# Patient Record
Sex: MALE | Race: WHITE | NOT HISPANIC OR LATINO | Employment: OTHER | URBAN - METROPOLITAN AREA
[De-identification: names, ages, dates, MRNs, and addresses within clinical notes are randomized per-mention and may not be internally consistent; named-entity substitution may affect disease eponyms.]

---

## 2021-04-13 DIAGNOSIS — Z23 ENCOUNTER FOR IMMUNIZATION: ICD-10-CM

## 2022-10-17 ENCOUNTER — TELEPHONE (OUTPATIENT)
Dept: SCHEDULING | Facility: CLINIC | Age: 72
End: 2022-10-17
Payer: MEDICARE

## 2022-10-17 NOTE — TELEPHONE ENCOUNTER
New Patient Appointment Request    Name of caller: Fiorella    Reason for Visit: was in [facility]-d/c 10/16-afib; saw Dr Pastor 2009 for valve repair    Insurance: Medicare and [employer] supplement    Recent Procedures: prostate removed-7/27/22    Referred by: friends    Previous Cardiologist name and phone number: Dr Pastor; Dr Barajas    Best contact number: [phone]    Additional notes: Pt is scheduled with Dr Golden 10/21 at [facility], but prefers to have  seen in [facility], [place], or [place] office if possible.    Please call pt if any NP appts available in those locations in the near future.

## 2022-10-17 NOTE — TELEPHONE ENCOUNTER
Called pt and left VM. We do not schedule here for any of those locations, pt is to call central scheduling.

## 2022-10-19 ENCOUNTER — APPOINTMENT (EMERGENCY)
Dept: RADIOLOGY | Facility: HOSPITAL | Age: 72
DRG: 308 | End: 2022-10-19
Payer: MEDICARE

## 2022-10-19 ENCOUNTER — HOSPITAL ENCOUNTER (INPATIENT)
Facility: HOSPITAL | Age: 72
LOS: 4 days | Discharge: HOME | DRG: 308 | End: 2022-10-23
Attending: EMERGENCY MEDICINE | Admitting: INTERNAL MEDICINE
Payer: MEDICARE

## 2022-10-19 ENCOUNTER — APPOINTMENT (EMERGENCY)
Dept: RADIOLOGY | Facility: HOSPITAL | Age: 72
DRG: 308 | End: 2022-10-19
Attending: EMERGENCY MEDICINE
Payer: MEDICARE

## 2022-10-19 DIAGNOSIS — I50.9 ACUTE CONGESTIVE HEART FAILURE, UNSPECIFIED HEART FAILURE TYPE (CMS/HCC): ICD-10-CM

## 2022-10-19 DIAGNOSIS — E83.42 HYPOMAGNESEMIA: ICD-10-CM

## 2022-10-19 DIAGNOSIS — I48.91 ATRIAL FIBRILLATION, UNSPECIFIED TYPE (CMS/HCC): Primary | ICD-10-CM

## 2022-10-19 DIAGNOSIS — E87.6 HYPOKALEMIA: ICD-10-CM

## 2022-10-19 PROBLEM — N18.9 CKD (CHRONIC KIDNEY DISEASE): Status: ACTIVE | Noted: 2022-10-19

## 2022-10-19 PROBLEM — K92.1 GASTROINTESTINAL HEMORRHAGE WITH MELENA: Status: ACTIVE | Noted: 2022-10-19

## 2022-10-19 PROBLEM — C50.921: Status: ACTIVE | Noted: 2022-10-19

## 2022-10-19 PROBLEM — E87.8 ELECTROLYTE ABNORMALITY: Status: ACTIVE | Noted: 2022-10-19

## 2022-10-19 PROBLEM — C50.929: Status: ACTIVE | Noted: 2022-10-19

## 2022-10-19 PROBLEM — C61 PROSTATE CANCER (CMS/HCC): Status: ACTIVE | Noted: 2022-10-19

## 2022-10-19 PROBLEM — N42.9 ABNORMALITY DETECTED ON RECTAL EXAMINATION OF PROSTATE: Status: ACTIVE | Noted: 2022-10-19

## 2022-10-19 LAB
ALBUMIN SERPL-MCNC: 3 G/DL (ref 3.4–5)
ALP SERPL-CCNC: 82 IU/L (ref 35–126)
ALT SERPL-CCNC: 32 IU/L (ref 16–63)
ANION GAP SERPL CALC-SCNC: 8 MEQ/L (ref 3–15)
AST SERPL-CCNC: 40 IU/L (ref 15–41)
ATRIAL RATE: 144
BASOPHILS # BLD: 0.06 K/UL (ref 0.01–0.1)
BASOPHILS NFR BLD: 0.6 %
BILIRUB SERPL-MCNC: 0.3 MG/DL (ref 0.3–1.2)
BNP SERPL-MCNC: 521 PG/ML
BUN SERPL-MCNC: 13 MG/DL (ref 8–20)
CALCIUM SERPL-MCNC: 8.1 MG/DL (ref 8.9–10.3)
CHLORIDE SERPL-SCNC: 111 MEQ/L (ref 98–109)
CO2 SERPL-SCNC: 21 MEQ/L (ref 22–32)
CREAT SERPL-MCNC: 1.4 MG/DL (ref 0.8–1.3)
D DIMER PPP IA.FEU-MCNC: 3.3 UG/ML FEU (ref 0–0.5)
DIFFERENTIAL METHOD BLD: ABNORMAL
EOSINOPHIL # BLD: 0 K/UL (ref 0.04–0.54)
EOSINOPHIL NFR BLD: 0 %
ERYTHROCYTE [DISTWIDTH] IN BLOOD BY AUTOMATED COUNT: 15.7 % (ref 11.6–14.4)
GFR SERPL CREATININE-BSD FRML MDRD: 49.8 ML/MIN/1.73M*2
GLUCOSE SERPL-MCNC: 107 MG/DL (ref 70–99)
HCT VFR BLDCO AUTO: 30.8 % (ref 40.1–51)
HGB BLD-MCNC: 10.1 G/DL (ref 13.7–17.5)
IMM GRANULOCYTES # BLD AUTO: 0.07 K/UL (ref 0–0.08)
IMM GRANULOCYTES NFR BLD AUTO: 0.7 %
LYMPHOCYTES # BLD: 1.24 K/UL (ref 1.2–3.5)
LYMPHOCYTES NFR BLD: 12 %
MAGNESIUM SERPL-MCNC: 1.7 MG/DL (ref 1.8–2.5)
MCH RBC QN AUTO: 33.7 PG (ref 28–33.2)
MCHC RBC AUTO-ENTMCNC: 32.8 G/DL (ref 32.2–36.5)
MCV RBC AUTO: 102.7 FL (ref 83–98)
MONOCYTES # BLD: 0.64 K/UL (ref 0.3–1)
MONOCYTES NFR BLD: 6.2 %
NEUTROPHILS # BLD: 8.33 K/UL (ref 1.7–7)
NEUTS SEG NFR BLD: 80.5 %
NRBC BLD-RTO: 0.6 %
PDW BLD AUTO: 9.2 FL (ref 9.4–12.4)
PLATELET # BLD AUTO: 287 K/UL (ref 150–350)
POTASSIUM SERPL-SCNC: 3.1 MEQ/L (ref 3.6–5.1)
PROT SERPL-MCNC: 5.6 G/DL (ref 6–8.2)
QRS DURATION: 122
QT INTERVAL: 346
QTC CALCULATION(BAZETT): 514
R AXIS: -26
RBC # BLD AUTO: 3 M/UL (ref 4.5–5.8)
SARS-COV-2 RNA RESP QL NAA+PROBE: NEGATIVE
SODIUM SERPL-SCNC: 140 MEQ/L (ref 136–144)
T WAVE AXIS: 172
TROPONIN I SERPL HS-MCNC: 31 PG/ML
TROPONIN I SERPL HS-MCNC: 39 PG/ML
VENTRICULAR RATE: 133
WBC # BLD AUTO: 10.34 K/UL (ref 3.8–10.5)

## 2022-10-19 PROCEDURE — 63600105 HC IODINE BASED CONTRAST

## 2022-10-19 PROCEDURE — 83880 ASSAY OF NATRIURETIC PEPTIDE: CPT

## 2022-10-19 PROCEDURE — 96366 THER/PROPH/DIAG IV INF ADDON: CPT | Mod: 59

## 2022-10-19 PROCEDURE — 25000000 HC PHARMACY GENERAL

## 2022-10-19 PROCEDURE — U0002 COVID-19 LAB TEST NON-CDC: HCPCS

## 2022-10-19 PROCEDURE — 71045 X-RAY EXAM CHEST 1 VIEW: CPT

## 2022-10-19 PROCEDURE — 1123F ACP DISCUSS/DSCN MKR DOCD: CPT | Performed by: INTERNAL MEDICINE

## 2022-10-19 PROCEDURE — 80053 COMPREHEN METABOLIC PANEL: CPT

## 2022-10-19 PROCEDURE — 93010 ELECTROCARDIOGRAM REPORT: CPT | Performed by: INTERNAL MEDICINE

## 2022-10-19 PROCEDURE — 25000000 HC PHARMACY GENERAL: Performed by: EMERGENCY MEDICINE

## 2022-10-19 PROCEDURE — 93005 ELECTROCARDIOGRAM TRACING: CPT

## 2022-10-19 PROCEDURE — 63700000 HC SELF-ADMINISTRABLE DRUG: Performed by: INTERNAL MEDICINE

## 2022-10-19 PROCEDURE — 63700000 HC SELF-ADMINISTRABLE DRUG

## 2022-10-19 PROCEDURE — 84484 ASSAY OF TROPONIN QUANT: CPT | Mod: 91

## 2022-10-19 PROCEDURE — 85025 COMPLETE CBC W/AUTO DIFF WBC: CPT

## 2022-10-19 PROCEDURE — 99222 1ST HOSP IP/OBS MODERATE 55: CPT | Performed by: INTERNAL MEDICINE

## 2022-10-19 PROCEDURE — 96375 TX/PRO/DX INJ NEW DRUG ADDON: CPT | Mod: 59

## 2022-10-19 PROCEDURE — 84484 ASSAY OF TROPONIN QUANT: CPT

## 2022-10-19 PROCEDURE — 83735 ASSAY OF MAGNESIUM: CPT

## 2022-10-19 PROCEDURE — 99285 EMERGENCY DEPT VISIT HI MDM: CPT | Mod: 25

## 2022-10-19 PROCEDURE — 96365 THER/PROPH/DIAG IV INF INIT: CPT | Mod: 59

## 2022-10-19 PROCEDURE — 36415 COLL VENOUS BLD VENIPUNCTURE: CPT

## 2022-10-19 PROCEDURE — 96376 TX/PRO/DX INJ SAME DRUG ADON: CPT | Mod: 59

## 2022-10-19 PROCEDURE — 63600000 HC DRUGS/DETAIL CODE: Performed by: EMERGENCY MEDICINE

## 2022-10-19 PROCEDURE — 12000000 HC ROOM AND CARE MED/SURG

## 2022-10-19 PROCEDURE — 71275 CT ANGIOGRAPHY CHEST: CPT | Mod: ME

## 2022-10-19 PROCEDURE — 99223 1ST HOSP IP/OBS HIGH 75: CPT | Performed by: INTERNAL MEDICINE

## 2022-10-19 PROCEDURE — 85379 FIBRIN DEGRADATION QUANT: CPT | Performed by: EMERGENCY MEDICINE

## 2022-10-19 PROCEDURE — 63600000 HC DRUGS/DETAIL CODE: Performed by: INTERNAL MEDICINE

## 2022-10-19 PROCEDURE — 63600000 HC DRUGS/DETAIL CODE

## 2022-10-19 RX ORDER — METOPROLOL SUCCINATE 100 MG/1
100 TABLET, EXTENDED RELEASE ORAL 2 TIMES DAILY
COMMUNITY
End: 2022-11-17 | Stop reason: SDUPTHER

## 2022-10-19 RX ORDER — POTASSIUM CHLORIDE 750 MG/1
40 TABLET, EXTENDED RELEASE ORAL ONCE
Status: COMPLETED | OUTPATIENT
Start: 2022-10-19 | End: 2022-10-19

## 2022-10-19 RX ORDER — IBUPROFEN 200 MG
16-32 TABLET ORAL AS NEEDED
Status: DISCONTINUED | OUTPATIENT
Start: 2022-10-19 | End: 2022-10-23 | Stop reason: HOSPADM

## 2022-10-19 RX ORDER — DILTIAZEM HYDROCHLORIDE 5 MG/ML
10 INJECTION INTRAVENOUS ONCE
Status: COMPLETED | OUTPATIENT
Start: 2022-10-19 | End: 2022-10-19

## 2022-10-19 RX ORDER — DEXTROSE 40 %
15-30 GEL (GRAM) ORAL AS NEEDED
Status: DISCONTINUED | OUTPATIENT
Start: 2022-10-19 | End: 2022-10-23 | Stop reason: HOSPADM

## 2022-10-19 RX ORDER — METOPROLOL TARTRATE 50 MG/1
50 TABLET ORAL 2 TIMES DAILY
Status: DISCONTINUED | OUTPATIENT
Start: 2022-10-19 | End: 2022-10-20

## 2022-10-19 RX ORDER — METOPROLOL TARTRATE 1 MG/ML
5 INJECTION, SOLUTION INTRAVENOUS ONCE
Status: COMPLETED | OUTPATIENT
Start: 2022-10-19 | End: 2022-10-19

## 2022-10-19 RX ORDER — FUROSEMIDE 10 MG/ML
40 INJECTION INTRAMUSCULAR; INTRAVENOUS EVERY 12 HOURS
Status: DISCONTINUED | OUTPATIENT
Start: 2022-10-19 | End: 2022-10-20

## 2022-10-19 RX ORDER — HYDROCHLOROTHIAZIDE 25 MG/1
25 TABLET ORAL DAILY
COMMUNITY
End: 2022-10-23 | Stop reason: HOSPADM

## 2022-10-19 RX ORDER — ATORVASTATIN CALCIUM 10 MG/1
10 TABLET, FILM COATED ORAL DAILY
Status: DISCONTINUED | OUTPATIENT
Start: 2022-10-19 | End: 2022-10-23 | Stop reason: HOSPADM

## 2022-10-19 RX ORDER — METOPROLOL TARTRATE 1 MG/ML
5 INJECTION, SOLUTION INTRAVENOUS EVERY 5 MIN PRN
Status: DISCONTINUED | OUTPATIENT
Start: 2022-10-19 | End: 2022-10-23 | Stop reason: HOSPADM

## 2022-10-19 RX ORDER — PANTOPRAZOLE SODIUM 40 MG/1
40 TABLET, DELAYED RELEASE ORAL 2 TIMES DAILY
COMMUNITY
End: 2023-02-15 | Stop reason: SDUPTHER

## 2022-10-19 RX ORDER — HEPARIN SODIUM 5000 [USP'U]/ML
5000 INJECTION, SOLUTION INTRAVENOUS; SUBCUTANEOUS EVERY 8 HOURS
Status: DISCONTINUED | OUTPATIENT
Start: 2022-10-19 | End: 2022-10-20

## 2022-10-19 RX ORDER — SIMVASTATIN 20 MG/1
20 TABLET, FILM COATED ORAL NIGHTLY
COMMUNITY
End: 2022-11-17 | Stop reason: SDUPTHER

## 2022-10-19 RX ORDER — LIDOCAINE AND PRILOCAINE 25; 25 MG/G; MG/G
1 CREAM TOPICAL AS NEEDED
COMMUNITY
End: 2023-07-17 | Stop reason: ALTCHOICE

## 2022-10-19 RX ORDER — POTASSIUM CHLORIDE 14.9 MG/ML
20 INJECTION INTRAVENOUS ONCE
Status: COMPLETED | OUTPATIENT
Start: 2022-10-19 | End: 2022-10-19

## 2022-10-19 RX ORDER — FUROSEMIDE 10 MG/ML
20 INJECTION INTRAMUSCULAR; INTRAVENOUS ONCE
Status: DISPENSED | OUTPATIENT
Start: 2022-10-19 | End: 2022-10-20

## 2022-10-19 RX ORDER — DEXTROSE 50 % IN WATER (D50W) INTRAVENOUS SYRINGE
25 AS NEEDED
Status: DISCONTINUED | OUTPATIENT
Start: 2022-10-19 | End: 2022-10-23 | Stop reason: HOSPADM

## 2022-10-19 RX ORDER — PANTOPRAZOLE SODIUM 40 MG/1
40 TABLET, DELAYED RELEASE ORAL 2 TIMES DAILY
Status: DISCONTINUED | OUTPATIENT
Start: 2022-10-19 | End: 2022-10-23 | Stop reason: HOSPADM

## 2022-10-19 RX ADMIN — POTASSIUM CHLORIDE 40 MEQ: 750 TABLET, EXTENDED RELEASE ORAL at 14:56

## 2022-10-19 RX ADMIN — DILTIAZEM HYDROCHLORIDE 10 MG: 5 INJECTION INTRAVENOUS at 15:42

## 2022-10-19 RX ADMIN — IOHEXOL 100 ML: 350 INJECTION, SOLUTION INTRAVENOUS at 18:18

## 2022-10-19 RX ADMIN — METOPROLOL TARTRATE 5 MG: 5 INJECTION, SOLUTION INTRAVENOUS at 13:21

## 2022-10-19 RX ADMIN — HEPARIN SODIUM 5000 UNITS: 5000 INJECTION INTRAVENOUS; SUBCUTANEOUS at 23:45

## 2022-10-19 RX ADMIN — METOPROLOL TARTRATE 50 MG: 50 TABLET, FILM COATED ORAL at 20:28

## 2022-10-19 RX ADMIN — ATORVASTATIN CALCIUM 10 MG: 10 TABLET, FILM COATED ORAL at 20:28

## 2022-10-19 RX ADMIN — MAGNESIUM SULFATE HEPTAHYDRATE 2 G: 40 INJECTION, SOLUTION INTRAVENOUS at 14:56

## 2022-10-19 RX ADMIN — PANTOPRAZOLE SODIUM 40 MG: 40 TABLET, DELAYED RELEASE ORAL at 20:29

## 2022-10-19 RX ADMIN — METOPROLOL TARTRATE 5 MG: 5 INJECTION, SOLUTION INTRAVENOUS at 19:05

## 2022-10-19 RX ADMIN — POTASSIUM CHLORIDE 20 MEQ: 14.9 INJECTION, SOLUTION INTRAVENOUS at 15:43

## 2022-10-19 ASSESSMENT — ENCOUNTER SYMPTOMS
CHILLS: 0
LIGHT-HEADEDNESS: 0
NERVOUS/ANXIOUS: 0
DIAPHORESIS: 0
COUGH: 0
UNEXPECTED WEIGHT CHANGE: 1
FREQUENCY: 0
WEAKNESS: 0
HEADACHES: 0
NUMBNESS: 0
SORE THROAT: 0
SHORTNESS OF BREATH: 1
VOMITING: 0
DIARRHEA: 0
ABDOMINAL DISTENTION: 1
FEVER: 0
RHINORRHEA: 0
NAUSEA: 0
BACK PAIN: 0
FLANK PAIN: 0
ABDOMINAL PAIN: 0
HEMATURIA: 0
FATIGUE: 0
DYSURIA: 0
SINUS PRESSURE: 0
DIZZINESS: 0

## 2022-10-19 NOTE — CONSULTS
Electrophysiology Consult        Reason for Consult: Atrial fibrillation with rapid ventricular response      Cam Rosas is a 72 y.o. male with a past medical history of prostate cancer status post prostatectomy, right breast cancer currently undergoing chemotherapy (last dose 10/3), recently diagnosed atrial fibrillation approximately 9 days ago, CKD, mitral annular ring placement (2006), hypertension, who presents with worsening shortness of breath.    Patient presented to [facility] 10/10 with shortness of breath and palpitations for 4 days.  He was found to be in atrial fibrillation at that time and was started on a Cardizem drip.  Patient was also febrile to 100.6 with a white blood cell count of 25.4, found to have duodenitis.  He was not anticoagulated due to melena.  He underwent an EGD which showed duodenal ulcer and shallow gastric ulcers which were biopsied.  No source of bleeding was identified.  He did receive transfusion.      He was discharged from [facility] 10/16 on metoprolol succinate 100 mg twice daily. He followed up with his PCP the following day, who noted his HR was not well controlled and referred him for a TTE. TTE was performed yesterday which showed an ejection fraction of 40-45%, global left ventricular hypokinesis, mildly dilated left atrium, however this was performed while the patient was in A. fib with ventricular rates in the 130s.  Previous echo demonstrated a normal ejection fraction.    On presentation today, he was found to be hypokalemic to 3.1, magnesium 1.7. He was in AF with rates 130-140s on telemetry. He was given 10 mg IV diltiazem and 5 mg IV metoprolol in the ER, in addition to 40 mEq of potassium p.o. and 20 mEq IV x2. He denies any history of ischemic heart disease, chest pain, lightheadedness, or syncope. He does endorse orthopnea, weight gain, and worsening lower extremity edema.       No past medical history on file.  No past  surgical history on file.  Social History     Socioeconomic History    Marital status:      No family history on file.  Allergies:  Azithromycin and Lorazepam      dilTIAZem, 10 mg, intravenous, Once    furosemide, 20 mg, intravenous, Once    magnesium sulfate in D5W/NSS/SWFI, 2 g, intravenous, Once    potassium chloride, 20 mEq, intravenous, Once    ROS: 10 point review systems performed and is negative unless otherwise stated above    Objective   Visit Vitals  /76 (BP Location: Left upper arm, Patient Position: Lying)   Pulse (!) 135   Temp 36.4 °C (97.5 °F)   Resp (!) 22   SpO2 99%         Physical exam:  General: No acute distress  Neck: JVP 20+ cm H2O  CV: irregularly irregular, no murmurs  Respiratory: normal inspiratory effort, otherwise CTAB  GI: Abdomen soft  Skin: Warm  Extremities: 2+ pitting edema b/l  Psych: Normal affect      Labs   Results from last 7 days   Lab Units 10/19/22  1305   WBC K/uL 10.34   HEMOGLOBIN g/dL 10.1*   HEMATOCRIT % 30.8*   PLATELETS K/uL 287     No results found for: INR  No results found for: TROPONINI  Results from last 7 days   Lab Units 10/19/22  1305   SODIUM mEQ/L 140   POTASSIUM mEQ/L 3.1*   CHLORIDE mEQ/L 111*   CO2 mEQ/L 21*   BUN mg/dL 13   CREATININE mg/dL 1.4*   CALCIUM mg/dL 8.1*   ALBUMIN g/dL 3.0*   BILIRUBIN TOTAL mg/dL 0.3   ALK PHOS IU/L 82   ALT IU/L 32   AST IU/L 40   GLUCOSE mg/dL 107*           ECG:   - 10/19/2022: AF with RVR, LBBB, low precordial voltage    Telemetry: AF with RVR, rates 130-140s      Assessment/Plan:  1. Atrial fibrillation with rapid ventricular response: [id number] score of at least 3, recently admitted to [facility] 10/10-10/16 with AF w/ RVR, melena requiring blood transfusion, found to have non-bleeding duodenal ulcer and gastric ulcers, nothing actively bleeding, attempts were made to rate control unsuccessfully and he was discharged on Toprol XL 100mg BID, off of AC. TTE obtained yesterday at OSH  demonstrated EF 40-45%, global hypokinesis, mildly dilated LA, though HR was in 130s at that time. He is marginally better rate controlled now in 110-120s with addition of diltiazem and IV metoprolol    2. Acute CHF exacerbation: examines markedly overloaded, unclear whether AF precipitated CHF or CHF precipitated AF. He is also actively receiving chemotherapy for breast cancer, which may also be playing into this    3. Melena: As above. No further episodes since initial presentation to OSH. Has not been retried on AC    4. CKD: Cr 1.4, appears close to baseline    5. Hypokalemia and hypomagnesemia: likely in the setting of diuresis with inadequate repletion. 3.1 and 1.7 on admission, respectively. He had been on HCTZ as an outpatient, which was discontinued during recent [facility] admission.      6. Breast cancer: Currently receiving chemotherapy    7. History of mitral annular ring placement: By Dr. Pastor in 2006      - Start metoprolol tartrate 50mg TID, titrate up as BP allows  - Aggressively replete K and Mg. K > 4, Mg > 2. He should be on standing repletion of these  - Needs additional diuresis as he is quite overloaded, likely also contributing to fast rates  - Would ideally like to trial him on AC. Can discuss with GI. Without the ability to demonstrate tolerance of AC, he cannot be safely cardioverted  - If he cannot be rate controlled with beta blockade, we can consider digoxin, but with his hypokalemia/hypomagnesemia would hold off until this is repleted    Brenton Julien MD  PGY8 Electrophysiology Fellow    Recommendations not final until attending attestation note.    This letter was generated using speech recognition software. Please excuse any typographical errors.

## 2022-10-19 NOTE — PROGRESS NOTES
Spoke with the patients wife and confirmed with medication list from [facility] and/or patient's own pharmacy to complete the medication reconciliation.     Prior to admission medication list:    Current Outpatient Medications:     hydrochlorothiazide (HYDRODIURIL) 25 mg tablet, Take 25 mg by mouth daily.    lidocaine-prilocaine (EMLA) cream, Apply 1 application topically as needed for pain.    metoprolol succinate XL (TOPROL-XL) 100 mg 24 hr tablet, Take 100 mg by mouth 2 (two) times a day.    pantoprazole (PROTONIX) 40 mg EC tablet, Take 40 mg by mouth 2 (two) times a day.    simvastatin (ZOCOR) 20 mg tablet, Take 20 mg by mouth nightly.    Comments about home medications:    -The patient's wife reports that lorazepam was discontinued due to an adverse reaction.    -Per [facility], the patient received a ten-day course of dexamethasone on 10/4/22. The patient's wife reports that the patient completed this as directed.     -Per [facility], the patient received a ten-day course of ondansetron on 9/30/22. The patient's wife reports that the patient completed this as directed.    -Per [facility], the patient received a seven-day course of prochlorperazine on 9/30/22. The patient's wife reports that the patient completed this as directed.    Compliance:   Consistent fills, no compliance concerns based on patient interview

## 2022-10-19 NOTE — ED ATTESTATION NOTE
I have personally seen and examined the patient.   EHR/RN and PA hx reviewed    I reviewed and agree with the PA/NP's assessment and plan of care. Defer to my note for any discrepancies b/t us.     My examination, assessment, and plan of care of Cam Rosas is as follows:    HPI-  Recent dx af (9d ago); recent inc'd MCGREGOR;  Pt has cancer, getting chemo-tx (last 9 d ago);  Had black stool 9d ago;  No abd pain; pt not put on AC for af given GIB    PE-  G- alert  p- cta b/l  cv- tachycaric,   A- s, nt/nd, no g/r  Ext- no le assymetry/cords; 2+ edema feet to knees;    Data reviewed  Presentation most consistent with rapid a-flutter and resulting LE edema;  Dimer +-->CT to r/o PE;  e'lyte derangments-->optimized;    Metop 5mg iv--->no improvement in HR  --->dilt 10mg iv-->mild transient improvement  EP c/s--->recommend against dig load right now; to reconsider after e'lytes optimized;recomend b-blocker---will try again;  No recommendation for missy guided CV right now.    Doubt  Sepsis; , or other acute emergency at this time.    +++++++++++++++++++++++++++++++++++++++++++++++++++++++++++++++++  PROCEDURE NOTE  CRITICAL CARE NOTE:  -ACUTE PROCESS/ORGAN SYSTEM AT RISK- atrial flutter with RVR; CHF  -I have personally provided 40 minutes of critical care time exclusive of time spent on separately billable procedures. Time includes review of all data / discussion with consultants / admitting physicians, re-evaluation of patient, discussion with patient and/or family, review of all results, and monitoring for potential decompensation.       Kike Loaiza MD  10/19/22 2031

## 2022-10-19 NOTE — ED PROVIDER NOTES
Emergency Medicine Note  HPI   HISTORY OF PRESENT ILLNESS     72-year-old male with PMH of prostate cancer status post prostatectomy, right breast HER2 positive cancer undergoing THCP chemo (pt states last dose 10/10/2022), CKD presenting for tachycardia, shortness of breath x1 week.  Patient was recently hospitalized at [facility] for atrial fibrillation.  On chart review, patient was started on Cardizem drip.  Patient was also found to have heme positive stool, EGD with nonbleeding duodenal and gastric ulcers.  Patient was started on PPI.  Patient also had Toprol adjusted to 100 twice daily from 50 once daily.  It seems patient was not started on anticoagulation at this time.  Currently patient endorses lower extremity swelling, abdominal distention, approximately 10 pound weight gain, dyspnea on exertion, orthopnea.  Denies chest pain, lightheadedness/dizziness, palpitations.  Denies cough, congestion, nausea, fevers or chills.  Respirations.    Patient had echo performed yesterday, EF of 40 to 45%.  Patient was tachycardic to 130s.  Patient states he was instructed by his PCP to come in to be evaluated given echo results.  Patient states he follows with Dr. Golden.            Patient History   PAST HISTORY     Reviewed from Nursing Triage:  Allergies  Meds       Past Medical History:   Diagnosis Date    Atrial fibrillation (CMS/HCC)        History reviewed. No pertinent surgical history.    History reviewed. No pertinent family history.    Social History     Tobacco Use    Smoking status: Never    Smokeless tobacco: Never         Review of Systems   REVIEW OF SYSTEMS     Review of Systems   Constitutional: Positive for unexpected weight change. Negative for chills, diaphoresis, fatigue and fever.   HENT: Negative for congestion, rhinorrhea, sinus pressure and sore throat.    Eyes: Negative for visual disturbance.   Respiratory: Positive for shortness of breath. Negative for cough.     Cardiovascular: Positive for leg swelling. Negative for chest pain.   Gastrointestinal: Positive for abdominal distention. Negative for abdominal pain, diarrhea, nausea and vomiting.   Genitourinary: Negative for dysuria, flank pain, frequency and hematuria.   Musculoskeletal: Negative for back pain.   Skin: Negative for rash.   Neurological: Negative for dizziness, weakness, light-headedness, numbness and headaches.   Psychiatric/Behavioral: The patient is not nervous/anxious.          VITALS     ED Vitals    Date/Time Temp Pulse Resp BP SpO2 [facility]   10/20/22 0320 36.7 °C (98.1 °F) 131 22 108/82 100 % JQ   10/19/22 2345 -- 122 -- 112/71 -- JQ   10/19/22 2300 36.7 °C (98 °F) -- 24 110/52 99 % JQ   10/19/22 2126 36.7 °C (98 °F) 122 20 120/62 98 % JQ   10/19/22 2026 36.6 °C (97.9 °F) 132 22 127/68 98 % JQ   10/19/22 2025 -- 130 -- 127/68 98 % JQ   10/19/22 1837 -- 134 26 122/91 99 % LC   10/19/22 1715 -- 132 24 131/93 97 % LC   10/19/22 1615 -- 130 -- 139/116 97 % MMH   10/19/22 1542 -- 138 -- 139/88 -- LC   10/19/22 1515 -- 135 22 -- -- LC   10/19/22 1345 -- 130 21 114/76 99 % LC   10/19/22 1330 -- 133 23 108/75 98 % LC   10/19/22 1321 -- 134 -- 108/81 -- LC   10/19/22 1228 36.4 °C (97.5 °F) 137 18 114/73 100 % JWB        Pulse Ox %: 100 % (10/19/22 1308)  Pulse Ox Interpretation: Normal (10/19/22 1308)  Heart Rate: 137 (10/19/22 1308)  Rhythm Strip Interpretation: Atrial Fibrillation (10/19/22 1308)     Physical Exam   PHYSICAL EXAM     Physical Exam  Vitals and nursing note reviewed.   Constitutional:       Appearance: He is well-developed.      Comments: 72-year-old male in no acute distress on exam.  Heart rate of 137.  Otherwise stable vitals.  Calm and cooperative   HENT:      Head: Normocephalic and atraumatic.   Eyes:      Conjunctiva/sclera: Conjunctivae normal.   Cardiovascular:      Rate and Rhythm: Tachycardia present. Rhythm irregular.   Pulmonary:      Effort: Pulmonary effort is normal.      Comments:  Decreased breath sounds at bases  Abdominal:      General: There is no distension.      Palpations: Abdomen is soft. There is no mass.      Tenderness: There is no abdominal tenderness.   Musculoskeletal:         General: No tenderness or deformity. Normal range of motion.      Cervical back: Normal range of motion.      Right lower leg: Edema present.      Left lower leg: Edema present.      Comments: 3+ pitting edema bilaterally   Skin:     General: Skin is warm and dry.   Neurological:      General: No focal deficit present.      Mental Status: He is alert. Mental status is at baseline.   Psychiatric:         Behavior: Behavior normal.           PROCEDURES     Procedures     DATA     Results     Procedure Component Value Units Date/Time    D-dimer, quantitative [[id number]]  (Abnormal) Collected: 10/19/22 1453    Specimen: Blood, Venous Updated: 10/19/22 1530     D-Dimer, Quant 3.30 ug/mL FEU      Comment: Suggested Age Related Reference Range: 0.00 - 0.72  The D-Dimer assay can be used as an aid in the diagnosis of DVT or PE. The test can not be used by itself to exclude DVT or PE. When used as a diagnostic aid, the cutoff value is the same as the reference range: <0.5 ug/ml FEU.       SARS-CoV-2 (COVID-19), PCR Nasopharynx [[id number]]  (Normal) Collected: 10/19/22 1305    Specimen: Nasopharyngeal Swab from Nasopharynx Updated: 10/19/22 1448    Narrative:      The following orders were created for panel order SARS-CoV-2 (COVID-19), PCR Nasopharynx.  Procedure                               Abnormality         Status                     ---------                               -----------         ------                     SARS-CoV-2 (COVID-19), P...[[id number]]  Normal              Final result                 Please view results for these tests on the individual orders.    SARS-CoV-2 (COVID-19), PCR Nasopharynx [[id number]]  (Normal) Collected: 10/19/22 1305    Specimen: Nasopharyngeal Swab from Nasopharynx Updated:  10/19/22 1448     SARS-CoV-2 (COVID-19) Negative    Narrative:      Testing performed using real-time PCR for detection of COVID-19. EUA approved validation studies performed on site.     Comprehensive metabolic panel [[id number]]  (Abnormal) Collected: 10/19/22 1305    Specimen: Blood, Venous Updated: 10/19/22 1401     Sodium 140 mEQ/L      Potassium 3.1 mEQ/L      Comment: Results obtained on plasma. Plasma Potassium values may be up to 0.4 mEQ/L less than serum values. The differences may be greater for patients with high platelet or white cell counts.        Chloride 111 mEQ/L      CO2 21 mEQ/L      BUN 13 mg/dL      Creatinine 1.4 mg/dL      Glucose 107 mg/dL      Calcium 8.1 mg/dL      AST (SGOT) 40 IU/L      ALT (SGPT) 32 IU/L      Alkaline Phosphatase 82 IU/L      Total Protein 5.6 g/dL      Comment: Test performed on plasma which typically contains approximately 0.4 g/dL more protein than serum.        Albumin 3.0 g/dL      Bilirubin, Total 0.3 mg/dL      eGFR 49.8 mL/min/1.73m*2      Anion Gap 8 mEQ/L     Magnesium [[id number]]  (Abnormal) Collected: 10/19/22 1305    Specimen: Blood, Venous Updated: 10/19/22 1401     Magnesium 1.7 mg/dL     HS Troponin I (with 2 hour reflex) [[id number]]  (Abnormal) Collected: 10/19/22 1305    Specimen: Blood, Venous Updated: 10/19/22 1359     High Sens Troponin I 39 pg/mL     B-type natriuretic peptide [[id number]]  (Abnormal) Collected: 10/19/22 1305    Specimen: Blood, Venous Updated: 10/19/22 1359      pg/mL     CBC and differential [[id number]]  (Abnormal) Collected: 10/19/22 1305    Specimen: Blood, Venous Updated: 10/19/22 1327     WBC 10.34 K/uL      RBC 3.00 M/uL      Hemoglobin 10.1 g/dL      Hematocrit 30.8 %      .7 fL      MCH 33.7 pg      MCHC 32.8 g/dL      RDW 15.7 %      Platelets 287 K/uL      MPV 9.2 fL      Differential Type Auto     nRBC 0.6 %      Immature Granulocytes 0.7 %      Neutrophils 80.5 %      Lymphocytes 12.0 %      Monocytes 6.2 %       Eosinophils 0.0 %      Basophils 0.6 %      Immature Granulocytes, Absolute 0.07 K/uL      Neutrophils, Absolute 8.33 K/uL      Lymphocytes, Absolute 1.24 K/uL      Monocytes, Absolute 0.64 K/uL      Eosinophils, Absolute 0.00 K/uL      Basophils, Absolute 0.06 K/uL           Imaging Results          CT ANGIOGRAPHY CHEST PULMONARY EMBOLISM WITH IV CONTRAST (Final result)  Result time 10/19/22 18:28:02    Final result                 Impression:    IMPRESSION:    1.  No pulmonary embolism. No active disease in the chest.  2.  Suspicion for mild colitis in the visualized transverse colon               Narrative:    CLINICAL HISTORY:    Shortness of breath    COMPARISON:    None    COMMENT:  Technique: CT of the Chest was performed following the administration of  intravenous contrast. :  100mL of iohexoL (OMNIPAQUE 350) 350 mg iodine/mL  solution 100 mL  3D MIP and/or volume rendered image reconstruction performed and reviewed.    CHEST:    Lungs: Groundglass 0.6 cm nodule right upper lobe, axial image 80. No  parenchymal consolidation., Right anterolateral intercostal hernia at the level  of the third and fourth rib.  Airways: The central airways are widely patent.  Pleura: within normal limits.  Lower Neck: within normal limits.  Axilla: No enlarged nodes.  Mediastinum and Zelda: No enlarged nodes.  Heart and Pericardium: Mitral annular ring is noted. Top normal heart size. No  pericardial effusion  Vessels:  Atherosclerotic changes of the aorta and coronary arteries.  Pulmonary arteries:  There is no pulmonary embolism.  Esophagus:  Normal caliber.  Upper abdomen:  Cholelithiasis.  Chest Wall: Segmental wall thickening of the visualized transverse colon  Bones:  Degenerative changes of the spine                               X-RAY CHEST 1 VIEW (Final result)  Result time 10/19/22 14:33:38   Procedure changed from X-RAY CHEST 2 VIEWS     Final result                 Impression:    IMPRESSION:  1.  No acute  disease seen in the chest.  2.  Left chest wall Port-A-Cath with tip at the cavoatrial junction.  3.  Status post valvular repair.    COMMENT:    Support lines, tubes, devices, and surgical hardware, material: Left chest wall  Port-A-Cath as above. Valvular prosthesis. Monitoring leads.    Lungs and Pleura: Minimal subsegmental atelectasis at the left lung base. No  consolidation or effusion. No pneumothorax.    Cardiovascular and Mediastinum: The cardiomediastinal silhouette is stable in  size noting postoperative change    Other: Degenerative changes of the visualized spine.    CLINICAL HISTORY:   a fib    PROCEDURE: Single frontal view of the chest.    COMPARISON:   5/18/2009                                      ECG 12 lead   ED Interpretation   Atrial fibrillation at rate of 133 bpm.  Nonspecific ST/T wave changes      Final Result          Scoring tools                                  ED Course & MDM   MDM / ED COURSE / CLINICAL IMPRESSION / DISPO     MDM  Number of Diagnoses or Management Options  Diagnosis management comments: 72-year-old male presenting for tachycardia, uncontrolled atrial fibrillation with RVR, shortness of breath.  On exam, patient is fluid overloaded, atrial fibrillation at a rate of 137 bpm.  Cardiopulmonary exam with decreased breath sounds at bases.  Differential includes but is not limited to A. fib with RVR secondary to new induction of chemotherapy, lecture abnormality, doubt GI bleed, doubt infection.  CHF likely secondary to uncontrolled A. fib fibrillation for 1 week plus.  Will check basic labs, chest x-ray, BNP, magnesium.  Will rate control, consider anticoagulation.  Will discuss with attending      ED Course as of 10/20/22 1334   Wed Oct 19, 2022   1306 Ekg- af, 130s, non-specific st-tw changes [JF]   1347 Patient given 5 of Lopressor.  On reexamination, patient continues to have heart rate in the 130s [SB]   1439 BNP(!): 521 [SB]   1439 Potassium(!): 3.1 [SB]   1439  Magnesium(!): 1.7  Magnesium and potassium repleted [SB]   1440 Creatinine(!): 1.4  Last known 1.24, on 10/15 via Care Everywhere [SB]   1859 Paged [facility] [SB]   1923 Repaged [facility] [SB]   1931 Case was discussed with hospitalist.  Reviewed patient's presentation, ED course, and relevant data.  Hospitalist accepts patient on their service and will see / admit pt.   [SB]      ED Course User Index  [JF] Kike Loaiza MD  [SB] Chyna Rios PA C     Clinical Impression      Atrial fibrillation, unspecified type (CMS/HCC)   Acute congestive heart failure, unspecified heart failure type (CMS/HCC)   Hypokalemia   Hypomagnesemia     _________________     ED Disposition   Admit / Observation                  Chyna Rios PA C  10/20/22 3047

## 2022-10-20 LAB
ANION GAP SERPL CALC-SCNC: 11 MEQ/L (ref 3–15)
APTT PPP: 28 SEC (ref 23–35)
APTT PPP: 36 SEC (ref 23–35)
BILIRUB UR QL STRIP.AUTO: NEGATIVE MG/DL
BUN SERPL-MCNC: 14 MG/DL (ref 8–20)
CALCIUM SERPL-MCNC: 8.5 MG/DL (ref 8.9–10.3)
CHLORIDE SERPL-SCNC: 110 MEQ/L (ref 98–109)
CLARITY UR REFRACT.AUTO: CLEAR
CO2 SERPL-SCNC: 18 MEQ/L (ref 22–32)
COLOR UR AUTO: COLORLESS
CREAT SERPL-MCNC: 1.4 MG/DL (ref 0.8–1.3)
ERYTHROCYTE [DISTWIDTH] IN BLOOD BY AUTOMATED COUNT: 15.8 % (ref 11.6–14.4)
GFR SERPL CREATININE-BSD FRML MDRD: 49.8 ML/MIN/1.73M*2
GLUCOSE SERPL-MCNC: 166 MG/DL (ref 70–99)
GLUCOSE UR STRIP.AUTO-MCNC: NEGATIVE MG/DL
HCT VFR BLDCO AUTO: 32.4 % (ref 40.1–51)
HGB BLD-MCNC: 10.7 G/DL (ref 13.7–17.5)
HGB UR QL STRIP.AUTO: NEGATIVE
INR PPP: 1.1
KETONES UR STRIP.AUTO-MCNC: NEGATIVE MG/DL
LEUKOCYTE ESTERASE UR QL STRIP.AUTO: NEGATIVE
MAGNESIUM SERPL-MCNC: 2 MG/DL (ref 1.8–2.5)
MCH RBC QN AUTO: 33.8 PG (ref 28–33.2)
MCHC RBC AUTO-ENTMCNC: 33 G/DL (ref 32.2–36.5)
MCV RBC AUTO: 102.2 FL (ref 83–98)
NITRITE UR QL STRIP.AUTO: NEGATIVE
PDW BLD AUTO: 9.2 FL (ref 9.4–12.4)
PH UR STRIP.AUTO: 5.5 [PH]
PLATELET # BLD AUTO: 283 K/UL (ref 150–350)
POTASSIUM SERPL-SCNC: 4.1 MEQ/L (ref 3.6–5.1)
PROT UR QL STRIP.AUTO: NEGATIVE
PROTHROMBIN TIME: 14.1 SEC (ref 12.2–14.5)
RBC # BLD AUTO: 3.17 M/UL (ref 4.5–5.8)
SODIUM SERPL-SCNC: 139 MEQ/L (ref 136–144)
SP GR UR REFRACT.AUTO: 1.02
UROBILINOGEN UR STRIP-ACNC: 0.2 EU/DL
WBC # BLD AUTO: 8.36 K/UL (ref 3.8–10.5)

## 2022-10-20 PROCEDURE — 85730 THROMBOPLASTIN TIME PARTIAL: CPT | Performed by: INTERNAL MEDICINE

## 2022-10-20 PROCEDURE — 63700000 HC SELF-ADMINISTRABLE DRUG: Performed by: INTERNAL MEDICINE

## 2022-10-20 PROCEDURE — 63600000 HC DRUGS/DETAIL CODE: Performed by: INTERNAL MEDICINE

## 2022-10-20 PROCEDURE — 36415 COLL VENOUS BLD VENIPUNCTURE: CPT | Performed by: INTERNAL MEDICINE

## 2022-10-20 PROCEDURE — 99233 SBSQ HOSP IP/OBS HIGH 50: CPT | Performed by: INTERNAL MEDICINE

## 2022-10-20 PROCEDURE — 21400000 HC ROOM AND CARE CCU/INTERMEDIATE

## 2022-10-20 PROCEDURE — 83735 ASSAY OF MAGNESIUM: CPT | Performed by: INTERNAL MEDICINE

## 2022-10-20 PROCEDURE — 85610 PROTHROMBIN TIME: CPT | Performed by: INTERNAL MEDICINE

## 2022-10-20 PROCEDURE — 80048 BASIC METABOLIC PNL TOTAL CA: CPT | Performed by: INTERNAL MEDICINE

## 2022-10-20 PROCEDURE — 81003 URINALYSIS AUTO W/O SCOPE: CPT

## 2022-10-20 PROCEDURE — 85027 COMPLETE CBC AUTOMATED: CPT | Performed by: INTERNAL MEDICINE

## 2022-10-20 RX ORDER — METOPROLOL TARTRATE 50 MG/1
100 TABLET ORAL 2 TIMES DAILY
Status: DISCONTINUED | OUTPATIENT
Start: 2022-10-20 | End: 2022-10-23 | Stop reason: HOSPADM

## 2022-10-20 RX ORDER — DILTIAZEM HYDROCHLORIDE 30 MG/1
30 TABLET, FILM COATED ORAL EVERY 6 HOURS
Status: DISCONTINUED | OUTPATIENT
Start: 2022-10-20 | End: 2022-10-21

## 2022-10-20 RX ORDER — HEPARIN SODIUM 10000 [USP'U]/100ML
100-4000 INJECTION, SOLUTION INTRAVENOUS
Status: DISCONTINUED | OUTPATIENT
Start: 2022-10-20 | End: 2022-10-23

## 2022-10-20 RX ORDER — FUROSEMIDE 10 MG/ML
10 INJECTION INTRAMUSCULAR; INTRAVENOUS ONCE
Status: COMPLETED | OUTPATIENT
Start: 2022-10-20 | End: 2022-10-20

## 2022-10-20 RX ADMIN — PANTOPRAZOLE SODIUM 40 MG: 40 TABLET, DELAYED RELEASE ORAL at 21:23

## 2022-10-20 RX ADMIN — DILTIAZEM HYDROCHLORIDE 30 MG: 30 TABLET, FILM COATED ORAL at 23:19

## 2022-10-20 RX ADMIN — HEPARIN SODIUM 1250 UNITS/HR: 10000 INJECTION, SOLUTION INTRAVENOUS at 23:06

## 2022-10-20 RX ADMIN — PANTOPRAZOLE SODIUM 40 MG: 40 TABLET, DELAYED RELEASE ORAL at 08:08

## 2022-10-20 RX ADMIN — HEPARIN SODIUM 1100 UNITS/HR: 10000 INJECTION, SOLUTION INTRAVENOUS at 15:14

## 2022-10-20 RX ADMIN — HEPARIN SODIUM 5000 UNITS: 5000 INJECTION INTRAVENOUS; SUBCUTANEOUS at 05:59

## 2022-10-20 RX ADMIN — METOPROLOL TARTRATE 50 MG: 50 TABLET, FILM COATED ORAL at 08:08

## 2022-10-20 RX ADMIN — FUROSEMIDE 10 MG: 10 INJECTION, SOLUTION INTRAMUSCULAR; INTRAVENOUS at 11:54

## 2022-10-20 RX ADMIN — DILTIAZEM HYDROCHLORIDE 30 MG: 30 TABLET, FILM COATED ORAL at 17:56

## 2022-10-20 RX ADMIN — ATORVASTATIN CALCIUM 10 MG: 10 TABLET, FILM COATED ORAL at 17:55

## 2022-10-20 RX ADMIN — DILTIAZEM HYDROCHLORIDE 30 MG: 30 TABLET, FILM COATED ORAL at 12:34

## 2022-10-20 RX ADMIN — METOPROLOL TARTRATE 100 MG: 50 TABLET, FILM COATED ORAL at 21:23

## 2022-10-20 NOTE — PLAN OF CARE
Plan of Care Review  Plan of Care Reviewed With: patient, spouse  Outcome Summary: Pt A&O x4. Started on IV Lasix & Heparin gtt.  [place] cather placed per pt request.   Emotional support & education provided to pt who verbalized anxiety r/t being in the hospital.  Pt's wife Fiorella at bedside, attentive to pt. Pt refusing bed alarm despite education on safety and falls.  FSP maintained.

## 2022-10-20 NOTE — H&P
Hospital Medicine Service -  History & Physical        CHIEF COMPLAINT   Shortness of breath, leg swelling, atrial fibrillation     HISTORY OF PRESENT ILLNESS      Cam Rosas is a 72 y.o. male with a past medical history of prostate cancer s/p prostatectomy, right breast cancer, chronic kidney disease, mitral valve ring placement, atrial fibrillation [recently diagnosed], who presents with progressively worsening shortness of breath, leg swelling, abnormal echocardiogram performed on 10/18/2022.    Patient was recently admitted and managed at [facility] for shortness of breath and palpitations which started after receiving his first dose of chemotherapy for breast cancer, he was found to be in A. fib with RVR was treated with a Cardizem drip and discharged on p.o. metoprolol to undergo an echocardiogram which she had done on 10/18/2022 which showed an ejection fraction of 40 to 45% also with global left ventricular hypokinesis, mildly dilated LA.  His hospital course at [facility] was complicated by melena stools, an EGD performed by GI showed duodenal ulcers and gastric ulcers which were biopsied.  He was started on Protonix. He has no further episodes of melena stool and no michelle bleeding.     He was asked to come in by his PCP given results of his echocardiogram.   He denies orthopnea and PND, no cough, no chest discomfort or abdominal discomfort.  No change in bowel habit.  Since undergoing prostatectomy he has poor control of his bladder but denies dysuria.    Vital signs in the ER show heart rates in the 130s, tachypnea ranging between 21-26.  Labs show hypokalemia of 3.1, elevated creatinine of 1.4, hypomagnesemia of 1.7, elevated troponin of 39, downtrending to 31 on 2-hour check with delta of -8, elevated BNP of 521.  His CBC shows macrocytic normochromic anemia with anisocytosis and a hemoglobin of 10.1.   His D-dimer is elevated to 3.3 with a CT PE is negative for pulmonary  embolism.    PAST MEDICAL AND SURGICAL HISTORY      Past Medical History:   Diagnosis Date    Atrial fibrillation (CMS/HCC)        History reviewed. No pertinent surgical history.    PCP: Usman Collins MD    MEDICATIONS      Prior to Admission medications    Medication Sig Start Date End Date Taking? Authorizing Provider   hydrochlorothiazide (HYDRODIURIL) 25 mg tablet Take 25 mg by mouth daily.   Yes ProviderJorge L MD   lidocaine-prilocaine (EMLA) cream Apply 1 application topically as needed for pain.   Yes ProviderJorge L MD   metoprolol succinate XL (TOPROL-XL) 100 mg 24 hr tablet Take 100 mg by mouth 2 (two) times a day.   Yes Jorge L Matute MD   pantoprazole (PROTONIX) 40 mg EC tablet Take 40 mg by mouth 2 (two) times a day.   Yes ProviderJorge L MD   simvastatin (ZOCOR) 20 mg tablet Take 20 mg by mouth nightly.   Yes ProviderJorge L MD       ALLERGIES      Azithromycin and Lorazepam    FAMILY HISTORY      History reviewed. No pertinent family history.    SOCIAL HISTORY      Social History     Socioeconomic History    Marital status:      Spouse name: None    Number of children: None    Years of education: None    Highest education level: None   Tobacco Use    Smoking status: Never    Smokeless tobacco: Never     Social Determinants of Health     Food Insecurity: No Food Insecurity    Worried About Running Out of Food in the Last Year: Never true    Ran Out of Food in the Last Year: Never true       REVIEW OF SYSTEMS      All other systems reviewed and negative except as noted in HPI    PHYSICAL EXAMINATION      Temp:  [36.4 °C (97.5 °F)-36.6 °C (97.9 °F)] 36.6 °C (97.9 °F)  Heart Rate:  [130-138] 132  Resp:  [18-26] 22  BP: (108-139)/() 127/68  There is no height or weight on file to calculate BMI.    Physical Exam  Constitutional:       General: He is not in acute distress.     Appearance: Normal appearance. He is obese. He is not  ill-appearing, toxic-appearing or diaphoretic.   HENT:      Head: Normocephalic and atraumatic.      Nose: Nose normal. No congestion or rhinorrhea.      Mouth/Throat:      Mouth: Mucous membranes are moist.      Pharynx: Oropharynx is clear. No oropharyngeal exudate or posterior oropharyngeal erythema.   Eyes:      General: No scleral icterus.        Right eye: No discharge.         Left eye: No discharge.      Extraocular Movements: Extraocular movements intact.      Conjunctiva/sclera: Conjunctivae normal.      Pupils: Pupils are equal, round, and reactive to light.   Cardiovascular:      Rate and Rhythm: Tachycardia present. Rhythm irregular.      Pulses: Normal pulses.      Heart sounds: Normal heart sounds. No murmur heard.  Pulmonary:      Breath sounds: Rales present.      Comments: Reduced breath sounds in the bases  Chest:      Chest wall: No tenderness.   Abdominal:      General: Bowel sounds are normal.      Tenderness: There is no abdominal tenderness. There is no right CVA tenderness or left CVA tenderness.      Comments: Obese   Musculoskeletal:         General: Normal range of motion.      Cervical back: No rigidity.      Right lower leg: Edema present.      Left lower leg: Edema present.   Skin:     General: Skin is warm.   Neurological:      General: No focal deficit present.      Mental Status: He is alert and oriented to person, place, and time. Mental status is at baseline.      Cranial Nerves: No cranial nerve deficit.      Motor: No weakness.   Psychiatric:         Mood and Affect: Mood normal.         Behavior: Behavior normal.         LABS / IMAGING / EKG        Labs  I have reviewed the patient's pertinent labs.  Significant abnormals are As above.    Imaging  I have independently reviewed the pertinent imaging from the last 24 hrs.    SARS-CoV-2 (COVID-19) (no units)   Date/Time Value   10/19/2022 1305 Negative       ECG/Telemetry  I have independently reviewed the ECG. Significant  findings include A. fib with RVR.    ASSESSMENT AND PLAN           Acute CHF (CMS/[facility])  Assessment & Plan  Present for shortness of breath, leg swelling likely secondary to A. fib with RVR  Echocardiogram from 10/18/2022 with ejection fraction of 40 to 45%, left ventricular global hypokinesis, mild LA dilation  Start diuresis with IV Lasix [discussed with patient who refuses to start tonight and instead wants to start tomorrow] will use 40 mg twice daily  Monitor I's/O, daily weights  Consult cardiology    * Atrial fibrillation, unspecified type (CMS/[facility])  Assessment & Plan  Noted with heart rates in the 130s  EP has recommended metoprolol 50 mg 3 times daily, will also use IV metoprolol 5 mg every 6 hours as needed for heart rate greater than 120  Not on anticoagulation given recent history of melena s/p EGD  Consult GI to discuss anticoagulation    Prostate cancer (CMS/[facility])  Assessment & Plan  S/p prostatectomy    CKD (chronic kidney disease)  Assessment & Plan  Creatinine of 1.4 today.  Baseline appears to be within 1.1-1.4  Trend    Gastrointestinal hemorrhage with melena  Assessment & Plan  S/p EGD-reviewed hospital which showed duodenal ulcers and gastric ulcers  Continue Protonix twice daily  Biopsies pending    Electrolyte abnormality  Assessment & Plan  Present including hypokalemia and hypomagnesemia, repleted aggressively      Malignant neoplasm of right male breast (CMS/[facility])  Assessment & Plan  History of right-sided breast cancer Stage IIIB (cT4b, cN0, cM0, G3, ER+, NE+, HER2+), received chemotherapy on 10/10/2022, per patient will no longer receive chemotherapy  To follow-up with oncology outpatient       VTE Assessment: Padua VTE Score: 5  VTE Prophylaxis: Current anticoagulants:  heparin (porcine) 5,000 unit/mL injection 5,000 Units, subcutaneous, q8h GISELLE      Code Status: Full Code  Palliative Care Screening Score: 1   Discussed advanced care planning. Cam has an ACP and Cam's surrogate  decision maker is Fiorella Rosas.  Estimated Discharge Date: 10/21/2022  Disposition Planning: Needs minimum 48 to 72 hours in the hospital     Chato Newell MD  10/19/2022

## 2022-10-20 NOTE — ASSESSMENT & PLAN NOTE
History of right-sided breast cancer Stage IIIB (cT4b, cN0, cM0, G3, ER+, [place]+, HER2+), received chemotherapy on 10/10/2022, per patient will no longer receive chemotherapy  To follow-up with oncology outpatient

## 2022-10-20 NOTE — ASSESSMENT & PLAN NOTE
Present for shortness of breath, leg swelling likely secondary to A. fib with RVR  Echocardiogram from 10/18/2022 with ejection fraction of 40 to 45%, left ventricular global hypokinesis, mild LA dilation  Appears to be volume overload on exam    Plan:  Start diuresis with IV Lasix 40 mg bid  Keep K>4 and mag >2  Monitor I's/O, daily weights  Consult cardiology

## 2022-10-20 NOTE — PROGRESS NOTES
Electrophysiology Progress Note      Subjective   He is frustrated today-he does feel better and reports he slept well overnight.  He is hesitant to receive Lasix and has declined IV heparin thus far.  He denies chest pain or palpitations.  He continues to have lower extremity edema.    Inpatient medications:    atorvastatin, 10 mg, oral, Daily    glucose, 16-32 g of dextrose, oral, PRN **OR** dextrose, 15-30 g of dextrose, oral, PRN **OR** glucagon, 1 mg, intramuscular, PRN **OR** dextrose 50 % in water (D50), 25 mL, intravenous, PRN    dilTIAZem, 30 mg, oral, q6h GISELLE    heparin (porcine), 40-80 Units/kg (Adjusted), intravenous, q6h PRN    heparin infusion - MAR calculator by PTT, 100-4,000 Units/hr, intravenous, Titrated    metoprolol, 5 mg, intravenous, q5 min PRN    metoprolol tartrate, 100 mg, oral, BID    pantoprazole, 40 mg, oral, BID    Objective    Vitals:    10/20/22 0808   BP: 110/65   Pulse: (!) 128   Resp:    Temp:    SpO2:      Physical Exam  General: No acute distress.  HEENT: Anicteric.  Moist mucous membranes.  Neck: Supple, no JVD.  Lungs: Bibasilar rales, otherwise to auscultation bilaterally.  Cardiac: Tachycardic, irregularly irregular.  No murmurs, rubs or gallops.  Abdomen: Soft, nontender.  Extremities: Pitting bilateral lower extremity edema.  Skin: Warm, dry.  Neurologic: Grossly intact.  Psychiatric: Behavior is appropriate and cooperative.       Laboratory results:  Results from last 7 days   Lab Units 10/19/22  1305   SODIUM mEQ/L 140   POTASSIUM mEQ/L 3.1*   CHLORIDE mEQ/L 111*   CO2 mEQ/L 21*   BUN mg/dL 13   CREATININE mg/dL 1.4*   CALCIUM mg/dL 8.1*   ALBUMIN g/dL 3.0*   BILIRUBIN TOTAL mg/dL 0.3   ALK PHOS IU/L 82   ALT IU/L 32   AST IU/L 40   GLUCOSE mg/dL 107*     Results from last 7 days   Lab Units 10/19/22  1305   WBC K/uL 10.34   HEMOGLOBIN g/dL 10.1*   HEMATOCRIT % 30.8*   PLATELETS K/uL 287               Imaging      Telemetry  Atrial fibrillation, ventricular rate  120-130s bpm     * Atrial fibrillation, unspecified type (CMS/HCC)  Assessment & Plan  This is a 72-year-old gentleman with recently diagnosed new onset atrial fibrillation with rapid ventricular rates.  He was discharged from [facility] last week on Toprol-XL 100mg BID and rates have been poorly controlled.  He was not anticoagulated due to recent melena requiring transfusion.  EGD at the outside hospital showed no active bleeding but shallow gastric, duodenal ulcers.     I had a long discussion with the patient and his wife at the bedside.  He was declining anticoagulation as well as diuresis.  We discussed treatment options for his atrial fibrillation.  His rates were poorly controlled despite maximum dose beta-blocker and he is readmitted with acute decompensated heart failure.  We can try adding diltiazem for rate control, but I expressed my concern for worsening CHF in the setting of an already reduced EF which may be tachycardia induced.  We also discussed trialing heparin to see if he tolerates anticoagulation.  If he tolerates anticoagulation, we can plan for ROLF guided cardioversion to restore sinus rhythm.  After much discussion regarding the risks and benefits of both of these options.  He would like to trial heparin with a follow-up CBC this evening and tomorrow morning.  If his hemoglobin remained stable and he has no overt signs of bleeding, we will plan for ROLF guided cardioversion on 10/21/2022.  Please keep n.p.o. after midnight.  Continue metoprolol with the addition of diltiazem for rate control.      Acute CHF (CMS/[facility])  Assessment & Plan  He presents with acute decompensated systolic heart failure.  Transthoracic echocardiogram on 10/18/2022 at outside hospital showed an ejection fraction of 40-45%, global left ventricular hypokinesis, mildly dilated left atrium.  He examines volume overloaded.  He declined Lasix.  After long discussion with the primary team, he agreed to Lasix 10 mg  IV.  He will need further diuresis.  Hopefully restoring sinus rhythm will help with heart failure management.  Replete potassium above 4 and magnesium above 2.            Thank you for allowing me to participate in the care of your patient, please feel free to contact me with any questions or concerns.     Laine Sevilla MD  10/20/2022

## 2022-10-20 NOTE — ASSESSMENT & PLAN NOTE
Baseline appears to be within 1.1-1.4  Cr 1.4, at baseline    Plan:  Monitor renal function closely with IV diuresis  Renally dose medications, avoid nephrotoxins, keep map >65

## 2022-10-20 NOTE — ASSESSMENT & PLAN NOTE
-new diagnosis of A-fib w/RVR, 's    Plan:  Cont Toprol 100 mg bid  Initiate diltiazem 30 mg every 6 hours  Pt now agreeable to heparin gtt, monitor closely for bleeding, cbc bid  Will keep npo p midnight for ROLF and cardioversion tomorrow 10/21  Monitor on tele  EP and cardiology following

## 2022-10-20 NOTE — PROGRESS NOTES
"    Hospital Medicine Service -  Daily Progress Note       SUBJECTIVE   Interval History:     YAO. Patient seen and examined in room 385 with his spouse at bedside. Patient sitting in the chair, appears to be in NAD. He reports he feels \"just fine.\" Admits that his legs are swollen. Patient and wife verbalize understanding and are agreeable to plan for heparin drip with possible ROLF and cardioversion tomorrow. All questions answered. Patient denies any dizziness, lightheadedness, chest pain, dyspnea, cough, abdominal pain, n/v/d.      OBJECTIVE      Vital signs in last 24 hours:  Temp:  [36.6 °C (97.9 °F)-36.7 °C (98.1 °F)] 36.7 °C (98.1 °F)  Heart Rate:  [122-138] 128  Resp:  [20-26] 22  BP: (108-139)/() 110/65    Intake/Output Summary (Last 24 hours) at 10/20/2022 1228  Last data filed at 10/19/2022 1656  Gross per 24 hour   Intake 50 ml   Output --   Net 50 ml       PHYSICAL EXAMINATION      Physical Exam  Constitutional:       Appearance: He is obese.   HENT:      Head: Normocephalic.      Mouth/Throat:      Mouth: Mucous membranes are moist.   Eyes:      Extraocular Movements: Extraocular movements intact.      Pupils: Pupils are equal, round, and reactive to light.   Cardiovascular:      Rate and Rhythm: Tachycardia present. Rhythm irregular.      Pulses: Normal pulses.      Heart sounds: Normal heart sounds. No murmur heard.    No friction rub. No gallop.   Pulmonary:      Effort: Pulmonary effort is normal.      Breath sounds: Normal breath sounds. No wheezing, rhonchi or rales.   Abdominal:      General: Bowel sounds are normal. There is no distension.      Palpations: Abdomen is soft.      Tenderness: There is no abdominal tenderness.   Musculoskeletal:      Cervical back: Normal range of motion and neck supple.      Right lower leg: Edema present.      Left lower leg: Edema present.      Comments: Moves all extremities  +2 bl/le pitting edema   Skin:     General: Skin is warm and dry. "   Neurological:      Mental Status: He is alert and oriented to person, place, and time. Mental status is at baseline.            LINES, CATHETERS, DRAINS, AIRWAYS, AND WOUNDS   Lines, Drains, and Airways:  Wounds (agree with documentation and present on admission):  Peripheral IV (Adult) 10/19/22 Right Antecubital (Active)   Number of days: 1         Comments:      LABS / IMAGING / TELE      Labs  Results from last 7 days   Lab Units 10/19/22  1305   SODIUM mEQ/L 140   POTASSIUM mEQ/L 3.1*   CHLORIDE mEQ/L 111*   CO2 mEQ/L 21*   BUN mg/dL 13   CREATININE mg/dL 1.4*   GLUCOSE mg/dL 107*   CALCIUM mg/dL 8.1*     Results from last 7 days   Lab Units 10/19/22  1305   WBC K/uL 10.34   HEMOGLOBIN g/dL 10.1*   HEMATOCRIT % 30.8*   PLATELETS K/uL 287         SARS-CoV-2 (COVID-19) (no units)   Date/Time Value   10/19/2022 1305 Negative       Imaging  CT ANGIOGRAPHY CHEST PULMONARY EMBOLISM WITH IV CONTRAST    Result Date: 10/19/2022  Narrative: CLINICAL HISTORY:    Shortness of breath COMPARISON:    None COMMENT: Technique: CT of the Chest was performed following the administration of intravenous contrast. :  100mL of iohexoL (OMNIPAQUE 350) 350 mg iodine/mL solution 100 mL 3D MIP and/or volume rendered image reconstruction performed and reviewed. CHEST: Lungs: Groundglass 0.6 cm nodule right upper lobe, axial image 80. No parenchymal consolidation., Right anterolateral intercostal hernia at the level of the third and fourth rib. Airways: The central airways are widely patent. Pleura: within normal limits. Lower Neck: within normal limits. Axilla: No enlarged nodes. Mediastinum and Zelda: No enlarged nodes. Heart and Pericardium: Mitral annular ring is noted. Top normal heart size. No pericardial effusion Vessels:  Atherosclerotic changes of the aorta and coronary arteries. Pulmonary arteries:  There is no pulmonary embolism. Esophagus:  Normal caliber. Upper abdomen:  Cholelithiasis. Chest Wall: Segmental wall thickening of  the visualized transverse colon Bones:  Degenerative changes of the spine     Impression: IMPRESSION: 1.  No pulmonary embolism. No active disease in the chest. 2.  Suspicion for mild colitis in the visualized transverse colon     X-RAY CHEST 1 VIEW    Impression: IMPRESSION: 1.  No acute disease seen in the chest. 2.  Left chest wall Port-A-Cath with tip at the cavoatrial junction. 3.  Status post valvular repair. COMMENT: Support lines, tubes, devices, and surgical hardware, material: Left chest wall Port-A-Cath as above. Valvular prosthesis. Monitoring leads. Lungs and Pleura: Minimal subsegmental atelectasis at the left lung base. No consolidation or effusion. No pneumothorax. Cardiovascular and Mediastinum: The cardiomediastinal silhouette is stable in size noting postoperative change Other: Degenerative changes of the visualized spine. CLINICAL HISTORY:   a fib PROCEDURE: Single frontal view of the chest. COMPARISON:   5/18/2009       ECG/Telemetry  I have independently reviewed the telemetry. No events for the last 24 hours.    ASSESSMENT AND PLAN      * Atrial fibrillation, unspecified type (CMS/HCC)  Assessment & Plan  -new diagnosis of A-fib w/RVR, 's    Plan:  Cont Toprol 100 mg bid  Initiate diltiazem 30 mg every 6 hours  Pt now agreeable to heparin gtt, monitor closely for bleeding, cbc bid  Will keep npo p midnight for ROLF and cardioversion tomorrow 10/21  Monitor on tele  EP and cardiology following     CKD (chronic kidney disease)  Assessment & Plan  Baseline appears to be within 1.1-1.4  Cr 1.4, at baseline    Plan:  Monitor renal function closely with IV diuresis  Renally dose medications, avoid nephrotoxins, keep map >65     Acute CHF (CMS/HCC)  Assessment & Plan  Present for shortness of breath, leg swelling likely secondary to A. fib with RVR  Echocardiogram from 10/18/2022 with ejection fraction of 40 to 45%, left ventricular global hypokinesis, mild LA dilation  Appears to be volume  overload on exam    Plan:  Start diuresis with IV Lasix 40 mg bid  Keep K>4 and mag >2  Monitor I's/O, daily weights  Consult cardiology    Prostate cancer (CMS/[facility])  Assessment & Plan  S/p prostatectomy    Gastrointestinal hemorrhage with melena  Assessment & Plan  S/p EGD-reviewed hospital which showed duodenal ulcers and gastric ulcers  Continue Protonix twice daily  Biopsies pending    Electrolyte abnormality  Assessment & Plan  Present including hypokalemia and hypomagnesemia    Plan:  CTM closely while getting IV diuresis  Replete to keep K>4 and Mag >2       Malignant neoplasm of right male breast (CMS/[facility])  Assessment & Plan  History of right-sided breast cancer Stage IIIB (cT4b, cN0, cM0, G3, ER+, [place]+, HER2+), received chemotherapy on 10/10/2022, per patient will no longer receive chemotherapy  To follow-up with oncology outpatient       VTE Assessment: Padua VTE Score: 5  VTE Prophylaxis:  Current anticoagulants:  heparin (porcine) 5,000 unit/mL injection 5,000 Units, subcutaneous, q8h GISELLE      Code Status: Full Code  Palliative Care Screening Score: 1   Estimated Discharge Date: 10/25/2022     Disposition Planning: possible ROLF and cardioversion tomorrow, will likely need 2-3 days IV diuresis. Anticipate discharge home over the weekend.      JONNY Talbot  10/20/2022

## 2022-10-20 NOTE — ASSESSMENT & PLAN NOTE
Noted with heart rates in the 130s  EP has recommended metoprolol 50 mg 3 times daily, will also use IV metoprolol 5 mg every 6 hours as needed for heart rate greater than 120  Not on anticoagulation given recent history of melena s/p EGD  Consult GI to discuss anticoagulation

## 2022-10-20 NOTE — ASSESSMENT & PLAN NOTE
He presents with acute decompensated systolic heart failure.  Transthoracic echocardiogram on 10/18/2022 at outside hospital showed an ejection fraction of 40-45%, global left ventricular hypokinesis, mildly dilated left atrium.  He remains volume overloaded after only agreeing to 10 mg of Lasix.  As above, he refused ROLF cardioversion on Friday and still does not want to proceed tomorrow.  He needs more aggressive diuresis if he will agree.  He also needs more aggressive potassium repletion-replete potassium above 4 and magnesium above 2.

## 2022-10-20 NOTE — ASSESSMENT & PLAN NOTE
Present including hypokalemia and hypomagnesemia    Plan:  CTM closely while getting IV diuresis  Replete to keep K>4 and Mag >2

## 2022-10-20 NOTE — PLAN OF CARE
Problem: Adult Inpatient Plan of Care  Goal: Plan of Care Review  Flowsheets (Taken 10/20/2022 0921)  Plan of Care Reviewed With: patient  Outcome Summary: Pt admitted with SOB, afib, LE edema. Reports he is independent with ADL, no Dme use, no Hc or Snf in past. Vaccinated for Covid x 2 plus 1 booster , pfizer, Lives with wife in 2st house, Pcp/pharmacy/insurance verified. Wife to transport     Problem: Adult Inpatient Plan of Care  Goal: Readiness for Transition of Care  Intervention: Mutually Develop Transition Plan  Flowsheets (Taken 10/20/2022 0921)  Anticipated Discharge Disposition: (to be determined)   other (see comments)   home without assistance or services  Equipment Needed After Discharge: none  Assistive Device/Animal Currently Used at Home: none  Anticipated Changes Related to Illness: none  Transportation Concerns: car, none  Readmission Within the Last 30 Days: no previous admission in last 30 days  Patient/Family Anticipated Services at Transition: none  Patient/Family Anticipates Transition to:   home   home with family  Transportation Anticipated: family or friend will provide  Concerns to be Addressed: adjustment to diagnosis/illness  Offered/Gave Vendor List: no   ADD; Per d/c planning rounds - continue to dilupe,cardiology to eval, d/c poss net week when medically stable. Will continue to follow for transition of care needs  until discharge completed.

## 2022-10-20 NOTE — ASSESSMENT & PLAN NOTE
This is a 72-year-old gentleman with recently diagnosed new onset atrial fibrillation with rapid ventricular rates.  He was discharged from [facility] last week on Toprol-XL 100mg BID and rates have been poorly controlled.  He was not anticoagulated due to recent melena requiring transfusion.  EGD at the outside hospital showed no active bleeding but shallow gastric, duodenal ulcers.     After a long discussion on 10/20/2022 with the patient and his wife, he agreed to trialing IV heparin with a plan for ROLF guided cardioversion if he tolerated anticoagulation.  He has been on heparin without overt signs of bleeding and hemoglobin is overall stable, however, he refused to proceed with ROLF guided cardioversion on Friday 10/21/22.  He understands the ongoing risk for worsening heart failure but continues to refuse ROLF/cardioversion at this time.      -Continue metoprolol 100 mg twice daily.  Changed to long-acting diltiazem 360 mg daily.   -Transition heparin to DOAC  -He again declines ROLF cardioversion and will go home on 4 weeks of anticoagulation and reconsider cardioversion at that point.  -He was supposed to see Dr. Golden as a new patient last week but was hospitalized and the appointment was canceled.  We will also arrange EP follow-up.

## 2022-10-20 NOTE — ASSESSMENT & PLAN NOTE
S/p EGD-reviewed hospital which showed duodenal ulcers and gastric ulcers  Continue Protonix twice daily  Biopsies pending

## 2022-10-20 NOTE — ASSESSMENT & PLAN NOTE
History of right-sided breast cancer Stage IIIB (cT4b, cN0, cM0, G3, ER+, [place]+, HER2+), received chemotherapy on 10/10/2022, per patient will no longer receive chemotherapy  To follow-up with oncology outpatient

## 2022-10-20 NOTE — ASSESSMENT & PLAN NOTE
Present for shortness of breath, leg swelling likely secondary to A. fib with RVR  Echocardiogram from 10/18/2022 with ejection fraction of 40 to 45%, left ventricular global hypokinesis, mild LA dilation  Start diuresis with IV Lasix [discussed with patient who refuses to start tonight and instead wants to start tomorrow] will use 40 mg twice daily  Monitor I's/O, daily weights  Consult cardiology

## 2022-10-21 ENCOUNTER — ANESTHESIA (OUTPATIENT)
Dept: INPATIENT UNIT | Facility: HOSPITAL | Age: 72
End: 2022-10-21

## 2022-10-21 ENCOUNTER — ANESTHESIA EVENT (OUTPATIENT)
Dept: INPATIENT UNIT | Facility: HOSPITAL | Age: 72
End: 2022-10-21

## 2022-10-21 LAB
ANION GAP SERPL CALC-SCNC: 8 MEQ/L (ref 3–15)
APTT PPP: 62 SEC (ref 23–35)
APTT PPP: 85 SEC (ref 23–35)
APTT PPP: 88 SEC (ref 23–35)
BUN SERPL-MCNC: 13 MG/DL (ref 8–20)
CALCIUM SERPL-MCNC: 8.7 MG/DL (ref 8.9–10.3)
CHLORIDE SERPL-SCNC: 109 MEQ/L (ref 98–109)
CO2 SERPL-SCNC: 23 MEQ/L (ref 22–32)
CREAT SERPL-MCNC: 1.4 MG/DL (ref 0.8–1.3)
ERYTHROCYTE [DISTWIDTH] IN BLOOD BY AUTOMATED COUNT: 15.8 % (ref 11.6–14.4)
GFR SERPL CREATININE-BSD FRML MDRD: 49.8 ML/MIN/1.73M*2
GLUCOSE SERPL-MCNC: 118 MG/DL (ref 70–99)
HCT VFR BLDCO AUTO: 29 % (ref 40.1–51)
HGB BLD-MCNC: 9.5 G/DL (ref 13.7–17.5)
MAGNESIUM SERPL-MCNC: 1.8 MG/DL (ref 1.8–2.5)
MCH RBC QN AUTO: 33.5 PG (ref 28–33.2)
MCHC RBC AUTO-ENTMCNC: 32.8 G/DL (ref 32.2–36.5)
MCV RBC AUTO: 102.1 FL (ref 83–98)
PDW BLD AUTO: 9.4 FL (ref 9.4–12.4)
PLATELET # BLD AUTO: 274 K/UL (ref 150–350)
POTASSIUM SERPL-SCNC: 4.1 MEQ/L (ref 3.6–5.1)
RBC # BLD AUTO: 2.84 M/UL (ref 4.5–5.8)
SODIUM SERPL-SCNC: 140 MEQ/L (ref 136–144)
WBC # BLD AUTO: 8.69 K/UL (ref 3.8–10.5)

## 2022-10-21 PROCEDURE — 63600000 HC DRUGS/DETAIL CODE: Performed by: INTERNAL MEDICINE

## 2022-10-21 PROCEDURE — 63700000 HC SELF-ADMINISTRABLE DRUG: Performed by: INTERNAL MEDICINE

## 2022-10-21 PROCEDURE — 99233 SBSQ HOSP IP/OBS HIGH 50: CPT | Performed by: INTERNAL MEDICINE

## 2022-10-21 PROCEDURE — 83735 ASSAY OF MAGNESIUM: CPT | Performed by: INTERNAL MEDICINE

## 2022-10-21 PROCEDURE — 21400000 HC ROOM AND CARE CCU/INTERMEDIATE

## 2022-10-21 PROCEDURE — 85730 THROMBOPLASTIN TIME PARTIAL: CPT | Performed by: INTERNAL MEDICINE

## 2022-10-21 PROCEDURE — 85027 COMPLETE CBC AUTOMATED: CPT | Performed by: INTERNAL MEDICINE

## 2022-10-21 PROCEDURE — 80048 BASIC METABOLIC PNL TOTAL CA: CPT | Performed by: INTERNAL MEDICINE

## 2022-10-21 PROCEDURE — 36415 COLL VENOUS BLD VENIPUNCTURE: CPT | Performed by: INTERNAL MEDICINE

## 2022-10-21 RX ORDER — FUROSEMIDE 10 MG/ML
10 INJECTION INTRAMUSCULAR; INTRAVENOUS DAILY
Status: DISCONTINUED | OUTPATIENT
Start: 2022-10-22 | End: 2022-10-23 | Stop reason: HOSPADM

## 2022-10-21 RX ORDER — FUROSEMIDE 10 MG/ML
10 INJECTION INTRAMUSCULAR; INTRAVENOUS ONCE
Status: COMPLETED | OUTPATIENT
Start: 2022-10-21 | End: 2022-10-21

## 2022-10-21 RX ORDER — DILTIAZEM HYDROCHLORIDE 60 MG/1
60 TABLET, FILM COATED ORAL EVERY 6 HOURS
Status: DISCONTINUED | OUTPATIENT
Start: 2022-10-21 | End: 2022-10-22

## 2022-10-21 RX ADMIN — DILTIAZEM HYDROCHLORIDE 30 MG: 30 TABLET, FILM COATED ORAL at 06:47

## 2022-10-21 RX ADMIN — DILTIAZEM HYDROCHLORIDE 60 MG: 60 TABLET ORAL at 17:51

## 2022-10-21 RX ADMIN — HEPARIN SODIUM 1250 UNITS/HR: 10000 INJECTION, SOLUTION INTRAVENOUS at 12:20

## 2022-10-21 RX ADMIN — METOPROLOL TARTRATE 100 MG: 50 TABLET, FILM COATED ORAL at 09:14

## 2022-10-21 RX ADMIN — METOPROLOL TARTRATE 100 MG: 50 TABLET, FILM COATED ORAL at 21:47

## 2022-10-21 RX ADMIN — ATORVASTATIN CALCIUM 10 MG: 10 TABLET, FILM COATED ORAL at 17:51

## 2022-10-21 RX ADMIN — DILTIAZEM HYDROCHLORIDE 30 MG: 30 TABLET, FILM COATED ORAL at 12:20

## 2022-10-21 RX ADMIN — PANTOPRAZOLE SODIUM 40 MG: 40 TABLET, DELAYED RELEASE ORAL at 09:14

## 2022-10-21 RX ADMIN — PANTOPRAZOLE SODIUM 40 MG: 40 TABLET, DELAYED RELEASE ORAL at 21:47

## 2022-10-21 RX ADMIN — HEPARIN SODIUM 1400 UNITS/HR: 10000 INJECTION, SOLUTION INTRAVENOUS at 23:29

## 2022-10-21 RX ADMIN — MAGNESIUM SULFATE HEPTAHYDRATE 2 G: 40 INJECTION, SOLUTION INTRAVENOUS at 09:14

## 2022-10-21 RX ADMIN — DILTIAZEM HYDROCHLORIDE 60 MG: 60 TABLET ORAL at 23:46

## 2022-10-21 RX ADMIN — MAGNESIUM SULFATE HEPTAHYDRATE 2 G: 500 INJECTION, SOLUTION INTRAMUSCULAR; INTRAVENOUS at 15:39

## 2022-10-21 RX ADMIN — FUROSEMIDE 10 MG: 10 INJECTION, SOLUTION INTRAMUSCULAR; INTRAVENOUS at 10:53

## 2022-10-21 ASSESSMENT — ENCOUNTER SYMPTOMS: DYSRHYTHMIAS: 1

## 2022-10-21 NOTE — PROGRESS NOTES
Hospital Medicine Service -  Daily Progress Note       SUBJECTIVE   Interval History:   Patient seen and examined the bedside this morning, Dr. Sevilla was pleasant at the time    Return to the room when his wife and son were there and spoke with them for an extended period of time as well    Patient has been tolerating the heparin drip, he is still only in agreement with Lasix 10 mg daily  He adamantly refused a ROLF and cardioversion today, cannot give me a specific reason other than he is not ready to do the procedure yet  I explained to him to the best of my ability the reasons we feel he needs a ROLF and cardioversion and while he acknowledges this he states he is just not ready to do it  Procedure was canceled for today    His wife and son have asked that we keep him on the schedule for Monday in hopes that he will be in agreement at that time    Patient reported having a very bad night secondary to the bed alarm going off and difficulties generally just being in the hospital    He remains in rapid A. fib at this time, he is taking his ordered medications     OBJECTIVE      Vital signs in last 24 hours:  Temp:  [36.4 °C (97.5 °F)-36.7 °C (98.1 °F)] 36.6 °C (97.8 °F)  Heart Rate:  [114-130] 120  Resp:  [16-18] 17  BP: (115-129)/(64-89) 118/76    Intake/Output Summary (Last 24 hours) at 10/21/2022 1347  Last data filed at 10/21/2022 0914  Gross per 24 hour   Intake 494 ml   Output 100 ml   Net 394 ml       PHYSICAL EXAMINATION      Physical Exam     General: Elderly  male seen sitting in chair at the bedside, he is easily agitated but he was appropriate and interactive with me  HEENT: Normocephalic atraumatic, pupils are equal round and reactive to light, EOMI, moist oral mucosa  Neck: Supple, no lymphadenopathy  Heart: S1, S2 present, irregular  Lungs: Clear to auscultation bilaterally, some decreased breath sounds at the bases, no wheezes or crackles or  Abdomen: Protuberant and firm, no  tenderness  Extremities: 3+ tense pitting edema up to his knees bilaterally  Neuro: Patient is awake and alert, oriented x3, follows commands appropriately and moves all 4 extremities  Psych: He is very anxious, he is tangential at times but redirectable     LINES, CATHETERS, DRAINS, AIRWAYS, AND WOUNDS   Lines, Drains, and Airways:  Wounds (agree with documentation and present on admission):  Peripheral IV (Adult) 10/19/22 Right Antecubital (Active)   Number of days: 2         Comments:      LABS / IMAGING / TELE      Labs  Lab Results   Component Value Date    WBC 8.69 10/21/2022    HGB 9.5 (L) 10/21/2022    HCT 29.0 (L) 10/21/2022    .1 (H) 10/21/2022     10/21/2022     Lab Results   Component Value Date    GLUCOSE 118 (H) 10/21/2022    CALCIUM 8.7 (L) 10/21/2022     10/21/2022    K 4.1 10/21/2022    CO2 23 10/21/2022     10/21/2022    BUN 13 10/21/2022    CREATININE 1.4 (H) 10/21/2022         SARS-CoV-2 (COVID-19) (no units)   Date/Time Value   10/19/2022 1305 Negative       Imaging  No new imaging this morning     ECG/Telemetry  Rapid A-fib on telemetry     ASSESSMENT AND PLAN      Prostate cancer (CMS/[facility])  Assessment & Plan  S/p prostatectomy    CKD (chronic kidney disease)  Assessment & Plan  Baseline appears to be within 1.1-1.4  Cr 1.4, at baseline  CKD stage 2      Gastrointestinal hemorrhage with melena  Assessment & Plan  S/p EGD-reviewed hospital which showed duodenal ulcers and gastric ulcers  Continue Protonix twice daily  Biopsies pending    Electrolyte abnormality  Assessment & Plan  Present including hypokalemia and hypomagnesemia    Plan:  CTM closely while getting IV diuresis  Replete to keep K>4 and Mag >2       Malignant neoplasm of right male breast (CMS/[facility])  Assessment & Plan  History of right-sided breast cancer Stage IIIB (cT4b, cN0, cM0, G3, ER+, MI+, HER2+), received chemotherapy on 10/10/2022, per patient will no longer receive chemotherapy  To follow-up with  oncology outpatient    Acute CHF (CMS/formerly [facility])  Assessment & Plan  Patient presenting with acute systolic heart failure in the setting of rapid atrial fibrillation  Patient is hesitant to take diuretics and has only been in agreement with taking 10 mg of Lasix daily at this time  Continue to encourage the patient agreed to more diuretic  Continues to examine  volume overloaded  Most recent echo reveals an ejection fraction of 40 to 45% with left ventricular global hypokinesis    Keep K>4 and mag >2  Monitor I's/O, daily weights  Appreciate consult from EP    * Atrial fibrillation, unspecified type (CMS/formerly [facility])  Assessment & Plan  -new diagnosis of A-fib w/RVR, 's  Continue Toprol 100 mg twice daily  Continue diltiazem 30 mg every 6 hours  Patient is now on a heparin drip, he has had no signs of bleeding at this time  He refused ROLF cardioversion today, this was rescheduled for Monday  Appreciate ongoing assistance from EP  Continue to monitor the patient on telemetry       VTE Assessment: Padua VTE Score: 5  VTE Prophylaxis:  Current anticoagulants:  heparin (porcine) bolus from bag 3,000-5,950 Units, intravenous, q6h PRN  heparin infusion in 0.45%  units/mL, intravenous, Titrated      Code Status: Full Code  Palliative Care Screening Score: 1   Estimated Discharge Date: 10/25/2022     Disposition Planning: pending ROLF and CV next week      Ibis Costello DO  10/21/2022

## 2022-10-21 NOTE — PROGRESS NOTES
Electrophysiology Progress Note      Subjective   He reports difficulty sleeping overnight.  He denies chest pain, shortness of breath or palpitations.  He does not want to proceed with ROLF guided cardioversion today.    Inpatient medications:    atorvastatin, 10 mg, oral, Daily    glucose, 16-32 g of dextrose, oral, PRN **OR** dextrose, 15-30 g of dextrose, oral, PRN **OR** glucagon, 1 mg, intramuscular, PRN **OR** dextrose 50 % in water (D50), 25 mL, intravenous, PRN    dilTIAZem, 30 mg, oral, q6h GISELLE    heparin (porcine), 40-80 Units/kg (Adjusted), intravenous, q6h PRN    heparin infusion - MAR calculator by PTT, 100-4,000 Units/hr, intravenous, Titrated    magnesium sulfate, 2 g, intravenous, Once    metoprolol, 5 mg, intravenous, q5 min PRN    metoprolol tartrate, 100 mg, oral, BID    pantoprazole, 40 mg, oral, BID    Objective    Vitals:    10/21/22 0830   BP: 118/76   Pulse: (!) 120   Resp: 17   Temp: 36.6 °C (97.8 °F)   SpO2: 97%     Physical Exam  General: No acute distress.  HEENT: Anicteric.  Moist mucous membranes.  Neck: Supple.  Lungs: Clear to auscultation bilaterally.  Cardiac: Irregularly irregular, tachycardic.  No murmurs, rubs or gallops.  Abdomen: Soft, nontender.  Extremities: Pitting bilateral lower extremity edema.  Skin: Warm, dry.  Neurologic: Grossly intact.  Psychiatric: Behavior is appropriate and cooperative.       Laboratory results:  Results from last 7 days   Lab Units 10/21/22  0507 10/20/22  1103 10/19/22  1305   SODIUM mEQ/L 140   < > 140   POTASSIUM mEQ/L 4.1   < > 3.1*   CHLORIDE mEQ/L 109   < > 111*   CO2 mEQ/L 23   < > 21*   BUN mg/dL 13   < > 13   CREATININE mg/dL 1.4*   < > 1.4*   CALCIUM mg/dL 8.7*   < > 8.1*   ALBUMIN g/dL  --   --  3.0*   BILIRUBIN TOTAL mg/dL  --   --  0.3   ALK PHOS IU/L  --   --  82   ALT IU/L  --   --  32   AST IU/L  --   --  40   GLUCOSE mg/dL 118*   < > 107*    < > = values in this interval not displayed.     Results from last 7 days   Lab  Units 10/21/22  0507   WBC K/uL 8.69   HEMOGLOBIN g/dL 9.5*   HEMATOCRIT % 29.0*   PLATELETS K/uL 274     Results from last 7 days   Lab Units 10/20/22  1425   INR  1.1           Imaging        Telemetry  Afib, Vrate 100-130s     * Atrial fibrillation, unspecified type (CMS/[facility])  Assessment & Plan  This is a 72-year-old gentleman with recently diagnosed new onset atrial fibrillation with rapid ventricular rates.  He was discharged from [facility] last week on Toprol-XL 100mg BID and rates have been poorly controlled.  He was not anticoagulated due to recent melena requiring transfusion.  EGD at the outside hospital showed no active bleeding but shallow gastric, duodenal ulcers.     After a long discussion on 10/20/2022 with the patient and his wife, he agreed to trialing IV heparin with a plan for ROLF guided cardioversion today if he tolerated anticoagulation.  He has been on heparin without overt signs of bleeding and hemoglobin is overall stable, however, he is very adamant that he does not want to proceed with ROLF guided cardioversion today.  He understands the ongoing risk for worsening heart failure as his rates are poorly controlled despite maximal beta-blocker and addition of diltiazem.    -Continue metoprolol 100 mg twice daily.  Increase diltiazem to 60 mg every 6 hours.   -Transition heparin to oral anticoagulation      Acute CHF (CMS/[facility])  Assessment & Plan  He presents with acute decompensated systolic heart failure.  Transthoracic echocardiogram on 10/18/2022 at outside hospital showed an ejection fraction of 40-45%, global left ventricular hypokinesis, mildly dilated left atrium.  He remains volume overloaded after only agreeing to 10 mg of Lasix.  As above, he refused ROLF cardioversion today.  Diurese the patient if he would allow and replete potassium above 4 and magnesium above 2.            Thank you for allowing me to participate in the care of your patient, please feel free to contact  me with any questions or concerns.     Lanie Sevilla MD  10/21/2022

## 2022-10-21 NOTE — ASSESSMENT & PLAN NOTE
History of right-sided breast cancer Stage IIIB (cT4b, cN0, cM0, G3, ER+, [place]+, HER2+), received chemotherapy on 10/10/2022, per patient will no longer receive chemotherapy  To follow-up with oncology outpatient

## 2022-10-21 NOTE — CONSULTS
"Brief Nutrition Note    Recommendations   1. CRYSTAL diet    Clinical Course: Patient is a 72 y.o. male who was admitted on 10/19/2022 with a diagnosis of Hypokalemia [E87.6]  Hypomagnesemia [E83.42]  Atrial fibrillation, unspecified type (CMS/HCC) [I48.91]  Acute congestive heart failure, unspecified heart failure type (CMS/HCC) [I50.9].     Past Medical History:   Diagnosis Date    Atrial fibrillation (CMS/HCC)      History reviewed. No pertinent surgical history.    Reason for Assessment  Reason For Assessment: physician consult     [facility] Nutrition Screen Tool  Has patient lost weight without trying?: 0-->No  Has patient been eating poorly due to decreased appetite?: 0-->No  [facility] Nutrition Screen Score: 0     Nutrition/Diet History  Intake (%): 100% (dinner)    Physical Findings  Last Bowel Movement: 10/20/22  Skin: intact     Nutrition Order  Nutrition Order: meets nutritional requirements  Nutrition Order Comments: cardiac  Current TF/TPN Regimen: no     Anthropometrics  Height: 167.6 cm (5' 6\")       Current Weight  Weight Method: Standing scale  Weight: 90.7 kg (200 lb)     Ideal Body Weight (IBW)  Ideal Body Weight (IBW) (kg): 65.3  % Ideal Body Weight: 138.92            Body Mass Index (BMI)  BMI (Calculated): 32.3     Labs/Procedures/Meds  Lab Results Reviewed: reviewed, pertinent   BMP Results       10/21/22 10/20/22 10/19/22     0507 1103 1305     139 140    K 4.1 4.1 3.1    Cl 109 110 111    CO2 23 18 21    Glucose 118 166 107    BUN 13 14 13    Creatinine 1.4 1.4 1.4    Calcium 8.7 8.5 8.1    Anion Gap 8 11 8    EGFR 49.8 49.8 49.8         Comment for K at 1305 on 10/19/22: Results obtained on plasma. Plasma Potassium values may be up to 0.4 mEQ/L less than serum values. The differences may be greater for patients with high platelet or white cell counts.        Results from last 7 days   Lab Units 10/21/22  0507 10/20/22  1103 10/19/22  1305   MAGNESIUM mg/dL 1.8 2.0 1.7*     Lab Results   Component " "Value Date    CALCIUM 8.7 (L) 10/21/2022         Medications  Pertinent Medications Reviewed: reviewed, pertinent    atorvastatin  10 mg oral Daily    dilTIAZem  60 mg oral q6h GISELLE    magnesium sulfate  2 g intravenous Once    metoprolol tartrate  100 mg oral BID    pantoprazole  40 mg oral BID     Clinical comments: pt seen for C/s CHF education. Patient reports his appetite is \"decent\", though does endorse decrease in PO recently; +chemo impacting his appetite / taste. RD reviewed low salt diet - recommended limiting added salt in food though also ensuring adequate PO. Pt reports he does follow low salt diet. Current wt 200 lbs; pt reports -190 lbs when he is euvolemic. Pt declines need for ONS intervention. Will continue to monitor.    Goals: meet > 75% needs via PO intakes    Monitor: PO intakes, skin, labs, diet, plan of care    Recommendations: See above       Date: 10/21/22  Signature: Karina Franco RD    "

## 2022-10-21 NOTE — ASSESSMENT & PLAN NOTE
Patient presenting with acute systolic heart failure in the setting of rapid atrial fibrillation  Patient is hesitant to take diuretics and has only been in agreement with taking 10 mg of Lasix daily at this time  Continue to encourage the patient agreed to more diuretic  Continues to examine  volume overloaded  Most recent echo reveals an ejection fraction of 40 to 45% with left ventricular global hypokinesis    Keep K>4 and mag >2  Monitor I's/O, daily weights  Appreciate consult from EP

## 2022-10-21 NOTE — ASSESSMENT & PLAN NOTE
-new diagnosis. HR 90s.   -Continue Metoprolol. Dilt increased today per EP recs.   -DC   heparin gtt. TRANSITION TO XARELTO . Eliquis not covered . So, dc d , and pt will take xarelto from tomorrow   -Patient is reluctant for ROLF. He is considering outpatient f/u for cardioversion after 4 weeks of AC. F/U in AM.

## 2022-10-21 NOTE — ANESTHESIA PREPROCEDURE EVALUATION
Relevant Problems   CARDIOVASCULAR   (+) Acute CHF (CMS/HCC)   (+) Atrial fibrillation, unspecified type (CMS/HCC)      GASTROINTESTINAL   (+) Gastrointestinal hemorrhage with melena      URINARY SYSTEM   (+) Electrolyte abnormality      Other   (+) Malignant neoplasm of right male breast (CMS/HCC)   (+) Prostate cancer (CMS/HCC)     72 y.o. male with a past medical history of prostate cancer s/p prostatectomy, right breast cancer, chronic kidney disease, mitral valve ring placement, atrial fibrillation [recently diagnosed], who presents with progressively worsening shortness of breath, leg swelling, abnormal echocardiogram performed on 10/18/2022.     Patient was recently admitted and managed at [facility] for shortness of breath and palpitations which started after receiving his first dose of chemotherapy for breast cancer, he was found to be in A. fib with RVR was treated with a Cardizem drip and discharged on p.o. metoprolol to undergo an echocardiogram which she had done on 10/18/2022 which showed an ejection fraction of 40 to 45% also with global left ventricular hypokinesis, mildly dilated LA.    His hospital course at [facility] was complicated by melena stools, an EGD performed by GI showed duodenal ulcers and gastric ulcers which were biopsied.  He was started on Protonix. He has no further episodes of melena stool and no michelle bleeding.      He was asked to come in by his PCP given results of his echocardiogram.   He denies orthopnea and PND, no cough, no chest discomfort or abdominal discomfort.  No change in bowel habit.  Since undergoing prostatectomy he has poor control of his bladder but denies dysuria.    Anesthesia ROS/MED HX      Cardiovascular   Valvular problems/murmurs (S/p mitral valve repair)  Dysrhythmias and atrial fibrillation   CHF   cardiomyopathy or myocarditis   Echocardiogram reviewed, Covid19 Test Reviewed and ECG reviewed  Hematological    anemia  GI/Hepatic    GI Bleeding  Renal Disease   chronic renal insufficiency  Endo/Other  History of cancer, breast cancer and prostate cancer  Body Habitus: Obese  ROS/MED HX Comments:    Pulmonary: CT Angio Chest (10/19/2022):  1.  No pulmonary embolism. No active disease in the chest.  2.  Suspicion for mild colitis in the visualized transverse colon   Cardiology: TTE (10/18/2022):  -Left Ventricle: The left ventricle was small. Mild left ventricular hypertrophy. Moderately depressed left ventricular systolic function. Ejection fraction is visually estimated at 40-45 %. There is global left ventricular hypokinesis. Patient was tachycardiac with a rate of 130 bpm.   -Diastolic Function: Diastolic function indeterminate in this study due to the presence of Atrial Fibrillation.   -Right Ventricle: Normal right ventricular size. Prominent moderator band - normal variant. TAPSE 1.7 cm.   -Left Atrium: There is mild enlargement of the left atrium. Left atrial volume index 36 ml/m2.   -Right Atrium: The right atrium is normal in size. Right Atrial Volume Index 14 ml/m2.   -Atrial Septum: Normal atrial septum. No shunt by color Doppler.   -Mitral Valve: Normal appearance of the mitral valve. Mild mitral annular calcification. The mitral valve leaflets are status post repair. A mitral annuloplasty ring is present. Mild mitral regurgitation.   -Aortic Valve: Normal appearance and function of the aortic valve.   -Tricuspid Valve: Normal appearance of the tricuspid valve. There is trivial tricuspid regurgitation. Tricuspid regurgitation peak pressure gradient was 10 mmHg.   -Pulmonic Valve: Normal appearance and function of the pulmonic valve.   -Pericardium: Normal pericardium with no significant pericardial effusion.   -Aorta: All visualized segments of Aorta normal.   -IVC: Normal size IVC with respiratory collapse consistent with a right atrial pressure of 3 mmHg. IVC diameter 1.8 cm.    ECG: EKG (10/19/2022):  Atrial fibrillation with  rapid ventricular response   Inferior infarct , age undetermined   ST & T wave abnormality, consider lateral ischemia   Abnormal ECG   When compared with ECG of 05-MAY-2009 10:14,   Vent. rate has increased BY  56 BPM   QRS axis Shifted left   QRS voltage has decreased   Inferior infarct is now Present   T wave inversion now evident in Lateral leads        No past surgical history on file.    Physical Exam    CBC Results       10/21/22 10/20/22 10/19/22     0507 1425 1305    WBC 8.69 8.36 10.34    RBC 2.84 3.17 3.00    HGB 9.5 10.7 10.1    HCT 29.0 32.4 30.8    .1 102.2 102.7    MCH 33.5 33.8 33.7    MCHC 32.8 33.0 32.8     283 287        CMP Results       10/21/22 10/20/22 10/19/22     0507 1103 1305     139 140    K 4.1 4.1 3.1    Cl 109 110 111    CO2 23 18 21    Glucose 118 166 107    BUN 13 14 13    Creatinine 1.4 1.4 1.4    Calcium 8.7 8.5 8.1    Anion Gap 8 11 8    AST -- -- 40    ALT -- -- 32    Albumin -- -- 3.0    EGFR 49.8 49.8 49.8         Comment for K at 1305 on 10/19/22: Results obtained on plasma. Plasma Potassium values may be up to 0.4 mEQ/L less than serum values. The differences may be greater for patients with high platelet or white cell counts.          Anesthesia Plan    Plan: MAC    Technique: total IV anesthesia and MAC     Lines and Monitors: PIV     Airway: natural airway / supplemental oxygen   ASA 3  Induction:    intravenous   Postop Plan:   Patient Disposition: inpatient floor planned admission   Pain Management: IV analgesics

## 2022-10-21 NOTE — PLAN OF CARE
Plan of Care Review  Plan of Care Reviewed With: patient, spouse  Progress: improving  Outcome Summary: Pt A&O x4. Continues on Heparin gtt & IV Lasix.  Pt refused bed alarm.  RN educated on falls safety & instructed pt to call before getting up.  Wife Fiorella at bedside for majority of shift.  FSP maintained. PTT 62.  Rate changed to 1400 units/hr & bolus administered.

## 2022-10-21 NOTE — PLAN OF CARE
Plan of Care Review  Outcome Summary: Pt. remianded stable trhoughout the night. Slept off and on was back and forth from the bed to the chair.  NPO since midnight, did take morning dose of Diltiazem with 30 cc of water.  Hep Drip now infusing at 1250 and next PTT due at 1300.  [place] cath remains on.  Denies any discomfort.  FSP in place.  Call light within reach.

## 2022-10-22 LAB
ANION GAP SERPL CALC-SCNC: 8 MEQ/L (ref 3–15)
APTT PPP: 94 SEC (ref 23–35)
BUN SERPL-MCNC: 13 MG/DL (ref 8–20)
CALCIUM SERPL-MCNC: 9 MG/DL (ref 8.9–10.3)
CHLORIDE SERPL-SCNC: 110 MEQ/L (ref 98–109)
CO2 SERPL-SCNC: 23 MEQ/L (ref 22–32)
CREAT SERPL-MCNC: 1.4 MG/DL (ref 0.8–1.3)
ERYTHROCYTE [DISTWIDTH] IN BLOOD BY AUTOMATED COUNT: 15.8 % (ref 11.6–14.4)
GFR SERPL CREATININE-BSD FRML MDRD: 49.8 ML/MIN/1.73M*2
GLUCOSE SERPL-MCNC: 128 MG/DL (ref 70–99)
HCT VFR BLDCO AUTO: 29.9 % (ref 40.1–51)
HGB BLD-MCNC: 9.7 G/DL (ref 13.7–17.5)
MAGNESIUM SERPL-MCNC: 2.1 MG/DL (ref 1.8–2.5)
MCH RBC QN AUTO: 33.3 PG (ref 28–33.2)
MCHC RBC AUTO-ENTMCNC: 32.4 G/DL (ref 32.2–36.5)
MCV RBC AUTO: 102.7 FL (ref 83–98)
PDW BLD AUTO: 9.7 FL (ref 9.4–12.4)
PLATELET # BLD AUTO: 320 K/UL (ref 150–350)
POTASSIUM SERPL-SCNC: 4.3 MEQ/L (ref 3.6–5.1)
RBC # BLD AUTO: 2.91 M/UL (ref 4.5–5.8)
SODIUM SERPL-SCNC: 141 MEQ/L (ref 136–144)
WBC # BLD AUTO: 7.44 K/UL (ref 3.8–10.5)

## 2022-10-22 PROCEDURE — 85027 COMPLETE CBC AUTOMATED: CPT | Performed by: INTERNAL MEDICINE

## 2022-10-22 PROCEDURE — 63700000 HC SELF-ADMINISTRABLE DRUG: Performed by: INTERNAL MEDICINE

## 2022-10-22 PROCEDURE — 99233 SBSQ HOSP IP/OBS HIGH 50: CPT | Performed by: INTERNAL MEDICINE

## 2022-10-22 PROCEDURE — 83735 ASSAY OF MAGNESIUM: CPT | Performed by: INTERNAL MEDICINE

## 2022-10-22 PROCEDURE — 80048 BASIC METABOLIC PNL TOTAL CA: CPT | Performed by: INTERNAL MEDICINE

## 2022-10-22 PROCEDURE — 63600000 HC DRUGS/DETAIL CODE: Mod: JW | Performed by: INTERNAL MEDICINE

## 2022-10-22 PROCEDURE — 63700000 HC SELF-ADMINISTRABLE DRUG: Performed by: HOSPITALIST

## 2022-10-22 PROCEDURE — 36415 COLL VENOUS BLD VENIPUNCTURE: CPT | Performed by: INTERNAL MEDICINE

## 2022-10-22 PROCEDURE — 21400000 HC ROOM AND CARE CCU/INTERMEDIATE

## 2022-10-22 PROCEDURE — 99233 SBSQ HOSP IP/OBS HIGH 50: CPT | Performed by: HOSPITALIST

## 2022-10-22 PROCEDURE — 63600000 HC DRUGS/DETAIL CODE: Performed by: INTERNAL MEDICINE

## 2022-10-22 PROCEDURE — 85730 THROMBOPLASTIN TIME PARTIAL: CPT | Performed by: HOSPITALIST

## 2022-10-22 RX ORDER — DILTIAZEM HYDROCHLORIDE 90 MG/1
90 TABLET, FILM COATED ORAL EVERY 6 HOURS
Status: DISCONTINUED | OUTPATIENT
Start: 2022-10-22 | End: 2022-10-23

## 2022-10-22 RX ADMIN — METOPROLOL TARTRATE 100 MG: 50 TABLET, FILM COATED ORAL at 08:44

## 2022-10-22 RX ADMIN — ATORVASTATIN CALCIUM 10 MG: 10 TABLET, FILM COATED ORAL at 16:53

## 2022-10-22 RX ADMIN — FUROSEMIDE 10 MG: 10 INJECTION, SOLUTION INTRAMUSCULAR; INTRAVENOUS at 08:44

## 2022-10-22 RX ADMIN — DILTIAZEM HYDROCHLORIDE 90 MG: 90 TABLET, FILM COATED ORAL at 16:51

## 2022-10-22 RX ADMIN — HEPARIN SODIUM 1400 UNITS/HR: 10000 INJECTION, SOLUTION INTRAVENOUS at 08:44

## 2022-10-22 RX ADMIN — DILTIAZEM HYDROCHLORIDE 60 MG: 60 TABLET ORAL at 06:16

## 2022-10-22 RX ADMIN — DILTIAZEM HYDROCHLORIDE 60 MG: 60 TABLET ORAL at 12:14

## 2022-10-22 RX ADMIN — METOPROLOL TARTRATE 100 MG: 50 TABLET, FILM COATED ORAL at 20:15

## 2022-10-22 RX ADMIN — PANTOPRAZOLE SODIUM 40 MG: 40 TABLET, DELAYED RELEASE ORAL at 08:44

## 2022-10-22 RX ADMIN — HEPARIN SODIUM 1400 UNITS/HR: 10000 INJECTION, SOLUTION INTRAVENOUS at 06:02

## 2022-10-22 RX ADMIN — PANTOPRAZOLE SODIUM 40 MG: 40 TABLET, DELAYED RELEASE ORAL at 20:15

## 2022-10-22 NOTE — PROGRESS NOTES
Hospital Medicine Service -  Daily Progress Note       SUBJECTIVE   Interval History: No acute overnight events. Patient denies chest pain, palpitations, dyspnea. No hypoxia. He is leaning against ROLF now as he wants to go home. He will discuss with his wife tonight. Wife at bedside. All questions answered.      OBJECTIVE      Vital signs in last 24 hours:  Temp:  [36.3 °C (97.4 °F)-36.7 °C (98 °F)] 36.7 °C (98 °F)  Heart Rate:  [] 90  Resp:  [18-20] 18  BP: (106-125)/(66-77) 122/68  No intake or output data in the 24 hours ending 10/22/22 3366    PHYSICAL EXAMINATION      Physical Exam  Vitals and nursing note reviewed.   Constitutional:       General: He is not in acute distress.     Appearance: Normal appearance. He is well-developed.   HENT:      Head: Normocephalic and atraumatic.   Eyes:      Extraocular Movements: Extraocular movements intact.      Pupils: Pupils are equal, round, and reactive to light.   Cardiovascular:      Rate and Rhythm: Normal rate. Rhythm irregular.      Heart sounds: Normal heart sounds.   Pulmonary:      Effort: Pulmonary effort is normal. No respiratory distress.      Breath sounds: Normal breath sounds. No wheezing, rhonchi or rales.   Abdominal:      General: Bowel sounds are normal. There is no distension.      Palpations: Abdomen is soft.      Tenderness: There is no abdominal tenderness.   Musculoskeletal:         General: Swelling (2+ BLE pitting edema) present. Normal range of motion.      Cervical back: Normal range of motion and neck supple.   Skin:     General: Skin is warm and dry.   Neurological:      Mental Status: He is alert and oriented to person, place, and time.            LINES, CATHETERS, DRAINS, AIRWAYS, AND WOUNDS   Lines, Drains, and Airways:  Wounds (agree with documentation and present on admission):  Peripheral IV (Adult) 10/19/22 Right Antecubital (Active)   Number of days: 3         Comments:      LABS / IMAGING / TELE      Labs  Results from  last 7 days   Lab Units 10/22/22  0411   WBC K/uL 7.44   HEMOGLOBIN g/dL 9.7*   HEMATOCRIT % 29.9*   PLATELETS K/uL 320     Results from last 7 days   Lab Units 10/22/22  0411   SODIUM mEQ/L 141   POTASSIUM mEQ/L 4.3   CHLORIDE mEQ/L 110*   CO2 mEQ/L 23   BUN mg/dL 13   CREATININE mg/dL 1.4*   GLUCOSE mg/dL 128*   CALCIUM mg/dL 9.0     SARS-CoV-2 (COVID-19) (no units)   Date/Time Value   10/19/2022 1305 Negative       Imaging  I have independently reviewed the pertinent imaging from the last 24 hrs.    ECG/Telemetry  I have independently reviewed the telemetry. No events for the last 24 hours.    ASSESSMENT AND PLAN      * Atrial fibrillation, unspecified type (CMS/[facility])  Assessment & Plan  -new diagnosis. HR 90s.   -Continue Metoprolol. Dilt increased today per EP recs.   -Continue heparin gtt.   -Patient is reluctant for ROLF. He is considering outpatient f/u for cardioversion after 4 weeks of AC. F/U in AM.     Prostate cancer (CMS/[facility])  Assessment & Plan  -S/p prostatectomy    CKD (chronic kidney disease)  Assessment & Plan  Baseline appears to be within 1.1-1.4  Cr 1.4, at baseline  CKD stage 2      Gastrointestinal hemorrhage with melena  Assessment & Plan  S/p EGD-reviewed hospital which showed duodenal ulcers and gastric ulcers  Continue Protonix twice daily  Biopsies pending    Electrolyte abnormality  Assessment & Plan  Present including hypokalemia and hypomagnesemia    Plan:  CTM closely while getting IV diuresis  Replete to keep K>4 and Mag >2       Malignant neoplasm of right male breast (CMS/[facility])  Assessment & Plan  History of right-sided breast cancer Stage IIIB (cT4b, cN0, cM0, G3, ER+, DE+, HER2+), received chemotherapy on 10/10/2022, per patient will no longer receive chemotherapy  To follow-up with oncology outpatient    Acute CHF (CMS/[facility])  Assessment & Plan  Patient presenting with acute systolic heart failure in the setting of rapid atrial fibrillation  Patient is hesitant to take diuretics and  has only been in agreement with taking 10 mg of Lasix daily at this time  Continue to encourage the patient agreed to more diuretic  Continues to examine  volume overloaded  Most recent echo reveals an ejection fraction of 40 to 45% with left ventricular global hypokinesis    Keep K>4 and mag >2  Monitor I's/O, daily weights  Appreciate consult from EP       VTE Assessment: Padua VTE Score: 5  VTE Prophylaxis:  Current anticoagulants:  heparin (porcine) bolus from bag 3,000-5,950 Units, intravenous, q6h PRN  heparin infusion in 0.45%  units/mL, intravenous, Titrated      Code Status: Full Code  Palliative Care Screening Score: 1   Estimated Discharge Date: 10/25/2022     Disposition Planning: Home     Almaz Glynn DO  10/22/2022

## 2022-10-22 NOTE — PROGRESS NOTES
Electrophysiology Progress Note      Subjective   He is feeling better this morning.  He denies chest pain or dyspnea.  He continues to have lower extremity edema.    Inpatient medications:    atorvastatin, 10 mg, oral, Daily    glucose, 16-32 g of dextrose, oral, PRN **OR** dextrose, 15-30 g of dextrose, oral, PRN **OR** glucagon, 1 mg, intramuscular, PRN **OR** dextrose 50 % in water (D50), 25 mL, intravenous, PRN    dilTIAZem, 60 mg, oral, q6h GISELLE    furosemide, 10 mg, intravenous, Daily    heparin (porcine), 40-80 Units/kg (Adjusted), intravenous, q6h PRN    heparin infusion - MAR calculator by PTT, 100-4,000 Units/hr, intravenous, Titrated    metoprolol, 5 mg, intravenous, q5 min PRN    metoprolol tartrate, 100 mg, oral, BID    pantoprazole, 40 mg, oral, BID    Objective    Vitals:    10/22/22 1040   BP: 114/70   Pulse: 81   Resp: 18   Temp: 36.3 °C (97.4 °F)   SpO2: 100%     Physical Exam  General: No acute distress.  HEENT: Anicteric.  Moist mucous membranes.  Neck: Supple, no JVD.  Lungs: Clear to auscultation bilaterally.  Cardiac: Irregularly irregular.  No murmurs, rubs or gallops.  Abdomen: Soft, nontender.  Extremities: Pitting bilateral lower extremity edema to the knees.  Skin: Warm, dry.  Neurologic: Grossly intact.  Psychiatric: Behavior is appropriate and cooperative.       Laboratory results:  Results from last 7 days   Lab Units 10/22/22  0411 10/20/22  1103 10/19/22  1305   SODIUM mEQ/L 141   < > 140   POTASSIUM mEQ/L 4.3   < > 3.1*   CHLORIDE mEQ/L 110*   < > 111*   CO2 mEQ/L 23   < > 21*   BUN mg/dL 13   < > 13   CREATININE mg/dL 1.4*   < > 1.4*   CALCIUM mg/dL 9.0   < > 8.1*   ALBUMIN g/dL  --   --  3.0*   BILIRUBIN TOTAL mg/dL  --   --  0.3   ALK PHOS IU/L  --   --  82   ALT IU/L  --   --  32   AST IU/L  --   --  40   GLUCOSE mg/dL 128*   < > 107*    < > = values in this interval not displayed.     Results from last 7 days   Lab Units 10/22/22  0411   WBC K/uL 7.44   HEMOGLOBIN g/dL  9.7*   HEMATOCRIT % 29.9*   PLATELETS K/uL 320     Results from last 7 days   Lab Units 10/20/22  1425   INR  1.1           Imaging        Telemetry  Atrial fibrillation, ventricular rates  bpm     * Atrial fibrillation, unspecified type (CMS/HCC)  Assessment & Plan  This is a 72-year-old gentleman with recently diagnosed new onset atrial fibrillation with rapid ventricular rates.  He was discharged from [facility] last week on Toprol-XL 100mg BID and rates have been poorly controlled.  He was not anticoagulated due to recent melena requiring transfusion.  EGD at the outside hospital showed no active bleeding but shallow gastric, duodenal ulcers.     After a long discussion on 10/20/2022 with the patient and his wife, he agreed to trialing IV heparin with a plan for ROLF guided cardioversion if he tolerated anticoagulation.  He has been on heparin without overt signs of bleeding and hemoglobin is overall stable, however, he refused to proceed with ROLF guided cardioversion on Friday 10/21/22.  He understands the ongoing risk for worsening heart failure as his rates are poorly controlled despite maximal beta-blocker and addition of diltiazem.      -Continue metoprolol 100 mg twice daily.  Increase diltiazem to 90 mg every 6 hours.   -Transition heparin to DOAC  -He is reconsidering ROLF cardioversion and may proceed on Monday.  He will discuss further with his family over the weekend      Acute CHF (CMS/[facility])  Assessment & Plan  He presents with acute decompensated systolic heart failure.  Transthoracic echocardiogram on 10/18/2022 at outside hospital showed an ejection fraction of 40-45%, global left ventricular hypokinesis, mildly dilated left atrium.  He remains volume overloaded after only agreeing to 10 mg of Lasix.  As above, he refused ROLF cardioversion on Friday but may reconsider for Monday.  He needs more aggressive diuresis if he will agree. Replete potassium above 4 and magnesium above 2.             Thank you for allowing me to participate in the care of your patient, please feel free to contact me with any questions or concerns.     Lanie Sevilla MD  10/22/2022

## 2022-10-22 NOTE — PLAN OF CARE
Plan of Care Review  Plan of Care Reviewed With: patient  Progress: no change  Outcome Summary: no changes in assessment, bed alarm on call bell in reach  heparin drip unchanged at 1400 units/hr. ambulated to bathroom for hygiene am care. no concerns or complains trhoughout my shift. denied pain/sob/cp. voided in bathroom - clear yellow urine, no bowel movement today.

## 2022-10-22 NOTE — PLAN OF CARE
Plan of Care Review  Plan of Care Reviewed With: patient  Progress: improving  Outcome Summary: VSS  satble. Patient received in the room awake and responsive. Safety checks maintained. Patient remain on heparin drip and infusing without any complication. Safety checks maintained. Call light within reach.

## 2022-10-23 VITALS
RESPIRATION RATE: 18 BRPM | HEART RATE: 90 BPM | DIASTOLIC BLOOD PRESSURE: 70 MMHG | TEMPERATURE: 98.5 F | HEIGHT: 66 IN | OXYGEN SATURATION: 97 % | BODY MASS INDEX: 32.11 KG/M2 | WEIGHT: 199.8 LBS | SYSTOLIC BLOOD PRESSURE: 120 MMHG

## 2022-10-23 LAB
ANION GAP SERPL CALC-SCNC: 6 MEQ/L (ref 3–15)
APTT PPP: 131 SEC (ref 23–35)
BUN SERPL-MCNC: 12 MG/DL (ref 8–20)
CALCIUM SERPL-MCNC: 8.8 MG/DL (ref 8.9–10.3)
CHLORIDE SERPL-SCNC: 109 MEQ/L (ref 98–109)
CO2 SERPL-SCNC: 25 MEQ/L (ref 22–32)
CREAT SERPL-MCNC: 1.6 MG/DL (ref 0.8–1.3)
ERYTHROCYTE [DISTWIDTH] IN BLOOD BY AUTOMATED COUNT: 15.5 % (ref 11.6–14.4)
GFR SERPL CREATININE-BSD FRML MDRD: 42.7 ML/MIN/1.73M*2
GLUCOSE SERPL-MCNC: 116 MG/DL (ref 70–99)
HCT VFR BLDCO AUTO: 29.6 % (ref 40.1–51)
HGB BLD-MCNC: 9.5 G/DL (ref 13.7–17.5)
MAGNESIUM SERPL-MCNC: 1.8 MG/DL (ref 1.8–2.5)
MCH RBC QN AUTO: 33.5 PG (ref 28–33.2)
MCHC RBC AUTO-ENTMCNC: 32.1 G/DL (ref 32.2–36.5)
MCV RBC AUTO: 104.2 FL (ref 83–98)
PDW BLD AUTO: 9.2 FL (ref 9.4–12.4)
PLATELET # BLD AUTO: 279 K/UL (ref 150–350)
POTASSIUM SERPL-SCNC: 3.4 MEQ/L (ref 3.6–5.1)
RBC # BLD AUTO: 2.84 M/UL (ref 4.5–5.8)
SODIUM SERPL-SCNC: 140 MEQ/L (ref 136–144)
WBC # BLD AUTO: 6.06 K/UL (ref 3.8–10.5)

## 2022-10-23 PROCEDURE — 63700000 HC SELF-ADMINISTRABLE DRUG: Performed by: HOSPITALIST

## 2022-10-23 PROCEDURE — 99233 SBSQ HOSP IP/OBS HIGH 50: CPT | Performed by: INTERNAL MEDICINE

## 2022-10-23 PROCEDURE — 80048 BASIC METABOLIC PNL TOTAL CA: CPT | Performed by: INTERNAL MEDICINE

## 2022-10-23 PROCEDURE — 63700000 HC SELF-ADMINISTRABLE DRUG: Performed by: INTERNAL MEDICINE

## 2022-10-23 PROCEDURE — 85027 COMPLETE CBC AUTOMATED: CPT | Performed by: INTERNAL MEDICINE

## 2022-10-23 PROCEDURE — 83735 ASSAY OF MAGNESIUM: CPT | Performed by: INTERNAL MEDICINE

## 2022-10-23 PROCEDURE — 36415 COLL VENOUS BLD VENIPUNCTURE: CPT | Performed by: HOSPITALIST

## 2022-10-23 PROCEDURE — 63600000 HC DRUGS/DETAIL CODE: Mod: JW | Performed by: INTERNAL MEDICINE

## 2022-10-23 PROCEDURE — 99239 HOSP IP/OBS DSCHRG MGMT >30: CPT | Performed by: INTERNAL MEDICINE

## 2022-10-23 PROCEDURE — 85730 THROMBOPLASTIN TIME PARTIAL: CPT | Performed by: HOSPITALIST

## 2022-10-23 PROCEDURE — 63600000 HC DRUGS/DETAIL CODE: Performed by: INTERNAL MEDICINE

## 2022-10-23 RX ORDER — DILTIAZEM HYDROCHLORIDE 300 MG/1
300 CAPSULE, EXTENDED RELEASE ORAL DAILY
Qty: 30 CAPSULE | Refills: 0 | Status: SHIPPED | OUTPATIENT
Start: 2022-10-23 | End: 2022-11-17 | Stop reason: SDUPTHER

## 2022-10-23 RX ORDER — FUROSEMIDE 20 MG/1
20 TABLET ORAL DAILY
Qty: 30 TABLET | Refills: 0 | Status: SHIPPED | OUTPATIENT
Start: 2022-10-23 | End: 2022-11-17 | Stop reason: SDUPTHER

## 2022-10-23 RX ORDER — POTASSIUM CHLORIDE 750 MG/1
10 TABLET, EXTENDED RELEASE ORAL DAILY
Qty: 30 TABLET | Refills: 0 | Status: SHIPPED | OUTPATIENT
Start: 2022-10-23 | End: 2022-11-17 | Stop reason: SDUPTHER

## 2022-10-23 RX ADMIN — DILTIAZEM HYDROCHLORIDE 300 MG: 180 CAPSULE, COATED, EXTENDED RELEASE ORAL at 13:49

## 2022-10-23 RX ADMIN — PANTOPRAZOLE SODIUM 40 MG: 40 TABLET, DELAYED RELEASE ORAL at 08:57

## 2022-10-23 RX ADMIN — METOPROLOL TARTRATE 100 MG: 50 TABLET, FILM COATED ORAL at 08:57

## 2022-10-23 RX ADMIN — HEPARIN SODIUM 1250 UNITS/HR: 10000 INJECTION, SOLUTION INTRAVENOUS at 07:03

## 2022-10-23 RX ADMIN — DILTIAZEM HYDROCHLORIDE 90 MG: 90 TABLET, FILM COATED ORAL at 05:58

## 2022-10-23 RX ADMIN — FUROSEMIDE 10 MG: 10 INJECTION, SOLUTION INTRAMUSCULAR; INTRAVENOUS at 09:00

## 2022-10-23 RX ADMIN — DILTIAZEM HYDROCHLORIDE 90 MG: 90 TABLET, FILM COATED ORAL at 00:17

## 2022-10-23 NOTE — PROGRESS NOTES
Electrophysiology Progress Note      Subjective   He continues to decline ROLF cardioversion which was rescheduled for tomorrow after he refused on Friday.  He would like to go home today.    Inpatient medications:    atorvastatin, 10 mg, oral, Daily    glucose, 16-32 g of dextrose, oral, PRN **OR** dextrose, 15-30 g of dextrose, oral, PRN **OR** glucagon, 1 mg, intramuscular, PRN **OR** dextrose 50 % in water (D50), 25 mL, intravenous, PRN    dilTIAZem, 90 mg, oral, q6h GISELLE    furosemide, 10 mg, intravenous, Daily    heparin (porcine), 40-80 Units/kg (Adjusted), intravenous, q6h PRN    heparin infusion - MAR calculator by PTT, 100-4,000 Units/hr, intravenous, Titrated    metoprolol, 5 mg, intravenous, q5 min PRN    metoprolol tartrate, 100 mg, oral, BID    pantoprazole, 40 mg, oral, BID    Objective    Vitals:    10/23/22 0804   BP: 100/65   Pulse: 78   Resp: 18   Temp: 36.7 °C (98.1 °F)   SpO2: 95%     Physical Exam  General: No acute distress.  HEENT: Anicteric.  Moist mucous membranes.  Neck: Supple, no JVD.  Lungs: Clear to auscultation bilaterally.  Cardiac: Irregularly irregular.  No murmurs, rubs or gallops.  Abdomen: Soft, nontender.  Extremities: Pitting bilateral lower extremity edema to the knees.  Skin: Warm, dry.  Neurologic: Grossly intact.  Psychiatric: Behavior is appropriate and cooperative.       Laboratory results:  Results from last 7 days   Lab Units 10/23/22  0505 10/20/22  1103 10/19/22  1305   SODIUM mEQ/L 140   < > 140   POTASSIUM mEQ/L 3.4*   < > 3.1*   CHLORIDE mEQ/L 109   < > 111*   CO2 mEQ/L 25   < > 21*   BUN mg/dL 12   < > 13   CREATININE mg/dL 1.6*   < > 1.4*   CALCIUM mg/dL 8.8*   < > 8.1*   ALBUMIN g/dL  --   --  3.0*   BILIRUBIN TOTAL mg/dL  --   --  0.3   ALK PHOS IU/L  --   --  82   ALT IU/L  --   --  32   AST IU/L  --   --  40   GLUCOSE mg/dL 116*   < > 107*    < > = values in this interval not displayed.     Results from last 7 days   Lab Units 10/23/22  0505   WBC  K/uL 6.06   HEMOGLOBIN g/dL 9.5*   HEMATOCRIT % 29.6*   PLATELETS K/uL 279     Results from last 7 days   Lab Units 10/20/22  1425   INR  1.1           Imaging      Telemetry  Atrial fibrillation, ventricular rates  bpm     * Atrial fibrillation, unspecified type (CMS/HCC)  Assessment & Plan  This is a 72-year-old gentleman with recently diagnosed new onset atrial fibrillation with rapid ventricular rates.  He was discharged from [facility] last week on Toprol-XL 100mg BID and rates have been poorly controlled.  He was not anticoagulated due to recent melena requiring transfusion.  EGD at the outside hospital showed no active bleeding but shallow gastric, duodenal ulcers.     After a long discussion on 10/20/2022 with the patient and his wife, he agreed to trialing IV heparin with a plan for ROLF guided cardioversion if he tolerated anticoagulation.  He has been on heparin without overt signs of bleeding and hemoglobin is overall stable, however, he refused to proceed with ROLF guided cardioversion on Friday 10/21/22.  He understands the ongoing risk for worsening heart failure but continues to refuse ROLF/cardioversion at this time.      -Continue metoprolol 100 mg twice daily.  Changed to long-acting diltiazem 360 mg daily.   -Transition heparin to DOAC  -He again declines ROLF cardioversion and will go home on 4 weeks of anticoagulation and reconsider cardioversion at that point.  -He was supposed to see Dr. Golden as a new patient last week but was hospitalized and the appointment was canceled.  We will also arrange EP follow-up.    Acute CHF (CMS/[facility])  Assessment & Plan  He presents with acute decompensated systolic heart failure.  Transthoracic echocardiogram on 10/18/2022 at outside hospital showed an ejection fraction of 40-45%, global left ventricular hypokinesis, mildly dilated left atrium.  He remains volume overloaded after only agreeing to 10 mg of Lasix.  As above, he refused ROLF  cardioversion on Friday and still does not want to proceed tomorrow.  He needs more aggressive diuresis if he will agree.  He also needs more aggressive potassium repletion-replete potassium above 4 and magnesium above 2.            Thank you for allowing me to participate in the care of your patient, please feel free to contact me with any questions or concerns.     Lanie Sevilla MD  10/23/2022

## 2022-10-23 NOTE — DISCHARGE SUMMARY
[facility] Medicine Service -  Inpatient Discharge Summary        BRIEF OVERVIEW   Admitting Provider: Chato Newell MD  Attending Provider: Sulema Goodwin,* Attending phys phone: [phone]    PCP: Usman Collins [phone]    Admission Date: 10/19/2022  Discharge Date: 10/23/2022     DISCHARGE DIAGNOSES      Primary Discharge Diagnosis  Atrial fibrillation, unspecified type (CMS/HCC)    Secondary Discharge Diagnoses  Active Hospital Problems    Diagnosis Date Noted    Atrial fibrillation, unspecified type (CMS/HCC) 10/19/2022     Priority: High    Acute CHF (CMS/HCC) 10/19/2022    Malignant neoplasm of right male breast (CMS/HCC) 10/19/2022    Electrolyte abnormality 10/19/2022    Gastrointestinal hemorrhage with melena 10/19/2022    CKD (chronic kidney disease) 10/19/2022    Prostate cancer (CMS/HCC) 10/19/2022      Resolved Hospital Problems   No resolved problems to display.       Problem List on Day of Discharge  * Atrial fibrillation, unspecified type (CMS/HCC)  Assessment & Plan  -new diagnosis. HR 90s.   -Continue Metoprolol. Dilt increased today per EP recs.   -DC   heparin gtt. TRANSITION TO XARELTO . Eliquis not covered . So, dc d , and pt will take xarelto from tomorrow   -Patient is reluctant for ROLF. He is considering outpatient f/u for cardioversion after 4 weeks of AC. F/U in AM.     Prostate cancer (CMS/HCC)  Assessment & Plan  -S/p prostatectomy    CKD (chronic kidney disease)  Assessment & Plan  Baseline appears to be within 1.1-1.4  Cr 1.4, at baseline  CKD stage 2      Gastrointestinal hemorrhage with melena  Assessment & Plan  S/p EGD-reviewed hospital which showed duodenal ulcers and gastric ulcers  Continue Protonix twice daily  Biopsies pending    Electrolyte abnormality  Assessment & Plan  Present including hypokalemia and hypomagnesemia    Plan:  CTM closely while getting IV diuresis  Replete to keep K>4 and Mag >2       Malignant neoplasm of right male  breast (CMS/[facility])  Assessment & Plan  History of right-sided breast cancer Stage IIIB (cT4b, cN0, cM0, G3, ER+, CA+, HER2+), received chemotherapy on 10/10/2022, per patient will no longer receive chemotherapy  To follow-up with oncology outpatient    Acute CHF (CMS/[facility])  Assessment & Plan  Patient presenting with acute systolic heart failure in the setting of rapid atrial fibrillation  Patient is hesitant to take diuretics and has only been in agreement with taking 10 mg of Lasix daily at this time  Continue to encourage the patient agreed to more diuretic  Continues to examine  volume overloaded  Most recent echo reveals an ejection fraction of 40 to 45% with left ventricular global hypokinesis    Keep K>4 and mag >2  Monitor I's/O, daily weights  Appreciate consult from EP    SUMMARY OF HOSPITALIZATION      Presenting Problem/History of Present Illness  This is a 72 y.o. year-old male admitted on 10/19/2022 with Hypokalemia [E87.6]  Hypomagnesemia [E83.42]  Atrial fibrillation, unspecified type (CMS/[facility]) [I48.91]  Acute congestive heart failure, unspecified heart failure type (CMS/[facility]) [I50.9].      Hospital Course  Cam Rosas is a 72 y.o. male with a past medical history of prostate cancer s/p prostatectomy, right breast cancer, chronic kidney disease, mitral valve ring placement, atrial fibrillation [recently diagnosed], who presents with progressively worsening shortness of breath, leg swelling, abnormal echocardiogram performed on 10/18/2022.     Patient was recently admitted and managed at [facility] for shortness of breath and palpitations which started after receiving his first dose of chemotherapy for breast cancer, he was found to be in A. fib with RVR was treated with a Cardizem drip and discharged on p.o. metoprolol to undergo an echocardiogram which she had done on 10/18/2022 which showed an ejection fraction of 40 to 45% also with global left ventricular hypokinesis, mildly dilated  LA.  His hospital course at [facility] was complicated by melena stools, an EGD performed by GI showed duodenal ulcers and gastric ulcers which were biopsied.  He was started on Protonix. He has no further episodes of melena stool and no michelle bleeding.    He is admitted with new onset atrial fibrillation . He is very reluctant to pursue ROLF and is considering dc home with outpatient f/u for cardioversion in a few weeks.   We discussed  with EP,  As he is not agreable for cardioversion , We discontinued heparin drip, transitioned him to xarelto , and discharging him home today . He reluctantly agreed to use lasix po 20 mg q  Daily .  He will follow up with cardiology & EP to discuss cardioversion again in 4 wks . He was switched from cardizem drip to 360 mg extended release cardizem po q daily along with his metoprolol.          Exam on Day of Discharge  Physical Exam  Constitutional:       Appearance: Normal appearance.   HENT:      Head: Normocephalic and atraumatic.      Nose: Nose normal.   Eyes:      Extraocular Movements: Extraocular movements intact.   Cardiovascular:      Comments: Irreg. Rhythm   Pulmonary:      Effort: Pulmonary effort is normal.   Abdominal:      Palpations: Abdomen is soft.   Musculoskeletal:         General: Swelling present.   Skin:     General: Skin is warm.   Neurological:      General: No focal deficit present.      Mental Status: He is alert.   Psychiatric:         Mood and Affect: Mood normal.         Consults During Admission  IP CONSULT TO ELECTROPHYSIOLOGY  IP CONSULT TO CASE MANAGEMENT  IP CONSULT TO NUTRITION SERVICES  IP CONSULT TO CASE MANAGEMENT    DISCHARGE MEDICATIONS               Medication List      START taking these medications    diltiazem 300 mg 24 hr capsule  Commonly known as: TIAZAC  Take 1 capsule (300 mg total) by mouth daily.  Dose: 300 mg     furosemide 20 mg tablet  Commonly known as: LASIX  Take 1 tablet (20 mg total) by mouth daily.  Dose: 20  mg     potassium chloride 10 mEq CR tablet  Commonly known as: KLOR-CON  Take 1 tablet (10 mEq total) by mouth daily.  Dose: 10 mEq     rivaroxaban 15 mg tablet  Commonly known as: XARELTO  Start taking on: October 24, 2022  Take 1 tablet (15 mg total) by mouth daily with dinner Indications: treatment to prevent blood clots in chronic atrial fibrillation.  Dose: 15 mg        CONTINUE taking these medications    lidocaine-prilocaine cream  Commonly known as: EMLA  Apply 1 application topically as needed for pain.  Dose: 1 application     metoprolol succinate  mg 24 hr tablet  Commonly known as: TOPROL-XL  Take 100 mg by mouth 2 (two) times a day.  Dose: 100 mg     pantoprazole 40 mg EC tablet  Commonly known as: PROTONIX  Take 40 mg by mouth 2 (two) times a day.  Dose: 40 mg     simvastatin 20 mg tablet  Commonly known as: ZOCOR  Take 20 mg by mouth nightly.  Dose: 20 mg        STOP taking these medications    hydrochlorothiazide 25 mg tablet  Commonly known as: HYDRODIURIL             Pl. Note , diltiazem dose has been changed to 360 mg long acting . - called the pharmacy .        PROCEDURES / LABS / IMAGING      Operative Procedures      Other Procedures      Pertinent Labs      SARS-CoV-2 (COVID-19) (no units)   Date/Time Value   10/19/2022 1305 Negative       Pertinent Imaging  CT ANGIOGRAPHY CHEST PULMONARY EMBOLISM WITH IV CONTRAST    Result Date: 10/19/2022  IMPRESSION: 1.  No pulmonary embolism. No active disease in the chest. 2.  Suspicion for mild colitis in the visualized transverse colon     X-RAY CHEST 1 VIEW    Result Date: 10/19/2022  IMPRESSION: 1.  No acute disease seen in the chest. 2.  Left chest wall Port-A-Cath with tip at the cavoatrial junction. 3.  Status post valvular repair. COMMENT: Support lines, tubes, devices, and surgical hardware, material: Left chest wall Port-A-Cath as above. Valvular prosthesis. Monitoring leads. Lungs and Pleura: Minimal subsegmental atelectasis at the left  lung base. No consolidation or effusion. No pneumothorax. Cardiovascular and Mediastinum: The cardiomediastinal silhouette is stable in size noting postoperative change Other: Degenerative changes of the visualized spine. CLINICAL HISTORY:   a fib PROCEDURE: Single frontal view of the chest. COMPARISON:   5/18/2009       OUTPATIENT  FOLLOW-UP / REFERRALS / PENDING TESTS        Outpatient Follow-Up Appointments            Tomorrow [facility] EP LAB 2; [facility] ROLF [facility] Cardiology          Referrals  No orders of the defined types were placed in this encounter.      Test Results Pending at Discharge  Unresulted Labs (From admission, onward)     Start     Ordered    10/21/22 0600  Basic metabolic panel  Daily      Question:  Release to patient  Answer:  Immediate   Start Status   10/24/22 0600 Scheduled   10/25/22 0600 Scheduled   10/26/22 0600 Scheduled   10/27/22 0600 Scheduled       10/20/22 1229    10/21/22 0600  Magnesium  Daily      Question:  Release to patient  Answer:  Immediate   Start Status   10/24/22 0600 Scheduled   10/25/22 0600 Scheduled   10/26/22 0600 Scheduled   10/27/22 0600 Scheduled       10/20/22 1229    10/21/22 0600  CBC  Daily      Question:  Release to patient  Answer:  Immediate   Start Status   10/24/22 0600 Scheduled   10/25/22 0600 Scheduled   10/26/22 0600 Scheduled   10/27/22 0600 Scheduled       10/20/22 1230                Important Issues to Address in Follow-  Follow up with   & [facility] - Dr.Collen Sevilla     DISCHARGE DISPOSITION AND DESTINATION      Disposition: Home   Destination: Home                          OK for d/c. See d/c summary.  Total time of discharge planning and counseling 35 minutes.  NOTE: Some or all of the note above was created with the use of dictation software, please note this dictation software can have anomalies in its ability to transcribe. Please contact me directly for anything that seems abnormal for clarification.          Code Status At  Discharge: Full Code    Physician Order for Life-Sustaining Treatment Document Status      No documents found

## 2022-10-23 NOTE — NURSING NOTE
"Discharge pending for prcing of home meds - alvarez from CM to price meds \"will call the pharmacy @ 1300 today\" pt and famliy informed  "

## 2022-10-23 NOTE — DISCHARGE INSTRUCTIONS
You were started on a blood thinner . If you notice any bleeding in luigi, or urine , pl. Seek medical treatment . You have to follow up with  both cardiology & Electrophysiology doctors to follow up for your atrial fibrillation . Continue anticoagulation , and discuss cardioversion with your next appointment. .    will go home on 4 weeks of anticoagulation and reconsider cardioversion at that point.   You were  started on 20 mg po lasix . Make sure   you get regular lab work  with your doctor / pcp .   Dilatizem dose has been changed to 360 mg - a verbal order given to pharmacy.

## 2022-10-23 NOTE — NURSING NOTE
Discharge instruction given to patient and family - all questions answered. Coupon given for xarelto. All belongings sent with pt.

## 2022-10-23 NOTE — PLAN OF CARE
Plan of Care Review  Plan of Care Reviewed With: patient  Progress: no change  Outcome Summary: Patient  received in the room sitting on the chair, awake ,alert and responsive. No pain or discomfort. Heparin infusion continues with no reaction observed. VSS stable. Safety checks maintained. Call light within reach.

## 2022-10-23 NOTE — PLAN OF CARE
Problem: Adult Inpatient Plan of Care  Goal: Plan of Care Review  Outcome: Progressing     On Call RN Care Coordination Note     Contacted a local [facility] about PO Eliquis-pt copay=$335/month-pt deducible has not been met yet? Start Coumadin. D/C date home: TBD.     Addendum    1410 PO Xarelto @ [facility]-pt copay=$46/month-30 day free coupon could be used-pt copay=$0-medication is available & ready for . Eliquis prescription cancelled @ [facility].

## 2022-10-24 ENCOUNTER — TELEPHONE (OUTPATIENT)
Dept: SCHEDULING | Facility: CLINIC | Age: 72
End: 2022-10-24
Payer: MEDICARE

## 2022-10-24 NOTE — TELEPHONE ENCOUNTER
Patient recently D/C'ed from [facility]. Needed sooner appt. I scheduled him at [facility] office.

## 2022-10-24 NOTE — TELEPHONE ENCOUNTER
New Patient Appointment Request    Name of caller:  Fiorella     Reason for Visit:  A fib      Insurance:Medicare       Recent Procedures:  Prostate removed 7/2022     Referred by: n/a     Previous Cardiologist name and phone number: n/a     Best contact number:  [phone]     Additional [facility] or  [facility] location

## 2022-11-04 ENCOUNTER — OFFICE VISIT (OUTPATIENT)
Dept: CARDIOLOGY | Facility: CLINIC | Age: 72
End: 2022-11-04
Payer: MEDICARE

## 2022-11-04 VITALS
SYSTOLIC BLOOD PRESSURE: 118 MMHG | WEIGHT: 197 LBS | BODY MASS INDEX: 31.66 KG/M2 | HEART RATE: 81 BPM | RESPIRATION RATE: 16 BRPM | DIASTOLIC BLOOD PRESSURE: 64 MMHG | HEIGHT: 66 IN

## 2022-11-04 DIAGNOSIS — I50.21 ACUTE SYSTOLIC CONGESTIVE HEART FAILURE (CMS/HCC): ICD-10-CM

## 2022-11-04 DIAGNOSIS — I48.91 ATRIAL FIBRILLATION, UNSPECIFIED TYPE (CMS/HCC): Primary | ICD-10-CM

## 2022-11-04 DIAGNOSIS — K92.1 GASTROINTESTINAL HEMORRHAGE WITH MELENA: ICD-10-CM

## 2022-11-04 PROCEDURE — 99215 OFFICE O/P EST HI 40 MIN: CPT | Performed by: INTERNAL MEDICINE

## 2022-11-04 PROCEDURE — 93000 ELECTROCARDIOGRAM COMPLETE: CPT | Performed by: INTERNAL MEDICINE

## 2022-11-04 NOTE — PATIENT INSTRUCTIONS
Take furosemide (Lasix) 20 mg daily.    Check weight every day - when weight decreases by ~10 lbs, take lasix every other day. If weight decreases by more than 4 lbs, skip that days Lasix    Have blood checked next week.

## 2022-11-04 NOTE — PROGRESS NOTES
Electrophysiology  Outpatient Consult Note         HPI   Cam Rosas is a 72 y.o. male who is seen today for further evaluation and management of his newly diagnosed atrial fibrillation.  He was recently admitted to [facility] (10/19-10/23) with acute decompensated systolic heart failure. He was recently diagnosed with atrial fibrillation when he was seen at [facility].  He was placed on metoprolol and discharged.  He was not anticoagulated due to recent melena requiring transfusion.  EGD at outside hospital showed no active bleeding.    While admitted he started on IV diltiazem in the ED and was noted to be both hypokalemic and hypomagnesemic.  He was placed on IV heparin with close monitoring of his hemoglobin and discussed possible ROLF guided cardioversion. He then refused ROLF guided cardioversion and wanted to be discharged home.  He was discharged on metoprolol, diltiazem, and Xarelto with plans for cardioversion in 4 weeks.      He has a past medical history of CKD, breast cancer on chemotherapy melena, and history of mitral annular ring placement by Dr. Pastor in 2006.    Since his discharge he has continued to have lower extremity edema and has been taking variable doses of diuretics.  He has been prescribed 20 mg of furosemide, however was told to take only half a tablet.  When occasional times he took 20 mg but did not notice any abrupt response.  His weight prior to developing symptoms of heart failure was about 186 pounds, and more recently home his weights been 195 pounds.    Past Medical History:   Diagnosis Date    Atrial fibrillation (CMS/[facility])        History reviewed. No pertinent surgical history.    Allergies: Azithromycin and Lorazepam    Current Outpatient Medications   Medication Sig Dispense Refill    dilTIAZem CD (TIAZAC) 300 mg 24 hr capsule Take 1 capsule (300 mg total) by mouth daily. (Patient taking differently: Take 300 mg by mouth daily. 360mg daily) 30 capsule 0     furosemide (LASIX) 20 mg tablet Take 1 tablet (20 mg total) by mouth daily. 30 tablet 0    lidocaine-prilocaine (EMLA) cream Apply 1 application topically as needed for pain.      metoprolol succinate XL (TOPROL-XL) 100 mg 24 hr tablet Take 100 mg by mouth 2 (two) times a day.      pantoprazole (PROTONIX) 40 mg EC tablet Take 40 mg by mouth 2 (two) times a day.      potassium chloride (KLOR-CON) 10 mEq CR tablet Take 1 tablet (10 mEq total) by mouth daily. 30 tablet 0    rivaroxaban (XARELTO) 15 mg tablet Take 1 tablet (15 mg total) by mouth daily with dinner Indications: treatment to prevent blood clots in chronic atrial fibrillation. 30 tablet 0    simvastatin (ZOCOR) 20 mg tablet Take 20 mg by mouth nightly.       No current facility-administered medications for this visit.       Social History     Tobacco Use    Smoking status: Never    Smokeless tobacco: Never       History reviewed. No pertinent family history.    Review of Systems   Constitutional: Negative for malaise/fatigue.   HENT: Negative for hearing loss.    Eyes: Negative for visual disturbance.   Cardiovascular: Positive for dyspnea on exertion, irregular heartbeat and leg swelling. Negative for chest pain, near-syncope, orthopnea, palpitations, paroxysmal nocturnal dyspnea and syncope.   Respiratory: Negative for cough and shortness of breath.    Hematologic/Lymphatic: Negative for bleeding problem.   Skin: Negative for rash.   Musculoskeletal: Negative for joint pain and muscle weakness.   Gastrointestinal: Negative for abdominal pain.   Genitourinary: Negative for hematuria.   Neurological: Negative for focal weakness, paresthesias, tremors and weakness.   Psychiatric/Behavioral: Negative for altered mental status.       Objective     Vitals:    11/04/22 1428   BP: 118/64   Pulse: 81   Resp: 16       Physical Exam  Constitutional:       Appearance: He is well-developed.   HENT:      Head: Normocephalic and atraumatic.   Neck:       Vascular: No JVD.   Cardiovascular:      Rate and Rhythm: Normal rate. Rhythm irregular.      Heart sounds: No murmur heard.  Pulmonary:      Breath sounds: Normal breath sounds.   Abdominal:      General: Bowel sounds are normal.      Palpations: Abdomen is soft.      Tenderness: There is no abdominal tenderness.   Musculoskeletal:      Right lower leg: Edema present.      Left lower leg: Edema present.   Skin:     General: Skin is warm and dry.   Neurological:      Mental Status: He is alert and oriented to person, place, and time.          Labs   Lab Results   Component Value Date    WBC 6.06 10/23/2022    HGB 9.5 (L) 10/23/2022    HCT 29.6 (L) 10/23/2022     10/23/2022    ALT 32 10/19/2022    AST 40 10/19/2022     10/23/2022    K 3.4 (L) 10/23/2022     10/23/2022    CREATININE 1.6 (H) 10/23/2022    BUN 12 10/23/2022    CO2 25 10/23/2022    INR 1.1 10/20/2022       IMAGING   Transthoracic echocardiogram on 10/18/2022 at outside hospital showed an ejection fraction of 40-45%, global left ventricular hypokinesis, mildly dilated left atrium.    ECG   Atrial fibrillation with left-sided ventricular conduction delay nonspecific ST-T normalities.    Assessment/Plan   Problem List Items Addressed This Visit        Circulatory    Atrial fibrillation, unspecified type (CMS/HCC) - Primary     Patient has new onset atrial fibrillation with association of worsening heart failure.  At a prior hospitalization anticoagulation was not initiated due to GI bleed.  He is currently tolerating his oral anticoagulants.  We will watch closely for bleeding.  During the most recent hospitalization he was reluctant undergo transesophageal ultrasonic cardioversion, so therefore we will continue rate control strategy with plan on cardioversion after 3 to 4 weeks of adequate coagulation.         Relevant Orders    ECG 12 LEAD-OFFICE PERFORMED (Completed)    Basic metabolic panel    Acute CHF (CMS/HCC)     Patient remains  volume overloaded.  We will increase his furosemide to 20 mg daily.  If there is no significant weight loss we may need to increase this to 40 mg.  If however his weight does decrease significantly, we will begin the taper back on diuretics.  We are aiming for about a 10 pound weight loss over the next week.            Digestive    Gastrointestinal hemorrhage with melena     Patient has history of gastric ulcers.  He is on Protonix I will be followed closely for any bleeding.            Gary Aceves MD  11/6/2022

## 2022-11-06 ASSESSMENT — ENCOUNTER SYMPTOMS
ABDOMINAL PAIN: 0
SYNCOPE: 0
IRREGULAR HEARTBEAT: 1
PARESTHESIAS: 0
ORTHOPNEA: 0
PALPITATIONS: 0
DYSPNEA ON EXERTION: 1
FOCAL WEAKNESS: 0
HEMATURIA: 0
TREMORS: 0
NEAR-SYNCOPE: 0
PND: 0
WEAKNESS: 0
SHORTNESS OF BREATH: 0
COUGH: 0
ALTERED MENTAL STATUS: 0

## 2022-11-06 NOTE — ASSESSMENT & PLAN NOTE
Patient has new onset atrial fibrillation with association of worsening heart failure.  At a prior hospitalization anticoagulation was not initiated due to GI bleed.  He is currently tolerating his oral anticoagulants.  We will watch closely for bleeding.  During the most recent hospitalization he was reluctant undergo transesophageal ultrasonic cardioversion, so therefore we will continue rate control strategy with plan on cardioversion after 3 to 4 weeks of adequate coagulation.

## 2022-11-06 NOTE — ASSESSMENT & PLAN NOTE
Patient has history of gastric ulcers.  He is on Protonix I will be followed closely for any bleeding.

## 2022-11-06 NOTE — ASSESSMENT & PLAN NOTE
Patient remains volume overloaded.  We will increase his furosemide to 20 mg daily.  If there is no significant weight loss we may need to increase this to 40 mg.  If however his weight does decrease significantly, we will begin the taper back on diuretics.  We are aiming for about a 10 pound weight loss over the next week.

## 2022-11-07 ENCOUNTER — TELEPHONE (OUTPATIENT)
Dept: SCHEDULING | Facility: CLINIC | Age: 72
End: 2022-11-07
Payer: MEDICARE

## 2022-11-07 NOTE — TELEPHONE ENCOUNTER
Medical Records Request     Name of caller: Fiorella    Relationship to patient: Spouse    Whos requesting copy of medical records? Fiorella    Records being requested: Recent lab slip    Fax number: [phone]    Best contact number: [phone]

## 2022-11-10 ENCOUNTER — OFFICE VISIT (OUTPATIENT)
Dept: HEMATOLOGY/ONCOLOGY | Facility: CLINIC | Age: 72
End: 2022-11-10
Attending: INTERNAL MEDICINE
Payer: MEDICARE

## 2022-11-10 VITALS
HEART RATE: 92 BPM | BODY MASS INDEX: 32.38 KG/M2 | SYSTOLIC BLOOD PRESSURE: 108 MMHG | WEIGHT: 200.6 LBS | RESPIRATION RATE: 18 BRPM | TEMPERATURE: 97.4 F | DIASTOLIC BLOOD PRESSURE: 67 MMHG | OXYGEN SATURATION: 94 %

## 2022-11-10 DIAGNOSIS — Z17.0 MALIGNANT NEOPLASM OF OVERLAPPING SITES OF RIGHT BREAST IN MALE, ESTROGEN RECEPTOR POSITIVE (CMS/HCC): Primary | ICD-10-CM

## 2022-11-10 DIAGNOSIS — C50.821 MALIGNANT NEOPLASM OF OVERLAPPING SITES OF RIGHT BREAST IN MALE, ESTROGEN RECEPTOR POSITIVE (CMS/HCC): Primary | ICD-10-CM

## 2022-11-10 LAB
BUN SERPL-MCNC: 19 MG/DL (ref 7–25)
BUN/CREAT SERPL: 12 (CALC) (ref 6–22)
CALCIUM SERPL-MCNC: 8.6 MG/DL (ref 8.6–10.3)
CHLORIDE SERPL-SCNC: 104 MMOL/L (ref 98–110)
CO2 SERPL-SCNC: 26 MMOL/L (ref 20–32)
CREAT SERPL-MCNC: 1.53 MG/DL (ref 0.7–1.28)
EGFRCR SERPLBLD CKD-EPI 2021: 48 ML/MIN/1.73M2
GLUCOSE SERPL-MCNC: 142 MG/DL (ref 65–139)
POTASSIUM SERPL-SCNC: 3.9 MMOL/L (ref 3.5–5.3)
SODIUM SERPL-SCNC: 141 MMOL/L (ref 135–146)

## 2022-11-10 PROCEDURE — 99205 OFFICE O/P NEW HI 60 MIN: CPT | Performed by: INTERNAL MEDICINE

## 2022-11-10 ASSESSMENT — ENCOUNTER SYMPTOMS
MUSCULOSKELETAL NEGATIVE: 1
LEG SWELLING: 1
CHILLS: 1
HEMATOLOGIC/LYMPHATIC NEGATIVE: 1
DEPRESSION: 1
GASTROINTESTINAL NEGATIVE: 1
NEUROLOGICAL NEGATIVE: 1
EYES NEGATIVE: 1
ENDOCRINE NEGATIVE: 1
SHORTNESS OF BREATH: 1

## 2022-11-10 NOTE — ASSESSMENT & PLAN NOTE
He presents today regarding his recently diagnosed breast cancer.  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  He was receiving his care at the [facility].  He wishes to transfer his care.  His treatment course has been complicated by atrial fibrillation, decreased ejection fraction.  He is scheduled for cardioversion in 3 weeks.    We discussed therapy for breast cancer.  He requires multimodality treatment including surgery, chemotherapy, anti-HER2/jhonatan therapy, radiation and endocrine therapy.  He currently cannot receive anti-HER2/jhonatan therapy or chemotherapy or surgery considering his cardiac issues.    We discussed that we may build to treat him with endocrine therapy at this time until his  cardiac issues are addressed.  I plan to contact his cardiologist to ensure that it is safe to give him either tamoxifen or anastrozole at this time.  After speaking with his cardiologist I will contact him by phone and discuss the next steps.    All of his questions were answered.

## 2022-11-10 NOTE — LETTER
November 10, 2022                                                                                                                             Patient: Cam Rosas   YOB: 1950   Date of Visit: 11/10/2022       Dear Dr. Collins:    Thank you for referring Cam Rosas to me for evaluation at [facility] [place]. Attached is my assessment and plan of care.    If you have questions, please do not hesitate to call me. I look forward to following Cam Rosas along with you.           Sincerely,        Paz Cleveland MD    CC: No Recipients

## 2022-11-10 NOTE — PROGRESS NOTES
Review of Systems   Constitutional: Positive for chills (frequently feels cold since starting xarelto).   HENT:  Negative.    Eyes: Negative.    Respiratory: Positive for shortness of breath (With stairs ever since starting chemo/hospitalization).    Cardiovascular: Positive for leg swelling (BLE swelling/tightness since hospitlization in early october).   Gastrointestinal: Negative.    Endocrine: Negative.    Genitourinary: Negative.          Follows up with Dr. Torrez (urologist) next week.     Musculoskeletal: Negative.    Skin: Positive for rash (redness/dry skin over legs).   Neurological: Negative.    Hematological: Negative.    Psychiatric/Behavioral: Positive for depression (Symptoms come and go, depressed about health condition).

## 2022-11-10 NOTE — PROGRESS NOTES
Cam Rosas is a 72 y.o. male,   :  1950  REFERRING PHYSICIAN:   Usman Collins MD  [address] [place] [id number]  PRIMARY CARE PROVIDER:  Usman Collins MD    Encounter Diagnosis   Name Primary?   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  Treatment Plans     No treatment plans exist        Oncology History    No history exists.       History of Present Illness    Cam Rosas is seen today at the request of Usman Collins MD  [address] [place] [id number] for   Encounter Diagnosis   Name Primary?    Malignant neoplasm of overlapping sites of right breast in male, estrogen receptor positive (CMS/HCC) Yes   .  Records were reviewed.    He presents today regarding his diagnosis of breast cancer.  He tells me that he has had a rough year.  On 2022 he underwent prostatectomy for prostate cancer.  Antio[place] score was 4+3 equal 7.  He notes that his recent PSA was decreased and he does have follow-up scheduled with his urologist soon.    He have an abnormality in his right breast.   He had noticed that the right nipple was retracted and crustingHe underwent a mammogram on 2022.  There was a worrisome retroareolar mass with associated dimpling of the nipple.  The left breast was negative.  He was referred to surgery and saw Dr. Sergei Camacho on 2022.     He underwent a biopsy which revealed invasive ductal carcinoma [place] grade 3 ER positive at 90%, AR positive at 50%, HER2/jhonatan positive at +3, Ki-67 30%.      Records indicate that he clinically had a stage II breast cancer.  He underwent a metastatic disease work-up.  He underwent a bone scan which showed no evidence of metastatic disease.  There was areas of degenerative changes.    He was referred to oncology who discussed neoadjuvant chemotherapy with MARY KAY CLEMENTS.    He met with genetic counseling.  He does have a son who has esophageal carcinoma at the age of 48 his mother had cervical cancer and an uncle who had leukemia he was found to have a mutation in BRCA1 and the OK genes.    On September 30 initiated his first cycle to [facility] but apparently had infusion reaction to the Cancian T.  Not continue with therapy that day.  A few days later he received the Taxotere and carboplatin without issue.  On October 10 he was sent to the emergency room due to elevated heart rate.  He was admitted to the hospital with LIZETH barba.  During that hospitalization he also developed heme positive stools and underwent upper endoscopy and was found to have duodenitis.  There was a decrease in his ejection fraction.  He was admitted to [facility] in mid October with atrial fibrillation.  He indicates he has met with cardiology and there is plan for cardioversion in 3 to 4 weeks.  He currently is on diuretics for his congestive heart failure.    He does not have any fevers or chills nausea vomiting difficulty swallowing.  His legs are heavy.  It makes it difficult to walk.  He is hopeful the diuretics will help.  He is worried about all of his medical issues.  He feels his cardiac issues are the highest priority    He does wish to to transfer his care.              Review of Systems - Oncology   Review of Systems   Constitutional: Positive for chills (frequently feels cold since starting xarelto).   HENT:  Negative.    Eyes: Negative.    Respiratory: Positive for shortness of breath (With stairs ever since starting chemo/hospitalization).    Cardiovascular: Positive  for leg swelling (BLE swelling/tightness since hospitlization in early october).   Gastrointestinal: Negative.    Endocrine: Negative.    Genitourinary: Negative.          Follows up with Dr. Torrez (urologist) next week.     Musculoskeletal: Negative.    Skin: Positive for rash (redness/dry skin over legs).   Neurological: Negative.    Hematological: Negative.    Psychiatric/Behavioral: Positive for depression (Symptoms come and go, depressed about health condition).   Review of Systems:  Nursing assessment reviewed. Pertinent positive and negative symptoms noted in HPI, all others negative.    Temp:  [36.3 °C (97.4 °F)] 36.3 °C (97.4 °F)  Heart Rate:  [92] 92  Resp:  [18] 18  BP: (108)/(67) 108/67  Visit Vitals  /67   Pulse 92   Temp 36.3 °C (97.4 °F)   Resp 18   Wt 91 kg (200 lb 9.6 oz)   SpO2 94%   BMI 32.38 kg/m²     Physical Exam  Vitals and nursing note reviewed.   Constitutional:       General: He is not in acute distress.     Appearance: Normal appearance. He is well-developed. He is not ill-appearing.   HENT:      Head: Normocephalic and atraumatic.   Eyes:      General: No scleral icterus.     Extraocular Movements: Extraocular movements intact.   Cardiovascular:      Rate and Rhythm: Normal rate. Rhythm irregular.   Pulmonary:      Effort: Pulmonary effort is normal. No respiratory distress.      Breath sounds: Normal breath sounds. No wheezing, rhonchi or rales.   Chest:      Comments: Right breast mass with retracted nipple.  Small satellite nodule/lymph node    No palpable abnormalities left breast  Abdominal:      General: Bowel sounds are normal. There is no distension.      Palpations: Abdomen is soft. There is no mass.      Tenderness: There is no abdominal tenderness.   Musculoskeletal:      Cervical back: Neck supple.      Right lower leg: Edema present.      Left lower leg: Edema present.   Lymphadenopathy:      Cervical: No cervical adenopathy.   Skin:     General: Skin is warm and dry.       Coloration: Skin is not jaundiced.   Neurological:      Mental Status: He is alert and oriented to person, place, and time.   Psychiatric:         Mood and Affect: Mood normal.         Behavior: Behavior normal.         Past Medical History:   Diagnosis Date    Atrial fibrillation (CMS/HCC)     CHF (congestive heart failure) (CMS/HCC)     Chronic kidney disease     Hx antineoplastic chemo        Past Surgical History:   Procedure Laterality Date    CARDIAC SURGERY         Social History     Tobacco Use    Smoking status: Never    Smokeless tobacco: Never   Substance Use Topics    Alcohol use: Yes     Alcohol/week: 2.0 standard drinks     Types: 2 Shots of liquor per week     Comment: 2 drinks/day - scotch       Family History   Problem Relation Age of Onset    Hypertension Biological Mother     Breast cancer Biological Mother     Hypertension Biological Father     Arthritis Biological Father     No Known Problems Biological Sister     No Known Problems Biological Brother     Heart disease Maternal Grandmother     Arthritis Maternal Grandfather     COPD Maternal Grandfather     Diabetes Maternal Grandfather     No Known Problems Paternal Grandmother     No Known Problems Paternal Grandfather        Allergies  Azithromycin, Prednisone, and Lorazepam    Medications  Current Outpatient Medications   Medication Sig Dispense Refill    dilTIAZem CD (TIAZAC) 300 mg 24 hr capsule Take 1 capsule (300 mg total) by mouth daily. (Patient taking differently: Take 300 mg by mouth daily. 360mg daily) 30 capsule 0    furosemide (LASIX) 20 mg tablet Take 1 tablet (20 mg total) by mouth daily. 30 tablet 0    metoprolol succinate XL (TOPROL-XL) 100 mg 24 hr tablet Take 100 mg by mouth 2 (two) times a day.      pantoprazole (PROTONIX) 40 mg EC tablet Take 40 mg by mouth 2 (two) times a day.      potassium chloride (KLOR-CON) 10 mEq CR tablet Take 1 tablet (10 mEq total) by mouth daily. 30 tablet 0     rivaroxaban (XARELTO) 15 mg tablet Take 1 tablet (15 mg total) by mouth daily with dinner Indications: treatment to prevent blood clots in chronic atrial fibrillation. 30 tablet 0    simvastatin (ZOCOR) 20 mg tablet Take 20 mg by mouth nightly.      lidocaine-prilocaine (EMLA) cream Apply 1 application topically as needed for pain.       No current facility-administered medications for this visit.        Laboratory    Recent Results (from the past 672 hour(s))   ECG 12 lead    Collection Time: 10/19/22 12:28 PM   Result Value Ref Range    Ventricular rate 133     Atrial rate 144     QRS duration 122     QT Interval 346     QTC Calculation(Bazett) 514     R Axis -26     T Wave Axis 172    CBC and differential    Collection Time: 10/19/22  1:05 PM   Result Value Ref Range    WBC 10.34 3.80 - 10.50 K/uL    RBC 3.00 (L) 4.50 - 5.80 M/uL    Hemoglobin 10.1 (L) 13.7 - 17.5 g/dL    Hematocrit 30.8 (L) 40.1 - 51.0 %    .7 (H) 83.0 - 98.0 fL    MCH 33.7 (H) 28.0 - 33.2 pg    MCHC 32.8 32.2 - 36.5 g/dL    RDW 15.7 (H) 11.6 - 14.4 %    Platelets 287 150 - 350 K/uL    MPV 9.2 (L) 9.4 - 12.4 fL    Differential Type Auto     nRBC 0.6 (H) <=0.0 %    Immature Granulocytes 0.7 %    Neutrophils 80.5 %    Lymphocytes 12.0 %    Monocytes 6.2 %    Eosinophils 0.0 %    Basophils 0.6 %    Immature Granulocytes, Absolute 0.07 0.00 - 0.08 K/uL    Neutrophils, Absolute 8.33 (H) 1.70 - 7.00 K/uL    Lymphocytes, Absolute 1.24 1.20 - 3.50 K/uL    Monocytes, Absolute 0.64 0.30 - 1.00 K/uL    Eosinophils, Absolute 0.00 (L) 0.04 - 0.54 K/uL    Basophils, Absolute 0.06 0.01 - 0.10 K/uL   Comprehensive metabolic panel    Collection Time: 10/19/22  1:05 PM   Result Value Ref Range    Sodium 140 136 - 144 mEQ/L    Potassium 3.1 (L) 3.6 - 5.1 mEQ/L    Chloride 111 (H) 98 - 109 mEQ/L    CO2 21 (L) 22 - 32 mEQ/L    BUN 13 8 - 20 mg/dL    Creatinine 1.4 (H) 0.8 - 1.3 mg/dL    Glucose 107 (H) 70 - 99 mg/dL    Calcium 8.1 (L) 8.9 - 10.3 mg/dL     AST (SGOT) 40 15 - 41 IU/L    ALT (SGPT) 32 16 - 63 IU/L    Alkaline Phosphatase 82 35 - 126 IU/L    Total Protein 5.6 (L) 6.0 - 8.2 g/dL    Albumin 3.0 (L) 3.4 - 5.0 g/dL    Bilirubin, Total 0.3 0.3 - 1.2 mg/dL    eGFR 49.8 (L) >=60.0 mL/min/1.73m*2    Anion Gap 8 3 - 15 mEQ/L   HS Troponin I (with 2 hour reflex)    Collection Time: 10/19/22  1:05 PM   Result Value Ref Range    High Sens Troponin I 39 (H) <15 pg/mL   Magnesium    Collection Time: 10/19/22  1:05 PM   Result Value Ref Range    Magnesium 1.7 (L) 1.8 - 2.5 mg/dL   B-type natriuretic peptide    Collection Time: 10/19/22  1:05 PM   Result Value Ref Range     (H) <=100 pg/mL   SARS-CoV-2 (COVID-19), PCR Nasopharynx    Collection Time: 10/19/22  1:05 PM    Specimen: Nasopharynx; Nasopharyngeal Swab   Result Value Ref Range    SARS-CoV-2 (COVID-19) Negative Negative   HIGH SENSITIVE TROPONIN I (NO REFLEX)    Collection Time: 10/19/22  2:53 PM   Result Value Ref Range    High Sens Troponin I 31 (H) <15 pg/mL   D-dimer, quantitative    Collection Time: 10/19/22  2:53 PM   Result Value Ref Range    D-Dimer, Quant 3.30 (H) 0.00 - 0.50 ug/mL FEU   Basic metabolic panel    Collection Time: 10/20/22 11:03 AM   Result Value Ref Range    Sodium 139 136 - 144 mEQ/L    Potassium 4.1 3.6 - 5.1 mEQ/L    Chloride 110 (H) 98 - 109 mEQ/L    CO2 18 (L) 22 - 32 mEQ/L    BUN 14 8 - 20 mg/dL    Creatinine 1.4 (H) 0.8 - 1.3 mg/dL    Glucose 166 (H) 70 - 99 mg/dL    Calcium 8.5 (L) 8.9 - 10.3 mg/dL    eGFR 49.8 (L) >=60.0 mL/min/1.73m*2    Anion Gap 11 3 - 15 mEQ/L   Magnesium    Collection Time: 10/20/22 11:03 AM   Result Value Ref Range    Magnesium 2.0 1.8 - 2.5 mg/dL   CBC    Collection Time: 10/20/22  2:25 PM   Result Value Ref Range    WBC 8.36 3.80 - 10.50 K/uL    RBC 3.17 (L) 4.50 - 5.80 M/uL    Hemoglobin 10.7 (L) 13.7 - 17.5 g/dL    Hematocrit 32.4 (L) 40.1 - 51.0 %    .2 (H) 83.0 - 98.0 fL    MCH 33.8 (H) 28.0 - 33.2 pg    MCHC 33.0 32.2 - 36.5 g/dL     RDW 15.8 (H) 11.6 - 14.4 %    Platelets 283 150 - 350 K/uL    MPV 9.2 (L) 9.4 - 12.4 fL   Protime-INR    Collection Time: 10/20/22  2:25 PM   Result Value Ref Range    PT 14.1 12.2 - 14.5 sec    INR 1.1     PTT    Collection Time: 10/20/22  2:25 PM   Result Value Ref Range    PTT 28 23 - 35 sec   UA Reflex to Culture (Macroscopic)    Collection Time: 10/20/22  7:23 PM    Specimen: Urine, Clean Catch   Result Value Ref Range    Color, Urine Colorless Yellow, Colorless    Clarity, Urine Clear Clear    Specific Gravity, Urine 1.018 1.005 - 1.030    pH, Urine 5.5 4.5 - 8.0    Leukocyte Esterase Negative Negative    Nitrite, Urine Negative Negative    Protein, Urine Negative Negative    Glucose, Urine Negative Negative mg/dL    Ketones, Urine Negative Negative mg/dL    Urobilinogen, Urine 0.2 <2.0 EU/dL EU/dL    Bilirubin, Urine Negative Negative mg/dL    Blood, Urine Negative Negative   PTT    Collection Time: 10/20/22  9:25 PM   Result Value Ref Range    PTT 36 (H) 23 - 35 sec   Basic metabolic panel    Collection Time: 10/21/22  5:07 AM   Result Value Ref Range    Sodium 140 136 - 144 mEQ/L    Potassium 4.1 3.6 - 5.1 mEQ/L    Chloride 109 98 - 109 mEQ/L    CO2 23 22 - 32 mEQ/L    BUN 13 8 - 20 mg/dL    Creatinine 1.4 (H) 0.8 - 1.3 mg/dL    Glucose 118 (H) 70 - 99 mg/dL    Calcium 8.7 (L) 8.9 - 10.3 mg/dL    eGFR 49.8 (L) >=60.0 mL/min/1.73m*2    Anion Gap 8 3 - 15 mEQ/L   Magnesium    Collection Time: 10/21/22  5:07 AM   Result Value Ref Range    Magnesium 1.8 1.8 - 2.5 mg/dL   CBC    Collection Time: 10/21/22  5:07 AM   Result Value Ref Range    WBC 8.69 3.80 - 10.50 K/uL    RBC 2.84 (L) 4.50 - 5.80 M/uL    Hemoglobin 9.5 (L) 13.7 - 17.5 g/dL    Hematocrit 29.0 (L) 40.1 - 51.0 %    .1 (H) 83.0 - 98.0 fL    MCH 33.5 (H) 28.0 - 33.2 pg    MCHC 32.8 32.2 - 36.5 g/dL    RDW 15.8 (H) 11.6 - 14.4 %    Platelets 274 150 - 350 K/uL    MPV 9.4 9.4 - 12.4 fL   PTT    Collection Time: 10/21/22  5:07 AM   Result Value Ref  Range    PTT 85 (H) 23 - 35 sec   PTT    Collection Time: 10/21/22  3:00 PM   Result Value Ref Range    PTT 62 (H) 23 - 35 sec   PTT    Collection Time: 10/21/22 10:12 PM   Result Value Ref Range    PTT 88 (H) 23 - 35 sec   Basic metabolic panel    Collection Time: 10/22/22  4:11 AM   Result Value Ref Range    Sodium 141 136 - 144 mEQ/L    Potassium 4.3 3.6 - 5.1 mEQ/L    Chloride 110 (H) 98 - 109 mEQ/L    CO2 23 22 - 32 mEQ/L    BUN 13 8 - 20 mg/dL    Creatinine 1.4 (H) 0.8 - 1.3 mg/dL    Glucose 128 (H) 70 - 99 mg/dL    Calcium 9.0 8.9 - 10.3 mg/dL    eGFR 49.8 (L) >=60.0 mL/min/1.73m*2    Anion Gap 8 3 - 15 mEQ/L   Magnesium    Collection Time: 10/22/22  4:11 AM   Result Value Ref Range    Magnesium 2.1 1.8 - 2.5 mg/dL   CBC    Collection Time: 10/22/22  4:11 AM   Result Value Ref Range    WBC 7.44 3.80 - 10.50 K/uL    RBC 2.91 (L) 4.50 - 5.80 M/uL    Hemoglobin 9.7 (L) 13.7 - 17.5 g/dL    Hematocrit 29.9 (L) 40.1 - 51.0 %    .7 (H) 83.0 - 98.0 fL    MCH 33.3 (H) 28.0 - 33.2 pg    MCHC 32.4 32.2 - 36.5 g/dL    RDW 15.8 (H) 11.6 - 14.4 %    Platelets 320 150 - 350 K/uL    MPV 9.7 9.4 - 12.4 fL   PTT    Collection Time: 10/22/22  4:11 AM   Result Value Ref Range    PTT 94 (H) 23 - 35 sec   Basic metabolic panel    Collection Time: 10/23/22  5:05 AM   Result Value Ref Range    Sodium 140 136 - 144 mEQ/L    Potassium 3.4 (L) 3.6 - 5.1 mEQ/L    Chloride 109 98 - 109 mEQ/L    CO2 25 22 - 32 mEQ/L    BUN 12 8 - 20 mg/dL    Creatinine 1.6 (H) 0.8 - 1.3 mg/dL    Glucose 116 (H) 70 - 99 mg/dL    Calcium 8.8 (L) 8.9 - 10.3 mg/dL    eGFR 42.7 (L) >=60.0 mL/min/1.73m*2    Anion Gap 6 3 - 15 mEQ/L   Magnesium    Collection Time: 10/23/22  5:05 AM   Result Value Ref Range    Magnesium 1.8 1.8 - 2.5 mg/dL   CBC    Collection Time: 10/23/22  5:05 AM   Result Value Ref Range    WBC 6.06 3.80 - 10.50 K/uL    RBC 2.84 (L) 4.50 - 5.80 M/uL    Hemoglobin 9.5 (L) 13.7 - 17.5 g/dL    Hematocrit 29.6 (L) 40.1 - 51.0 %    .2  (H) 83.0 - 98.0 fL    MCH 33.5 (H) 28.0 - 33.2 pg    MCHC 32.1 (L) 32.2 - 36.5 g/dL    RDW 15.5 (H) 11.6 - 14.4 %    Platelets 279 150 - 350 K/uL    MPV 9.2 (L) 9.4 - 12.4 fL   PTT    Collection Time: 10/23/22  5:05 AM   Result Value Ref Range     (H) 23 - 35 sec   Basic metabolic panel    Collection Time: 11/09/22  1:31 PM   Result Value Ref Range    Glucose 142 (H) 65 - 139 mg/dL    BUN 19 7 - 25 mg/dL    Creatinine 1.53 (H) 0.70 - 1.28 mg/dL    EGFR 48 (L) > OR = 60 mL/min/1.73m2    Bun/Creatinine Ratio 12 6 - 22 (calc)    Sodium 141 135 - 146 mmol/L    Potassium 3.9 3.5 - 5.3 mmol/L    Chloride 104 98 - 110 mmol/L    Carbon Dioxide 26 20 - 32 mmol/L    Calcium 8.6 8.6 - 10.3 mg/dL       Radiology  No new Radiology.  CT ANGIOGRAPHY CHEST PULMONARY EMBOLISM WITH IV CONTRAST    Result Date: 10/19/2022  Narrative: CLINICAL HISTORY:    Shortness of breath COMPARISON:    None COMMENT: Technique: CT of the Chest was performed following the administration of intravenous contrast. :  100mL of iohexoL (OMNIPAQUE 350) 350 mg iodine/mL solution 100 mL 3D MIP and/or volume rendered image reconstruction performed and reviewed. CHEST: Lungs: Groundglass 0.6 cm nodule right upper lobe, axial image 80. No parenchymal consolidation., Right anterolateral intercostal hernia at the level of the third and fourth rib. Airways: The central airways are widely patent. Pleura: within normal limits. Lower Neck: within normal limits. Axilla: No enlarged nodes. Mediastinum and Zelda: No enlarged nodes. Heart and Pericardium: Mitral annular ring is noted. Top normal heart size. No pericardial effusion Vessels:  Atherosclerotic changes of the aorta and coronary arteries. Pulmonary arteries:  There is no pulmonary embolism. Esophagus:  Normal caliber. Upper abdomen:  Cholelithiasis. Chest Wall: Segmental wall thickening of the visualized transverse colon Bones:  Degenerative changes of the spine     Impression: IMPRESSION: 1.  No  pulmonary embolism. No active disease in the chest. 2.  Suspicion for mild colitis in the visualized transverse colon     X-RAY CHEST 1 VIEW    Impression: IMPRESSION: 1.  No acute disease seen in the chest. 2.  Left chest wall Port-A-Cath with tip at the cavoatrial junction. 3.  Status post valvular repair. COMMENT: Support lines, tubes, devices, and surgical hardware, material: Left chest wall Port-A-Cath as above. Valvular prosthesis. Monitoring leads. Lungs and Pleura: Minimal subsegmental atelectasis at the left lung base. No consolidation or effusion. No pneumothorax. Cardiovascular and Mediastinum: The cardiomediastinal silhouette is stable in size noting postoperative change Other: Degenerative changes of the visualized spine. CLINICAL HISTORY:   a fib PROCEDURE: Single frontal view of the chest. COMPARISON:   5/18/2009       Assessment and Plan  Malignant neoplasm of right male breast (CMS/HCC)  He presents today regarding his recently diagnosed breast cancer.  He has a grade 3 ER positive NV positive HER2/jhonatan positive invasive ductal carcinoma involving the right breast.  He was receiving his care at the [place] [facility].  He wishes to transfer his care.  His treatment course has been complicated by atrial fibrillation, decreased ejection fraction.  He is scheduled for cardioversion in 3 weeks.    We discussed therapy for breast cancer.  He requires multimodality treatment including surgery, chemotherapy, anti-HER2/jhonatan therapy, radiation and endocrine therapy.  He currently cannot receive anti-HER2/jhonatan therapy or chemotherapy or surgery considering his cardiac issues.    We discussed that we may build to treat him with endocrine therapy at this time until his  cardiac issues are addressed.  I plan to contact his cardiologist to ensure that it is safe to give him either tamoxifen or anastrozole at this time.  After speaking with his cardiologist I will contact him by phone and discuss the next  steps.    All of his questions were answered.      I spent 75 minutes on this date of service performing the following activities: obtaining history, performing examination, documenting, preparing for visit, obtaining / reviewing records, providing counseling and education, communicating results and coordinating care.     Paz Cleveland MD

## 2022-11-15 ENCOUNTER — TELEPHONE (OUTPATIENT)
Dept: HEMATOLOGY/ONCOLOGY | Facility: CLINIC | Age: 72
End: 2022-11-15
Payer: MEDICARE

## 2022-11-15 ENCOUNTER — TELEPHONE (OUTPATIENT)
Dept: SCHEDULING | Facility: CLINIC | Age: 72
End: 2022-11-15
Payer: MEDICARE

## 2022-11-15 RX ORDER — TAMOXIFEN CITRATE 20 MG/1
20 TABLET ORAL DAILY
Qty: 30 TABLET | Refills: 3 | Status: SHIPPED | OUTPATIENT
Start: 2022-11-15 | End: 2022-12-12 | Stop reason: SDUPTHER

## 2022-11-15 NOTE — TELEPHONE ENCOUNTER
Triage, please call patient.  I reached out to his cardiologist Dr. Colt Acvees regarding using tamoxifen or anastrozole for his breast cancer while he undergoes further evaluation management of his cardiac issues.  Dr. Aceves indicated that he can be on either medication.  Cardioversion planned in 3 to 4 weeks.  Would need to be on anticoagulation for at least 3 to 4 weeks following audio version if surgery is planned.    Would like to start tamoxifen at this time.  Did discuss this with him during his recent visit.  Prescription for tamoxifen sent to his pharmacy.  He is to take 1 pill daily.    I attempted to contact him by telephone but there was no answer.  I could not leave a voice message.

## 2022-11-15 NOTE — TELEPHONE ENCOUNTER
Patient still remains slightly fluid overloaded.  His weight is hanging around 197 for the last few days.  I increased the furosemide 40 mg daily and potassium to 20 mEq.  They will call with an update on Friday.

## 2022-11-15 NOTE — TELEPHONE ENCOUNTER
I attempted to contact patient.  He was not available.  Voice message left.  Did not expect the tamoxifen to cause him weight gain or fluid retention.  Recommend that he take it to help control his breast cancer he undergoes cardiac evaluation and management.  Them to call back with questions

## 2022-11-15 NOTE — TELEPHONE ENCOUNTER
Spoke with Cam and informed him Dr. Cleveland spoke with his cardiologist and who cleared him to start Tamoxifen. Advised him the prescription was sent over to his pharmacy and he should start taking it as directed 20 mg daily. He is concerned about the possibility of weight gain and increased fluid retention. He states he does not want to take anything that will increase his weight or cause more swelling. He would like to speak with Dr. Cleveland.

## 2022-11-17 ENCOUNTER — TELEPHONE (OUTPATIENT)
Dept: SCHEDULING | Facility: CLINIC | Age: 72
End: 2022-11-17
Payer: MEDICARE

## 2022-11-17 DIAGNOSIS — I48.91 ATRIAL FIBRILLATION, UNSPECIFIED TYPE (CMS/HCC): Primary | ICD-10-CM

## 2022-11-17 RX ORDER — METOPROLOL SUCCINATE 100 MG/1
100 TABLET, EXTENDED RELEASE ORAL 2 TIMES DAILY
Qty: 180 TABLET | Refills: 2 | Status: ON HOLD | OUTPATIENT
Start: 2022-11-17 | End: 2022-12-05 | Stop reason: SDUPTHER

## 2022-11-17 RX ORDER — DILTIAZEM HYDROCHLORIDE 300 MG/1
300 CAPSULE, EXTENDED RELEASE ORAL DAILY
Qty: 90 CAPSULE | Refills: 2 | Status: SHIPPED | OUTPATIENT
Start: 2022-11-17 | End: 2022-11-18 | Stop reason: ALTCHOICE

## 2022-11-17 RX ORDER — PANTOPRAZOLE SODIUM 40 MG/1
40 TABLET, DELAYED RELEASE ORAL 2 TIMES DAILY
Status: CANCELLED | OUTPATIENT
Start: 2022-11-17

## 2022-11-17 RX ORDER — SIMVASTATIN 20 MG/1
20 TABLET, FILM COATED ORAL NIGHTLY
Qty: 90 TABLET | Refills: 3 | Status: SHIPPED | OUTPATIENT
Start: 2022-11-17 | End: 2023-02-15 | Stop reason: SDUPTHER

## 2022-11-17 RX ORDER — POTASSIUM CHLORIDE 750 MG/1
20 TABLET, EXTENDED RELEASE ORAL DAILY
Qty: 180 TABLET | Refills: 2 | Status: SHIPPED | OUTPATIENT
Start: 2022-11-17 | End: 2023-02-15 | Stop reason: SDUPTHER

## 2022-11-17 RX ORDER — FUROSEMIDE 20 MG/1
40 TABLET ORAL DAILY
Qty: 180 TABLET | Refills: 2 | Status: SHIPPED | OUTPATIENT
Start: 2022-11-17 | End: 2023-01-06

## 2022-11-17 NOTE — TELEPHONE ENCOUNTER
Cardiac meds escribed to [facility]    Protonix Not a cardiac med--per protocol not escribed    Called [facility] who called for refill and LM VM advised to reach out to PCP for this    Please advise of any changes.    Thank you

## 2022-11-17 NOTE — TELEPHONE ENCOUNTER
Pt's wife Fiorella is requesting to review BMP lab result w/ Dr. Aceves.     Fiorella can be reached at [phone].    Ty.

## 2022-11-17 NOTE — TELEPHONE ENCOUNTER
Medicine Refill Request    Last Office: Visit date not found   Last Consult Visit: Visit date not found  Last Telemedicine Visit: Visit date not found    Next Appointment: Visit date not found    furosemide (LASIX) 20 mg tablet  **Fiorella states that rx has changed to 40 mg daily**    dilTIAZem CD (TIAZAC) 300 mg 24 hr capsule    metoprolol succinate XL (TOPROL-XL) 100 mg 24 hr tablet    potassium chloride (KLOR-CON) 10 mEq CR tablet  **Fiorella states one 20 mg tablet daily**    rivaroxaban (XARELTO) 15 mg tablet    pantoprazole (PROTONIX) 40 mg EC tablet    simvastatin (ZOCOR) 20 mg tablet    **Fiorella is requesting 90 day supply of all meds.**      Current Outpatient Medications:     dilTIAZem CD (TIAZAC) 300 mg 24 hr capsule, Take 1 capsule (300 mg total) by mouth daily. (Patient taking differently: Take 300 mg by mouth daily. 360mg daily), Disp: 30 capsule, Rfl: 0    furosemide (LASIX) 20 mg tablet, Take 1 tablet (20 mg total) by mouth daily., Disp: 30 tablet, Rfl: 0    lidocaine-prilocaine (EMLA) cream, Apply 1 application topically as needed for pain., Disp: , Rfl:     metoprolol succinate XL (TOPROL-XL) 100 mg 24 hr tablet, Take 100 mg by mouth 2 (two) times a day., Disp: , Rfl:     pantoprazole (PROTONIX) 40 mg EC tablet, Take 40 mg by mouth 2 (two) times a day., Disp: , Rfl:     potassium chloride (KLOR-CON) 10 mEq CR tablet, Take 1 tablet (10 mEq total) by mouth daily., Disp: 30 tablet, Rfl: 0    rivaroxaban (XARELTO) 15 mg tablet, Take 1 tablet (15 mg total) by mouth daily with dinner Indications: treatment to prevent blood clots in chronic atrial fibrillation., Disp: 30 tablet, Rfl: 0    simvastatin (ZOCOR) 20 mg tablet, Take 20 mg by mouth nightly., Disp: , Rfl:     tamoxifen (NOLVADEX) 20 mg chemo tablet, Take  1 tablet (20 mg total) daily, Disp: 30 tablet, Rfl: 3      BP Readings from Last 3 Encounters:   11/10/22 108/67   11/04/22 118/64   10/23/22 120/70       Recent Lab results:  No results found for:  CHOL, No results found for: HDL, No results found for: LDLCALC, No results found for: TRIG     Lab Results   Component Value Date    GLUCOSE 142 (H) 11/09/2022   , No results found for: HGBA1C      Lab Results   Component Value Date    CREATININE 1.53 (H) 11/09/2022       No results found for: TSH

## 2022-11-18 RX ORDER — DILTIAZEM HYDROCHLORIDE 120 MG/1
360 CAPSULE, COATED, EXTENDED RELEASE ORAL DAILY
Qty: 270 CAPSULE | Refills: 3 | Status: SHIPPED | OUTPATIENT
Start: 2022-11-18 | End: 2023-01-06

## 2022-11-18 NOTE — TELEPHONE ENCOUNTER
Spoke with patient's wife.  Reordered medications.  Spoke regarding lab work.  He will need to be arranged for cardioversion with Dr. Aceves either next week or the week after.  He will need labs within 1 month and COVID testing prior to procedure.  He  has been on Xarelto uninterrupted since he was discharged from the hospital on 10/19.    Liana/Galilea -please schedule for cardioversion, orders are placed. Thank you!

## 2022-11-18 NOTE — TELEPHONE ENCOUNTER
Pt's spouse Fiorella called returning Abhilash's call. Fiorella states when pt was in ER he was supposed to take diltiazem 360mg. Fiorella states Dung was not at pharmacy, medication was sent 11/17 and pharmacy confirmed. Fiorella requests a call back once medication has been sent     Fiorella can be reached at [phone]

## 2022-11-22 NOTE — TELEPHONE ENCOUNTER
Pt's spouse Fiorella called requests a call back from [facility] with the information for 11/28 cardioversion. Fiorella unsure if our office if we have pt's information that Fiorella filled out.     Fiorella can be reached at [phone]

## 2022-11-28 ENCOUNTER — HOSPITAL ENCOUNTER (OUTPATIENT)
Facility: HOSPITAL | Age: 72
Setting detail: HOSPITAL OUTPATIENT SURGERY
Discharge: HOME | End: 2022-11-28
Attending: INTERNAL MEDICINE | Admitting: INTERNAL MEDICINE
Payer: MEDICARE

## 2022-11-28 DIAGNOSIS — I48.91 ATRIAL FIBRILLATION, UNSPECIFIED TYPE (CMS/HCC): ICD-10-CM

## 2022-11-28 DIAGNOSIS — I48.91 ATRIAL FIBRILLATION, UNSPECIFIED TYPE (CMS/HCC): Primary | ICD-10-CM

## 2022-11-28 RX ORDER — SODIUM CHLORIDE, SODIUM GLUCONATE, SODIUM ACETATE, POTASSIUM CHLORIDE AND MAGNESIUM CHLORIDE 30; 37; 368; 526; 502 MG/100ML; MG/100ML; MG/100ML; MG/100ML; MG/100ML
125 INJECTION, SOLUTION INTRAVENOUS CONTINUOUS
Status: CANCELLED | OUTPATIENT
Start: 2022-11-28 | End: 2022-12-05

## 2022-11-28 RX ORDER — DEXTROSE 50 % IN WATER (D50W) INTRAVENOUS SYRINGE
25 AS NEEDED
Status: CANCELLED | OUTPATIENT
Start: 2022-11-28

## 2022-11-28 RX ORDER — IBUPROFEN 200 MG
16-32 TABLET ORAL AS NEEDED
Status: CANCELLED | OUTPATIENT
Start: 2022-11-28

## 2022-11-28 RX ORDER — DEXTROSE 40 %
15-30 GEL (GRAM) ORAL AS NEEDED
Status: CANCELLED | OUTPATIENT
Start: 2022-11-28

## 2022-11-28 RX ORDER — ONDANSETRON HYDROCHLORIDE 2 MG/ML
4 INJECTION, SOLUTION INTRAVENOUS
Status: CANCELLED | OUTPATIENT
Start: 2022-11-28

## 2022-11-28 NOTE — TELEPHONE ENCOUNTER
Pt's spouse, Fiorella, call to reschedule cardioversion.    Fiorella states that she was there this morning and states that she did not receive instructions to have PATs completed prior to procedure.      Fiorella can be reached at [phone].

## 2022-11-28 NOTE — NURSING NOTE
Patient arrived for outpatient CV. RN asked patient and wife for results of Covid test done at [facility]. After several attempts, they were unsuccessful at getting results. RN offered to repeat Covid test today and explained that there would be a delay in procedure while waiting for results. Both patient and wife did not want to repeat test and decided to reschedule for later date

## 2022-12-01 ENCOUNTER — APPOINTMENT (OUTPATIENT)
Dept: LAB | Age: 72
End: 2022-12-01
Payer: MEDICARE

## 2022-12-01 DIAGNOSIS — I48.91 ATRIAL FIBRILLATION, UNSPECIFIED TYPE (CMS/HCC): ICD-10-CM

## 2022-12-01 LAB
ANION GAP SERPL CALC-SCNC: 11 MEQ/L (ref 3–15)
BASOPHILS # BLD: 0.05 K/UL (ref 0.01–0.1)
BASOPHILS NFR BLD: 0.8 %
BUN SERPL-MCNC: 22 MG/DL (ref 8–20)
CALCIUM SERPL-MCNC: 9 MG/DL (ref 8.9–10.3)
CHLORIDE SERPL-SCNC: 109 MEQ/L (ref 98–109)
CO2 SERPL-SCNC: 23 MEQ/L (ref 22–32)
CREAT SERPL-MCNC: 1.3 MG/DL (ref 0.8–1.3)
DIFFERENTIAL METHOD BLD: ABNORMAL
EOSINOPHIL # BLD: 0.19 K/UL (ref 0.04–0.54)
EOSINOPHIL NFR BLD: 2.9 %
ERYTHROCYTE [DISTWIDTH] IN BLOOD BY AUTOMATED COUNT: 14.6 % (ref 11.6–14.4)
GFR SERPL CREATININE-BSD FRML MDRD: 54.3 ML/MIN/1.73M*2
GLUCOSE SERPL-MCNC: 116 MG/DL (ref 70–99)
HCT VFR BLDCO AUTO: 35 % (ref 40.1–51)
HGB BLD-MCNC: 10.8 G/DL (ref 13.7–17.5)
IMM GRANULOCYTES # BLD AUTO: 0.03 K/UL (ref 0–0.08)
IMM GRANULOCYTES NFR BLD AUTO: 0.5 %
LYMPHOCYTES # BLD: 1.6 K/UL (ref 1.2–3.5)
LYMPHOCYTES NFR BLD: 24.5 %
MAGNESIUM SERPL-MCNC: 1.4 MG/DL (ref 1.8–2.5)
MCH RBC QN AUTO: 30.9 PG (ref 28–33.2)
MCHC RBC AUTO-ENTMCNC: 30.9 G/DL (ref 32.2–36.5)
MCV RBC AUTO: 100 FL (ref 83–98)
MONOCYTES # BLD: 0.59 K/UL (ref 0.3–1)
MONOCYTES NFR BLD: 9 %
NEUTROPHILS # BLD: 4.08 K/UL (ref 1.7–7)
NEUTS SEG NFR BLD: 62.3 %
NRBC BLD-RTO: 0 %
PDW BLD AUTO: 9.3 FL (ref 9.4–12.4)
PLATELET # BLD AUTO: 209 K/UL (ref 150–350)
POTASSIUM SERPL-SCNC: 3.9 MEQ/L (ref 3.6–5.1)
RBC # BLD AUTO: 3.5 M/UL (ref 4.5–5.8)
SODIUM SERPL-SCNC: 143 MEQ/L (ref 136–144)
WBC # BLD AUTO: 6.54 K/UL (ref 3.8–10.5)

## 2022-12-01 PROCEDURE — 36415 COLL VENOUS BLD VENIPUNCTURE: CPT

## 2022-12-01 PROCEDURE — 80048 BASIC METABOLIC PNL TOTAL CA: CPT

## 2022-12-01 PROCEDURE — C9803 HOPD COVID-19 SPEC COLLECT: HCPCS

## 2022-12-01 PROCEDURE — U0003 INFECTIOUS AGENT DETECTION BY NUCLEIC ACID (DNA OR RNA); SEVERE ACUTE RESPIRATORY SYNDROME CORONAVIRUS 2 (SARS-COV-2) (CORONAVIRUS DISEASE [COVID-19]), AMPLIFIED PROBE TECHNIQUE, MAKING USE OF HIGH THROUGHPUT TECHNOLOGIES AS DESCRIBED BY CMS-2020-01-R: HCPCS

## 2022-12-01 PROCEDURE — 83735 ASSAY OF MAGNESIUM: CPT

## 2022-12-01 PROCEDURE — 85025 COMPLETE CBC W/AUTO DIFF WBC: CPT

## 2022-12-02 LAB — SARS-COV-2 RNA RESP QL NAA+PROBE: NEGATIVE

## 2022-12-05 ENCOUNTER — ANESTHESIA EVENT (OUTPATIENT)
Dept: CARDIOLOGY | Facility: HOSPITAL | Age: 72
Setting detail: HOSPITAL OUTPATIENT SURGERY
End: 2022-12-05
Payer: MEDICARE

## 2022-12-05 ENCOUNTER — HOSPITAL ENCOUNTER (OUTPATIENT)
Facility: HOSPITAL | Age: 72
Setting detail: HOSPITAL OUTPATIENT SURGERY
Discharge: HOME | End: 2022-12-05
Attending: INTERNAL MEDICINE | Admitting: INTERNAL MEDICINE
Payer: MEDICARE

## 2022-12-05 VITALS
WEIGHT: 189 LBS | HEART RATE: 68 BPM | HEIGHT: 65 IN | OXYGEN SATURATION: 100 % | TEMPERATURE: 97.2 F | DIASTOLIC BLOOD PRESSURE: 71 MMHG | RESPIRATION RATE: 23 BRPM | BODY MASS INDEX: 31.49 KG/M2 | SYSTOLIC BLOOD PRESSURE: 156 MMHG

## 2022-12-05 DIAGNOSIS — I48.91 ATRIAL FIBRILLATION, UNSPECIFIED TYPE (CMS/HCC): ICD-10-CM

## 2022-12-05 DIAGNOSIS — I50.21 ACUTE SYSTOLIC CONGESTIVE HEART FAILURE (CMS/HCC): Primary | ICD-10-CM

## 2022-12-05 PROCEDURE — 93005 ELECTROCARDIOGRAM TRACING: CPT | Performed by: INTERNAL MEDICINE

## 2022-12-05 PROCEDURE — 63600000 HC DRUGS/DETAIL CODE: Mod: JW | Performed by: NURSE ANESTHETIST, CERTIFIED REGISTERED

## 2022-12-05 PROCEDURE — 92960 CARDIOVERSION ELECTRIC EXT: CPT | Performed by: INTERNAL MEDICINE

## 2022-12-05 PROCEDURE — 25800000 HC PHARMACY IV SOLUTIONS: Performed by: NURSE ANESTHETIST, CERTIFIED REGISTERED

## 2022-12-05 PROCEDURE — 71000011 HC PACU PHASE 1 EA ADDL MIN: Performed by: INTERNAL MEDICINE

## 2022-12-05 PROCEDURE — 71000001 HC PACU PHASE 1 INITIAL 30MIN: Performed by: INTERNAL MEDICINE

## 2022-12-05 PROCEDURE — 37000002 HC ANESTHESIA MAC: Performed by: INTERNAL MEDICINE

## 2022-12-05 PROCEDURE — 5A2204Z RESTORATION OF CARDIAC RHYTHM, SINGLE: ICD-10-PCS | Performed by: INTERNAL MEDICINE

## 2022-12-05 RX ORDER — SODIUM CHLORIDE 9 MG/ML
INJECTION, SOLUTION INTRAVENOUS CONTINUOUS PRN
Status: DISCONTINUED | OUTPATIENT
Start: 2022-12-05 | End: 2022-12-05 | Stop reason: SURG

## 2022-12-05 RX ORDER — LANOLIN ALCOHOL/MO/W.PET/CERES
400 CREAM (GRAM) TOPICAL DAILY
Qty: 30 TABLET | Refills: 0 | Status: SHIPPED | OUTPATIENT
Start: 2022-12-05 | End: 2023-01-06 | Stop reason: SDUPTHER

## 2022-12-05 RX ORDER — AMIODARONE HYDROCHLORIDE 200 MG/1
TABLET ORAL
Qty: 58 TABLET | Refills: 0 | Status: SHIPPED | OUTPATIENT
Start: 2022-12-05 | End: 2023-01-06

## 2022-12-05 RX ORDER — METOPROLOL SUCCINATE 100 MG/1
100 TABLET, EXTENDED RELEASE ORAL DAILY
Qty: 30 TABLET | Refills: 0 | Status: SHIPPED | OUTPATIENT
Start: 2022-12-05 | End: 2023-03-09

## 2022-12-05 RX ORDER — PROPOFOL 200MG/20ML
SYRINGE (ML) INTRAVENOUS AS NEEDED
Status: DISCONTINUED | OUTPATIENT
Start: 2022-12-05 | End: 2022-12-05 | Stop reason: SURG

## 2022-12-05 RX ADMIN — SODIUM CHLORIDE: 9 INJECTION, SOLUTION INTRAVENOUS at 10:18

## 2022-12-05 RX ADMIN — PROPOFOL 50 MG: 10 INJECTION, EMULSION INTRAVENOUS at 10:28

## 2022-12-05 ASSESSMENT — ENCOUNTER SYMPTOMS
SHORTNESS OF BREATH: 1
DYSRHYTHMIAS: 1

## 2022-12-05 NOTE — DISCHARGE INSTRUCTIONS
CARDIOVERSION DISCHARGE INSTRUCTIONS    Anesthesia:  You received a short-acting anesthesia during your procedure. Because of this, you may not drive or drink alcohol for 24 hours.   Please do not drive or operate heavy equipment for 24 hours.  Please do not make critical decisions or sign contracts for 24 hours.  Avoid activities requiring balance today.    Medications:  Please review your medications with your care provider prior to leaving the hospital.    New Medication: Amiodarone  New Medication: Magnesium  Decrease Metoprolol to 100mg daily    How you may feel:  Most patients feel well after the procedure. However you may have discomfort in the chest or a skin burn where the electrical current was applied. This will resolve in a few days.If it is uncomfortable you may use 1% hydrocortisone cream or ibuprofen cream. (All are available over the counter)    Due to the anesthesia, you may feel drowsy for the rest of the day.    Activity:  Rest for the remainder of the day while you feel the effects from the anesthesia. You may resume your normal activity the following day.    Get help if symptoms occur:  ? Rapid heartbeat or palpitations  ? Dizziness  ? Shortness of breath  ? Passing out  ? Numbness, or arm/leg weakness  ? Difficulty with speech      Follow-up with Dr. Aceves in 1 month, [phone].

## 2022-12-05 NOTE — ANESTHESIA PREPROCEDURE EVALUATION
Relevant Problems   CARDIOVASCULAR   (+) Acute CHF (CMS/HCC)   (+) Atrial fibrillation, unspecified type (CMS/HCC)      GASTROINTESTINAL   (+) Gastrointestinal hemorrhage with melena      URINARY SYSTEM   (+) Electrolyte abnormality      Other   (+) Malignant neoplasm of right male breast (CMS/HCC)   (+) Prostate cancer (CMS/HCC)       Anesthesia ROS/MED HX    Anesthesia History    Previous anesthetics  Pulmonary    Shortness of breath  Neuro/Psych - neg  Cardiovascular  Dysrhythmias and atrial fibrillation   CHF (EF 40%)   Covid19 Test Reviewed and ECG reviewed  Hematological    anticoagulants   anemia  GI/Hepatic   GI Bleeding  Renal Disease   chronic renal insufficiency  Endo/Other  History of cancer, history of chemotherapy, breast cancer and prostate cancer  Body Habitus: Obese       Past Surgical History:   Procedure Laterality Date   • CARDIAC SURGERY         Physical Exam    Airway   Mallampati: III   TM distance: >3 FB   Neck ROM: full  Cardiovascular    Rhythm: irregular   Rate: normalPulmonary    Decreased breath sounds  Dental - normal        Anesthesia Plan    Plan: MAC    Technique: MAC     Airway: natural airway / supplemental oxygen   ASA 3  Blood Products:   Use of Blood Products Discussed: No   Anesthetic plan and risks discussed with: patient  Induction:    intravenous   Postop Plan:   Patient Disposition: phase II then home   Pain Management: IV analgesics  Comments:    Plan: Patient is SOB. He attributes this to anxiety for the procedure. Decreased breath sounds bilaterally but no rales.

## 2022-12-05 NOTE — ANESTHESIOLOGIST PRE-PROCEDURE ATTESTATION
Pre-Procedure Patient Identification:  I am the Primary Anesthesiologist and have identified the patient on 12/05/22 at 9:41 AM.   I have confirmed the procedure(s) will be performed by the following surgeon/proceduralist Gary Aceves MD.

## 2022-12-05 NOTE — H&P
Patient is s/p cardioversion  Plan to start Amio 400BID x 1 week then 200daily.  Decrease Metoprolol to 100 daily  Adding magnesium supplement for mag 1.4  Will follow-up with Dr. Aceves in 1 month - office contacted for appointment

## 2022-12-05 NOTE — ANESTHESIA POSTPROCEDURE EVALUATION
Patient: Cam Rosas    Procedure Summary     Date: 12/05/22 Room / Location: [facility] CATH/EP HOLDING A / [facility] CARDIAC CATH/EP    Anesthesia Start: 1021 Anesthesia Stop: 1035    Procedure: CARDIOVERSION EXTERNAL Diagnosis:       Atrial fibrillation, unspecified type (CMS/HCC)      (atrial fibrillation)    Providers: Gary Aceves MD Responsible Provider: Ayah Oneil MD    Anesthesia Type: MAC ASA Status: 3          Anesthesia Type: MAC  PACU Vitals  12/5/2022 1034 - 12/5/2022 1134      12/5/2022  1045 12/5/2022  1100          BP: 146/56 156/71      Pulse: 66 68      Resp: 24 23      SpO2: 100 % 100 %              Anesthesia Post Evaluation    Pain management: adequate  Patient location during evaluation: PACU  Patient participation: complete - patient participated  Level of consciousness: awake and alert  Cardiovascular status: acceptable  Airway Patency: adequate  Respiratory status: acceptable  Hydration status: acceptable  Anesthetic complications: no  Comments: SOB improved post cardioversion

## 2022-12-05 NOTE — Clinical Note
Universal procedure checklist completed. Airway assessment completed. Physician agrees with pre-sedation assessment..

## 2022-12-05 NOTE — H&P
Cardiology History and Physical     Admitting Diagnosis   Atrial fibrillation, unspecified type (CMS/HCC) [I48.91]   HPI    Cam Rosas is a 72 y.o. male with PMH of CKD, MV repair, GI bleed, prostate cancer s/p prostatectomy, recent breast cancer diagnosis who presents to [facility] for cardioversion. Pt was started on chemotherapy on September 30th. On October 10 he was admitted to [facility] with rapid AF and decompensated heart failure. During that hospitalization he also developed heme positive stools and underwent upper endoscopy and was found to have duodenitis. Echo showed decrease in his ejection fraction to 40%.  He was discharged on Toprol without anticoagulation. He was then readmitted to [facility] in mid October with atrial fibrillation. He was recommended to have ROLF/cardioversion during that admission but patient declined. He was diuresed and discharged on Xarelto and Diltiazem with plan for outpatient cardioversion in 3-4 weeks.   He denies syncope, dizziness, palpitations, chest pain. He continues to have fluid retention and weight gain but it has significantly improved. His weight is down to 189. Patient has been compliant with taking 40mg of Lasix a day .He was recently started on Tamoxifen for breast cancer. He has broken blood vessels in his R eye that he just noticed this morning.  He feels more short of breath than usual this morning but reports feeling very anxious.       Medical History   Medical History:   Past Medical History:   Diagnosis Date   • Atrial fibrillation (CMS/HCC)    • CHF (congestive heart failure) (CMS/HCC)    • Chronic kidney disease    • Hx antineoplastic chemo        Surgical History:   Past Surgical History:   Procedure Laterality Date   • CARDIAC SURGERY         Allergies: Azithromycin, Prednisone, and Lorazepam    No current facility-administered medications for this encounter.       Social History:   Social History     Socioeconomic History   • Marital  "status:    Tobacco Use   • Smoking status: Never   • Smokeless tobacco: Never   Substance and Sexual Activity   • Alcohol use: Yes     Alcohol/week: 2.0 standard drinks     Types: 2 Shots of liquor per week     Comment: 2 drinks/day - scotch     Social Determinants of Health     Food Insecurity: No Food Insecurity   • Worried About Running Out of Food in the Last Year: Never true   • Ran Out of Food in the Last Year: Never true       Family History:   Family History   Problem Relation Age of Onset   • Hypertension Biological Mother    • Breast cancer Biological Mother    • Hypertension Biological Father    • Arthritis Biological Father    • No Known Problems Biological Sister    • No Known Problems Biological Brother    • Heart disease Maternal Grandmother    • Arthritis Maternal Grandfather    • COPD Maternal Grandfather    • Diabetes Maternal Grandfather    • No Known Problems Paternal Grandmother    • No Known Problems Paternal Grandfather          Review of Systems   All other systems reviewed and negative except as noted in the HPI.   Objective    Vital Signs for the last 24 hours           Physical Exam   Visit Vitals  Ht 1.651 m (5' 5\")   Wt 85.7 kg (189 lb)   BMI 31.45 kg/m²       General Appearance:  Alert, no distress   Head:  Normocephalic, without obvious abnormality, atraumatic   Eyes:  Conjunctiva/corneas clear, EOM's intact   Endocrine: No thyroid enlargement    Lungs:   Decreased lung fields bilaterally, respirations unlabored, no rales, no wheezing   Heart:  Regular rhythm, S1 and S2 normal, no murmur, rub or gallop   Abdomen:   Soft, non-tender, no masses, no organomegaly   Vascular: Pulses 2+ and symmetric all extremities, no carotid bruit or jugular vein distention   Musculoskeletal:  Skin: No injury or deformity  Skin color, texture, turgor normal, no rashes or lesions, no cyanosis or edema   Extremities: Extremities normal, atraumatic   Behavior/Emotional: Appropriate, cooperative      "      Labs   Lab Results   Component Value Date    WBC 6.54 12/01/2022    HGB 10.8 (L) 12/01/2022    HCT 35.0 (L) 12/01/2022     12/01/2022    ALT 32 10/19/2022    AST 40 10/19/2022     12/01/2022    K 3.9 12/01/2022     12/01/2022    CREATININE 1.3 12/01/2022    BUN 22 (H) 12/01/2022    CO2 23 12/01/2022    INR 1.1 10/20/2022     Troponin I Results       10/19/22 10/19/22     1453 1305    HS Troponin I 31 39            Imaging   I have independently reviewed the pertinent imaging from the last 24 hrs.            ECG     atrial fibrillation   Telemetry   atrial fibrillation      Assessment/Plan      Atrial fibrillation, unspecified type (CMS/HCC)  Newly diagnosed following chemotherapy for breast cancer in September  Declined prior ROLF cardioversion   Has been compliant with Xarelto without missed doses  Has been on Cardizem 360QD and Toprol XL 100BID   NPO since last evening   Will follow-up with Dr. Aceves     Malignant neoplasm of right male breast (CMS/HCC)  Unable to continue chemotherapy at this time due to cardiac issues  Started on Tamoxifen     Acute CHF (CMS/HCC)  Improving, weight down to 189 (was as high as 200s)  ECHO at [facility] showed EF 40%  Continues on lasix 40mg daily     Electrolyte abnormality  12/1 bloodwork shows magnesium 1.4 and K 3.9       JONNY Clifton  12/5/2022  9:20 AM

## 2022-12-06 LAB
ATRIAL RATE: 60
P AXIS: 3
PR INTERVAL: 196
QRS DURATION: 114
QRS DURATION: 124
QT INTERVAL: 362
QT INTERVAL: 410
QTC CALCULATION(BAZETT): 362
QTC CALCULATION(BAZETT): 512
R AXIS: -20
R AXIS: -23
T WAVE AXIS: -36
T WAVE AXIS: 0
VENTRICULAR RATE: 60
VENTRICULAR RATE: 94

## 2022-12-06 PROCEDURE — 93010 ELECTROCARDIOGRAM REPORT: CPT | Mod: 76 | Performed by: INTERNAL MEDICINE

## 2022-12-06 PROCEDURE — 93010 ELECTROCARDIOGRAM REPORT: CPT | Performed by: INTERNAL MEDICINE

## 2022-12-08 ENCOUNTER — TELEPHONE (OUTPATIENT)
Dept: SCHEDULING | Facility: CLINIC | Age: 72
End: 2022-12-08
Payer: MEDICARE

## 2022-12-08 NOTE — TELEPHONE ENCOUNTER
Patient Name: Cam Rosas    Caller name: Fiorella    Relationship: wife    Reason for call: Pt is scheduled 1/6/23 for 1 month FU s/p cardioversion.    Wife would like to know if there are any appt available in the [facility] or [facility] office for the fu    Callback number: [phone]

## 2022-12-12 RX ORDER — TAMOXIFEN CITRATE 20 MG/1
20 TABLET ORAL DAILY
Qty: 90 TABLET | Refills: 1 | Status: SHIPPED | OUTPATIENT
Start: 2022-12-12 | End: 2023-06-06 | Stop reason: SDUPTHER

## 2023-01-06 ENCOUNTER — OFFICE VISIT (OUTPATIENT)
Dept: CARDIOLOGY | Facility: CLINIC | Age: 73
End: 2023-01-06
Payer: MEDICARE

## 2023-01-06 VITALS
DIASTOLIC BLOOD PRESSURE: 80 MMHG | RESPIRATION RATE: 20 BRPM | HEART RATE: 60 BPM | WEIGHT: 194 LBS | SYSTOLIC BLOOD PRESSURE: 122 MMHG | BODY MASS INDEX: 32.32 KG/M2 | HEIGHT: 65 IN

## 2023-01-06 DIAGNOSIS — I50.42 CHRONIC COMBINED SYSTOLIC AND DIASTOLIC CHF (CONGESTIVE HEART FAILURE) (CMS/HCC): ICD-10-CM

## 2023-01-06 DIAGNOSIS — I48.91 ATRIAL FIBRILLATION, UNSPECIFIED TYPE (CMS/HCC): Primary | ICD-10-CM

## 2023-01-06 DIAGNOSIS — K92.1 GASTROINTESTINAL HEMORRHAGE WITH MELENA: ICD-10-CM

## 2023-01-06 PROCEDURE — 99214 OFFICE O/P EST MOD 30 MIN: CPT | Performed by: INTERNAL MEDICINE

## 2023-01-06 PROCEDURE — 93000 ELECTROCARDIOGRAM COMPLETE: CPT | Performed by: INTERNAL MEDICINE

## 2023-01-06 RX ORDER — AMIODARONE HYDROCHLORIDE 200 MG/1
TABLET ORAL
Qty: 58 TABLET | Refills: 0 | COMMUNITY
Start: 2023-01-06 | End: 2023-01-06 | Stop reason: SDUPTHER

## 2023-01-06 RX ORDER — DILTIAZEM HYDROCHLORIDE 120 MG/1
120 CAPSULE, COATED, EXTENDED RELEASE ORAL DAILY
Qty: 270 CAPSULE | Refills: 3 | COMMUNITY
Start: 2023-01-06 | End: 2023-05-12 | Stop reason: SDUPTHER

## 2023-01-06 RX ORDER — TORSEMIDE 20 MG/1
20 TABLET ORAL DAILY
Qty: 90 TABLET | Refills: 1 | Status: SHIPPED | OUTPATIENT
Start: 2023-01-06 | End: 2023-02-15 | Stop reason: SDUPTHER

## 2023-01-06 RX ORDER — SILVER SULFADIAZINE 10 G/1000G
CREAM TOPICAL DAILY
Qty: 20 G | Refills: 0 | Status: SHIPPED | OUTPATIENT
Start: 2023-01-06 | End: 2023-07-17 | Stop reason: ALTCHOICE

## 2023-01-06 RX ORDER — AMIODARONE HYDROCHLORIDE 200 MG/1
200 TABLET ORAL DAILY
Qty: 90 TABLET | Refills: 3 | Status: SHIPPED | OUTPATIENT
Start: 2023-01-06 | End: 2023-02-15 | Stop reason: SDUPTHER

## 2023-01-06 RX ORDER — LANOLIN ALCOHOL/MO/W.PET/CERES
400 CREAM (GRAM) TOPICAL DAILY
Qty: 90 TABLET | Refills: 1 | Status: SHIPPED | OUTPATIENT
Start: 2023-01-06 | End: 2023-02-15 | Stop reason: ALTCHOICE

## 2023-01-06 ASSESSMENT — ENCOUNTER SYMPTOMS
COUGH: 0
TREMORS: 0
WEAKNESS: 0
HEMATURIA: 0
ORTHOPNEA: 0
IRREGULAR HEARTBEAT: 0
FOCAL WEAKNESS: 0
PARESTHESIAS: 0
PND: 0
DYSPNEA ON EXERTION: 0
SYNCOPE: 0
NEAR-SYNCOPE: 0
PALPITATIONS: 0
ABDOMINAL PAIN: 0
SHORTNESS OF BREATH: 0
ALTERED MENTAL STATUS: 0

## 2023-01-06 NOTE — PROGRESS NOTES
Electrophysiology  Outpatient Note         HPI   Cam Rosas is a 73 y.o. male who is seen today for follow-up and management of atrial fibrillation.  He recently underwent a cardioversion on December 5, 2022.  He was loaded with amiodarone and now comes to the office on amiodarone 200 mg daily, metoprolol 100 mg daily and diltiazem 360 mg .  He returns the office in sinus bradycardia.    He was admitted to [facility] (10/19-10/23) with acute decompensated systolic heart failure. He was then diagnosed with atrial fibrillation when he was seen at [facility].  He was placed on metoprolol and discharged.  He was not anticoagulated due to recent melena requiring transfusion.  EGD at outside hospital showed no active bleeding.    While admitted he started on IV diltiazem in the ED and was noted to be both hypokalemic and hypomagnesemic.  He was placed on IV heparin with close monitoring of his hemoglobin and discussed possible ROLF guided cardioversion. He then refused ROLF guided cardioversion and wanted to be discharged home.  He was discharged on metoprolol, diltiazem, and Xarelto with plans for cardioversion in 4 weeks.      He has a past medical history of CKD, breast cancer on chemotherapy melena, and history of mitral annular ring placement by Dr. Pastor in 2006.    Past Medical History:   Diagnosis Date   • Atrial fibrillation (CMS/HCC)    • CHF (congestive heart failure) (CMS/HCC)    • Chronic kidney disease    • Hx antineoplastic chemo        Past Surgical History:   Procedure Laterality Date   • CARDIAC SURGERY     • CARDIOVERSION  12/05/2022    Successful DC cardioversion       Allergies: Azithromycin, Prednisone, and Lorazepam    Current Outpatient Medications   Medication Sig Dispense Refill   • amiodarone (PACERONE) 200 mg tablet Take 1 tablet (200 mg total) by mouth daily. 90 tablet 3   • dilTIAZem CD (CARDIZEM CD) 120 mg 24 hr capsule Take 1 capsule (120 mg total) by mouth daily. 270  capsule 3   • lidocaine-prilocaine (EMLA) cream Apply 1 application topically as needed for pain.     • magnesium oxide (MAG-OX) 400 mg (241.3 mg magnesium) tablet Take 1 tablet (400 mg total) by mouth daily. 90 tablet 1   • metoprolol succinate XL (TOPROL-XL) 100 mg 24 hr tablet Take 1 tablet (100 mg total) by mouth daily. 30 tablet 0   • pantoprazole (PROTONIX) 40 mg EC tablet Take 40 mg by mouth 2 (two) times a day.     • potassium chloride (KLOR-CON) 10 mEq CR tablet Take 2 tablets (20 mEq total) by mouth daily. 180 tablet 2   • rivaroxaban (XARELTO) 15 mg tablet Take 1 tablet (15 mg total) by mouth daily with dinner Indications: treatment to prevent blood clots in chronic atrial fibrillation. 90 tablet 1   • silver sulfadiazine (SILVADENE) 1 % cream Apply topically daily. 20 g 0   • simvastatin (ZOCOR) 20 mg tablet Take 1 tablet (20 mg total) by mouth nightly. 90 tablet 3   • tamoxifen (NOLVADEX) 20 mg chemo tablet Take  1 tablet (20 mg total) daily 90 tablet 1   • torsemide (DEMADEX) 20 mg tablet Take 1 tablet (20 mg total) by mouth daily. 90 tablet 1     No current facility-administered medications for this visit.       Social History     Tobacco Use   • Smoking status: Never   • Smokeless tobacco: Never   Substance Use Topics   • Alcohol use: Yes     Alcohol/week: 2.0 standard drinks     Types: 2 Shots of liquor per week     Comment: 2 drinks/day - scotch       Family History   Problem Relation Age of Onset   • Hypertension Biological Mother    • Breast cancer Biological Mother    • Hypertension Biological Father    • Arthritis Biological Father    • No Known Problems Biological Sister    • No Known Problems Biological Brother    • Heart disease Maternal Grandmother    • Arthritis Maternal Grandfather    • COPD Maternal Grandfather    • Diabetes Maternal Grandfather    • No Known Problems Paternal Grandmother    • No Known Problems Paternal Grandfather        Review of Systems   Constitutional: Negative for  malaise/fatigue.   HENT: Negative for hearing loss.    Eyes: Negative for visual disturbance.   Cardiovascular: Negative for chest pain, dyspnea on exertion, irregular heartbeat, leg swelling, near-syncope, orthopnea, palpitations, paroxysmal nocturnal dyspnea and syncope.   Respiratory: Negative for cough and shortness of breath.    Hematologic/Lymphatic: Negative for bleeding problem.   Skin: Negative for rash.   Musculoskeletal: Negative for joint pain and muscle weakness.   Gastrointestinal: Negative for abdominal pain.   Genitourinary: Negative for hematuria.   Neurological: Negative for focal weakness, paresthesias, tremors and weakness.   Psychiatric/Behavioral: Negative for altered mental status.       Objective     Vitals:    01/06/23 1400   BP: 122/80   Pulse: 60   Resp: 20       Physical Exam  Constitutional:       Appearance: He is well-developed.   HENT:      Head: Normocephalic and atraumatic.   Neck:      Vascular: No JVD.   Cardiovascular:      Rate and Rhythm: Regular rhythm. Bradycardia present.      Heart sounds: No murmur heard.  Pulmonary:      Breath sounds: Normal breath sounds.   Abdominal:      General: Bowel sounds are normal.      Palpations: Abdomen is soft.      Tenderness: There is no abdominal tenderness.   Musculoskeletal:      Right lower leg: No edema.      Left lower leg: No edema.   Skin:     General: Skin is warm and dry.   Neurological:      Mental Status: He is alert and oriented to person, place, and time.          Labs   Lab Results   Component Value Date    WBC 6.54 12/01/2022    HGB 10.8 (L) 12/01/2022    HCT 35.0 (L) 12/01/2022     12/01/2022    ALT 32 10/19/2022    AST 40 10/19/2022     12/01/2022    K 3.9 12/01/2022     12/01/2022    CREATININE 1.3 12/01/2022    BUN 22 (H) 12/01/2022    CO2 23 12/01/2022    INR 1.1 10/20/2022       Echocardiogram   Transthoracic echocardiogram on 10/18/2022 at outside hospital showed an ejection fraction of 40-45%,  global left ventricular hypokinesis, mildly dilated left atrium.    Cardioversion 12/5/2022. amiodarone.     ECG Sinus  Bradycardia       Assessment/Plan   Problem List Items Addressed This Visit        Circulatory    Atrial fibrillation, unspecified type (CMS/HCC) - Primary     Will continue amiodarone at 200 mg daily. Diltiazem will be decreased to 120 daily and metoprolol continued for now. .          Relevant Medications    dilTIAZem CD (CARDIZEM CD) 120 mg 24 hr capsule    amiodarone (PACERONE) 200 mg tablet    Other Relevant Orders    ECG 12 lead (Completed)    Basic metabolic panel    Magnesium    Chronic combined systolic and diastolic CHF (congestive heart failure) (CMS/HCC)     Remains stable with no CHF at this time. Exacerbated by AF and RVR; continue rhythm control.          Relevant Medications    dilTIAZem CD (CARDIZEM CD) 120 mg 24 hr capsule    amiodarone (PACERONE) 200 mg tablet       Digestive    Gastrointestinal hemorrhage with melena     Patient has history of gastric ulcers.  He is on Protonix and will be followed closely for any bleeding.            Gary Aceves MD   01/06/2023

## 2023-01-21 PROBLEM — I50.42 CHRONIC COMBINED SYSTOLIC AND DIASTOLIC CHF (CONGESTIVE HEART FAILURE) (CMS/HCC): Status: ACTIVE | Noted: 2022-10-19

## 2023-01-21 NOTE — ASSESSMENT & PLAN NOTE
Patient has history of gastric ulcers.  He is on Protonix and will be followed closely for any bleeding.

## 2023-01-21 NOTE — ASSESSMENT & PLAN NOTE
Will continue amiodarone at 200 mg daily. Diltiazem will be decreased to 120 daily and metoprolol continued for now. .

## 2023-01-27 ENCOUNTER — TRANSCRIBE ORDERS (OUTPATIENT)
Dept: SCHEDULING | Age: 73
End: 2023-01-27

## 2023-01-27 DIAGNOSIS — R60.0 LOCALIZED EDEMA: Primary | ICD-10-CM

## 2023-01-30 ENCOUNTER — HOSPITAL ENCOUNTER (OUTPATIENT)
Dept: CARDIOLOGY | Facility: HOSPITAL | Age: 73
Discharge: HOME | End: 2023-01-30
Attending: INTERNAL MEDICINE
Payer: MEDICARE

## 2023-01-30 DIAGNOSIS — R60.0 LOCALIZED EDEMA: ICD-10-CM

## 2023-01-30 PROCEDURE — 93970 EXTREMITY STUDY: CPT

## 2023-01-31 ENCOUNTER — TELEPHONE (OUTPATIENT)
Dept: HEMATOLOGY/ONCOLOGY | Facility: HOSPITAL | Age: 73
End: 2023-01-31
Payer: MEDICARE

## 2023-01-31 LAB
LT CFV REFLUX: 0 SEC
LT GSV 2CM DISTAL TO SFJ AP: 0.87 CM
LT GSV 2CM DISTAL TO SFJ REFLUX: 0 SEC
LT GSV 2CM DISTAL TO SFJ TRANS: 0.82 CM
LT GSV ABOVE KNEE AP: 0.31 CM
LT GSV ABOVE KNEE REFLUX: 0 SEC
LT GSV ABOVE KNEE TRANS: 0.31 CM
LT GSV AT SFJ AP: 0.89 CM
LT GSV AT SFJ REFLUX: 0 SEC
LT GSV AT SFJ TRANS: 0.75 CM
LT GSV BELOW KNEE AP: 0.27 CM
LT GSV BELOW KNEE REFLUX: 0 SEC
LT GSV BELOW KNEE TRANS: 0.34 CM
LT GSV MID CALF AP: 0.4 CM
LT GSV MID CALF REFLUX: 0 SEC
LT GSV MID CALF TRANS: 0.55 CM
LT GSV MID THIGH AP: 0.34 CM
LT GSV MID THIGH REFLUX: 0 SEC
LT GSV MID THIGH TRANS: 0.4 CM
LT GSV PROX THIGH AP: 0.42 CM
LT GSV PROX THIGH REFLUX: 0 SEC
LT GSV PROX THIGH TRANS: 0.38 CM
LT POPLITEAL REFLUX: 0 SEC
LT SFV DISTAL REFLUX: 0 SEC
LT SFV MID REFLUX: 0 SEC
LT SFV PROX REFLUX: 0 SEC
LT SSV PROX AP: 0.15 CM
LT SSV PROX REFLUX: 0 SEC
LT SSV PROX TRANS: 0.15 CM
RT CFV REFLUX: 0 SEC
RT GSV 2CM DISTAL TO SFJ AP: 0.57 CM
RT GSV 2CM DISTAL TO SFJ REFLUX: 0 SEC
RT GSV 2CM DISTAL TO SFJ TRANS: 0.59 CM
RT GSV ABOVE KNEE AP: 0.27 CM
RT GSV ABOVE KNEE REFLUX: 0 SEC
RT GSV ABOVE KNEE TRANS: 0.33 CM
RT GSV AT SFJ AP: 0.71 CM
RT GSV AT SFJ REFLUX: 1.7 SEC
RT GSV AT SFJ TRANS: 0.63 CM
RT GSV BELOW KNEE AP: 0.37 CM
RT GSV BELOW KNEE REFLUX: 0 SEC
RT GSV BELOW KNEE TRANS: 0.4 CM
RT GSV MID CALF AP: 0.27 CM
RT GSV MID CALF REFLUX: 0 SEC
RT GSV MID CALF TRANS: 0.33 CM
RT GSV MID THIGH AP: 0.41 CM
RT GSV MID THIGH REFLUX: 0 SEC
RT GSV MID THIGH TRANS: 0.29 CM
RT GSV PROX THIGH AP: 0.43 CM
RT GSV PROX THIGH REFLUX: 0 SEC
RT GSV PROX THIGH TRANS: 0.36 CM
RT POPLITEAL REFLUX: 0 SEC
RT SFV DISTAL REFLUX: 0 SEC
RT SFV MID REFLUX: 0 SEC
RT SFV PROX REFLUX: 0 SEC
RT SSV PROX AP: 0.13 CM
RT SSV PROX REFLUX: 0 SEC
RT SSV PROX TRANS: 0.16 CM

## 2023-01-31 NOTE — TELEPHONE ENCOUNTER
Spoke with his wife and provided Dr. Cleveland's feedback. She verbalized understanding and will follow-up with his cardiologist.

## 2023-01-31 NOTE — TELEPHONE ENCOUNTER
Wife called and reports she has questions about Tamoxifen and if it could be worsening her 's leg swelling. She reports for the past month both of his legs have been swollen and weeping. She reports his FMD ordered ultrasounds of both legs and he had them completed yesterday. She would like Dr. Cleveland's feedback. I advised her I would return a call after speaking with Dr. Cleveland.

## 2023-02-01 ENCOUNTER — TELEPHONE (OUTPATIENT)
Dept: SCHEDULING | Facility: CLINIC | Age: 73
End: 2023-02-01
Payer: MEDICARE

## 2023-02-01 DIAGNOSIS — I50.42 CHRONIC COMBINED SYSTOLIC AND DIASTOLIC HEART FAILURE (CMS/HCC): Primary | ICD-10-CM

## 2023-02-01 NOTE — TELEPHONE ENCOUNTER
Pt of Dr. Aceves w/hx afib on Xarelto AC, CHF and GIB. Call received from spouse Fiorella to discuss worsening LE edema in patient.    At OV 1/6, Dr. Aceves changed Lasix to torsemide and ordered silvadene cream for his legs, which were quite swollen and demonstrated some open wounds. Pt was directed to monitor LE edema and weight trends and callback to f/u.    Per spouse, not much weight loss:  Weight on 1/10 - 194lb  Weight today 2/1 - 189lb    On torsemide 20mg daily, weight has not decreased as much as desired. Legs have become progressively more swollen, painful, warm to the touch and oozing clear liquid and serosanguinous drainage/bleeding from open wounds. PCP saw pt this week, had LE US done on 1/30 (no DVT) and ordered more silvadene cream Pt was also advised to wrap LE in ACE bandages. PCP did not believe LE infected. Spouse also spoke to Oncology to discuss if symptoms could be r/t the tamoxifen, but Oncology RN did not believe this could cause these symptoms and advised her to contact Cardiology about edema.    Spouse is concerned that this may not be the right medication as symptoms have not been improving. Pt is urinating a lot (incontinence) after taking the torsemide. BP has been stable and pt not having issues with lightheadedness or dizziness.     Fiorella can be reached to discuss at [phone]. Thank you.

## 2023-02-01 NOTE — TELEPHONE ENCOUNTER
Fide I spoke with wife. She refers a HF appointment in [place] meeting with Dr Moran.       Spoke with wife. Per CRNP increase torsemide to 40 mg daily X thurs and Friday. Needs ordered lab work completed. Wife said can not get there until Monday. Elevate legs and warp as directed. If no increased diuresis, weight loss or if SOB proceed to ED.

## 2023-02-01 NOTE — TELEPHONE ENCOUNTER
Pt's spouse called back returning Linsey's call. Tried calling Linsey but no answer     Pt can be reached at [phone]   Spouse can be reached at [phone]

## 2023-02-01 NOTE — TELEPHONE ENCOUNTER
Ayah see below. Tough case. Ill include outside echo from 10/2022    Ejection fraction is visually estimated at 40-45 %. There is global left ventricular   hypokinesis. Patient was tachycardiac with a rate of 130 bpm.   Mild mitral annular calcification. The mitral valve leaflets are status post repair. A   mitral annuloplasty ring is present. Mild mitral regurgitation.   Compared to prior echocardiogram, EF has now declined from 55% to 40%.     LE edema and wounds likely multifactorial including heart failure and venous insufficiencies. US yesterday showed no DVT. Any suggestions for medication changes. Will need HF new pt appointment also. But while waiting to be seen....      Fide please help patient find an appt with any of the HF docs. Thx

## 2023-02-02 NOTE — TELEPHONE ENCOUNTER
Pt spouse Fiorella is calling to follow up on appt with Dr Moran. Fiorella was advised that Dr Aceves needs to send a referral over in order to get sooner appt. please advise      [phone]

## 2023-02-02 NOTE — TELEPHONE ENCOUNTER
Linsey I added in a BNP since he is getting labs soon. If you get the chance can you tell them to make sure the lab looks for both Dr. Aceves and I lab orders.    Thanks!

## 2023-02-02 NOTE — TELEPHONE ENCOUNTER
Fide- please let Mrs Rosas know for first appt [facility].

## 2023-02-02 NOTE — TELEPHONE ENCOUNTER
Lank for first visit, easier to squeeze in, booked out for months at [facility] with us only going there once a month    Thanks Linsey!

## 2023-02-02 NOTE — TELEPHONE ENCOUNTER
Spoke to patient gave her information to call for new patient appointment with Dr. Moran. Explain that we may need  to talk to Dr. Moran to get her  in early for a sooner appointment. sw

## 2023-02-03 NOTE — TELEPHONE ENCOUNTER
Pt's spouse Fiorella  calling regarding B-type natriuretic peptide lab, needed to be sent to [facility], please advise    Pt states she was able to print it out and wanted to know if that is okay to use     P:[phone]    TY

## 2023-02-06 ENCOUNTER — TELEPHONE (OUTPATIENT)
Dept: CARDIOLOGY | Facility: CLINIC | Age: 73
End: 2023-02-06

## 2023-02-06 NOTE — TELEPHONE ENCOUNTER
Patient called our office regarding appointment with Dr. Moran. In chart it said that patient is on wait list. Patient want to know how long does her  have to wait to be seen? Please call patient wife [phone]. Thanks Fide

## 2023-02-06 NOTE — TELEPHONE ENCOUNTER
Paul was on the list of NPV appointments I asked you to schedule from early this morning. Please call those people when you can.

## 2023-02-08 LAB — BNP SERPL-MCNC: 352 PG/ML

## 2023-02-15 ENCOUNTER — OFFICE VISIT (OUTPATIENT)
Dept: CARDIOLOGY | Facility: CLINIC | Age: 73
End: 2023-02-15
Payer: MEDICARE

## 2023-02-15 ENCOUNTER — TELEPHONE (OUTPATIENT)
Dept: CARDIOLOGY | Facility: CLINIC | Age: 73
End: 2023-02-15

## 2023-02-15 VITALS
SYSTOLIC BLOOD PRESSURE: 124 MMHG | OXYGEN SATURATION: 98 % | HEIGHT: 65 IN | DIASTOLIC BLOOD PRESSURE: 70 MMHG | BODY MASS INDEX: 31.04 KG/M2 | WEIGHT: 186.3 LBS | HEART RATE: 57 BPM

## 2023-02-15 DIAGNOSIS — C61 PROSTATE CANCER (CMS/HCC): ICD-10-CM

## 2023-02-15 DIAGNOSIS — I48.0 PAROXYSMAL ATRIAL FIBRILLATION (CMS/HCC): ICD-10-CM

## 2023-02-15 DIAGNOSIS — N18.2 STAGE 2 CHRONIC KIDNEY DISEASE: ICD-10-CM

## 2023-02-15 DIAGNOSIS — I50.21 ACUTE SYSTOLIC HEART FAILURE (CMS/HCC): Primary | ICD-10-CM

## 2023-02-15 PROBLEM — I50.42 CHRONIC COMBINED SYSTOLIC AND DIASTOLIC CHF (CONGESTIVE HEART FAILURE) (CMS/HCC): Status: RESOLVED | Noted: 2022-10-19 | Resolved: 2023-02-15

## 2023-02-15 PROCEDURE — 99205 OFFICE O/P NEW HI 60 MIN: CPT | Performed by: INTERNAL MEDICINE

## 2023-02-15 PROCEDURE — 93000 ELECTROCARDIOGRAM COMPLETE: CPT | Performed by: INTERNAL MEDICINE

## 2023-02-15 RX ORDER — POTASSIUM CHLORIDE 750 MG/1
20 TABLET, EXTENDED RELEASE ORAL DAILY
Qty: 180 TABLET | Refills: 3 | Status: SHIPPED | OUTPATIENT
Start: 2023-02-15 | End: 2023-04-17

## 2023-02-15 RX ORDER — PANTOPRAZOLE SODIUM 40 MG/1
40 TABLET, DELAYED RELEASE ORAL 2 TIMES DAILY
Qty: 180 TABLET | Refills: 3 | Status: SHIPPED | OUTPATIENT
Start: 2023-02-15 | End: 2024-01-10 | Stop reason: HOSPADM

## 2023-02-15 RX ORDER — AMIODARONE HYDROCHLORIDE 200 MG/1
200 TABLET ORAL DAILY
Qty: 90 TABLET | Refills: 3 | Status: SHIPPED | OUTPATIENT
Start: 2023-02-15 | End: 2024-04-11

## 2023-02-15 RX ORDER — SIMVASTATIN 20 MG/1
20 TABLET, FILM COATED ORAL NIGHTLY
Qty: 90 TABLET | Refills: 3 | Status: SHIPPED | OUTPATIENT
Start: 2023-02-15 | End: 2024-01-10 | Stop reason: HOSPADM

## 2023-02-15 RX ORDER — TORSEMIDE 20 MG/1
20 TABLET ORAL DAILY
Qty: 150 TABLET | Refills: 3 | Status: SHIPPED | OUTPATIENT
Start: 2023-02-15 | End: 2023-03-03 | Stop reason: SDUPTHER

## 2023-02-15 NOTE — PATIENT INSTRUCTIONS
Echo with next visit in [place] meeting April 17 th (Eloisa will call you with time for your echocardiogram and appointment)    *Weigh yourself daily in the morning after urinating: Call with weight gain of 3 lbs in one day or 5 lbs in one week  *Call the office if you experience: Chest pain, worsening shortness of breath, swelling in your legs or abdomen, dizziness or lightheaded  *Low sodium diet: 2,000 to 2,500mg of sodium per day  *Carry your medication list with you at all times and bring to every doctors appointment  *Before starting any new over the counter medications please call to ensure no interactions  *Fluid restriction of 48 ounces total per day  *Bring your weight log and blood pressure log to every doctors appointment    [phone]--Rasheeda Olsen Nurse Practitioner for Dr. Moran

## 2023-02-15 NOTE — TELEPHONE ENCOUNTER
Scheduled patient for ECHO and OV on 4/17. LM for him to call me back and confirm the time and date and also that e received my message.

## 2023-02-15 NOTE — TELEPHONE ENCOUNTER
Good Afternoon Eloisa,     Can you assist w/ scheduling an OV/Echo same day on 4/17 @ [place].*per Rachel Olsen*   Pt will be expecting your call with appt times.   Thank you so much

## 2023-02-15 NOTE — PROGRESS NOTES
" Cardiology Note          HPI   Cam Rosas \"PAULO" is a 73 y.o. man who presents for management of acute systolic heart failure, today, referred by Dr. Aceves, to our advanced heart failure clinic.    \"Paulo" has a past medical history of newly diagnosed atrial fibrillation in October with RVR and hospital admission where they also found his EF to be down to 40% s/p new chemotherapy for breast cancer; prostate cancer, CKD Stage 2, recent melena with no active bleeding on EGD, and s/p mitral annual ring placement by Dr. Pastor in 2006.    He was following with Dr. Ra Barajas, cardiologist, for 15 years, who unfortunately passed away 3 months ago. We will work on obtaining his records.     [facility] reports he has not had a weak heart nor atrial fibrillation prior to these events in the late fall of 2022. He was cardioverted after being on amiodarone and Xarelto for 4 weeks by Dr. Aceves on December 5th, 2022. Since that time he has had no further atrial fibrillation. He has been feeing much improved as his weight is down to 186 from 204 lbs. He had an increase in his weight for months since he was initiated on chemo and decadron. He continues to come down in weight by 1/2 ounce per day, no weight gains. He had one dose of Cancian T in September and one dose of Taxotere and carboplastin. He has had no further chemo as he did not tolerate these medications and this is what lead to him going into atrial fibrillation. He is now on Tamoxifen for his breat cancer and likely going for radiation treatments for his prostate cancer. He is post prostatectomy.     From a heart failure standpoint, his edema is much less. He never had abdominal bloating. He had orthopnea sleeping in a recliner. He recently returned to his bed as he is now able to lay flat and he got his sneakers on for the first time today in months! He denies chest pain, dyspnea at rest, orthopnea, PND, edema or abdominal bloating. He denies dizziness or " lightheadedness. He reports skin blisters, redness on his legs, which are improving with improvement of edema.        Past Medical History:   Diagnosis Date   • Acute systolic heart failure (CMS/HCC) 2/15/2023   • Atrial fibrillation (CMS/HCC)    • CHF (congestive heart failure) (CMS/HCC)    • Chronic kidney disease    • CKD (chronic kidney disease) 10/19/2022   • Hx antineoplastic chemo      Past Surgical History:   Procedure Laterality Date   • CARDIAC SURGERY     • CARDIOVERSION  12/05/2022    Successful DC cardioversion     Social History     Tobacco Use   • Smoking status: Never   • Smokeless tobacco: Never   Substance Use Topics   • Alcohol use: Yes     Alcohol/week: 2.0 standard drinks     Types: 2 Shots of liquor per week     Comment: 2 drinks/day - scotch       Family Status   Relation Name Status   • Bio Mother  (Not Specified)   • Bio Father  (Not Specified)   • Bio Sister  (Not Specified)   • Bio Brother  (Not Specified)   • MGM  (Not Specified)   • MGF  (Not Specified)   • PGM  (Not Specified)   • PGF  (Not Specified)        ALLERGIES  Azithromycin, Prednisone, and Lorazepam      Outpatient Encounter Medications as of 2/15/2023:   •  amiodarone (PACERONE) 200 mg tablet, Take 1 tablet (200 mg total) by mouth daily.  •  dilTIAZem CD (CARDIZEM CD) 120 mg 24 hr capsule, Take 1 capsule (120 mg total) by mouth daily.  •  lidocaine-prilocaine (EMLA) cream, Apply 1 application topically as needed for pain.  •  metoprolol succinate XL (TOPROL-XL) 100 mg 24 hr tablet, Take 1 tablet (100 mg total) by mouth daily.  •  pantoprazole (PROTONIX) 40 mg EC tablet, Take 1 tablet (40 mg total) by mouth 2 (two) times a day.  •  potassium chloride (KLOR-CON) 10 mEq CR tablet, Take 2 tablets (20 mEq total) by mouth daily.  •  rivaroxaban (XARELTO) 15 mg tablet, Take 1 tablet (15 mg total) by mouth daily with dinner Indications: treatment to prevent blood clots in chronic atrial fibrillation.  •  silver sulfadiazine  "(SILVADENE) 1 % cream, Apply topically daily.  •  simvastatin (ZOCOR) 20 mg tablet, Take 1 tablet (20 mg total) by mouth nightly.  •  tamoxifen (NOLVADEX) 20 mg chemo tablet, Take  1 tablet (20 mg total) daily  •  torsemide (DEMADEX) 20 mg tablet, Take 1 tablet (20 mg total) by mouth daily. With additional as needed for weight gain  •  [DISCONTINUED] amiodarone (PACERONE) 200 mg tablet, Take 1 tablet (200 mg total) by mouth daily.  •  [DISCONTINUED] magnesium oxide (MAG-OX) 400 mg (241.3 mg magnesium) tablet, Take 1 tablet (400 mg total) by mouth daily.  •  [DISCONTINUED] pantoprazole (PROTONIX) 40 mg EC tablet, Take 40 mg by mouth 2 (two) times a day.  •  [DISCONTINUED] potassium chloride (KLOR-CON) 10 mEq CR tablet, Take 2 tablets (20 mEq total) by mouth daily.  •  [DISCONTINUED] simvastatin (ZOCOR) 20 mg tablet, Take 1 tablet (20 mg total) by mouth nightly.  •  [DISCONTINUED] torsemide (DEMADEX) 20 mg tablet, Take 1 tablet (20 mg total) by mouth daily.    ROS as above in HPI and otherwise negative    Objective   Visit Vitals  /70   Pulse (!) 57   Ht 1.651 m (5' 5\")   Wt 84.5 kg (186 lb 4.8 oz)   SpO2 98%   BMI 31.00 kg/m²       Wt Readings from Last 3 Encounters:   02/15/23 84.5 kg (186 lb 4.8 oz)   01/06/23 88 kg (194 lb)   12/05/22 85.7 kg (189 lb)       Physical Exam  Constitutional:       Appearance: He is well-nourished.   HENT:      Head: Normocephalic and atraumatic.      Nose: Nose normal.      Mouth/Throat:      Mouth: Oropharynx is clear and moist.   Eyes:      Pupils: Pupils are equal, round, and reactive to light.   Neck:      Vascular: No carotid bruit or JVD.   Cardiovascular:      Rate and Rhythm: Normal rate and regular rhythm.      Pulses: Intact distal pulses.      Heart sounds: Normal heart sounds, S1 normal and S2 normal. No murmur heard.    No friction rub. No gallop.   Pulmonary:      Effort: No respiratory distress.      Breath sounds: Normal breath sounds. No wheezing or rales. "   Abdominal:      General: Bowel sounds are normal.      Palpations: Abdomen is soft. There is no mass.      Tenderness: There is no abdominal tenderness.   Musculoskeletal:         General: No edema. Normal range of motion.   Skin:     General: Skin is warm and dry.   Neurological:      Mental Status: He is alert and oriented to person, place, and time.   Psychiatric:         Mood and Affect: Mood and affect normal.         Behavior: Behavior normal.         Judgment: Judgment normal.         Lab Results   Component Value Date    GLUCOSE 116 (H) 12/01/2022    CALCIUM 9.0 12/01/2022     12/01/2022    K 3.9 12/01/2022    CO2 23 12/01/2022     12/01/2022    BUN 22 (H) 12/01/2022    CREATININE 1.3 12/01/2022     Lab Results   Component Value Date    ALT 32 10/19/2022    AST 40 10/19/2022    ALKPHOS 82 10/19/2022    BILITOT 0.3 10/19/2022     Lab Results   Component Value Date    WBC 6.54 12/01/2022    HGB 10.8 (L) 12/01/2022    HCT 35.0 (L) 12/01/2022    .0 (H) 12/01/2022     12/01/2022     No results found for: TSH  No results found for: CHOL  No results found for: HDL  No results found for: LDLCALC  No results found for: TRIG  No results found for: CHOLHDL  No results found for: HGBA1C     EKG    Performed on 2/15/2023 and personally reviewed:  Sinus  Bradycardia at 57bpm  Low voltage in limb leads.    -Nonspecific QRS widening.    -Nonspecific ST depression   +   T-abnormality  -Nondiagnostic  -Possible  Anterior/lateral  ischemia.     Imaging  TTE Sept 2022  Conclusions:   Ejection fraction is visually estimated at 50-55 %. No regional wall motion abnormalities   were appreciated on today's study.   Strain evaluation was technically difficult due to presence of PVC.   The mitral valve leaflets are status post repair. A mitral annuloplasty ring is present.   Mild mitral regurgitation.   Estimated PASP is 33 mmHg. No evidence of pulmonary hypertension.   Indeterminate rhythm on ECG. cannot  rule out AF. Clinical correlation suggested.   No prior for comparison.     TTE October 2022  Conclusions:   Ejection fraction is visually estimated at 40-45 %. There is global left ventricular   hypokinesis. Patient was tachycardiac with a rate of 130 bpm.   Mild mitral annular calcification. The mitral valve leaflets are status post repair. A   mitral annuloplasty ring is present. Mild mitral regurgitation.   Compared to prior echocardiogram, EF has now declined from 55% to 40%.      Assessment   1. Newly diagnosed acute systolic and diastolic heart failure, ACC Stage C, NYHA class 2  Acute change within one month with the only change being new atrial fibrillation with RVR at the time of the echocardiogram in October. He has been out of atrial fibrillation now since December. We will repeat an echocardiogram in April.   If he remains at 40%, we will stop Cardizem as this is a negative inotrope and start adding on GDMT with Entresto, SGLT-2 and spirolactone. If his EF recovers, we will not initiate these therapies as the clear cause is the atrial fibrillation.  He had one dose of the above mentioned chemotherapy agents in HPI.  Some chemo agents are cardiotoxic, he will let us know if his oncologist decides to pursue chemotherapy. If this is decided before his next scheduled echo in April, we will have this done sooner as it should be done prior to chemotherapy, as this will impact the choice of chemotherapy based on his EF.  He will continue to follow a low salt diet. We discussed fluid limitations to 48-64 ounces.  He will continue with torsemide 20 mg daily with additional as needed for weight gain of 3 lbs in 1 day or 5 lbs 1 week.    2. Paroxysmal atrial fibrillation  Rhythm control and following with Dr. Aceves. On Xarelto    3. CKD Stage 2  Kidneys stable on current medical therapy. We ordered labs to be done soon.    4. GI bleed  EGD with no active bleeding.    5. Breast and Prostate Cancer  As above, he  will let us know if oncology recommends chemo, plan outlined above.    By signing my name below, I, Chel GALLEGOSDeepika Olsen, attest that this documentation has been prepared under the direction and in the presence of Gary Moran MD          Thank you for allowing me to participate in the care of this patient.  Please do not hesitate to contact me with any questions or concerns.    Sincerely,  JONNY Coleman  2/15/2023     I spent 60 minutes on this date of service performing the following activities: obtaining history, performing examination, entering orders, documenting, preparing for visit, obtaining / reviewing records, providing counseling and education and communicating results.    Patient seen and examined.  I agree with the findings and recommendations of the scribed note above with my modifications.  We reviewed [facility] notes, labs and testing. We reviewed oncology notes, nephrology and EP notes. We ordered an echocardiogram and BMP and Mag to be done soon. Please not hesitate to contact me with any questions or concerns.    Sincerely,    Gary Moran MD  2/15/2023

## 2023-02-15 NOTE — LETTER
"February 15, 2023     Gary Aceves MD  [address]  [facility]  [place] [address]    Patient: Cam Rosas  YOB: 1950  Date of Visit: 2/15/2023      Dear Dr. Aceves:    Thank you for referring Cam Rosas to me for evaluation. Below are my notes for this consultation.    If you have questions, please do not hesitate to call me. I look forward to following your patient along with you.         Sincerely,        Gary Moran MD        CC: MD Paz Andujar MD Krishna Komanduri, DO Domsky, Steven M, MD  2/15/2023 10:26 PM  Sign when Signing Visit   Cardiology Note          HPI    Cam Rosas \"JH\" is a 73 y.o. man who presents for management of acute systolic heart failure, today, referred by Dr. Aceves, to our advanced heart failure clinic.    \"Jh\" has a past medical history of newly diagnosed atrial fibrillation in October with RVR and hospital admission where they also found his EF to be down to 40% s/p new chemotherapy for breast cancer; prostate cancer, CKD Stage 2, recent melena with no active bleeding on EGD, and s/p mitral annual ring placement by Dr. Pastor in 2006.    He was following with Dr. Ra Barajas, cardiologist, for 15 years, who unfortunately passed away 3 months ago. We will work on obtaining his records.     [facility] reports he has not had a weak heart nor atrial fibrillation prior to these events in the late fall of 2022. He was cardioverted after being on amiodarone and Xarelto for 4 weeks by Dr. Aceves on December 5th, 2022. Since that time he has had no further atrial fibrillation. He has been feeing much improved as his weight is down to 186 from 204 lbs. He had an increase in his weight for months since he was initiated on chemo and decadron. He continues to come down in weight by 1/2 ounce per day, no weight gains. He had one dose of Cancian T in September and one dose of Taxotere and " carboplastin. He has had no further chemo as he did not tolerate these medications and this is what lead to him going into atrial fibrillation. He is now on Tamoxifen for his breat cancer and likely going for radiation treatments for his prostate cancer. He is post prostatectomy.     From a heart failure standpoint, his edema is much less. He never had abdominal bloating. He had orthopnea sleeping in a recliner. He recently returned to his bed as he is now able to lay flat and he got his sneakers on for the first time today in months! He denies chest pain, dyspnea at rest, orthopnea, PND, edema or abdominal bloating. He denies dizziness or lightheadedness. He reports skin blisters, redness on his legs, which are improving with improvement of edema.        Past Medical History:   Diagnosis Date   • Acute systolic heart failure (CMS/HCC) 2/15/2023   • Atrial fibrillation (CMS/HCC)    • CHF (congestive heart failure) (CMS/HCC)    • Chronic kidney disease    • CKD (chronic kidney disease) 10/19/2022   • Hx antineoplastic chemo      Past Surgical History:   Procedure Laterality Date   • CARDIAC SURGERY     • CARDIOVERSION  12/05/2022    Successful DC cardioversion     Social History     Tobacco Use   • Smoking status: Never   • Smokeless tobacco: Never   Substance Use Topics   • Alcohol use: Yes     Alcohol/week: 2.0 standard drinks     Types: 2 Shots of liquor per week     Comment: 2 drinks/day - scotch       Family Status   Relation Name Status   • Bio Mother  (Not Specified)   • Bio Father  (Not Specified)   • Bio Sister  (Not Specified)   • Bio Brother  (Not Specified)   • MGM  (Not Specified)   • MGF  (Not Specified)   • PGM  (Not Specified)   • PGF  (Not Specified)        ALLERGIES  Azithromycin, Prednisone, and Lorazepam      Outpatient Encounter Medications as of 2/15/2023:   •  amiodarone (PACERONE) 200 mg tablet, Take 1 tablet (200 mg total) by mouth daily.  •  dilTIAZem CD (CARDIZEM CD) 120 mg 24 hr capsule,  "Take 1 capsule (120 mg total) by mouth daily.  •  lidocaine-prilocaine (EMLA) cream, Apply 1 application topically as needed for pain.  •  metoprolol succinate XL (TOPROL-XL) 100 mg 24 hr tablet, Take 1 tablet (100 mg total) by mouth daily.  •  pantoprazole (PROTONIX) 40 mg EC tablet, Take 1 tablet (40 mg total) by mouth 2 (two) times a day.  •  potassium chloride (KLOR-CON) 10 mEq CR tablet, Take 2 tablets (20 mEq total) by mouth daily.  •  rivaroxaban (XARELTO) 15 mg tablet, Take 1 tablet (15 mg total) by mouth daily with dinner Indications: treatment to prevent blood clots in chronic atrial fibrillation.  •  silver sulfadiazine (SILVADENE) 1 % cream, Apply topically daily.  •  simvastatin (ZOCOR) 20 mg tablet, Take 1 tablet (20 mg total) by mouth nightly.  •  tamoxifen (NOLVADEX) 20 mg chemo tablet, Take  1 tablet (20 mg total) daily  •  torsemide (DEMADEX) 20 mg tablet, Take 1 tablet (20 mg total) by mouth daily. With additional as needed for weight gain  •  [DISCONTINUED] amiodarone (PACERONE) 200 mg tablet, Take 1 tablet (200 mg total) by mouth daily.  •  [DISCONTINUED] magnesium oxide (MAG-OX) 400 mg (241.3 mg magnesium) tablet, Take 1 tablet (400 mg total) by mouth daily.  •  [DISCONTINUED] pantoprazole (PROTONIX) 40 mg EC tablet, Take 40 mg by mouth 2 (two) times a day.  •  [DISCONTINUED] potassium chloride (KLOR-CON) 10 mEq CR tablet, Take 2 tablets (20 mEq total) by mouth daily.  •  [DISCONTINUED] simvastatin (ZOCOR) 20 mg tablet, Take 1 tablet (20 mg total) by mouth nightly.  •  [DISCONTINUED] torsemide (DEMADEX) 20 mg tablet, Take 1 tablet (20 mg total) by mouth daily.    ROS as above in HPI and otherwise negative    Objective    Visit Vitals  /70   Pulse (!) 57   Ht 1.651 m (5' 5\")   Wt 84.5 kg (186 lb 4.8 oz)   SpO2 98%   BMI 31.00 kg/m²       Wt Readings from Last 3 Encounters:   02/15/23 84.5 kg (186 lb 4.8 oz)   01/06/23 88 kg (194 lb)   12/05/22 85.7 kg (189 lb)       Physical " Exam  Constitutional:       Appearance: He is well-nourished.   HENT:      Head: Normocephalic and atraumatic.      Nose: Nose normal.      Mouth/Throat:      Mouth: Oropharynx is clear and moist.   Eyes:      Pupils: Pupils are equal, round, and reactive to light.   Neck:      Vascular: No carotid bruit or JVD.   Cardiovascular:      Rate and Rhythm: Normal rate and regular rhythm.      Pulses: Intact distal pulses.      Heart sounds: Normal heart sounds, S1 normal and S2 normal. No murmur heard.    No friction rub. No gallop.   Pulmonary:      Effort: No respiratory distress.      Breath sounds: Normal breath sounds. No wheezing or rales.   Abdominal:      General: Bowel sounds are normal.      Palpations: Abdomen is soft. There is no mass.      Tenderness: There is no abdominal tenderness.   Musculoskeletal:         General: No edema. Normal range of motion.   Skin:     General: Skin is warm and dry.   Neurological:      Mental Status: He is alert and oriented to person, place, and time.   Psychiatric:         Mood and Affect: Mood and affect normal.         Behavior: Behavior normal.         Judgment: Judgment normal.         Lab Results   Component Value Date    GLUCOSE 116 (H) 12/01/2022    CALCIUM 9.0 12/01/2022     12/01/2022    K 3.9 12/01/2022    CO2 23 12/01/2022     12/01/2022    BUN 22 (H) 12/01/2022    CREATININE 1.3 12/01/2022     Lab Results   Component Value Date    ALT 32 10/19/2022    AST 40 10/19/2022    ALKPHOS 82 10/19/2022    BILITOT 0.3 10/19/2022     Lab Results   Component Value Date    WBC 6.54 12/01/2022    HGB 10.8 (L) 12/01/2022    HCT 35.0 (L) 12/01/2022    .0 (H) 12/01/2022     12/01/2022     No results found for: TSH  No results found for: CHOL  No results found for: HDL  No results found for: LDLCALC  No results found for: TRIG  No results found for: CHOLHDL  No results found for: HGBA1C     EKG    Performed on 2/15/2023 and personally reviewed:  Sinus   Bradycardia at 57bpm  Low voltage in limb leads.    -Nonspecific QRS widening.    -Nonspecific ST depression   +   T-abnormality  -Nondiagnostic  -Possible  Anterior/lateral  ischemia.     Imaging  TTE Sept 2022  Conclusions:   Ejection fraction is visually estimated at 50-55 %. No regional wall motion abnormalities   were appreciated on today's study.   Strain evaluation was technically difficult due to presence of PVC.   The mitral valve leaflets are status post repair. A mitral annuloplasty ring is present.   Mild mitral regurgitation.   Estimated PASP is 33 mmHg. No evidence of pulmonary hypertension.   Indeterminate rhythm on ECG. cannot rule out AF. Clinical correlation suggested.   No prior for comparison.     TTE October 2022  Conclusions:   Ejection fraction is visually estimated at 40-45 %. There is global left ventricular   hypokinesis. Patient was tachycardiac with a rate of 130 bpm.   Mild mitral annular calcification. The mitral valve leaflets are status post repair. A   mitral annuloplasty ring is present. Mild mitral regurgitation.   Compared to prior echocardiogram, EF has now declined from 55% to 40%.      Assessment    1. Newly diagnosed acute systolic and diastolic heart failure, ACC Stage C, NYHA class 2  Acute change within one month with the only change being new atrial fibrillation with RVR at the time of the echocardiogram in October. He has been out of atrial fibrillation now since December. We will repeat an echocardiogram in April.   If he remains at 40%, we will stop Cardizem as this is a negative inotrope and start adding on GDMT with Entresto, SGLT-2 and spirolactone. If his EF recovers, we will not initiate these therapies as the clear cause is the atrial fibrillation.  He had one dose of the above mentioned chemotherapy agents in HPI.  Some chemo agents are cardiotoxic, he will let us know if his oncologist decides to pursue chemotherapy. If this is decided before his next scheduled  echo in April, we will have this done sooner as it should be done prior to chemotherapy, as this will impact the choice of chemotherapy based on his EF.  He will continue to follow a low salt diet. We discussed fluid limitations to 48-64 ounces.  He will continue with torsemide 20 mg daily with additional as needed for weight gain of 3 lbs in 1 day or 5 lbs 1 week.    2. Paroxysmal atrial fibrillation  Rhythm control and following with Dr. Aceves. On Xarelto    3. CKD Stage 2  Kidneys stable on current medical therapy. We ordered labs to be done soon.    4. GI bleed  EGD with no active bleeding.    5. Breast and Prostate Cancer  As above, he will let us know if oncology recommends chemo, plan outlined above.    By signing my name below, I, Chel Olsen, attest that this documentation has been prepared under the direction and in the presence of Gary Moran MD         Thank you for allowing me to participate in the care of this patient.  Please do not hesitate to contact me with any questions or concerns.    Sincerely,  JONNY Coleman  2/15/2023     I spent 60 minutes on this date of service performing the following activities: obtaining history, performing examination, entering orders, documenting, preparing for visit, obtaining / reviewing records, providing counseling and education and communicating results.    Patient seen and examined.  I agree with the findings and recommendations of the scribed note above with my modifications.  We reviewed [facility] notes, labs and testing. We reviewed oncology notes, nephrology and EP notes. We ordered an echocardiogram and BMP and Mag to be done soon. Please not hesitate to contact me with any questions or concerns.    Sincerely,    Gary Moran MD  2/15/2023

## 2023-02-16 ENCOUNTER — TELEPHONE (OUTPATIENT)
Dept: HEMATOLOGY/ONCOLOGY | Facility: CLINIC | Age: 73
End: 2023-02-16
Payer: MEDICARE

## 2023-02-16 NOTE — TELEPHONE ENCOUNTER
Received letter from cardiologist.  Patient needs a follow-up appointment with me.  Please schedule.

## 2023-02-21 ENCOUNTER — OFFICE VISIT (OUTPATIENT)
Dept: HEMATOLOGY/ONCOLOGY | Facility: CLINIC | Age: 73
End: 2023-02-21
Attending: INTERNAL MEDICINE
Payer: MEDICARE

## 2023-02-21 VITALS
OXYGEN SATURATION: 96 % | HEART RATE: 59 BPM | DIASTOLIC BLOOD PRESSURE: 59 MMHG | WEIGHT: 186.8 LBS | SYSTOLIC BLOOD PRESSURE: 113 MMHG | TEMPERATURE: 98.1 F | RESPIRATION RATE: 18 BRPM | BODY MASS INDEX: 31.09 KG/M2

## 2023-02-21 DIAGNOSIS — Z17.0 MALIGNANT NEOPLASM OF OVERLAPPING SITES OF RIGHT BREAST IN MALE, ESTROGEN RECEPTOR POSITIVE (CMS/HCC): Primary | ICD-10-CM

## 2023-02-21 DIAGNOSIS — C50.821 MALIGNANT NEOPLASM OF OVERLAPPING SITES OF RIGHT BREAST IN MALE, ESTROGEN RECEPTOR POSITIVE (CMS/HCC): Primary | ICD-10-CM

## 2023-02-21 DIAGNOSIS — C61 PROSTATE CANCER (CMS/HCC): ICD-10-CM

## 2023-02-21 PROCEDURE — 99215 OFFICE O/P EST HI 40 MIN: CPT | Performed by: INTERNAL MEDICINE

## 2023-02-21 RX ORDER — CHOLECALCIFEROL (VITAMIN D3) 25 MCG
1000 TABLET ORAL DAILY
COMMUNITY

## 2023-02-21 ASSESSMENT — ENCOUNTER SYMPTOMS
LEG SWELLING: 1
NEUROLOGICAL NEGATIVE: 1
GASTROINTESTINAL NEGATIVE: 1
RESPIRATORY NEGATIVE: 1
DEPRESSION: 1
EYES NEGATIVE: 1
MUSCULOSKELETAL NEGATIVE: 1

## 2023-02-21 ASSESSMENT — PAIN SCALES - GENERAL: PAINLEVEL: 0-NO PAIN

## 2023-02-21 NOTE — ASSESSMENT & PLAN NOTE
Currently on tamoxifen and tolerating it well.  This was initiated while he underwent evaluation management of heart failure, A-fib, and renal failure.  He has improved.  Of note is that he does need to see radiation oncology regarding treatment of his prostate cancer.    On exam he still has a palpable mass and nodularity within the right breast.  There are some physical signs of improvement since he has been on the tamoxifen.  He will continue on the tamoxifen at this time while we have him undergo further evaluation.    His urologist had requested a PET scan to evaluate for distant sites of prostate cancer.  I am going to order a PET scan which she would like to be done in the [facility] system to evaluate for metastatic disease from breast cancer and prostate cancer.  He is not a good candidate for CAT scan with contrast considering his history of heart failure and renal failure.    We are going to have him evaluated by radiation oncology for management of his increasing PSA and history of prostate cancer.    I have ordered a mammogram with ultrasound the right breast.  His wife is going to  the discs from [facility] and bring them with her so that a comparison can be made.  I am going to refer him to Dr. Barb Osuna.  He would like all further care to be within the [facility] system.    We have had discussions that typically HER2/jhonatan positive cancer such as he has would require systemic therapy.  He is not a good candidate for any type of cardiotoxic agents considering his history.  However there are chemotherapeutic agents that are not particularly cardiotoxic that we could consider.  Discussed that we will need to have this as a team approach for him.    Is going to get his PET scan, mammogram and ultrasound, see radiation oncology, see the breast surgeon, and continue on his tamoxifen until we regroup in approximately a month.

## 2023-02-21 NOTE — ASSESSMENT & PLAN NOTE
He recently saw his urologist who recommended evaluation by radiation oncology due to his PSA increasing 6 months post prostatectomy.  He would like all further care to be in the [facility] system.  He has been referred to Dr. Greg Ochsner from radiation oncology.    We will need to coordinate a plan since he has had his renal failure, A-fib, and cardiac heart failure issues managed.  He needs treatment for his breast cancer as well.

## 2023-02-21 NOTE — PROGRESS NOTES
Review of Systems   HENT:  Negative.    Eyes: Negative.    Respiratory: Negative.    Cardiovascular: Positive for leg swelling (improving ).   Gastrointestinal: Negative.    Genitourinary:         No control of urine    Musculoskeletal: Negative.    Skin:        Sore on legs treating with silvadene and wraps. Dryness    Neurological: Negative.    Hematological:        Burst vessel R. Eye    Psychiatric/Behavioral: Positive for depression (down with medical issue ).   Labs 2/15/23  U/S of legs to r/o clots for recent leg swelling   12/5/22 Cardioversion

## 2023-02-21 NOTE — PROGRESS NOTES
Cam Rosas is a 73 y.o. male,   :  1950    Encounter Diagnoses   Name Primary?   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, estrogen receptor positive (CMS/HCC) Yes   • Prostate cancer (CMS/HCC)         Cancer Staging   No matching staging information was found for the patient.    Treatment Plans     No treatment plans exist          Oncology History   Malignant neoplasm of right male breast (CMS/HCC)   2022 Biopsy     1.  Right breast mass:     Invasive ductal carcinoma, Oumar grade 3 (3+ 3+2).   Invasive carcinoma is 13 mm in maximum dimension in core biopsy.    ER+ 90%; KY+ 50%; [id number]+3; [id number]%     2022 -  Chemotherapy    2022 initiated first cycle TCP H at [facility].  Questionable reaction during the anti-HER2/jhonatan therapy with Herceptin.  Taxotere and carboplatin given on 10/3/2022.  Patient hospitalized after first cycle.     10/19/2022 Initial Diagnosis    Malignant neoplasm of right male breast (CMS/HCC)     11/15/2022 -  Hormone Therapy    Tamoxifen 20 mg daily while undergoes evaluation of cardiac issues         Patient Active Problem List   Diagnosis   • Atrial fibrillation, unspecified type (CMS/HCC)   • Malignant neoplasm of right male breast (CMS/HCC)   • Electrolyte abnormality   • Gastrointestinal hemorrhage with melena   • CKD (chronic kidney disease)   • Prostate cancer (CMS/HCC)   • Acute systolic heart failure (CMS/HCC)       History of Present Illness  Cam Rosas is seen today in follow up.  I first and last met him on November 10, 2022 when he presented regarding his history of ER positive, HER2/jhonatan positive, right breast cancer.      At that time he had undergone a prostatectomy for prostate cancer in 2022.  In 2022 he was diagnosed with a right-sided breast cancer.  Biopsy revealed it to be grade 3, ER +90%, KY +50%, and HER2/jhonatan positive at +3 Ki-67 30%.  He had met with surgery and [facility] oncology [facility].  Metastatic work-up was negative.  He met with genetic counseling and underwent testing and has a BRCA1 and OK gene nutation.      He received 1 cycle of neoadjuvant chemotherapy with TCPH but was admitted to the hospital and developed heart failure, renal dysfunction and atrial fibrillation.    He was unable to take any further systemic therapy at that time due to decreased ejection fraction, heart failure and atrial fibrillation.  I discussed this case with cardiology.  We started tamoxifen while he underwent further cardiac evaluation and management.    He most recently saw cardiology on February 15.  Records indicate that on December 5 he underwent cardioversion after being on amiodarone and Xarelto for 4 weeks.  Since then there was no further atrial fibrillation.  Significant amount of his swelling resolved.  They will have follow-up with him in April with a repeat echocardiogram.  He was made aware that if he is recommended chemotherapy that there will need to be discussion if any of the agents are cardiotoxic.  If an agent is being considered he may need to have echocardiogram performed sooner.    Met with his nephrologist on February 13, 2023.  His creatinine had improved to 1.67 with a GFR 43 mL/min.  Prior to his recent medical issues his baseline creatinine was approximately 1.33 with chronic kidney disease felt to be related to hypertensive nephropathy.  It was felt to be exacerbated by valvular heart disease blood pressure was felt to be controlled.  He was felt to be euvolemic.  Follow-up was scheduled for 6 months.    He met with his urologist on February 14, 2023.  It was felt that his PSA had increased to 0.54  6 months out postsurgery.  It was noted that he had a decipher testing predicting rather high risk tumor biology.  His Clrae score was 7.  He was recommended further treatment considering biochemical recurrence occurrence.  He was recommended to undergo a PET scan for  staging and he was referred to radiation oncology to discuss salvage radiation therapy.    I had received a letter from his cardiologist prompting my office to call him and set up an appointment and thus he presents today.    He has continued tamoxifen.  He notes that his nipple is not as inverted.  He has seen some favorable changes in his right breast.  He has tolerated the tamoxifen well and has not had any significant side effects.              Review of Systems - Oncology  Review of Systems   HENT:  Negative.    Eyes: Negative.    Respiratory: Negative.    Cardiovascular: Positive for leg swelling (improving ).   Gastrointestinal: Negative.    Genitourinary:         No control of urine    Musculoskeletal: Negative.    Skin:        Sore on legs treating with silvadene and wraps. Dryness    Neurological: Negative.    Hematological:        Burst vessel R. Eye    Psychiatric/Behavioral: Positive for depression (down with medical issue ).   Labs 2/15/23  U/S of legs to r/o clots for recent leg swelling   12/5/22 Cardioversion   Review of Systems:  Nursing assessment reviewed. Pertinent positive and negative symptoms noted in HPI, all others negative.    Temp:  [36.7 °C (98.1 °F)] 36.7 °C (98.1 °F)  Heart Rate:  [59] 59  Resp:  [18] 18  BP: (113)/(59) 113/59  Visit Vitals  BP (!) 113/59 (BP Location: Left upper arm, Patient Position: Sitting)   Pulse (!) 59   Temp 36.7 °C (98.1 °F) (Temporal)   Resp 18   Wt 84.7 kg (186 lb 12.8 oz)   SpO2 96% Comment: RA   BMI 31.09 kg/m²     Physical Exam  Constitutional:       General: He is not in acute distress.     Appearance: He is not ill-appearing.   HENT:      Head: Normocephalic.   Eyes:      Comments: Injected right sclera   Cardiovascular:      Rate and Rhythm: Normal rate and regular rhythm.   Pulmonary:      Effort: Pulmonary effort is normal. No respiratory distress.      Breath sounds: Normal breath sounds. No wheezing, rhonchi or rales.   Chest:      Comments: Right  nipple is retracted.  Palpable mass.  Multiple small nodules palpable in the lateral aspect of the breast going up towards the axilla.  No significant redness or swelling.  Exam is improved from what I saw in November 2022.  Abdominal:      General: Bowel sounds are normal. There is no distension.      Palpations: Abdomen is soft. There is no mass.      Tenderness: There is no abdominal tenderness.   Musculoskeletal:      Right lower leg: Edema present.      Left lower leg: Edema present.   Lymphadenopathy:      Cervical: No cervical adenopathy.   Neurological:      Mental Status: He is oriented to person, place, and time.   Psychiatric:         Mood and Affect: Mood normal.         Behavior: Behavior normal.         Past Medical History:   Diagnosis Date   • Acute systolic heart failure (CMS/HCC) 2/15/2023   • Atrial fibrillation (CMS/HCC)    • CHF (congestive heart failure) (CMS/HCC)    • Chronic kidney disease    • CKD (chronic kidney disease) 10/19/2022   • Hx antineoplastic chemo        Past Surgical History:   Procedure Laterality Date   • CARDIAC SURGERY     • CARDIOVERSION  12/05/2022    Successful DC cardioversion       Social History     Tobacco Use   • Smoking status: Never   • Smokeless tobacco: Never   Substance Use Topics   • Alcohol use: Yes     Alcohol/week: 2.0 standard drinks     Types: 2 Shots of liquor per week     Comment: 2 drinks/day - scotch       Family History   Problem Relation Age of Onset   • Hyperlipidemia Biological Mother    • Hypertension Biological Mother    • Breast cancer Biological Mother    • Hyperlipidemia Biological Father    • Hypertension Biological Father    • Arthritis Biological Father    • No Known Problems Biological Sister    • No Known Problems Biological Brother    • Heart disease Maternal Grandmother    • Arthritis Maternal Grandfather    • COPD Maternal Grandfather    • Diabetes Maternal Grandfather    • No Known Problems Paternal Grandmother    • No Known Problems  Paternal Grandfather          Allergies  Azithromycin, Prednisone, and Lorazepam    Medications  Current Outpatient Medications   Medication Sig Dispense Refill   • amiodarone (PACERONE) 200 mg tablet Take 1 tablet (200 mg total) by mouth daily. 90 tablet 3   • cholecalciferol, vitamin D3, 1,000 unit (25 mcg) tablet Take 1,000 Units by mouth daily.     • dilTIAZem CD (CARDIZEM CD) 120 mg 24 hr capsule Take 1 capsule (120 mg total) by mouth daily. 270 capsule 3   • lidocaine-prilocaine (EMLA) cream Apply 1 application topically as needed for pain.     • metoprolol succinate XL (TOPROL-XL) 100 mg 24 hr tablet Take 1 tablet (100 mg total) by mouth daily. 30 tablet 0   • pantoprazole (PROTONIX) 40 mg EC tablet Take 1 tablet (40 mg total) by mouth 2 (two) times a day. 180 tablet 3   • potassium chloride (KLOR-CON) 10 mEq CR tablet Take 2 tablets (20 mEq total) by mouth daily. 180 tablet 3   • rivaroxaban (XARELTO) 15 mg tablet Take 1 tablet (15 mg total) by mouth daily with dinner Indications: treatment to prevent blood clots in chronic atrial fibrillation. 90 tablet 1   • silver sulfadiazine (SILVADENE) 1 % cream Apply topically daily. 20 g 0   • simvastatin (ZOCOR) 20 mg tablet Take 1 tablet (20 mg total) by mouth nightly. 90 tablet 3   • tamoxifen (NOLVADEX) 20 mg chemo tablet Take  1 tablet (20 mg total) daily 90 tablet 1   • torsemide (DEMADEX) 20 mg tablet Take 1 tablet (20 mg total) by mouth daily. With additional as needed for weight gain 150 tablet 3     No current facility-administered medications for this visit.        Laboratory  Recent Results (from the past 672 hour(s))   Ultrasound venous reflux leg bilateral    Collection Time: 01/30/23  9:42 AM   Result Value Ref Range    Rt CFV Reflux 0.00 sec    Rt Popliteal Reflux 0.00 sec    Rt SFV distal Reflux 0.00 sec    Rt SFV mid Reflux 0.00 sec    Rt SFV prox Reflux 0.00 sec    Rt GSV 2cm distal to SFJ AP 0.57 cm    Rt GSV 2cm distal to SFJ Reflux 0.00 sec     Rt GSV 2cm distal to SFJ Trans 0.59 cm    Rt GSV above knee AP 0.27 cm    Rt GSV above knee Reflux 0.00 sec    Rt GSV above knee Trans 0.33 cm    Rt GSV at SFJ AP 0.71 cm    Rt GSV at SFJ Reflux 1.70 sec    Rt GSV at SFJ Trans 0.63 cm    Rt GSV below knee AP 0.37 cm    Rt GSV below knee Reflux 0.00 sec    Rt GSV below knee Trans 0.40 cm    Rt GSV mid calf AP 0.27 cm    Rt GSV mid calf Reflux 0.00 sec    Rt GSV mid calf Trans 0.33 cm    Rt GSV mid thigh AP 0.41 cm    Rt GSV mid thigh Reflux 0.00 sec    Rt GSV mid thigh Trans 0.29 cm    Rt GSV prox thigh AP 0.43 cm    Rt GSV prox thigh Reflux 0.00 sec    Rt GSV prox thigh Trans 0.36 cm    Rt SSV prox AP 0.13 cm    Rt SSV prox Reflux 0.00 sec    Rt SSV prox Trans 0.16 cm    Lt CFV Reflux 0.00 sec    Lt GSV 2cm distal to SFJ AP 0.87 cm    Lt GSV 2cm distal to SFJ Reflux 0.00 sec    Lt GSV 2cm distal to SFJ Trans 0.82 cm    Lt GSV above knee AP 0.31 cm    Lt GSV above knee Reflux 0.00 sec    Lt GSV above knee Trans 0.31 cm    Lt GSV at SFJ AP 0.89 cm    Lt GSV at SFJ Reflux 0.00 sec    Lt GSV at SFJ Trans 0.75 cm    Lt GSV below knee AP 0.27 cm    Lt GSV below knee Reflux 0.00 sec    Lt GSV below knee Trans 0.34 cm    Lt GSV mid calf AP 0.40 cm    Lt GSV mid calf Reflux 0.00 sec    Lt GSV mid calf Trans 0.55 cm    Lt GSV mid thigh AP 0.34 cm    Lt GSV mid thigh Reflux 0.00 sec    Lt GSV mid thigh Trans 0.40 cm    Lt GSV prox thigh AP 0.42 cm    Lt GSV prox thigh Reflux 0.00 sec    Lt GSV prox thigh Trans 0.38 cm    Lt Popliteal Reflux 0.00 sec    Lt SFV distal Reflux 0.00 sec    Lt SFV mid Reflux 0.00 sec    Lt SFV prox Reflux 0.00 sec    Lt SSV prox AP 0.15 cm    Lt SSV prox Reflux 0.00 sec    Lt SSV prox Trans 0.15 cm   B-type natriuretic peptide    Collection Time: 02/07/23  1:26 PM   Result Value Ref Range    B Type Natriuretic Peptide (Bnp) 352 (H) <100 pg/mL       Radiology    Ultrasound venous reflux leg bilateral    Result Date: 1/31/2023  Narrative: No evidence  of deep vein thrombus (DVT) in either lower extremity.      Assessment and Plan  Prostate cancer (CMS/HCC)  He recently saw his urologist who recommended evaluation by radiation oncology due to his PSA increasing 6 months post prostatectomy.  He would like all further care to be in the [facility] system.  He has been referred to Dr. Greg Ochsner from radiation oncology.    We will need to coordinate a plan since he has had his renal failure, A-fib, and cardiac heart failure issues managed.  He needs treatment for his breast cancer as well.    Malignant neoplasm of right male breast (CMS/HCC)  Currently on tamoxifen and tolerating it well.  This was initiated while he underwent evaluation management of heart failure, A-fib, and renal failure.  He has improved.  Of note is that he does need to see radiation oncology regarding treatment of his prostate cancer.    On exam he still has a palpable mass and nodularity within the right breast.  There are some physical signs of improvement since he has been on the tamoxifen.  He will continue on the tamoxifen at this time while we have him undergo further evaluation.    His urologist had requested a PET scan to evaluate for distant sites of prostate cancer.  I am going to order a PET scan which she would like to be done in the [facility] system to evaluate for metastatic disease from breast cancer and prostate cancer.  He is not a good candidate for CAT scan with contrast considering his history of heart failure and renal failure.    We are going to have him evaluated by radiation oncology for management of his increasing PSA and history of prostate cancer.    I have ordered a mammogram with ultrasound the right breast.  His wife is going to  the discs from [facility] and bring them with her so that a comparison can be made.  I am going to refer him to Dr. Barb Osuna.  He would like all further care to be within the [facility]  system.    We have had discussions that typically HER2/jhonatan positive cancer such as he has would require systemic therapy.  He is not a good candidate for any type of cardiotoxic agents considering his history.  However there are chemotherapeutic agents that are not particularly cardiotoxic that we could consider.  Discussed that we will need to have this as a team approach for him.    Is going to get his PET scan, mammogram and ultrasound, see radiation oncology, see the breast surgeon, and continue on his tamoxifen until we regroup in approximately a month.      I spent 50 minutes on this date of service performing the following activities: obtaining history, performing examination, entering orders, documenting, preparing for visit, obtaining / reviewing records, providing counseling and education, communicating results and coordinating care.     Paz Cleveland MD

## 2023-02-22 ENCOUNTER — TRANSCRIBE ORDERS (OUTPATIENT)
Dept: SCHEDULING | Age: 73
End: 2023-02-22

## 2023-02-22 DIAGNOSIS — Z17.0 ESTROGEN RECEPTOR POSITIVE STATUS (ER+): ICD-10-CM

## 2023-02-22 DIAGNOSIS — C50.821 MALIGNANT NEOPLASM OF OVERLAPPING SITES OF RIGHT MALE BREAST (CMS/HCC): Primary | ICD-10-CM

## 2023-02-24 LAB
BUN SERPL-MCNC: 31 MG/DL (ref 7–25)
BUN/CREAT SERPL: 18 (CALC) (ref 6–22)
CALCIUM SERPL-MCNC: 8.5 MG/DL (ref 8.6–10.3)
CHLORIDE SERPL-SCNC: 103 MMOL/L (ref 98–110)
CO2 SERPL-SCNC: 25 MMOL/L (ref 20–32)
CREAT SERPL-MCNC: 1.72 MG/DL (ref 0.7–1.28)
EGFRCR SERPLBLD CKD-EPI 2021: 41 ML/MIN/1.73M2
GLUCOSE SERPL-MCNC: 85 MG/DL (ref 65–99)
MAGNESIUM SERPL-MCNC: 1.6 MG/DL (ref 1.5–2.5)
POTASSIUM SERPL-SCNC: 4 MMOL/L (ref 3.5–5.3)
SODIUM SERPL-SCNC: 141 MMOL/L (ref 135–146)

## 2023-02-28 ASSESSMENT — ENCOUNTER SYMPTOMS
WEAKNESS: 0
SYNCOPE: 0
SHORTNESS OF BREATH: 0
COUGH: 0
FOCAL WEAKNESS: 0
DYSPNEA ON EXERTION: 0
ALTERED MENTAL STATUS: 0
ORTHOPNEA: 0
TREMORS: 0
PALPITATIONS: 0
NEAR-SYNCOPE: 0
ABDOMINAL PAIN: 0
PARESTHESIAS: 0
HEMATURIA: 0
IRREGULAR HEARTBEAT: 0
PND: 0

## 2023-02-28 NOTE — PROGRESS NOTES
Electrophysiology  Outpatient Note         HPI   Cam Rosas is a 73 y.o. male who is seen today for follow-up and management of atrial fibrillation.  He underwent a cardioversion on December 5, 2022.  He was loaded with amiodarone and now comes to the office on amiodarone 200 mg daily, metoprolol 100 mg daily and diltiazem 360 mg .  He returned to the office 1/2023 in sinus bradycardia. Diltizem was decreased. Today his QT is slightly loner but still within normal range. He remains bradycardia.     He was admitted to [facility] (10/19-10/23) with acute decompensated systolic heart failure. He was then diagnosed with atrial fibrillation when he was seen at [facility].  He was placed on metoprolol and discharged.  He was not anticoagulated due to recent melena requiring transfusion.  EGD at outside hospital showed no active bleeding.    While admitted he started on IV diltiazem in the ED and was noted to be both hypokalemic and hypomagnesemic.  He was placed on IV heparin with close monitoring of his hemoglobin and discussed possible ROLF guided cardioversion. He then refused ROLF guided cardioversion and wanted to be discharged home.  He was discharged on metoprolol, diltiazem, and Xarelto with plans for cardioversion in 4 weeks.      He has a past medical history of CKD, breast cancer on chemotherapy melena, and history of mitral annular ring placement by Dr. Pastor in 2006.    Past Medical History:   Diagnosis Date   • Acute systolic heart failure (CMS/HCC) 02/15/2023   • Atrial fibrillation (CMS/HCC)    • Breast cancer (CMS/HCC) October 2022   • CHF (congestive heart failure) (CMS/HCC)    • Chronic kidney disease    • CKD (chronic kidney disease) 10/19/2022   • Hx antineoplastic chemo    • Prostate cancer (CMS/HCC)        Past Surgical History:   Procedure Laterality Date   • CARDIAC SURGERY     • CARDIOVERSION  12/05/2022    Successful DC cardioversion   • PROSTATE SURGERY  July 2022   •  PROSTATECTOMY  07/15/2022   • TONSILLECTOMY         Allergies: Azithromycin, Prednisone, and Lorazepam    Current Outpatient Medications   Medication Sig Dispense Refill   • amiodarone (PACERONE) 200 mg tablet Take 1 tablet (200 mg total) by mouth daily. 90 tablet 3   • cholecalciferol, vitamin D3, 1,000 unit (25 mcg) tablet Take 1,000 Units by mouth daily.     • dilTIAZem CD (CARDIZEM CD) 120 mg 24 hr capsule Take 1 capsule (120 mg total) by mouth daily. 270 capsule 3   • lidocaine-prilocaine (EMLA) cream Apply 1 application topically as needed for pain.     • metoprolol succinate XL (TOPROL-XL) 50 mg 24 hr tablet Take 1 tablet (50 mg total) by mouth daily.     • pantoprazole (PROTONIX) 40 mg EC tablet Take 1 tablet (40 mg total) by mouth 2 (two) times a day. 180 tablet 3   • potassium chloride (KLOR-CON) 10 mEq CR tablet Take 2 tablets (20 mEq total) by mouth daily. 180 tablet 3   • rivaroxaban (XARELTO) 15 mg tablet Take 1 tablet (15 mg total) by mouth daily with dinner Indications: treatment to prevent blood clots in chronic atrial fibrillation. 90 tablet 1   • silver sulfadiazine (SILVADENE) 1 % cream Apply topically daily. 20 g 0   • simvastatin (ZOCOR) 20 mg tablet Take 1 tablet (20 mg total) by mouth nightly. 90 tablet 3   • tamoxifen (NOLVADEX) 20 mg chemo tablet Take  1 tablet (20 mg total) daily 90 tablet 1   • torsemide (DEMADEX) 20 mg tablet Take 0.5 tablets (10 mg total) by mouth daily. With additional 1/2 tablet for AM standing weight over 185 lbs 90 tablet 3   • acetaminophen (TYLENOL EX STR RAPID RELEASE ORAL) Take 500 mg by mouth 3 (three) times a day.       No current facility-administered medications for this visit.       Social History     Tobacco Use   • Smoking status: Never   • Smokeless tobacco: Never   Vaping Use   • Vaping status: Never Used   Substance Use Topics   • Alcohol use: Yes     Alcohol/week: 2.0 standard drinks of alcohol     Types: 2 Shots of liquor per week     Comment: 2  drinks/day - scotch   • Drug use: Never       Family History   Problem Relation Age of Onset   • Hyperlipidemia Biological Mother    • Hypertension Biological Mother    • Breast cancer Biological Mother    • Cancer Biological Mother         gyn? cervical   • Hyperlipidemia Biological Father    • Hypertension Biological Father    • Arthritis Biological Father    • No Known Problems Biological Sister    • No Known Problems Biological Brother    • Heart disease Maternal Grandmother    • Arthritis Maternal Grandfather    • COPD Maternal Grandfather    • Diabetes Maternal Grandfather    • No Known Problems Paternal Grandmother    • No Known Problems Paternal Grandfather    • Cancer less than or equal to age 50 Other    • Esophageal cancer Other         47, in abd,chemo q 3 weeks       Review of Systems   Constitutional: Negative for malaise/fatigue.   HENT: Negative for hearing loss.    Eyes: Negative for visual disturbance.   Cardiovascular: Negative for chest pain, dyspnea on exertion, irregular heartbeat, leg swelling, near-syncope, orthopnea, palpitations, paroxysmal nocturnal dyspnea and syncope.   Respiratory: Negative for cough and shortness of breath.    Hematologic/Lymphatic: Negative for bleeding problem.   Skin: Negative for rash.   Musculoskeletal: Negative for joint pain and muscle weakness.   Gastrointestinal: Negative for abdominal pain.   Genitourinary: Negative for hematuria.   Neurological: Negative for focal weakness, paresthesias, tremors and weakness.   Psychiatric/Behavioral: Negative for altered mental status.       Objective     Vitals:    03/09/23 1431   BP: 130/66   Pulse: (!) 53   Resp: 18   SpO2: 95%       Physical Exam  Constitutional:       Appearance: He is well-developed.   HENT:      Head: Normocephalic and atraumatic.   Neck:      Vascular: No JVD.   Cardiovascular:      Rate and Rhythm: Regular rhythm. Bradycardia present.      Heart sounds: No murmur heard.  Pulmonary:      Breath  sounds: Normal breath sounds.   Abdominal:      General: Bowel sounds are normal.      Palpations: Abdomen is soft.      Tenderness: There is no abdominal tenderness.   Musculoskeletal:      Right lower leg: No edema.      Left lower leg: No edema.   Skin:     General: Skin is warm and dry.   Neurological:      Mental Status: He is alert and oriented to person, place, and time.          Labs   Lab Results   Component Value Date    WBC 6.54 12/01/2022    HGB 10.8 (L) 12/01/2022    HCT 35.0 (L) 12/01/2022     12/01/2022    ALT 32 10/19/2022    AST 40 10/19/2022     02/23/2023    K 4.0 02/23/2023     02/23/2023    CREATININE 1.72 (H) 02/23/2023    BUN 31 (H) 02/23/2023    CO2 25 02/23/2023    INR 1.1 10/20/2022       Echocardiogram   Transthoracic echocardiogram on 10/18/2022 at outside hospital showed an ejection fraction of 40-45%, global left ventricular hypokinesis, mildly dilated left atrium.    Cardioversion 12/5/2022. amiodarone.     ECG Sinus  Bradycardia       Assessment/Plan   Problem List Items Addressed This Visit        Circulatory    Atrial fibrillation, unspecified type (CMS/HCC) - Primary     He is maintaining sinus rhythm/bradycardia. Will continue amiodarone at 200 mg daily. Diltiazem was decreased to 120 mg daily at the January visit due to bradycardia.  Today he remains bradycardic and the metoprolol will be decreased to 50 mg daily.          Relevant Medications    metoprolol succinate XL (TOPROL-XL) 50 mg 24 hr tablet    Other Relevant Orders    ECG 12 LEAD-OFFICE PERFORMED (Completed)       Gary Aceves MD   03/09/2023

## 2023-03-03 ENCOUNTER — TELEPHONE (OUTPATIENT)
Dept: HEMATOLOGY/ONCOLOGY | Facility: HOSPITAL | Age: 73
End: 2023-03-03
Payer: MEDICARE

## 2023-03-03 RX ORDER — TORSEMIDE 20 MG/1
10 TABLET ORAL DAILY
Qty: 90 TABLET | Refills: 3 | Status: SHIPPED | OUTPATIENT
Start: 2023-03-03 | End: 2023-08-01 | Stop reason: ENTERED-IN-ERROR

## 2023-03-03 NOTE — PROGRESS NOTES
Torsemide decreased to 10 mg daily w/ PRN 10 mg for wgt >185 lbs (see result note from recent labs)

## 2023-03-03 NOTE — TELEPHONE ENCOUNTER
He has an allergy to benzo.  I would wait for dr rivera to respond to see what she would like him to have

## 2023-03-03 NOTE — TELEPHONE ENCOUNTER
Patient wife Fiorella SHANT with triage line.  She stated pt is apprehensive regarding PET SCAN on Monday at 130 pm . PET Scan recommended she contact doctor to order something for him to relax during test.

## 2023-03-03 NOTE — TELEPHONE ENCOUNTER
Office PHI checked.  LVM for patients wife with Dr Cleveland recommendation for OTC Benadryl 25-50 mg prior to MRI on Monday to help relax patient.

## 2023-03-06 ENCOUNTER — HOSPITAL ENCOUNTER (OUTPATIENT)
Dept: RADIOLOGY | Facility: CLINIC | Age: 73
Discharge: HOME | End: 2023-03-06
Attending: INTERNAL MEDICINE
Payer: MEDICARE

## 2023-03-06 VITALS — WEIGHT: 183 LBS | HEIGHT: 65 IN | BODY MASS INDEX: 30.49 KG/M2

## 2023-03-06 DIAGNOSIS — Z17.0 MALIGNANT NEOPLASM OF OVERLAPPING SITES OF RIGHT BREAST IN MALE, ESTROGEN RECEPTOR POSITIVE (CMS/HCC): ICD-10-CM

## 2023-03-06 DIAGNOSIS — C50.821 MALIGNANT NEOPLASM OF OVERLAPPING SITES OF RIGHT BREAST IN MALE, ESTROGEN RECEPTOR POSITIVE (CMS/HCC): ICD-10-CM

## 2023-03-06 DIAGNOSIS — C61 PROSTATE CANCER (CMS/HCC): ICD-10-CM

## 2023-03-06 RX ORDER — FLUDEOXYGLUCOSE F18 300 MCI/ML
8.99 INJECTION INTRAVENOUS ONCE
Status: COMPLETED | OUTPATIENT
Start: 2023-03-06 | End: 2023-03-06

## 2023-03-06 RX ADMIN — FLUDEOXYGLUCOSE F18 8.99 MILLICURIE: 300 INJECTION INTRAVENOUS at 13:38

## 2023-03-08 ENCOUNTER — DOCUMENTATION (OUTPATIENT)
Dept: RADIATION ONCOLOGY | Facility: HOSPITAL | Age: 73
End: 2023-03-08
Payer: MEDICARE

## 2023-03-08 ENCOUNTER — TELEPHONE (OUTPATIENT)
Dept: HEMATOLOGY/ONCOLOGY | Facility: CLINIC | Age: 73
End: 2023-03-08
Payer: MEDICARE

## 2023-03-08 ENCOUNTER — HOSPITAL ENCOUNTER (OUTPATIENT)
Dept: RADIATION ONCOLOGY | Facility: HOSPITAL | Age: 73
Setting detail: RADIATION/ONCOLOGY SERIES
Discharge: HOME | End: 2023-03-08
Attending: RADIOLOGY
Payer: MEDICARE

## 2023-03-08 VITALS
SYSTOLIC BLOOD PRESSURE: 142 MMHG | OXYGEN SATURATION: 95 % | BODY MASS INDEX: 31.05 KG/M2 | HEART RATE: 78 BPM | WEIGHT: 186.6 LBS | DIASTOLIC BLOOD PRESSURE: 76 MMHG

## 2023-03-08 DIAGNOSIS — C61 PROSTATE CANCER (CMS/HCC): Primary | ICD-10-CM

## 2023-03-08 RX ORDER — DIPHENHYDRAMINE HCL 50 MG/ML
25 VIAL (ML) INJECTION ONCE AS NEEDED
Status: CANCELLED | OUTPATIENT
Start: 2023-03-21

## 2023-03-08 RX ORDER — EPINEPHRINE 1 MG/ML
0.5 INJECTION, SOLUTION INTRAMUSCULAR; SUBCUTANEOUS ONCE AS NEEDED
Status: CANCELLED | OUTPATIENT
Start: 2023-03-21

## 2023-03-08 RX ORDER — FAMOTIDINE 10 MG/ML
20 INJECTION INTRAVENOUS ONCE AS NEEDED
Status: CANCELLED | OUTPATIENT
Start: 2023-03-21

## 2023-03-08 ASSESSMENT — ENCOUNTER SYMPTOMS
DEPRESSION: 0
ENDOCRINE NEGATIVE: 1
OCCASIONAL FEELINGS OF UNSTEADINESS: 0
HEMATOLOGIC/LYMPHATIC NEGATIVE: 1
GASTROINTESTINAL NEGATIVE: 1
DIFFICULTY URINATING: 0
FREQUENCY: 1
ARTHRALGIAS: 0
FATIGUE: 1
NEUROLOGICAL NEGATIVE: 1
WOUND: 0
CARDIOVASCULAR NEGATIVE: 1
RESPIRATORY NEGATIVE: 1
PSYCHIATRIC NEGATIVE: 1
LOSS OF SENSATION IN FEET: 0
BACK PAIN: 1
ALLERGIC/IMMUNOLOGIC NEGATIVE: 1
EYES NEGATIVE: 1

## 2023-03-08 ASSESSMENT — PAIN SCALES - GENERAL: PAINLEVEL: 0-NO PAIN

## 2023-03-08 NOTE — LETTER
March 8, 2023                                                          Patient: Cam Rosas   YOB: 1950   Date of Visit: 3/8/2023       Dear Dr. Collins:    The patient is seen at the [facility] today. Attached is my assessment and plan of care.  Thank you for the opportunity to share in Cam Rosas's care.       Sincerely,        Gregory J. Ochsner, MD      CC: No Recipients

## 2023-03-08 NOTE — PROGRESS NOTES
Patient ID:  Cam Rossa is a 73 y.o. male.    Referring Physician:   No referring provider defined for this encounter.    Primary Care Provider:  Usman Collins MD     Cancer Staging Information:  Cancer Staging   Prostate cancer (CMS/HCC)  Staging form: Prostate, AJCC 8th Edition  - Pathologic stage from 7/27/2022: Stage Unknown (pT3b, pNX, cM0, PSA: 6, Grade Group: 3) - Signed by [facility], Gregory J, MD on 3/8/2023      Problem List:  Patient Active Problem List    Diagnosis Date Noted   • Acute systolic heart failure (CMS/HCC) 02/15/2023   • Atrial fibrillation, unspecified type (CMS/HCC) 10/19/2022   • Malignant neoplasm of right male breast (CMS/HCC) 10/19/2022   • Electrolyte abnormality 10/19/2022   • Gastrointestinal hemorrhage with melena 10/19/2022   • CKD (chronic kidney disease) 10/19/2022   • Prostate cancer (CMS/HCC) 10/19/2022       Chief Complaint  No chief complaint on file.      Subjective      History of Present Illness:  The following portions of the patient's history were reviewed and updated as appropriate: allergies, current medications, past family history, past medical history, past social history and past surgical history.   HPI patient is a 73-year-old male who was noted in February of last year to have an elevated PSA level of 5.98.  Prior level from the year before was 5.1.  Patient was seen by urology and prostate needle biopsy reported prostate cancer involving 4 of 12 cores.  On 7/27/2022 Dr. Torrez performed laparoscopic prostatectomy using robotic with pelvic lymphadenectomy.  Pathology from [facility] revealed Clare score 7(4 +3) adenocarcinoma the prostate gland tumor involving 31 to 40% of the prostate with extraprostatic extension present.  Right seminal vesicle invasion present as well as involved margin right posterior lateral.  No lymph nodes are found in the specimen.  Since surgery the patient's had urinary incontinence and wears a diaper which is somewhat improving  but apparently stagnant now with little improvement.  PSA level shortly after surgery was 0.14 and more recent PSA level from last month is 0.54.  Apparently when patient was undergoing evaluation for surgery he is noted to have an inverted right nipple and right breast mass.  Patient was seen by Dr. Sergei Camacho surgeon and right breast biopsy on 8/29/2022 at [facility] showed grade 3 invasive ductal carcinoma.  Tumor was estrogen and progesterone receptor positive HER2 positive.  Work-up including bone scan and CAT scans chest abdomen pelvis were clear.  Patient received 1 cycle neoadjuvant chemotherapy with TCHP but was admitted to the hospital with heart failure, renal dysfunction and atrial fibrillation.  Patient said at that time he developed significant leg swelling with weeping.  Patient decided to come here for reevaluation and treatment.  Recent PET CT scan shows no evidence of malignancy.  Patient continues on tamoxifen therapy and is referred by Dr.Szarka hansen for evaluation for prostate cancer for consideration of postoperative radiation therapy.  Patient has been genetic tested and was found to have BRCA1 and OK gene mutation.  His oldest son has esophageal cancer recurrent.  Patient is scheduled for mammogram and evaluation with Dr. Barb Mustafa breast surgeon here at [facility].    Review of Systems:  Review of Systems   Constitutional: Positive for fatigue.   Genitourinary: Positive for bladder incontinence and frequency. Negative for difficulty urinating, pelvic pain and penile discharge.    Musculoskeletal: Positive for back pain. Negative for arthralgias.   Skin: Negative for itching, rash and wound.        Past Medical/Surgical History  Past Medical History:   Diagnosis Date   • Acute systolic heart failure (CMS/HCC) 02/15/2023   • Atrial fibrillation (CMS/HCC)    • Breast cancer (CMS/HCC) October 2022   • CHF (congestive heart failure) (CMS/HCC)    • Chronic kidney disease     • CKD (chronic kidney disease) 10/19/2022   • Hx antineoplastic chemo    • Prostate cancer (CMS/HCC)      Past Surgical History:   Procedure Laterality Date   • CARDIAC SURGERY     • CARDIOVERSION  12/05/2022    Successful DC cardioversion   • PROSTATE SURGERY  July 2022   • PROSTATECTOMY  07/15/2022   • TONSILLECTOMY          Current Medications  Current Outpatient Medications   Medication Sig Dispense Refill   • amiodarone (PACERONE) 200 mg tablet Take 1 tablet (200 mg total) by mouth daily. 90 tablet 3   • cholecalciferol, vitamin D3, 1,000 unit (25 mcg) tablet Take 1,000 Units by mouth daily.     • dilTIAZem CD (CARDIZEM CD) 120 mg 24 hr capsule Take 1 capsule (120 mg total) by mouth daily. 270 capsule 3   • lidocaine-prilocaine (EMLA) cream Apply 1 application topically as needed for pain.     • metoprolol succinate XL (TOPROL-XL) 100 mg 24 hr tablet Take 1 tablet (100 mg total) by mouth daily. 30 tablet 0   • pantoprazole (PROTONIX) 40 mg EC tablet Take 1 tablet (40 mg total) by mouth 2 (two) times a day. 180 tablet 3   • potassium chloride (KLOR-CON) 10 mEq CR tablet Take 2 tablets (20 mEq total) by mouth daily. 180 tablet 3   • rivaroxaban (XARELTO) 15 mg tablet Take 1 tablet (15 mg total) by mouth daily with dinner Indications: treatment to prevent blood clots in chronic atrial fibrillation. 90 tablet 1   • silver sulfadiazine (SILVADENE) 1 % cream Apply topically daily. 20 g 0   • simvastatin (ZOCOR) 20 mg tablet Take 1 tablet (20 mg total) by mouth nightly. 90 tablet 3   • tamoxifen (NOLVADEX) 20 mg chemo tablet Take  1 tablet (20 mg total) daily 90 tablet 1   • torsemide (DEMADEX) 20 mg tablet Take 0.5 tablets (10 mg total) by mouth daily. With additional 1/2 tablet for AM standing weight over 185 lbs 90 tablet 3     No current facility-administered medications for this encounter.       Allergies  Allergies   Allergen Reactions   • Azithromycin Other (see comments)     Kidney issues     • Prednisone  Angioedema   • Lorazepam Other (see comments)     WEAKNESS       Social History  Social History     Socioeconomic History   • Marital status:      Spouse name: None   • Number of children: None   • Years of education: None   • Highest education level: None   Occupational History   • Occupation: insurance   Tobacco Use   • Smoking status: Never   • Smokeless tobacco: Never   Substance and Sexual Activity   • Alcohol use: Yes     Alcohol/week: 2.0 standard drinks of alcohol     Types: 2 Shots of liquor per week     Comment: 2 drinks/day - scotch   • Drug use: Never   • Sexual activity: Not Currently     Partners: Female     Social Determinants of Health     Food Insecurity: No Food Insecurity (10/19/2022)    Hunger Vital Sign    • Worried About Running Out of Food in the Last Year: Never true    • Ran Out of Food in the Last Year: Never true       Family History  Family History   Problem Relation Age of Onset   • Hyperlipidemia Biological Mother    • Hypertension Biological Mother    • Breast cancer Biological Mother    • Hyperlipidemia Biological Father    • Hypertension Biological Father    • Arthritis Biological Father    • No Known Problems Biological Sister    • No Known Problems Biological Brother    • Heart disease Maternal Grandmother    • Arthritis Maternal Grandfather    • COPD Maternal Grandfather    • Diabetes Maternal Grandfather    • No Known Problems Paternal Grandmother    • No Known Problems Paternal Grandfather    • Cancer less than or equal to age 50 Other       Family Status   Relation Name Status   • Bio Mother mother (Not Specified)   • Bio Father Dad (Not Specified)   • Bio Sister  (Not Specified)   • Bio Brother  (Not Specified)   • [employer] (Not Specified)   • MGF  (Not Specified)   • PGM  (Not Specified)   • PGF  (Not Specified)   • Other son (Not Specified)               Objective      Physical Exam:  Vital Signs for this encounter:  BSA: 1.97 meters squared  Visit Vitals  BP (!)  142/76 (BP Location: Right upper arm, Patient Position: Sitting)   Pulse 78   Wt 84.6 kg (186 lb 9.6 oz)   SpO2 95%   BMI 31.05 kg/m²         Physical Exam healthy elderly appearing male in no apparent distress.  Right nipple is inverted with underlying palpable mass multifocal feeling.  There is a 3 to 4 cm area under the nipple with nodularity.  No adenopathy appreciated.  Left breast without suspicious lesions.  Lungs clear.  Healed small abdominal incisions.  Normal external genitalia.  Performance Status:80     PAIN ASSESSMENT:  Score Zero    Location    Pain Intervention      LABS:         Assessment/Plan Patient with BRCA1 and OK gene mutations with pathologic T3b Northome score 7 adenocarcinoma prostate status post prostatectomy completed 6 months ago now with rising PSA level.  Radiographically no evidence of metastatic disease.  Patient would certainly benefit from postop radiation therapy for biochemical failure with radiation therapy directed to the prostate and seminal vesicle bed and draining nicolette regions.  I discussed with the patient and wife risks and benefits of radiation therapy including possibility of damaging normal adjacent tissue especially bladder and the rectum.  Estimated chance of radiation proctitis or cystitis is 5 to 10% max.  Reviewed likely acute effects of radiation therapy with patient and wife.  Patient has urinary incontinence and was given instructions for Kegel exercises.  I tentatively plan to deliver a total dose of approximately 7000 cGy delivered over 8 weeks using intensity modulated radiation therapy with image guidance using a daily cone beam.  I have also recommended that he have 6 months of hormone therapy in addition to radiation therapy to help improve his chance for cure.  I discussed this with  who will initiate hormonal therapy for prostate cancer shortly.  In addition the patient has a synchronous high-grade right breast cancer and needs likely surgery  and will probably require postop radiation therapy.  I told patient and wife that I would like to try to treat simultaneously prostate bed and right chest wall region if needed for the synchronous malignancies.  Patient continues on hormonal therapy and will likely not receive chemotherapy given his toxicities.  Patient to contact me for follow-up after right breast surgery is completed.    Diagnoses and Orders Associated With This Visit:  Prostate cancer (CMS/HCC) (Primary)      TREATMENT PLAN SUMMARY:         IV CHEMOTHERAPY:  Treatment Details- Chemotherapy   Treatment goal [No plan goal]   Plan Name LEUPROLIDE (LUPRON) 22.5 MG EVERY 3 MONTHS   Status Active   Start Date 3/21/2023   End Date Until discontinued   Provider Paz Cleveland MD   IV Chemotherapy [No matching medication found in this treatment plan]

## 2023-03-08 NOTE — PROGRESS NOTES
"3/1/22 PSA 5.98    4/29/22 prostate bx     7/15/22 cytoscopy      7/27/22 Radical prostectomy   Prostatic adenocarcinoma Talmage score 4+3=7 (grade group 3).   Estimated percentage of prostate involved by tumor:  31-40%   Right side seminal vesicle invasion     8/25/22 B/L diagnostic mammogram (pt noticed R nipple was retracted and crusting)  In the subareolar portion of the right breast, there is a worrisome spiculated mass measuring approximately 1.6 cm in diameter. This is associated with some nipple retraction.    8/29/22 R US guided core bx (Dr. Sergei Camacho)  1.  Right breast mass:     Invasive ductal carcinoma, Oumar grade 3 (3+ 3+2).   Invasive carcinoma is 13 mm in maximum dimension in core biopsy.   (ER) Status: 90 %, strong positive   (PgR) Status: 50%, strong positive   HER2 Positive (Score 3+)     09/30/22 initiation of TCP H at [facility] (received x1 cycle)    10/10/22 pt presented to ER for elevated HR, admitted for a-fib    11/10/22 office visit w/ Dr. Cleveland -   \"requires multimodality treatment including surgery, chemotherapy, anti-HER2/jhonatan therapy, radiation and endocrine therapy.  He currently cannot receive anti-HER2/jhonatan therapy or chemotherapy or surgery considering his cardiac issues. Will contact his cardiologist to ensure it is safe to give him either Tamoxifen or anastrozole until cardiac issues are addressed.\"    11/15/22 3 month f/u  appt w/ Dr. Franny Hall - post-op PSA 0.14    11/15/22 pt began Tamoxifen 20mg QD   12/5/22 cardioversion     2/14/23 6 month f/u appt w/ Dr. Franny Hall - PSA 0.54. Underwent decipher testing predicting high risk tumor biology   Talmage score 7     2/21/23 LOV w/ Dr. Cleveland - recommend PET scan, mammogram and US. Would like pt to see breast surgeon and radiation oncology. Will continue tamoxifen and f/u in x1 month    3/6/23 PET scan    3/8/23 consult w/ Dr. [facility] for radiation therapy     3/16 US breast     3/20 appt w/ DrDeepika " Enrrique    Yes

## 2023-03-08 NOTE — TELEPHONE ENCOUNTER
Received  call from Dr. Greg [facility].  He saw him in consultation today.  He will plan radiotherapy for the prostate after he undergoes surgery for the breast.  Most likely will need radiation for the breast as well.  He would like him to start on Lupron injections.  Patient is seeing me on March 21.  He has an appointment with the breast surgeon.  Dr. Greg [facility] makes me aware that he will refuse any type of chemotherapy if offered.  Have placed therapy plan for Lupron injections to start on March 21.  At that time.  We will ask the nursing staff to coordinate the appointments.

## 2023-03-08 NOTE — PROGRESS NOTES
Subjective      Patient ID: Cam Rosas is a 73 y.o. male.  1950    Diagnosis:  No diagnosis found.    HPI    The following have been reviewed and updated as appropriate in this visit:        Review of Systems   Constitutional: Positive for fatigue (mild).   HENT: Negative.    Eyes: Negative.    Respiratory: Negative.    Cardiovascular: Negative.    Gastrointestinal: Negative.    Endocrine: Negative.    Genitourinary:        2-3 pads/day d/t incontinence      Musculoskeletal: Positive for back pain (lower back pain, Tylenol PRN effective).   Skin:        Starting in December, pt began experiencing edema w/ weeping on BLE. Per pt, this has resolved within the past two weeks. Was using silvadene cream w/ wraps. Currently using compression stockings.      Allergic/Immunologic: Negative.    Neurological: Negative.    Hematological: Negative.    Psychiatric/Behavioral: Negative.        Objective     Vitals:    03/08/23 1357   BP: (!) 142/76   BP Location: Right upper arm   Patient Position: Sitting   Pulse: 78   SpO2: 95%   Weight: 84.6 kg (186 lb 9.6 oz)     Body mass index is 31.05 kg/m².    Physical Exam    Assessment/Plan   Diagnoses and Orders Associated With This Visit:  There are no diagnoses linked to this encounter.

## 2023-03-09 ENCOUNTER — OFFICE VISIT (OUTPATIENT)
Dept: CARDIOLOGY | Facility: CLINIC | Age: 73
End: 2023-03-09
Payer: MEDICARE

## 2023-03-09 VITALS
BODY MASS INDEX: 30.82 KG/M2 | DIASTOLIC BLOOD PRESSURE: 66 MMHG | WEIGHT: 185 LBS | RESPIRATION RATE: 18 BRPM | HEART RATE: 53 BPM | HEIGHT: 65 IN | SYSTOLIC BLOOD PRESSURE: 130 MMHG | OXYGEN SATURATION: 95 %

## 2023-03-09 DIAGNOSIS — I48.91 ATRIAL FIBRILLATION, UNSPECIFIED TYPE (CMS/HCC): Primary | ICD-10-CM

## 2023-03-09 PROCEDURE — 99214 OFFICE O/P EST MOD 30 MIN: CPT | Performed by: INTERNAL MEDICINE

## 2023-03-09 PROCEDURE — 93000 ELECTROCARDIOGRAM COMPLETE: CPT | Performed by: INTERNAL MEDICINE

## 2023-03-09 RX ORDER — METOPROLOL SUCCINATE 50 MG/1
50 TABLET, EXTENDED RELEASE ORAL DAILY
COMMUNITY
Start: 2023-03-09 | End: 2023-06-08 | Stop reason: SDUPTHER

## 2023-03-09 NOTE — ASSESSMENT & PLAN NOTE
He is maintaining sinus rhythm/bradycardia. Will continue amiodarone at 200 mg daily. Diltiazem was decreased to 120 mg daily at the January visit due to bradycardia.  Today he remains bradycardic and the metoprolol will be decreased to 50 mg daily.

## 2023-03-16 ENCOUNTER — HOSPITAL ENCOUNTER (OUTPATIENT)
Dept: RADIOLOGY | Facility: CLINIC | Age: 73
Discharge: HOME | End: 2023-03-16
Attending: INTERNAL MEDICINE
Payer: MEDICARE

## 2023-03-16 DIAGNOSIS — Z17.0 MALIGNANT NEOPLASM OF OVERLAPPING SITES OF RIGHT BREAST IN MALE, ESTROGEN RECEPTOR POSITIVE (CMS/HCC): ICD-10-CM

## 2023-03-16 DIAGNOSIS — C50.821 MALIGNANT NEOPLASM OF OVERLAPPING SITES OF RIGHT MALE BREAST (CMS/HCC): ICD-10-CM

## 2023-03-16 DIAGNOSIS — C50.821 MALIGNANT NEOPLASM OF OVERLAPPING SITES OF RIGHT BREAST IN MALE, ESTROGEN RECEPTOR POSITIVE (CMS/HCC): ICD-10-CM

## 2023-03-16 DIAGNOSIS — Z17.0 ESTROGEN RECEPTOR POSITIVE STATUS (ER+): ICD-10-CM

## 2023-03-16 PROCEDURE — G0279 TOMOSYNTHESIS, MAMMO: HCPCS

## 2023-03-16 PROCEDURE — 76642 ULTRASOUND BREAST LIMITED: CPT | Mod: RT

## 2023-03-17 ENCOUNTER — APPOINTMENT (OUTPATIENT)
Dept: RADIOLOGY | Age: 73
End: 2023-03-17
Payer: MEDICARE

## 2023-03-20 ENCOUNTER — OFFICE VISIT (OUTPATIENT)
Dept: SURGERY | Facility: CLINIC | Age: 73
End: 2023-03-20
Payer: MEDICARE

## 2023-03-20 VITALS
HEART RATE: 67 BPM | DIASTOLIC BLOOD PRESSURE: 80 MMHG | BODY MASS INDEX: 31.46 KG/M2 | WEIGHT: 188.8 LBS | SYSTOLIC BLOOD PRESSURE: 128 MMHG | OXYGEN SATURATION: 97 % | HEIGHT: 65 IN | TEMPERATURE: 97.3 F

## 2023-03-20 DIAGNOSIS — C61 PROSTATE CANCER (CMS/HCC): ICD-10-CM

## 2023-03-20 DIAGNOSIS — C50.021 MALIGNANT NEOPLASM INVOLVING BOTH NIPPLE AND AREOLA OF RIGHT BREAST IN MALE, ESTROGEN RECEPTOR POSITIVE (CMS/HCC): Primary | ICD-10-CM

## 2023-03-20 DIAGNOSIS — Z17.0 MALIGNANT NEOPLASM INVOLVING BOTH NIPPLE AND AREOLA OF RIGHT BREAST IN MALE, ESTROGEN RECEPTOR POSITIVE (CMS/HCC): Primary | ICD-10-CM

## 2023-03-20 PROCEDURE — 99205 OFFICE O/P NEW HI 60 MIN: CPT | Performed by: SURGERY

## 2023-03-20 NOTE — LETTER
2023     Usman Collins MD  [address] [facility] [address]    Patient: Cam Rosas  YOB: 1950  Date of Visit: 3/20/2023      Dear Dr. Collins:    Thank you for referring Cam Rosas to me for evaluation. Below are my notes for this consultation.    If you have questions, please do not hesitate to call me. I look forward to following your patient along with you.         Sincerely,        Barb Osuna MD        CC: Christine E Szarka, MD Steven A Rothman, MD Steven M Domsky, MD Gregory J Ochsner, MD Carruthers, Catherine D, MD  3/20/2023  2:47 PM  Signed      [facility]  Dr. Barb Hernandes [place] [address]  Phone: [phone]  Fax: [phone]      Patient ID: Cam Rosas                              : 1950    Visit Date: 3/20/2023  Referring Provider: Paz Cleveland MD   PCP: Usman Collins MD  GYN: No care team member to display    Chief Complaint: Abnormal Breast Imaging      Cam Rosas is a 73 y.o.  male who presents for discussion of his right breast cancer.  He was found to have prostate cancer, and about a week or 2 prior to his prostate surgery his wife noticed a change in his right nipple.  He reports he had actually noticed this for couple weeks prior to her noticing the problem.  This was in .  Mammogram and ultrasound were suspicious, and he had a core needle biopsy at [facility] on 2022 which revealed invasive ductal carcinoma, grade 3, ER positive (90%), GA positive (50%), HER2 positive (+3), Ki-67 30%.  He met with a surgeon, and had genetic testing done which revealed mutations in BRCA1 as well as OK.  He proceeded with neoadjuvant chemotherapy with TCHP, but this was complicated with episodes of atrial fibrillation as well as heart failure.  He was also found to have ulcers in his stomach and duodenum.  He ended up having to have a cardiac  ablation.  He was placed on tamoxifen, and has been tolerating that well.  He reports his heart is now in a regular rhythm.  In the meantime, he was noted to have an elevated PSA, and has been recommended to have radiation to the area of the prostate.  He will also be starting Lupron, he is seeing Dr. Cleveland tomorrow.  He reports the last 8 months has been very difficult.  He had a follow-up mammogram and ultrasound on March 16, 2023 which showed a significant interval decrease in the size of the retroareolar right breast now measuring 11 mm compared to previous 19 mm.  Thickening of the skin previously noted was also improved.    Review of Systems           Allergies: Azithromycin, Prednisone, and Lorazepam    Current Medications: has a current medication list which includes the following prescription(s): amiodarone, cholecalciferol (vitamin d3), diltiazem cd, lidocaine-prilocaine, metoprolol succinate xl, pantoprazole, potassium chloride, rivaroxaban, silver sulfadiazine, simvastatin, tamoxifen, and torsemide.    Past Medical History:  has a past medical history of Acute systolic heart failure (CMS/HCC) (02/15/2023), Atrial fibrillation (CMS/HCC), Breast cancer (CMS/HCC) (October 2022), CHF (congestive heart failure) (CMS/HCC), Chronic kidney disease, CKD (chronic kidney disease) (10/19/2022), antineoplastic chemo, and Prostate cancer (CMS/HCC).    Past Surgical History:  has a past surgical history that includes Cardiac surgery; Cardioversion (12/05/2022); Prostate surgery (July 2022); Tonsillectomy; and Prostatectomy (07/15/2022).    Social History:   Social History     Tobacco Use   • Smoking status: Never   • Smokeless tobacco: Never   Vaping Use   • Vaping status: Never Used   Substance Use Topics   • Alcohol use: Yes     Alcohol/week: 2.0 standard drinks of alcohol     Types: 2 Shots of liquor per week     Comment: 2 drinks/day - scotch   • Drug use: Never       Family History: family history includes  "Arthritis in his biological father and maternal grandfather; Breast cancer in his biological mother; COPD in his maternal grandfather; Cancer in his biological mother; Cancer less than or equal to age 50 in an other family member; Diabetes in his maternal grandfather; Esophageal cancer in an other family member; Heart disease in his maternal grandmother; Hyperlipidemia in his biological father and biological mother; Hypertension in his biological father and biological mother; No Known Problems in his biological brother, biological sister, paternal grandfather, and paternal grandmother.        Physical Exam:    Vitals:   Visit Vitals  /80   Pulse 67   Temp 36.3 °C (97.3 °F)   Ht 1.651 m (5' 5\")   Wt 85.6 kg (188 lb 12.8 oz)   SpO2 97%   BMI 31.42 kg/m²     Body mass index is 31.42 kg/m².    Physical Exam  Constitutional:       Appearance: Normal appearance.   HENT:      Head: Normocephalic and atraumatic.   Cardiovascular:      Rate and Rhythm: Normal rate and regular rhythm.      Heart sounds: Normal heart sounds.   Pulmonary:      Effort: Pulmonary effort is normal.      Breath sounds: Normal breath sounds.   Chest:      Comments: Left breast and nipple normal in appearance with minimal gynecomastia.  Right nipple is flattened with slight inversion and slight nodularity of the nipple.  No ulceration or nipple discharge noted.  In the retroareolar right breast there is a small mass present measuring about a centimeter, fixed to the nipple.  No other dominant masses noted.  No axillary or supraclavicular adenopathy.  Abdominal:      General: Abdomen is flat.      Palpations: Abdomen is soft.   Musculoskeletal:         General: Normal range of motion.   Lymphadenopathy:      Cervical: No cervical adenopathy.   Skin:     General: Skin is warm and dry.   Neurological:      General: No focal deficit present.      Mental Status: He is alert and oriented to person, place, and time.   Psychiatric:         Mood and " Affect: Mood normal.         Behavior: Behavior normal.         Breast Imaging: I have personally reviewed the reports and images as follows.    Mammogram and ultrasound were reviewed from August as well as March.  There is a significant decrease in the size of the biopsy-proven right breast cancer from 1.9 cm down to 1.1 cm.  No axillary adenopathy noted.  Impression:  No problem-specific Assessment & Plan notes found for this encounter.  Malignant neoplasm of male breast  Recommendation and Plan:   Cam and his wife presented for discussion of his right breast cancer diagnosis and treatment options.  As noted above, he was noted to have an invasive ductal carcinoma, grade 3, ER positive, IN positive, HER2 positive in August, 2022.  His tumor is clinical T1c, N0, M0, or stage Ia breast cancer.  This was diagnosed just after he had surgery for prostate cancer in July, 2022.  He was placed on chemotherapy and had significant side effects including atrial fibrillation and heart failure.  Chemotherapy was stopped and he was then started on tamoxifen.  On repeat imaging, he has had a significant improvement in the cancer with about 50% reduction in the size of the tumor.  He is tolerating the tamoxifen well.  In the meantime, his PSA numbers have increased, and radiation to the prostate bed as well as Lupron have been recommended.    We discussed surgical management of his breast cancer including central lumpectomy with sentinel node biopsy versus mastectomy and sentinel node biopsy.  We reviewed that radiation would definitely be indicated after lumpectomy, and that he might need radiation after mastectomy depending on the final pathology findings.  We reviewed that these 2 surgical options do give equal survival, but there is a higher risk of local recurrence with lumpectomy versus mastectomy.  Mastectomy is a more invasive procedure and does require drains and hospitalization.  Both procedures would require  general anesthesia.    We reviewed whole breast radiation as well as accelerated whole breast radiation.  We reviewed some of the side effects including skin changes, fatigue, and effects on the lung and rib cage.    At this time, Cam prefers not to have any surgical intervention as he reports he has had a very tough 8 months since the diagnosis of his prostate cancer.  He has had a significant decrease in the size of his tumor, most likely secondary to his tamoxifen, as he only received 1 dose of chemotherapy.  We reviewed that if the surgery is done, he will need to continue on medication indefinitely.  If he wishes to proceed with this, I would like to have repeat imaging in 4 months just to ensure that there is no growth of the tumor.  We reviewed that sometimes cancers can stop responding to treatment in which case surgery would be needed.  He will be seeing Dr. Cleveland tomorrow, and will discuss this with her as well.  I did review that HER2 positive cancer does have a higher risk of spread, and continued HER2 therapy would be recommended, but he is not interested in having that at this time.  He does seem willing to accept medical treatment for his prostate cancer, and we discussed that if he is not having surgery for the breast, then he should proceed with radiation for the prostate as well.    All his and his wife's questions were addressed.  They participated in shared decision making.  I will see him back in the office in 4 months with a repeat ultrasound.  I encouraged him to call with any concerns in the meantime.    I spent 65 minutes on this date of service performing the following activities: obtaining history, performing examination, entering orders, documenting, preparing for visit, obtaining / reviewing records, providing counseling and education, independently reviewing study/studies, communicating results and coordinating care.      Return in about 4 months (around  7/20/2023).        3/20/2023   2:36 PM      Barb Osuna MD

## 2023-03-20 NOTE — PROGRESS NOTES
[facility]  Dr. Barb Osuna  [address] [place] [address]  Phone: [phone]  Fax: [phone]      Patient ID: Cam Rosas                              : 1950    Visit Date: 3/20/2023  Referring Provider: Paz Cleveland MD   PCP: Usman Collins MD  GYN: No care team member to display    Chief Complaint: Abnormal Breast Imaging      Cam Rosas is a 73 y.o.  male who presents for discussion of his right breast cancer.  He was found to have prostate cancer, and about a week or 2 prior to his prostate surgery his wife noticed a change in his right nipple.  He reports he had actually noticed this for couple weeks prior to her noticing the problem.  This was in .  Mammogram and ultrasound were suspicious, and he had a core needle biopsy at [facility] on 2022 which revealed invasive ductal carcinoma, grade 3, ER positive (90%), NH positive (50%), HER2 positive (+3), Ki-67 30%.  He met with a surgeon, and had genetic testing done which revealed mutations in BRCA1 as well as OK.  He proceeded with neoadjuvant chemotherapy with TCHP, but this was complicated with episodes of atrial fibrillation as well as heart failure.  He was also found to have ulcers in his stomach and duodenum.  He ended up having to have a cardiac ablation.  He was placed on tamoxifen, and has been tolerating that well.  He reports his heart is now in a regular rhythm.  In the meantime, he was noted to have an elevated PSA, and has been recommended to have radiation to the area of the prostate.  He will also be starting Lupron, he is seeing Dr. Cleveland tomorrow.  He reports the last 8 months has been very difficult.  He had a follow-up mammogram and ultrasound on 2023 which showed a significant interval decrease in the size of the retroareolar right breast now measuring 11 mm compared to previous 19 mm.  Thickening of the skin previously  noted was also improved.    Review of Systems           Allergies: Azithromycin, Prednisone, and Lorazepam    Current Medications: has a current medication list which includes the following prescription(s): amiodarone, cholecalciferol (vitamin d3), diltiazem cd, lidocaine-prilocaine, metoprolol succinate xl, pantoprazole, potassium chloride, rivaroxaban, silver sulfadiazine, simvastatin, tamoxifen, and torsemide.    Past Medical History:  has a past medical history of Acute systolic heart failure (CMS/HCC) (02/15/2023), Atrial fibrillation (CMS/[facility]), Breast cancer (CMS/HCC) (October 2022), CHF (congestive heart failure) (CMS/[facility]), Chronic kidney disease, CKD (chronic kidney disease) (10/19/2022), antineoplastic chemo, and Prostate cancer (CMS/[facility]).    Past Surgical History:  has a past surgical history that includes Cardiac surgery; Cardioversion (12/05/2022); Prostate surgery (July 2022); Tonsillectomy; and Prostatectomy (07/15/2022).    Social History:   Social History     Tobacco Use   • Smoking status: Never   • Smokeless tobacco: Never   Vaping Use   • Vaping status: Never Used   Substance Use Topics   • Alcohol use: Yes     Alcohol/week: 2.0 standard drinks of alcohol     Types: 2 Shots of liquor per week     Comment: 2 drinks/day - scotch   • Drug use: Never       Family History: family history includes Arthritis in his biological father and maternal grandfather; Breast cancer in his biological mother; COPD in his maternal grandfather; Cancer in his biological mother; Cancer less than or equal to age 50 in an other family member; Diabetes in his maternal grandfather; Esophageal cancer in an other family member; Heart disease in his maternal grandmother; Hyperlipidemia in his biological father and biological mother; Hypertension in his biological father and biological mother; No Known Problems in his biological brother, biological sister, paternal grandfather, and paternal grandmother.        Physical  "Exam:    Vitals:   Visit Vitals  /80   Pulse 67   Temp 36.3 °C (97.3 °F)   Ht 1.651 m (5' 5\")   Wt 85.6 kg (188 lb 12.8 oz)   SpO2 97%   BMI 31.42 kg/m²     Body mass index is 31.42 kg/m².    Physical Exam  Constitutional:       Appearance: Normal appearance.   HENT:      Head: Normocephalic and atraumatic.   Cardiovascular:      Rate and Rhythm: Normal rate and regular rhythm.      Heart sounds: Normal heart sounds.   Pulmonary:      Effort: Pulmonary effort is normal.      Breath sounds: Normal breath sounds.   Chest:      Comments: Left breast and nipple normal in appearance with minimal gynecomastia.  Right nipple is flattened with slight inversion and slight nodularity of the nipple.  No ulceration or nipple discharge noted.  In the retroareolar right breast there is a small mass present measuring about a centimeter, fixed to the nipple.  No other dominant masses noted.  No axillary or supraclavicular adenopathy.  Abdominal:      General: Abdomen is flat.      Palpations: Abdomen is soft.   Musculoskeletal:         General: Normal range of motion.   Lymphadenopathy:      Cervical: No cervical adenopathy.   Skin:     General: Skin is warm and dry.   Neurological:      General: No focal deficit present.      Mental Status: He is alert and oriented to person, place, and time.   Psychiatric:         Mood and Affect: Mood normal.         Behavior: Behavior normal.         Breast Imaging: I have personally reviewed the reports and images as follows.    Mammogram and ultrasound were reviewed from August as well as March.  There is a significant decrease in the size of the biopsy-proven right breast cancer from 1.9 cm down to 1.1 cm.  No axillary adenopathy noted.  Impression:  No problem-specific Assessment & Plan notes found for this encounter.  Malignant neoplasm of male breast  Recommendation and Plan:   Cam and his wife presented for discussion of his right breast cancer diagnosis and treatment options.  " As noted above, he was noted to have an invasive ductal carcinoma, grade 3, ER positive, HI positive, HER2 positive in August, 2022.  His tumor is clinical T1c, N0, M0, or stage Ia breast cancer.  This was diagnosed just after he had surgery for prostate cancer in July, 2022.  He was placed on chemotherapy and had significant side effects including atrial fibrillation and heart failure.  Chemotherapy was stopped and he was then started on tamoxifen.  On repeat imaging, he has had a significant improvement in the cancer with about 50% reduction in the size of the tumor.  He is tolerating the tamoxifen well.  In the meantime, his PSA numbers have increased, and radiation to the prostate bed as well as Lupron have been recommended.    We discussed surgical management of his breast cancer including central lumpectomy with sentinel node biopsy versus mastectomy and sentinel node biopsy.  We reviewed that radiation would definitely be indicated after lumpectomy, and that he might need radiation after mastectomy depending on the final pathology findings.  We reviewed that these 2 surgical options do give equal survival, but there is a higher risk of local recurrence with lumpectomy versus mastectomy.  Mastectomy is a more invasive procedure and does require drains and hospitalization.  Both procedures would require general anesthesia.    We reviewed whole breast radiation as well as accelerated whole breast radiation.  We reviewed some of the side effects including skin changes, fatigue, and effects on the lung and rib cage.    At this time, Cam prefers not to have any surgical intervention as he reports he has had a very tough 8 months since the diagnosis of his prostate cancer.  He has had a significant decrease in the size of his tumor, most likely secondary to his tamoxifen, as he only received 1 dose of chemotherapy.  We reviewed that if the surgery is done, he will need to continue on medication indefinitely.  If  he wishes to proceed with this, I would like to have repeat imaging in 4 months just to ensure that there is no growth of the tumor.  We reviewed that sometimes cancers can stop responding to treatment in which case surgery would be needed.  He will be seeing Dr. Cleveland tomorrow, and will discuss this with her as well.  I did review that HER2 positive cancer does have a higher risk of spread, and continued HER2 therapy would be recommended, but he is not interested in having that at this time.  He does seem willing to accept medical treatment for his prostate cancer, and we discussed that if he is not having surgery for the breast, then he should proceed with radiation for the prostate as well.    All his and his wife's questions were addressed.  They participated in shared decision making.  I will see him back in the office in 4 months with a repeat ultrasound.  I encouraged him to call with any concerns in the meantime.    I spent 65 minutes on this date of service performing the following activities: obtaining history, performing examination, entering orders, documenting, preparing for visit, obtaining / reviewing records, providing counseling and education, independently reviewing study/studies, communicating results and coordinating care.      Return in about 4 months (around 7/20/2023).        3/20/2023   2:36 PM      Barb Osuna MD

## 2023-03-21 ENCOUNTER — CLINICAL SUPPORT (OUTPATIENT)
Dept: HEMATOLOGY/ONCOLOGY | Facility: CLINIC | Age: 73
End: 2023-03-21
Attending: INTERNAL MEDICINE
Payer: MEDICARE

## 2023-03-21 ENCOUNTER — HOSPITAL ENCOUNTER (OUTPATIENT)
Dept: HEMATOLOGY/ONCOLOGY | Age: 73
Setting detail: INFUSION SERIES
Discharge: HOME | End: 2023-03-21
Attending: INTERNAL MEDICINE
Payer: MEDICARE

## 2023-03-21 VITALS
OXYGEN SATURATION: 98 % | RESPIRATION RATE: 16 BRPM | WEIGHT: 187.4 LBS | TEMPERATURE: 97.3 F | BODY MASS INDEX: 31.22 KG/M2 | HEART RATE: 61 BPM | HEIGHT: 65 IN | SYSTOLIC BLOOD PRESSURE: 156 MMHG | DIASTOLIC BLOOD PRESSURE: 75 MMHG

## 2023-03-21 DIAGNOSIS — C61 PROSTATE CANCER (CMS/HCC): Primary | ICD-10-CM

## 2023-03-21 DIAGNOSIS — Z17.0 MALIGNANT NEOPLASM OF OVERLAPPING SITES OF RIGHT BREAST IN MALE, ESTROGEN RECEPTOR POSITIVE (CMS/HCC): ICD-10-CM

## 2023-03-21 DIAGNOSIS — C50.821 MALIGNANT NEOPLASM OF OVERLAPPING SITES OF RIGHT BREAST IN MALE, ESTROGEN RECEPTOR POSITIVE (CMS/HCC): ICD-10-CM

## 2023-03-21 DIAGNOSIS — C61 PROSTATE CANCER (CMS/HCC): ICD-10-CM

## 2023-03-21 PROCEDURE — 63600000 HC DRUGS/DETAIL CODE: Performed by: INTERNAL MEDICINE

## 2023-03-21 PROCEDURE — 96523 IRRIG DRUG DELIVERY DEVICE: CPT

## 2023-03-21 PROCEDURE — 99215 OFFICE O/P EST HI 40 MIN: CPT | Performed by: INTERNAL MEDICINE

## 2023-03-21 PROCEDURE — 96401 CHEMO ANTI-NEOPL SQ/IM: CPT

## 2023-03-21 RX ORDER — DIPHENHYDRAMINE HCL 50 MG/ML
25 VIAL (ML) INJECTION ONCE AS NEEDED
Status: CANCELLED | OUTPATIENT
Start: 2023-03-21

## 2023-03-21 RX ORDER — EPINEPHRINE 1 MG/ML
0.5 INJECTION, SOLUTION INTRAMUSCULAR; SUBCUTANEOUS ONCE AS NEEDED
Status: CANCELLED | OUTPATIENT
Start: 2023-03-21

## 2023-03-21 RX ORDER — FAMOTIDINE 10 MG/ML
20 INJECTION INTRAVENOUS ONCE AS NEEDED
Status: CANCELLED | OUTPATIENT
Start: 2023-03-21

## 2023-03-21 RX ORDER — DIPHENHYDRAMINE HCL 50 MG/ML
25 VIAL (ML) INJECTION ONCE AS NEEDED
Status: CANCELLED | OUTPATIENT
Start: 2023-06-19

## 2023-03-21 RX ORDER — HEPARIN 100 UNIT/ML
500 SYRINGE INTRAVENOUS AS NEEDED
Status: CANCELLED | OUTPATIENT
Start: 2023-03-21

## 2023-03-21 RX ORDER — HEPARIN 100 UNIT/ML
500 SYRINGE INTRAVENOUS AS NEEDED
Status: DISCONTINUED | OUTPATIENT
Start: 2023-03-21 | End: 2023-03-22 | Stop reason: HOSPADM

## 2023-03-21 RX ORDER — EPINEPHRINE 1 MG/ML
0.5 INJECTION, SOLUTION INTRAMUSCULAR; SUBCUTANEOUS ONCE AS NEEDED
Status: CANCELLED | OUTPATIENT
Start: 2023-06-19

## 2023-03-21 RX ORDER — FAMOTIDINE 10 MG/ML
20 INJECTION INTRAVENOUS ONCE AS NEEDED
Status: CANCELLED | OUTPATIENT
Start: 2023-06-19

## 2023-03-21 RX ADMIN — HEPARIN 500 UNITS: 100 SYRINGE at 09:49

## 2023-03-21 RX ADMIN — LEUPROLIDE ACETATE 22.5 MG: KIT at 09:57

## 2023-03-21 ASSESSMENT — ENCOUNTER SYMPTOMS
PSYCHIATRIC NEGATIVE: 1
HEMATOLOGIC/LYMPHATIC NEGATIVE: 1
DIFFICULTY URINATING: 1
RESPIRATORY NEGATIVE: 1
EYES NEGATIVE: 1
BACK PAIN: 1
CARDIOVASCULAR NEGATIVE: 1
CONSTITUTIONAL NEGATIVE: 1
NEUROLOGICAL NEGATIVE: 1

## 2023-03-21 ASSESSMENT — PAIN SCALES - GENERAL: PAINLEVEL: 0-NO PAIN

## 2023-03-21 NOTE — PROGRESS NOTES
LCW port has not been accessed since his last chemo treatment I believe October of 2022. Port was accessed with .75 in needle good blood return, port was flushed with saline and heparin then de-accessed. Pt was informed he should have port flushed every 6-8 wks. Pt was given Lupron injection to L deltoid. Pt given information of Lupron from [facility] website. Pt to schedule appt with Dr. Nicole for radiation. Pt has next lupron injection scheduled for June 20th.

## 2023-03-21 NOTE — PROGRESS NOTES
Review of Systems   Constitutional: Negative.    HENT:  Negative.    Eyes: Negative.    Respiratory: Negative.    Cardiovascular: Negative.    Genitourinary: Positive for difficulty urinating.    Musculoskeletal: Positive for back pain (l4 and l5 last injection was about a year ago ).   Skin:        Legs healing using silvadene    Neurological: Negative.    Hematological: Negative.    Psychiatric/Behavioral: Negative.

## 2023-03-21 NOTE — PROGRESS NOTES
Cam Rosas is a 73 y.o. male,   :  1950    Encounter Diagnoses   Name Primary?   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, estrogen receptor positive (CMS/HCC)    • Prostate cancer (CMS/HCC)         Cancer Staging   Malignant neoplasm of right male breast (CMS/HCC)  Staging form: Breast, AJCC 8th Edition  - Clinical stage from 3/20/2023: Stage IA (cT1c, cN0, cM0, G3, ER+, MA+, HER2+) - Signed by Barb Osuna MD on 3/20/2023    Prostate cancer (CMS/HCC)  Staging form: Prostate, AJCC 8th Edition  - Pathologic stage from 2022: Stage Unknown (pT3b, pNX, cM0, PSA: 6, Grade Group: 3) - Signed by [facility]ner, Gregory J, MD on 3/8/2023      Treatment Plans     No treatment plans exist          Oncology History   Malignant neoplasm of right male breast (CMS/HCC)   2022 Biopsy     1.  Right breast mass:     Invasive ductal carcinoma, [place] grade 3 (3+ 3+2).   Invasive carcinoma is 13 mm in maximum dimension in core biopsy.    ER+ 90%; MA+ 50%; [id number]+3; QQ3848%     2022 -  Chemotherapy    2022 initiated first cycle TCP H at [facility].  Questionable reaction during the anti-HER2/jhonatan therapy with Herceptin.  Taxotere and carboplatin given on 10/3/2022.  Patient hospitalized after first cycle.     10/19/2022 Initial Diagnosis    Malignant neoplasm of right male breast (CMS/HCC)     11/15/2022 -  Hormone Therapy    Tamoxifen 20 mg daily while undergoes evaluation of cardiac issues     3/20/2023 Cancer Staged    Staging form: Breast, AJCC 8th Edition  - Clinical stage from 3/20/2023: Stage IA (cT1c, cN0, cM0, G3, ER+, MA+, HER2+)     Prostate cancer (CMS/HCC)   2022 Cancer Staged    Staging form: Prostate, AJCC 8th Edition  - Pathologic stage from 2022: Stage Unknown (pT3b, pNX, cM0, PSA: 6, Grade Group: 3)     3/21/2023 -  Chemotherapy    LEUPROLIDE (LUPRON) 22.5 MG EVERY 3 MONTHS  Plan Provider: Paz Cleveland MD     3/21/2023 -   Chemotherapy    MEDIPORT FLUSH (SINGLE LUMEN) - PATIENT ON TREATMENT  Plan Provider: Paz Cleveland MD     3/21/2023 -  Chemotherapy    LEUPROLIDE (LUPRON) 22.5 MG EVERY 3 MONTHS  Plan Provider: Paz Cleveland MD         Patient Active Problem List   Diagnosis   • Atrial fibrillation, unspecified type (CMS/HCC)   • Malignant neoplasm of right male breast (CMS/HCC)   • Electrolyte abnormality   • Gastrointestinal hemorrhage with melena   • CKD (chronic kidney disease)   • Prostate cancer (CMS/HCC)   • Acute systolic heart failure (CMS/HCC)       History of Present Illness  Cam Rosas is seen today in follow up.  Presents today in follow-up and to receive Lupron.    He underwent a PET scan3/6/2023 which showed no abnormal metabolic activity suggestive of metastatic or recurrent disease.  He met with Dr. Greg Ochsner on 3/8/2023 who recommended radiotherapy and initiating of Lupron.  Mr. West wished to receive Lupron in our office as opposed to a urologist.  This was arranged for him.  Dr. [facility] recommended radiation after he underwent surgery for his breast cancer.  If he needed radiation for breast cancer he would do them at the same time.    He underwent mammogram and ultrasound on 3/16/2023.  This revealed significant decrease in the right retroareolar breast mass.  It decreased from 1.9 x 1.7 x 1 0.8 to 1.1 x 0.5 x 0.8.    He met with Dr. Osuna yesterday surgical options were discussed with him.  He has indicated to me as well as to her that he will not take chemotherapy.  He was very happy that his breast cancer had decreased on the tamoxifen.  He voiced that he did not wish to have surgical intervention at this time considering he has had a rough past 8 months.  She is going to see him in follow-up with repeat imaging at 4 months to make sure there is no growth of the tumor.    He is compliant taking his tamoxifen.  He is here today for follow-up and to receive Lupron.  He is worried about  "developing recurrent pedal edema again which I do not think he will have with the Lupron.      Review of Systems - Oncology  Sign when Signing Visit                                  Review of Systems   Constitutional: Negative.    HENT:  Negative.    Eyes: Negative.    Respiratory: Negative.    Cardiovascular: Negative.    Genitourinary: Positive for difficulty urinating.    Musculoskeletal: Positive for back pain (l4 and l5 last injection was about a year ago ).   Skin:        Legs healing using silvadene    Neurological: Negative.    Hematological: Negative.    Psychiatric/Behavioral: Negative.              Review of Systems:  Nursing assessment reviewed. Pertinent positive and negative symptoms noted in HPI, all others negative.    Temp:  [36.3 °C (97.3 °F)] 36.3 °C (97.3 °F)  Heart Rate:  [61-67] 61  Resp:  [16] 16  BP: (128-156)/(75-80) 156/75  Visit Vitals  BP (!) 156/75 (BP Location: Right upper arm, Patient Position: Sitting)   Pulse 61   Temp 36.3 °C (97.3 °F) (Temporal)   Resp 16   Ht 1.651 m (5' 5\")   Wt 85 kg (187 lb 6.4 oz)   SpO2 98% Comment: RA   BMI 31.18 kg/m²     Physical Exam  Vitals and nursing note reviewed.   Constitutional:       General: He is not in acute distress.     Appearance: Normal appearance. He is not ill-appearing.   Chest:      Comments: Significant improvement in his right breast exam.  Decrease in the mass.  Less retraction of the nipple area.  Actually it now looks normal to the eye.  Less palpable nodularity within the breast.  No palpable axillary adenopathy.  Musculoskeletal:      Right lower leg: No edema.      Left lower leg: No edema.      Comments: Chronic vascular changes in his lower extremity but no edema   Neurological:      Mental Status: He is alert and oriented to person, place, and time.   Psychiatric:         Mood and Affect: Mood normal.         Behavior: Behavior normal.         Past Medical History:   Diagnosis Date   • Acute systolic heart failure (CMS/HCC) " 02/15/2023   • Atrial fibrillation (CMS/HCC)    • Breast cancer (CMS/HCC) October 2022   • CHF (congestive heart failure) (CMS/HCC)    • Chronic kidney disease    • CKD (chronic kidney disease) 10/19/2022   • Hx antineoplastic chemo    • Prostate cancer (CMS/HCC)        Past Surgical History:   Procedure Laterality Date   • CARDIAC SURGERY     • CARDIOVERSION  12/05/2022    Successful DC cardioversion   • PROSTATE SURGERY  July 2022   • PROSTATECTOMY  07/15/2022   • TONSILLECTOMY         Social History     Tobacco Use   • Smoking status: Never   • Smokeless tobacco: Never   Vaping Use   • Vaping status: Never Used   Substance Use Topics   • Alcohol use: Yes     Alcohol/week: 2.0 standard drinks of alcohol     Types: 2 Shots of liquor per week     Comment: 2 drinks/day - scotch   • Drug use: Never       Family History   Problem Relation Age of Onset   • Hyperlipidemia Biological Mother    • Hypertension Biological Mother    • Breast cancer Biological Mother    • Cancer Biological Mother         gyn? cervical   • Hyperlipidemia Biological Father    • Hypertension Biological Father    • Arthritis Biological Father    • No Known Problems Biological Sister    • No Known Problems Biological Brother    • Heart disease Maternal Grandmother    • Arthritis Maternal Grandfather    • COPD Maternal Grandfather    • Diabetes Maternal Grandfather    • No Known Problems Paternal Grandmother    • No Known Problems Paternal Grandfather    • Cancer less than or equal to age 50 Other    • Esophageal cancer Other         47, in abd,chemo q 3 weeks         Allergies  Azithromycin, Prednisone, and Lorazepam    Medications  Current Outpatient Medications   Medication Sig Dispense Refill   • acetaminophen (TYLENOL EX STR RAPID RELEASE ORAL) Take 500 mg by mouth 3 (three) times a day.     • amiodarone (PACERONE) 200 mg tablet Take 1 tablet (200 mg total) by mouth daily. 90 tablet 3   • cholecalciferol, vitamin D3, 1,000 unit (25 mcg) tablet  Take 1,000 Units by mouth daily.     • dilTIAZem CD (CARDIZEM CD) 120 mg 24 hr capsule Take 1 capsule (120 mg total) by mouth daily. 270 capsule 3   • lidocaine-prilocaine (EMLA) cream Apply 1 application topically as needed for pain.     • metoprolol succinate XL (TOPROL-XL) 50 mg 24 hr tablet Take 1 tablet (50 mg total) by mouth daily.     • pantoprazole (PROTONIX) 40 mg EC tablet Take 1 tablet (40 mg total) by mouth 2 (two) times a day. 180 tablet 3   • potassium chloride (KLOR-CON) 10 mEq CR tablet Take 2 tablets (20 mEq total) by mouth daily. 180 tablet 3   • rivaroxaban (XARELTO) 15 mg tablet Take 1 tablet (15 mg total) by mouth daily with dinner Indications: treatment to prevent blood clots in chronic atrial fibrillation. 90 tablet 1   • silver sulfadiazine (SILVADENE) 1 % cream Apply topically daily. 20 g 0   • simvastatin (ZOCOR) 20 mg tablet Take 1 tablet (20 mg total) by mouth nightly. 90 tablet 3   • tamoxifen (NOLVADEX) 20 mg chemo tablet Take  1 tablet (20 mg total) daily 90 tablet 1   • torsemide (DEMADEX) 20 mg tablet Take 0.5 tablets (10 mg total) by mouth daily. With additional 1/2 tablet for AM standing weight over 185 lbs 90 tablet 3     No current facility-administered medications for this visit.     Facility-Administered Medications Ordered in Other Visits   Medication Dose Route Frequency Provider Last Rate Last Admin   • heparin, porcine (PF) flush 500 Units  500 Units intra-catheter PRN Paz Cleveland MD   500 Units at 03/21/23 0949   • leuprolide (3 month) (LUPRON DEPOT) intramuscular injection 22.5 mg  22.5 mg intramuscular Once Paz Cleveland MD            Laboratory  Recent Results (from the past 672 hour(s))   Basic metabolic panel    Collection Time: 02/23/23  1:40 PM   Result Value Ref Range    Glucose 85 65 - 99 mg/dL    BUN 31 (H) 7 - 25 mg/dL    Creatinine 1.72 (H) 0.70 - 1.28 mg/dL    EGFR 41 (L) > OR = 60 mL/min/1.73m2    Bun/Creatinine Ratio 18 6 - 22 (calc)     Sodium 141 135 - 146 mmol/L    Potassium 4.0 3.5 - 5.3 mmol/L    Chloride 103 98 - 110 mmol/L    Carbon Dioxide 25 20 - 32 mmol/L    Calcium 8.5 (L) 8.6 - 10.3 mg/dL   Magnesium    Collection Time: 02/23/23  1:40 PM   Result Value Ref Range    Magnesium 1.6 1.5 - 2.5 mg/dL       Radiology    BI TRANSFER OF OUTSIDE FILMS    Result Date: 3/17/2023  Narrative: This order has been auto-finalized and does not contain a result.    US TRANSFER OF OUTSIDE FILMS    Result Date: 3/17/2023  Narrative: This order has been auto-finalized and does not contain a result.    BI DIAGNOSTIC MAMMOGRAM BILATERAL (TOMOSYNTHESIS), ULTRASOUND BREAST LIMITED RIGHT    Result Date: 3/16/2023  Narrative: CLINICAL HISTORY:  C50.821: Malignant neoplasm of overlapping sites of right male breast Z17.0: Estrogen receptor positive status (ER+)   . Right breast cancer status post 1 course of chemotherapy. Reevaluate. --    Impression:   Significant interval decrease in size of the retroareolar right breast mass compared to 9/9/2022. See comment for details. COMPARISON: Digital right only mammogram and ultrasound completed 9/9/2022. COMMENT:  Full-field digital imaging of the breasts is completed 3-D tomosynthesis. The previously documented mass in the subareolar right breast with associated nipple retraction shows a significant interval decrease in size. A biopsy clip is again noted at the upper outer margin of the mass. No new or enlarging mass. Left breast: No prior left breast mammogram available. Probable minor gynecomastia deep to the nipple best demonstrated on the MLO view. There are no findings concerning for malignancy. Diagnostic right breast ultrasound is completed. Retroareolar right breast: Small residual solid, hypoechoic, irregularly marginated mass deep to the nipple measuring approximately 1.1 x 0.5 x 0.8 cm today compared to 1.9 x 1.7 x 1.8 cm on 9/9/2022. There is no internal vascularity on present exam, also representing a change  from prior. Thickening of the overlying skin noted on the September 2022 study also shows interval improvement. Computer assisted detection was utilized in the interpretation of this study. Written results were conveyed to the patient at the time of imaging. FINAL ASSESSMENT  BIRADS CATEGORY 6 - KNOWN BIOPSY PROVEN MALIGNANCY     CT PET WHOLE BODY    Result Date: 3/6/2023  Narrative: CLINICAL HISTORY: Right breast cancer diagnosed August 2022 treated with chemotherapy; history of prostate cancer status post prostatectomy July 2022     Impression: IMPRESSION: No abnormal metabolic activity suggestive of metastatic/recurrent disease. COMMENT: Following the intravenous injection of 8.99 mCi of F-18 fluorodeoxyglucose, whole body PET/CT was performed. The CT was performed utilizing a lower dose for purposes of attenuation correction and anatomic localization; this does not constitute a diagnostic CT. Comparison is made to a chest CT from 10/19/2022, and outside CT scans of the abdomen and pelvis and bone scan from 09/09/2022. Fasting blood glucose = 109 mg/dL. For reference, background mediastinal uptake has a maximal SUV of 3.1 as measured in the proximal descending thoracic aorta at the level of the leo. There is no abnormal intracranial metabolic activity. There is no abnormal metabolic activity at the skull base or in the neck. There is no abnormal metabolic activity in the chest, including in an 8 mm groundglass nodule in the right upper lobe; this should be followed. A surgical clip is again seen in the region of the right breast; level symmetrical metabolic activity is seen in the breast tissue. There is no abnormal metabolic activity in the liver, spleen, adrenal glands, or elsewhere in the abdomen or pelvis. There is increased metabolic activity about the left knee which is felt to be due to degenerative changes. There is also mild increased uptake about the left shoulder joint, of uncertain significance. In  addition to the above-described findings, the low-dose CT reveals age appropriate intracranial atrophy, left jugular central venous catheter, mild cardiomegaly, mitral annular prosthesis, atherosclerotic calcification of the coronary arteries and atherosclerotic calcification elsewhere, probable pleuroparenchymal scarring at the lung bases, gallstones, surgical absence of the prostate gland, colonic diverticulosis, and small fat-containing umbilical and left inguinal hernias.      Assessment and Plan  Prostate cancer (CMS/HCC)  He has decided not to pursue surgery at this time for his breast cancer.  He did meet with radiation oncology.  I contacted Dr. Greg Ochsner by telephone today.  His office will contact him to start radiotherapy considering his not undergoing surgery for breast cancer.    He will receive his Lupron today.  He will receive it every 3 months.  .  He will return in 3 months time with laboratory studies including a PSA.    Malignant neoplasm of right male breast (CMS/HCC)  Everyone is very pleased that his breast mass has decreased in size while on tamoxifen.  He met with the breast surgeon and does not wish to pursue surgery at this time.    I do feel that he should have surgery.  I informed him that surgery is the cure.  PET scan did not reveal evidence of distant metastasis.  The tamoxifen is not a cure.  He is at increased risk for recurrent disease considering his pathology.  He voiced that he might consider surgery in the future.  Discussed with him that if the cancer did spread it could be considered incurable in the future.  He does not want to consider surgery at this time.  He does have follow-up with the breast surgeon with a managing studies.  I will continue to monitor him as well since he does not wish to go to surgery at this time.  He will continue on his tamoxifen.          I spent 50 minutes on this date of service performing the following activities: obtaining history,  performing examination, entering orders, documenting, preparing for visit, obtaining / reviewing records, providing counseling and education, communicating results and coordinating care.     Paz Cleveland MD

## 2023-03-21 NOTE — ASSESSMENT & PLAN NOTE
Everyone is very pleased that his breast mass has decreased in size while on tamoxifen.  He met with the breast surgeon and does not wish to pursue surgery at this time.    I do feel that he should have surgery.  I informed him that surgery is the cure.  PET scan did not reveal evidence of distant metastasis.  The tamoxifen is not a cure.  He is at increased risk for recurrent disease considering his pathology.  He voiced that he might consider surgery in the future.  Discussed with him that if the cancer did spread it could be considered incurable in the future.  He does not want to consider surgery at this time.  He does have follow-up with the breast surgeon with a managing studies.  I will continue to monitor him as well since he does not wish to go to surgery at this time.  He will continue on his tamoxifen.

## 2023-03-21 NOTE — ASSESSMENT & PLAN NOTE
He has decided not to pursue surgery at this time for his breast cancer.  He did meet with radiation oncology.  I contacted Dr. Greg O[facility]ner by telephone today.  His office will contact him to start radiotherapy considering his not undergoing surgery for breast cancer.    He will receive his Lupron today.  He will receive it every 3 months.  .  He will return in 3 months time with laboratory studies including a PSA.

## 2023-03-27 ENCOUNTER — DOCUMENTATION (OUTPATIENT)
Dept: RADIATION ONCOLOGY | Facility: HOSPITAL | Age: 73
End: 2023-03-27
Payer: MEDICARE

## 2023-03-27 NOTE — PROGRESS NOTES
Reconsult - last seen by Dr. O[facility]ner on 3/8/23.    Prostate/Breast    3/20/23 Dr. Osuna:  Pt did not want surgical intervention at this time.  Will need repeat imaging in 4 months to ensure no growth of tumor.  Aware HER2+ cancer has higher risk of spread - recommended pt continue HER2 treatments - pt not interested at this time.  Would like to proceed with prostate cancer treatments.    3/21/23  Dr. Cleveland:  Will start Lupron at the [facility].  Pt is compliant with taking tamoxifen - and pleased with results.  He is aware  Tamoxifen is not a cure.  Pt may consider surgery in the future.

## 2023-03-29 ENCOUNTER — HOSPITAL ENCOUNTER (OUTPATIENT)
Dept: RADIATION ONCOLOGY | Facility: HOSPITAL | Age: 73
Setting detail: RADIATION/ONCOLOGY SERIES
Discharge: HOME | End: 2023-03-29
Attending: RADIOLOGY
Payer: MEDICARE

## 2023-03-29 VITALS
HEART RATE: 57 BPM | DIASTOLIC BLOOD PRESSURE: 63 MMHG | OXYGEN SATURATION: 99 % | BODY MASS INDEX: 31.05 KG/M2 | SYSTOLIC BLOOD PRESSURE: 114 MMHG | WEIGHT: 186.6 LBS

## 2023-03-29 DIAGNOSIS — C61 PROSTATE CANCER (CMS/HCC): Primary | ICD-10-CM

## 2023-03-29 ASSESSMENT — ENCOUNTER SYMPTOMS
DIZZINESS: 1
EYES NEGATIVE: 1
CARDIOVASCULAR NEGATIVE: 1
ALLERGIC/IMMUNOLOGIC NEGATIVE: 1
MUSCULOSKELETAL NEGATIVE: 1
RESPIRATORY NEGATIVE: 1
LIGHT-HEADEDNESS: 1
FATIGUE: 1
PSYCHIATRIC NEGATIVE: 1
ENDOCRINE NEGATIVE: 1

## 2023-03-29 ASSESSMENT — PAIN SCALES - GENERAL: PAINLEVEL: 0-NO PAIN

## 2023-03-29 NOTE — PROGRESS NOTES
Subjective      Patient ID: Cam Rosas is a 73 y.o. male.  1950    Diagnosis:  No diagnosis found.    HPI    The following have been reviewed and updated as appropriate in this visit:        Review of Systems   Constitutional: Positive for fatigue (mild).   HENT: Positive for congestion (mucinex ).    Eyes: Negative.    Respiratory: Negative.    Cardiovascular: Negative.    Endocrine: Negative.    Genitourinary:        Incontinence      Musculoskeletal: Negative.    Skin: Negative.    Allergic/Immunologic: Negative.    Neurological: Positive for dizziness and light-headedness.   Psychiatric/Behavioral: Negative.        Objective     Vitals:    03/29/23 1046   BP: 114/63   BP Location: Right upper arm   Patient Position: Sitting   Pulse: (!) 57   SpO2: 99%   Weight: 84.6 kg (186 lb 9.6 oz)     Body mass index is 31.05 kg/m².    Physical Exam    Assessment/Plan   Diagnoses and Orders Associated With This Visit:  There are no diagnoses linked to this encounter.

## 2023-03-29 NOTE — PROGRESS NOTES
Patient ID:  Cam Rosas is a 73 y.o. male.  Referring Physician:   No referring provider defined for this encounter.    Primary Care Provider:  Usman Collins MD    Problem List:  Patient Active Problem List    Diagnosis Date Noted   • Acute systolic heart failure (CMS/HCC) 02/15/2023   • Atrial fibrillation, unspecified type (CMS/HCC) 10/19/2022   • Malignant neoplasm of right male breast (CMS/HCC) 10/19/2022   • Electrolyte abnormality 10/19/2022   • Gastrointestinal hemorrhage with melena 10/19/2022   • CKD (chronic kidney disease) 10/19/2022   • Prostate cancer (CMS/HCC) 10/19/2022        Cancer Staging Information:  Cancer Staging   Malignant neoplasm of right male breast (CMS/[facility])  Staging form: Breast, AJCC 8th Edition  - Clinical stage from 3/20/2023: Stage IA (cT1c, cN0, cM0, G3, ER+, KS+, HER2+) - Signed by Barb Osuna MD on 3/20/2023    Prostate cancer (CMS/HCC)  Staging form: Prostate, AJCC 8th Edition  - Pathologic stage from 7/27/2022: Stage Unknown (pT3b, pNX, cM0, PSA: 6, Grade Group: 3) - Signed by [facility]ner, Gregory J, MD on 3/8/2023      Subjective      Radiation Delivered       History of Present Illness:  Last seen patient was seen by breast surgeon and elected to forego surgery.  Patient is on tamoxifen and being followed by medical oncology.  Patient recently received Lupron shot 3 months per  denies any side effects including hot flashes.  Patient continues to note some urinary incontinence but is otherwise without urinary or bowel complaints.  Does complain of nasal congestion with lots of mucus and cough.    Physical Exam:  Vital Signs for this encounter:  BP: 114/63  Heart Rate: 57  SpO2: 99 %  Weight: 84.6 kg (186 lb 9.6 oz) (03/29 1046)  Lungs clear to auscultation.  Evidence of nasal congestion.  Abdomen soft nontender.  Performance Status:80       PAIN ASSESSMENT:  Score Zero    Location    Pain Intervention      LABS:  No results found for this or any previous  visit (from the past 336 hour(s)).       Assessment/Plan Patient with pretty much synchronous stage Ia right breast cancer and pathologic T3a [place] score 7 adenocarcinoma the prostate gland.  Patient on hormone therapy alone for breast cancer.  Patient has biochemical failure following surgical resection for prostate cancer and discussed with patient and wife regarding postop radiation therapy for salvage purposes.  I recommended today in addition 6 months of hormone therapy which is being provided by medical oncology.  Discussed with patient wife regarding risks and benefits of patient therapy to the prostate bed.  Discussed possibility of patient developing damage of the rectum and or bladder in 5 to 10% of cases.  Discussed likely acute effects of radiation therapy including diarrhea, urinary frequency and more urinary incontinence and tiredness.  I explained the symptoms typically resolve following treatment.  Patient is interested therefore I have arranged CT simulation planning next week.  I have given patient information regarding prep and plan to deliver 7020 cGy in 39 fractions over 7-1/2 weeks starting shortly.  Also recommended patient take allergy medications for apparent allergies and Mucinex.  Follow-up with his primary doctor for this.  Patient also complains of claustrophobia and will try to prescribe an antianxiety medication if safe and effective.    Diagnoses and Orders Associated With This Visit:  Prostate cancer (CMS/[facility]) (Primary)        TREATMENT PLAN SUMMARY:  Treatment Details- Chemotherapy   Treatment goal [No plan goal]   Plan Name LEUPROLIDE (LUPRON) 22.5 MG EVERY 3 MONTHS   Status Active   Start Date 3/21/2023   End Date Until discontinued   Provider Paz Cleveland MD   IV Chemotherapy leuprolide (3 month) (LUPRON DEPOT) intramuscular injection 22.5 mg, 22.5 mg, intramuscular, Once, 1 of 1 cycle                       Treatment Details- Chemotherapy   Treatment goal [No plan goal]    Plan Name MEDIPORT FLUSH (SINGLE LUMEN) - PATIENT ON TREATMENT   Status Active   Start Date 3/21/2023   End Date Until discontinued   Provider Paz Cleveland MD   IV Chemotherapy [No matching medication found in this treatment plan]                     Treatment Details- Chemotherapy   Treatment goal [No plan goal]   Plan Name LEUPROLIDE (LUPRON) 22.5 MG EVERY 3 MONTHS   Status Active   Start Date 3/21/2023   End Date Until discontinued   Provider Paz Cleveland MD   IV Chemotherapy leuprolide (3 month) (LUPRON DEPOT) intramuscular injection 22.5 mg, 22.5 mg, intramuscular, Once, 1 of 1 cycle  Administration: 22.5 mg (3/21/2023)

## 2023-03-29 NOTE — LETTER
March 29, 2023                                                          Patient: Cam Rosas   YOB: 1950   Date of Visit: 3/29/2023       Dear Dr. Collins:    The patient is seen at the [facility] today. Attached is my assessment and plan of care.  Thank you for the opportunity to share in Cam Rossa's care.       Sincerely,        Gregory J. O[facility]ner, MD      CC: No Recipients

## 2023-04-03 ENCOUNTER — DOCUMENTATION (OUTPATIENT)
Dept: RADIATION ONCOLOGY | Facility: HOSPITAL | Age: 73
End: 2023-04-03
Payer: MEDICARE

## 2023-04-03 NOTE — PROGRESS NOTES
TREATMENT PLAN OF CARE AND GOALS PELVIS    Care Providers   Medical Oncologist Dr. Cleveland   Radiation Oncologist Dr. [facility]   Primary Care Provider Usman Collins MD   Radiation Oncology Nurse April Philip RN BSN OCN    JASON Wright [phone]    Treatment Plan of Care   Chemotherapy, Hormonal, and Targeted therapies   Regimen Start date   Interval, Duration   Tamoxifen and Lupron          Radiation Therapy   Treated area Start date Interval, Duration   Prostate          Potential side effects:   BLADDER IRRITATION: with increased need to urinate.       AZO - (over the counter) as needed. See handout.        Good nutrition and hydration 8 - 8 oz water daily          (minimum). We can provide you with a nutrition        consult if needed.  BOWEL IRRITATION: abdominal cramping, rectal pressure and diarrhea are possible.        Imodium (over the counter) as needed. See handout.       Good nutrition and hydration.        Low-residue diet.   HAIR LOSS: You may lose your pubic hair near the area of the radiation. Some people find that the hair grows back in a different color or texture than before.  SEXUAL SIDE EFFECTS:  Some men may experience erectile dysfunction. These are temporary. Please tell your doctor or nurse. They may be able to recommend medications or products to help.  PELVIS PREP: On the day of treatments try to empty your bowels prior to treatment. Drink 16-20 ounces of water 30 minutes prior to radiation treatment. Do not urinate right before treatment.      SKIN REDNESS: like a sunburn  Calendula or Aquaphor as needed for redness (OTC).   1% Hydrocortisone (OTC) for itching as needed.   (Never use creams or lotions four hours prior to treatment).  Dove bar soap and warm water for bathing.  No hot tubs, heating pads or ice packs to treatment area.  Watch sun exposure - use sunscreen with zinc (Never use four hours prior to treatment).    FATIGUE: Exercise/Conserve energy    NO  MULTIVITAMINS OR ANTIOXIDANTS during your radiation treatment.    You will meet with the Nurse/ Doctor once a week for an on treatment visit. Please plan for a longer visit on this day.     You will speak with the radiation therapists for scheduling and treatment specific requirements.    Resources you may be interested in:  Music therapy, Art therapy, Staying Active, Nutrition, Complementary therapies (Reiki etc), Spiritual Care.     Contact Numbers:    Triage Line  [phone]  Doctor on call (nights and weekends) [phone]  To schedule or cancel your radiation treatment appointments  [phone]  Reviewed by:    April Philip RN  BSN OCN              Date completed:04/03/23

## 2023-04-04 ENCOUNTER — HOSPITAL ENCOUNTER (OUTPATIENT)
Dept: RADIATION ONCOLOGY | Facility: HOSPITAL | Age: 73
Setting detail: RADIATION/ONCOLOGY SERIES
Discharge: HOME | End: 2023-04-04
Attending: RADIOLOGY
Payer: MEDICARE

## 2023-04-04 PROCEDURE — 77332 RADIATION TREATMENT AID(S): CPT | Performed by: RADIOLOGY

## 2023-04-07 LAB
ARIA ZRC COURSE ID: NORMAL
ARIA ZRC COURSE INTENT: NORMAL
ARIA ZRC COURSE START DATE: NORMAL
ARIA ZRC TREATMENT ELAPSED DAYS: NORMAL
ARIA ZRP FRACTIONS TREATED TO DATE: NORMAL
ARIA ZRP PLAN ID: NORMAL
ARIA ZRP PLAN NAME: NORMAL
ARIA ZRP PRESCRIBED DOSE CGY: 1980
ARIA ZRP PRESCRIBED DOSE CGY: 1980
ARIA ZRP PRESCRIBED DOSE CGY: 5040
ARIA ZRP PRESCRIBED DOSE CGY: 5040
ARIA ZRP PRESCRIBED DOSE PER FRACTION: 1.8
ARIA ZRR DOSAGE GIVEN TO DATE: 0
ARIA ZRR REFERENCE POINT ID: NORMAL
MLH ARIA ZRC TREATMENT DATES: NORMAL

## 2023-04-13 ENCOUNTER — HOSPITAL ENCOUNTER (OUTPATIENT)
Dept: RADIATION ONCOLOGY | Facility: HOSPITAL | Age: 73
Setting detail: RADIATION/ONCOLOGY SERIES
Discharge: HOME | End: 2023-04-13
Attending: RADIOLOGY
Payer: MEDICARE

## 2023-04-13 LAB
ARIA ZRC COURSE ID: NORMAL
ARIA ZRC COURSE INTENT: NORMAL
ARIA ZRC COURSE START DATE: NORMAL
ARIA ZRC TREATMENT ELAPSED DAYS: NORMAL
ARIA ZRP FRACTIONS TREATED TO DATE: NORMAL
ARIA ZRP PLAN ID: NORMAL
ARIA ZRP PRESCRIBED DOSE CGY: 5040
ARIA ZRP PRESCRIBED DOSE PER FRACTION: 1.8
ARIA ZRR DOSAGE GIVEN TO DATE: 1.8
ARIA ZRR DOSAGE GIVEN TO DATE: 1.8
ARIA ZRR DOSAGE GIVEN TO DATE: 1.83
ARIA ZRR REFERENCE POINT ID: NORMAL
ARIA ZRR SESSION DOSAGE GIVEN: 1.8
ARIA ZRR SESSION DOSAGE GIVEN: 1.8
ARIA ZRR SESSION DOSAGE GIVEN: 1.83
MLH ARIA ZRC TREATMENT DATES: NORMAL

## 2023-04-13 PROCEDURE — 77385 HC IMRT DELIVERY SIMPLE: CPT | Performed by: RADIOLOGY

## 2023-04-14 ENCOUNTER — HOSPITAL ENCOUNTER (OUTPATIENT)
Dept: RADIATION ONCOLOGY | Facility: HOSPITAL | Age: 73
Setting detail: RADIATION/ONCOLOGY SERIES
Discharge: HOME | End: 2023-04-14
Attending: RADIOLOGY
Payer: MEDICARE

## 2023-04-14 LAB
ARIA ZRC COURSE ID: NORMAL
ARIA ZRC COURSE INTENT: NORMAL
ARIA ZRC COURSE START DATE: NORMAL
ARIA ZRC TREATMENT ELAPSED DAYS: NORMAL
ARIA ZRP FRACTIONS TREATED TO DATE: NORMAL
ARIA ZRP PLAN ID: NORMAL
ARIA ZRP PRESCRIBED DOSE CGY: 5040
ARIA ZRP PRESCRIBED DOSE PER FRACTION: 1.8
ARIA ZRR DOSAGE GIVEN TO DATE: 3.6
ARIA ZRR DOSAGE GIVEN TO DATE: 3.6
ARIA ZRR DOSAGE GIVEN TO DATE: 3.65
ARIA ZRR REFERENCE POINT ID: NORMAL
ARIA ZRR SESSION DOSAGE GIVEN: 1.8
ARIA ZRR SESSION DOSAGE GIVEN: 1.8
ARIA ZRR SESSION DOSAGE GIVEN: 1.83
MLH ARIA ZRC TREATMENT DATES: NORMAL

## 2023-04-14 PROCEDURE — 77385 HC IMRT DELIVERY SIMPLE: CPT | Performed by: RADIOLOGY

## 2023-04-17 ENCOUNTER — HOSPITAL ENCOUNTER (OUTPATIENT)
Dept: RADIATION ONCOLOGY | Facility: HOSPITAL | Age: 73
Setting detail: RADIATION/ONCOLOGY SERIES
Discharge: HOME | End: 2023-04-17
Attending: RADIOLOGY
Payer: MEDICARE

## 2023-04-17 ENCOUNTER — HOSPITAL ENCOUNTER (OUTPATIENT)
Dept: CARDIOLOGY | Facility: CLINIC | Age: 73
Discharge: HOME | End: 2023-04-17
Attending: NURSE PRACTITIONER
Payer: MEDICARE

## 2023-04-17 ENCOUNTER — OFFICE VISIT (OUTPATIENT)
Dept: CARDIOLOGY | Facility: CLINIC | Age: 73
End: 2023-04-17
Payer: MEDICARE

## 2023-04-17 VITALS
HEIGHT: 65 IN | WEIGHT: 186 LBS | DIASTOLIC BLOOD PRESSURE: 65 MMHG | BODY MASS INDEX: 30.99 KG/M2 | SYSTOLIC BLOOD PRESSURE: 125 MMHG

## 2023-04-17 VITALS
OXYGEN SATURATION: 97 % | BODY MASS INDEX: 30.99 KG/M2 | HEART RATE: 61 BPM | SYSTOLIC BLOOD PRESSURE: 148 MMHG | DIASTOLIC BLOOD PRESSURE: 76 MMHG | HEIGHT: 65 IN | RESPIRATION RATE: 18 BRPM | WEIGHT: 186 LBS

## 2023-04-17 DIAGNOSIS — C61 PROSTATE CANCER (CMS/HCC): ICD-10-CM

## 2023-04-17 DIAGNOSIS — I50.21 ACUTE SYSTOLIC CONGESTIVE HEART FAILURE (CMS/HCC): ICD-10-CM

## 2023-04-17 DIAGNOSIS — N18.31 ACUTE RENAL FAILURE SUPERIMPOSED ON STAGE 3A CHRONIC KIDNEY DISEASE, UNSPECIFIED ACUTE RENAL FAILURE TYPE (CMS/HCC): ICD-10-CM

## 2023-04-17 DIAGNOSIS — I48.0 PAROXYSMAL ATRIAL FIBRILLATION (CMS/HCC): ICD-10-CM

## 2023-04-17 DIAGNOSIS — I48.0 PAROXYSMAL ATRIAL FIBRILLATION (CMS/HCC): Primary | ICD-10-CM

## 2023-04-17 DIAGNOSIS — N17.9 ACUTE RENAL FAILURE SUPERIMPOSED ON STAGE 3A CHRONIC KIDNEY DISEASE, UNSPECIFIED ACUTE RENAL FAILURE TYPE (CMS/HCC): ICD-10-CM

## 2023-04-17 DIAGNOSIS — I50.42 CHRONIC COMBINED SYSTOLIC AND DIASTOLIC CHF (CONGESTIVE HEART FAILURE) (CMS/HCC): ICD-10-CM

## 2023-04-17 DIAGNOSIS — R60.0 LOCALIZED EDEMA: ICD-10-CM

## 2023-04-17 DIAGNOSIS — I50.21 ACUTE SYSTOLIC HEART FAILURE (CMS/HCC): ICD-10-CM

## 2023-04-17 LAB
AORTIC ROOT ANNULUS: 3.7 CM
ARIA ZRC COURSE ID: NORMAL
ARIA ZRC COURSE INTENT: NORMAL
ARIA ZRC COURSE START DATE: NORMAL
ARIA ZRC TREATMENT ELAPSED DAYS: NORMAL
ARIA ZRP FRACTIONS TREATED TO DATE: NORMAL
ARIA ZRP PLAN ID: NORMAL
ARIA ZRP PRESCRIBED DOSE CGY: 5040
ARIA ZRP PRESCRIBED DOSE PER FRACTION: 1.8
ARIA ZRR DOSAGE GIVEN TO DATE: 5.4
ARIA ZRR DOSAGE GIVEN TO DATE: 5.4
ARIA ZRR DOSAGE GIVEN TO DATE: 5.48
ARIA ZRR REFERENCE POINT ID: NORMAL
ARIA ZRR SESSION DOSAGE GIVEN: 1.8
ARIA ZRR SESSION DOSAGE GIVEN: 1.8
ARIA ZRR SESSION DOSAGE GIVEN: 1.83
ASCENDING AORTA: 3.5 CM
AV PEAK GRADIENT: 4 MMHG
AV PEAK VELOCITY-S: 0.94 M/S
AV VALVE AREA: 2.6 CM2
BSA FOR ECHO PROCEDURE: 1.97 M2
DOP CALC LVOT STROKE VOLUME: 65.2 CM3
E WAVE DECELERATION TIME: 254 MS
E/A RATIO: 4.1
E/E' RATIO: 20.8
E/LAT E' RATIO: 12.7
EDV (BP): 78.5 CM3
EF (A4C): 53.2 %
EF A2C: 69.2 %
EJECTION FRACTION: 62.4 %
EST RIGHT VENT SYSTOLIC PRESSURE BY TRICUSPID REGURGITATION JET: 52 MMHG
ESV (BP): 29.5 CM3
FRACTIONAL SHORTENING: 31.93 %
INTERVENTRICULAR SEPTUM: 1.19 CM
LA ESV (BP): 81.3 CM3
LA ESV INDEX (A2C): 36.09 CM3/M2
LA ESV INDEX (BP): 41.27 CM3/M2
LA/AORTA RATIO: 1.46
LAAS-AP2: 23.8 CM2
LAAS-AP4: 27.7 CM2
LAD 2D: 5.4 CM
LALD A4C: 6.59 CM
LALD A4C: 6.74 CM
LAV-S: 71.1 CM3
LEFT ATRIUM VOLUME INDEX: 46.5 CM3/M2
LEFT ATRIUM VOLUME: 91.6 CM3
LEFT INTERNAL DIMENSION IN SYSTOLE: 3.07 CM (ref 2.71–4.11)
LEFT VENTRICLE DIASTOLIC VOLUME INDEX: 37.72 CM3/M2
LEFT VENTRICLE DIASTOLIC VOLUME: 74.3 CM3
LEFT VENTRICLE SYSTOLIC VOLUME INDEX: 17.66 CM3/M2
LEFT VENTRICLE SYSTOLIC VOLUME: 34.8 CM3
LEFT VENTRICULAR INTERNAL DIMENSION IN DIASTOLE: 4.51 CM (ref 4.6–6.39)
LEFT VENTRICULAR POSTERIOR WALL IN END DIASTOLE: 1.24 CM (ref 0.6–1.12)
LV DIASTOLIC VOLUME: 76.5 CM3
LV ESV (APICAL 2 CHAMBER): 23.6 CM3
LVAD-AP2: 27.4 CM2
LVAD-AP4: 26 CM2
LVAS-AP2: 13.4 CM2
LVAS-AP4: 16.8 CM2
LVEDVI(A2C): 38.83 CM3/M2
LVEDVI(BP): 39.85 CM3/M2
LVESVI(A2C): 11.98 CM3/M2
LVESVI(BP): 14.97 CM3/M2
LVLD-AP2: 8.2 CM
LVLD-AP4: 7.53 CM
LVLS-AP2: 6.28 CM
LVLS-AP4: 6.73 CM
LVOT 2D: 2.3 CM
LVOT A: 4.15 CM2
LVOT MG: 1 MMHG
LVOT MV: 0.53 M/S
LVOT PEAK VELOCITY: 0.75 M/S
LVOT PG: 2 MMHG
LVOT STROKE VOLUME INDEX: 33.09 ML/M2
LVOT VTI: 15.7 CM
MLH ARIA ZRC TREATMENT DATES: NORMAL
MLH CV ECHO AVA INDEX VELOCITY RATIO: 1.3
MV E'TISSUE VEL-LAT: 0.12 M/S
MV E'TISSUE VEL-MED: 0.07 M/S
MV MEAN GRADIENT: 2 MMHG
MV PEAK A VEL: 0.37 M/S
MV PEAK E VEL: 1.54 M/S
MV PEAK GRADIENT: 9 MMHG
MV VALVE AREA BY CONTINUITY EQUATION: 1.65 CM2
MV VTI: 39.6 CM
POSTERIOR WALL: 1.24 CM
PULMONARY REGURGITATION LATE DIASTOLIC VELOCITY: 1.35 M/S
PV PEAK GRADIENT: 2 MMHG
PV PV: 0.76 M/S
RAP: 5 MMHG
RVOT VMAX: 0.53 M/S
RVOT VTI: 13 CM
SEPTAL TISSUE DOPPLER FREE WALL LATE DIA VELOCITY (APICAL 4 CHAMBER VIEW): 0.12 M/S
TAPSE: 1.68 CM
TR MAX PG: 46.51 MMHG
TRICUSPID VALVE PEAK REGURGITATION VELOCITY: 3.41 M/S
Z-SCORE OF LEFT VENTRICULAR DIMENSION IN END DIASTOLE: -1.83
Z-SCORE OF LEFT VENTRICULAR DIMENSION IN END SYSTOLE: -0.66
Z-SCORE OF LEFT VENTRICULAR POSTERIOR WALL IN END DIASTOLE: 2.16

## 2023-04-17 PROCEDURE — 93306 TTE W/DOPPLER COMPLETE: CPT | Performed by: INTERNAL MEDICINE

## 2023-04-17 PROCEDURE — 77385 HC IMRT DELIVERY SIMPLE: CPT | Performed by: RADIOLOGY

## 2023-04-17 PROCEDURE — 99214 OFFICE O/P EST MOD 30 MIN: CPT | Performed by: INTERNAL MEDICINE

## 2023-04-17 PROCEDURE — 93000 ELECTROCARDIOGRAM COMPLETE: CPT | Performed by: INTERNAL MEDICINE

## 2023-04-17 RX ORDER — PUMPKIN SEED EXTRACT/SOY GERM 300 MG
CAPSULE ORAL DAILY
COMMUNITY
End: 2023-05-23 | Stop reason: ENTERED-IN-ERROR

## 2023-04-17 RX ORDER — SENNOSIDES 8.6 MG/1
2 TABLET ORAL DAILY
COMMUNITY
End: 2023-07-17 | Stop reason: ALTCHOICE

## 2023-04-17 RX ORDER — POTASSIUM CHLORIDE 750 MG/1
10 TABLET, EXTENDED RELEASE ORAL DAILY
Qty: 180 TABLET | Refills: 3 | COMMUNITY
Start: 2023-04-17 | End: 2023-06-08 | Stop reason: SDUPTHER

## 2023-04-17 NOTE — LETTER
"April 17, 2023     Usman Collins MD  [facility] [address]    Patient: Cam Rosas  YOB: 1950  Date of Visit: 4/17/2023      Dear Dr. Collins:    Thank you for referring Cam Rosas to me for evaluation. Below are my notes for this consultation.    If you have questions, please do not hesitate to call me. I look forward to following your patient along with you.         Sincerely,        Gary Moran MD        CC: Christine E Szarka, MD Gregory J [facility], MD Steven A Rothman, MD [place], Gary SANTOS MD  4/17/2023  4:07 PM  Sign when Signing Visit   Cardiology Note          HPI   Cam Rosas \"CHOPS\" is a 73 y.o. man who presents followup of heart failure to our advanced heart failure clinic, originally referred by Dr. Aceves.    \"Dennyss\" has a past medical history of newly diagnosed atrial fibrillation in October with RVR and hospital admission where they also found his EF to be down to 40% s/p new chemotherapy for breast cancer; prostate cancer, CKD Stage 2, recent melena with no active bleeding on EGD, and s/p mitral annual ring placement by Dr. Pastor in 2006.  He was following with Dr. Ra Barajas, cardiologist, for 15 years, who unfortunately passed away late 2022. We will work on obtaining his records.     [facility] reports he has not had a weak heart nor atrial fibrillation prior to these events in the late fall of 2022. He was cardioverted after being on amiodarone and Xarelto for 4 weeks by Dr. Aceves on December 5th, 2022. Since that time he has had no further atrial fibrillation. He has been feeing much improved as his weight is down to 186 from 204 lbs. He had an increase in his weight for months since he was initiated on chemo and decadron. He had one dose of Cancian T in September and one dose of Taxotere and carboplastin. He has had no further chemo as he did not tolerate these medications and this is what lead to him going into atrial fibrillation. He is " now on Tamoxifen for his breat cancer and likely going for radiation treatments for his prostate cancer. He is post prostatectomy.     Since his last visit 2 months ago he reports reasonable cardiovascular stability.  To his knowledge he has not had a reoccurrence of atrial fibrillation.  He is on day 3 of the 7-week course of radiation for his prostate.  He reduced his torsemide to 10 mg when under 185 pounds and skips it when under 183 pounds.  He has not been over 185 pounds in weeks.  I think this is very reasonable and I asked him to continue this.  I do note he had mild acute kidney injury on labs a couple months ago.  We did an echo in the office today just before the visit and thankfully his LV function has returned to normal.  He no longer needs to wrap his legs as his edema has virtually disappeared. He never had abdominal bloating. He denies chest pain, dyspnea at rest, orthopnea, PND, edema or abdominal bloating. He denies dizziness or lightheadedness.  His skin blisters on his legs have healed.        Past Medical History:   Diagnosis Date   • Acute systolic heart failure (CMS/HCC) 02/15/2023   • Atrial fibrillation (CMS/HCC)    • Breast cancer (CMS/HCC) October 2022   • CHF (congestive heart failure) (CMS/HCC)    • Chronic kidney disease    • CKD (chronic kidney disease) 10/19/2022   • Hx antineoplastic chemo    • Prostate cancer (CMS/HCC)      Past Surgical History:   Procedure Laterality Date   • CARDIAC SURGERY     • CARDIOVERSION  12/05/2022    Successful DC cardioversion   • PROSTATE SURGERY  July 2022   • PROSTATECTOMY  07/15/2022   • TONSILLECTOMY       Social History     Tobacco Use   • Smoking status: Never   • Smokeless tobacco: Never   Vaping Use   • Vaping status: Never Used   Substance Use Topics   • Alcohol use: Yes     Alcohol/week: 2.0 standard drinks of alcohol     Types: 2 Shots of liquor per week     Comment: 2 drinks/day - scotch   • Drug use: Never       Family Status   Relation  Name Status   • Bio Mother mother (Not Specified)   • Bio Father Dad (Not Specified)   • Bio Sister  (Not Specified)   • Bio Brother  (Not Specified)   • MGM Belle (Not Specified)   • MGF  (Not Specified)   • PGM  (Not Specified)   • PGF  (Not Specified)   • Other son (Not Specified)        ALLERGIES  Azithromycin, Prednisone, and Lorazepam      Outpatient Encounter Medications as of 4/17/2023:   •  acetaminophen (TYLENOL EX STR RAPID RELEASE ORAL), Take 500 mg by mouth as needed.  •  amiodarone (PACERONE) 200 mg tablet, Take 1 tablet (200 mg total) by mouth daily.  •  cholecalciferol, vitamin D3, 1,000 unit (25 mcg) tablet, Take 1,000 Units by mouth daily.  •  dilTIAZem CD (CARDIZEM CD) 120 mg 24 hr capsule, Take 1 capsule (120 mg total) by mouth daily.  •  lidocaine-prilocaine (EMLA) cream, Apply 1 application topically as needed for pain.  •  metoprolol succinate XL (TOPROL-XL) 50 mg 24 hr tablet, Take 1 tablet (50 mg total) by mouth daily.  •  pantoprazole (PROTONIX) 40 mg EC tablet, Take 1 tablet (40 mg total) by mouth 2 (two) times a day.  •  potassium chloride (KLOR-CON) 10 mEq CR tablet, Take 1 tablet (10 mEq total) by mouth daily.  •  pumpkin seed extract-soy germ (Azo Bladder ControL) 300 mg capsule, Take by mouth daily.  •  rivaroxaban (XARELTO) 15 mg tablet, Take 1 tablet (15 mg total) by mouth daily with dinner Indications: treatment to prevent blood clots in chronic atrial fibrillation.  •  senna (SENOKOT) 8.6 mg tablet, Take 2 tablets by mouth daily.  •  silver sulfadiazine (SILVADENE) 1 % cream, Apply topically daily.  •  simethicone (GAS-X ORAL), Take by mouth 2 (two) times a day. Before treatment  •  simvastatin (ZOCOR) 20 mg tablet, Take 1 tablet (20 mg total) by mouth nightly.  •  tamoxifen (NOLVADEX) 20 mg chemo tablet, Take  1 tablet (20 mg total) daily  •  torsemide (DEMADEX) 20 mg tablet, Take 0.5 tablets (10 mg total) by mouth daily. With additional 1/2 tablet for AM standing weight over  "185 lbs  •  [DISCONTINUED] potassium chloride (KLOR-CON) 10 mEq CR tablet, Take 2 tablets (20 mEq total) by mouth daily.    ROS as above in HPI and otherwise negative    Objective   Visit Vitals  BP (!) 148/76 (BP Location: Left upper arm, Patient Position: Sitting)   Pulse 61   Resp 18   Ht 1.651 m (5' 5\")   Wt 84.4 kg (186 lb)   SpO2 97%   BMI 30.95 kg/m²       Wt Readings from Last 3 Encounters:   04/17/23 84.4 kg (186 lb)   04/17/23 84.4 kg (186 lb)   03/29/23 84.6 kg (186 lb 9.6 oz)       Physical Exam  HENT:      Head: Normocephalic and atraumatic.      Nose: Nose normal.   Eyes:      Pupils: Pupils are equal, round, and reactive to light.   Neck:      Vascular: No carotid bruit or JVD.   Cardiovascular:      Rate and Rhythm: Normal rate and regular rhythm.      Pulses: Intact distal pulses.      Heart sounds: Normal heart sounds, S1 normal and S2 normal. No murmur heard.     No friction rub. No gallop.   Pulmonary:      Effort: No respiratory distress.      Breath sounds: Normal breath sounds. No wheezing or rales.   Abdominal:      General: Bowel sounds are normal.      Palpations: Abdomen is soft. There is no mass.      Tenderness: There is no abdominal tenderness.   Musculoskeletal:         General: Normal range of motion.   Skin:     General: Skin is warm and dry.   Neurological:      Mental Status: He is alert and oriented to person, place, and time.   Psychiatric:         Behavior: Behavior normal.         Judgment: Judgment normal.         Lab Results   Component Value Date    GLUCOSE 85 02/23/2023    CALCIUM 8.5 (L) 02/23/2023     02/23/2023    K 4.0 02/23/2023    CO2 25 02/23/2023     02/23/2023    BUN 31 (H) 02/23/2023    CREATININE 1.72 (H) 02/23/2023     Lab Results   Component Value Date    ALT 32 10/19/2022    AST 40 10/19/2022    ALKPHOS 82 10/19/2022    BILITOT 0.3 10/19/2022     Lab Results   Component Value Date    WBC 6.54 12/01/2022    HGB 10.8 (L) 12/01/2022    HCT 35.0 (L) " 12/01/2022    .0 (H) 12/01/2022     12/01/2022     No results found for: TSH  No results found for: CHOL  No results found for: HDL  No results found for: LDLCALC  No results found for: TRIG  No results found for: CHOLHDL  No results found for: HGBA1C  Labs October 2022:\       EKG    Performed on 4/17/2023 and personally reviewed:  Sinus  Bradycardia at 57bpm  Low voltage in limb leads.    -Nonspecific QRS widening.    -Nonspecific ST depression   +   T-abnormality  -Nondiagnostic  -Possible  Anterior/lateral  ischemia.     Imaging  TTE Sept 2022  Conclusions:   Ejection fraction is visually estimated at 50-55 %. No regional wall motion abnormalities   were appreciated on today's study.   Strain evaluation was technically difficult due to presence of PVC.   The mitral valve leaflets are status post repair. A mitral annuloplasty ring is present.   Mild mitral regurgitation.   Estimated PASP is 33 mmHg. No evidence of pulmonary hypertension.   Indeterminate rhythm on ECG. cannot rule out AF. Clinical correlation suggested.   No prior for comparison.     TTE October 2022  Conclusions:   Ejection fraction is visually estimated at 40-45 %. There is global left ventricular   hypokinesis. Patient was tachycardiac with a rate of 130 bpm.   Mild mitral annular calcification. The mitral valve leaflets are status post repair. A   mitral annuloplasty ring is present. Mild mitral regurgitation.   Compared to prior echocardiogram, EF has now declined from 55% to 40%.     TTE April 2023  •  Left Ventricle: Normal ventricle size. Mild concentric left ventricular hypertrophy. No outflow tract obstruction present. Low normal systolic function. Estimated EF 55%. Wall motion appears grossly normal. Grade II diastolic dysfunction.  •  Right Ventricle: Normal ventricle size. Increased wall thickness. Normal systolic function.  •  Left Atrium: Moderately dilated atrium. Cannot exclude patent foramen ovale (PFO).  •   Right Atrium: Mildly dilated atrium.  •  Aortic Valve: Tricuspid valve.  Sclerotic leaflets. Trace regurgitation. No stenosis.  •  Mitral Valve: Mitral annuloplasty ring present. Mild regurgitation.  •  Tricuspid Valve: Normal structure. Mild regurgitation. The regurgitation jet is eccentric. Estimated RVSP = 52 mmHg.  •  Pulmonic Valve: Normal structure. Trace regurgitation. Mildly dilated pulmonary artery.  •  Aorta: Aortic root normal. Sinuses of Valsalva normal-sized. Ascending aorta normal-sized.  •  IVC/SVC: Inferior vena cava is <2.1cm. Inferior vena cava demonstrates normal respiratory collapse.  •  Pericardium: Normal structure.  •  Compared with echo report from October 2022, LV function has normalized.  •  I personally reviewed results with him today in the office.     Assessment   1. Newly diagnosed acute systolic and diastolic heart failure, ACC Stage C, NYHA class 2  Acute change within one month with the only change being new atrial fibrillation with RVR at the time of the echocardiogram in October. He has been out of atrial fibrillation now since December.  Repeat echocardiogram shows his ejection fraction has rebounded from 40% back to his baseline of 55%.  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  His diuretics have been scaled back.  I asked him to decrease his potassium to 10 mEq daily given he is taking significantly less diuretics and I asked him to have a basic metabolic profile checked in 2 weeks.  If his ejection fraction ever drops again, we will stop Cardizem as this is a negative inotrope and start adding on GDMT with Entresto, SGLT-2 and spirolactone. Some chemo agents are cardiotoxic, he will let us know if his oncologist decides to pursue chemotherapy.  He will continue to follow a low salt diet. We discussed fluid limitations to 48-64 ounces.  He will continue with torsemide 20 mg daily when weight over 185 pounds, 10 mg when between 183 and 185 pounds, and no  diuretic when below 183 pounds.     2. Paroxysmal atrial fibrillation  Rhythm control and following with Dr. Aceves. On Xarelto.     3.  Acute on CKD Stage 3a  Creatinine a bit higher on labs 6 weeks ago. Creat 1.4-->1.76. Diuretics have been scaled back since then.  We ordered labs to be done soon.    4. GI bleed  EGD with no active bleeding.    5. Breast and Prostate Cancer  As above, he will let us know if oncology recommends chemo, plan outlined above.       Thank you for allowing me to participate in the care of this patient.  I reviewed notes from Ep, oncology and radiation oncology.  I reviewed interim labs.  I ordered a basic metabolic profile to be done in 2 weeks.  I will plan to see him back in 6 months or sooner should the need arise.  Please do not hesitate to contact me with any questions or concerns.    Sincerely,  Gary Moran MD  4/17/2023

## 2023-04-17 NOTE — PROGRESS NOTES
" Cardiology Note     9/8/2023: Addendum: Port Removal at [facility] on 9/14/2023    Attention: Dr. Simpson    He should hold his Xarelto 48 hours prior to procedure. If your preference is for a 72 hour hold due to bleeding risk that is acceptable. He should continue on all his other cardiac medications.    Please do not hesitate to contact our office with questions or concerns.     Sincerely,     Chel Olsen JONNY          MIKEY Rosas \"JH\" is a 73 y.o. man who presents followup of heart failure to our advanced heart failure clinic, originally referred by Dr. Aceves.    \"Jh\" has a past medical history of newly diagnosed atrial fibrillation in October with RVR and hospital admission where they also found his EF to be down to 40% s/p new chemotherapy for breast cancer; prostate cancer, CKD Stage 2, recent melena with no active bleeding on EGD, and s/p mitral annual ring placement by Dr. Pastor in 2006.  He was following with Dr. Ra Barajas, cardiologist, for 15 years, who unfortunately passed away late 2022. We will work on obtaining his records.     Jh reports he has not had a weak heart nor atrial fibrillation prior to these events in the late fall of 2022. He was cardioverted after being on amiodarone and Xarelto for 4 weeks by Dr. Aceves on December 5th, 2022. Since that time he has had no further atrial fibrillation. He has been feeing much improved as his weight is down to 186 from 204 lbs. He had an increase in his weight for months since he was initiated on chemo and decadron. He had one dose of Cancian T in September and one dose of Taxotere and carboplastin. He has had no further chemo as he did not tolerate these medications and this is what lead to him going into atrial fibrillation. He is now on Tamoxifen for his breat cancer and likely going for radiation treatments for his prostate cancer. He is post prostatectomy.     Since his last visit 2 months ago he reports " reasonable cardiovascular stability.  To his knowledge he has not had a reoccurrence of atrial fibrillation.  He is on day 3 of the 7-week course of radiation for his prostate.  He reduced his torsemide to 10 mg when under 185 pounds and skips it when under 183 pounds.  He has not been over 185 pounds in weeks.  I think this is very reasonable and I asked him to continue this.  I do note he had mild acute kidney injury on labs a couple months ago.  We did an echo in the office today just before the visit and thankfully his LV function has returned to normal.  He no longer needs to wrap his legs as his edema has virtually disappeared. He never had abdominal bloating. He denies chest pain, dyspnea at rest, orthopnea, PND, edema or abdominal bloating. He denies dizziness or lightheadedness.  His skin blisters on his legs have healed.        Past Medical History:   Diagnosis Date   • Acute systolic heart failure (CMS/HCC) 02/15/2023   • Atrial fibrillation (CMS/HCC)    • Breast cancer (CMS/HCC) October 2022   • CHF (congestive heart failure) (CMS/HCC)    • Chronic kidney disease    • CKD (chronic kidney disease) 10/19/2022   • Hx antineoplastic chemo    • Prostate cancer (CMS/HCC)      Past Surgical History:   Procedure Laterality Date   • CARDIAC SURGERY     • CARDIOVERSION  12/05/2022    Successful DC cardioversion   • PROSTATE SURGERY  July 2022   • PROSTATECTOMY  07/15/2022   • TONSILLECTOMY       Social History     Tobacco Use   • Smoking status: Never   • Smokeless tobacco: Never   Vaping Use   • Vaping status: Never Used   Substance Use Topics   • Alcohol use: Yes     Alcohol/week: 2.0 standard drinks of alcohol     Types: 2 Shots of liquor per week     Comment: 2 drinks/day - scotch   • Drug use: Never       Family Status   Relation Name Status   • Bio Mother mother (Not Specified)   • Bio Father Dad (Not Specified)   • Bio Sister  (Not Specified)   • Bio Brother  (Not Specified)   • MGM Belle (Not Specified)    • MGF  (Not Specified)   • PGM  (Not Specified)   • PGF  (Not Specified)   • Other son (Not Specified)        ALLERGIES  Azithromycin, Prednisone, and Lorazepam      Outpatient Encounter Medications as of 4/17/2023:   •  acetaminophen (TYLENOL EX STR RAPID RELEASE ORAL), Take 500 mg by mouth as needed.  •  amiodarone (PACERONE) 200 mg tablet, Take 1 tablet (200 mg total) by mouth daily.  •  cholecalciferol, vitamin D3, 1,000 unit (25 mcg) tablet, Take 1,000 Units by mouth daily.  •  dilTIAZem CD (CARDIZEM CD) 120 mg 24 hr capsule, Take 1 capsule (120 mg total) by mouth daily.  •  lidocaine-prilocaine (EMLA) cream, Apply 1 application topically as needed for pain.  •  metoprolol succinate XL (TOPROL-XL) 50 mg 24 hr tablet, Take 1 tablet (50 mg total) by mouth daily.  •  pantoprazole (PROTONIX) 40 mg EC tablet, Take 1 tablet (40 mg total) by mouth 2 (two) times a day.  •  potassium chloride (KLOR-CON) 10 mEq CR tablet, Take 1 tablet (10 mEq total) by mouth daily.  •  pumpkin seed extract-soy germ (Azo Bladder ControL) 300 mg capsule, Take by mouth daily.  •  rivaroxaban (XARELTO) 15 mg tablet, Take 1 tablet (15 mg total) by mouth daily with dinner Indications: treatment to prevent blood clots in chronic atrial fibrillation.  •  senna (SENOKOT) 8.6 mg tablet, Take 2 tablets by mouth daily.  •  silver sulfadiazine (SILVADENE) 1 % cream, Apply topically daily.  •  simethicone (GAS-X ORAL), Take by mouth 2 (two) times a day. Before treatment  •  simvastatin (ZOCOR) 20 mg tablet, Take 1 tablet (20 mg total) by mouth nightly.  •  tamoxifen (NOLVADEX) 20 mg chemo tablet, Take  1 tablet (20 mg total) daily  •  torsemide (DEMADEX) 20 mg tablet, Take 0.5 tablets (10 mg total) by mouth daily. With additional 1/2 tablet for AM standing weight over 185 lbs  •  [DISCONTINUED] potassium chloride (KLOR-CON) 10 mEq CR tablet, Take 2 tablets (20 mEq total) by mouth daily.    ROS as above in HPI and otherwise negative    Objective  "  Visit Vitals  BP (!) 148/76 (BP Location: Left upper arm, Patient Position: Sitting)   Pulse 61   Resp 18   Ht 1.651 m (5' 5\")   Wt 84.4 kg (186 lb)   SpO2 97%   BMI 30.95 kg/m²       Wt Readings from Last 3 Encounters:   04/17/23 84.4 kg (186 lb)   04/17/23 84.4 kg (186 lb)   03/29/23 84.6 kg (186 lb 9.6 oz)       Physical Exam  HENT:      Head: Normocephalic and atraumatic.      Nose: Nose normal.   Eyes:      Pupils: Pupils are equal, round, and reactive to light.   Neck:      Vascular: No carotid bruit or JVD.   Cardiovascular:      Rate and Rhythm: Normal rate and regular rhythm.      Pulses: Intact distal pulses.      Heart sounds: Normal heart sounds, S1 normal and S2 normal. No murmur heard.     No friction rub. No gallop.   Pulmonary:      Effort: No respiratory distress.      Breath sounds: Normal breath sounds. No wheezing or rales.   Abdominal:      General: Bowel sounds are normal.      Palpations: Abdomen is soft. There is no mass.      Tenderness: There is no abdominal tenderness.   Musculoskeletal:         General: Normal range of motion.   Skin:     General: Skin is warm and dry.   Neurological:      Mental Status: He is alert and oriented to person, place, and time.   Psychiatric:         Behavior: Behavior normal.         Judgment: Judgment normal.         Lab Results   Component Value Date    GLUCOSE 85 02/23/2023    CALCIUM 8.5 (L) 02/23/2023     02/23/2023    K 4.0 02/23/2023    CO2 25 02/23/2023     02/23/2023    BUN 31 (H) 02/23/2023    CREATININE 1.72 (H) 02/23/2023     Lab Results   Component Value Date    ALT 32 10/19/2022    AST 40 10/19/2022    ALKPHOS 82 10/19/2022    BILITOT 0.3 10/19/2022     Lab Results   Component Value Date    WBC 6.54 12/01/2022    HGB 10.8 (L) 12/01/2022    HCT 35.0 (L) 12/01/2022    .0 (H) 12/01/2022     12/01/2022     No results found for: TSH  No results found for: CHOL  No results found for: HDL  No results found for: LDLCALC  No " results found for: TRIG  No results found for: CHOLHDL  No results found for: HGBA1C  Labs October 2022:\       EKG    Performed on 4/17/2023 and personally reviewed:  Sinus  Bradycardia at 57bpm  Low voltage in limb leads.    -Nonspecific QRS widening.    -Nonspecific ST depression   +   T-abnormality  -Nondiagnostic  -Possible  Anterior/lateral  ischemia.     Imaging  TTE Sept 2022  Conclusions:   Ejection fraction is visually estimated at 50-55 %. No regional wall motion abnormalities   were appreciated on today's study.   Strain evaluation was technically difficult due to presence of PVC.   The mitral valve leaflets are status post repair. A mitral annuloplasty ring is present.   Mild mitral regurgitation.   Estimated PASP is 33 mmHg. No evidence of pulmonary hypertension.   Indeterminate rhythm on ECG. cannot rule out AF. Clinical correlation suggested.   No prior for comparison.     TTE October 2022  Conclusions:   Ejection fraction is visually estimated at 40-45 %. There is global left ventricular   hypokinesis. Patient was tachycardiac with a rate of 130 bpm.   Mild mitral annular calcification. The mitral valve leaflets are status post repair. A   mitral annuloplasty ring is present. Mild mitral regurgitation.   Compared to prior echocardiogram, EF has now declined from 55% to 40%.     TTE April 2023  •  Left Ventricle: Normal ventricle size. Mild concentric left ventricular hypertrophy. No outflow tract obstruction present. Low normal systolic function. Estimated EF 55%. Wall motion appears grossly normal. Grade II diastolic dysfunction.  •  Right Ventricle: Normal ventricle size. Increased wall thickness. Normal systolic function.  •  Left Atrium: Moderately dilated atrium. Cannot exclude patent foramen ovale (PFO).  •  Right Atrium: Mildly dilated atrium.  •  Aortic Valve: Tricuspid valve.  Sclerotic leaflets. Trace regurgitation. No stenosis.  •  Mitral Valve: Mitral annuloplasty ring present. Mild  regurgitation.  •  Tricuspid Valve: Normal structure. Mild regurgitation. The regurgitation jet is eccentric. Estimated RVSP = 52 mmHg.  •  Pulmonic Valve: Normal structure. Trace regurgitation. Mildly dilated pulmonary artery.  •  Aorta: Aortic root normal. Sinuses of Valsalva normal-sized. Ascending aorta normal-sized.  •  IVC/SVC: Inferior vena cava is <2.1cm. Inferior vena cava demonstrates normal respiratory collapse.  •  Pericardium: Normal structure.  •  Compared with echo report from October 2022, LV function has normalized.  •  I personally reviewed results with him today in the office.     Assessment   1. Newly diagnosed acute systolic and diastolic heart failure, ACC Stage C, NYHA class 2  Acute change within one month with the only change being new atrial fibrillation with RVR at the time of the echocardiogram in October. He has been out of atrial fibrillation now since December.  Repeat echocardiogram shows his ejection fraction has rebounded from 40% back to his baseline of 55%.  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  His diuretics have been scaled back.  I asked him to decrease his potassium to 10 mEq daily given he is taking significantly less diuretics and I asked him to have a basic metabolic profile checked in 2 weeks.  If his ejection fraction ever drops again, we will stop Cardizem as this is a negative inotrope and start adding on GDMT with Entresto, SGLT-2 and spirolactone. Some chemo agents are cardiotoxic, he will let us know if his oncologist decides to pursue chemotherapy.  He will continue to follow a low salt diet. We discussed fluid limitations to 48-64 ounces.  He will continue with torsemide 20 mg daily when weight over 185 pounds, 10 mg when between 183 and 185 pounds, and no diuretic when below 183 pounds.     2. Paroxysmal atrial fibrillation  Rhythm control and following with Dr. Aceves. On Xarelto.     3.  Acute on CKD Stage 3a  Creatinine a bit higher on  labs 6 weeks ago. Creat 1.4-->1.76. Diuretics have been scaled back since then.  We ordered labs to be done soon.    4. GI bleed  EGD with no active bleeding.    5. Breast and Prostate Cancer  As above, he will let us know if oncology recommends chemo, plan outlined above.       Thank you for allowing me to participate in the care of this patient.  I reviewed notes from Ep, oncology and radiation oncology.  I reviewed interim labs.  I ordered a basic metabolic profile to be done in 2 weeks.  I will plan to see him back in 6 months or sooner should the need arise.  Please do not hesitate to contact me with any questions or concerns.    Sincerely,  Gary Moran MD  4/17/2023

## 2023-04-17 NOTE — PATIENT INSTRUCTIONS
BMP blood work at [facility] in 2-3 weeks  OK to cut down torsemide as you did: 0mg if weight <183, 10mg for 183-185, and 20mg for weight >185  Decrease potassium to 1 10meq pill daily  Return visit in about 6 months

## 2023-04-18 ENCOUNTER — HOSPITAL ENCOUNTER (OUTPATIENT)
Dept: RADIATION ONCOLOGY | Facility: HOSPITAL | Age: 73
Setting detail: RADIATION/ONCOLOGY SERIES
Discharge: HOME | End: 2023-04-18
Attending: RADIOLOGY
Payer: MEDICARE

## 2023-04-18 ENCOUNTER — RAD ONC OTV (OUTPATIENT)
Dept: RADIATION ONCOLOGY | Facility: HOSPITAL | Age: 73
End: 2023-04-18
Payer: MEDICARE

## 2023-04-18 VITALS
BODY MASS INDEX: 30.69 KG/M2 | DIASTOLIC BLOOD PRESSURE: 76 MMHG | SYSTOLIC BLOOD PRESSURE: 164 MMHG | WEIGHT: 184.4 LBS | HEART RATE: 53 BPM | TEMPERATURE: 97.6 F

## 2023-04-18 DIAGNOSIS — C61 PROSTATE CANCER (CMS/HCC): Primary | ICD-10-CM

## 2023-04-18 LAB
ARIA ZRC COURSE ID: NORMAL
ARIA ZRC COURSE INTENT: NORMAL
ARIA ZRC COURSE START DATE: NORMAL
ARIA ZRC TREATMENT ELAPSED DAYS: NORMAL
ARIA ZRP FRACTIONS TREATED TO DATE: NORMAL
ARIA ZRP PLAN ID: NORMAL
ARIA ZRP PRESCRIBED DOSE CGY: 5040
ARIA ZRP PRESCRIBED DOSE PER FRACTION: 1.8
ARIA ZRR DOSAGE GIVEN TO DATE: 7.2
ARIA ZRR DOSAGE GIVEN TO DATE: 7.2
ARIA ZRR DOSAGE GIVEN TO DATE: 7.31
ARIA ZRR REFERENCE POINT ID: NORMAL
ARIA ZRR SESSION DOSAGE GIVEN: 1.8
ARIA ZRR SESSION DOSAGE GIVEN: 1.8
ARIA ZRR SESSION DOSAGE GIVEN: 1.83
MLH ARIA ZRC TREATMENT DATES: NORMAL

## 2023-04-18 PROCEDURE — 77385 HC IMRT DELIVERY SIMPLE: CPT | Performed by: RADIOLOGY

## 2023-04-18 ASSESSMENT — ENCOUNTER SYMPTOMS
APPETITE CHANGE: 0
PSYCHIATRIC NEGATIVE: 1
FATIGUE: 0
GASTROINTESTINAL NEGATIVE: 1
MUSCULOSKELETAL NEGATIVE: 1
DYSURIA: 0
DIFFICULTY URINATING: 0
UNEXPECTED WEIGHT CHANGE: 0
HEMATURIA: 0

## 2023-04-18 ASSESSMENT — PAIN SCALES - GENERAL: PAINLEVEL: 0-NO PAIN

## 2023-04-18 NOTE — LETTER
April 18, 2023                                                          Patient: Cam Rosas   YOB: 1950   Date of Visit: 4/18/2023       Dear Dr. Collins:    The patient is seen at the [facility] RADIATION THERAPY today. Attached is my assessment and plan of care.  Thank you for the opportunity to share in Cam Rosas's care.       Sincerely,        Gregory J. Ochsner, MD      CC: No Recipients

## 2023-04-18 NOTE — PROGRESS NOTES
Subjective      Patient ID: Cam Rosas is a 73 y.o. male.  1950   Diagnosis Plan   1. Prostate cancer (CMS/[facility])          Diagnosis:  No diagnosis found.    HPI patient presents with wife continue to note urinary leakage uses diaper.  Continue stool softener and Senokot notes soft loose stool but denies diarrhea.  Denies blood per urine or bladder.  Notes some warmness at night possibly hot flashes from hormone therapy.  Patient on Lupron and tamoxifen.    The following have been reviewed and updated as appropriate in this visit:        Review of Systems   Constitutional: Negative for appetite change, fatigue and unexpected weight change (down 2 lbs).   Gastrointestinal: Negative.    Genitourinary: Negative for difficulty urinating (incontinence ), dysuria and hematuria.   Musculoskeletal: Negative.    Skin: Negative.    Psychiatric/Behavioral: Negative.        Objective     Vitals:    04/18/23 0929   BP: (!) 164/76   Patient Position: Sitting   Pulse: (!) 53   Temp: 36.4 °C (97.6 °F)   Weight: 83.6 kg (184 lb 6.4 oz)     Body mass index is 30.69 kg/m².    Physical Exam vital signs stable.  Abdomen soft nontender.  Performance status 80.    Assessment/Plan Continue postop radiation therapy to pelvic region including prostate bed for salvage radiation therapy for prostate cancer.  Continue 6 months of Lupron therapy per medical oncology.  Continue tamoxifen for breast cancer.  Discussed with patient regarding obtaining penile clamp to help with urinary incontinence.  Diagnoses and Orders Associated With This Visit:  There are no diagnoses linked to this encounter.

## 2023-04-19 ENCOUNTER — HOSPITAL ENCOUNTER (OUTPATIENT)
Dept: RADIATION ONCOLOGY | Facility: HOSPITAL | Age: 73
Setting detail: RADIATION/ONCOLOGY SERIES
Discharge: HOME | End: 2023-04-19
Attending: RADIOLOGY
Payer: MEDICARE

## 2023-04-19 LAB
ARIA ZRC COURSE ID: NORMAL
ARIA ZRC COURSE INTENT: NORMAL
ARIA ZRC COURSE START DATE: NORMAL
ARIA ZRC TREATMENT ELAPSED DAYS: NORMAL
ARIA ZRP FRACTIONS TREATED TO DATE: NORMAL
ARIA ZRP PLAN ID: NORMAL
ARIA ZRP PRESCRIBED DOSE CGY: 5040
ARIA ZRP PRESCRIBED DOSE PER FRACTION: 1.8
ARIA ZRR DOSAGE GIVEN TO DATE: 9
ARIA ZRR DOSAGE GIVEN TO DATE: 9
ARIA ZRR DOSAGE GIVEN TO DATE: 9.14
ARIA ZRR REFERENCE POINT ID: NORMAL
ARIA ZRR SESSION DOSAGE GIVEN: 1.8
ARIA ZRR SESSION DOSAGE GIVEN: 1.8
ARIA ZRR SESSION DOSAGE GIVEN: 1.83
MLH ARIA ZRC TREATMENT DATES: NORMAL

## 2023-04-19 PROCEDURE — 77385 HC IMRT DELIVERY SIMPLE: CPT | Performed by: RADIOLOGY

## 2023-04-20 ENCOUNTER — RAD ONC OTV (OUTPATIENT)
Dept: RADIATION ONCOLOGY | Facility: HOSPITAL | Age: 73
End: 2023-04-20
Payer: MEDICARE

## 2023-04-20 ENCOUNTER — HOSPITAL ENCOUNTER (OUTPATIENT)
Dept: RADIATION ONCOLOGY | Facility: HOSPITAL | Age: 73
Setting detail: RADIATION/ONCOLOGY SERIES
Discharge: HOME | End: 2023-04-20
Attending: RADIOLOGY
Payer: MEDICARE

## 2023-04-20 VITALS
WEIGHT: 184 LBS | DIASTOLIC BLOOD PRESSURE: 64 MMHG | OXYGEN SATURATION: 97 % | BODY MASS INDEX: 30.62 KG/M2 | SYSTOLIC BLOOD PRESSURE: 117 MMHG | HEART RATE: 58 BPM | TEMPERATURE: 98.6 F

## 2023-04-20 DIAGNOSIS — C50.021 MALIGNANT NEOPLASM INVOLVING BOTH NIPPLE AND AREOLA OF RIGHT BREAST IN MALE, ESTROGEN RECEPTOR POSITIVE (CMS/HCC): Primary | ICD-10-CM

## 2023-04-20 DIAGNOSIS — Z17.0 MALIGNANT NEOPLASM INVOLVING BOTH NIPPLE AND AREOLA OF RIGHT BREAST IN MALE, ESTROGEN RECEPTOR POSITIVE (CMS/HCC): Primary | ICD-10-CM

## 2023-04-20 LAB
ARIA ZRC COURSE ID: NORMAL
ARIA ZRC COURSE INTENT: NORMAL
ARIA ZRC COURSE START DATE: NORMAL
ARIA ZRC TREATMENT ELAPSED DAYS: NORMAL
ARIA ZRP FRACTIONS TREATED TO DATE: NORMAL
ARIA ZRP PLAN ID: NORMAL
ARIA ZRP PRESCRIBED DOSE CGY: 5040
ARIA ZRP PRESCRIBED DOSE PER FRACTION: 1.8
ARIA ZRR DOSAGE GIVEN TO DATE: 10.8
ARIA ZRR DOSAGE GIVEN TO DATE: 10.8
ARIA ZRR DOSAGE GIVEN TO DATE: 10.96
ARIA ZRR REFERENCE POINT ID: NORMAL
ARIA ZRR SESSION DOSAGE GIVEN: 1.8
ARIA ZRR SESSION DOSAGE GIVEN: 1.8
ARIA ZRR SESSION DOSAGE GIVEN: 1.83
MLH ARIA ZRC TREATMENT DATES: NORMAL

## 2023-04-20 PROCEDURE — 77336 RADIATION PHYSICS CONSULT: CPT | Performed by: RADIOLOGY

## 2023-04-20 PROCEDURE — 77385 HC IMRT DELIVERY SIMPLE: CPT | Performed by: RADIOLOGY

## 2023-04-20 NOTE — PROGRESS NOTES
Patient ID:   Cam Rosas  1950  [id number]    Referring Physician:   No referring provider defined for this encounter.  Primary Care Provider:  Usman Collins MD     Problem List:  Patient Active Problem List    Diagnosis Date Noted   • Acute systolic heart failure (CMS/HCC) 02/15/2023   • Atrial fibrillation, unspecified type (CMS/HCC) 10/19/2022   • Malignant neoplasm of right male breast (CMS/HCC) 10/19/2022   • Electrolyte abnormality 10/19/2022   • Gastrointestinal hemorrhage with melena 10/19/2022   • CKD (chronic kidney disease) 10/19/2022   • Prostate cancer (CMS/HCC) 10/19/2022       Cancer Staging   Malignant neoplasm of right male breast (CMS/HCC)  Staging form: Breast, AJCC 8th Edition  - Clinical stage from 3/20/2023: Stage IA (cT1c, cN0, cM0, G3, ER+, AR+, HER2+) - Signed by Barb Osuna MD on 3/20/2023    Prostate cancer (CMS/HCC)  Staging form: Prostate, AJCC 8th Edition  - Pathologic stage from 7/27/2022: Stage Unknown (pT3b, pNX, cM0, PSA: 6, Grade Group: 3) - Signed by Ochsner, Gregory J, MD on 3/8/2023      TREATMENT PLAN SUMMARY:  Radiation Treatments     Active   Plans   1a_part pelv   Most recent treatment: Dose planned: 180 cGy (fraction 6 of 28 on 4/20/2023)   Total: Dose planned: 5,040 cGy   Elapsed Days: 7 days as of 2023-04-20 @ 14:2015      Reference Points   1_calc   Most recent treatment: Dose given: 183 cGy (on 4/20/2023)   Total: Dose given: 1,096 cGy   Elapsed Days: 7 days as of 2023-04-20 @ 14:2015      1a_part pelv   Most recent treatment: Dose given: 180 cGy (on 4/20/2023)   Total: Dose given: 1,080 cGy   Elapsed Days: 7 days as of 2023-04-20 @ 14:2015      1c_trgt prosbed   Most recent treatment: Dose given: 180 cGy (on 4/20/2023)   Total: Dose given: 1,080 cGy   Elapsed Days: 7 days as of 2023-04-20 @ 14:2015         Historical   Plans   1a_part pelv   Most recent treatment: Dose planned: 180 cGy (fraction 0 of 28 on 4/7/2023)   Total: Dose planned:  "5,040 cGy   Elapsed Days: : @ --      1a_part pelv1   Most recent treatment: Dose planned: 180 cGy (fraction 0 of 28 on 4/7/2023)   Total: Dose planned: 5,040 cGy   Elapsed Days: : @ --      1b_cd prosbd1   Most recent treatment: Dose planned: 180 cGy (fraction 0 of 11 on 4/7/2023)   Total: Dose planned: 1,980 cGy   Elapsed Days: : @ --      1b_cd prosbed   Most recent treatment: Dose planned: 180 cGy (fraction 0 of 11 on 4/7/2023)   Total: Dose planned: 1,980 cGy   Elapsed Days: : @ --      Reference Points   1_calc   Most recent treatment: Course completed on 4/7/2023   Total: Dose given: 0 cGy   Elapsed Days: : @ --      1a_part pelv   Most recent treatment: Course completed on 4/7/2023   Total: Dose given: 0 cGy   Elapsed Days: : @ --      1b_cd prosbed   Most recent treatment: Course completed on 4/7/2023   Total: Dose given: 0 cGy   Elapsed Days: : @ --      1c_trgt prosbed   Most recent treatment: Course completed on 4/7/2023   Total: Dose given: 0 cGy   Elapsed Days: : @ --                   Subjective    Interval Notes:  Presents with new imbalance.  According to patient's wife he fell at home and, nearly fell here.  He denies headaches or nausea or vomiting.  Is no longer on blood pressure medications.  Not on Flomax.  Vital Signs for this encounter:  BP: 117/64  Temp: 37 °C (98.6 °F)  Temp Source: Oral  Heart Rate: 58  Resp: 18  SpO2: 97 %  Height: 165.1 cm (5' 5\")  Weight: 83.5 kg (184 lb) (04/17 1506-04/20 1430)  Performance Status:  70, Cares for self; unable to carry on normal activity or to do active work.  PAIN ASSESSMENT:  Score    Location    Pain Intervention      TOXICITY ASSESSMENT:             Objective      Physical Exam:  Physical Exam gait imbalance noted.  Blood pressure stable no tilt noted.  Abdomen soft nontender.  Neurologically otherwise intact.    LABS:  CMP   Lab Results   Component Value Date    ALBUMIN 3.0 (L) 10/19/2022    CO2 25 02/23/2023    BUN 31 (H) 02/23/2023    CREATININE " 1.72 (H) 02/23/2023    GLUCOSE 85 02/23/2023    AST 40 10/19/2022    ALT 32 10/19/2022    EGFR 41 (L) 02/23/2023     CBC   Lab Results   Component Value Date    WBC 6.54 12/01/2022    HGB 10.8 (L) 12/01/2022    HCT 35.0 (L) 12/01/2022    .0 (H) 12/01/2022     12/01/2022     Tumor Marker No results found for: , , PSA, , CEA, HCGQUANT         Assessment/Plan History of right breast cancer and prostate cancer with new gait and balance.  Does not appear to be cardiac reasons possible brain metastasis.  Ordered stat brain scan CAT scan he refuses MRI scan.  Ordered stat CAT scan and blood work with CMP for creatinine level.  Patient wants to continue radiation therapy and he knows he must use a wheelchair when here at the Center.  Otherwise tolerating radiation therapy well.    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

## 2023-04-21 ENCOUNTER — HOSPITAL ENCOUNTER (OUTPATIENT)
Dept: RADIATION ONCOLOGY | Facility: HOSPITAL | Age: 73
Setting detail: RADIATION/ONCOLOGY SERIES
Discharge: HOME | End: 2023-04-21
Attending: RADIOLOGY
Payer: MEDICARE

## 2023-04-21 DIAGNOSIS — C61 PROSTATE CANCER (CMS/HCC): Primary | ICD-10-CM

## 2023-04-21 LAB
ARIA ZRC COURSE ID: NORMAL
ARIA ZRC COURSE INTENT: NORMAL
ARIA ZRC COURSE START DATE: NORMAL
ARIA ZRC TREATMENT ELAPSED DAYS: NORMAL
ARIA ZRP FRACTIONS TREATED TO DATE: NORMAL
ARIA ZRP PLAN ID: NORMAL
ARIA ZRP PRESCRIBED DOSE CGY: 5040
ARIA ZRP PRESCRIBED DOSE PER FRACTION: 1.8
ARIA ZRR DOSAGE GIVEN TO DATE: 12.6
ARIA ZRR DOSAGE GIVEN TO DATE: 12.6
ARIA ZRR DOSAGE GIVEN TO DATE: 12.79
ARIA ZRR REFERENCE POINT ID: NORMAL
ARIA ZRR SESSION DOSAGE GIVEN: 1.8
ARIA ZRR SESSION DOSAGE GIVEN: 1.8
ARIA ZRR SESSION DOSAGE GIVEN: 1.83
MLH ARIA ZRC TREATMENT DATES: NORMAL

## 2023-04-21 PROCEDURE — 77385 HC IMRT DELIVERY SIMPLE: CPT | Performed by: RADIOLOGY

## 2023-04-24 ENCOUNTER — HOSPITAL ENCOUNTER (OUTPATIENT)
Dept: RADIATION ONCOLOGY | Facility: HOSPITAL | Age: 73
Setting detail: RADIATION/ONCOLOGY SERIES
Discharge: HOME | End: 2023-04-24
Attending: RADIOLOGY
Payer: MEDICARE

## 2023-04-24 LAB
ARIA ZRC COURSE ID: NORMAL
ARIA ZRC COURSE INTENT: NORMAL
ARIA ZRC COURSE START DATE: NORMAL
ARIA ZRC TREATMENT ELAPSED DAYS: NORMAL
ARIA ZRP FRACTIONS TREATED TO DATE: NORMAL
ARIA ZRP PLAN ID: NORMAL
ARIA ZRP PRESCRIBED DOSE CGY: 5040
ARIA ZRP PRESCRIBED DOSE PER FRACTION: 1.8
ARIA ZRR DOSAGE GIVEN TO DATE: 14.4
ARIA ZRR DOSAGE GIVEN TO DATE: 14.4
ARIA ZRR DOSAGE GIVEN TO DATE: 14.62
ARIA ZRR REFERENCE POINT ID: NORMAL
ARIA ZRR SESSION DOSAGE GIVEN: 1.8
ARIA ZRR SESSION DOSAGE GIVEN: 1.8
ARIA ZRR SESSION DOSAGE GIVEN: 1.83
MLH ARIA ZRC TREATMENT DATES: NORMAL

## 2023-04-24 PROCEDURE — 77385 HC IMRT DELIVERY SIMPLE: CPT | Performed by: RADIOLOGY

## 2023-04-25 ENCOUNTER — RAD ONC OTV (OUTPATIENT)
Dept: RADIATION ONCOLOGY | Facility: HOSPITAL | Age: 73
End: 2023-04-25
Payer: MEDICARE

## 2023-04-25 ENCOUNTER — HOSPITAL ENCOUNTER (OUTPATIENT)
Dept: RADIATION ONCOLOGY | Facility: HOSPITAL | Age: 73
Setting detail: RADIATION/ONCOLOGY SERIES
Discharge: HOME | End: 2023-04-25
Attending: RADIOLOGY
Payer: MEDICARE

## 2023-04-25 VITALS
TEMPERATURE: 98 F | HEART RATE: 67 BPM | DIASTOLIC BLOOD PRESSURE: 79 MMHG | SYSTOLIC BLOOD PRESSURE: 164 MMHG | OXYGEN SATURATION: 100 % | BODY MASS INDEX: 30.55 KG/M2 | WEIGHT: 183.6 LBS

## 2023-04-25 DIAGNOSIS — C61 PROSTATE CANCER (CMS/HCC): Primary | ICD-10-CM

## 2023-04-25 LAB
ALBUMIN SERPL-MCNC: 3.5 G/DL (ref 3.6–5.1)
ALBUMIN/GLOB SERPL: 1.5 (CALC) (ref 1–2.5)
ALP SERPL-CCNC: 63 U/L (ref 35–144)
ALT SERPL-CCNC: 10 U/L (ref 9–46)
ARIA ZRC COURSE ID: NORMAL
ARIA ZRC COURSE INTENT: NORMAL
ARIA ZRC COURSE START DATE: NORMAL
ARIA ZRC TREATMENT ELAPSED DAYS: NORMAL
ARIA ZRP FRACTIONS TREATED TO DATE: NORMAL
ARIA ZRP PLAN ID: NORMAL
ARIA ZRP PRESCRIBED DOSE CGY: 5040
ARIA ZRP PRESCRIBED DOSE PER FRACTION: 1.8
ARIA ZRR DOSAGE GIVEN TO DATE: 16.2
ARIA ZRR DOSAGE GIVEN TO DATE: 16.2
ARIA ZRR DOSAGE GIVEN TO DATE: 16.45
ARIA ZRR REFERENCE POINT ID: NORMAL
ARIA ZRR SESSION DOSAGE GIVEN: 1.8
ARIA ZRR SESSION DOSAGE GIVEN: 1.8
ARIA ZRR SESSION DOSAGE GIVEN: 1.83
AST SERPL-CCNC: 16 U/L (ref 10–35)
BASOPHILS # BLD AUTO: 31 CELLS/UL (ref 0–200)
BASOPHILS NFR BLD AUTO: 0.7 %
BILIRUB SERPL-MCNC: 0.5 MG/DL (ref 0.2–1.2)
BUN SERPL-MCNC: 16 MG/DL (ref 7–25)
BUN/CREAT SERPL: 12 (CALC) (ref 6–22)
CALCIUM SERPL-MCNC: 8.5 MG/DL (ref 8.6–10.3)
CHLORIDE SERPL-SCNC: 110 MMOL/L (ref 98–110)
CO2 SERPL-SCNC: 23 MMOL/L (ref 20–32)
CREAT SERPL-MCNC: 1.32 MG/DL (ref 0.7–1.28)
EGFRCR SERPLBLD CKD-EPI 2021: 57 ML/MIN/1.73M2
EOSINOPHIL # BLD AUTO: 79 CELLS/UL (ref 15–500)
EOSINOPHIL NFR BLD AUTO: 1.8 %
ERYTHROCYTE [DISTWIDTH] IN BLOOD BY AUTOMATED COUNT: 14.5 % (ref 11–15)
GLOBULIN SER CALC-MCNC: 2.4 G/DL (CALC) (ref 1.9–3.7)
GLUCOSE SERPL-MCNC: 128 MG/DL (ref 65–99)
HCT VFR BLD AUTO: 37.1 % (ref 38.5–50)
HGB BLD-MCNC: 12.1 G/DL (ref 13.2–17.1)
LYMPHOCYTES # BLD AUTO: 902 CELLS/UL (ref 850–3900)
LYMPHOCYTES NFR BLD AUTO: 20.5 %
MCH RBC QN AUTO: 30.9 PG (ref 27–33)
MCHC RBC AUTO-ENTMCNC: 32.6 G/DL (ref 32–36)
MCV RBC AUTO: 94.6 FL (ref 80–100)
MLH ARIA ZRC TREATMENT DATES: NORMAL
MONOCYTES # BLD AUTO: 414 CELLS/UL (ref 200–950)
MONOCYTES NFR BLD AUTO: 9.4 %
NEUTROPHILS # BLD AUTO: 2974 CELLS/UL (ref 1500–7800)
NEUTROPHILS NFR BLD AUTO: 67.6 %
PLATELET # BLD AUTO: 153 THOUSAND/UL (ref 140–400)
PMV BLD REES-ECKER: 8.9 FL (ref 7.5–12.5)
POTASSIUM SERPL-SCNC: 3.9 MMOL/L (ref 3.5–5.3)
PROT SERPL-MCNC: 5.9 G/DL (ref 6.1–8.1)
PSA SERPL-MCNC: 0.11 NG/ML
RBC # BLD AUTO: 3.92 MILLION/UL (ref 4.2–5.8)
SODIUM SERPL-SCNC: 141 MMOL/L (ref 135–146)
WBC # BLD AUTO: 4.4 THOUSAND/UL (ref 3.8–10.8)

## 2023-04-25 PROCEDURE — 77385 HC IMRT DELIVERY SIMPLE: CPT | Performed by: RADIOLOGY

## 2023-04-25 ASSESSMENT — ENCOUNTER SYMPTOMS
RESPIRATORY NEGATIVE: 1
CARDIOVASCULAR NEGATIVE: 1
FATIGUE: 1
DIARRHEA: 1

## 2023-04-25 NOTE — LETTER
April 25, 2023                                                          Patient: Cam Rosas   YOB: 1950   Date of Visit: 4/25/2023       Dear Dr. Collins:    The patient is seen at the [facility] RADIATION THERAPY today. Attached is my assessment and plan of care.  Thank you for the opportunity to share in Cam Rosas's care.       Sincerely,        Gregory J. Ochsner, MD      CC: No Recipients

## 2023-04-25 NOTE — PROGRESS NOTES
Subjective      Patient ID: Cam Rosas is a 73 y.o. male.  1950   Diagnosis Plan   1. Prostate cancer (CMS/HCC)          Diagnosis:  No diagnosis found.    HPI patient presents continuing to note some lightheadedness.  Patient is scheduled for his head CT on Thursday.  Patient recently seen by cardiology and cleared heart failure is improved.  Does note fatigue but denies urinary or bowel issues.  Actually notes some loose stool but denies michelle diarrhea.  Is on laxatives in preparation for daily radiation therapy.  Recent PSA levels down to 0.11.  Patient is receiving hormone therapy per medical oncology.  He is receiving hormone therapy for prostate and breast cancer.    The following have been reviewed and updated as appropriate in this visit:        Review of Systems   Constitutional: Positive for fatigue. Unexpected weight change: weight loss 1 lbs.   Respiratory: Negative.    Cardiovascular: Negative.    Gastrointestinal: Positive for diarrhea.   Genitourinary: Negative.    Skin: Negative.        Objective     There were no vitals filed for this visit.  There is no height or weight on file to calculate BMI.    Physical Exam vital signs stable.  Abdomen soft nontender.  Patient sitting in wheelchair no apparent distress.  Neurologic exam unchanged.  Performance status 60.    Assessment/Plan Continue postop radiation therapy to prostate bed as previously outlined.  Patient scheduled for head CT Thursday to rule out brain metastases.  Reevaluate on Friday.  Continue follow-up with cardiology.  Diagnoses and Orders Associated With This Visit:  There are no diagnoses linked to this encounter.

## 2023-04-26 ENCOUNTER — HOSPITAL ENCOUNTER (OUTPATIENT)
Dept: RADIATION ONCOLOGY | Facility: HOSPITAL | Age: 73
Setting detail: RADIATION/ONCOLOGY SERIES
Discharge: HOME | End: 2023-04-26
Attending: RADIOLOGY
Payer: MEDICARE

## 2023-04-26 LAB
ARIA ZRC COURSE ID: NORMAL
ARIA ZRC COURSE INTENT: NORMAL
ARIA ZRC COURSE START DATE: NORMAL
ARIA ZRC TREATMENT ELAPSED DAYS: NORMAL
ARIA ZRP FRACTIONS TREATED TO DATE: NORMAL
ARIA ZRP PLAN ID: NORMAL
ARIA ZRP PRESCRIBED DOSE CGY: 5040
ARIA ZRP PRESCRIBED DOSE PER FRACTION: 1.8
ARIA ZRR DOSAGE GIVEN TO DATE: 18
ARIA ZRR DOSAGE GIVEN TO DATE: 18
ARIA ZRR DOSAGE GIVEN TO DATE: 18.27
ARIA ZRR REFERENCE POINT ID: NORMAL
ARIA ZRR SESSION DOSAGE GIVEN: 1.8
ARIA ZRR SESSION DOSAGE GIVEN: 1.8
ARIA ZRR SESSION DOSAGE GIVEN: 1.83
MLH ARIA ZRC TREATMENT DATES: NORMAL

## 2023-04-26 PROCEDURE — 77385 HC IMRT DELIVERY SIMPLE: CPT | Performed by: RADIOLOGY

## 2023-04-27 ENCOUNTER — HOSPITAL ENCOUNTER (OUTPATIENT)
Dept: RADIATION ONCOLOGY | Facility: HOSPITAL | Age: 73
Setting detail: RADIATION/ONCOLOGY SERIES
Discharge: HOME | End: 2023-04-27
Attending: RADIOLOGY
Payer: MEDICARE

## 2023-04-27 ENCOUNTER — HOSPITAL ENCOUNTER (OUTPATIENT)
Dept: RADIOLOGY | Facility: HOSPITAL | Age: 73
Discharge: HOME | End: 2023-04-27
Attending: RADIOLOGY
Payer: MEDICARE

## 2023-04-27 DIAGNOSIS — C50.021 MALIGNANT NEOPLASM INVOLVING BOTH NIPPLE AND AREOLA OF RIGHT BREAST IN MALE, ESTROGEN RECEPTOR POSITIVE (CMS/HCC): ICD-10-CM

## 2023-04-27 DIAGNOSIS — Z17.0 MALIGNANT NEOPLASM INVOLVING BOTH NIPPLE AND AREOLA OF RIGHT BREAST IN MALE, ESTROGEN RECEPTOR POSITIVE (CMS/HCC): ICD-10-CM

## 2023-04-27 LAB
ARIA ZRC COURSE ID: NORMAL
ARIA ZRC COURSE INTENT: NORMAL
ARIA ZRC COURSE START DATE: NORMAL
ARIA ZRC TREATMENT ELAPSED DAYS: NORMAL
ARIA ZRP FRACTIONS TREATED TO DATE: NORMAL
ARIA ZRP PLAN ID: NORMAL
ARIA ZRP PRESCRIBED DOSE CGY: 5040
ARIA ZRP PRESCRIBED DOSE PER FRACTION: 1.8
ARIA ZRR DOSAGE GIVEN TO DATE: 19.8
ARIA ZRR DOSAGE GIVEN TO DATE: 19.8
ARIA ZRR DOSAGE GIVEN TO DATE: 20.1
ARIA ZRR REFERENCE POINT ID: NORMAL
ARIA ZRR SESSION DOSAGE GIVEN: 1.8
ARIA ZRR SESSION DOSAGE GIVEN: 1.8
ARIA ZRR SESSION DOSAGE GIVEN: 1.83
MLH ARIA ZRC TREATMENT DATES: NORMAL

## 2023-04-27 PROCEDURE — 77385 HC IMRT DELIVERY SIMPLE: CPT | Performed by: RADIOLOGY

## 2023-04-27 PROCEDURE — G1004 CDSM NDSC: HCPCS

## 2023-04-27 PROCEDURE — 70470 CT HEAD/BRAIN W/O & W/DYE: CPT | Mod: ME

## 2023-04-27 PROCEDURE — 77336 RADIATION PHYSICS CONSULT: CPT | Performed by: RADIOLOGY

## 2023-04-27 PROCEDURE — 63600105 HC IODINE BASED CONTRAST: Performed by: RADIOLOGY

## 2023-04-27 RX ADMIN — IOHEXOL 100 ML: 350 INJECTION, SOLUTION INTRAVENOUS at 10:50

## 2023-04-28 ENCOUNTER — HOSPITAL ENCOUNTER (OUTPATIENT)
Dept: RADIATION ONCOLOGY | Facility: HOSPITAL | Age: 73
Setting detail: RADIATION/ONCOLOGY SERIES
Discharge: HOME | End: 2023-04-28
Attending: RADIOLOGY
Payer: MEDICARE

## 2023-04-28 LAB
ARIA ZRC COURSE ID: NORMAL
ARIA ZRC COURSE INTENT: NORMAL
ARIA ZRC COURSE START DATE: NORMAL
ARIA ZRC TREATMENT ELAPSED DAYS: NORMAL
ARIA ZRP FRACTIONS TREATED TO DATE: NORMAL
ARIA ZRP PLAN ID: NORMAL
ARIA ZRP PRESCRIBED DOSE CGY: 5040
ARIA ZRP PRESCRIBED DOSE PER FRACTION: 1.8
ARIA ZRR DOSAGE GIVEN TO DATE: 21.6
ARIA ZRR DOSAGE GIVEN TO DATE: 21.6
ARIA ZRR DOSAGE GIVEN TO DATE: 21.93
ARIA ZRR REFERENCE POINT ID: NORMAL
ARIA ZRR SESSION DOSAGE GIVEN: 1.8
ARIA ZRR SESSION DOSAGE GIVEN: 1.8
ARIA ZRR SESSION DOSAGE GIVEN: 1.83
MLH ARIA ZRC TREATMENT DATES: NORMAL

## 2023-04-28 PROCEDURE — 77385 HC IMRT DELIVERY SIMPLE: CPT | Performed by: RADIOLOGY

## 2023-05-01 ENCOUNTER — HOSPITAL ENCOUNTER (OUTPATIENT)
Dept: RADIATION ONCOLOGY | Facility: HOSPITAL | Age: 73
Setting detail: RADIATION/ONCOLOGY SERIES
Discharge: HOME | End: 2023-05-01
Attending: RADIOLOGY
Payer: MEDICARE

## 2023-05-01 LAB
ARIA ZRC COURSE ID: NORMAL
ARIA ZRC COURSE INTENT: NORMAL
ARIA ZRC COURSE START DATE: NORMAL
ARIA ZRC TREATMENT ELAPSED DAYS: NORMAL
ARIA ZRP FRACTIONS TREATED TO DATE: NORMAL
ARIA ZRP PLAN ID: NORMAL
ARIA ZRP PRESCRIBED DOSE CGY: 5040
ARIA ZRP PRESCRIBED DOSE PER FRACTION: 1.8
ARIA ZRR DOSAGE GIVEN TO DATE: 23.4
ARIA ZRR DOSAGE GIVEN TO DATE: 23.4
ARIA ZRR DOSAGE GIVEN TO DATE: 23.76
ARIA ZRR REFERENCE POINT ID: NORMAL
ARIA ZRR SESSION DOSAGE GIVEN: 1.8
ARIA ZRR SESSION DOSAGE GIVEN: 1.8
ARIA ZRR SESSION DOSAGE GIVEN: 1.83
MLH ARIA ZRC TREATMENT DATES: NORMAL

## 2023-05-01 PROCEDURE — 77385 HC IMRT DELIVERY SIMPLE: CPT | Performed by: RADIOLOGY

## 2023-05-02 ENCOUNTER — HOSPITAL ENCOUNTER (OUTPATIENT)
Dept: RADIATION ONCOLOGY | Facility: HOSPITAL | Age: 73
Setting detail: RADIATION/ONCOLOGY SERIES
Discharge: HOME | End: 2023-05-02
Attending: RADIOLOGY
Payer: MEDICARE

## 2023-05-02 ENCOUNTER — RAD ONC OTV (OUTPATIENT)
Dept: RADIATION ONCOLOGY | Facility: HOSPITAL | Age: 73
End: 2023-05-02
Payer: MEDICARE

## 2023-05-02 VITALS
HEART RATE: 55 BPM | TEMPERATURE: 98 F | BODY MASS INDEX: 30.69 KG/M2 | WEIGHT: 184.4 LBS | OXYGEN SATURATION: 100 % | DIASTOLIC BLOOD PRESSURE: 75 MMHG | SYSTOLIC BLOOD PRESSURE: 146 MMHG

## 2023-05-02 DIAGNOSIS — C61 PROSTATE CANCER (CMS/HCC): Primary | ICD-10-CM

## 2023-05-02 LAB
ARIA ZRC COURSE ID: NORMAL
ARIA ZRC COURSE INTENT: NORMAL
ARIA ZRC COURSE START DATE: NORMAL
ARIA ZRC TREATMENT ELAPSED DAYS: NORMAL
ARIA ZRP FRACTIONS TREATED TO DATE: NORMAL
ARIA ZRP PLAN ID: NORMAL
ARIA ZRP PRESCRIBED DOSE CGY: 5040
ARIA ZRP PRESCRIBED DOSE PER FRACTION: 1.8
ARIA ZRR DOSAGE GIVEN TO DATE: 25.2
ARIA ZRR DOSAGE GIVEN TO DATE: 25.2
ARIA ZRR DOSAGE GIVEN TO DATE: 25.58
ARIA ZRR REFERENCE POINT ID: NORMAL
ARIA ZRR SESSION DOSAGE GIVEN: 1.8
ARIA ZRR SESSION DOSAGE GIVEN: 1.8
ARIA ZRR SESSION DOSAGE GIVEN: 1.83
MLH ARIA ZRC TREATMENT DATES: NORMAL

## 2023-05-02 PROCEDURE — 77385 HC IMRT DELIVERY SIMPLE: CPT | Performed by: RADIOLOGY

## 2023-05-02 ASSESSMENT — ENCOUNTER SYMPTOMS
WOUND: 1
DIFFICULTY URINATING: 1
FATIGUE: 1
GASTROINTESTINAL NEGATIVE: 1

## 2023-05-02 ASSESSMENT — PAIN SCALES - GENERAL: PAINLEVEL: 0-NO PAIN

## 2023-05-02 NOTE — LETTER
May 2, 2023                                                          Patient: Cam Rosas   YOB: 1950   Date of Visit: 5/2/2023       Dear Dr. Collins:    The patient is seen at the [facility] RADI[facility] THERAPY today. Attached is my assessment and plan of care.  Thank you for the opportunity to share in Cam Rosas's care.       Sincerely,        Gregory J. Ochsner, MD      CC: No Recipients

## 2023-05-02 NOTE — PROGRESS NOTES
Subjective      Patient ID: Cam Rosas is a 73 y.o. male.  1950   Diagnosis Plan   1. Prostate cancer (CMS/HCC)          Diagnosis:  No diagnosis found.    HPI patient presents claiming resolution of lightheadedness and no more falls.  Stopped Flomax.  Denies leg swelling.  Notes mild difficulty urinating.  Head CT showed no evidence of metastatic disease.    The following have been reviewed and updated as appropriate in this visit:        Review of Systems   Constitutional: Positive for fatigue.   Gastrointestinal: Negative.    Genitourinary: Positive for difficulty urinating.   Skin: Positive for wound (back leg has a cut).       Objective     There were no vitals filed for this visit.  There is no height or weight on file to calculate BMI.    Physical Exam vital signs stable.  Abdomen soft nontender.  Performance status 80.  No leg swelling noted.  Right breast exam unchanged.    Assessment/Plan Continue definitive radiation therapy with hormone therapy for prostate cancer.  Patient continues hormone therapy for breast cancer.  Patient again warned about lightheadedness if he returns to let us know.  Patient encouraged to drink plenty of fluids.  Diagnoses and Orders Associated With This Visit:  There are no diagnoses linked to this encounter.

## 2023-05-03 ENCOUNTER — HOSPITAL ENCOUNTER (OUTPATIENT)
Dept: RADIATION ONCOLOGY | Facility: HOSPITAL | Age: 73
Setting detail: RADIATION/ONCOLOGY SERIES
Discharge: HOME | End: 2023-05-03
Attending: RADIOLOGY
Payer: MEDICARE

## 2023-05-03 LAB
ARIA ZRC COURSE ID: NORMAL
ARIA ZRC COURSE INTENT: NORMAL
ARIA ZRC COURSE START DATE: NORMAL
ARIA ZRC TREATMENT ELAPSED DAYS: NORMAL
ARIA ZRP FRACTIONS TREATED TO DATE: NORMAL
ARIA ZRP PLAN ID: NORMAL
ARIA ZRP PRESCRIBED DOSE CGY: 5040
ARIA ZRP PRESCRIBED DOSE PER FRACTION: 1.8
ARIA ZRR DOSAGE GIVEN TO DATE: 27
ARIA ZRR DOSAGE GIVEN TO DATE: 27
ARIA ZRR DOSAGE GIVEN TO DATE: 27.41
ARIA ZRR REFERENCE POINT ID: NORMAL
ARIA ZRR SESSION DOSAGE GIVEN: 1.8
ARIA ZRR SESSION DOSAGE GIVEN: 1.8
ARIA ZRR SESSION DOSAGE GIVEN: 1.83
MLH ARIA ZRC TREATMENT DATES: NORMAL

## 2023-05-03 PROCEDURE — 77385 HC IMRT DELIVERY SIMPLE: CPT | Performed by: RADIOLOGY

## 2023-05-04 ENCOUNTER — HOSPITAL ENCOUNTER (OUTPATIENT)
Dept: RADIATION ONCOLOGY | Facility: HOSPITAL | Age: 73
Setting detail: RADIATION/ONCOLOGY SERIES
Discharge: HOME | End: 2023-05-04
Attending: RADIOLOGY
Payer: MEDICARE

## 2023-05-04 LAB
ARIA ZRC COURSE ID: NORMAL
ARIA ZRC COURSE INTENT: NORMAL
ARIA ZRC COURSE START DATE: NORMAL
ARIA ZRC TREATMENT ELAPSED DAYS: NORMAL
ARIA ZRP FRACTIONS TREATED TO DATE: NORMAL
ARIA ZRP PLAN ID: NORMAL
ARIA ZRP PRESCRIBED DOSE CGY: 5040
ARIA ZRP PRESCRIBED DOSE PER FRACTION: 1.8
ARIA ZRR DOSAGE GIVEN TO DATE: 28.8
ARIA ZRR DOSAGE GIVEN TO DATE: 28.8
ARIA ZRR DOSAGE GIVEN TO DATE: 29.24
ARIA ZRR REFERENCE POINT ID: NORMAL
ARIA ZRR SESSION DOSAGE GIVEN: 1.8
ARIA ZRR SESSION DOSAGE GIVEN: 1.8
ARIA ZRR SESSION DOSAGE GIVEN: 1.83
MLH ARIA ZRC TREATMENT DATES: NORMAL

## 2023-05-04 PROCEDURE — 77336 RADIATION PHYSICS CONSULT: CPT | Performed by: RADIOLOGY

## 2023-05-04 PROCEDURE — 77385 HC IMRT DELIVERY SIMPLE: CPT | Performed by: RADIOLOGY

## 2023-05-05 ENCOUNTER — HOSPITAL ENCOUNTER (OUTPATIENT)
Dept: RADIATION ONCOLOGY | Facility: HOSPITAL | Age: 73
Setting detail: RADIATION/ONCOLOGY SERIES
Discharge: HOME | End: 2023-05-05
Attending: RADIOLOGY
Payer: MEDICARE

## 2023-05-05 LAB
ARIA ZRC COURSE ID: NORMAL
ARIA ZRC COURSE INTENT: NORMAL
ARIA ZRC COURSE START DATE: NORMAL
ARIA ZRC TREATMENT ELAPSED DAYS: NORMAL
ARIA ZRP FRACTIONS TREATED TO DATE: NORMAL
ARIA ZRP PLAN ID: NORMAL
ARIA ZRP PRESCRIBED DOSE CGY: 5040
ARIA ZRP PRESCRIBED DOSE PER FRACTION: 1.8
ARIA ZRR DOSAGE GIVEN TO DATE: 30.6
ARIA ZRR DOSAGE GIVEN TO DATE: 30.6
ARIA ZRR DOSAGE GIVEN TO DATE: 31.07
ARIA ZRR REFERENCE POINT ID: NORMAL
ARIA ZRR SESSION DOSAGE GIVEN: 1.8
ARIA ZRR SESSION DOSAGE GIVEN: 1.8
ARIA ZRR SESSION DOSAGE GIVEN: 1.83
MLH ARIA ZRC TREATMENT DATES: NORMAL

## 2023-05-05 PROCEDURE — 77385 HC IMRT DELIVERY SIMPLE: CPT | Performed by: RADIOLOGY

## 2023-05-08 ENCOUNTER — HOSPITAL ENCOUNTER (OUTPATIENT)
Dept: RADIATION ONCOLOGY | Facility: HOSPITAL | Age: 73
Setting detail: RADIATION/ONCOLOGY SERIES
Discharge: HOME | End: 2023-05-08
Attending: RADIOLOGY
Payer: MEDICARE

## 2023-05-08 LAB
ARIA ZRC COURSE ID: NORMAL
ARIA ZRC COURSE INTENT: NORMAL
ARIA ZRC COURSE START DATE: NORMAL
ARIA ZRC TREATMENT ELAPSED DAYS: NORMAL
ARIA ZRP FRACTIONS TREATED TO DATE: NORMAL
ARIA ZRP PLAN ID: NORMAL
ARIA ZRP PRESCRIBED DOSE CGY: 5040
ARIA ZRP PRESCRIBED DOSE PER FRACTION: 1.8
ARIA ZRR DOSAGE GIVEN TO DATE: 32.4
ARIA ZRR DOSAGE GIVEN TO DATE: 32.4
ARIA ZRR DOSAGE GIVEN TO DATE: 32.89
ARIA ZRR REFERENCE POINT ID: NORMAL
ARIA ZRR SESSION DOSAGE GIVEN: 1.8
ARIA ZRR SESSION DOSAGE GIVEN: 1.8
ARIA ZRR SESSION DOSAGE GIVEN: 1.83
MLH ARIA ZRC TREATMENT DATES: NORMAL

## 2023-05-08 PROCEDURE — 77385 HC IMRT DELIVERY SIMPLE: CPT | Performed by: RADIOLOGY

## 2023-05-09 ENCOUNTER — HOSPITAL ENCOUNTER (OUTPATIENT)
Dept: RADIATION ONCOLOGY | Facility: HOSPITAL | Age: 73
Setting detail: RADIATION/ONCOLOGY SERIES
Discharge: HOME | End: 2023-05-09
Attending: RADIOLOGY
Payer: MEDICARE

## 2023-05-09 ENCOUNTER — RAD ONC OTV (OUTPATIENT)
Dept: RADIATION ONCOLOGY | Facility: HOSPITAL | Age: 73
End: 2023-05-09
Payer: MEDICARE

## 2023-05-09 VITALS
BODY MASS INDEX: 30.95 KG/M2 | HEART RATE: 61 BPM | SYSTOLIC BLOOD PRESSURE: 149 MMHG | OXYGEN SATURATION: 99 % | TEMPERATURE: 98 F | DIASTOLIC BLOOD PRESSURE: 75 MMHG | WEIGHT: 186 LBS

## 2023-05-09 DIAGNOSIS — C61 PROSTATE CANCER (CMS/HCC): Primary | ICD-10-CM

## 2023-05-09 LAB
ARIA ZRC COURSE ID: NORMAL
ARIA ZRC COURSE INTENT: NORMAL
ARIA ZRC COURSE START DATE: NORMAL
ARIA ZRC TREATMENT ELAPSED DAYS: NORMAL
ARIA ZRP FRACTIONS TREATED TO DATE: NORMAL
ARIA ZRP PLAN ID: NORMAL
ARIA ZRP PRESCRIBED DOSE CGY: 5040
ARIA ZRP PRESCRIBED DOSE PER FRACTION: 1.8
ARIA ZRR DOSAGE GIVEN TO DATE: 34.2
ARIA ZRR DOSAGE GIVEN TO DATE: 34.2
ARIA ZRR DOSAGE GIVEN TO DATE: 34.72
ARIA ZRR REFERENCE POINT ID: NORMAL
ARIA ZRR SESSION DOSAGE GIVEN: 1.8
ARIA ZRR SESSION DOSAGE GIVEN: 1.8
ARIA ZRR SESSION DOSAGE GIVEN: 1.83
MLH ARIA ZRC TREATMENT DATES: NORMAL

## 2023-05-09 PROCEDURE — 77338 DESIGN MLC DEVICE FOR IMRT: CPT | Mod: 59 | Performed by: RADIOLOGY

## 2023-05-09 PROCEDURE — 77385 HC IMRT DELIVERY SIMPLE: CPT | Performed by: RADIOLOGY

## 2023-05-09 PROCEDURE — 77300 RADIATION THERAPY DOSE PLAN: CPT | Performed by: RADIOLOGY

## 2023-05-09 ASSESSMENT — ENCOUNTER SYMPTOMS
DIFFICULTY URINATING: 1
FATIGUE: 1
DIARRHEA: 1

## 2023-05-09 ASSESSMENT — PAIN SCALES - GENERAL: PAINLEVEL: 0-NO PAIN

## 2023-05-09 NOTE — LETTER
May 9, 2023                                                          Patient: Cam Rosas   YOB: 1950   Date of Visit: 5/9/2023       Dear Dr. Collins:    The patient is seen at the [facility] RADIATION THERAPY today. Attached is my assessment and plan of care.  Thank you for the opportunity to share in Cam Rosas's care.       Sincerely,        Gregory J. Ochsner, MD      CC: No Recipients

## 2023-05-09 NOTE — PROGRESS NOTES
Subjective      Patient ID: Cam Rosas is a 73 y.o. male.  1950   Diagnosis Plan   1. Prostate cancer (CMS/[facility])          Diagnosis:  No diagnosis found.    HPI patient presents with wife claiming improved urination more sensitive to the need for urination.  Continued urinary incontinence unchanged.  Otherwise denies urinary or bowel issues.  Continues tamoxifen for breast cancer.  Recently discontinued Lasix.  Better off Lasix.    The following have been reviewed and updated as appropriate in this visit:        Review of Systems   Constitutional: Positive for fatigue.   Gastrointestinal: Positive for diarrhea (sennokot).   Genitourinary: Positive for difficulty urinating (improving- starting to be able to urinate).       Objective     There were no vitals filed for this visit.  There is no height or weight on file to calculate BMI.    Physical Exam vital signs stable.  Strong regular pulse at 60.  Bruising noted to right flank area old apparently.  Abdomen soft nontender.  Neurologically grossly intact.  Performance status 70.    Assessment/Plan History of prostate cancer status post surgery now undergoing salvage radiation therapy with hormone therapy.  Patient admits to recent fall at home and tripped on his rug.  Said he moved too fast.  He refuses to get in wheelchair again and refused to go to emergency room.  Patient is doing better off Lasix and knows about hypotension postural.  Continue radiation therapy as previously outlined.  I recommend no more than 6 months of hormone therapy.  Continue follow-up with cardiology.  Continue good p.o. fluid intake.  Patient told to go to emergency room if further problems.  Diagnoses and Orders Associated With This Visit:  There are no diagnoses linked to this encounter.

## 2023-05-10 ENCOUNTER — HOSPITAL ENCOUNTER (OUTPATIENT)
Dept: RADIATION ONCOLOGY | Facility: HOSPITAL | Age: 73
Setting detail: RADIATION/ONCOLOGY SERIES
Discharge: HOME | End: 2023-05-10
Attending: RADIOLOGY
Payer: MEDICARE

## 2023-05-10 LAB
ARIA ZRC COURSE ID: NORMAL
ARIA ZRC COURSE INTENT: NORMAL
ARIA ZRC COURSE START DATE: NORMAL
ARIA ZRC TREATMENT ELAPSED DAYS: NORMAL
ARIA ZRP FRACTIONS TREATED TO DATE: NORMAL
ARIA ZRP PLAN ID: NORMAL
ARIA ZRP PRESCRIBED DOSE CGY: 5040
ARIA ZRP PRESCRIBED DOSE PER FRACTION: 1.8
ARIA ZRR DOSAGE GIVEN TO DATE: 36
ARIA ZRR DOSAGE GIVEN TO DATE: 36
ARIA ZRR DOSAGE GIVEN TO DATE: 36.55
ARIA ZRR REFERENCE POINT ID: NORMAL
ARIA ZRR SESSION DOSAGE GIVEN: 1.8
ARIA ZRR SESSION DOSAGE GIVEN: 1.8
ARIA ZRR SESSION DOSAGE GIVEN: 1.83
MLH ARIA ZRC TREATMENT DATES: NORMAL

## 2023-05-10 PROCEDURE — 77385 HC IMRT DELIVERY SIMPLE: CPT | Performed by: RADIOLOGY

## 2023-05-11 ENCOUNTER — HOSPITAL ENCOUNTER (OUTPATIENT)
Dept: RADIATION ONCOLOGY | Facility: HOSPITAL | Age: 73
Setting detail: RADIATION/ONCOLOGY SERIES
Discharge: HOME | End: 2023-05-11
Attending: RADIOLOGY
Payer: MEDICARE

## 2023-05-11 LAB
ARIA ZRC COURSE ID: NORMAL
ARIA ZRC COURSE INTENT: NORMAL
ARIA ZRC COURSE START DATE: NORMAL
ARIA ZRC TREATMENT ELAPSED DAYS: NORMAL
ARIA ZRP FRACTIONS TREATED TO DATE: NORMAL
ARIA ZRP PLAN ID: NORMAL
ARIA ZRP PRESCRIBED DOSE CGY: 5040
ARIA ZRP PRESCRIBED DOSE PER FRACTION: 1.8
ARIA ZRR DOSAGE GIVEN TO DATE: 37.8
ARIA ZRR DOSAGE GIVEN TO DATE: 37.8
ARIA ZRR DOSAGE GIVEN TO DATE: 38.38
ARIA ZRR REFERENCE POINT ID: NORMAL
ARIA ZRR SESSION DOSAGE GIVEN: 1.8
ARIA ZRR SESSION DOSAGE GIVEN: 1.8
ARIA ZRR SESSION DOSAGE GIVEN: 1.83
MLH ARIA ZRC TREATMENT DATES: NORMAL

## 2023-05-11 PROCEDURE — 77336 RADIATION PHYSICS CONSULT: CPT | Performed by: RADIOLOGY

## 2023-05-11 PROCEDURE — 77385 HC IMRT DELIVERY SIMPLE: CPT | Performed by: RADIOLOGY

## 2023-05-12 ENCOUNTER — HOSPITAL ENCOUNTER (OUTPATIENT)
Dept: RADIATION ONCOLOGY | Facility: HOSPITAL | Age: 73
Setting detail: RADIATION/ONCOLOGY SERIES
Discharge: HOME | End: 2023-05-12
Attending: RADIOLOGY
Payer: MEDICARE

## 2023-05-12 DIAGNOSIS — I48.91 ATRIAL FIBRILLATION, UNSPECIFIED TYPE (CMS/HCC): ICD-10-CM

## 2023-05-12 LAB
ARIA ZRC COURSE ID: NORMAL
ARIA ZRC COURSE INTENT: NORMAL
ARIA ZRC COURSE START DATE: NORMAL
ARIA ZRC TREATMENT ELAPSED DAYS: NORMAL
ARIA ZRP FRACTIONS TREATED TO DATE: NORMAL
ARIA ZRP PLAN ID: NORMAL
ARIA ZRP PRESCRIBED DOSE CGY: 5040
ARIA ZRP PRESCRIBED DOSE PER FRACTION: 1.8
ARIA ZRR DOSAGE GIVEN TO DATE: 39.6
ARIA ZRR DOSAGE GIVEN TO DATE: 39.6
ARIA ZRR DOSAGE GIVEN TO DATE: 40.2
ARIA ZRR REFERENCE POINT ID: NORMAL
ARIA ZRR SESSION DOSAGE GIVEN: 1.8
ARIA ZRR SESSION DOSAGE GIVEN: 1.8
ARIA ZRR SESSION DOSAGE GIVEN: 1.83
MLH ARIA ZRC TREATMENT DATES: NORMAL

## 2023-05-12 PROCEDURE — 77385 HC IMRT DELIVERY SIMPLE: CPT | Performed by: RADIOLOGY

## 2023-05-12 RX ORDER — DILTIAZEM HYDROCHLORIDE 120 MG/1
120 CAPSULE, COATED, EXTENDED RELEASE ORAL DAILY
Qty: 270 CAPSULE | Refills: 3 | Status: SHIPPED | OUTPATIENT
Start: 2023-05-12 | End: 2023-09-28

## 2023-05-12 NOTE — TELEPHONE ENCOUNTER
Medicine Refill Request [facility]    Name of Patient: Cam Rosas    Caller's name/Relationship: spouse    Callback number: [phone]    Medication Name, Dosage, Supply: dilTIAZem CD (CARDIZEM CD) 120 mg 24 hr capsule    Quantity left: 1 weeks  Would like a call back    Pharmacy: [facility]    Last Office Visit: 3/9/23  Last Consult Visit: Visit date not found  Last Telemedicine Visit: Visit date not found    Next Appointment: Visit date not found      Current Outpatient Medications:   •  acetaminophen (TYLENOL EX STR RAPID RELEASE ORAL), Take 500 mg by mouth as needed., Disp: , Rfl:   •  amiodarone (PACERONE) 200 mg tablet, Take 1 tablet (200 mg total) by mouth daily., Disp: 90 tablet, Rfl: 3  •  cholecalciferol, vitamin D3, 1,000 unit (25 mcg) tablet, Take 1,000 Units by mouth daily., Disp: , Rfl:   •  dilTIAZem CD (CARDIZEM CD) 120 mg 24 hr capsule, Take 1 capsule (120 mg total) by mouth daily., Disp: 270 capsule, Rfl: 3  •  lidocaine-prilocaine (EMLA) cream, Apply 1 application topically as needed for pain., Disp: , Rfl:   •  metoprolol succinate XL (TOPROL-XL) 50 mg 24 hr tablet, Take 1 tablet (50 mg total) by mouth daily., Disp: , Rfl:   •  pantoprazole (PROTONIX) 40 mg EC tablet, Take 1 tablet (40 mg total) by mouth 2 (two) times a day., Disp: 180 tablet, Rfl: 3  •  potassium chloride (KLOR-CON) 10 mEq CR tablet, Take 1 tablet (10 mEq total) by mouth daily., Disp: 180 tablet, Rfl: 3  •  pumpkin seed extract-soy germ (Azo Bladder ControL) 300 mg capsule, Take by mouth daily., Disp: , Rfl:   •  rivaroxaban (XARELTO) 15 mg tablet, Take 1 tablet (15 mg total) by mouth daily with dinner Indications: treatment to prevent blood clots in chronic atrial fibrillation., Disp: 90 tablet, Rfl: 1  •  senna (SENOKOT) 8.6 mg tablet, Take 2 tablets by mouth daily., Disp: , Rfl:   •  silver sulfadiazine (SILVADENE) 1 % cream, Apply topically daily., Disp: 20 g, Rfl: 0  •  simethicone (GAS-X ORAL), Take by mouth 2 (two) times a day.  Before treatment, Disp: , Rfl:   •  simvastatin (ZOCOR) 20 mg tablet, Take 1 tablet (20 mg total) by mouth nightly., Disp: 90 tablet, Rfl: 3  •  tamoxifen (NOLVADEX) 20 mg chemo tablet, Take  1 tablet (20 mg total) daily, Disp: 90 tablet, Rfl: 1  •  torsemide (DEMADEX) 20 mg tablet, Take 0.5 tablets (10 mg total) by mouth daily. With additional 1/2 tablet for AM standing weight over 185 lbs (Patient not taking: Reported on 4/18/2023), Disp: 90 tablet, Rfl: 3      BP Readings from Last 3 Encounters:   05/09/23 (!) 149/75   05/02/23 (!) 146/75   04/25/23 (!) 164/79       Recent Lab results:  No results found for: CHOL, No results found for: HDL, No results found for: LDLCALC, No results found for: TRIG     Lab Results   Component Value Date    GLUCOSE 128 (H) 04/24/2023   , No results found for: HGBA1C      Lab Results   Component Value Date    CREATININE 1.32 (H) 04/24/2023       No results found for: TSH

## 2023-05-15 ENCOUNTER — HOSPITAL ENCOUNTER (OUTPATIENT)
Dept: RADIATION ONCOLOGY | Facility: HOSPITAL | Age: 73
Setting detail: RADIATION/ONCOLOGY SERIES
Discharge: HOME | End: 2023-05-15
Attending: RADIOLOGY
Payer: MEDICARE

## 2023-05-15 LAB
ARIA ZRC COURSE ID: NORMAL
ARIA ZRC COURSE INTENT: NORMAL
ARIA ZRC COURSE START DATE: NORMAL
ARIA ZRC TREATMENT ELAPSED DAYS: NORMAL
ARIA ZRP FRACTIONS TREATED TO DATE: NORMAL
ARIA ZRP PLAN ID: NORMAL
ARIA ZRP PRESCRIBED DOSE CGY: 5040
ARIA ZRP PRESCRIBED DOSE PER FRACTION: 1.8
ARIA ZRR DOSAGE GIVEN TO DATE: 41.4
ARIA ZRR DOSAGE GIVEN TO DATE: 41.4
ARIA ZRR DOSAGE GIVEN TO DATE: 42.03
ARIA ZRR REFERENCE POINT ID: NORMAL
ARIA ZRR SESSION DOSAGE GIVEN: 1.8
ARIA ZRR SESSION DOSAGE GIVEN: 1.8
ARIA ZRR SESSION DOSAGE GIVEN: 1.83
MLH ARIA ZRC TREATMENT DATES: NORMAL

## 2023-05-15 PROCEDURE — 77385 HC IMRT DELIVERY SIMPLE: CPT | Performed by: RADIOLOGY

## 2023-05-16 ENCOUNTER — RAD ONC OTV (OUTPATIENT)
Dept: RADIATION ONCOLOGY | Facility: HOSPITAL | Age: 73
End: 2023-05-16
Payer: MEDICARE

## 2023-05-16 ENCOUNTER — HOSPITAL ENCOUNTER (OUTPATIENT)
Dept: RADIATION ONCOLOGY | Facility: HOSPITAL | Age: 73
Setting detail: RADIATION/ONCOLOGY SERIES
Discharge: HOME | End: 2023-05-16
Attending: RADIOLOGY
Payer: MEDICARE

## 2023-05-16 VITALS
WEIGHT: 187 LBS | DIASTOLIC BLOOD PRESSURE: 70 MMHG | BODY MASS INDEX: 31.12 KG/M2 | SYSTOLIC BLOOD PRESSURE: 141 MMHG | TEMPERATURE: 97.5 F | OXYGEN SATURATION: 98 % | HEART RATE: 64 BPM

## 2023-05-16 DIAGNOSIS — C61 PROSTATE CANCER (CMS/HCC): Primary | ICD-10-CM

## 2023-05-16 LAB
ARIA ZRC COURSE ID: NORMAL
ARIA ZRC COURSE INTENT: NORMAL
ARIA ZRC COURSE START DATE: NORMAL
ARIA ZRC TREATMENT ELAPSED DAYS: NORMAL
ARIA ZRP FRACTIONS TREATED TO DATE: NORMAL
ARIA ZRP PLAN ID: NORMAL
ARIA ZRP PRESCRIBED DOSE CGY: 5040
ARIA ZRP PRESCRIBED DOSE PER FRACTION: 1.8
ARIA ZRR DOSAGE GIVEN TO DATE: 43.2
ARIA ZRR DOSAGE GIVEN TO DATE: 43.2
ARIA ZRR DOSAGE GIVEN TO DATE: 43.86
ARIA ZRR REFERENCE POINT ID: NORMAL
ARIA ZRR SESSION DOSAGE GIVEN: 1.8
ARIA ZRR SESSION DOSAGE GIVEN: 1.8
ARIA ZRR SESSION DOSAGE GIVEN: 1.83
MLH ARIA ZRC TREATMENT DATES: NORMAL

## 2023-05-16 PROCEDURE — 77385 HC IMRT DELIVERY SIMPLE: CPT | Performed by: RADIOLOGY

## 2023-05-16 ASSESSMENT — ENCOUNTER SYMPTOMS
SHORTNESS OF BREATH: 1
WHEEZING: 1
COUGH: 1
GASTROINTESTINAL NEGATIVE: 1
FATIGUE: 1
WEAKNESS: 1

## 2023-05-16 ASSESSMENT — PAIN SCALES - GENERAL: PAINLEVEL: 0-NO PAIN

## 2023-05-16 NOTE — PROGRESS NOTES
Pt could not lay down last night he was SOB and weak, gasping for air. Needed help going to the bathroom.  Pt has allergies.     Subjective      Patient ID: Cam Rosas is a 73 y.o. male.  1950   Diagnosis Plan   1. Prostate cancer (CMS/[facility])          Diagnosis:  No diagnosis found.    HPI presents with wife taking advantage of using wheelchair when here receiving treatment complaining of some shortness of breath last night.  Notes congestion takes Zyrtec.  Complains of dry cough.  No further falls taking it easy.  Being very extra careful.  Drinking and eating well.  Complain of fatigue.    The following have been reviewed and updated as appropriate in this visit:        Review of Systems   Constitutional: Positive for fatigue.   Respiratory: Positive for cough (could not clear throat/ choking on mucous no blood but very hard to clear  ), shortness of breath (difficulty breathing lastnight) and wheezing.    Cardiovascular: Positive for leg swelling (new onset).   Gastrointestinal: Negative.    Genitourinary: Negative.         Urination improved   Neurological: Positive for weakness.       Objective     There were no vitals filed for this visit.  There is no height or weight on file to calculate BMI.    Physical Exam vital signs stable.  Pulse regular at 60.  Lungs clear to auscultation.  Pulse ox 90% on room air.  Patient sitting in wheelchair in no apparent distress.    Assessment/Plan Patient with history of prostate cancer undergoing postop salvage radiation therapy with hormone therapy also on tamoxifen for right breast cancer.  Since starting hormones for his prostate cancer he is noted weakness and generalized and has had several falls.  Cardiology has apparently cleared him.  No evidence of being in atrial fibrillation currently.  I believe some of his symptoms are likely related to lack of testosterone from hormone shots.  Therefore I told patient no further hormone shots he had a 3-month shot which  should be sufficient.  I feel the risks outweigh the benefits for further hormone shots for patient for prostate cancer.  Patient to discuss with Dr. Cleveland his medical oncologist when he sees him next month.  Continue tamoxifen for breast cancer.  Continue radiation therapy postop radiation therapy to the prostate bed as previously outlined.  Patient encouraged to continue Zyrtec for likely seasonal allergies along with taking Mucinex for cough.  Encouraged increased fluid intake and nutritional intake.  Diagnoses and Orders Associated With This Visit:  There are no diagnoses linked to this encounter.

## 2023-05-16 NOTE — LETTER
May 16, 2023                                                          Patient: Cam Rosas   YOB: 1950   Date of Visit: 5/16/2023       Dear Dr. Collins:    The patient is seen at the [facility] RADIATION [facility]ER[facility]Y today. Attached is my assessment and plan of care.  Thank you for the opportunity to share in Cam Rosas's care.       Sincerely,        Gregory J. Ochsner, MD      CC: No Recipients

## 2023-05-17 ENCOUNTER — HOSPITAL ENCOUNTER (OUTPATIENT)
Dept: RADIATION ONCOLOGY | Facility: HOSPITAL | Age: 73
Setting detail: RADIATION/ONCOLOGY SERIES
Discharge: HOME | End: 2023-05-17
Attending: RADIOLOGY
Payer: MEDICARE

## 2023-05-17 LAB
ARIA ZRC COURSE ID: NORMAL
ARIA ZRC COURSE INTENT: NORMAL
ARIA ZRC COURSE START DATE: NORMAL
ARIA ZRC TREATMENT ELAPSED DAYS: NORMAL
ARIA ZRP FRACTIONS TREATED TO DATE: NORMAL
ARIA ZRP PLAN ID: NORMAL
ARIA ZRP PRESCRIBED DOSE CGY: 5040
ARIA ZRP PRESCRIBED DOSE PER FRACTION: 1.8
ARIA ZRR DOSAGE GIVEN TO DATE: 45
ARIA ZRR DOSAGE GIVEN TO DATE: 45
ARIA ZRR DOSAGE GIVEN TO DATE: 45.69
ARIA ZRR REFERENCE POINT ID: NORMAL
ARIA ZRR SESSION DOSAGE GIVEN: 1.8
ARIA ZRR SESSION DOSAGE GIVEN: 1.8
ARIA ZRR SESSION DOSAGE GIVEN: 1.83
MLH ARIA ZRC TREATMENT DATES: NORMAL

## 2023-05-17 PROCEDURE — 77385 HC IMRT DELIVERY SIMPLE: CPT | Performed by: RADIOLOGY

## 2023-05-18 ENCOUNTER — HOSPITAL ENCOUNTER (OUTPATIENT)
Dept: RADIATION ONCOLOGY | Facility: HOSPITAL | Age: 73
Setting detail: RADIATION/ONCOLOGY SERIES
Discharge: HOME | End: 2023-05-18
Attending: RADIOLOGY
Payer: MEDICARE

## 2023-05-18 LAB
ARIA ZRC COURSE ID: NORMAL
ARIA ZRC COURSE INTENT: NORMAL
ARIA ZRC COURSE START DATE: NORMAL
ARIA ZRC TREATMENT ELAPSED DAYS: NORMAL
ARIA ZRP FRACTIONS TREATED TO DATE: NORMAL
ARIA ZRP PLAN ID: NORMAL
ARIA ZRP PRESCRIBED DOSE CGY: 5040
ARIA ZRP PRESCRIBED DOSE PER FRACTION: 1.8
ARIA ZRR DOSAGE GIVEN TO DATE: 46.8
ARIA ZRR DOSAGE GIVEN TO DATE: 46.8
ARIA ZRR DOSAGE GIVEN TO DATE: 47.51
ARIA ZRR REFERENCE POINT ID: NORMAL
ARIA ZRR SESSION DOSAGE GIVEN: 1.8
ARIA ZRR SESSION DOSAGE GIVEN: 1.8
ARIA ZRR SESSION DOSAGE GIVEN: 1.83
MLH ARIA ZRC TREATMENT DATES: NORMAL

## 2023-05-18 PROCEDURE — 77385 HC IMRT DELIVERY SIMPLE: CPT | Performed by: RADIOLOGY

## 2023-05-18 PROCEDURE — 77336 RADIATION PHYSICS CONSULT: CPT | Performed by: RADIOLOGY

## 2023-05-19 ENCOUNTER — HOSPITAL ENCOUNTER (OUTPATIENT)
Dept: RADIATION ONCOLOGY | Facility: HOSPITAL | Age: 73
Setting detail: RADIATION/ONCOLOGY SERIES
Discharge: HOME | End: 2023-05-19
Attending: RADIOLOGY
Payer: MEDICARE

## 2023-05-19 LAB
ARIA ZRC COURSE ID: NORMAL
ARIA ZRC COURSE INTENT: NORMAL
ARIA ZRC COURSE START DATE: NORMAL
ARIA ZRC TREATMENT ELAPSED DAYS: NORMAL
ARIA ZRP FRACTIONS TREATED TO DATE: NORMAL
ARIA ZRP PLAN ID: NORMAL
ARIA ZRP PRESCRIBED DOSE CGY: 5040
ARIA ZRP PRESCRIBED DOSE PER FRACTION: 1.8
ARIA ZRR DOSAGE GIVEN TO DATE: 48.6
ARIA ZRR DOSAGE GIVEN TO DATE: 48.6
ARIA ZRR DOSAGE GIVEN TO DATE: 49.34
ARIA ZRR REFERENCE POINT ID: NORMAL
ARIA ZRR SESSION DOSAGE GIVEN: 1.8
ARIA ZRR SESSION DOSAGE GIVEN: 1.8
ARIA ZRR SESSION DOSAGE GIVEN: 1.83
MLH ARIA ZRC TREATMENT DATES: NORMAL

## 2023-05-19 PROCEDURE — 77385 HC IMRT DELIVERY SIMPLE: CPT | Performed by: RADIOLOGY

## 2023-05-22 ENCOUNTER — HOSPITAL ENCOUNTER (OUTPATIENT)
Dept: RADIATION ONCOLOGY | Facility: HOSPITAL | Age: 73
Setting detail: RADIATION/ONCOLOGY SERIES
Discharge: HOME | End: 2023-05-22
Attending: RADIOLOGY
Payer: MEDICARE

## 2023-05-22 LAB
ARIA ZRC COURSE ID: NORMAL
ARIA ZRC COURSE INTENT: NORMAL
ARIA ZRC COURSE START DATE: NORMAL
ARIA ZRC TREATMENT ELAPSED DAYS: NORMAL
ARIA ZRP FRACTIONS TREATED TO DATE: NORMAL
ARIA ZRP PLAN ID: NORMAL
ARIA ZRP PRESCRIBED DOSE CGY: 5040
ARIA ZRP PRESCRIBED DOSE PER FRACTION: 1.8
ARIA ZRR DOSAGE GIVEN TO DATE: 50.4
ARIA ZRR DOSAGE GIVEN TO DATE: 50.4
ARIA ZRR DOSAGE GIVEN TO DATE: 51.17
ARIA ZRR REFERENCE POINT ID: NORMAL
ARIA ZRR SESSION DOSAGE GIVEN: 1.8
ARIA ZRR SESSION DOSAGE GIVEN: 1.8
ARIA ZRR SESSION DOSAGE GIVEN: 1.83
MLH ARIA ZRC TREATMENT DATES: NORMAL

## 2023-05-22 PROCEDURE — 77385 HC IMRT DELIVERY SIMPLE: CPT | Performed by: RADIOLOGY

## 2023-05-23 ENCOUNTER — HOSPITAL ENCOUNTER (OUTPATIENT)
Dept: RADIATION ONCOLOGY | Facility: HOSPITAL | Age: 73
Setting detail: RADIATION/ONCOLOGY SERIES
Discharge: HOME | End: 2023-05-23
Attending: RADIOLOGY
Payer: MEDICARE

## 2023-05-23 ENCOUNTER — RAD ONC OTV (OUTPATIENT)
Dept: RADIATION ONCOLOGY | Facility: HOSPITAL | Age: 73
End: 2023-05-23
Payer: MEDICARE

## 2023-05-23 VITALS
BODY MASS INDEX: 30.49 KG/M2 | DIASTOLIC BLOOD PRESSURE: 65 MMHG | HEART RATE: 56 BPM | SYSTOLIC BLOOD PRESSURE: 129 MMHG | WEIGHT: 183.2 LBS

## 2023-05-23 DIAGNOSIS — C61 PROSTATE CANCER (CMS/HCC): Primary | ICD-10-CM

## 2023-05-23 LAB
ARIA ZRC COURSE ID: NORMAL
ARIA ZRC COURSE INTENT: NORMAL
ARIA ZRC COURSE START DATE: NORMAL
ARIA ZRC TREATMENT ELAPSED DAYS: NORMAL
ARIA ZRP FRACTIONS TREATED TO DATE: NORMAL
ARIA ZRP PLAN ID: NORMAL
ARIA ZRP PRESCRIBED DOSE CGY: 1980
ARIA ZRP PRESCRIBED DOSE PER FRACTION: 1.8
ARIA ZRR DOSAGE GIVEN TO DATE: 1.8
ARIA ZRR DOSAGE GIVEN TO DATE: 52.2
ARIA ZRR DOSAGE GIVEN TO DATE: 52.99
ARIA ZRR REFERENCE POINT ID: NORMAL
ARIA ZRR SESSION DOSAGE GIVEN: 1.8
ARIA ZRR SESSION DOSAGE GIVEN: 1.8
ARIA ZRR SESSION DOSAGE GIVEN: 1.83
MLH ARIA ZRC TREATMENT DATES: NORMAL

## 2023-05-23 PROCEDURE — 77385 HC IMRT DELIVERY SIMPLE: CPT | Performed by: RADIOLOGY

## 2023-05-23 RX ORDER — CETIRIZINE HYDROCHLORIDE 10 MG/1
10 TABLET ORAL NIGHTLY
COMMUNITY
End: 2024-01-10 | Stop reason: HOSPADM

## 2023-05-23 ASSESSMENT — ENCOUNTER SYMPTOMS
OCCASIONAL FEELINGS OF UNSTEADINESS: 0
DIFFICULTY URINATING: 0
PSYCHIATRIC NEGATIVE: 1
FATIGUE: 0
DIARRHEA: 0
APPETITE CHANGE: 0
MUSCULOSKELETAL NEGATIVE: 1
CONSTIPATION: 0
FREQUENCY: 0
DYSURIA: 0
UNEXPECTED WEIGHT CHANGE: 1

## 2023-05-23 ASSESSMENT — PAIN SCALES - GENERAL: PAINLEVEL: 0-NO PAIN

## 2023-05-23 NOTE — PROGRESS NOTES
Subjective      Patient ID: Cam Rosas is a 73 y.o. male.  1950   Diagnosis Plan   1. Prostate cancer (CMS/[facility])          Diagnosis:  No diagnosis found.    HPI patient presents with wife continue to note occasional hot flashes but is otherwise without complaints.  Continues urinary leakage longstanding.  Denies bowel problems.  Lightheadedness much less of a problem.    The following have been reviewed and updated as appropriate in this visit:        Review of Systems   Constitutional: Positive for unexpected weight change (2 lbs - pt is ok with loss). Negative for appetite change and fatigue.   Gastrointestinal: Negative for constipation (formed) and diarrhea.   Endocrine: Positive for heat intolerance (Hot flash from Tamoxifen).   Genitourinary: Negative.  Negative for difficulty urinating, dysuria and frequency (wears incontinence pad).   Musculoskeletal: Negative.    Skin: Negative.    Psychiatric/Behavioral: Negative.    Pt only received 1 Lupron injection    Objective     Vitals:    05/23/23 1422   BP: 129/65   Patient Position: Sitting   Pulse: (!) 56   Weight: 83.1 kg (183 lb 3.2 oz)     Body mass index is 30.49 kg/m².    Physical Exam vital signs stable.  Patient sitting in wheelchair no apparent distress.  Abdomen soft nontender.  Performance status 70.    Assessment/Plan Patient with biochemical failure following prostatectomy undergoing salvage radiation therapy with hormone therapy.  Plan stopping hormone shots given possible side effects of lightheadedness.  Continue tamoxifen therapy for breast cancer.  Continue radiation therapy as previously outlined.  Patient had a little area in the rectum for treatment continue Gas-X for prevention.  Diagnoses and Orders Associated With This Visit:  There are no diagnoses linked to this encounter.

## 2023-05-23 NOTE — LETTER
May 23, 2023                                                          Patient: Cam Rosas   YOB: 1950   Date of Visit: 5/23/2023       Dear Dr. Collins:    The patient is seen at the [facility] RADIATION THERAPY today. Attached is my assessment and plan of care.  Thank you for the opportunity to share in Cam Rosas's care.       Sincerely,        Gregory J. Ochsner, MD      CC: No Recipients

## 2023-05-24 ENCOUNTER — HOSPITAL ENCOUNTER (OUTPATIENT)
Dept: RADIATION ONCOLOGY | Facility: HOSPITAL | Age: 73
Setting detail: RADIATION/ONCOLOGY SERIES
Discharge: HOME | End: 2023-05-24
Attending: RADIOLOGY
Payer: MEDICARE

## 2023-05-24 LAB
ARIA ZRC COURSE ID: NORMAL
ARIA ZRC COURSE INTENT: NORMAL
ARIA ZRC COURSE START DATE: NORMAL
ARIA ZRC TREATMENT ELAPSED DAYS: NORMAL
ARIA ZRP FRACTIONS TREATED TO DATE: NORMAL
ARIA ZRP PLAN ID: NORMAL
ARIA ZRP PRESCRIBED DOSE CGY: 1980
ARIA ZRP PRESCRIBED DOSE PER FRACTION: 1.8
ARIA ZRR DOSAGE GIVEN TO DATE: 3.6
ARIA ZRR DOSAGE GIVEN TO DATE: 54
ARIA ZRR DOSAGE GIVEN TO DATE: 54.82
ARIA ZRR REFERENCE POINT ID: NORMAL
ARIA ZRR SESSION DOSAGE GIVEN: 1.8
ARIA ZRR SESSION DOSAGE GIVEN: 1.8
ARIA ZRR SESSION DOSAGE GIVEN: 1.83
MLH ARIA ZRC TREATMENT DATES: NORMAL

## 2023-05-24 PROCEDURE — 77385 HC IMRT DELIVERY SIMPLE: CPT | Performed by: RADIOLOGY

## 2023-05-25 ENCOUNTER — HOSPITAL ENCOUNTER (OUTPATIENT)
Dept: RADIATION ONCOLOGY | Facility: HOSPITAL | Age: 73
Setting detail: RADIATION/ONCOLOGY SERIES
Discharge: HOME | End: 2023-05-25
Attending: RADIOLOGY
Payer: MEDICARE

## 2023-05-25 LAB
ARIA ZRC COURSE ID: NORMAL
ARIA ZRC COURSE INTENT: NORMAL
ARIA ZRC COURSE START DATE: NORMAL
ARIA ZRC TREATMENT ELAPSED DAYS: NORMAL
ARIA ZRP FRACTIONS TREATED TO DATE: NORMAL
ARIA ZRP PLAN ID: NORMAL
ARIA ZRP PRESCRIBED DOSE CGY: 1980
ARIA ZRP PRESCRIBED DOSE PER FRACTION: 1.8
ARIA ZRR DOSAGE GIVEN TO DATE: 5.4
ARIA ZRR DOSAGE GIVEN TO DATE: 55.8
ARIA ZRR DOSAGE GIVEN TO DATE: 56.65
ARIA ZRR REFERENCE POINT ID: NORMAL
ARIA ZRR SESSION DOSAGE GIVEN: 1.8
ARIA ZRR SESSION DOSAGE GIVEN: 1.8
ARIA ZRR SESSION DOSAGE GIVEN: 1.83
MLH ARIA ZRC TREATMENT DATES: NORMAL

## 2023-05-25 PROCEDURE — 77336 RADIATION PHYSICS CONSULT: CPT | Performed by: RADIOLOGY

## 2023-05-25 PROCEDURE — 77385 HC IMRT DELIVERY SIMPLE: CPT | Performed by: RADIOLOGY

## 2023-05-26 ENCOUNTER — HOSPITAL ENCOUNTER (OUTPATIENT)
Dept: RADIATION ONCOLOGY | Facility: HOSPITAL | Age: 73
Setting detail: RADIATION/ONCOLOGY SERIES
Discharge: HOME | End: 2023-05-26
Attending: RADIOLOGY
Payer: MEDICARE

## 2023-05-26 LAB
ARIA ZRC COURSE ID: NORMAL
ARIA ZRC COURSE INTENT: NORMAL
ARIA ZRC COURSE START DATE: NORMAL
ARIA ZRC TREATMENT ELAPSED DAYS: NORMAL
ARIA ZRP FRACTIONS TREATED TO DATE: NORMAL
ARIA ZRP PLAN ID: NORMAL
ARIA ZRP PRESCRIBED DOSE CGY: 1980
ARIA ZRP PRESCRIBED DOSE PER FRACTION: 1.8
ARIA ZRR DOSAGE GIVEN TO DATE: 57.6
ARIA ZRR DOSAGE GIVEN TO DATE: 58.48
ARIA ZRR DOSAGE GIVEN TO DATE: 7.2
ARIA ZRR REFERENCE POINT ID: NORMAL
ARIA ZRR SESSION DOSAGE GIVEN: 1.8
ARIA ZRR SESSION DOSAGE GIVEN: 1.8
ARIA ZRR SESSION DOSAGE GIVEN: 1.83
MLH ARIA ZRC TREATMENT DATES: NORMAL

## 2023-05-26 PROCEDURE — 77385 HC IMRT DELIVERY SIMPLE: CPT | Performed by: RADIOLOGY

## 2023-05-30 ENCOUNTER — RAD ONC OTV (OUTPATIENT)
Dept: RADIATION ONCOLOGY | Facility: HOSPITAL | Age: 73
End: 2023-05-30
Payer: MEDICARE

## 2023-05-30 ENCOUNTER — HOSPITAL ENCOUNTER (OUTPATIENT)
Dept: RADIATION ONCOLOGY | Facility: HOSPITAL | Age: 73
Setting detail: RADIATION/ONCOLOGY SERIES
Discharge: HOME | End: 2023-05-30
Attending: RADIOLOGY
Payer: MEDICARE

## 2023-05-30 VITALS
HEART RATE: 52 BPM | BODY MASS INDEX: 30.62 KG/M2 | DIASTOLIC BLOOD PRESSURE: 68 MMHG | SYSTOLIC BLOOD PRESSURE: 113 MMHG | WEIGHT: 184 LBS | OXYGEN SATURATION: 98 %

## 2023-05-30 DIAGNOSIS — C61 PROSTATE CANCER (CMS/HCC): Primary | ICD-10-CM

## 2023-05-30 LAB
ARIA ZRC COURSE ID: NORMAL
ARIA ZRC COURSE INTENT: NORMAL
ARIA ZRC COURSE START DATE: NORMAL
ARIA ZRC TREATMENT ELAPSED DAYS: NORMAL
ARIA ZRP FRACTIONS TREATED TO DATE: NORMAL
ARIA ZRP PLAN ID: NORMAL
ARIA ZRP PRESCRIBED DOSE CGY: 1980
ARIA ZRP PRESCRIBED DOSE PER FRACTION: 1.8
ARIA ZRR DOSAGE GIVEN TO DATE: 59.4
ARIA ZRR DOSAGE GIVEN TO DATE: 60.3
ARIA ZRR DOSAGE GIVEN TO DATE: 9
ARIA ZRR REFERENCE POINT ID: NORMAL
ARIA ZRR SESSION DOSAGE GIVEN: 1.8
ARIA ZRR SESSION DOSAGE GIVEN: 1.8
ARIA ZRR SESSION DOSAGE GIVEN: 1.83
MLH ARIA ZRC TREATMENT DATES: NORMAL

## 2023-05-30 PROCEDURE — 77385 HC IMRT DELIVERY SIMPLE: CPT | Performed by: RADIOLOGY

## 2023-05-30 ASSESSMENT — ENCOUNTER SYMPTOMS
DIARRHEA: 1
FREQUENCY: 1
UNEXPECTED WEIGHT CHANGE: 1

## 2023-05-30 ASSESSMENT — PAIN SCALES - GENERAL: PAINLEVEL: 0-NO PAIN

## 2023-05-30 NOTE — PROGRESS NOTES
Subjective      Patient ID: Cam Rosas is a 73 y.o. male.  1950   Diagnosis Plan   1. Prostate cancer (CMS/[facility])          Diagnosis:  No diagnosis found.    HPI patient presents without complaints.  Denies any further falls.  Uses wheelchair at [facility] center for safety reasons.  Notes hot flashes persist.  Urinary leakage persists.  Patient noted recent loose stool denies watery diarrhea.  Frequent urination.    The following have been reviewed and updated as appropriate in this visit:        Review of Systems   Constitutional: Positive for unexpected weight change (weight is steady).   Gastrointestinal: Positive for diarrhea (loose).   Genitourinary: Positive for frequency.       Objective     Vitals:    05/30/23 1426   BP: 113/68   BP Location: Left upper arm   Patient Position: Sitting   Pulse: (!) 52   SpO2: 98%   Weight: 83.5 kg (184 lb)     Body mass index is 30.62 kg/m².    Physical Exam vital signs stable.  Abdomen soft nontender.  Skin clear.  Performance status 70.    Assessment/Plan Patient with prostate cancer and breast cancer undergoing definitive radiation therapy for salvage purposes for biochemical failure following prostatectomy.  Hormone therapy is on hold for the given side effects of lightheadedness and falling down.  Continue tamoxifen therapy for breast cancer.  Continue radiation therapy as previously outlined.  Patient instructed to use of Imodium if necessary for diarrhea.  Diagnoses and Orders Associated With This Visit:  There are no diagnoses linked to this encounter.

## 2023-05-30 NOTE — LETTER
May 30, 2023                                                          Patient: Cam Rosas   YOB: 1950   Date of Visit: 5/30/2023       Dear Dr. Collins:    The patient is seen at the [facility] RAD[facility]ATION [facility]ERAP[facility] today. Attached is my assessment and plan of care.  Thank you for the opportunity to share in Cam Rosas's care.       Sincerely,        Gregory J. Ochsner, MD      CC: No Recipients

## 2023-05-31 ENCOUNTER — HOSPITAL ENCOUNTER (OUTPATIENT)
Dept: RADIATION ONCOLOGY | Facility: HOSPITAL | Age: 73
Setting detail: RADIATION/ONCOLOGY SERIES
Discharge: HOME | End: 2023-05-31
Attending: RADIOLOGY
Payer: MEDICARE

## 2023-05-31 LAB
ARIA ZRC COURSE ID: NORMAL
ARIA ZRC COURSE INTENT: NORMAL
ARIA ZRC COURSE START DATE: NORMAL
ARIA ZRC TREATMENT ELAPSED DAYS: NORMAL
ARIA ZRP FRACTIONS TREATED TO DATE: NORMAL
ARIA ZRP PLAN ID: NORMAL
ARIA ZRP PRESCRIBED DOSE CGY: 1980
ARIA ZRP PRESCRIBED DOSE PER FRACTION: 1.8
ARIA ZRR DOSAGE GIVEN TO DATE: 10.8
ARIA ZRR DOSAGE GIVEN TO DATE: 61.2
ARIA ZRR DOSAGE GIVEN TO DATE: 62.13
ARIA ZRR REFERENCE POINT ID: NORMAL
ARIA ZRR SESSION DOSAGE GIVEN: 1.8
ARIA ZRR SESSION DOSAGE GIVEN: 1.8
ARIA ZRR SESSION DOSAGE GIVEN: 1.83
MLH ARIA ZRC TREATMENT DATES: NORMAL

## 2023-05-31 PROCEDURE — 77385 HC IMRT DELIVERY SIMPLE: CPT | Performed by: RADIOLOGY

## 2023-06-01 ENCOUNTER — HOSPITAL ENCOUNTER (OUTPATIENT)
Dept: RADIATION ONCOLOGY | Facility: HOSPITAL | Age: 73
Setting detail: RADIATION/ONCOLOGY SERIES
Discharge: HOME | End: 2023-06-01
Attending: RADIOLOGY
Payer: MEDICARE

## 2023-06-01 LAB
ARIA ZRC COURSE ID: NORMAL
ARIA ZRC COURSE INTENT: NORMAL
ARIA ZRC COURSE START DATE: NORMAL
ARIA ZRC TREATMENT ELAPSED DAYS: NORMAL
ARIA ZRP FRACTIONS TREATED TO DATE: NORMAL
ARIA ZRP PLAN ID: NORMAL
ARIA ZRP PRESCRIBED DOSE CGY: 1980
ARIA ZRP PRESCRIBED DOSE PER FRACTION: 1.8
ARIA ZRR DOSAGE GIVEN TO DATE: 12.6
ARIA ZRR DOSAGE GIVEN TO DATE: 63
ARIA ZRR DOSAGE GIVEN TO DATE: 63.96
ARIA ZRR REFERENCE POINT ID: NORMAL
ARIA ZRR SESSION DOSAGE GIVEN: 1.8
ARIA ZRR SESSION DOSAGE GIVEN: 1.8
ARIA ZRR SESSION DOSAGE GIVEN: 1.83
MLH ARIA ZRC TREATMENT DATES: NORMAL

## 2023-06-01 PROCEDURE — 77385 HC IMRT DELIVERY SIMPLE: CPT | Performed by: RADIOLOGY

## 2023-06-01 PROCEDURE — 77336 RADIATION PHYSICS CONSULT: CPT | Performed by: RADIOLOGY

## 2023-06-02 ENCOUNTER — HOSPITAL ENCOUNTER (OUTPATIENT)
Dept: RADIATION ONCOLOGY | Facility: HOSPITAL | Age: 73
Setting detail: RADIATION/ONCOLOGY SERIES
Discharge: HOME | End: 2023-06-02
Attending: RADIOLOGY
Payer: MEDICARE

## 2023-06-02 LAB
ARIA ZRC COURSE ID: NORMAL
ARIA ZRC COURSE INTENT: NORMAL
ARIA ZRC COURSE START DATE: NORMAL
ARIA ZRC TREATMENT ELAPSED DAYS: NORMAL
ARIA ZRP FRACTIONS TREATED TO DATE: NORMAL
ARIA ZRP PLAN ID: NORMAL
ARIA ZRP PRESCRIBED DOSE CGY: 1980
ARIA ZRP PRESCRIBED DOSE PER FRACTION: 1.8
ARIA ZRR DOSAGE GIVEN TO DATE: 14.4
ARIA ZRR DOSAGE GIVEN TO DATE: 64.8
ARIA ZRR DOSAGE GIVEN TO DATE: 65.79
ARIA ZRR REFERENCE POINT ID: NORMAL
ARIA ZRR SESSION DOSAGE GIVEN: 1.8
ARIA ZRR SESSION DOSAGE GIVEN: 1.8
ARIA ZRR SESSION DOSAGE GIVEN: 1.83
MLH ARIA ZRC TREATMENT DATES: NORMAL

## 2023-06-02 PROCEDURE — 77385 HC IMRT DELIVERY SIMPLE: CPT | Performed by: RADIOLOGY

## 2023-06-05 ENCOUNTER — HOSPITAL ENCOUNTER (OUTPATIENT)
Dept: RADIATION ONCOLOGY | Facility: HOSPITAL | Age: 73
Setting detail: RADIATION/ONCOLOGY SERIES
Discharge: HOME | End: 2023-06-05
Attending: RADIOLOGY
Payer: MEDICARE

## 2023-06-05 LAB
ARIA ZRC COURSE ID: NORMAL
ARIA ZRC COURSE INTENT: NORMAL
ARIA ZRC COURSE START DATE: NORMAL
ARIA ZRC TREATMENT ELAPSED DAYS: NORMAL
ARIA ZRP FRACTIONS TREATED TO DATE: NORMAL
ARIA ZRP PLAN ID: NORMAL
ARIA ZRP PRESCRIBED DOSE CGY: 1980
ARIA ZRP PRESCRIBED DOSE PER FRACTION: 1.8
ARIA ZRR DOSAGE GIVEN TO DATE: 16.2
ARIA ZRR DOSAGE GIVEN TO DATE: 66.6
ARIA ZRR DOSAGE GIVEN TO DATE: 67.61
ARIA ZRR REFERENCE POINT ID: NORMAL
ARIA ZRR SESSION DOSAGE GIVEN: 1.8
ARIA ZRR SESSION DOSAGE GIVEN: 1.8
ARIA ZRR SESSION DOSAGE GIVEN: 1.83
MLH ARIA ZRC TREATMENT DATES: NORMAL

## 2023-06-05 PROCEDURE — 77385 HC IMRT DELIVERY SIMPLE: CPT | Performed by: RADIOLOGY

## 2023-06-06 ENCOUNTER — RAD ONC OTV (OUTPATIENT)
Dept: RADIATION ONCOLOGY | Facility: HOSPITAL | Age: 73
End: 2023-06-06
Payer: MEDICARE

## 2023-06-06 ENCOUNTER — HOSPITAL ENCOUNTER (OUTPATIENT)
Dept: RADIATION ONCOLOGY | Facility: HOSPITAL | Age: 73
Setting detail: RADIATION/ONCOLOGY SERIES
Discharge: HOME | End: 2023-06-06
Attending: RADIOLOGY
Payer: MEDICARE

## 2023-06-06 VITALS
WEIGHT: 184 LBS | BODY MASS INDEX: 30.62 KG/M2 | HEART RATE: 52 BPM | SYSTOLIC BLOOD PRESSURE: 145 MMHG | TEMPERATURE: 97.7 F | DIASTOLIC BLOOD PRESSURE: 70 MMHG

## 2023-06-06 DIAGNOSIS — C61 PROSTATE CANCER (CMS/HCC): Primary | ICD-10-CM

## 2023-06-06 LAB
ARIA ZRC COURSE ID: NORMAL
ARIA ZRC COURSE INTENT: NORMAL
ARIA ZRC COURSE START DATE: NORMAL
ARIA ZRC TREATMENT ELAPSED DAYS: NORMAL
ARIA ZRP FRACTIONS TREATED TO DATE: NORMAL
ARIA ZRP PLAN ID: NORMAL
ARIA ZRP PRESCRIBED DOSE CGY: 1980
ARIA ZRP PRESCRIBED DOSE PER FRACTION: 1.8
ARIA ZRR DOSAGE GIVEN TO DATE: 18
ARIA ZRR DOSAGE GIVEN TO DATE: 68.4
ARIA ZRR DOSAGE GIVEN TO DATE: 69.44
ARIA ZRR REFERENCE POINT ID: NORMAL
ARIA ZRR SESSION DOSAGE GIVEN: 1.8
ARIA ZRR SESSION DOSAGE GIVEN: 1.8
ARIA ZRR SESSION DOSAGE GIVEN: 1.83
MLH ARIA ZRC TREATMENT DATES: NORMAL

## 2023-06-06 PROCEDURE — 77385 HC IMRT DELIVERY SIMPLE: CPT | Performed by: RADIOLOGY

## 2023-06-06 RX ORDER — TAMOXIFEN CITRATE 20 MG/1
20 TABLET ORAL DAILY
Qty: 90 TABLET | Refills: 1 | Status: SHIPPED | OUTPATIENT
Start: 2023-06-06 | End: 2023-09-05 | Stop reason: SDUPTHER

## 2023-06-06 ASSESSMENT — ENCOUNTER SYMPTOMS
OCCASIONAL FEELINGS OF UNSTEADINESS: 0
CONSTITUTIONAL NEGATIVE: 1
DIARRHEA: 0
DIFFICULTY URINATING: 1
CONSTIPATION: 0
PSYCHIATRIC NEGATIVE: 1
FREQUENCY: 0
MUSCULOSKELETAL NEGATIVE: 1
HEMATURIA: 0
DYSURIA: 0

## 2023-06-06 ASSESSMENT — PAIN SCALES - GENERAL: PAINLEVEL: 0-NO PAIN

## 2023-06-06 NOTE — TELEPHONE ENCOUNTER
RN called and spoke with patent spouse. Spouse informed [facility]

## 2023-06-06 NOTE — PROGRESS NOTES
Subjective      Patient ID: Cam Rosas is a 73 y.o. male.  1950   Diagnosis Plan   1. Prostate cancer (CMS/[facility])  PSA, Post Prostatectomy        Diagnosis:  No diagnosis found.    HPI patient presents continuing no hot flashes and difficulty with urination with urinary incontinence for which she uses diapers.  Urination improves a little bit the other day and is able to urinate in his own.  This has been a chronic problem only worsened by radiation therapy.  Notes loose stools denies diarrhea.    The following have been reviewed and updated as appropriate in this visit:        Review of Systems   Constitutional: Negative.    Gastrointestinal: Negative for constipation and diarrhea (loose stool/no imodium/no senna).   Endocrine: Positive for heat intolerance (Hot flashes).   Genitourinary: Positive for difficulty urinating (Incontinence/ sitting on toliet urinates better). Negative for dysuria, frequency and hematuria.   Musculoskeletal: Negative.    Skin: Negative.    Psychiatric/Behavioral: Negative.    Completed Lupron.    Objective     Vitals:    06/06/23 0912   BP: (!) 145/70   Patient Position: Sitting   Pulse: (!) 52   Temp: 36.5 °C (97.7 °F)   Weight: 83.5 kg (184 lb)     Body mass index is 30.62 kg/m².    Physical Exam patient sitting in wheelchair no apparent distress.  Tapers in place.  Abdomen soft nontender.  Vital signs stable.  Fullness right lateral breast no obvious masses.  No adenopathy noted.  Performance status 80.    Assessment/Plan Continue radiation therapy to a total dose 7020 cGy and follow-up in 3 to 4 weeks.  Patient given prescription for PSA to be done prior.  Symptoms from radiation therapy should gradually resolve over the next couple weeks.  No further hormone therapy for prostate cancer continue tamoxifen therapy for breast cancer.  Diagnoses and Orders Associated With This Visit:  There are no diagnoses linked to this encounter.

## 2023-06-06 NOTE — LETTER
June 6, 2023                                                          Patient: Cam Rosas   YOB: 1950   Date of Visit: 6/6/2023       Dear Dr. Collins:    The patient is seen at the [facility] RADIATION THERAP[facility] today. Attached is my assessment and plan of care.  Thank you for the opportunity to share in Cam Rosas's care.       Sincerely,        Gregory J. Ochsner, MD      CC: No Recipients

## 2023-06-07 ENCOUNTER — HOSPITAL ENCOUNTER (OUTPATIENT)
Dept: RADIATION ONCOLOGY | Facility: HOSPITAL | Age: 73
Setting detail: RADIATION/ONCOLOGY SERIES
Discharge: HOME | End: 2023-06-07
Attending: RADIOLOGY
Payer: MEDICARE

## 2023-06-07 LAB
ARIA ZRC COURSE ID: NORMAL
ARIA ZRC COURSE INTENT: NORMAL
ARIA ZRC COURSE START DATE: NORMAL
ARIA ZRC TREATMENT ELAPSED DAYS: NORMAL
ARIA ZRP FRACTIONS TREATED TO DATE: NORMAL
ARIA ZRP PLAN ID: NORMAL
ARIA ZRP PRESCRIBED DOSE CGY: 1980
ARIA ZRP PRESCRIBED DOSE PER FRACTION: 1.8
ARIA ZRR DOSAGE GIVEN TO DATE: 19.8
ARIA ZRR DOSAGE GIVEN TO DATE: 70.2
ARIA ZRR DOSAGE GIVEN TO DATE: 71.27
ARIA ZRR REFERENCE POINT ID: NORMAL
ARIA ZRR SESSION DOSAGE GIVEN: 1.8
ARIA ZRR SESSION DOSAGE GIVEN: 1.8
ARIA ZRR SESSION DOSAGE GIVEN: 1.83
MLH ARIA ZRC TREATMENT DATES: NORMAL

## 2023-06-07 PROCEDURE — 77385 HC IMRT DELIVERY SIMPLE: CPT | Performed by: RADIOLOGY

## 2023-06-07 PROCEDURE — 77402 RADIATION TX DELIVERY LVL 1: CPT | Performed by: RADIOLOGY

## 2023-06-08 RX ORDER — POTASSIUM CHLORIDE 750 MG/1
10 TABLET, EXTENDED RELEASE ORAL DAILY
Qty: 180 TABLET | Refills: 3 | Status: ON HOLD | OUTPATIENT
Start: 2023-06-08 | End: 2023-08-17 | Stop reason: ALTCHOICE

## 2023-06-08 RX ORDER — METOPROLOL SUCCINATE 50 MG/1
50 TABLET, EXTENDED RELEASE ORAL DAILY
Qty: 90 TABLET | Refills: 3 | Status: SHIPPED | OUTPATIENT
Start: 2023-06-08 | End: 2024-01-10 | Stop reason: HOSPADM

## 2023-06-08 NOTE — TELEPHONE ENCOUNTER
Medicine Refill Request  Pt's spouse Fiorella called requests a refill of         metoprolol succinate XL (TOPROL-XL) 50 mg 24 hr tablet      Last Office: Visit date not found   Last Consult Visit: Visit date not found  Last Telemedicine Visit: Visit date not found    Next Appointment: Visit date not found      Current Outpatient Medications:   •  acetaminophen (TYLENOL EX STR RAPID RELEASE ORAL), Take 500 mg by mouth as needed., Disp: , Rfl:   •  amiodarone (PACERONE) 200 mg tablet, Take 1 tablet (200 mg total) by mouth daily., Disp: 90 tablet, Rfl: 3  •  cetirizine (ZyrTEC) 10 mg tablet, Take 10 mg by mouth daily., Disp: , Rfl:   •  cholecalciferol, vitamin D3, 1,000 unit (25 mcg) tablet, Take 1,000 Units by mouth daily., Disp: , Rfl:   •  cranberry conc-C-bacillus coag (AZO CRANBERRY + PROBIOTIC) 250-30-15 mg tablet, Take by mouth daily., Disp: , Rfl:   •  dilTIAZem CD (CARDIZEM CD) 120 mg 24 hr capsule, Take 1 capsule (120 mg total) by mouth daily., Disp: 270 capsule, Rfl: 3  •  lidocaine-prilocaine (EMLA) cream, Apply 1 application topically as needed for pain., Disp: , Rfl:   •  metoprolol succinate XL (TOPROL-XL) 50 mg 24 hr tablet, Take 1 tablet (50 mg total) by mouth daily., Disp: , Rfl:   •  pantoprazole (PROTONIX) 40 mg EC tablet, Take 1 tablet (40 mg total) by mouth 2 (two) times a day., Disp: 180 tablet, Rfl: 3  •  potassium chloride (KLOR-CON) 10 mEq CR tablet, Take 1 tablet (10 mEq total) by mouth daily., Disp: 180 tablet, Rfl: 3  •  rivaroxaban (XARELTO) 15 mg tablet, Take 1 tablet (15 mg total) by mouth daily with dinner Indications: treatment to prevent blood clots in chronic atrial fibrillation., Disp: 90 tablet, Rfl: 1  •  senna (SENOKOT) 8.6 mg tablet, Take 2 tablets by mouth daily., Disp: , Rfl:   •  silver sulfadiazine (SILVADENE) 1 % cream, Apply topically daily. (Patient not taking: Reported on 5/23/2023), Disp: 20 g, Rfl: 0  •  simethicone (GAS-X ORAL), Take by mouth 2 (two) times a day. Before  treatment, Disp: , Rfl:   •  simvastatin (ZOCOR) 20 mg tablet, Take 1 tablet (20 mg total) by mouth nightly., Disp: 90 tablet, Rfl: 3  •  tamoxifen (NOLVADEX) 20 mg chemo tablet, Take  1 tablet (20 mg total) daily, Disp: 90 tablet, Rfl: 1  •  torsemide (DEMADEX) 20 mg tablet, Take 0.5 tablets (10 mg total) by mouth daily. With additional 1/2 tablet for AM standing weight over 185 lbs (Patient not taking: Reported on 4/18/2023), Disp: 90 tablet, Rfl: 3      BP Readings from Last 3 Encounters:   06/06/23 (!) 145/70   05/30/23 113/68   05/23/23 129/65       Recent Lab results:  No results found for: CHOL, No results found for: HDL, No results found for: LDLCALC, No results found for: TRIG     Lab Results   Component Value Date    GLUCOSE 128 (H) 04/24/2023   , No results found for: HGBA1C      Lab Results   Component Value Date    CREATININE 1.32 (H) 04/24/2023       No results found for: TSH

## 2023-06-08 NOTE — TELEPHONE ENCOUNTER
Medicine Refill Request  Pt spouse calling in   potassium chloride (KLOR-CON) 10 mEq CR tablet,      Current Outpatient Medications:   •  acetaminophen (TYLENOL EX STR RAPID RELEASE ORAL), Take 500 mg by mouth as needed., Disp: , Rfl:   •  amiodarone (PACERONE) 200 mg tablet, Take 1 tablet (200 mg total) by mouth daily., Disp: 90 tablet, Rfl: 3  •  cetirizine (ZyrTEC) 10 mg tablet, Take 10 mg by mouth daily., Disp: , Rfl:   •  cholecalciferol, vitamin D3, 1,000 unit (25 mcg) tablet, Take 1,000 Units by mouth daily., Disp: , Rfl:   •  cranberry conc-C-bacillus coag (AZO CRANBERRY + PROBIOTIC) 250-30-15 mg tablet, Take by mouth daily., Disp: , Rfl:   •  dilTIAZem CD (CARDIZEM CD) 120 mg 24 hr capsule, Take 1 capsule (120 mg total) by mouth daily., Disp: 270 capsule, Rfl: 3  •  lidocaine-prilocaine (EMLA) cream, Apply 1 application topically as needed for pain., Disp: , Rfl:   •  metoprolol succinate XL (TOPROL-XL) 50 mg 24 hr tablet, Take 1 tablet (50 mg total) by mouth daily., Disp: , Rfl:   •  pantoprazole (PROTONIX) 40 mg EC tablet, Take 1 tablet (40 mg total) by mouth 2 (two) times a day., Disp: 180 tablet, Rfl: 3  •  potassium chloride (KLOR-CON) 10 mEq CR tablet, Take 1 tablet (10 mEq total) by mouth daily., Disp: 180 tablet, Rfl: 3  •  rivaroxaban (XARELTO) 15 mg tablet, Take 1 tablet (15 mg total) by mouth daily with dinner Indications: treatment to prevent blood clots in chronic atrial fibrillation., Disp: 90 tablet, Rfl: 1  •  senna (SENOKOT) 8.6 mg tablet, Take 2 tablets by mouth daily., Disp: , Rfl:   •  silver sulfadiazine (SILVADENE) 1 % cream, Apply topically daily. (Patient not taking: Reported on 5/23/2023), Disp: 20 g, Rfl: 0  •  simethicone (GAS-X ORAL), Take by mouth 2 (two) times a day. Before treatment, Disp: , Rfl:   •  simvastatin (ZOCOR) 20 mg tablet, Take 1 tablet (20 mg total) by mouth nightly., Disp: 90 tablet, Rfl: 3  •  tamoxifen (NOLVADEX) 20 mg chemo tablet, Take  1 tablet (20 mg total)  daily, Disp: 90 tablet, Rfl: 1  •  torsemide (DEMADEX) 20 mg tablet, Take 0.5 tablets (10 mg total) by mouth daily. With additional 1/2 tablet for AM standing weight over 185 lbs (Patient not taking: Reported on 4/18/2023), Disp: 90 tablet, Rfl: 3      BP Readings from Last 3 Encounters:   06/06/23 (!) 145/70   05/30/23 113/68   05/23/23 129/65       Recent Lab results:  No results found for: CHOL, No results found for: HDL, No results found for: LDLCALC, No results found for: TRIG     Lab Results   Component Value Date    GLUCOSE 128 (H) 04/24/2023   , No results found for: HGBA1C      Lab Results   Component Value Date    CREATININE 1.32 (H) 04/24/2023       No results found for: TSH

## 2023-06-09 LAB
ARIA ZRC COURSE ID: NORMAL
ARIA ZRC COURSE INTENT: NORMAL
ARIA ZRC COURSE START DATE: NORMAL
ARIA ZRC TREATMENT ELAPSED DAYS: NORMAL
ARIA ZRP FRACTIONS TREATED TO DATE: NORMAL
ARIA ZRP FRACTIONS TREATED TO DATE: NORMAL
ARIA ZRP PLAN ID: NORMAL
ARIA ZRP PLAN ID: NORMAL
ARIA ZRP PLAN NAME: NORMAL
ARIA ZRP PLAN NAME: NORMAL
ARIA ZRP PRESCRIBED DOSE CGY: 1980
ARIA ZRP PRESCRIBED DOSE CGY: 5040
ARIA ZRP PRESCRIBED DOSE PER FRACTION: 1.8
ARIA ZRP PRESCRIBED DOSE PER FRACTION: 1.8
ARIA ZRR DOSAGE GIVEN TO DATE: 19.8
ARIA ZRR DOSAGE GIVEN TO DATE: 50.4
ARIA ZRR DOSAGE GIVEN TO DATE: 70.2
ARIA ZRR DOSAGE GIVEN TO DATE: 71.27
ARIA ZRR REFERENCE POINT ID: NORMAL
MLH ARIA ZRC TREATMENT DATES: NORMAL

## 2023-06-20 ENCOUNTER — OFFICE VISIT (OUTPATIENT)
Dept: HEMATOLOGY/ONCOLOGY | Facility: CLINIC | Age: 73
End: 2023-06-20
Attending: INTERNAL MEDICINE
Payer: MEDICARE

## 2023-06-20 ENCOUNTER — HOSPITAL ENCOUNTER (OUTPATIENT)
Dept: HEMATOLOGY/ONCOLOGY | Facility: HOSPITAL | Age: 73
Setting detail: INFUSION SERIES
End: 2023-06-20
Attending: INTERNAL MEDICINE
Payer: MEDICARE

## 2023-06-20 VITALS
BODY MASS INDEX: 30.69 KG/M2 | DIASTOLIC BLOOD PRESSURE: 76 MMHG | OXYGEN SATURATION: 98 % | RESPIRATION RATE: 18 BRPM | WEIGHT: 184.4 LBS | TEMPERATURE: 97.6 F | SYSTOLIC BLOOD PRESSURE: 140 MMHG | HEART RATE: 82 BPM

## 2023-06-20 DIAGNOSIS — C61 PROSTATE CANCER (CMS/HCC): ICD-10-CM

## 2023-06-20 DIAGNOSIS — Z17.0 MALIGNANT NEOPLASM OF OVERLAPPING SITES OF RIGHT BREAST IN MALE, ESTROGEN RECEPTOR POSITIVE (CMS/HCC): ICD-10-CM

## 2023-06-20 DIAGNOSIS — C50.821 MALIGNANT NEOPLASM OF OVERLAPPING SITES OF RIGHT BREAST IN MALE, ESTROGEN RECEPTOR POSITIVE (CMS/HCC): ICD-10-CM

## 2023-06-20 PROCEDURE — 99214 OFFICE O/P EST MOD 30 MIN: CPT | Performed by: INTERNAL MEDICINE

## 2023-06-20 NOTE — ASSESSMENT & PLAN NOTE
27 2022 underwent prostatectomy for prostate cancer at outside institution.  [place] score 4+3.  February 2023 PSA increasing.  Urology recommended radiation therapy.  Received Lupron while receiving treatment with radiation.  Completed radiotherapy June 2023.    He completed his radiation.  He feels very well.  He is going to continue surveillance under the direction of radiation oncology and his urologist.

## 2023-06-20 NOTE — PROGRESS NOTES
Cam Rosas is a 73 y.o. male,   :  1950    Encounter Diagnoses   Name Primary?   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, estrogen receptor positive (CMS/HCC)    • Prostate cancer (CMS/HCC)         Cancer Staging   Malignant neoplasm of right male breast (CMS/HCC)  Staging form: Breast, AJCC 8th Edition  - Clinical stage from 3/20/2023: Stage IA (cT1c, cN0, cM0, G3, ER+, NJ+, HER2+) - Signed by Barb Osuna MD on 3/20/2023    Prostate cancer (CMS/HCC)  Staging form: Prostate, AJCC 8th Edition  - Pathologic stage from 2022: Stage Unknown (pT3b, pNX, cM0, PSA: 6, Grade Group: 3) - Signed by [facility], Gregory J, MD on 3/8/2023      Current Therapy:  Treatment Plans     No treatment plans exist        No therapy plan of the specified type found.    Oncology History   Malignant neoplasm of right male breast (CMS/HCC)   2022 Biopsy     1.  Right breast mass:     Invasive ductal carcinoma, Post grade 3 (3+ 3+2).   Invasive carcinoma is 13 mm in maximum dimension in core biopsy.    ER+ 90%; NJ+ 50%; [id number]+3; [id number]%     2022 -  Chemotherapy    2022 initiated first cycle TCP H at [facility].  Questionable reaction during the anti-HER2/jhonatan therapy with Herceptin.  Taxotere and carboplatin given on 10/3/2022.  Patient hospitalized after first cycle.     10/19/2022 Initial Diagnosis    Malignant neoplasm of right male breast (CMS/HCC)     11/15/2022 -  Hormone Therapy    Tamoxifen 20 mg daily while undergoes evaluation of cardiac issues     3/20/2023 Cancer Staged    Staging form: Breast, AJCC 8th Edition  - Clinical stage from 3/20/2023: Stage IA (cT1c, cN0, cM0, G3, ER+, NJ+, HER2+)     Prostate cancer (CMS/HCC)   2022 Cancer Staged    Staging form: Prostate, AJCC 8th Edition  - Pathologic stage from 2022: Stage Unknown (pT3b, pNX, cM0, PSA: 6, Grade Group: 3)     3/21/2023 - 2023 Chemotherapy    LEUPROLIDE (LUPRON) 22.5 MG  EVERY 3 MONTHS  Plan Provider: Paz Cleveland MD     3/21/2023 -  Chemotherapy    MEDIPORT FLUSH (SINGLE LUMEN) - PATIENT ON TREATMENT  Plan Provider: Paz Cleveland MD     3/21/2023 - 3/21/2023 Chemotherapy    LEUPROLIDE (LUPRON) 22.5 MG EVERY 3 MONTHS  Plan Provider: Paz Cleevland MD         Patient Active Problem List   Diagnosis   • Atrial fibrillation, unspecified type (CMS/HCC)   • Malignant neoplasm of right male breast (CMS/HCC)   • Electrolyte abnormality   • Gastrointestinal hemorrhage with melena   • CKD (chronic kidney disease)   • Prostate cancer (CMS/HCC)   • Acute systolic heart failure (CMS/HCC)       History of Present Illness  Cam Rosas returns today in follow up.   Presents today in follow-up.  He completed his radiotherapy recently.  He is feeling very well.  He is in good spirits.  With saying Dr. Greg [facility] he was informed that the Lupron injections can be discontinued.  He is happy about that because they cause more flushing.  He is compliant taking his tamoxifen.  He notes further improvement in his right nipple.  He does have an appointment with Dr. Barb Osuna in mid July with a repeat ultrasound.  He denies any fevers or chills, nausea or vomiting, shortness of breath, chest pain, or pain anywhere.  Says he will do what ever we tell him.      Review of Systems - Oncology  Toxicity Assessment:         Review of Systems:  Pertinent positive and negative symptoms noted in HPI, all others negative.    No data recorded    Temp:  [36.4 °C (97.6 °F)] 36.4 °C (97.6 °F)  Heart Rate:  [82] 82  Resp:  [18] 18  BP: (140)/(76) 140/76  Visit Vitals  /76   Pulse 82   Temp 36.4 °C (97.6 °F) (Temporal)   Resp 18   Wt 83.6 kg (184 lb 6.4 oz)   SpO2 98%   BMI 30.69 kg/m²     Physical Exam  Vitals and nursing note reviewed.   Constitutional:       General: He is not in acute distress.     Appearance: Normal appearance. He is well-developed. He is not ill-appearing.    HENT:      Head: Normocephalic and atraumatic.      Nose: Nose normal.   Eyes:      General: No scleral icterus.     Extraocular Movements: Extraocular movements intact.   Cardiovascular:      Rate and Rhythm: Normal rate and regular rhythm.      Heart sounds: Normal heart sounds.   Pulmonary:      Effort: Pulmonary effort is normal. No respiratory distress.      Breath sounds: Normal breath sounds. No wheezing, rhonchi or rales.   Chest:      Comments: No palpable axillary lymphadenopathy.  Continue improvement in the nipple area.  Flattening of the nipple now.  No significant retraction.  No erythema no edema  Abdominal:      General: Bowel sounds are normal. There is no distension.      Palpations: Abdomen is soft. There is no mass.      Tenderness: There is no abdominal tenderness.   Musculoskeletal:      Cervical back: Neck supple.      Right lower leg: No edema.      Left lower leg: No edema.      Comments: Peripheral vascular disease changes lower extremities   Lymphadenopathy:      Cervical: No cervical adenopathy.   Skin:     General: Skin is warm and dry.      Coloration: Skin is not jaundiced.   Neurological:      Mental Status: He is alert and oriented to person, place, and time.   Psychiatric:         Mood and Affect: Mood normal.         Behavior: Behavior normal.         Past Medical History:   Diagnosis Date   • Acute systolic heart failure (CMS/HCC) 02/15/2023   • Atrial fibrillation (CMS/HCC)    • Breast cancer (CMS/HCC) October 2022   • CHF (congestive heart failure) (CMS/HCC)    • Chronic kidney disease    • CKD (chronic kidney disease) 10/19/2022   • History of radiation therapy    • Hx antineoplastic chemo    • Prostate cancer (CMS/HCC)        Past Surgical History:   Procedure Laterality Date   • CARDIAC SURGERY     • CARDIOVERSION  12/05/2022    Successful DC cardioversion   • PROSTATE SURGERY  July 2022   • PROSTATECTOMY  07/15/2022   • TONSILLECTOMY         Social History     Tobacco Use    • Smoking status: Never   • Smokeless tobacco: Never   Vaping Use   • Vaping Use: Never used   Substance Use Topics   • Alcohol use: Yes     Alcohol/week: 2.0 standard drinks of alcohol     Types: 2 Shots of liquor per week     Comment: 2 drinks/day - scotch   • Drug use: Never       Family History   Problem Relation Age of Onset   • Hyperlipidemia Biological Mother    • Hypertension Biological Mother    • Breast cancer Biological Mother    • Cancer Biological Mother         gyn? cervical   • Hyperlipidemia Biological Father    • Hypertension Biological Father    • Arthritis Biological Father    • No Known Problems Biological Sister    • No Known Problems Biological Brother    • Heart disease Maternal Grandmother    • Arthritis Maternal Grandfather    • COPD Maternal Grandfather    • Diabetes Maternal Grandfather    • No Known Problems Paternal Grandmother    • No Known Problems Paternal Grandfather    • Cancer less than or equal to age 50 Other    • Esophageal cancer Other         47, in abd,chemo q 3 weeks       Allergies  Azithromycin, Prednisone, and Lorazepam    Medications    Current Outpatient Medications:   •  acetaminophen (TYLENOL EX STR RAPID RELEASE ORAL), Take 500 mg by mouth as needed., Disp: , Rfl:   •  amiodarone (PACERONE) 200 mg tablet, Take 1 tablet (200 mg total) by mouth daily., Disp: 90 tablet, Rfl: 3  •  cetirizine (ZyrTEC) 10 mg tablet, Take 10 mg by mouth daily., Disp: , Rfl:   •  cholecalciferol, vitamin D3, 1,000 unit (25 mcg) tablet, Take 1,000 Units by mouth daily., Disp: , Rfl:   •  cranberry conc-C-bacillus coag (AZO CRANBERRY + PROBIOTIC) 250-30-15 mg tablet, Take by mouth daily., Disp: , Rfl:   •  dilTIAZem CD (CARDIZEM CD) 120 mg 24 hr capsule, Take 1 capsule (120 mg total) by mouth daily., Disp: 270 capsule, Rfl: 3  •  lidocaine-prilocaine (EMLA) cream, Apply 1 application topically as needed for pain., Disp: , Rfl:   •  metoprolol succinate XL (TOPROL-XL) 50 mg 24 hr tablet,  Take 1 tablet (50 mg total) by mouth daily., Disp: 90 tablet, Rfl: 3  •  pantoprazole (PROTONIX) 40 mg EC tablet, Take 1 tablet (40 mg total) by mouth 2 (two) times a day., Disp: 180 tablet, Rfl: 3  •  potassium chloride (KLOR-CON) 10 mEq CR tablet, Take 1 tablet (10 mEq total) by mouth daily., Disp: 180 tablet, Rfl: 3  •  rivaroxaban (XARELTO) 15 mg tablet, Take 1 tablet (15 mg total) by mouth daily with dinner Indications: treatment to prevent blood clots in chronic atrial fibrillation., Disp: 90 tablet, Rfl: 1  •  senna (SENOKOT) 8.6 mg tablet, Take 2 tablets by mouth daily., Disp: , Rfl:   •  silver sulfadiazine (SILVADENE) 1 % cream, Apply topically daily., Disp: 20 g, Rfl: 0  •  simethicone (GAS-X ORAL), Take by mouth 2 (two) times a day. Before treatment, Disp: , Rfl:   •  simvastatin (ZOCOR) 20 mg tablet, Take 1 tablet (20 mg total) by mouth nightly., Disp: 90 tablet, Rfl: 3  •  tamoxifen (NOLVADEX) 20 mg chemo tablet, Take  1 tablet (20 mg total) daily, Disp: 90 tablet, Rfl: 1  •  torsemide (DEMADEX) 20 mg tablet, Take 0.5 tablets (10 mg total) by mouth daily. With additional 1/2 tablet for AM standing weight over 185 lbs, Disp: 90 tablet, Rfl: 3   Laboratory  Recent Results (from the past 672 hour(s))   Rad Onc Aria Session Summary    Collection Time: 05/23/23  2:23 PM   Result Value Ref Range    Course ID C1     Course Start Date 4/5/2023  7:44 AM     Treatment Elapsed Days 40 days as of 2023-05-23 @ 14:2118     Course Intent Post-op     Treatment Dates       Course End Date: : @ --  First Treatment Date: 2023-04-13 @ 14:3206  Last Treatment Date: 2023-05-23 @ 14:2118      Reference Point ID [email or web address]     Dosage Given To Date Gy 52.89462257     Session Dosage Given Gy 1.68079318     Reference Point ID [email or web address] prosbed     Dosage Given To Date Gy 1.8     Session Dosage Given Gy 1.8     Reference Point ID [email or web address] prosbed     Dosage Given To Date Gy 52.2     Session Dosage Given Gy 1.8     PLAN ID [email or web address] prosbed      Fractions Treated To Date 1 of 11     Prescribed Dose Per Fraction Gy 1.8     Prescribed Dose cGy 1,980    Rad Onc Aria Session Summary    Collection Time: 05/24/23  2:47 PM   Result Value Ref Range    Course ID C1     Course Start Date 4/5/2023  7:44 AM     Treatment Elapsed Days 41 days as of 2023-05-24 @ 14:4540     Course Intent Post-op     Treatment Dates       Course End Date: : @ --  First Treatment Date: 2023-04-13 @ 14:3206  Last Treatment Date: 2023-05-24 @ 14:4540      Reference Point ID [email or web address]     Dosage Given To Date Gy 54.8724591502163     Session Dosage Given Gy 1.72996908     Reference Point ID [email or web address] prosbed     Dosage Given To Date Gy 3.6     Session Dosage Given Gy 1.8     Reference Point ID [email or web address] prosbed     Dosage Given To Date Gy 54     Session Dosage Given Gy 1.8     PLAN ID [email or web address] prosbed     Fractions Treated To Date 2 of 11     Prescribed Dose Per Fraction Gy 1.8     Prescribed Dose cGy 1,980    Rad Onc Aria Session Summary    Collection Time: 05/25/23  2:30 PM   Result Value Ref Range    Course ID C1     Course Start Date 4/5/2023  7:44 AM     Treatment Elapsed Days 42 days as of 2023-05-25 @ 14:2749     Course Intent Post-op     Treatment Dates       Course End Date: : @ --  First Treatment Date: 2023-04-13 @ 14:3206  Last Treatment Date: 2023-05-25 @ 14:2749      Reference Point ID [email or web address]     Dosage Given To Date Gy 56.28991238     Session Dosage Given Gy 1.99120384     Reference Point ID [email or web address] prosbed     Dosage Given To Date Gy 5.4     Session Dosage Given Gy 1.8     Reference Point ID [email or web address] prosbed     Dosage Given To Date Gy 55.8     Session Dosage Given Gy 1.8     PLAN ID [email or web address] prosbed     Fractions Treated To Date 3 of 11     Prescribed Dose Per Fraction Gy 1.8     Prescribed Dose cGy 1,980    Rad Onc Aria Session Summary    Collection Time: 05/26/23 12:05 PM   Result Value Ref Range    Course ID C1     Course Start Date 4/5/2023  7:44 AM     Treatment Elapsed Days 43 days as  of 2023-05-26 @ 12:0317     Course Intent Post-op     Treatment Dates       Course End Date: : @ --  First Treatment Date: 2023-04-13 @ 14:3206  Last Treatment Date: 2023-05-26 @ 12:0317      Reference Point ID [email or web address]     Dosage Given To Date Gy 58.6950615554279     Session Dosage Given Gy 1.91630302     Reference Point ID [email or web address] prosbed     Dosage Given To Date Gy 7.2     Session Dosage Given Gy 1.8     Reference Point ID [email or web address] prosbed     Dosage Given To Date Gy 57.6     Session Dosage Given Gy 1.8     PLAN ID [email or web address] prosbed     Fractions Treated To Date 4 of 11     Prescribed Dose Per Fraction Gy 1.8     Prescribed Dose cGy 1,980    Rad Onc Aria Session Summary    Collection Time: 05/30/23  3:29 PM   Result Value Ref Range    Course ID C1     Course Start Date 4/5/2023  7:44 AM     Treatment Elapsed Days 47 days as of 2023-05-30 @ 15:2817     Course Intent Post-op     Treatment Dates       Course End Date: : @ --  First Treatment Date: 2023-04-13 @ 14:3206  Last Treatment Date: 2023-05-30 @ 15:2817      Reference Point ID [email or web address]     Dosage Given To Date Gy 60.89944719     Session Dosage Given Gy 1.60514582     Reference Point ID [email or web address] prosbed     Dosage Given To Date Gy 9     Session Dosage Given Gy 1.8     Reference Point ID [email or web address] prosbed     Dosage Given To Date Gy 59.4     Session Dosage Given Gy 1.8     PLAN ID [email or web address] prosbed     Fractions Treated To Date 5 of 11     Prescribed Dose Per Fraction Gy 1.8     Prescribed Dose cGy 1,980    Rad Onc Aria Session Summary    Collection Time: 05/31/23  3:15 PM   Result Value Ref Range    Course ID C1     Course Start Date 4/5/2023  7:44 AM     Treatment Elapsed Days 48 days as of 2023-05-31 @ 15:1314     Course Intent Post-op     Treatment Dates       Course End Date: : @ --  First Treatment Date: 2023-04-13 @ 14:3206  Last Treatment Date: 2023-05-31 @ 15:1314      Reference Point ID [email or web address]     Dosage Given To Date Gy 62.5415820541257     Session Dosage Given Gy  1.85041812     Reference Point ID [email or web address] prosbed     Dosage Given To Date Gy 10.8     Session Dosage Given Gy 1.8     Reference Point ID [email or web address] prosbed     Dosage Given To Date Gy 61.2     Session Dosage Given Gy 1.8     PLAN ID [email or web address] prosbed     Fractions Treated To Date 6 of 11     Prescribed Dose Per Fraction Gy 1.8     Prescribed Dose cGy 1,980    Rad Onc Aria Session Summary    Collection Time: 06/01/23  2:50 PM   Result Value Ref Range    Course ID C1     Course Start Date 4/5/2023  7:44 AM     Treatment Elapsed Days 49 days as of 2023-06-01 @ 14:4835     Course Intent Post-op     Treatment Dates       Course End Date: : @ --  First Treatment Date: 2023-04-13 @ 14:3206  Last Treatment Date: 2023-06-01 @ 14:4835      Reference Point ID [email or web address]     Dosage Given To Date Gy 63.6639242     Session Dosage Given Gy 1.66186269     Reference Point ID [email or web address]_cd prosbed     Dosage Given To Date Gy 12.6     Session Dosage Given Gy 1.8     Reference Point ID [email or web address] prosbed     Dosage Given To Date Gy 63     Session Dosage Given Gy 1.8     PLAN ID [email or web address]_cd prosbed     Fractions Treated To Date 7 of 11     Prescribed Dose Per Fraction Gy 1.8     Prescribed Dose cGy 1,980    Rad Onc Aria Session Summary    Collection Time: 06/02/23  2:30 PM   Result Value Ref Range    Course ID C1     Course Start Date 4/5/2023  7:44 AM     Treatment Elapsed Days 50 days as of 2023-06-02 @ 14:2818     Course Intent Post-op     Treatment Dates       Course End Date: : @ --  First Treatment Date: 2023-04-13 @ 14:3206  Last Treatment Date: 2023-06-02 @ 14:2818      Reference Point ID [email or web address]     Dosage Given To Date Gy 65.16659376     Session Dosage Given Gy 1.99038662     Reference Point ID [email or web address] prosbed     Dosage Given To Date Gy 14.4     Session Dosage Given Gy 1.8     Reference Point ID [email or web address] prosbed     Dosage Given To Date Gy 64.8     Session Dosage Given Gy 1.8     PLAN ID [email or web address]_cd prosbed     Fractions Treated To Date 8 of 11     Prescribed Dose  Per Fraction Gy 1.8     Prescribed Dose cGy 1,980    Rad Onc Aria Session Summary    Collection Time: 06/05/23  2:27 PM   Result Value Ref Range    Course ID C1     Course Start Date 4/5/2023  7:44 AM     Treatment Elapsed Days 53 days as of 2023-06-05 @ 14:2541     Course Intent Post-op     Treatment Dates       Course End Date: : @ --  First Treatment Date: 2023-04-13 @ 14:3206  Last Treatment Date: 2023-06-05 @ 14:2541      Reference Point ID [email or web address]     Dosage Given To Date Gy 67.9597516153134     Session Dosage Given Gy 1.00215794     Reference Point ID [email or web address]_cd prosbed     Dosage Given To Date Gy 16.2     Session Dosage Given Gy 1.8     Reference Point ID [email or web address]_trgt prosbed     Dosage Given To Date Gy 66.6     Session Dosage Given Gy 1.8     PLAN ID [email or web address]_cd prosbed     Fractions Treated To Date 9 of 11     Prescribed Dose Per Fraction Gy 1.8     Prescribed Dose cGy 1,980    Rad Onc Aria Session Summary    Collection Time: 06/06/23  9:10 AM   Result Value Ref Range    Course ID C1     Course Start Date 4/5/2023  7:44 AM     Treatment Elapsed Days 54 days as of 2023-06-06 @ 09:0854     Course Intent Post-op     Treatment Dates       Course End Date: : @ --  First Treatment Date: 2023-04-13 @ 14:3206  Last Treatment Date: 2023-06-06 @ 09:0854      Reference Point ID [email or web address]     Dosage Given To Date Gy 69.2498941028789     Session Dosage Given Gy 1.68039286     Reference Point ID [email or web address]_cd prosbed     Dosage Given To Date Gy 18     Session Dosage Given Gy 1.8     Reference Point ID [email or web address] prosbed     Dosage Given To Date Gy 68.4     Session Dosage Given Gy 1.8     PLAN ID [email or web address]_cd prosbed     Fractions Treated To Date 10 of 11     Prescribed Dose Per Fraction Gy 1.8     Prescribed Dose cGy 1,980    Rad Onc Aria Session Summary    Collection Time: 06/07/23  2:29 PM   Result Value Ref Range    Course ID C1     Course Start Date 4/5/2023  7:44 AM     Treatment Elapsed Days 55 days as of 2023-06-07 @ 14:2817     Course Intent Post-op      Treatment Dates       Course End Date: : @ --  First Treatment Date: 2023-04-13 @ 14:3206  Last Treatment Date: 2023-06-07 @ 14:2817      Reference Point ID [email or web address]     Dosage Given To Date Gy 71.9828131377812     Session Dosage Given Gy 1.98637126     Reference Point ID [email or web address]_cd prosbed     Dosage Given To Date Gy 19.8     Session Dosage Given Gy 1.8     Reference Point ID [email or web address]_trgt prosbed     Dosage Given To Date Gy 70.2     Session Dosage Given Gy 1.8     PLAN ID [email or web address]_cd prosbed     Fractions Treated To Date 11 of 11     Prescribed Dose Per Fraction Gy 1.8     Prescribed Dose cGy 1,980    Rad Onc Aria Course Summary    Collection Time: 06/09/23  9:30 AM   Result Value Ref Range    Course ID C1     Course Start Date 4/5/2023  7:44 AM     Treatment Elapsed Days 55 days as of 2023-06-07 @ 14:2817     Course Intent Post-op     Treatment Dates       Course End Date: 2023-06-09 @ 09:3004  First Treatment Date: 2023-04-13 @ 14:3206  Last Treatment Date: 2023-06-07 @ 14:2817      Reference Point ID [email or web address]     Dosage Given To Date Gy 71.4598663615375     Reference Point ID [email or web address]_part pelv     Dosage Given To Date Gy 50.4     Reference Point ID [email or web address]_cd prosbed     Dosage Given To Date Gy 19.8     Reference Point ID [email or web address]_trgt prosbed     Dosage Given To Date Gy 70.2     PLAN ID 1a_part pelv     Plan Name 1a_part pelv     Fractions Treated To Date 28 of 28     Prescribed Dose Per Fraction Gy 1.8     Prescribed Dose cGy 5,040     PLAN ID [email or web address]_cd prosbed     Plan Name 1b_cd prosbed     Fractions Treated To Date 11 of 11     Prescribed Dose Per Fraction Gy 1.8     Prescribed Dose cGy 1,980        Radiology    No results found.    Assessment and Plan  Prostate cancer (CMS/HCC)    27 2022 underwent prostatectomy for prostate cancer at outside institution.  Clarks score 4+3.  February 2023 PSA increasing.  Urology recommended radiation therapy.  Received Lupron while receiving treatment with radiation.  Completed radiotherapy June  2023.    He completed his radiation.  He feels very well.  He is going to continue surveillance under the direction of radiation oncology and his urologist.    Malignant neoplasm of right male breast (CMS/HCC)  Diagnosed with invasive ductal carcinoma of the right breast, grade 3, ER positive AK positive HER2/jhonatan +3.  Initiated 1 cycle TC pH in September 2022 at [facility].  Questionable reaction to the anti-HER2/jhonatan therapy.  Second cycle with Taxotere and carboplatin alone.  Patient hospitalized.  Patient decays she will never take systemic therapy again for his breast cancer.  Had multiple medical issues including cardiac issues.  Was agreeable to initiating tamoxifen November 2022.  Remained on tamoxifen while underwent treatment for his prostate cancer.  Has been seen by our breast surgery group.    He presents today in follow-up.  He continues on the tamoxifen.  He is continue to have improvement in his breast exam.Recommend that he proceed with surgical intervention.  He does have a follow-up ultrasound of the breast and appoint with Dr. Barb Osuna in approximately a month.  Following that appointment he will have follow-up with my colleague Dr. Kailey Gale regarding further management.  All questions answered.      I spent 30 minutes on this date of service performing the following activities: obtaining history, performing examination, entering orders, documenting, preparing for visit, obtaining / reviewing records, providing counseling and education, communicating results and coordinating care.     Paz Cleveland MD

## 2023-07-05 ENCOUNTER — HOSPITAL ENCOUNTER (OUTPATIENT)
Dept: HEMATOLOGY/ONCOLOGY | Facility: HOSPITAL | Age: 73
Setting detail: INFUSION SERIES
Discharge: HOME | End: 2023-07-05
Attending: INTERNAL MEDICINE
Payer: MEDICARE

## 2023-07-05 DIAGNOSIS — C61 PROSTATE CANCER (CMS/HCC): Primary | ICD-10-CM

## 2023-07-05 PROCEDURE — 96523 IRRIG DRUG DELIVERY DEVICE: CPT

## 2023-07-05 RX ORDER — HEPARIN 100 UNIT/ML
500 SYRINGE INTRAVENOUS AS NEEDED
Status: CANCELLED | OUTPATIENT
Start: 2023-07-05

## 2023-07-05 RX ORDER — HEPARIN 100 UNIT/ML
500 SYRINGE INTRAVENOUS AS NEEDED
Status: DISCONTINUED | OUTPATIENT
Start: 2023-07-05 | End: 2023-07-06 | Stop reason: HOSPADM

## 2023-07-05 RX ADMIN — HEPARIN 500 UNITS: 100 SYRINGE at 14:17

## 2023-07-05 NOTE — PROGRESS NOTES
Patient here for port flush. LCW port accessed, blood return notes, port flushed NSS and heparinized.     Appointment schedule reviewed with patient. Patient verbalized understanding schedule.

## 2023-07-17 ENCOUNTER — OFFICE VISIT (OUTPATIENT)
Dept: SURGERY | Facility: CLINIC | Age: 73
End: 2023-07-17
Payer: MEDICARE

## 2023-07-17 ENCOUNTER — PATIENT OUTREACH (OUTPATIENT)
Dept: RADIOLOGY | Facility: HOSPITAL | Age: 73
End: 2023-07-17
Payer: MEDICARE

## 2023-07-17 ENCOUNTER — TELEPHONE (OUTPATIENT)
Dept: SCHEDULING | Facility: CLINIC | Age: 73
End: 2023-07-17
Payer: MEDICARE

## 2023-07-17 ENCOUNTER — HOSPITAL ENCOUNTER (OUTPATIENT)
Dept: RADIOLOGY | Facility: HOSPITAL | Age: 73
Discharge: HOME | End: 2023-07-17
Attending: SURGERY
Payer: MEDICARE

## 2023-07-17 VITALS
TEMPERATURE: 97.4 F | OXYGEN SATURATION: 99 % | WEIGHT: 188.4 LBS | BODY MASS INDEX: 31.39 KG/M2 | HEIGHT: 65 IN | DIASTOLIC BLOOD PRESSURE: 82 MMHG | HEART RATE: 61 BPM | SYSTOLIC BLOOD PRESSURE: 138 MMHG

## 2023-07-17 DIAGNOSIS — Z17.0: Primary | ICD-10-CM

## 2023-07-17 DIAGNOSIS — C50.021 MALIGNANT NEOPLASM INVOLVING BOTH NIPPLE AND AREOLA OF RIGHT BREAST IN MALE, ESTROGEN RECEPTOR POSITIVE (CMS/HCC): ICD-10-CM

## 2023-07-17 DIAGNOSIS — Z17.0 MALIGNANT NEOPLASM INVOLVING BOTH NIPPLE AND AREOLA OF RIGHT BREAST IN MALE, ESTROGEN RECEPTOR POSITIVE (CMS/HCC): ICD-10-CM

## 2023-07-17 DIAGNOSIS — C50.021: Primary | ICD-10-CM

## 2023-07-17 PROCEDURE — 76642 ULTRASOUND BREAST LIMITED: CPT | Mod: RT

## 2023-07-17 PROCEDURE — 99214 OFFICE O/P EST MOD 30 MIN: CPT | Performed by: SURGERY

## 2023-07-17 NOTE — TELEPHONE ENCOUNTER
Shannon calling from surgeon office requesting cc addendum    Surgery: R Mesectomy   Surgery date: 8/17  Surgeon:Dr. Barb Osuna  Tel: [phone]  Fax: [phone]    Pt last seen 4/23

## 2023-07-17 NOTE — PROGRESS NOTES
[facility]  Dr. Barb Osuna  [address] [place] [address]  Phone: [phone]  Fax: [phone]      Patient ID: Cam Rosas                              : 1950    Visit Date: 2023  Referring Provider: No ref. provider found   PCP: Usman Collins MD  GYN: No care team member to display    Subjective:  Cam presents for follow-up of right breast cancer.  He was initially noted to have prostate cancer, and a week or 2 prior to his prostate surgery his wife had noticed a change in his right nipple.  He ended up having a core needle biopsy on 2022 which showed invasive ductal carcinoma, grade 3, ER positive, AZ positive, HER2 positive.  Genetic testing at that time revealed pathogenic mutations in BRCA1 and OK.  He proceeded with neoadjuvant chemotherapy with TCHP, but had significant side effects with atrial fibrillation and heart failure as well as stomach and duodenal ulcers.  He stopped the chemotherapy, and was placed on tamoxifen.  Since I last saw him, he had 39 treatments of radiation for his prostate cancer, and is no longer on Lupron.  He had an ultrasound done today which showed a slight increase in the size of the retroareolar mass in the right breast now measuring 1.7 cm (1.1 cm in March and 1.9 cm in ).  Also noted on ultrasound were 3 additional probable satellite lesions around the main tumor measuring 1.1 cm, 0.4 cm, and 0.4 cm.  He reports he is tolerating the tamoxifen well.  He has not noticed any change in the breast himself.     Oncology History:    Cancer Staging   Malignant neoplasm of right male breast (CMS/HCC)  Staging form: Breast, AJCC 8th Edition  - Clinical stage from 3/20/2023: Stage IA (cT1c, cN0, cM0, G3, ER+, AZ+, HER2+) - Signed by Barb Osuna MD on 3/20/2023    Prostate cancer (CMS/HCC)  Staging form: Prostate, AJCC 8th Edition  - Pathologic stage from 2022: Stage Unknown  "(pT3b, pNX, cM0, PSA: 6, Grade Group: 3) - Signed by O[facility]ner, Gregory J, MD on 3/8/2023         Oncology History   Malignant neoplasm of right male breast (CMS/HCC)   8/29/2022 Biopsy     1.  Right breast mass:     Invasive ductal carcinoma, Rogerson grade 3 (3+ 3+2).   Invasive carcinoma is 13 mm in maximum dimension in core biopsy.    ER+ 90%; NC+ 50%; Yrv7crt+3; [id number]%     9/30/2022 -  Chemotherapy    9/30/2022 initiated first cycle TCP H at [facility].  Questionable reaction during the anti-HER2/jhonatan therapy with Herceptin.  Taxotere and carboplatin given on 10/3/2022.  Patient hospitalized after first cycle.     10/19/2022 Initial Diagnosis    Malignant neoplasm of right male breast (CMS/HCC)     11/15/2022 -  Hormone Therapy    Tamoxifen 20 mg daily while undergoes evaluation of cardiac issues     3/20/2023 Cancer Staged    Staging form: Breast, AJCC 8th Edition  - Clinical stage from 3/20/2023: Stage IA (cT1c, cN0, cM0, G3, ER+, NC+, HER2+)     Prostate cancer (CMS/HCC)   7/27/2022 Cancer Staged    Staging form: Prostate, AJCC 8th Edition  - Pathologic stage from 7/27/2022: Stage Unknown (pT3b, pNX, cM0, PSA: 6, Grade Group: 3)     3/21/2023 - 6/20/2023 Chemotherapy    LEUPROLIDE (LUPRON) 22.5 MG EVERY 3 MONTHS  Plan Provider: Paz Cleveland MD     3/21/2023 -  Chemotherapy    MEDIPORT FLUSH (SINGLE LUMEN) - PATIENT ON TREATMENT  Plan Provider: Paz Cleveland MD     3/21/2023 - 3/21/2023 Chemotherapy    LEUPROLIDE (LUPRON) 22.5 MG EVERY 3 MONTHS  Plan Provider: Paz Cleveland MD           Review of Systems       Physical Exam:    Vitals:   Visit Vitals  /82   Pulse 61   Temp 36.3 °C (97.4 °F)   Ht 1.651 m (5' 5\")   Wt 85.5 kg (188 lb 6.4 oz)   SpO2 99%   BMI 31.35 kg/m²     Body mass index is 31.35 kg/m².    Physical Exam  Constitutional:       Appearance: Normal appearance.   HENT:      Head: Normocephalic and atraumatic.   Cardiovascular:      Rate and Rhythm: Normal rate " and regular rhythm.      Heart sounds: Normal heart sounds.   Pulmonary:      Effort: Pulmonary effort is normal.      Breath sounds: Normal breath sounds.   Chest:          Comments: Left breast and nipple normal in appearance with minimal gynecomastia.  Right nipple is flattened with slight inversion and slight nodularity of the nipple.  No ulceration or nipple discharge noted, but there is a slight red sara on the nipple.  In the retroareolar right breast there is a small mass present measuring about a centimeter, fixed to the nipple, no definite change in size.  No other dominant masses noted.  No axillary or supraclavicular adenopathy.  Scar lateral right breast from previous valve replacement  Abdominal:      General: Abdomen is flat.      Palpations: Abdomen is soft.   Musculoskeletal:         General: Normal range of motion.   Lymphadenopathy:      Cervical: No cervical adenopathy.   Skin:     General: Skin is warm and dry.   Neurological:      General: No focal deficit present.      Mental Status: He is alert and oriented to person, place, and time.   Psychiatric:         Mood and Affect: Mood normal.         Behavior: Behavior normal.         Breast Imaging: I have personally reviewed the reports and images as follows.  Ultrasound was reviewed and shows a small increase in the size of the tumor as well as 3 additional lesions around the primary tumor  Impression:  Malignant neoplasm of nipple of right male breast  Recommendation and Plan:   Cam presents for follow-up of his right breast cancer.  Unfortunately, on ultrasound it appears there has been a slight increase in the size of the tumor as well as several satellite lesions which have developed.  He is taking tamoxifen, but is not interested in resuming chemotherapy because of his previous reaction to that.  I explained that I would recommend surgery at this time, and as he has several satellite lesions I would recommend mastectomy rather than a  lumpectomy.  He will also need a sentinel node biopsy.  He and his wife are in agreement with the plan.  We discussed the plan for mastectomy with sentinel node biopsy and possible axillary node dissection.  I explained risks of bleeding, infection, scarring, lymphedema, nerve damage, numbness.  We discussed placement of the drain at the time of surgery and the possibility of needing an axillary node dissection if 3 or more nodes are found to be positive with metastatic cancer.  We also reviewed the potential of needing postmastectomy radiation.  All his questions as well as his wife's questions were addressed, and he will be scheduled for surgery as soon as possible.  He will need cardiac clearance for the surgery.    All of the questions were answered.  The patient is in agreement with the treatment plan.      No follow-ups on file.        7/17/2023   11:42 AM    MD Barb Robles MD

## 2023-07-17 NOTE — LETTER
2023     Usman Collins MD  [address] [facility] [address]    Patient: Cam Rosas  YOB: 1950  Date of Visit: 2023      Dear Dr. Collins:    Thank you for referring Cam Rosas to me for evaluation. Below are my notes for this consultation.    If you have questions, please do not hesitate to call me. I look forward to following your patient along with you.         Sincerely,        Barb Osuna MD        CC: Steven M Domsky, MD Christine E Szarka, MD Gregory J Ochsner, MD Carruthers, Catherine D, MD  2023 12:59 PM  Signed      [facility]  Dr. Barb Osuna  [facility] [address] [place] [address]  Phone: [phone]  Fax: [phone]      Patient ID: Cam Rosas                              : 1950    Visit Date: 2023  Referring Provider: No ref. provider found   PCP: Usman Collins MD  GYN: No care team member to display    Subjective:  Cam presents for follow-up of right breast cancer.  He was initially noted to have prostate cancer, and a week or 2 prior to his prostate surgery his wife had noticed a change in his right nipple.  He ended up having a core needle biopsy on 2022 which showed invasive ductal carcinoma, grade 3, ER positive, ID positive, HER2 positive.  Genetic testing at that time revealed pathogenic mutations in BRCA1 and OK.  He proceeded with neoadjuvant chemotherapy with TCHP, but had significant side effects with atrial fibrillation and heart failure as well as stomach and duodenal ulcers.  He stopped the chemotherapy, and was placed on tamoxifen.  Since I last saw him, he had 39 treatments of radiation for his prostate cancer, and is no longer on Lupron.  He had an ultrasound done today which showed a slight increase in the size of the retroareolar mass in the right breast now measuring 1.7 cm (1.1 cm in March and 1.9 cm in ).  Also noted on ultrasound were  3 additional probable satellite lesions around the main tumor measuring 1.1 cm, 0.4 cm, and 0.4 cm.  He reports he is tolerating the tamoxifen well.  He has not noticed any change in the breast himself.     Oncology History:    Cancer Staging   Malignant neoplasm of right male breast (CMS/HCC)  Staging form: Breast, AJCC 8th Edition  - Clinical stage from 3/20/2023: Stage IA (cT1c, cN0, cM0, G3, ER+, VT+, HER2+) - Signed by Barb Osuna MD on 3/20/2023    Prostate cancer (CMS/[facility])  Staging form: Prostate, AJCC 8th Edition  - Pathologic stage from 7/27/2022: Stage Unknown (pT3b, pNX, cM0, PSA: 6, Grade Group: 3) - Signed by Ochsner, Gregory J, MD on 3/8/2023         Oncology History   Malignant neoplasm of right male breast (CMS/HCC)   8/29/2022 Biopsy     1.  Right breast mass:     Invasive ductal carcinoma, Oumar grade 3 (3+ 3+2).   Invasive carcinoma is 13 mm in maximum dimension in core biopsy.    ER+ 90%; VT+ 50%; Wvo7svs+3; RC0137%     9/30/2022 -  Chemotherapy    9/30/2022 initiated first cycle TCP H at [facility].  Questionable reaction during the anti-HER2/jhonatan therapy with Herceptin.  Taxotere and carboplatin given on 10/3/2022.  Patient hospitalized after first cycle.     10/19/2022 Initial Diagnosis    Malignant neoplasm of right male breast (CMS/HCC)     11/15/2022 -  Hormone Therapy    Tamoxifen 20 mg daily while undergoes evaluation of cardiac issues     3/20/2023 Cancer Staged    Staging form: Breast, AJCC 8th Edition  - Clinical stage from 3/20/2023: Stage IA (cT1c, cN0, cM0, G3, ER+, VT+, HER2+)     Prostate cancer (CMS/HCC)   7/27/2022 Cancer Staged    Staging form: Prostate, AJCC 8th Edition  - Pathologic stage from 7/27/2022: Stage Unknown (pT3b, pNX, cM0, PSA: 6, Grade Group: 3)     3/21/2023 - 6/20/2023 Chemotherapy    LEUPROLIDE (LUPRON) 22.5 MG EVERY 3 MONTHS  Plan Provider: Paz Cleveland MD     3/21/2023 -  Chemotherapy    MEDIPORT FLUSH (SINGLE LUMEN) -  "PATIENT ON TREATMENT  Plan Provider: Paz Cleveland MD     3/21/2023 - 3/21/2023 Chemotherapy    LEUPROLIDE (LUPRON) 22.5 MG EVERY 3 MONTHS  Plan Provider: Paz Cleveland MD           Review of Systems       Physical Exam:    Vitals:   Visit Vitals  /82   Pulse 61   Temp 36.3 °C (97.4 °F)   Ht 1.651 m (5' 5\")   Wt 85.5 kg (188 lb 6.4 oz)   SpO2 99%   BMI 31.35 kg/m²     Body mass index is 31.35 kg/m².    Physical Exam  Constitutional:       Appearance: Normal appearance.   HENT:      Head: Normocephalic and atraumatic.   Cardiovascular:      Rate and Rhythm: Normal rate and regular rhythm.      Heart sounds: Normal heart sounds.   Pulmonary:      Effort: Pulmonary effort is normal.      Breath sounds: Normal breath sounds.   Chest:          Comments: Left breast and nipple normal in appearance with minimal gynecomastia.  Right nipple is flattened with slight inversion and slight nodularity of the nipple.  No ulceration or nipple discharge noted, but there is a slight red sara on the nipple.  In the retroareolar right breast there is a small mass present measuring about a centimeter, fixed to the nipple, no definite change in size.  No other dominant masses noted.  No axillary or supraclavicular adenopathy.  Scar lateral right breast from previous valve replacement  Abdominal:      General: Abdomen is flat.      Palpations: Abdomen is soft.   Musculoskeletal:         General: Normal range of motion.   Lymphadenopathy:      Cervical: No cervical adenopathy.   Skin:     General: Skin is warm and dry.   Neurological:      General: No focal deficit present.      Mental Status: He is alert and oriented to person, place, and time.   Psychiatric:         Mood and Affect: Mood normal.         Behavior: Behavior normal.         Breast Imaging: I have personally reviewed the reports and images as follows.  Ultrasound was reviewed and shows a small increase in the size of the tumor as well as 3 additional " lesions around the primary tumor  Impression:  Malignant neoplasm of nipple of right male breast  Recommendation and Plan:   Cam presents for follow-up of his right breast cancer.  Unfortunately, on ultrasound it appears there has been a slight increase in the size of the tumor as well as several satellite lesions which have developed.  He is taking tamoxifen, but is not interested in resuming chemotherapy because of his previous reaction to that.  I explained that I would recommend surgery at this time, and as he has several satellite lesions I would recommend mastectomy rather than a lumpectomy.  He will also need a sentinel node biopsy.  He and his wife are in agreement with the plan.  We discussed the plan for mastectomy with sentinel node biopsy and possible axillary node dissection.  I explained risks of bleeding, infection, scarring, lymphedema, nerve damage, numbness.  We discussed placement of the drain at the time of surgery and the possibility of needing an axillary node dissection if 3 or more nodes are found to be positive with metastatic cancer.  We also reviewed the potential of needing postmastectomy radiation.  All his questions as well as his wife's questions were addressed, and he will be scheduled for surgery as soon as possible.  He will need cardiac clearance for the surgery.    All of the questions were answered.  The patient is in agreement with the treatment plan.      No follow-ups on file.        7/17/2023   11:42 AM    MD Barb Robles MD

## 2023-07-18 ENCOUNTER — DOCUMENTATION (OUTPATIENT)
Dept: RADIATION ONCOLOGY | Facility: HOSPITAL | Age: 73
End: 2023-07-18
Payer: MEDICARE

## 2023-07-18 PROBLEM — N18.31 STAGE 3A CHRONIC KIDNEY DISEASE (CMS/HCC): Status: ACTIVE | Noted: 2021-08-31

## 2023-07-18 PROBLEM — E86.0 DEHYDRATION DETERMINED BY EXAMINATION: Status: ACTIVE | Noted: 2022-10-10

## 2023-07-18 PROBLEM — M48.061 SPINAL STENOSIS OF LUMBAR REGION: Status: ACTIVE | Noted: 2023-07-18

## 2023-07-18 PROBLEM — I10 ESSENTIAL HYPERTENSION: Status: ACTIVE | Noted: 2021-08-31

## 2023-07-18 PROBLEM — I49.1 APC (ATRIAL PREMATURE CONTRACTIONS): Status: ACTIVE | Noted: 2022-03-30

## 2023-07-18 PROBLEM — E78.5 HYPERLIPIDEMIA: Status: ACTIVE | Noted: 2021-08-31

## 2023-07-18 PROBLEM — M51.16 HERNIATION OF LUMBAR INTERVERTEBRAL DISC WITH RADICULOPATHY: Status: ACTIVE | Noted: 2023-07-18

## 2023-07-18 PROBLEM — M47.817 SPONDYLOSIS OF LUMBOSACRAL REGION: Status: ACTIVE | Noted: 2023-07-18

## 2023-07-18 NOTE — PROGRESS NOTES
Prostate Cancer Follow Up (RADIATION)    LOV 6/6/23- 184 LBS  EOT 6/7/23    Dr Cleveland- Hem/ Onc LOV 6/20/23  Dr Osuna- [facility] LOV 7/17/23    Surgery- Masectomy 8/17/23  Ultrasound Breast 7/17/23  CT Head 4/27/23    PSA- [facility] 7/23/23

## 2023-07-19 ENCOUNTER — PATIENT OUTREACH (OUTPATIENT)
Dept: RADIOLOGY | Facility: HOSPITAL | Age: 73
End: 2023-07-19
Payer: MEDICARE

## 2023-07-21 LAB
BUN SERPL-MCNC: 23 MG/DL (ref 7–25)
BUN/CREAT SERPL: 16 (CALC) (ref 6–22)
CALCIUM SERPL-MCNC: 8.5 MG/DL (ref 8.6–10.3)
CHLORIDE SERPL-SCNC: 109 MMOL/L (ref 98–110)
CO2 SERPL-SCNC: 25 MMOL/L (ref 20–32)
CREAT SERPL-MCNC: 1.46 MG/DL (ref 0.7–1.28)
EGFRCR SERPLBLD CKD-EPI 2021: 50 ML/MIN/1.73M2
GLUCOSE SERPL-MCNC: 83 MG/DL (ref 65–99)
POTASSIUM SERPL-SCNC: 5 MMOL/L (ref 3.5–5.3)
SODIUM SERPL-SCNC: 142 MMOL/L (ref 135–146)

## 2023-07-23 LAB — PSA SERPL DL<=0.01 NG/ML-MCNC: <0.02 NG/ML

## 2023-07-26 ENCOUNTER — TELEPHONE (OUTPATIENT)
Dept: SURGERY | Facility: CLINIC | Age: 73
End: 2023-07-26
Payer: MEDICARE

## 2023-07-27 ENCOUNTER — HOSPITAL ENCOUNTER (OUTPATIENT)
Dept: RADIATION ONCOLOGY | Facility: HOSPITAL | Age: 73
Setting detail: RADIATION/ONCOLOGY SERIES
Discharge: HOME | End: 2023-07-27
Attending: RADIOLOGY
Payer: MEDICARE

## 2023-07-27 VITALS
WEIGHT: 189.2 LBS | BODY MASS INDEX: 31.48 KG/M2 | HEART RATE: 54 BPM | TEMPERATURE: 97.3 F | DIASTOLIC BLOOD PRESSURE: 67 MMHG | SYSTOLIC BLOOD PRESSURE: 133 MMHG

## 2023-07-27 DIAGNOSIS — C61 PROSTATE CANCER (CMS/HCC): Primary | ICD-10-CM

## 2023-07-27 ASSESSMENT — ENCOUNTER SYMPTOMS
HEMATURIA: 0
SHORTNESS OF BREATH: 0
CONSTITUTIONAL NEGATIVE: 1
COUGH: 1
GASTROINTESTINAL NEGATIVE: 1
OCCASIONAL FEELINGS OF UNSTEADINESS: 0
DYSURIA: 0
DYSPHORIC MOOD: 1
NEUROLOGICAL NEGATIVE: 1
CARDIOVASCULAR NEGATIVE: 1
DIFFICULTY URINATING: 1
NERVOUS/ANXIOUS: 1
EYES NEGATIVE: 1
ARTHRALGIAS: 1

## 2023-07-27 ASSESSMENT — PAIN SCALES - GENERAL: PAINLEVEL: 0-NO PAIN

## 2023-07-27 NOTE — PROGRESS NOTES
Subjective      Patient ID: Cam Rosas is a 73 y.o. male.  1950    Diagnosis:  No diagnosis found.    HPI    The following have been reviewed and updated as appropriate in this visit:        Review of Systems   Constitutional: Negative.    HENT: Negative.    Eyes: Negative.    Respiratory: Positive for cough (in am - takes mucinex). Negative for shortness of breath.    Cardiovascular: Negative.    Gastrointestinal: Negative.    Endocrine: Positive for heat intolerance (  Hot flashes - on Tamoxifen).   Genitourinary: Positive for difficulty urinating (incontinence/feels a sensation - small amount /wears a pad). Negative for dysuria and hematuria.   Musculoskeletal: Positive for arthralgias (knee).   Skin: Negative.    Neurological: Negative.    Psychiatric/Behavioral: Positive for dysphoric mood (it comes and goes). The patient is nervous/anxious (Its comes and goes.  Denies need for therapy).    Mastectomy 8/17/23 with Dr. Osuna    Objective     Vitals:    07/27/23 1319   BP: 133/67   Patient Position: Sitting   Pulse: (!) 54   Temp: 36.3 °C (97.3 °F)   Weight: 85.8 kg (189 lb 3.2 oz)     Body mass index is 31.48 kg/m².    Physical Exam    Assessment/Plan   Diagnoses and Orders Associated With This Visit:  There are no diagnoses linked to this encounter.

## 2023-07-27 NOTE — PROGRESS NOTES
[facility] RADIATION THERAPY  [address] [place] [address]  Dept: [phone]  Loc: [phone]    Objective   Cam Rosas, 1950, has completed a course of radiation.  Cam Rosas was seen and treated in Radiation Oncology at [facility] RADIATION THERAPY.     Cam Rosas, MRN: [id number]   Patient Active Problem List   Diagnosis   • Atrial fibrillation, unspecified type (CMS/HCC)   • Malignant neoplasm of right male breast (CMS/HCC)   • Electrolyte abnormality   • Gastrointestinal hemorrhage with melena   • CKD (chronic kidney disease)   • Prostate cancer (CMS/HCC)   • Acute systolic heart failure (CMS/HCC)   • APC (atrial premature contractions)   • Dehydration determined by examination   • Essential hypertension   • Herniation of lumbar intervertebral disc with radiculopathy   • Hyperlipidemia   • Spinal stenosis of lumbar region   • Spondylosis of lumbosacral region   • Stage 3a chronic kidney disease (CMS/HCC)       Below you will find the details of the course of radiation therapy.  He tolerated the treatment well.      Treatment Intent: primary.  Cancer Staging   Malignant neoplasm of right male breast (CMS/HCC)  Staging form: Breast, AJCC 8th Edition  - Clinical stage from 3/20/2023: Stage IA (cT1c, cN0, cM0, G3, ER+, DC+, HER2+) - Signed by Barb Osuna MD on 3/20/2023    Prostate cancer (CMS/HCC)  Staging form: Prostate, AJCC 8th Edition  - Pathologic stage from 7/27/2022: Stage Unknown (pT3b, pNX, cM0, PSA: 6, Grade Group: 3) - Signed by O[facility]ner, Gregory J, MD on 3/8/2023       Performance Status at the End of Treatment: 80, Normal activity with effort; some signs or symptoms of disease..    LABS:  CMP  Lab Results   Component Value Date    ALBUMIN 3.5 (L) 04/24/2023    CO2 25 07/20/2023    BUN 23 07/20/2023    CREATININE 1.46 (H) 07/20/2023    GLUCOSE 83 07/20/2023    AST 16 04/24/2023    ALT 10 04/24/2023    EGFR 50 (L) 07/20/2023    EGFR  57 (L) 04/24/2023    EGFR 41 (L) 02/23/2023    EGFR 54.3 (L) 12/01/2022    EGFR 48 (L) 11/09/2022    EGFR 42.7 (L) 10/23/2022     CBC  Lab Results   Component Value Date    WBC 4.4 04/24/2023    HGB 12.1 (L) 04/24/2023    HCT 37.1 (L) 04/24/2023    MCV 94.6 04/24/2023     04/24/2023     12/01/2022     10/23/2022     10/22/2022     10/21/2022     10/20/2022     Tumor Marker  Lab Results   Component Value Date    PSA 0.11 04/24/2023        Treatment Plan Summary  Radiation Therapy  Radiation Treatments     Active   No active radiation treatments to show.   Historical   Plans   1a_part pelv   Most recent treatment: Dose planned: 180 cGy (fraction 28 of 28 on 5/22/2023)   Total: Dose planned: 5,040 cGy   Elapsed Days: 55 days as of 2023-06-07 @ 14:2817      1b_cd prosbed   Most recent treatment: Dose planned: 180 cGy (fraction 11 of 11 on 6/7/2023)   Total: Dose planned: 1,980 cGy   Elapsed Days: 55 days as of 2023-06-07 @ 14:2817      1a_part pelv   Most recent treatment: Dose planned: 180 cGy (fraction 0 of 28 on 4/7/2023)   Total: Dose planned: 5,040 cGy   Elapsed Days: : @ --      1a_part pelv1   Most recent treatment: Dose planned: 180 cGy (fraction 0 of 28 on 4/7/2023)   Total: Dose planned: 5,040 cGy   Elapsed Days: : @ --      1b_cd prosbd1   Most recent treatment: Dose planned: 180 cGy (fraction 0 of 11 on 4/7/2023)   Total: Dose planned: 1,980 cGy   Elapsed Days: : @ --      1b_cd prosbed   Most recent treatment: Dose planned: 180 cGy (fraction 0 of 11 on 4/7/2023)   Total: Dose planned: 1,980 cGy   Elapsed Days: : @ --      Reference Points   1_calc   Most recent treatment: Dose given: 183 cGy (on 6/7/2023)   Total: Dose given: 7,127 cGy   Elapsed Days: 55 days as of 2023-06-07 @ 14:2817      1a_part pelv   Most recent treatment: Dose given: 180 cGy (on 5/22/2023)   Total: Dose given: 5,040 cGy   Elapsed Days: 55 days as of 2023-06-07 @ 14:2817      1b_cd prosbed    Most recent treatment: Dose given: 180 cGy (on 6/7/2023)   Total: Dose given: 1,980 cGy   Elapsed Days: 55 days as of 2023-06-07 @ 14:2817      1c_trgt prosbed   Most recent treatment: Dose given: 180 cGy (on 6/7/2023)   Total: Dose given: 7,020 cGy   Elapsed Days: 55 days as of 2023-06-07 @ 14:2817      1_calc   Most recent treatment: Course completed on 4/7/2023   Total: Dose given: 0 cGy   Elapsed Days: : @ --      1a_part pelv   Most recent treatment: Course completed on 4/7/2023   Total: Dose given: 0 cGy   Elapsed Days: : @ --      1b_cd prosbed   Most recent treatment: Course completed on 4/7/2023   Total: Dose given: 0 cGy   Elapsed Days: : @ --      1c_trgt prosbed   Most recent treatment: Course completed on 4/7/2023   Total: Dose given: 0 cGy   Elapsed Days: : @ --                        Assessment/Plan See follow-up note from today.    Plan for Follow-up: We plan to see the patient back in in 3 months to monitor immediate post-therapy progress, which will be further documented.  The patient is also informed of the need for continuing follow-up through your office.    CMS Clinical Data Elements  End of Treatment  [facility] ONCBCN RADIATION COMPLETION FORM ADVANCED       7/27/2023 1:48 PM

## 2023-07-27 NOTE — PROGRESS NOTES
Patient ID:  Cam Rosas is a 73 y.o. male.  Referring Physician:   No referring provider defined for this encounter.    Primary Care Provider:  Usman Collins MD    Problem List:  Patient Active Problem List    Diagnosis Date Noted   • Herniation of lumbar intervertebral disc with radiculopathy 07/18/2023   • Spinal stenosis of lumbar region 07/18/2023   • Spondylosis of lumbosacral region 07/18/2023   • Acute systolic heart failure (CMS/HCC) 02/15/2023   • Atrial fibrillation, unspecified type (CMS/HCC) 10/19/2022   • Malignant neoplasm of right male breast (CMS/HCC) 10/19/2022   • Electrolyte abnormality 10/19/2022   • Gastrointestinal hemorrhage with melena 10/19/2022   • CKD (chronic kidney disease) 10/19/2022   • Prostate cancer (CMS/HCC) 10/19/2022   • Dehydration determined by examination 10/10/2022   • APC (atrial premature contractions) 03/30/2022   • Essential hypertension 08/31/2021   • Hyperlipidemia 08/31/2021   • Stage 3a chronic kidney disease (CMS/HCC) 08/31/2021        Cancer Staging Information:  Cancer Staging   Malignant neoplasm of right male breast (CMS/HCC)  Staging form: Breast, AJCC 8th Edition  - Clinical stage from 3/20/2023: Stage IA (cT1c, cN0, cM0, G3, ER+, PA+, HER2+) - Signed by Barb Osuna MD on 3/20/2023    Prostate cancer (CMS/[facility])  Staging form: Prostate, AJCC 8th Edition  - Pathologic stage from 7/27/2022: Stage Unknown (pT3b, pNX, cM0, PSA: 6, Grade Group: 3) - Signed by Ochsner, Gregory J, MD on 3/8/2023      Subjective      Radiation Delivered  Radiation Therapy   Component Value Date    COURSEID C1 06/09/2023    PLANID [id number] pelv 06/09/2023    PLANID 1b_cd prosbed 06/09/2023    PRESCRIBEDDO 1.8 06/09/2023    PRESCRIBEDDO 1.8 06/09/2023    DOSAGEGIVENT 19.8 06/09/2023    DOSAGEGIVENT 70.2 06/09/2023    DOSAGEGIVENT [id number] 06/09/2023    DOSAGEGIVENT 50.4 06/09/2023    FRACTIONSTRE 28 of 28 06/09/2023    FRACTIONSTRE 11 of 11 06/09/2023        History of Present Illness:  Patient presents with spouse in good spirits.  Patient denies hot flashes notes occasional urinary leakage.  Uses a pad otherwise without complaints.  Admits not using Kegel exercises as recommended.  Recent PSA levels undetectable.  Continues tamoxifen for right breast cancer.    Physical Exam:  Vital Signs for this encounter:  BP: 133/67  Temp: 36.3 °C (97.3 °F)  Heart Rate: 54  Weight: 85.8 kg (189 lb 3.2 oz) (07/27 1319)  Fullness right breast firmness and area of biopsy consistent with persistent primary right breast cancer.  No adenopathy appreciated.  Lungs clear.  Abdomen soft nontender.  Performance Status:90       PAIN ASSESSMENT:  Score Zero    Location    Pain Intervention      LABS:  Recent Results (from the past 336 hour(s))   Basic metabolic panel    Collection Time: 07/20/23  9:55 AM   Result Value Ref Range Status    Glucose 83 65 - 99 mg/dL Final    BUN 23 7 - 25 mg/dL Final    Creatinine 1.46 (H) 0.70 - 1.28 mg/dL Final    EGFR 50 (L) > OR = 60 mL/min/1.73m2 Final    Bun/Creatinine Ratio 16 6 - 22 (calc) Final    Sodium 142 135 - 146 mmol/L Final    Potassium 5.0 3.5 - 5.3 mmol/L Final    Chloride 109 98 - 110 mmol/L Final    Carbon Dioxide 25 20 - 32 mmol/L Final    Calcium 8.5 (L) 8.6 - 10.3 mg/dL Final   PSA, Post Prostatectomy    Collection Time: 07/20/23  9:59 AM   Result Value Ref Range Status    PSA, Post-Prostatectomy <0.02 ng/mL Final          Assessment/Plan Patient with biochemical recurrent prostate cancer following prostatectomy recently completed radiation therapy with hormonal therapy.  Patient has excellent biochemical response thus far.  Hormonal therapy was discontinued secondary to side effects.  Was having lightheadedness and falling that no longer is a problem.  Continues tamoxifen for right breast cancer scheduled for mastectomy in a few weeks with Dr. Paz.  Patient to have repeat PSA level and testosterone level done next month and  see me in a few months.  We will review pathology from breast cancer mastectomy to ensure no need for postop radiation therapy.  Continue follow-up with urology and encouraged to do Kegel exercises.    Diagnoses and Orders Associated With This Visit:  Prostate cancer (CMS/[facility]) (Primary)  -     PSA, Post Prostatectomy  -     Testosterone        TREATMENT PLAN SUMMARY:  Treatment Details- Chemotherapy   Treatment goal [No plan goal]   Plan Name LEUPROLIDE (LUPRON) 22.5 MG EVERY 3 MONTHS   Status Inactive   Start Date 3/21/2023   End Date 3/21/2023   Provider Paz Cleveland MD   IV Chemotherapy leuprolide (3 month) (LUPRON DEPOT) intramuscular injection 22.5 mg, 22.5 mg, intramuscular, Once, 1 of 1 cycle                       Treatment Details- Chemotherapy   Treatment goal [No plan goal]   Plan Name MEDIPORT FLUSH (SINGLE LUMEN) - PATIENT ON TREATMENT   Status Active   Start Date 3/21/2023   End Date Until discontinued   Provider Paz Cleveland MD   IV Chemotherapy [No matching medication found in this treatment plan]                     Treatment Details- Chemotherapy   Treatment goal [No plan goal]   Plan Name LEUPROLIDE (LUPRON) 22.5 MG EVERY 3 MONTHS   Status Inactive   Start Date 3/21/2023   End Date 3/21/2023   Provider Paz Cleveland MD   IV Chemotherapy leuprolide (3 month) (LUPRON DEPOT) intramuscular injection 22.5 mg, 22.5 mg, intramuscular, Once, 1 of 1 cycle  Administration: 22.5 mg (3/21/2023)

## 2023-07-27 NOTE — LETTER
July 27, 2023                                                          Patient: Cam Rosas   YOB: 1950   Date of Visit: 7/27/2023       Dear Dr. Collins:    The patient is seen at the [facility] today. Attached is my assessment and plan of care.  Thank you for the opportunity to share in Cam Rosas's care.       Sincerely,        Gregory J. Ochsner, MD      CC: No Recipients

## 2023-08-01 ENCOUNTER — APPOINTMENT (OUTPATIENT)
Dept: PREADMISSION TESTING | Facility: HOSPITAL | Age: 73
End: 2023-08-01
Payer: MEDICARE

## 2023-08-01 ENCOUNTER — PREP FOR CASE (OUTPATIENT)
Dept: SURGERY | Facility: CLINIC | Age: 73
End: 2023-08-01
Payer: MEDICARE

## 2023-08-01 VITALS — BODY MASS INDEX: 30.79 KG/M2 | WEIGHT: 185 LBS

## 2023-08-01 DIAGNOSIS — Z17.0: Primary | ICD-10-CM

## 2023-08-01 DIAGNOSIS — C50.021: Primary | ICD-10-CM

## 2023-08-01 RX ORDER — SODIUM CHLORIDE 9 MG/ML
INJECTION, SOLUTION INTRAVENOUS CONTINUOUS
Status: CANCELLED | OUTPATIENT
Start: 2023-08-01 | End: 2023-08-02

## 2023-08-01 RX ORDER — ACETAMINOPHEN 325 MG/1
975 TABLET ORAL
Status: CANCELLED | OUTPATIENT
Start: 2023-08-01

## 2023-08-01 RX ORDER — GUAIFENESIN 600 MG/1
1200 TABLET, EXTENDED RELEASE ORAL 2 TIMES DAILY
COMMUNITY
End: 2024-01-10 | Stop reason: HOSPADM

## 2023-08-01 RX ORDER — GABAPENTIN 100 MG/1
300 CAPSULE ORAL
Status: CANCELLED | OUTPATIENT
Start: 2023-08-01

## 2023-08-01 NOTE — H&P
Subjective:  Cam presents for follow-up of right breast cancer.  He was initially noted to have prostate cancer, and a week or 2 prior to his prostate surgery his wife had noticed a change in his right nipple.  He ended up having a core needle biopsy on August 29, 2022 which showed invasive ductal carcinoma, grade 3, ER positive, RI positive, HER2 positive.  Genetic testing at that time revealed pathogenic mutations in BRCA1 and OK.  He proceeded with neoadjuvant chemotherapy with TCHP, but had significant side effects with atrial fibrillation and heart failure as well as stomach and duodenal ulcers.  He stopped the chemotherapy, and was placed on tamoxifen.  Since I last saw him, he had 39 treatments of radiation for his prostate cancer, and is no longer on Lupron.  He had an ultrasound done today which showed a slight increase in the size of the retroareolar mass in the right breast now measuring 1.7 cm (1.1 cm in March and 1.9 cm in September, 2022).  Also noted on ultrasound were 3 additional probable satellite lesions around the main tumor measuring 1.1 cm, 0.4 cm, and 0.4 cm.  He reports he is tolerating the tamoxifen well.  He has not noticed any change in the breast himself.                 Oncology History:                          Cancer Staging   Malignant neoplasm of right male breast (CMS/HCC)  Staging form: Breast, AJCC 8th Edition  - Clinical stage from 3/20/2023: Stage IA (cT1c, cN0, cM0, G3, ER+, RI+, HER2+) - Signed by Barb Osuna MD on 3/20/2023     Prostate cancer (CMS/[facility])  Staging form: Prostate, AJCC 8th Edition  - Pathologic stage from 7/27/2022: Stage Unknown (pT3b, pNX, cM0, PSA: 6, Grade Group: 3) - Signed by Ochsner, Gregory J, MD on 3/8/2023                                           Patient's Oncology History documentation       Oncology History   Malignant neoplasm of right male breast (CMS/HCC)   8/29/2022 Biopsy       1.  Right breast mass:     Invasive ductal  "carcinoma, [place] grade 3 (3+ 3+2).   Invasive carcinoma is 13 mm in maximum dimension in core biopsy.    ER+ 90%; MI+ 50%; Pai8rrn+3; MV4958%      9/30/2022 -  Chemotherapy     9/30/2022 initiated first cycle TCP H at [facility].  Questionable reaction during the anti-HER2/jhonatan therapy with Herceptin.  Taxotere and carboplatin given on 10/3/2022.  Patient hospitalized after first cycle.      10/19/2022 Initial Diagnosis     Malignant neoplasm of right male breast (CMS/HCC)      11/15/2022 -  Hormone Therapy     Tamoxifen 20 mg daily while undergoes evaluation of cardiac issues      3/20/2023 Cancer Staged     Staging form: Breast, AJCC 8th Edition  - Clinical stage from 3/20/2023: Stage IA (cT1c, cN0, cM0, G3, ER+, MI+, HER2+)      Prostate cancer (CMS/HCC)   7/27/2022 Cancer Staged     Staging form: Prostate, AJCC 8th Edition  - Pathologic stage from 7/27/2022: Stage Unknown (pT3b, pNX, cM0, PSA: 6, Grade Group: 3)      3/21/2023 - 6/20/2023 Chemotherapy     LEUPROLIDE (LUPRON) 22.5 MG EVERY 3 MONTHS  Plan Provider: Paz Cleveland MD      3/21/2023 -  Chemotherapy     MEDIPORT FLUSH (SINGLE LUMEN) - PATIENT ON TREATMENT  Plan Provider: Paz Cleveland MD      3/21/2023 - 3/21/2023 Chemotherapy     LEUPROLIDE (LUPRON) 22.5 MG EVERY 3 MONTHS  Plan Provider: Paz Cleveland MD                  Review of Systems         Physical Exam:     Vitals:   Visit Vitals  /82   Pulse 61   Temp 36.3 °C (97.4 °F)   Ht 1.651 m (5' 5\")   Wt 85.5 kg (188 lb 6.4 oz)   SpO2 99%   BMI 31.35 kg/m²      Body mass index is 31.35 kg/m².     Physical Exam  Constitutional:       Appearance: Normal appearance.   HENT:      Head: Normocephalic and atraumatic.   Cardiovascular:      Rate and Rhythm: Normal rate and regular rhythm.      Heart sounds: Normal heart sounds.   Pulmonary:      Effort: Pulmonary effort is normal.      Breath sounds: Normal breath sounds.   Chest:          Comments: Left breast and " nipple normal in appearance with minimal gynecomastia.  Right nipple is flattened with slight inversion and slight nodularity of the nipple.  No ulceration or nipple discharge noted, but there is a slight red sara on the nipple.  In the retroareolar right breast there is a small mass present measuring about a centimeter, fixed to the nipple, no definite change in size.  No other dominant masses noted.  No axillary or supraclavicular adenopathy.  Scar lateral right breast from previous valve replacement  Abdominal:      General: Abdomen is flat.      Palpations: Abdomen is soft.   Musculoskeletal:         General: Normal range of motion.   Lymphadenopathy:      Cervical: No cervical adenopathy.   Skin:     General: Skin is warm and dry.   Neurological:      General: No focal deficit present.      Mental Status: He is alert and oriented to person, place, and time.   Psychiatric:         Mood and Affect: Mood normal.         Behavior: Behavior normal.            Breast Imaging: I have personally reviewed the reports and images as follows.  Ultrasound was reviewed and shows a small increase in the size of the tumor as well as 3 additional lesions around the primary tumor  Impression:  Malignant neoplasm of nipple of right male breast  Recommendation and Plan:   Cam presents for follow-up of his right breast cancer.  Unfortunately, on ultrasound it appears there has been a slight increase in the size of the tumor as well as several satellite lesions which have developed.  He is taking tamoxifen, but is not interested in resuming chemotherapy because of his previous reaction to that.  I explained that I would recommend surgery at this time, and as he has several satellite lesions I would recommend mastectomy rather than a lumpectomy.  He will also need a sentinel node biopsy.  He and his wife are in agreement with the plan.  We discussed the plan for mastectomy with sentinel node biopsy and possible axillary node  dissection.  I explained risks of bleeding, infection, scarring, lymphedema, nerve damage, numbness.  We discussed placement of the drain at the time of surgery and the possibility of needing an axillary node dissection if 3 or more nodes are found to be positive with metastatic cancer.  We also reviewed the potential of needing postmastectomy radiation.  All his questions as well as his wife's questions were addressed, and he will be scheduled for surgery as soon as possible.  He will need cardiac clearance for the surgery.     All of the questions were answered.  The patient is in agreement with the treatment plan.        No follow-ups on file.

## 2023-08-01 NOTE — PRE-PROCEDURE INSTRUCTIONS
1. We will call you between 3 pm and 7 pm on 8/16/23 to inform you of arrival time for your procedure. If you do not hear by 6PM, please call [phone] for arrival time.     2. Please arrive through the Main Entrance of the [facility] (across from the parking garage) and report to the Surgery Registration Desk on the day of your procedure.    3. Please follow the following fasting guidelines:     No solid food EIGHT HOURS prior to surgery.  Unlimited clear liquids, meaning water or PLAIN black coffee WITHOUT any milk, cream, sugar, or sweetener are permitted up to TWO HOURS prior to arrival at the hospital.    4. Please take ONLY the following medications with a sip of water on the morning of your procedure: (populate names and/or NONE)  Amiodarone  Metoprolol  Diltiazem  Mucinex  Tamoxifen  Tylenol if need  Stop Xarelto per cardiology    5. Other Instructions: You may brush your teeth the morning of the procedure. Rinse and spit, do not swallow.  Bring a list of your medications with dosages.  Use surgical wash as directed.     6. If you develop a cold, cough, fever, rash, or other symptom prior to the day of the procedure, please report it to your physician immediately.    7. If you need to cancel the procedure for any reason, please contact your physician.    8. Make arrangements to have safe transportation home accompanied by a responsible adult. If you have not arranged safe transportation home, your surgery will be cancelled. Safe transportation may include private vehicle, ride-share service, taxi and public transportation when accompanied by a responsible adult who will assist you home. A responsible adult is someone known to you and does not include the taxi, ride-share or public transit drive transporting you.    9.  If it is medically necessary for you to have a longer stay, you will be informed as soon as the decision is made.    10. Only bring essential items to the hospital.  Do not wear or bring  anything of value to the hospital including jewelry of any kind, money, or wallet. Do not wear make-up or contact lenses.  DO NOT BRING MEDICATIONS FROM HOME unless instructed to do so. DO bring your hearing aids, glasses, and a case    11. No lotion, creams, powders, or oils on skin the morning of procedure     12. Dress in comfortable clothes.    13.  If instructed, please bring a copy of your Advanced Directive (Living Will/Durable Power of ) on the day of your procedure.     14. Ensuring your safety at all times is a very important part of our [facility] Culture of Safety. After having surgery and sedation, you are at risk for falling and balance issues. Although you may feel awake, the effects of the medication can last up to 24 hours after anesthesia. If you need to use the bathroom during your recovery period, nursing staff will escort you there and stay with you to ensure your safety.    15. Refrain from drinking alcohol and smoking cigarettes for 24 hours prior to surgery.    16. Shower with antibacterial soap (Dial) the night before and morning of your procedure.  If your procedure indicates the need for CHG antiseptic wash (Bactoshield or Hibiclens), please use this instead and follow instructions as discussed at the time of your Pre-Admission Testing phone interview or visit.    Above instructions reviewed with patient and patient acknowledges understanding.    Form explained by: Kalyn Jama RN

## 2023-08-01 NOTE — H&P (VIEW-ONLY)
Subjective:  Cam presents for follow-up of right breast cancer.  He was initially noted to have prostate cancer, and a week or 2 prior to his prostate surgery his wife had noticed a change in his right nipple.  He ended up having a core needle biopsy on August 29, 2022 which showed invasive ductal carcinoma, grade 3, ER positive, NH positive, HER2 positive.  Genetic testing at that time revealed pathogenic mutations in BRCA1 and OK.  He proceeded with neoadjuvant chemotherapy with TCHP, but had significant side effects with atrial fibrillation and heart failure as well as stomach and duodenal ulcers.  He stopped the chemotherapy, and was placed on tamoxifen.  Since I last saw him, he had 39 treatments of radiation for his prostate cancer, and is no longer on Lupron.  He had an ultrasound done today which showed a slight increase in the size of the retroareolar mass in the right breast now measuring 1.7 cm (1.1 cm in March and 1.9 cm in September, 2022).  Also noted on ultrasound were 3 additional probable satellite lesions around the main tumor measuring 1.1 cm, 0.4 cm, and 0.4 cm.  He reports he is tolerating the tamoxifen well.  He has not noticed any change in the breast himself.                 Oncology History:                          Cancer Staging   Malignant neoplasm of right male breast (CMS/HCC)  Staging form: Breast, AJCC 8th Edition  - Clinical stage from 3/20/2023: Stage IA (cT1c, cN0, cM0, G3, ER+, NH+, HER2+) - Signed by Barb Osuna MD on 3/20/2023     Prostate cancer (CMS/[facility])  Staging form: Prostate, AJCC 8th Edition  - Pathologic stage from 7/27/2022: Stage Unknown (pT3b, pNX, cM0, PSA: 6, Grade Group: 3) - Signed by Ochsner, Gregory J, MD on 3/8/2023                                           Patient's Oncology History documentation       Oncology History   Malignant neoplasm of right male breast (CMS/HCC)   8/29/2022 Biopsy       1.  Right breast mass:     Invasive ductal  "carcinoma, [place] grade 3 (3+ 3+2).   Invasive carcinoma is 13 mm in maximum dimension in core biopsy.    ER+ 90%; TX+ 50%; Maa2qzv+3; [id number]%      9/30/2022 -  Chemotherapy     9/30/2022 initiated first cycle TCP H at [facility].  Questionable reaction during the anti-HER2/jhonatan therapy with Herceptin.  Taxotere and carboplatin given on 10/3/2022.  Patient hospitalized after first cycle.      10/19/2022 Initial Diagnosis     Malignant neoplasm of right male breast (CMS/HCC)      11/15/2022 -  Hormone Therapy     Tamoxifen 20 mg daily while undergoes evaluation of cardiac issues      3/20/2023 Cancer Staged     Staging form: Breast, AJCC 8th Edition  - Clinical stage from 3/20/2023: Stage IA (cT1c, cN0, cM0, G3, ER+, TX+, HER2+)      Prostate cancer (CMS/HCC)   7/27/2022 Cancer Staged     Staging form: Prostate, AJCC 8th Edition  - Pathologic stage from 7/27/2022: Stage Unknown (pT3b, pNX, cM0, PSA: 6, Grade Group: 3)      3/21/2023 - 6/20/2023 Chemotherapy     LEUPROLIDE (LUPRON) 22.5 MG EVERY 3 MONTHS  Plan Provider: Paz Cleveland MD      3/21/2023 -  Chemotherapy     MEDIPORT FLUSH (SINGLE LUMEN) - PATIENT ON TREATMENT  Plan Provider: Paz Cleveland MD      3/21/2023 - 3/21/2023 Chemotherapy     LEUPROLIDE (LUPRON) 22.5 MG EVERY 3 MONTHS  Plan Provider: Paz Cleveland MD                  Review of Systems         Physical Exam:     Vitals:   Visit Vitals  /82   Pulse 61   Temp 36.3 °C (97.4 °F)   Ht 1.651 m (5' 5\")   Wt 85.5 kg (188 lb 6.4 oz)   SpO2 99%   BMI 31.35 kg/m²      Body mass index is 31.35 kg/m².     Physical Exam  Constitutional:       Appearance: Normal appearance.   HENT:      Head: Normocephalic and atraumatic.   Cardiovascular:      Rate and Rhythm: Normal rate and regular rhythm.      Heart sounds: Normal heart sounds.   Pulmonary:      Effort: Pulmonary effort is normal.      Breath sounds: Normal breath sounds.   Chest:          Comments: Left breast and " nipple normal in appearance with minimal gynecomastia.  Right nipple is flattened with slight inversion and slight nodularity of the nipple.  No ulceration or nipple discharge noted, but there is a slight red sara on the nipple.  In the retroareolar right breast there is a small mass present measuring about a centimeter, fixed to the nipple, no definite change in size.  No other dominant masses noted.  No axillary or supraclavicular adenopathy.  Scar lateral right breast from previous valve replacement  Abdominal:      General: Abdomen is flat.      Palpations: Abdomen is soft.   Musculoskeletal:         General: Normal range of motion.   Lymphadenopathy:      Cervical: No cervical adenopathy.   Skin:     General: Skin is warm and dry.   Neurological:      General: No focal deficit present.      Mental Status: He is alert and oriented to person, place, and time.   Psychiatric:         Mood and Affect: Mood normal.         Behavior: Behavior normal.            Breast Imaging: I have personally reviewed the reports and images as follows.  Ultrasound was reviewed and shows a small increase in the size of the tumor as well as 3 additional lesions around the primary tumor  Impression:  Malignant neoplasm of nipple of right male breast  Recommendation and Plan:   Cam presents for follow-up of his right breast cancer.  Unfortunately, on ultrasound it appears there has been a slight increase in the size of the tumor as well as several satellite lesions which have developed.  He is taking tamoxifen, but is not interested in resuming chemotherapy because of his previous reaction to that.  I explained that I would recommend surgery at this time, and as he has several satellite lesions I would recommend mastectomy rather than a lumpectomy.  He will also need a sentinel node biopsy.  He and his wife are in agreement with the plan.  We discussed the plan for mastectomy with sentinel node biopsy and possible axillary node  dissection.  I explained risks of bleeding, infection, scarring, lymphedema, nerve damage, numbness.  We discussed placement of the drain at the time of surgery and the possibility of needing an axillary node dissection if 3 or more nodes are found to be positive with metastatic cancer.  We also reviewed the potential of needing postmastectomy radiation.  All his questions as well as his wife's questions were addressed, and he will be scheduled for surgery as soon as possible.  He will need cardiac clearance for the surgery.     All of the questions were answered.  The patient is in agreement with the treatment plan.        No follow-ups on file.

## 2023-08-08 ENCOUNTER — APPOINTMENT (OUTPATIENT)
Dept: LAB | Age: 73
End: 2023-08-08
Attending: SURGERY
Payer: MEDICARE

## 2023-08-08 DIAGNOSIS — Z17.0: ICD-10-CM

## 2023-08-08 DIAGNOSIS — C50.021: ICD-10-CM

## 2023-08-08 LAB
ALBUMIN SERPL-MCNC: 4.1 G/DL (ref 3.5–5.7)
ALP SERPL-CCNC: 65 IU/L (ref 34–125)
ALT SERPL-CCNC: 15 IU/L (ref 7–52)
ANION GAP SERPL CALC-SCNC: 10 MEQ/L (ref 3–15)
AST SERPL-CCNC: 31 IU/L (ref 13–39)
BASOPHILS # BLD: 0.04 K/UL (ref 0.01–0.1)
BASOPHILS NFR BLD: 0.5 %
BILIRUB SERPL-MCNC: 0.8 MG/DL (ref 0.3–1.2)
BUN SERPL-MCNC: 28 MG/DL (ref 7–25)
CALCIUM SERPL-MCNC: 9 MG/DL (ref 8.6–10.3)
CHLORIDE SERPL-SCNC: 108 MEQ/L (ref 98–107)
CO2 SERPL-SCNC: 23 MEQ/L (ref 21–31)
CREAT SERPL-MCNC: 1.3 MG/DL (ref 0.7–1.3)
DIFFERENTIAL METHOD BLD: ABNORMAL
EOSINOPHIL # BLD: 0.05 K/UL (ref 0.04–0.54)
EOSINOPHIL NFR BLD: 0.7 %
ERYTHROCYTE [DISTWIDTH] IN BLOOD BY AUTOMATED COUNT: 15.8 % (ref 11.6–14.4)
GFR SERPL CREATININE-BSD FRML MDRD: 54.1 ML/MIN/1.73M*2
GLUCOSE SERPL-MCNC: 102 MG/DL (ref 70–99)
HCT VFR BLDCO AUTO: 36.8 % (ref 40.1–51)
HGB BLD-MCNC: 11.6 G/DL (ref 13.7–17.5)
IMM GRANULOCYTES # BLD AUTO: 0.02 K/UL (ref 0–0.08)
IMM GRANULOCYTES NFR BLD AUTO: 0.3 %
LYMPHOCYTES # BLD: 1.37 K/UL (ref 1.2–3.5)
LYMPHOCYTES NFR BLD: 18.6 %
MCH RBC QN AUTO: 33.4 PG (ref 28–33.2)
MCHC RBC AUTO-ENTMCNC: 31.5 G/DL (ref 32.2–36.5)
MCV RBC AUTO: 106.1 FL (ref 83–98)
MONOCYTES # BLD: 0.82 K/UL (ref 0.3–1)
MONOCYTES NFR BLD: 11.1 %
NEUTROPHILS # BLD: 5.08 K/UL (ref 1.7–7)
NEUTS SEG NFR BLD: 68.8 %
NRBC BLD-RTO: 0 %
PDW BLD AUTO: 9.3 FL (ref 9.4–12.4)
PLATELET # BLD AUTO: 196 K/UL (ref 150–350)
POTASSIUM SERPL-SCNC: 4.5 MEQ/L (ref 3.5–5.1)
PROT SERPL-MCNC: 6.2 G/DL (ref 6–8.2)
RBC # BLD AUTO: 3.47 M/UL (ref 4.5–5.8)
SODIUM SERPL-SCNC: 141 MEQ/L (ref 136–145)
WBC # BLD AUTO: 7.38 K/UL (ref 3.8–10.5)

## 2023-08-08 PROCEDURE — 36415 COLL VENOUS BLD VENIPUNCTURE: CPT

## 2023-08-08 PROCEDURE — 80053 COMPREHEN METABOLIC PANEL: CPT

## 2023-08-08 PROCEDURE — 85025 COMPLETE CBC W/AUTO DIFF WBC: CPT

## 2023-08-09 ENCOUNTER — ANESTHESIA EVENT (OUTPATIENT)
Dept: OPERATING ROOM | Facility: HOSPITAL | Age: 73
Setting detail: HOSPITAL OUTPATIENT SURGERY
End: 2023-08-09
Payer: MEDICARE

## 2023-08-15 ENCOUNTER — PATIENT OUTREACH (OUTPATIENT)
Dept: RADIOLOGY | Facility: HOSPITAL | Age: 73
End: 2023-08-15
Payer: MEDICARE

## 2023-08-17 ENCOUNTER — APPOINTMENT (OUTPATIENT)
Dept: RADIOLOGY | Facility: HOSPITAL | Age: 73
Setting detail: HOSPITAL OUTPATIENT SURGERY
End: 2023-08-17
Attending: SURGERY
Payer: MEDICARE

## 2023-08-17 ENCOUNTER — PATIENT OUTREACH (OUTPATIENT)
Dept: RADIOLOGY | Facility: HOSPITAL | Age: 73
End: 2023-08-17
Payer: MEDICARE

## 2023-08-17 ENCOUNTER — DOCUMENTATION (OUTPATIENT)
Dept: SOCIAL WORK | Facility: HOSPITAL | Age: 73
End: 2023-08-17
Payer: MEDICARE

## 2023-08-17 ENCOUNTER — HOSPITAL ENCOUNTER (OUTPATIENT)
Facility: HOSPITAL | Age: 73
Discharge: HOME HEALTH | End: 2023-08-18
Attending: SURGERY | Admitting: SURGERY
Payer: MEDICARE

## 2023-08-17 ENCOUNTER — APPOINTMENT (OUTPATIENT)
Dept: RADIOLOGY | Facility: HOSPITAL | Age: 73
End: 2023-08-17
Attending: SURGERY
Payer: MEDICARE

## 2023-08-17 ENCOUNTER — ANESTHESIA (OUTPATIENT)
Dept: OPERATING ROOM | Facility: HOSPITAL | Age: 73
Setting detail: HOSPITAL OUTPATIENT SURGERY
End: 2023-08-17
Payer: MEDICARE

## 2023-08-17 DIAGNOSIS — I48.91 ATRIAL FIBRILLATION, UNSPECIFIED TYPE (CMS/HCC): ICD-10-CM

## 2023-08-17 DIAGNOSIS — C50.121 MALIGNANT NEOPLASM OF CENTRAL PORTION OF RIGHT BREAST IN MALE, ESTROGEN RECEPTOR POSITIVE (CMS/HCC): Primary | ICD-10-CM

## 2023-08-17 DIAGNOSIS — C50.921 MALIGNANT NEOPLASM OF RIGHT MALE BREAST, UNSPECIFIED ESTROGEN RECEPTOR STATUS, UNSPECIFIED SITE OF BREAST (CMS/HCC): ICD-10-CM

## 2023-08-17 DIAGNOSIS — Z17.0 MALIGNANT NEOPLASM OF CENTRAL PORTION OF RIGHT BREAST IN MALE, ESTROGEN RECEPTOR POSITIVE (CMS/HCC): Primary | ICD-10-CM

## 2023-08-17 PROCEDURE — 0HTT0ZZ RESECTION OF RIGHT BREAST, OPEN APPROACH: ICD-10-PCS | Performed by: SURGERY

## 2023-08-17 PROCEDURE — C71L1ZZ PLANAR NUCLEAR MEDICINE IMAGING OF UPPER CHEST LYMPHATICS USING TECHNETIUM 99M (TC-99M): ICD-10-PCS | Performed by: SURGERY

## 2023-08-17 PROCEDURE — 25000000 HC PHARMACY GENERAL: Performed by: SURGERY

## 2023-08-17 PROCEDURE — 88360 TUMOR IMMUNOHISTOCHEM/MANUAL: CPT | Mod: 59 | Performed by: SURGERY

## 2023-08-17 PROCEDURE — 71000012 HC PACU PHASE 2 EA ADDL MIN: Performed by: SURGERY

## 2023-08-17 PROCEDURE — 71000001 HC PACU PHASE 1 INITIAL 30MIN: Performed by: SURGERY

## 2023-08-17 PROCEDURE — 88307 TISSUE EXAM BY PATHOLOGIST: CPT | Mod: 59 | Performed by: SURGERY

## 2023-08-17 PROCEDURE — 88332 PATH CONSLTJ SURG EA ADD BLK: CPT | Performed by: PATHOLOGY

## 2023-08-17 PROCEDURE — 19303 MAST SIMPLE COMPLETE: CPT | Mod: RT | Performed by: SURGERY

## 2023-08-17 PROCEDURE — A9520 TC99 TILMANOCEPT DIAG 0.5MCI: HCPCS | Performed by: SURGERY

## 2023-08-17 PROCEDURE — 37000001 HC ANESTHESIA GENERAL: Performed by: SURGERY

## 2023-08-17 PROCEDURE — A9521 TC99M EXAMETAZIME: HCPCS | Performed by: SURGERY

## 2023-08-17 PROCEDURE — 38525 BIOPSY/REMOVAL LYMPH NODES: CPT | Mod: RT | Performed by: SURGERY

## 2023-08-17 PROCEDURE — 07B50ZX EXCISION OF RIGHT AXILLARY LYMPHATIC, OPEN APPROACH, DIAGNOSTIC: ICD-10-PCS | Performed by: SURGERY

## 2023-08-17 PROCEDURE — 97535 SELF CARE MNGMENT TRAINING: CPT | Mod: GO

## 2023-08-17 PROCEDURE — 88342 IMHCHEM/IMCYTCHM 1ST ANTB: CPT | Mod: 59 | Performed by: SURGERY

## 2023-08-17 PROCEDURE — 63700000 HC SELF-ADMINISTRABLE DRUG: Performed by: SURGERY

## 2023-08-17 PROCEDURE — 27200000 HC STERILE SUPPLY: Performed by: SURGERY

## 2023-08-17 PROCEDURE — 63600000 HC DRUGS/DETAIL CODE: Performed by: NURSE ANESTHETIST, CERTIFIED REGISTERED

## 2023-08-17 PROCEDURE — 71000011 HC PACU PHASE 1 EA ADDL MIN: Performed by: SURGERY

## 2023-08-17 PROCEDURE — 88331 PATH CONSLTJ SURG 1 BLK 1SPC: CPT | Mod: 59 | Performed by: PATHOLOGY

## 2023-08-17 PROCEDURE — 36000004 HC OR LEVEL 4 INITIAL 30MIN: Performed by: SURGERY

## 2023-08-17 PROCEDURE — 71000002 HC PACU PHASE 2 INITIAL 30MIN: Performed by: SURGERY

## 2023-08-17 PROCEDURE — 25800000 HC PHARMACY IV SOLUTIONS: Performed by: SURGERY

## 2023-08-17 PROCEDURE — 38792 RA TRACER ID OF SENTINL NODE: CPT | Mod: RT | Performed by: SURGERY

## 2023-08-17 PROCEDURE — A4648 IMPLANTABLE TISSUE MARKER: HCPCS | Performed by: SURGERY

## 2023-08-17 PROCEDURE — 97166 OT EVAL MOD COMPLEX 45 MIN: CPT | Mod: GO

## 2023-08-17 PROCEDURE — 25000000 HC PHARMACY GENERAL: Performed by: NURSE ANESTHETIST, CERTIFIED REGISTERED

## 2023-08-17 PROCEDURE — 36000014 HC OR LEVEL 4 EA ADDL MIN: Performed by: SURGERY

## 2023-08-17 PROCEDURE — 38792 RA TRACER ID OF SENTINL NODE: CPT

## 2023-08-17 PROCEDURE — 71045 X-RAY EXAM CHEST 1 VIEW: CPT

## 2023-08-17 PROCEDURE — 63600000 HC DRUGS/DETAIL CODE: Mod: JZ | Performed by: SURGERY

## 2023-08-17 RX ORDER — LIDOCAINE HYDROCHLORIDE 10 MG/ML
INJECTION, SOLUTION INFILTRATION; PERINEURAL AS NEEDED
Status: DISCONTINUED | OUTPATIENT
Start: 2023-08-17 | End: 2023-08-17 | Stop reason: SURG

## 2023-08-17 RX ORDER — OXYCODONE HYDROCHLORIDE 5 MG/1
5 TABLET ORAL EVERY 4 HOURS PRN
Status: DISCONTINUED | OUTPATIENT
Start: 2023-08-17 | End: 2023-08-18 | Stop reason: HOSPADM

## 2023-08-17 RX ORDER — ACETAMINOPHEN 325 MG/1
975 TABLET ORAL
Status: COMPLETED | OUTPATIENT
Start: 2023-08-17 | End: 2023-08-17

## 2023-08-17 RX ORDER — IPRATROPIUM BROMIDE AND ALBUTEROL SULFATE 2.5; .5 MG/3ML; MG/3ML
3 SOLUTION RESPIRATORY (INHALATION) ONCE
Status: COMPLETED | OUTPATIENT
Start: 2023-08-17 | End: 2023-08-17

## 2023-08-17 RX ORDER — ISOSULFAN BLUE 50 MG/5ML
INJECTION, SOLUTION SUBCUTANEOUS
Status: DISCONTINUED | OUTPATIENT
Start: 2023-08-17 | End: 2023-08-17 | Stop reason: HOSPADM

## 2023-08-17 RX ORDER — CETIRIZINE HYDROCHLORIDE 10 MG/1
10 TABLET ORAL NIGHTLY
Status: DISCONTINUED | OUTPATIENT
Start: 2023-08-17 | End: 2023-08-18 | Stop reason: HOSPADM

## 2023-08-17 RX ORDER — ACETAMINOPHEN 325 MG/1
975 TABLET ORAL
Status: DISCONTINUED | OUTPATIENT
Start: 2023-08-17 | End: 2023-08-18 | Stop reason: HOSPADM

## 2023-08-17 RX ORDER — CHOLECALCIFEROL (VITAMIN D3) 25 MCG
1000 TABLET ORAL DAILY
Status: DISCONTINUED | OUTPATIENT
Start: 2023-08-17 | End: 2023-08-18 | Stop reason: HOSPADM

## 2023-08-17 RX ORDER — GUAIFENESIN 600 MG/1
1200 TABLET, EXTENDED RELEASE ORAL 2 TIMES DAILY
Status: DISCONTINUED | OUTPATIENT
Start: 2023-08-17 | End: 2023-08-18 | Stop reason: HOSPADM

## 2023-08-17 RX ORDER — AMIODARONE HYDROCHLORIDE 200 MG/1
200 TABLET ORAL DAILY
Status: DISCONTINUED | OUTPATIENT
Start: 2023-08-18 | End: 2023-08-18 | Stop reason: HOSPADM

## 2023-08-17 RX ORDER — OXYCODONE HYDROCHLORIDE 5 MG/1
5 TABLET ORAL EVERY 4 HOURS PRN
Qty: 15 TABLET | Refills: 0 | Status: SHIPPED | OUTPATIENT
Start: 2023-08-17 | End: 2023-08-22

## 2023-08-17 RX ORDER — DIPHENHYDRAMINE HCL 25 MG
25 CAPSULE ORAL EVERY 6 HOURS PRN
Status: DISCONTINUED | OUTPATIENT
Start: 2023-08-17 | End: 2023-08-18 | Stop reason: HOSPADM

## 2023-08-17 RX ORDER — DEXTROSE 50 % IN WATER (D50W) INTRAVENOUS SYRINGE
25 AS NEEDED
Status: DISCONTINUED | OUTPATIENT
Start: 2023-08-17 | End: 2023-08-17 | Stop reason: HOSPADM

## 2023-08-17 RX ORDER — IBUPROFEN 200 MG
400 TABLET ORAL EVERY 6 HOURS PRN
Start: 2023-08-17 | End: 2023-10-20 | Stop reason: ENTERED-IN-ERROR

## 2023-08-17 RX ORDER — FENTANYL CITRATE 50 UG/ML
50 INJECTION, SOLUTION INTRAMUSCULAR; INTRAVENOUS EVERY 5 MIN PRN
Status: DISCONTINUED | OUTPATIENT
Start: 2023-08-17 | End: 2023-08-17 | Stop reason: HOSPADM

## 2023-08-17 RX ORDER — ROCURONIUM BROMIDE 10 MG/ML
INJECTION, SOLUTION INTRAVENOUS AS NEEDED
Status: DISCONTINUED | OUTPATIENT
Start: 2023-08-17 | End: 2023-08-17 | Stop reason: SURG

## 2023-08-17 RX ORDER — GABAPENTIN 300 MG/1
300 CAPSULE ORAL
Status: COMPLETED | OUTPATIENT
Start: 2023-08-17 | End: 2023-08-17

## 2023-08-17 RX ORDER — LIDOCAINE HYDROCHLORIDE 10 MG/ML
INJECTION, SOLUTION INFILTRATION; PERINEURAL
Status: DISCONTINUED | OUTPATIENT
Start: 2023-08-17 | End: 2023-08-17 | Stop reason: HOSPADM

## 2023-08-17 RX ORDER — IBUPROFEN 600 MG/1
600 TABLET ORAL EVERY 6 HOURS PRN
Status: DISCONTINUED | OUTPATIENT
Start: 2023-08-17 | End: 2023-08-18 | Stop reason: HOSPADM

## 2023-08-17 RX ORDER — EPHEDRINE SULFATE 50 MG/ML
INJECTION, SOLUTION INTRAVENOUS AS NEEDED
Status: DISCONTINUED | OUTPATIENT
Start: 2023-08-17 | End: 2023-08-17 | Stop reason: SURG

## 2023-08-17 RX ORDER — ONDANSETRON HYDROCHLORIDE 2 MG/ML
INJECTION, SOLUTION INTRAVENOUS AS NEEDED
Status: DISCONTINUED | OUTPATIENT
Start: 2023-08-17 | End: 2023-08-17 | Stop reason: SURG

## 2023-08-17 RX ORDER — ACETAMINOPHEN 500 MG
1000 TABLET ORAL EVERY 6 HOURS PRN
Start: 2023-08-17 | End: 2023-09-16

## 2023-08-17 RX ORDER — FENTANYL CITRATE 50 UG/ML
INJECTION, SOLUTION INTRAMUSCULAR; INTRAVENOUS AS NEEDED
Status: DISCONTINUED | OUTPATIENT
Start: 2023-08-17 | End: 2023-08-17 | Stop reason: SURG

## 2023-08-17 RX ORDER — SODIUM CHLORIDE 9 MG/ML
INJECTION, SOLUTION INTRAVENOUS CONTINUOUS
Status: ACTIVE | OUTPATIENT
Start: 2023-08-17 | End: 2023-08-18

## 2023-08-17 RX ORDER — PROPOFOL 10 MG/ML
INJECTION, EMULSION INTRAVENOUS AS NEEDED
Status: DISCONTINUED | OUTPATIENT
Start: 2023-08-17 | End: 2023-08-17 | Stop reason: SURG

## 2023-08-17 RX ORDER — METOPROLOL SUCCINATE 50 MG/1
50 TABLET, EXTENDED RELEASE ORAL DAILY
Status: DISCONTINUED | OUTPATIENT
Start: 2023-08-18 | End: 2023-08-18 | Stop reason: HOSPADM

## 2023-08-17 RX ORDER — DEXTROSE 40 %
15-30 GEL (GRAM) ORAL AS NEEDED
Status: DISCONTINUED | OUTPATIENT
Start: 2023-08-17 | End: 2023-08-17 | Stop reason: HOSPADM

## 2023-08-17 RX ORDER — BUPIVACAINE HYDROCHLORIDE 5 MG/ML
INJECTION, SOLUTION PERINEURAL
Status: DISCONTINUED | OUTPATIENT
Start: 2023-08-17 | End: 2023-08-17 | Stop reason: HOSPADM

## 2023-08-17 RX ORDER — HYDROMORPHONE HYDROCHLORIDE 1 MG/ML
0.5 INJECTION, SOLUTION INTRAMUSCULAR; INTRAVENOUS; SUBCUTANEOUS
Status: DISCONTINUED | OUTPATIENT
Start: 2023-08-17 | End: 2023-08-17 | Stop reason: HOSPADM

## 2023-08-17 RX ORDER — PANTOPRAZOLE SODIUM 40 MG/1
40 TABLET, DELAYED RELEASE ORAL 2 TIMES DAILY
Status: DISCONTINUED | OUTPATIENT
Start: 2023-08-17 | End: 2023-08-18 | Stop reason: HOSPADM

## 2023-08-17 RX ORDER — IBUPROFEN 200 MG
16-32 TABLET ORAL AS NEEDED
Status: DISCONTINUED | OUTPATIENT
Start: 2023-08-17 | End: 2023-08-17 | Stop reason: HOSPADM

## 2023-08-17 RX ORDER — DILTIAZEM HYDROCHLORIDE 120 MG/1
120 CAPSULE, COATED, EXTENDED RELEASE ORAL DAILY
Status: DISCONTINUED | OUTPATIENT
Start: 2023-08-18 | End: 2023-08-18 | Stop reason: HOSPADM

## 2023-08-17 RX ORDER — ATORVASTATIN CALCIUM 10 MG/1
10 TABLET, FILM COATED ORAL EVERY EVENING
Status: DISCONTINUED | OUTPATIENT
Start: 2023-08-17 | End: 2023-08-18 | Stop reason: HOSPADM

## 2023-08-17 RX ORDER — HYDROMORPHONE HYDROCHLORIDE 1 MG/ML
0.25 INJECTION, SOLUTION INTRAMUSCULAR; INTRAVENOUS; SUBCUTANEOUS EVERY 4 HOURS PRN
Status: DISCONTINUED | OUTPATIENT
Start: 2023-08-17 | End: 2023-08-18 | Stop reason: HOSPADM

## 2023-08-17 RX ORDER — CEFAZOLIN SODIUM/WATER 2 G/20 ML
2 SYRINGE (ML) INTRAVENOUS
Status: COMPLETED | OUTPATIENT
Start: 2023-08-17 | End: 2023-08-17

## 2023-08-17 RX ADMIN — SUGAMMADEX 200 MG: 100 INJECTION, SOLUTION INTRAVENOUS at 09:53

## 2023-08-17 RX ADMIN — TILMANOCEPT 1.1 MILLICURIE: KIT at 07:25

## 2023-08-17 RX ADMIN — ACETAMINOPHEN 975 MG: 325 TABLET ORAL at 22:21

## 2023-08-17 RX ADMIN — ACETAMINOPHEN 975 MG: 325 TABLET ORAL at 17:24

## 2023-08-17 RX ADMIN — ONDANSETRON 4 MG: 2 INJECTION INTRAMUSCULAR; INTRAVENOUS at 09:51

## 2023-08-17 RX ADMIN — ACETAMINOPHEN 975 MG: 325 TABLET ORAL at 06:37

## 2023-08-17 RX ADMIN — SODIUM CHLORIDE 500 ML: 9 INJECTION, SOLUTION INTRAVENOUS at 17:23

## 2023-08-17 RX ADMIN — EPHEDRINE SULFATE 5 MG: 50 INJECTION, SOLUTION INTRAVENOUS at 08:44

## 2023-08-17 RX ADMIN — SODIUM CHLORIDE: 9 INJECTION, SOLUTION INTRAVENOUS at 06:53

## 2023-08-17 RX ADMIN — GUAIFENESIN 1200 MG: 600 TABLET ORAL at 16:30

## 2023-08-17 RX ADMIN — LIDOCAINE HYDROCHLORIDE 5 ML: 10 INJECTION, SOLUTION INFILTRATION; PERINEURAL at 07:37

## 2023-08-17 RX ADMIN — PROPOFOL 100 MG: 10 INJECTION, EMULSION INTRAVENOUS at 07:37

## 2023-08-17 RX ADMIN — ROCURONIUM BROMIDE 50 MG: 10 SOLUTION INTRAVENOUS at 07:37

## 2023-08-17 RX ADMIN — CEFAZOLIN 2 G: 2 INJECTION, POWDER, FOR SOLUTION INTRAMUSCULAR; INTRAVENOUS at 16:29

## 2023-08-17 RX ADMIN — CETIRIZINE HYDROCHLORIDE 10 MG: 10 TABLET, FILM COATED ORAL at 22:21

## 2023-08-17 RX ADMIN — CHOLECALCIFEROL TAB 25 MCG (1000 UNIT) 1000 UNITS: 25 TAB at 18:37

## 2023-08-17 RX ADMIN — FENTANYL CITRATE 50 MCG: 50 INJECTION, SOLUTION INTRAMUSCULAR; INTRAVENOUS at 07:30

## 2023-08-17 RX ADMIN — Medication 2 G: at 07:48

## 2023-08-17 RX ADMIN — ATORVASTATIN CALCIUM 10 MG: 10 TABLET, FILM COATED ORAL at 17:24

## 2023-08-17 RX ADMIN — EPHEDRINE SULFATE 5 MG: 50 INJECTION, SOLUTION INTRAVENOUS at 09:03

## 2023-08-17 RX ADMIN — IPRATROPIUM BROMIDE AND ALBUTEROL SULFATE 3 ML: 2.5; .5 SOLUTION RESPIRATORY (INHALATION) at 17:09

## 2023-08-17 RX ADMIN — PANTOPRAZOLE SODIUM 40 MG: 40 TABLET, DELAYED RELEASE ORAL at 22:21

## 2023-08-17 RX ADMIN — GABAPENTIN 300 MG: 300 CAPSULE ORAL at 06:37

## 2023-08-17 RX ADMIN — FENTANYL CITRATE 50 MCG: 50 INJECTION, SOLUTION INTRAMUSCULAR; INTRAVENOUS at 07:37

## 2023-08-17 ASSESSMENT — COGNITIVE AND FUNCTIONAL STATUS - GENERAL
DRESSING REGULAR UPPER BODY CLOTHING: 2 - A LOT
DRESSING REGULAR LOWER BODY CLOTHING: 2 - A LOT
HELP NEEDED FOR PERSONAL GROOMING: 3 - A LITTLE
MOVING TO AND FROM BED TO CHAIR: 3 - A LITTLE
HELP NEEDED FOR BATHING: 2 - A LOT
EATING MEALS: 3 - A LITTLE
AFFECT: FLAT/BLUNTED AFFECT
WALKING IN HOSPITAL ROOM: 3 - A LITTLE
TOILETING: 2 - A LOT
STANDING UP FROM CHAIR USING ARMS: 3 - A LITTLE
CLIMB 3 TO 5 STEPS WITH RAILING: 3 - A LITTLE

## 2023-08-17 ASSESSMENT — ENCOUNTER SYMPTOMS: DYSRHYTHMIAS: 1

## 2023-08-17 NOTE — PLAN OF CARE
Problem: Adult Inpatient Plan of Care  Goal: Plan of Care Review  Outcome: Progressing  Flowsheets (Taken 8/17/2023 7944)  Progress: improving  Outcome Evaluation: OT evaluation complete. ADL [facility] 14 . Patient presents with functional limitations affecting areas of ADL’s and functional transfers. Currently, patient performs bed mobility with MIN A, functional STS MIN A for stability using no AD, and functional mobility with MOD A X2 assist with several LOB requiring MAX A. Pt would benefit from physical therapy consult. RN stated he will update MD. Pt required MIN A for UB dressing and MAX A For LB dressing. Spouse present and stated concerns for taking pt home. RN made aware. Noted drop in pt BP with movement.   Pt would benefit from skilled OT services to maximize safety and independence with daily tasks. Anticipate d/c to home with assist/ OT HH when medically stable. OT will continue to follow, if pt does not improve pt may require SNF.  Plan of Care Reviewed With: patient

## 2023-08-17 NOTE — ANESTHESIA PREPROCEDURE EVALUATION
Relevant Problems   CARDIOVASCULAR   (+) APC (atrial premature contractions)   (+) Atrial fibrillation, unspecified type (CMS/HCC)   (+) Essential hypertension      GASTROINTESTINAL   (+) Gastrointestinal hemorrhage with melena      MUSCULOSKELETAL   (+) Spondylosis of lumbosacral region      NEUROLOGY   (+) Herniation of lumbar intervertebral disc with radiculopathy      URINARY SYSTEM   (+) Electrolyte abnormality      Other   (+) Malignant neoplasm of right male breast (CMS/HCC)   (+) Prostate cancer (CMS/HCC)       Anesthesia ROS/MED HX    Anesthesia History - neg  Pulmonary - neg  Neuro/Psych - neg  Cardiovascular   hypertension  Dysrhythmias and atrial fibrillation   CHF  Hematological - neg  GI/Hepatic- neg  Musculoskeletal- neg  Renal Disease- neg  Endo/Other- neg       HPI: 73M here for right breast mastectomy and RSLN biopsy. PMH includes afib, HTN, CHF, CKD.       TTE 4/17/23   Left Ventricle: Normal ventricle size. Mild concentric left ventricular hypertrophy. No outflow tract obstruction present. Low normal systolic function. Estimated EF 55%. Wall motion appears grossly normal. Grade II diastolic dysfunction.  •  Right Ventricle: Normal ventricle size. Increased wall thickness. Normal systolic function.  •  Left Atrium: Moderately dilated atrium. Cannot exclude patent foramen ovale (PFO).  •  Right Atrium: Mildly dilated atrium.  •  Aortic Valve: Tricuspid valve.  Sclerotic leaflets. Trace regurgitation. No stenosis.  •  Mitral Valve: Mitral annuloplasty ring present. Mild regurgitation.  •  Tricuspid Valve: Normal structure. Mild regurgitation. The regurgitation jet is eccentric. Estimated RVSP = 52 mmHg.  •  Pulmonic Valve: Normal structure. Trace regurgitation. Mildly dilated pulmonary artery.  •  Aorta: Aortic root normal. Sinuses of Valsalva normal-sized. Ascending aorta normal-sized.  •  IVC/SVC: Inferior vena cava is <2.1cm. Inferior vena cava demonstrates normal respiratory collapse.  •   Pericardium: Normal structure.  •  Compared with echo report from October 2022, LV function has normalized.  •  I personally reviewed results with him today in the office.      Allergies:   Allergies   Allergen Reactions   • Azithromycin Other (see comments)     Kidney issues     • Prednisone Angioedema     All over swelling   • Lorazepam Other (see comments)     WEAKNESS       ROS: Denies any chest pain or shortness of breath    PMH:   Past Medical History:   Diagnosis Date   • Acute systolic heart failure (CMS/HCC) 02/15/2023   • Atrial fibrillation (CMS/HCC)    • Breast cancer (CMS/HCC) October 2022   • CHF (congestive heart failure) (CMS/HCC)    • Chronic kidney disease    • CKD (chronic kidney disease) 10/19/2022   • History of radiation therapy    • Hx antineoplastic chemo    • Prostate cancer (CMS/HCC)        PSH:   Past Surgical History:   Procedure Laterality Date   • CARDIAC SURGERY      mitral valve repair 2006   • CARDIOVERSION  12/05/2022    Successful DC cardioversion   • KNEE CARTILAGE SURGERY Left    • PROSTATE SURGERY  July 2022   • PROSTATECTOMY  07/15/2022   • TONSILLECTOMY         No current facility-administered medications on file prior to encounter.     Current Outpatient Medications on File Prior to Encounter   Medication Sig   • acetaminophen (TYLENOL EX STR RAPID RELEASE ORAL) Take 500 mg by mouth as needed.   • amiodarone (PACERONE) 200 mg tablet Take 1 tablet (200 mg total) by mouth daily.   • cetirizine (ZyrTEC) 10 mg tablet Take 10 mg by mouth nightly.   • cholecalciferol, vitamin D3, 1,000 unit (25 mcg) tablet Take 1,000 Units by mouth daily.   • dilTIAZem CD (CARDIZEM CD) 120 mg 24 hr capsule Take 1 capsule (120 mg total) by mouth daily.   • metoprolol succinate XL (TOPROL-XL) 50 mg 24 hr tablet Take 1 tablet (50 mg total) by mouth daily.   • pantoprazole (PROTONIX) 40 mg EC tablet Take 1 tablet (40 mg total) by mouth 2 (two) times a day.   • simvastatin (ZOCOR) 20 mg tablet Take 1  tablet (20 mg total) by mouth nightly.   • tamoxifen (NOLVADEX) 20 mg chemo tablet Take  1 tablet (20 mg total) daily   • rivaroxaban (XARELTO) 15 mg tablet Take 1 tablet (15 mg total) by mouth daily with dinner Indications: treatment to prevent blood clots in chronic atrial fibrillation.   • [DISCONTINUED] potassium chloride (KLOR-CON) 10 mEq CR tablet Take 1 tablet (10 mEq total) by mouth daily.       CBC Results       08/08/23 04/24/23 12/01/22     1000 1152 1319    WBC 7.38 4.4 6.54    RBC 3.47 3.92 3.50    HGB 11.6 12.1 10.8    HCT 36.8 37.1 35.0    .1 94.6 100.0    MCH 33.4 30.9 30.9    MCHC 31.5 32.6 30.9     153 209        BMP Results       08/08/23 07/20/23 04/24/23     1000 0955 1147     142 141    K 4.5 5.0 3.9    Cl 108 109 110    CO2 23 25 23    Glucose 102 83 128    BUN 28 23 16    Creatinine 1.3 1.46 1.32    Calcium 9.0 8.5 8.5    Anion Gap 10 -- --    EGFR 54.1 50 57         Comment for Glucose at 0955 on 07/20/23:               Fasting reference interval         Comment for Glucose at 1147 on 04/24/23:               Fasting reference interval     For someone without known diabetes, a glucose  value >125 mg/dL indicates that they may have  diabetes and this should be confirmed with a  follow-up test.         Comment for EGFR at 0955 on 07/20/23: The eGFR is based on the CKD-EPI 2021 equation. To calculate   the new eGFR from a previous Creatinine or Cystatin C  result, go to https://www.kidney.org/professionals/  kdoqi/gfr%5Fcalculator      Comment for EGFR at 1147 on 04/24/23: The eGFR is based on the CKD-EPI 2021 equation. To calculate   the new eGFR from a previous Creatinine or Cystatin C  result, go to https://www.kidney.org/professionals/  kdoqi/gfr%5Fcalculator                    Physical Exam    Airway   Mallampati: II   TM distance: >3 FB   Neck ROM: full  Cardiovascular - normal   Rhythm: regular   Rate: normalPulmonary - normal   clear to auscultation  Dental -  normal        Anesthesia Plan    Plan: general    Technique: general endotracheal     Airway: direct visual laryngoscopy   3 ASA  Anesthetic plan and risks discussed with: patient  Induction:    intravenous   Postop Plan:   Patient Disposition: phase II then home  Comments:    Plan: GETA

## 2023-08-17 NOTE — PROGRESS NOTES
Referral received and chart reviewed. Attempted to contact wife and patient, voicemails left.  [facility] referral completed for RN and OT at home.  [facility] will reach out to patient at home.

## 2023-08-17 NOTE — HOSPITAL COURSE
Radha is a 73 y.o. male admitted on 8/17/2023 with Malignant neoplasm of right male breast, unspecified estrogen receptor status, unspecified site of breast (CMS/HCC) [C50.921]  Malignant neoplasm of central portion of right male breast (CMS/HCC) [C50.121]. Principal problem is No Principal Problem: There is no principal problem currently on the Problem List. Please update the Problem List and refresh..    Past Medical History  Radha has a past medical history of Acute systolic heart failure (CMS/HCC) (02/15/2023), Atrial fibrillation (CMS/HCC), Breast cancer (CMS/HCC) (October 2022), CHF (congestive heart failure) (CMS/HCC), Chronic kidney disease, CKD (chronic kidney disease) (10/19/2022), History of radiation therapy, antineoplastic chemo, and Prostate cancer (CMS/HCC).    History of Present Illness   Cam presents for follow-up of right breast cancer.  He was initially noted to have prostate cancer, and a week or 2 prior to his prostate surgery his wife had noticed a change in his right nipple.  He ended up having a core needle biopsy on August 29, 2022 which showed invasive ductal carcinoma, grade 3, ER positive, [place] positive, HER2 positive.  Genetic testing at that time revealed pathogenic mutations in BRCA1 and OK.  He proceeded with neoadjuvant chemotherapy with TCHP, but had significant side effects with atrial fibrillation and heart failure as well as stomach and duodenal ulcers.  He stopped the chemotherapy, and was placed on tamoxifen.  Since I last saw him, he had 39 treatments of radiation for his prostate cancer, and is no longer on Lupron.  He had an ultrasound done today which showed a slight increase in the size of the retroareolar mass in the right breast now measuring 1.7 cm (1.1 cm in March and 1.9 cm in September, 2022).  Also noted on ultrasound were 3 additional probable satellite lesions around the main tumor measuring 1.1 cm, 0.4 cm, and 0.4 cm.  He reports he is tolerating the tamoxifen  well.  He has not noticed any change in the breast himself.    PROCEDURE: RIGHT BREAST MASTECTOMY; RIGHT SENTINEL NODE IDENTIFICATION, MAPPING, AND BIOPSY

## 2023-08-17 NOTE — OR SURGEON
Pre-Procedure patient identification:  I am the primary operating surgeon/proceduralist and I have reviewed the applicable pathology reports and radiology studies for this procedure. I have identified the patient and confirmed laterality is right on 08/17/23 at 7:18 AM Barb Osuna MD  Phone Number: [phone]

## 2023-08-17 NOTE — ANESTHESIOLOGIST PRE-PROCEDURE ATTESTATION
Pre-Procedure Patient Identification:  I am the Primary Anesthesiologist and have identified the patient on 08/17/23 at 7:23 AM.   I have confirmed the procedure(s) will be performed by the following surgeon/proceduralist Barb Osuna MD.

## 2023-08-17 NOTE — PROGRESS NOTES
1405: Spoke with Felicia in PACU, patient for d/c to home today. Attending requesting home care services, patient has SUSI drain. Spoke with Shereen Deluna for home care referral.

## 2023-08-17 NOTE — PROGRESS NOTES
Occupational Therapy -  Initial Evaluation     Patient: Cam Rosas  Location: [facility] Operating Room OR  MRN: [id number]  Today's date: 8/17/2023    HISTORY OF PRESENT ILLNESS     Radha is a 73 y.o. male admitted on 8/17/2023 with Malignant neoplasm of right male breast, unspecified estrogen receptor status, unspecified site of breast (CMS/HCC) [C50.921]  Malignant neoplasm of central portion of right male breast (CMS/HCC) [C50.121]. Principal problem is No Principal Problem: There is no principal problem currently on the Problem List. Please update the Problem List and refresh..    Past Medical History  Radha has a past medical history of Acute systolic heart failure (CMS/HCC) (02/15/2023), Atrial fibrillation (CMS/HCC), Breast cancer (CMS/HCC) (October 2022), CHF (congestive heart failure) (CMS/HCC), Chronic kidney disease, CKD (chronic kidney disease) (10/19/2022), History of radiation therapy, antineoplastic chemo, and Prostate cancer (CMS/HCC).    History of Present Illness   Cam presents for follow-up of right breast cancer.  He was initially noted to have prostate cancer, and a week or 2 prior to his prostate surgery his wife had noticed a change in his right nipple.  He ended up having a core needle biopsy on August 29, 2022 which showed invasive ductal carcinoma, grade 3, ER positive, RI positive, HER2 positive.  Genetic testing at that time revealed pathogenic mutations in BRCA1 and OK.  He proceeded with neoadjuvant chemotherapy with TCHP, but had significant side effects with atrial fibrillation and heart failure as well as stomach and duodenal ulcers.  He stopped the chemotherapy, and was placed on tamoxifen.  Since I last saw him, he had 39 treatments of radiation for his prostate cancer, and is no longer on Lupron.  He had an ultrasound done today which showed a slight increase in the size of the retroareolar mass in the right breast now measuring 1.7 cm (1.1 cm in March and 1.9  cm in September, 2022).  Also noted on ultrasound were 3 additional probable satellite lesions around the main tumor measuring 1.1 cm, 0.4 cm, and 0.4 cm.  He reports he is tolerating the tamoxifen well.  He has not noticed any change in the breast himself.    PROCEDURE: RIGHT BREAST MASTECTOMY; RIGHT SENTINEL NODE IDENTIFICATION, MAPPING, AND BIOPSY;    PRIOR LEVEL OF FUNCTION AND LIVING ENVIRONMENT     Prior Level of Function    Flowsheet Row Most Recent Value   Dominant Hand right   Ambulation independent   Transferring independent   Toileting independent   Bathing independent   Dressing independent   Eating independent   IADLs independent   Driving/Transportation    Communication understands/communicates without difficulty   Assistive Device Currently Used at Home none        Prior Living Environment    Flowsheet Row Most Recent Value   People in Home spouse   Current Living Arrangements home   Living Environment Comment lives with spouse, 2 SH, 2 SRINIVAS with no handrail, LA on first floor, bed and bath located upstairs. tub/ shower combo        Occupational Profile    Flowsheet Row Most Recent Value   Successful Occupations indep PLOF   Occupational History/Life Experiences retired        VITALS AND PAIN     OT Vitals    Date/Time Pulse HR Source Resp O2 Therapy BP BP Location BP Method Pt Position [facility]   08/17/23 1429 55 Monitor 16 -- 162/69 -- -- -- CS   08/17/23 1430 54 Monitor -- None (Room air) 162/69 Left upper arm Automatic Lying CLG   08/17/23 1435 56 Monitor -- -- 143/69 Left upper arm Automatic Sitting CLG   08/17/23 1437 56 -- -- -- 137/69 Left upper arm Automatic Standing CLG   08/17/23 1440 58 -- -- -- 112/55 Left upper arm Automatic Sitting CLG   08/17/23 1450 55 Monitor -- -- 158/67 Left upper arm Automatic Lying CLG      OT Pain    Date/Time Pain Type Rating: Rest Rating: Activity [facility]   08/17/23 1430 Pain Assessment 0 0 CLG   08/17/23 1450 Pain Assessment 0 0 CLG        Objective    OBJECTIVE     Start time:  1410  End time:  1450  Session Length: 40 min  Mode of Treatment: individual therapy, occupational therapy    General Observations  Patient received supine, in bed. He was no issues or concerns identified by nurse prior to session, agreeable to therapy.      Precautions: fall, limb restrictions              OT Eval and Treat - 08/17/23 1415        Cognition    Orientation Status oriented x 3     Affect/Mental Status flat/blunted affect     Follows Commands WFL     Cognitive Function executive function deficit;safety deficit     Executive Function Deficit planning/decision-making;problem-solving/reasoning;judgment;organization/sequencing     Safety Deficit insight into deficits/self-awareness;judgment;problem-solving        Upper Extremity Assessment    General Observations R UE ROM to 90 degress. pt with drain therefore, did not assess further. LUE WNL ROM strength        Motor Skills    Coordination gross motor deficit;minimal impairment        Bed Mobility    Bed Mobility Activities supine to sit;sit to supine     [place] minimum assist (75% or more patient effort);1 person assist     Safety/Cues increased time to complete;maintaining center of gravity over base of support;maintaining precautions;technique;sequencing     Assistive Device head of bed elevated        Mobility Belt    Mobility Belt Used for All Out of Bed Activity no     Reason Mobility Belt Not Used patient refused        Sit/Stand Transfer    Surface edge of bed     [place] minimum assist (75% or more patient effort);1 person assist     Safety/Cues increased time to complete;maintaining center of gravity over base of support;sequencing;maintaining precautions     Assistive Device none        Functional Mobility    Distance in room/bathroom     Functional Mobility [place] moderate assist (50-74% patient effort);2 person assist     Safety/Cues increased time to complete;maintaining center of gravity over  base of support;sequencing;technique     Assistive Device none     Functional Mobility Comments noted increase tremors and several LOB. 2 person assist to safety complete with chair follow. noted MAX A for correction of balance.        Upper Body Dressing    Tasks don;front-opening garment     [place] minimum assist (75% or more patient effort)     Safety/Cues increased time to complete;compensatory techniques;sequencing;use of adaptive equipment;maintaining precautions;energy conservation     Position edge of bed sitting     Adaptive Equipment none     Comment education to complete tasks without assist. pt able to thread RUE requried assist for pull shirt around back, able to button shirt seated EOB        Lower Body Dressing    Tasks don;pants/bottoms     [place] maximum assist (25-49% patient effort)     Safety/Cues increased time to complete;energy conservation;problem-solving;compensatory techniques;maintaining precautions     Position edge of bed sitting     Adaptive Equipment none     Comment requried assist with threading RLE And pull up pants.        Balance    Static Sitting Balance WFL     Dynamic Sitting Balance mild impairment     Sit to Stand Dynamic Balance moderate impairment     Static Standing Balance moderate impairment     Dynamic Standing Balance severe impairment        Impairments/Safety Issues    Impairments Affecting Function balance;endurance/activity tolerance     Safety Issues Affecting Function judgment;insight into deficits/self-awareness;sequencing abilities;safety precautions follow-through/compliance;safety precaution awareness        Upper Extremity (Therapeutic Exercise)    Comment elbow, wrist, hand ROM taught and eduation to not raise arm right past 90 degrees. pt recieved handout                               Education Documentation  Self-Care, taught by France Kyle OT at 8/17/2023  3:00 PM.  Learner: Patient  Readiness: Acceptance  Method:  Explanation  Response: Verbalizes Understanding, Needs Reinforcement  Comment: role of OT, goals, BADLS, handut of ROM,        Session Outcome  Patient supine, in bed at end of session, bed alarm on, all needs met, call light in reach, personal items in reach. Nursing notified about change in vital signs, patient's performance, patient's position, and patient's response to therapy/activity.    AM-PAC™ - ADL (Current Function)     Putting on/taking off regular lower body clothing 2 - A Lot   Bathing 2 - A Lot   Toileting 2 - A Lot   Putting on/taking off regular upper body clothing 2 - A Lot   Help for taking care of personal grooming 3 - A Little   Eating meals 3 - A Little   AM-PAC™ ADL Score 14      ASSESSMENT AND PLAN     Progress Summary  OT evaluation complete. ADL [facility] 14 . Patient presents with functional limitations affecting areas of ADL’s and functional transfers. Currently, patient performs bed mobility with MIN A, functional STS MIN A for stability using no AD, and functional mobility with MOD A X2 assist with several LOB requiring MAX A. Pt would benefit from physical therapy consult. RN stated he will update MD. Pt required MIN A for UB dressing and MAX A For LB dressing. Spouse present and stated concerns for taking pt home. RN made aware. Noted drop in pt BP with movement.   Pt would benefit from skilled OT services to maximize safety and independence with daily tasks. Anticipate d/c to home with assist/ OT HH when medically stable. OT will continue to follow, if pt does not improve pt may require SNF.   Patient/Family Therapy Goal Statement: go home    OT Plan    Flowsheet Row Most Recent Value   Rehab Potential good, to achieve stated therapy goals at 08/17/2023 1415   Therapy Frequency 2 times/wk at 08/17/2023 1415   Planned Therapy Interventions activity tolerance training, adaptive equipment training, BADL retraining, functional balance retraining, IADL retraining, passive ROM/stretching,  patient/caregiver education/training, strengthening exercise, transfer/mobility retraining, ROM/therapeutic exercise at 08/17/2023 1415          OT Discharge Recommendations    Flowsheet Row Most Recent Value   OT Recommended Discharge Disposition home with assistance, home with home health  [pending progress] at 08/17/2023 1415   Anticipated Equipment Needs At Discharge (OT) other (see comments)  [TBD] at 08/17/2023 1415               OT Goals    Flowsheet Row Most Recent Value   Transfer Goal 1    Activity/Assistive Device toilet at 08/17/2023 1415   [place] modified independence at 08/17/2023 1415   Time Frame by discharge at 08/17/2023 1415   Progress/Outcome goal ongoing at 08/17/2023 1415   Bathing Goal 1    Activity/Assistive Device bathing skills, all at 08/17/2023 1415   [place] modified independence at 08/17/2023 1415   Time Frame by discharge at 08/17/2023 1415   Progress/Outcome goal ongoing at 08/17/2023 1415   Dressing Goal 1    Activity/Adaptive Equipment dressing skills, all at 08/17/2023 1415   [place] modified independence at 08/17/2023 1415   Time Frame by discharge at 08/17/2023 1415   Progress/Outcome goal ongoing at 08/17/2023 1415   Toileting Goal 1    Activity/Assistive Device toileting skills, all at 08/17/2023 1415   [place] modified independence at 08/17/2023 1415   Time Frame by discharge at 08/17/2023 1415   Progress/Outcome goal ongoing at 08/17/2023 1415   Grooming Goal 1    Activity/Assistive Device grooming skills, all at 08/17/2023 1415   [place] modified independence at 08/17/2023 1415   Time Frame by discharge at 08/17/2023 1415   Progress/Outcome goal ongoing at 08/17/2023 1415

## 2023-08-17 NOTE — DISCHARGE INSTRUCTIONS
Discharge Instructions for Mastectomy     You just had a procedure to remove your breast. After surgery, be sure to have an adult drive you home.     What to expect  The following are common after a mastectomy:   Bruising and mild swelling around the incision  Mild discomfort for a few days.  Feeling tired for a day or so.  Feeling anxious or down.  Some pain is to be expected and is normal. Take Tylenol and Ibuprofen as        directed on the bottle for 48 hours postoperatively.      Diet  What to eat and drink after a mastectomy:   Start with liquids and light, easy-to-digest foods, such as bananas and dry toast. As you feel up to it, slowly return to your normal diet.  There is no diet restriction.  Drink at least 6 to 8 glasses of water or other nonalcoholic fluids a day, unless directed otherwise.    Activity  What you can do after a mastectomy:   Please wear the provided pink surgical bra until your follow-up appointment, even when going to sleep.   After the procedure, take it easy for the rest of the day.  If you had general anesthesia, don't use machinery or power tools, drink alcohol, or make any major decisions for at least the first 24 hours.  Return to normal activities (including driving) in 24 hours.   No contact sports or strenuous activity; otherwise there is no restriction.  You may shower in 24 hours, starting ***. No tub bath or swimming pools for 2 weeks. Leave dressing over drain site in place.  No repetitive overhead movements until drains removed.    Bandage and incision care   You may use a waterproof ice pack or bag of frozen peas in a thin cloth. Place this over the  incision for 20 minutes on, 20 minutes off, for 48 hours postoperatively for pain relief.  You may shower in 24 hours, starting ***. No tub bath or swimming pools for 2 weeks. Leave dressing over drain site in place.    Postoperative appointment  Please call [phone] to schedule a postoperative follow-up appointment with  Dr. Osuna for 2 weeks weeks after your surgery.     Drain management  Empty your drains when about 1/3 full and record output (initially 2-3 times per day). Strip each drain at least daily.  When the output over 24 hours is less than 30 mL, please notify the office and we will schedule you for drain removal prior to your post-op appointment.    Please call the Office for questions or concerns.                [place] Lymph Node Biopsy in Breast Cancer Treatment, Care After  This sheet gives you information about how to care for yourself after your procedure. Your health care provider may also give you more specific instructions. If you have problems or questions, contact your health care provider.  What can I expect after the procedure?  After the procedure, it is common to have:  Numbness, tingling, or pain near your incision.  Swelling or bruising near your incision.  Follow these instructions at home:  Activity  Avoid activities that take a lot of effort (are strenuous).  Return to your normal activities as told by your health care provider. Ask your health care provider what activities are safe for you.  Incision care    Follow instructions from your health care provider about how to take care of your incision. Make sure you:  Wash your hands with soap and water before you change your bandage (dressing). If soap and water are not available, use hand .  Leave stitches (sutures), skin glue, or adhesive strips in place.   Do not take baths, swim, or use a hot tub for two weeks. You may shower 24 hours after your procedure.  Check your biopsy site every day for signs of infection. Check for:  More redness, swelling, or pain.  More fluid or blood.  Warmth.  Pus or a bad smell.  General instructions  Wear your provided pink surgical bra until your follow-up appointment. You may remove the bra to shower.  You may resume your regular diet.  Keep all follow-up visits as told by your health care provider.  This is important.  Contact a health care provider if:  You have more redness, swelling, or pain around your biopsy site.  You have more fluid or blood coming from your incision.  Your incision feels warm to the touch.  You have pus or a bad smell coming from your incision.  You have nausea and vomiting.  You have any new bruising.  You have chills or fever.  Get help right away if:  You have pain that is getting worse, and your medicine is not helping.  You have vomiting that will not stop.  You have chest pain or trouble breathing.  Summary  Follow instructions from your health care provider about how to take care of your incision.  This information is not intended to replace advice given to you by your health care provider. Make sure you discuss any questions you have with your health care provider.  Document Released: 12/23/2014 Document Revised: 11/30/2018 Document Reviewed: 09/06/2017  Elsevier Patient Education © 2020 [employer]

## 2023-08-17 NOTE — OP NOTE
OPERATIVE NOTE    DATE: 2023    PATIENT NAME: Cam Rosas  MRN: [id number]  AGE: 73 y.o.  : 1950    PREOPERATIVE DIAGNOSIS:  1.  Right breast cancer status post neoadjuvant treatment    POSTOPERATIVE DIAGNOSIS:  1.  Same with negative sentinel nodes      PROCEDURE: RIGHT BREAST MASTECTOMY; RIGHT SENTINEL NODE IDENTIFICATION, MAPPING, AND BIOPSY;    ESTIMATED BLOOD LOSS: No blood loss documented.    Surgeon(s) and Role:     * Barb Osuna MD - Primary    ASSISTANT: Dr. Alvina Hobson, fellow, and Dr. Rosanna Henriquez, resident    ANESTHESIA: General      INDICATIONS FOR PROCEDURE: Cam is a 73-year-old male who noted a mass in his right breast.  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  He initially was placed on neoadjuvant chemotherapy, however he had a severe reaction to this with atrial fibrillation and hospitalization.  He then stopped chemotherapy and was placed on tamoxifen.  Most recent imaging showed questionable increase in the size of the mass in his right breast as well as some satellite lesions, after discussion of options he elected to proceed with a mastectomy and sentinel node biopsy with possible axillary node dissection.    DESCRIPTION OF THE PROCEDURE IN FULL: The patient was identified and marked in the preoperative area.  Informed consent was obtained..  Skin of the right breast was prepped with alcohol and injected with Lymphoseek for sentinel node identification.  The patient was taken to the operating room, and placed on the operating table in supine position.  General endotracheal anesthesia was administered.  A timeout was performed, and the patient and surgical site were identified.  Skin of the right breast was prepped with alcohol, and injected with isosulfan blue dye for sentinel node mapping.  The axilla was checked with the neoprobe and there was good uptake in the axilla of the Lymphoseek.  Next, the right breast  were prepped and draped in usual sterile fashion.    Skin at the anterior axillary hair line was anesthetized with a mixture of lidocaine and Marcaine and an incision was made in the skin using the PEAK PlasmaBlade.  Dissection was carried through subcutaneous tissue and the clavipectoral fascia with the PlasmaBlade.  The axilla was entered and a lymphatic channel was identified and traced to a small blue lymph node which was hot, this was removed and had ex vivo counts of 171.  This was sent for frozen section as right axillary sentinel node #1.  A second hot and blue lymph node was likewise removed, this had ex vivo counts of 33 and was sent as right axillary sentinel node 2 for frozen section.  A third lymph node was likewise removed which was blue and hot, this had ex vivo counts of 248 sent as right axillary sentinel node #3 for frozen section.  The axilla was checked and no additional blue, hot, or palpable nodes were identified.  The axilla was injected with additional lidocaine and Marcaine for local anesthetic and made hemostatic with the PEAK PlasmaBlade.  This incision was left open pending the results of the sentinel node biopsy.  Next, an incision was made around the skin of the right breast using the peak plasma blade.  Dissection was carried into subcutaneous tissue, and skin flaps were created superiorly to the clavicle, medially to the sternum, inferiorly to the inframammary fold, and laterally to the serratus muscle.  The breast and pectoralis fascia were peeled off of the pectoralis muscle with the peak plasma blade.  The axillary tail was marked with suture for orientation, and the breast was sent to pathology.  The cavity was then irrigated with saline until clear and made hemostatic with the peak plasma blade.  [place] nodes 1, 2, and 3 were all noted to be negative on frozen section.  A 15 Nigerian Justin drain was then placed under the mastectomy flaps and sutured in place with a Prolene  suture.  Jeniffer was applied to the incisions for hemostasis.  The axillary incision was closed with 3-0 Vicryl deep and a running 4-0 Monocryl subcuticular stitch.  The breast incision was closed with 2-0 Vicryl deep's and a running 4 oh strata fix suture.  Additional skin at the lateral mastectomy edge was removed and sent to path cosmesis.  The drain bulb was then fixed to the drain.  Dermabond was applied to the incisions followed by dry dressings and an Ace wrap.  Patient was woken and taken to recovery in stable condition.  All sponge, needle, and instrument counts reported me as correct at the end of the case.      Synoptic Operative Report - Breast [place] Node Biopsy    1. Substrate)s) used for sentinel node biopsy in the non-neoadjuvant setting: BSNB SUBTRATES: N/A    2.  Substrate(s) used for sentinel node biopsy in the neoadjuvant setting: BSNB SUBTRATES: Dye and Radiotracer    3.  All colored nodes or non-colored nodes present at the end of a dye filled lymphatic channel were removed, if dye was used as the substrate for localization: Yes    4.  All significantly radioactive nodes were removed, if radionuclide was used as the substrate for localization:Yes    5.  All palpably suspicious nodes were removed if present: Yes    6.  If clips were placed in pathologically-involved nodes, those nodes were identified and removed: N/A    Synoptic Report complete       DISPOSITION: The patient tolerated the procedure well        Electronically signed by Barb Osuna MD August 17, 2023 10:01 AM    CC: Patient Care Team:  Usman Collins MD as PCP - General (Internal Medicine)  Paz Cleveland MD as Medical Oncologist (Hematology and Oncology)  Gary Moran MD as Cardiologist (Cardiology)  Bill Amin DO as Nephrologist (Nephrology)  [facility], Gregory J, MD as Radiation Oncologist (Radiation Oncology)  Barb Osuna MD as Consulting Physician (Breast Surgery)  Rafael Torrez,  MD as Referring Physician (Urology)  Gary Aceves MD as Cardiologist (Electrophysiology)  Lucita Brambila, MIQUEL as Nurse Navigator  Kailey Gale MD as Medical Oncologist (Hematology and Oncology)

## 2023-08-17 NOTE — NURSING NOTE
Pt arrived with wife at bedside, dressing to chest CDI, brief drain teaching preformed with wife, patient introduced to IS able to maintain 1000, denies SOB, denies pain, can make needs known, call bell within reach.

## 2023-08-18 ENCOUNTER — TELEPHONE (OUTPATIENT)
Dept: SURGERY | Facility: CLINIC | Age: 73
End: 2023-08-18
Payer: MEDICARE

## 2023-08-18 VITALS
WEIGHT: 183 LBS | TEMPERATURE: 98.1 F | BODY MASS INDEX: 29.41 KG/M2 | HEIGHT: 66 IN | HEART RATE: 68 BPM | DIASTOLIC BLOOD PRESSURE: 66 MMHG | OXYGEN SATURATION: 94 % | RESPIRATION RATE: 16 BRPM | SYSTOLIC BLOOD PRESSURE: 139 MMHG

## 2023-08-18 PROBLEM — I48.0 PAROXYSMAL ATRIAL FIBRILLATION (CMS/HCC): Status: ACTIVE | Noted: 2022-10-19

## 2023-08-18 LAB
ANION GAP SERPL CALC-SCNC: 6 MEQ/L (ref 3–15)
BUN SERPL-MCNC: 15 MG/DL (ref 7–25)
CALCIUM SERPL-MCNC: 7.8 MG/DL (ref 8.6–10.3)
CHLORIDE SERPL-SCNC: 109 MEQ/L (ref 98–107)
CO2 SERPL-SCNC: 25 MEQ/L (ref 21–31)
CREAT SERPL-MCNC: 1.2 MG/DL (ref 0.7–1.3)
ERYTHROCYTE [DISTWIDTH] IN BLOOD BY AUTOMATED COUNT: 15.1 % (ref 11.6–14.4)
GFR SERPL CREATININE-BSD FRML MDRD: 59.3 ML/MIN/1.73M*2
GLUCOSE SERPL-MCNC: 115 MG/DL (ref 70–99)
HCT VFR BLDCO AUTO: 34.5 % (ref 40.1–51)
HGB BLD-MCNC: 10.8 G/DL (ref 13.7–17.5)
MCH RBC QN AUTO: 34.5 PG (ref 28–33.2)
MCHC RBC AUTO-ENTMCNC: 31.3 G/DL (ref 32.2–36.5)
MCV RBC AUTO: 110.2 FL (ref 83–98)
PDW BLD AUTO: 8.8 FL (ref 9.4–12.4)
PLATELET # BLD AUTO: 108 K/UL (ref 150–350)
POTASSIUM SERPL-SCNC: 4.2 MEQ/L (ref 3.5–5.1)
RBC # BLD AUTO: 3.13 M/UL (ref 4.5–5.8)
SODIUM SERPL-SCNC: 140 MEQ/L (ref 136–145)
WBC # BLD AUTO: 6.72 K/UL (ref 3.8–10.5)

## 2023-08-18 PROCEDURE — 80048 BASIC METABOLIC PNL TOTAL CA: CPT | Performed by: HOSPITALIST

## 2023-08-18 PROCEDURE — 63600000 HC DRUGS/DETAIL CODE: Mod: JZ | Performed by: SURGERY

## 2023-08-18 PROCEDURE — 36415 COLL VENOUS BLD VENIPUNCTURE: CPT | Performed by: HOSPITALIST

## 2023-08-18 PROCEDURE — 63700000 HC SELF-ADMINISTRABLE DRUG: Performed by: SURGERY

## 2023-08-18 PROCEDURE — 85027 COMPLETE CBC AUTOMATED: CPT | Performed by: HOSPITALIST

## 2023-08-18 PROCEDURE — 25800000 HC PHARMACY IV SOLUTIONS: Performed by: SURGERY

## 2023-08-18 PROCEDURE — 99221 1ST HOSP IP/OBS SF/LOW 40: CPT | Performed by: HOSPITALIST

## 2023-08-18 PROCEDURE — 97162 PT EVAL MOD COMPLEX 30 MIN: CPT | Mod: GP

## 2023-08-18 RX ORDER — BENZOCAINE AND DEXTROMETHORPHAN HYDROBROMIDE 7.5; 5 MG/1; MG/1
1 LOZENGE ORAL EVERY 4 HOURS PRN
Refills: 0
Start: 2023-08-18 | End: 2023-09-17

## 2023-08-18 RX ADMIN — ACETAMINOPHEN 975 MG: 325 TABLET ORAL at 12:30

## 2023-08-18 RX ADMIN — GUAIFENESIN 600 MG: 600 TABLET ORAL at 05:38

## 2023-08-18 RX ADMIN — ACETAMINOPHEN 975 MG: 325 TABLET ORAL at 05:17

## 2023-08-18 RX ADMIN — BENZOCAINE 6 MG-MENTHOL 10 MG LOZENGES 1 LOZENGE: at 12:30

## 2023-08-18 RX ADMIN — DILTIAZEM HYDROCHLORIDE 120 MG: 120 CAPSULE, COATED, EXTENDED RELEASE ORAL at 09:50

## 2023-08-18 RX ADMIN — CEFAZOLIN 2 G: 2 INJECTION, POWDER, FOR SOLUTION INTRAMUSCULAR; INTRAVENOUS at 00:42

## 2023-08-18 RX ADMIN — METOPROLOL SUCCINATE 50 MG: 50 TABLET, EXTENDED RELEASE ORAL at 09:50

## 2023-08-18 RX ADMIN — PANTOPRAZOLE SODIUM 40 MG: 40 TABLET, DELAYED RELEASE ORAL at 09:50

## 2023-08-18 RX ADMIN — AMIODARONE HYDROCHLORIDE 200 MG: 200 TABLET ORAL at 09:50

## 2023-08-18 RX ADMIN — CHOLECALCIFEROL TAB 25 MCG (1000 UNIT) 1000 UNITS: 25 TAB at 09:51

## 2023-08-18 ASSESSMENT — COGNITIVE AND FUNCTIONAL STATUS - GENERAL
STANDING UP FROM CHAIR USING ARMS: 4 - NONE
WALKING IN HOSPITAL ROOM: 4 - NONE
MOVING TO AND FROM BED TO CHAIR: 3 - A LITTLE
STANDING UP FROM CHAIR USING ARMS: 3 - A LITTLE
CLIMB 3 TO 5 STEPS WITH RAILING: 3 - A LITTLE
CLIMB 3 TO 5 STEPS WITH RAILING: 3 - A LITTLE
AFFECT: WFL
MOVING TO AND FROM BED TO CHAIR: 4 - NONE
WALKING IN HOSPITAL ROOM: 3 - A LITTLE

## 2023-08-18 NOTE — PLAN OF CARE
Problem: Adult Inpatient Plan of Care  Goal: Plan of Care Review  Outcome: Progressing    Patient reported minimal chest pain throughout the shift; however, this morning patient reported right chest/breast pain had increased a bit. Administer scheduled tylenol and discussed PRN options if tylenol ineffective. Patient ambulated to bathroom this morning using walker. Incontinent of urine at baseline but patient requested to ambulate to bathroom to change his brief. Quarter sized area of bloody drainage noted on ACE bandage; on the right side at the SUSI drain site. Spoke with answering service; awaiting call back from on call oncology provider. 5ml of bloody drainage removed from SUSI drain. Patient noted with frequent cough, he reports he had cough prior to surgery.Pt on scheduled mucinex, only wanted to take 1 of two mucinex tablets ordered this morning. Bed alarm active, call bell in reach.

## 2023-08-18 NOTE — CONSULTS
[facility] Medicine Service -  Inpatient Consultation         Requesting Physician: Dr. Osuna    Reason for Consultation: Medical management     HISTORY OF PRESENT ILLNESS        This is a 73 y.o. male PMH prostate cancer s/p prostatectomy, paroxysmal atrial fibrillation, CKD stage 3a, right breast cancer who presented on 8/17/23 for elective right mastectomy with sentinel node biopsy on 8/17 with Dr. Osuna. [facility] was consulted for post-operative medical management. Patient tolerated surgery well. He denies fever, chills, chest pain, palpitations, abdominal pain, nausea, vomiting, bowel/bladder changes, right arm swelling/numbness/weakness. He states pain is controlled. Tolerating diet. Wife at bedside.     PAST MEDICAL AND SURGICAL HISTORY        Past Medical History:   Diagnosis Date   • Acute systolic heart failure (CMS/HCC) 02/15/2023   • Atrial fibrillation (CMS/HCC)    • Breast cancer (CMS/HCC) October 2022   • CHF (congestive heart failure) (CMS/HCC)    • Chronic kidney disease    • CKD (chronic kidney disease) 10/19/2022   • History of radiation therapy    • Hx antineoplastic chemo    • Prostate cancer (CMS/HCC)        Past Surgical History:   Procedure Laterality Date   • CARDIAC SURGERY      mitral valve repair 2006   • CARDIOVERSION  12/05/2022    Successful DC cardioversion   • KNEE CARTILAGE SURGERY Left    • PROSTATE SURGERY  July 2022   • PROSTATECTOMY  07/15/2022   • TONSILLECTOMY         PCP: Usman Collins MD    MEDICATIONS        Home Medications:  Medications Prior to Admission   Medication Sig Dispense Refill Last Dose   • acetaminophen (TYLENOL EX STR RAPID RELEASE ORAL) Take 500 mg by mouth as needed.   8/16/2023   • amiodarone (PACERONE) 200 mg tablet Take 1 tablet (200 mg total) by mouth daily. 90 tablet 3 8/17/2023 at 0430   • cetirizine (ZyrTEC) 10 mg tablet Take 10 mg by mouth nightly.   8/16/2023   • cholecalciferol, vitamin D3, 1,000 unit (25 mcg) tablet Take 1,000 Units by  mouth daily.   8/16/2023   • dilTIAZem CD (CARDIZEM CD) 120 mg 24 hr capsule Take 1 capsule (120 mg total) by mouth daily. 270 capsule 3 8/17/2023 at 0430   • guaiFENesin (MUCINEX) 600 mg 12 hr tablet Take 1,200 mg by mouth 2 (two) times a day.   8/17/2023 at 0430   • metoprolol succinate XL (TOPROL-XL) 50 mg 24 hr tablet Take 1 tablet (50 mg total) by mouth daily. 90 tablet 3 8/17/2023 at 0430   • pantoprazole (PROTONIX) 40 mg EC tablet Take 1 tablet (40 mg total) by mouth 2 (two) times a day. 180 tablet 3 8/16/2023   • simvastatin (ZOCOR) 20 mg tablet Take 1 tablet (20 mg total) by mouth nightly. 90 tablet 3 8/16/2023   • tamoxifen (NOLVADEX) 20 mg chemo tablet Take  1 tablet (20 mg total) daily 90 tablet 1 8/17/2023 at 0430   • [DISCONTINUED] rivaroxaban (XARELTO) 15 mg tablet Take 1 tablet (15 mg total) by mouth daily with dinner Indications: treatment to prevent blood clots in chronic atrial fibrillation. 90 tablet 1 8/14/2023 at 1900       Current inpatient medications were personally reviewed.    ALLERGIES        Azithromycin, Prednisone, and Lorazepam    FAMILY HISTORY        Family History   Problem Relation Age of Onset   • Hyperlipidemia Biological Mother    • Hypertension Biological Mother    • Breast cancer Biological Mother    • Cancer Biological Mother         gyn? cervical   • Hyperlipidemia Biological Father    • Hypertension Biological Father    • Arthritis Biological Father    • No Known Problems Biological Sister    • No Known Problems Biological Brother    • Heart disease Maternal Grandmother    • Arthritis Maternal Grandfather    • COPD Maternal Grandfather    • Diabetes Maternal Grandfather    • No Known Problems Paternal Grandmother    • No Known Problems Paternal Grandfather    • Cancer less than or equal to age 50 Other    • Esophageal cancer Other         47, in abd,chemo q 3 weeks       SOCIAL HISTORY        Social History     Socioeconomic History   • Marital status:      Spouse  "name: None   • Number of children: None   • Years of education: None   • Highest education level: None   Occupational History   • Occupation: insurance   Tobacco Use   • Smoking status: Never   • Smokeless tobacco: Never   Vaping Use   • Vaping Use: Never used   Substance and Sexual Activity   • Alcohol use: Yes     Alcohol/week: 2.0 standard drinks of alcohol     Types: 2 Shots of liquor per week     Comment: 2 drinks/day - scotch   • Drug use: Never   • Sexual activity: Not Currently     Partners: Female     Social Determinants of Health     Financial Resource Strain: Low Risk  (8/18/2023)    Overall Financial Resource Strain (CARDIA)    • Difficulty of Paying Living Expenses: Not hard at all   Food Insecurity: No Food Insecurity (10/19/2022)    Hunger Vital Sign    • Worried About Running Out of Food in the Last Year: Never true    • Ran Out of Food in the Last Year: Never true   Transportation Needs: No Transportation Needs (8/18/2023)    PRAPARE - Transportation    • Lack of Transportation (Medical): No    • Lack of Transportation (Non-Medical): No   Housing Stability: Unknown (8/18/2023)    Housing Stability Vital Sign    • Unstable Housing in the Last Year: No       REVIEW OF SYSTEMS        All other systems reviewed and negative except as noted in HPI    PHYSICAL EXAMINATION        Visit Vitals  /66 (BP Location: Left upper arm, Patient Position: Sitting)   Pulse 68   Temp 36.7 °C (98.1 °F) (Oral)   Resp 16   Ht 1.676 m (5' 6\")   Wt 83 kg (183 lb)   SpO2 94%   BMI 29.54 kg/m²     Body mass index is 29.54 kg/m².    Intake/Output Summary (Last 24 hours) at 8/18/2023 1121  Last data filed at 8/18/2023 0900  Gross per 24 hour   Intake 320 ml   Output 35 ml   Net 285 ml       Physical Exam  Vitals and nursing note reviewed.   Constitutional:       General: He is not in acute distress.     Appearance: Normal appearance. He is well-developed.   HENT:      Head: Normocephalic and atraumatic.   Eyes:      " Extraocular Movements: Extraocular movements intact.      Pupils: Pupils are equal, round, and reactive to light.   Cardiovascular:      Rate and Rhythm: Normal rate and regular rhythm.      Heart sounds: Normal heart sounds.   Pulmonary:      Effort: Pulmonary effort is normal. No respiratory distress.      Breath sounds: Normal breath sounds. No wheezing, rhonchi or rales.   Abdominal:      General: Bowel sounds are normal. There is no distension.      Palpations: Abdomen is soft.      Tenderness: There is no abdominal tenderness.   Musculoskeletal:         General: No swelling. Normal range of motion.      Cervical back: Normal range of motion and neck supple.   Skin:     General: Skin is warm and dry.      Comments: Right chest wall surgical incision clean, dry, glue intact. Drain in place with minimal serosanguinous drainage. RUE no edema, strength 5/5, sensation intact.    Neurological:      Mental Status: He is alert and oriented to person, place, and time.         LABS / EKG        Labs  Results from last 7 days   Lab Units 08/18/23  0918   WBC K/uL 6.72   HEMOGLOBIN g/dL 10.8*   HEMATOCRIT % 34.5*   PLATELETS K/uL 108*     Results from last 7 days   Lab Units 08/18/23  0918   SODIUM mEQ/L 140   POTASSIUM mEQ/L 4.2   CHLORIDE mEQ/L 109*   CO2 mEQ/L 25   BUN mg/dL 15   CREATININE mg/dL 1.2   GLUCOSE mg/dL 115*   CALCIUM mg/dL 7.8*     SARS-CoV-2 (COVID-19) (no units)   Date/Time Value   12/01/2022 1319 Negative        ECG/Telemetry  I have independently reviewed the telemetry. No events for the last 24 hours.    Imaging  Not on telemetry.    ASSESSMENT AND RECOMMENDATIONS           Paroxysmal atrial fibrillation (CMS/HCC)  Assessment & Plan  -Continue metoprolol and diltiazem.   -Restart Xarelto once cleared by surgery.    * Malignant neoplasm of right male breast (CMS/HCC)  Assessment & Plan  -S/P right mastectomy with sentinel node biopsy on 8/17 Dr. Osuna.   -Post-op management per surgery team.      Stage 3a chronic kidney disease (CMS/[facility])  Assessment & Plan  -Post-op creatinine at baseline.   -Avoid nephrotoxins.     Hyperlipidemia  Assessment & Plan  -Continue statin.    Essential hypertension  Assessment & Plan  -BP acceptable.   -Continue diltiazem and metoprolol.   -Monitor vitals.     Prostate cancer (CMS/[facility])  Assessment & Plan  -History of prostatectomy and XRT.  -Follow up with urology.       DVT ppx: SCDs.  GI ppx: Pantoprazole.     Almaz Glynn,   8/18/2023

## 2023-08-18 NOTE — PLAN OF CARE
Care Coordination Discharge Plan Note     Discharge Needs Assessment  Concerns to be Addressed:    Current Discharge Risk:      Anticipated Discharge Plan  Anticipated Discharge Disposition: home with home health, home with assistance  Type of Home Care Services: home PT, home OT, nursing        Patient Choice  Offered/Gave Vendor List: yes  Patient's Choice of Community Agency(s): [facility]    Patient and/or patient guardian/advocate was made aware of their right to choose a provider. A list of eligible providers was presented and reviewed with the patient and/or patient guardian/advocate in written and/or verbal form. The list delineates providers in the patient’s desired geographic area who are participating in the Medicare program and/or providers contracted with the patient’s primary insurance. The Medicare list and quality ratings were obtained from the Medicare.gov [medicare.gov] website.    ---------------------------------------------------------------------------------------------------------------------    Interdisciplinary Discharge Plan Review:  Participants:     Concerns Comments:      Discharge Plan:   Disposition/Destination: Home Health Care - [facility] / Home  Discharge Facility:    Community Resources:      Discharge Transportation:  Is Out of Hospital DNR needed at Discharge: no  Does patient need discharge transport? No      PT will be discharging home with assistance and RN/OT/PT through [facility]. Per PT rec is for home w/assist & HHC.   JASON Finney

## 2023-08-18 NOTE — PROGRESS NOTES
"Breast Surgery Progress Note          Subjective: Postop day #1 status post right mastectomy with sentinel node biopsy.  Patient reports he is doing well.  No significant pain.  He was able to walk in the ortiz with a walker and some in the room without the walker.  PT has recommended continuing using a walker at home, he does report having a walker.  Seen by [facility] and cleared for discharge.    Objective:    Visit Vitals  /66 (BP Location: Left upper arm, Patient Position: Sitting)   Pulse 68   Temp 36.7 °C (98.1 °F) (Oral)   Resp 16   Ht 1.676 m (5' 6\")   Wt 83 kg (183 lb)   SpO2 94%   BMI 29.54 kg/m²       Physical Exam:  Gen: no acute distress, comfortable  Breast Exam: Incisions clean dry and intact.  Small amount of fluid under the flaps, drain stripped and fluid evacuated.  No significant ecchymosis.  Abdomen: Deferred  Drains: Serosanguineous      Intake/Output Summary (Last 24 hours) at 8/18/2023 1213  Last data filed at 8/18/2023 0900  Gross per 24 hour   Intake 320 ml   Output 35 ml   Net 285 ml       CBC Results       08/18/23 08/08/23 04/24/23     0918 1000 1152    WBC 6.72 7.38 4.4    RBC 3.13 3.47 3.92    HGB 10.8 11.6 12.1    HCT 34.5 36.8 37.1    .2 106.1 94.6    MCH 34.5 33.4 30.9    MCHC 31.3 31.5 32.6     196 153          BMP Results       08/18/23 08/08/23 07/20/23     0918 1000 0955     141 142    K 4.2 4.5 5.0    Cl 109 108 109    CO2 25 23 25    Glucose 115 102 83    BUN 15 28 23    Creatinine 1.2 1.3 1.46    Calcium 7.8 9.0 8.5    Anion Gap 6 10 --    EGFR 59.3 54.1 50         Comment for Glucose at 0955 on 07/20/23:               Fasting reference interval         Comment for EGFR at 0918 on 08/18/23: NOT CALCULATED    Comment for EGFR at 0955 on 07/20/23: The eGFR is based on the CKD-EPI 2021 equation. To calculate   the new eGFR from a previous Creatinine or Cystatin C  result, go to https://www.kidney.org/professionals/  kdoqi/gfr%5Fcalculator      "         Assessment and Plan: Postop day #1 status post right mastectomy and sentinel node biopsy  Discussed situation with his wife, he seems stable for discharge, will be discharged home with home health care.  Nurses to do education regarding drain management.  He will continue to use a walker as needed, they will call the office when the drain is less than 30 cc, otherwise I will see him in about 12 days.      Barb Osuna MD FACS

## 2023-08-18 NOTE — PLAN OF CARE
Care Coordination Discharge Plan Summary    Admission Assessment Summary    General Information  Readmission Within the last 30 days: no previous admission in last 30 days  Does patient have a :    Patient-Specific Goals (include timeframe):      Living Arrangements  Arrived From:    Current Living Arrangements: home  People in Home: spouse  Home Accessibility:    Living Arrangement Comments:      Social Determinants of Health - Screenings  Housing Stability  In the last 12 months, was there a time when you did not have a steady place to sleep or slept in a shelter (including now)?: No  Financial Resource Strain  How hard is it for you to pay for the very basics like food, housing, medical care, and heating?: Not hard at all  Transportation Needs  In the past 12 months, has lack of transportation kept you from medical appointments or from getting medications?: No  In the past 12 months, has lack of transportation kept you from meetings, work, or from getting things needed for daily living?: No    Functional Status Prior to Admission  Assistive Device/Animal Currently Used at Home: none  Functional Status Comments:    IADL Comments:      Discharge Needs Assessment    Concerns to be Addressed:    Current Discharge Risk:    Anticipated Changes Related to Illness:      Discharge Plan Summary    Patient Choice  Offered/Gave Vendor List: yes  Patient's Choice of Community Agency(s): [facility]  Patient and/or patient guardian/advocate was made aware of their right to choose a provider. A list of eligible providers was presented and reviewed with the patient and/or patient guardian/advocate in written and/or verbal form. The list delineates providers in the patient’s desired geographic area who are participating in the Medicare program and/or providers contracted with the patient’s primary insurance. The Medicare list and quality ratings were obtained from the Medicare.gov [medicare.gov] website.    Discharge  Plan:  Disposition/Destination: Home Health Care - [facility] / Home       Connection to Community  Not applicable    Community Resources:      Discharge Transportation:  Is Out of Hospital DNR needed at Discharge: no  Does patient need discharge transport?         PT will be discharging home today w/HHC for home OT through [facility] and home w/assist. Per OT rec is for home w/assist & HHC.   JASON Finney

## 2023-08-18 NOTE — PLAN OF CARE
Problem: Adult Inpatient Plan of Care  Goal: Plan of Care Review  Outcome: Met  Flowsheets (Taken 8/18/2023 1213)  Progress: improving  Outcome Evaluation: PT evaluation complete. [facility] mobility score of 23/24. Demonstrates mod indep with bed mobility, sit <> stand transfers, and gait training using RW, close supervision with stair training. Mild instabilty with stair training secondary to dynamic balance impairments. PT to sign off. Recommending home with assist from family, especially with stairs, and home PT. Recommend use of RW at home to improve stability.  Plan of Care Reviewed With: patient     Problem: Mobility Impairment  Goal: Optimal Mobility  Outcome: Met

## 2023-08-18 NOTE — PROGRESS NOTES
Physical Therapy -  Initial Evaluation     Patient: Cam Rosas  Location: [address]  MRN: [id number]  Today's date: 8/18/2023    HISTORY OF PRESENT ILLNESS     Radha is a 73 y.o. male admitted on 8/17/2023 with Malignant neoplasm of right male breast, unspecified estrogen receptor status, unspecified site of breast (CMS/HCC) [C50.921]  Malignant neoplasm of central portion of right male breast (CMS/HCC) [C50.121]. Principal problem is No Principal Problem: There is no principal problem currently on the Problem List. Please update the Problem List and refresh..    Past Medical History  Radha has a past medical history of Acute systolic heart failure (CMS/HCC) (02/15/2023), Atrial fibrillation (CMS/HCC), Breast cancer (CMS/HCC) (October 2022), CHF (congestive heart failure) (CMS/HCC), Chronic kidney disease, CKD (chronic kidney disease) (10/19/2022), History of radiation therapy, antineoplastic chemo, and Prostate cancer (CMS/HCC).    History of Present Illness   Cam presents for follow-up of right breast cancer.  He was initially noted to have prostate cancer, and a week or 2 prior to his prostate surgery his wife had noticed a change in his right nipple.  He ended up having a core needle biopsy on August 29, 2022 which showed invasive ductal carcinoma, grade 3, ER positive, MS positive, HER2 positive.  Genetic testing at that time revealed pathogenic mutations in BRCA1 and OK.  He proceeded with neoadjuvant chemotherapy with TCHP, but had significant side effects with atrial fibrillation and heart failure as well as stomach and duodenal ulcers.  He stopped the chemotherapy, and was placed on tamoxifen.  Since I last saw him, he had 39 treatments of radiation for his prostate cancer, and is no longer on Lupron.  He had an ultrasound done today which showed a slight increase in the size of the retroareolar mass in the right breast now measuring 1.7 cm (1.1 cm in March and 1.9 cm in  September, 2022).  Also noted on ultrasound were 3 additional probable satellite lesions around the main tumor measuring 1.1 cm, 0.4 cm, and 0.4 cm.  He reports he is tolerating the tamoxifen well.  He has not noticed any change in the breast himself.    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 tub/ shower combo. Pt reports that he plans to stay on the main level upon return to home. Children live nearby and are able to assist, if necessary.        VITALS AND PAIN     PT Vitals    Date/Time Pulse HR Source SpO2 Pt Activity O2 Therapy BP BP Location BP Method Pt Position [facility]   08/18/23 0955 66 Monitor 95 % At rest None (Room air) 155/68 Left upper arm Automatic Sitting MPN   08/18/23 1010 68 Monitor 94 % At rest None (Room air) 139/66 Left upper arm Automatic Sitting MPN      PT Pain    Date/Time Pain Type Rating: Rest Rating: Activity [facility]   08/18/23 0955 Pain Assessment 0 - no pain 0 - no pain MPN        Objective   OBJECTIVE     Start time:  0952  End time:  1012  Session Length: 20 min  Mode of Treatment:  individual therapy, physical therapy    General Observations  Patient received supine, in bed. He was agreeable to therapy, no issues or concerns identified by nurse prior to session.      Precautions: fall, limb restrictions        Services  Do You Speak a Language Other Than English at Home?: no  Is an  Needed/Used?: No      PT Eval and Treat - 08/18/23 1205        Cognition    Orientation Status oriented x 3     Affect/Mental Status WFL     Follows Commands WFL;follows one-step commands;over 90% accuracy     Cognitive Function WFL        Hearing Assessment    Hearing Status WFL        Sensory Assessment    Sensory Assessment sensation intact, lower extremities        Lower Extremity Assessment    LE Assessment ROM and Strength WFL        Bed Mobility    Bed Mobility Activities supine to sit;sit to supine     [place] modified independence     Safety/Cues increased time to complete     Assistive Device head of bed elevated        Mobility Belt    Mobility Belt Used for All Out of Bed Activity no     Reason Mobility Belt Not Used other (see comments)     Reason Mobility Belt Not Used Pt declined        Sit/Stand Transfer    Surface edge of bed     [place] modified independence     Safety/Cues increased time to complete     Assistive Device walker, front-wheeled        Gait Training    [place], Gait modified independence     Safety/Cues increased time to complete;gait deviations;minimal;verbal cues;proper use of assistive device     Assistive Device walker, front-wheeled     Distance in Feet 80 feet     Pattern step-through     Deviations/Abnormal Patterns stride length decreased;step length decreased;gait speed decreased        Stairs Training    [place], Stairs close supervision     Safety/Cues increased time to complete     Assistive Device railing     Handrail Location (Stairs) left side (ascending);right side (descending)     Number of Stairs 2     Ascending Stairs  Technique step-to-step     Descending Stairs Technique step-to-step     Comment Pt reports using door frame on left side to stabilize himself at home. Use of single railing with stair training during eval.        Balance    Static Sitting Balance WFL;sitting, edge of bed     Dynamic Sitting Balance WFL;sitting, edge of bed     Sit to Stand Dynamic Balance WFL;supported     Static Standing Balance WFL;supported     Dynamic Standing Balance mild impairment;supported     Comment, Balance Use of RW to improve stability. Demonstrates mild unsteadiness with stair training requiring close supervision. Otherwise, demonstrates good standing static and dynamic balance.        Impairments/Safety Issues    Impairments Affecting Function balance;endurance/activity tolerance     Functional Endurance Fair (+)                               Education Documentation  Joint Mobility/Strength, taught by Meek Villagran, PT at 8/18/2023 12:13 PM.  Learner: Patient  Readiness: Acceptance  Method: Explanation  Response: Verbalizes Understanding  Comment: Education on role of PT, PT POC and d/c recs, use of RW        Session Outcome  Patient supine, in bed at end of session, bed alarm on, all needs met, personal items in reach, call light in reach. Nursing notified about patient's response to therapy/activity, patient's performance, and patient's position.    AM-PAC™ - Mobility (Current Function)     Turning form your back to your side while in flat bed without using bedrails 4 - None   Moving from lying on your back to sitting on the side of a flat bed without using bedrails 4 - None   Moving to and from a bed to a chair 4 - None   Standing up from a chair using your arms 4 - None   To walk in a hospital room 4 - None   Climbing 3-5 steps with a railing 3 - A Little   AM-PAC™ Mobility Score 23      ASSESSMENT AND PLAN     Progress Summary  PT evaluation complete. [facility] mobility score of 23/24. Demonstrates mod indep with bed mobility, sit <>  stand transfers, and gait training using RW, close supervision with stair training. Mild instabilty with stair training secondary to dynamic balance impairments. PT to sign off. Recommending home with assist from family, especially with stairs, and home PT. Recommend use of RW at home to improve stability.    Patient/Family Therapy Goals Statement: Home    PT Plan    Flowsheet Row Most Recent Value   Therapy Frequency evaluation only at 08/18/2023 1205          PT Discharge Recommendations    Flowsheet Row Most Recent Value   PT Recommended Discharge Disposition home with assistance, home with home health at 08/18/2023 1205   Anticipated Equipment Needs at Discharge (PT) none at 08/18/2023 1205

## 2023-08-18 NOTE — NURSING NOTE
Patient discharged to home, all instructions reviewed with patient, IV access was removed, SUSI drain teaching done with wife, acknowledge the understanding drain care, R  Breast dressing was replaced by MD this morning, site CDI, patient comfortable at the time of discharge, frequent cough persist.

## 2023-08-18 NOTE — PLAN OF CARE
Care Coordination Discharge Plan Note     Discharge Needs Assessment  Concerns to be Addressed:    Current Discharge Risk:      Anticipated Discharge Plan  Anticipated Discharge Disposition: home with home health       Patient Choice  Offered/Gave Vendor List: yes  Patient's Choice of Community Agency(s): [facility]    Patient and/or patient guardian/advocate was made aware of their right to choose a provider. A list of eligible providers was presented and reviewed with the patient and/or patient guardian/advocate in written and/or verbal form. The list delineates providers in the patient’s desired geographic area who are participating in the Medicare program and/or providers contracted with the patient’s primary insurance. The Medicare list and quality ratings were obtained from the Medicare.gov [medicare.gov] website.    ---------------------------------------------------------------------------------------------------------------------    Interdisciplinary Discharge Plan Review:  Participants:     Concerns Comments:      Discharge Plan:   Disposition/Destination: Home Health Care - [facility] / Home  Discharge Facility:    Community Resources:      Discharge Transportation:  Is Out of Hospital DNR needed at Discharge:    Does patient need discharge transport?        PT will be discharging home today w/HHC for home OT through [facility] and home w/assist. Per OT rec is for home w/assist & HHC.   JASON Finney

## 2023-08-18 NOTE — ASSESSMENT & PLAN NOTE
-S/P right mastectomy with sentinel node biopsy on 8/17 Dr. Osuna.   -Post-op management per surgery team.

## 2023-08-21 ENCOUNTER — TELEPHONE (OUTPATIENT)
Dept: SURGERY | Facility: CLINIC | Age: 73
End: 2023-08-21
Payer: MEDICARE

## 2023-08-21 ENCOUNTER — TRANSCRIBE ORDERS (OUTPATIENT)
Dept: RADIOLOGY | Facility: HOSPITAL | Age: 73
End: 2023-08-21

## 2023-08-21 ENCOUNTER — TELEPHONE (OUTPATIENT)
Dept: CARDIOLOGY | Facility: CLINIC | Age: 73
End: 2023-08-21
Payer: MEDICARE

## 2023-08-21 ENCOUNTER — PATIENT OUTREACH (OUTPATIENT)
Dept: RADIOLOGY | Facility: HOSPITAL | Age: 73
End: 2023-08-21
Payer: MEDICARE

## 2023-08-21 DIAGNOSIS — I50.21 ACUTE SYSTOLIC CONGESTIVE HEART FAILURE (CMS/HCC): ICD-10-CM

## 2023-08-21 DIAGNOSIS — N18.2 STAGE 2 CHRONIC KIDNEY DISEASE: ICD-10-CM

## 2023-08-21 DIAGNOSIS — N17.9 ACUTE RENAL FAILURE SUPERIMPOSED ON STAGE 3A CHRONIC KIDNEY DISEASE, UNSPECIFIED ACUTE RENAL FAILURE TYPE (CMS/HCC): Primary | ICD-10-CM

## 2023-08-21 DIAGNOSIS — N18.31 ACUTE RENAL FAILURE SUPERIMPOSED ON STAGE 3A CHRONIC KIDNEY DISEASE, UNSPECIFIED ACUTE RENAL FAILURE TYPE (CMS/HCC): Primary | ICD-10-CM

## 2023-08-21 DIAGNOSIS — I50.42 CHRONIC COMBINED SYSTOLIC AND DIASTOLIC CHF (CONGESTIVE HEART FAILURE) (CMS/HCC): ICD-10-CM

## 2023-08-21 ASSESSMENT — PAIN SCALES - GENERAL: PAIN_LEVEL: 0

## 2023-08-21 NOTE — TELEPHONE ENCOUNTER
Saturday, 8/19/2023 at about 11:30 AM I returned page from Fiorella West regarding her  Cam West.  Fiorella stated that Cam drain did not seem to be working.  The drain appeared to be holding suction but there was no output.  She tried stripping the drain several times without success.  Fiorella had also noticed some serosanguineous drainage at the axillary incision, but no drainage at the mastectomy incision.     I instructed the patient's wife to set the patient into a sitting position and attempt to strip the drain in that manner I also instructed to change the wet gauze if she felt comfortable.  Otherwise, the home health care nurse was scheduled to come out to the house today or early Sunday morning to assist with the drain and dressings.    I advised the patient to please call us back if the drain output did not improve or if she noticed increased drainage around the axillary incision or mastectomy incision, or  overlying skin color changes at the sites.  She was also instructed to call if the patient developed any fevers, chills, worsening pain at the incision site.    The patient and the patient's wife agreed with the plan.  All questions were answered to the patient and his wife satisfaction.      Addendum:    8/21/2023 at about 1015 I followed up with the patient and his wife today to ensure the drain was functioning properly and to make sure they did not have any more questions.  Per Fiorella, this patient's spouse, she states that the drain started working again on Juan C morning.  The patient has had about 110 cc of serosanguineous output over the weekend.  Both incisions appear clean, dry, intact.  The home health care nurse came out on Sunday to assist the patient.  Only complaint today is that the patient is having bilateral lower extremity edema.  Fiorella states that she called Dr. Osuna office to inform us about the swelling.  The patient complains of a heaviness but no pain.  Patient is ambulating.   The patient was encouraged to elevate his lower extremities and continue to walk as tolerated.  I instructed the patient and his wife that I would call back with any further recommendations if needed.

## 2023-08-21 NOTE — TELEPHONE ENCOUNTER
Telephone call from Fiorella (Radha wife) with a concern of bilateral lower extremity edema.  She reports an increase in bilateral foot and ankle swelling on Saturday, with some increase in swelling in his calves bilaterally this morning.  She denies any calf pain, tenderness, or erythema on either side.  She reports after his prostate surgery 1 year ago this same thing occurred, and it took several months for resolution.  aRdha does have a history of congestive heart failure, and I recommend she call cardiology for input and management.  She was agreeable and will call now.  She also reports maureen continues to have a cough postoperatively, and home health nurse recommended consultation with primary care physician with possibility of getting an inhaler.  I told her I agreed, and recommend she call PCP today for further assessment and management.  As far as his breast surgery, she reports Radha has minimal pain .  She reports his SUSI drain is still putting out quite a bit of drainage and she continues to strip the tubing and empty regularly.  He has a postop appointment scheduled next Monday with Dr. Osuna which I encouraged him to keep.  Otherwise they will call with any questions or concerns prior.

## 2023-08-21 NOTE — TELEPHONE ENCOUNTER
"Patient of Niharika Moran and Yola with PMH PAF, CKD, heart failure    Received call from patient's wife who reports patient had right mastectomy at [facility] on 8/17/23 by Dr Osuna and was discharged home on 8/18/23.    Wife reports she noted patient had b/l LE swelling of feet, ankles and legs on Saturday which has been gradually increasing. Per wife, patient had prostate surgery last year and developed LE swelling at that time as well but she believes it was related to steroids which patient is not currently taking.    Weight prior to hospitalization was 181-182 lbs  Weight today 184 lbs  No BP or HR available    No sodium indiscretion and patient denies CP, palpitations, lightheadedness, dizziness, syncope, SOB, MCGREGOR, PND or orthopnea.    Per wife, patient was seen by HCRN yesterday and was noted to have \"a rattle in his chest\" and was advised patient may need an inhaler    Patient is not currently taking any diuretics and is taking amiodarone 200 mg daily, Cardizem  mg daily and metoprolol succinate 50 mg daily.    Patient's wife also reports patient has been experiencing a cough since being in the hospital and at home and called Dr Osuna office this am and was advised to contact our office to discuss LE swelling and contact PCP to discuss cough and if inhaler is needed.    Wife is requesting call back and can be reached [phone]    "

## 2023-08-21 NOTE — TELEPHONE ENCOUNTER
A--he has torsemide 20 mg in his home. I told him to take 10 mg now and call with his weight in the morning. No plans for chemo as of now, hopefully they got it all with surgery.

## 2023-08-21 NOTE — TELEPHONE ENCOUNTER
I called and spoke with Mrs. Rosas.  She informed me Chops lower extremity edema has progressively gotten worse since he has been home on Friday.  She stated the swelling is up to his knee.      He denies shortness of breath or abdominal bloating.  He needs to sleep in a recliner post surgery so he is not laying flat.  The edema has gotten a little worse each day.     Please advise.

## 2023-08-21 NOTE — ANESTHESIA POSTPROCEDURE EVALUATION
Patient: Cam Rosas    Procedure Summary     Date: 08/17/23 Room / Location:  OR 2 / PH OR    Anesthesia Start: 0730 Anesthesia Stop: 1016    Procedure: RIGHT BREAST MASTECTOMY; RIGHT SENTINEL NODE IDENTIFICATION, MAPPING, AND BIOPSY; (Right: Breast) Diagnosis:       Malignant neoplasm of right male breast, unspecified estrogen receptor status, unspecified site of breast (CMS/HCC)      (Right breast cancer)    Surgeons: Barb Osuna MD Responsible Provider: Sergio Borges MD    Anesthesia Type: general ASA Status: 3          Anesthesia Type: general  PACU Vitals  8/17/2023 1008 - 8/17/2023 1108      8/17/2023  1015 8/17/2023  1030 8/17/2023  1045 8/17/2023  1100    BP: 171/74 153/69 150/64 166/69    Temp: 36.3 °C (97.3 °F) -- -- --    Pulse: 51 52 52 48    Resp: 21 24 21 14    SpO2: 98 % 98 % 98 % 99 %            Anesthesia Post Evaluation    Pain score: 0  Pain management: adequate  Patient location during evaluation: PACU  Patient participation: complete - patient participated  Level of consciousness: awake and alert  Cardiovascular status: acceptable  Airway Patency: adequate  Respiratory status: acceptable  Hydration status: acceptable  Anesthetic complications: no

## 2023-08-22 NOTE — TELEPHONE ENCOUNTER
I called Anila back.  She informed me that Mrs. Rosas thought she was supposed to start the torsemide 10 mg today. She stated his wheezing does not clear much with a cough. She encouraged him to do deep breathing exercises with coughing.  He does not have an insentive spirometer at home.     I called Fiorella and left a message informing her to make sure Radha gets his torsemide 10 mg today. I instructed her on the message to make sure he is doing the deep breathing exercises and coughing.  I asked her to call me back at my direct phone number.

## 2023-08-22 NOTE — TELEPHONE ENCOUNTER
Call received from HCRN Rina to verify medication changes    Upon further inquiry, it was discovered that the pt/spouse did NOT follow instructions yesterday as Torsemide not taken     lbs today and yesterday  +1 pitting LE edema, knee down  Appetite is poor  Low energy levels  Sleeping in recliner    BP sitting 138/62  BP standing 138/59  HR 60  RA sat 97%, + wheezing t/o all fields with persistent cough noted, however the pt denies dyspnea    Advised Rina to provide Torsemide at this time     Next SNV planned for Thurs    Please return call to Rina for additional diuretic instructions and advise if KCL should also be initiated. Pt has 10 meq tabs in home    Rina can be reached at [phone]

## 2023-08-22 NOTE — TELEPHONE ENCOUNTER
I received a VM message from Mrs. Rosas regarding Chops torsemide and potassium.  I let her know we only want her to give him torsemide today and we will reevaluate tomorrow about his weight and symptoms.  I let her know he does not need potassium for one dose of torsemide.  I told her when he was in the hospital his potassium was 4.2.  I told her I will follow up with her tomorrow.  She was thankful for the call.

## 2023-08-23 ENCOUNTER — TELEPHONE (OUTPATIENT)
Dept: SURGERY | Facility: CLINIC | Age: 73
End: 2023-08-23
Payer: MEDICARE

## 2023-08-23 NOTE — TELEPHONE ENCOUNTER
Spoke with wife, Radha is doing better with his swelling on torsemide, his weight was down 2 pounds today.  His drain is decreasing in output.  We discussed his pathology which showed 1 node positive out of a total of 4, no extranodal extension, I do not think that additional nicolette surgery would be necessary but did recommend consultation with radiation oncology to discuss the possibility of postmastectomy radiation.

## 2023-08-23 NOTE — TELEPHONE ENCOUNTER
I called Fiorella galvan.  She stated his weight is down 2 lbs today.  It is 182 lbs.  He denies any shortness of breath or abdominal bloating.   She informed me physical therapy was there working with him yesterday.  She denies any change in his lower extremity edema.  Please advise.

## 2023-08-23 NOTE — TELEPHONE ENCOUNTER
Pt spouse Fiorella following up on medication update . Pt has lost 2lbs since yesterday. Please advise [phone]

## 2023-08-23 NOTE — TELEPHONE ENCOUNTER
I called and instructed Fiorella to have Chops take torsmide 10 mg today and we will see how his is tomorrow.  She verbally understood.

## 2023-08-25 ENCOUNTER — TELEPHONE (OUTPATIENT)
Dept: CARDIOLOGY | Facility: CLINIC | Age: 73
End: 2023-08-25
Payer: MEDICARE

## 2023-08-25 DIAGNOSIS — N18.31 STAGE 3A CHRONIC KIDNEY DISEASE (CMS/HCC): Primary | ICD-10-CM

## 2023-08-25 DIAGNOSIS — I50.21 ACUTE SYSTOLIC HEART FAILURE (CMS/HCC): ICD-10-CM

## 2023-08-25 NOTE — TELEPHONE ENCOUNTER
I called Fiorella.  She stated Radha is doing better but he still has lower extremity edema.  I instructed her to continue with the torsemide 10 mg daily today and over the weekend.  She informed me the HCRN is going back out on Wednesday.  They have an appointment with Dr. Osuna on Monday at [facility].  I asked her to have him have labs drawn when they are their.  She asked if she could call me back because the OT was there.

## 2023-08-25 NOTE — TELEPHONE ENCOUNTER
I received a call from Ezequiel-home physical therapist. He asked for clarification on duration of diuretic therapy? Pt told Ezequiel he was to take this for a couple of days however Ezequiel thought it was to continue on a daily basis.    Weights:    8/2421=700 lbs  8/2519=861 lbs    Mild MCGREGOR, no rest dyspnea, PND or orthopnea.    No abdominal distention    Increase in bilateral lower extremity edema today, 2+ which extends from feet to mid calf.    Lungs clear per HCRN visit yesterday, no assessment today.    Vital signs: HR at rest 58 with increase to 65 with activity BP sitting 124/64 standing 130/60 after activity 130/70 room air pulse ox at rest=97% with exercise 95%    Dry cough better, not completely resolved.    Took Torsemide 10mg on 8/22 and 8/23.    Please reach out to spouse (Fiorella) at [phone] with your recommendations.

## 2023-08-28 ENCOUNTER — OFFICE VISIT (OUTPATIENT)
Dept: SURGERY | Facility: CLINIC | Age: 73
End: 2023-08-28
Payer: MEDICARE

## 2023-08-28 VITALS
BODY MASS INDEX: 30.46 KG/M2 | WEIGHT: 182.8 LBS | TEMPERATURE: 96.8 F | DIASTOLIC BLOOD PRESSURE: 64 MMHG | SYSTOLIC BLOOD PRESSURE: 120 MMHG | HEART RATE: 60 BPM | OXYGEN SATURATION: 97 % | HEIGHT: 65 IN

## 2023-08-28 DIAGNOSIS — Z15.89 MONOALLELIC MUTATION OF ATM GENE: ICD-10-CM

## 2023-08-28 DIAGNOSIS — Z15.01 BRCA1 POSITIVE: ICD-10-CM

## 2023-08-28 DIAGNOSIS — Z17.0 MALIGNANT NEOPLASM INVOLVING BOTH NIPPLE AND AREOLA OF RIGHT BREAST IN MALE, ESTROGEN RECEPTOR POSITIVE (CMS/HCC): Primary | ICD-10-CM

## 2023-08-28 DIAGNOSIS — C50.021 MALIGNANT NEOPLASM INVOLVING BOTH NIPPLE AND AREOLA OF RIGHT BREAST IN MALE, ESTROGEN RECEPTOR POSITIVE (CMS/HCC): Primary | ICD-10-CM

## 2023-08-28 DIAGNOSIS — Z15.01 MONOALLELIC MUTATION OF ATM GENE: ICD-10-CM

## 2023-08-28 DIAGNOSIS — Z15.09 MONOALLELIC MUTATION OF ATM GENE: ICD-10-CM

## 2023-08-28 DIAGNOSIS — Z15.09 BRCA1 POSITIVE: ICD-10-CM

## 2023-08-28 PROCEDURE — 99024 POSTOP FOLLOW-UP VISIT: CPT | Performed by: SURGERY

## 2023-08-28 RX ORDER — TORSEMIDE 10 MG/1
10 TABLET ORAL DAILY
COMMUNITY
End: 2023-08-31

## 2023-08-28 NOTE — PROGRESS NOTES
Patient ID: Cam Rosas                              : 1950    Visit Date: 2023  Referring Provider: No ref. provider found  PCP: Usman Collins MD  GYN: No care team member to display    Subjective   Chief Complaint: Post-op Visit    Cam Rosas is a 73 y.o. old male presenting today for first postoperative visit after right mastectomy with sentinel node biopsy for right breast cancer.  He was originally diagnosed at [place] with prostate cancer and then found to have a mass in his right breast.  Genetic testing revealed a mutation in BRCA1 as well as OK.  He initially had chemotherapy for his HER2 positive breast cancer, but had a reaction to the anti-HER2 therapy, and then after a second cycle of chemotherapy was hospitalized.  He refused any additional chemotherapy, he was placed on tamoxifen.  He had a mastectomy and sentinel node biopsy on 2023.  Frozen section of his lymph nodes was negative, on final pathology for right axillary sentinel nodes were removed, 1 of 4 was positive for metastatic disease with a 2.5 mm metastasis with no extranodal extension.  The other 3 lymph nodes were negative.  In the right breast she had invasive ductal carcinoma, grade 3, with multiple scattered foci of disease, the largest measuring 12 mm.  Carcinoma infiltrates the dermis and dermal lymphatic invasion was present, skin ulceration was not identified.  All margins were negative.  He had a partial response to the chemotherapy.  After the surgery, he did develop swelling in his legs, he called his cardiologist and was recommended to start torsemide.  He had a couple doses of that over the weekend, but his wife was unclear whether he should continue this.  He has physical therapy coming to the home to help him with his walking.  He reports he is overall feeling good.  No pain.       Breast History:    OB/GYN Status    Currently Pregnant:  N/A   Last Menstrual Period:  N/A   Menstrual  "Status:   N/A   LMP Comment:   N/A   Menarche Age:     Breastfeeding?  N/A   Lactation Comment:   N/A       Family History: family history includes Arthritis in his biological father and maternal grandfather; Breast cancer in his biological mother; COPD in his maternal grandfather; Cancer in his biological mother; Cancer less than or equal to age 50 in an other family member; Diabetes in his maternal grandfather; Esophageal cancer in an other family member; Heart disease in his maternal grandmother; Hyperlipidemia in his biological father and biological mother; Hypertension in his biological father and biological mother; No Known Problems in his biological brother, biological sister, paternal grandfather, and paternal grandmother.  Allergies: is allergic to azithromycin, prednisone, and lorazepam.    Objective   Vitals:   Visit Vitals  /64   Pulse 60   Temp (!) 36 °C (96.8 °F)   Ht 1.651 m (5' 5\")   Wt 82.9 kg (182 lb 12.8 oz)   SpO2 97%   BMI 30.42 kg/m²     Physical Exam  Cardiovascular:      Comments: Bilateral lower extremity pitting edema present  Chest:      Comments: Right mastectomy and axillary incisions healing well.Drain removed.  Slightly decreased range of motion of right shoulder.  No evidence of lymphedema.           Assessment/Plan   Problem List Items Addressed This Visit        Other    Malignant neoplasm of right male breast (CMS/HCC) - Primary    Relevant Orders    Ambulatory referral to Physical Therapy    BRCA1 positive    Monoallelic mutation of OK gene     Chops is healing from his right mastectomy with sentinel node biopsy well.  His drain was removed without difficulty.  He does have bilateral lower extremity edema, he was on torsemide over the weekend, I encouraged his wife to continue this and to follow-up with the cardiologist for further recommendations.  He is seeing PT at home for gait training, I added to the prescription range of motion exercises for the right shoulder.  We " reviewed his final pathology which showed a 2.5 mm metastasis in 1 out of 4 right axillary sentinel nodes with no extranodal extension.  In the right breast he had multifocal invasive ductal carcinoma, grade 3, with dermal invasion and dermal lymphatic invasion.  He will be following up with Dr. Dr. Gale to discuss medical management of his breast cancer.  In addition, I have recommended consultation with his radiation oncologist to consider postmastectomy radiation given the node positivity and dermal lymphatic invasion.  I do not think additional surgery is necessary at this time.  He will follow-up in 1 month with my nurse practitioner, and I will see him 3 months after that.  We also discussed follow-up for his left breast given the fact that he carries both the BRCA1 and OK mutation.  He did have a bilateral mammogram in March, I explained to his wife that there is not a definite recommendation for annual mammogram for men with BRCA mutations, but we can consider that.  He would not be due for any additional imaging until March, 2024, and we can always discuss that further at his next appointment.    Return in about 4 weeks (around 9/25/2023) for Jesus Palencia.       Barb Osuna MD

## 2023-08-28 NOTE — LETTER
2023     Usman Collins MD  [facility] [address]    Patient: Cam Rosas  YOB: 1950  Date of Visit: 2023      Dear Dr. Collins:    Thank you for referring Cam Rosas to me for evaluation. Below are my notes for this consultation.    If you have questions, please do not hesitate to call me. I look forward to following your patient along with you.         Sincerely,        Barb Osuna MD        CC: Gregory J Ochsner, MD Sandra Urtishak, MD Carruthers, Catherine D, MD  2023  4:57 PM  Signed  Patient ID: Cam Rosas                              : 1950    Visit Date: 2023  Referring Provider: No ref. provider found  PCP: Usman Collins MD  GYN: No care team member to display    Subjective   Chief Complaint: Post-op Visit    Cam Rosas is a 73 y.o. old male presenting today for first postoperative visit after right mastectomy with sentinel node biopsy for right breast cancer.  He was originally diagnosed at [place] with prostate cancer and then found to have a mass in his right breast.  Genetic testing revealed a mutation in BRCA1 as well as OK.  He initially had chemotherapy for his HER2 positive breast cancer, but had a reaction to the anti-HER2 therapy, and then after a second cycle of chemotherapy was hospitalized.  He refused any additional chemotherapy, he was placed on tamoxifen.  He had a mastectomy and sentinel node biopsy on 2023.  Frozen section of his lymph nodes was negative, on final pathology for right axillary sentinel nodes were removed, 1 of 4 was positive for metastatic disease with a 2.5 mm metastasis with no extranodal extension.  The other 3 lymph nodes were negative.  In the right breast she had invasive ductal carcinoma, grade 3, with multiple scattered foci of disease, the largest measuring 12 mm.  Carcinoma infiltrates the dermis and dermal lymphatic invasion was present, skin  "ulceration was not identified.  All margins were negative.  He had a partial response to the chemotherapy.  After the surgery, he did develop swelling in his legs, he called his cardiologist and was recommended to start torsemide.  He had a couple doses of that over the weekend, but his wife was unclear whether he should continue this.  He has physical therapy coming to the home to help him with his walking.  He reports he is overall feeling good.  No pain.      Breast History:    OB/GYN Status    Currently Pregnant:  N/A   Last Menstrual Period:  N/A   Menstrual Status:   N/A   LMP Comment:   N/A   Menarche Age:     Breastfeeding?  N/A   Lactation Comment:   N/A       Family History: family history includes Arthritis in his biological father and maternal grandfather; Breast cancer in his biological mother; COPD in his maternal grandfather; Cancer in his biological mother; Cancer less than or equal to age 50 in an other family member; Diabetes in his maternal grandfather; Esophageal cancer in an other family member; Heart disease in his maternal grandmother; Hyperlipidemia in his biological father and biological mother; Hypertension in his biological father and biological mother; No Known Problems in his biological brother, biological sister, paternal grandfather, and paternal grandmother.  Allergies: is allergic to azithromycin, prednisone, and lorazepam.    Objective   Vitals:   Visit Vitals  /64   Pulse 60   Temp (!) 36 °C (96.8 °F)   Ht 1.651 m (5' 5\")   Wt 82.9 kg (182 lb 12.8 oz)   SpO2 97%   BMI 30.42 kg/m²     Physical Exam  Cardiovascular:      Comments: Bilateral lower extremity pitting edema present  Chest:      Comments: Right mastectomy and axillary incisions healing well.Drain removed.  Slightly decreased range of motion of right shoulder.  No evidence of lymphedema.          Assessment/Plan   Problem List Items Addressed This Visit        Other    Malignant neoplasm of right male breast " (CMS/[facility]) - Primary    Relevant Orders    Ambulatory referral to Physical Therapy    BRCA1 positive    Monoallelic mutation of OK gene     Radha is healing from his right mastectomy with sentinel node biopsy well.  His drain was removed without difficulty.  He does have bilateral lower extremity edema, he was on torsemide over the weekend, I encouraged his wife to continue this and to follow-up with the cardiologist for further recommendations.  He is seeing PT at home for gait training, I added to the prescription range of motion exercises for the right shoulder.  We reviewed his final pathology which showed a 2.5 mm metastasis in 1 out of 4 right axillary sentinel nodes with no extranodal extension.  In the right breast he had multifocal invasive ductal carcinoma, grade 3, with dermal invasion and dermal lymphatic invasion.  He will be following up with Dr. Dr. Gale to discuss medical management of his breast cancer.  In addition, I have recommended consultation with his radiation oncologist to consider postmastectomy radiation given the node positivity and dermal lymphatic invasion.  I do not think additional surgery is necessary at this time.  He will follow-up in 1 month with my nurse practitioner, and I will see him 3 months after that.  We also discussed follow-up for his left breast given the fact that he carries both the BRCA1 and OK mutation.  He did have a bilateral mammogram in March, I explained to his wife that there is not a definite recommendation for annual mammogram for men with BRCA mutations, but we can consider that.  He would not be due for any additional imaging until March, 2024, and we can always discuss that further at his next appointment.    Return in about 4 weeks (around 9/25/2023) for Jesus Palencia.      Barb Osuna MD

## 2023-08-29 ENCOUNTER — PATIENT OUTREACH (OUTPATIENT)
Dept: RADIOLOGY | Facility: HOSPITAL | Age: 73
End: 2023-08-29
Payer: MEDICARE

## 2023-08-30 ENCOUNTER — TELEPHONE (OUTPATIENT)
Dept: SCHEDULING | Facility: CLINIC | Age: 73
End: 2023-08-30
Payer: MEDICARE

## 2023-08-30 LAB
CASE RPRT: NORMAL
CLINICAL INFO: NORMAL
IMMUNE STAIN STUDY: NORMAL
PATH REPORT.ADDENDUM SPEC: NORMAL
PATH REPORT.FINAL DX SPEC: NORMAL
PATH REPORT.GROSS SPEC: NORMAL
PATH REPORT.INTRAOP OBS SPEC DOC: NORMAL
PATHOLOGY SYNOPTIC REPORT: NORMAL

## 2023-08-30 NOTE — TELEPHONE ENCOUNTER
I called to follow up with Fiorella the patient's wife. She denies Chops having any shortness of breath or abdominal bloating.  He has some edema in his feet. The right foot has more edema than the left. He is complaining of pain in his right big toe.  She denies any color change in the toe.     She said he was lightheaded when he tried going up the 2 steps but that was the only time.     He continues his torsmeide 10 mg daily.  She said his weight has been 179-191 lbs.  She informed me Anila Theatro today.  I told her we will contact her when we get the results.

## 2023-08-30 NOTE — TELEPHONE ENCOUNTER
I received a call back from Anila.  She is concerned about  Mr. Rosas's BP dropping  SBP from 120 to 80 when he stood up.  Radha denied having any lightheadedness or dizziness with the drop in BP of 80/45.  She was concerned when he was walking to the kitchen and when he got to the 2 steps to walk up he almost fell backwards.  He has had a fall on these steps in the past. I told her I would inform Dr. Moran and JONNY Rodrigez.

## 2023-08-30 NOTE — TELEPHONE ENCOUNTER
Anila from [facility] calling     States pt bp dropped when standing, ppt denied any symptoms, did try to grab something and almost fell back but still denied dizziness    18oz of fluid so far today  180lbs  Sitting /0  Standing 80/4 HR 56, Pulse ox 98% on room air    Can be reached at [phone] if any questions

## 2023-08-30 NOTE — TELEPHONE ENCOUNTER
I called Anila back to discuss Mr. Rosas's drop in BP when standing.  I asked her to call me back at my direct number.

## 2023-08-31 ENCOUNTER — PATIENT OUTREACH (OUTPATIENT)
Dept: RADIOLOGY | Facility: HOSPITAL | Age: 73
End: 2023-08-31
Payer: MEDICARE

## 2023-08-31 RX ORDER — TORSEMIDE 10 MG/1
10 TABLET ORAL 3 TIMES WEEKLY
Qty: 40 TABLET | Refills: 0
Start: 2023-09-01 | End: 2023-10-20 | Stop reason: ENTERED-IN-ERROR

## 2023-08-31 NOTE — TELEPHONE ENCOUNTER
I called to follow up with Mrs. Rosas.  She said Radha is doing ok.  He did not move around much last night.  He did not complain of lightheadedness or dizziness.  He denies any shortness of breath or abdominal bloating. He does not have any swelling on his left leg but the right leg is swollen but he is not complaining of pain in his R big toe. His weight is 180 lbs today. She gave him his torsemide 10 mg this am.  She does not have a cuff to check his BP.

## 2023-08-31 NOTE — TELEPHONE ENCOUNTER
Per JONNY Rodrigez: I called Mrs. Rosas and let her know we got the lab work back.  I informed her the labs showed he is a little dry so we want to decrease his torsemide to 3 days a week. She verbally understood. I asked her to call us if he gains weight or develops congestive symptoms.  She verbally understood.

## 2023-09-05 ENCOUNTER — HOSPITAL ENCOUNTER (OUTPATIENT)
Dept: RADIATION ONCOLOGY | Facility: HOSPITAL | Age: 73
Setting detail: RADIATION/ONCOLOGY SERIES
Discharge: HOME | End: 2023-09-05
Attending: RADIOLOGY
Payer: MEDICARE

## 2023-09-05 ENCOUNTER — OFFICE VISIT (OUTPATIENT)
Dept: HEMATOLOGY/ONCOLOGY | Facility: CLINIC | Age: 73
End: 2023-09-05
Attending: INTERNAL MEDICINE
Payer: MEDICARE

## 2023-09-05 VITALS
TEMPERATURE: 98.5 F | SYSTOLIC BLOOD PRESSURE: 122 MMHG | DIASTOLIC BLOOD PRESSURE: 50 MMHG | RESPIRATION RATE: 18 BRPM | BODY MASS INDEX: 30.29 KG/M2 | HEART RATE: 80 BPM | OXYGEN SATURATION: 98 % | WEIGHT: 182 LBS

## 2023-09-05 VITALS
SYSTOLIC BLOOD PRESSURE: 128 MMHG | BODY MASS INDEX: 29.95 KG/M2 | DIASTOLIC BLOOD PRESSURE: 62 MMHG | WEIGHT: 180 LBS | HEART RATE: 60 BPM | OXYGEN SATURATION: 97 %

## 2023-09-05 DIAGNOSIS — Z15.01 BRCA1 POSITIVE: ICD-10-CM

## 2023-09-05 DIAGNOSIS — C50.021 MALIGNANT NEOPLASM INVOLVING BOTH NIPPLE AND AREOLA OF RIGHT BREAST IN MALE, ESTROGEN RECEPTOR POSITIVE (CMS/HCC): Primary | ICD-10-CM

## 2023-09-05 DIAGNOSIS — Z17.0 MALIGNANT NEOPLASM INVOLVING BOTH NIPPLE AND AREOLA OF RIGHT BREAST IN MALE, ESTROGEN RECEPTOR POSITIVE (CMS/HCC): Primary | ICD-10-CM

## 2023-09-05 DIAGNOSIS — C61 PROSTATE CANCER (CMS/HCC): ICD-10-CM

## 2023-09-05 DIAGNOSIS — Z15.09 BRCA1 POSITIVE: ICD-10-CM

## 2023-09-05 PROCEDURE — G8752 SYS BP LESS 140: HCPCS | Performed by: INTERNAL MEDICINE

## 2023-09-05 PROCEDURE — 99214 OFFICE O/P EST MOD 30 MIN: CPT | Performed by: INTERNAL MEDICINE

## 2023-09-05 PROCEDURE — G8754 DIAS BP LESS 90: HCPCS | Performed by: INTERNAL MEDICINE

## 2023-09-05 RX ORDER — TAMOXIFEN CITRATE 20 MG/1
20 TABLET ORAL DAILY
Qty: 90 TABLET | Refills: 3 | Status: SHIPPED | OUTPATIENT
Start: 2023-09-05 | End: 2024-11-04 | Stop reason: SDUPTHER

## 2023-09-05 ASSESSMENT — ENCOUNTER SYMPTOMS
ENDOCRINE NEGATIVE: 1
COUGH: 1
ENDOCRINE NEGATIVE: 1
SHORTNESS OF BREATH: 0
GASTROINTESTINAL NEGATIVE: 1
LOSS OF SENSATION IN FEET: 0
FATIGUE: 1
OCCASIONAL FEELINGS OF UNSTEADINESS: 0
HEMATOLOGIC/LYMPHATIC NEGATIVE: 1
UNEXPECTED WEIGHT CHANGE: 0
LEG SWELLING: 1
COUGH: 1
TROUBLE SWALLOWING: 0
VOICE CHANGE: 0
EYES NEGATIVE: 1
CHILLS: 0
NERVOUS/ANXIOUS: 1
GASTROINTESTINAL NEGATIVE: 1
WHEEZING: 0
LEG SWELLING: 1
HEMATOLOGIC/LYMPHATIC NEGATIVE: 1
PSYCHIATRIC NEGATIVE: 1
EYES NEGATIVE: 1
DEPRESSION: 0
CONSTITUTIONAL NEGATIVE: 1
APPETITE CHANGE: 0

## 2023-09-05 ASSESSMENT — PAIN SCALES - GENERAL
PAINLEVEL: 0-NO PAIN
PAINLEVEL: 0-NO PAIN

## 2023-09-05 NOTE — ASSESSMENT & PLAN NOTE
July 27, 2022 underwent prostatectomy for prostate cancer at outside institution.  Dover score 4+3.  February 2023 PSA increasing.  Urology recommended radiation therapy.  Received Lupron while receiving treatment with radiation.  Completed radiotherapy June 2023.    He completed his radiation.  He feels very well.  He is going to continue surveillance under the direction of radiation oncology and his urologist.   Danny Melendez

## 2023-09-05 NOTE — PROGRESS NOTES
Patient ID:  Cam Rosas is a 73 y.o. male.    Referring Physician:   No referring provider defined for this encounter.    Primary Care Provider:  Usman Collins MD     Cancer Staging Information:  Cancer Staging   Malignant neoplasm of right male breast (CMS/HCC)  Staging form: Breast, AJCC 8th Edition  - Clinical stage from 3/20/2023: Stage IA (cT1c, cN0, cM0, G3, ER+, HI+, HER2+) - Signed by Barb Osuna MD on 3/20/2023  - Pathologic stage from 8/28/2023: No Stage Recommended (ypT1c, pN1a(sn), cM0, G3, ER+, HI+, HER2+) - Signed by Barb Osuna MD on 8/28/2023    Prostate cancer (CMS/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    Herniation of lumbar intervertebral disc with radiculopathy 07/18/2023    Spinal stenosis of lumbar region 07/18/2023    Spondylosis of lumbosacral region 07/18/2023    Acute systolic heart failure (CMS/HCC) 02/15/2023    Paroxysmal atrial fibrillation (CMS/HCC) 10/19/2022    Malignant neoplasm of right male breast (CMS/HCC) 10/19/2022    Electrolyte abnormality 10/19/2022    Gastrointestinal hemorrhage with melena 10/19/2022    CKD (chronic kidney disease) 10/19/2022    Prostate cancer (CMS/HCC) 10/19/2022    Dehydration determined by examination 10/10/2022    APC (atrial premature contractions) 03/30/2022    Essential hypertension 08/31/2021    Hyperlipidemia 08/31/2021    Stage 3a chronic kidney disease (CMS/HCC) 08/31/2021       Chief Complaint  No chief complaint on file.      Subjective      History of Present Illness:  The following portions of the patient's history were reviewed and updated as appropriate: allergies, current medications, past family  history, past medical history, past social history and past surgical history.   HPI patient presents with his wife for follow-up assessment for right breast cancer.  Patient has a history of pathologic T3b prostate cancer status post prostatectomy recently completed salvage radiation therapy with hormonal therapy.  Patient doing well following that treatment PSA level undetectable.  Patient completed hormone therapy for prostate cancer.  Patient was noted to have a synchronous right breast cancer and started on tamoxifen therapy.  He initially had 1 cycle of neoadjuvant chemotherapy but was admitted to the hospital with heart failure renal dysfunction and atrial fibrillation.  Since then he vows no further chemotherapy.  Continues on hormone therapy with tamoxifen.  On 8/17/2023 Dr. Paz performed right breast mastectomy and sentinel node sampling.  Final pathology from [facility] revealed grade 3 invasive ductal carcinoma with tumor bed with dense fibrosis compatible with partial response and multiple scattered foci of residual invasive carcinoma largest measuring 12 mm.  Carcinoma infiltrates the dermis and dermal lymphatics with lymphatic invasion present all margins are clear.  Because of lymphatic invasion felt to be inflammatory type breast cancer.  0 of 4 nodes involved.  Tumor was estrogen progesterone receptor positive and HER2 positive.  Following surgery patient doing well regaining arm mobility.  Denies any urinary or bowel issues currently.  Seen by Dr. Gale oncology and again refuses further chemotherapy but is agreeable to continue tamoxifen.    Review of Systems:  Review of Systems   Constitutional: Negative for appetite change, chills and unexpected weight change.   HENT:   Negative for trouble swallowing and voice change.    Respiratory: Positive for cough. Negative for shortness of breath and wheezing.    Cardiovascular: Positive for leg swelling.   Musculoskeletal: Positive for gait  problem.   Neurological: Positive for gait problem.        Past Medical/Surgical History  Past Medical History:   Diagnosis Date    Acute systolic heart failure (CMS/HCC) 02/15/2023    Atrial fibrillation (CMS/HCC)     Breast cancer (CMS/HCC) October 2022    CHF (congestive heart failure) (CMS/HCC)     Chronic kidney disease     CKD (chronic kidney disease) 10/19/2022    History of radiation therapy     Hx antineoplastic chemo     Prostate cancer (CMS/HCC)      Past Surgical History:   Procedure Laterality Date    CARDIAC SURGERY      mitral valve repair 2006    CARDIOVERSION  12/05/2022    Successful DC cardioversion    KNEE CARTILAGE SURGERY Left     PROSTATE SURGERY  July 2022    PROSTATECTOMY  07/15/2022    TONSILLECTOMY          Current Medications  Current Outpatient Medications   Medication Sig Dispense Refill    acetaminophen (TYLENOL EX STR RAPID RELEASE ORAL) Take 500 mg by mouth as needed.      acetaminophen (TYLENOL) 500 mg tablet Take 2 tablets (1,000 mg total) by mouth every 6 (six) hours as needed for pain.      amiodarone (PACERONE) 200 mg tablet Take 1 tablet (200 mg total) by mouth daily. 90 tablet 3    cetirizine (ZyrTEC) 10 mg tablet Take 10 mg by mouth nightly.      cholecalciferol, vitamin D3, 1,000 unit (25 mcg) tablet Take 1,000 Units by mouth daily.      dextromethorphan-benzocaine (CEPACOL SORE THROAT-COUGH) 5-7.5 mg lozenge Take 1 lozenge by mouth every 4 (four) hours as needed (cough). (Patient not taking: Reported on 8/28/2023)  0    dilTIAZem CD (CARDIZEM CD) 120 mg 24 hr capsule Take 1 capsule (120 mg total) by mouth daily. 270 capsule 3    guaiFENesin (MUCINEX) 600 mg 12 hr tablet Take 1,200 mg by mouth 2 (two) times a day.      ibuprofen (MOTRIN) 200 mg tablet Take 2 tablets (400 mg total) by mouth every 6 (six) hours as needed for pain for up to 10 days. (Patient not taking: Reported on 8/28/2023)      metoprolol succinate XL (TOPROL-XL) 50 mg 24 hr tablet  Take 1 tablet (50 mg total) by mouth daily. 90 tablet 3    pantoprazole (PROTONIX) 40 mg EC tablet Take 1 tablet (40 mg total) by mouth 2 (two) times a day. 180 tablet 3    rivaroxaban (XARELTO) 15 mg tablet Take 1 tablet (15 mg total) by mouth daily with dinner Indications: treatment to prevent blood clots in chronic atrial fibrillation. 30 tablet 0    simvastatin (ZOCOR) 20 mg tablet Take 1 tablet (20 mg total) by mouth nightly. 90 tablet 3    tamoxifen (NOLVADEX) 20 mg chemo tablet Take  1 tablet (20 mg total) daily 90 tablet 3    torsemide (DEMADEX) 10 mg tablet Take 1 tablet (10 mg total) by mouth 3 (three) times a week (Mon, Wed, Fri). 40 tablet 0     No current facility-administered medications for this encounter.       Allergies  Allergies   Allergen Reactions    Azithromycin Other (see comments)     Kidney issues      Prednisone Angioedema     All over swelling    Lorazepam Other (see comments)     WEAKNESS       Social History  Social History     Socioeconomic History    Marital status:      Spouse name: None    Number of children: None    Years of education: None    Highest education level: None   Occupational History    Occupation: insurance   Tobacco Use    Smoking status: Never    Smokeless tobacco: Never   Vaping Use    Vaping Use: Never used   Substance and Sexual Activity    Alcohol use: Yes     Alcohol/week: 2.0 standard drinks of alcohol     Types: 2 Shots of liquor per week     Comment: 2 drinks/day - scotch    Drug use: Never    Sexual activity: Not Currently     Partners: Female     Social Determinants of Health     Financial Resource Strain: Low Risk  (8/18/2023)    Overall Financial Resource Strain (CARDIA)     Difficulty of Paying Living Expenses: Not hard at all   Food Insecurity: No Food Insecurity (10/19/2022)    Hunger Vital Sign     Worried About Running Out of Food in the Last Year: Never true     Ran Out of Food in the Last Year: Never true    Transportation Needs: No Transportation Needs (8/18/2023)    PRAPARE - Transportation     Lack of Transportation (Medical): No     Lack of Transportation (Non-Medical): No   Housing Stability: Unknown (8/18/2023)    Housing Stability Vital Sign     Unstable Housing in the Last Year: No       Family History  Family History   Problem Relation Age of Onset    Hyperlipidemia Biological Mother     Hypertension Biological Mother     Breast cancer Biological Mother     Cancer Biological Mother         gyn? cervical    Hyperlipidemia Biological Father     Hypertension Biological Father     Arthritis Biological Father     No Known Problems Biological Sister     No Known Problems Biological Brother     Heart disease Maternal Grandmother     Arthritis Maternal Grandfather     COPD Maternal Grandfather     Diabetes Maternal Grandfather     No Known Problems Paternal Grandmother     No Known Problems Paternal Grandfather     Cancer less than or equal to age 50 Other     Esophageal cancer Other         47, in abd,chemo q 3 weeks      Family Status   Relation Name Status    Bio Mother mother (Not Specified)    Bio Father Dad (Not Specified)    Bio Sister  (Not Specified)    Bio Brother  (Not Specified)    MGM Belle (Not Specified)    MGF  (Not Specified)    PGM  (Not Specified)    PGF  (Not Specified)    Other son (Not Specified)               Objective      Physical Exam:  Vital Signs for this encounter:  BSA: 1.93 meters squared  Visit Vitals  /62 (BP Location: Right upper arm, Patient Position: Sitting)   Pulse 60   Wt 81.6 kg (180 lb)   SpO2 97%   BMI 29.95 kg/m²         Physical Exam healing right chest wall incision.  Healing right axillary incision.  No chest wall or axillary masses noted.  No supraclavicular adenopathy noted.  Lungs clear to auscultation.  Slight decreased mobility right arm but no swelling noted.  Performance Status:80     PAIN ASSESSMENT:  Score Zero    Location     Pain Intervention      LABS:  Recent Results (from the past 4368 hour(s))   Comprehensive metabolic panel    Collection Time: 04/24/23 11:47 AM   Result Value Ref Range Status    Glucose 128 (H) 65 - 99 mg/dL Final    BUN 16 7 - 25 mg/dL Final    Creatinine 1.32 (H) 0.70 - 1.28 mg/dL Final    EGFR 57 (L) > OR = 60 mL/min/1.73m2 Final    Bun/Creatinine Ratio 12 6 - 22 (calc) Final    Sodium 141 135 - 146 mmol/L Final    Potassium 3.9 3.5 - 5.3 mmol/L Final    Chloride 110 98 - 110 mmol/L Final    Carbon Dioxide 23 20 - 32 mmol/L Final    Calcium 8.5 (L) 8.6 - 10.3 mg/dL Final    Protein, Total 5.9 (L) 6.1 - 8.1 g/dL Final    Albumin 3.5 (L) 3.6 - 5.1 g/dL Final    Globulin 2.4 1.9 - 3.7 g/dL (calc) Final    Albumin/Globulin Ratio 1.5 1.0 - 2.5 (calc) Final    Bilirubin, Total 0.5 0.2 - 1.2 mg/dL Final    Alkaline Phosphatase 63 35 - 144 U/L Final    Ast 16 10 - 35 U/L Final    Alt 10 9 - 46 U/L Final   CBC and Differential    Collection Time: 04/24/23 11:52 AM   Result Value Ref Range Status    White Blood Cell Count 4.4 3.8 - 10.8 Thousand/uL Final    Red Blood Cell Count 3.92 (L) 4.20 - 5.80 Million/uL Final    Hemoglobin 12.1 (L) 13.2 - 17.1 g/dL Final    HEMATOCRIT 37.1 (L) 38.5 - 50.0 % Final    Mcv 94.6 80.0 - 100.0 fL Final    Mch 30.9 27.0 - 33.0 pg Final    Mchc 32.6 32.0 - 36.0 g/dL Final    Rdw 14.5 11.0 - 15.0 % Final    Platelet Count 153 140 - 400 Thousand/uL Final    Mpv 8.9 7.5 - 12.5 fL Final    Absolute Neutrophils 2,974 1,500 - 7,800 cells/uL Final    Absolute Lymphocytes 902 850 - 3,900 cells/uL Final    Absolute Monocytes 414 200 - 950 cells/uL Final    Absolute Eosinophils 79 15 - 500 cells/uL Final    Absolute Basophils 31 0 - 200 cells/uL Final    Neutrophils 67.6 % Final    Lymphocytes 20.5 % Final    Monocytes 9.4 % Final    Eosinophils 1.8 % Final    Basophils 0.7 % Final   PSA    Collection Time: 04/24/23 11:52 AM   Result Value Ref Range Status    Psa, Total 0.11 < OR = 4.00 ng/mL Final    Basic metabolic panel    Collection Time: 07/20/23  9:55 AM   Result Value Ref Range Status    Glucose 83 65 - 99 mg/dL Final    BUN 23 7 - 25 mg/dL Final    Creatinine 1.46 (H) 0.70 - 1.28 mg/dL Final    EGFR 50 (L) > OR = 60 mL/min/1.73m2 Final    Bun/Creatinine Ratio 16 6 - 22 (calc) Final    Sodium 142 135 - 146 mmol/L Final    Potassium 5.0 3.5 - 5.3 mmol/L Final    Chloride 109 98 - 110 mmol/L Final    Carbon Dioxide 25 20 - 32 mmol/L Final    Calcium 8.5 (L) 8.6 - 10.3 mg/dL Final   PSA, Post Prostatectomy    Collection Time: 07/20/23  9:59 AM   Result Value Ref Range Status    PSA, Post-Prostatectomy <0.02 ng/mL Final   Comprehensive metabolic panel    Collection Time: 08/08/23 10:00 AM   Result Value Ref Range Status    Sodium 141 136 - 145 mEQ/L Final    Potassium 4.5 3.5 - 5.1 mEQ/L Final    Chloride 108 (H) 98 - 107 mEQ/L Final    CO2 23 21 - 31 mEQ/L Final    BUN 28 (H) 7 - 25 mg/dL Final    Creatinine 1.3 0.7 - 1.3 mg/dL Final    Glucose 102 (H) 70 - 99 mg/dL Final    Calcium 9.0 8.6 - 10.3 mg/dL Final    AST (SGOT) 31 13 - 39 IU/L Final    ALT (SGPT) 15 7 - 52 IU/L Final    Alkaline Phosphatase 65 34 - 125 IU/L Final    Total Protein 6.2 6.0 - 8.2 g/dL Final    Albumin 4.1 3.5 - 5.7 g/dL Final    Bilirubin, Total 0.8 0.3 - 1.2 mg/dL Final    eGFR 54.1 (L) >=60.0 mL/min/1.73m*2 Final    Anion Gap 10 3 - 15 mEQ/L Final   CBC and differential    Collection Time: 08/08/23 10:00 AM   Result Value Ref Range Status    WBC 7.38 3.80 - 10.50 K/uL Final    RBC 3.47 (L) 4.50 - 5.80 M/uL Final    Hemoglobin 11.6 (L) 13.7 - 17.5 g/dL Final    Hematocrit 36.8 (L) 40.1 - 51.0 % Final    .1 (H) 83.0 - 98.0 fL Final    MCH 33.4 (H) 28.0 - 33.2 pg Final    MCHC 31.5 (L) 32.2 - 36.5 g/dL Final    RDW 15.8 (H) 11.6 - 14.4 % Final    Platelets 196 150 - 350 K/uL Final    MPV 9.3 (L) 9.4 - 12.4 fL Final    Differential Type Auto No results found Final    nRBC 0.0 <=0.0 % Final    Immature Granulocytes 0.3 % Final     Neutrophils 68.8 % Final    Lymphocytes 18.6 % Final    Monocytes 11.1 % Final    Eosinophils 0.7 % Final    Basophils 0.5 % Final    Immature Granulocytes, Absolute 0.02 0.00 - 0.08 K/uL Final    Neutrophils, Absolute 5.08 1.70 - 7.00 K/uL Final    Lymphocytes, Absolute 1.37 1.20 - 3.50 K/uL Final    Monocytes, Absolute 0.82 0.30 - 1.00 K/uL Final    Eosinophils, Absolute 0.05 0.04 - 0.54 K/uL Final    Basophils, Absolute 0.04 0.01 - 0.10 K/uL Final   Pathology Tissue Exam    Collection Time: 08/17/23  8:26 AM    Specimen: 1: Lymph Node (specify site)    2: Lymph Node (specify site)    3: Lymph Node (specify site)    4: Breast, Right; Lymph Node    5: Breast, Right; Lymph Node   Result Value Ref Range Status    Case Report  No results found Final     Surgical Pathology                                Case: [id number]                                  Authorizing Provider:  Barb Osuna,   Collected:           08/17/2023 0826                                     MD                                                                           Ordering Location:     [facility]   Received:            08/17/2023 0843                                     Room                                                                         Pathologist:           Master Johnson MD                                                       Intraop:               Eliz Lakhani MD                                                               Specimens:   A) - Lymph Node (specify site), right axillary sentinel lymph node #1, blue hot,                    count is 171                                                                                        B) - Lymph Node (specify site), right axillary sentinel lymph node #2, blue, count is                33                                                                                                  C) - Lymph Node (specify site), right axillary sentinel lymph node #3,  blue and hot,                count is 248                                                                                        D) - Breast, Right, right breast mastectomy suture marks axillary tail                              E) - Breast, Right, right breast mastectomy skin lateral                                   Addendum  No results found Final     IMMUNOHISTOCHEMICAL ANALYSIS FOR ESTROGEN, PROGESTERONE, HER2 RECEPTORS AND Ki-67 IN INFILTRATING BREAST CARCINOMA      Immunohistochemical stains are performed on block D9    ESTROGEN RECEPTOR (CLONE SP1):    Positive. Nuclear staining is seen in 80% of infiltrating tumor cells     Intensity of nuclear staining:  Strong     PROGESTERONE RECEPTOR (CLONE IE2):        Positive. Nuclear staining is seen in 70% of infiltrating tumor cells     Intensity of nuclear staining:  Strong    HER2 (CLONE 4B5)     POSITIVE (+3); 60% of tumor cells    Ki-67: 50 %    Less than 10%: Favorable.  10-20%: Borderline.  20% and higher: unfavorable.      COMMENT FOR ER/PgR, HER2 AND Ki-67:  Immunohistochemical stains are performed on formalin fixed, paraffin embedded tissue. Tissue is fixed in 10% neutral buffered formalin not less than 6 hours and not more than 72 hours.  Positive and negative controls are adequate. Internal control is: positive  Cold Ischemia time is 46 seconds  Specimen is adequate for testing.  The detection system used is a miiCard Multimer kit.    DEFINITION OF POSITIVE RESULTS FOR ER AND PgR:  Nuclear staining with any intensity, involving equal or greater than 1% of infiltrating tumor cells, is considered positive.    DEFINITION OF POSITIVE AND NEGATIVE RESULTS FOR HER2:    Staining Pattern Score HER2   Interpretation     No Membrane staining   0   Negative   Faint, partial staining of the membrane, >10%of invasive tumor cells  1+   Negative   Weak complete staining of the membrane, >10%of invasive tumor cells  Circumferential membrane IHC staining that is  intense  but within =/<10% of tumor cells can be considered 2+ equivocal 2+       Indeterminate   Intense complete staining of the membrane, >10%of invasive tumor cells 3+   Positive     Product name: PATHWAY - TM HER2 (Clone 4B5); : [employer] FDA approved No: [id number]; Approval date: 11/28/2000    Reference:  Alex Colon, Didier ARMENDARIZ, et al : ASCO/CAP HER2 testing in breast cancer update.  Arch Pathol Lab Med 9604758;(9): doi:10.5858/arpa.[id number]-BD  Alex ROBERTSON, Chaka LINTON, Benjie SANTOS, et al: ASCO/CAP guideline recommendations for Immunohistochemical testing of estrogen and progesterone receptors in breast cancer. J Clin Oncol 28:2784-30071, 2010.  Trinh RIDDLE, Alex LOWE, Benjie SANTOS, et al. Estrogen and progesterone receptor testing in breast cancer: American Society of Clinical Oncology/College of American Pathologists guideline update Arch Pathol Lab Med (2020) 144 (5): 545-563.    The immunohistochemical test for Ki-67, estrogen and progesterone receptors used in this lab was developed and its performance characteristics determined by [facility].  It has not been cleared or approved by the U.S. Food and Drug Administration.  The FDA has determined that such clearance or approval is not necessary.  This test is used for clinical purposes. Immunohistochemical stains were performed at [facility], [address] [place] [place], [place]. Shan Bolden MD, Medical Director.        Clinical Information Pre-op diagnosis:  Right breast cancer No results found Final    Final Diagnosis  No results found Final     A.  Right axillary sentinel lymph node #1, excision:             One lymph node; negative for metastatic carcinoma (0/1).     B.  Right axillary sentinel lymph node #2, excision:             One lymph node positive for metastatic carcinoma (1/1).                - Size of metastatic deposit - 2.5mm.                - No evidence of extra-nicolette  extension identified.    C.  Right axillary sentinel lymph node #3, excision:             Two lymph nodes; negative for metastatic carcinoma (0/2).    D.  Right breast, mastectomy:             Invasive ductal carcinoma.               - Histologic grade 3 (of 3).               - Status-post neoadjuvant chemotherapy.               - Prior biopsy site changes are noted.               - Tumor bed with dense fibrosis and histiocytic infiltrate, compatible with partial response to therapy (~50%).               - Multiple scattered foci of residual invasive carcinoma (multifocal), greatest confluent size - 12mm.               - Carcinoma infiltrates the dermis and dermal lymphatic invasion is present; skin ulceration is not identified.               - All margins are negative for carcinoma.               - Pathologic stage (8th ed. AJCC): ypmT1c, ypN1a, ypMx.                     ** given the present of dermal lymphatic invasion, clinical correlation is required to determine if                          inflammatory breast carcinoma exists for this patient (**ypmT4).    E.  Right breast mastectomy skin lateral, excision:               Skin and subcutaneous tissue with no abnormality; no tumor identified.            **ATTENTION PATIENTS**  **The findings in this report have been made available for review potentially before your provider has had a chance to review and discuss the results with you.  Please allow time for your provider to review your results.  If you have any questions or concerns about these results, please contact the healthcare provider who ordered the test.**        Synoptic Checklist  No results found Final     INVASIVE CARCINOMA OF THE BREAST: Resection   INVASIVE CARCINOMA OF THE BREAST: COMPLETE EXCISION - All Specimens   8th Edition - Protocol posted: 12/14/2022      SPECIMEN      Procedure:    Excision (less than total mastectomy)       Specimen Laterality:    Right       TUMOR      Tumor Site:    Not  specified       Histologic Type:    Invasive carcinoma of no special type (ductal)       Histologic Grade (Oumar Histologic Score):            Glandular (Acinar) / Tubular Differentiation:    Score 3         Nuclear Pleomorphism:    Score 3         Mitotic Rate:    Score 2         Overall Grade:    Grade 3 (scores of 8 or 9)       Tumor Size:    Greatest dimension of largest invasive focus (Millimeters): 12 mm      Tumor Focality:    Multiple foci of invasive carcinoma         Number of Foci:    At least: 7       Ductal Carcinoma In Situ (DCIS):    Not identified       Lobular Carcinoma In Situ (LCIS):    Not identified       Tumor Extent:    Skin is present and involved         Skin Invasion:    Carcinoma directly invades into the dermis or epidermis without skin ulceration (this does not change the T classification)         Skin Satellite Foci:    Satellite foci not identified     Lymphatic and / or Vascular Invasion:    Present       :    Extensive     Dermal Lymphovascular Invasion:    Present     Microcalcifications:    Not identified     Treatment Effect in the Breast:    Probable or definite response to presurgical therapy in the invasive carcinoma     Treatment Effect in the Lymph Nodes:    No definite response to presurgical therapy in metastatic carcinoma       MARGINS    Margin Status for Invasive Carcinoma:    All margins negative for invasive carcinoma       Distance from Invasive Carcinoma to Closest Margin:    Greater than: 5 mm      REGIONAL LYMPH NODES    Regional Lymph Node Status:          :    Tumor present in regional lymph node(s)         Number of Lymph Nodes with Macrometastases:    1         Number of Lymph Nodes with Micrometastases:    0         Number of Lymph Nodes with Isolated Tumor Cells:    0         Size of Largest Sherri Metastatic Deposit:    2.5 mm        Extranodal Extension:    Not identified       Total Number of Lymph Nodes Examined (sentinel and non-sentinel):    4        "Number of Trimble Nodes Examined:    4       pTNM CLASSIFICATION (AJCC 8th Edition)      Reporting of pT, pN, and (when applicable) pM categories is based on information available to the pathologist at the time the report is issued. As per the AJCC (Chapter 1, 8th Ed.) it is the managing physicians responsibility to establish the final pathologic stage based upon all pertinent information, including but potentially not limited to this pathology report.    Modified Classification:    y     pT Category:    pT1c     T Suffix:    (m)     pN Category:    pN1a     N Suffix:    (sn)       Immunohistochemistry  No results found Final     Immunohistochemical stains for cytokeratin AE1/AE3 (blocks A1, C1) are negative, confirming lymph nodes negative for metastatic carcinoma.    Immunohistochemical staining (block B1) for cytokeratin AE1/AE3 is positive, confirming one lymph  node positive for metastatic carcinoma.    Some immunohistochemical tests used in this lab were developed and their performance characteristics determined by [facility].  They have not been cleared or approved by the U[place] Food and Drug Administration.  The FDA has determined that such clearance or approval is not necessary.  These tests are used for clinical purposes.  Control slide(s) are reviewed and are adequate.  Immunostains are performed in the Immunohistochemistry Lab at [facility], [address], [place] [address], Shan Bolden MD, Medical Director.      Gross Description  No results found Final     A.  The specimen  is received fresh for frozen section in a container labeled with the patient's name and \" right axillary sentinel lymph node #1, blue hot\", and consists of a 1.5 x 1.1 x 0.4 cm firm, pale yellow-red fatty lymph node which is bisected and entirely frozen as A1.  B.  The specimen  is received fresh for frozen section in a container labeled with the patient's name and \" right axillary sentinel " "lymph node #2, blue\", and consists of a 1.5 cm fatty, pale yellow-red lymph node which is bisected and entirely frozen as B1.  C.  The specimen  is received fresh for frozen section in a container labeled with the patient's name and \" right axillary sentinel lymph node #3, blue and hot\", and consists of 2, palpable, fatty pale yellow-red probable lymph nodes 1.1-1.6 cm in greatest dimension.  The nodes are frozen as follows:    C1-1 lymph node in toto  C2-1 lymph node bisected  D.  The specimen is received in formalin in a container labeled with the patient's name and \" right breast mastectomy\", and consists of a 263 g, 16 x 13.5 x 2.5 cm breast.  There is a stitch marking axillary tail.  Surfacing the breast is a 8.5 x 4 cm ellipse of lightly pigmented, mildly wrinkled skin stained by blue dye on the lateral half.  The nipple is central, retracted, and indurated.  Surfacing the nipple inferiorly is a 0.3 x 0.3 x 0.2 cm indurated, smooth surfaced light brown nodule.  Muscle is not present posteriorly, the surface of which is inked black.  Sectioning through the nipple discloses a subcutaneous, well-defined indurated tan-white mass approximately 1.7 cm in greatest dimension.  In addition there are at least 2 similar-appearing satellite masses more inferior at approximately 0.6 cm in greatest dimension.  All lesions are widely free of the deep margin.  Within the center of this area is a well-defined 0.6 cm in diameter clot filled cavity.  Identification of a stereotactic marker clip is rendered extremely difficult due to the presence of an abundance of blue dye.  The remainder of the specimen consists of 8 preponderance of yellow, lobular adipose tissue with occasional interlacing delicate strands of white fibrous tissue.  No intramammary nodes are present.  Representative sections are submitted as follows:    D1-D10 entire lesional area including nipple, skin, and satellite nodules  D11-D12 deep margin including " "satellite nodule  D13-upper outer quadrant  D14-lower outer quadrant  D15-upper inner quadrant  D16-lower inner quadrant    Cold Ischemia time: 46s        E.  The specimen is received in formalin in a container labeled with the patient's name and \" right breast mastectomy skin lateral\", and consists of a 3.5 x 2.5 cm roughly triangular portion of lightly pigmented tan-pink hairbearing skin with 1.6 cm of subcutaneous tissue.  There appears to be a central scarlike area 1.9 x 0.3 cm.  The deep surface is inked.  A representative central longitudinal section is submitted in cassette E1.    Prateek PAREKH ([facility])cm      Intraoperative Consultation  No results found Final     FSA1:  One lymph node; negative for metastatic carcinoma (0/1).     FSB1:  One lymph node; negative for metastatic carcinoma (0/1).     FSC1-2:  Two lymph nodes; negative for metastatic carcinoma (0/2).       Results called to:Dr. Osuna at 9:13 am.       Electronically signed by Eliz Lakhani MD on August 17, 2023 at 9:13 AM.         CBC    Collection Time: 08/18/23  9:18 AM   Result Value Ref Range Status    WBC 6.72 3.80 - 10.50 K/uL Final    RBC 3.13 (L) 4.50 - 5.80 M/uL Final    Hemoglobin 10.8 (L) 13.7 - 17.5 g/dL Final    Hematocrit 34.5 (L) 40.1 - 51.0 % Final    .2 (H) 83.0 - 98.0 fL Final    MCH 34.5 (H) 28.0 - 33.2 pg Final    MCHC 31.3 (L) 32.2 - 36.5 g/dL Final    RDW 15.1 (H) 11.6 - 14.4 % Final    Platelets 108 (L) 150 - 350 K/uL Final    MPV 8.8 (L) 9.4 - 12.4 fL Final   Basic metabolic panel    Collection Time: 08/18/23  9:18 AM   Result Value Ref Range Status    Sodium 140 136 - 145 mEQ/L Final    Potassium 4.2 3.5 - 5.1 mEQ/L Final    Chloride 109 (H) 98 - 107 mEQ/L Final    CO2 25 21 - 31 mEQ/L Final    BUN 15 7 - 25 mg/dL Final    Creatinine 1.2 0.7 - 1.3 mg/dL Final    Glucose 115 (H) 70 - 99 mg/dL Final    Calcium 7.8 (L) 8.6 - 10.3 mg/dL Final    eGFR 59.3 (L) >=60.0 mL/min/1.73m*2 Final    Anion Gap 6 3 - " 15 mEQ/L Final          Assessment/Plan 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  Patient continues to refuse chemotherapy or other alternative targeted therapy but is agreeable to continue tamoxifen.  Discussed with patient and wife regarding right chest wall radiation therapy to try to prevent local regional recurrence.  I also plan on treating nicolette beds given his high-grade inflammatory right breast cancer.  Risks and benefits of radiation therapy discussed including possibility of damaging normal adjacent tissues.  Patient understands that he is at increased risk for right arm swelling in the future.  Acute effects such as skin erythema and tiredness were also discussed.  Patient is agreeable and therefore I have arranged CT simulation planning take place in a few weeks and plan to deliver 5000 cGy over approximately 6 weeks using conformal radiation therapy with image guidance daily.  Treatment to start shortly.  Patient scheduled for repeat PSA level and testosterone level in regards to prostate cancer follow-up.  Did offer for patient to be treated by Dr. Taryn Vallejo my partner and breast expert but he would prefer that I take care of him for the situation.    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, estrogen receptor positive (CMS/HCC) (Primary)      TREATMENT PLAN SUMMARY:    Radiation Therapy   Component Value Date    COURSEID C1 06/09/2023    PLANID [id number] 06/09/2023    PLANID [id number] prosbed 06/09/2023    PRESCRIBEDDO 1.8 06/09/2023    PRESCRIBEDDO 1.8 06/09/2023    DOSAGEGIVENT 19.8 06/09/2023    DOSAGEGIVENT 70.2 06/09/2023    DOSAGEGIVENT [id number] 06/09/2023    DOSAGEGIVENT 50.4 06/09/2023    FRACTIONSTRE 28 of 28 06/09/2023    FRACTIONSTRE 11 of 11 06/09/2023       IV  CHEMOTHERAPY:  Treatment Details- Chemotherapy   Treatment goal [No plan goal]   Plan Name LEUPROLIDE (LUPRON) 22.5 MG EVERY 3 MONTHS   Status Inactive   Start Date 3/21/2023   End Date 3/21/2023   Provider Paz Cleveland MD   IV Chemotherapy leuprolide (3 month) (LUPRON DEPOT) intramuscular injection 22.5 mg, 22.5 mg, intramuscular, Once, 1 of 1 cycle                       Treatment Details- Chemotherapy   Treatment goal [No plan goal]   Plan Name MEDIPORT FLUSH (SINGLE LUMEN) - PATIENT ON TREATMENT   Status Active   Start Date 3/21/2023   End Date Until discontinued   Provider Paz Cleveland MD   IV Chemotherapy [No matching medication found in this treatment plan]                     Treatment Details- Chemotherapy   Treatment goal [No plan goal]   Plan Name LEUPROLIDE (LUPRON) 22.5 MG EVERY 3 MONTHS   Status Inactive   Start Date 3/21/2023   End Date 3/21/2023   Provider Paz Cleveland MD   IV Chemotherapy leuprolide (3 month) (LUPRON DEPOT) intramuscular injection 22.5 mg, 22.5 mg, intramuscular, Once, 1 of 1 cycle  Administration: 22.5 mg (3/21/2023)

## 2023-09-05 NOTE — PROGRESS NOTES
Cam Rosas is a 73 y.o. male,   :  1950    Encounter Diagnoses   Name Primary?   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, estrogen receptor positive (CMS/HCC) Yes    BRCA1 positive     Prostate cancer (CMS/HCC)         Cancer Staging   Malignant neoplasm of right male breast (CMS/HCC)  Staging form: Breast, AJCC 8th Edition  - Clinical stage from 3/20/2023: Stage IA (cT1c, cN0, cM0, G3, ER+, KY+, HER2+) - Signed by Barb Osuna MD on 3/20/2023  - Pathologic stage from 2023: No Stage Recommended (ypT1c, pN1a(sn), cM0, G3, ER+, KY+, HER2+) - Signed by Barb Osuna MD on 2023    Prostate cancer (CMS/HCC)  Staging form: Prostate, AJCC 8th Edition  - Pathologic stage from 2022: Stage Unknown (pT3b, pNX, cM0, PSA: 6, Grade Group: 3) - Signed by [facility], Gregory J, MD on 3/8/2023      Treatment Plans     No treatment plans exist          Oncology History   Malignant neoplasm of right male breast (CMS/HCC)   2022 Biopsy     1.  Right breast mass:     Invasive ductal carcinoma, [place] grade 3 (3+ 3+2).   Invasive carcinoma is 13 mm in maximum dimension in core biopsy.    ER+ 90%; KY+ 50%; Xds0rph+3; EX3179%     2022 -  Chemotherapy    2022 initiated first cycle TCP H at [facility].  Questionable reaction during the anti-HER2/jhonatan therapy with Herceptin.  Taxotere and carboplatin given on 10/3/2022.  Patient hospitalized after first cycle.     10/19/2022 Initial Diagnosis    Malignant neoplasm of right male breast (CMS/HCC)     11/15/2022 -  Hormone Therapy    Tamoxifen 20 mg daily while undergoes evaluation of cardiac issues     3/20/2023 Cancer Staged    Staging form: Breast, AJCC 8th Edition  - Clinical stage from 3/20/2023: Stage IA (cT1c, cN0, cM0, G3, ER+, KY+, HER2+)     2023 Cancer Staged    Staging form: Breast, AJCC 8th Edition  - Pathologic stage from 2023: No Stage Recommended (ypT1c,  pN1a(sn), cM0, G3, ER+, CA+, HER2+)     Prostate cancer (CMS/HCC)   7/27/2022 Cancer Staged    Staging form: Prostate, AJCC 8th Edition  - Pathologic stage from 7/27/2022: Stage Unknown (pT3b, pNX, cM0, PSA: 6, Grade Group: 3)     3/21/2023 - 6/20/2023 Chemotherapy    LEUPROLIDE (LUPRON) 22.5 MG EVERY 3 MONTHS  Plan Provider: Paz Cleveland MD     3/21/2023 -  Chemotherapy    MEDIPORT FLUSH (SINGLE LUMEN) - PATIENT ON TREATMENT  Plan Provider: Paz Cleveland MD     3/21/2023 - 3/21/2023 Chemotherapy    LEUPROLIDE (LUPRON) 22.5 MG EVERY 3 MONTHS  Plan Provider: Paz Cleveland MD         Patient Active Problem List   Diagnosis    Paroxysmal atrial fibrillation (CMS/HCC)    Malignant neoplasm of right male breast (CMS/HCC)    Electrolyte abnormality    Gastrointestinal hemorrhage with melena    CKD (chronic kidney disease)    Prostate cancer (CMS/HCC)    Acute systolic heart failure (CMS/HCC)    APC (atrial premature contractions)    Dehydration determined by examination    Essential hypertension    Herniation of lumbar intervertebral disc with radiculopathy    Hyperlipidemia    Spinal stenosis of lumbar region    Spondylosis of lumbosacral region    Stage 3a chronic kidney disease (CMS/HCC)    BRCA1 positive    Monoallelic mutation of OK gene       History of Present Illness  HPI  Cam Rosas is seen today in follow up.  I am assuming his care since Dr. Cleveland's MCC.  Patient has a history of right breast cancer in the setting of BRCA1 mutation.  He also has a history of prostate cancer.  With regards to his breast cancer this was diagnosed almost a year ago at [facility].  Tumor was ER, CA and HER2/jhonatan positive.  Patient was recommended neoadjuvant chemotherapy.  However he had significant adverse reaction to his first cycle of chemotherapy and declined further intravenous systemic treatment.  He did start taking tamoxifen neoadjuvantly.  This led to a decrease in  size of the breast mass.  He ultimately underwent right mastectomy with Dr. Osuna on August 17.  Final pathology showed a residual 12 m focus of invasive ductal carcinoma in the right breast and 1 axillary lymph node with metastatic deposit.  Patient reports he is recovering well from his surgery.  He continues on tamoxifen and is tolerating the medication without difficulty.  He will be following up with radiation oncology later today to discuss radiation for his recent breast cancer.    Review of Systems - Oncology    Review of Systems:  Nursing assessment reviewed. Pertinent positive and negative symptoms noted in HPI, all others negative.    Temp:  [36.9 °C (98.5 °F)] 36.9 °C (98.5 °F)  Heart Rate:  [60-80] 60  Resp:  [18] 18  BP: (122-128)/(50-62) 128/62  Visit Vitals  BP (!) 122/50 (BP Location: Left upper arm, Patient Position: Sitting)   Pulse 80   Temp 36.9 °C (98.5 °F) (Temporal)   Resp 18   Wt 82.6 kg (182 lb)   SpO2 98% Comment: RA   BMI 30.29 kg/m²     Physical Exam  Constitutional:       General: He is not in acute distress.     Appearance: He is well-developed.   HENT:      Head: Normocephalic.      Mouth/Throat:      Pharynx: No oropharyngeal exudate.   Eyes:      General: No scleral icterus.     Pupils: Pupils are equal, round, and reactive to light.   Cardiovascular:      Rate and Rhythm: Normal rate and regular rhythm.   Pulmonary:      Effort: Pulmonary effort is normal.      Breath sounds: Normal breath sounds.   Chest:      Comments: Status post right mastectomy with well-healing scar  Abdominal:      Palpations: Abdomen is soft.      Tenderness: There is no abdominal tenderness.   Musculoskeletal:         General: No tenderness.      Cervical back: Neck supple.   Lymphadenopathy:      Cervical: No cervical adenopathy.   Skin:     Findings: No rash.   Neurological:      Mental Status: He is alert and oriented to person, place, and time.         Past Medical History:   Diagnosis Date     Acute systolic heart failure (CMS/HCC) 02/15/2023    Atrial fibrillation (CMS/HCC)     Breast cancer (CMS/HCC) October 2022    CHF (congestive heart failure) (CMS/HCC)     Chronic kidney disease     CKD (chronic kidney disease) 10/19/2022    History of radiation therapy     Hx antineoplastic chemo     Prostate cancer (CMS/HCC)        Past Surgical History:   Procedure Laterality Date    CARDIAC SURGERY      mitral valve repair 2006    CARDIOVERSION  12/05/2022    Successful DC cardioversion    KNEE CARTILAGE SURGERY Left     PROSTATE SURGERY  July 2022    PROSTATECTOMY  07/15/2022    TONSILLECTOMY         Social History     Tobacco Use    Smoking status: Never    Smokeless tobacco: Never   Vaping Use    Vaping Use: Never used   Substance Use Topics    Alcohol use: Yes     Alcohol/week: 2.0 standard drinks of alcohol     Types: 2 Shots of liquor per week     Comment: 2 drinks/day - scotch    Drug use: Never       Family History   Problem Relation Age of Onset    Hyperlipidemia Biological Mother     Hypertension Biological Mother     Breast cancer Biological Mother     Cancer Biological Mother         gyn? cervical    Hyperlipidemia Biological Father     Hypertension Biological Father     Arthritis Biological Father     No Known Problems Biological Sister     No Known Problems Biological Brother     Heart disease Maternal Grandmother     Arthritis Maternal Grandfather     COPD Maternal Grandfather     Diabetes Maternal Grandfather     No Known Problems Paternal Grandmother     No Known Problems Paternal Grandfather     Cancer less than or equal to age 50 Other     Esophageal cancer Other         47, in abd,chemo q 3 weeks         Allergies  Azithromycin, Prednisone, and Lorazepam    Medications  Current Outpatient Medications   Medication Sig Dispense Refill    acetaminophen (TYLENOL EX STR RAPID RELEASE ORAL) Take 500 mg by mouth as needed.      acetaminophen (TYLENOL) 500 mg  tablet Take 2 tablets (1,000 mg total) by mouth every 6 (six) hours as needed for pain.      amiodarone (PACERONE) 200 mg tablet Take 1 tablet (200 mg total) by mouth daily. 90 tablet 3    cetirizine (ZyrTEC) 10 mg tablet Take 10 mg by mouth nightly.      cholecalciferol, vitamin D3, 1,000 unit (25 mcg) tablet Take 1,000 Units by mouth daily.      dilTIAZem CD (CARDIZEM CD) 120 mg 24 hr capsule Take 1 capsule (120 mg total) by mouth daily. 270 capsule 3    guaiFENesin (MUCINEX) 600 mg 12 hr tablet Take 1,200 mg by mouth 2 (two) times a day.      metoprolol succinate XL (TOPROL-XL) 50 mg 24 hr tablet Take 1 tablet (50 mg total) by mouth daily. 90 tablet 3    pantoprazole (PROTONIX) 40 mg EC tablet Take 1 tablet (40 mg total) by mouth 2 (two) times a day. 180 tablet 3    rivaroxaban (XARELTO) 15 mg tablet Take 1 tablet (15 mg total) by mouth daily with dinner Indications: treatment to prevent blood clots in chronic atrial fibrillation. 30 tablet 0    simvastatin (ZOCOR) 20 mg tablet Take 1 tablet (20 mg total) by mouth nightly. 90 tablet 3    tamoxifen (NOLVADEX) 20 mg chemo tablet Take  1 tablet (20 mg total) daily 90 tablet 3    torsemide (DEMADEX) 10 mg tablet Take 1 tablet (10 mg total) by mouth 3 (three) times a week (Mon, Wed, Fri). 40 tablet 0    dextromethorphan-benzocaine (CEPACOL SORE THROAT-COUGH) 5-7.5 mg lozenge Take 1 lozenge by mouth every 4 (four) hours as needed (cough). (Patient not taking: Reported on 8/28/2023)  0    ibuprofen (MOTRIN) 200 mg tablet Take 2 tablets (400 mg total) by mouth every 6 (six) hours as needed for pain for up to 10 days. (Patient not taking: Reported on 8/28/2023)       No current facility-administered medications for this visit.          Laboratory  Recent Results (from the past 504 hour(s))   Pathology Tissue Exam    Collection Time: 08/17/23  8:26 AM    Specimen: 1: Lymph Node (specify site)    2: Lymph Node (specify site)    3: Lymph Node (specify site)     4: Breast, Right; Lymph Node    5: Breast, Right; Lymph Node   Result Value Ref Range    Case Report       Surgical Pathology                                Case: [id number]                                  Authorizing Provider:  Barb Osuna,   Collected:           08/17/2023 0826                                     MD                                                                           Ordering Location:     [facility] Operating   Received:            08/17/2023 0843                                     Room                                                                         Pathologist:           Master Johnson MD                                                       Intraop:               Eliz Lakhani MD                                                               Specimens:   A) - Lymph Node (specify site), right axillary sentinel lymph node #1, blue hot,                    count is 171                                                                                        B) - Lymph Node (specify site), right axillary sentinel lymph node #2, blue, count is                33                                                                                                  C) - Lymph Node (specify site), right axillary sentinel lymph node #3, blue and hot,                count is 248                                                                                        D) - Breast, Right, right breast mastectomy suture marks axillary tail                              E) - Breast, Right, right breast mastectomy skin lateral                                   Addendum       IMMUNOHISTOCHEMICAL ANALYSIS FOR ESTROGEN, PROGESTERONE, HER2 RECEPTORS AND Ki-67 IN INFILTRATING BREAST CARCINOMA      Immunohistochemical stains are performed on block D9    ESTROGEN RECEPTOR (CLONE SP1):    Positive. Nuclear staining is seen in 80% of infiltrating tumor cells     Intensity of nuclear staining:   Strong     PROGESTERONE RECEPTOR (CLONE IE2):        Positive. Nuclear staining is seen in 70% of infiltrating tumor cells     Intensity of nuclear staining:  Strong    HER2 (CLONE 4B5)     POSITIVE (+3); 60% of tumor cells    Ki-67: 50 %    Less than 10%: Favorable.  10-20%: Borderline.  20% and higher: unfavorable.      COMMENT FOR ER/PgR, HER2 AND Ki-67:  Immunohistochemical stains are performed on formalin fixed, paraffin embedded tissue. Tissue is fixed in 10% neutral buffered formalin not less than 6 hours and not more than 72 hours.  Positive and negative controls are adequate. Internal control is: positive  Cold Ischemia time is 46 seconds  Specimen is adequate for testing.  The detection system used is a Cycle Multimer kit.    DEFINITION OF POSITIVE RESULTS FOR ER AND PgR:  Nuclear staining with any intensity, involving equal or greater than 1% of infiltrating tumor cells, is considered positive.    DEFINITION OF POSITIVE AND NEGATIVE RESULTS FOR HER2:    Staining Pattern Score HER2   Interpretation     No Membrane staining   0   Negative   Faint, partial staining of the membrane, >10%of invasive tumor cells  1+   Negative   Weak complete staining of the membrane, >10%of invasive tumor cells  Circumferential membrane IHC staining that is intense  but within =/<10% of tumor cells can be considered 2+ equivocal 2+       Indeterminate   Intense complete staining of the membrane, >10%of invasive tumor cells 3+   Positive     Product name: PATHWAY - TM HER2 (Clone 4B5); : [employer] FDA approved No: [id number]; Approval date: 11/28/2000    Reference:  Alex Colon, Didier B, et al : ASCO/CAP HER2 testing in breast cancer update.  Arch Pathol Lab Med 4325529;(9): doi:10.5858/arpa.1998-8611-OD  Chaka Chavez, Benjie SANTOS, et al: ASCO/CAP guideline recommendations for Immunohistochemical testing of estrogen and progesterone receptors in breast cancer. J Clin Oncol  28:1459-85891, 2010.  Trinh KH, Alex EH, Benjie M, et al. Estrogen and progesterone receptor testing in breast cancer: American Society of Clinical Oncology/College of American Pathologists guideline update Arch Pathol Lab Med (2020) 144 (5): 545-563.    The immunohistochemical test for Ki-67, estrogen and progesterone receptors used in this lab was developed and its performance characteristics determined by [employer].  It has not been cleared or approved by the U.S. Food and Drug Administration.  The FDA has determined that such clearance or approval is not necessary.  This test is used for clinical purposes. Immunohistochemical stains were performed at [facility], [address] [place] [place], [place]. Shan Bolden MD, Medical Director.        Clinical Information Pre-op diagnosis:  Right breast cancer     Final Diagnosis       A.  Right axillary sentinel lymph node #1, excision:             One lymph node; negative for metastatic carcinoma (0/1).     B.  Right axillary sentinel lymph node #2, excision:             One lymph node positive for metastatic carcinoma (1/1).                - Size of metastatic deposit - 2.5mm.                - No evidence of extra-nicolette extension identified.    C.  Right axillary sentinel lymph node #3, excision:             Two lymph nodes; negative for metastatic carcinoma (0/2).    D.  Right breast, mastectomy:             Invasive ductal carcinoma.               - Histologic grade 3 (of 3).               - Status-post neoadjuvant chemotherapy.               - Prior biopsy site changes are noted.               - Tumor bed with dense fibrosis and histiocytic infiltrate, compatible with partial response to therapy (~50%).               - Multiple scattered foci of residual invasive carcinoma (multifocal), greatest confluent size - 12mm.               - Carcinoma infiltrates the dermis and dermal lymphatic invasion is present; skin ulceration is not  identified.               - All margins are negative for carcinoma.               - Pathologic stage (8th ed. AJCC): ypmT1c, ypN1a, ypMx.                     ** given the present of dermal lymphatic invasion, clinical correlation is required to determine if                          inflammatory breast carcinoma exists for this patient (**ypmT4).    E.  Right breast mastectomy skin lateral, excision:               Skin and subcutaneous tissue with no abnormality; no tumor identified.            **ATTENTION PATIENTS**  **The findings in this report have been made available for review potentially before your provider has had a chance to review and discuss the results with you.  Please allow time for your provider to review your results.  If you have any questions or concerns about these results, please contact the healthcare provider who ordered the test.**        Synoptic Checklist       INVASIVE CARCINOMA OF THE BREAST: Resection   INVASIVE CARCINOMA OF THE BREAST: COMPLETE EXCISION - All Specimens   8th Edition - Protocol posted: 12/14/2022      SPECIMEN      Procedure:    Excision (less than total mastectomy)       Specimen Laterality:    Right       TUMOR      Tumor Site:    Not specified       Histologic Type:    Invasive carcinoma of no special type (ductal)       Histologic Grade (Oumar Histologic Score):            Glandular (Acinar) / Tubular Differentiation:    Score 3         Nuclear Pleomorphism:    Score 3         Mitotic Rate:    Score 2         Overall Grade:    Grade 3 (scores of 8 or 9)       Tumor Size:    Greatest dimension of largest invasive focus (Millimeters): 12 mm      Tumor Focality:    Multiple foci of invasive carcinoma         Number of Foci:    At least: 7       Ductal Carcinoma In Situ (DCIS):    Not identified       Lobular Carcinoma In Situ (LCIS):    Not identified       Tumor Extent:    Skin is present and involved         Skin Invasion:    Carcinoma directly invades into the  dermis or epidermis without skin ulceration (this does not change the T classification)         Skin Satellite Foci:    Satellite foci not identified     Lymphatic and / or Vascular Invasion:    Present       :    Extensive     Dermal Lymphovascular Invasion:    Present     Microcalcifications:    Not identified     Treatment Effect in the Breast:    Probable or definite response to presurgical therapy in the invasive carcinoma     Treatment Effect in the Lymph Nodes:    No definite response to presurgical therapy in metastatic carcinoma       MARGINS    Margin Status for Invasive Carcinoma:    All margins negative for invasive carcinoma       Distance from Invasive Carcinoma to Closest Margin:    Greater than: 5 mm      REGIONAL LYMPH NODES    Regional Lymph Node Status:          :    Tumor present in regional lymph node(s)         Number of Lymph Nodes with Macrometastases:    1         Number of Lymph Nodes with Micrometastases:    0         Number of Lymph Nodes with Isolated Tumor Cells:    0         Size of Largest Sherri Metastatic Deposit:    2.5 mm        Extranodal Extension:    Not identified       Total Number of Lymph Nodes Examined (sentinel and non-sentinel):    4       Number of [place] Nodes Examined:    4       pTNM CLASSIFICATION (AJCC 8th Edition)      Reporting of pT, pN, and (when applicable) pM categories is based on information available to the pathologist at the time the report is issued. As per the AJCC (Chapter 1, 8th Ed.) it is the managing physicians responsibility to establish the final pathologic stage based upon all pertinent information, including but potentially not limited to this pathology report.    Modified Classification:    y     pT Category:    pT1c     T Suffix:    (m)     pN Category:    pN1a     N Suffix:    (sn)       Immunohistochemistry       Immunohistochemical stains for cytokeratin AE1/AE3 (blocks A1, C1) are negative, confirming lymph nodes negative for metastatic  "carcinoma.    Immunohistochemical staining (block B1) for cytokeratin AE1/AE3 is positive, confirming one lymph  node positive for metastatic carcinoma.    Some immunohistochemical tests used in this lab were developed and their performance characteristics determined by [employer].  They have not been cleared or approved by the U.S. Food and Drug Administration.  The FDA has determined that such clearance or approval is not necessary.  These tests are used for clinical purposes.  Control slide(s) are reviewed and are adequate.  Immunostains are performed in the Immunohistochemistry Lab at [facility], [address] [place], [place] [address], Shan Bolden MD, Medical Director.      Gross Description       A.  The specimen  is received fresh for frozen section in a container labeled with the patient's name and \" right axillary sentinel lymph node #1, blue hot\", and consists of a 1.5 x 1.1 x 0.4 cm firm, pale yellow-red fatty lymph node which is bisected and entirely frozen as A1.  B.  The specimen  is received fresh for frozen section in a container labeled with the patient's name and \" right axillary sentinel lymph node #2, blue\", and consists of a 1.5 cm fatty, pale yellow-red lymph node which is bisected and entirely frozen as B1.  C.  The specimen  is received fresh for frozen section in a container labeled with the patient's name and \" right axillary sentinel lymph node #3, blue and hot\", and consists of 2, palpable, fatty pale yellow-red probable lymph nodes 1.1-1.6 cm in greatest dimension.  The nodes are frozen as follows:    C1-1 lymph node in toto  C2-1 lymph node bisected  D.  The specimen is received in formalin in a container labeled with the patient's name and \" right breast mastectomy\", and consists of a 263 g, 16 x 13.5 x 2.5 cm breast.  There is a stitch marking axillary tail.  Surfacing the breast is a 8.5 x 4 cm ellipse of lightly pigmented, mildly wrinkled skin " "stained by blue dye on the lateral half.  The nipple is central, retracted, and indurated.  Surfacing the nipple inferiorly is a 0.3 x 0.3 x 0.2 cm indurated, smooth surfaced light brown nodule.  Muscle is not present posteriorly, the surface of which is inked black.  Sectioning through the nipple discloses a subcutaneous, well-defined indurated tan-white mass approximately 1.7 cm in greatest dimension.  In addition there are at least 2 similar-appearing satellite masses more inferior at approximately 0.6 cm in greatest dimension.  All lesions are widely free of the deep margin.  Within the center of this area is a well-defined 0.6 cm in diameter clot filled cavity.  Identification of a stereotactic marker clip is rendered extremely difficult due to the presence of an abundance of blue dye.  The remainder of the specimen consists of 8 preponderance of yellow, lobular adipose tissue with occasional interlacing delicate strands of white fibrous tissue.  No intramammary nodes are present.  Representative sections are submitted as follows:    D1-D10 entire lesional area including nipple, skin, and satellite nodules  D11-D12 deep margin including satellite nodule  D13-upper outer quadrant  D14-lower outer quadrant  D15-upper inner quadrant  D16-lower inner quadrant    Cold Ischemia time: 46s        E.  The specimen is received in formalin in a container labeled with the patient's name and \" right breast mastectomy skin lateral\", and consists of a 3.5 x 2.5 cm roughly triangular portion of lightly pigmented tan-pink hairbearing skin with 1.6 cm of subcutaneous tissue.  There appears to be a central scarlike area 1.9 x 0.3 cm.  The deep surface is inked.  A representative central longitudinal section is submitted in cassette E1.    Prateek PAREKH ([facility])cm      Intraoperative Consultation       FSA1:  One lymph node; negative for metastatic carcinoma (0/1).     FSB1:  One lymph node; negative for metastatic " carcinoma (0/1).     FSC1-2:  Two lymph nodes; negative for metastatic carcinoma (0/2).       Results called to:Dr. Osuna at 9:13 am.       Electronically signed by Eliz Lakhani MD on August 17, 2023 at 9:13 AM.         CBC    Collection Time: 08/18/23  9:18 AM   Result Value Ref Range    WBC 6.72 3.80 - 10.50 K/uL    RBC 3.13 (L) 4.50 - 5.80 M/uL    Hemoglobin 10.8 (L) 13.7 - 17.5 g/dL    Hematocrit 34.5 (L) 40.1 - 51.0 %    .2 (H) 83.0 - 98.0 fL    MCH 34.5 (H) 28.0 - 33.2 pg    MCHC 31.3 (L) 32.2 - 36.5 g/dL    RDW 15.1 (H) 11.6 - 14.4 %    Platelets 108 (L) 150 - 350 K/uL    MPV 8.8 (L) 9.4 - 12.4 fL   Basic metabolic panel    Collection Time: 08/18/23  9:18 AM   Result Value Ref Range    Sodium 140 136 - 145 mEQ/L    Potassium 4.2 3.5 - 5.1 mEQ/L    Chloride 109 (H) 98 - 107 mEQ/L    CO2 25 21 - 31 mEQ/L    BUN 15 7 - 25 mg/dL    Creatinine 1.2 0.7 - 1.3 mg/dL    Glucose 115 (H) 70 - 99 mg/dL    Calcium 7.8 (L) 8.6 - 10.3 mg/dL    eGFR 59.3 (L) >=60.0 mL/min/1.73m*2    Anion Gap 6 3 - 15 mEQ/L         Radiology  I have reviewed the patient's Radiology report(s).    X-RAY CHEST 1 VIEW    Result Date: 8/18/2023  Narrative: CLINICAL HISTORY:  Cough COMMENT:  A portable erect AP view of the chest was obtained at 1707 hours on 8/17/2023 COMPARISON:  Chest radiograph from 10/19/2022 A left chest wall port and postsurgical changes of aortic valvuloplasty are noted. There are multiple surgical clips in the right axilla. A right chest wall drain is noted.  Mild pulmonary vascular congestion and interstitial edema, which may suggest volume overload. No large effusion or pneumothorax.    There is stable cardiomegaly.     Impression: IMPRESSION: Interstitial thickening and mild pulmonary vascular congestion, which may suggest volume overload.     NM SENTINEL NODE RADIOPHARM TRACER    Result Date: 8/17/2023  Narrative: CLINICAL HISTORY:  Right Breast carcinoma. COMMENT: 1.1mCi of technetium 99m lymphoseek was  provided to Dr. Osuna for administration into the patient's breast as part of a planned sentinel node localization procedure.     Impression: IMPRESSION: Radiotracer provided as above.      Assessment and Plan  Malignant neoplasm of right male breast (CMS/HCC)  Diagnosed with invasive ductal carcinoma of the right breast, grade 3, ER positive TN positive HER2/jhonatan +3.  Initiated 1 cycle TCHP in September 2022 at [facility].  Questionable reaction to the anti-HER2/jhonatan therapy.  Second cycle with Taxotere and carboplatin alone.  Patient hospitalized.  Patient declined further intravenous systemic therapy for his breast cancer.  Had multiple medical issues including cardiac issues.  Was agreeable to initiating tamoxifen November 2022.  Remained on tamoxifen while underwent treatment for his prostate cancer.  Right mastectomy performed August 17, 2013.  Pathologic stage ypT1c N1a, ER positive, TN positive and HER2/jhonatan positive.    Patient seems to be recovering well from his recent surgery.  Reviewed final pathology showing residual invasive ductal carcinoma that was estrogen receptor positive and HER2 positive.  We discussed potential role for further HER2 directed therapy.  Given his previous adverse reaction to treatment as well as his comorbidities, patient declines any additional chemotherapy or HER2 directed therapy.  He is agreeable to continue on tamoxifen.  He will also be meeting with radiation oncology today discussed the role of adjuvant radiation.  I have asked him to return to see me in 3 months.    BRCA1 positive  Patient has a history of both breast and prostate cancer and BRCA1 mutation.  With regards to screening for contralateral breast cancer, we discussed recommendations for matter less clear than for women.  Patient is not interested in breast MRI as he is unable to tolerate MRI testing.  He would consider yearly mammogram and would be due for this testing in March.  We will plan to  continue clinical exams at least every 6 months.    Prostate cancer (CMS/[facility])    July 27, 2022 underwent prostatectomy for prostate cancer at outside institution.  Clare score 4+3.  February 2023 PSA increasing.  Urology recommended radiation therapy.  Received Lupron while receiving treatment with radiation.  Completed radiotherapy June 2023.    He completed his radiation.  He feels very well.  He is going to continue surveillance under the direction of radiation oncology and his urologist.      Kailey Gale MD

## 2023-09-05 NOTE — ASSESSMENT & PLAN NOTE
Patient has a history of both breast and prostate cancer and BRCA1 mutation.  With regards to screening for contralateral breast cancer, we discussed recommendations for matter less clear than for women.  Patient is not interested in breast MRI as he is unable to tolerate MRI testing.  He would consider yearly mammogram and would be due for this testing in March.  We will plan to continue clinical exams at least every 6 months.

## 2023-09-05 NOTE — ASSESSMENT & PLAN NOTE
Diagnosed with invasive ductal carcinoma of the right breast, grade 3, ER positive WV positive HER2/jhonatan +3.  Initiated 1 cycle TCHP in September 2022 at [facility].  Questionable reaction to the anti-HER2/jhonatan therapy.  Second cycle with Taxotere and carboplatin alone.  Patient hospitalized.  Patient declined further intravenous systemic therapy for his breast cancer.  Had multiple medical issues including cardiac issues.  Was agreeable to initiating tamoxifen November 2022.  Remained on tamoxifen while underwent treatment for his prostate cancer.  Right mastectomy performed August 17, 2013.  Pathologic stage ypT1c N1a, ER positive, WV positive and HER2/jhonatan positive.    Patient seems to be recovering well from his recent surgery.  Reviewed final pathology showing residual invasive ductal carcinoma that was estrogen receptor positive and HER2 positive.  We discussed potential role for further HER2 directed therapy.  Given his previous adverse reaction to treatment as well as his comorbidities, patient declines any additional chemotherapy or HER2 directed therapy.  He is agreeable to continue on tamoxifen.  He will also be meeting with radiation oncology today discussed the role of adjuvant radiation.  I have asked him to return to see me in 3 months.

## 2023-09-05 NOTE — PROGRESS NOTES
Review of Systems   Constitutional: Positive for fatigue (mild ).   HENT:  Negative.    Eyes: Negative.    Respiratory: Positive for cough (comes and goes white phlegm taking mucinex ).    Cardiovascular: Positive for leg swelling (improving ).   Gastrointestinal: Negative.    Endocrine: Negative.    Genitourinary: Negative.     Musculoskeletal: Positive for gait problem (uses cane ).   Skin: Negative.    Neurological: Positive for gait problem (uses cane ).   Hematological: Negative.    Psychiatric/Behavioral: The patient is nervous/anxious.      Pt had surgery on August 17 R mastectomy with sentinel node biopsy

## 2023-09-05 NOTE — PROGRESS NOTES
Subjective      Patient ID: Radha Rosas is a 73 y.o. male.  1950    Diagnosis:  No diagnosis found.    HPI    The following have been reviewed and updated as appropriate in this visit:        Review of Systems   Constitutional: Negative.    HENT: Negative.    Eyes: Negative.    Respiratory: Positive for cough (Mucinex PRN, productive cough, white phlegm ).    Cardiovascular: Positive for leg swelling (BLE, R>L).   Gastrointestinal: Negative.    Endocrine: Negative.    Genitourinary: Negative.    Musculoskeletal: Positive for gait problem (cane w/ ambulation).   Skin: Negative.    Hematological: Negative.    Psychiatric/Behavioral: Negative.        Objective     Vitals:    09/05/23 1225   BP: 128/62   BP Location: Right upper arm   Patient Position: Sitting   Pulse: 60   SpO2: 97%   Weight: 81.6 kg (180 lb)     Body mass index is 29.95 kg/m².    Physical Exam    Assessment/Plan   Diagnoses and Orders Associated With This Visit:  There are no diagnoses linked to this encounter.

## 2023-09-05 NOTE — LETTER
September 5, 2023                                                          Patient: Radha Rosas   YOB: 1950   Date of Visit: 9/5/2023       Dear Dr. Collins:    The patient is seen at the [facility] RADIATION [facility]ERAPY today. Attached is my assessment and plan of care.  Thank you for the opportunity to share in Radha Rosas's care.       Sincerely,        Gregory J. [facility], MD      CC: No Recipients

## 2023-09-06 ENCOUNTER — PATIENT OUTREACH (OUTPATIENT)
Dept: RADIOLOGY | Facility: HOSPITAL | Age: 73
End: 2023-09-06
Payer: MEDICARE

## 2023-09-08 ENCOUNTER — TELEPHONE (OUTPATIENT)
Dept: SCHEDULING | Facility: CLINIC | Age: 73
End: 2023-09-08
Payer: MEDICARE

## 2023-09-08 DIAGNOSIS — Z01.812 PRE-PROCEDURE LAB EXAM: Primary | ICD-10-CM

## 2023-09-08 DIAGNOSIS — I48.0 PAROXYSMAL ATRIAL FIBRILLATION (CMS/HCC): ICD-10-CM

## 2023-09-08 NOTE — TELEPHONE ENCOUNTER
Patient's spouse Clementine calling because Dr. Polk wants to have the patient's port removed.  The procedure will be at [facility].      They can not schedule the appt until they receive a note stating that the patient is OK to hold Xarelto for 72 hours prior to the procedure.    The note can be faxed to [phone], Interventional Radiology - [facility] [facility] would like a call back when this is completed because her  is tentatively scheduled for Thursday 9/14/23 @ 9am    Clementine can be reached at [phone]

## 2023-09-08 NOTE — TELEPHONE ENCOUNTER
Deo Muse, it looks like they are requesting a letter below.     I ordered the PT/INR to [facility] in an alternate encounter.     I called Clementine and advised the pt may hold the Xarelto for 72 hrs prior to the procedure and that we will send a letter either today or Monday. I advised the labs were sent to [facility].

## 2023-09-08 NOTE — TELEPHONE ENCOUNTER
I called and s/w the pt's wife Clementine. Dr. Gale ([facility] hematology/oncology) is recommending the pt's port be removed. It has to be done at [facility] where it was placed. [facility] is requesting a PT/INR level prior to the procedure and would like the xarelto held for 72 hrs prior to the scheduled 9/14 procedure. She was advised to reach out to Dr. Moran for the order.     SD/LG: Pls advise if he can hold the Xarelto and if I can order a PT/INR to [facility]. TY

## 2023-09-08 NOTE — TELEPHONE ENCOUNTER
Patient spouse called stated [facility] lab called her told her patient will need additional lab before he can have procedure state's patient will need a PT/INR patient is having labs drawn at [facility] Spouse did not have fax number    Spouse can be reached at:[phone]    [facility]  [address] [place] [address]

## 2023-09-11 ENCOUNTER — TELEPHONE (OUTPATIENT)
Dept: CARDIOLOGY | Facility: CLINIC | Age: 73
End: 2023-09-11
Payer: MEDICARE

## 2023-09-11 DIAGNOSIS — I48.91 ATRIAL FIBRILLATION, UNSPECIFIED TYPE (CMS/HCC): ICD-10-CM

## 2023-09-11 NOTE — TELEPHONE ENCOUNTER
Medicine Refill Request    Name of Patient: Cam Rosas    Caller's name/Relationship: Clementine Macedo number: [phone]    Medication Name, Dosage, Supply: xarelto 15mg 90#    Quantity left: 9 pills left    Pharmacy: [id number]    Last Office Visit: Visit date not found   Last Consult Visit: Visit date not found  Last Telemedicine Visit: Visit date not found    Next Appointment: Visit date not found      Current Outpatient Medications:     acetaminophen (TYLENOL EX STR RAPID RELEASE ORAL), Take 500 mg by mouth as needed., Disp: , Rfl:     acetaminophen (TYLENOL) 500 mg tablet, Take 2 tablets (1,000 mg total) by mouth every 6 (six) hours as needed for pain., Disp: , Rfl:     amiodarone (PACERONE) 200 mg tablet, Take 1 tablet (200 mg total) by mouth daily., Disp: 90 tablet, Rfl: 3    cetirizine (ZyrTEC) 10 mg tablet, Take 10 mg by mouth nightly., Disp: , Rfl:     cholecalciferol, vitamin D3, 1,000 unit (25 mcg) tablet, Take 1,000 Units by mouth daily., Disp: , Rfl:     dextromethorphan-benzocaine (CEPACOL SORE THROAT-COUGH) 5-7.5 mg lozenge, Take 1 lozenge by mouth every 4 (four) hours as needed (cough). (Patient not taking: Reported on 8/28/2023), Disp: , Rfl: 0    dilTIAZem CD (CARDIZEM CD) 120 mg 24 hr capsule, Take 1 capsule (120 mg total) by mouth daily., Disp: 270 capsule, Rfl: 3    guaiFENesin (MUCINEX) 600 mg 12 hr tablet, Take 1,200 mg by mouth 2 (two) times a day., Disp: , Rfl:     ibuprofen (MOTRIN) 200 mg tablet, Take 2 tablets (400 mg total) by mouth every 6 (six) hours as needed for pain for up to 10 days. (Patient not taking: Reported on 8/28/2023), Disp: , Rfl:     metoprolol succinate XL (TOPROL-XL) 50 mg 24 hr tablet, Take 1 tablet (50 mg total) by mouth daily., Disp: 90 tablet, Rfl: 3    pantoprazole (PROTONIX) 40 mg EC tablet, Take 1 tablet (40 mg total) by mouth 2 (two) times a day., Disp: 180 tablet, Rfl: 3    rivaroxaban (XARELTO) 15 mg tablet, Take 1 tablet (15 mg  total) by mouth daily with dinner Indications: treatment to prevent blood clots in chronic atrial fibrillation., Disp: 30 tablet, Rfl: 0    simvastatin (ZOCOR) 20 mg tablet, Take 1 tablet (20 mg total) by mouth nightly., Disp: 90 tablet, Rfl: 3    tamoxifen (NOLVADEX) 20 mg chemo tablet, Take  1 tablet (20 mg total) daily, Disp: 90 tablet, Rfl: 3    torsemide (DEMADEX) 10 mg tablet, Take 1 tablet (10 mg total) by mouth 3 (three) times a week (Mon, Wed, Fri)., Disp: 40 tablet, Rfl: 0      BP Readings from Last 3 Encounters:   09/05/23 128/62   09/05/23 (!) 122/50   08/28/23 120/64       Recent Lab results:  No results found for: CHOL, No results found for: HDL, No results found for: LDLCALC, No results found for: TRIG     Lab Results   Component Value Date    GLUCOSE 115 (H) 08/18/2023   , No results found for: HGBA1C      Lab Results   Component Value Date    CREATININE 1.2 08/18/2023       No results found for: TSH      No results found for: HGBA1C

## 2023-09-12 LAB
INR PPP: 1.3
PROTHROMBIN TIME: 13 SEC (ref 9–11.5)

## 2023-09-13 NOTE — PROGRESS NOTES
Patient ID: Cam Rosas                              : 1950    Visit Date: 2023  Referring Provider: No ref. provider found  PCP: Usman Collins MD  GYN: No care team member to display    Subjective   Chief Complaint: Post-op Visit    Cam Rosas is a 73 y.o. old male presenting today for second postoperative visit after right mastectomy with sentinel lymph node biopsy for right breast cancer.  He had genetic testing which revealed a mutation in the BRCA1 gene as well as OK.  He initially had chemotherapy for his HER2 positive breast cancer but had a reaction to the anti-HER2 therapy, and then after a second cycle of chemotherapy was hospitalized.  He ultimately declined any additional chemotherapy treatments and was placed on tamoxifen.  On 2023 he had right mastectomy and sentinel lymph node biopsy.  Frozen section of the lymph nodes were negative on final pathology right axillary sentinel lymph node was removed, 1 of 4 positive for metastatic disease with a 2.5 mm metastasis with no extranodal extension.  The other 3 lymph nodes were negative.  In the right breast he had invasive ductal carcinoma, grade 3 with multiple scattered foci of disease, the largest measuring 12 mm.  Carcinoma infiltrates the dermis and dermal lymphatic invasion was present, skin ulceration was not identified.  All margins were negative.  He had a partial response to the chemotherapy.  On 2023 he met with Dr. Gale, medical oncology, to discuss options moving forward.  After discussion he ultimately declined any additional chemotherapy or HER2 directed therapy.  He was agreeable to continue on tamoxifen.  On 2023 he met with Dr. Nicole, radiation oncology, and began radiation therapy yesterday, 2023.         Breast History:    OB/GYN Status    Currently Pregnant:  N/A   Last Menstrual Period:  N/A   Menstrual Status:   N/A   LMP Comment:   N/A   Menarche Age:    "  Breastfeeding?  N/A   Lactation Comment:   N/A       Family History: family history includes Arthritis in his biological father and maternal grandfather; Breast cancer in his biological mother; COPD in his maternal grandfather; Cancer in his biological mother; Cancer less than or equal to age 50 in an other family member; Diabetes in his maternal grandfather; Esophageal cancer in an other family member; Heart disease in his maternal grandmother; Hyperlipidemia in his biological father and biological mother; Hypertension in his biological father and biological mother; No Known Problems in his biological brother, biological sister, paternal grandfather, and paternal grandmother.  Allergies: is allergic to azithromycin, prednisone, and lorazepam.    Objective   Vitals:   Visit Vitals  /78   Pulse (!) 57   Temp 36.4 °C (97.6 °F)   Ht 1.651 m (5' 5\")   Wt 83.6 kg (184 lb 3.2 oz)   SpO2 97%   BMI 30.65 kg/m²     Physical Exam  Chest:          Comments: Healing mastectomy scar, with mild edema noted.  No erythema, or tenderness to palpation. No evidence of lymphedema.           Assessment/Plan   Problem List Items Addressed This Visit        Other    Malignant neoplasm of right male breast (CMS/HCC) - Primary    BRCA1 positive    Monoallelic mutation of OK gene     Radha is a 73-year-old who presents for second postoperative visit after right mastectomy.  He continues to heal nicely postoperatively.  After long discussion with medical oncology, he ultimately declines any additional HER2 therapy and will continue on the tamoxifen.  He will also continue his radiation treatments as scheduled.  He still has his Chemo-Port in place, and will schedule to have removed in the operating room.  I encouraged him to continue with routine self exams, and reviewed indications that would warrant follow-up.  Otherwise he will follow-up with Dr. Osuna in 6 months or sooner with any questions or concerns.  Lastly, I " encouraged  him to continue with healthy diet, moderate exercise, and to continue with his routine and preventative health care.    Return in about 6 months (around 3/29/2024).       JONNY Sánchez

## 2023-09-14 ENCOUNTER — TELEPHONE (OUTPATIENT)
Dept: SURGERY | Facility: CLINIC | Age: 73
End: 2023-09-14
Payer: MEDICARE

## 2023-09-14 NOTE — TELEPHONE ENCOUNTER
Home care nurse Rina called to advise Dr Osuna That Cam has a script to have a port removed and it says for him to go to [facility]. Patient told the nurse that he will not go back to that hospital and that the script needs to send him elsewhere or he will just keep the port in and have it flushed every month per the Nurse.   I attached the phone number for [facility] # [phone].  Message sent to Dr. Osuna

## 2023-09-15 ENCOUNTER — HOSPITAL ENCOUNTER (OUTPATIENT)
Dept: RADIATION ONCOLOGY | Facility: HOSPITAL | Age: 73
Setting detail: RADIATION/ONCOLOGY SERIES
Discharge: HOME | End: 2023-09-15
Attending: RADIOLOGY
Payer: MEDICARE

## 2023-09-15 ENCOUNTER — DOCUMENTATION (OUTPATIENT)
Dept: RADIATION ONCOLOGY | Facility: HOSPITAL | Age: 73
End: 2023-09-15
Payer: MEDICARE

## 2023-09-15 PROCEDURE — 77290 THER RAD SIMULAJ FIELD CPLX: CPT | Performed by: RADIOLOGY

## 2023-09-15 PROCEDURE — 77332 RADIATION TREATMENT AID(S): CPT | Performed by: RADIOLOGY

## 2023-09-15 NOTE — PROGRESS NOTES
"TREATMENT PLAN OF CARE AND GOALS BREAST     Care Providers   Medical Oncologist Dr. Gale   Radiation Oncologist Dr. [facility]   Surgeon Dr. Osuna   Primary Care Provider Usman Collins MD   Radiation Oncology Nurse April Philip, MIQUEL BSN OCN    Radha Walter, [phone]   Genetics Counselor Abhilash Marleyannalee, MS, [facility] [phone]    Treatment Plan of Care   Chemotherapy, Hormonal, and Targeted therapies   Regimen Start date   Interval, Duration   Tamoxifen          Radiation Therapy   Treatment area/Technique Start date Interval, Duration   Right Chest wall/LN            -Stay hydrated. Drink plenty of fluids.     -Moisturize your skin. Moisturizers provide a seal over your skin to keep water from escaping. Thicker moisturizers work best. Choose a moisturizer that's water-based and unscented. Ask your doctor or nurse to recommend specific products.    NEVER USE A MOISTURIZER 4 HOURS PRIOR TO YOUR RADIATION TREATMENT.  Apply after treatment and nightly.     -Limit bath time. Hot water and long showers or baths can dry your skin. Take short baths or showers, and use warm -- rather than hot -- water.     - -Wash with mild soap. Avoid harsh, drying soaps such as deodorant or antibacterial types.    - Avoid direct sun. UV rays are the highest from 10 am to 4 pm- and are stronger during spring and summer months. It is suggested to use:  UV Protective clothing, Sunscreen with Zinc, or stay in a shaded area.  Do not use tanning beds.                            - Allow time for air : By wearing a loose-fitting cotton shirt or using a hair dryer set to \" COOL\" .    -Avoid Chlorine -  dries your skin.  Shower after swimming in a pool.    - Avoid antiperspirants.  Use a natural deodorant only after your daily treatments.    FATIGUE: Conserve your energy, reduce stress, and keep yourself from focusing on the fatigue by exercising.      NO MULTIVITAMINS OR ANTIOXIDANTS during your radiation treatment.    You " will meet with the Nurse/ Doctor once a week for an on treatment visit. Please plan for a longer visit on this day.     You will speak with the radiation therapists for scheduling and treatment specific requirements.      Resources you may be interested in:      Social Work, Free counseling services, Art therapy, Nutrition, Complementary therapies , Spiritual Care, Virtual programs and classes provided by [facility]. Free wig program.       Contact Numbers:    [facility] Line  [phone]  Doctor on call (nights and weekends) [phone]  To schedule or cancel your radiation treatment appointments  [phone]  Reviewed by:    April Philip RN  BSN OCN              Date completed:09/15/23

## 2023-09-17 LAB
PSA SERPL DL<=0.01 NG/ML-MCNC: <0.02 NG/ML
TESTOST SERPL-MCNC: 15 NG/DL (ref 250–1100)

## 2023-09-18 LAB
ARIA ZRC COURSE ID: NORMAL
ARIA ZRC COURSE ID: NORMAL
ARIA ZRC COURSE INTENT: NORMAL
ARIA ZRC COURSE INTENT: NORMAL
ARIA ZRC COURSE START DATE: NORMAL
ARIA ZRC COURSE START DATE: NORMAL
ARIA ZRC TREATMENT ELAPSED DAYS: NORMAL
ARIA ZRC TREATMENT ELAPSED DAYS: NORMAL
ARIA ZRP FRACTIONS TREATED TO DATE: NORMAL
ARIA ZRP PLAN ID: NORMAL
ARIA ZRP PLAN NAME: NORMAL
ARIA ZRP PRESCRIBED DOSE CGY: 1980
ARIA ZRP PRESCRIBED DOSE CGY: 1980
ARIA ZRP PRESCRIBED DOSE CGY: 5000
ARIA ZRP PRESCRIBED DOSE CGY: 5040
ARIA ZRP PRESCRIBED DOSE CGY: 5040
ARIA ZRP PRESCRIBED DOSE PER FRACTION: 1.8
ARIA ZRP PRESCRIBED DOSE PER FRACTION: 2
ARIA ZRR DOSAGE GIVEN TO DATE: 0
ARIA ZRR REFERENCE POINT ID: NORMAL
MLH ARIA ZRC TREATMENT DATES: NORMAL
MLH ARIA ZRC TREATMENT DATES: NORMAL

## 2023-09-25 ASSESSMENT — ENCOUNTER SYMPTOMS
WEAKNESS: 0
TREMORS: 0
SHORTNESS OF BREATH: 0
COUGH: 0
DYSPNEA ON EXERTION: 0
SYNCOPE: 0
FOCAL WEAKNESS: 0
HEMATURIA: 0
NEAR-SYNCOPE: 0
PALPITATIONS: 0
PARESTHESIAS: 0
PND: 0
IRREGULAR HEARTBEAT: 0
ABDOMINAL PAIN: 0
ORTHOPNEA: 0
ALTERED MENTAL STATUS: 0

## 2023-09-25 NOTE — PROGRESS NOTES
Electrophysiology  Outpatient Note         HPI   Cam Rosas is a 73 y.o. male who is seen today for follow-up and management of atrial fibrillation.  He underwent a cardioversion on December 5, 2022.  He was loaded with amiodarone and now comes to the office on amiodarone 200 mg daily, metoprolol 100 mg daily and diltiazem 360 mg .  He returned to the office 1/2023 in sinus bradycardia. Diltizem was decreased. During the month of August 2023  he underwent right breast mastectomy with lymph node removal.  Overall he feels well and with no symptoms of bradycardia.  Denies any episodes of syncope or near syncope, and has had no palpitations or symptoms of recurrent atrial fibrillation.    He was admitted to [facility] (10/19-10/23) with acute decompensated systolic heart failure. He was then diagnosed with atrial fibrillation when he was seen at [facility].  He was placed on metoprolol and discharged.  He was not anticoagulated due to recent melena requiring transfusion.  EGD at outside hospital showed no active bleeding.    While admitted he started on IV diltiazem in the ED and was noted to be both hypokalemic and hypomagnesemic.  He was placed on IV heparin with close monitoring of his hemoglobin and discussed possible ROLF guided cardioversion. He then refused ROLF guided cardioversion and wanted to be discharged home.  He was discharged on metoprolol, diltiazem, and Xarelto with plans for cardioversion in 4 weeks.      He has a past medical history of CKD, breast cancer on chemotherapy underwent BX/REMV,LYMPH NODE,DEEP AXILLARY on 8/17/2023,  melena, and history of mitral annular ring placement by Dr. Pastor in 2006.    Past Medical History:   Diagnosis Date    Acute systolic heart failure (CMS/HCC) 02/15/2023    Atrial fibrillation (CMS/HCC)     Breast cancer (CMS/HCC) October 2022    CHF (congestive heart failure) (CMS/HCC)     Chronic kidney disease     CKD (chronic kidney disease)  10/19/2022    History of radiation therapy     Hx antineoplastic chemo     Prostate cancer (CMS/HCC)        Past Surgical History:   Procedure Laterality Date    CARDIAC SURGERY      mitral valve repair 2006    CARDIOVERSION  12/05/2022    Successful DC cardioversion    KNEE CARTILAGE SURGERY Left     MASTECTOMY      PROSTATE SURGERY  July 2022    PROSTATECTOMY  07/15/2022    TONSILLECTOMY         Allergies: Azithromycin, Prednisone, and Lorazepam    Current Outpatient Medications   Medication Sig Dispense Refill    acetaminophen (TYLENOL EX STR RAPID RELEASE ORAL) Take 500 mg by mouth as needed.      amiodarone (PACERONE) 200 mg tablet Take 1 tablet (200 mg total) by mouth daily. 90 tablet 3    cetirizine (ZyrTEC) 10 mg tablet Take 10 mg by mouth nightly.      cholecalciferol, vitamin D3, 1,000 unit (25 mcg) tablet Take 1,000 Units by mouth daily.      guaiFENesin (MUCINEX) 600 mg 12 hr tablet Take 1,200 mg by mouth 2 (two) times a day.      metoprolol succinate XL (TOPROL-XL) 50 mg 24 hr tablet Take 1 tablet (50 mg total) by mouth daily. 90 tablet 3    pantoprazole (PROTONIX) 40 mg EC tablet Take 1 tablet (40 mg total) by mouth 2 (two) times a day. 180 tablet 3    rivaroxaban (XARELTO) 15 mg tablet Take 1 tablet (15 mg total) by mouth daily with dinner. 90 tablet 3    simvastatin (ZOCOR) 20 mg tablet Take 1 tablet (20 mg total) by mouth nightly. 90 tablet 3    tamoxifen (NOLVADEX) 20 mg chemo tablet Take  1 tablet (20 mg total) daily 90 tablet 3    torsemide (DEMADEX) 10 mg tablet Take 1 tablet (10 mg total) by mouth 3 (three) times a week (Mon, Wed, Fri). (Patient taking differently: Take 10 mg by mouth 3 (three) times a week (Mon, Wed, Fri). If weight > 185 lb) 40 tablet 0    ibuprofen (MOTRIN) 200 mg tablet Take 2 tablets (400 mg total) by mouth every 6 (six) hours as needed for pain for up to 10 days. (Patient not taking: Reported on 8/28/2023)       No current facility-administered  medications for this visit.       Social History     Tobacco Use    Smoking status: Never    Smokeless tobacco: Never   Vaping Use    Vaping Use: Never used   Substance Use Topics    Alcohol use: Yes     Alcohol/week: 14.0 standard drinks of alcohol     Types: 14 Shots of liquor per week     Comment: 2 drinks/day - scotch    Drug use: Never       Family History   Problem Relation Age of Onset    Hyperlipidemia Biological Mother     Hypertension Biological Mother     Breast cancer Biological Mother     Cancer Biological Mother         gyn? cervical    Hyperlipidemia Biological Father     Hypertension Biological Father     Arthritis Biological Father     No Known Problems Biological Sister     No Known Problems Biological Brother     Heart disease Maternal Grandmother     Arthritis Maternal Grandfather     COPD Maternal Grandfather     Diabetes Maternal Grandfather     No Known Problems Paternal Grandmother     No Known Problems Paternal Grandfather     Cancer less than or equal to age 50 Other     Esophageal cancer Other         47, in abd,chemo q 3 weeks       Review of Systems   Constitutional: Negative for malaise/fatigue.   HENT: Negative for hearing loss.    Eyes: Negative for visual disturbance.   Cardiovascular: Negative for chest pain, dyspnea on exertion, irregular heartbeat, leg swelling, near-syncope, orthopnea, palpitations, paroxysmal nocturnal dyspnea and syncope.   Respiratory: Negative for cough and shortness of breath.    Hematologic/Lymphatic: Negative for bleeding problem.   Skin: Negative for rash.   Musculoskeletal: Negative for joint pain and muscle weakness.   Gastrointestinal: Negative for abdominal pain.   Genitourinary: Negative for hematuria.   Neurological: Negative for focal weakness, paresthesias, tremors and weakness.   Psychiatric/Behavioral: Negative for altered mental status.       Objective     Vitals:    09/28/23 1318   BP: 124/70   Pulse: (!) 48   Resp: 18    SpO2: 98%       Physical Exam  Constitutional:       Appearance: He is well-developed.   HENT:      Head: Normocephalic and atraumatic.   Neck:      Vascular: No JVD.   Cardiovascular:      Rate and Rhythm: Regular rhythm. Bradycardia present.      Heart sounds: No murmur heard.  Pulmonary:      Breath sounds: Normal breath sounds.   Abdominal:      General: Bowel sounds are normal.      Palpations: Abdomen is soft.      Tenderness: There is no abdominal tenderness.   Musculoskeletal:      Right lower leg: No edema.      Left lower leg: No edema.   Skin:     General: Skin is warm and dry.   Neurological:      Mental Status: He is alert and oriented to person, place, and time.          Labs   Lab Results   Component Value Date    WBC 6.72 08/18/2023    HGB 10.8 (L) 08/18/2023    HCT 34.5 (L) 08/18/2023     (L) 08/18/2023    ALT 15 08/08/2023    AST 31 08/08/2023     08/18/2023    K 4.2 08/18/2023     (H) 08/18/2023    CREATININE 1.2 08/18/2023    BUN 15 08/18/2023    CO2 25 08/18/2023    INR 1.3 (H) 09/11/2023       Echocardiogram   Transthoracic echocardiogram on 10/18/2022 at outside hospital showed an ejection fraction of 40-45%, global left ventricular hypokinesis, mildly dilated left atrium.    Cardioversion 12/5/2022. amiodarone.     ECG marked sinus bradycardia with left side intraventricular conduction delay.      Assessment/Plan   Problem List Items Addressed This Visit        Circulatory    Paroxysmal atrial fibrillation (CMS/HCC)     He is maintaining sinus rhythm/bradycardia. Will continue amiodarone at 200 mg daily.  He continues to be mildly bradycardic though asymptomatic.  He is on low-dose diltiazem which we will discontinue.  For now we will continue metoprolol at the lower dose, though this too may need to be decreased in the future.         Chronic combined systolic and diastolic CHF (congestive heart failure) (CMS/HCC)     Remains stable with no CHF at this time.  No evidence  of volume overload.  Exacerbated by AF and RVR; continue rhythm control.             Genitourinary    Stage 3a chronic kidney disease (CMS/HCC)    Relevant Orders    ECG 12 LEAD-OFFICE PERFORMED (Completed)       Other    Long term current use of amiodarone     He is tolerating amiodarone, maintaining sinus rhythm.  However, he does have significant bradycardia in combination with his diltiazem and metoprolol.  We will discontinue diltiazem.  We may need to consider discontinuing metoprolol future as well.  For now he remains asymptomatic.    His blood chemistries have been adequate with no evidence of any liver issues.  He is due for general blood work in the near future, and I have added a TSH for amiodarone follow-up.         Relevant Orders    TSH w reflex FT4   Other Visit Diagnoses     Atrial fibrillation, unspecified type (CMS/HCC)    -  Primary    Relevant Orders    ECG 12 LEAD-OFFICE PERFORMED (Completed)    TSH w reflex FT4    Acute systolic congestive heart failure (CMS/HCC)        Relevant Orders    ECG 12 LEAD-OFFICE PERFORMED (Completed)          Gary Aceves MD   09/28/2023

## 2023-09-27 ENCOUNTER — HOSPITAL ENCOUNTER (OUTPATIENT)
Dept: RADIATION ONCOLOGY | Facility: HOSPITAL | Age: 73
Setting detail: RADIATION/ONCOLOGY SERIES
Discharge: HOME | End: 2023-09-27
Attending: RADIOLOGY
Payer: MEDICARE

## 2023-09-27 PROCEDURE — 77280 THER RAD SIMULAJ FIELD SMPL: CPT | Performed by: RADIOLOGY

## 2023-09-28 ENCOUNTER — OFFICE VISIT (OUTPATIENT)
Dept: CARDIOLOGY | Facility: CLINIC | Age: 73
End: 2023-09-28
Payer: MEDICARE

## 2023-09-28 ENCOUNTER — HOSPITAL ENCOUNTER (OUTPATIENT)
Dept: RADIATION ONCOLOGY | Facility: HOSPITAL | Age: 73
Setting detail: RADIATION/ONCOLOGY SERIES
Discharge: HOME | End: 2023-09-28
Attending: RADIOLOGY
Payer: MEDICARE

## 2023-09-28 VITALS
OXYGEN SATURATION: 98 % | SYSTOLIC BLOOD PRESSURE: 124 MMHG | HEART RATE: 48 BPM | RESPIRATION RATE: 18 BRPM | WEIGHT: 183.8 LBS | HEIGHT: 65 IN | DIASTOLIC BLOOD PRESSURE: 70 MMHG | BODY MASS INDEX: 30.62 KG/M2

## 2023-09-28 DIAGNOSIS — Z79.899 LONG TERM CURRENT USE OF AMIODARONE: ICD-10-CM

## 2023-09-28 DIAGNOSIS — I50.21 ACUTE SYSTOLIC CONGESTIVE HEART FAILURE (CMS/HCC): ICD-10-CM

## 2023-09-28 DIAGNOSIS — I50.42 CHRONIC COMBINED SYSTOLIC AND DIASTOLIC CHF (CONGESTIVE HEART FAILURE) (CMS/HCC): ICD-10-CM

## 2023-09-28 DIAGNOSIS — I48.0 PAROXYSMAL ATRIAL FIBRILLATION (CMS/HCC): ICD-10-CM

## 2023-09-28 DIAGNOSIS — I48.91 ATRIAL FIBRILLATION, UNSPECIFIED TYPE (CMS/HCC): Primary | ICD-10-CM

## 2023-09-28 DIAGNOSIS — N18.31 STAGE 3A CHRONIC KIDNEY DISEASE (CMS/HCC): ICD-10-CM

## 2023-09-28 LAB
ARIA ZRC COURSE ID: NORMAL
ARIA ZRC COURSE INTENT: NORMAL
ARIA ZRC COURSE START DATE: NORMAL
ARIA ZRC TREATMENT ELAPSED DAYS: NORMAL
ARIA ZRP FRACTIONS TREATED TO DATE: NORMAL
ARIA ZRP FRACTIONS TREATED TO DATE: NORMAL
ARIA ZRP PLAN ID: NORMAL
ARIA ZRP PLAN ID: NORMAL
ARIA ZRP PRESCRIBED DOSE CGY: 5000
ARIA ZRP PRESCRIBED DOSE CGY: 5000
ARIA ZRP PRESCRIBED DOSE PER FRACTION: 2
ARIA ZRP PRESCRIBED DOSE PER FRACTION: 2
ARIA ZRR DOSAGE GIVEN TO DATE: 2
ARIA ZRR DOSAGE GIVEN TO DATE: 2.02
ARIA ZRR REFERENCE POINT ID: NORMAL
ARIA ZRR SESSION DOSAGE GIVEN: 2
ARIA ZRR SESSION DOSAGE GIVEN: 2.02
MLH ARIA ZRC TREATMENT DATES: NORMAL

## 2023-09-28 PROCEDURE — 77332 RADIATION TREATMENT AID(S): CPT | Performed by: RADIOLOGY

## 2023-09-28 PROCEDURE — 93000 ELECTROCARDIOGRAM COMPLETE: CPT | Performed by: INTERNAL MEDICINE

## 2023-09-28 PROCEDURE — 77387 GUIDANCE FOR RADJ TX DLVR: CPT | Performed by: RADIOLOGY

## 2023-09-28 PROCEDURE — G8754 DIAS BP LESS 90: HCPCS | Performed by: INTERNAL MEDICINE

## 2023-09-28 PROCEDURE — G8752 SYS BP LESS 140: HCPCS | Performed by: INTERNAL MEDICINE

## 2023-09-28 PROCEDURE — 77412 RADIATION TX DELIVERY LVL 3: CPT | Performed by: RADIOLOGY

## 2023-09-28 PROCEDURE — 99214 OFFICE O/P EST MOD 30 MIN: CPT | Performed by: INTERNAL MEDICINE

## 2023-09-28 NOTE — LETTER
October 2, 2023     Usman Collins MD  [facility] [address]    Patient: Cam Rosas  YOB: 1950  Date of Visit: 9/28/2023      Dear Dr. Collins:    Thank you for referring Cam Rosas to me for evaluation. Below are my notes for this consultation.    If you have questions, please do not hesitate to call me. I look forward to following your patient along with you.         Sincerely,        Gary Aceves MD        CC: No Recipients    Gary Aceves MD  10/2/2023 11:43 AM  Signed       Electrophysiology  Outpatient Note         HPI   Cam Rosas is a 73 y.o. male who is seen today for follow-up and management of atrial fibrillation.  He underwent a cardioversion on December 5, 2022.  He was loaded with amiodarone and now comes to the office on amiodarone 200 mg daily, metoprolol 100 mg daily and diltiazem 360 mg .  He returned to the office 1/2023 in sinus bradycardia. Diltizem was decreased. During the month of August 2023  he underwent right breast mastectomy with lymph node removal.  Overall he feels well and with no symptoms of bradycardia.  Denies any episodes of syncope or near syncope, and has had no palpitations or symptoms of recurrent atrial fibrillation.    He was admitted to [facility] (10/19-10/23) with acute decompensated systolic heart failure. He was then diagnosed with atrial fibrillation when he was seen at [facility].  He was placed on metoprolol and discharged.  He was not anticoagulated due to recent melena requiring transfusion.  EGD at outside hospital showed no active bleeding.    While admitted he started on IV diltiazem in the ED and was noted to be both hypokalemic and hypomagnesemic.  He was placed on IV heparin with close monitoring of his hemoglobin and discussed possible ROLF guided cardioversion. He then refused ROLF guided cardioversion and wanted to be discharged home.  He was discharged on metoprolol, diltiazem, and Xarelto with  plans for cardioversion in 4 weeks.      He has a past medical history of CKD, breast cancer on chemotherapy underwent BX/REMV,LYMPH NODE,DEEP AXILLARY on 8/17/2023,  melena, and history of mitral annular ring placement by Dr. Pastor in 2006.    Past Medical History:   Diagnosis Date    Acute systolic heart failure (CMS/HCC) 02/15/2023    Atrial fibrillation (CMS/HCC)     Breast cancer (CMS/HCC) October 2022    CHF (congestive heart failure) (CMS/HCC)     Chronic kidney disease     CKD (chronic kidney disease) 10/19/2022    History of radiation therapy     Hx antineoplastic chemo     Prostate cancer (CMS/HCC)        Past Surgical History:   Procedure Laterality Date    CARDIAC SURGERY      mitral valve repair 2006    CARDIOVERSION  12/05/2022    Successful DC cardioversion    KNEE CARTILAGE SURGERY Left     MASTECTOMY      PROSTATE SURGERY  July 2022    PROSTATECTOMY  07/15/2022    TONSILLECTOMY         Allergies: Azithromycin, Prednisone, and Lorazepam    Current Outpatient Medications   Medication Sig Dispense Refill    acetaminophen (TYLENOL EX STR RAPID RELEASE ORAL) Take 500 mg by mouth as needed.      amiodarone (PACERONE) 200 mg tablet Take 1 tablet (200 mg total) by mouth daily. 90 tablet 3    cetirizine (ZyrTEC) 10 mg tablet Take 10 mg by mouth nightly.      cholecalciferol, vitamin D3, 1,000 unit (25 mcg) tablet Take 1,000 Units by mouth daily.      guaiFENesin (MUCINEX) 600 mg 12 hr tablet Take 1,200 mg by mouth 2 (two) times a day.      metoprolol succinate XL (TOPROL-XL) 50 mg 24 hr tablet Take 1 tablet (50 mg total) by mouth daily. 90 tablet 3    pantoprazole (PROTONIX) 40 mg EC tablet Take 1 tablet (40 mg total) by mouth 2 (two) times a day. 180 tablet 3    rivaroxaban (XARELTO) 15 mg tablet Take 1 tablet (15 mg total) by mouth daily with dinner. 90 tablet 3    simvastatin (ZOCOR) 20 mg tablet Take 1 tablet (20 mg total) by mouth nightly. 90 tablet 3    tamoxifen (NOLVADEX)  20 mg chemo tablet Take  1 tablet (20 mg total) daily 90 tablet 3    torsemide (DEMADEX) 10 mg tablet Take 1 tablet (10 mg total) by mouth 3 (three) times a week (Mon, Wed, Fri). (Patient taking differently: Take 10 mg by mouth 3 (three) times a week (Mon, Wed, Fri). If weight > 185 lb) 40 tablet 0    ibuprofen (MOTRIN) 200 mg tablet Take 2 tablets (400 mg total) by mouth every 6 (six) hours as needed for pain for up to 10 days. (Patient not taking: Reported on 8/28/2023)       No current facility-administered medications for this visit.       Social History     Tobacco Use    Smoking status: Never    Smokeless tobacco: Never   Vaping Use    Vaping Use: Never used   Substance Use Topics    Alcohol use: Yes     Alcohol/week: 14.0 standard drinks of alcohol     Types: 14 Shots of liquor per week     Comment: 2 drinks/day - scotch    Drug use: Never       Family History   Problem Relation Age of Onset    Hyperlipidemia Biological Mother     Hypertension Biological Mother     Breast cancer Biological Mother     Cancer Biological Mother         gyn? cervical    Hyperlipidemia Biological Father     Hypertension Biological Father     Arthritis Biological Father     No Known Problems Biological Sister     No Known Problems Biological Brother     Heart disease Maternal Grandmother     Arthritis Maternal Grandfather     COPD Maternal Grandfather     Diabetes Maternal Grandfather     No Known Problems Paternal Grandmother     No Known Problems Paternal Grandfather     Cancer less than or equal to age 50 Other     Esophageal cancer Other         47, in abd,chemo q 3 weeks       Review of Systems   Constitutional: Negative for malaise/fatigue.   HENT: Negative for hearing loss.    Eyes: Negative for visual disturbance.   Cardiovascular: Negative for chest pain, dyspnea on exertion, irregular heartbeat, leg swelling, near-syncope, orthopnea, palpitations, paroxysmal nocturnal dyspnea and syncope.    Respiratory: Negative for cough and shortness of breath.    Hematologic/Lymphatic: Negative for bleeding problem.   Skin: Negative for rash.   Musculoskeletal: Negative for joint pain and muscle weakness.   Gastrointestinal: Negative for abdominal pain.   Genitourinary: Negative for hematuria.   Neurological: Negative for focal weakness, paresthesias, tremors and weakness.   Psychiatric/Behavioral: Negative for altered mental status.       Objective     Vitals:    09/28/23 1318   BP: 124/70   Pulse: (!) 48   Resp: 18   SpO2: 98%       Physical Exam  Constitutional:       Appearance: He is well-developed.   HENT:      Head: Normocephalic and atraumatic.   Neck:      Vascular: No JVD.   Cardiovascular:      Rate and Rhythm: Regular rhythm. Bradycardia present.      Heart sounds: No murmur heard.  Pulmonary:      Breath sounds: Normal breath sounds.   Abdominal:      General: Bowel sounds are normal.      Palpations: Abdomen is soft.      Tenderness: There is no abdominal tenderness.   Musculoskeletal:      Right lower leg: No edema.      Left lower leg: No edema.   Skin:     General: Skin is warm and dry.   Neurological:      Mental Status: He is alert and oriented to person, place, and time.          Labs   Lab Results   Component Value Date    WBC 6.72 08/18/2023    HGB 10.8 (L) 08/18/2023    HCT 34.5 (L) 08/18/2023     (L) 08/18/2023    ALT 15 08/08/2023    AST 31 08/08/2023     08/18/2023    K 4.2 08/18/2023     (H) 08/18/2023    CREATININE 1.2 08/18/2023    BUN 15 08/18/2023    CO2 25 08/18/2023    INR 1.3 (H) 09/11/2023       Echocardiogram   Transthoracic echocardiogram on 10/18/2022 at outside hospital showed an ejection fraction of 40-45%, global left ventricular hypokinesis, mildly dilated left atrium.    Cardioversion 12/5/2022. amiodarone.     ECG marked sinus bradycardia with left side intraventricular conduction delay.      Assessment/Plan   Problem List Items Addressed This Visit         Circulatory    Paroxysmal atrial fibrillation (CMS/HCC)     He is maintaining sinus rhythm/bradycardia. Will continue amiodarone at 200 mg daily.  He continues to be mildly bradycardic though asymptomatic.  He is on low-dose diltiazem which we will discontinue.  For now we will continue metoprolol at the lower dose, though this too may need to be decreased in the future.         Chronic combined systolic and diastolic CHF (congestive heart failure) (CMS/HCC)     Remains stable with no CHF at this time.  No evidence of volume overload.  Exacerbated by AF and RVR; continue rhythm control.             Genitourinary    Stage 3a chronic kidney disease (CMS/HCC)    Relevant Orders    ECG 12 LEAD-OFFICE PERFORMED (Completed)       Other    Long term current use of amiodarone     He is tolerating amiodarone, maintaining sinus rhythm.  However, he does have significant bradycardia in combination with his diltiazem and metoprolol.  We will discontinue diltiazem.  We may need to consider discontinuing metoprolol future as well.  For now he remains asymptomatic.    His blood chemistries have been adequate with no evidence of any liver issues.  He is due for general blood work in the near future, and I have added a TSH for amiodarone follow-up.         Relevant Orders    TSH w reflex FT4   Other Visit Diagnoses     Atrial fibrillation, unspecified type (CMS/HCC)    -  Primary    Relevant Orders    ECG 12 LEAD-OFFICE PERFORMED (Completed)    TSH w reflex FT4    Acute systolic congestive heart failure (CMS/HCC)        Relevant Orders    ECG 12 LEAD-OFFICE PERFORMED (Completed)          Gary Aceves MD   09/28/2023

## 2023-09-29 ENCOUNTER — HOSPITAL ENCOUNTER (OUTPATIENT)
Dept: RADIATION ONCOLOGY | Facility: HOSPITAL | Age: 73
Setting detail: RADIATION/ONCOLOGY SERIES
Discharge: HOME | End: 2023-09-29
Attending: RADIOLOGY
Payer: MEDICARE

## 2023-09-29 ENCOUNTER — OFFICE VISIT (OUTPATIENT)
Dept: SURGERY | Facility: CLINIC | Age: 73
End: 2023-09-29
Payer: MEDICARE

## 2023-09-29 ENCOUNTER — TELEPHONE (OUTPATIENT)
Dept: SCHEDULING | Facility: CLINIC | Age: 73
End: 2023-09-29
Payer: MEDICARE

## 2023-09-29 VITALS
TEMPERATURE: 97.6 F | OXYGEN SATURATION: 97 % | SYSTOLIC BLOOD PRESSURE: 140 MMHG | BODY MASS INDEX: 30.69 KG/M2 | HEART RATE: 57 BPM | WEIGHT: 184.2 LBS | DIASTOLIC BLOOD PRESSURE: 78 MMHG | HEIGHT: 65 IN

## 2023-09-29 DIAGNOSIS — Z15.09 MONOALLELIC MUTATION OF ATM GENE: ICD-10-CM

## 2023-09-29 DIAGNOSIS — Z15.01 BRCA1 POSITIVE: ICD-10-CM

## 2023-09-29 DIAGNOSIS — C50.021 MALIGNANT NEOPLASM INVOLVING BOTH NIPPLE AND AREOLA OF RIGHT BREAST IN MALE, ESTROGEN RECEPTOR POSITIVE (CMS/HCC): Primary | ICD-10-CM

## 2023-09-29 DIAGNOSIS — Z15.89 MONOALLELIC MUTATION OF ATM GENE: ICD-10-CM

## 2023-09-29 DIAGNOSIS — Z15.09 BRCA1 POSITIVE: ICD-10-CM

## 2023-09-29 DIAGNOSIS — Z15.01 MONOALLELIC MUTATION OF ATM GENE: ICD-10-CM

## 2023-09-29 DIAGNOSIS — Z17.0 MALIGNANT NEOPLASM INVOLVING BOTH NIPPLE AND AREOLA OF RIGHT BREAST IN MALE, ESTROGEN RECEPTOR POSITIVE (CMS/HCC): Primary | ICD-10-CM

## 2023-09-29 LAB
ARIA ZRC COURSE ID: NORMAL
ARIA ZRC COURSE INTENT: NORMAL
ARIA ZRC COURSE START DATE: NORMAL
ARIA ZRC TREATMENT ELAPSED DAYS: NORMAL
ARIA ZRP FRACTIONS TREATED TO DATE: NORMAL
ARIA ZRP FRACTIONS TREATED TO DATE: NORMAL
ARIA ZRP PLAN ID: NORMAL
ARIA ZRP PLAN ID: NORMAL
ARIA ZRP PRESCRIBED DOSE CGY: 5000
ARIA ZRP PRESCRIBED DOSE CGY: 5000
ARIA ZRP PRESCRIBED DOSE PER FRACTION: 2
ARIA ZRP PRESCRIBED DOSE PER FRACTION: 2
ARIA ZRR DOSAGE GIVEN TO DATE: 4
ARIA ZRR DOSAGE GIVEN TO DATE: 4.04
ARIA ZRR REFERENCE POINT ID: NORMAL
ARIA ZRR SESSION DOSAGE GIVEN: 2
ARIA ZRR SESSION DOSAGE GIVEN: 2.02
MLH ARIA ZRC TREATMENT DATES: NORMAL

## 2023-09-29 PROCEDURE — 77387 GUIDANCE FOR RADJ TX DLVR: CPT | Performed by: RADIOLOGY

## 2023-09-29 PROCEDURE — 77412 RADIATION TX DELIVERY LVL 3: CPT | Performed by: RADIOLOGY

## 2023-09-29 PROCEDURE — 99024 POSTOP FOLLOW-UP VISIT: CPT | Performed by: NURSE PRACTITIONER

## 2023-09-29 NOTE — TELEPHONE ENCOUNTER
Pt's spouse Fiorella called, wants to reschedule pt's 10/2 appt with Dr. Moran.      Fiorella can be reached at [phone]

## 2023-10-02 ENCOUNTER — HOSPITAL ENCOUNTER (OUTPATIENT)
Dept: RADIATION ONCOLOGY | Facility: HOSPITAL | Age: 73
Setting detail: RADIATION/ONCOLOGY SERIES
Discharge: HOME | End: 2023-10-02
Attending: RADIOLOGY
Payer: MEDICARE

## 2023-10-02 PROBLEM — N17.9 ACUTE RENAL FAILURE SUPERIMPOSED ON STAGE 3A CHRONIC KIDNEY DISEASE (CMS/HCC): Status: ACTIVE | Noted: 2023-10-02

## 2023-10-02 PROBLEM — N18.31 ACUTE RENAL FAILURE SUPERIMPOSED ON STAGE 3A CHRONIC KIDNEY DISEASE (CMS/HCC): Status: ACTIVE | Noted: 2023-10-02

## 2023-10-02 PROBLEM — Z79.899 LONG TERM CURRENT USE OF AMIODARONE: Status: ACTIVE | Noted: 2023-10-02

## 2023-10-02 LAB
ARIA ZRC COURSE ID: NORMAL
ARIA ZRC COURSE INTENT: NORMAL
ARIA ZRC COURSE START DATE: NORMAL
ARIA ZRC TREATMENT ELAPSED DAYS: NORMAL
ARIA ZRP FRACTIONS TREATED TO DATE: NORMAL
ARIA ZRP FRACTIONS TREATED TO DATE: NORMAL
ARIA ZRP PLAN ID: NORMAL
ARIA ZRP PLAN ID: NORMAL
ARIA ZRP PRESCRIBED DOSE CGY: 5000
ARIA ZRP PRESCRIBED DOSE CGY: 5000
ARIA ZRP PRESCRIBED DOSE PER FRACTION: 2
ARIA ZRP PRESCRIBED DOSE PER FRACTION: 2
ARIA ZRR DOSAGE GIVEN TO DATE: 6
ARIA ZRR DOSAGE GIVEN TO DATE: 6.06
ARIA ZRR REFERENCE POINT ID: NORMAL
ARIA ZRR SESSION DOSAGE GIVEN: 2
ARIA ZRR SESSION DOSAGE GIVEN: 2.02
MLH ARIA ZRC TREATMENT DATES: NORMAL

## 2023-10-02 PROCEDURE — 77387 GUIDANCE FOR RADJ TX DLVR: CPT | Performed by: RADIOLOGY

## 2023-10-02 PROCEDURE — 77412 RADIATION TX DELIVERY LVL 3: CPT | Performed by: RADIOLOGY

## 2023-10-02 NOTE — TELEPHONE ENCOUNTER
Pt's spouse Fiorella called and cancelled 10/2 appt with Dr. Moran and seeking appt after 11/6    Fiorella can be reached at [phone]

## 2023-10-02 NOTE — ASSESSMENT & PLAN NOTE
He is maintaining sinus rhythm/bradycardia. Will continue amiodarone at 200 mg daily.  He continues to be mildly bradycardic though asymptomatic.  He is on low-dose diltiazem which we will discontinue.  For now we will continue metoprolol at the lower dose, though this too may need to be decreased in the future.

## 2023-10-02 NOTE — ASSESSMENT & PLAN NOTE
Remains stable with no CHF at this time.  No evidence of volume overload.  Exacerbated by AF and RVR; continue rhythm control.

## 2023-10-02 NOTE — ASSESSMENT & PLAN NOTE
He is tolerating amiodarone, maintaining sinus rhythm.  However, he does have significant bradycardia in combination with his diltiazem and metoprolol.  We will discontinue diltiazem.  We may need to consider discontinuing metoprolol future as well.  For now he remains asymptomatic.    His blood chemistries have been adequate with no evidence of any liver issues.  He is due for general blood work in the near future, and I have added a TSH for amiodarone follow-up.

## 2023-10-03 ENCOUNTER — HOSPITAL ENCOUNTER (OUTPATIENT)
Dept: RADIATION ONCOLOGY | Facility: HOSPITAL | Age: 73
Setting detail: RADIATION/ONCOLOGY SERIES
Discharge: HOME | End: 2023-10-03
Attending: RADIOLOGY
Payer: MEDICARE

## 2023-10-03 LAB
ARIA ZRC COURSE ID: NORMAL
ARIA ZRC COURSE INTENT: NORMAL
ARIA ZRC COURSE START DATE: NORMAL
ARIA ZRC TREATMENT ELAPSED DAYS: NORMAL
ARIA ZRP FRACTIONS TREATED TO DATE: NORMAL
ARIA ZRP FRACTIONS TREATED TO DATE: NORMAL
ARIA ZRP PLAN ID: NORMAL
ARIA ZRP PLAN ID: NORMAL
ARIA ZRP PRESCRIBED DOSE CGY: 5000
ARIA ZRP PRESCRIBED DOSE CGY: 5000
ARIA ZRP PRESCRIBED DOSE PER FRACTION: 2
ARIA ZRP PRESCRIBED DOSE PER FRACTION: 2
ARIA ZRR DOSAGE GIVEN TO DATE: 8
ARIA ZRR DOSAGE GIVEN TO DATE: 8.08
ARIA ZRR REFERENCE POINT ID: NORMAL
ARIA ZRR SESSION DOSAGE GIVEN: 2
ARIA ZRR SESSION DOSAGE GIVEN: 2.02
MLH ARIA ZRC TREATMENT DATES: NORMAL

## 2023-10-03 PROCEDURE — 77387 GUIDANCE FOR RADJ TX DLVR: CPT | Performed by: RADIOLOGY

## 2023-10-03 PROCEDURE — 77412 RADIATION TX DELIVERY LVL 3: CPT | Performed by: RADIOLOGY

## 2023-10-04 ENCOUNTER — HOSPITAL ENCOUNTER (OUTPATIENT)
Dept: RADIATION ONCOLOGY | Facility: HOSPITAL | Age: 73
Setting detail: RADIATION/ONCOLOGY SERIES
Discharge: HOME | End: 2023-10-04
Attending: RADIOLOGY
Payer: MEDICARE

## 2023-10-04 LAB
ARIA ZRC COURSE ID: NORMAL
ARIA ZRC COURSE INTENT: NORMAL
ARIA ZRC COURSE START DATE: NORMAL
ARIA ZRC TREATMENT ELAPSED DAYS: NORMAL
ARIA ZRP FRACTIONS TREATED TO DATE: NORMAL
ARIA ZRP FRACTIONS TREATED TO DATE: NORMAL
ARIA ZRP PLAN ID: NORMAL
ARIA ZRP PLAN ID: NORMAL
ARIA ZRP PRESCRIBED DOSE CGY: 5000
ARIA ZRP PRESCRIBED DOSE CGY: 5000
ARIA ZRP PRESCRIBED DOSE PER FRACTION: 2
ARIA ZRP PRESCRIBED DOSE PER FRACTION: 2
ARIA ZRR DOSAGE GIVEN TO DATE: 10
ARIA ZRR DOSAGE GIVEN TO DATE: 10.1
ARIA ZRR REFERENCE POINT ID: NORMAL
ARIA ZRR SESSION DOSAGE GIVEN: 2
ARIA ZRR SESSION DOSAGE GIVEN: 2.02
MLH ARIA ZRC TREATMENT DATES: NORMAL

## 2023-10-04 PROCEDURE — 77412 RADIATION TX DELIVERY LVL 3: CPT | Performed by: RADIOLOGY

## 2023-10-04 PROCEDURE — 77387 GUIDANCE FOR RADJ TX DLVR: CPT | Performed by: RADIOLOGY

## 2023-10-05 ENCOUNTER — HOSPITAL ENCOUNTER (OUTPATIENT)
Dept: RADIATION ONCOLOGY | Facility: HOSPITAL | Age: 73
Setting detail: RADIATION/ONCOLOGY SERIES
Discharge: HOME | End: 2023-10-05
Attending: RADIOLOGY
Payer: MEDICARE

## 2023-10-05 LAB
ARIA ZRC COURSE ID: NORMAL
ARIA ZRC COURSE INTENT: NORMAL
ARIA ZRC COURSE START DATE: NORMAL
ARIA ZRC TREATMENT ELAPSED DAYS: NORMAL
ARIA ZRP FRACTIONS TREATED TO DATE: NORMAL
ARIA ZRP FRACTIONS TREATED TO DATE: NORMAL
ARIA ZRP PLAN ID: NORMAL
ARIA ZRP PLAN ID: NORMAL
ARIA ZRP PRESCRIBED DOSE CGY: 5000
ARIA ZRP PRESCRIBED DOSE CGY: 5000
ARIA ZRP PRESCRIBED DOSE PER FRACTION: 2
ARIA ZRP PRESCRIBED DOSE PER FRACTION: 2
ARIA ZRR DOSAGE GIVEN TO DATE: 12
ARIA ZRR DOSAGE GIVEN TO DATE: 12.12
ARIA ZRR REFERENCE POINT ID: NORMAL
ARIA ZRR SESSION DOSAGE GIVEN: 2
ARIA ZRR SESSION DOSAGE GIVEN: 2.02
MLH ARIA ZRC TREATMENT DATES: NORMAL

## 2023-10-05 PROCEDURE — 77412 RADIATION TX DELIVERY LVL 3: CPT | Performed by: RADIOLOGY

## 2023-10-05 PROCEDURE — 77387 GUIDANCE FOR RADJ TX DLVR: CPT | Performed by: RADIOLOGY

## 2023-10-06 ENCOUNTER — HOSPITAL ENCOUNTER (OUTPATIENT)
Dept: RADIATION ONCOLOGY | Facility: HOSPITAL | Age: 73
Setting detail: RADIATION/ONCOLOGY SERIES
Discharge: HOME | End: 2023-10-06
Attending: RADIOLOGY
Payer: MEDICARE

## 2023-10-06 LAB
ARIA ZRC COURSE ID: NORMAL
ARIA ZRC COURSE INTENT: NORMAL
ARIA ZRC COURSE START DATE: NORMAL
ARIA ZRC TREATMENT ELAPSED DAYS: NORMAL
ARIA ZRP FRACTIONS TREATED TO DATE: NORMAL
ARIA ZRP FRACTIONS TREATED TO DATE: NORMAL
ARIA ZRP PLAN ID: NORMAL
ARIA ZRP PLAN ID: NORMAL
ARIA ZRP PRESCRIBED DOSE CGY: 5000
ARIA ZRP PRESCRIBED DOSE CGY: 5000
ARIA ZRP PRESCRIBED DOSE PER FRACTION: 2
ARIA ZRP PRESCRIBED DOSE PER FRACTION: 2
ARIA ZRR DOSAGE GIVEN TO DATE: 14
ARIA ZRR DOSAGE GIVEN TO DATE: 14.14
ARIA ZRR REFERENCE POINT ID: NORMAL
ARIA ZRR SESSION DOSAGE GIVEN: 2
ARIA ZRR SESSION DOSAGE GIVEN: 2.02
MLH ARIA ZRC TREATMENT DATES: NORMAL

## 2023-10-06 PROCEDURE — 77412 RADIATION TX DELIVERY LVL 3: CPT | Performed by: RADIOLOGY

## 2023-10-06 PROCEDURE — 77336 RADIATION PHYSICS CONSULT: CPT | Performed by: RADIOLOGY

## 2023-10-06 PROCEDURE — 77387 GUIDANCE FOR RADJ TX DLVR: CPT | Performed by: RADIOLOGY

## 2023-10-09 ENCOUNTER — RAD ONC OTV (OUTPATIENT)
Dept: RADIATION ONCOLOGY | Facility: HOSPITAL | Age: 73
End: 2023-10-09
Payer: MEDICARE

## 2023-10-09 ENCOUNTER — HOSPITAL ENCOUNTER (OUTPATIENT)
Dept: RADIATION ONCOLOGY | Facility: HOSPITAL | Age: 73
Setting detail: RADIATION/ONCOLOGY SERIES
Discharge: HOME | End: 2023-10-09
Attending: RADIOLOGY
Payer: MEDICARE

## 2023-10-09 VITALS — SYSTOLIC BLOOD PRESSURE: 153 MMHG | DIASTOLIC BLOOD PRESSURE: 71 MMHG | HEART RATE: 51 BPM

## 2023-10-09 DIAGNOSIS — C50.021 MALIGNANT NEOPLASM INVOLVING BOTH NIPPLE AND AREOLA OF RIGHT BREAST IN MALE, ESTROGEN RECEPTOR POSITIVE (CMS/HCC): Primary | ICD-10-CM

## 2023-10-09 DIAGNOSIS — Z17.0 MALIGNANT NEOPLASM INVOLVING BOTH NIPPLE AND AREOLA OF RIGHT BREAST IN MALE, ESTROGEN RECEPTOR POSITIVE (CMS/HCC): Primary | ICD-10-CM

## 2023-10-09 LAB
ARIA ZRC COURSE ID: NORMAL
ARIA ZRC COURSE INTENT: NORMAL
ARIA ZRC COURSE START DATE: NORMAL
ARIA ZRC TREATMENT ELAPSED DAYS: NORMAL
ARIA ZRP FRACTIONS TREATED TO DATE: NORMAL
ARIA ZRP FRACTIONS TREATED TO DATE: NORMAL
ARIA ZRP PLAN ID: NORMAL
ARIA ZRP PLAN ID: NORMAL
ARIA ZRP PRESCRIBED DOSE CGY: 5000
ARIA ZRP PRESCRIBED DOSE CGY: 5000
ARIA ZRP PRESCRIBED DOSE PER FRACTION: 2
ARIA ZRP PRESCRIBED DOSE PER FRACTION: 2
ARIA ZRR DOSAGE GIVEN TO DATE: 16
ARIA ZRR DOSAGE GIVEN TO DATE: 16.16
ARIA ZRR REFERENCE POINT ID: NORMAL
ARIA ZRR SESSION DOSAGE GIVEN: 2
ARIA ZRR SESSION DOSAGE GIVEN: 2.02
MLH ARIA ZRC TREATMENT DATES: NORMAL

## 2023-10-09 PROCEDURE — 77412 RADIATION TX DELIVERY LVL 3: CPT | Performed by: RADIOLOGY

## 2023-10-09 PROCEDURE — 77387 GUIDANCE FOR RADJ TX DLVR: CPT | Performed by: RADIOLOGY

## 2023-10-09 RX ORDER — BETAMETHASONE DIPROPIONATE 0.5 MG/G
CREAM TOPICAL 2 TIMES DAILY
Qty: 45 G | Refills: 1 | Status: SHIPPED | OUTPATIENT
Start: 2023-10-09 | End: 2023-10-20 | Stop reason: ENTERED-IN-ERROR

## 2023-10-09 ASSESSMENT — ENCOUNTER SYMPTOMS
COLOR CHANGE: 1
MUSCULOSKELETAL NEGATIVE: 1
PSYCHIATRIC NEGATIVE: 1
FATIGUE: 1

## 2023-10-09 ASSESSMENT — PAIN SCALES - GENERAL: PAINLEVEL: 0-NO PAIN

## 2023-10-09 NOTE — LETTER
October 9, 2023                                                          Patient: Radha Rosas   YOB: 1950   Date of Visit: 10/9/2023       Dear Dr. Collins:    The patient is seen at the [facility] RADIATION THERAPY today. Attached is my assessment and plan of care.  Thank you for the opportunity to share in Radha Rosas's care.       Sincerely,        Gregory J. Ochsner, MD      CC: No Recipients

## 2023-10-09 NOTE — PROGRESS NOTES
Subjective      Patient ID: Radha Rosas is a 73 y.o. male.  1950   Diagnosis Plan   1. 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    HPI patient notes mild fatigue and erythema right chest wall region.    The following have been reviewed and updated as appropriate in this visit:        Review of Systems   Constitutional: Positive for fatigue.   Musculoskeletal: Negative.    Skin: Positive for color change (increased erythema to Chest wall with small blister/ Denies itching. Pt is using aquaphor/ will have doctor order topical steroid.  Re-inforced skin care education).   Psychiatric/Behavioral: Negative.    Continues on Tamoxifen.      Objective     Vitals:    10/09/23 1035   BP: (!) 153/71   Patient Position: Sitting   Pulse: (!) 51     There is no height or weight on file to calculate BMI.    Physical Exam patient in wheelchair no apparent distress.  Vital signs stable.  Healed right-sided mastectomy incision.  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  No desquamation noted.  No abnormal masses or adenopathy noted.  Performance status 70.    Assessment/Plan Prescribed Diprolene cream to use with Aquaphor for radiation dermatitis.  Continue treatment as previously outlined.  Diagnoses and Orders Associated With This Visit:  There are no diagnoses linked to this encounter.

## 2023-10-10 ENCOUNTER — HOSPITAL ENCOUNTER (OUTPATIENT)
Dept: RADIATION ONCOLOGY | Facility: HOSPITAL | Age: 73
Setting detail: RADIATION/ONCOLOGY SERIES
Discharge: HOME | End: 2023-10-10
Attending: RADIOLOGY
Payer: MEDICARE

## 2023-10-10 LAB
ARIA ZRC COURSE ID: NORMAL
ARIA ZRC COURSE INTENT: NORMAL
ARIA ZRC COURSE START DATE: NORMAL
ARIA ZRC TREATMENT ELAPSED DAYS: NORMAL
ARIA ZRP FRACTIONS TREATED TO DATE: NORMAL
ARIA ZRP FRACTIONS TREATED TO DATE: NORMAL
ARIA ZRP PLAN ID: NORMAL
ARIA ZRP PLAN ID: NORMAL
ARIA ZRP PRESCRIBED DOSE CGY: 5000
ARIA ZRP PRESCRIBED DOSE CGY: 5000
ARIA ZRP PRESCRIBED DOSE PER FRACTION: 2
ARIA ZRP PRESCRIBED DOSE PER FRACTION: 2
ARIA ZRR DOSAGE GIVEN TO DATE: 18
ARIA ZRR DOSAGE GIVEN TO DATE: 18.18
ARIA ZRR REFERENCE POINT ID: NORMAL
ARIA ZRR SESSION DOSAGE GIVEN: 2
ARIA ZRR SESSION DOSAGE GIVEN: 2.02
MLH ARIA ZRC TREATMENT DATES: NORMAL

## 2023-10-10 PROCEDURE — 77387 GUIDANCE FOR RADJ TX DLVR: CPT | Performed by: RADIOLOGY

## 2023-10-10 PROCEDURE — 77412 RADIATION TX DELIVERY LVL 3: CPT | Performed by: RADIOLOGY

## 2023-10-11 ENCOUNTER — HOSPITAL ENCOUNTER (OUTPATIENT)
Dept: RADIATION ONCOLOGY | Facility: HOSPITAL | Age: 73
Setting detail: RADIATION/ONCOLOGY SERIES
Discharge: HOME | End: 2023-10-11
Attending: RADIOLOGY
Payer: MEDICARE

## 2023-10-11 LAB
ARIA ZRC COURSE ID: NORMAL
ARIA ZRC COURSE INTENT: NORMAL
ARIA ZRC COURSE START DATE: NORMAL
ARIA ZRC TREATMENT ELAPSED DAYS: NORMAL
ARIA ZRP FRACTIONS TREATED TO DATE: NORMAL
ARIA ZRP FRACTIONS TREATED TO DATE: NORMAL
ARIA ZRP PLAN ID: NORMAL
ARIA ZRP PLAN ID: NORMAL
ARIA ZRP PRESCRIBED DOSE CGY: 5000
ARIA ZRP PRESCRIBED DOSE CGY: 5000
ARIA ZRP PRESCRIBED DOSE PER FRACTION: 2
ARIA ZRP PRESCRIBED DOSE PER FRACTION: 2
ARIA ZRR DOSAGE GIVEN TO DATE: 20
ARIA ZRR DOSAGE GIVEN TO DATE: 20.2
ARIA ZRR REFERENCE POINT ID: NORMAL
ARIA ZRR SESSION DOSAGE GIVEN: 2
ARIA ZRR SESSION DOSAGE GIVEN: 2.02
MLH ARIA ZRC TREATMENT DATES: NORMAL

## 2023-10-11 PROCEDURE — 77387 GUIDANCE FOR RADJ TX DLVR: CPT | Performed by: RADIOLOGY

## 2023-10-11 PROCEDURE — 77412 RADIATION TX DELIVERY LVL 3: CPT | Performed by: RADIOLOGY

## 2023-10-12 ENCOUNTER — HOSPITAL ENCOUNTER (OUTPATIENT)
Dept: RADIATION ONCOLOGY | Facility: HOSPITAL | Age: 73
Setting detail: RADIATION/ONCOLOGY SERIES
Discharge: HOME | End: 2023-10-12
Attending: RADIOLOGY
Payer: MEDICARE

## 2023-10-12 LAB
ARIA ZRC COURSE ID: NORMAL
ARIA ZRC COURSE INTENT: NORMAL
ARIA ZRC COURSE START DATE: NORMAL
ARIA ZRC TREATMENT ELAPSED DAYS: NORMAL
ARIA ZRP FRACTIONS TREATED TO DATE: NORMAL
ARIA ZRP FRACTIONS TREATED TO DATE: NORMAL
ARIA ZRP PLAN ID: NORMAL
ARIA ZRP PLAN ID: NORMAL
ARIA ZRP PRESCRIBED DOSE CGY: 5000
ARIA ZRP PRESCRIBED DOSE CGY: 5000
ARIA ZRP PRESCRIBED DOSE PER FRACTION: 2
ARIA ZRP PRESCRIBED DOSE PER FRACTION: 2
ARIA ZRR DOSAGE GIVEN TO DATE: 22
ARIA ZRR DOSAGE GIVEN TO DATE: 22.22
ARIA ZRR REFERENCE POINT ID: NORMAL
ARIA ZRR SESSION DOSAGE GIVEN: 2
ARIA ZRR SESSION DOSAGE GIVEN: 2.02
MLH ARIA ZRC TREATMENT DATES: NORMAL

## 2023-10-12 PROCEDURE — 77412 RADIATION TX DELIVERY LVL 3: CPT | Performed by: RADIOLOGY

## 2023-10-12 PROCEDURE — 77336 RADIATION PHYSICS CONSULT: CPT | Performed by: RADIOLOGY

## 2023-10-12 PROCEDURE — 77387 GUIDANCE FOR RADJ TX DLVR: CPT | Performed by: RADIOLOGY

## 2023-10-13 ENCOUNTER — HOSPITAL ENCOUNTER (OUTPATIENT)
Dept: RADIATION ONCOLOGY | Facility: HOSPITAL | Age: 73
Setting detail: RADIATION/ONCOLOGY SERIES
Discharge: HOME | End: 2023-10-13
Attending: RADIOLOGY
Payer: MEDICARE

## 2023-10-13 LAB
ARIA ZRC COURSE ID: NORMAL
ARIA ZRC COURSE INTENT: NORMAL
ARIA ZRC COURSE START DATE: NORMAL
ARIA ZRC TREATMENT ELAPSED DAYS: NORMAL
ARIA ZRP FRACTIONS TREATED TO DATE: NORMAL
ARIA ZRP FRACTIONS TREATED TO DATE: NORMAL
ARIA ZRP PLAN ID: NORMAL
ARIA ZRP PLAN ID: NORMAL
ARIA ZRP PRESCRIBED DOSE CGY: 5000
ARIA ZRP PRESCRIBED DOSE CGY: 5000
ARIA ZRP PRESCRIBED DOSE PER FRACTION: 2
ARIA ZRP PRESCRIBED DOSE PER FRACTION: 2
ARIA ZRR DOSAGE GIVEN TO DATE: 24
ARIA ZRR DOSAGE GIVEN TO DATE: 24.24
ARIA ZRR REFERENCE POINT ID: NORMAL
ARIA ZRR SESSION DOSAGE GIVEN: 2
ARIA ZRR SESSION DOSAGE GIVEN: 2.02
MLH ARIA ZRC TREATMENT DATES: NORMAL

## 2023-10-13 PROCEDURE — 77387 GUIDANCE FOR RADJ TX DLVR: CPT | Performed by: RADIOLOGY

## 2023-10-13 PROCEDURE — 77412 RADIATION TX DELIVERY LVL 3: CPT | Performed by: RADIOLOGY

## 2023-10-16 ENCOUNTER — HOSPITAL ENCOUNTER (OUTPATIENT)
Dept: RADIATION ONCOLOGY | Facility: HOSPITAL | Age: 73
Setting detail: RADIATION/ONCOLOGY SERIES
Discharge: HOME | End: 2023-10-16
Attending: RADIOLOGY
Payer: MEDICARE

## 2023-10-16 ENCOUNTER — RAD ONC OTV (OUTPATIENT)
Dept: RADIATION ONCOLOGY | Facility: HOSPITAL | Age: 73
End: 2023-10-16
Payer: MEDICARE

## 2023-10-16 VITALS
HEART RATE: 54 BPM | OXYGEN SATURATION: 100 % | DIASTOLIC BLOOD PRESSURE: 64 MMHG | WEIGHT: 183 LBS | BODY MASS INDEX: 30.45 KG/M2 | RESPIRATION RATE: 17 BRPM | SYSTOLIC BLOOD PRESSURE: 140 MMHG

## 2023-10-16 DIAGNOSIS — C50.021 MALIGNANT NEOPLASM INVOLVING BOTH NIPPLE AND AREOLA OF RIGHT BREAST IN MALE, ESTROGEN RECEPTOR POSITIVE (CMS/HCC): Primary | ICD-10-CM

## 2023-10-16 DIAGNOSIS — Z17.0 MALIGNANT NEOPLASM INVOLVING BOTH NIPPLE AND AREOLA OF RIGHT BREAST IN MALE, ESTROGEN RECEPTOR POSITIVE (CMS/HCC): Primary | ICD-10-CM

## 2023-10-16 LAB
ARIA ZRC COURSE ID: NORMAL
ARIA ZRC COURSE INTENT: NORMAL
ARIA ZRC COURSE START DATE: NORMAL
ARIA ZRC TREATMENT ELAPSED DAYS: NORMAL
ARIA ZRP FRACTIONS TREATED TO DATE: NORMAL
ARIA ZRP FRACTIONS TREATED TO DATE: NORMAL
ARIA ZRP PLAN ID: NORMAL
ARIA ZRP PLAN ID: NORMAL
ARIA ZRP PRESCRIBED DOSE CGY: 5000
ARIA ZRP PRESCRIBED DOSE CGY: 5000
ARIA ZRP PRESCRIBED DOSE PER FRACTION: 2
ARIA ZRP PRESCRIBED DOSE PER FRACTION: 2
ARIA ZRR DOSAGE GIVEN TO DATE: 26
ARIA ZRR DOSAGE GIVEN TO DATE: 26.26
ARIA ZRR REFERENCE POINT ID: NORMAL
ARIA ZRR SESSION DOSAGE GIVEN: 2
ARIA ZRR SESSION DOSAGE GIVEN: 2.02
MLH ARIA ZRC TREATMENT DATES: NORMAL

## 2023-10-16 PROCEDURE — 77412 RADIATION TX DELIVERY LVL 3: CPT | Performed by: RADIOLOGY

## 2023-10-16 PROCEDURE — 77387 GUIDANCE FOR RADJ TX DLVR: CPT | Performed by: RADIOLOGY

## 2023-10-16 ASSESSMENT — ENCOUNTER SYMPTOMS
FATIGUE: 1
CARDIOVASCULAR NEGATIVE: 1
COLOR CHANGE: 1
PSYCHIATRIC NEGATIVE: 1
NEUROLOGICAL NEGATIVE: 1
GASTROINTESTINAL NEGATIVE: 1
ENDOCRINE NEGATIVE: 1
EYES NEGATIVE: 1
ALLERGIC/IMMUNOLOGIC NEGATIVE: 1
RESPIRATORY NEGATIVE: 1
MUSCULOSKELETAL NEGATIVE: 1
HEMATOLOGIC/LYMPHATIC NEGATIVE: 1

## 2023-10-16 ASSESSMENT — PAIN SCALES - GENERAL: PAINLEVEL: 0-NO PAIN

## 2023-10-16 NOTE — LETTER
October 16, 2023                                                          Patient: Radha Rosas   YOB: 1950   Date of Visit: 10/16/2023       Dear Dr. Collins:    The patient is seen at the [facility] RADIATION THERAPY today. Attached is my assessment and plan of care.  Thank you for the opportunity to share in Radha Rosas's care.       Sincerely,        Gregory J. Ochsner, MD      CC: No Recipients

## 2023-10-16 NOTE — PROGRESS NOTES
Subjective      Patient ID: Radha Rosas is a 73 y.o. male.  1950   Diagnosis Plan   1. 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    HPI patient notes fatigue otherwise without complaints.  Using steroid cream and Aquaphor ointment for radiation dermatitis.    The following have been reviewed and updated as appropriate in this visit:        Review of Systems   Constitutional: Positive for fatigue.   HENT: Negative.    Eyes: Negative.    Respiratory: Negative.    Cardiovascular: Negative.    Gastrointestinal: Negative.    Endocrine: Negative.    Genitourinary: Negative.    Musculoskeletal: Negative.    Skin: Positive for color change (skin over treated area is a little pink, patient using bethametasone ointment BID).   Allergic/Immunologic: Negative.    Neurological: Negative.    Hematological: Negative.    Psychiatric/Behavioral: Negative.        Objective     There were no vitals filed for this visit.  There is no height or weight on file to calculate BMI.    Physical Exam moderate skin erythema right chest wall with healed incision.  No desquamation noted.  No adenopathy appreciated.  Performance status 70.  Patient sitting in wheelchair no apparent distress    Assessment/Plan Continue radiation therapy postop to right chest wall as previously outlined.  We will likely discontinue bolus by next week.  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  Diagnoses and Orders Associated With This Visit:  There are no diagnoses linked to this encounter.

## 2023-10-17 ENCOUNTER — HOSPITAL ENCOUNTER (OUTPATIENT)
Dept: RADIATION ONCOLOGY | Facility: HOSPITAL | Age: 73
Setting detail: RADIATION/ONCOLOGY SERIES
Discharge: HOME | End: 2023-10-17
Attending: RADIOLOGY
Payer: MEDICARE

## 2023-10-17 ENCOUNTER — PREP FOR CASE (OUTPATIENT)
Dept: SURGERY | Facility: CLINIC | Age: 73
End: 2023-10-17
Payer: MEDICARE

## 2023-10-17 DIAGNOSIS — C50.021 MALIGNANT NEOPLASM INVOLVING BOTH NIPPLE AND AREOLA OF RIGHT BREAST IN MALE, ESTROGEN RECEPTOR POSITIVE (CMS/HCC): Primary | ICD-10-CM

## 2023-10-17 DIAGNOSIS — Z17.0 MALIGNANT NEOPLASM INVOLVING BOTH NIPPLE AND AREOLA OF RIGHT BREAST IN MALE, ESTROGEN RECEPTOR POSITIVE (CMS/HCC): Primary | ICD-10-CM

## 2023-10-17 LAB
ARIA ZRC COURSE ID: NORMAL
ARIA ZRC COURSE INTENT: NORMAL
ARIA ZRC COURSE START DATE: NORMAL
ARIA ZRC TREATMENT ELAPSED DAYS: NORMAL
ARIA ZRP FRACTIONS TREATED TO DATE: NORMAL
ARIA ZRP FRACTIONS TREATED TO DATE: NORMAL
ARIA ZRP PLAN ID: NORMAL
ARIA ZRP PLAN ID: NORMAL
ARIA ZRP PRESCRIBED DOSE CGY: 5000
ARIA ZRP PRESCRIBED DOSE CGY: 5000
ARIA ZRP PRESCRIBED DOSE PER FRACTION: 2
ARIA ZRP PRESCRIBED DOSE PER FRACTION: 2
ARIA ZRR DOSAGE GIVEN TO DATE: 28
ARIA ZRR DOSAGE GIVEN TO DATE: 28.28
ARIA ZRR REFERENCE POINT ID: NORMAL
ARIA ZRR SESSION DOSAGE GIVEN: 2
ARIA ZRR SESSION DOSAGE GIVEN: 2.02
MLH ARIA ZRC TREATMENT DATES: NORMAL

## 2023-10-17 PROCEDURE — 77412 RADIATION TX DELIVERY LVL 3: CPT | Performed by: RADIOLOGY

## 2023-10-17 PROCEDURE — 77387 GUIDANCE FOR RADJ TX DLVR: CPT | Performed by: RADIOLOGY

## 2023-10-17 RX ORDER — ACETAMINOPHEN 325 MG/1
975 TABLET ORAL
Status: CANCELLED | OUTPATIENT
Start: 2023-10-17

## 2023-10-17 RX ORDER — SODIUM CHLORIDE 9 MG/ML
INJECTION, SOLUTION INTRAVENOUS CONTINUOUS
Status: CANCELLED | OUTPATIENT
Start: 2023-10-17 | End: 2023-10-18

## 2023-10-17 NOTE — H&P
Subjective:  Cam presents for follow-up of right breast cancer.  He was initially noted to have prostate cancer, and a week or 2 prior to his prostate surgery his wife had noticed a change in his right nipple.  He ended up having a core needle biopsy on August 29, 2022 which showed invasive ductal carcinoma, grade 3, ER positive, OK positive, HER2 positive.  Genetic testing at that time revealed pathogenic mutations in BRCA1 and OK.  He proceeded with neoadjuvant chemotherapy with TCHP, but had significant side effects with atrial fibrillation and heart failure as well as stomach and duodenal ulcers.  He stopped the chemotherapy, and was placed on tamoxifen.    He proceeded with a mastectomy for treatment, he now has decided that he has completed chemotherapy and desires port removal.                 Oncology History:                          Cancer Staging   Malignant neoplasm of right male breast (CMS/[facility])  Staging form: Breast, AJCC 8th Edition  - Clinical stage from 3/20/2023: Stage IA (cT1c, cN0, cM0, G3, ER+, OK+, HER2+) - Signed by Barb Osuna MD on 3/20/2023     Prostate cancer (CMS/[facility])  Staging form: Prostate, AJCC 8th Edition  - Pathologic stage from 7/27/2022: Stage Unknown (pT3b, pNX, cM0, PSA: 6, Grade Group: 3) - Signed by Ochsner, Gregory J, MD on 3/8/2023                                           Patient's Oncology History documentation       Oncology History   Malignant neoplasm of right male breast (CMS/HCC)   8/29/2022 Biopsy       1.  Right breast mass:     Invasive ductal carcinoma, [place] grade 3 (3+ 3+2).   Invasive carcinoma is 13 mm in maximum dimension in core biopsy.    ER+ 90%; OK+ 50%; Smi7umh+3; ND1939%      9/30/2022 -  Chemotherapy     9/30/2022 initiated first cycle TCP H at [facility].  Questionable reaction during the anti-HER2/jhonatan therapy with Herceptin.  Taxotere and carboplatin given on 10/3/2022.  Patient hospitalized after first cycle.     "  10/19/2022 Initial Diagnosis     Malignant neoplasm of right male breast (CMS/HCC)      11/15/2022 -  Hormone Therapy     Tamoxifen 20 mg daily while undergoes evaluation of cardiac issues      3/20/2023 Cancer Staged     Staging form: Breast, AJCC 8th Edition  - Clinical stage from 3/20/2023: Stage IA (cT1c, cN0, cM0, G3, ER+, UT+, HER2+)      Prostate cancer (CMS/HCC)   7/27/2022 Cancer Staged     Staging form: Prostate, AJCC 8th Edition  - Pathologic stage from 7/27/2022: Stage Unknown (pT3b, pNX, cM0, PSA: 6, Grade Group: 3)      3/21/2023 - 6/20/2023 Chemotherapy     LEUPROLIDE (LUPRON) 22.5 MG EVERY 3 MONTHS  Plan Provider: Paz Cleveland MD      3/21/2023 -  Chemotherapy     MEDIPORT FLUSH (SINGLE LUMEN) - PATIENT ON TREATMENT  Plan Provider: Paz Cleveland MD      3/21/2023 - 3/21/2023 Chemotherapy     LEUPROLIDE (LUPRON) 22.5 MG EVERY 3 MONTHS  Plan Provider: Paz Cleveland MD                  Review of Systems         Physical Exam:     Vitals:   Visit Vitals  /82   Pulse 61   Temp 36.3 °C (97.4 °F)   Ht 1.651 m (5' 5\")   Wt 85.5 kg (188 lb 6.4 oz)   SpO2 99%   BMI 31.35 kg/m²      Body mass index is 31.35 kg/m².     Physical Exam  Constitutional:       Appearance: Normal appearance.   HENT:      Head: Normocephalic and atraumatic.   Cardiovascular:      Rate and Rhythm: Normal rate and regular rhythm.      Heart sounds: Normal heart sounds.   Pulmonary:      Effort: Pulmonary effort is normal.      Breath sounds: Normal breath sounds.   Chest:       Abdominal:      General: Abdomen is flat.      Palpations: Abdomen is soft.   Musculoskeletal:         General: Normal range of motion.   Lymphadenopathy:      Cervical: No cervical adenopathy.   Skin:     General: Skin is warm and dry.   Neurological:      General: No focal deficit present.      Mental Status: He is alert and oriented to person, place, and time.   Psychiatric:         Mood and Affect: Mood normal.         Behavior: " Behavior normal.            Breast Imaging: I have personally reviewed the reports and images as follows.  Ultrasound was reviewed and shows a small increase in the size of the tumor as well as 3 additional lesions around the primary tumor  Impression:  Malignant neoplasm of nipple of right male breast  Recommendation and Plan:   Cam presents for Xvsnqf-q-Aybf removal.  He has had a mastectomy and healed well from that procedure.  The procedure was explained to him as well as risks of bleeding, infection, and scarring.  All of the questions were answered.  The patient is in agreement with the treatment plan.        No follow-ups on file.

## 2023-10-17 NOTE — H&P (VIEW-ONLY)
Subjective:  Cam presents for follow-up of right breast cancer.  He was initially noted to have prostate cancer, and a week or 2 prior to his prostate surgery his wife had noticed a change in his right nipple.  He ended up having a core needle biopsy on August 29, 2022 which showed invasive ductal carcinoma, grade 3, ER positive, PA positive, HER2 positive.  Genetic testing at that time revealed pathogenic mutations in BRCA1 and OK.  He proceeded with neoadjuvant chemotherapy with TCHP, but had significant side effects with atrial fibrillation and heart failure as well as stomach and duodenal ulcers.  He stopped the chemotherapy, and was placed on tamoxifen.    He proceeded with a mastectomy for treatment, he now has decided that he has completed chemotherapy and desires port removal.                 Oncology History:                          Cancer Staging   Malignant neoplasm of right male breast (CMS/[facility])  Staging form: Breast, AJCC 8th Edition  - Clinical stage from 3/20/2023: Stage IA (cT1c, cN0, cM0, G3, ER+, PA+, HER2+) - Signed by Barb Osuna MD on 3/20/2023     Prostate cancer (CMS/[facility])  Staging form: Prostate, AJCC 8th Edition  - Pathologic stage from 7/27/2022: Stage Unknown (pT3b, pNX, cM0, PSA: 6, Grade Group: 3) - Signed by Ochsner, Gregory J, MD on 3/8/2023                                           Patient's Oncology History documentation       Oncology History   Malignant neoplasm of right male breast (CMS/HCC)   8/29/2022 Biopsy       1.  Right breast mass:     Invasive ductal carcinoma, [place] grade 3 (3+ 3+2).   Invasive carcinoma is 13 mm in maximum dimension in core biopsy.    ER+ 90%; PA+ 50%; Phm9art+3; UL8093%      9/30/2022 -  Chemotherapy     9/30/2022 initiated first cycle TCP H at [facility].  Questionable reaction during the anti-HER2/jhonatan therapy with Herceptin.  Taxotere and carboplatin given on 10/3/2022.  Patient hospitalized after first cycle.     "  10/19/2022 Initial Diagnosis     Malignant neoplasm of right male breast (CMS/HCC)      11/15/2022 -  Hormone Therapy     Tamoxifen 20 mg daily while undergoes evaluation of cardiac issues      3/20/2023 Cancer Staged     Staging form: Breast, AJCC 8th Edition  - Clinical stage from 3/20/2023: Stage IA (cT1c, cN0, cM0, G3, ER+, [place]+, HER2+)      Prostate cancer (CMS/HCC)   7/27/2022 Cancer Staged     Staging form: Prostate, AJCC 8th Edition  - Pathologic stage from 7/27/2022: Stage Unknown (pT3b, pNX, cM0, PSA: 6, Grade Group: 3)      3/21/2023 - 6/20/2023 Chemotherapy     LEUPROLIDE (LUPRON) 22.5 MG EVERY 3 MONTHS  Plan Provider: Paz Cleveland MD      3/21/2023 -  Chemotherapy     MEDIPORT FLUSH (SINGLE LUMEN) - PATIENT ON TREATMENT  Plan Provider: Paz Cleveland MD      3/21/2023 - 3/21/2023 Chemotherapy     LEUPROLIDE (LUPRON) 22.5 MG EVERY 3 MONTHS  Plan Provider: Paz Cleveland MD                  Review of Systems         Physical Exam:     Vitals:   Visit Vitals  /82   Pulse 61   Temp 36.3 °C (97.4 °F)   Ht 1.651 m (5' 5\")   Wt 85.5 kg (188 lb 6.4 oz)   SpO2 99%   BMI 31.35 kg/m²      Body mass index is 31.35 kg/m².     Physical Exam  Constitutional:       Appearance: Normal appearance.   HENT:      Head: Normocephalic and atraumatic.   Cardiovascular:      Rate and Rhythm: Normal rate and regular rhythm.      Heart sounds: Normal heart sounds.   Pulmonary:      Effort: Pulmonary effort is normal.      Breath sounds: Normal breath sounds.   Chest:       Abdominal:      General: Abdomen is flat.      Palpations: Abdomen is soft.   Musculoskeletal:         General: Normal range of motion.   Lymphadenopathy:      Cervical: No cervical adenopathy.   Skin:     General: Skin is warm and dry.   Neurological:      General: No focal deficit present.      Mental Status: He is alert and oriented to person, place, and time.   Psychiatric:         Mood and Affect: Mood normal.         Behavior: " Behavior normal.            Breast Imaging: I have personally reviewed the reports and images as follows.  Ultrasound was reviewed and shows a small increase in the size of the tumor as well as 3 additional lesions around the primary tumor  Impression:  Malignant neoplasm of nipple of right male breast  Recommendation and Plan:   Cam presents for Yqycne-r-Vlqu removal.  He has had a mastectomy and healed well from that procedure.  The procedure was explained to him as well as risks of bleeding, infection, and scarring.  All of the questions were answered.  The patient is in agreement with the treatment plan.        No follow-ups on file.

## 2023-10-18 ENCOUNTER — HOSPITAL ENCOUNTER (OUTPATIENT)
Dept: RADIATION ONCOLOGY | Facility: HOSPITAL | Age: 73
Setting detail: RADIATION/ONCOLOGY SERIES
Discharge: HOME | End: 2023-10-18
Attending: RADIOLOGY
Payer: MEDICARE

## 2023-10-18 ENCOUNTER — DOCUMENTATION (OUTPATIENT)
Dept: RADIATION ONCOLOGY | Facility: HOSPITAL | Age: 73
End: 2023-10-18
Payer: MEDICARE

## 2023-10-18 LAB
ARIA ZRC COURSE ID: NORMAL
ARIA ZRC COURSE ID: NORMAL
ARIA ZRC COURSE INTENT: NORMAL
ARIA ZRC COURSE INTENT: NORMAL
ARIA ZRC COURSE START DATE: NORMAL
ARIA ZRC COURSE START DATE: NORMAL
ARIA ZRC TREATMENT ELAPSED DAYS: NORMAL
ARIA ZRC TREATMENT ELAPSED DAYS: NORMAL
ARIA ZRP FRACTIONS TREATED TO DATE: NORMAL
ARIA ZRP PLAN ID: NORMAL
ARIA ZRP PLAN NAME: NORMAL
ARIA ZRP PRESCRIBED DOSE CGY: 1980
ARIA ZRP PRESCRIBED DOSE CGY: 1980
ARIA ZRP PRESCRIBED DOSE CGY: 3000
ARIA ZRP PRESCRIBED DOSE CGY: 5000
ARIA ZRP PRESCRIBED DOSE CGY: 5000
ARIA ZRP PRESCRIBED DOSE CGY: 5040
ARIA ZRP PRESCRIBED DOSE CGY: 5040
ARIA ZRP PRESCRIBED DOSE PER FRACTION: 1.8
ARIA ZRP PRESCRIBED DOSE PER FRACTION: 2
ARIA ZRR DOSAGE GIVEN TO DATE: 0
ARIA ZRR DOSAGE GIVEN TO DATE: 30
ARIA ZRR DOSAGE GIVEN TO DATE: 30.3
ARIA ZRR REFERENCE POINT ID: NORMAL
ARIA ZRR SESSION DOSAGE GIVEN: 2
ARIA ZRR SESSION DOSAGE GIVEN: 2.02
MLH ARIA ZRC TREATMENT DATES: NORMAL
MLH ARIA ZRC TREATMENT DATES: NORMAL

## 2023-10-18 PROCEDURE — 77387 GUIDANCE FOR RADJ TX DLVR: CPT | Performed by: RADIOLOGY

## 2023-10-18 PROCEDURE — 77300 RADIATION THERAPY DOSE PLAN: CPT | Performed by: RADIOLOGY

## 2023-10-18 PROCEDURE — 77412 RADIATION TX DELIVERY LVL 3: CPT | Performed by: RADIOLOGY

## 2023-10-18 NOTE — PROGRESS NOTES
Malignant Neoplasm Of Right Male Breast Follow Up: Radiation/Oncology    LOV 10/16/23- 183 LSB  EOT 10/18/23    Dr Gale- Med/Onc LOV 9/5/23 Next Appt 12/7/23    [facility] L[facility] 9/29/23 (Pt to return for follow up 6 months- around 3/29/24)    Labs 9/29/23  Betamethasone cream

## 2023-10-19 ENCOUNTER — HOSPITAL ENCOUNTER (OUTPATIENT)
Dept: RADIATION ONCOLOGY | Facility: HOSPITAL | Age: 73
Setting detail: RADIATION/ONCOLOGY SERIES
Discharge: HOME | End: 2023-10-19
Attending: RADIOLOGY
Payer: MEDICARE

## 2023-10-19 LAB
ARIA ZRC COURSE ID: NORMAL
ARIA ZRC COURSE INTENT: NORMAL
ARIA ZRC COURSE START DATE: NORMAL
ARIA ZRC TREATMENT ELAPSED DAYS: NORMAL
ARIA ZRP FRACTIONS TREATED TO DATE: NORMAL
ARIA ZRP FRACTIONS TREATED TO DATE: NORMAL
ARIA ZRP PLAN ID: NORMAL
ARIA ZRP PLAN ID: NORMAL
ARIA ZRP PRESCRIBED DOSE CGY: 2000
ARIA ZRP PRESCRIBED DOSE CGY: 5000
ARIA ZRP PRESCRIBED DOSE PER FRACTION: 2
ARIA ZRP PRESCRIBED DOSE PER FRACTION: 2
ARIA ZRR DOSAGE GIVEN TO DATE: 32
ARIA ZRR DOSAGE GIVEN TO DATE: 32.32
ARIA ZRR REFERENCE POINT ID: NORMAL
ARIA ZRR SESSION DOSAGE GIVEN: 2
ARIA ZRR SESSION DOSAGE GIVEN: 2.02
MLH ARIA ZRC TREATMENT DATES: NORMAL

## 2023-10-19 PROCEDURE — 77336 RADIATION PHYSICS CONSULT: CPT | Performed by: RADIOLOGY

## 2023-10-19 PROCEDURE — 77387 GUIDANCE FOR RADJ TX DLVR: CPT | Performed by: RADIOLOGY

## 2023-10-19 PROCEDURE — 77412 RADIATION TX DELIVERY LVL 3: CPT | Performed by: RADIOLOGY

## 2023-10-20 ENCOUNTER — HOSPITAL ENCOUNTER (OUTPATIENT)
Dept: RADIATION ONCOLOGY | Facility: HOSPITAL | Age: 73
Setting detail: RADIATION/ONCOLOGY SERIES
Discharge: HOME | End: 2023-10-20
Attending: RADIOLOGY
Payer: MEDICARE

## 2023-10-20 ENCOUNTER — APPOINTMENT (OUTPATIENT)
Dept: PREADMISSION TESTING | Facility: HOSPITAL | Age: 73
End: 2023-10-20
Payer: MEDICARE

## 2023-10-20 VITALS — HEIGHT: 65 IN | BODY MASS INDEX: 30.49 KG/M2 | WEIGHT: 183 LBS

## 2023-10-20 LAB
ARIA ZRC COURSE ID: NORMAL
ARIA ZRC COURSE INTENT: NORMAL
ARIA ZRC COURSE START DATE: NORMAL
ARIA ZRC TREATMENT ELAPSED DAYS: NORMAL
ARIA ZRP FRACTIONS TREATED TO DATE: NORMAL
ARIA ZRP FRACTIONS TREATED TO DATE: NORMAL
ARIA ZRP PLAN ID: NORMAL
ARIA ZRP PLAN ID: NORMAL
ARIA ZRP PRESCRIBED DOSE CGY: 2000
ARIA ZRP PRESCRIBED DOSE CGY: 5000
ARIA ZRP PRESCRIBED DOSE PER FRACTION: 2
ARIA ZRP PRESCRIBED DOSE PER FRACTION: 2
ARIA ZRR DOSAGE GIVEN TO DATE: 34
ARIA ZRR DOSAGE GIVEN TO DATE: 34.34
ARIA ZRR REFERENCE POINT ID: NORMAL
ARIA ZRR SESSION DOSAGE GIVEN: 2
ARIA ZRR SESSION DOSAGE GIVEN: 2.02
MLH ARIA ZRC TREATMENT DATES: NORMAL

## 2023-10-20 PROCEDURE — 77412 RADIATION TX DELIVERY LVL 3: CPT | Performed by: RADIOLOGY

## 2023-10-20 PROCEDURE — 77387 GUIDANCE FOR RADJ TX DLVR: CPT | Performed by: RADIOLOGY

## 2023-10-20 NOTE — PRE-PROCEDURE INSTRUCTIONS
1. We will call you between 3 pm and 7 pm on 11/8/23 to inform you of arrival time for your procedure. If you do not hear by 6PM, please call [phone] for arrival time.     2. Please arrive through the Main Entrance of the [facility] (across from the parking garage) and report to the Surgery Registration Desk on the day of your procedure.    3. Please follow the following fasting guidelines:     No solid food After midnight on 11/8/23  Unlimited clear liquids, meaning water or PLAIN black coffee WITHOUT any milk, cream, sugar, or sweetener are permitted up to TWO HOURS prior to arrival at the hospital.    4. Please take ONLY the following medications with a sip of water on the morning of your procedure: (populate names and/or NONE)  Amiodarone  Metoprolol  Pantoprazole  Tylenol if need  Stop Xarelto per cardiology  Check with oncologist regarding Tamoxifen      5. Other Instructions: You may brush your teeth the morning of the procedure. Rinse and spit, do not swallow.  Bring a list of your medications with dosages.  Use surgical wash as directed.     6. If you develop a cold, cough, fever, rash, or other symptom prior to the day of the procedure, please report it to your physician immediately.    7. If you need to cancel the procedure for any reason, please contact your physician.    8. Make arrangements to have safe transportation home accompanied by a responsible adult. If you have not arranged safe transportation home, your surgery will be cancelled. Safe transportation may include private vehicle, ride-share service, taxi and public transportation when accompanied by a responsible adult who will assist you home. A responsible adult is someone known to you and does not include the taxi, ride-share or public transit drive transporting you.    9.  If it is medically necessary for you to have a longer stay, you will be informed as soon as the decision is made.    10. Only bring essential items to the hospital.  Do  not wear or bring anything of value to the hospital including jewelry of any kind, money, or wallet. Do not wear make-up or contact lenses.  DO NOT BRING MEDICATIONS FROM HOME unless instructed to do so. DO bring your hearing aids, glasses, and a case    11. No lotion, creams, powders, or oils on skin the morning of procedure     12. Dress in comfortable clothes.    13.  If instructed, please bring a copy of your Advanced Directive (Living Will/Durable Power of ) on the day of your procedure.     14. Ensuring your safety at all times is a very important part of our [facility] Culture of Safety. After having surgery and sedation, you are at risk for falling and balance issues. Although you may feel awake, the effects of the medication can last up to 24 hours after anesthesia. If you need to use the bathroom during your recovery period, nursing staff will escort you there and stay with you to ensure your safety.    15. Refrain from drinking alcohol and smoking cigarettes for 24 hours prior to surgery.    16. Shower with antibacterial soap (Dial) the night before and morning of your procedure.  If your procedure indicates the need for CHG antiseptic wash (Bactoshield or Hibiclens), please use this instead and follow instructions as discussed at the time of your Pre-Admission Testing phone interview or visit.    Above instructions reviewed with patient and patient acknowledges understanding.    Form explained by: Kalyn Jama RN

## 2023-10-23 ENCOUNTER — HOSPITAL ENCOUNTER (OUTPATIENT)
Dept: RADIATION ONCOLOGY | Facility: HOSPITAL | Age: 73
Setting detail: RADIATION/ONCOLOGY SERIES
Discharge: HOME | End: 2023-10-23
Attending: RADIOLOGY
Payer: MEDICARE

## 2023-10-23 ENCOUNTER — RAD ONC OTV (OUTPATIENT)
Dept: RADIATION ONCOLOGY | Facility: HOSPITAL | Age: 73
End: 2023-10-23
Payer: MEDICARE

## 2023-10-23 VITALS
DIASTOLIC BLOOD PRESSURE: 77 MMHG | SYSTOLIC BLOOD PRESSURE: 161 MMHG | HEART RATE: 61 BPM | TEMPERATURE: 97.2 F | RESPIRATION RATE: 16 BRPM | OXYGEN SATURATION: 99 %

## 2023-10-23 DIAGNOSIS — C50.021 MALIGNANT NEOPLASM INVOLVING BOTH NIPPLE AND AREOLA OF RIGHT BREAST IN MALE, ESTROGEN RECEPTOR POSITIVE (CMS/HCC): Primary | ICD-10-CM

## 2023-10-23 DIAGNOSIS — Z17.0 MALIGNANT NEOPLASM INVOLVING BOTH NIPPLE AND AREOLA OF RIGHT BREAST IN MALE, ESTROGEN RECEPTOR POSITIVE (CMS/HCC): Primary | ICD-10-CM

## 2023-10-23 LAB
ARIA ZRC COURSE ID: NORMAL
ARIA ZRC COURSE INTENT: NORMAL
ARIA ZRC COURSE START DATE: NORMAL
ARIA ZRC TREATMENT ELAPSED DAYS: NORMAL
ARIA ZRP FRACTIONS TREATED TO DATE: NORMAL
ARIA ZRP FRACTIONS TREATED TO DATE: NORMAL
ARIA ZRP PLAN ID: NORMAL
ARIA ZRP PLAN ID: NORMAL
ARIA ZRP PRESCRIBED DOSE CGY: 2000
ARIA ZRP PRESCRIBED DOSE CGY: 5000
ARIA ZRP PRESCRIBED DOSE PER FRACTION: 2
ARIA ZRP PRESCRIBED DOSE PER FRACTION: 2
ARIA ZRR DOSAGE GIVEN TO DATE: 36
ARIA ZRR DOSAGE GIVEN TO DATE: 36.36
ARIA ZRR REFERENCE POINT ID: NORMAL
ARIA ZRR SESSION DOSAGE GIVEN: 2
ARIA ZRR SESSION DOSAGE GIVEN: 2.02
MLH ARIA ZRC TREATMENT DATES: NORMAL

## 2023-10-23 PROCEDURE — 77412 RADIATION TX DELIVERY LVL 3: CPT | Performed by: RADIOLOGY

## 2023-10-23 PROCEDURE — 77387 GUIDANCE FOR RADJ TX DLVR: CPT | Performed by: RADIOLOGY

## 2023-10-23 ASSESSMENT — ENCOUNTER SYMPTOMS
PSYCHIATRIC NEGATIVE: 1
MUSCULOSKELETAL NEGATIVE: 1
CARDIOVASCULAR NEGATIVE: 1
DIFFICULTY URINATING: 0
ACTIVITY CHANGE: 1
HEMATOLOGIC/LYMPHATIC NEGATIVE: 1
OCCASIONAL FEELINGS OF UNSTEADINESS: 1
NEUROLOGICAL NEGATIVE: 1
EYES NEGATIVE: 1
GASTROINTESTINAL NEGATIVE: 1
COLOR CHANGE: 1
RESPIRATORY NEGATIVE: 1

## 2023-10-23 ASSESSMENT — PAIN SCALES - GENERAL: PAINLEVEL: 5

## 2023-10-23 NOTE — PROGRESS NOTES
Subjective      Patient ID: Radha Rosas is a 73 y.o. male.  1950   Diagnosis Plan   1. 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    HPI notes some skin irritation for which topical creams and ointments help.  Had a fall over the weekend and tripped fell denies hurting himself.  Refused to go to the emergency room.    The following have been reviewed and updated as appropriate in this visit:        Review of Systems   Constitutional: Positive for activity change (not much of an appetite not eating as much but still gets nutrition ).   Eyes: Negative.    Respiratory: Negative.    Cardiovascular: Negative.    Gastrointestinal: Negative.    Endocrine: Positive for heat intolerance (hot flashes once or twice a night ).   Genitourinary: Negative for difficulty urinating (prostate removal incontinence ).   Musculoskeletal: Negative.    Skin: Positive for color change (bruising on left rib cage from recent fall ) and rash (redness no pain or puritis using prescribed cream ).   Neurological: Negative.    Hematological: Negative.    Psychiatric/Behavioral: Negative.        Objective     Vitals:    10/23/23 1017   BP: (!) 161/77   BP Location: Left forearm   Patient Position: Sitting   Pulse: 61   Resp: 16   Temp: 36.2 °C (97.2 °F)   TempSrc: Temporal   SpO2: 99%     There is no height or weight on file to calculate BMI.    Physical Exam patient sitting in wheelchair no apparent distress.  Moderate erythema right chest wall with healed incision no desquamation noted.  No adenopathy appreciated.  Blood pressure elevated otherwise vital signs stable.  Performance status 70.    Assessment/Plan Prostate cancer right breast cancer undergoing tamoxifen therapy discontinued hormone therapy for prostate cancer.  Recent testosterone levels 15.  Patient continues to note hot flashes.  Likely some of his symptoms  overall weakness secondary to hormone therapy with low testosterone.  I told patient and wife I expect his testosterone to hopefully rebound.  Patient refuses going to the emergency room knows to be careful at home and also regarding falls.  Continue radiation therapy to right chest wall as previously outlined.  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  Diagnoses and Orders Associated With This Visit:  There are no diagnoses linked to this encounter.

## 2023-10-23 NOTE — LETTER
October 23, 2023                                                          Patient: Radha Rosas   YOB: 1950   Date of Visit: 10/23/2023       Dear Dr. Collins:    The patient is seen at the [facility] RADIATION THERAPY today. Attached is my assessment and plan of care.  Thank you for the opportunity to share in Radha Rosas's care.       Sincerely,        Gregory J. Ochsner, MD      CC: No Recipients

## 2023-10-24 ENCOUNTER — HOSPITAL ENCOUNTER (OUTPATIENT)
Dept: RADIATION ONCOLOGY | Facility: HOSPITAL | Age: 73
Setting detail: RADIATION/ONCOLOGY SERIES
Discharge: HOME | End: 2023-10-24
Attending: RADIOLOGY
Payer: MEDICARE

## 2023-10-24 LAB
ARIA ZRC COURSE ID: NORMAL
ARIA ZRC COURSE INTENT: NORMAL
ARIA ZRC COURSE START DATE: NORMAL
ARIA ZRC TREATMENT ELAPSED DAYS: NORMAL
ARIA ZRP FRACTIONS TREATED TO DATE: NORMAL
ARIA ZRP FRACTIONS TREATED TO DATE: NORMAL
ARIA ZRP PLAN ID: NORMAL
ARIA ZRP PLAN ID: NORMAL
ARIA ZRP PRESCRIBED DOSE CGY: 2000
ARIA ZRP PRESCRIBED DOSE CGY: 5000
ARIA ZRP PRESCRIBED DOSE PER FRACTION: 2
ARIA ZRP PRESCRIBED DOSE PER FRACTION: 2
ARIA ZRR DOSAGE GIVEN TO DATE: 38
ARIA ZRR DOSAGE GIVEN TO DATE: 38.38
ARIA ZRR REFERENCE POINT ID: NORMAL
ARIA ZRR SESSION DOSAGE GIVEN: 2
ARIA ZRR SESSION DOSAGE GIVEN: 2.02
MLH ARIA ZRC TREATMENT DATES: NORMAL

## 2023-10-24 PROCEDURE — 77387 GUIDANCE FOR RADJ TX DLVR: CPT | Performed by: RADIOLOGY

## 2023-10-24 PROCEDURE — 77412 RADIATION TX DELIVERY LVL 3: CPT | Performed by: RADIOLOGY

## 2023-10-25 ENCOUNTER — HOSPITAL ENCOUNTER (OUTPATIENT)
Dept: RADIATION ONCOLOGY | Facility: HOSPITAL | Age: 73
Setting detail: RADIATION/ONCOLOGY SERIES
Discharge: HOME | End: 2023-10-25
Attending: RADIOLOGY
Payer: MEDICARE

## 2023-10-25 LAB
ARIA ZRC COURSE ID: NORMAL
ARIA ZRC COURSE INTENT: NORMAL
ARIA ZRC COURSE START DATE: NORMAL
ARIA ZRC TREATMENT ELAPSED DAYS: NORMAL
ARIA ZRP FRACTIONS TREATED TO DATE: NORMAL
ARIA ZRP FRACTIONS TREATED TO DATE: NORMAL
ARIA ZRP PLAN ID: NORMAL
ARIA ZRP PLAN ID: NORMAL
ARIA ZRP PRESCRIBED DOSE CGY: 2000
ARIA ZRP PRESCRIBED DOSE CGY: 5000
ARIA ZRP PRESCRIBED DOSE PER FRACTION: 2
ARIA ZRP PRESCRIBED DOSE PER FRACTION: 2
ARIA ZRR DOSAGE GIVEN TO DATE: 40
ARIA ZRR DOSAGE GIVEN TO DATE: 40.4
ARIA ZRR REFERENCE POINT ID: NORMAL
ARIA ZRR SESSION DOSAGE GIVEN: 2
ARIA ZRR SESSION DOSAGE GIVEN: 2.02
MLH ARIA ZRC TREATMENT DATES: NORMAL

## 2023-10-25 PROCEDURE — 77387 GUIDANCE FOR RADJ TX DLVR: CPT | Performed by: RADIOLOGY

## 2023-10-25 PROCEDURE — 77412 RADIATION TX DELIVERY LVL 3: CPT | Performed by: RADIOLOGY

## 2023-10-26 ENCOUNTER — HOSPITAL ENCOUNTER (OUTPATIENT)
Dept: RADIATION ONCOLOGY | Facility: HOSPITAL | Age: 73
Setting detail: RADIATION/ONCOLOGY SERIES
Discharge: HOME | End: 2023-10-26
Attending: RADIOLOGY
Payer: MEDICARE

## 2023-10-26 ENCOUNTER — HOSPITAL ENCOUNTER (OUTPATIENT)
Dept: HEMATOLOGY/ONCOLOGY | Facility: HOSPITAL | Age: 73
Setting detail: INFUSION SERIES
Discharge: HOME | End: 2023-10-26
Attending: INTERNAL MEDICINE
Payer: MEDICARE

## 2023-10-26 DIAGNOSIS — C61 PROSTATE CANCER (CMS/HCC): Primary | ICD-10-CM

## 2023-10-26 LAB
ARIA ZRC COURSE ID: NORMAL
ARIA ZRC COURSE INTENT: NORMAL
ARIA ZRC COURSE START DATE: NORMAL
ARIA ZRC TREATMENT ELAPSED DAYS: NORMAL
ARIA ZRP FRACTIONS TREATED TO DATE: NORMAL
ARIA ZRP FRACTIONS TREATED TO DATE: NORMAL
ARIA ZRP PLAN ID: NORMAL
ARIA ZRP PLAN ID: NORMAL
ARIA ZRP PRESCRIBED DOSE CGY: 2000
ARIA ZRP PRESCRIBED DOSE CGY: 5000
ARIA ZRP PRESCRIBED DOSE PER FRACTION: 2
ARIA ZRP PRESCRIBED DOSE PER FRACTION: 2
ARIA ZRR DOSAGE GIVEN TO DATE: 42
ARIA ZRR DOSAGE GIVEN TO DATE: 42.42
ARIA ZRR REFERENCE POINT ID: NORMAL
ARIA ZRR SESSION DOSAGE GIVEN: 2
ARIA ZRR SESSION DOSAGE GIVEN: 2.02
MLH ARIA ZRC TREATMENT DATES: NORMAL

## 2023-10-26 PROCEDURE — 77412 RADIATION TX DELIVERY LVL 3: CPT | Performed by: RADIOLOGY

## 2023-10-26 PROCEDURE — 3C1ZX8Z IRRIGATION OF INDWELLING DEVICE USING IRRIGATING SUBSTANCE, EXTERNAL APPROACH: ICD-10-PCS | Performed by: INTERNAL MEDICINE

## 2023-10-26 PROCEDURE — 77387 GUIDANCE FOR RADJ TX DLVR: CPT | Performed by: RADIOLOGY

## 2023-10-26 PROCEDURE — 96523 IRRIG DRUG DELIVERY DEVICE: CPT

## 2023-10-26 RX ORDER — HEPARIN 100 UNIT/ML
500 SYRINGE INTRAVENOUS AS NEEDED
OUTPATIENT
Start: 2023-10-26

## 2023-10-26 RX ORDER — HEPARIN 100 UNIT/ML
500 SYRINGE INTRAVENOUS AS NEEDED
Status: DISCONTINUED | OUTPATIENT
Start: 2023-10-26 | End: 2023-10-27 | Stop reason: HOSPADM

## 2023-10-26 RX ADMIN — HEPARIN 500 UNITS: 100 SYRINGE at 14:59

## 2023-10-27 ENCOUNTER — HOSPITAL ENCOUNTER (OUTPATIENT)
Dept: RADIATION ONCOLOGY | Facility: HOSPITAL | Age: 73
Setting detail: RADIATION/ONCOLOGY SERIES
Discharge: HOME | End: 2023-10-27
Attending: RADIOLOGY
Payer: MEDICARE

## 2023-10-27 LAB
ARIA ZRC COURSE ID: NORMAL
ARIA ZRC COURSE INTENT: NORMAL
ARIA ZRC COURSE START DATE: NORMAL
ARIA ZRC TREATMENT ELAPSED DAYS: NORMAL
ARIA ZRP FRACTIONS TREATED TO DATE: NORMAL
ARIA ZRP FRACTIONS TREATED TO DATE: NORMAL
ARIA ZRP PLAN ID: NORMAL
ARIA ZRP PLAN ID: NORMAL
ARIA ZRP PRESCRIBED DOSE CGY: 2000
ARIA ZRP PRESCRIBED DOSE CGY: 5000
ARIA ZRP PRESCRIBED DOSE PER FRACTION: 2
ARIA ZRP PRESCRIBED DOSE PER FRACTION: 2
ARIA ZRR DOSAGE GIVEN TO DATE: 44
ARIA ZRR DOSAGE GIVEN TO DATE: 44.44
ARIA ZRR REFERENCE POINT ID: NORMAL
ARIA ZRR SESSION DOSAGE GIVEN: 2
ARIA ZRR SESSION DOSAGE GIVEN: 2.02
MLH ARIA ZRC TREATMENT DATES: NORMAL

## 2023-10-27 PROCEDURE — 77387 GUIDANCE FOR RADJ TX DLVR: CPT | Performed by: RADIOLOGY

## 2023-10-27 PROCEDURE — 77412 RADIATION TX DELIVERY LVL 3: CPT | Performed by: RADIOLOGY

## 2023-10-27 PROCEDURE — 77336 RADIATION PHYSICS CONSULT: CPT | Performed by: RADIOLOGY

## 2023-10-30 ENCOUNTER — HOSPITAL ENCOUNTER (OUTPATIENT)
Dept: RADIATION ONCOLOGY | Facility: HOSPITAL | Age: 73
Setting detail: RADIATION/ONCOLOGY SERIES
Discharge: HOME | End: 2023-10-30
Attending: RADIOLOGY
Payer: MEDICARE

## 2023-10-30 LAB
ARIA ZRC COURSE ID: NORMAL
ARIA ZRC COURSE INTENT: NORMAL
ARIA ZRC COURSE START DATE: NORMAL
ARIA ZRC TREATMENT ELAPSED DAYS: NORMAL
ARIA ZRP FRACTIONS TREATED TO DATE: NORMAL
ARIA ZRP FRACTIONS TREATED TO DATE: NORMAL
ARIA ZRP PLAN ID: NORMAL
ARIA ZRP PLAN ID: NORMAL
ARIA ZRP PRESCRIBED DOSE CGY: 2000
ARIA ZRP PRESCRIBED DOSE CGY: 5000
ARIA ZRP PRESCRIBED DOSE PER FRACTION: 2
ARIA ZRP PRESCRIBED DOSE PER FRACTION: 2
ARIA ZRR DOSAGE GIVEN TO DATE: 46
ARIA ZRR DOSAGE GIVEN TO DATE: 46.46
ARIA ZRR REFERENCE POINT ID: NORMAL
ARIA ZRR SESSION DOSAGE GIVEN: 2
ARIA ZRR SESSION DOSAGE GIVEN: 2.02
MLH ARIA ZRC TREATMENT DATES: NORMAL

## 2023-10-30 PROCEDURE — 77412 RADIATION TX DELIVERY LVL 3: CPT | Performed by: RADIOLOGY

## 2023-10-30 PROCEDURE — 77387 GUIDANCE FOR RADJ TX DLVR: CPT | Performed by: RADIOLOGY

## 2023-10-31 ENCOUNTER — RAD ONC OTV (OUTPATIENT)
Dept: RADIATION ONCOLOGY | Facility: HOSPITAL | Age: 73
End: 2023-10-31
Payer: MEDICARE

## 2023-10-31 ENCOUNTER — HOSPITAL ENCOUNTER (OUTPATIENT)
Dept: RADIATION ONCOLOGY | Facility: HOSPITAL | Age: 73
Setting detail: RADIATION/ONCOLOGY SERIES
Discharge: HOME | End: 2023-10-31
Attending: RADIOLOGY
Payer: MEDICARE

## 2023-10-31 VITALS
HEART RATE: 59 BPM | SYSTOLIC BLOOD PRESSURE: 160 MMHG | RESPIRATION RATE: 18 BRPM | DIASTOLIC BLOOD PRESSURE: 74 MMHG | OXYGEN SATURATION: 100 %

## 2023-10-31 DIAGNOSIS — Z17.0 MALIGNANT NEOPLASM INVOLVING BOTH NIPPLE AND AREOLA OF RIGHT BREAST IN MALE, ESTROGEN RECEPTOR POSITIVE (CMS/HCC): Primary | ICD-10-CM

## 2023-10-31 DIAGNOSIS — C50.021 MALIGNANT NEOPLASM INVOLVING BOTH NIPPLE AND AREOLA OF RIGHT BREAST IN MALE, ESTROGEN RECEPTOR POSITIVE (CMS/HCC): Primary | ICD-10-CM

## 2023-10-31 LAB
ARIA ZRC COURSE ID: NORMAL
ARIA ZRC COURSE INTENT: NORMAL
ARIA ZRC COURSE START DATE: NORMAL
ARIA ZRC TREATMENT ELAPSED DAYS: NORMAL
ARIA ZRP FRACTIONS TREATED TO DATE: NORMAL
ARIA ZRP FRACTIONS TREATED TO DATE: NORMAL
ARIA ZRP PLAN ID: NORMAL
ARIA ZRP PLAN ID: NORMAL
ARIA ZRP PRESCRIBED DOSE CGY: 2000
ARIA ZRP PRESCRIBED DOSE CGY: 5000
ARIA ZRP PRESCRIBED DOSE PER FRACTION: 2
ARIA ZRP PRESCRIBED DOSE PER FRACTION: 2
ARIA ZRR DOSAGE GIVEN TO DATE: 48
ARIA ZRR DOSAGE GIVEN TO DATE: 48.48
ARIA ZRR REFERENCE POINT ID: NORMAL
ARIA ZRR SESSION DOSAGE GIVEN: 2
ARIA ZRR SESSION DOSAGE GIVEN: 2.02
MLH ARIA ZRC TREATMENT DATES: NORMAL

## 2023-10-31 PROCEDURE — 77387 GUIDANCE FOR RADJ TX DLVR: CPT | Performed by: RADIOLOGY

## 2023-10-31 PROCEDURE — 77412 RADIATION TX DELIVERY LVL 3: CPT | Performed by: RADIOLOGY

## 2023-10-31 RX ORDER — SILVER SULFADIAZINE 10 G/1000G
CREAM TOPICAL 2 TIMES DAILY
Qty: 50 G | Refills: 1 | Status: SHIPPED | OUTPATIENT
Start: 2023-10-31 | End: 2024-05-13 | Stop reason: ALTCHOICE

## 2023-10-31 ASSESSMENT — ENCOUNTER SYMPTOMS
NEUROLOGICAL NEGATIVE: 1
EYES NEGATIVE: 1
RESPIRATORY NEGATIVE: 1
PSYCHIATRIC NEGATIVE: 1
GASTROINTESTINAL NEGATIVE: 1
MUSCULOSKELETAL NEGATIVE: 1
CONSTITUTIONAL NEGATIVE: 1
OCCASIONAL FEELINGS OF UNSTEADINESS: 1
COLOR CHANGE: 1
HEMATOLOGIC/LYMPHATIC NEGATIVE: 1
CARDIOVASCULAR NEGATIVE: 1
ENDOCRINE NEGATIVE: 1

## 2023-10-31 ASSESSMENT — PAIN SCALES - GENERAL: PAINLEVEL: 0-NO PAIN

## 2023-10-31 NOTE — PROGRESS NOTES
Subjective      Patient ID: Radha Rosas is a 73 y.o. male.  1950   Diagnosis Plan   1. 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    HPI patient complains of leg swelling bilaterally.  Continues in her right chest wall skin discomfort mild.  Using topical creams and steroid ointments.    The following have been reviewed and updated as appropriate in this visit:        Review of Systems   Constitutional: Negative.    HENT: Negative.    Eyes: Negative.    Respiratory: Negative.    Cardiovascular: Negative.    Gastrointestinal: Negative.    Endocrine: Negative.    Genitourinary: Negative.    Musculoskeletal: Negative.    Skin: Positive for color change (erythema, dry flaky skin, hyperpigmentation to treatment area ).   Neurological: Negative.    Hematological: Negative.    Psychiatric/Behavioral: Negative.        Objective     Vitals:    10/31/23 1012   BP: (!) 160/74   BP Location: Left upper arm   Patient Position: Sitting   Pulse: (!) 59   Resp: 18   SpO2: 100%     There is no height or weight on file to calculate BMI.    Physical Exam increased skin pigmentation right chest wall with some dry desquamation.  1+ bilateral pedal edema.    Pt has large area of erythema, dry flaky desquamation, and hyperpigmentation to right chest area of radiation treatment. +2 edema bilateral lower extremities.     Assessment/Plan Continue radiation therapy to right chest wall as previously outlined.  Follow-up in 2 weeks.  Discontinued steroid cream started Silvadene cream pending desquamation moist.  Continue Aquaphor ointment for radiation dermatitis.  Lasix as needed for leg swelling.  Diagnoses and Orders Associated With This Visit:  There are no diagnoses linked to this encounter.

## 2023-10-31 NOTE — LETTER
October 31, 2023                                                          Patient: Radha oRsas   YOB: 1950   Date of Visit: 10/31/2023       Dear Dr. Collins:    The patient is seen at the [facility] RADIATION THERAPY today. Attached is my assessment and plan of care.  Thank you for the opportunity to share in Radha Rosas's care.       Sincerely,        Gregory J. Ochsner, MD      CC: No Recipients

## 2023-11-01 ENCOUNTER — HOSPITAL ENCOUNTER (OUTPATIENT)
Dept: RADIATION ONCOLOGY | Facility: HOSPITAL | Age: 73
Setting detail: RADIATION/ONCOLOGY SERIES
Discharge: HOME | End: 2023-11-01
Attending: RADIOLOGY
Payer: MEDICARE

## 2023-11-01 LAB
ARIA ZRC COURSE ID: NORMAL
ARIA ZRC COURSE INTENT: NORMAL
ARIA ZRC COURSE START DATE: NORMAL
ARIA ZRC TREATMENT ELAPSED DAYS: NORMAL
ARIA ZRP FRACTIONS TREATED TO DATE: NORMAL
ARIA ZRP FRACTIONS TREATED TO DATE: NORMAL
ARIA ZRP PLAN ID: NORMAL
ARIA ZRP PLAN ID: NORMAL
ARIA ZRP PRESCRIBED DOSE CGY: 2000
ARIA ZRP PRESCRIBED DOSE CGY: 5000
ARIA ZRP PRESCRIBED DOSE PER FRACTION: 2
ARIA ZRP PRESCRIBED DOSE PER FRACTION: 2
ARIA ZRR DOSAGE GIVEN TO DATE: 50
ARIA ZRR DOSAGE GIVEN TO DATE: 50.51
ARIA ZRR REFERENCE POINT ID: NORMAL
ARIA ZRR SESSION DOSAGE GIVEN: 2
ARIA ZRR SESSION DOSAGE GIVEN: 2.02
MLH ARIA ZRC TREATMENT DATES: NORMAL

## 2023-11-01 PROCEDURE — 77387 GUIDANCE FOR RADJ TX DLVR: CPT | Performed by: RADIOLOGY

## 2023-11-01 PROCEDURE — 77412 RADIATION TX DELIVERY LVL 3: CPT | Performed by: RADIOLOGY

## 2023-11-04 LAB
ALBUMIN SERPL-MCNC: 3.6 G/DL (ref 3.6–5.1)
ALBUMIN/GLOB SERPL: 1.6 (CALC) (ref 1–2.5)
ALP SERPL-CCNC: 136 U/L (ref 35–144)
ALT SERPL-CCNC: 18 U/L (ref 9–46)
AST SERPL-CCNC: 39 U/L (ref 10–35)
BASOPHILS # BLD AUTO: 30 CELLS/UL (ref 0–200)
BASOPHILS NFR BLD AUTO: 0.8 %
BILIRUB SERPL-MCNC: 0.7 MG/DL (ref 0.2–1.2)
BUN SERPL-MCNC: 18 MG/DL (ref 7–25)
BUN/CREAT SERPL: ABNORMAL (CALC) (ref 6–22)
CALCIUM SERPL-MCNC: 8.3 MG/DL (ref 8.6–10.3)
CHLORIDE SERPL-SCNC: 105 MMOL/L (ref 98–110)
CO2 SERPL-SCNC: 29 MMOL/L (ref 20–32)
CREAT SERPL-MCNC: 1.14 MG/DL (ref 0.7–1.28)
EGFRCR SERPLBLD CKD-EPI 2021: 68 ML/MIN/1.73M2
EOSINOPHIL # BLD AUTO: 52 CELLS/UL (ref 15–500)
EOSINOPHIL NFR BLD AUTO: 1.4 %
ERYTHROCYTE [DISTWIDTH] IN BLOOD BY AUTOMATED COUNT: 14.3 % (ref 11–15)
GLOBULIN SER CALC-MCNC: 2.3 G/DL (CALC) (ref 1.9–3.7)
GLUCOSE SERPL-MCNC: 93 MG/DL (ref 65–99)
HCT VFR BLD AUTO: 32 % (ref 38.5–50)
HGB BLD-MCNC: 10.6 G/DL (ref 13.2–17.1)
LYMPHOCYTES # BLD AUTO: 422 CELLS/UL (ref 850–3900)
LYMPHOCYTES NFR BLD AUTO: 11.4 %
MCH RBC QN AUTO: 33.8 PG (ref 27–33)
MCHC RBC AUTO-ENTMCNC: 33.1 G/DL (ref 32–36)
MCV RBC AUTO: 101.9 FL (ref 80–100)
MONOCYTES # BLD AUTO: 374 CELLS/UL (ref 200–950)
MONOCYTES NFR BLD AUTO: 10.1 %
NEUTROPHILS # BLD AUTO: 2823 CELLS/UL (ref 1500–7800)
NEUTROPHILS NFR BLD AUTO: 76.3 %
PLATELET # BLD AUTO: 160 THOUSAND/UL (ref 140–400)
PMV BLD REES-ECKER: 9.4 FL (ref 7.5–12.5)
POTASSIUM SERPL-SCNC: 3.9 MMOL/L (ref 3.5–5.3)
PROT SERPL-MCNC: 5.9 G/DL (ref 6.1–8.1)
RBC # BLD AUTO: 3.14 MILLION/UL (ref 4.2–5.8)
SODIUM SERPL-SCNC: 142 MMOL/L (ref 135–146)
TSH SERPL-ACNC: 0.4 MIU/L (ref 0.4–4.5)
WBC # BLD AUTO: 3.7 THOUSAND/UL (ref 3.8–10.8)

## 2023-11-06 ENCOUNTER — ANESTHESIA EVENT (OUTPATIENT)
Dept: OPERATING ROOM | Facility: HOSPITAL | Age: 73
Setting detail: HOSPITAL OUTPATIENT SURGERY
End: 2023-11-06
Payer: MEDICARE

## 2023-11-06 LAB
ARIA ZRC COURSE ID: NORMAL
ARIA ZRC COURSE INTENT: NORMAL
ARIA ZRC COURSE START DATE: NORMAL
ARIA ZRC TREATMENT ELAPSED DAYS: NORMAL
ARIA ZRP FRACTIONS TREATED TO DATE: NORMAL
ARIA ZRP PLAN ID: NORMAL
ARIA ZRP PLAN NAME: NORMAL
ARIA ZRP PRESCRIBED DOSE CGY: 2000
ARIA ZRP PRESCRIBED DOSE CGY: 5000
ARIA ZRP PRESCRIBED DOSE CGY: 5000
ARIA ZRP PRESCRIBED DOSE PER FRACTION: 2
ARIA ZRR DOSAGE GIVEN TO DATE: 50
ARIA ZRR DOSAGE GIVEN TO DATE: 50.51
ARIA ZRR REFERENCE POINT ID: NORMAL
MLH ARIA ZRC TREATMENT DATES: NORMAL

## 2023-11-08 ENCOUNTER — TELEPHONE (OUTPATIENT)
Dept: SURGERY | Facility: CLINIC | Age: 73
End: 2023-11-08
Payer: MEDICARE

## 2023-11-08 NOTE — TELEPHONE ENCOUNTER
Chops wife called asking if he should take his tamoxifen in the morning. I called the [facility] and they transferred me to [place] in Triage, who said it's ok to take.

## 2023-11-08 NOTE — ANESTHESIA PREPROCEDURE EVALUATION
Relevant Problems   CARDIOVASCULAR   (+) APC (atrial premature contractions)   (+) Essential hypertension   (+) Paroxysmal atrial fibrillation (CMS/HCC)      GASTROINTESTINAL   (+) Gastrointestinal hemorrhage with melena      MUSCULOSKELETAL   (+) Spondylosis of lumbosacral region      NEUROLOGY   (+) Herniation of lumbar intervertebral disc with radiculopathy      URINARY SYSTEM   (+) Acute renal failure superimposed on stage 3a chronic kidney disease (CMS/HCC)   (+) Electrolyte abnormality      Other   (+) Malignant neoplasm of right male breast (CMS/HCC)   (+) Prostate cancer (CMS/HCC)       ROS/Med Hx     Past Surgical History:   Procedure Laterality Date    CARDIAC SURGERY      mitral valve repair 2006    CARDIOVERSION  12/05/2022    Successful DC cardioversion    KNEE CARTILAGE SURGERY Left     MASTECTOMY      PROSTATE SURGERY  July 2022    PROSTATECTOMY  07/15/2022    TONSILLECTOMY         Physical Exam    Airway   Mallampati: II   TM distance: >3 FB   Neck ROM: full  Dental    Teeth Problems: chipped        Anesthesia Plan    Plan: MAC   3 ASA  Comments:    Plan: GA TIVA

## 2023-11-09 ENCOUNTER — ANESTHESIA (OUTPATIENT)
Dept: OPERATING ROOM | Facility: HOSPITAL | Age: 73
Setting detail: HOSPITAL OUTPATIENT SURGERY
End: 2023-11-09
Payer: MEDICARE

## 2023-11-09 ENCOUNTER — HOSPITAL ENCOUNTER (OUTPATIENT)
Facility: HOSPITAL | Age: 73
Setting detail: HOSPITAL OUTPATIENT SURGERY
Discharge: HOME | End: 2023-11-09
Attending: SURGERY | Admitting: SURGERY
Payer: MEDICARE

## 2023-11-09 ENCOUNTER — DOCUMENTATION (OUTPATIENT)
Dept: RADIATION ONCOLOGY | Facility: HOSPITAL | Age: 73
End: 2023-11-09
Payer: MEDICARE

## 2023-11-09 VITALS
SYSTOLIC BLOOD PRESSURE: 139 MMHG | OXYGEN SATURATION: 96 % | BODY MASS INDEX: 29.16 KG/M2 | HEIGHT: 65 IN | RESPIRATION RATE: 16 BRPM | WEIGHT: 175 LBS | HEART RATE: 53 BPM | DIASTOLIC BLOOD PRESSURE: 65 MMHG | TEMPERATURE: 97.2 F

## 2023-11-09 DIAGNOSIS — C50.021: Primary | ICD-10-CM

## 2023-11-09 DIAGNOSIS — Z17.0: Primary | ICD-10-CM

## 2023-11-09 PROCEDURE — 71000011 HC PACU PHASE 1 EA ADDL MIN: Performed by: SURGERY

## 2023-11-09 PROCEDURE — 36000012 HC OR LEVEL 2 EA ADDL MIN: Performed by: SURGERY

## 2023-11-09 PROCEDURE — 71000012 HC PACU PHASE 2 EA ADDL MIN: Performed by: SURGERY

## 2023-11-09 PROCEDURE — 36590 REMOVAL TUNNELED CV CATH: CPT | Mod: 79 | Performed by: SURGERY

## 2023-11-09 PROCEDURE — 63600000 HC DRUGS/DETAIL CODE: Mod: JZ | Performed by: SURGERY

## 2023-11-09 PROCEDURE — 71000002 HC PACU PHASE 2 INITIAL 30MIN: Performed by: SURGERY

## 2023-11-09 PROCEDURE — 63600000 HC DRUGS/DETAIL CODE: Mod: JZ | Performed by: ANESTHESIOLOGY

## 2023-11-09 PROCEDURE — 36000002 HC OR LEVEL 2 INITIAL 30MIN: Performed by: SURGERY

## 2023-11-09 PROCEDURE — 25000000 HC PHARMACY GENERAL: Performed by: SURGERY

## 2023-11-09 PROCEDURE — 27200000 HC STERILE SUPPLY: Performed by: SURGERY

## 2023-11-09 PROCEDURE — 63700000 HC SELF-ADMINISTRABLE DRUG: Performed by: SURGERY

## 2023-11-09 PROCEDURE — 0JPT0XZ REMOVAL OF TUNNELED VASCULAR ACCESS DEVICE FROM TRUNK SUBCUTANEOUS TISSUE AND FASCIA, OPEN APPROACH: ICD-10-PCS | Performed by: SURGERY

## 2023-11-09 PROCEDURE — 05PY33Z REMOVAL OF INFUSION DEVICE FROM UPPER VEIN, PERCUTANEOUS APPROACH: ICD-10-PCS | Performed by: SURGERY

## 2023-11-09 PROCEDURE — 25800000 HC PHARMACY IV SOLUTIONS: Performed by: SURGERY

## 2023-11-09 PROCEDURE — 37000002 HC ANESTHESIA MAC: Performed by: SURGERY

## 2023-11-09 PROCEDURE — 71000001 HC PACU PHASE 1 INITIAL 30MIN: Performed by: SURGERY

## 2023-11-09 RX ORDER — ACETAMINOPHEN 325 MG/1
975 TABLET ORAL
Status: COMPLETED | OUTPATIENT
Start: 2023-11-09 | End: 2023-11-09

## 2023-11-09 RX ORDER — HYDROMORPHONE HYDROCHLORIDE 1 MG/ML
0.5 INJECTION, SOLUTION INTRAMUSCULAR; INTRAVENOUS; SUBCUTANEOUS
Status: DISCONTINUED | OUTPATIENT
Start: 2023-11-09 | End: 2023-11-09 | Stop reason: HOSPADM

## 2023-11-09 RX ORDER — ACETAMINOPHEN 500 MG
1000 TABLET ORAL EVERY 6 HOURS PRN
Start: 2023-11-09 | End: 2023-12-09

## 2023-11-09 RX ORDER — PROPOFOL 200MG/20ML
SYRINGE (ML) INTRAVENOUS AS NEEDED
Status: DISCONTINUED | OUTPATIENT
Start: 2023-11-09 | End: 2023-11-09 | Stop reason: SURG

## 2023-11-09 RX ORDER — FENTANYL CITRATE 50 UG/ML
INJECTION, SOLUTION INTRAMUSCULAR; INTRAVENOUS AS NEEDED
Status: DISCONTINUED | OUTPATIENT
Start: 2023-11-09 | End: 2023-11-09 | Stop reason: SURG

## 2023-11-09 RX ORDER — MIDAZOLAM HYDROCHLORIDE 2 MG/2ML
INJECTION, SOLUTION INTRAMUSCULAR; INTRAVENOUS AS NEEDED
Status: DISCONTINUED | OUTPATIENT
Start: 2023-11-09 | End: 2023-11-09 | Stop reason: SURG

## 2023-11-09 RX ORDER — SODIUM CHLORIDE 9 MG/ML
INJECTION, SOLUTION INTRAVENOUS CONTINUOUS
Status: DISCONTINUED | OUTPATIENT
Start: 2023-11-09 | End: 2023-11-10 | Stop reason: HOSPADM

## 2023-11-09 RX ORDER — MEPERIDINE HYDROCHLORIDE 25 MG/ML
12.5 INJECTION INTRAMUSCULAR; INTRAVENOUS; SUBCUTANEOUS EVERY 10 MIN PRN
Status: DISCONTINUED | OUTPATIENT
Start: 2023-11-09 | End: 2023-11-09 | Stop reason: HOSPADM

## 2023-11-09 RX ORDER — EPHEDRINE SULFATE/0.9% NACL/PF 50 MG/5 ML
10 SYRINGE (ML) INTRAVENOUS AS NEEDED
Status: DISCONTINUED | OUTPATIENT
Start: 2023-11-09 | End: 2023-11-09 | Stop reason: HOSPADM

## 2023-11-09 RX ORDER — LIDOCAINE HYDROCHLORIDE 10 MG/ML
INJECTION, SOLUTION INFILTRATION; PERINEURAL
Status: DISCONTINUED | OUTPATIENT
Start: 2023-11-09 | End: 2023-11-09 | Stop reason: HOSPADM

## 2023-11-09 RX ORDER — BUPIVACAINE HYDROCHLORIDE 5 MG/ML
INJECTION, SOLUTION EPIDURAL; INTRACAUDAL
Status: DISCONTINUED | OUTPATIENT
Start: 2023-11-09 | End: 2023-11-09 | Stop reason: HOSPADM

## 2023-11-09 RX ORDER — LABETALOL HCL 20 MG/4 ML
5 SYRINGE (ML) INTRAVENOUS AS NEEDED
Status: DISCONTINUED | OUTPATIENT
Start: 2023-11-09 | End: 2023-11-09 | Stop reason: HOSPADM

## 2023-11-09 RX ORDER — ONDANSETRON HYDROCHLORIDE 2 MG/ML
4 INJECTION, SOLUTION INTRAVENOUS
Status: DISCONTINUED | OUTPATIENT
Start: 2023-11-09 | End: 2023-11-09 | Stop reason: HOSPADM

## 2023-11-09 RX ORDER — FENTANYL CITRATE 50 UG/ML
50 INJECTION, SOLUTION INTRAMUSCULAR; INTRAVENOUS EVERY 5 MIN PRN
Status: DISCONTINUED | OUTPATIENT
Start: 2023-11-09 | End: 2023-11-09 | Stop reason: HOSPADM

## 2023-11-09 RX ADMIN — PROPOFOL 150 MG: 10 INJECTION, EMULSION INTRAVENOUS at 07:30

## 2023-11-09 RX ADMIN — MIDAZOLAM 1 MG: 1 INJECTION INTRAMUSCULAR; INTRAVENOUS at 07:33

## 2023-11-09 RX ADMIN — SODIUM CHLORIDE: 9 INJECTION, SOLUTION INTRAVENOUS at 06:58

## 2023-11-09 RX ADMIN — FENTANYL CITRATE 50 MCG: 50 INJECTION, SOLUTION INTRAMUSCULAR; INTRAVENOUS at 07:36

## 2023-11-09 RX ADMIN — FENTANYL CITRATE 50 MCG: 50 INJECTION, SOLUTION INTRAMUSCULAR; INTRAVENOUS at 07:26

## 2023-11-09 RX ADMIN — MIDAZOLAM 1 MG: 1 INJECTION INTRAMUSCULAR; INTRAVENOUS at 07:25

## 2023-11-09 RX ADMIN — PROPOFOL 50 MG: 10 INJECTION, EMULSION INTRAVENOUS at 07:26

## 2023-11-09 RX ADMIN — ACETAMINOPHEN 975 MG: 325 TABLET ORAL at 07:00

## 2023-11-09 NOTE — OR SURGEON
Pre-Procedure patient identification:  I am the primary operating surgeon/proceduralist and I have reviewed the applicable pathology reports and radiology studies for this procedure. I have identified the patient and confirmed laterality is left on 11/09/23 at 7:12 AM Barb Osuna MD  Phone Number: [phone]

## 2023-11-09 NOTE — OP NOTE
OPERATIVE NOTE    DATE: 23    PATIENT NAME: Cam Rosas  MRN: [id number]  AGE:73 y.o., : 1950    PREOPERATIVE DIAGNOSIS: History of breast cancer and venous insufficiency    POSTOPERATIVE DIAGNOSIS: Same    PROCEDURE: PORT/PERMACATH REMOVAL    SURGEON:   Dr. Barb Osuna    Surgeon(s) and Role:     * Barb Osuna MD - Primary    ANESTHESIA: Monitor Anesthesia Care    INDICATIONS FOR PROCEDURE: Radha is a 73-year-old male who had been diagnosed with right breast cancer.  He had a few treatments of chemotherapy, but did not tolerate this well.  He then proceeded with a mastectomy.  He has completed his chemotherapy and is ready for port removal.    PREPARATION: The patient was examined in the preoperative area.  Informed consent was obtained.  The left port site was marked for planned excision.  Patient was then taken to the operating room and placed on the operating table in supine position.  The sedation anesthesia was administered.    DESCRIPTION OF THE PROCEDURE IN FULL: A timeout was performed and the patient and surgical site were identified.  The left chest wall was prepped and draped in usual sterile fashion skin was anesthetized with a mixture of lidocaine and Marcaine.  The incision over the port was opened using the scalpel, and dissection was carried through subcutaneous tissue to the port using sharp and electrocautery dissection.  The port was removed, ligating the venous insertion tunnel with a 3-0 Vicryl suture.  And pressure was held over the port insertion site in the left IJ for hemostasis.  The incision was then injected with additional lidocaine and Marcaine for local anesthetic and made hemostatic with electrocautery.  The incision was closed in a layered fashion with 3-0 Vicryl deep followed by running 4-0 Monocryl subcuticular stitch.  Dermabond was applied, followed by dressing.  Patient was woken and taken to recovery in stable condition.    DISPOSITION:  The patient tolerated the procedure well and was taken to the recovery room in stable condition.    ESTIMATED BLOOD LOSS: No blood loss documented.    COUNTS: Sponge and needle counts correct at the close of the procedure per nursing report.     Electronically signed by Barb Osuna MD November 9, 2023 8:12 AM    CC: Patient Care Team:  Usman Collins MD as PCP - General (Internal Medicine)  Paz Cleveland MD as Medical Oncologist (Hematology and Oncology)  Gary Moran MD as Cardiologist (Cardiology)  Bill Amin DO as Nephrologist (Nephrology)  Ochsner, Gregory J, MD as Radiation Oncologist (Radiation Oncology)  Barb Osuna MD as Consulting Physician (Breast Surgery)  Rafael Torrez MD as Referring Physician (Urology)  Gary Aceves MD as Cardiologist (Electrophysiology)  Lucita Brambila, MIQUEL as Nurse Navigator  Kailey Gale MD as Medical Oncologist (Hematology and Oncology)

## 2023-11-09 NOTE — ANESTHESIA POSTPROCEDURE EVALUATION
Patient: Cam Rosas    Procedure Summary     Date: 11/09/23 Room / Location:  OR 3 / PH OR    Anesthesia Start: 0725 Anesthesia Stop: 0810    Procedure: PORT/PERMACATH REMOVAL (Left: Chest) Diagnosis:       Malignant neoplasm of right male breast, unspecified estrogen receptor status, unspecified site of breast (CMS/HCC)      (right breast cancer)    Surgeons: Barb Osuna MD Responsible Provider: Meek Nieves MD    Anesthesia Type: MAC ASA Status: 3          Anesthesia Type: MAC  PACU Vitals  11/9/2023 0755 - 11/9/2023 0841      11/9/2023  0755 11/9/2023  0800 11/9/2023  0815       BP: 112/59 112/59 150/70     Temp: 36.7 °C (98 °F) -- --     Pulse: 50 50 54     Resp: 9 9 20     SpO2: 100 % 100 % 99 %             Anesthesia Post Evaluation    Pain management: adequate  Patient participation: complete - patient participated  Level of consciousness: awake and alert  Cardiovascular status: acceptable  Airway Patency: adequate  Respiratory status: acceptable  Hydration status: acceptable  Anesthetic complications: no

## 2023-11-09 NOTE — PROGRESS NOTES
Malignant Neoplasm Of Right Male Breast Follow Up (RADIATION)    LOV 10/31/23- 175 LBS  EOT 11/1/23    Dr Osuna[facility] LOV 11/9/23- OP NOTE     Labs 11/3/23

## 2023-11-09 NOTE — ANESTHESIOLOGIST PRE-PROCEDURE ATTESTATION
Pre-Procedure Patient Identification:  I am the Primary Anesthesiologist and have identified the patient on 11/09/23 at 7:10 AM.   I have confirmed the procedure(s) will be performed by the following surgeon/proceduralist Barb Osuna MD.

## 2023-11-09 NOTE — DISCHARGE INSTRUCTIONS
Ice pack to *left chest wall 20 minutes every hour for today  OK to remove dressing and shower tomorrow  No heavy lifting or vigorous exercise for 2-3 days  Follow up with Dr. Osuna in 2 weeks.

## 2023-11-13 ENCOUNTER — TRANSCRIBE ORDERS (OUTPATIENT)
Dept: SCHEDULING | Age: 73
End: 2023-11-13

## 2023-11-13 DIAGNOSIS — R60.0 LOCALIZED EDEMA: Primary | ICD-10-CM

## 2023-11-15 ENCOUNTER — TELEPHONE (OUTPATIENT)
Dept: SCHEDULING | Facility: CLINIC | Age: 73
End: 2023-11-15
Payer: MEDICARE

## 2023-11-15 NOTE — TELEPHONE ENCOUNTER
Pt's spouse, Fiorella called and asked could a TTE be preformed on the day of pt's upcoming appt    Pt's appt is 11/27    Fiorella say they are open that whole day and can move the time if needed    Fiorella can be reached at [phone]

## 2023-11-17 ENCOUNTER — HOSPITAL ENCOUNTER (OUTPATIENT)
Dept: RADIATION ONCOLOGY | Facility: HOSPITAL | Age: 73
Setting detail: RADIATION/ONCOLOGY SERIES
Discharge: HOME | End: 2023-11-17
Attending: RADIOLOGY
Payer: MEDICARE

## 2023-11-17 VITALS
DIASTOLIC BLOOD PRESSURE: 67 MMHG | SYSTOLIC BLOOD PRESSURE: 143 MMHG | HEART RATE: 51 BPM | OXYGEN SATURATION: 99 % | TEMPERATURE: 97.8 F

## 2023-11-17 DIAGNOSIS — C50.021 MALIGNANT NEOPLASM INVOLVING BOTH NIPPLE AND AREOLA OF RIGHT BREAST IN MALE, ESTROGEN RECEPTOR POSITIVE (CMS/HCC): Primary | ICD-10-CM

## 2023-11-17 DIAGNOSIS — Z17.0 MALIGNANT NEOPLASM INVOLVING BOTH NIPPLE AND AREOLA OF RIGHT BREAST IN MALE, ESTROGEN RECEPTOR POSITIVE (CMS/HCC): Primary | ICD-10-CM

## 2023-11-17 ASSESSMENT — ENCOUNTER SYMPTOMS
OCCASIONAL FEELINGS OF UNSTEADINESS: 1
APPETITE CHANGE: 0
GASTROINTESTINAL NEGATIVE: 1
EYES NEGATIVE: 1
RESPIRATORY NEGATIVE: 1
NEUROLOGICAL NEGATIVE: 1
MUSCULOSKELETAL NEGATIVE: 1
UNEXPECTED WEIGHT CHANGE: 0
PSYCHIATRIC NEGATIVE: 1
FATIGUE: 1
FEVER: 0

## 2023-11-17 ASSESSMENT — PAIN SCALES - GENERAL: PAINLEVEL: 0-NO PAIN

## 2023-11-17 NOTE — PROGRESS NOTES
Patient ID:  Cam Rosas is a 73 y.o. male.  Referring Physician:   No referring provider defined for this encounter.    Primary Care Provider:  Usman Collins MD    Problem List:  Patient Active Problem List    Diagnosis Date Noted    Long term current use of amiodarone 10/02/2023    Acute renal failure superimposed on stage 3a chronic kidney disease (CMS/HCC) 10/02/2023    BRCA1 positive 08/28/2023    Monoallelic mutation of OK gene 08/28/2023    Herniation of lumbar intervertebral disc with radiculopathy 07/18/2023    Spinal stenosis of lumbar region 07/18/2023    Spondylosis of lumbosacral region 07/18/2023    Acute systolic heart failure (CMS/HCC) 02/15/2023    Paroxysmal atrial fibrillation (CMS/HCC) 10/19/2022    Chronic combined systolic and diastolic CHF (congestive heart failure) (CMS/HCC) 10/19/2022    Malignant neoplasm of right male breast (CMS/[facility], [facility]) 10/19/2022    Electrolyte abnormality 10/19/2022    Gastrointestinal hemorrhage with melena 10/19/2022    CKD (chronic kidney disease) 10/19/2022    Prostate cancer (CMS/[facility], [facility]) 10/19/2022    Dehydration determined by examination 10/10/2022    APC (atrial premature contractions) 03/30/2022    Essential hypertension 08/31/2021    Hyperlipidemia 08/31/2021    Stage 3a chronic kidney disease (CMS/HCC) 08/31/2021        Cancer Staging Information:  Cancer Staging   Malignant neoplasm of right male breast (CMS/[facility], located within [facility])  Staging form: Breast, AJCC 8th Edition  - Clinical stage from 3/20/2023: Stage IA (cT1c, cN0, cM0, G3, ER+, DE+, HER2+) - Signed by Barb Osuna MD on 3/20/2023  - Pathologic stage from 8/28/2023: No Stage Recommended (ypT1c, pN1a(sn), cM0, G3, ER+, DE+, HER2+) - Signed by Barb Osuna MD on 8/28/2023    Prostate cancer (CMS/[facility], located [facility])  Staging form: Prostate, AJCC 8th Edition  - Pathologic stage from 7/27/2022: Stage Unknown (pT3b, pNX, cM0, PSA: 6, Grade Group: 3) - Signed by [facility], Gregory J, MD on  3/8/2023      Subjective      Radiation Delivered  Radiation Therapy   Component Value Date    COURSEID C2 11/06/2023    PLANID 2a_right nds 11/06/2023    PLANID 2b_R CW NoBol 11/06/2023    PLANID 2b_right CW 11/06/2023    PRESCRIBEDDO 2 11/06/2023    PRESCRIBEDDO 2 11/06/2023    PRESCRIBEDDO 2 11/06/2023    DOSAGEGIVENT 50.2286106 11/06/2023    DOSAGEGIVENT 50 11/06/2023    DOSAGEGIVENT 50 11/06/2023    DOSAGEGIVENT [id number] 11/06/2023    FRACTIONSTRE 25 of 25 11/06/2023    FRACTIONSTRE 10 of 10 11/06/2023    FRACTIONSTRE 15 of 15 11/06/2023       History of Present Illness:  Patient presents with his wife for reassessment purposes.  Getting out of the car he fell to the ground has an abrasion on his right shin.  Complains of left knee giving out.  Patient is otherwise without complaints.  PSA level from several months ago was undetectable.  Testosterone level was less than 15.    Physical Exam:  Vital Signs for this encounter:  BP: 143/67  Temp: 36.6 °C (97.8 °F)  Heart Rate: 51  SpO2: 99 % (11/17 1121)  Signs stable.  Small abrasion right shin.  Dry desquamation right chest wall with increased pigmentation.  No adenopathy appreciated.  Status post right-sided mastectomy.  1+ bilateral pedal edema.  Sitting in wheelchair no apparent distress.  Performance Status:70       PAIN ASSESSMENT:  Score Zero    Location    Pain Intervention      LABS:  No results found for this or any previous visit (from the past 336 hour(s)).       Assessment/Plan With history of intermediate risk prostate cancer status post radiation therapy hormone therapy stopped early secondary to generalized weakness.  Patient with male right breast cancer status postmastectomy, radiation therapy to the chest wall and draining nicolette regions recently completed.  Patient now on hormonal therapy.  Patient with dry desquamation recommended Aquaphor ointment.  Arranging home care with physical therapy and recommended left knee evaluation by  orthopedics.  Continue follow-up and treatment with medical oncology.  Repeat blood work including PSA level and testosterone level and follow-up in 3 months.  Follow-up with cardiology echo scheduled for next week.    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, estrogen receptor positive (CMS/HCC)  (Primary)        TREATMENT PLAN SUMMARY:  Treatment Details- Chemotherapy   Treatment goal [No plan goal]   Plan Name LEUPROLIDE (LUPRON) 22.5 MG EVERY 3 MONTHS   Status Inactive   Start Date 3/21/2023   End Date 3/21/2023   Provider Paz Cleveland MD   IV Chemotherapy leuprolide (3 month) (LUPRON DEPOT) intramuscular injection 22.5 mg, 22.5 mg, intramuscular, Once, 1 of 1 cycle                       Treatment Details- Chemotherapy   Treatment goal [No plan goal]   Plan Name MEDIPORT FLUSH (SINGLE LUMEN) - PATIENT ON TREATMENT   Status Active   Start Date 3/21/2023   End Date Until discontinued   Provider Paz Cleveland MD   IV Chemotherapy [No matching medication found in this treatment plan]                     Treatment Details- Chemotherapy   Treatment goal [No plan goal]   Plan Name LEUPROLIDE (LUPRON) 22.5 MG EVERY 3 MONTHS   Status Inactive   Start Date 3/21/2023   End Date 3/21/2023   Provider Paz Cleveland MD   IV Chemotherapy leuprolide (3 month) (LUPRON DEPOT) intramuscular injection 22.5 mg, 22.5 mg, intramuscular, Once, 1 of 1 cycle  Administration: 22.5 mg (3/21/2023)

## 2023-11-17 NOTE — PROGRESS NOTES
Subjective      Patient ID: Radha Rosas is a 73 y.o. male.  1950    Diagnosis:  No diagnosis found.    HPI    The following have been reviewed and updated as appropriate in this visit:        Review of Systems   Constitutional: Positive for fatigue. Negative for appetite change, fever and unexpected weight change (unable to weigh d/t fall today.  Pt slipped out of wheelchair on the floor of sun room in cancer center.  Skin tear to right shin cleansed and tegaderm placed.  Instructions/supplies provided to wife.).   HENT: Negative.    Eyes: Negative.    Respiratory: Negative.  Shortness of breath: pulse ox 97% RA.    Cardiovascular: Positive for leg swelling (B/L +2 pitting edema.  Primary is ordering an Echo for 11/27/23 for further evaluation.).   Gastrointestinal: Negative.    Endocrine: Negative for heat intolerance (Decreased hot flashes - on Tamoxifen).   Genitourinary: Negative.    Musculoskeletal: Negative.    Skin: Negative.  Color change: rare use of silvadene.   Neurological: Negative.    Psychiatric/Behavioral: Negative.    12/7/23 Dr. Gale.     11/27 Dr. Moran (cardiology)  Spoke with  who will put referral in for Home Care nurse/PT and OT.  Wife and pt pleased with this intervention.  Objective     Vitals:    11/17/23 1121   BP: (!) 143/67   Patient Position: Sitting   Pulse: (!) 51   Temp: 36.6 °C (97.8 °F)   SpO2: 99%     There is no height or weight on file to calculate BMI.    Physical Exam    Assessment/Plan   Diagnoses and Orders Associated With This Visit:  There are no diagnoses linked to this encounter.

## 2023-11-17 NOTE — RAD ONC COMPLETION NOTE
Formerly [facility] RADIATION THERAPY  [address] [place] [address]  Dept: [phone]  Loc: [phone]    Objective   Cam Rosas, 1950, has completed a course of radiation.  Cam Rosas was seen and treated in Radiation Oncology at [facility] RADIATION THERAPY.     Radha Rosas, MRN: [id number]   Patient Active Problem List   Diagnosis    Paroxysmal atrial fibrillation (CMS/HCC)    Chronic combined systolic and diastolic CHF (congestive heart failure) (CMS/HCC)    Malignant neoplasm of right male breast (CMS/HCC)    Electrolyte abnormality    Gastrointestinal hemorrhage with melena    CKD (chronic kidney disease)    Prostate cancer (CMS/HCC)    Acute systolic heart failure (CMS/HCC)    APC (atrial premature contractions)    Dehydration determined by examination    Essential hypertension    Herniation of lumbar intervertebral disc with radiculopathy    Hyperlipidemia    Spinal stenosis of lumbar region    Spondylosis of lumbosacral region    Stage 3a chronic kidney disease (CMS/HCC)    BRCA1 positive    Monoallelic mutation of OK gene    Long term current use of amiodarone    Acute renal failure superimposed on stage 3a chronic kidney disease (CMS/HCC)       Below you will find the details of the course of radiation therapy.  He tolerated the treatment well.      Treatment Intent: adjuvant.  Cancer Staging   Malignant neoplasm of right male breast (CMS/HCC)  Staging form: Breast, AJCC 8th Edition  - Clinical stage from 3/20/2023: Stage IA (cT1c, cN0, cM0, G3, ER+, TX+, HER2+) - Signed by Barb Osuna MD on 3/20/2023  - Pathologic stage from 8/28/2023: No Stage Recommended (ypT1c, pN1a(sn), cM0, G3, ER+, TX+, HER2+) - Signed by Barb Osuna MD on 8/28/2023    Prostate cancer (CMS/HCC)  Staging form: Prostate, AJCC 8th Edition  - Pathologic stage from 7/27/2022: Stage Unknown (pT3b, pNX, cM0, PSA: 6, Grade Group: 3) - Signed by  [facility], Gregory J, MD on 3/8/2023       Performance Status at the End of Treatment: 70, Cares for self; unable to carry on normal activity or to do active work..    LABS:  CMP  Lab Results   Component Value Date    ALBUMIN 3.6 11/03/2023    CO2 29 11/03/2023    BUN 18 11/03/2023    CREATININE 1.14 11/03/2023    GLUCOSE 93 11/03/2023    AST 39 (H) 11/03/2023    ALT 18 11/03/2023    EGFR 68 11/03/2023    EGFR 59.3 (L) 08/18/2023    EGFR 54.1 (L) 08/08/2023    EGFR 50 (L) 07/20/2023    EGFR 57 (L) 04/24/2023    EGFR 41 (L) 02/23/2023     CBC  Lab Results   Component Value Date    WBC 3.7 (L) 11/03/2023    HGB 10.6 (L) 11/03/2023    HCT 32.0 (L) 11/03/2023    .9 (H) 11/03/2023     11/03/2023     (L) 08/18/2023     08/08/2023     04/24/2023     12/01/2022     10/23/2022     Tumor Marker  Lab Results   Component Value Date    PSA 0.11 04/24/2023        Treatment Plan Summary  Radiation Therapy  Radiation Treatments     Active   No active radiation treatments to show.   Historical   Plans   1a_part pelv   Most recent treatment: Dose planned: 180 cGy (fraction 28 of 28 on 5/22/2023)   Total: Dose planned: 5,040 cGy   Elapsed Days: 55 days as of 2023-06-07 @ 14:2817      1b_cd prosbed   Most recent treatment: Dose planned: 180 cGy (fraction 11 of 11 on 6/7/2023)   Total: Dose planned: 1,980 cGy   Elapsed Days: 55 days as of 2023-06-07 @ 14:2817      2a_right nds   Most recent treatment: Dose planned: 200 cGy (fraction 25 of 25 on 11/1/2023)   Total: Dose planned: 5,000 cGy   Elapsed Days: 34 days as of 2023-11-01 @ 10:3209      2b_R CW NoBol   Most recent treatment: Dose planned: 200 cGy (fraction 10 of 10 on 11/1/2023)   Total: Dose planned: 2,000 cGy   Elapsed Days: 34 days as of 2023-11-01 @ 10:3209      2b_right CW   Most recent treatment: Dose planned: 200 cGy (fraction 15 of 15 on 10/18/2023)   Total: Dose planned: 5,000 cGy   Elapsed Days: 34 days as of 2023-11-01 @  10:3209      1a_part pelv   Most recent treatment: Dose planned: 180 cGy (fraction 0 of 28 on 10/18/2023)   Total: Dose planned: 5,040 cGy   Elapsed Days: : @ --      1a_part pelv1   Most recent treatment: Dose planned: 180 cGy (fraction 0 of 28 on 10/18/2023)   Total: Dose planned: 5,040 cGy   Elapsed Days: : @ --      1b_cd prosbd1   Most recent treatment: Dose planned: 180 cGy (fraction 0 of 11 on 10/18/2023)   Total: Dose planned: 1,980 cGy   Elapsed Days: : @ --      1b_cd prosbed   Most recent treatment: Dose planned: 180 cGy (fraction 0 of 11 on 10/18/2023)   Total: Dose planned: 1,980 cGy   Elapsed Days: : @ --      2b_RtCWBol 15   Most recent treatment: Dose planned: 200 cGy (fraction 0 of 15 on 10/18/2023)   Total: Dose planned: 3,000 cGy   Elapsed Days: : @ --      2 flds   Most recent treatment: Dose planned: -- (fraction 0 of 25 on 9/18/2023)   Total: Dose planned: --   Elapsed Days: : @ --      2a_rt nodEZ0   Most recent treatment: Dose planned: 200 cGy (fraction 0 of 25 on 9/18/2023)   Total: Dose planned: 5,000 cGy   Elapsed Days: : @ --      2a_rt nodes   Most recent treatment: Dose planned: 200 cGy (fraction 0 of 25 on 9/18/2023)   Total: Dose planned: 5,000 cGy   Elapsed Days: : @ --      2b_flds   Most recent treatment: Dose planned: 200 cGy (fraction 0 of 25 on 9/18/2023)   Total: Dose planned: 5,000 cGy   Elapsed Days: : @ --      2b_fldsEZ0   Most recent treatment: Dose planned: 200 cGy (fraction 0 of 25 on 9/18/2023)   Total: Dose planned: 5,000 cGy   Elapsed Days: : @ --      Reference Points   1_calc   Most recent treatment: Dose given: 183 cGy (on 6/7/2023)   Total: Dose given: 7,127 cGy   Elapsed Days: 55 days as of 2023-06-07 @ 14:2817      1a_part pelv   Most recent treatment: Dose given: 180 cGy (on 5/22/2023)   Total: Dose given: 5,040 cGy   Elapsed Days: 55 days as of 2023-06-07 @ 14:2817      1b_cd prosbed   Most recent treatment: Dose given: 180 cGy (on 6/7/2023)   Total: Dose  given: 1,980 cGy   Elapsed Days: 55 days as of 2023-06-07 @ 14:2817      1c_trgt prosbed   Most recent treatment: Dose given: 180 cGy (on 6/7/2023)   Total: Dose given: 7,020 cGy   Elapsed Days: 55 days as of 2023-06-07 @ 14:2817      2a_calc Nodes   Most recent treatment: Dose given: 200 cGy (on 11/1/2023)   Total: Dose given: 5,000 cGy   Elapsed Days: 34 days as of 2023-11-01 @ 10:3209      2a_rt nodes   Most recent treatment: Dose given: 200 cGy (on 11/1/2023)   Total: Dose given: 5,000 cGy   Elapsed Days: 34 days as of 2023-11-01 @ 10:3209      2b_calc CW   Most recent treatment: Dose given: 202 cGy (on 11/1/2023)   Total: Dose given: 5,051 cGy   Elapsed Days: 34 days as of 2023-11-01 @ 10:3209      2b_right CW   Most recent treatment: Dose given: 200 cGy (on 11/1/2023)   Total: Dose given: 5,000 cGy   Elapsed Days: 34 days as of 2023-11-01 @ 10:3209      1_calc   Most recent treatment: Course completed on 10/18/2023   Total: Dose given: 0 cGy   Elapsed Days: : @ --      1a_part pelv   Most recent treatment: Course completed on 10/18/2023   Total: Dose given: 0 cGy   Elapsed Days: : @ --      1b_cd prosbed   Most recent treatment: Course completed on 10/18/2023   Total: Dose given: 0 cGy   Elapsed Days: : @ --      1c_trgt prosbed   Most recent treatment: Course completed on 10/18/2023   Total: Dose given: 0 cGy   Elapsed Days: : @ --      2b_calc CW   Most recent treatment: Course completed on 10/18/2023   Total: Dose given: 0 cGy   Elapsed Days: : @ --      2b_right CW   Most recent treatment: Course completed on 10/18/2023   Total: Dose given: 0 cGy   Elapsed Days: : @ --      2a_calc Nodes   Most recent treatment: Course completed on 9/18/2023   Total: Dose given: 0 cGy   Elapsed Days: : @ --      2a_rt nodes   Most recent treatment: Course completed on 9/18/2023   Total: Dose given: 0 cGy   Elapsed Days: : @ --      2b_calc CW   Most recent treatment: Course completed on 9/18/2023   Total: Dose given: 0 cGy    Elapsed Days: : @ --                        Assessment/Plan See follow-up note.    Plan for Follow-up: We plan to see the patient back in in 3 months to monitor immediate post-therapy progress, which will be further documented.  The patient is also informed of the need for continuing follow-up through your office.    CMS Clinical Data Elements  End of Treatment  [facility] ON[facility] RADIATION COMPLETION FORM ADVANCED       11/17/2023 11:55 AM

## 2023-11-17 NOTE — LETTER
November 17, 2023                                                          Patient: Radha Rosas   YOB: 1950   Date of Visit: 11/17/2023       Dear Dr. Collins:    The patient is seen at the [facility] RADIATION THERAPY today. Attached is my assessment and plan of care.  Thank you for the opportunity to share in Radha Rosas's care.       Sincerely,        Gregory J. Ochsner, MD      CC: No Recipients

## 2023-11-20 ENCOUNTER — TELEPHONE (OUTPATIENT)
Dept: CARDIOLOGY | Facility: CLINIC | Age: 73
End: 2023-11-20
Payer: MEDICARE

## 2023-11-20 RX ORDER — TORSEMIDE 20 MG/1
20 TABLET ORAL DAILY PRN
Qty: 30 TABLET | Refills: 3 | Status: SHIPPED | OUTPATIENT
Start: 2023-11-20 | End: 2024-01-10 | Stop reason: HOSPADM

## 2023-11-20 RX ORDER — POTASSIUM CHLORIDE 20 MEQ/1
TABLET, EXTENDED RELEASE ORAL
Qty: 30 TABLET | Refills: 3 | Status: SHIPPED | OUTPATIENT
Start: 2023-11-20 | End: 2024-01-10 | Stop reason: HOSPADM

## 2023-11-20 NOTE — TELEPHONE ENCOUNTER
"Dr Moran patient. Seen on  4/17/23. PMH: systolic HF, afib, prostate Ca, CKD, breast cancer on chemotherapy underwent BX/REMV,LYMPH NODE,DEEP AXILLARY on 8/17/2023, melena, and history of mitral annular ring placement by Dr. Pastor in 2006.    Call from [facility]. DANIELA got a referral from Radiation therapy. She is seeing patient for skin breakdown following XRT at mastectomy site, as well as fall/weakness, PT, OT.    Veronica saw patient today. He has significant swelling in BLE left greater than right, instep to mid calf. Both legs are taut and slightly weepy. They left is mildly pink and she is concerned may be heading towards cellulitis.     He told her Torsemide \"20 mg 1/2 table when weight is 186 or greater\" but he has lost significant weight and she thinks those paramaters may need to be re-evaluated.      She told the patient she thinks he probably needs to take Torsemide 20 mg tablets today for edema and that she would call you. Torsemide seems to have fallen off of med profile. Unable to locate any conversation that it was DC.     178 lbs  62 bpm regular  110/62 sitting  LCTA  No SOB, cough, CP palpitations.    [phone]      "

## 2023-11-20 NOTE — TELEPHONE ENCOUNTER
I called and spoke with Veronica.  I told her we are going to have Chops take the torsemide 20 mg today and tomorrow.  I told her we are going to order him potassium 20 mEq daily to take with the torsemide.  She stated she will call the family.  I told her I would check in with him on Wednesday.  I also let her know he has an echo and an appointment with Dr. Moran on Monday at [place].  She said she will call the family and let them know.

## 2023-11-22 ENCOUNTER — TELEPHONE (OUTPATIENT)
Dept: RADIATION ONCOLOGY | Facility: HOSPITAL | Age: 73
End: 2023-11-22
Payer: MEDICARE

## 2023-11-22 NOTE — TELEPHONE ENCOUNTER
I called and spoke with Mrs. Rosas.  She informed me Radha did not take the torsemide yesterday because he was out at appointments all day. His weight was 178 lbs yesterday, the same as it was on Monday. He is not up yet so she does not have a weight from today.  I instructed her to have him take the torsemide 20 mg today and tomorrow. I confirmed he is getting potassium 20 mEq with the torsemide. I instructed her to give him the torsemide today and tomorrow and that I would check in on Friday.  She verbally understood.

## 2023-11-22 NOTE — TELEPHONE ENCOUNTER
Spoke to pts home care nurse gianni in regards to silvadene cream he is to use that twice daily and can use aquaphor if having discomfort between silvadene applicatiions

## 2023-11-24 NOTE — TELEPHONE ENCOUNTER
I called to follow up with Mr. Rosas's weight and symptoms.  I spoke with his wife Fiorella and she informed me he had the torsemide and potassium on Wed and Monday. She said it was too busy with the holiday for him to take it yesterday.  She said his weight is down.  Weight today is 174.5 lbs from 178 lbs on Tuesday. She said the lower extremity edema has improved. He denies any shortness of breath or lower extremity edema.

## 2023-11-27 ENCOUNTER — OFFICE VISIT (OUTPATIENT)
Dept: CARDIOLOGY | Facility: CLINIC | Age: 73
End: 2023-11-27
Payer: MEDICARE

## 2023-11-27 ENCOUNTER — HOSPITAL ENCOUNTER (OUTPATIENT)
Dept: CARDIOLOGY | Facility: CLINIC | Age: 73
Discharge: HOME | End: 2023-11-27
Attending: INTERNAL MEDICINE
Payer: MEDICARE

## 2023-11-27 VITALS
SYSTOLIC BLOOD PRESSURE: 134 MMHG | RESPIRATION RATE: 18 BRPM | HEIGHT: 65 IN | BODY MASS INDEX: 29.49 KG/M2 | WEIGHT: 177 LBS | OXYGEN SATURATION: 94 % | HEART RATE: 59 BPM | DIASTOLIC BLOOD PRESSURE: 72 MMHG

## 2023-11-27 VITALS
SYSTOLIC BLOOD PRESSURE: 118 MMHG | WEIGHT: 177 LBS | BODY MASS INDEX: 29.49 KG/M2 | DIASTOLIC BLOOD PRESSURE: 58 MMHG | HEIGHT: 65 IN

## 2023-11-27 DIAGNOSIS — R60.0 LOCALIZED EDEMA: ICD-10-CM

## 2023-11-27 DIAGNOSIS — I50.33 ACUTE ON CHRONIC DIASTOLIC (CONGESTIVE) HEART FAILURE: Primary | ICD-10-CM

## 2023-11-27 DIAGNOSIS — I48.91 ATRIAL FIBRILLATION, UNSPECIFIED TYPE (CMS/HCC): ICD-10-CM

## 2023-11-27 DIAGNOSIS — Z79.899 LONG TERM CURRENT USE OF AMIODARONE: ICD-10-CM

## 2023-11-27 DIAGNOSIS — N18.31 STAGE 3A CHRONIC KIDNEY DISEASE (CMS/HCC): ICD-10-CM

## 2023-11-27 LAB
AORTIC ROOT ANNULUS: 3.2 CM
AORTIC VALVE MEAN VELOCITY: 0.66 M/S
AORTIC VALVE VELOCITY TIME INTEGRAL: 23.7 CM
ASCENDING AORTA: 3.2 CM
AV MEAN GRADIENT: 2 MMHG
AV MEAN GRADIENT: 2 MMHG
AV PEAK GRADIENT: 4 MMHG
AV PEAK VELOCITY-S: 0.98 M/S
AV VALVE AREA INDEX: 1.49
AV VALVE AREA: 2.39 CM2
AV VELOCITY RATIO: 0.63
AVA (VTI): 2.86 CM2
BSA FOR ECHO PROCEDURE: 1.92 M2
CUSP SEPARATION: 2.2 CM
DOP CALC LVOT STROKE VOLUME: 67.82 CM3
E WAVE DECELERATION TIME: 222 MS
E/A RATIO: 4.8
E/E' RATIO: 29.6
E/LAT E' RATIO: 12
EDV (BP): 80.5 CM3
EF (A4C): 54.9 %
EF A2C: 58.9 %
EJECTION FRACTION: 57.9 %
EST RIGHT VENT SYSTOLIC PRESSURE BY TRICUSPID REGURGITATION JET: 54 MMHG
ESV (BP): 33.9 CM3
FRACTIONAL SHORTENING: 31.25 %
INTERVENTRICULAR SEPTUM: 1.22 CM
LA ESV (BP): 98.6 CM3
LA ESV INDEX (A2C): 55.21 CM3/M2
LA ESV INDEX (BP): 51.35 CM3/M2
LA/AORTA RATIO: 1.66
LAAS-AP2: 28.7 CM2
LAAS-AP4: 26.4 CM2
LAD 2D: 5.3 CM
LALD A4C: 6.38 CM
LALD A4C: 6.57 CM
LAV-S: 106 CM3
LEFT ATRIUM VOLUME INDEX: 46.72 CM3/M2
LEFT ATRIUM VOLUME: 89.7 CM3
LEFT INTERNAL DIMENSION IN SYSTOLE: 3.63 CM (ref 2.63–3.98)
LEFT VENTRICLE DIASTOLIC VOLUME INDEX: 42.81 CM3/M2
LEFT VENTRICLE DIASTOLIC VOLUME: 82.2 CM3
LEFT VENTRICLE SYSTOLIC VOLUME INDEX: 19.38 CM3/M2
LEFT VENTRICLE SYSTOLIC VOLUME: 37.2 CM3
LEFT VENTRICULAR INTERNAL DIMENSION IN DIASTOLE: 5.28 CM (ref 4.46–6.19)
LEFT VENTRICULAR POSTERIOR WALL IN END DIASTOLE: 1.25 CM (ref 0.58–1.09)
LV DIASTOLIC VOLUME: 74.5 CM3
LV ESV (APICAL 2 CHAMBER): 30.6 CM3
LVAD-AP2: 26.6 CM2
LVAD-AP4: 27.3 CM2
LVAS-AP2: 17 CM2
LVAS-AP4: 17.8 CM2
LVEDVI(A2C): 38.8 CM3/M2
LVEDVI(BP): 41.93 CM3/M2
LVESVI(A2C): 15.94 CM3/M2
LVESVI(BP): 17.66 CM3/M2
LVLD-AP2: 8.26 CM
LVLD-AP4: 7.81 CM
LVLS-AP2: 7.64 CM
LVLS-AP4: 7.35 CM
LVOT 2D: 2.4 CM
LVOT A: 4.52 CM2
LVOT MG: 1 MMHG
LVOT MV: 0.46 M/S
LVOT PEAK VELOCITY: 0.66 M/S
LVOT PG: 2 MMHG
LVOT STROKE VOLUME INDEX: 35.33 ML/M2
LVOT VTI: 15 CM
MLH CV ECHO AVA INDEX VELOCITY RATIO: 1.2
MV E'TISSUE VEL-LAT: 0.14 M/S
MV E'TISSUE VEL-MED: 0.06 M/S
MV MEAN GRADIENT: 2 MMHG
MV PEAK A VEL: 0.34 M/S
MV PEAK E VEL: 1.66 M/S
MV PEAK GRADIENT: 10 MMHG
MV STENOSIS PRESSURE HALF TIME: 65 MS
MV VALVE AREA BY CONTINUITY EQUATION: 1.81 CM2
MV VALVE AREA P 1/2 METHOD: 3.38 CM2
MV VTI: 37.5 CM
POSTERIOR WALL: 1.25 CM
RAP: 8 MMHG
RVOT VMAX: 0.35 M/S
SEPTAL TISSUE DOPPLER FREE WALL LATE DIA VELOCITY (APICAL 4 CHAMBER VIEW): 0.1 M/S
TR MAX PG: 46.24 MMHG
TRICUSPID VALVE PEAK REGURGITATION VELOCITY: 3.4 M/S
Z-SCORE OF LEFT VENTRICULAR DIMENSION IN END DIASTOLE: 0.06
Z-SCORE OF LEFT VENTRICULAR DIMENSION IN END SYSTOLE: 0.9
Z-SCORE OF LEFT VENTRICULAR POSTERIOR WALL IN END DIASTOLE: 2.35

## 2023-11-27 PROCEDURE — G8754 DIAS BP LESS 90: HCPCS | Performed by: INTERNAL MEDICINE

## 2023-11-27 PROCEDURE — 93000 ELECTROCARDIOGRAM COMPLETE: CPT | Performed by: INTERNAL MEDICINE

## 2023-11-27 PROCEDURE — G8752 SYS BP LESS 140: HCPCS | Performed by: INTERNAL MEDICINE

## 2023-11-27 PROCEDURE — 93306 TTE W/DOPPLER COMPLETE: CPT | Performed by: INTERNAL MEDICINE

## 2023-11-27 PROCEDURE — 99214 OFFICE O/P EST MOD 30 MIN: CPT | Performed by: INTERNAL MEDICINE

## 2023-11-27 RX ORDER — BETAMETHASONE DIPROPIONATE 0.5 MG/G
CREAM TOPICAL AS NEEDED
COMMUNITY
Start: 2023-11-19 | End: 2024-01-10 | Stop reason: HOSPADM

## 2023-11-27 NOTE — LETTER
"November 27, 2023     Usman Collins MD  [facility] [address]    Patient: Cam Rosas  YOB: 1950  Date of Visit: 11/27/2023      Dear Dr. Collins:    Thank you for referring Cam Rosas to me for evaluation. Below are my notes for this consultation.    If you have questions, please do not hesitate to call me. I look forward to following your patient along with you.         Sincerely,        Gary Moran MD        CC: MD Bill Sanford, MD Gary House MD Domsky, Gary SANTOS MD  11/27/2023  5:20 PM  Sign when Signing Visit   Cardiology Note          HPI   Cam Rosas \"JH\" is a 73 y.o. man who presents followup of heart failure to our advanced heart failure clinic, originally referred by Dr. Aceves.    \"Jh\" has a past medical history of newly diagnosed atrial fibrillation in October with RVR and hospital admission where they also found his EF to be down to 40% s/p new chemotherapy for breast cancer; prostate cancer, CKD Stage 2, recent melena with no active bleeding on EGD, and s/p mitral annual ring placement by Dr. Pastor in 2006.  He was following with Dr. Ra Barajas, cardiologist, for 15 years, who unfortunately passed away late 2022.     [facility] reports he has not had a weak heart nor atrial fibrillation prior to these events in the late fall of 2022. He was cardioverted after being on amiodarone and Xarelto for 4 weeks by Dr. Aceves on December 5th, 2022. Since that time he has had no further atrial fibrillation. He has been feeing much improved as his weight is down to 186 from 204 lbs. He had an increase in his weight for months since he was initiated on chemo and decadron. He had one dose of Cancian T in September and one dose of Taxotere and carboplastin. He has had no further chemo as he did not tolerate these medications and this is what lead to him going into atrial fibrillation. He is now on " Tamoxifen for his breat cancer and radiation treatments for his prostate cancer. He is post prostatectomy.     Since his last visit 7 months ago he reports fair cardiovascular stability. He completed radiation treatment for his prostate cancer in the spring.  He underwent a right mastectomy in August.  One of the nodes were positive, so he began radiation therapy to his right chest in late September.  4 days later he called with worsening lower extremity edema.  We asked him to take torsemide 10 mg.  After about 10 days of this, home care nurse reported orthostatic vital signs though he felt well.  His weight had decreased from 184 pounds down to 180 pounds on the daily low-dose torsemide.  I then reduce his torsemide to 10 mg 3 times per week.  He followed up with Dr. Aceves in late September who found him to be well but mildly bradycardic, so diltiazem was discontinued.  Home care nurse called a week ago with worsening lower extremity edema and redness.  His weight was 178 pounds but his home care nurse felt he probably lost regular weight and this still represented volume overload.  After 2 doses of torsemide 20 mg, his weight was 174.5 pounds and his legs were better. He has been taking it 3x/week on average and weight has now been 178-179 with noticible edema. He has been avoiding salt. He estimates he does not take in excessive amounts of fluid though it includes a fair amount of Gatorade and we discussed this. He is not formally exercising but he does not experience shortness of breath including with physical therapy. To his knowledge he has not had a reoccurrence of atrial fibrillation though he has never felt it. He denies chest pain, dyspnea at rest, orthopnea, PND, edema or abdominal bloating. He denies dizziness or lightheadedness.          Past Medical History:   Diagnosis Date    Acute systolic heart failure (CMS/HCC) 02/15/2023    Ambulates with cane     and walker    Atrial fibrillation (CMS/HCC)      Breast cancer (CMS/[facility]) October 2022    CHF (congestive heart failure) (CMS/[facility])     Chronic kidney disease     CKD (chronic kidney disease) 10/19/2022    History of radiation therapy     Hx antineoplastic chemo     Prostate cancer (CMS/[facility])      Past Surgical History:   Procedure Laterality Date    CARDIAC SURGERY      mitral valve repair 2006    CARDIOVERSION  12/05/2022    Successful DC cardioversion    KNEE CARTILAGE SURGERY Left     MASTECTOMY      PROSTATE SURGERY  July 2022    PROSTATECTOMY  07/15/2022    TONSILLECTOMY       Social History     Tobacco Use    Smoking status: Never    Smokeless tobacco: Never   Vaping Use    Vaping Use: Never used   Substance Use Topics    Alcohol use: Yes     Alcohol/week: 14.0 standard drinks of alcohol     Types: 14 Shots of liquor per week     Comment: 2 drinks/day - scotch    Drug use: Never       Family Status   Relation Name Status    Bio Mother mother (Not Specified)    Bio Father Dad (Not Specified)    Bio Sister  (Not Specified)    Bio Brother  (Not Specified)    MGM Belle (Not Specified)    MGF  (Not Specified)    PGM  (Not Specified)    PGF  (Not Specified)    Other son (Not Specified)        ALLERGIES  Azithromycin, Prednisone, and Lorazepam      Outpatient Encounter Medications as of 11/27/2023:     acetaminophen (TYLENOL EX STR RAPID RELEASE ORAL), Take 500 mg by mouth as needed.    acetaminophen (TYLENOL) 500 mg tablet, Take 2 tablets (1,000 mg total) by mouth every 6 (six) hours as needed for pain.    amiodarone (PACERONE) 200 mg tablet, Take 1 tablet (200 mg total) by mouth daily.    betamethasone dipropionate 0.05 % cream, Apply topically as needed.    cetirizine (ZyrTEC) 10 mg tablet, Take 10 mg by mouth nightly.    cholecalciferol, vitamin D3, 1,000 unit (25 mcg) tablet, Take 1,000 Units by mouth daily.    guaiFENesin (MUCINEX) 600 mg 12 hr tablet, Take 1,200 mg by mouth 2 (two) times a day.    metoprolol succinate  "XL (TOPROL-XL) 50 mg 24 hr tablet, Take 1 tablet (50 mg total) by mouth daily.    pantoprazole (PROTONIX) 40 mg EC tablet, Take 1 tablet (40 mg total) by mouth 2 (two) times a day.    potassium chloride (KLOR-CON M) 20 mEq CR tablet, Take 1 tablet (20 mEq) daily when you take as needed  torsemide.    rivaroxaban (XARELTO) 15 mg tablet, Take 1 tablet (15 mg total) by mouth daily with dinner.    simvastatin (ZOCOR) 20 mg tablet, Take 1 tablet (20 mg total) by mouth nightly.    tamoxifen (NOLVADEX) 20 mg chemo tablet, Take  1 tablet (20 mg total) daily    torsemide (DEMADEX) 20 mg tablet, Take 1 tablet (20 mg total) by mouth daily as needed (for weight gain 3 lbs overnight, 5 lbs in a week, lower extremity edema).    silver sulfadiazine (SILVADENE) 1 % cream, Apply topically 2 (two) times a day. Apply to affected area. (Patient not taking: Reported on 11/17/2023)    [DISCONTINUED] sodium chloride 0.9 % infusion,     ROS as above in HPI.  Additionally, depression, rash and muscle weakness.  Otherwise all relevant systems were reviewed and are negative.    Objective   Visit Vitals  /72 (BP Location: Left upper arm, Patient Position: Sitting)   Pulse (!) 59   Resp 18   Ht 1.651 m (5' 5\")   Wt 80.3 kg (177 lb)   SpO2 94%   BMI 29.45 kg/m²       Wt Readings from Last 3 Encounters:   11/27/23 80.3 kg (177 lb)   11/27/23 80.3 kg (177 lb)   11/09/23 79.4 kg (175 lb)       Physical Exam  HENT:      Head: Normocephalic and atraumatic.      Nose: Nose normal.   Eyes:      Pupils: Pupils are equal, round, and reactive to light.   Neck:      Vascular: No carotid bruit or JVD.      Comments: JVP 9cm  Cardiovascular:      Rate and Rhythm: Normal rate and regular rhythm.      Pulses: Intact distal pulses.      Heart sounds: Normal heart sounds, S1 normal and S2 normal. No murmur heard.     No friction rub. No gallop.   Pulmonary:      Effort: No respiratory distress.      Breath sounds: Normal breath sounds. No wheezing " "or rales.   Abdominal:      General: Bowel sounds are normal.      Palpations: Abdomen is soft. There is no mass.      Tenderness: There is no abdominal tenderness.   Musculoskeletal:         General: Swelling (2+ b/l LE edema to upper shin) present. Normal range of motion.   Skin:     General: Skin is warm and dry.   Neurological:      Mental Status: He is alert and oriented to person, place, and time.   Psychiatric:         Behavior: Behavior normal.         Judgment: Judgment normal.         Lab Results   Component Value Date    GLUCOSE 93 11/03/2023    CALCIUM 8.3 (L) 11/03/2023     11/03/2023    K 3.9 11/03/2023    CO2 29 11/03/2023     11/03/2023    BUN 18 11/03/2023    CREATININE 1.14 11/03/2023     Lab Results   Component Value Date    ALT 18 11/03/2023    AST 39 (H) 11/03/2023    ALKPHOS 136 11/03/2023    BILITOT 0.7 11/03/2023     Lab Results   Component Value Date    WBC 3.7 (L) 11/03/2023    HGB 10.6 (L) 11/03/2023    HCT 32.0 (L) 11/03/2023    .9 (H) 11/03/2023     11/03/2023     Lab Results   Component Value Date    TSH 0.40 11/03/2023     No results found for: \"CHOL\"  No results found for: \"HDL\"  No results found for: \"LDLCALC\"  No results found for: \"TRIG\"  No results found for: \"CHOLHDL\"  No results found for: \"HGBA1C\"  Labs October 2022:\       EKG    Performed on 11/27/2023 and personally reviewed:  Sinus  Bradycardia at 56bpm  Low voltage in limb leads.    -Nonspecific QRS widening.    -Nonspecific ST depression   +   T-abnormality  -Nondiagnostic     Imaging  TTE Sept 2022  Conclusions:   Ejection fraction is visually estimated at 50-55 %. No regional wall motion abnormalities   were appreciated on today's study.   Strain evaluation was technically difficult due to presence of PVC.   The mitral valve leaflets are status post repair. A mitral annuloplasty ring is present.   Mild mitral regurgitation.   Estimated PASP is 33 mmHg. No evidence of pulmonary hypertension. "   Indeterminate rhythm on ECG. cannot rule out AF. Clinical correlation suggested.   No prior for comparison.     TTE October 2022  Conclusions:   Ejection fraction is visually estimated at 40-45 %. There is global left ventricular   hypokinesis. Patient was tachycardiac with a rate of 130 bpm.   Mild mitral annular calcification. The mitral valve leaflets are status post repair. A   mitral annuloplasty ring is present. Mild mitral regurgitation.   Compared to prior echocardiogram, EF has now declined from 55% to 40%.     TTE April 2023    Left Ventricle: Normal ventricle size. Mild concentric left ventricular hypertrophy. No outflow tract obstruction present. Low normal systolic function. Estimated EF 55%. Wall motion appears grossly normal. Grade II diastolic dysfunction.    Right Ventricle: Normal ventricle size. Increased wall thickness. Normal systolic function.    Left Atrium: Moderately dilated atrium. Cannot exclude patent foramen ovale (PFO).    Right Atrium: Mildly dilated atrium.    Aortic Valve: Tricuspid valve.  Sclerotic leaflets. Trace regurgitation. No stenosis.    Mitral Valve: Mitral annuloplasty ring present. Mild regurgitation.    Tricuspid Valve: Normal structure. Mild regurgitation. The regurgitation jet is eccentric. Estimated RVSP = 52 mmHg.    Pulmonic Valve: Normal structure. Trace regurgitation. Mildly dilated pulmonary artery.    Aorta: Aortic root normal. Sinuses of Valsalva normal-sized. Ascending aorta normal-sized.    IVC/SVC: Inferior vena cava is <2.1cm. Inferior vena cava demonstrates normal respiratory collapse.    Pericardium: Normal structure.    Compared with echo report from October 2022, LV function has normalized.    I personally reviewed results with him today in the office.    TTE 11/27/2023:    Left Ventricle: Normal ventricle size. Mild concentric left ventricular hypertrophy. No outflow tract obstruction present. Low normal systolic function. Estimated EF  55%. Wall motion appears grossly normal. Grade II diastolic dysfunction.    Right Ventricle: Mildly to moderately dilated ventricle size. Increased wall thickness. RVOT doppler shows VTI =10, possible systolic notching, time to peak acceleration of 70-80ms. This suggests normal output and elevated pulmonary vascular resistance. RV s'=10cm/s. TAPSE=2.1cm. Normal systolic function.    Left Atrium: Moderately dilated atrium.    Right Atrium: Mildly dilated atrium.    Aortic Valve: Tricuspid valve.  Sclerotic leaflets. Trace regurgitation. No stenosis. Calculated dimensionless index = 0.63.    Mitral Valve: Mitral annuloplasty ring present. Mild regurgitation. The jet is centrally directed.    Tricuspid Valve: Normal structure. Mild regurgitation. The regurgitation jet is eccentric. Estimated RVSP = 54 mmHg.    Pulmonic Valve: Normal structure. Trace regurgitation. Mildly dilated pulmonary artery.    Aorta: Aortic root normal. Sinuses of Valsalva normal-sized. Ascending aorta normal-sized.    IVC/SVC: Inferior vena cava is <2.1cm. Inferior vena cava collapses <50% during inspiration.    Pericardium: There is a trivial circumferential pericardial effusion.    Compared with April 2023, RV is now dilated.    I personally reviewed results with him today in the office.        Assessment   1.  Acute on chronic diastolic heart failure, ACC Stage C, NYHA class 2. HFrecEF.   Acute change within one month with the only change being new atrial fibrillation with RVR at the time of the echocardiogram in October 2022. He has been out of atrial fibrillation now since December.  Repeat echocardiogram shows his ejection fraction has rebounded from 40% back to his baseline of 55%.  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  His diuretics have been scaled back, but he has become volume overloaded recently.  I considered lower extremity ultrasound of his legs, but he has been on therapeutic anticoagulation and  an ultrasound of his legs were negative for DVT in January when his edema was much worse.  Based on exam today at 178 pounds, his dry weight is likely in the low 170s.  I asked him to increase torsemide to most days of the week.  He will take potassium on days he takes torsemide.  Given prior acute kidney injury, I asked him to get a basic metabolic profile in 2 to 3 weeks.   He will continue to follow a low salt diet. We discussed fluid limitations to 48-64 ounces.    2. Paroxysmal atrial fibrillation  Rhythm control and following with Dr. Aceves. On Xarelto.  On his last 3 checks of his renal function his GFR has been above 50, thus I increased his Xarelto to 20 mg daily.    3.  Acute on CKD Stage 3a  On his last 3 checks of his renal function his GFR has been above 50, thus I increased his Xarelto to 20 mg daily. Baseline creatinine 1.1-1.4.  Given increase in diuretics that are recommended today have asked him to get a basic metabolic profile in 2 to 3 weeks.    4. GI bleed  EGD with no active bleeding.    5. Breast and Prostate Cancer  As above, he will let us know if oncology recommends chemo, plan outlined above.    6.  Long-term use of amiodarone  TSH and LFTs were checked 3 weeks ago and are essentially normal.       Thank you for allowing me to participate in the care of this patient.  I reviewed notes from Ep, oncology, surgery and radiation oncology.  I reviewed interim labs.  I asked him to increase torsemide and potassium to most days of the week. I ordered a basic metabolic profile to be done in 2-3 weeks.  I will plan to see him back in 6 months or sooner should the need arise.  Please do not hesitate to contact me with any questions or concerns.    Sincerely,    Gary Moran MD  11/27/2023

## 2023-11-27 NOTE — PROGRESS NOTES
" Cardiology Note          HPI   Cam Rosas \"RADHA\" is a 73 y.o. man who presents followup of heart failure to our advanced heart failure clinic, originally referred by Dr. Aceves.    \"Radha\" has a past medical history of newly diagnosed atrial fibrillation in October with RVR and hospital admission where they also found his EF to be down to 40% s/p new chemotherapy for breast cancer; prostate cancer, CKD Stage 2, recent melena with no active bleeding on EGD, and s/p mitral annual ring placement by Dr. Pastor in 2006.  He was following with Dr. Ra Barajas, cardiologist, for 15 years, who unfortunately passed away late 2022.     [facility] reports he has not had a weak heart nor atrial fibrillation prior to these events in the late fall of 2022. He was cardioverted after being on amiodarone and Xarelto for 4 weeks by Dr. Aceves on December 5th, 2022. Since that time he has had no further atrial fibrillation. He has been feeing much improved as his weight is down to 186 from 204 lbs. He had an increase in his weight for months since he was initiated on chemo and decadron. He had one dose of Cancian T in September and one dose of Taxotere and carboplastin. He has had no further chemo as he did not tolerate these medications and this is what lead to him going into atrial fibrillation. He is now on Tamoxifen for his breat cancer and radiation treatments for his prostate cancer. He is post prostatectomy.     Since his last visit 7 months ago he reports fair cardiovascular stability. He completed radiation treatment for his prostate cancer in the spring.  He underwent a right mastectomy in August.  One of the nodes were positive, so he began radiation therapy to his right chest in late September.  4 days later he called with worsening lower extremity edema.  We asked him to take torsemide 10 mg.  After about 10 days of this, home care nurse reported orthostatic vital signs though he felt well.  His weight had " decreased from 184 pounds down to 180 pounds on the daily low-dose torsemide.  I then reduce his torsemide to 10 mg 3 times per week.  He followed up with Dr. Aceves in late September who found him to be well but mildly bradycardic, so diltiazem was discontinued.  Home care nurse called a week ago with worsening lower extremity edema and redness.  His weight was 178 pounds but his home care nurse felt he probably lost regular weight and this still represented volume overload.  After 2 doses of torsemide 20 mg, his weight was 174.5 pounds and his legs were better. He has been taking it 3x/week on average and weight has now been 178-179 with noticible edema. He has been avoiding salt. He estimates he does not take in excessive amounts of fluid though it includes a fair amount of Gatorade and we discussed this. He is not formally exercising but he does not experience shortness of breath including with physical therapy. To his knowledge he has not had a reoccurrence of atrial fibrillation though he has never felt it. He denies chest pain, dyspnea at rest, orthopnea, PND, edema or abdominal bloating. He denies dizziness or lightheadedness.          Past Medical History:   Diagnosis Date    Acute systolic heart failure (CMS/HCC) 02/15/2023    Ambulates with cane     and walker    Atrial fibrillation (CMS/HCC)     Breast cancer (CMS/HCC) October 2022    CHF (congestive heart failure) (CMS/HCC)     Chronic kidney disease     CKD (chronic kidney disease) 10/19/2022    History of radiation therapy     Hx antineoplastic chemo     Prostate cancer (CMS/HCC)      Past Surgical History:   Procedure Laterality Date    CARDIAC SURGERY      mitral valve repair 2006    CARDIOVERSION  12/05/2022    Successful DC cardioversion    KNEE CARTILAGE SURGERY Left     MASTECTOMY      PROSTATE SURGERY  July 2022    PROSTATECTOMY  07/15/2022    TONSILLECTOMY       Social History     Tobacco Use    Smoking status: Never     Smokeless tobacco: Never   Vaping Use    Vaping Use: Never used   Substance Use Topics    Alcohol use: Yes     Alcohol/week: 14.0 standard drinks of alcohol     Types: 14 Shots of liquor per week     Comment: 2 drinks/day - scotch    Drug use: Never       Family Status   Relation Name Status    Bio Mother mother (Not Specified)    Bio Father Dad (Not Specified)    Bio Sister  (Not Specified)    Bio Brother  (Not Specified)    MGM Belle (Not Specified)    MGF  (Not Specified)    PGM  (Not Specified)    PGF  (Not Specified)    Other son (Not Specified)        ALLERGIES  Azithromycin, Prednisone, and Lorazepam      Outpatient Encounter Medications as of 11/27/2023:     acetaminophen (TYLENOL EX STR RAPID RELEASE ORAL), Take 500 mg by mouth as needed.    acetaminophen (TYLENOL) 500 mg tablet, Take 2 tablets (1,000 mg total) by mouth every 6 (six) hours as needed for pain.    amiodarone (PACERONE) 200 mg tablet, Take 1 tablet (200 mg total) by mouth daily.    betamethasone dipropionate 0.05 % cream, Apply topically as needed.    cetirizine (ZyrTEC) 10 mg tablet, Take 10 mg by mouth nightly.    cholecalciferol, vitamin D3, 1,000 unit (25 mcg) tablet, Take 1,000 Units by mouth daily.    guaiFENesin (MUCINEX) 600 mg 12 hr tablet, Take 1,200 mg by mouth 2 (two) times a day.    metoprolol succinate XL (TOPROL-XL) 50 mg 24 hr tablet, Take 1 tablet (50 mg total) by mouth daily.    pantoprazole (PROTONIX) 40 mg EC tablet, Take 1 tablet (40 mg total) by mouth 2 (two) times a day.    potassium chloride (KLOR-CON M) 20 mEq CR tablet, Take 1 tablet (20 mEq) daily when you take as needed  torsemide.    rivaroxaban (XARELTO) 15 mg tablet, Take 1 tablet (15 mg total) by mouth daily with dinner.    simvastatin (ZOCOR) 20 mg tablet, Take 1 tablet (20 mg total) by mouth nightly.    tamoxifen (NOLVADEX) 20 mg chemo tablet, Take  1 tablet (20 mg total) daily    torsemide (DEMADEX) 20 mg tablet, Take 1 tablet (20  "mg total) by mouth daily as needed (for weight gain 3 lbs overnight, 5 lbs in a week, lower extremity edema).    silver sulfadiazine (SILVADENE) 1 % cream, Apply topically 2 (two) times a day. Apply to affected area. (Patient not taking: Reported on 11/17/2023)    [DISCONTINUED] sodium chloride 0.9 % infusion,     ROS as above in HPI.  Additionally, depression, rash and muscle weakness.  Otherwise all relevant systems were reviewed and are negative.    Objective   Visit Vitals  /72 (BP Location: Left upper arm, Patient Position: Sitting)   Pulse (!) 59   Resp 18   Ht 1.651 m (5' 5\")   Wt 80.3 kg (177 lb)   SpO2 94%   BMI 29.45 kg/m²       Wt Readings from Last 3 Encounters:   11/27/23 80.3 kg (177 lb)   11/27/23 80.3 kg (177 lb)   11/09/23 79.4 kg (175 lb)       Physical Exam  HENT:      Head: Normocephalic and atraumatic.      Nose: Nose normal.   Eyes:      Pupils: Pupils are equal, round, and reactive to light.   Neck:      Vascular: No carotid bruit or JVD.      Comments: JVP 9cm  Cardiovascular:      Rate and Rhythm: Normal rate and regular rhythm.      Pulses: Intact distal pulses.      Heart sounds: Normal heart sounds, S1 normal and S2 normal. No murmur heard.     No friction rub. No gallop.   Pulmonary:      Effort: No respiratory distress.      Breath sounds: Normal breath sounds. No wheezing or rales.   Abdominal:      General: Bowel sounds are normal.      Palpations: Abdomen is soft. There is no mass.      Tenderness: There is no abdominal tenderness.   Musculoskeletal:         General: Swelling (2+ b/l LE edema to upper shin) present. Normal range of motion.   Skin:     General: Skin is warm and dry.   Neurological:      Mental Status: He is alert and oriented to person, place, and time.   Psychiatric:         Behavior: Behavior normal.         Judgment: Judgment normal.         Lab Results   Component Value Date    GLUCOSE 93 11/03/2023    CALCIUM 8.3 (L) 11/03/2023     11/03/2023    K " "3.9 11/03/2023    CO2 29 11/03/2023     11/03/2023    BUN 18 11/03/2023    CREATININE 1.14 11/03/2023     Lab Results   Component Value Date    ALT 18 11/03/2023    AST 39 (H) 11/03/2023    ALKPHOS 136 11/03/2023    BILITOT 0.7 11/03/2023     Lab Results   Component Value Date    WBC 3.7 (L) 11/03/2023    HGB 10.6 (L) 11/03/2023    HCT 32.0 (L) 11/03/2023    .9 (H) 11/03/2023     11/03/2023     Lab Results   Component Value Date    TSH 0.40 11/03/2023     No results found for: \"CHOL\"  No results found for: \"HDL\"  No results found for: \"LDLCALC\"  No results found for: \"TRIG\"  No results found for: \"CHOLHDL\"  No results found for: \"HGBA1C\"  Labs October 2022:\       EKG    Performed on 11/27/2023 and personally reviewed:  Sinus  Bradycardia at 56bpm  Low voltage in limb leads.    -Nonspecific QRS widening.    -Nonspecific ST depression   +   T-abnormality  -Nondiagnostic     Imaging  TTE Sept 2022  Conclusions:   Ejection fraction is visually estimated at 50-55 %. No regional wall motion abnormalities   were appreciated on today's study.   Strain evaluation was technically difficult due to presence of PVC.   The mitral valve leaflets are status post repair. A mitral annuloplasty ring is present.   Mild mitral regurgitation.   Estimated PASP is 33 mmHg. No evidence of pulmonary hypertension.   Indeterminate rhythm on ECG. cannot rule out AF. Clinical correlation suggested.   No prior for comparison.     TTE October 2022  Conclusions:   Ejection fraction is visually estimated at 40-45 %. There is global left ventricular   hypokinesis. Patient was tachycardiac with a rate of 130 bpm.   Mild mitral annular calcification. The mitral valve leaflets are status post repair. A   mitral annuloplasty ring is present. Mild mitral regurgitation.   Compared to prior echocardiogram, EF has now declined from 55% to 40%.     TTE April 2023    Left Ventricle: Normal ventricle size. Mild concentric left ventricular " hypertrophy. No outflow tract obstruction present. Low normal systolic function. Estimated EF 55%. Wall motion appears grossly normal. Grade II diastolic dysfunction.    Right Ventricle: Normal ventricle size. Increased wall thickness. Normal systolic function.    Left Atrium: Moderately dilated atrium. Cannot exclude patent foramen ovale (PFO).    Right Atrium: Mildly dilated atrium.    Aortic Valve: Tricuspid valve.  Sclerotic leaflets. Trace regurgitation. No stenosis.    Mitral Valve: Mitral annuloplasty ring present. Mild regurgitation.    Tricuspid Valve: Normal structure. Mild regurgitation. The regurgitation jet is eccentric. Estimated RVSP = 52 mmHg.    Pulmonic Valve: Normal structure. Trace regurgitation. Mildly dilated pulmonary artery.    Aorta: Aortic root normal. Sinuses of Valsalva normal-sized. Ascending aorta normal-sized.    IVC/SVC: Inferior vena cava is <2.1cm. Inferior vena cava demonstrates normal respiratory collapse.    Pericardium: Normal structure.    Compared with echo report from October 2022, LV function has normalized.    I personally reviewed results with him today in the office.    TTE 11/27/2023:    Left Ventricle: Normal ventricle size. Mild concentric left ventricular hypertrophy. No outflow tract obstruction present. Low normal systolic function. Estimated EF 55%. Wall motion appears grossly normal. Grade II diastolic dysfunction.    Right Ventricle: Mildly to moderately dilated ventricle size. Increased wall thickness. RVOT doppler shows VTI =10, possible systolic notching, time to peak acceleration of 70-80ms. This suggests normal output and elevated pulmonary vascular resistance. RV s'=10cm/s. TAPSE=2.1cm. Normal systolic function.    Left Atrium: Moderately dilated atrium.    Right Atrium: Mildly dilated atrium.    Aortic Valve: Tricuspid valve.  Sclerotic leaflets. Trace regurgitation. No stenosis. Calculated dimensionless index = 0.63.    Mitral Valve:  Mitral annuloplasty ring present. Mild regurgitation. The jet is centrally directed.    Tricuspid Valve: Normal structure. Mild regurgitation. The regurgitation jet is eccentric. Estimated RVSP = 54 mmHg.    Pulmonic Valve: Normal structure. Trace regurgitation. Mildly dilated pulmonary artery.    Aorta: Aortic root normal. Sinuses of Valsalva normal-sized. Ascending aorta normal-sized.    IVC/SVC: Inferior vena cava is <2.1cm. Inferior vena cava collapses <50% during inspiration.    Pericardium: There is a trivial circumferential pericardial effusion.    Compared with April 2023, RV is now dilated.    I personally reviewed results with him today in the office.        Assessment   1.  Acute on chronic diastolic heart failure, ACC Stage C, NYHA class 2. HFrecEF.   Acute change within one month with the only change being new atrial fibrillation with RVR at the time of the echocardiogram in October 2022. He has been out of atrial fibrillation now since December.  Repeat echocardiogram shows his ejection fraction has rebounded from 40% back to his baseline of 55%.  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  His diuretics have been scaled back, but he has become volume overloaded recently.  I considered lower extremity ultrasound of his legs, but he has been on therapeutic anticoagulation and an ultrasound of his legs were negative for DVT in January when his edema was much worse.  Based on exam today at 178 pounds, his dry weight is likely in the low 170s.  I asked him to increase torsemide to most days of the week.  He will take potassium on days he takes torsemide.  Given prior acute kidney injury, I asked him to get a basic metabolic profile in 2 to 3 weeks.   He will continue to follow a low salt diet. We discussed fluid limitations to 48-64 ounces.    2. Paroxysmal atrial fibrillation  Rhythm control and following with Dr. Aceves. On Xarelto.  On his last 3 checks of his renal function his  GFR has been above 50, thus I increased his Xarelto to 20 mg daily.    3.  Acute on CKD Stage 3a  On his last 3 checks of his renal function his GFR has been above 50, thus I increased his Xarelto to 20 mg daily. Baseline creatinine 1.1-1.4.  Given increase in diuretics that are recommended today have asked him to get a basic metabolic profile in 2 to 3 weeks.    4. GI bleed  EGD with no active bleeding.    5. Breast and Prostate Cancer  As above, he will let us know if oncology recommends chemo, plan outlined above.    6.  Long-term use of amiodarone  TSH and LFTs were checked 3 weeks ago and are essentially normal.       Thank you for allowing me to participate in the care of this patient.  I reviewed notes from Ep, oncology, nephrology, surgery and radiation oncology.  I reviewed interim labs.  I asked him to increase torsemide and potassium to most days of the week. I ordered a basic metabolic profile to be done in 2-3 weeks.  I will plan to see him back in 6 months or sooner should the need arise.  Please do not hesitate to contact me with any questions or concerns.    Sincerely,    Gary Moran MD  11/27/2023

## 2023-11-27 NOTE — PATIENT INSTRUCTIONS
Try to increase Torsemide to nearly daily.  Bloodwork in a few weeks to check your kidneys  Return visit in 4-6 months.

## 2023-12-07 ENCOUNTER — OFFICE VISIT (OUTPATIENT)
Dept: HEMATOLOGY/ONCOLOGY | Facility: CLINIC | Age: 73
End: 2023-12-07
Attending: INTERNAL MEDICINE
Payer: MEDICARE

## 2023-12-07 VITALS
DIASTOLIC BLOOD PRESSURE: 62 MMHG | SYSTOLIC BLOOD PRESSURE: 127 MMHG | BODY MASS INDEX: 29.45 KG/M2 | OXYGEN SATURATION: 98 % | WEIGHT: 177 LBS | HEART RATE: 60 BPM

## 2023-12-07 DIAGNOSIS — Z17.0 MALIGNANT NEOPLASM INVOLVING BOTH NIPPLE AND AREOLA OF RIGHT BREAST IN MALE, ESTROGEN RECEPTOR POSITIVE (CMS/HCC): Primary | ICD-10-CM

## 2023-12-07 DIAGNOSIS — Z15.01 BRCA1 POSITIVE: ICD-10-CM

## 2023-12-07 DIAGNOSIS — C50.021 MALIGNANT NEOPLASM INVOLVING BOTH NIPPLE AND AREOLA OF RIGHT BREAST IN MALE, ESTROGEN RECEPTOR POSITIVE (CMS/HCC): Primary | ICD-10-CM

## 2023-12-07 DIAGNOSIS — Z15.09 BRCA1 POSITIVE: ICD-10-CM

## 2023-12-07 DIAGNOSIS — C61 PROSTATE CANCER (CMS/HCC): ICD-10-CM

## 2023-12-07 PROCEDURE — G8754 DIAS BP LESS 90: HCPCS | Performed by: INTERNAL MEDICINE

## 2023-12-07 PROCEDURE — 99214 OFFICE O/P EST MOD 30 MIN: CPT | Performed by: INTERNAL MEDICINE

## 2023-12-07 PROCEDURE — G8752 SYS BP LESS 140: HCPCS | Performed by: INTERNAL MEDICINE

## 2023-12-07 ASSESSMENT — ENCOUNTER SYMPTOMS
LEG SWELLING: 1
EYES NEGATIVE: 1
COUGH: 1
GASTROINTESTINAL NEGATIVE: 1
BRUISES/BLEEDS EASILY: 1
WOUND: 1
CONSTITUTIONAL NEGATIVE: 1

## 2023-12-07 ASSESSMENT — PAIN SCALES - GENERAL: PAINLEVEL: 0-NO PAIN

## 2023-12-07 NOTE — PROGRESS NOTES
Review of Systems   Constitutional: Negative.    HENT:  Negative.    Eyes: Negative.    Respiratory: Positive for cough (upo waking).    Cardiovascular: Positive for leg swelling.   Gastrointestinal: Negative.    Genitourinary: Negative.     Musculoskeletal: Positive for gait problem.        Sore ribs on Lside   Skin: Positive for wound. Rash: L leg-Homecare for wound.   Neurological: Positive for gait problem.   Hematological: Bruises/bleeds easily.

## 2023-12-07 NOTE — ASSESSMENT & PLAN NOTE
Patient has a history of both breast and prostate cancer and BRCA1 mutation.  With regards to screening for contralateral breast cancer, we discussed recommendations for men are less clear than for women.  Patient is not interested in breast MRI as he is unable to tolerate MRI testing.  He would consider yearly mammogram and would be due for this testing in March.  He was given a prescription for left-sided mammogram to be performed in March today.

## 2023-12-07 NOTE — ASSESSMENT & PLAN NOTE
Diagnosed with invasive ductal carcinoma of the right breast, grade 3, ER positive TX positive HER2/jhonatan +3.  Initiated 1 cycle TCHP in September 2022 at [facility].  Questionable reaction to the anti-HER2/jhonatan therapy.  Second cycle with Taxotere and carboplatin alone.  Patient hospitalized.  Patient declined further intravenous systemic therapy for his breast cancer.  Had multiple medical issues including cardiac issues.  Was agreeable to initiating tamoxifen November 2022.  Remained on tamoxifen while underwent treatment for his prostate cancer.  Right mastectomy performed August 17, 2013.  Pathologic stage ypT1c N1a, ER positive, TX positive and HER2/jhonatan positive.  Completed adjuvant radiation to the chest wall and axilla in November 2023.    Medically he is stable without any new signs or symptoms to suggest recurrence.  No evidence of recurrence on exam.  He continues to be followed expectantly.  He continues on tamoxifen and is not having any side effects.  He will be following up with Dr. [facility] in February and Dr. Mustafa in April.  Have asked him to return to see me in July.  We discussed annual contralateral mammogram which will be due in March and he was given a prescription for this testing.  He was asked to call with questions or concerns prior to his next visit.

## 2023-12-07 NOTE — PROGRESS NOTES
Cam Rosas is a 73 y.o. male,   :  1950    Encounter Diagnoses   Name Primary?   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, estrogen receptor positive (CMS/HCC)  Yes   • Prostate cancer (CMS/HCC)    • BRCA1 positive         Cancer Staging   Malignant neoplasm of right male breast (CMS/HCC)  Staging form: Breast, AJCC 8th Edition  - Clinical stage from 3/20/2023: Stage IA (cT1c, cN0, cM0, G3, ER+, NV+, HER2+) - Signed by Barb Osuna MD on 3/20/2023  - Pathologic stage from 2023: No Stage Recommended (ypT1c, pN1a(sn), cM0, G3, ER+, NV+, HER2+) - Signed by Barb Osuna MD on 2023    Prostate cancer (CMS/HCC)  Staging form: Prostate, AJCC 8th Edition  - Pathologic stage from 2022: Stage Unknown (pT3b, pNX, cM0, PSA: 6, Grade Group: 3) - Signed by [facility], Gregory J, MD on 3/8/2023      Treatment Plans     No treatment plans exist          Oncology History   Malignant neoplasm of right male breast (CMS/HCC)   2022 Biopsy     1.  Right breast mass:     Invasive ductal carcinoma, Picher grade 3 (3+ 3+2).   Invasive carcinoma is 13 mm in maximum dimension in core biopsy.    ER+ 90%; NV+ 50%; Kys0lji+3; [id number]%     2022 -  Chemotherapy    2022 initiated first cycle TCP H at [facility].  Questionable reaction during the anti-HER2/jhonatan therapy with Herceptin.  Taxotere and carboplatin given on 10/3/2022.  Patient hospitalized after first cycle.     10/19/2022 Initial Diagnosis    Malignant neoplasm of right male breast (CMS/HCC)     11/15/2022 -  Hormone Therapy    Tamoxifen 20 mg daily while undergoes evaluation of cardiac issues     3/20/2023 Cancer Staged    Staging form: Breast, AJCC 8th Edition  - Clinical stage from 3/20/2023: Stage IA (cT1c, cN0, cM0, G3, ER+, NV+, HER2+)     2023 Cancer Staged    Staging form: Breast, AJCC 8th Edition  - Pathologic stage from 2023: No Stage Recommended (ypT1c,  pN1a(sn), cM0, G3, ER+, AZ+, HER2+)     Prostate cancer (CMS/HCC)   7/27/2022 Cancer Staged    Staging form: Prostate, AJCC 8th Edition  - Pathologic stage from 7/27/2022: Stage Unknown (pT3b, pNX, cM0, PSA: 6, Grade Group: 3)     3/21/2023 - 6/20/2023 Chemotherapy    LEUPROLIDE (LUPRON) 22.5 MG EVERY 3 MONTHS  Plan Provider: Paz Cleveland MD     3/21/2023 -  Chemotherapy    MEDIPORT FLUSH (SINGLE LUMEN) - PATIENT ON TREATMENT  Plan Provider: Paz Cleveland MD     3/21/2023 - 3/21/2023 Chemotherapy    LEUPROLIDE (LUPRON) 22.5 MG EVERY 3 MONTHS  Plan Provider: Paz Cleveland MD         Patient Active Problem List   Diagnosis   • Paroxysmal atrial fibrillation (CMS/HCC)   • Chronic combined systolic and diastolic CHF (congestive heart failure) (CMS/HCC)   • Malignant neoplasm of right male breast (CMS/HCC)   • Electrolyte abnormality   • Gastrointestinal hemorrhage with melena   • CKD (chronic kidney disease)   • Prostate cancer (CMS/HCC)   • Acute systolic heart failure (CMS/HCC)   • APC (atrial premature contractions)   • Dehydration determined by examination   • Essential hypertension   • Herniation of lumbar intervertebral disc with radiculopathy   • Hyperlipidemia   • Spinal stenosis of lumbar region   • Spondylosis of lumbosacral region   • Stage 3a chronic kidney disease (CMS/HCC)   • BRCA1 positive   • 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  Reports he is feeling about the same.  He is continue to follow-up with wound care for lower extremity wound.  He completed radiation to the right chest wall and axilla in November and reports that he recovered well from that treatment.    Review of Systems - Oncology    Review of Systems:  Nursing assessment reviewed. Pertinent positive and negative symptoms  noted in HPI, all others negative.    Heart Rate:  [60] 60  BP: (127)/(62) 127/62  Visit Vitals  /62 (BP Location: Left upper arm, Patient Position: Sitting)   Pulse 60   Wt 80.3 kg (177 lb) Comment: patient reported   SpO2 98%   BMI 29.45 kg/m²     Physical Exam  Constitutional:       General: He is not in acute distress.     Appearance: He is well-developed.   HENT:      Head: Normocephalic.      Mouth/Throat:      Pharynx: No oropharyngeal exudate.   Eyes:      General: No scleral icterus.     Pupils: Pupils are equal, round, and reactive to light.   Cardiovascular:      Rate and Rhythm: Normal rate and regular rhythm.   Pulmonary:      Effort: Pulmonary effort is normal.      Breath sounds: Normal breath sounds.   Abdominal:      Palpations: Abdomen is soft.      Tenderness: There is no abdominal tenderness.   Musculoskeletal:         General: No tenderness.      Cervical back: Neck supple.   Lymphadenopathy:      Cervical: No cervical adenopathy.   Skin:     Findings: No rash.   Neurological:      Mental Status: He is alert and oriented to person, place, and time.         Past Medical History:   Diagnosis Date   • Acute systolic heart failure (CMS/HCC) 02/15/2023   • Ambulates with cane     and walker   • Atrial fibrillation (CMS/HCC)    • Breast cancer (CMS/HCC) October 2022   • CHF (congestive heart failure) (CMS/HCC)    • Chronic kidney disease    • CKD (chronic kidney disease) 10/19/2022   • History of radiation therapy    • Hx antineoplastic chemo    • Prostate cancer (CMS/HCC)        Past Surgical History:   Procedure Laterality Date   • CARDIAC SURGERY      mitral valve repair 2006   • CARDIOVERSION  12/05/2022    Successful DC cardioversion   • KNEE CARTILAGE SURGERY Left    • MASTECTOMY     • PROSTATE SURGERY  July 2022   • PROSTATECTOMY  07/15/2022   • TONSILLECTOMY         Social History     Tobacco Use   • Smoking status: Never   • Smokeless tobacco: Never   Vaping Use   • Vaping Use: Never  used   Substance Use Topics   • Alcohol use: Yes     Alcohol/week: 14.0 standard drinks of alcohol     Types: 14 Shots of liquor per week     Comment: 2 drinks/day - scotch   • Drug use: Never       Family History   Problem Relation Age of Onset   • Hyperlipidemia Biological Mother    • Hypertension Biological Mother    • Breast cancer Biological Mother    • Cancer Biological Mother         gyn? cervical   • Hyperlipidemia Biological Father    • Hypertension Biological Father    • Arthritis Biological Father    • No Known Problems Biological Sister    • No Known Problems Biological Brother    • Heart disease Maternal Grandmother    • Arthritis Maternal Grandfather    • COPD Maternal Grandfather    • Diabetes Maternal Grandfather    • No Known Problems Paternal Grandmother    • No Known Problems Paternal Grandfather    • Cancer less than or equal to age 50 Other    • Esophageal cancer Other         47, in abd,chemo q 3 weeks         Allergies  Azithromycin, Prednisone, and Lorazepam    Medications  Current Outpatient Medications   Medication Sig Dispense Refill   • acetaminophen (TYLENOL EX STR RAPID RELEASE ORAL) Take 500 mg by mouth as needed.     • acetaminophen (TYLENOL) 500 mg tablet Take 2 tablets (1,000 mg total) by mouth every 6 (six) hours as needed for pain.     • amiodarone (PACERONE) 200 mg tablet Take 1 tablet (200 mg total) by mouth daily. 90 tablet 3   • cetirizine (ZyrTEC) 10 mg tablet Take 10 mg by mouth nightly.     • cholecalciferol, vitamin D3, 1,000 unit (25 mcg) tablet Take 1,000 Units by mouth daily.     • guaiFENesin (MUCINEX) 600 mg 12 hr tablet Take 1,200 mg by mouth 2 (two) times a day.     • metoprolol succinate XL (TOPROL-XL) 50 mg 24 hr tablet Take 1 tablet (50 mg total) by mouth daily. 90 tablet 3   • pantoprazole (PROTONIX) 40 mg EC tablet Take 1 tablet (40 mg total) by mouth 2 (two) times a day. 180 tablet 3   • potassium chloride (KLOR-CON M) 20 mEq CR tablet Take 1 tablet (20 mEq)  daily when you take as needed  torsemide. (Patient taking differently: as needed. Take 1 tablet (20 mEq) daily when taking torsemide (also taken as needed)..) 30 tablet 3   • rivaroxaban (XARELTO) 20 mg tablet Take 1 tablet (20 mg total) by mouth daily with dinner. 90 tablet 3   • silver sulfadiazine (SILVADENE) 1 % cream Apply topically 2 (two) times a day. Apply to affected area. 50 g 1   • simvastatin (ZOCOR) 20 mg tablet Take 1 tablet (20 mg total) by mouth nightly. 90 tablet 3   • tamoxifen (NOLVADEX) 20 mg chemo tablet Take  1 tablet (20 mg total) daily 90 tablet 3   • betamethasone dipropionate 0.05 % cream Apply topically as needed.     • torsemide (DEMADEX) 20 mg tablet Take 1 tablet (20 mg total) by mouth daily as needed (for weight gain 3 lbs overnight, 5 lbs in a week, lower extremity edema). (Patient not taking: Reported on 12/7/2023) 30 tablet 3     No current facility-administered medications for this visit.          Laboratory  Recent Results (from the past 504 hour(s))   Transthoracic echo (TTE) complete    Collection Time: 11/27/23  2:04 PM   Result Value Ref Range    BSA 1.92 m2    LVLd A2C 8.26 cm    LVLd A4C 7.81 cm    LVAd A2C 26.60 cm2    LVAd A4C 27.30 cm2    LV EDV (apical 2 chamber 74.50 cm3    LV Diastolic Volume 82.20 cm3    End Diastolic Volume Biplane Measurement 80.50 cm3    LVLs A2C 7.64 cm    LVLs A4C 7.35 cm    LVAs A2C 17.00 cm2    LVAs A4C 17.80 cm2    LV ESV (Apical 2 Chamber) 30.60 cm3    LV Systolic Volume 37.20 cm3    End Systolic Volume Biplane 33.90 cm3    IVS 1.22 cm    LVIDd 5.28 4.46 - 6.19 cm    LVIDs 3.63 2.63 - 3.98 cm    LVOT Diameter 2D 2.40 cm    LVOT Mean Velocity  0.46 m/s    LVOT Mean Gradient 1.00 mmHg    LVOT VTI 15.00 cm    LVOT Peak Velocity 0.66 m/s    LVOT Peak Gradient 2.00 mmHg    PW 1.25 cm    LVPWd 1.25 0.58 - 1.09 cm    MV E' Tissue Velocity Lateral 0.14 m/s    MV E' Tissue Velocity Medial 0.06 m/s    Ejection Fraction Apical 2 Chamber 58.90 %     Ejection Fraction (A4C) 54.90 %    EF 57.90 %    Tissue doppler E/ Lateral E' ratio 12.00     E/E' ratio 29.60     FS 31.25 %    LVOT Cross-section Area 4.52 cm2    LVOT Stroke Volume 67.82 cm3    RYAN A4C  6.38 cm    LALD A4C 6.57 cm    LAAs A2C 28.70 cm2    LAAs A4C 26.40 cm2    Left Atrium Systolic Volume 106.00 cm3    LA volume 89.70 cm3    Left Atrium End Systolic Volume (BP method) 98.60 cm3    LAD 2D 5.30 cm    LA/Ao Ratio 1.66     Cusp Separation 2.20 cm    AV Mean Velocity 0.66 m/s    AV Mean Gradient 2.00 mmHg    AV Mean Gradient 2.00 mmHg    AV VTI 23.70 cm    AV Peak Velocity-S 0.98 m/s    AV Peak Gradient 4.00 mmHg    AV Area 2.39 cm2    Ao Root Annulus 3.20 cm    Ascend Ao 3.20 cm    E wave deceleration time 222.00 ms    MV Stenosis P1/2t 65.00 ms    MV Peak A-Wave 0.34 m/s    MV Peak E-Wave 1.66 m/s    MV Mean Gradient 2.00 mmHg    MV VTI 37.50 cm    MV Peak Gradient 10.00 mmHg    MVA P1/2  3.38 cm2    MVA Cont Eq 1.81 cm2    E/A ratio 4.80     RVOT PV 0.35 m/s    TR Peak Velocity 3.40 m/s    TR Peak Gradient 46.24 mmHg    LV Diastolic Volume Index 42.81 cm3/m2    LV Systolic Volume Index 19.38 cm3/m2    LV Diastolic Volume Index (A2C) 38.80 cm3/m2    LV Systolic Volume Index (A2C) 15.94 cm3/m2    LV Diastolic Volume Index (BP) 41.93 cm3/m2    LV Systolic Volume  Index (BP) 17.66 cm3/m2    LVOT Stroke Volume Index 35.33 mL/m2    LA Volume Index 46.72 cm3/m2    Left Atrial End Systolic Volume Index 55.21 cm3/m2    LA ESV INDEX (BP) 51.35 cm3/m2    AV Velocity Ratio 0.63     Aortic Valve Area by Continuity of VTI 2.86 cm2    Valve area - Index 1.49     Valve area - Index 1.2     ZLVPWD 2.35     ZLVIDS 0.90     ZLVIDD 0.06     S' lat velocity 0.10 m/s    RAP 8 mmHg    Est RVSP TR JET 54 mmHg         Radiology  I have reviewed the patient's Radiology report(s).    Transthoracic echo (TTE) complete    Result Date: 11/27/2023  Narrative: •  Left Ventricle: Normal ventricle size. Mild concentric left  ventricular hypertrophy. No outflow tract obstruction present. Low normal systolic function. Estimated EF 55%. Wall motion appears grossly normal. Grade II diastolic dysfunction. •  Right Ventricle: Mildly to moderately dilated ventricle size. Increased wall thickness. RVOT doppler shows VTI =10, possible systolic notching, time to peak acceleration of 70-80ms. This suggests normal output and elevated pulmonary vascular resistance. RV s'=10cm/s. TAPSE=2.1cm. Normal systolic function. •  Left Atrium: Moderately dilated atrium. •  Right Atrium: Mildly dilated atrium. •  Aortic Valve: Tricuspid valve.  Sclerotic leaflets. Trace regurgitation. No stenosis. Calculated dimensionless index = 0.63. •  Mitral Valve: Mitral annuloplasty ring present. Mild regurgitation. The jet is centrally directed. •  Tricuspid Valve: Normal structure. Mild regurgitation. The regurgitation jet is eccentric. Estimated RVSP = 54 mmHg. •  Pulmonic Valve: Normal structure. Trace regurgitation. Mildly dilated pulmonary artery. •  Aorta: Aortic root normal. Sinuses of Valsalva normal-sized. Ascending aorta normal-sized. •  IVC/SVC: Inferior vena cava is <2.1cm. Inferior vena cava collapses <50% during inspiration. •  Pericardium: There is a trivial circumferential pericardial effusion. •  Compared with April 2023, RV is now dilated. •  I personally reviewed results with him today in the office.       Assessment and Plan  Malignant neoplasm of right male breast (CMS/HCC)  Diagnosed with invasive ductal carcinoma of the right breast, grade 3, ER positive CO positive HER2/jhonatan +3.  Initiated 1 cycle TCHP in September 2022 at [facility].  Questionable reaction to the anti-HER2/jhonatan therapy.  Second cycle with Taxotere and carboplatin alone.  Patient hospitalized.  Patient declined further intravenous systemic therapy for his breast cancer.  Had multiple medical issues including cardiac issues.  Was agreeable to initiating tamoxifen November  2022.  Remained on tamoxifen while underwent treatment for his prostate cancer.  Right mastectomy performed August 17, 2013.  Pathologic stage ypT1c N1a, ER positive, IL positive and HER2/jhonatan positive.  Completed adjuvant radiation to the chest wall and axilla in November 2023.    Medically he is stable without any new signs or symptoms to suggest recurrence.  No evidence of recurrence on exam.  He continues to be followed expectantly.  He continues on tamoxifen and is not having any side effects.  He will be following up with Dr. [facility] in February and Dr. Mustafa in April.  Have asked him to return to see me in July.  We discussed annual contralateral mammogram which will be due in March and he was given a prescription for this testing.  He was asked to call with questions or concerns prior to his next visit.    Prostate cancer (CMS/[facility])    July 27, 2022 underwent prostatectomy for prostate cancer at outside institution.  Clare score 4+3.  February 2023 PSA increasing.  Urology recommended radiation therapy.  Received Lupron while receiving treatment with radiation.  Completed radiotherapy June 2023.    He is going to continue surveillance under the direction of radiation oncology and his urologist.  Most recent PSA from September 2023 was undetectable.    BRCA1 positive  Patient has a history of both breast and prostate cancer and BRCA1 mutation.  With regards to screening for contralateral breast cancer, we discussed recommendations for men are less clear than for women.  Patient is not interested in breast MRI as he is unable to tolerate MRI testing.  He would consider yearly mammogram and would be due for this testing in March.  He was given a prescription for left-sided mammogram to be performed in March today.        Kailey Gale MD

## 2023-12-07 NOTE — ASSESSMENT & PLAN NOTE
July 27, 2022 underwent prostatectomy for prostate cancer at outside institution.  Huntington score 4+3.  February 2023 PSA increasing.  Urology recommended radiation therapy.  Received Lupron while receiving treatment with radiation.  Completed radiotherapy June 2023.    He is going to continue surveillance under the direction of radiation oncology and his urologist.  Most recent PSA from September 2023 was undetectable.

## 2023-12-24 ENCOUNTER — APPOINTMENT (INPATIENT)
Dept: RADIOLOGY | Facility: HOSPITAL | Age: 73
DRG: 004 | End: 2023-12-24
Payer: MEDICARE

## 2023-12-24 ENCOUNTER — APPOINTMENT (INPATIENT)
Dept: RADIOLOGY | Facility: HOSPITAL | Age: 73
DRG: 004 | End: 2023-12-24
Attending: HOSPITALIST
Payer: MEDICARE

## 2023-12-24 ENCOUNTER — ANESTHESIA EVENT (INPATIENT)
Dept: INTENSIVE CARE | Facility: HOSPITAL | Age: 73
DRG: 004 | End: 2023-12-24
Payer: MEDICARE

## 2023-12-24 ENCOUNTER — HOSPITAL ENCOUNTER (INPATIENT)
Facility: HOSPITAL | Age: 73
LOS: 17 days | Discharge: NURSING, REHAB OR LONG-TERM CARE | DRG: 004 | End: 2024-01-10
Attending: EMERGENCY MEDICINE | Admitting: HOSPITALIST
Payer: MEDICARE

## 2023-12-24 ENCOUNTER — ANESTHESIA (INPATIENT)
Dept: INTENSIVE CARE | Facility: HOSPITAL | Age: 73
DRG: 004 | End: 2023-12-24
Payer: MEDICARE

## 2023-12-24 ENCOUNTER — APPOINTMENT (EMERGENCY)
Dept: RADIOLOGY | Facility: HOSPITAL | Age: 73
DRG: 004 | End: 2023-12-24
Payer: MEDICARE

## 2023-12-24 DIAGNOSIS — I21.3 ST ELEVATION MYOCARDIAL INFARCTION (STEMI), UNSPECIFIED ARTERY (CMS/HCC): ICD-10-CM

## 2023-12-24 DIAGNOSIS — R65.20 SEVERE SEPSIS (CMS/HCC): Primary | ICD-10-CM

## 2023-12-24 DIAGNOSIS — A41.9 SEVERE SEPSIS (CMS/HCC): Primary | ICD-10-CM

## 2023-12-24 DIAGNOSIS — I48.0 PAROXYSMAL ATRIAL FIBRILLATION (CMS/HCC): ICD-10-CM

## 2023-12-24 DIAGNOSIS — E44.0 MODERATE PROTEIN-CALORIE MALNUTRITION (CMS/HCC): ICD-10-CM

## 2023-12-24 DIAGNOSIS — J10.1 INFLUENZA A: ICD-10-CM

## 2023-12-24 DIAGNOSIS — I46.9 CARDIAC ARREST (CMS/HCC): ICD-10-CM

## 2023-12-24 DIAGNOSIS — I50.9 ACUTE ON CHRONIC CONGESTIVE HEART FAILURE, UNSPECIFIED HEART FAILURE TYPE (CMS/HCC): ICD-10-CM

## 2023-12-24 DIAGNOSIS — I50.21 ACUTE SYSTOLIC HEART FAILURE (CMS/HCC): ICD-10-CM

## 2023-12-24 DIAGNOSIS — R60.1 GENERALIZED EDEMA: ICD-10-CM

## 2023-12-24 LAB
ALBUMIN SERPL-MCNC: 3.9 G/DL (ref 3.5–5.7)
ALP SERPL-CCNC: 84 IU/L (ref 34–125)
ALT SERPL-CCNC: 22 IU/L (ref 7–52)
ANION GAP SERPL CALC-SCNC: 13 MEQ/L (ref 3–15)
AST SERPL-CCNC: 40 IU/L (ref 13–39)
BASE EXCESS BLDA CALC-SCNC: -5.1 MEQ/L
BASE EXCESS BLDA CALC-SCNC: -5.9 MEQ/L
BASE EXCESS BLDA CALC-SCNC: -6 MEQ/L
BASE EXCESS BLDV CALC-SCNC: -8.4 MEQ/L
BASOPHILS # BLD: 0.04 K/UL (ref 0.01–0.1)
BASOPHILS NFR BLD: 0.4 %
BILIRUB SERPL-MCNC: 0.6 MG/DL (ref 0.3–1.2)
BNP SERPL-MCNC: 1135 PG/ML
BUN SERPL-MCNC: 16 MG/DL (ref 7–25)
CA-I BLD-SCNC: 1.09 MMOL/L (ref 1.15–1.27)
CALCIUM SERPL-MCNC: 8.7 MG/DL (ref 8.6–10.3)
CHLORIDE BLDA-SCNC: 110 MEQ/L (ref 98–109)
CHLORIDE SERPL-SCNC: 107 MEQ/L (ref 98–107)
CO2 BLDA-SCNC: 21 MEQ/L (ref 22–32)
CO2 BLDV-SCNC: 22.8 MEQ/L (ref 22–32)
CO2 SERPL-SCNC: 21 MEQ/L (ref 21–31)
CREAT SERPL-MCNC: 1.2 MG/DL (ref 0.7–1.3)
DIFFERENTIAL METHOD BLD: ABNORMAL
EGFRCR SERPLBLD CKD-EPI 2021: >60 ML/MIN/1.73M*2
EOSINOPHIL # BLD: 0.01 K/UL (ref 0.04–0.54)
EOSINOPHIL NFR BLD: 0.1 %
ERYTHROCYTE [DISTWIDTH] IN BLOOD BY AUTOMATED COUNT: 15 % (ref 11.6–14.4)
FIO2 ON VENT: 21 %
FIO2 ON VENT: 40 %
FIO2 ON VENT: 40 %
FLUAV RNA SPEC QL NAA+PROBE: POSITIVE
FLUBV RNA SPEC QL NAA+PROBE: NEGATIVE
GLUCOSE BLDA-MCNC: 206 MG/DL (ref 70–99)
GLUCOSE SERPL-MCNC: 195 MG/DL (ref 70–99)
HCO3 BLDA-SCNC: 20 MEQ/L (ref 21–28)
HCO3 BLDA-SCNC: 20.3 MEQ/L (ref 21–28)
HCO3 BLDA-SCNC: 21 MEQ/L (ref 21–28)
HCO3 BLDV-SCNC: 17.5 MEQ/L (ref 21–28)
HCT VFR BLDCO AUTO: 35.2 % (ref 40.1–51)
HGB BLD-MCNC: 11 G/DL (ref 13.7–17.5)
HGB BLDA-MCNC: 10.1 G/DL (ref 14–17.5)
IMM GRANULOCYTES # BLD AUTO: 0.07 K/UL (ref 0–0.08)
IMM GRANULOCYTES NFR BLD AUTO: 0.6 %
INHALED O2 CONCENTRATION: ABNORMAL %
LACTATE BLDA-SCNC: 2.4 MMOL/L (ref 0.4–1.6)
LACTATE SERPL-SCNC: 3.8 MMOL/L (ref 0.4–2)
LACTATE SERPL-SCNC: 5.1 MMOL/L (ref 0.4–2)
LYMPHOCYTES # BLD: 0.79 K/UL (ref 1.2–3.5)
LYMPHOCYTES NFR BLD: 7.2 %
MACROCYTES BLD QL SMEAR: ABNORMAL
MCH RBC QN AUTO: 34.5 PG (ref 28–33.2)
MCHC RBC AUTO-ENTMCNC: 31.3 G/DL (ref 32.2–36.5)
MCV RBC AUTO: 110.3 FL (ref 83–98)
MONOCYTES # BLD: 0.76 K/UL (ref 0.3–1)
MONOCYTES NFR BLD: 6.9 %
NEUTROPHILS # BLD: 9.34 K/UL (ref 1.7–7)
NEUTS SEG NFR BLD: 84.8 %
NRBC BLD-RTO: 0.2 %
PCO2 BLDA: 37 MM HG (ref 35–48)
PCO2 BLDA: 46 MM HG (ref 35–48)
PCO2 BLDA: 48 MM HG (ref 35–48)
PCO2 BLDV: 59 MM HG (ref 41–51)
PDW BLD AUTO: 9.4 FL (ref 9.4–12.4)
PH BLDA: 7.27 [PH] (ref 7.35–7.45)
PH BLDA: 7.27 [PH] (ref 7.35–7.45)
PH BLDA: 7.33 PH (ref 7.35–7.45)
PH BLDV: 7.16 [PH] (ref 7.32–7.42)
PLAT MORPH BLD: NORMAL
PLATELET # BLD AUTO: 167 K/UL (ref 150–350)
PLATELET # BLD EST: ABNORMAL 10*3/UL
PLATELET CLUMP BLD QL SMEAR: PRESENT
PO2 BLDA: 100 MM HG (ref 83–100)
PO2 BLDA: 151 MM HG (ref 83–100)
PO2 BLDA: 514 MM HG (ref 83–100)
PO2 BLDV: 42 MM HG (ref 25–40)
POCT PATIENT TEMPERATURE: 97.2 °F (ref 97–99)
POCT TEST (BLD GAS): ABNORMAL
POLYCHROMASIA BLD QL SMEAR: ABNORMAL
POTASSIUM BLDA-SCNC: 4.6 MEQ/L (ref 3.4–4.5)
POTASSIUM SERPL-SCNC: 4.1 MEQ/L (ref 3.5–5.1)
PROT SERPL-MCNC: 6.8 G/DL (ref 6–8.2)
RBC # BLD AUTO: 3.19 M/UL (ref 4.5–5.8)
RSV RNA SPEC QL NAA+PROBE: NEGATIVE
SAO2 % BLDA: 98 % (ref 93–98)
SARS-COV-2 RNA RESP QL NAA+PROBE: NEGATIVE
SODIUM BLDA-SCNC: 137 MEQ/L (ref 136–145)
SODIUM SERPL-SCNC: 141 MEQ/L (ref 136–145)
TROPONIN I SERPL HS-MCNC: 49.4 PG/ML
TROPONIN I SERPL HS-MCNC: 68.7 PG/ML
WBC # BLD AUTO: 11.01 K/UL (ref 3.8–10.5)

## 2023-12-24 PROCEDURE — 85610 PROTHROMBIN TIME: CPT | Performed by: HOSPITALIST

## 2023-12-24 PROCEDURE — 71045 X-RAY EXAM CHEST 1 VIEW: CPT

## 2023-12-24 PROCEDURE — 84484 ASSAY OF TROPONIN QUANT: CPT | Performed by: EMERGENCY MEDICINE

## 2023-12-24 PROCEDURE — 94003 VENT MGMT INPAT SUBQ DAY: CPT

## 2023-12-24 PROCEDURE — 82803 BLOOD GASES ANY COMBINATION: CPT | Performed by: STUDENT IN AN ORGANIZED HEALTH CARE EDUCATION/TRAINING PROGRAM

## 2023-12-24 PROCEDURE — 36415 COLL VENOUS BLD VENIPUNCTURE: CPT | Performed by: EMERGENCY MEDICINE

## 2023-12-24 PROCEDURE — 93005 ELECTROCARDIOGRAM TRACING: CPT | Performed by: EMERGENCY MEDICINE

## 2023-12-24 PROCEDURE — 5A1945Z RESPIRATORY VENTILATION, 24-96 CONSECUTIVE HOURS: ICD-10-PCS | Performed by: HOSPITALIST

## 2023-12-24 PROCEDURE — 85025 COMPLETE CBC W/AUTO DIFF WBC: CPT | Performed by: EMERGENCY MEDICINE

## 2023-12-24 PROCEDURE — 36600 WITHDRAWAL OF ARTERIAL BLOOD: CPT

## 2023-12-24 PROCEDURE — 83605 ASSAY OF LACTIC ACID: CPT | Performed by: EMERGENCY MEDICINE

## 2023-12-24 PROCEDURE — 94002 VENT MGMT INPAT INIT DAY: CPT

## 2023-12-24 PROCEDURE — 63600105 HC IODINE BASED CONTRAST: Mod: JZ

## 2023-12-24 PROCEDURE — 87637 SARSCOV2&INF A&B&RSV AMP PRB: CPT | Performed by: EMERGENCY MEDICINE

## 2023-12-24 PROCEDURE — 99223 1ST HOSP IP/OBS HIGH 75: CPT | Performed by: HOSPITALIST

## 2023-12-24 PROCEDURE — 87040 BLOOD CULTURE FOR BACTERIA: CPT

## 2023-12-24 PROCEDURE — 80053 COMPREHEN METABOLIC PANEL: CPT | Performed by: EMERGENCY MEDICINE

## 2023-12-24 PROCEDURE — 82803 BLOOD GASES ANY COMBINATION: CPT | Performed by: EMERGENCY MEDICINE

## 2023-12-24 PROCEDURE — 83880 ASSAY OF NATRIURETIC PEPTIDE: CPT

## 2023-12-24 PROCEDURE — 87040 BLOOD CULTURE FOR BACTERIA: CPT | Performed by: EMERGENCY MEDICINE

## 2023-12-24 PROCEDURE — 63600000 HC DRUGS/DETAIL CODE: Mod: JW | Performed by: ANESTHESIOLOGY

## 2023-12-24 PROCEDURE — 63600000 HC DRUGS/DETAIL CODE: Mod: JZ

## 2023-12-24 PROCEDURE — 99285 EMERGENCY DEPT VISIT HI MDM: CPT | Mod: 25

## 2023-12-24 PROCEDURE — 25000000 HC PHARMACY GENERAL: Performed by: ANESTHESIOLOGY

## 2023-12-24 PROCEDURE — 0BH17EZ INSERTION OF ENDOTRACHEAL AIRWAY INTO TRACHEA, VIA NATURAL OR ARTIFICIAL OPENING: ICD-10-PCS | Performed by: ANESTHESIOLOGY

## 2023-12-24 PROCEDURE — 96374 THER/PROPH/DIAG INJ IV PUSH: CPT | Mod: 59

## 2023-12-24 PROCEDURE — 20000000 HC ROOM AND CARE ICU

## 2023-12-24 PROCEDURE — 94660 CPAP INITIATION&MGMT: CPT

## 2023-12-24 PROCEDURE — 25800000 HC PHARMACY IV SOLUTIONS

## 2023-12-24 PROCEDURE — G1004 CDSM NDSC: HCPCS

## 2023-12-24 RX ORDER — CHLORHEXIDINE GLUCONATE ORAL RINSE 1.2 MG/ML
15 SOLUTION DENTAL 2 TIMES DAILY
Status: DISCONTINUED | OUTPATIENT
Start: 2023-12-25 | End: 2023-12-26

## 2023-12-24 RX ORDER — NOREPINEPHRINE BITARTRATE 0.02 MG/ML
INJECTION, SOLUTION INTRAVENOUS
Status: DISPENSED
Start: 2023-12-24 | End: 2023-12-25

## 2023-12-24 RX ORDER — ATROPINE SULFATE 0.1 MG/ML
INJECTION INTRAVENOUS
Status: COMPLETED
Start: 2023-12-24 | End: 2023-12-24

## 2023-12-24 RX ORDER — ROCURONIUM BROMIDE 10 MG/ML
INJECTION, SOLUTION INTRAVENOUS
Status: COMPLETED | OUTPATIENT
Start: 2023-12-24 | End: 2023-12-24

## 2023-12-24 RX ORDER — ATROPINE SULFATE 0.1 MG/ML
INJECTION INTRAVENOUS
Status: DISPENSED
Start: 2023-12-24 | End: 2023-12-25

## 2023-12-24 RX ORDER — LIDOCAINE HYDROCHLORIDE 10 MG/ML
INJECTION, SOLUTION EPIDURAL; INFILTRATION; INTRACAUDAL; PERINEURAL
Status: COMPLETED | OUTPATIENT
Start: 2023-12-24 | End: 2023-12-24

## 2023-12-24 RX ORDER — FUROSEMIDE 10 MG/ML
40 INJECTION INTRAMUSCULAR; INTRAVENOUS ONCE
Status: COMPLETED | OUTPATIENT
Start: 2023-12-24 | End: 2023-12-24

## 2023-12-24 RX ORDER — PROPOFOL 200MG/20ML
SYRINGE (ML) INTRAVENOUS
Status: COMPLETED | OUTPATIENT
Start: 2023-12-24 | End: 2023-12-24

## 2023-12-24 RX ORDER — PHENYLEPHRINE HCL IN 0.9% NACL 1 MG/10 ML
SYRINGE (ML) INTRAVENOUS
Status: COMPLETED | OUTPATIENT
Start: 2023-12-24 | End: 2023-12-24

## 2023-12-24 RX ORDER — IOPAMIDOL 755 MG/ML
100 INJECTION, SOLUTION INTRAVASCULAR
Status: COMPLETED | OUTPATIENT
Start: 2023-12-24 | End: 2023-12-24

## 2023-12-24 RX ADMIN — LIDOCAINE HYDROCHLORIDE 10 ML: 10 INJECTION, SOLUTION EPIDURAL; INFILTRATION; INTRACAUDAL; PERINEURAL at 23:10

## 2023-12-24 RX ADMIN — PIPERACILLIN AND TAZOBACTAM 4.5 G: 4; .5 INJECTION, POWDER, LYOPHILIZED, FOR SOLUTION INTRAVENOUS; PARENTERAL at 20:20

## 2023-12-24 RX ADMIN — ATROPINE SULFATE 1 MG: 0.1 INJECTION INTRAVENOUS at 23:15

## 2023-12-24 RX ADMIN — IOPAMIDOL 100 ML: 755 INJECTION, SOLUTION INTRAVENOUS at 19:07

## 2023-12-24 RX ADMIN — VANCOMYCIN HYDROCHLORIDE 1750 MG: 1 INJECTION, POWDER, LYOPHILIZED, FOR SOLUTION INTRAVENOUS at 20:55

## 2023-12-24 RX ADMIN — ROCURONIUM BROMIDE 100 MG: 10 INJECTION INTRAVENOUS at 23:10

## 2023-12-24 RX ADMIN — ATROPINE SULFATE 0.5 MG: 0.1 INJECTION INTRAVENOUS at 21:48

## 2023-12-24 RX ADMIN — Medication 500 MCG: at 23:10

## 2023-12-24 RX ADMIN — PROPOFOL 200 MG: 10 INJECTION, EMULSION INTRAVENOUS at 23:10

## 2023-12-24 RX ADMIN — FUROSEMIDE 40 MG: 10 INJECTION, SOLUTION INTRAMUSCULAR; INTRAVENOUS at 18:17

## 2023-12-24 ASSESSMENT — ENCOUNTER SYMPTOMS
WEAKNESS: 1
FATIGUE: 1
SHORTNESS OF BREATH: 1
COUGH: 1
NAUSEA: 0
UNEXPECTED WEIGHT CHANGE: 1
ABDOMINAL PAIN: 0
VOMITING: 0

## 2023-12-24 ASSESSMENT — COGNITIVE AND FUNCTIONAL STATUS - GENERAL
WALKING IN HOSPITAL ROOM: 1 - TOTAL
STANDING UP FROM CHAIR USING ARMS: 1 - TOTAL
MOVING TO AND FROM BED TO CHAIR: 1 - TOTAL
CLIMB 3 TO 5 STEPS WITH RAILING: 1 - TOTAL

## 2023-12-24 NOTE — ED ATTESTATION NOTE
Procedures  Physical Exam  Review of Systems  [facility]    12/24/20234:50 PM  I have personally seen and examined the patient. I was involved in the care and medical decision making for this patient.    I reviewed and agree with the PA/NP/Resident's assessment and plan of care, with any exceptions as documented below.    My focused history, examination, assessment, and plan of care of Cam Rosas is as follows:  The patient presents with dyspnea.  Patient has a history of right breast cancer and also prostate cancer.  He is thought to be in remission.  Starting last night he developed shortness of breath and a cough.  Today he was profoundly weak.  Exam: Alert and oriented  Extremely tachypneic with difficulty breathing (using accessory muscles)    Impression/Plan/Medical Decision Making: Extensive discussion with the patient's wife and the patient.  He is a full code.  His sats were 82% on arrival and now 100% on a nonrebreather.  We will do a BiPAP trial.  We will rule the patient out for blood clots and also fluid in the lungs in addition to COVID/flu/RSV.  The patient may require intubation if he continues to be is profoundly dyspneic as he is right now.  I will follow closely    Vital Signs Review: Vital signs have been reviewed. The oxygen saturation is  SpO2: (!) 82 % which is hypoxic.    I was physically present for the key/critical portions of the following procedures: None    This document was created using Dragon dictation software.  There might be some typographical errors due to this technology.    We are in a period of time with increased volumes and decreased capacity.      Dakota Jefferson MD  12/24/23 1402

## 2023-12-24 NOTE — Clinical Note
Patient placed on procedure table in supine position with arms at side. Positioning devices: all pressure points padded and safety strap applied.

## 2023-12-24 NOTE — Clinical Note
Closure device placed for the right femoral artery. Closure device used: Angio-Seal. Closure pressure manually applied. Hemostasis achieved.

## 2023-12-24 NOTE — Clinical Note
Send to the following Provider Care Team:: [facility] CONSULT/ADMIT TEAM [[id number]]

## 2023-12-24 NOTE — ED PROVIDER NOTES
Emergency Medicine Note  HPI   HISTORY OF PRESENT ILLNESS       History provided by:  Patient and spouse  History limited by:  Acuity of condition   used: No      73 y.o. male with PMH of atrial fibrillation s/p cardioversion on Xarelto, CHF, CKD, HTN, prostate cancer and breast cancer s/p right mastectomy not currently on chemoradiation therapy, HLD presents to ED for evaluation of shortness of breath. History limited due to acuity, provided mostly by wife. Patient hypoxic in triage at 82% on room air. Normally does not use supplemental oxygen. Lives at home with his wife and has home health care. Patient complaining of shortness of breath and cough. According to wife, patient with increased bilateral lower extremity swelling.     Patient follows with oncology (Dr. Gale). He completed radiation treatment for prostate cancer in the spring. Underwent right mastectomy in August and radiation therapy to right chest wall in September for breast cancer.     Patient follows with cardiology (Dr. Moran) for history of CHF and Atrial fibrillation. Patient seen by cardiology in the office on 11/27/2023. He was placed on torsemide 10 mg however was having orthostatic symptoms so decreased to three times per week then told to monitor weight and take if his weight goes above 185 lbs. Wife states his weight has been below that so he has not taken regularly, maybe took twice last week.        Oncologist: Shahla  Cardiology: Luisa  Breast Surgeon: Enrrique        Patient History   PAST HISTORY     Reviewed from Nursing Triage:  Tobacco  Allergies  Meds  Problems  Med Hx  Surg Hx  Fam Hx  Soc   Hx      Past Medical History:   Diagnosis Date   • Acute systolic heart failure (CMS/HCC) 02/15/2023   • Ambulates with cane     and walker   • Atrial fibrillation (CMS/HCC)    • Breast cancer (CMS/HCC) October 2022   • CHF (congestive heart failure) (CMS/HCC)    • Chronic kidney disease    • CKD (chronic  kidney disease) 10/19/2022   • History of radiation therapy    • Hx antineoplastic chemo    • Prostate cancer (CMS/HCC)        Past Surgical History:   Procedure Laterality Date   • CARDIAC SURGERY      mitral valve repair 2006   • CARDIOVERSION  12/05/2022    Successful DC cardioversion   • KNEE CARTILAGE SURGERY Left    • MASTECTOMY     • PROSTATE SURGERY  July 2022   • PROSTATECTOMY  07/15/2022   • TONSILLECTOMY         Family History   Problem Relation Age of Onset   • Hyperlipidemia Biological Mother    • Hypertension Biological Mother    • Breast cancer Biological Mother    • Cancer Biological Mother         gyn? cervical   • Hyperlipidemia Biological Father    • Hypertension Biological Father    • Arthritis Biological Father    • No Known Problems Biological Sister    • No Known Problems Biological Brother    • Heart disease Maternal Grandmother    • Arthritis Maternal Grandfather    • COPD Maternal Grandfather    • Diabetes Maternal Grandfather    • No Known Problems Paternal Grandmother    • No Known Problems Paternal Grandfather    • Cancer less than or equal to age 50 Other    • Esophageal cancer Other         47, in abd,chemo q 3 weeks       Social History     Tobacco Use   • Smoking status: Never   • Smokeless tobacco: Never   Vaping Use   • Vaping Use: Never used   Substance Use Topics   • Alcohol use: Yes     Alcohol/week: 14.0 standard drinks of alcohol     Types: 14 Shots of liquor per week     Comment: 2 drinks/day - scotch   • Drug use: Never         Review of Systems   REVIEW OF SYSTEMS     Review of Systems   Unable to perform ROS: Acuity of condition (provided by wife)   Constitutional: Positive for fatigue and unexpected weight change.   Respiratory: Positive for cough and shortness of breath.    Cardiovascular: Positive for leg swelling. Negative for chest pain.   Gastrointestinal: Negative for abdominal pain, nausea and vomiting.   Neurological: Positive for weakness.         VITALS     ED  Vitals    Date/Time Temp Pulse Resp BP SpO2 [facility]   12/24/23 2015 -- 70 28 118/70 97 % MS   12/24/23 1915 -- 72 27 145/92 95 % MS   12/24/23 1743 -- 68 31 139/95 94 % MS   12/24/23 1643 -- -- -- -- 100 % HKR   12/24/23 1640 -- -- -- -- 63 % HKR   12/24/23 1639 -- -- -- -- 83 % HKR   12/24/23 1634 35.3 °C (95.6 °F) 70 30 142/86 82 % HKR        Pulse Ox %: 82 % (12/24/23 1651)  Pulse Ox Interpretation: Low (12/24/23 1651)  Heart Rate: 70 (12/24/23 1651)     Vital Signs Review: Vital signs have been reviewed. The oxygen saturation is SpO2: (!) 82 % which is  LOW     Physical Exam   PHYSICAL EXAM     Physical Exam  Vitals and nursing note reviewed.   Constitutional:       General: He is awake. He is in acute distress.      Appearance: Normal appearance. He is well-groomed and normal weight. He is ill-appearing and diaphoretic.      Interventions: He is not intubated.  HENT:      Head: Normocephalic and atraumatic.      Mouth/Throat:      Lips: Pink.      Mouth: Mucous membranes are moist.   Cardiovascular:      Rate and Rhythm: Normal rate and regular rhythm.      Pulses:           Radial pulses are 2+ on the right side and 2+ on the left side.        Posterior tibial pulses are 2+ on the right side and 2+ on the left side.      Heart sounds: Normal heart sounds.   Pulmonary:      Effort: Tachypnea, accessory muscle usage and respiratory distress present. He is not intubated.      Breath sounds: Examination of the right-lower field reveals rales. Examination of the left-lower field reveals rales. Rales present.   Abdominal:      Palpations: Abdomen is soft.      Tenderness: There is no abdominal tenderness. There is no guarding or rebound.   Musculoskeletal:      Cervical back: Neck supple.      Right lower leg: 3+ Pitting Edema present.      Left lower leg: 3+ Pitting Edema present.   Skin:     General: Skin is warm.      Capillary Refill: Capillary refill takes less than 2 seconds.   Neurological:      General: No focal  deficit present.      Mental Status: He is alert.      GCS: GCS eye subscore is 4. GCS verbal subscore is 5. GCS motor subscore is 6.           PROCEDURES     Critical Care    Performed by: Ya Fajardo PA C  Authorized by: Dakota Jefferson MD    Critical care provider statement:     Critical care time (minutes):  45    Critical care start time:  12/24/2023 4:41 PM    Critical care end time:  12/24/2023 5:37 PM    Critical care was necessary to treat or prevent imminent or life-threatening deterioration of the following conditions:  Respiratory failure and sepsis    Critical care was time spent personally by me on the following activities:  Development of treatment plan with patient or surrogate, discussions with primary provider, evaluation of patient's response to treatment, examination of patient, obtaining history from patient or surrogate, ordering and performing treatments and interventions, ordering and review of laboratory studies, ordering and review of radiographic studies, pulse oximetry, re-evaluation of patient's condition and review of old charts    I assumed direction of critical care for this patient from another provider in my specialty: no      Care discussed with: admitting provider           DATA     Results     Procedure Component Value Units Date/Time    Blood gas, arterial [[id number]]  (Abnormal) Collected: 12/24/23 1753    Specimen: Blood, Arterial Updated: 12/24/23 1818     pH, Arterial 7.27     pCO2, Arterial 48 mm Hg      pO2, Arterial 151 mm Hg      Base Excess, Arterial -5.1 mEQ/L      HCO3, Arterial 21.0 mEQ/L      Source Of Oxygen bipap     FIO2 40    B-type natriuretic peptide [[id number]]  (Abnormal) Collected: 12/24/23 1647    Specimen: Blood, Venous Updated: 12/24/23 1752     BNP 1,135 pg/mL     Blood Culture Blood, Venous [[id number]] Collected: 12/24/23 1647    Specimen: Blood, Venous Updated: 12/24/23 1742    SARS-CoV-2 (COVID-19) Nasopharynx [[id number]]  (Abnormal)  Collected: 12/24/23 1649    Specimen: Nasopharyngeal Swab from Nasopharynx Updated: 12/24/23 1736    Narrative:      The following orders were created for panel order SARS-CoV-2 (COVID-19) Nasopharynx.  Procedure                               Abnormality         Status                     ---------                               -----------         ------                     SARS-COV-2 (COVID-19)/ F...[[id number]]  Abnormal            Final result                 Please view results for these tests on the individual orders.    SARS-COV-2 (COVID-19)/ FLU A/B, AND RSV, PCR Nasopharynx [[id number]]  (Abnormal) Collected: 12/24/23 1649    Specimen: Nasopharyngeal Swab from Nasopharynx Updated: 12/24/23 1736     SARS-CoV-2 (COVID-19) Negative     Influenza A Positive     Influenza B Negative     Respiratory Syncytial Virus Negative    Narrative:      Testing performed using real-time PCR for detection of COVID-19. EUA approved validation studies performed on site.     HS Troponin I (with 2 hour reflex) [[id number]]  (Abnormal) Collected: 12/24/23 1647    Specimen: Blood, Venous Updated: 12/24/23 1731     High Sens Troponin I 49.4 pg/mL     CBC and differential [[id number]]  (Abnormal) Collected: 12/24/23 1647    Specimen: Blood, Venous Updated: 12/24/23 1726     WBC 11.01 K/uL      RBC 3.19 M/uL      Hemoglobin 11.0 g/dL      Hematocrit 35.2 %      .3 fL      MCH 34.5 pg      MCHC 31.3 g/dL      RDW 15.0 %      Platelets 167 K/uL      MPV 9.4 fL      Differential Type Auto     nRBC 0.2 %      Immature Granulocytes 0.6 %      Neutrophils 84.8 %      Lymphocytes 7.2 %      Monocytes 6.9 %      Eosinophils 0.1 %      Basophils 0.4 %      Immature Granulocytes, Absolute 0.07 K/uL      Neutrophils, Absolute 9.34 K/uL      Lymphocytes, Absolute 0.79 K/uL      Monocytes, Absolute 0.76 K/uL      Eosinophils, Absolute 0.01 K/uL      Basophils, Absolute 0.04 K/uL      PLT Morphology Normal     Platelet Estimate Adequate  (150,000-400,000)     Clumped Platelets Present     Macrocytes 3+     Polychromasia 1+    Comprehensive metabolic panel [[id number]]  (Abnormal) Collected: 12/24/23 1647    Specimen: Blood, Venous Updated: 12/24/23 1723     Sodium 141 mEQ/L      Potassium 4.1 mEQ/L      Comment: Results obtained on plasma. Plasma Potassium values may be up to 0.4 mEQ/L less than serum values. The differences may be greater for patients with high platelet or white cell counts.        Chloride 107 mEQ/L      CO2 21 mEQ/L      BUN 16 mg/dL      Creatinine 1.2 mg/dL      Glucose 195 mg/dL      Calcium 8.7 mg/dL      AST (SGOT) 40 IU/L      ALT (SGPT) 22 IU/L      Alkaline Phosphatase 84 IU/L      Total Protein 6.8 g/dL      Comment: Test performed on plasma which typically contains approximately 0.4 g/dL more protein than serum.        Albumin 3.9 g/dL      Bilirubin, Total 0.6 mg/dL      eGFR >60.0 mL/min/1.73m*2      Comment: Calculation based on the Chronic Kidney Disease Epidemiology Collaboration (CKD-EPI) equation refit without adjustment for race.        Anion Gap 13 mEQ/L     Blood Gas+Lactate, Venous [[id number]]  (Abnormal) Collected: 12/24/23 1647    Specimen: Blood, Venous Updated: 12/24/23 1716     pH, Venous 7.16     pCO2, Venous 59 mm Hg      pO2, Venous 42 mm Hg      HCO3, Venous 17.5 mEQ/L      Base Excess, Venous -8.4 mEQ/L      TCO2, Venous 22.8 mEQ/L      Lactate 5.1 mmol/L      Source Of Oxygen -     FIO2 21    Blood Culture Blood, Venous [[id number]] Collected: 12/24/23 1652    Specimen: Blood, Venous Updated: 12/24/23 1656    [facility] Blood Culture [[id number]] Collected: 12/24/23 1647    Specimen: Blood, Venous Updated: 12/24/23 1655    RAINBOW LT BLUE [[id number]] Collected: 12/24/23 1647    Specimen: Blood, Venous Updated: 12/24/23 1655    RAINBOW RED [[id number]] Collected: 12/24/23 1647    Specimen: Blood, Venous Updated: 12/24/23 1655    [place] Draw Panel [[id number]] Collected: 12/24/23 1647    Specimen: Blood,  Venous Updated: 12/24/23 1655    Narrative:      The following orders were created for panel order [place] Draw Panel.  Procedure                               Abnormality         Status                     ---------                               -----------         ------                     RAINBOW RED[[id number]]                                      In process                 RAINBOW LT BLUE[[id number]]                                  In process                 RAINBOW GOLD[[id number]]                                     In process                 [place] Blood Culture[[id number]]                            In process                   Please view results for these tests on the individual orders.    RAINBOW GOLD [[id number]] Collected: 12/24/23 1647    Specimen: Blood, Venous Updated: 12/24/23 1655          Imaging Results          CT ANGIOGRAPHY CHEST PULMONARY EMBOLISM WITH IV CONTRAST (In process)  Result time 12/24/23 19:37:32               X-RAY CHEST 1 VIEW (Preliminary result)  Result time 12/24/23 18:38:48    Preliminary Interpretation    Cardiomegaly and pulmonary vascular congestion                              ECG 12 lead    (Results Pending)       Scoring tools                                  ED Course & MDM   MDM / ED COURSE / CLINICAL IMPRESSION / DISPO     Medical Decision Making  Patient presents with wife c/o shortness of breath. PMHx of prostate and breast cancer (not currently on chemoradiation) and CHF. Hypoxic in triage at 82% on RA, not usually on supplemental oxygen. SpO2 dropped to 62% when brought back to room with tachypnea and use of accessory muscles. Improved to 100% on nonrebreather. Respiratory placed patient on BiPAP, patient improved. Patient with 3+ pitting edema bilaterally to LE. Labs obtained: lactate 5.1, pH 7.16, pCO2 59, pO2 42, HCO2 17.5, trop 49.4 > 68.7, BNP 1135, WBC 11.01. CMP unremarkable. CXR shows cardiomegaly and pulmonary vascular congestion. CTA chest PE study  ordered. Patient treated with IV lasix, zosyn, and vancomycin. Admitted to ICU        Acute on chronic congestive heart failure, unspecified heart failure type (CMS/HCC): acute illness or injury  Influenza A: acute illness or injury  Severe sepsis (CMS/HCC): acute illness or injury  Amount and/or Complexity of Data Reviewed  Labs: ordered. Decision-making details documented in ED Course.  Radiology: ordered and independent interpretation performed.  ECG/medicine tests: ordered.      Risk  Prescription drug management.  Decision regarding hospitalization.          ED Course as of 12/24/23 2220   Sun Dec 24, 2023   1641 Called to patient's bedside. Patient was hypoxic at 82% on RA in triage. Now 100% on nonrebreather. He is tachypneic and using accessory muscles. Respirations at 30. Slightly hypothermic. BP and HR normal. Wife at bedside states patient c/o shortness of breath today, increased leg swelling last few days. Pmhx of prostate and breast cancer. Not currently on chemoradiation therapy     Plan: cbc, cmp, trop,  [CK]   1648 D/w Kyleigh: ICU NP - to come see [SB]   1650 Respiratory called for BiPAP [CK]   1709 WBC(!): 11.01 [CK]   1709 Hemoglobin(!): 11.0 [CK]   1716 pH, Venous(!!): 7.16 [CK]   1716 Lactate(!!): 5.1  TTE performed 11/27/2023 with an EF 55%. Patient appears volume overload with pmhx of CHF. No sepsis fluids ordered at this time. BP stable. Will continue to monitor  [CK]   1716 pCO2, Venous(!): 59 [CK]   1716 pO2, Venous(!): 42 [CK]   1717 HCO3, Venous(!): 17.5 [CK]   1726 Comprehensive metabolic panel(!)  unremarkable [CK]   1737 Influenza A(!): Positive [CK]   1737 High Sens Troponin I(!): 49.4 [CK]   1753 BNP(!): 1,135 [CK]   1800 Updated patient and wife with results and plan for admission. Reviewed code status again, patient is a FULL CODE [CK]   1831 pH, Arterial(!): 7.27  Improved on BiPAP [CK]   1839 [facility] [place]]   1842 [facility] [place] returned call but will be signing patient out to night  [facility] [place] given [CK]   1956 Case discussed with hospitalist (Dr. Lomas). Reviewed patient's presentation, ED course, and relevant data. Hospitalist accepts patient on their service and will see/admit.  [CK]   2026 [facility] [place] patient and recommends ICU admission rather than PCU [CK]      ED Course User Index  [CK] Ya Fajardo PA C  [SB] Dakota Jefferson MD     Clinical Impression      Severe sepsis (CMS/[facility])   Influenza A   Acute on chronic congestive heart failure, unspecified heart failure type (CMS/[facility])     _________________     ED Disposition   Admit / Observation                   Ya Fajardo PA C  12/24/23 2220

## 2023-12-24 NOTE — Clinical Note
Patient placed on procedure table in supine position. Positioning devices: all pressure points padded, arm board under arms and safety strap applied.

## 2023-12-25 ENCOUNTER — APPOINTMENT (INPATIENT)
Dept: RADIOLOGY | Facility: HOSPITAL | Age: 73
DRG: 004 | End: 2023-12-25
Attending: INTERNAL MEDICINE
Payer: MEDICARE

## 2023-12-25 ENCOUNTER — APPOINTMENT (INPATIENT)
Dept: RADIOLOGY | Facility: HOSPITAL | Age: 73
DRG: 004 | End: 2023-12-25
Attending: STUDENT IN AN ORGANIZED HEALTH CARE EDUCATION/TRAINING PROGRAM
Payer: MEDICARE

## 2023-12-25 ENCOUNTER — APPOINTMENT (INPATIENT)
Dept: RADIOLOGY | Facility: HOSPITAL | Age: 73
DRG: 004 | End: 2023-12-25
Attending: NURSE PRACTITIONER
Payer: MEDICARE

## 2023-12-25 LAB
ALBUMIN SERPL-MCNC: 3 G/DL (ref 3.5–5.7)
ALBUMIN SERPL-MCNC: 3.7 G/DL (ref 3.5–5.7)
ALP SERPL-CCNC: 66 IU/L (ref 34–125)
ALP SERPL-CCNC: 71 IU/L (ref 34–125)
ALT SERPL-CCNC: 34 IU/L (ref 7–52)
ALT SERPL-CCNC: 64 IU/L (ref 7–52)
ANION GAP SERPL CALC-SCNC: 11 MEQ/L (ref 3–15)
ANION GAP SERPL CALC-SCNC: 16 MEQ/L (ref 3–15)
ANISOCYTOSIS BLD QL SMEAR: ABNORMAL
APTT PPP: 26 SEC (ref 23–35)
APTT PPP: 33 SEC (ref 23–35)
AST SERPL-CCNC: 130 IU/L (ref 13–39)
AST SERPL-CCNC: 70 IU/L (ref 13–39)
ATRIAL RATE: 326
ATRIAL RATE: 72
ATRIAL RATE: 86
BACTERIA URNS QL MICRO: ABNORMAL /HPF
BASE EXCESS BLDA CALC-SCNC: -2.5 MEQ/L
BASE EXCESS BLDA CALC-SCNC: -2.6 MEQ/L
BASE EXCESS BLDA CALC-SCNC: -5.1 MEQ/L
BASOPHILS # BLD: 0 K/UL (ref 0.01–0.1)
BASOPHILS NFR BLD: 0 %
BILIRUB SERPL-MCNC: 0.6 MG/DL (ref 0.3–1.2)
BILIRUB SERPL-MCNC: 0.7 MG/DL (ref 0.3–1.2)
BILIRUB UR QL STRIP.AUTO: NEGATIVE MG/DL
BUN SERPL-MCNC: 19 MG/DL (ref 7–25)
BUN SERPL-MCNC: 20 MG/DL (ref 7–25)
CA-I BLD-SCNC: 1.01 MMOL/L (ref 1.15–1.27)
CA-I BLD-SCNC: 1.01 MMOL/L (ref 1.15–1.27)
CA-I BLD-SCNC: 1.06 MMOL/L (ref 1.15–1.27)
CALCIUM SERPL-MCNC: 7.6 MG/DL (ref 8.6–10.3)
CALCIUM SERPL-MCNC: 8.3 MG/DL (ref 8.6–10.3)
CATH EF ESTIMATED: 25 %
CHLORIDE BLDA-SCNC: 108 MEQ/L (ref 98–109)
CHLORIDE BLDA-SCNC: 109 MEQ/L (ref 98–109)
CHLORIDE BLDA-SCNC: 110 MEQ/L (ref 98–109)
CHLORIDE SERPL-SCNC: 104 MEQ/L (ref 98–107)
CHLORIDE SERPL-SCNC: 107 MEQ/L (ref 98–107)
CLARITY UR REFRACT.AUTO: CLEAR
CO2 BLDA-SCNC: 25 MEQ/L (ref 22–32)
CO2 BLDA-SCNC: 26 MEQ/L (ref 22–32)
CO2 BLDA-SCNC: 26 MEQ/L (ref 22–32)
CO2 SERPL-SCNC: 25 MEQ/L (ref 21–31)
CO2 SERPL-SCNC: 26 MEQ/L (ref 21–31)
COLOR UR AUTO: YELLOW
CREAT SERPL-MCNC: 1.5 MG/DL (ref 0.7–1.3)
CREAT SERPL-MCNC: 1.5 MG/DL (ref 0.7–1.3)
DIFFERENTIAL METHOD BLD: ABNORMAL
EGFRCR SERPLBLD CKD-EPI 2021: 48.9 ML/MIN/1.73M*2
EGFRCR SERPLBLD CKD-EPI 2021: 48.9 ML/MIN/1.73M*2
EOSINOPHIL # BLD: 0 K/UL (ref 0.04–0.54)
EOSINOPHIL NFR BLD: 0 %
ERYTHROCYTE [DISTWIDTH] IN BLOOD BY AUTOMATED COUNT: 15.4 % (ref 11.6–14.4)
ERYTHROCYTE [DISTWIDTH] IN BLOOD BY AUTOMATED COUNT: 15.6 % (ref 11.6–14.4)
GLUCOSE BLD-MCNC: 161 MG/DL (ref 70–99)
GLUCOSE BLD-MCNC: 171 MG/DL (ref 70–99)
GLUCOSE BLDA-MCNC: 171 MG/DL (ref 70–99)
GLUCOSE BLDA-MCNC: 184 MG/DL (ref 70–99)
GLUCOSE BLDA-MCNC: 221 MG/DL (ref 70–99)
GLUCOSE SERPL-MCNC: 177 MG/DL (ref 70–99)
GLUCOSE SERPL-MCNC: 180 MG/DL (ref 70–99)
GLUCOSE UR STRIP.AUTO-MCNC: NEGATIVE MG/DL
GRAM STN SPEC: NORMAL
HCO3 BLDA-SCNC: 21 MEQ/L (ref 21–28)
HCO3 BLDA-SCNC: 23 MEQ/L (ref 21–28)
HCO3 BLDA-SCNC: 23 MEQ/L (ref 21–28)
HCT VFR BLDCO AUTO: 30.4 % (ref 40.1–51)
HCT VFR BLDCO AUTO: 34.2 % (ref 40.1–51)
HGB BLD-MCNC: 10.6 G/DL (ref 13.7–17.5)
HGB BLD-MCNC: 9.5 G/DL (ref 13.7–17.5)
HGB BLDA-MCNC: 10.6 G/DL (ref 14–17.5)
HGB BLDA-MCNC: 10.7 G/DL (ref 14–17.5)
HGB BLDA-MCNC: 10.8 G/DL (ref 14–17.5)
HGB UR QL STRIP.AUTO: ABNORMAL
HYALINE CASTS #/AREA URNS LPF: ABNORMAL /LPF
INR PPP: 1.4
INR PPP: 1.9
KETONES UR STRIP.AUTO-MCNC: NEGATIVE MG/DL
LACTATE BLDA-SCNC: 10.5 MMOL/L (ref 0.4–1.6)
LACTATE BLDA-SCNC: 4.3 MMOL/L (ref 0.4–1.6)
LACTATE BLDA-SCNC: 5.6 MMOL/L (ref 0.4–1.6)
LACTATE SERPL-SCNC: 2.4 MMOL/L (ref 0.4–2)
LACTATE SERPL-SCNC: 3.1 MMOL/L (ref 0.4–2)
LEUKOCYTE ESTERASE UR QL STRIP.AUTO: NEGATIVE
LYMPHOCYTES # BLD: 1.67 K/UL (ref 1.2–3.5)
LYMPHOCYTES NFR BLD: 12 %
MACROCYTES BLD QL SMEAR: ABNORMAL
MAGNESIUM SERPL-MCNC: 1.4 MG/DL (ref 1.8–2.5)
MAGNESIUM SERPL-MCNC: 3.1 MG/DL (ref 1.8–2.5)
MCH RBC QN AUTO: 34.3 PG (ref 28–33.2)
MCH RBC QN AUTO: 34.4 PG (ref 28–33.2)
MCHC RBC AUTO-ENTMCNC: 31 G/DL (ref 32.2–36.5)
MCHC RBC AUTO-ENTMCNC: 31.3 G/DL (ref 32.2–36.5)
MCV RBC AUTO: 110.1 FL (ref 83–98)
MCV RBC AUTO: 110.7 FL (ref 83–98)
MONOCYTES # BLD: 0.84 K/UL (ref 0.3–1)
MONOCYTES NFR BLD: 6 %
MRSA DNA SPEC QL NAA+PROBE: NEGATIVE
MUCOUS THREADS URNS QL MICRO: ABNORMAL /LPF
NEUTS BAND # BLD: 11.44 K/UL (ref 1.7–7)
NEUTS SEG NFR BLD: 82 %
NEUTS VAC BLD QL SMEAR: ABNORMAL
NITRITE UR QL STRIP.AUTO: NEGATIVE
OVALOCYTES BLD QL SMEAR: ABNORMAL
PCO2 BLDA: 45 MM HG (ref 35–48)
PCO2 BLDA: 48 MM HG (ref 35–48)
PCO2 BLDA: 71 MM HG (ref 35–48)
PDW BLD AUTO: 9.5 FL (ref 9.4–12.4)
PDW BLD AUTO: 9.9 FL (ref 9.4–12.4)
PH BLDA: 7.15 PH (ref 7.35–7.45)
PH BLDA: 7.31 PH (ref 7.35–7.45)
PH BLDA: 7.32 PH (ref 7.35–7.45)
PH UR STRIP.AUTO: 5.5 [PH]
PHOSPHATE SERPL-MCNC: 4.7 MG/DL (ref 2.4–4.7)
PLAT MORPH BLD: NORMAL
PLATELET # BLD AUTO: 127 K/UL (ref 150–350)
PLATELET # BLD AUTO: 133 K/UL (ref 150–350)
PLATELET # BLD EST: ABNORMAL 10*3/UL
PO2 BLDA: 122 MM HG (ref 83–100)
PO2 BLDA: 186 MM HG (ref 83–100)
PO2 BLDA: 327 MM HG (ref 83–100)
POCT OXYHGB: 42.5 % (ref 93–98)
POCT OXYHGB: 97.5 % (ref 93–98)
POCT PATIENT TEMPERATURE: 100.8 °F (ref 97–99)
POCT PATIENT TEMPERATURE: 101.8 °F (ref 97–99)
POCT PATIENT TEMPERATURE: 97.7 °F (ref 97–99)
POCT TEST (BLD GAS): ABNORMAL
POCT TEST: ABNORMAL
POCT TEST: NORMAL
POTASSIUM BLDA-SCNC: 3.7 MEQ/L (ref 3.4–4.5)
POTASSIUM BLDA-SCNC: 4 MEQ/L (ref 3.4–4.5)
POTASSIUM BLDA-SCNC: 4.1 MEQ/L (ref 3.4–4.5)
POTASSIUM SERPL-SCNC: 3.7 MEQ/L (ref 3.5–5.1)
POTASSIUM SERPL-SCNC: 3.8 MEQ/L (ref 3.5–5.1)
POTASSIUM SERPL-SCNC: 3.8 MEQ/L (ref 3.5–5.1)
POTASSIUM SERPL-SCNC: 4.5 MEQ/L (ref 3.5–5.1)
PROT SERPL-MCNC: 5.2 G/DL (ref 6–8.2)
PROT SERPL-MCNC: 6.2 G/DL (ref 6–8.2)
PROT UR QL STRIP.AUTO: ABNORMAL
PROTHROMBIN TIME: 17.4 SEC (ref 12.2–14.5)
PROTHROMBIN TIME: 21.2 SEC (ref 12.2–14.5)
QRS DURATION: 144
QRS DURATION: 150
QRS DURATION: 164
QT INTERVAL: 464
QT INTERVAL: 512
QT INTERVAL: 522
QTC CALCULATION(BAZETT): 518
QTC CALCULATION(BAZETT): 568
QTC CALCULATION(BAZETT): 579
R AXIS: -33
R AXIS: -40
R AXIS: -49
RBC # BLD AUTO: 2.76 M/UL (ref 4.5–5.8)
RBC # BLD AUTO: 3.09 M/UL (ref 4.5–5.8)
RBC #/AREA URNS HPF: ABNORMAL /HPF
SAO2 % BLDA: 98 % (ref 93–98)
SAO2 % BLDA: 98 % (ref 93–98)
SAO2 % BLDA: 99 % (ref 93–98)
SMUDGE CELLS BLD QL SMEAR: ABNORMAL
SODIUM BLDA-SCNC: 141 MEQ/L (ref 136–145)
SODIUM BLDA-SCNC: 143 MEQ/L (ref 136–145)
SODIUM BLDA-SCNC: 143 MEQ/L (ref 136–145)
SODIUM SERPL-SCNC: 141 MEQ/L (ref 136–145)
SODIUM SERPL-SCNC: 148 MEQ/L (ref 136–145)
SP GR UR REFRACT.AUTO: >1.035
SQUAMOUS URNS QL MICRO: ABNORMAL /HPF
T WAVE AXIS: 144
T WAVE AXIS: 159
T WAVE AXIS: 176
TOXIC GRANULES BLD QL SMEAR: SLIGHT
TROPONIN I SERPL HS-MCNC: 135 PG/ML
TROPONIN I SERPL HS-MCNC: 154.2 PG/ML
TROPONIN I SERPL HS-MCNC: 410.7 PG/ML
TROPONIN I SERPL HS-MCNC: 706.3 PG/ML
TROPONIN I SERPL HS-MCNC: 792 PG/ML
TROPONIN I SERPL HS-MCNC: 860.4 PG/ML
UROBILINOGEN UR STRIP-ACNC: 0.2 EU/DL
VENTRICULAR RATE: 74
VENTRICULAR RATE: 74
VENTRICULAR RATE: 75
WBC # BLD AUTO: 10.6 K/UL (ref 3.8–10.5)
WBC # BLD AUTO: 13.95 K/UL (ref 3.8–10.5)
WBC #/AREA URNS HPF: ABNORMAL /HPF

## 2023-12-25 PROCEDURE — 25800000 HC PHARMACY IV SOLUTIONS: Performed by: NURSE PRACTITIONER

## 2023-12-25 PROCEDURE — 5A2204Z RESTORATION OF CARDIAC RHYTHM, SINGLE: ICD-10-PCS | Performed by: INTERNAL MEDICINE

## 2023-12-25 PROCEDURE — 5A12012 PERFORMANCE OF CARDIAC OUTPUT, SINGLE, MANUAL: ICD-10-PCS | Performed by: INTERNAL MEDICINE

## 2023-12-25 PROCEDURE — 83735 ASSAY OF MAGNESIUM: CPT | Performed by: NURSE PRACTITIONER

## 2023-12-25 PROCEDURE — 84484 ASSAY OF TROPONIN QUANT: CPT | Performed by: NURSE PRACTITIONER

## 2023-12-25 PROCEDURE — 93460 R&L HRT ART/VENTRICLE ANGIO: CPT | Performed by: INTERNAL MEDICINE

## 2023-12-25 PROCEDURE — 27200000 HC STERILE SUPPLY: Performed by: INTERNAL MEDICINE

## 2023-12-25 PROCEDURE — 36620 INSERTION CATHETER ARTERY: CPT

## 2023-12-25 PROCEDURE — 87205 SMEAR GRAM STAIN: CPT | Performed by: HOSPITALIST

## 2023-12-25 PROCEDURE — 71045 X-RAY EXAM CHEST 1 VIEW: CPT

## 2023-12-25 PROCEDURE — 85027 COMPLETE CBC AUTOMATED: CPT | Performed by: HOSPITALIST

## 2023-12-25 PROCEDURE — 93005 ELECTROCARDIOGRAM TRACING: CPT | Performed by: HOSPITALIST

## 2023-12-25 PROCEDURE — 93005 ELECTROCARDIOGRAM TRACING: CPT | Performed by: INTERNAL MEDICINE

## 2023-12-25 PROCEDURE — C1751 CATH, INF, PER/CENT/MIDLINE: HCPCS | Performed by: INTERNAL MEDICINE

## 2023-12-25 PROCEDURE — C1760 CLOSURE DEV, VASC: HCPCS | Performed by: INTERNAL MEDICINE

## 2023-12-25 PROCEDURE — B215YZZ FLUOROSCOPY OF LEFT HEART USING OTHER CONTRAST: ICD-10-PCS | Performed by: INTERNAL MEDICINE

## 2023-12-25 PROCEDURE — 85730 THROMBOPLASTIN TIME PARTIAL: CPT | Performed by: NURSE PRACTITIONER

## 2023-12-25 PROCEDURE — 20000000 HC ROOM AND CARE ICU

## 2023-12-25 PROCEDURE — C1769 GUIDE WIRE: HCPCS | Performed by: INTERNAL MEDICINE

## 2023-12-25 PROCEDURE — 81003 URINALYSIS AUTO W/O SCOPE: CPT | Performed by: HOSPITALIST

## 2023-12-25 PROCEDURE — 85610 PROTHROMBIN TIME: CPT | Performed by: HOSPITALIST

## 2023-12-25 PROCEDURE — 27800000 HC SUPPLY/IMPLANTS: Performed by: INTERNAL MEDICINE

## 2023-12-25 PROCEDURE — 3E033XZ INTRODUCTION OF VASOPRESSOR INTO PERIPHERAL VEIN, PERCUTANEOUS APPROACH: ICD-10-PCS | Performed by: INTERNAL MEDICINE

## 2023-12-25 PROCEDURE — 84484 ASSAY OF TROPONIN QUANT: CPT | Performed by: HOSPITALIST

## 2023-12-25 PROCEDURE — 36415 COLL VENOUS BLD VENIPUNCTURE: CPT | Performed by: HOSPITALIST

## 2023-12-25 PROCEDURE — 63600000 HC DRUGS/DETAIL CODE: Mod: JZ | Performed by: HOSPITALIST

## 2023-12-25 PROCEDURE — 25000000 HC PHARMACY GENERAL

## 2023-12-25 PROCEDURE — 25800000 HC PHARMACY IV SOLUTIONS: Performed by: HOSPITALIST

## 2023-12-25 PROCEDURE — C1894 INTRO/SHEATH, NON-LASER: HCPCS | Performed by: INTERNAL MEDICINE

## 2023-12-25 PROCEDURE — 94640 AIRWAY INHALATION TREATMENT: CPT

## 2023-12-25 PROCEDURE — 92950 HEART/LUNG RESUSCITATION CPR: CPT

## 2023-12-25 PROCEDURE — B211YZZ FLUOROSCOPY OF MULTIPLE CORONARY ARTERIES USING OTHER CONTRAST: ICD-10-PCS | Performed by: INTERNAL MEDICINE

## 2023-12-25 PROCEDURE — 83605 ASSAY OF LACTIC ACID: CPT | Performed by: NURSE PRACTITIONER

## 2023-12-25 PROCEDURE — 83735 ASSAY OF MAGNESIUM: CPT | Performed by: HOSPITALIST

## 2023-12-25 PROCEDURE — 25000000 HC PHARMACY GENERAL: Performed by: INTERNAL MEDICINE

## 2023-12-25 PROCEDURE — 80053 COMPREHEN METABOLIC PANEL: CPT | Performed by: NURSE PRACTITIONER

## 2023-12-25 PROCEDURE — 85025 COMPLETE CBC W/AUTO DIFF WBC: CPT | Performed by: NURSE PRACTITIONER

## 2023-12-25 PROCEDURE — 85730 THROMBOPLASTIN TIME PARTIAL: CPT | Performed by: HOSPITALIST

## 2023-12-25 PROCEDURE — 87070 CULTURE OTHR SPECIMN AEROBIC: CPT | Performed by: HOSPITALIST

## 2023-12-25 PROCEDURE — 36600 WITHDRAWAL OF ARTERIAL BLOOD: CPT

## 2023-12-25 PROCEDURE — 84100 ASSAY OF PHOSPHORUS: CPT | Performed by: HOSPITALIST

## 2023-12-25 PROCEDURE — 4A023N8 MEASUREMENT OF CARDIAC SAMPLING AND PRESSURE, BILATERAL, PERCUTANEOUS APPROACH: ICD-10-PCS | Performed by: INTERNAL MEDICINE

## 2023-12-25 PROCEDURE — 82040 ASSAY OF SERUM ALBUMIN: CPT | Performed by: HOSPITALIST

## 2023-12-25 PROCEDURE — 63600105 HC IODINE BASED CONTRAST: Performed by: INTERNAL MEDICINE

## 2023-12-25 PROCEDURE — 63600000 HC DRUGS/DETAIL CODE: Mod: JZ | Performed by: NURSE PRACTITIONER

## 2023-12-25 PROCEDURE — 87641 MR-STAPH DNA AMP PROBE: CPT | Performed by: HOSPITALIST

## 2023-12-25 PROCEDURE — 63700000 HC SELF-ADMINISTRABLE DRUG: Performed by: HOSPITALIST

## 2023-12-25 PROCEDURE — 33210 INSERT ELECTRD/PM CATH SNGL: CPT | Performed by: INTERNAL MEDICINE

## 2023-12-25 PROCEDURE — 94003 VENT MGMT INPAT SUBQ DAY: CPT

## 2023-12-25 PROCEDURE — 25800000 HC PHARMACY IV SOLUTIONS: Performed by: INTERNAL MEDICINE

## 2023-12-25 PROCEDURE — 84132 ASSAY OF SERUM POTASSIUM: CPT | Performed by: NURSE PRACTITIONER

## 2023-12-25 PROCEDURE — 25000000 HC PHARMACY GENERAL: Performed by: HOSPITALIST

## 2023-12-25 DEVICE — SWAN-GANZ BIPOLAR PACING CATHETER
Type: IMPLANTABLE DEVICE | Site: HEART | Status: FUNCTIONAL
Brand: SWAN-GANZ

## 2023-12-25 DEVICE — ANGIO-SEAL VIP VASCULAR CLOSURE DEVICE
Type: IMPLANTABLE DEVICE | Site: ARTERIAL | Status: FUNCTIONAL
Brand: ANGIO-SEAL

## 2023-12-25 RX ORDER — ACETAMINOPHEN 650 MG/20.3ML
650 LIQUID ORAL EVERY 4 HOURS PRN
Status: DISCONTINUED | OUTPATIENT
Start: 2023-12-25 | End: 2024-01-10 | Stop reason: HOSPADM

## 2023-12-25 RX ORDER — SODIUM BICARBONATE 1 MEQ/ML
SYRINGE (ML) INTRAVENOUS
Status: COMPLETED
Start: 2023-12-25 | End: 2023-12-25

## 2023-12-25 RX ORDER — GUAIFENESIN 100 MG/5ML
400 SOLUTION ORAL EVERY 6 HOURS
Status: DISCONTINUED | OUTPATIENT
Start: 2023-12-25 | End: 2024-01-08

## 2023-12-25 RX ORDER — DEXTROSE 40 %
15-30 GEL (GRAM) ORAL AS NEEDED
Status: DISCONTINUED | OUTPATIENT
Start: 2023-12-25 | End: 2024-01-10 | Stop reason: HOSPADM

## 2023-12-25 RX ORDER — PRAVASTATIN SODIUM 20 MG/1
20 TABLET ORAL DAILY
Status: DISCONTINUED | OUTPATIENT
Start: 2023-12-25 | End: 2023-12-25

## 2023-12-25 RX ORDER — PANTOPRAZOLE SODIUM 40 MG/10ML
40 INJECTION, POWDER, LYOPHILIZED, FOR SOLUTION INTRAVENOUS EVERY 12 HOURS
Status: DISCONTINUED | OUTPATIENT
Start: 2023-12-25 | End: 2024-01-10 | Stop reason: HOSPADM

## 2023-12-25 RX ORDER — POTASSIUM CHLORIDE 14.9 MG/ML
20 INJECTION INTRAVENOUS AS NEEDED
Status: DISCONTINUED | OUTPATIENT
Start: 2023-12-25 | End: 2024-01-10 | Stop reason: HOSPADM

## 2023-12-25 RX ORDER — FUROSEMIDE 10 MG/ML
40 INJECTION INTRAMUSCULAR; INTRAVENOUS EVERY 12 HOURS
Status: DISCONTINUED | OUTPATIENT
Start: 2023-12-25 | End: 2024-01-02

## 2023-12-25 RX ORDER — SODIUM CHLORIDE 9 MG/ML
INJECTION, SOLUTION INTRAVENOUS
Status: COMPLETED | OUTPATIENT
Start: 2023-12-25 | End: 2023-12-25

## 2023-12-25 RX ORDER — POTASSIUM CHLORIDE 750 MG/1
20 TABLET, EXTENDED RELEASE ORAL AS NEEDED
Status: DISCONTINUED | OUTPATIENT
Start: 2023-12-25 | End: 2024-01-10 | Stop reason: HOSPADM

## 2023-12-25 RX ORDER — NOREPINEPHRINE BITARTRATE 0.02 MG/ML
0-90 INJECTION, SOLUTION INTRAVENOUS
Status: DISCONTINUED | OUTPATIENT
Start: 2023-12-25 | End: 2023-12-30

## 2023-12-25 RX ORDER — ATROPINE SULFATE 0.1 MG/ML
1 INJECTION INTRAVENOUS ONCE
Status: COMPLETED | OUTPATIENT
Start: 2023-12-24 | End: 2023-12-24

## 2023-12-25 RX ORDER — LIDOCAINE HYDROCHLORIDE ANHYDROUS AND DEXTROSE MONOHYDRATE .8; 5 G/100ML; G/100ML
2 INJECTION, SOLUTION INTRAVENOUS CONTINUOUS
Status: DISCONTINUED | OUTPATIENT
Start: 2023-12-25 | End: 2023-12-25

## 2023-12-25 RX ORDER — IOPAMIDOL 755 MG/ML
INJECTION, SOLUTION INTRAVASCULAR
Status: DISCONTINUED | OUTPATIENT
Start: 2023-12-25 | End: 2023-12-25 | Stop reason: HOSPADM

## 2023-12-25 RX ORDER — HEPARIN SODIUM 5000 [USP'U]/ML
5000 INJECTION, SOLUTION INTRAVENOUS; SUBCUTANEOUS
Status: DISCONTINUED | OUTPATIENT
Start: 2023-12-25 | End: 2023-12-28

## 2023-12-25 RX ORDER — TAMOXIFEN CITRATE 10 MG/1
20 TABLET ORAL DAILY
Status: DISCONTINUED | OUTPATIENT
Start: 2023-12-25 | End: 2024-01-10 | Stop reason: HOSPADM

## 2023-12-25 RX ORDER — TAMOXIFEN CITRATE 10 MG/1
20 TABLET ORAL DAILY
Status: DISCONTINUED | OUTPATIENT
Start: 2023-12-25 | End: 2023-12-25

## 2023-12-25 RX ORDER — AMIODARONE HYDROCHLORIDE 200 MG/1
200 TABLET ORAL DAILY
Status: DISCONTINUED | OUTPATIENT
Start: 2023-12-25 | End: 2024-01-10 | Stop reason: HOSPADM

## 2023-12-25 RX ORDER — CHOLECALCIFEROL (VITAMIN D3) 25 MCG
1000 TABLET ORAL DAILY
Status: DISCONTINUED | OUTPATIENT
Start: 2023-12-25 | End: 2024-01-10 | Stop reason: HOSPADM

## 2023-12-25 RX ORDER — METOCLOPRAMIDE HYDROCHLORIDE 5 MG/ML
10 INJECTION INTRAMUSCULAR; INTRAVENOUS EVERY 6 HOURS PRN
Status: DISCONTINUED | OUTPATIENT
Start: 2023-12-25 | End: 2024-01-10 | Stop reason: HOSPADM

## 2023-12-25 RX ORDER — SILVER SULFADIAZINE 10 G/1000G
CREAM TOPICAL 2 TIMES DAILY
Status: DISCONTINUED | OUTPATIENT
Start: 2023-12-25 | End: 2024-01-10 | Stop reason: HOSPADM

## 2023-12-25 RX ORDER — CALCIUM GLUCONATE 20 MG/ML
1 INJECTION, SOLUTION INTRAVENOUS EVERY 6 HOURS PRN
Status: DISCONTINUED | OUTPATIENT
Start: 2023-12-25 | End: 2024-01-10 | Stop reason: HOSPADM

## 2023-12-25 RX ORDER — ATROPINE SULFATE 0.1 MG/ML
1 INJECTION INTRAVENOUS ONCE AS NEEDED
Status: DISCONTINUED | OUTPATIENT
Start: 2023-12-25 | End: 2024-01-10 | Stop reason: HOSPADM

## 2023-12-25 RX ORDER — IBUPROFEN 200 MG
16-32 TABLET ORAL AS NEEDED
Status: DISCONTINUED | OUTPATIENT
Start: 2023-12-25 | End: 2024-01-10 | Stop reason: HOSPADM

## 2023-12-25 RX ORDER — CETIRIZINE HYDROCHLORIDE 1 MG/ML
10 SOLUTION ORAL NIGHTLY
Status: DISCONTINUED | OUTPATIENT
Start: 2023-12-25 | End: 2024-01-10 | Stop reason: HOSPADM

## 2023-12-25 RX ORDER — POTASSIUM CHLORIDE 750 MG/1
40 TABLET, EXTENDED RELEASE ORAL AS NEEDED
Status: DISCONTINUED | OUTPATIENT
Start: 2023-12-25 | End: 2024-01-10 | Stop reason: HOSPADM

## 2023-12-25 RX ORDER — ATORVASTATIN CALCIUM 10 MG/1
10 TABLET, FILM COATED ORAL NIGHTLY
Status: DISCONTINUED | OUTPATIENT
Start: 2023-12-25 | End: 2024-01-10 | Stop reason: HOSPADM

## 2023-12-25 RX ORDER — OSELTAMIVIR PHOSPHATE 6 MG/ML
30 FOR SUSPENSION ORAL DAILY
Status: DISCONTINUED | OUTPATIENT
Start: 2023-12-25 | End: 2023-12-25

## 2023-12-25 RX ORDER — LIDOCAINE HYDROCHLORIDE 10 MG/ML
INJECTION, SOLUTION INFILTRATION; PERINEURAL
Status: DISCONTINUED | OUTPATIENT
Start: 2023-12-25 | End: 2023-12-25 | Stop reason: HOSPADM

## 2023-12-25 RX ORDER — OSELTAMIVIR PHOSPHATE 6 MG/ML
30 FOR SUSPENSION ORAL 2 TIMES DAILY
Status: COMPLETED | OUTPATIENT
Start: 2023-12-25 | End: 2023-12-29

## 2023-12-25 RX ORDER — ALBUTEROL SULFATE 90 UG/1
2 INHALANT RESPIRATORY (INHALATION) EVERY 6 HOURS PRN
Status: DISCONTINUED | OUTPATIENT
Start: 2023-12-25 | End: 2023-12-26

## 2023-12-25 RX ORDER — BETAMETHASONE VALERATE 1.2 MG/G
OINTMENT TOPICAL 2 TIMES DAILY PRN
Status: DISCONTINUED | OUTPATIENT
Start: 2023-12-25 | End: 2024-01-10 | Stop reason: HOSPADM

## 2023-12-25 RX ORDER — MILRINONE LACTATE 0.2 MG/ML
0.12 INJECTION, SOLUTION INTRAVENOUS CONTINUOUS
Status: DISCONTINUED | OUTPATIENT
Start: 2023-12-25 | End: 2023-12-29

## 2023-12-25 RX ORDER — DEXTROSE 50 % IN WATER (D50W) INTRAVENOUS SYRINGE
25 AS NEEDED
Status: DISCONTINUED | OUTPATIENT
Start: 2023-12-25 | End: 2024-01-10 | Stop reason: HOSPADM

## 2023-12-25 RX ORDER — MIDAZOLAM HYDROCHLORIDE 2 MG/2ML
INJECTION, SOLUTION INTRAMUSCULAR; INTRAVENOUS
Status: DISPENSED
Start: 2023-12-25 | End: 2023-12-25

## 2023-12-25 RX ORDER — FENTANYL CITRATE/PF 100MCG/2ML
0-200 SYRINGE (ML) INTRAVENOUS
Status: DISCONTINUED | OUTPATIENT
Start: 2023-12-25 | End: 2023-12-30

## 2023-12-25 RX ADMIN — VANCOMYCIN HYDROCHLORIDE 750 MG: 750 INJECTION, POWDER, LYOPHILIZED, FOR SOLUTION INTRAVENOUS at 21:50

## 2023-12-25 RX ADMIN — GUAIFENESIN 400 MG: 100 SOLUTION ORAL at 12:14

## 2023-12-25 RX ADMIN — PIPERACILLIN AND TAZOBACTAM 4.5 G: 4; .5 INJECTION, POWDER, LYOPHILIZED, FOR SOLUTION INTRAVENOUS; PARENTERAL at 22:00

## 2023-12-25 RX ADMIN — CHLORHEXIDINE GLUCONATE 0.12% ORAL RINSE 15 ML: 1.2 LIQUID ORAL at 09:58

## 2023-12-25 RX ADMIN — ACETAMINOPHEN 650 MG: 650 SOLUTION ORAL at 04:34

## 2023-12-25 RX ADMIN — Medication 10 MG: at 21:50

## 2023-12-25 RX ADMIN — PIPERACILLIN AND TAZOBACTAM 4.5 G: 4; .5 INJECTION, POWDER, LYOPHILIZED, FOR SOLUTION INTRAVENOUS; PARENTERAL at 14:43

## 2023-12-25 RX ADMIN — CHLORHEXIDINE GLUCONATE 0.12% ORAL RINSE 15 ML: 1.2 LIQUID ORAL at 01:15

## 2023-12-25 RX ADMIN — PANTOPRAZOLE SODIUM 40 MG: 40 INJECTION, POWDER, LYOPHILIZED, FOR SOLUTION INTRAVENOUS at 04:34

## 2023-12-25 RX ADMIN — OSELTAMIVIR PHOSPHATE 30 MG: 6 POWDER, FOR SUSPENSION ORAL at 20:30

## 2023-12-25 RX ADMIN — SODIUM BICARBONATE: 84 INJECTION INTRAVENOUS at 09:58

## 2023-12-25 RX ADMIN — GUAIFENESIN 400 MG: 100 SOLUTION ORAL at 17:28

## 2023-12-25 RX ADMIN — Medication 25 MCG/HR: at 04:19

## 2023-12-25 RX ADMIN — SODIUM CHLORIDE 500 ML: 9 INJECTION, SOLUTION INTRAVENOUS at 16:20

## 2023-12-25 RX ADMIN — VANCOMYCIN HYDROCHLORIDE 750 MG: 750 INJECTION, POWDER, LYOPHILIZED, FOR SOLUTION INTRAVENOUS at 09:59

## 2023-12-25 RX ADMIN — CHLORHEXIDINE GLUCONATE 0.12% ORAL RINSE 15 ML: 1.2 LIQUID ORAL at 20:00

## 2023-12-25 RX ADMIN — PIPERACILLIN AND TAZOBACTAM 4.5 G: 4; .5 INJECTION, POWDER, LYOPHILIZED, FOR SOLUTION INTRAVENOUS; PARENTERAL at 10:42

## 2023-12-25 RX ADMIN — OSELTAMIVIR PHOSPHATE 30 MG: 6 POWDER, FOR SUSPENSION ORAL at 12:14

## 2023-12-25 RX ADMIN — NOREPINEPHRINE BITARTRATE 14 MCG/MIN: 0.02 INJECTION, SOLUTION INTRAVENOUS at 17:41

## 2023-12-25 RX ADMIN — MILRINONE LACTATE IN DEXTROSE 0.12 MCG/KG/MIN: 200 INJECTION, SOLUTION INTRAVENOUS at 14:45

## 2023-12-25 RX ADMIN — GUAIFENESIN 400 MG: 100 SOLUTION ORAL at 06:08

## 2023-12-25 RX ADMIN — PIPERACILLIN AND TAZOBACTAM 4.5 G: 4; .5 INJECTION, POWDER, LYOPHILIZED, FOR SOLUTION INTRAVENOUS; PARENTERAL at 03:59

## 2023-12-25 RX ADMIN — ATORVASTATIN CALCIUM 10 MG: 10 TABLET, FILM COATED ORAL at 21:50

## 2023-12-25 RX ADMIN — IPRATROPIUM BROMIDE 2 PUFF: 17 AEROSOL, METERED RESPIRATORY (INHALATION) at 20:33

## 2023-12-25 RX ADMIN — NOREPINEPHRINE BITARTRATE 14 MCG/MIN: 0.02 INJECTION, SOLUTION INTRAVENOUS at 23:01

## 2023-12-25 RX ADMIN — IPRATROPIUM BROMIDE 2 PUFF: 17 AEROSOL, METERED RESPIRATORY (INHALATION) at 14:56

## 2023-12-25 RX ADMIN — PANTOPRAZOLE SODIUM 40 MG: 40 INJECTION, POWDER, LYOPHILIZED, FOR SOLUTION INTRAVENOUS at 21:15

## 2023-12-25 RX ADMIN — SILVER SULFADIAZINE: 10 CREAM TOPICAL at 20:00

## 2023-12-25 RX ADMIN — IPRATROPIUM BROMIDE 2 PUFF: 17 AEROSOL, METERED RESPIRATORY (INHALATION) at 11:11

## 2023-12-25 RX ADMIN — HEPARIN SODIUM 5000 UNITS: 5000 INJECTION, SOLUTION INTRAVENOUS; SUBCUTANEOUS at 06:07

## 2023-12-25 RX ADMIN — LIDOCAINE HYDROCHLORIDE ANHYDROUS AND DEXTROSE MONOHYDRATE 2 MG/MIN: .8; 5 INJECTION, SOLUTION INTRAVENOUS at 09:11

## 2023-12-25 RX ADMIN — MAGNESIUM SULFATE HEPTAHYDRATE 2 G: 40 INJECTION, SOLUTION INTRAVENOUS at 06:04

## 2023-12-25 RX ADMIN — ACETAMINOPHEN 650 MG: 650 SOLUTION ORAL at 10:04

## 2023-12-25 RX ADMIN — HEPARIN SODIUM 5000 UNITS: 5000 INJECTION, SOLUTION INTRAVENOUS; SUBCUTANEOUS at 17:25

## 2023-12-25 ASSESSMENT — COGNITIVE AND FUNCTIONAL STATUS - GENERAL
STANDING UP FROM CHAIR USING ARMS: 1 - TOTAL
MOVING TO AND FROM BED TO CHAIR: 1 - TOTAL
STANDING UP FROM CHAIR USING ARMS: 1 - TOTAL
CLIMB 3 TO 5 STEPS WITH RAILING: 1 - TOTAL
MOVING TO AND FROM BED TO CHAIR: 1 - TOTAL
WALKING IN HOSPITAL ROOM: 1 - TOTAL
CLIMB 3 TO 5 STEPS WITH RAILING: 1 - TOTAL
WALKING IN HOSPITAL ROOM: 1 - TOTAL

## 2023-12-25 NOTE — CONSULTS
Cardiology Consult Note      Cam Rosas is a 73 y.o. male who was admitted for Influenza A [J10.1]  Severe sepsis (CMS/[facility]) [A41.9, R65.20]  Acute on chronic congestive heart failure, unspecified heart failure type (CMS/[facility]) [I50.9]. Cardiology was consulted by Tanesha for VT.      History of Present Illness: 73-year-old white male presented to the emergency room with shortness of breath generalized weakness and a cough on 12/24/2023.  In the emergency room the patient was hypoxic with an oxygenation of 82%.  He does not usually employ supplemental oxygen at home.  He lives with his wife.    The patient has completed radiation therapy for prostate cancer in the spring.  He has right breast cancer and underwent a right mastectomy in August and radiation therapy to his right chest wall in September.    The patient does have a history of Atrial fibrillation diagnosed in October 2023 with a rapid ventricular response rate.  His ejection fraction was found to be 40%.  He underwent mitral annular ring repair by Dr. Pastor in 2006.  He was cardioverted after being placed on amiodarone and Xarelto in December 2022.  Since that time he has not had further episodes of atrial fibrillation.  He did note an increase in his weight after chemotherapy.  He was treated with torsemide.  An echocardiogram in November 2023 revealed normal left ventricular function.    He has chronic renal insufficiency with a creatinine of 1.1 to 1.4 mg/dL.    The patient was hypoxic last evening.  He became bradycardic.  He was treated with atropine.  He was subsequently intubated and supposedly was doing well this morning when he once again had bradycardia with polymorphic ventricular tachycardia and then went into sustained ventricular flutter.  He was treated with intravenous lidocaine.  He received 4 external shocks and a slow wide-complex rhythm was achieved.  His blood pressure was restored.  He was given 300 mg of IV amiodarone.  He  was bolused with intravenous lidocaine.  He had persistent bradycardia.  He had a persistently wide QRS.  I discontinued the lidocaine.  He is heart rate has come up.  His pH is 7.32.  His PaO2 is greater than 300.    Outside records reviewed.    PAST MEDICAL HISTORY:   Past Medical History:   Diagnosis Date   • Acute systolic heart failure (CMS/HCC) 02/15/2023   • Ambulates with cane     and walker   • Atrial fibrillation (CMS/[facility])    • Breast cancer (CMS/HCC) October 2022   • CHF (congestive heart failure) (CMS/[facility])    • Chronic kidney disease    • CKD (chronic kidney disease) 10/19/2022   • History of radiation therapy    • Hx antineoplastic chemo    • Prostate cancer (CMS/HCC)        PAST SURGICAL HISTORY: He has a past surgical history that includes Cardiac surgery; Cardioversion (12/05/2022); Prostate surgery (July 2022); Tonsillectomy; Prostatectomy (07/15/2022); Knee cartilage surgery (Left); and Mastectomy.    SOCIAL HISTORY: He is   Social History     Tobacco Use   • Smoking status: Never   • Smokeless tobacco: Never   Vaping Use   • Vaping Use: Never used   Substance Use Topics   • Alcohol use: Yes     Alcohol/week: 14.0 standard drinks of alcohol     Types: 14 Shots of liquor per week     Comment: 2 drinks/day - scotch   • Drug use: Never       FAMILY HISTORY:He has a family history includes Arthritis in his biological father and maternal grandfather; Breast cancer in his biological mother; COPD in his maternal grandfather; Cancer in his biological mother; Cancer less than or equal to age 50 in an other family member; Diabetes in his maternal grandfather; Esophageal cancer in an other family member; Heart disease in his maternal grandmother; Hyperlipidemia in his biological father and biological mother; Hypertension in his biological father and biological mother; No Known Problems in his biological brother, biological sister, paternal grandfather, and paternal grandmother.    ALLERGIES:  Allergies    Allergen Reactions   • Azithromycin Other (see comments)     Kidney issues     • Prednisone Angioedema     All over swelling   • Lorazepam Other (see comments)     WEAKNESS       Home Meds  •  acetaminophen (TYLENOL EX STR RAPID RELEASE ORAL), Take 500 mg by mouth as needed.  •  amiodarone, Take 1 tablet (200 mg total) by mouth daily.  •  cetirizine, Take 10 mg by mouth nightly.  •  cholecalciferol (vitamin D3), Take 1,000 Units by mouth daily.  •  guaiFENesin, Take 1,200 mg by mouth 2 (two) times a day.  •  metoprolol succinate XL, Take 1 tablet (50 mg total) by mouth daily.  •  pantoprazole, Take 1 tablet (40 mg total) by mouth 2 (two) times a day.  •  rivaroxaban, Take 1 tablet (20 mg total) by mouth daily with dinner.  •  simvastatin, Take 1 tablet (20 mg total) by mouth nightly.  •  tamoxifen, Take  1 tablet (20 mg total) daily  •  torsemide, Take 1 tablet (20 mg total) by mouth daily as needed (for weight gain 3 lbs overnight, 5 lbs in a week, lower extremity edema).  •  betamethasone dipropionate, Apply topically as needed.  •  potassium chloride, Take 1 tablet (20 mEq) daily when you take as needed  torsemide. (Patient taking differently: as needed. Take 1 tablet (20 mEq) daily when taking torsemide (also taken as needed)..)  •  silver sulfadiazine, Apply topically 2 (two) times a day. Apply to affected area.    CURRENT MEDICATIONS:   •  acetaminophen, 650 mg, feeding tube, q4h PRN  •  albuterol HFA, 2 puff, inhalation, q6h PRN  •  amiodarone, 200 mg, feeding tube, Daily  •  atorvastatin, 10 mg, oral, Nightly  •  atropine, 1 mg, intravenous, Once PRN  •  betamethasone valerate, , Topical, 2x daily PRN  •  calcium gluconate, 1 g, intravenous, q6h PRN  •  cetirizine, 10 mg, feeding tube, Nightly  •  chlorhexidine, 15 mL, Mouth/Throat, BID  •  cholecalciferol (vitamin D3), 1,000 Units, feeding tube, Daily  •  glucose, 16-32 g of dextrose, oral, PRN **OR** dextrose, 15-30 g of dextrose, oral, PRN **OR**  glucagon, 1 mg, intramuscular, PRN **OR** dextrose 50 % in water (D50), 25 mL, intravenous, PRN  •  fentaNYL, 0-200 mcg/hr, intravenous, Titrated **AND** fentaNYL, 25 mcg, intravenous, q5 min PRN  •  furosemide, 40 mg, intravenous, q12h GISELLE  •  guaiFENesin, 400 mg, oral, q6h GISELLE  •  heparin (porcine), 5,000 Units, subcutaneous, q12h (6a, 6p)  •  ipratropium HFA, 2 puff, inhalation, QID (8a, 12p, 4p, 8p)  •  lidocaine, 2 mg/min, intravenous, Continuous  •  magnesium sulfate, 2 g, intravenous, PRN  •  metoclopramide, 10 mg, intravenous, q6h PRN  •  midazolam, , ,   •  norepinephrine, 0-90 mcg/min, intravenous, Titrated  •  oseltamivir, 30 mg, oral, BID  •  pantoprazole, 40 mg, intravenous, q12h GISELLE  •  piperacillin-tazobactam, 4.5 g, intravenous, q6h INT  •  potassium chloride, 20 mEq, oral, PRN  •  potassium chloride, 40 mEq, oral, PRN  •  potassium chloride, 20 mEq, intravenous, PRN  •  potassium chloride in water, 20 mEq, intravenous, PRN **AND** potassium chloride in water, 20 mEq, intravenous, PRN  •  silver sulfadiazine, , Topical, BID  •  tamoxifen, 20 mg, feeding tube, Daily  •  vancomycin, 750 mg, intravenous, q12h INT **AND** [] IV Vancomycin Therapy by Pharmacy Protocol, , , Once    Review of Systems:  Pertinent items are noted in HPI.  Intubated unable to assess  Physical Exam:    Visit Vitals  /74   Pulse (!) 59   Temp (!) 38.6 °C (101.5 °F)   Resp (!) 25   Wt 80.3 kg (177 lb 0.5 oz)   SpO2 98%   BMI 29.46 kg/m²       General appearance: Intubated and sedated  Head and Neck: without obvious abnormality  Eyes: Pupils equal and clear conjunctiva  Lungs: Adequate air entry bilaterally, skin changes consistent with radiation therapy  Heart: Distant S1, S2 normal, no murmur, click, rub or gallop, regular rate and rhythm, no JVD  Abdomen: soft, non-tender; bowel sounds normal; no masses  Extremities/Musculoskeletal: peripheral pulses intact, no edema, no joint deformities  Skin: Skin changes  "consistent with radiation therapy  Neurologic: Intubated and sedated   Psyche: Appropriate affect, no evidence for depression or anxiety  Endocrine: no thyromegaly    Labs:   Results from last 7 days   Lab Units 12/25/23  1023 12/25/23  0934 12/25/23  0410 12/24/23  1647   SODIUM mEQ/L 148*  --  141 141   POTASSIUM mEQ/L 3.8  3.8 3.7 4.5 4.1   CO2 mEQ/L 25  --  26 21   BUN mg/dL 20  --  19 16   CREATININE mg/dL 1.5*  --  1.5* 1.2   CALCIUM mg/dL 7.6*  --  8.3* 8.7   ALT IU/L 64*  --  34 22   AST IU/L 130*  --  70* 40*     Results from last 7 days   Lab Units 12/25/23  0934 12/25/23  0410 12/24/23  1647   WBC K/uL 13.95* 10.60* 11.01*   HEMOGLOBIN g/dL 9.5* 10.6* 11.0*   HEMATOCRIT % 30.4* 34.2* 35.2*   PLATELETS K/uL 133* 127* 167     Results from last 7 days   Lab Units 12/25/23  0934 12/25/23  0410 12/24/23  1647   PTT sec 26 33  --    INR   --  1.4 1.9   PROTIME sec  --  17.4* 21.2*         No lab exists for component: \"CPK\"  0   Lab Value Date/Time    BNP 1,135 (H) 12/24/2023 1647     (H) 02/07/2023 1326     (H) 10/19/2022 1305       Telemetry (12/25/23): Bradycardia with an intraventricular conduction delay compatible with left bundle branch block, torsade and ventricular flutter  Cardiac Testing:    Electrogram (12/25/23): Left bundle branch block, unclear sinus mechanism    Echocardiogram:    Cardiac Imaging    TRANSTHORACIC ECHO (TTE) COMPLETE 11/27/2023    Interpretation Summary  •  Left Ventricle: Normal ventricle size. Mild concentric left ventricular hypertrophy. No outflow tract obstruction present. Low normal systolic function. Estimated EF 55%. Wall motion appears grossly normal. Grade II diastolic dysfunction.  •  Right Ventricle: Mildly to moderately dilated ventricle size. Increased wall thickness. RVOT doppler shows VTI =10, possible systolic notching, time to peak acceleration of 70-80ms. This suggests normal output and elevated pulmonary vascular resistance. RV s'=10cm/s. " TAPSE=2.1cm. Normal systolic function.  •  Left Atrium: Moderately dilated atrium.  •  Right Atrium: Mildly dilated atrium.  •  Aortic Valve: Tricuspid valve.  Sclerotic leaflets. Trace regurgitation. No stenosis. Calculated dimensionless index = 0.63.  •  Mitral Valve: Mitral annuloplasty ring present. Mild regurgitation. The jet is centrally directed.  •  Tricuspid Valve: Normal structure. Mild regurgitation. The regurgitation jet is eccentric. Estimated RVSP = 54 mmHg.  •  Pulmonic Valve: Normal structure. Trace regurgitation. Mildly dilated pulmonary artery.  •  Aorta: Aortic root normal. Sinuses of Valsalva normal-sized. Ascending aorta normal-sized.  •  IVC/SVC: Inferior vena cava is <2.1cm. Inferior vena cava collapses <50% during inspiration.  •  Pericardium: There is a trivial circumferential pericardial effusion.  •  Compared with April 2023, RV is now dilated.  •  I personally reviewed results with him today in the office.    Cardiac Imaging    US VENOUS REFLUX LEG BILATERAL 01/30/2023    Interpretation Summary  No evidence of deep vein thrombus (DVT) in either lower extremity.        Impression and Plan:    Cam Rosas is a 73 y.o. male with the following problems,      1.  VF arrest-the circumstances of this event are relatively unclear.  The patient has a history of atrial fibrillation and is on amiodarone.  He has preserved left ventricular function.  He presented with shortness of breath and hypoxemia.  He developed bradycardia.  The twelve-lead electrocardiogram was during his bradycardia on normal available for review.  Telemetry reveals what appears to be sinus bradycardia and possible AV block.  His bradycardia was treated with atropine.  It is certainly possible that his bradycardia was due to hypoxemia.  He was intubated.  He then had episodes of bradycardia with polymorphic ventricular tachycardia and ultimately ventricular flutter resuscitation with external defibrillation.  He was treated  with IV amiodarone and lidocaine.  He then has developed further bradycardia.  I have discontinued the amiodarone and the lidocaine.    At this point I will organize for him to undergo placement of a temporary wire and have a cardiac catheterization to rule out coronary disease.  I cannot explain why he developed further bradycardia and torsade once he was intubated and adequately ventilated.  I do note that his magnesium was low.  It has been repleted.    2.  Hypoxemia- chest x-ray on admission revealed mild pulmonary interstitial edema.  I will consider diuresis after cardiac catheterization.  Hypoxemia may be related to influenza.  He is influenza A positive.  He may have a superimposed right lower lobe pneumonia.    3.  Prostate cancer-completed therapy    4.  Right breast cancer-radiation therapy    I have spent more than 60 minutes of ICU time coordinating the patient's care, speaking with the ICU team including Dr. Dorsey and Dr. Onofre.  Electronic signature:    Michele Estrada MD 12/25/23

## 2023-12-25 NOTE — H&P
Hospital Medicine Service -  History & Physical        CHIEF COMPLAINT     Chief Complaint: Respiratory failure   Patient presents with   • Weakness - Generalized   • Cough   • Shortness of Breath   • Fatigue        HISTORY OF PRESENT ILLNESS      73 y.o. 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  Does have this history of paroxysmal atrial fibrillation and is on Xarelto and amiodarone for rate control but is currently in the sinus rhythm.  His BNP was greater than 1000, and white blood cell count was elevated only minimally but the patient was markedly hypoxic and in distress.  He tested positive for influenza A, and given his hypoxia was admitted to the ICU.  We initially had him on a nonrebreather mask and then tried BiPAP but after a short time on the BiPAP and discussion with his wife and worsening blood gas, the patient agreed to intubation.  Lasix was given and the patient's blood pressure was controlled.  For the influenza he was started on steroids, Tamiflu, Mucinex, albuterol and ipratropium MDI.  He was admitted to the ICU.  We plan to trend his troponin due to the significant strain of his heart failure, obtain an echocardiogram and cardiology consult in the morning.  At this point the patient is full code, and was involved in making that decision and making the decision to go on the ventilator along with his wife.        PAST MEDICAL AND SURGICAL HISTORY      Past Medical History:   Diagnosis Date   • Acute systolic heart failure (CMS/[facility]) 02/15/2023   •  Ambulates with cane     and walker   • Atrial fibrillation (CMS/HCC)    • Breast cancer (CMS/HCC) October 2022   • CHF (congestive heart failure) (CMS/HCC)    • Chronic kidney disease    • CKD (chronic kidney disease) 10/19/2022   • History of radiation therapy    • Hx antineoplastic chemo    • Prostate cancer (CMS/HCC)        Past Surgical History:   Procedure Laterality Date   • CARDIAC SURGERY      mitral valve repair 2006   • CARDIOVERSION  12/05/2022    Successful DC cardioversion   • KNEE CARTILAGE SURGERY Left    • MASTECTOMY     • PROSTATE SURGERY  July 2022   • PROSTATECTOMY  07/15/2022   • TONSILLECTOMY         MEDICATIONS      Prior to Admission medications    Medication Sig Start Date End Date Taking? Authorizing Provider   acetaminophen (TYLENOL EX STR RAPID RELEASE ORAL) Take 500 mg by mouth as needed.   Yes Provider, Jorge L Barboza MD   amiodarone (PACERONE) 200 mg tablet Take 1 tablet (200 mg total) by mouth daily. 2/15/23  Yes Chel Olsen CRNP   cetirizine (ZyrTEC) 10 mg tablet Take 10 mg by mouth nightly.   Yes Provider, Jorge L Barboza MD   cholecalciferol, vitamin D3, 1,000 unit (25 mcg) tablet Take 1,000 Units by mouth daily.   Yes Provider, Jorge L Barboza MD   guaiFENesin (MUCINEX) 600 mg 12 hr tablet Take 1,200 mg by mouth 2 (two) times a day.   Yes Provider, Jorge L Barboza MD   metoprolol succinate XL (TOPROL-XL) 50 mg 24 hr tablet Take 1 tablet (50 mg total) by mouth daily. 6/8/23  Yes Gary Moran MD   pantoprazole (PROTONIX) 40 mg EC tablet Take 1 tablet (40 mg total) by mouth 2 (two) times a day. 2/15/23  Yes Chel Olsen CRNP   rivaroxaban (XARELTO) 20 mg tablet Take 1 tablet (20 mg total) by mouth daily with dinner. 11/27/23  Yes Gary Moran MD   simvastatin (ZOCOR) 20 mg tablet Take 1 tablet (20 mg total) by mouth nightly. 2/15/23  Yes Chel Olsen CRNP   tamoxifen (NOLVADEX) 20 mg chemo tablet Take  1 tablet (20 mg total) daily 9/5/23  Yes Kailey Gale,  MD   torsemide (DEMADEX) 20 mg tablet Take 1 tablet (20 mg total) by mouth daily as needed (for weight gain 3 lbs overnight, 5 lbs in a week, lower extremity edema). 11/20/23  Yes Chel Olsen CRNP   betamethasone dipropionate 0.05 % cream Apply topically as needed. 11/19/23   Provider, Jorge L Barboza MD   potassium chloride (KLOR-CON M) 20 mEq CR tablet Take 1 tablet (20 mEq) daily when you take as needed  torsemide.  Patient taking differently: as needed. Take 1 tablet (20 mEq) daily when taking torsemide (also taken as needed).. 11/20/23   Chel Olsen CRNP   silver sulfadiazine (SILVADENE) 1 % cream Apply topically 2 (two) times a day. Apply to affected area. 10/31/23 10/30/24  Ochsner, Gregory J, MD       ALLERGIES      Azithromycin, Prednisone, and Lorazepam    FAMILY HISTORY      Family History   Problem Relation Age of Onset   • Hyperlipidemia Biological Mother    • Hypertension Biological Mother    • Breast cancer Biological Mother    • Cancer Biological Mother         gyn? cervical   • Hyperlipidemia Biological Father    • Hypertension Biological Father    • Arthritis Biological Father    • No Known Problems Biological Sister    • No Known Problems Biological Brother    • Heart disease Maternal Grandmother    • Arthritis Maternal Grandfather    • COPD Maternal Grandfather    • Diabetes Maternal Grandfather    • No Known Problems Paternal Grandmother    • No Known Problems Paternal Grandfather    • Cancer less than or equal to age 50 Other    • Esophageal cancer Other         47, in abd,chemo q 3 weeks       SOCIAL HISTORY      Social History     Socioeconomic History   • Marital status:      Spouse name: None   • Number of children: None   • Years of education: None   • Highest education level: None   Occupational History   • Occupation: insurance   Tobacco Use   • Smoking status: Never   • Smokeless tobacco: Never   Vaping Use   • Vaping Use: Never used   Substance and Sexual Activity   •  Alcohol use: Yes     Alcohol/week: 14.0 standard drinks of alcohol     Types: 14 Shots of liquor per week     Comment: 2 drinks/day - scotch   • Drug use: Never   • Sexual activity: Not Currently     Partners: Female     Social Determinants of Health     Financial Resource Strain: Low Risk  (8/18/2023)    Overall Financial Resource Strain (CARDIA)    • Difficulty of Paying Living Expenses: Not hard at all   Food Insecurity: No Food Insecurity (12/24/2023)    Hunger Vital Sign    • Worried About Running Out of Food in the Last Year: Never true    • Ran Out of Food in the Last Year: Never true   Transportation Needs: No Transportation Needs (8/18/2023)    PRAPARE - Transportation    • Lack of Transportation (Medical): No    • Lack of Transportation (Non-Medical): No   Housing Stability: Unknown (8/18/2023)    Housing Stability Vital Sign    • Unstable Housing in the Last Year: No       REVIEW OF SYSTEMS        Review of Systems: Limited due to the patient's degree of dyspnea  Constitutional:+for 3 days of increasing fatigue, denies and unexpected weight change.   Eyes: Negative for visual disturbance. Mouth no sore throat  Respiratory:  for cough and shortness of breath at rest and worse with any activity.    Cardiovascular: Denies  chest pain and palpitations.   Gastrointestinal: Negative for abdominal pain, constipation, diarrhea, nausea and vomiting.   Genitourinary: + for difficulty urinating which he attributes to having a Lloyd catheter once in the past although it more likely is due to his prostate cancer and surgery.  (Currently refusing Lloyd catheter). no hematuria no frequency no urgency no discharge  Musculoskeletal: + Generalized myalgias and arthralgias over the last 3 days.   Skin: Negative for rash.   Neurological:   + for dizziness with exertion, denies headaches.   Hematological: Does  bruise/bleed easily.   Psychiatric/Behavioral: Negative for confusion and dysphoric mood no suicidal  ideations          PHYSICAL EXAMINATION      Temp:  [35.3 °C (95.6 °F)] 35.3 °C (95.6 °F)  Heart Rate:  [36-82] 75  Resp:  [18-36] 24  BP: ()/(47-95) 137/65  FiO2 (%) (Set):  [40 %-100 %] 40 %  Body mass index is 30.41 kg/m².    General exam : appears age stated, overweight, in acute respiratory distress, unable to speak more than 1 word at a time even with assistance from BiPAP   Head: atraumatic, normocephalic  Eyes : PERRLA, EOMI, + pallor, no icterus  ENT: no oral lesions, no facial asymmetry, oropharynx cyanotic, mucous membranes dry  Neck: supple, no Lymph nodes,+ Thyromegaly, no JVD   CVS : Bradycardic irregular rate and rhythm, 2/6 systolic murmur, no rubs or gallops  Resp: Increased accessory muscle usage, unable to speak more than 1 word at a time, currently on BiPAP, lungs sound wet throughout with diminished breath sounds at bases and scattered wheezing to auscultation Bilaterally  Abdomen : Obese, firm, decreased bowel sounds, no discrete tenderness, mass or organomegaly,   Extremities : Intact pulses, trace edema bilaterally but there is an area on the left pretibial and lateral lower extremity that has focal swelling and erythema, wife says this has been a recent development.  Denies calf tenderness on palpation but states he does occasionally get cramps in his legs.  MSK: no joint deformity or effusion  Skin: Left pretibial and lateral lower extremity erythema as described, they asked nurses to place photo in media otherwise skin color is cool and mottled in the lower extremities    Neuro: AAO x3, CN 2-12 intact, 4/5 motor strength in all extremities, sensations and DTRs intact, coordination intact.  Psych:  anxious mood.at times uncooperative, but once his wife arrived he was cooperative and follow recommendations.      LABS / IMAGING / EKG        Labs  CBC Results       12/24/23 11/03/23 08/18/23     1647 1111 0918    WBC 11.01 3.7 6.72    RBC 3.19 3.14 3.13    HGB 11.0 10.6 10.8    HCT 35.2  32.0 34.5    .3 101.9 110.2    MCH 34.5 33.8 34.5    MCHC 31.3 33.1 31.3     160 108        CMP Results       12/24/23 11/03/23 08/18/23     1647 1111 0918     142 140    K 4.1 3.9 4.2    Cl 107 105 109    CO2 21 29 25    Glucose 195 93 115    BUN 16 18 15    Creatinine 1.2 1.14 1.2    Calcium 8.7 8.3 7.8    Anion Gap 13 -- 6    AST 40 39 --    ALT 22 18 --    Albumin 3.9 3.6 --    EGFR >60.0 68 59.3         Comment for K at 1647 on 12/24/23: Results obtained on plasma. Plasma Potassium values may be up to 0.4 mEQ/L less than serum values. The differences may be greater for patients with high platelet or white cell counts.    Comment for Glucose at 1111 on 11/03/23:               Fasting reference interval         Comment for EGFR at 1647 on 12/24/23: Calculation based on the Chronic Kidney Disease Epidemiology Collaboration (CKD-EPI) equation refit without adjustment for race.    Comment for EGFR at 0918 on 08/18/23: NOT CALCULATED          Troponin I Results       12/24/23 12/24/23 10/19/22     1817 1647 1453    HS Troponin I 68.7 49.4 31        Microbiology Results     Procedure Component Value Units Date/Time    SARS-CoV-2 (COVID-19) Nasopharynx [[id number]]  (Abnormal) Collected: 12/24/23 1649    Specimen: Nasopharyngeal Swab from Nasopharynx Updated: 12/24/23 1736    Narrative:      The following orders were created for panel order SARS-CoV-2 (COVID-19) Nasopharynx.  Procedure                               Abnormality         Status                     ---------                               -----------         ------                     SARS-COV-2 (COVID-19)/ F...[[id number]]  Abnormal            Final result                 Please view results for these tests on the individual orders.    SARS-COV-2 (COVID-19)/ FLU A/B, AND RSV, PCR Nasopharynx [[id number]]  (Abnormal) Collected: 12/24/23 1649    Specimen: Nasopharyngeal Swab from Nasopharynx Updated: 12/24/23 1736     SARS-CoV-2 (COVID-19)  Negative     Influenza A Positive     Influenza B Negative     Respiratory Syncytial Virus Negative    Narrative:      Testing performed using real-time PCR for detection of COVID-19. EUA approved validation studies performed on site.         UA Results       10/20/22     1923    Color Colorless    Clarity Clear    Glucose Negative    Bilirubin Negative    Ketones Negative    Sp Grav 1.018    Blood Negative    Ph 5.5    Protein Negative    Urobilinogen 0.2    Nitrite Negative    Leuk Est Negative         Comment for Blood at 1923 on 10/20/22: The sensitivity of the occult blood test is equivalent to approximately 4 intact RBC/HPF.    Comment for Leuk Est at 1923 on 10/20/22: Results can be falsely negative due to high specific gravity, some antibiotics, glucose >3 g/dl, or WBC other than neutrophils.          Imaging  CT ANGIOGRAPHY CHEST PULMONARY EMBOLISM WITH IV CONTRAST   Final Result   IMPRESSION:   1. No pulmonary embolism in the main or proximal segmental pulmonary arteries.   2. Right upper lobe and left lower lobe infiltrate with patchy airspace   opacities in the right middle lobe.      X-RAY CHEST 1 VIEW   ED Interpretation   Cardiomegaly and pulmonary vascular congestion      ECG 12 lead    (Results Pending)   X-RAY CHEST 1 VIEW    (Results Pending)   ECG 12-lead - daily x3    (Results Pending)   ECG 12-lead - daily x3    (Results Pending)         ECG/Telemetry  reviewed by me sinus bradycardia with first-degree AV block, left bundle branch block, no acute ischemic change    At 1 point patient's heart rate slowed into the 30s but responded quickly to an amp of 1 mg IV atropine at the time he was on BiPAP and did not receive any medication acutely that would slow his heart rate although he is on amiodarone and metoprolol chronically.    ASSESSMENT AND PLAN           1.  Acute respiratory failure with hypoxia and hypercapnia: Failing BiPAP, patient has a combination of RSV, and probable multifocal  pneumonia and CHF.  He is agreed to electively be intubated.  This was done by anesthesia without incident.  The patient will be maintained on sedation, and started on steroids, Tamiflu via NG tube, and albuterol and ipratropium Tropium MDI around-the-clock.  Continuous monitoring of pulse ox, will ask respiratory therapy to get an A-line if possible.  Daily x-rays in the morning, strict intake and output.  2.  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  Trend troponin, monitor on telemetry, daily weights and intake and output.  Consult cardiology, may benefit from another echocardiogram  3.  Influenza A: Diffuse wheezing and dyspnea complicated by patient's underlying heart failure.  He already has an elevated BNP but also lactate greater than 5.  This may just be the combination of cardiac strain and respiratory distress from his influenza but he will be started on broad-spectrum antibiotics as well because he is immunocompromise from his chemotherapy.  Zosyn and vancomycin been ordered and ID consult will be placed.  Tamiflu will be started.  4.  Septic shock: Initially patient blood pressure was 70/50, temperature as high as 38.9.  The first ABG showed significant acidosis but once patient was intubated, pH came up from 7.27 where it had been for several hours to 7.33.  At that time able to decrease his FiO2 from 100% to 50%, and will continue to monitor with repeat ABG in the a.m.  Levophed for blood pressure support.  Monitor for any secondary organ dysfunction associated with his sepsis.  Because the patient had an elevated lactic acid and was in distress he was started on vancomycin and Zosyn which we will continue.  Infectious disease will be consulted to see him in the morning.  We need to balance the patient's need for diuretics and his need for support to clear the lactic acid secondary to sepsis  5.   Arrhythmia: Patient has history of paroxysmal atrial fibrillation but is currently in sinus bradycardia with left bundle branch block.  He had an episode of worsening bradycardia that decreased into the 30s but responded to atropine.  Hold his Lopressor and amiodarone, consult cardiology, trend troponin, consider echocardiogram, continue to have as needed atropine order available.  I am awaiting PT/INR to determine whether or not to continue the patient's Xarelto.  Protonix twice daily twice daily for GI prophylaxis  6.  History prostate cancer: Patient had no urine output for several hours, despite Lasix.  He finally agreed to Lloyd catheter and he is now starting to have diuresis.  Strict intake and output, continue Lloyd due to potential for obstruction, await urinalysis to see if that may be a source of infection.  6.  Left lower extremity cellulitis: There is a focal area of swelling, and erythema in the pretibial and lateral lower extremity.  Monitor closely, have asked nurses to put a photo in media which should be followed, infectious disease will be consulted, and skin infection is covered by his current antibiotics with vancomycin and Zosyn.  7.  CKD 3: Initial creatinine 1.5.  Monitor closely for worsening secondary to sepsis   8.  Breast cancer: Currently on tamoxifen, increasing his hypercoagulability.  He is on Xarelto but no coags were checked in the ER.  Those have been ordered and are pending.  If abnormal, consider holding the Xarelto and using Lovenox or IV heparin instead.  For now continue his tamoxifen  9.  General: Appears very toxic and in significant distress.  Influenza on top of his other chronic underlying conditions carries greater risk, and thus he was put in the ICU.  Now he is intubated and infectious disease will see him in the morning.  VTE Assessment: Padua    VTE Prophylaxis Plan:   Code Status: Full Code       Angie Almendarez MD  12/25/2023

## 2023-12-25 NOTE — CONSULTS
Offered supportive conversation and spiritual resources to Pt's sons and wife/Fiorella in [facility]. Fiorella shows signs of deep distress, processing circumstances but asked me to return for a visit when Pt is more settled. She and sons thanked me for visit and support.    The Spiritual Care team will remain available for spiritual and emotional needs.    Mindy Oppenheimer  Spiritual Care Coordinator  [phone]  #[phone]

## 2023-12-25 NOTE — PROGRESS NOTES
Interventional Cardiology Note:    Case discussed with family, Dr. Estrada.   Plan for L/R HC with Temp Wire backup pacing.  Risks / benefits explained to family.       Cardiac Cath Risk/Benefits Statement    I have explained the risks and benefits of invasive catheterization with the patient which include, but are not limited to at least a 2-3% (higher than usual) risk of heart attack, stroke, vascular trauma requiring surgical repair or transfusion, abnormal heart rhythm requiring defibrillation or pacemaker, need for intraaortic balloon pump support, renal dysfunction requiring dialysis, exacerbated gastrointestinal bleeding, allergic response to medications requiring ventilatory support, emergent cardiac surgery and even death. They also understand the need for medical compliance - particularly if stenting is required - mandating continued daily consumption of aspirin and antiplatelet agent such as plavix, brilinta or effient.  The patient expresses an understanding and verbally consents. They also understand plans for either radial or femoral access - with unique risks to both vascular beds including arterial occlusion, vascular trauma, hematoma and need for evacuation. I have answered all of their questions regarding the procedure and they are willing to proceed.    Haja Onofre MD

## 2023-12-25 NOTE — PLAN OF CARE
Problem: Adult Inpatient Plan of Care  Goal: Optimal Comfort and Wellbeing  Outcome: Progressing     Problem: Adult Inpatient Plan of Care  Goal: Readiness for Transition of Care  Outcome: Progressing     Problem: Infection  Goal: Absence of Infection Signs and Symptoms  Outcome: Progressing     Problem: Restraint, Nonviolent  Goal: Absence of Harm or Injury  Outcome: Progressing     Problem: Sepsis/Septic Shock  Goal: Optimal Coping  Outcome: Progressing  Goal: Absence of Bleeding  Outcome: Progressing  Goal: Blood Glucose Level Within Targeted Range  Outcome: Progressing  Goal: Absence of Infection Signs and Symptoms  Outcome: Progressing  Goal: Optimal Nutrition Intake  Outcome: Progressing   Plan of Care Review  Plan of Care Reviewed With: patient, family  Outcome Evaluation: Pt had witnessed cardiac arrest this morning, Vtac/ Vfib arrest, PEA, muliple rounds of shocks, Epi, Amio push, lidocaine & bicab gtt, return to ROSC, now following commands, taken to cath lab for temp pacer, Milrinone gtt Levophed gtt, family at the bedside emotional support offered.

## 2023-12-25 NOTE — CONSULTS
Responded to RRT, Pt engaged with staff and no family present.    The Spiritual Care team will remain available for spiritual and emotional needs.    Mindy Oppenheimer  Spiritual Care Coordinator  [phone]  #[phone]

## 2023-12-25 NOTE — CONSULTS
Pulmonology / Critical Care Consult Note    Subjective     Cam Rosas is a 73 y.o. male with past medical history of prostate cancer and breast cancer, status post right mastectomy, prior chemotherapy, history atrial fibrillation status post cardioversion on Xarelto, CHF, CKD, hypertension who presented to the emergency room on 12/24/2023 with shortness of breath-found to be saturating 82% on room air.  History from chart as patient intubated.  Per his wife he had had increased lower extremity swelling recently.  He had completed radiation therapy for prostate cancer in the spring.  For breast CA-had right mastectomy in August 2023 followed by radiation to right chest wall in September 2023.  He follows at [facility] cardiology for CHF and atrial fibrillation .  He had been placed on torsemide but due to orthostatic symptoms as well as reduced to 3 times a week.  Vital signs in the ER-temperature 35.3 heart rate 70 respiratory 30 /86 O2 sat 82% room air-subsequently desaturated to 63% and placed on nonrebreather then on BiPAP.  He had episode of bradycardia and was intubated overnight.  Subsequently he became more febrile-temperature 38.9  Viral swab-flu positive  CT angio chest- no PE.  Left lower lobe infiltrate with surrounding groundglass opacities patchy airspace disease right middle lobe.  Groundglass nodular infiltrate right upper lobe  Postintubation ABG-7.3 3/37/514  BNP 1135    This morning, patient was awake and responsive on ventilator. He had been  hemodynamically stable and then suddenly had eyes rolling back and became pulseless with telemetry showing ventricular fibrillation.    CODE BLUE called.  CPR initiated.  Patient required shock 3 times.  Epinephrine administered.  Bicarb administered.  Amiodarone given.  IV magnesium administered.  Lidocaine started.  Thereafter able to obtain pulse and blood pressure.  Postcardiac arrest.  Became more bradycardic-wide complexes.  ABG indicates  metabolic acidosis-additional bicarb administered  Targeted temperature monitoring protocol initiated  Wife updated on arrival to ICU.  Cardiology has been consulted and will see patient    Outside records reviewed. Pertinent radiology results reviewed. Pertinent lab results reviewed..    Past Medical History:   Diagnosis Date   • Acute systolic heart failure (CMS/HCC) 02/15/2023   • Ambulates with cane     and walker   • Atrial fibrillation (CMS/HCC)    • Breast cancer (CMS/HCC) October 2022   • CHF (congestive heart failure) (CMS/HCC)    • Chronic kidney disease    • CKD (chronic kidney disease) 10/19/2022   • History of radiation therapy    • Hx antineoplastic chemo    • Prostate cancer (CMS/HCC)      Past Surgical History:   Procedure Laterality Date   • CARDIAC SURGERY      mitral valve repair 2006   • CARDIOVERSION  12/05/2022    Successful DC cardioversion   • KNEE CARTILAGE SURGERY Left    • MASTECTOMY     • PROSTATE SURGERY  July 2022   • PROSTATECTOMY  07/15/2022   • TONSILLECTOMY       Social History     Socioeconomic History   • Marital status:      Spouse name: None   • Number of children: None   • Years of education: None   • Highest education level: None   Occupational History   • Occupation: insurance   Tobacco Use   • Smoking status: Never   • Smokeless tobacco: Never   Vaping Use   • Vaping Use: Never used   Substance and Sexual Activity   • Alcohol use: Yes     Alcohol/week: 14.0 standard drinks of alcohol     Types: 14 Shots of liquor per week     Comment: 2 drinks/day - scotch   • Drug use: Never   • Sexual activity: Not Currently     Partners: Female     Social Determinants of Health     Financial Resource Strain: Low Risk  (8/18/2023)    Overall Financial Resource Strain (CARDIA)    • Difficulty of Paying Living Expenses: Not hard at all   Food Insecurity: No Food Insecurity (12/24/2023)    Hunger Vital Sign    • Worried About Running Out of Food in the Last Year: Never true    • Ran  Out of Food in the Last Year: Never true   Transportation Needs: No Transportation Needs (8/18/2023)    PRAPARE - Transportation    • Lack of Transportation (Medical): No    • Lack of Transportation (Non-Medical): No   Housing Stability: Unknown (8/18/2023)    Housing Stability Vital Sign    • Unstable Housing in the Last Year: No     Family History   Problem Relation Age of Onset   • Hyperlipidemia Biological Mother    • Hypertension Biological Mother    • Breast cancer Biological Mother    • Cancer Biological Mother         gyn? cervical   • Hyperlipidemia Biological Father    • Hypertension Biological Father    • Arthritis Biological Father    • No Known Problems Biological Sister    • No Known Problems Biological Brother    • Heart disease Maternal Grandmother    • Arthritis Maternal Grandfather    • COPD Maternal Grandfather    • Diabetes Maternal Grandfather    • No Known Problems Paternal Grandmother    • No Known Problems Paternal Grandfather    • Cancer less than or equal to age 50 Other    • Esophageal cancer Other         47, in abd,chemo q 3 weeks       Allergies: Azithromycin, Prednisone, and Lorazepam    Current Inpatient Medications   Medication Dose Route Frequency Provider Last Rate Last Admin   • acetaminophen (TYLENOL) 650 mg/20.3 mL solution 650 mg  650 mg feeding tube q4h PRN Angie Almendarez MD   650 mg at 12/25/23 1004   • albuterol HFA 90 mcg/actuation inhaler 2 puff  2 puff inhalation q6h PRN Angie Almendarez MD       • amiodarone (PACERONE) tablet 200 mg  200 mg feeding tube Daily Angie Almendarez MD       • atorvastatin (LIPITOR) tablet 10 mg  10 mg oral Nightly Angie Almendarez MD       • atropine 0.1 mg/mL injection  - Pyxis Override Pull            • atropine injection 1 mg  1 mg intravenous Once PRN Angie Almendarez MD       • betamethasone valerate (VALISONE) 0.1 % ointment   Topical 2x daily PRN Angie Almendarez MD       • calcium gluconate 1  gram/50 mL IVPB in NSS 1 g  1 g intravenous q6h PRN Angie Almendarez MD       • cetirizine (ZyrTEC) 1 mg/mL solution 10 mg  10 mg feeding tube Nightly Angie Almendarez MD       • chlorhexidine (PERIDEX) 0.12 % mouthwash 15 mL  15 mL Mouth/Throat BID Angie Almendarez MD   15 mL at 12/25/23 0958   • cholecalciferol (vitamin D3) tablet 1,000 Units  1,000 Units feeding tube Daily Angie Almendarez MD       • glucose chewable tablet 16-32 g of dextrose  16-32 g of dextrose oral PRN Angie Almendarez MD        Or   • dextrose 40 % oral gel 15-30 g of dextrose  15-30 g of dextrose oral PRN Angie Almendarez MD        Or   • glucagon (GLUCAGEN) injection 1 mg  1 mg intramuscular PRN Angie Almendarez MD        Or   • dextrose 50 % in water (D50) injection 12.5 g  25 mL intravenous PRN Angie Almendarez MD       • fentaNYL (SUBLIMAZE) 2500 mcg/50 mL (50 mcg/mL) syringe  0-200 mcg/hr intravenous Titrated Angie Almendarez MD 0.5 mL/hr at 12/25/23 0603 25 mcg/hr at 12/25/23 0603    And   • fentaNYL bolus from bag 25 mcg  25 mcg intravenous q5 min PRN Angie Almendarez MD   25 mcg at 12/25/23 0427   • furosemide (LASIX) injection 40 mg  40 mg intravenous q12h Angie Parry MD       • guaiFENesin (ROBITUSSIN) 100 mg/5 mL liquid 400 mg  400 mg oral q6h Angie Parry MD   400 mg at 12/25/23 0608   • heparin (porcine) 5,000 unit/mL injection 5,000 Units  5,000 Units subcutaneous q12h (6a, 6p) Angie Almendarez MD   5,000 Units at 12/25/23 0607   • ipratropium HFA (ATROVENT HFA) 17 mcg/actuation inhaler 2 puff  2 puff inhalation QID (8a, 12p, 4p, 8p) Angie Almendarez MD       • lidocaine infusion 2 g/250 mL D5W (8 mg/mL)  2 mg/min intravenous Continuous Ceci Jaeger CRNP 15 mL/hr at 12/25/23 0911 2 mg/min at 12/25/23 0911   • magnesium sulfate IVPB 2g in 50 mL NSS/D5W/SWFI  2 g intravenous PRN Angie Almendarez MD   Stopped at 12/25/23  0828   • metoclopramide (REGLAN) injection 10 mg  10 mg intravenous q6h PRN Angie Almendarez MD       • midazolam (VERSED) 1 mg/mL injection  - Pyxis Override Pull            • norepinephrine (LEVOPHED) 4 mg/250 mL (16 mcg/mL) in 0.9 % NaCl infusion  0-90 mcg/min intravenous Titrated Angie Almendarez  mL/hr at 12/25/23 1045 28 mcg/min at 12/25/23 1045   • oseltamivir (TAMIFLU) 6 mg/mL suspension 30 mg  30 mg oral BID Angie Almendarez MD       • pantoprazole (PROTONIX) injection 40 mg  40 mg intravenous q12h GISELLE Angie Almendarez MD   40 mg at 12/25/23 0434   • piperacillin-tazobactam (ZOSYN) 4.5 g/100 mL IVPB in NSS  4.5 g intravenous q6h INT Angie Almendarez  mL/hr at 12/25/23 1042 4.5 g at 12/25/23 1042   • potassium chloride (KLOR-CON M) ER tablet (particles/crystals) 20 mEq  20 mEq oral PRN Angie Almendarez MD       • potassium chloride (KLOR-CON M) ER tablet (particles/crystals) 40 mEq  40 mEq oral PRN Angie Almendarez MD       • potassium chloride 20 mEq in 100 mL IVPB  (premix)  20 mEq intravenous PRN Angie Almendarez MD       • potassium chloride 20 mEq in 100 mL IVPB  (premix)  20 mEq intravenous PRN Angie Almendarez MD        And   • potassium chloride 20 mEq in 100 mL IVPB  (premix)  20 mEq intravenous PRN Angie Almendarez MD       • silver sulfadiazine (SILVADENE) 1 % cream   Topical BID Angie Almendarez MD       • tamoxifen (NOLVADEX) tablet 20 mg  20 mg feeding tube Daily Angie Almendarez MD       • vancomycin 750 mg/250 mL IVPB in NSS  750 mg intravenous q12h INT Angie Almendarez  mL/hr at 12/25/23 0959 750 mg at 12/25/23 0959        Medication List      ASK your doctor about these medications    amiodarone 200 mg tablet  Commonly known as: PACERONE  Take 1 tablet (200 mg total) by mouth daily.  Dose: 200 mg     betamethasone dipropionate 0.05 % cream  Apply topically as needed.     cetirizine 10 mg  tablet  Commonly known as: ZyrTEC  Take 10 mg by mouth nightly.  Dose: 10 mg     cholecalciferol (vitamin D3) 1,000 unit (25 mcg) tablet  Take 1,000 Units by mouth daily.  Dose: 1,000 Units     guaiFENesin 600 mg 12 hr tablet  Commonly known as: MUCINEX  Take 1,200 mg by mouth 2 (two) times a day.  Dose: 1,200 mg     metoprolol succinate XL 50 mg 24 hr tablet  Commonly known as: TOPROL-XL  Take 1 tablet (50 mg total) by mouth daily.  Dose: 50 mg     pantoprazole 40 mg EC tablet  Commonly known as: PROTONIX  Take 1 tablet (40 mg total) by mouth 2 (two) times a day.  Dose: 40 mg     potassium chloride 20 mEq CR tablet  Commonly known as: KLOR-CON M  Take 1 tablet (20 mEq) daily when you take as needed  torsemide.     rivaroxaban 20 mg tablet  Commonly known as: XARELTO  Take 1 tablet (20 mg total) by mouth daily with dinner.  Dose: 20 mg     silver sulfadiazine 1 % cream  Commonly known as: SILVADENE  Apply topically 2 (two) times a day. Apply to affected area.     simvastatin 20 mg tablet  Commonly known as: ZOCOR  Take 1 tablet (20 mg total) by mouth nightly.  Dose: 20 mg     tamoxifen 20 mg chemo tablet  Commonly known as: NOLVADEX  Take  1 tablet (20 mg total) daily  Dose: 20 mg     torsemide 20 mg tablet  Commonly known as: DEMADEX  Take 1 tablet (20 mg total) by mouth daily as needed (for weight gain 3 lbs overnight, 5 lbs in a week, lower extremity edema).  Dose: 20 mg     TYLENOL EX STR RAPID RELEASE ORAL  Take 500 mg by mouth as needed.  Dose: 500 mg            Review of Systems:  Pertinent items are noted in HPI.    Objective     Vital Signs for the last 24 hours:  Temp:  [35.3 °C (95.6 °F)-38.6 °C (101.5 °F)] 38.6 °C (101.5 °F)  Heart Rate:  [] 47  Resp:  [18-92] 24  BP: ()/(47-95) 122/59  SpO2:  [63 %-100 %] 100 %    Oxygen:  Oxygen Therapy: Supplemental oxygen  O2 Delivery Method: Endotracheal tube  FiO2 (%) (Set): 100 %        Labs:  I have reviewed the patient's labs.  Significant abnormals  are Elevated BNP.  Flu positive.    Imaging:  I have reviewed the Imaging from the last 24 hrs.      ECG:  EKG on admission-wide QRS rhythm.  Left bundle branch block    Physical Exam:    Sedated, intubated on vent  HEENT-pupils equal reactive  Cor: Regular rhythm, normal heart sounds, no murmurs  Chest: Assisted breath sounds but clear bilaterally  Abdomen: Soft, nontender  Extremities: No clubbing, cyanosis.  1+ edema with chronic venous stasis changes  Neuro check troponins  Cardiology consulted  Continue Tamiflu and Zosyn.  Sputum culture sent awake and alert prior to cardiac arrest this morning.  Difficult to assess thereafter    Assessment      73 y.o. male with history atrial fibrillation, CHF, prostate and breast CA, history of GI bleed who presented to the emergency room with shortness of breath and found to be influenza A positive.  Possible superimposed right lower lobe pneumonia  Intubated for bradycardic event overnight.  This morning he ventricular fibrillation arrest-unclear precipitating events.. Looked like [place] at one point.   ROSC was obtained- not following commands postcode  Metabolic acidosis post cardiac arrest  Influenza A with possible superimposed bacterial pneumonia, but did not have hypoxic respiratory arrest his reason for his intubation/cardiac arrest    Plan     Continue current vent settings-increase RR to compensate for acidosis  IV bicarbonate  IV lidocaine drip for now  Levophed vasopressor support  Echocardiogram  Continue Tamiflu and Zosyn, pending sputum culture results  Sedation with fentanyl  TTM protocol  Cardiology consulted- D/Jimmie Palomo  Follow-up ABG and check electrolytes    Discussed with nursing, respiratory therapy and consultants.  Met with his wife and explained current condition critical illness    Critical care time 100 minutes evaluating and managing patient with cardiac arrest, AHRF, Influenza A

## 2023-12-25 NOTE — NURSING NOTE
Procedure: A-line Insertion      Platelets levels: Confirmed   Positive Modified Guillermo’s test   Patient, Procedure and Site: Confirmed   Placement Date - 12/25/23      Technique:    1. A time out was performed.   2. The patients left radial region was prepped with  Chlora Prep, draped in sterile fashion.   3. A 20G Jelco Arterial Catheter Needle was inserted with sterile technique into the left radial artery and spontaneous pulsatile blood flow was observed.     4. The arterial catheter was advanced over guide wire and then attached to the intraflow flush system.   5. Transduced and calibrated    6. Secured with tape, &  transparent Dressing    Findings:   Good arterial blood flow noted.   Procedure performed with anatomical landmark & guidwire.   No Adverse reactions noted

## 2023-12-25 NOTE — PROGRESS NOTES
"Vancomycin Dosing by Pharmacy Consult Initiated    Cam Rosas is a 73 y.o. male who has been consulted for vancomycin dosing for pneumonia and sepsis prescribed by Dr. Angie Almendarez.    Reviewed relevant clinical data including weight, renal function, previous vancomycin doses, and vancomycin levels:  Creatinine   Date/Time Value Ref Range Status   12/24/2023 1647 1.2 0.7 - 1.3 mg/dL Final     No results found for: \"VANCORANDOM\", \"VANCOTROUGH\", \"VANCOPEAK\"      Vancomycin Administrations (last 96 hours)       Date/Time Action Medication Dose Rate    12/24/23 2055 New Bag    vancomycin (VANCOCIN) 1,750 mg in sodium chloride 0.9 % 500 mL IVPB 1,750 mg 250 mL/hr            Assessment/Plan  The patient is ordered vancomycin dosing by pharmacy.    The dose will be based on:         Wt Readings from Last 1 Encounters:   12/25/23 82.9 kg (182 lb 12.2 oz)         Estimated Creatinine Clearance: 54.4 mL/min by (C-G formula)      Will initiate a loading dose   1750 mg IV x1 and maintenance dose of 750 mg IV every 12 hours.    Target a trough of 15-20 ug/mL per vancomycin dosing per pharmacy order.  Pharmacy will continue to follow the patient's vancomycin dosing daily.      Please call vancomycin levels to the pharmacy.  Shannon Ridley PharmD    "

## 2023-12-25 NOTE — SIGNIFICANT EVENT
Pt. was awake, following commands,MAEX4  BP soft, Low dose Levophed restarted, Febrile, Resp therapist at the bedside pt was placed on PSV, Noted Vtach on the monitor, Pt rolled back he's eyes , lost pulse, coded  PEA, see Code sheet.

## 2023-12-25 NOTE — PLAN OF CARE
Plan of Care Review  Plan of Care Reviewed With: patient, spouse  Progress: no change Pt was intubated and care explained to wife and patient. Will monitor labs and try to wean as appropriate

## 2023-12-25 NOTE — CONSULTS
Infectious Disease Consult Note    Patient Name: Cam Rosas  MR#: [id number]  : 1950  Admission Date: 2023  Consult Date: 23 11:42 AM   Consultant: Adam Tucker MD    Reason for Consult: influenza A, with resp. failure - started on ABX due to elevated lactate - also has cellulitis left pre-tibial area  Referring Provider: Angie Almendarez       History of Present Illness     Cam Rosas is a 73 y.o. male with PMH of CKD, CHF, HTN, prostate ca, breast cancer s/p right mastectomy not on chemo who was admitted on 2023 with dyspnea which started the day before. He required intubation on admission for respiratory distress and is unable to provide any history now. He had fever of 102 with WBC count of 14K now and influenza A PCR is positive. He was started on vancomycin, pip-tazo along with oseltamivir with CTA chest showing right upper lobe and left lower lobe infiltrate with patchy airspace opacities in the right middle lobe. Blood cx and sputum cx are in process.    Outside records were reviewed.    Allergies:   Allergies   Allergen Reactions   • Azithromycin Other (see comments)     Kidney issues     • Prednisone Angioedema     All over swelling   • Lorazepam Other (see comments)     WEAKNESS       Medical History:   Past Medical History:   Diagnosis Date   • Acute systolic heart failure (CMS/HCC) 02/15/2023   • Ambulates with cane     and walker   • Atrial fibrillation (CMS/HCC)    • Breast cancer (CMS/HCC) 2022   • CHF (congestive heart failure) (CMS/HCC)    • Chronic kidney disease    • CKD (chronic kidney disease) 10/19/2022   • History of radiation therapy    • Hx antineoplastic chemo    • Prostate cancer (CMS/HCC)        Surgical History:   Past Surgical History:   Procedure Laterality Date   • CARDIAC SURGERY      mitral valve repair    • CARDIOVERSION  2022    Successful DC cardioversion   • KNEE CARTILAGE SURGERY Left    • MASTECTOMY     • PROSTATE  SURGERY  July 2022   • PROSTATECTOMY  07/15/2022   • TONSILLECTOMY         Social History:   Social History     Socioeconomic History   • Marital status:      Spouse name: None   • Number of children: None   • Years of education: None   • Highest education level: None   Occupational History   • Occupation: insurance   Tobacco Use   • Smoking status: Never   • Smokeless tobacco: Never   Vaping Use   • Vaping Use: Never used   Substance and Sexual Activity   • Alcohol use: Yes     Alcohol/week: 14.0 standard drinks of alcohol     Types: 14 Shots of liquor per week     Comment: 2 drinks/day - scotch   • Drug use: Never   • Sexual activity: Not Currently     Partners: Female     Social Determinants of Health     Financial Resource Strain: Low Risk  (8/18/2023)    Overall Financial Resource Strain (CARDIA)    • Difficulty of Paying Living Expenses: Not hard at all   Food Insecurity: No Food Insecurity (12/24/2023)    Hunger Vital Sign    • Worried About Running Out of Food in the Last Year: Never true    • Ran Out of Food in the Last Year: Never true   Transportation Needs: No Transportation Needs (8/18/2023)    PRAPARE - Transportation    • Lack of Transportation (Medical): No    • Lack of Transportation (Non-Medical): No   Housing Stability: Unknown (8/18/2023)    Housing Stability Vital Sign    • Unstable Housing in the Last Year: No          Travel Exposure:   Travel and Exposure Screening    Flowsheet Row Most Recent Value   Travel Screening    Overnight hospitalization outside the [place] in the last year? No Filed On: 12/24/2023 1642   Exposure Screening    Symptoms         Family History:   Family History   Problem Relation Age of Onset   • Hyperlipidemia Biological Mother    • Hypertension Biological Mother    • Breast cancer Biological Mother    • Cancer Biological Mother         gyn? cervical   • Hyperlipidemia Biological Father    • Hypertension Biological Father    • Arthritis Biological Father    • No  Known Problems Biological Sister    • No Known Problems Biological Brother    • Heart disease Maternal Grandmother    • Arthritis Maternal Grandfather    • COPD Maternal Grandfather    • Diabetes Maternal Grandfather    • No Known Problems Paternal Grandmother    • No Known Problems Paternal Grandfather    • Cancer less than or equal to age 50 Other    • Esophageal cancer Other         47, in abd,chemo q 3 weeks       Review of Systems    Review of systems not obtained due to patient factors.    Medications:    Current IP Meds (From admission, onward)        Frequency     cetirizine (ZyrTEC) 1 mg/mL solution 10 mg         Nightly     atorvastatin (LIPITOR) tablet 10 mg         Nightly     lidocaine infusion 2 g/250 mL D5W (8 mg/mL)  (lidocaine IV infusion/bolus panel)         Continuous     sodium bicarbonate 8.4 % (1 mEq/mL) injection  - Pyxis Override Pull        Note to Pharmacy: Created by cabinet override         vancomycin 750 mg/250 mL IVPB in NSS  (Vancomycin IV therapy by Pharmacy Protocol)        See Hyperspace for full Linked Orders Report.    Every 12 hours interval     furosemide (LASIX) injection 40 mg         Every 12 hours     amiodarone (PACERONE) tablet 200 mg         Daily     cholecalciferol (vitamin D3) tablet 1,000 Units         Daily     silver sulfadiazine (SILVADENE) 1 % cream         2 times daily     pravastatin (PRAVACHOL) tablet 20 mg  Status:  Discontinued         Daily     tamoxifen (NOLVADEX) tablet 20 mg  Status:  Discontinued         Daily     oseltamivir (TAMIFLU) 6 mg/mL suspension 30 mg  Status:  Discontinued         Daily     tamoxifen (NOLVADEX) tablet 20 mg         Daily     oseltamivir (TAMIFLU) 6 mg/mL suspension 30 mg         2 times daily     midazolam (VERSED) 1 mg/mL injection  - Pyxis Override Pull        Note to Pharmacy: Created by cabinet override         norepinephrine (LEVOPHED) 4 mg/250 mL (16 mcg/mL) in 0.9 % NaCl infusion         Titrated     ipratropium HFA  (ATROVENT HFA) 17 mcg/actuation inhaler 2 puff  (Mechanically Ventilated)         4 times daily (8a, 12p, 4p, 8p)     guaiFENesin (ROBITUSSIN) 100 mg/5 mL liquid 400 mg         Every 6 hours     heparin (porcine) 5,000 unit/mL injection 5,000 Units  (Assessed at increased VTE risk (Padua score greater than or equal to 4))         Every 12 hours (6a, 6p)     fentaNYL (SUBLIMAZE) 2500 mcg/50 mL (50 mcg/mL) syringe  (Sedation Medications for Critical Care Patients)        See Hyperspace for full Linked Orders Report.    Titrated     pantoprazole (PROTONIX) injection 40 mg         Every 12 hours     piperacillin-tazobactam (ZOSYN) 4.5 g/100 mL IVPB in NSS         Every 6 hours interval     potassium chloride (KLOR-CON M) ER tablet (particles/crystals) 20 mEq  (Potassium Replacement)         As needed     potassium chloride (KLOR-CON M) ER tablet (particles/crystals) 40 mEq  (Potassium Replacement)         As needed     potassium chloride 20 mEq in 100 mL IVPB  (premix)  (Potassium Replacement)         As needed     potassium chloride 20 mEq in 100 mL IVPB  (premix)  (Potassium Replacement)        See Hyperspace for full Linked Orders Report.    As needed     potassium chloride 20 mEq in 100 mL IVPB  (premix)  (Potassium Replacement)        See Hyperspace for full Linked Orders Report.    As needed     calcium gluconate 1 gram/50 mL IVPB in NSS 1 g         Every 6 hours PRN     magnesium sulfate IVPB 2g in 50 mL NSS/D5W/SWFI         As needed     albuterol HFA 90 mcg/actuation inhaler 2 puff  (Mechanically Ventilated)         Every 6 hours PRN     metoclopramide (REGLAN) injection 10 mg  (Nausea/Vomiting)         Every 6 hours PRN     acetaminophen (TYLENOL) 650 mg/20.3 mL solution 650 mg  (Analgesics - Acetaminophen pain or fever)         Every 4 hours PRN     glucose chewable tablet 16-32 g of dextrose  (Hypoglycemia Treatment Protocol and Hyperglycemia Validation Protocol)        See Hyperspace for full Linked Orders  Report.    As needed     dextrose 40 % oral gel 15-30 g of dextrose  (Hypoglycemia Treatment Protocol and Hyperglycemia Validation Protocol)        See Hyperspace for full Linked Orders Report.    As needed     glucagon (GLUCAGEN) injection 1 mg  (Hypoglycemia Treatment Protocol and Hyperglycemia Validation Protocol)        See Hyperspace for full Linked Orders Report.    As needed     dextrose 50 % in water (D50) injection 12.5 g  (Hypoglycemia Treatment Protocol and Hyperglycemia Validation Protocol)        See Hyperspace for full Linked Orders Report.    As needed     betamethasone valerate (VALISONE) 0.1 % ointment         2 times daily PRN     fentaNYL bolus from bag 25 mcg  (Sedation Medications for Critical Care Patients)        See Hyperspace for full Linked Orders Report.    Every 5 min PRN     atropine injection 1 mg         Once as needed     chlorhexidine (PERIDEX) 0.12 % mouthwash 15 mL  (Adult invasive ventilation)         2 times daily     atropine injection 1 mg         Once     lidocaine PF (XYLOCAINE) 10 mg/mL (1 %) injection              phenylephrine HCl in 0.9% NaCl 1 mg/10 mL (100 mcg/mL) syringe              propofol (DIPRIVAN) IV bolus injection              rocuronium (ZEMURON) injection              atropine 0.1 mg/mL injection  - Pyxis Override Pull        Note to Pharmacy: Created by cabinet override         atropine 0.1 mg/mL injection  - Pyxis Override Pull        Note to Pharmacy: Created by cabinet override         iopamidoL (ISOVUE-370) 370 mg iodine /mL (76 %) injection 100 mL         Once in imaging     furosemide (LASIX) injection 40 mg  (ED Diuretic Med Panel)         Once     piperacillin-tazobactam (ZOSYN) 4.5 g/100 mL IVPB in NSS  (Sepsis Antibiotics)         Once     vancomycin (VANCOCIN) 1,750 mg in sodium chloride 0.9 % 500 mL IVPB  (Sepsis Antibiotics)         Once          Anti-infectives (From admission, onward)    Start     Dose/Rate Route Frequency Ordered Stop     12/25/23 0900  vancomycin 750 mg/250 mL IVPB in NSS        See Hyperspace for full Linked Orders Report.    750 mg  250 mL/hr over 60 Minutes intravenous Every 12 hours interval 12/25/23 0317      12/25/23 0900  silver sulfadiazine (SILVADENE) 1 % cream          Topical 2 times daily 12/25/23 0344 10/30/24 0859    12/25/23 0900  oseltamivir (TAMIFLU) 6 mg/mL suspension 30 mg         30 mg oral 2 times daily 12/25/23 0802 12/30/23 0859    12/25/23 0415  piperacillin-tazobactam (ZOSYN) 4.5 g/100 mL IVPB in NSS         4.5 g  200 mL/hr over 30 Minutes intravenous Every 6 hours interval 12/25/23 0317              Objective     Vital Signs:    Patient Vitals for the past 72 hrs:   BP Temp Temp src Pulse Resp SpO2 Weight   12/25/23 1102 (!) 116/56 -- -- (!) 45 (!) 25 100 % --   12/25/23 1100 (!) 116/56 -- -- (!) 45 (!) 25 100 % --   12/25/23 1055 -- -- -- (!) 46 (!) 24 100 % --   12/25/23 1050 -- -- -- (!) 47 (!) 24 100 % --   12/25/23 1045 (!) 122/59 -- -- (!) 47 (!) 24 100 % --   12/25/23 1040 -- -- -- (!) 47 (!) 24 100 % --   12/25/23 1030 (!) 123/58 -- -- (!) 48 (!) 27 100 % --   12/25/23 1015 (!) 117/57 -- -- (!) 50 (!) 24 100 % --   12/25/23 1010 (!) 119/58 -- -- (!) 50 (!) 21 100 % --   12/25/23 1005 (!) 115/56 -- -- (!) 51 (!) 24 99 % --   12/25/23 1000 (!) 119/56 -- -- (!) 52 (!) 24 98 % --   12/25/23 0955 (!) 121/59 -- -- (!) 54 (!) 22 100 % --   12/25/23 0950 118/63 -- -- (!) 55 (!) 28 100 % --   12/25/23 0945 (!) 108/55 -- -- 80 (!) 31 99 % --   12/25/23 0940 120/67 -- -- 90 (!) 28 100 % --   12/25/23 0935 102/64 -- -- (!) 104 (!) 25 100 % --   12/25/23 0930 (!) 112/59 -- -- (!) 106 (!) 29 100 % --   12/25/23 0925 108/70 -- -- (!) 107 (!) 24 (!) 46 % --   12/25/23 0920 (!) 114/56 -- -- 67 (!) 25 (!) 74 % --   12/25/23 0915 (!) 126/57 -- -- 87 (!) 35 (!) 82 % --   12/25/23 0905 (!) 175/139 -- -- 87 (!) 31 (!) 74 % --   12/25/23 0900 -- -- -- (!) 171 (!) 25 (!) 80 % --   12/25/23 0855 (!) 70/51 -- -- (!) 113 (!)  92 (!) 74 % --   12/25/23 0850 -- -- -- 78 (!) 25 97 % --   12/25/23 0845 (!) 106/53 -- -- 73 18 94 % --   12/25/23 0830 (!) 79/50 -- -- 62 (!) 27 95 % --   12/25/23 0815 (!) 81/49 -- -- 64 (!) 28 95 % --   12/25/23 0800 (!) 81/53 -- -- 62 (!) 24 95 % --   12/25/23 0715 (!) 88/54 -- -- 63 (!) 24 95 % --   12/25/23 0700 (!) 92/54 -- -- 64 (!) 24 95 % --   12/25/23 0645 (!) 92/52 -- -- 66 19 95 % --   12/25/23 0630 (!) 84/52 -- -- 64 (!) 22 95 % --   12/25/23 0615 (!) 92/51 -- -- 65 (!) 25 94 % --   12/25/23 0600 (!) 88/50 -- -- 68 (!) 26 94 % 80.3 kg (177 lb 0.5 oz)   12/25/23 0530 101/60 -- -- 71 (!) 25 93 % --   12/25/23 0434 -- (!) 38.6 °C (101.5 °F) -- -- -- -- --   12/25/23 0400 131/69 -- -- 75 (!) 24 94 % --   12/25/23 0345 -- -- -- -- -- -- 82.9 kg (182 lb 12.2 oz)   12/25/23 0330 137/65 -- -- 75 (!) 24 92 % --   12/25/23 0300 (!) 154/71 -- -- 73 (!) 24 92 % --   12/25/23 0245 (!) 150/67 -- -- 72 (!) 26 (!) 91 % --   12/25/23 0230 (!) 159/68 -- -- 72 (!) 27 93 % --   12/25/23 0200 138/80 -- -- 69 (!) 24 93 % --   12/25/23 0130 138/77 -- -- 69 (!) 24 93 % --   12/25/23 0115 136/69 -- -- 68 (!) 24 93 % --   12/25/23 0100 140/71 -- -- 68 (!) 24 92 % --   12/25/23 0030 (!) 144/65 -- -- 70 (!) 24 92 % --   12/25/23 0000 (!) 143/73 -- -- 72 (!) 24 (!) 91 % --   12/24/23 2345 -- -- -- 72 (!) 23 94 % --   12/24/23 2339 -- -- -- 72 -- 95 % --   12/24/23 2333 (!) 142/61 -- -- 74 (!) 24 95 % --   12/24/23 2315 (!) 101/47 -- -- (!) 37 (!) 24 (!) 69 % --   12/24/23 2306 138/76 -- -- 77 (!) 24 99 % --   12/24/23 2300 (!) 109/58 -- -- (!) 36 18 100 % --   12/24/23 2245 97/71 -- -- 64 19 98 % --   12/24/23 2230 (!) 90/49 -- -- (!) 39 19 98 % --   12/24/23 2215 (!) 157/55 -- -- 82 (!) 21 98 % --   12/24/23 2200 119/86 -- -- 73 (!) 25 99 % --   12/24/23 2145 114/69 -- -- (!) 38 (!) 31 95 % --   12/24/23 2135 126/72 -- -- 68 (!) 36 100 % --   12/24/23 2130 126/72 -- -- 72 (!) 28 (!) 88 % --   12/24/23 2015 118/70 -- -- 70 (!) 28  97 % --   23 1915 (!) 145/92 -- -- 72 (!) 27 95 % --   23 1745 -- -- -- -- -- -- 81.6 kg (180 lb)   23 1743 (!) 139/95 -- -- 68 (!) 31 94 % --   23 1643 -- -- -- -- -- 100 % --   23 1640 -- -- -- -- -- (!) 63 % --   23 1639 -- -- -- -- -- (!) 83 % --   23 1634 (!) 142/86 (!) 35.3 °C (95.6 °F) Temporal 70 (!) 30 (!) 82 % --       Temp (72hrs), Av °C (98.6 °F), Min:35.3 °C (95.6 °F), Max:38.6 °C (101.5 °F)      Physical Exam:    General appearance: intubated  Head: ETT in place  Eyes: anicteric sclera  Lungs: ventilated respirations b/l  Heart: regular rate and rhythm  Abdomen: soft, non-tender  Extremities: edema none  Joints: no swelling  Skin: no rashes  Neurologic: sedated    Lines, Drains, Airways, Wounds:  Peripheral IV (Adult) 23 Distal;Posterior;Right Forearm (Active)   Number of days: 1       Peripheral IV (Adult) 23 Left Antecubital (Active)   Number of days: 1       Urethral Catheter Temperature probe 16 Fr (Active)   Number of days: 1       ETT  (Active)   Number of days: 1       Wound Other (comment) Right Pretibial (Active)   Number of days: 1       Wound Venous Ulcer Left Pretibial (Active)   Number of days: 1       Wound Perineum (Active)   Number of days: 1       Labs:    CBC Results       23     0934 0410 1647    WBC 13.95 10.60 11.01    RBC 2.76 3.09 3.19    HGB 9.5 10.6 11.0    HCT 30.4 34.2 35.2    .1 110.7 110.3    MCH 34.4 34.3 34.5    MCHC 31.3 31.0 31.3     127 167      BMP Results       23     1023 0934 0410     -- 141    K 3.8 3.7 4.5     3.8      Cl 107 -- 104    CO2 25 -- 26    Glucose 177 -- 180    BUN 20 -- 19    Creatinine 1.5 -- 1.5    Calcium 7.6 -- 8.3    Anion Gap 16 -- 11    EGFR 48.9 -- 48.9         Comment for EGFR at 1023 on 23: Calculation based on the Chronic Kidney Disease Epidemiology Collaboration (CKD-EPI) equation refit without adjustment for  race.    Comment for EGFR at 0410 on 12/25/23: Calculation based on the Chronic Kidney Disease Epidemiology Collaboration (CKD-EPI) equation refit without adjustment for race.      PT/PTT Results       12/25/23 12/25/23 12/24/23     0934 0410 1647    PT -- 17.4 21.2    INR -- 1.4 1.9    PTT 26 33 --         Comment for INR at 0410 on 12/25/23: Moderate Intensity Anticoagulation = 2.0 to 3.0, High Intensity = 2.5 to 3.5    Comment for INR at 1647 on 12/24/23: Moderate Intensity Anticoagulation = 2.0 to 3.0, High Intensity = 2.5 to 3.5      UA Results       12/25/23 0414    Color Yellow    Clarity Clear    Glucose Negative    Bilirubin Negative    Ketones Negative    Sp Grav >1.035    Blood Trace    Ph 5.5    Protein Trace    Urobilinogen 0.2    Nitrite Negative    Leuk Est Negative    WBC 0 TO 3    RBC 0 TO 4    Bacteria Rare         Comment for Blood at 0414 on 12/25/23: The sensitivity of the occult blood test is equivalent to approximately 4 intact RBC/HPF.    Comment for Protein at 0414 on 12/25/23: Trace False Positive Protein can be seen with alkaline or highly buffered urines or urine with high specific gravity. Suggest clinical correlation.    Comment for Leuk Est at 0414 on 12/25/23: Results can be falsely negative due to high specific gravity, some antibiotics, glucose >3 g/dl, or WBC other than neutrophils.      Lactate Results       12/24/23 12/24/23 2012 1647    Lactate 3.8 5.1          Microbiology Results     Procedure Component Value Units Date/Time    Sputum Gram Stain (Lab Only) Expectorated Sputum [[id number]] Collected: 12/25/23 0418    Specimen: Aspirate from Expectorated Sputum Updated: 12/25/23 0956     Gram Stain Result 3+ WBC      No Epithelial Cells Seen      3+ Gram positive bacilli      2+ Gram positive cocci in pairs, chains and clusters    MRSA Screen, Nares Only Nose [[id number]]  (Normal) Collected: 12/25/23 0352    Specimen: Nasal Swab from Nose Updated: 12/25/23 0906     MRSA  DNA, Nares Negative    SARS-CoV-2 (COVID-19) Nasopharynx [[id number]]  (Abnormal) Collected: 12/24/23 1649    Specimen: Nasopharyngeal Swab from Nasopharynx Updated: 12/24/23 1736    Narrative:      The following orders were created for panel order SARS-CoV-2 (COVID-19) Nasopharynx.  Procedure                               Abnormality         Status                     ---------                               -----------         ------                     SARS-COV-2 (COVID-19)/ F...[[id number]]  Abnormal            Final result                 Please view results for these tests on the individual orders.    SARS-COV-2 (COVID-19)/ FLU A/B, AND RSV, PCR Nasopharynx [[id number]]  (Abnormal) Collected: 12/24/23 1649    Specimen: Nasopharyngeal Swab from Nasopharynx Updated: 12/24/23 1736     SARS-CoV-2 (COVID-19) Negative     Influenza A Positive     Influenza B Negative     Respiratory Syncytial Virus Negative    Narrative:      Testing performed using real-time PCR for detection of COVID-19. EUA approved validation studies performed on site.           Pathology Results     ** No results found for the last 720 hours. **          Echo:     Cardiac Imaging    TRANSTHORACIC ECHO (TTE) COMPLETE 11/27/2023    Interpretation Summary  •  Left Ventricle: Normal ventricle size. Mild concentric left ventricular hypertrophy. No outflow tract obstruction present. Low normal systolic function. Estimated EF 55%. Wall motion appears grossly normal. Grade II diastolic dysfunction.  •  Right Ventricle: Mildly to moderately dilated ventricle size. Increased wall thickness. RVOT doppler shows VTI =10, possible systolic notching, time to peak acceleration of 70-80ms. This suggests normal output and elevated pulmonary vascular resistance. RV s'=10cm/s. TAPSE=2.1cm. Normal systolic function.  •  Left Atrium: Moderately dilated atrium.  •  Right Atrium: Mildly dilated atrium.  •  Aortic Valve: Tricuspid valve.  Sclerotic leaflets. Trace  regurgitation. No stenosis. Calculated dimensionless index = 0.63.  •  Mitral Valve: Mitral annuloplasty ring present. Mild regurgitation. The jet is centrally directed.  •  Tricuspid Valve: Normal structure. Mild regurgitation. The regurgitation jet is eccentric. Estimated RVSP = 54 mmHg.  •  Pulmonic Valve: Normal structure. Trace regurgitation. Mildly dilated pulmonary artery.  •  Aorta: Aortic root normal. Sinuses of Valsalva normal-sized. Ascending aorta normal-sized.  •  IVC/SVC: Inferior vena cava is <2.1cm. Inferior vena cava collapses <50% during inspiration.  •  Pericardium: There is a trivial circumferential pericardial effusion.  •  Compared with April 2023, RV is now dilated.  •  I personally reviewed results with him today in the office.      Imaging:    Radiology Imaging    XR CHEST 1 VW    Narrative  CLINICAL HISTORY: Shortness of breath.    Impression  IMPRESSION: Vascular congestion.  Left basal atelectasis.    COMMENT: AP radiograph the chest is compared to previous study dated 8/17/2023.  There is vascular congestion and small effusions.  Findings may represent mild  congestive heart failure.  Cardiac silhouette is unchanged.  Prosthetic valve  ring seen.  Surgical staples seen in the right axilla.  There is minimal left  basal atelectasis.      Assessment     1. Sepsis due to pneumonia - in the setting of #2 and requiring intubation now. CTA chest shows right upper lobe and left lower lobe infiltrate with patchy airspace opacities in the right middle lobe. Blood cx and sputum cx are in process.    2. Influenza A - on oseltamivir with fever and leukocytosis being noted.    3. Acute hypoxic respiratory failure requiring intubation - mgmt per Primary team        Plan     1. Continue vancomycin and pip-tazo for now while waiting for blood cx and sputum cx and continue oseltamivir while waiting for fever to resolve.

## 2023-12-25 NOTE — ANESTHESIA PROCEDURE NOTES
Airway  Urgency: emergent    Start Time: 12/24/2023 11:10 PM  Stop Time: 12/24/2023 11:14 PM      General Information and Staff    Anesthesiologist: Ralf Nieves MD  Performed by: Ralf Nieves MD  Authorized by: Ralf Nieves MD      Consent for Airway (if performed for an anesthetic, see related documentation for consents)  Consent: The procedure was performed in an emergent situation. Verbal consent obtained.  Risks and benefits: risks, benefits and alternatives were discussed  Consent given by: patient and spouse      Indications and Patient Condition  Indications for airway management: respiratory failure  Sedation level: deep  Preoxygenated: yes  Patient position: sniffing      Final Airway Details  Final airway type: endotracheal airway      Successful airway: ETT  Cuffed: yes   Successful intubation technique: video laryngoscopy  Facilitating devices/methods: intubating stylet and cricoid pressure  Endotracheal tube insertion site: oral  Blade: Carlos  Blade size: #3  ETT size (mm): 7.5  Cormack-Lehane Classification: grade IIa - partial view of glottis  Placement verified by: chest auscultation and capnometry   Measured from: lips  Number of attempts at approach: 2          Medications Administered -   rocuronium (ZEMURON) injection - intravenous   100 mg - 12/24/2023 11:10:00 PM  propofol (DIPRIVAN) bolus injection 10 mg/mL - intravenous   200 mg - 12/24/2023 11:10:00 PM  lidocaine PF (XYLOCAINE) injection 1% - intravenous   10 mL - 12/24/2023 11:10:00 PM  phenylephrine (SEA-SYNEPHRINE) injection 100 mcg/mL - intravenous   500 mcg - 12/24/2023 11:10:00 PM

## 2023-12-25 NOTE — NURSING NOTE
Pt was intubated by Dr Nieves. Bradycardic from medications given . Pt given Atropine by nursing.4mg and then another 1mg by Dr Nieves. Patients BP dropped also and was on levophed for approximately 1/2 hour. Dr Almendarez was at several  RRT's on the floor so order was put in late.

## 2023-12-26 ENCOUNTER — APPOINTMENT (INPATIENT)
Dept: RADIOLOGY | Facility: HOSPITAL | Age: 73
DRG: 004 | End: 2023-12-26
Payer: MEDICARE

## 2023-12-26 ENCOUNTER — APPOINTMENT (INPATIENT)
Dept: CARDIOLOGY | Facility: HOSPITAL | Age: 73
DRG: 004 | End: 2023-12-26
Attending: NURSE PRACTITIONER
Payer: MEDICARE

## 2023-12-26 ENCOUNTER — ANESTHESIA (INPATIENT)
Dept: INTENSIVE CARE | Facility: HOSPITAL | Age: 73
DRG: 004 | End: 2023-12-26
Payer: MEDICARE

## 2023-12-26 ENCOUNTER — APPOINTMENT (INPATIENT)
Dept: RADIOLOGY | Facility: HOSPITAL | Age: 73
DRG: 004 | End: 2023-12-26
Attending: NURSE PRACTITIONER
Payer: MEDICARE

## 2023-12-26 ENCOUNTER — ANESTHESIA EVENT (INPATIENT)
Dept: INTENSIVE CARE | Facility: HOSPITAL | Age: 73
DRG: 004 | End: 2023-12-26
Payer: MEDICARE

## 2023-12-26 LAB
ALBUMIN SERPL-MCNC: 2.6 G/DL (ref 3.5–5.7)
ALP SERPL-CCNC: 53 IU/L (ref 34–125)
ALT SERPL-CCNC: 268 IU/L (ref 7–52)
ANION GAP SERPL CALC-SCNC: 8 MEQ/L (ref 3–15)
AORTIC ROOT ANNULUS: 3.5 CM
ASCENDING AORTA: 3.2 CM
AST SERPL-CCNC: 711 IU/L (ref 13–39)
BASE EXCESS BLDA CALC-SCNC: -0.5 MEQ/L
BASE EXCESS BLDA CALC-SCNC: -1.1 MEQ/L
BASE EXCESS BLDA CALC-SCNC: -4.2 MEQ/L
BASE EXCESS BLDA CALC-SCNC: -5.2 MEQ/L
BASE EXCESS BLDA CALC-SCNC: 0.7 MEQ/L
BILIRUB SERPL-MCNC: 0.9 MG/DL (ref 0.3–1.2)
BSA FOR ECHO PROCEDURE: 1.92 M2
BUN SERPL-MCNC: 21 MG/DL (ref 7–25)
CA-I BLD-SCNC: 1.05 MMOL/L (ref 1.15–1.27)
CA-I BLD-SCNC: 1.06 MMOL/L (ref 1.15–1.27)
CA-I BLD-SCNC: 1.07 MMOL/L (ref 1.15–1.27)
CA-I BLD-SCNC: 1.08 MMOL/L (ref 1.15–1.27)
CA-I BLD-SCNC: 1.1 MMOL/L (ref 1.15–1.27)
CALCIUM SERPL-MCNC: 7.3 MG/DL (ref 8.6–10.3)
CHLORIDE BLDA-SCNC: 113 MEQ/L (ref 98–109)
CHLORIDE BLDA-SCNC: 115 MEQ/L (ref 98–109)
CHLORIDE SERPL-SCNC: 114 MEQ/L (ref 98–107)
CO2 BLDA-SCNC: 25 MEQ/L (ref 22–32)
CO2 BLDA-SCNC: 26 MEQ/L (ref 22–32)
CO2 BLDA-SCNC: 27 MEQ/L (ref 22–32)
CO2 BLDA-SCNC: 28 MEQ/L (ref 22–32)
CO2 BLDA-SCNC: 28 MEQ/L (ref 22–32)
CO2 SERPL-SCNC: 26 MEQ/L (ref 21–31)
CREAT SERPL-MCNC: 1.6 MG/DL (ref 0.7–1.3)
E WAVE DECELERATION TIME: 260 MS
E/A RATIO: 2.3
E/E' RATIO: 14.7
E/LAT E' RATIO: 9.3
EDV (BP): 86.7 CM3
EF (A4C): 30.2 %
EF A2C: 36.2 %
EGFRCR SERPLBLD CKD-EPI 2021: 45.2 ML/MIN/1.73M*2
EJECTION FRACTION: 31.4 %
ERYTHROCYTE [DISTWIDTH] IN BLOOD BY AUTOMATED COUNT: 15.6 % (ref 11.6–14.4)
EST RIGHT VENT SYSTOLIC PRESSURE BY TRICUSPID REGURGITATION JET: 43 MMHG
ESV (BP): 59.5 CM3
FRACTIONAL SHORTENING: 27.66 %
GLUCOSE BLD-MCNC: 112 MG/DL (ref 70–99)
GLUCOSE BLD-MCNC: 144 MG/DL (ref 70–99)
GLUCOSE BLDA-MCNC: 100 MG/DL (ref 70–99)
GLUCOSE BLDA-MCNC: 101 MG/DL (ref 70–99)
GLUCOSE BLDA-MCNC: 108 MG/DL (ref 70–99)
GLUCOSE BLDA-MCNC: 96 MG/DL (ref 70–99)
GLUCOSE BLDA-MCNC: 97 MG/DL (ref 70–99)
GLUCOSE SERPL-MCNC: 117 MG/DL (ref 70–99)
HCO3 BLDA-SCNC: 21 MEQ/L (ref 21–28)
HCO3 BLDA-SCNC: 22 MEQ/L (ref 21–28)
HCO3 BLDA-SCNC: 24 MEQ/L (ref 21–28)
HCO3 BLDA-SCNC: 25 MEQ/L (ref 21–28)
HCO3 BLDA-SCNC: 26 MEQ/L (ref 21–28)
HCT VFR BLDCO AUTO: 29 % (ref 40.1–51)
HGB BLD-MCNC: 9.4 G/DL (ref 13.7–17.5)
HGB BLDA-MCNC: 9.1 G/DL (ref 14–17.5)
HGB BLDA-MCNC: 9.2 G/DL (ref 14–17.5)
HGB BLDA-MCNC: 9.6 G/DL (ref 14–17.5)
HGB BLDA-MCNC: 9.8 G/DL (ref 14–17.5)
HGB BLDA-MCNC: 9.8 G/DL (ref 14–17.5)
INTERVENTRICULAR SEPTUM: 1.11 CM
LA ESV INDEX (A2C): 13.96 CM3/M2
LA/AORTA RATIO: 1.31
LAAS-AP2: 12.1 CM2
LACTATE BLDA-SCNC: 1.3 MMOL/L (ref 0.4–1.6)
LACTATE BLDA-SCNC: 1.4 MMOL/L (ref 0.4–1.6)
LACTATE BLDA-SCNC: 1.5 MMOL/L (ref 0.4–1.6)
LACTATE BLDA-SCNC: 1.6 MMOL/L (ref 0.4–1.6)
LACTATE BLDA-SCNC: 1.7 MMOL/L (ref 0.4–1.6)
LAD 2D: 4.6 CM
LALD A4C: 4.35 CM
LAV-S: 26.8 CM3
LEFT INTERNAL DIMENSION IN SYSTOLE: 3.19 CM (ref 2.63–3.98)
LEFT VENTRICLE DIASTOLIC VOLUME INDEX: 49.22 CM3/M2
LEFT VENTRICLE DIASTOLIC VOLUME: 94.5 CM3
LEFT VENTRICLE SYSTOLIC VOLUME INDEX: 34.38 CM3/M2
LEFT VENTRICLE SYSTOLIC VOLUME: 66 CM3
LEFT VENTRICULAR INTERNAL DIMENSION IN DIASTOLE: 4.41 CM (ref 4.46–6.19)
LEFT VENTRICULAR POSTERIOR WALL IN END DIASTOLE: 1.06 CM (ref 0.58–1.09)
LV DIASTOLIC VOLUME: 77.3 CM3
LV ESV (APICAL 2 CHAMBER): 49.3 CM3
LVAD-AP2: 26.4 CM2
LVAD-AP4: 30.2 CM2
LVAS-AP2: 20 CM2
LVAS-AP4: 25.2 CM2
LVEDVI(A2C): 40.26 CM3/M2
LVEDVI(BP): 45.16 CM3/M2
LVESVI(A2C): 25.68 CM3/M2
LVESVI(BP): 30.99 CM3/M2
LVLD-AP2: 7.5 CM
LVLD-AP4: 7.79 CM
LVLS-AP2: 6.87 CM
LVLS-AP4: 7.67 CM
MAGNESIUM SERPL-MCNC: 2.2 MG/DL (ref 1.8–2.5)
MCH RBC QN AUTO: 34.2 PG (ref 28–33.2)
MCHC RBC AUTO-ENTMCNC: 32.4 G/DL (ref 32.2–36.5)
MCV RBC AUTO: 105.5 FL (ref 83–98)
MV E'TISSUE VEL-LAT: 0.11 M/S
MV E'TISSUE VEL-MED: 0.07 M/S
MV PEAK A VEL: 0.45 M/S
MV PEAK E VEL: 1.04 M/S
MV STENOSIS PRESSURE HALF TIME: 76 MS
MV VALVE AREA P 1/2 METHOD: 2.89 CM2
PCO2 BLDA: 41 MM HG (ref 35–48)
PCO2 BLDA: 44 MM HG (ref 35–48)
PCO2 BLDA: 59 MM HG (ref 35–48)
PCO2 BLDA: 66 MM HG (ref 35–48)
PCO2 BLDA: 73 MM HG (ref 35–48)
PDW BLD AUTO: 9.7 FL (ref 9.4–12.4)
PH BLDA: 7.15 PH (ref 7.35–7.45)
PH BLDA: 7.17 PH (ref 7.35–7.45)
PH BLDA: 7.26 PH (ref 7.35–7.45)
PH BLDA: 7.36 PH (ref 7.35–7.45)
PH BLDA: 7.4 PH (ref 7.35–7.45)
PHOSPHATE SERPL-MCNC: 2.9 MG/DL (ref 2.4–4.7)
PLATELET # BLD AUTO: 112 K/UL (ref 150–350)
PO2 BLDA: 114 MM HG (ref 83–100)
PO2 BLDA: 308 MM HG (ref 83–100)
PO2 BLDA: 69 MM HG (ref 83–100)
PO2 BLDA: 79 MM HG (ref 83–100)
PO2 BLDA: 84 MM HG (ref 83–100)
POCT PATIENT TEMPERATURE: 100 °F (ref 97–99)
POCT PATIENT TEMPERATURE: 100.4 °F (ref 97–99)
POCT PATIENT TEMPERATURE: 98.6 °F (ref 97–99)
POCT PATIENT TEMPERATURE: 99.5 °F (ref 97–99)
POCT PATIENT TEMPERATURE: 99.9 °F (ref 97–99)
POCT TEST (BLD GAS): ABNORMAL
POCT TEST: ABNORMAL
POCT TEST: ABNORMAL
POSTERIOR WALL: 1.06 CM
POTASSIUM BLDA-SCNC: 3.5 MEQ/L (ref 3.4–4.5)
POTASSIUM BLDA-SCNC: 3.6 MEQ/L (ref 3.4–4.5)
POTASSIUM BLDA-SCNC: 3.7 MEQ/L (ref 3.4–4.5)
POTASSIUM BLDA-SCNC: 3.7 MEQ/L (ref 3.4–4.5)
POTASSIUM BLDA-SCNC: 3.8 MEQ/L (ref 3.4–4.5)
POTASSIUM SERPL-SCNC: 3.5 MEQ/L (ref 3.5–5.1)
PROT SERPL-MCNC: 4.6 G/DL (ref 6–8.2)
RAP: 5 MMHG
RBC # BLD AUTO: 2.75 M/UL (ref 4.5–5.8)
SAO2 % BLDA: 89 % (ref 93–98)
SAO2 % BLDA: 93 % (ref 93–98)
SAO2 % BLDA: 93 % (ref 93–98)
SAO2 % BLDA: 97 % (ref 93–98)
SAO2 % BLDA: 98 % (ref 93–98)
SODIUM BLDA-SCNC: 144 MEQ/L (ref 136–145)
SODIUM BLDA-SCNC: 145 MEQ/L (ref 136–145)
SODIUM BLDA-SCNC: 147 MEQ/L (ref 136–145)
SODIUM SERPL-SCNC: 148 MEQ/L (ref 136–145)
TR MAX PG: 37.7 MMHG
TRICUSPID VALVE PEAK REGURGITATION VELOCITY: 3.07 M/S
WBC # BLD AUTO: 8.09 K/UL (ref 3.8–10.5)
Z-SCORE OF LEFT VENTRICULAR DIMENSION IN END DIASTOLE: -1.75
Z-SCORE OF LEFT VENTRICULAR DIMENSION IN END SYSTOLE: -0.12
Z-SCORE OF LEFT VENTRICULAR POSTERIOR WALL IN END DIASTOLE: 1.49

## 2023-12-26 PROCEDURE — 71045 X-RAY EXAM CHEST 1 VIEW: CPT

## 2023-12-26 PROCEDURE — 80053 COMPREHEN METABOLIC PANEL: CPT | Performed by: HOSPITALIST

## 2023-12-26 PROCEDURE — 94640 AIRWAY INHALATION TREATMENT: CPT

## 2023-12-26 PROCEDURE — 93005 ELECTROCARDIOGRAM TRACING: CPT | Performed by: INTERNAL MEDICINE

## 2023-12-26 PROCEDURE — 94003 VENT MGMT INPAT SUBQ DAY: CPT

## 2023-12-26 PROCEDURE — 25000000 HC PHARMACY GENERAL: Performed by: INTERNAL MEDICINE

## 2023-12-26 PROCEDURE — 20000000 HC ROOM AND CARE ICU

## 2023-12-26 PROCEDURE — 5A1955Z RESPIRATORY VENTILATION, GREATER THAN 96 CONSECUTIVE HOURS: ICD-10-PCS | Performed by: INTERNAL MEDICINE

## 2023-12-26 PROCEDURE — 63600000 HC DRUGS/DETAIL CODE: Mod: JZ

## 2023-12-26 PROCEDURE — 63700000 HC SELF-ADMINISTRABLE DRUG: Performed by: HOSPITALIST

## 2023-12-26 PROCEDURE — 94660 CPAP INITIATION&MGMT: CPT

## 2023-12-26 PROCEDURE — 93005 ELECTROCARDIOGRAM TRACING: CPT | Performed by: HOSPITALIST

## 2023-12-26 PROCEDURE — 25000000 HC PHARMACY GENERAL: Performed by: HOSPITALIST

## 2023-12-26 PROCEDURE — 25000000 HC PHARMACY GENERAL

## 2023-12-26 PROCEDURE — 25000000 HC PHARMACY GENERAL: Performed by: ANESTHESIOLOGY

## 2023-12-26 PROCEDURE — 0BH17EZ INSERTION OF ENDOTRACHEAL AIRWAY INTO TRACHEA, VIA NATURAL OR ARTIFICIAL OPENING: ICD-10-PCS | Performed by: ANESTHESIOLOGY

## 2023-12-26 PROCEDURE — 63600000 HC DRUGS/DETAIL CODE: Mod: JZ | Performed by: HOSPITALIST

## 2023-12-26 PROCEDURE — 36415 COLL VENOUS BLD VENIPUNCTURE: CPT | Performed by: HOSPITALIST

## 2023-12-26 PROCEDURE — 85027 COMPLETE CBC AUTOMATED: CPT | Performed by: HOSPITALIST

## 2023-12-26 PROCEDURE — 83735 ASSAY OF MAGNESIUM: CPT | Performed by: HOSPITALIST

## 2023-12-26 PROCEDURE — 25800000 HC PHARMACY IV SOLUTIONS: Performed by: HOSPITALIST

## 2023-12-26 PROCEDURE — 84100 ASSAY OF PHOSPHORUS: CPT | Performed by: HOSPITALIST

## 2023-12-26 PROCEDURE — 25500000 HC DRUGS/INCIDENT RAD: Mod: JZ | Performed by: INTERNAL MEDICINE

## 2023-12-26 PROCEDURE — 63600000 HC DRUGS/DETAIL CODE: Performed by: ANESTHESIOLOGY

## 2023-12-26 PROCEDURE — G1004 CDSM NDSC: HCPCS

## 2023-12-26 RX ORDER — CHLORHEXIDINE GLUCONATE ORAL RINSE 1.2 MG/ML
15 SOLUTION DENTAL
Status: DISCONTINUED | OUTPATIENT
Start: 2023-12-26 | End: 2024-01-10 | Stop reason: HOSPADM

## 2023-12-26 RX ORDER — PROPOFOL 10 MG/ML
0-80 INJECTION, EMULSION INTRAVENOUS
Status: DISCONTINUED | OUTPATIENT
Start: 2023-12-26 | End: 2023-12-27

## 2023-12-26 RX ORDER — SODIUM BICARBONATE 1 MEQ/ML
50 SYRINGE (ML) INTRAVENOUS ONCE
Status: COMPLETED | OUTPATIENT
Start: 2023-12-26 | End: 2023-12-26

## 2023-12-26 RX ORDER — DEXAMETHASONE SODIUM PHOSPHATE 4 MG/ML
6 INJECTION, SOLUTION INTRA-ARTICULAR; INTRALESIONAL; INTRAMUSCULAR; INTRAVENOUS; SOFT TISSUE
Status: DISCONTINUED | OUTPATIENT
Start: 2023-12-26 | End: 2023-12-26

## 2023-12-26 RX ORDER — DEXAMETHASONE SODIUM PHOSPHATE 4 MG/ML
10 INJECTION, SOLUTION INTRA-ARTICULAR; INTRALESIONAL; INTRAMUSCULAR; INTRAVENOUS; SOFT TISSUE ONCE
Status: COMPLETED | OUTPATIENT
Start: 2023-12-26 | End: 2023-12-26

## 2023-12-26 RX ORDER — ETOMIDATE 2 MG/ML
INJECTION INTRAVENOUS
Status: COMPLETED | OUTPATIENT
Start: 2023-12-26 | End: 2023-12-26

## 2023-12-26 RX ORDER — ALBUTEROL SULFATE 0.83 MG/ML
2.5 SOLUTION RESPIRATORY (INHALATION) EVERY 4 HOURS PRN
Status: DISCONTINUED | OUTPATIENT
Start: 2023-12-26 | End: 2024-01-10 | Stop reason: HOSPADM

## 2023-12-26 RX ORDER — FUROSEMIDE 10 MG/ML
40 INJECTION INTRAMUSCULAR; INTRAVENOUS ONCE
Status: COMPLETED | OUTPATIENT
Start: 2023-12-26 | End: 2023-12-26

## 2023-12-26 RX ADMIN — PANTOPRAZOLE SODIUM 40 MG: 40 INJECTION, POWDER, LYOPHILIZED, FOR SOLUTION INTRAVENOUS at 20:40

## 2023-12-26 RX ADMIN — PIPERACILLIN AND TAZOBACTAM 4.5 G: 4; .5 INJECTION, POWDER, LYOPHILIZED, FOR SOLUTION INTRAVENOUS; PARENTERAL at 17:04

## 2023-12-26 RX ADMIN — TAMOXIFEN CITRATE 20 MG: 10 TABLET, FILM COATED ORAL at 08:57

## 2023-12-26 RX ADMIN — VANCOMYCIN HYDROCHLORIDE 750 MG: 750 INJECTION, POWDER, LYOPHILIZED, FOR SOLUTION INTRAVENOUS at 08:56

## 2023-12-26 RX ADMIN — PIPERACILLIN AND TAZOBACTAM 4.5 G: 4; .5 INJECTION, POWDER, LYOPHILIZED, FOR SOLUTION INTRAVENOUS; PARENTERAL at 12:38

## 2023-12-26 RX ADMIN — HEPARIN SODIUM 5000 UNITS: 5000 INJECTION, SOLUTION INTRAVENOUS; SUBCUTANEOUS at 06:00

## 2023-12-26 RX ADMIN — OSELTAMIVIR PHOSPHATE 30 MG: 6 POWDER, FOR SUSPENSION ORAL at 08:57

## 2023-12-26 RX ADMIN — IPRATROPIUM BROMIDE 2 PUFF: 17 AEROSOL, METERED RESPIRATORY (INHALATION) at 19:49

## 2023-12-26 RX ADMIN — CHOLECALCIFEROL TAB 25 MCG (1000 UNIT) 1000 UNITS: 25 TAB at 08:57

## 2023-12-26 RX ADMIN — PROPOFOL 10 MCG/KG/MIN: 10 INJECTION, EMULSION INTRAVENOUS at 17:01

## 2023-12-26 RX ADMIN — GUAIFENESIN 400 MG: 100 SOLUTION ORAL at 00:30

## 2023-12-26 RX ADMIN — HEPARIN SODIUM 5000 UNITS: 5000 INJECTION, SOLUTION INTRAVENOUS; SUBCUTANEOUS at 17:04

## 2023-12-26 RX ADMIN — IPRATROPIUM BROMIDE 2 PUFF: 17 AEROSOL, METERED RESPIRATORY (INHALATION) at 08:21

## 2023-12-26 RX ADMIN — NOREPINEPHRINE BITARTRATE 14 MCG/MIN: 0.02 INJECTION, SOLUTION INTRAVENOUS at 04:11

## 2023-12-26 RX ADMIN — CHLORHEXIDINE GLUCONATE 0.12% ORAL RINSE 15 ML: 1.2 LIQUID ORAL at 08:57

## 2023-12-26 RX ADMIN — OSELTAMIVIR PHOSPHATE 30 MG: 6 POWDER, FOR SUSPENSION ORAL at 20:40

## 2023-12-26 RX ADMIN — SODIUM BICARBONATE 50 MEQ: 84 INJECTION, SOLUTION INTRAVENOUS at 15:11

## 2023-12-26 RX ADMIN — GUAIFENESIN 400 MG: 100 SOLUTION ORAL at 17:02

## 2023-12-26 RX ADMIN — PIPERACILLIN AND TAZOBACTAM 4.5 G: 4; .5 INJECTION, POWDER, LYOPHILIZED, FOR SOLUTION INTRAVENOUS; PARENTERAL at 04:10

## 2023-12-26 RX ADMIN — SUCCINYLCHOLINE CHLORIDE 100 MG: 20 INJECTION, SOLUTION INTRAMUSCULAR; INTRAVENOUS at 15:15

## 2023-12-26 RX ADMIN — METHYLPREDNISOLONE SODIUM SUCCINATE 60 MG: 125 INJECTION, POWDER, FOR SOLUTION INTRAMUSCULAR; INTRAVENOUS at 20:40

## 2023-12-26 RX ADMIN — Medication 10 MG: at 22:00

## 2023-12-26 RX ADMIN — GUAIFENESIN 400 MG: 100 SOLUTION ORAL at 06:00

## 2023-12-26 RX ADMIN — AMIODARONE HYDROCHLORIDE 200 MG: 200 TABLET ORAL at 08:57

## 2023-12-26 RX ADMIN — GUAIFENESIN 400 MG: 100 SOLUTION ORAL at 14:00

## 2023-12-26 RX ADMIN — FUROSEMIDE 40 MG: 10 INJECTION, SOLUTION INTRAMUSCULAR; INTRAVENOUS at 14:02

## 2023-12-26 RX ADMIN — PIPERACILLIN AND TAZOBACTAM 4.5 G: 4; .5 INJECTION, POWDER, LYOPHILIZED, FOR SOLUTION INTRAVENOUS; PARENTERAL at 22:00

## 2023-12-26 RX ADMIN — IPRATROPIUM BROMIDE 2 PUFF: 17 AEROSOL, METERED RESPIRATORY (INHALATION) at 15:51

## 2023-12-26 RX ADMIN — CHLORHEXIDINE GLUCONATE ORAL RINSE 15 ML: 1.2 SOLUTION DENTAL at 22:00

## 2023-12-26 RX ADMIN — ETOMIDATE 16 MG: 2 INJECTION, SOLUTION INTRAVENOUS at 15:15

## 2023-12-26 RX ADMIN — SODIUM CHLORIDE: 9 INJECTION INTRAMUSCULAR; INTRAVENOUS; SUBCUTANEOUS at 09:46

## 2023-12-26 RX ADMIN — SILVER SULFADIAZINE: 10 CREAM TOPICAL at 20:00

## 2023-12-26 RX ADMIN — SILVER SULFADIAZINE: 10 CREAM TOPICAL at 08:56

## 2023-12-26 RX ADMIN — DEXAMETHASONE SODIUM PHOSPHATE 10 MG: 4 INJECTION, SOLUTION INTRA-ARTICULAR; INTRALESIONAL; INTRAMUSCULAR; INTRAVENOUS; SOFT TISSUE at 14:03

## 2023-12-26 RX ADMIN — ATORVASTATIN CALCIUM 10 MG: 10 TABLET, FILM COATED ORAL at 22:00

## 2023-12-26 RX ADMIN — PANTOPRAZOLE SODIUM 40 MG: 40 INJECTION, POWDER, LYOPHILIZED, FOR SOLUTION INTRAVENOUS at 08:56

## 2023-12-26 RX ADMIN — NOREPINEPHRINE BITARTRATE 8 MCG/MIN: 0.02 INJECTION, SOLUTION INTRAVENOUS at 23:30

## 2023-12-26 RX ADMIN — IPRATROPIUM BROMIDE 2 PUFF: 17 AEROSOL, METERED RESPIRATORY (INHALATION) at 11:26

## 2023-12-26 RX ADMIN — POTASSIUM CHLORIDE 20 MEQ: 200 INJECTION, SOLUTION INTRAVENOUS at 06:26

## 2023-12-26 ASSESSMENT — COGNITIVE AND FUNCTIONAL STATUS - GENERAL
WALKING IN HOSPITAL ROOM: 1 - TOTAL
CLIMB 3 TO 5 STEPS WITH RAILING: 1 - TOTAL
STANDING UP FROM CHAIR USING ARMS: 1 - TOTAL
MOVING TO AND FROM BED TO CHAIR: 1 - TOTAL

## 2023-12-26 NOTE — ANESTHESIA PROCEDURE NOTES
Airway  Urgency: emergent    Start Time: 12/26/2023 3:14 PM  Airway not difficult    General Information and Staff    Patient location during procedure: ICU  Anesthesiologist: Sergio Borges MD  Performed: anesthesiologist   Performed by: Sergio Borges MD  Authorized by: Sergio Borges MD      Consent for Airway (if performed for an anesthetic, see related documentation for consents)  Patient identity confirmed: arm band  Consent: The procedure was performed in an emergent situation. Verbal consent not obtained. Written consent not obtained.  Risks and benefits: risks, benefits and alternatives were not discussed  Consent given by: patient      Indications and Patient Condition  Indications for airway management: respiratory distress  Sedation level: general  Preoxygenated: yes  Patient position: sniffing  MILS not maintained throughout  Mask difficulty assessment: 0 - not attempted    Final Airway Details  Final airway type: endotracheal airway      Successful airway: ETT wEVAC  Cuffed: yes   Successful intubation technique: video laryngoscopy  Facilitating devices/methods: intubating stylet and cricoid pressure  Endotracheal tube insertion site: oral  Blade: Carlos  Blade size: #3  ETT size (mm): 7.5  Cormack-Lehane Classification: grade I - full view of glottis  Placement verified by: chest auscultation and capnometry   Measured from: lips  ETT to lips (cm): 24  Number of attempts at approach: 1  Ventilation between attempts: none  Number of other approaches attempted: 0  Traumatic airway insertion        Medications Administered -   etomidate (AMIDATE) injection 2 mg/mL - intravenous   16 mg - 12/26/2023 3:15:00 PM  succinylcholine (ANECTINE) injection 20 mg/mL - intravenous   100 mg - 12/26/2023 3:15:00 PM

## 2023-12-26 NOTE — PLAN OF CARE
Problem: Adult Inpatient Plan of Care  Goal: Plan of Care Review  Outcome: Progressing  Flowsheets (Taken 12/26/2023 1331)  Plan of Care Reviewed With: (ICU team) other (see comments)     Problem: Skin Injury Risk Increased  Goal: Skin Health and Integrity  Outcome: Progressing     Nutrition Interventions/ Recommendations:   1. If unable to extubate pt today, recommend initiate EN   -Recommend Peptamen AF, initiate at 20ml/hr and advance by 10ml q 4hrs to goal rate of 60ml/hr.   -At goal, above provides 1440ml TV, 1728kcal (22kcal/kg), 109g protein (1.4g/kg), and 1170ml free water   -Suggest FWF 30ml q 4hrs for tube patency   2. If able to extubate, recommend ADAT to goal diet: 2g Na   3. Monitor labs/lytes, replete prn    -trend Na (148 today)  4. Monitor GI function  5. Daily weights    -monitor I/Os

## 2023-12-26 NOTE — PROGRESS NOTES
[facility]....Pt is in a current episode of homecare.  Will follow case for any homecare needs.

## 2023-12-26 NOTE — CONSULTS
Nutrition Note           Clinical Course: Patient is a 73 y.o. male who was admitted on 12/24/2023 with a diagnosis of Influenza A [J10.1]  Severe sepsis (CMS/HCC) [A41.9, R65.20]  Acute on chronic congestive heart failure, unspecified heart failure type (CMS/HCC) [I50.9].   Past Medical History:   Diagnosis Date   • Acute systolic heart failure (CMS/HCC) 02/15/2023   • Ambulates with cane     and walker   • Atrial fibrillation (CMS/HCC)    • Breast cancer (CMS/HCC) October 2022   • CHF (congestive heart failure) (CMS/HCC)    • Chronic kidney disease    • CKD (chronic kidney disease) 10/19/2022   • History of radiation therapy    • Hx antineoplastic chemo    • Prostate cancer (CMS/HCC)      Past Surgical History:   Procedure Laterality Date   • CARDIAC SURGERY      mitral valve repair 2006   • CARDIOVERSION  12/05/2022    Successful DC cardioversion   • KNEE CARTILAGE SURGERY Left    • MASTECTOMY     • PROSTATE SURGERY  July 2022   • PROSTATECTOMY  07/15/2022   • TONSILLECTOMY         Nutrition Interventions/ Recommendations:   1. If unable to extubate pt today, recommend initiate EN   -Recommend Peptamen AF, initiate at 20ml/hr and advance by 10ml q 4hrs to goal rate of 60ml/hr.   -At goal, above provides 1440ml TV, 1728kcal (22kcal/kg), 109g protein (1.4g/kg), and 1170ml free water   -Suggest FWF 30ml q 4hrs for tube patency   2. If able to extubate, recommend ADAT to goal diet: 2g Na   3. Monitor labs/lytes, replete prn    -trend Na (148 today)  4. Monitor GI function  5. Daily weights    -monitor I/Os          Nutrition Status Classification: Moderate nutritional compromise       Dietary Orders   (From admission, onward)             Start     Ordered    12/25/23 0932  NPO Diet  Diet effective now         12/25/23 0931                    Reason for Assessment  Reason For Assessment: physician consult, identified at risk by screening criteria (new vent,)  Diagnosis:  (hypoxia, flu code blue)         Nutrition/Diet  "History  Typical Food/Fluid Intake: pt from home  Diet Prior to Admission: unkown  Food Allergies: no known food allergies  Factors Affecting Nutritional Intake: compromised airway    Physical Findings  Overall Physical Appearance: on ventilator support, overweight  Gastrointestinal:  (no GI distress)  Tubes: Nasogastric tube  Skin: other (see comments) (R&L pretibeal wounds/blisters, perineum pink)         Nutrition Order  Nutrition Order: does not meet nutritional requirements  Nutrition Order Comments: NPO    Anthropometrics  Height: 165.1 cm (5' 5\")    Weights (last 7 days)     Date/Time Weight    12/26/23 0737 80.3 kg (177 lb)    12/25/23 0600 80.3 kg (177 lb 0.5 oz)    12/25/23 0345 82.9 kg (182 lb 12.2 oz)    12/24/23 1745 81.6 kg (180 lb)               Current Weight  Weight Method: Bed scale  Weight: 80.3 kg (177 lb)    Ideal Body Weight (IBW)  Ideal Body Weight (IBW) (kg): 62.51  % Ideal Body Weight: 128.44    Usual Body Weight (UBW)  Usual Body Weight:  (177-185# per EMR)    Body Mass Index (BMI)  BMI (Calculated): 29.5      Labs/Procedures/Meds  Lab Results Reviewed: reviewed      Results from last 7 days   Lab Units 12/26/23 0347 12/25/23  1023 12/25/23  0934 12/25/23  0410   SODIUM mEQ/L 148* 148*  --  141   POTASSIUM mEQ/L 3.5 3.8  3.8 3.7 4.5   CHLORIDE mEQ/L 114* 107  --  104   CO2 mEQ/L 26 25  --  26   BUN mg/dL 21 20  --  19   CREATININE mg/dL 1.6* 1.5*  --  1.5*   GLUCOSE mg/dL 117* 177*  --  180*   CALCIUM mg/dL 7.3* 7.6*  --  8.3*      Results from last 7 days   Lab Units 12/26/23 0347 12/25/23  1023 12/25/23  0410   ALK PHOS IU/L 53 66 71   BILIRUBIN TOTAL mg/dL 0.9 0.7 0.6   ALBUMIN g/dL 2.6* 3.0* 3.7   ALT IU/L 268* 64* 34   AST IU/L 711* 130* 70*          No results found for: \"HGBA1C\"  No results found for: \"QFQAKQVJ11\"  Lab Results   Component Value Date    CALCIUM 7.3 (L) 12/26/2023    PHOS 2.9 12/26/2023     Results from last 7 days   Lab Units 12/26/23  0347 12/25/23  0934 " 12/25/23  0410   WBC K/uL 8.09 13.95* 10.60*   HEMOGLOBIN g/dL 9.4* 9.5* 10.6*   HEMATOCRIT % 29.0* 30.4* 34.2*   PLATELETS K/uL 112* 133* 127*               Results from last 7 days   Lab Units 12/26/23  0347 12/25/23  1023 12/25/23  0410   MAGNESIUM mg/dL 2.2 3.1* 1.4*          Diagnostic Tests/Procedures  Diagnostic Test/Procedure Reviewed: reviewed    Medications  Pertinent Medications Reviewed: reviewed  • amiodarone  200 mg feeding tube Daily   • atorvastatin  10 mg oral Nightly   • cetirizine  10 mg feeding tube Nightly   • chlorhexidine  15 mL Mouth/Throat BID   • cholecalciferol (vitamin D3)  1,000 Units feeding tube Daily   • [Provider Managed Hold] furosemide  40 mg intravenous q12h GISELLE   • guaiFENesin  400 mg oral q6h GISELLE   • heparin (porcine)  5,000 Units subcutaneous q12h (6a, 6p)   • ipratropium HFA  2 puff inhalation QID (8a, 12p, 4p, 8p)   • oseltamivir  30 mg oral BID   • pantoprazole  40 mg intravenous q12h GISELLE   • piperacillin-tazobactam  4.5 g intravenous q6h INT   • silver sulfadiazine   Topical BID   • tamoxifen  20 mg feeding tube Daily     • fentaNYL  0-200 mcg/hr 50 mcg/hr (12/26/23 0815)   • milrinone  0.125 mcg/kg/min 0.125 mcg/kg/min (12/26/23 0700)   • norepinephrine  0-90 mcg/min Stopped (12/26/23 1210)       Estimated/Assessed Needs  Additional Documentation: Calorie Requirements (Group), Fluid Requirements (Group), Protein Requirements (Group)    Calorie Requirements  Estimated kCal Needs: Actual Body Weight  Estimated Calorie Need Method: kcal/kg  Calorie/kg Recommended: 22-25  Calorie Recommendations: 4976-1615 kcal      Protein Requirements  Recommended Dosing Weight (Estimated Protein Needs): Actual Body Weight  Est Protein Requirement Amount (gms/kg):  (1.2-1.5g)  Protein Recommendations: 96-120g    Fluid Requirements  Fluid Recommendation (mL):  (22-25ml/kg)  Recommended Fluid Needs Dosing Weight: Actual Body Weight  Fluid Requirements (mL/day): 1750-2000ml  Megan  Method (over 20 kg): 3105.74    PES  Statement: PES Statement  Nutrition Diagnosis: Inadequate Energy Intake  Related To:: Decreased ability to consume sufficient energy  As Evidenced By:: intubated, NPO at this time  Nutritional Needs Met?: No                                   Clinical Comments:     Initial assessment complete, secondary to MD consult for intubation and new vent patient. Pt is 74 yo male adm with hypoxia, +Flu A, pt required intubation 12/24 after failing bipap. Pt s/p Code Blue 12/25 AM, then had a cardiac cath and temporary pacer placed later that day. Pmhx includes chronic atrial fibrillation, right breast cancer post mastectomy and postradiation therapy currently on chemo, prostate cancer, combined systolic and diastolic heart failure, CKD 3.   At time of visit:    Pt intubated since 12/24  Currently off pressors, milrinone/fentanyl gtts  Pt awake and following commands, hoping for extubation today per RN  Labs (12/26) Na 148, creat 1.6  Lactate 1.3  UOP: 0.3ml/kg/hr  I/Os: +1.855 L since adm     TF recs are above if pt unable to be extubated today. Recommend initiation of TF prior to 2300 today to be within 48hrs of intubation, if needed. Will continue to follow per protocol.          Goals:  Extubation with diet advancement vs EN support if unable to extubate       Monitor and Evaluation:   Ability to extubate vs initiation of TF  GI function   Labs/lytes  Skin   Weights   Meds  I/Os   Plan of care       Discussed with: ICU team         Date: 12/26/23  Signature: PETE Cooper

## 2023-12-26 NOTE — PROGRESS NOTES
Critical Care/Pulmonary  Daily Progress Note        Subjective    Interval History:    Trial of extubation today   Shortly after became hypoxic and was placed on bipap  Afebrile  Remains on 2 of Levophed and Milrinone    I/O last 3 completed shifts:  In: 3162 [I.V.:1432; NG/GT:30; IV Piggyback:1700]  Out: 1225 [Urine:1220; Blood:5]  I/O this shift:  In: 48.5 [I.V.:48.5]  Out: 130 [Urine:130]    Net 24 hrs- +1834ml       Objective       Allergies: Azithromycin, Prednisone, and Lorazepam    CURRENT MEDS  •  acetaminophen, 650 mg, feeding tube, q4h PRN  •  albuterol HFA, 2 puff, inhalation, q6h PRN  •  amiodarone, 200 mg, feeding tube, Daily  •  atorvastatin, 10 mg, oral, Nightly  •  atropine, 1 mg, intravenous, Once PRN  •  betamethasone valerate, , Topical, 2x daily PRN  •  calcium gluconate, 1 g, intravenous, q6h PRN  •  cetirizine, 10 mg, feeding tube, Nightly  •  chlorhexidine, 15 mL, Mouth/Throat, BID  •  cholecalciferol (vitamin D3), 1,000 Units, feeding tube, Daily  •  glucose, 16-32 g of dextrose, oral, PRN **OR** dextrose, 15-30 g of dextrose, oral, PRN **OR** glucagon, 1 mg, intramuscular, PRN **OR** dextrose 50 % in water (D50), 25 mL, intravenous, PRN  •  fentaNYL, 0-200 mcg/hr, intravenous, Titrated **AND** fentaNYL, 25 mcg, intravenous, q5 min PRN  •  [Provider Managed Hold] furosemide, 40 mg, intravenous, q12h GISELLE  •  guaiFENesin, 400 mg, oral, q6h GISELLE  •  heparin (porcine), 5,000 Units, subcutaneous, q12h (6a, 6p)  •  ipratropium HFA, 2 puff, inhalation, QID (8a, 12p, 4p, 8p)  •  magnesium sulfate, 2 g, intravenous, PRN  •  metoclopramide, 10 mg, intravenous, q6h PRN  •  milrinone, 0.125 mcg/kg/min, intravenous, Continuous  •  norepinephrine, 0-90 mcg/min, intravenous, Titrated  •  oseltamivir, 30 mg, oral, BID  •  pantoprazole, 40 mg, intravenous, q12h GISELLE  •  piperacillin-tazobactam, 4.5 g, intravenous, q6h INT  •  potassium chloride, 20 mEq, oral, PRN  •  potassium chloride, 40 mEq, oral,  PRN  •  potassium chloride, 20 mEq, intravenous, PRN  •  potassium chloride in water, 20 mEq, intravenous, PRN **AND** potassium chloride in water, 20 mEq, intravenous, PRN  •  silver sulfadiazine, , Topical, BID  •  tamoxifen, 20 mg, feeding tube, Daily    VITAL SIGNS  Vitals:    12/26/23 0946 12/26/23 1000 12/26/23 1100 12/26/23 1200   BP: (!) 144/60      BP Location:       Patient Position:       Pulse:  78 78 80   Resp:  18 19 19   Temp:       TempSrc:       SpO2:  97% 97% 97%   Weight:       Height:           VENTILATOR SETTINGS  Vent Mode: Pressure support  FiO2 (%) (Set):  [40 %] 40 %  S RR:  [24] 24  S VT:  [450 mL] 450 mL  PEEP/CPAP (Set, cmH2O):  [6 cm H20] 6 cm H20  MAP (Observed, cmH2O):  [8-16] 8    Oxygen:  Oxygen Therapy: Supplemental oxygen  O2 Delivery Method: Endotracheal tube  FiO2 (%) (Set): 40 %        INTAKE/OUTPUT    Intake/Output Summary (Last 24 hours) at 12/26/2023 1230  Last data filed at 12/26/2023 0700  Gross per 24 hour   Intake 2223.79 ml   Output 605 ml   Net 1618.79 ml       Lines, Drains, Airways, Wounds:  Peripheral IV (Adult) 12/25/23 Anterior;Right Hand (Active)   Number of days: 1       NG/OG Tube 12/25/23 Left nostril (Active)   Number of days: 1       Urethral Catheter Temperature probe 16 Fr (Active)   Number of days: 2       ETT  (Active)   Number of days: 2       Wound Other (comment) Right Pretibial (Active)   Number of days: 2       Wound Venous Ulcer Left Pretibial (Active)   Number of days: 2       Wound Perineum (Active)   Number of days: 2       Catheterization Site - Arterial Right Femoral 6 Fr. (Active)   Number of days: 1       Catheterization Site - Venous Right Femoral 6 Fr. (Active)   Number of days: 1         Physical Exam:  Physical Exam  Vitals and nursing note reviewed.   Constitutional:       Comments: Awake and following commands   Cardiovascular:      Rate and Rhythm: Normal rate. Rhythm irregular.      Pulses: Normal pulses.      Heart sounds: Normal  heart sounds.   Pulmonary:      Effort: Pulmonary effort is normal.      Breath sounds: Normal breath sounds.      Comments: Coarse breath sounds  Abdominal:      General: Abdomen is flat. Bowel sounds are normal.      Palpations: Abdomen is soft.   Musculoskeletal:      Right lower leg: No edema.      Left lower leg: No edema.   Skin:     General: Skin is dry.   Neurological:      Mental Status: He is alert.      Comments: Able to follow commands  Strength intact throughout all extremities          Labs:  See Labs Below:  ABG  Results from last 7 days   Lab Units 12/26/23  1133 12/26/23  0355 12/25/23  1437 12/24/23  2332 12/24/23 2012 12/24/23  1753 12/24/23  1753 12/24/23  1647   PH ART pH 7.36 7.40 7.31*   < > 7.27*   < > 7.27*  --    PCO2 ART mm Hg 44 41 48   < > 46   < > 48  --    PO2 ART mm Hg 308* 114* 122*   < > 100   < > 151*  --    HCO3 ART mEQ/L 25 26 23   < > 20.3*   < > 21.0  --    O2 SAT ART % 98 97 98   < >  --   --   --   --    BASE EXC ART mEQ/L -0.5 0.7 -2.6   < > -5.9   < > -5.1  --    SOURCE OF OXYGEN   --   --   --   --  Bipap  --  bipap -    < > = values in this interval not displayed.     CBC  Results from last 7 days   Lab Units 12/26/23  0347 12/25/23  0934 12/25/23  0410   WBC K/uL 8.09 13.95* 10.60*   RBC M/uL 2.75* 2.76* 3.09*   HEMOGLOBIN g/dL 9.4* 9.5* 10.6*   HEMATOCRIT % 29.0* 30.4* 34.2*   MCV fL 105.5* 110.1* 110.7*   MCH pg 34.2* 34.4* 34.3*   MCHC g/dL 32.4 31.3* 31.0*   PLATELETS K/uL 112* 133* 127*   RDW % 15.6* 15.6* 15.4*   MPV fL 9.7 9.9 9.5     BMP  Results from last 7 days   Lab Units 12/26/23  0347 12/25/23  1023 12/25/23  0934 12/25/23  0410   SODIUM mEQ/L 148* 148*  --  141   POTASSIUM mEQ/L 3.5 3.8  3.8 3.7 4.5   CHLORIDE mEQ/L 114* 107  --  104   CO2 mEQ/L 26 25  --  26   BUN mg/dL 21 20  --  19   CREATININE mg/dL 1.6* 1.5*  --  1.5*   CALCIUM mg/dL 7.3* 7.6*  --  8.3*   ALBUMIN g/dL 2.6* 3.0*  --  3.7   BILIRUBIN TOTAL mg/dL 0.9 0.7  --  0.6   ALK PHOS IU/L 53 66   --  71   ALT IU/L 268* 64*  --  34   AST IU/L 711* 130*  --  70*   GLUCOSE mg/dL 117* 177*  --  180*     Coag  Results from last 7 days   Lab Units 12/25/23  0934 12/25/23  0410 12/24/23  1647   PROTIME sec  --  17.4* 21.2*   INR   --  1.4 1.9   PTT sec 26 33  --        Imaging:     All imaging reviewed by me    X-RAY CHEST 1 VIEW    Result Date: 12/26/2023  IMPRESSION: ET tube 4 cm above leo, NG tube tip not well seen, likely in proximal stomach. Stable cardiomegaly, mitral valve replacement. Clear lungs, with interstitial crowding secondary to low lung volumes.    X-RAY CHEST 1 VIEW    Result Date: 12/26/2023  IMPRESSION: Improved pulmonary vascular congestion. ET tube 3 cm above leo, NG tube tip below inferior edge of film.    X-RAY CHEST 1 VIEW    Result Date: 12/25/2023  IMPRESSION: Interval retraction of the endotracheal tube now in satisfactory position, as below. Stable satisfactory positioning of the enteric tube. Progressive pulmonary interstitial edema with stable enlargement of the cardiac silhouette. No pneumothorax. COMMENT: Comparison: Chest x-ray 12/24/2023. Technique: A single frontal AP portable upright projection of the chest was obtained. FINDINGS: There has been interval retraction of the endotracheal tube now terminating 2.7 cm above the leo. An enteric tube courses to the stomach with the distal tip collimated from the field-of-view beneath the left hemidiaphragm in satisfactory position. There are defibrillator pad projecting over the left hemithorax. There are progressively increased interstitial opacities seen projecting over both lungs. There is no pleural effusion or pneumothorax. The cardiac silhouette is stably enlarged. The mediastinal contours are unchanged. Again noted is a prosthetic mitral valve. The upper abdomen is unremarkable. There is no acute osseous abnormality. Surgical clips overlie the right axillary region.     X-RAY CHEST 1 VIEW    Result Date:  12/25/2023  IMPRESSION: Satisfactory positioning of the endotracheal and enteric tubes. No pneumothorax. Stable pulmonary edema and enlargement of the cardiac silhouette. COMMENT: Comparison: Chest x-ray 12/25/2023. Technique: A single frontal AP portable upright projection of the chest was obtained. FINDINGS: The tip of an endotracheal tube terminates 4.0 cm above the leo. An enteric tube courses to the stomach with the distal tip collimated from the field-of-view beneath the left hemidiaphragm in satisfactory position. There are defibrillator pad projecting over the left hemithorax. There are mildly increased interstitial opacities again seen projecting over both lungs. No pleural effusion or pneumothorax is seen. There is stable enlargement of the cardiac silhouette. Again noted is a prosthetic mitral valve. The mediastinal contours are unchanged. The upper abdomen is unremarkable. There is no acute osseous abnormality. There are surgical clips overlying the right axillary region.     X-RAY CHEST 1 VIEW    Result Date: 12/25/2023  IMPRESSION: Low-lying endotracheal tube terminating over the proximal right mainstem bronchus. Recommend retraction. Satisfactory positioning of the enteric tube. This finding and recommendation was discussed with nurse Shoemaker at 11:27 AM on 12/25/2023 by Dr. Martinez by telephone. Mild pulmonary interstitial edema and stable enlargement of the cardiac silhouette. No pneumothorax. COMMENT: Comparison: Chest x-ray 12/24/2023. Technique: A single frontal AP portable upright projection of the chest was obtained. FINDINGS: The tip of the intervally placed endotracheal tube terminates over the proximal right mainstem bronchus. The tip of the enteric tube terminates over the left upper quadrant of the abdomen. There are mildly increased interstitial opacities noted within both lungs. There is no pleural effusion or pneumothorax. There is stable enlargement of the cardiac silhouette. Again  noted is a mitral valve prosthesis. Surgical clips overlie the right axilla. The upper abdomen is unremarkable. There is no acute osseous abnormality.    X-RAY CHEST 1 VIEW    Result Date: 12/25/2023  IMPRESSION: Vascular congestion.  Left basal atelectasis. COMMENT: AP radiograph the chest is compared to previous study dated 8/17/2023. There is vascular congestion and small effusions.  Findings may represent mild congestive heart failure.  Cardiac silhouette is unchanged.  Prosthetic valve ring seen.  Surgical staples seen in the right axilla.  There is minimal left basal atelectasis.    CT ANGIOGRAPHY CHEST PULMONARY EMBOLISM WITH IV CONTRAST    Result Date: 12/24/2023  IMPRESSION: 1. No pulmonary embolism in the main or proximal segmental pulmonary arteries. 2. Right upper lobe and left lower lobe infiltrate with patchy airspace opacities in the right middle lobe.      Micro:   Microbiology Results     Procedure Component Value Units Date/Time    Sputum Gram Stain (Lab Only) Expectorated Sputum [[id number]] Collected: 12/25/23 0418    Specimen: Aspirate from Expectorated Sputum Updated: 12/25/23 0956     Gram Stain Result 3+ WBC      No Epithelial Cells Seen      3+ Gram positive bacilli      2+ Gram positive cocci in pairs, chains and clusters    Sputum Culture (Lab Only) Expectorated Sputum [[id number]]  (Normal) Collected: 12/25/23 0418    Specimen: Aspirate from Expectorated Sputum Updated: 12/26/23 0927     Culture Culture in progress    MRSA Screen, Nares Only Nose [[id number]]  (Normal) Collected: 12/25/23 0352    Specimen: Nasal Swab from Nose Updated: 12/25/23 0906     MRSA DNA, Nares Negative    Blood Culture Blood, Venous [[id number]]  (Normal) Collected: 12/24/23 1652    Specimen: Blood, Venous Updated: 12/26/23 0000     Culture No growth at 18-24 hours    SARS-CoV-2 (COVID-19) Nasopharynx [[id number]]  (Abnormal) Collected: 12/24/23 1649    Specimen: Nasopharyngeal Swab from Nasopharynx Updated: 12/24/23  1736    Narrative:      The following orders were created for panel order SARS-CoV-2 (COVID-19) Nasopharynx.  Procedure                               Abnormality         Status                     ---------                               -----------         ------                     SARS-COV-2 (COVID-19)/ F...[[id number]]  Abnormal            Final result                 Please view results for these tests on the individual orders.    SARS-COV-2 (COVID-19)/ FLU A/B, AND RSV, PCR Nasopharynx [[id number]]  (Abnormal) Collected: 12/24/23 1649    Specimen: Nasopharyngeal Swab from Nasopharynx Updated: 12/24/23 1736     SARS-CoV-2 (COVID-19) Negative     Influenza A Positive     Influenza B Negative     Respiratory Syncytial Virus Negative    Narrative:      Testing performed using real-time PCR for detection of COVID-19. EUA approved validation studies performed on site.     Blood Culture Blood, Venous [[id number]]  (Normal) Collected: 12/24/23 1647    Specimen: Blood, Venous Updated: 12/26/23 0100     Culture No growth at 18-24 hours          ECG/Telemetry  I have independently reviewed the telemetry. No events for the last 24 hours.         ASSESSMENT    73 y.o. male with history atrial fibrillation, CHF, prostate and breast CA, history of GI bleed who presented to the emergency room with shortness of breath and found to be influenza A positive. During day 2 of his hospital stay he had a v fib arrest with ROSC. He was transferred to the ICU for further monitoring     PLAN     #Acute Hypoxic respiratory failure  -Failed Bipap on 12/24 in ED and was subsequently intubated   -Today was able to successfully tolerate Pressure support  -ABG 7.36/44/308/25  -Trial of extubation was done however shortly after he became hypoxic requiring bag mask ventilation and was placed on bipap-- ? Upper airway edema vs acute bronchospasm vs flash pulm edema  -40 mg lasix given  -Chest XR pending  -Decadron 10mg given then 6mg q 6 of note  family reported that his allergic reaction to prednisone consisted of lower extremity edema they stated there was never any angio edema or anaphylaxis reaction in this case the benefits have outweighed the risks  -Repeat ABG post extubation 7.15/73/69/22  -Continue serial ABGs   Anesthesia called- pt reintubated- they noted some swelling around cords    #Shock  -Continue Levophed and milrinone  -Vasopressors to maintain a MAP above 65     #Takotsubo Cardiomyopathy  -TTE 12/26 suggestive of Takotsubo   -Confirmed by LV gram   -Once off pressure support and extubated start losartan 25mg daily and low dose carvedilol  -Cardiology following    #Pneumonia  -CT Chest 12/24 with left lower lobe infiltrate and right upper lobe infiltrate  -Blood cultures with no growth  -Sputum cultures pending  -Continue on Pip-tazo    #V Fib Arrest 12/25  - ROSC achieved   -Initially was started on TTM however was able to follow commands later in the day TTM was dc'd  -Taken by interventional cardiology for the placement of a temporary pacemaker  -Temporary pacemaker rate changed to 60 by cardiology this am    #Afib  -Continue on amiodarone    #Influenza A  -Continue Oseltamivir       VTE Prophylaxis Plan: SCDs SQH  GI Prophylaxis Plan: Protonix  Lines/Drains/Airway: PIV   Fluids/Electrolytes/Nutrition: NPO     Disposition Planning: Remain in the ICU    CODE STATUS  Full Code       The case was reviewed this morning at the patient's beside multidisciplinary rounds with the patient's nurse, dietician, pharmacist, respiratory therapist, physical therapist and critical care nurse coordinator. The patient's clinical status with regards to diagnosis, treatment plans including disposition of any IV, arterial lines, Lloyd and tubes were discussed, as well as dietary, respiratory therapy and mobilization needs.     This case was discussed with consultants.    Total critical time spent managing AHRF, flu, cardiogenic shock 60 minutes.    This note  was scribed by Ishaan Juares PA-C in my presence on my behalf.    Almaz Dorsey MD   12/26/2023  12:30 PM

## 2023-12-26 NOTE — PROGRESS NOTES
Daily Progress Note (LOS: 2)    ?  Interval History: Patient remains intubated.  He is awake and responsive.  His blood pressure is stable on low-dose milrinone and Levophed.  He is oxygenating fine.  His bedside echocardiogram is consistent with Takotsubo syndrome.  He is pacemaker is functioning appropriately.      Current Medications:  • amiodarone  200 mg feeding tube Daily   • atorvastatin  10 mg oral Nightly   • cetirizine  10 mg feeding tube Nightly   • chlorhexidine  15 mL Mouth/Throat BID   • cholecalciferol (vitamin D3)  1,000 Units feeding tube Daily   • [Provider Managed Hold] furosemide  40 mg intravenous q12h GISELLE   • guaiFENesin  400 mg oral q6h GISELLE   • heparin (porcine)  5,000 Units subcutaneous q12h (6a, 6p)   • ipratropium HFA  2 puff inhalation QID (8a, 12p, 4p, 8p)   • oseltamivir  30 mg oral BID   • pantoprazole  40 mg intravenous q12h GISELLE   • piperacillin-tazobactam  4.5 g intravenous q6h INT   • silver sulfadiazine   Topical BID   • tamoxifen  20 mg feeding tube Daily       Physical Exam:  Vitals:    12/26/23 0946   BP: (!) 144/60   Pulse:    Resp:    Temp:    SpO2:        General appearance: alert, appears stated age and cooperative  Head: without obvious abnormality  Eyes: PERRLA, EOM's intact  Lungs: clear to auscultation bilaterally, no rales or wheezing, intubated  Heart: S1, S2 normal, no murmur, click, rub or gallop, regular rate and rhythm, no JVD  Abdomen: soft, non-tender; bowel sounds normal; no masses  Extremities: Peripheries have warmed up   skin: Skin color, texture, turgor normal. No rashes or lesions  Neurologic: Alert and oriented X 3, no focal deficits  Psychiatric: Appropriate affect  Endocrine: No thyromegaly    I&Os:    Intake/Output Summary (Last 24 hours) at 12/26/2023 1052  Last data filed at 12/26/2023 0700  Gross per 24 hour   Intake 2507.96 ml   Output 630 ml   Net 1877.96 ml       Weights:   Wt Readings from Last 3 Encounters:   12/26/23 80.3 kg (177 lb)   12/07/23  "80.3 kg (177 lb)   11/27/23 80.3 kg (177 lb)       Labs:  Results from last 7 days   Lab Units 12/26/23  0347 12/25/23  1023 12/25/23  0934 12/25/23  0410   SODIUM mEQ/L 148* 148*  --  141   POTASSIUM mEQ/L 3.5 3.8  3.8 3.7 4.5   CO2 mEQ/L 26 25  --  26   BUN mg/dL 21 20  --  19   CREATININE mg/dL 1.6* 1.5*  --  1.5*   CALCIUM mg/dL 7.3* 7.6*  --  8.3*   ALT IU/L 268* 64*  --  34   AST IU/L 711* 130*  --  70*     Results from last 7 days   Lab Units 12/26/23  0347 12/25/23  0934 12/25/23  0410   WBC K/uL 8.09 13.95* 10.60*   HEMOGLOBIN g/dL 9.4* 9.5* 10.6*   HEMATOCRIT % 29.0* 30.4* 34.2*   PLATELETS K/uL 112* 133* 127*     Results from last 7 days   Lab Units 12/25/23  0934 12/25/23  0410 12/24/23  1647   PTT sec 26 33  --    INR   --  1.4 1.9   PROTIME sec  --  17.4* 21.2*         No lab exists for component: \"CPK\"  0   Lab Value Date/Time    BNP 1,135 (H) 12/24/2023 1647     (H) 02/07/2023 1326     (H) 10/19/2022 1305       Data Review:  EKG Impression: Sinus rhythm, nonspecific intraventricular conduction delay, QT prolongation  Telemetry Review: No further VT or VF    Plan:  Cam Rosas is a 73 y.o. male with the following problems,      1.  VF arrest- it appears that the patient came in with an upper respiratory tract infection and developed hypoxemia.  He became bradycardic with hypoxemia.  He was treated with atropine.  He was then intubated.  The following morning he had QT prolongation and had polymorphic VT VF.  A temporary wire was placed.  Lidocaine and amiodarone were discontinued.  Cardiac catheterization revealed no significant coronary disease.  His LV gram was consistent with Takotsubo syndrome.  He was started on intravenous milrinone as his intracardiac pressures were elevated.  He remains on milrinone and low-dose Levophed.  I recommend weaning off of Levophed.  Continue on milrinone.  The patient has been diuresed.  His chest x-ray revealed improved pulmonary vascular " congestion yesterday.    2.  Hypoxemia- chest x-ray on admission revealed mild pulmonary interstitial edema.    The patient's creatinine is increasing and his sodium is 148.  Avoid overdiuresis.    3.  Prostate cancer-completed therapy     4.  Right breast cancer-radiation therapy    5.  Atrial fibrillation-patient has a history of atrial fibrillation-continue on oral/nasogastric amiodarone.    6.  Takotsubo syndrome- initiate losartan 25 mg once a day and low-dose carvedilol once the patient is extubated, off of milrinone and blood pressure stabilizes.    7.  Hypoxemia/influenza A- improving.  Most recent blood gas looks good.    8.  Temporary pacing wire-if patient has no further bradycardia arrhythmias, consider removing the temporary wire after he is extubated.    I have spent more than 30 minutes of critical care ICU time evaluating and treating this patient.    Electronic signature  Michele Estrada MD   12/26/23

## 2023-12-26 NOTE — PROGRESS NOTES
"Infectious Disease Progress Note    Patient Name: Cam Rosas  MR#: [id number]  : 1950  Admission Date: 2023  Date: 23   Time: 10:25 AM   Author: Adam Tucker MD    Major Events: none    Antibiotics:    Anti-infectives (From admission, onward)    Start     Dose/Rate Route Frequency Ordered Stop    23 09  vancomycin 750 mg/250 mL IVPB in NSS        See Hyperspace for full Linked Orders Report.    750 mg  250 mL/hr over 60 Minutes intravenous Every 12 hours interval 23 0317      23 09  silver sulfadiazine (SILVADENE) 1 % cream          Topical 2 times daily 23 0344 10/30/24 0859    23 09  oseltamivir (TAMIFLU) 6 mg/mL suspension 30 mg         30 mg oral 2 times daily 23 0802 23 0859    23 0415  piperacillin-tazobactam (ZOSYN) 4.5 g/100 mL IVPB in NSS         4.5 g  200 mL/hr over 30 Minutes intravenous Every 6 hours interval 23            Subjective     Review of Systems  Remains on vent and is awake without acute events overnight. Remainder of 12 ROS is unchanged.    Objective     Vital Signs:    Patient Vitals for the past 72 hrs:   BP Temp Temp src Pulse Resp SpO2 Height Weight   23 0946 (!) 144/60 -- -- -- -- -- -- --   23 0823 (!) 144/60 -- -- 76 (!) 33 98 % -- --   23 0737 (!) 144/60 -- -- -- -- -- 1.651 m (5' 5\") 80.3 kg (177 lb)   23 0700 135/73 -- -- 73 (!) 24 96 % -- --   23 0630 121/66 -- -- 72 (!) 26 96 % -- --   23 0600 109/85 -- -- 71 (!) 29 96 % -- --   23 0530 138/67 -- -- 69 (!) 27 96 % -- --   23 0500 134/62 -- -- 69 (!) 24 96 % -- --   23 0430 120/63 -- -- 69 (!) 24 96 % -- --   23 0400 (!) 112/55 -- Bladder 70 (!) 24 96 % -- --   23 0330 116/61 -- -- 78 (!) 26 97 % -- --   23 0300 101/60 -- -- 70 (!) 24 96 % -- --   23 0230 (!) 95/54 -- -- 69 (!) 25 97 % -- --   23 0200 (!) 115/56 -- -- 70 (!) 26 96 % -- --   23 0130 " (!) 115/57 -- -- 69 (!) 26 97 % -- --   12/26/23 0100 (!) 103/55 -- -- 69 (!) 24 97 % -- --   12/26/23 0030 (!) 100/56 -- -- 69 (!) 25 97 % -- --   12/26/23 0000 110/60 -- Bladder 69 (!) 24 96 % -- --   12/25/23 2333 (!) 100/58 -- -- 69 (!) 22 97 % -- --   12/25/23 2330 (!) 100/58 -- -- 69 (!) 24 97 % -- --   12/25/23 2315 (!) 103/59 -- -- 69 (!) 24 97 % -- --   12/25/23 2310 119/61 -- -- 69 (!) 24 96 % -- --   12/25/23 2304 -- -- -- 87 (!) 26 97 % -- --   12/25/23 2301 -- -- -- 69 (!) 24 97 % -- --   12/25/23 2300 (!) 74/46 -- -- 69 (!) 24 97 % -- --   12/25/23 2200 (!) 114/58 -- -- 69 (!) 24 97 % -- --   12/25/23 2100 109/81 -- -- 69 (!) 24 96 % -- --   12/25/23 2025 -- -- -- 69 (!) 22 97 % -- --   12/25/23 2000 119/67 -- Bladder 69 (!) 24 96 % -- --   12/25/23 1915 116/69 -- -- 69 (!) 28 97 % -- --   12/25/23 1900 106/61 -- -- 69 (!) 24 98 % -- --   12/25/23 1845 (!) 107/59 -- -- 69 (!) 24 98 % -- --   12/25/23 1830 (!) 105/55 -- -- 69 (!) 24 98 % -- --   12/25/23 1815 (!) 108/59 -- -- 69 (!) 24 97 % -- --   12/25/23 1800 (!) 105/59 -- -- 69 (!) 24 98 % -- --   12/25/23 1745 (!) 114/58 -- -- 69 (!) 24 97 % -- --   12/25/23 1741 -- -- -- 69 (!) 24 97 % -- --   12/25/23 1730 (!) 119/58 -- -- 69 (!) 25 96 % -- --   12/25/23 1715 (!) 128/58 -- -- 69 (!) 24 97 % -- --   12/25/23 1700 (!) 118/56 -- -- 69 15 96 % -- --   12/25/23 1645 (!) 109/59 -- -- 69 (!) 22 96 % -- --   12/25/23 1630 119/62 -- -- 69 (!) 24 97 % -- --   12/25/23 1615 (!) 117/58 -- -- 69 (!) 24 97 % -- --   12/25/23 1600 (!) 117/58 -- -- 69 (!) 24 97 % -- --   12/25/23 1545 (!) 118/59 -- -- 69 (!) 24 96 % -- --   12/25/23 1530 (!) 123/59 -- -- 69 (!) 24 95 % -- --   12/25/23 1515 126/76 -- -- 69 (!) 23 94 % -- --   12/25/23 1500 127/71 -- -- 69 19 98 % -- --   12/25/23 1456 -- -- -- 69 14 95 % -- --   12/25/23 1450 -- -- -- 69 12 98 % -- --   12/25/23 1445 116/70 -- -- 69 14 98 % -- --   12/25/23 1442 -- -- -- 69 13 98 % -- --   12/25/23 1440 -- -- --  69 13 98 % -- --   12/25/23 1435 (!) 114/59 -- -- 69 14 98 % -- --   12/25/23 1430 -- -- -- 69 14 98 % -- --   12/25/23 1425 (!) 102/59 -- -- 69 16 98 % -- --   12/25/23 1420 -- -- -- 68 18 98 % -- --   12/25/23 1225 -- -- -- 60 20 98 % -- --   12/25/23 1220 -- -- -- (!) 59 15 98 % -- --   12/25/23 1215 -- -- -- 60 18 98 % -- --   12/25/23 1210 -- -- -- 73 (!) 25 98 % -- --   12/25/23 1200 136/86 -- -- (!) 59 (!) 21 98 % -- --   12/25/23 1145 130/74 -- -- (!) 59 (!) 25 98 % -- --   12/25/23 1140 -- -- -- (!) 40 (!) 25 99 % -- --   12/25/23 1130 115/63 -- -- (!) 42 (!) 24 100 % -- --   12/25/23 1102 (!) 116/56 -- -- (!) 45 (!) 25 100 % -- --   12/25/23 1100 (!) 116/56 -- -- (!) 45 (!) 25 100 % -- --   12/25/23 1055 -- -- -- (!) 46 (!) 24 100 % -- --   12/25/23 1050 -- -- -- (!) 47 (!) 24 100 % -- --   12/25/23 1045 (!) 122/59 -- -- (!) 47 (!) 24 100 % -- --   12/25/23 1040 -- -- -- (!) 47 (!) 24 100 % -- --   12/25/23 1030 (!) 123/58 -- -- (!) 48 (!) 27 100 % -- --   12/25/23 1015 (!) 117/57 -- -- (!) 50 (!) 24 100 % -- --   12/25/23 1010 (!) 119/58 -- -- (!) 50 (!) 21 100 % -- --   12/25/23 1005 (!) 115/56 -- -- (!) 51 (!) 24 99 % -- --   12/25/23 1000 (!) 119/56 -- -- (!) 52 (!) 24 98 % -- --   12/25/23 0955 (!) 121/59 -- -- (!) 54 (!) 22 100 % -- --   12/25/23 0950 118/63 -- -- (!) 55 (!) 28 100 % -- --   12/25/23 0945 (!) 108/55 -- -- 80 (!) 31 99 % -- --   12/25/23 0940 120/67 -- -- 90 (!) 28 100 % -- --   12/25/23 0935 102/64 -- -- (!) 104 (!) 25 100 % -- --   12/25/23 0930 (!) 112/59 -- -- (!) 106 (!) 29 100 % -- --   12/25/23 0925 108/70 -- -- (!) 107 (!) 24 (!) 46 % -- --   12/25/23 0920 (!) 114/56 -- -- 67 (!) 25 (!) 74 % -- --   12/25/23 0915 (!) 126/57 -- -- 87 (!) 35 (!) 82 % -- --   12/25/23 0905 (!) 175/139 -- -- 87 (!) 31 (!) 74 % -- --   12/25/23 0900 -- -- -- (!) 171 (!) 25 (!) 80 % -- --   12/25/23 0855 (!) 70/51 -- -- (!) 113 (!) 92 (!) 74 % -- --   12/25/23 0850 -- -- -- 78 (!) 25 97 % -- --    12/25/23 0845 (!) 106/53 -- -- 73 18 94 % -- --   12/25/23 0830 (!) 79/50 -- -- 62 (!) 27 95 % -- --   12/25/23 0815 (!) 81/49 -- -- 64 (!) 28 95 % -- --   12/25/23 0800 (!) 81/53 -- -- 62 (!) 24 95 % -- --   12/25/23 0715 (!) 88/54 -- -- 63 (!) 24 95 % -- --   12/25/23 0700 (!) 92/54 -- -- 64 (!) 24 95 % -- --   12/25/23 0645 (!) 92/52 -- -- 66 19 95 % -- --   12/25/23 0630 (!) 84/52 -- -- 64 (!) 22 95 % -- --   12/25/23 0615 (!) 92/51 -- -- 65 (!) 25 94 % -- --   12/25/23 0600 (!) 88/50 -- -- 68 (!) 26 94 % -- 80.3 kg (177 lb 0.5 oz)   12/25/23 0530 101/60 -- -- 71 (!) 25 93 % -- --   12/25/23 0434 -- (!) 38.6 °C (101.5 °F) -- -- -- -- -- --   12/25/23 0400 131/69 -- -- 75 (!) 24 94 % -- --   12/25/23 0345 -- -- -- -- -- -- -- 82.9 kg (182 lb 12.2 oz)   12/25/23 0330 137/65 -- -- 75 (!) 24 92 % -- --   12/25/23 0300 (!) 154/71 -- -- 73 (!) 24 92 % -- --   12/25/23 0245 (!) 150/67 -- -- 72 (!) 26 (!) 91 % -- --   12/25/23 0230 (!) 159/68 -- -- 72 (!) 27 93 % -- --   12/25/23 0200 138/80 -- -- 69 (!) 24 93 % -- --   12/25/23 0130 138/77 -- -- 69 (!) 24 93 % -- --   12/25/23 0115 136/69 -- -- 68 (!) 24 93 % -- --   12/25/23 0100 140/71 -- -- 68 (!) 24 92 % -- --   12/25/23 0030 (!) 144/65 -- -- 70 (!) 24 92 % -- --   12/25/23 0000 (!) 143/73 -- -- 72 (!) 24 (!) 91 % -- --   12/24/23 2345 -- -- -- 72 (!) 23 94 % -- --   12/24/23 2339 -- -- -- 72 -- 95 % -- --   12/24/23 2333 (!) 142/61 -- -- 74 (!) 24 95 % -- --   12/24/23 2315 (!) 101/47 -- -- (!) 37 (!) 24 (!) 69 % -- --   12/24/23 2306 138/76 -- -- 77 (!) 24 99 % -- --   12/24/23 2300 (!) 109/58 -- -- (!) 36 18 100 % -- --   12/24/23 2245 97/71 -- -- 64 19 98 % -- --   12/24/23 2230 (!) 90/49 -- -- (!) 39 19 98 % -- --   12/24/23 2215 (!) 157/55 -- -- 82 (!) 21 98 % -- --   12/24/23 2200 119/86 -- -- 73 (!) 25 99 % -- --   12/24/23 2145 114/69 -- -- (!) 38 (!) 31 95 % -- --   12/24/23 2135 126/72 -- -- 68 (!) 36 100 % -- --   12/24/23 2130 126/72 -- -- 72 (!) 28  (!) 88 % -- --   23 118/70 -- -- 70 (!) 28 97 % -- --   23 1915 (!) 145/92 -- -- 72 (!) 27 95 % -- --   23 1745 -- -- -- -- -- -- -- 81.6 kg (180 lb)   23 1743 (!) 139/95 -- -- 68 (!) 31 94 % -- --   23 1643 -- -- -- -- -- 100 % -- --   23 1640 -- -- -- -- -- (!) 63 % -- --   23 1639 -- -- -- -- -- (!) 83 % -- --   23 1634 (!) 142/86 (!) 35.3 °C (95.6 °F) Temporal 70 (!) 30 (!) 82 % -- --       Temp (72hrs), Av °C (98.6 °F), Min:35.3 °C (95.6 °F), Max:38.6 °C (101.5 °F)      Physical Exam:    General appearance: awake on vent  Head: ETT in place  Eyes: anicteric sclera  Lungs: decreased breath sounds b/l  Heart: regular rate and rhythm  Abdomen: soft, non-tender  Extremities: edema none  Joints: no swelling  Skin: no rashes  Neurologic: following commands    Lines, Drains, Airways, Wounds:  Peripheral IV (Adult) 23 Anterior;Right Hand (Active)   Number of days: 1       NG/OG Tube 23 Left nostril (Active)   Number of days: 1       Urethral Catheter Temperature probe 16 Fr (Active)   Number of days: 2       ETT  (Active)   Number of days: 2       Wound Other (comment) Right Pretibial (Active)   Number of days: 2       Wound Venous Ulcer Left Pretibial (Active)   Number of days: 2       Wound Perineum (Active)   Number of days: 2       Catheterization Site - Arterial Right Femoral 6 Fr. (Active)   Number of days: 1       Catheterization Site - Venous Right Femoral 6 Fr. (Active)   Number of days: 1       Labs:    CBC Results       1223     0347 0934 0410    WBC 8.09 13.95 10.60    RBC 2.75 2.76 3.09    HGB 9.4 9.5 10.6    HCT 29.0 30.4 34.2    .5 110.1 110.7    MCH 34.2 34.4 34.3    MCHC 32.4 31.3 31.0     133 127      BMP Results       23     0347 1023 0934     148 --    K 3.5 3.8 3.7      3.8     Cl 114 107 --    CO2 26 25 --    Glucose 117 177 --    BUN 21 20 --    Creatinine 1.6 1.5  --    Calcium 7.3 7.6 --    Anion Gap 8 16 --    EGFR 45.2 48.9 --         Comment for EGFR at 0347 on 12/26/23: Calculation based on the Chronic Kidney Disease Epidemiology Collaboration (CKD-EPI) equation refit without adjustment for race.    Comment for EGFR at 1023 on 12/25/23: Calculation based on the Chronic Kidney Disease Epidemiology Collaboration (CKD-EPI) equation refit without adjustment for race.      PT/PTT Results       12/25/23 12/25/23 12/24/23     0934 0410 1647    PT -- 17.4 21.2    INR -- 1.4 1.9    PTT 26 33 --         Comment for INR at 0410 on 12/25/23: Moderate Intensity Anticoagulation = 2.0 to 3.0, High Intensity = 2.5 to 3.5    Comment for INR at 1647 on 12/24/23: Moderate Intensity Anticoagulation = 2.0 to 3.0, High Intensity = 2.5 to 3.5      UA Results       12/25/23     0414    Color Yellow    Clarity Clear    Glucose Negative    Bilirubin Negative    Ketones Negative    Sp Grav >1.035    Blood Trace    Ph 5.5    Protein Trace    Urobilinogen 0.2    Nitrite Negative    Leuk Est Negative    WBC 0 TO 3    RBC 0 TO 4    Bacteria Rare         Comment for Blood at 0414 on 12/25/23: The sensitivity of the occult blood test is equivalent to approximately 4 intact RBC/HPF.    Comment for Protein at 0414 on 12/25/23: Trace False Positive Protein can be seen with alkaline or highly buffered urines or urine with high specific gravity. Suggest clinical correlation.    Comment for Leuk Est at 0414 on 12/25/23: Results can be falsely negative due to high specific gravity, some antibiotics, glucose >3 g/dl, or WBC other than neutrophils.      Lactate Results       12/25/23 12/25/23 12/24/23 2011 1729 2012    Lactate 2.4 3.1 3.8          Microbiology Results     Procedure Component Value Units Date/Time    Sputum Gram Stain (Lab Only) Expectorated Sputum [[id number]] Collected: 12/25/23 0418    Specimen: Aspirate from Expectorated Sputum Updated: 12/25/23 0990     Gram Stain Result 3+ WBC      No  Epithelial Cells Seen      3+ Gram positive bacilli      2+ Gram positive cocci in pairs, chains and clusters    Sputum Culture (Lab Only) Expectorated Sputum [[id number]]  (Normal) Collected: 12/25/23 0418    Specimen: Aspirate from Expectorated Sputum Updated: 12/26/23 0927     Culture Culture in progress    MRSA Screen, Nares Only Nose [[id number]]  (Normal) Collected: 12/25/23 0352    Specimen: Nasal Swab from Nose Updated: 12/25/23 0906     MRSA DNA, Nares Negative    Blood Culture Blood, Venous [[id number]]  (Normal) Collected: 12/24/23 1652    Specimen: Blood, Venous Updated: 12/26/23 0000     Culture No growth at 18-24 hours    SARS-CoV-2 (COVID-19) Nasopharynx [[id number]]  (Abnormal) Collected: 12/24/23 1649    Specimen: Nasopharyngeal Swab from Nasopharynx Updated: 12/24/23 1736    Narrative:      The following orders were created for panel order SARS-CoV-2 (COVID-19) Nasopharynx.  Procedure                               Abnormality         Status                     ---------                               -----------         ------                     SARS-COV-2 (COVID-19)/ F...[[id number]]  Abnormal            Final result                 Please view results for these tests on the individual orders.    SARS-COV-2 (COVID-19)/ FLU A/B, AND RSV, PCR Nasopharynx [[id number]]  (Abnormal) Collected: 12/24/23 1649    Specimen: Nasopharyngeal Swab from Nasopharynx Updated: 12/24/23 1736     SARS-CoV-2 (COVID-19) Negative     Influenza A Positive     Influenza B Negative     Respiratory Syncytial Virus Negative    Narrative:      Testing performed using real-time PCR for detection of COVID-19. EUA approved validation studies performed on site.     Blood Culture Blood, Venous [[id number]]  (Normal) Collected: 12/24/23 1647    Specimen: Blood, Venous Updated: 12/26/23 0100     Culture No growth at 18-24 hours          Pathology Results     ** No results found for the last 720 hours. **          Echo:     Cardiac  Imaging    TRANSTHORACIC ECHO (TTE) COMPLETE 11/27/2023    Interpretation Summary  •  Left Ventricle: Normal ventricle size. Mild concentric left ventricular hypertrophy. No outflow tract obstruction present. Low normal systolic function. Estimated EF 55%. Wall motion appears grossly normal. Grade II diastolic dysfunction.  •  Right Ventricle: Mildly to moderately dilated ventricle size. Increased wall thickness. RVOT doppler shows VTI =10, possible systolic notching, time to peak acceleration of 70-80ms. This suggests normal output and elevated pulmonary vascular resistance. RV s'=10cm/s. TAPSE=2.1cm. Normal systolic function.  •  Left Atrium: Moderately dilated atrium.  •  Right Atrium: Mildly dilated atrium.  •  Aortic Valve: Tricuspid valve.  Sclerotic leaflets. Trace regurgitation. No stenosis. Calculated dimensionless index = 0.63.  •  Mitral Valve: Mitral annuloplasty ring present. Mild regurgitation. The jet is centrally directed.  •  Tricuspid Valve: Normal structure. Mild regurgitation. The regurgitation jet is eccentric. Estimated RVSP = 54 mmHg.  •  Pulmonic Valve: Normal structure. Trace regurgitation. Mildly dilated pulmonary artery.  •  Aorta: Aortic root normal. Sinuses of Valsalva normal-sized. Ascending aorta normal-sized.  •  IVC/SVC: Inferior vena cava is <2.1cm. Inferior vena cava collapses <50% during inspiration.  •  Pericardium: There is a trivial circumferential pericardial effusion.  •  Compared with April 2023, RV is now dilated.  •  I personally reviewed results with him today in the office.      Imaging:    Radiology Imaging    XR CHEST 1 VW    Narrative  CLINICAL HISTORY: 73-year-old, ETT adjustment    TECHNIQUE: 1 view of the chest acquired.    COMPARISON: Earlier same day    COMMENT:  LINES: ET tube 3 cm above leo. NG tube tip below inferior edge of film.    LUNGS: Improved pulmonary vascular congestion. No pleural effusion.    MEDIASTINUM/OTHER: Prominent cardiomediastinal  silhouette. Overlying  defibrillator pads and EKG leads. Prosthetic cardiac valve. Surgical clips in  right axilla.    Impression  IMPRESSION:  Improved pulmonary vascular congestion.    ET tube 3 cm above leo, NG tube tip below inferior edge of film.      Assessment     1. Pneumonia - remains on vent with sputum cx pending while on pip-tazo in the setting of #2 and blood cx are showing no growth to date and MRSA screen is negative. Repeat CXR shows improved pulmonary vascular congestion.     2. Influenza A - on oseltamivir with fever and leukocytosis having resolved now.     3. Acute hypoxic respiratory failure requiring intubation - mgmt per Primary team     Plan     1. D/C vancomycin and continue pip-tazo for now while waiting for sputum cx. Continue oseltamivir with fever and leukocytosis being resolved.

## 2023-12-26 NOTE — PROGRESS NOTES
Vancomycin Dosing by Pharmacy Consult Discontinued    IV Vancomycin Dosing by Pharmacy Protocol was discontinued.  Pharmacy will sign off and no longer dose vancomycin for this patient.    Kae White, WardD

## 2023-12-26 NOTE — PLAN OF CARE
Care Coordination Admission Assessment Note    General Information:  Readmission Within the last 30 days: no previous admission in last 30 days  Does patient have a : No  Patient-Specific Goals (include timeframe): on vent unable to state    Living Arrangements:  Arrived From: home  Current Living Arrangements: home  People in Home: child(angelia), adult, spouse  Home Accessibility: stairs to enter home (Group), stairs within home (Group)  Living Arrangement Comments: patient is in 2 story home with 2 steps to enter & 13 steps to 2nd floor. david is staying mostly on first floor & 2 steps into family room & patient holds onto a pole to step down    Housing Stability and Utility Access (SDOH):  In the last 12 months, was there a time when you were not able to pay the mortgage or rent on time?: No  In the last 12 months, how many places have you lived?: 1  In the last 12 months, was there a time when you did not have a steady place to sleep or slept in a shelter (including now)?: No  In the past 12 months has the electric, gas, oil, or water company threatened to shut off services in your home?: No    Functional Status Prior to Admission:   Assistive Device/Animal Currently Used at Home: walker, front-wheeled, shower chair, cane, straight  Functional Status Comments: patient was needing some assistance for adls & wife does home mangement  IADL Comments:       Supports and Services:  Current Outpatient/Agency/Support Group: homecare agency  Type of Current Home Care Services: home PT, home OT, nursing  History of home care episode or rehab stay: [facility]ent [facility]    Discharge Needs Assessment:   Concerns to be Addressed: adjustment to diagnosis/illness, care coordination/care conferences, discharge planning  Current Discharge Risk: chronically ill, physical impairment  Anticipated Changes Related to Illness: inability to care for self    Patient/Family Anticipated Discharge  Plan:  Patient/Family Anticipates Transition To: home, home with family, home with help/services, skilled nursing facility  Patient/Family Anticipated Services at Transition: home health care, skilled nursing    Connection to Community  Not applicable      Patient Choice:   Offered/Gave Vendor List: yes  Patient's Choice of Community Agency(s): discussed with family that care coordiantin will follow for approrpaite aftecare needs  Patient and/or patient guardian/advocate was made aware of their right to choose a provider. A list of eligible providers was presented and reviewed with the patient and/or patient guardian/advocate in written and/or verbal form. The list delineates providers in the patient’s desired geographic area who are participating in the Medicare program and/or providers contracted with the patient’s primary insurance. The Medicare list and quality ratings were obtained from the Medicare.gov [medicare.gov] website.    Anticipated Discharge Plan:  Met with patient. Provided education and contact information for Care Coordination services.: yes  Anticipated Discharge Disposition: skilled nursing facility, home with home health  Type of Home Care Services: home OT, home PT, nursing    Type of Skilled Nursing Care Services: SLP, OT, PT, nursing      Transportation Needs (SDOH):  Transportation Concerns: none  Transportation Anticipated: family or friend will provide  Is Out of Hospital DNR needed at discharge?: no    In the past 12 months, has lack of transportation kept you from medical appointments or from getting medications?: No  In the past 12 months, has lack of transportation kept you from meetings, work, or from getting things needed for daily living?: No    Concerns - comments: shireen met with patient & his 2 children at bedside & spouse. sw went over role of care coordiatnon & d/c plans. sw confirmed pcp & pharamcy. [facility]. david has hx of prostate cancer  & was receing treatment in past. per son his brother has been staying at patient's home to assist with care fro fabion. patietn stays mostly in family room . sw discussed with wife that care coordination will continue to follow for appropriate aftecare needs. patient is currently on vent. Patient will need pt/ot when able.

## 2023-12-27 ENCOUNTER — APPOINTMENT (INPATIENT)
Dept: RADIOLOGY | Facility: HOSPITAL | Age: 73
DRG: 004 | End: 2023-12-27
Attending: NURSE PRACTITIONER
Payer: MEDICARE

## 2023-12-27 LAB
ALBUMIN SERPL-MCNC: 2.5 G/DL (ref 3.5–5.7)
ALP SERPL-CCNC: 49 IU/L (ref 34–125)
ALT SERPL-CCNC: 516 IU/L (ref 7–52)
ANION GAP SERPL CALC-SCNC: 9 MEQ/L (ref 3–15)
AST SERPL-CCNC: 1028 IU/L (ref 13–39)
ATRIAL RATE: 55
ATRIAL RATE: 60
ATRIAL RATE: 63
BILIRUB SERPL-MCNC: 0.9 MG/DL (ref 0.3–1.2)
BUN SERPL-MCNC: 28 MG/DL (ref 7–25)
CALCIUM SERPL-MCNC: 7.5 MG/DL (ref 8.6–10.3)
CHLORIDE SERPL-SCNC: 117 MEQ/L (ref 98–107)
CO2 SERPL-SCNC: 23 MEQ/L (ref 21–31)
CREAT SERPL-MCNC: 1.9 MG/DL (ref 0.7–1.3)
EGFRCR SERPLBLD CKD-EPI 2021: 36.8 ML/MIN/1.73M*2
ERYTHROCYTE [DISTWIDTH] IN BLOOD BY AUTOMATED COUNT: 16 % (ref 11.6–14.4)
GLUCOSE BLD-MCNC: 135 MG/DL (ref 70–99)
GLUCOSE BLD-MCNC: 155 MG/DL (ref 70–99)
GLUCOSE BLD-MCNC: 170 MG/DL (ref 70–99)
GLUCOSE BLD-MCNC: 175 MG/DL (ref 70–99)
GLUCOSE SERPL-MCNC: 138 MG/DL (ref 70–99)
GRAM STN SPEC: NORMAL
HCT VFR BLDCO AUTO: 27.8 % (ref 40.1–51)
HGB BLD-MCNC: 8.8 G/DL (ref 13.7–17.5)
MAGNESIUM SERPL-MCNC: 2 MG/DL (ref 1.8–2.5)
MCH RBC QN AUTO: 34.1 PG (ref 28–33.2)
MCHC RBC AUTO-ENTMCNC: 31.7 G/DL (ref 32.2–36.5)
MCV RBC AUTO: 107.8 FL (ref 83–98)
MICROORGANISM SPEC CULT: NORMAL
PDW BLD AUTO: 10.1 FL (ref 9.4–12.4)
PHOSPHATE SERPL-MCNC: 3.6 MG/DL (ref 2.4–4.7)
PLATELET # BLD AUTO: 94 K/UL (ref 150–350)
POCT TEST: ABNORMAL
POTASSIUM SERPL-SCNC: 3.8 MEQ/L (ref 3.5–5.1)
PROT SERPL-MCNC: 4.4 G/DL (ref 6–8.2)
QRS DURATION: 134
QRS DURATION: 138
QRS DURATION: 142
QRS DURATION: 162
QT INTERVAL: 496
QT INTERVAL: 508
QT INTERVAL: 516
QT INTERVAL: 532
QTC CALCULATION(BAZETT): 491
QTC CALCULATION(BAZETT): 502
QTC CALCULATION(BAZETT): 517
QTC CALCULATION(BAZETT): 662
R AXIS: -38
R AXIS: -40
R AXIS: -45
R AXIS: -48
RBC # BLD AUTO: 2.58 M/UL (ref 4.5–5.8)
SODIUM SERPL-SCNC: 149 MEQ/L (ref 136–145)
T WAVE AXIS: 111
T WAVE AXIS: 149
T WAVE AXIS: 150
T WAVE AXIS: 156
TRIGL SERPL-MCNC: 234 MG/DL
TRIGL SERPL-MCNC: 303 MG/DL
VENTRICULAR RATE: 57
VENTRICULAR RATE: 59
VENTRICULAR RATE: 59
VENTRICULAR RATE: 99
WBC # BLD AUTO: 5.31 K/UL (ref 3.8–10.5)

## 2023-12-27 PROCEDURE — 84100 ASSAY OF PHOSPHORUS: CPT | Performed by: HOSPITALIST

## 2023-12-27 PROCEDURE — 25000000 HC PHARMACY GENERAL: Performed by: HOSPITALIST

## 2023-12-27 PROCEDURE — 0BJ08ZZ INSPECTION OF TRACHEOBRONCHIAL TREE, VIA NATURAL OR ARTIFICIAL OPENING ENDOSCOPIC: ICD-10-PCS | Performed by: INTERNAL MEDICINE

## 2023-12-27 PROCEDURE — 36415 COLL VENOUS BLD VENIPUNCTURE: CPT | Performed by: HOSPITALIST

## 2023-12-27 PROCEDURE — 25800000 HC PHARMACY IV SOLUTIONS: Performed by: HOSPITALIST

## 2023-12-27 PROCEDURE — 63600000 HC DRUGS/DETAIL CODE: Mod: JZ | Performed by: HOSPITALIST

## 2023-12-27 PROCEDURE — 63600000 HC DRUGS/DETAIL CODE: Mod: JZ

## 2023-12-27 PROCEDURE — 84478 ASSAY OF TRIGLYCERIDES: CPT

## 2023-12-27 PROCEDURE — 25000000 HC PHARMACY GENERAL: Performed by: UROLOGY

## 2023-12-27 PROCEDURE — 87205 SMEAR GRAM STAIN: CPT

## 2023-12-27 PROCEDURE — 63600000 HC DRUGS/DETAIL CODE: Mod: JZ | Performed by: NURSE PRACTITIONER

## 2023-12-27 PROCEDURE — 94003 VENT MGMT INPAT SUBQ DAY: CPT

## 2023-12-27 PROCEDURE — 63700000 HC SELF-ADMINISTRABLE DRUG

## 2023-12-27 PROCEDURE — 83735 ASSAY OF MAGNESIUM: CPT | Performed by: HOSPITALIST

## 2023-12-27 PROCEDURE — 94640 AIRWAY INHALATION TREATMENT: CPT

## 2023-12-27 PROCEDURE — 31645 BRNCHSC W/THER ASPIR 1ST: CPT

## 2023-12-27 PROCEDURE — 20000000 HC ROOM AND CARE ICU

## 2023-12-27 PROCEDURE — 85027 COMPLETE CBC AUTOMATED: CPT | Performed by: HOSPITALIST

## 2023-12-27 PROCEDURE — 87070 CULTURE OTHR SPECIMN AEROBIC: CPT

## 2023-12-27 PROCEDURE — 71045 X-RAY EXAM CHEST 1 VIEW: CPT

## 2023-12-27 PROCEDURE — 63700000 HC SELF-ADMINISTRABLE DRUG: Performed by: HOSPITALIST

## 2023-12-27 PROCEDURE — 87106 FUNGI IDENTIFICATION YEAST: CPT

## 2023-12-27 PROCEDURE — 80053 COMPREHEN METABOLIC PANEL: CPT | Performed by: HOSPITALIST

## 2023-12-27 RX ORDER — FUROSEMIDE 10 MG/ML
40 INJECTION INTRAMUSCULAR; INTRAVENOUS ONCE
Status: COMPLETED | OUTPATIENT
Start: 2023-12-27 | End: 2023-12-27

## 2023-12-27 RX ORDER — LIDOCAINE HYDROCHLORIDE 20 MG/ML
JELLY TOPICAL ONCE
Status: COMPLETED | OUTPATIENT
Start: 2023-12-27 | End: 2023-12-27

## 2023-12-27 RX ORDER — PROPOFOL 10 MG/ML
0-80 INJECTION, EMULSION INTRAVENOUS
Status: DISCONTINUED | OUTPATIENT
Start: 2023-12-27 | End: 2023-12-28

## 2023-12-27 RX ORDER — MIDAZOLAM HYDROCHLORIDE 2 MG/2ML
1 INJECTION, SOLUTION INTRAMUSCULAR; INTRAVENOUS EVERY 4 HOURS PRN
Status: DISCONTINUED | OUTPATIENT
Start: 2023-12-27 | End: 2023-12-27

## 2023-12-27 RX ADMIN — PANTOPRAZOLE SODIUM 40 MG: 40 INJECTION, POWDER, LYOPHILIZED, FOR SOLUTION INTRAVENOUS at 20:34

## 2023-12-27 RX ADMIN — OSELTAMIVIR PHOSPHATE 30 MG: 6 POWDER, FOR SUSPENSION ORAL at 09:29

## 2023-12-27 RX ADMIN — Medication 150 MCG/HR: at 20:04

## 2023-12-27 RX ADMIN — METHYLPREDNISOLONE SODIUM SUCCINATE 60 MG: 125 INJECTION, POWDER, FOR SOLUTION INTRAMUSCULAR; INTRAVENOUS at 08:25

## 2023-12-27 RX ADMIN — LIDOCAINE HYDROCHLORIDE 11 ML: 20 JELLY TOPICAL at 23:40

## 2023-12-27 RX ADMIN — IPRATROPIUM BROMIDE 2 PUFF: 17 AEROSOL, METERED RESPIRATORY (INHALATION) at 20:11

## 2023-12-27 RX ADMIN — HEPARIN SODIUM 5000 UNITS: 5000 INJECTION, SOLUTION INTRAVENOUS; SUBCUTANEOUS at 17:13

## 2023-12-27 RX ADMIN — PIPERACILLIN AND TAZOBACTAM 4.5 G: 4; .5 INJECTION, POWDER, LYOPHILIZED, FOR SOLUTION INTRAVENOUS; PARENTERAL at 21:38

## 2023-12-27 RX ADMIN — PROPOFOL 16 MCG/KG/MIN: 10 INJECTION, EMULSION INTRAVENOUS at 01:51

## 2023-12-27 RX ADMIN — PIPERACILLIN AND TAZOBACTAM 4.5 G: 4; .5 INJECTION, POWDER, LYOPHILIZED, FOR SOLUTION INTRAVENOUS; PARENTERAL at 16:26

## 2023-12-27 RX ADMIN — GUAIFENESIN 400 MG: 100 SOLUTION ORAL at 06:00

## 2023-12-27 RX ADMIN — PROPOFOL 20 MCG/KG/MIN: 10 INJECTION, EMULSION INTRAVENOUS at 10:00

## 2023-12-27 RX ADMIN — IPRATROPIUM BROMIDE 2 PUFF: 17 AEROSOL, METERED RESPIRATORY (INHALATION) at 08:18

## 2023-12-27 RX ADMIN — Medication 2 MG/HR: at 11:59

## 2023-12-27 RX ADMIN — METHYLPREDNISOLONE SODIUM SUCCINATE 60 MG: 125 INJECTION, POWDER, FOR SOLUTION INTRAMUSCULAR; INTRAVENOUS at 02:00

## 2023-12-27 RX ADMIN — CHLORHEXIDINE GLUCONATE ORAL RINSE 15 ML: 1.2 SOLUTION DENTAL at 09:30

## 2023-12-27 RX ADMIN — PIPERACILLIN AND TAZOBACTAM 4.5 G: 4; .5 INJECTION, POWDER, LYOPHILIZED, FOR SOLUTION INTRAVENOUS; PARENTERAL at 04:20

## 2023-12-27 RX ADMIN — TAMOXIFEN CITRATE 20 MG: 10 TABLET, FILM COATED ORAL at 08:28

## 2023-12-27 RX ADMIN — GUAIFENESIN 400 MG: 100 SOLUTION ORAL at 00:00

## 2023-12-27 RX ADMIN — PIPERACILLIN AND TAZOBACTAM 4.5 G: 4; .5 INJECTION, POWDER, LYOPHILIZED, FOR SOLUTION INTRAVENOUS; PARENTERAL at 10:10

## 2023-12-27 RX ADMIN — IPRATROPIUM BROMIDE 2 PUFF: 17 AEROSOL, METERED RESPIRATORY (INHALATION) at 15:17

## 2023-12-27 RX ADMIN — ATORVASTATIN CALCIUM 10 MG: 10 TABLET, FILM COATED ORAL at 21:38

## 2023-12-27 RX ADMIN — Medication 100 MCG/HR: at 01:14

## 2023-12-27 RX ADMIN — AMIODARONE HYDROCHLORIDE 200 MG: 200 TABLET ORAL at 08:25

## 2023-12-27 RX ADMIN — PANTOPRAZOLE SODIUM 40 MG: 40 INJECTION, POWDER, LYOPHILIZED, FOR SOLUTION INTRAVENOUS at 08:26

## 2023-12-27 RX ADMIN — GUAIFENESIN 400 MG: 100 SOLUTION ORAL at 12:21

## 2023-12-27 RX ADMIN — CHLORHEXIDINE GLUCONATE ORAL RINSE 15 ML: 1.2 SOLUTION DENTAL at 21:38

## 2023-12-27 RX ADMIN — SILVER SULFADIAZINE: 10 CREAM TOPICAL at 20:34

## 2023-12-27 RX ADMIN — METHYLPREDNISOLONE SODIUM SUCCINATE 60 MG: 125 INJECTION, POWDER, FOR SOLUTION INTRAMUSCULAR; INTRAVENOUS at 15:27

## 2023-12-27 RX ADMIN — CHOLECALCIFEROL TAB 25 MCG (1000 UNIT) 1000 UNITS: 25 TAB at 08:25

## 2023-12-27 RX ADMIN — SILVER SULFADIAZINE: 10 CREAM TOPICAL at 08:27

## 2023-12-27 RX ADMIN — Medication 10 MG: at 21:38

## 2023-12-27 RX ADMIN — FUROSEMIDE 40 MG: 10 INJECTION, SOLUTION INTRAMUSCULAR; INTRAVENOUS at 17:08

## 2023-12-27 RX ADMIN — NOREPINEPHRINE BITARTRATE 4 MCG/MIN: 0.02 INJECTION, SOLUTION INTRAVENOUS at 10:00

## 2023-12-27 RX ADMIN — HEPARIN SODIUM 5000 UNITS: 5000 INJECTION, SOLUTION INTRAVENOUS; SUBCUTANEOUS at 06:00

## 2023-12-27 RX ADMIN — GUAIFENESIN 400 MG: 100 SOLUTION ORAL at 23:48

## 2023-12-27 RX ADMIN — GUAIFENESIN 400 MG: 100 SOLUTION ORAL at 17:10

## 2023-12-27 RX ADMIN — MILRINONE LACTATE IN DEXTROSE 0.12 MCG/KG/MIN: 200 INJECTION, SOLUTION INTRAVENOUS at 20:34

## 2023-12-27 RX ADMIN — METHYLPREDNISOLONE SODIUM SUCCINATE 60 MG: 125 INJECTION, POWDER, FOR SOLUTION INTRAMUSCULAR; INTRAVENOUS at 20:34

## 2023-12-27 RX ADMIN — OSELTAMIVIR PHOSPHATE 30 MG: 6 POWDER, FOR SUSPENSION ORAL at 20:34

## 2023-12-27 RX ADMIN — IPRATROPIUM BROMIDE 2 PUFF: 17 AEROSOL, METERED RESPIRATORY (INHALATION) at 11:15

## 2023-12-27 ASSESSMENT — COGNITIVE AND FUNCTIONAL STATUS - GENERAL
MOVING TO AND FROM BED TO CHAIR: 1 - TOTAL
CLIMB 3 TO 5 STEPS WITH RAILING: 1 - TOTAL
STANDING UP FROM CHAIR USING ARMS: 1 - TOTAL
WALKING IN HOSPITAL ROOM: 1 - TOTAL

## 2023-12-27 NOTE — PROCEDURES
Bronchoscopy Report          Date: 12/27/2023    Pre-Procedure Diagnosis: AHRF    Post-Procedure Diagnosis: AHRF    Sedation utilized throughout the procedure: Patient receiving Propofol prior to start of procedure, and received bolus of Fentanyl 100mcg prior to start    Summary of Procedure:  Called emergently by nursing team that patient not getting breaths on ventilator. Had been evaluated within the hour on rounds; stable at that time. All risks and benefits of the procedure were explained in detail to the patient; consent not obtained due to emergency nature of procedure, but family aware at bedside and verbally agreed. The procedure took place in the patient's ICU room. IV access was obtained. O2 received through bagging by RT via ETT. Vital signs were monitored throughout the procedure. A timeout was performed. The bronchoscope was passed through the patient's ETTdown to oral stoma. It was apparent at that time patient was biting down on ETT causing complete obstruction of airway. Bite block placed, and jaw pressure applied which allowed for advancement of bronchoscope to trachea. Bronchoscope was then passed to the leo, which appeared crisp.     A full airway exam of the entire tracheobronchial tree was then performed with excellent visualization. Airway exam revealed normal anatomy and mucosa. There was scant secretions in bilateral mainstem bronchi, as well as RUL; secretions with opaque, cream colored, and frothy    The bronchoscope was then removed. The patient tolerated the procedure well.    Estimated Blood Loss: <5cc    Complications: None    Fluoroscopy Time: None      Impression:    1. Airway obstruction due to biting on ETT      Vince Colbert MD  Pulmonary & Critical Care Medicine  12/27/2023  3:51 PM

## 2023-12-27 NOTE — PROGRESS NOTES
Critical Care/Pulmonary  Daily Progress Note        Subjective    Interval History:    Remains intubated and sedated on propofol and fentanyl  Afebrile  Remains on Levophed at 2 mcg/min      Shortly after rounding was approached by nursing for vent alarm sounding.  Concerns of of mucous plugging prompted emergent bronchoscopy please see procedure note by Dr. Colbert for more information.  During the bronchoscopy it was noted that the patient has been biting down on the ET tube completely occluding the airway.  Ativan drip was added for patient comfort and agitation.      I/O last 3 completed shifts:  In: 2778.5 [I.V.:1418.5; NG/GT:60; IV Piggyback:1300]  Out: 1135 [Urine:1135]  I/O this shift:  In: 360.4 [I.V.:330.4; NG/GT:30]  Out: 250 [Urine:250]    Net 24hr- 332.2ml       Objective       Allergies: Azithromycin, Prednisone, and Lorazepam    CURRENT MEDS  •  acetaminophen, 650 mg, feeding tube, q4h PRN  •  albuterol, 2.5 mg, nebulization, q4h PRN  •  amiodarone, 200 mg, feeding tube, Daily  •  atorvastatin, 10 mg, oral, Nightly  •  atropine, 1 mg, intravenous, Once PRN  •  betamethasone valerate, , Topical, 2x daily PRN  •  calcium gluconate, 1 g, intravenous, q6h PRN  •  cetirizine, 10 mg, feeding tube, Nightly  •  chlorhexidine, 15 mL, Mouth/Throat, 2 times per day  •  cholecalciferol (vitamin D3), 1,000 Units, feeding tube, Daily  •  glucose, 16-32 g of dextrose, oral, PRN **OR** dextrose, 15-30 g of dextrose, oral, PRN **OR** glucagon, 1 mg, intramuscular, PRN **OR** dextrose 50 % in water (D50), 25 mL, intravenous, PRN  •  fentaNYL, 0-200 mcg/hr, intravenous, Titrated **AND** fentaNYL, 25 mcg, intravenous, q5 min PRN  •  [Provider Managed Hold] furosemide, 40 mg, intravenous, q12h GISELLE  •  guaiFENesin, 400 mg, oral, q6h GISELLE  •  heparin (porcine), 5,000 Units, subcutaneous, q12h (6a, 6p)  •  ipratropium HFA, 2 puff, inhalation, QID (8a, 12p, 4p, 8p)  •  magnesium sulfate, 2 g, intravenous, PRN  •   methylPREDNISolone sodium succinate, 60 mg, intravenous, q6h INT  •  metoclopramide, 10 mg, intravenous, q6h PRN  •  [COMPLETED] midazolam, 2 mg, intravenous, Once **AND** midazolam, 0-15 mg/hr, intravenous, Titrated **AND** midazolam, 1 mg, intravenous, q5 min PRN  •  milrinone, 0.125 mcg/kg/min, intravenous, Continuous  •  norepinephrine, 0-90 mcg/min, intravenous, Titrated  •  oseltamivir, 30 mg, oral, BID  •  pantoprazole, 40 mg, intravenous, q12h GISELLE  •  piperacillin-tazobactam, 4.5 g, intravenous, q6h INT  •  potassium chloride, 20 mEq, oral, PRN  •  potassium chloride, 40 mEq, oral, PRN  •  potassium chloride, 20 mEq, intravenous, PRN  •  potassium chloride in water, 20 mEq, intravenous, PRN **AND** potassium chloride in water, 20 mEq, intravenous, PRN  •  propofoL, 0-80 mcg/kg/min, intravenous, Titrated  •  silver sulfadiazine, , Topical, BID  •  tamoxifen, 20 mg, feeding tube, Daily    VITAL SIGNS  Vitals:    12/27/23 1135 12/27/23 1136 12/27/23 1140 12/27/23 1513   BP:    (!) 152/65   BP Location:       Patient Position:       Pulse: 92 88 82 60   Resp: 15 18 18 18   Temp:       TempSrc:       SpO2: 100% 100% 99% 100%   Weight:       Height:           VENTILATOR SETTINGS  Vent Mode: Assist control/volume control  FiO2 (%) (Set):  [40 %-60 %] 40 %  S RR:  [18] 18  S VT:  [450 mL] 450 mL  PEEP/CPAP (Set, cmH2O):  [10 cm H20] 10 cm H20  MAP (Observed, cmH2O):  [15-17] 15    Oxygen:  Oxygen Therapy: Supplemental oxygen  O2 Delivery Method: Endotracheal tube  FiO2 (%) (Set): 40 %  O2 Flow Rate (L/min): 6 L/min     INTAKE/OUTPUT    Intake/Output Summary (Last 24 hours) at 12/27/2023 1532  Last data filed at 12/27/2023 1418  Gross per 24 hour   Intake 1300.22 ml   Output 790 ml   Net 510.22 ml       Lines, Drains, Airways, Wounds:  Peripheral IV (Adult) 12/25/23 Anterior;Right Hand (Active)   Number of days: 2       Peripheral IV (Adult) 12/26/23 Right Antecubital (Active)   Number of days: 1       Peripheral IV  (Adult) 11/26/23 Left;Posterior Forearm (Active)   Number of days: 31       NG/OG Tube 12/25/23 Left nostril (Active)   Number of days: 2       Urethral Catheter Temperature probe 16 Fr (Active)   Number of days: 3       ETT  (Active)   Number of days: 1       Wound Other (comment) Right Pretibial (Active)   Number of days: 3       Wound Venous Ulcer Left Pretibial (Active)   Number of days: 3       Wound Perineum (Active)   Number of days: 3       Catheterization Site - Arterial Right Femoral 6 Fr. (Active)   Number of days: 2       Catheterization Site - Venous Right Femoral 6 Fr. (Active)   Number of days: 2         Physical Exam:  Physical Exam     Labs:  See Labs Below:  ABG  Results from last 7 days   Lab Units 12/26/23  1609 12/26/23  1435 12/26/23  1350 12/24/23  2332 12/24/23 2012 12/24/23  1753 12/24/23  1753 12/24/23  1647   PH ART pH 7.26* 7.17* 7.15*   < > 7.27*   < > 7.27*  --    PCO2 ART mm Hg 59* 66* 73*   < > 46   < > 48  --    PO2 ART mm Hg 79* 84 69*   < > 100   < > 151*  --    HCO3 ART mEQ/L 24 21 22   < > 20.3*   < > 21.0  --    O2 SAT ART % 93 93 89*   < >  --   --   --   --    BASE EXC ART mEQ/L -1.1 -5.2 -4.2   < > -5.9   < > -5.1  --    SOURCE OF OXYGEN   --   --   --   --  Bipap  --  bipap -    < > = values in this interval not displayed.     CBC  Results from last 7 days   Lab Units 12/27/23  0519 12/26/23  0347 12/25/23  0934   WBC K/uL 5.31 8.09 13.95*   RBC M/uL 2.58* 2.75* 2.76*   HEMOGLOBIN g/dL 8.8* 9.4* 9.5*   HEMATOCRIT % 27.8* 29.0* 30.4*   MCV fL 107.8* 105.5* 110.1*   MCH pg 34.1* 34.2* 34.4*   MCHC g/dL 31.7* 32.4 31.3*   PLATELETS K/uL 94* 112* 133*   RDW % 16.0* 15.6* 15.6*   MPV fL 10.1 9.7 9.9     BMP  Results from last 7 days   Lab Units 12/27/23  0519 12/26/23  0347 12/25/23  1023   SODIUM mEQ/L 149* 148* 148*   POTASSIUM mEQ/L 3.8 3.5 3.8  3.8   CHLORIDE mEQ/L 117* 114* 107   CO2 mEQ/L 23 26 25   BUN mg/dL 28* 21 20   CREATININE mg/dL 1.9* 1.6* 1.5*   CALCIUM mg/dL  7.5* 7.3* 7.6*   ALBUMIN g/dL 2.5* 2.6* 3.0*   BILIRUBIN TOTAL mg/dL 0.9 0.9 0.7   ALK PHOS IU/L 49 53 66   ALT IU/L 516* 268* 64*   AST IU/L 1,028* 711* 130*   GLUCOSE mg/dL 138* 117* 177*     Coag  Results from last 7 days   Lab Units 12/25/23  0934 12/25/23  0410 12/24/23  1647   PROTIME sec  --  17.4* 21.2*   INR   --  1.4 1.9   PTT sec 26 33  --        Imaging:   X-RAY CHEST 1 VIEW    Result Date: 12/27/2023  IMPRESSION:  Reduced lung volumes. No acute cardiopulmonary process. Stable cardiac enlargement. COMPARISON: Portable chest studies 12/25 and 12/26/2023. COMMENT:  Upright portable imaging completed 0548 hours. Endotracheal tube 4.7 cm above. Enteric tube follows a normal course into left upper quadrant, directed to the left. Mild stable cardiac enlargement. Mitral annular prosthesis again noted. Stable hilar and mediastinal contours. Reduced lung volumes. No definite consolidation or pleural fluid. Unremarkable upper abdomen.    X-RAY CHEST 1 VIEW    Result Date: 12/26/2023  IMPRESSION: Endotracheal tube has been repositioned; the tip is now approximately 3.3 cm above the leo. COMMENT: A semierect AP portable view the chest was performed at 1615 and is compared to a prior study from 1503. Since the prior study, the endotracheal tube has been repositioned and the tip is now approximately 3.3 cm above the leo. There has been no other significant interval change.    X-RAY CHEST 1 VIEW    Result Date: 12/26/2023  IMPRESSION: ET tube 2 cm above leo. NG tube tip in proximal stomach. Probable right lower lobe atelectasis; attention on follow-up.    X-RAY CHEST 1 VIEW    Result Date: 12/26/2023  IMPRESSION: Status post extubation. Slightly improved vascular congestion since earlier in the day. Suspect developing atelectasis or airspace disease in the right midlung zone. COMMENT: Semierect AP portable view of the chest was performed at 1428 and is compared to a prior study from 5:27 AM. In the interval,  the endotracheal tube has been removed. An enteric tube remains in place with the tip in the stomach. There is a vascular catheter/line with the tip projecting near the junction of the IVC and right atrium; it is difficult to determine whether this was present previously but differences in technique. Mild cardiomegaly is again noted. Cardiac valvular prosthesis is again seen. The pulmonary vasculature and interstitial markings have improved slightly since earlier in the day. There is questionable developing airspace disease or atelectasis in the right midlung zone. Trace bilateral pleural effusions are suspected. The hilar and mediastinal structures appear stable. Surgical clips are again seen in the right axilla.    X-RAY CHEST 1 VIEW    Result Date: 12/26/2023  IMPRESSION: ET tube 4 cm above leo, NG tube tip not well seen, likely in proximal stomach. Stable cardiomegaly, mitral valve replacement. Clear lungs, with interstitial crowding secondary to low lung volumes.    X-RAY CHEST 1 VIEW    Result Date: 12/26/2023  IMPRESSION: Improved pulmonary vascular congestion. ET tube 3 cm above leo, NG tube tip below inferior edge of film.    X-RAY CHEST 1 VIEW    Result Date: 12/25/2023  IMPRESSION: Interval retraction of the endotracheal tube now in satisfactory position, as below. Stable satisfactory positioning of the enteric tube. Progressive pulmonary interstitial edema with stable enlargement of the cardiac silhouette. No pneumothorax. COMMENT: Comparison: Chest x-ray 12/24/2023. Technique: A single frontal AP portable upright projection of the chest was obtained. FINDINGS: There has been interval retraction of the endotracheal tube now terminating 2.7 cm above the leo. An enteric tube courses to the stomach with the distal tip collimated from the field-of-view beneath the left hemidiaphragm in satisfactory position. There are defibrillator pad projecting over the left hemithorax. There are progressively  increased interstitial opacities seen projecting over both lungs. There is no pleural effusion or pneumothorax. The cardiac silhouette is stably enlarged. The mediastinal contours are unchanged. Again noted is a prosthetic mitral valve. The upper abdomen is unremarkable. There is no acute osseous abnormality. Surgical clips overlie the right axillary region.     X-RAY CHEST 1 VIEW    Result Date: 12/25/2023  IMPRESSION: Satisfactory positioning of the endotracheal and enteric tubes. No pneumothorax. Stable pulmonary edema and enlargement of the cardiac silhouette. COMMENT: Comparison: Chest x-ray 12/25/2023. Technique: A single frontal AP portable upright projection of the chest was obtained. FINDINGS: The tip of an endotracheal tube terminates 4.0 cm above the leo. An enteric tube courses to the stomach with the distal tip collimated from the field-of-view beneath the left hemidiaphragm in satisfactory position. There are defibrillator pad projecting over the left hemithorax. There are mildly increased interstitial opacities again seen projecting over both lungs. No pleural effusion or pneumothorax is seen. There is stable enlargement of the cardiac silhouette. Again noted is a prosthetic mitral valve. The mediastinal contours are unchanged. The upper abdomen is unremarkable. There is no acute osseous abnormality. There are surgical clips overlying the right axillary region.     X-RAY CHEST 1 VIEW    Result Date: 12/25/2023  IMPRESSION: Low-lying endotracheal tube terminating over the proximal right mainstem bronchus. Recommend retraction. Satisfactory positioning of the enteric tube. This finding and recommendation was discussed with nurse Shoemaker at 11:27 AM on 12/25/2023 by Dr. Martinez by telephone. Mild pulmonary interstitial edema and stable enlargement of the cardiac silhouette. No pneumothorax. COMMENT: Comparison: Chest x-ray 12/24/2023. Technique: A single frontal AP portable upright projection of the  chest was obtained. FINDINGS: The tip of the intervally placed endotracheal tube terminates over the proximal right mainstem bronchus. The tip of the enteric tube terminates over the left upper quadrant of the abdomen. There are mildly increased interstitial opacities noted within both lungs. There is no pleural effusion or pneumothorax. There is stable enlargement of the cardiac silhouette. Again noted is a mitral valve prosthesis. Surgical clips overlie the right axilla. The upper abdomen is unremarkable. There is no acute osseous abnormality.    X-RAY CHEST 1 VIEW    Result Date: 12/25/2023  IMPRESSION: Vascular congestion.  Left basal atelectasis. COMMENT: AP radiograph the chest is compared to previous study dated 8/17/2023. There is vascular congestion and small effusions.  Findings may represent mild congestive heart failure.  Cardiac silhouette is unchanged.  Prosthetic valve ring seen.  Surgical staples seen in the right axilla.  There is minimal left basal atelectasis.    CT ANGIOGRAPHY CHEST PULMONARY EMBOLISM WITH IV CONTRAST    Result Date: 12/24/2023  IMPRESSION: 1. No pulmonary embolism in the main or proximal segmental pulmonary arteries. 2. Right upper lobe and left lower lobe infiltrate with patchy airspace opacities in the right middle lobe.      Micro:   Microbiology Results     Procedure Component Value Units Date/Time    Sputum culture / smear Expectorated Sputum [[id number]] Collected: 12/25/23 0418    Specimen: Aspirate from Expectorated Sputum Updated: 12/27/23 0845    Narrative:      The following orders were created for panel order Sputum culture / smear Expectorated Sputum.  Procedure                               Abnormality         Status                     ---------                               -----------         ------                     Sputum Gram Stain (Lab O...[[id number]]                      Final result               Sputum Culture (Lab Only...[[id number]]  Normal               Final result                 Please view results for these tests on the individual orders.    Sputum Gram Stain (Lab Only) Expectorated Sputum [[id number]] Collected: 12/25/23 0418    Specimen: Aspirate from Expectorated Sputum Updated: 12/25/23 0956     Gram Stain Result 3+ WBC      No Epithelial Cells Seen      3+ Gram positive bacilli      2+ Gram positive cocci in pairs, chains and clusters    Sputum Culture (Lab Only) Expectorated Sputum [[id number]]  (Normal) Collected: 12/25/23 0418    Specimen: Aspirate from Expectorated Sputum Updated: 12/27/23 0845     Culture Normal [place]    MRSA Screen, Nares Only Nose [[id number]]  (Normal) Collected: 12/25/23 0352    Specimen: Nasal Swab from Nose Updated: 12/25/23 0906     MRSA DNA, Nares Negative    Blood Culture Blood, Venous [[id number]]  (Normal) Collected: 12/24/23 1652    Specimen: Blood, Venous Updated: 12/27/23 0000     Culture No growth at 48 hours    SARS-CoV-2 (COVID-19) Nasopharynx [[id number]]  (Abnormal) Collected: 12/24/23 1649    Specimen: Nasopharyngeal Swab from Nasopharynx Updated: 12/24/23 1736    Narrative:      The following orders were created for panel order SARS-CoV-2 (COVID-19) Nasopharynx.  Procedure                               Abnormality         Status                     ---------                               -----------         ------                     SARS-COV-2 (COVID-19)/ F...[[id number]]  Abnormal            Final result                 Please view results for these tests on the individual orders.    SARS-COV-2 (COVID-19)/ FLU A/B, AND RSV, PCR Nasopharynx [[id number]]  (Abnormal) Collected: 12/24/23 1649    Specimen: Nasopharyngeal Swab from Nasopharynx Updated: 12/24/23 1736     SARS-CoV-2 (COVID-19) Negative     Influenza A Positive     Influenza B Negative     Respiratory Syncytial Virus Negative    Narrative:      Testing performed using real-time PCR for detection of COVID-19. EUA approved validation studies performed on site.      Blood Culture Blood, Venous [[id number]]  (Normal) Collected: 12/24/23 1647    Specimen: Blood, Venous Updated: 12/27/23 0100     Culture No growth at 48 hours          ECG/Telemetry  I have independently reviewed the telemetry. No events for the last 24 hours.         ASSESSMENT    73 y.o. male with history atrial fibrillation, CHF, prostate and breast CA, history of GI bleed who presented to the emergency room with shortness of breath and found to be influenza A positive. During day 2 of his hospital stay he had a v fib arrest with ROSC. He was transferred to the ICU for further monitoring     PLAN   #Acute Hypoxic respiratory failure  -Failed Bipap on 12/24 in ED and was subsequently intubated   -12/26 was successfully able to tolerate Pressure support  -ABG 7.36/44/308/25  -Trial of extubation was done however shortly after he became hypoxic requiring bag mask ventilation and was placed on bipap-- ? Upper airway edema vs acute bronchospasm vs flash pulm edema  -40 mg lasix given  -Chest XR 12/26 with hazy opacities in right lower lung, probable atelectasis  -Decadron 10mg given then 6mg q 6 of note family reported that his allergic reaction to prednisone consisted of lower extremity edema they stated there was never any angio edema or anaphylaxis reaction in this case the benefits have outweighed the risks  -Repeat ABG post extubation 7.15/73/69/22  -Continue serial ABGs   Anesthesia called- pt reintubated- they noted some swelling around cords  -Vent alarming today concern for mucous plugging  -Emergent bronchoscopy done which showed that he was biting down on ET tube  -Ativan drip added      #Shock  -Continue Levophed and milrinone  -Vasopressors to maintain a MAP above 65      #Takotsubo Cardiomyopathy  -TTE 12/26 suggestive of Takotsubo   -Confirmed by LV gram   -Once off pressure support and extubated start losartan 25mg daily and low dose carvedilol  -Cardiology following     #Pneumonia  -CT Chest 12/24  with left lower lobe infiltrate and right upper lobe infiltrate  -Blood cultures with no growth  -Sputum cultures pending  -Continue on Pip-tazo     #V Fib Arrest 12/25  - ROSC achieved   -Initially was started on TTM however was able to follow commands later in the day TTM was dc'd  -Taken by interventional cardiology for the placement of a temporary pacemaker  -Temporary pacemaker rate changed to 60 by cardiology   -Temporary pacemaker was reported to be out of place by nursing this morning, however was still functioning. The pacer became dyssynchronous to the patients intrinsic rate, due to the malfunction on call cardiology was called who came and removed the temporary pacer.     #Afib  -Continue on amiodarone     #Influenza A  -Continue Oseltamivir     VTE Prophylaxis Plan: SCDs SQH  GI Prophylaxis Plan: Protonix  Lines/Drains/Airway: PIV   Fluids/Electrolytes/Nutrition: NPO     Disposition Planning: Remain in the ICU    CODE STATUS  Full Code       The case was reviewed this morning at the patient's beside multidisciplinary rounds with the patient's nurse, dietician, pharmacist, respiratory therapist, physical therapist and critical care nurse coordinator. The patient's clinical status with regards to diagnosis, treatment plans including disposition of any IV, arterial lines, Lloyd and tubes were discussed, as well as dietary, respiratory therapy and mobilization needs.     This case was discussed with consultants.    Total critical time spent managing 50 minutes.    This note was scribed by Ishaan Juares PA-C in my presence on my behalf.       Vince Colbert MD  12/27/2023  3:32 PM

## 2023-12-27 NOTE — PROGRESS NOTES
Infectious Disease Progress Note    Patient Name: Cam Rosas  MR#: [id number]  : 1950  Admission Date: 2023  Date: 23   Time: 11:12 AM   Author: Adam Tucker MD    Major Events: none    Antibiotics:    Anti-infectives (From admission, onward)    Start     Dose/Rate Route Frequency Ordered Stop    23 09  silver sulfadiazine (SILVADENE) 1 % cream          Topical 2 times daily 23 0344 10/30/24 0859    23 09  oseltamivir (TAMIFLU) 6 mg/mL suspension 30 mg         30 mg oral 2 times daily 23 0802 23 0859    23 041  piperacillin-tazobactam (ZOSYN) 4.5 g/100 mL IVPB in NSS         4.5 g  200 mL/hr over 30 Minutes intravenous Every 6 hours interval 23 0317            Subjective     Review of Systems  Remains intubated and is afebrile without acute events overnight. Remainder of 12 ROS is unchanged.     Objective     Vital Signs:    Patient Vitals for the past 72 hrs:   BP Temp Temp src Pulse Resp SpO2 Height Weight   23 0819 -- -- -- 72 18 97 % -- --   23 0600 (!) 125/58 -- -- 70 18 96 % -- --   23 0530 -- -- -- (!) 117 18 97 % -- --   23 0500 -- -- -- 60 18 99 % -- --   23 0432 -- -- -- (!) 111 19 100 % -- --   23 0430 (!) 166/90 -- -- 61 18 99 % -- --   23 0400 134/60 -- Bladder 69 18 99 % -- --   23 0330 -- -- -- 60 18 100 % -- --   23 0300 -- -- -- 61 18 100 % -- --   23 0230 -- -- -- 62 18 100 % -- --   23 0200 129/68 -- -- 61 18 99 % -- --   23 -- -- -- 62 18 100 % -- --   23 -- -- -- 71 13 100 % -- --   23 -- -- -- 62 18 100 % -- --   23 -- -- -- 66 18 100 % -- --   23 -- -- -- 63 18 100 % -- --   23 -- -- -- 66 18 100 % -- --   23 0000 (!) 115/56 -- Bladder 66 18 100 % -- --   23 -- -- -- 65 18 100 % -- --   23 -- -- -- 67 18 100 % -- --   23 -- -- -- 68 18 100 % -- --  "  12/26/23 2230 -- -- -- 70 16 100 % -- --   12/26/23 2200 (!) 112/53 -- -- 72 18 100 % -- --   12/26/23 2130 -- -- -- 73 18 99 % -- --   12/26/23 2100 -- -- -- 77 18 99 % -- --   12/26/23 2045 -- -- -- 76 18 98 % -- --   12/26/23 2030 -- -- -- 75 18 98 % -- --   12/26/23 2015 -- -- -- 75 18 98 % -- --   12/26/23 2000 (!) 117/56 -- Bladder 76 18 98 % -- --   12/26/23 1949 -- -- -- 79 18 98 % -- --   12/26/23 1931 -- -- -- 86 18 98 % -- --   12/26/23 1930 -- -- -- 85 18 97 % -- --   12/26/23 1900 -- -- -- 79 18 98 % -- --   12/26/23 1800 (!) 103/54 -- -- 78 (!) 10 97 % -- --   12/26/23 1700 -- -- -- 83 (!) 26 95 % -- --   12/26/23 1600 (!) 121/55 -- -- 82 (!) 23 98 % -- --   12/26/23 1506 -- -- -- 84 (!) 26 100 % -- --   12/26/23 1500 -- -- -- 86 (!) 26 100 % -- --   12/26/23 1400 (!) 104/52 -- -- 83 (!) 24 94 % -- --   12/26/23 1303 -- -- -- 81 18 94 % -- --   12/26/23 1300 -- -- -- 82 17 96 % -- --   12/26/23 1200 -- -- -- 80 19 97 % -- --   12/26/23 1100 -- -- -- 78 19 97 % -- --   12/26/23 1000 -- -- -- 78 18 97 % -- --   12/26/23 0946 (!) 144/60 -- -- -- -- -- -- --   12/26/23 0900 (!) 117/55 -- -- 79 (!) 22 98 % -- --   12/26/23 0823 (!) 144/60 -- -- 76 (!) 33 98 % -- --   12/26/23 0800 (!) 152/62 -- -- 72 18 97 % -- --   12/26/23 0737 (!) 144/60 -- -- -- -- -- 1.651 m (5' 5\") 80.3 kg (177 lb)   12/26/23 0700 135/73 -- -- 73 (!) 24 96 % -- --   12/26/23 0630 121/66 -- -- 72 (!) 26 96 % -- --   12/26/23 0600 109/85 -- -- 71 (!) 29 96 % -- --   12/26/23 0530 138/67 -- -- 69 (!) 27 96 % -- --   12/26/23 0500 134/62 -- -- 69 (!) 24 96 % -- --   12/26/23 0430 120/63 -- -- 69 (!) 24 96 % -- --   12/26/23 0400 (!) 112/55 -- Bladder 70 (!) 24 96 % -- --   12/26/23 0330 116/61 -- -- 78 (!) 26 97 % -- --   12/26/23 0300 101/60 -- -- 70 (!) 24 96 % -- --   12/26/23 0230 (!) 95/54 -- -- 69 (!) 25 97 % -- --   12/26/23 0200 (!) 115/56 -- -- 70 (!) 26 96 % -- --   12/26/23 0130 (!) 115/57 -- -- 69 (!) 26 97 % -- -- "   12/26/23 0100 (!) 103/55 -- -- 69 (!) 24 97 % -- --   12/26/23 0030 (!) 100/56 -- -- 69 (!) 25 97 % -- --   12/26/23 0000 110/60 -- Bladder 69 (!) 24 96 % -- --   12/25/23 2333 (!) 100/58 -- -- 69 (!) 22 97 % -- --   12/25/23 2330 (!) 100/58 -- -- 69 (!) 24 97 % -- --   12/25/23 2315 (!) 103/59 -- -- 69 (!) 24 97 % -- --   12/25/23 2310 119/61 -- -- 69 (!) 24 96 % -- --   12/25/23 2304 -- -- -- 87 (!) 26 97 % -- --   12/25/23 2301 -- -- -- 69 (!) 24 97 % -- --   12/25/23 2300 (!) 74/46 -- -- 69 (!) 24 97 % -- --   12/25/23 2200 (!) 114/58 -- -- 69 (!) 24 97 % -- --   12/25/23 2100 109/81 -- -- 69 (!) 24 96 % -- --   12/25/23 2025 -- -- -- 69 (!) 22 97 % -- --   12/25/23 2000 119/67 -- Bladder 69 (!) 24 96 % -- --   12/25/23 1915 116/69 -- -- 69 (!) 28 97 % -- --   12/25/23 1900 106/61 -- -- 69 (!) 24 98 % -- --   12/25/23 1845 (!) 107/59 -- -- 69 (!) 24 98 % -- --   12/25/23 1830 (!) 105/55 -- -- 69 (!) 24 98 % -- --   12/25/23 1815 (!) 108/59 -- -- 69 (!) 24 97 % -- --   12/25/23 1800 (!) 105/59 -- -- 69 (!) 24 98 % -- --   12/25/23 1745 (!) 114/58 -- -- 69 (!) 24 97 % -- --   12/25/23 1741 -- -- -- 69 (!) 24 97 % -- --   12/25/23 1730 (!) 119/58 -- -- 69 (!) 25 96 % -- --   12/25/23 1715 (!) 128/58 -- -- 69 (!) 24 97 % -- --   12/25/23 1700 (!) 118/56 -- -- 69 15 96 % -- --   12/25/23 1645 (!) 109/59 -- -- 69 (!) 22 96 % -- --   12/25/23 1630 119/62 -- -- 69 (!) 24 97 % -- --   12/25/23 1615 (!) 117/58 -- -- 69 (!) 24 97 % -- --   12/25/23 1600 (!) 117/58 -- -- 69 (!) 24 97 % -- --   12/25/23 1545 (!) 118/59 -- -- 69 (!) 24 96 % -- --   12/25/23 1530 (!) 123/59 -- -- 69 (!) 24 95 % -- --   12/25/23 1515 126/76 -- -- 69 (!) 23 94 % -- --   12/25/23 1500 127/71 -- -- 69 19 98 % -- --   12/25/23 1456 -- -- -- 69 14 95 % -- --   12/25/23 1450 -- -- -- 69 12 98 % -- --   12/25/23 1445 116/70 -- -- 69 14 98 % -- --   12/25/23 1442 -- -- -- 69 13 98 % -- --   12/25/23 1440 -- -- -- 69 13 98 % -- --   12/25/23 1435 (!)  114/59 -- -- 69 14 98 % -- --   12/25/23 1430 -- -- -- 69 14 98 % -- --   12/25/23 1425 (!) 102/59 -- -- 69 16 98 % -- --   12/25/23 1420 -- -- -- 68 18 98 % -- --   12/25/23 1225 -- -- -- 60 20 98 % -- --   12/25/23 1220 -- -- -- (!) 59 15 98 % -- --   12/25/23 1215 -- -- -- 60 18 98 % -- --   12/25/23 1210 -- -- -- 73 (!) 25 98 % -- --   12/25/23 1200 136/86 -- -- (!) 59 (!) 21 98 % -- --   12/25/23 1145 130/74 -- -- (!) 59 (!) 25 98 % -- --   12/25/23 1140 -- -- -- (!) 40 (!) 25 99 % -- --   12/25/23 1130 115/63 -- -- (!) 42 (!) 24 100 % -- --   12/25/23 1102 (!) 116/56 -- -- (!) 45 (!) 25 100 % -- --   12/25/23 1100 (!) 116/56 -- -- (!) 45 (!) 25 100 % -- --   12/25/23 1055 -- -- -- (!) 46 (!) 24 100 % -- --   12/25/23 1050 -- -- -- (!) 47 (!) 24 100 % -- --   12/25/23 1045 (!) 122/59 -- -- (!) 47 (!) 24 100 % -- --   12/25/23 1040 -- -- -- (!) 47 (!) 24 100 % -- --   12/25/23 1030 (!) 123/58 -- -- (!) 48 (!) 27 100 % -- --   12/25/23 1015 (!) 117/57 -- -- (!) 50 (!) 24 100 % -- --   12/25/23 1010 (!) 119/58 -- -- (!) 50 (!) 21 100 % -- --   12/25/23 1005 (!) 115/56 -- -- (!) 51 (!) 24 99 % -- --   12/25/23 1000 (!) 119/56 -- -- (!) 52 (!) 24 98 % -- --   12/25/23 0955 (!) 121/59 -- -- (!) 54 (!) 22 100 % -- --   12/25/23 0950 118/63 -- -- (!) 55 (!) 28 100 % -- --   12/25/23 0945 (!) 108/55 -- -- 80 (!) 31 99 % -- --   12/25/23 0940 120/67 -- -- 90 (!) 28 100 % -- --   12/25/23 0935 102/64 -- -- (!) 104 (!) 25 100 % -- --   12/25/23 0930 (!) 112/59 -- -- (!) 106 (!) 29 100 % -- --   12/25/23 0925 108/70 -- -- (!) 107 (!) 24 (!) 46 % -- --   12/25/23 0920 (!) 114/56 -- -- 67 (!) 25 (!) 74 % -- --   12/25/23 0915 (!) 126/57 -- -- 87 (!) 35 (!) 82 % -- --   12/25/23 0905 (!) 175/139 -- -- 87 (!) 31 (!) 74 % -- --   12/25/23 0900 -- -- -- (!) 171 (!) 25 (!) 80 % -- --   12/25/23 0855 (!) 70/51 -- -- (!) 113 (!) 92 (!) 74 % -- --   12/25/23 0850 -- -- -- 78 (!) 25 97 % -- --   12/25/23 0845 (!) 106/53 -- -- 73 18 94  % -- --   12/25/23 0830 (!) 79/50 -- -- 62 (!) 27 95 % -- --   12/25/23 0815 (!) 81/49 -- -- 64 (!) 28 95 % -- --   12/25/23 0800 (!) 81/53 -- -- 62 (!) 24 95 % -- --   12/25/23 0715 (!) 88/54 -- -- 63 (!) 24 95 % -- --   12/25/23 0700 (!) 92/54 -- -- 64 (!) 24 95 % -- --   12/25/23 0645 (!) 92/52 -- -- 66 19 95 % -- --   12/25/23 0630 (!) 84/52 -- -- 64 (!) 22 95 % -- --   12/25/23 0615 (!) 92/51 -- -- 65 (!) 25 94 % -- --   12/25/23 0600 (!) 88/50 -- -- 68 (!) 26 94 % -- 80.3 kg (177 lb 0.5 oz)   12/25/23 0530 101/60 -- -- 71 (!) 25 93 % -- --   12/25/23 0434 -- (!) 38.6 °C (101.5 °F) -- -- -- -- -- --   12/25/23 0400 131/69 -- -- 75 (!) 24 94 % -- --   12/25/23 0345 -- -- -- -- -- -- -- 82.9 kg (182 lb 12.2 oz)   12/25/23 0330 137/65 -- -- 75 (!) 24 92 % -- --   12/25/23 0300 (!) 154/71 -- -- 73 (!) 24 92 % -- --   12/25/23 0245 (!) 150/67 -- -- 72 (!) 26 (!) 91 % -- --   12/25/23 0230 (!) 159/68 -- -- 72 (!) 27 93 % -- --   12/25/23 0200 138/80 -- -- 69 (!) 24 93 % -- --   12/25/23 0130 138/77 -- -- 69 (!) 24 93 % -- --   12/25/23 0115 136/69 -- -- 68 (!) 24 93 % -- --   12/25/23 0100 140/71 -- -- 68 (!) 24 92 % -- --   12/25/23 0030 (!) 144/65 -- -- 70 (!) 24 92 % -- --   12/25/23 0000 (!) 143/73 -- -- 72 (!) 24 (!) 91 % -- --   12/24/23 2345 -- -- -- 72 (!) 23 94 % -- --   12/24/23 2339 -- -- -- 72 -- 95 % -- --   12/24/23 2333 (!) 142/61 -- -- 74 (!) 24 95 % -- --   12/24/23 2315 (!) 101/47 -- -- (!) 37 (!) 24 (!) 69 % -- --   12/24/23 2306 138/76 -- -- 77 (!) 24 99 % -- --   12/24/23 2300 (!) 109/58 -- -- (!) 36 18 100 % -- --   12/24/23 2245 97/71 -- -- 64 19 98 % -- --   12/24/23 2230 (!) 90/49 -- -- (!) 39 19 98 % -- --   12/24/23 2215 (!) 157/55 -- -- 82 (!) 21 98 % -- --   12/24/23 2200 119/86 -- -- 73 (!) 25 99 % -- --   12/24/23 2145 114/69 -- -- (!) 38 (!) 31 95 % -- --   12/24/23 2135 126/72 -- -- 68 (!) 36 100 % -- --   12/24/23 2130 126/72 -- -- 72 (!) 28 (!) 88 % -- --   12/24/23 2015 118/70 --  -- 70 (!) 28 97 % -- --   23 1915 (!) 145/92 -- -- 72 (!) 27 95 % -- --   23 1745 -- -- -- -- -- -- -- 81.6 kg (180 lb)   23 1743 (!) 139/95 -- -- 68 (!) 31 94 % -- --   23 1643 -- -- -- -- -- 100 % -- --   23 1640 -- -- -- -- -- (!) 63 % -- --   23 1639 -- -- -- -- -- (!) 83 % -- --   23 1634 (!) 142/86 (!) 35.3 °C (95.6 °F) Temporal 70 (!) 30 (!) 82 % -- --       Temp (72hrs), Av °C (98.6 °F), Min:35.3 °C (95.6 °F), Max:38.6 °C (101.5 °F)      Physical Exam:    General appearance: intubated  Head: ETT in place  Eyes: anicteric sclera  Lungs: ventilated respirations b/l  Heart: regular rate and rhythm  Abdomen: soft, non-tender  Extremities: edema none  Joints: no swelling  Skin: no rashes  Neurologic: sedated    Lines, Drains, Airways, Wounds:  Peripheral IV (Adult) 23 Anterior;Right Hand (Active)   Number of days: 2       Peripheral IV (Adult) 23 Right Antecubital (Active)   Number of days: 1       Peripheral IV (Adult) 23 Left;Posterior Forearm (Active)   Number of days: 31       NG/OG Tube 23 Left nostril (Active)   Number of days: 2       Urethral Catheter Temperature probe 16 Fr (Active)   Number of days: 3       ETT  (Active)   Number of days: 1       Wound Other (comment) Right Pretibial (Active)   Number of days: 3       Wound Venous Ulcer Left Pretibial (Active)   Number of days: 3       Wound Perineum (Active)   Number of days: 3       Catheterization Site - Arterial Right Femoral 6 Fr. (Active)   Number of days: 2       Catheterization Site - Venous Right Femoral 6 Fr. (Active)   Number of days: 2       Labs:    CBC Results       23     0519 0347 0934    WBC 5.31 8.09 13.95    RBC 2.58 2.75 2.76    HGB 8.8 9.4 9.5    HCT 27.8 29.0 30.4    .8 105.5 110.1    MCH 34.1 34.2 34.4    MCHC 31.7 32.4 31.3    PLT 94 112 133         Comment for PLT at 0519 on 23: RESULTS CHECKED SPECIMEN QUALITY CHECKED       BMP Results       12/27/23 12/26/23 12/25/23     0519 0347 1023     148 148    K 3.8 3.5 3.8       3.8    Cl 117 114 107    CO2 23 26 25    Glucose 138 117 177    BUN 28 21 20    Creatinine 1.9 1.6 1.5    Calcium 7.5 7.3 7.6    Anion Gap 9 8 16    EGFR 36.8 45.2 48.9         Comment for EGFR at 0519 on 12/27/23: Calculation based on the Chronic Kidney Disease Epidemiology Collaboration (CKD-EPI) equation refit without adjustment for race.    Comment for EGFR at 0347 on 12/26/23: Calculation based on the Chronic Kidney Disease Epidemiology Collaboration (CKD-EPI) equation refit without adjustment for race.    Comment for EGFR at 1023 on 12/25/23: Calculation based on the Chronic Kidney Disease Epidemiology Collaboration (CKD-EPI) equation refit without adjustment for race.      PT/PTT Results       12/25/23 12/25/23 12/24/23     0934 0410 1647    PT -- 17.4 21.2    INR -- 1.4 1.9    PTT 26 33 --         Comment for INR at 0410 on 12/25/23: Moderate Intensity Anticoagulation = 2.0 to 3.0, High Intensity = 2.5 to 3.5    Comment for INR at 1647 on 12/24/23: Moderate Intensity Anticoagulation = 2.0 to 3.0, High Intensity = 2.5 to 3.5      UA Results       12/25/23     0414    Color Yellow    Clarity Clear    Glucose Negative    Bilirubin Negative    Ketones Negative    Sp Grav >1.035    Blood Trace    Ph 5.5    Protein Trace    Urobilinogen 0.2    Nitrite Negative    Leuk Est Negative    WBC 0 TO 3    RBC 0 TO 4    Bacteria Rare         Comment for Blood at 0414 on 12/25/23: The sensitivity of the occult blood test is equivalent to approximately 4 intact RBC/HPF.    Comment for Protein at 0414 on 12/25/23: Trace False Positive Protein can be seen with alkaline or highly buffered urines or urine with high specific gravity. Suggest clinical correlation.    Comment for Leuk Est at 0414 on 12/25/23: Results can be falsely negative due to high specific gravity, some antibiotics, glucose >3 g/dl, or WBC other than  neutrophils.      Lactate Results       12/25/23 12/25/23 12/24/23 2011 1729 2012    Lactate 2.4 3.1 3.8          Microbiology Results     Procedure Component Value Units Date/Time    Sputum culture / smear Expectorated Sputum [[id number]] Collected: 12/25/23 0418    Specimen: Aspirate from Expectorated Sputum Updated: 12/27/23 0845    Narrative:      The following orders were created for panel order Sputum culture / smear Expectorated Sputum.  Procedure                               Abnormality         Status                     ---------                               -----------         ------                     Sputum Gram Stain (Lab O...[[id number]]                      Final result               Sputum Culture (Lab Only...[[id number]]  Normal              Final result                 Please view results for these tests on the individual orders.    Sputum Gram Stain (Lab Only) Expectorated Sputum [[id number]] Collected: 12/25/23 0418    Specimen: Aspirate from Expectorated Sputum Updated: 12/25/23 0956     Gram Stain Result 3+ WBC      No Epithelial Cells Seen      3+ Gram positive bacilli      2+ Gram positive cocci in pairs, chains and clusters    Sputum Culture (Lab Only) Expectorated Sputum [[id number]]  (Normal) Collected: 12/25/23 0418    Specimen: Aspirate from Expectorated Sputum Updated: 12/27/23 0845     Culture Normal [place]    MRSA Screen, Nares Only Nose [[id number]]  (Normal) Collected: 12/25/23 0352    Specimen: Nasal Swab from Nose Updated: 12/25/23 0906     MRSA DNA, Nares Negative    Blood Culture Blood, Venous [[id number]]  (Normal) Collected: 12/24/23 1652    Specimen: Blood, Venous Updated: 12/27/23 0000     Culture No growth at 48 hours    SARS-CoV-2 (COVID-19) Nasopharynx [[id number]]  (Abnormal) Collected: 12/24/23 1649    Specimen: Nasopharyngeal Swab from Nasopharynx Updated: 12/24/23 1736    Narrative:      The following orders were created for panel order SARS-CoV-2 (COVID-19)  Nasopharynx.  Procedure                               Abnormality         Status                     ---------                               -----------         ------                     SARS-COV-2 (COVID-19)/ F...[[id number]]  Abnormal            Final result                 Please view results for these tests on the individual orders.    SARS-COV-2 (COVID-19)/ FLU A/B, AND RSV, PCR Nasopharynx [[id number]]  (Abnormal) Collected: 12/24/23 1649    Specimen: Nasopharyngeal Swab from Nasopharynx Updated: 12/24/23 1736     SARS-CoV-2 (COVID-19) Negative     Influenza A Positive     Influenza B Negative     Respiratory Syncytial Virus Negative    Narrative:      Testing performed using real-time PCR for detection of COVID-19. EUA approved validation studies performed on site.     Blood Culture Blood, Venous [[id number]]  (Normal) Collected: 12/24/23 1647    Specimen: Blood, Venous Updated: 12/27/23 0100     Culture No growth at 48 hours          Pathology Results     ** No results found for the last 720 hours. **          Echo:     Cardiac Imaging    TRANSTHORACIC ECHO (TTE) COMPLETE 12/26/2023    Interpretation Summary  •  Left Ventricle: Normal ventricle size. Normal wall thickness. Preserved systolic function. Estimated EF 40-45%. Hypokinesis of the apex. 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  •  Right Ventricle: Normal ventricle size. Pacer wire present. Normal systolic function.  •  Right Atrium: Normal sized atrium.  •  Aortic Valve: Tricuspid valve. Trace regurgitation. No stenosis.  •  Mitral Valve: Normal leaflet structure. Normal leaflet motion. Trace regurgitation. No stenosis.  •  Tricuspid Valve: Normal structure. Mild regurgitation. Estimated RVSP = 43 mmHg. No significant stenosis.  •  Pulmonic Valve: Normal structure. No regurgitation. No stenosis.  •  Aorta: Aortic root normal. Sinuses of Valsalva normal-sized. Ascending aorta normal-sized.  Aortic arch normal-sized. Descending aorta normal-sized.  •  Pericardium: Normal structure. No evidence of pericardial effusion. No cardiac tamponade.  •  Left Atrium: Mildly dilated atrium.      Imaging:    Radiology Imaging    XR CHEST 1 VW    Narrative  CLINICAL HISTORY: Endotracheal tube repositioning    Impression  IMPRESSION: Endotracheal tube has been repositioned; the tip is now  approximately 3.3 cm above the leo.      COMMENT: A semierect AP portable view the chest was performed at 1615 and is  compared to a prior study from 1503.    Since the prior study, the endotracheal tube has been repositioned and the tip  is now approximately 3.3 cm above the leo.    There has been no other significant interval change.      Assessment     1. Pneumonia - continuing on pip-tazo in the setting of VF arrest with sputum cx showing normal nixon and blood cx are showing no growth to date with pt remaining afebrile without leukocytosis.      2. Influenza A - on oseltamivir with repeat CXR pending     3. Acute hypoxic respiratory failure requiring intubation - mgmt per Primary team     Plan      1. Continue pip-tazo for 4 more days to complete 7 day course and continue oseltamivir for 2 more days to complete 5 day course with pt remaining afebrile without leukocytosis.

## 2023-12-28 ENCOUNTER — APPOINTMENT (INPATIENT)
Dept: RADIOLOGY | Facility: HOSPITAL | Age: 73
DRG: 004 | End: 2023-12-28
Attending: PHYSICIAN ASSISTANT
Payer: MEDICARE

## 2023-12-28 ENCOUNTER — APPOINTMENT (INPATIENT)
Dept: RADIOLOGY | Facility: HOSPITAL | Age: 73
DRG: 004 | End: 2023-12-28
Attending: NURSE PRACTITIONER
Payer: MEDICARE

## 2023-12-28 LAB
ALBUMIN SERPL-MCNC: 2.5 G/DL (ref 3.5–5.7)
ALP SERPL-CCNC: 47 IU/L (ref 34–125)
ALT SERPL-CCNC: 553 IU/L (ref 7–52)
ANION GAP SERPL CALC-SCNC: 11 MEQ/L (ref 3–15)
APTT PPP: 31 SEC (ref 23–35)
AST SERPL-CCNC: 822 IU/L (ref 13–39)
BASE EXCESS BLDA CALC-SCNC: 0.4 MEQ/L
BASOPHILS # BLD: 0.01 K/UL (ref 0.01–0.1)
BASOPHILS NFR BLD: 0.2 %
BILIRUB SERPL-MCNC: 0.9 MG/DL (ref 0.3–1.2)
BUN SERPL-MCNC: 40 MG/DL (ref 7–25)
CA-I BLD-SCNC: 0.9 MMOL/L (ref 1.15–1.27)
CALCIUM SERPL-MCNC: 7.8 MG/DL (ref 8.6–10.3)
CHLORIDE BLDA-SCNC: 116 MEQ/L (ref 98–109)
CHLORIDE SERPL-SCNC: 114 MEQ/L (ref 98–107)
CO2 BLDA-SCNC: 26 MEQ/L (ref 22–32)
CO2 SERPL-SCNC: 24 MEQ/L (ref 21–31)
CREAT SERPL-MCNC: 2.2 MG/DL (ref 0.7–1.3)
DIFFERENTIAL METHOD BLD: ABNORMAL
EGFRCR SERPLBLD CKD-EPI 2021: 30.9 ML/MIN/1.73M*2
EOSINOPHIL # BLD: 0 K/UL (ref 0.04–0.54)
EOSINOPHIL NFR BLD: 0 %
ERYTHROCYTE [DISTWIDTH] IN BLOOD BY AUTOMATED COUNT: 16 % (ref 11.6–14.4)
GIANT PLATELETS BLD QL SMEAR: ABNORMAL
GLUCOSE BLD-MCNC: 191 MG/DL (ref 70–99)
GLUCOSE BLD-MCNC: 216 MG/DL (ref 70–99)
GLUCOSE BLD-MCNC: 218 MG/DL (ref 70–99)
GLUCOSE BLD-MCNC: 247 MG/DL (ref 70–99)
GLUCOSE BLD-MCNC: 257 MG/DL (ref 70–99)
GLUCOSE BLD-MCNC: 297 MG/DL (ref 70–99)
GLUCOSE BLDA-MCNC: 216 MG/DL (ref 70–99)
GLUCOSE SERPL-MCNC: 201 MG/DL (ref 70–99)
HCO3 BLDA-SCNC: 25 MEQ/L (ref 21–28)
HCT VFR BLDCO AUTO: 26.5 % (ref 40.1–51)
HGB BLD-MCNC: 8.5 G/DL (ref 13.7–17.5)
HGB BLDA-MCNC: 8.7 G/DL (ref 14–17.5)
IMM GRANULOCYTES # BLD AUTO: 0.02 K/UL (ref 0–0.08)
IMM GRANULOCYTES NFR BLD AUTO: 0.5 %
INR PPP: 1.1
LACTATE BLDA-SCNC: 1.4 MMOL/L (ref 0.4–1.6)
LYMPHOCYTES # BLD: 0.14 K/UL (ref 1.2–3.5)
LYMPHOCYTES NFR BLD: 3.4 %
MACROCYTES BLD QL SMEAR: ABNORMAL
MAGNESIUM SERPL-MCNC: 2 MG/DL (ref 1.8–2.5)
MCH RBC QN AUTO: 33.6 PG (ref 28–33.2)
MCHC RBC AUTO-ENTMCNC: 32.1 G/DL (ref 32.2–36.5)
MCV RBC AUTO: 104.7 FL (ref 83–98)
MONOCYTES # BLD: 0.18 K/UL (ref 0.3–1)
MONOCYTES NFR BLD: 4.4 %
NEUTROPHILS # BLD: 3.78 K/UL (ref 1.7–7)
NEUTS SEG NFR BLD: 91.5 %
NRBC BLD-RTO: 2.2 %
OVALOCYTES BLD QL SMEAR: ABNORMAL
PCO2 BLDA: 36 MM HG (ref 35–48)
PDW BLD AUTO: 10.1 FL (ref 9.4–12.4)
PH BLDA: 7.44 PH (ref 7.35–7.45)
PLAT MORPH BLD: NORMAL
PLATELET # BLD AUTO: 94 K/UL (ref 150–350)
PLATELET # BLD EST: ABNORMAL 10*3/UL
PLATELET CLUMP BLD QL SMEAR: PRESENT
PO2 BLDA: 102 MM HG (ref 83–100)
POCT PATIENT TEMPERATURE: 98.5 °F (ref 97–99)
POCT TEST (BLD GAS): ABNORMAL
POCT TEST: ABNORMAL
POTASSIUM BLDA-SCNC: 4.2 MEQ/L (ref 3.4–4.5)
POTASSIUM SERPL-SCNC: 3.3 MEQ/L (ref 3.5–5.1)
PROT SERPL-MCNC: 4.5 G/DL (ref 6–8.2)
PROTHROMBIN TIME: 13.7 SEC (ref 12.2–14.5)
RBC # BLD AUTO: 2.53 M/UL (ref 4.5–5.8)
SAO2 % BLDA: 97 % (ref 93–98)
SODIUM BLDA-SCNC: 147 MEQ/L (ref 136–145)
SODIUM SERPL-SCNC: 149 MEQ/L (ref 136–145)
WBC # BLD AUTO: 4.13 K/UL (ref 3.8–10.5)

## 2023-12-28 PROCEDURE — 94640 AIRWAY INHALATION TREATMENT: CPT

## 2023-12-28 PROCEDURE — 36573 INSJ PICC RS&I 5 YR+: CPT

## 2023-12-28 PROCEDURE — 25800000 HC PHARMACY IV SOLUTIONS: Performed by: HOSPITALIST

## 2023-12-28 PROCEDURE — 25000000 HC PHARMACY GENERAL: Performed by: HOSPITALIST

## 2023-12-28 PROCEDURE — 25000000 HC PHARMACY GENERAL: Performed by: RADIOLOGY

## 2023-12-28 PROCEDURE — 63700000 HC SELF-ADMINISTRABLE DRUG

## 2023-12-28 PROCEDURE — 36415 COLL VENOUS BLD VENIPUNCTURE: CPT | Performed by: STUDENT IN AN ORGANIZED HEALTH CARE EDUCATION/TRAINING PROGRAM

## 2023-12-28 PROCEDURE — 83735 ASSAY OF MAGNESIUM: CPT | Performed by: STUDENT IN AN ORGANIZED HEALTH CARE EDUCATION/TRAINING PROGRAM

## 2023-12-28 PROCEDURE — 63600000 HC DRUGS/DETAIL CODE: Mod: JZ

## 2023-12-28 PROCEDURE — C1751 CATH, INF, PER/CENT/MIDLINE: HCPCS

## 2023-12-28 PROCEDURE — 85730 THROMBOPLASTIN TIME PARTIAL: CPT | Performed by: NURSE PRACTITIONER

## 2023-12-28 PROCEDURE — 85610 PROTHROMBIN TIME: CPT | Performed by: NURSE PRACTITIONER

## 2023-12-28 PROCEDURE — 36100330 IR TUNNELED PICC PLACEMENT

## 2023-12-28 PROCEDURE — 0JH63XZ INSERTION OF TUNNELED VASCULAR ACCESS DEVICE INTO CHEST SUBCUTANEOUS TISSUE AND FASCIA, PERCUTANEOUS APPROACH: ICD-10-PCS | Performed by: RADIOLOGY

## 2023-12-28 PROCEDURE — 63700000 HC SELF-ADMINISTRABLE DRUG: Performed by: HOSPITALIST

## 2023-12-28 PROCEDURE — 93005 ELECTROCARDIOGRAM TRACING: CPT | Performed by: PHYSICIAN ASSISTANT

## 2023-12-28 PROCEDURE — 20000000 HC ROOM AND CARE ICU

## 2023-12-28 PROCEDURE — 85730 THROMBOPLASTIN TIME PARTIAL: CPT | Performed by: INTERNAL MEDICINE

## 2023-12-28 PROCEDURE — 63600000 HC DRUGS/DETAIL CODE: Mod: JZ | Performed by: NURSE PRACTITIONER

## 2023-12-28 PROCEDURE — C1894 INTRO/SHEATH, NON-LASER: HCPCS

## 2023-12-28 PROCEDURE — 85025 COMPLETE CBC W/AUTO DIFF WBC: CPT | Performed by: STUDENT IN AN ORGANIZED HEALTH CARE EDUCATION/TRAINING PROGRAM

## 2023-12-28 PROCEDURE — 80053 COMPREHEN METABOLIC PANEL: CPT | Performed by: STUDENT IN AN ORGANIZED HEALTH CARE EDUCATION/TRAINING PROGRAM

## 2023-12-28 PROCEDURE — 94003 VENT MGMT INPAT SUBQ DAY: CPT

## 2023-12-28 PROCEDURE — 76937 US GUIDE VASCULAR ACCESS: CPT

## 2023-12-28 PROCEDURE — 63600000 HC DRUGS/DETAIL CODE: Performed by: HOSPITALIST

## 2023-12-28 PROCEDURE — 02HV33Z INSERTION OF INFUSION DEVICE INTO SUPERIOR VENA CAVA, PERCUTANEOUS APPROACH: ICD-10-PCS | Performed by: RADIOLOGY

## 2023-12-28 PROCEDURE — 63600000 HC DRUGS/DETAIL CODE: Performed by: STUDENT IN AN ORGANIZED HEALTH CARE EDUCATION/TRAINING PROGRAM

## 2023-12-28 PROCEDURE — 76770 US EXAM ABDO BACK WALL COMP: CPT

## 2023-12-28 RX ORDER — INSULIN LISPRO 100 [IU]/ML
1-10 INJECTION, SOLUTION INTRAVENOUS; SUBCUTANEOUS EVERY 6 HOURS
Status: DISCONTINUED | OUTPATIENT
Start: 2023-12-28 | End: 2024-01-10 | Stop reason: HOSPADM

## 2023-12-28 RX ORDER — SODIUM CHLORIDE 0.9 % (FLUSH) 0.9 %
10 SYRINGE (ML) INJECTION
Status: DISCONTINUED | OUTPATIENT
Start: 2023-12-28 | End: 2024-01-10 | Stop reason: HOSPADM

## 2023-12-28 RX ORDER — HEPARIN SODIUM 10000 [USP'U]/100ML
100-4000 INJECTION, SOLUTION INTRAVENOUS
Status: DISPENSED | OUTPATIENT
Start: 2023-12-28 | End: 2024-01-07

## 2023-12-28 RX ORDER — LIDOCAINE HYDROCHLORIDE 10 MG/ML
INJECTION, SOLUTION INFILTRATION; PERINEURAL
Status: COMPLETED | OUTPATIENT
Start: 2023-12-28 | End: 2023-12-28

## 2023-12-28 RX ORDER — INSULIN LISPRO 100 [IU]/ML
1-10 INJECTION, SOLUTION INTRAVENOUS; SUBCUTANEOUS 4 TIMES DAILY
Status: DISCONTINUED | OUTPATIENT
Start: 2023-12-28 | End: 2023-12-28

## 2023-12-28 RX ORDER — SODIUM CHLORIDE 0.9 % (FLUSH) 0.9 %
10 SYRINGE (ML) INJECTION AS NEEDED
Status: DISCONTINUED | OUTPATIENT
Start: 2023-12-28 | End: 2024-01-10 | Stop reason: HOSPADM

## 2023-12-28 RX ADMIN — METHYLPREDNISOLONE SODIUM SUCCINATE 60 MG: 125 INJECTION, POWDER, FOR SOLUTION INTRAMUSCULAR; INTRAVENOUS at 15:15

## 2023-12-28 RX ADMIN — METHYLPREDNISOLONE SODIUM SUCCINATE 60 MG: 125 INJECTION, POWDER, FOR SOLUTION INTRAMUSCULAR; INTRAVENOUS at 02:15

## 2023-12-28 RX ADMIN — INSULIN LISPRO 4 UNITS: 100 INJECTION, SOLUTION INTRAVENOUS; SUBCUTANEOUS at 12:14

## 2023-12-28 RX ADMIN — GUAIFENESIN 400 MG: 100 SOLUTION ORAL at 12:14

## 2023-12-28 RX ADMIN — GUAIFENESIN 400 MG: 100 SOLUTION ORAL at 17:38

## 2023-12-28 RX ADMIN — PANTOPRAZOLE SODIUM 40 MG: 40 INJECTION, POWDER, LYOPHILIZED, FOR SOLUTION INTRAVENOUS at 21:05

## 2023-12-28 RX ADMIN — INSULIN LISPRO 2 UNITS: 100 INJECTION, SOLUTION INTRAVENOUS; SUBCUTANEOUS at 06:24

## 2023-12-28 RX ADMIN — TAMOXIFEN CITRATE 20 MG: 10 TABLET, FILM COATED ORAL at 08:15

## 2023-12-28 RX ADMIN — IPRATROPIUM BROMIDE 2 PUFF: 17 AEROSOL, METERED RESPIRATORY (INHALATION) at 11:15

## 2023-12-28 RX ADMIN — GUAIFENESIN 400 MG: 100 SOLUTION ORAL at 06:22

## 2023-12-28 RX ADMIN — OSELTAMIVIR PHOSPHATE 30 MG: 6 POWDER, FOR SUSPENSION ORAL at 08:19

## 2023-12-28 RX ADMIN — AMIODARONE HYDROCHLORIDE 200 MG: 200 TABLET ORAL at 08:13

## 2023-12-28 RX ADMIN — PIPERACILLIN AND TAZOBACTAM 4.5 G: 4; .5 INJECTION, POWDER, LYOPHILIZED, FOR SOLUTION INTRAVENOUS; PARENTERAL at 10:18

## 2023-12-28 RX ADMIN — Medication 2 MG/HR: at 08:30

## 2023-12-28 RX ADMIN — CHOLECALCIFEROL TAB 25 MCG (1000 UNIT) 1000 UNITS: 25 TAB at 08:13

## 2023-12-28 RX ADMIN — SILVER SULFADIAZINE: 10 CREAM TOPICAL at 20:00

## 2023-12-28 RX ADMIN — IPRATROPIUM BROMIDE 2 PUFF: 17 AEROSOL, METERED RESPIRATORY (INHALATION) at 08:05

## 2023-12-28 RX ADMIN — Medication 10 ML: at 20:00

## 2023-12-28 RX ADMIN — SILVER SULFADIAZINE: 10 CREAM TOPICAL at 08:14

## 2023-12-28 RX ADMIN — INSULIN LISPRO 2 UNITS: 100 INJECTION, SOLUTION INTRAVENOUS; SUBCUTANEOUS at 17:38

## 2023-12-28 RX ADMIN — PANTOPRAZOLE SODIUM 40 MG: 40 INJECTION, POWDER, LYOPHILIZED, FOR SOLUTION INTRAVENOUS at 08:13

## 2023-12-28 RX ADMIN — LIDOCAINE HYDROCHLORIDE 5 ML: 10 INJECTION, SOLUTION INFILTRATION; PERINEURAL at 16:07

## 2023-12-28 RX ADMIN — GUAIFENESIN 400 MG: 100 SOLUTION ORAL at 23:45

## 2023-12-28 RX ADMIN — IPRATROPIUM BROMIDE 2 PUFF: 17 AEROSOL, METERED RESPIRATORY (INHALATION) at 15:09

## 2023-12-28 RX ADMIN — INSULIN LISPRO 2 UNITS: 100 INJECTION, SOLUTION INTRAVENOUS; SUBCUTANEOUS at 01:53

## 2023-12-28 RX ADMIN — CHLORHEXIDINE GLUCONATE ORAL RINSE 15 ML: 1.2 SOLUTION DENTAL at 21:04

## 2023-12-28 RX ADMIN — ATORVASTATIN CALCIUM 10 MG: 10 TABLET, FILM COATED ORAL at 21:03

## 2023-12-28 RX ADMIN — Medication 10 MG: at 21:08

## 2023-12-28 RX ADMIN — INSULIN LISPRO 4 UNITS: 100 INJECTION, SOLUTION INTRAVENOUS; SUBCUTANEOUS at 23:53

## 2023-12-28 RX ADMIN — PIPERACILLIN AND TAZOBACTAM 4.5 G: 4; .5 INJECTION, POWDER, LYOPHILIZED, FOR SOLUTION INTRAVENOUS; PARENTERAL at 21:19

## 2023-12-28 RX ADMIN — CALCIUM GLUCONATE 1 G: 20 INJECTION, SOLUTION INTRAVENOUS at 05:13

## 2023-12-28 RX ADMIN — PIPERACILLIN AND TAZOBACTAM 4.5 G: 4; .5 INJECTION, POWDER, LYOPHILIZED, FOR SOLUTION INTRAVENOUS; PARENTERAL at 17:28

## 2023-12-28 RX ADMIN — Medication 100 MCG/HR: at 17:30

## 2023-12-28 RX ADMIN — HEPARIN SODIUM 5000 UNITS: 5000 INJECTION, SOLUTION INTRAVENOUS; SUBCUTANEOUS at 06:22

## 2023-12-28 RX ADMIN — HEPARIN SODIUM 1100 UNITS/HR: 10000 INJECTION, SOLUTION INTRAVENOUS at 17:19

## 2023-12-28 RX ADMIN — OSELTAMIVIR PHOSPHATE 30 MG: 6 POWDER, FOR SUSPENSION ORAL at 20:00

## 2023-12-28 RX ADMIN — PIPERACILLIN AND TAZOBACTAM 4.5 G: 4; .5 INJECTION, POWDER, LYOPHILIZED, FOR SOLUTION INTRAVENOUS; PARENTERAL at 04:21

## 2023-12-28 RX ADMIN — METHYLPREDNISOLONE SODIUM SUCCINATE 60 MG: 125 INJECTION, POWDER, FOR SOLUTION INTRAMUSCULAR; INTRAVENOUS at 08:13

## 2023-12-28 RX ADMIN — IPRATROPIUM BROMIDE 2 PUFF: 17 AEROSOL, METERED RESPIRATORY (INHALATION) at 20:27

## 2023-12-28 RX ADMIN — POTASSIUM CHLORIDE 40 MEQ: 750 TABLET, EXTENDED RELEASE ORAL at 06:22

## 2023-12-28 RX ADMIN — METHYLPREDNISOLONE SODIUM SUCCINATE 60 MG: 125 INJECTION, POWDER, FOR SOLUTION INTRAMUSCULAR; INTRAVENOUS at 20:00

## 2023-12-28 RX ADMIN — CHLORHEXIDINE GLUCONATE ORAL RINSE 15 ML: 1.2 SOLUTION DENTAL at 10:18

## 2023-12-28 ASSESSMENT — COGNITIVE AND FUNCTIONAL STATUS - GENERAL
CLIMB 3 TO 5 STEPS WITH RAILING: 1 - TOTAL
WALKING IN HOSPITAL ROOM: 1 - TOTAL
STANDING UP FROM CHAIR USING ARMS: 1 - TOTAL
CLIMB 3 TO 5 STEPS WITH RAILING: 1 - TOTAL
WALKING IN HOSPITAL ROOM: 1 - TOTAL
STANDING UP FROM CHAIR USING ARMS: 1 - TOTAL
MOVING TO AND FROM BED TO CHAIR: 1 - TOTAL
MOVING TO AND FROM BED TO CHAIR: 1 - TOTAL

## 2023-12-28 NOTE — PROGRESS NOTES
Daily Progress Note (LOS: 4)    ?    Interval History: Patient remains intubated and sedated.  His blood pressure appears to be stable.  He remains on IV milrinone and Levophed.          Current Medications:  • amiodarone  200 mg feeding tube Daily   • atorvastatin  10 mg oral Nightly   • cetirizine  10 mg feeding tube Nightly   • chlorhexidine  15 mL Mouth/Throat 2 times per day   • cholecalciferol (vitamin D3)  1,000 Units feeding tube Daily   • [Provider Managed Hold] furosemide  40 mg intravenous q12h GISELLE   • guaiFENesin  400 mg oral q6h GISELLE   • heparin (porcine)  5,000 Units subcutaneous q12h (6a, 6p)   • insulin lispro U-100  1-10 Units subcutaneous q6h GISELLE   • ipratropium HFA  2 puff inhalation QID (8a, 12p, 4p, 8p)   • methylPREDNISolone sodium succinate  60 mg intravenous q6h INT   • oseltamivir  30 mg oral BID   • pantoprazole  40 mg intravenous q12h GISELLE   • piperacillin-tazobactam  4.5 g intravenous q6h INT   • silver sulfadiazine   Topical BID   • tamoxifen  20 mg feeding tube Daily       Physical Exam:  Vitals:    12/28/23 0800   BP:    Pulse: 67   Resp: 18   Temp: 36.9 °C (98.4 °F)   SpO2: 99%       General appearance: Intubated and sedated  Head: without obvious abnormality  Eyes: PERRLA, EOM's intact  Lungs: Adequate air entry on ventilator.  FiO2 40%   heart: Distant heart sounds abdomen: soft, non-tender; bowel sounds normal; no masses  Extremities: peripheral pulses intact, no edema  Skin: Skin color, texture, turgor normal. No rashes or lesions  Neurologic: Intubated and sedated  Psychiatric: Sedated   endocrine: No thyromegaly    I&Os:    Intake/Output Summary (Last 24 hours) at 12/28/2023 0839  Last data filed at 12/28/2023 0800  Gross per 24 hour   Intake 1652.36 ml   Output 1330 ml   Net 322.36 ml       Weights:   Wt Readings from Last 3 Encounters:   12/28/23 88.3 kg (194 lb 10.7 oz)   12/07/23 80.3 kg (177 lb)   11/27/23 80.3 kg (177 lb)       Labs:  Results from last 7 days   Lab Units  "12/28/23  0426 12/27/23  0519 12/26/23  0347   SODIUM mEQ/L 149* 149* 148*   POTASSIUM mEQ/L 3.3* 3.8 3.5   CO2 mEQ/L 24 23 26   BUN mg/dL 40* 28* 21   CREATININE mg/dL 2.2* 1.9* 1.6*   CALCIUM mg/dL 7.8* 7.5* 7.3*   ALT IU/L 553* 516* 268*   AST IU/L 822* 1,028* 711*     Results from last 7 days   Lab Units 12/28/23  0426 12/27/23  0519 12/26/23  0347   WBC K/uL 4.13 5.31 8.09   HEMOGLOBIN g/dL 8.5* 8.8* 9.4*   HEMATOCRIT % 26.5* 27.8* 29.0*   PLATELETS K/uL 94* 94* 112*     Results from last 7 days   Lab Units 12/25/23  0934 12/25/23  0410 12/24/23  1647   PTT sec 26 33  --    INR   --  1.4 1.9   PROTIME sec  --  17.4* 21.2*         No lab exists for component: \"CPK\"  0   Lab Value Date/Time    BNP 1,135 (H) 12/24/2023 1647     (H) 02/07/2023 1326     (H) 10/19/2022 1305       Data Review:  EKG Impression: Unclear atrial mechanism, possibly atrial fibrillation, intraventricular conduction delay   telemetry Review: Unclear atrial mechanism, adequate heart rate    Plan:  Cam Rosas is a 73 y.o. male with the following problems,      1.  VF arrest- it appears that the patient came in with an upper respiratory tract infection and developed hypoxemia.  He became bradycardic with hypoxemia.  He was treated with atropine.  He was then intubated.  The following morning he had QT prolongation and had polymorphic VT VF.  A temporary wire was placed.  Lidocaine and amiodarone were discontinued.  Cardiac catheterization revealed no significant coronary disease.  His LV gram was consistent with Takotsubo syndrome.  He was started on intravenous milrinone as his intracardiac pressures were elevated.  He remains on milrinone and low-dose Levophed.  I recommend weaning off of Levophed.  Continue on milrinone.    No further diuresis is required at this time.    2.  Hypoxemia- chest x-ray on admission revealed mild pulmonary interstitial edema.    The patient's creatinine is increasing and his sodium is 149.  " Avoid overdiuresis.  The patient is oxygenating adequately.     3.  Prostate cancer-completed therapy     4.  Right breast cancer-radiation therapy     5.  Atrial fibrillation-patient has a history of atrial fibrillation-continue on oral/nasogastric amiodarone.  It appears that the patient may be in atrial fibrillation today.  I recommend switching to full anticoagulation with either his subcutaneous Lovenox 1 mg/kg or a heparin drip.     6.  Takotsubo syndrome- initiate losartan 25 mg once a day and low-dose carvedilol once the patient is extubated, off of milrinone and blood pressure stabilizes.     7.  Hypoxemia/influenza A- improving.  Most recent blood gas looks good.     8.  Temporary pacing wire- no further bradycardia arrhythmias-removed yesterday.    9.  Elevated liver enzymes-this is likely due to shock liver.  Patient has had periods of hypotension.  The patient was biting on his ET tube yesterday and was acidotic.    10.  Swollen right arm-I discussed this with the nursing staff.  We need to determine whether this line is infiltrated.  If he has adequate peripheral lines, I would recommend removing the central groin line which has been in for a number of days.    I have spent more than 30 minutes of ICU time evaluating this patient and discussing my recommendations with the nursing staff.  Electronic signature  Michele Estrada MD   12/28/23

## 2023-12-28 NOTE — OR SURGEON
Pre-Procedure patient identification:  I am the primary operating surgeon/proceduralist and I have reviewed the applicable pathology reports and radiology studies for this procedure. I have identified the patient on 12/28/23 at 4:00 PM Abrahan Gomez MD PhD

## 2023-12-28 NOTE — PROGRESS NOTES
Infectious Disease Progress Note    Patient Name: Cam Rosas  MR#: [id number]  : 1950  Admission Date: 2023  Date: 23   Time: 11:28 AM   Author: Adam Tucker MD    Major Events: none    Antibiotics:    Anti-infectives (From admission, onward)    Start     Dose/Rate Route Frequency Ordered Stop    23 09  silver sulfadiazine (SILVADENE) 1 % cream          Topical 2 times daily 23 0344 10/30/24 0859    23 09  oseltamivir (TAMIFLU) 6 mg/mL suspension 30 mg         30 mg oral 2 times daily 23 0802 23 0859    23 0415  piperacillin-tazobactam (ZOSYN) 4.5 g/100 mL IVPB in NSS         4.5 g  200 mL/hr over 30 Minutes intravenous Every 6 hours interval 23 0317            Subjective     Review of Systems    Remains afebrile while on vent. No acute events overnight and remainder of 12 ROS is unchanged.    Objective     Vital Signs:    Patient Vitals for the past 72 hrs:   BP Temp Temp src Pulse Resp SpO2 Height Weight   23 1115 -- -- -- 63 18 100 % -- --   23 1100 -- -- -- 64 18 100 % -- --   23 1000 (!) 109/59 -- -- 66 18 100 % -- --   23 0908 (!) 161/64 -- -- -- -- -- -- --   23 0900 -- -- -- 68 18 99 % -- --   23 0800 -- 36.9 °C (98.4 °F) Axillary 67 18 99 % -- --   23 0749 (!) 135/59 -- -- 68 18 99 % -- --   23 0700 -- -- -- 68 18 98 % -- --   23 0600 -- -- -- -- -- -- -- 88.3 kg (194 lb 10.7 oz)   23 0545 -- -- -- 69 18 99 % -- --   23 0515 -- -- -- 69 18 99 % -- --   23 0500 -- -- -- 67 18 98 % -- --   235 -- -- -- 67 18 97 % -- --   23 -- -- -- 66 18 98 % -- --   23 -- -- -- 73 18 99 % -- --   23 (!) 196/82 -- -- -- -- -- -- --   23 (!) 111/57 36.9 °C (98.5 °F) Oral 70 18 97 % -- --   235 -- -- -- 68 18 97 % -- --   230 -- -- -- 67 18 99 % -- --   235 -- -- -- 67 18 98 % -- --   23 030 --  -- -- 67 18 98 % -- --   12/28/23 0245 -- -- -- 66 18 98 % -- --   12/28/23 0230 -- -- -- 66 18 99 % -- --   12/28/23 0215 -- -- -- 67 18 98 % -- --   12/28/23 0200 106/61 -- -- -- 18 98 % -- --   12/28/23 0145 -- -- -- 66 18 98 % -- --   12/28/23 0130 -- -- -- 67 18 99 % -- --   12/28/23 0115 -- -- -- 68 18 98 % -- --   12/28/23 0100 -- -- -- 69 18 98 % -- --   12/28/23 0045 -- -- -- 71 17 99 % -- --   12/28/23 0030 -- -- -- 75 18 98 % -- --   12/28/23 0015 -- -- -- 73 18 98 % -- --   12/28/23 0000 132/65 36.7 °C (98 °F) Oral 72 12 98 % -- --   12/27/23 2345 -- -- -- 72 18 99 % -- --   12/27/23 2330 -- -- -- 72 17 99 % -- --   12/27/23 2318 -- -- -- 73 18 99 % -- --   12/27/23 2315 -- -- -- 73 18 99 % -- --   12/27/23 2300 -- -- -- 73 18 100 % -- --   12/27/23 2245 -- -- -- 72 18 100 % -- --   12/27/23 2230 -- -- -- 68 18 97 % -- --   12/27/23 2215 (!) 92/51 -- -- 71 18 97 % -- --   12/27/23 2200 (!) 162/70 -- -- 70 18 99 % -- --   12/27/23 2145 (!) 166/79 -- -- 71 18 99 % -- --   12/27/23 2144 (!) 178/78 -- -- -- -- -- -- --   12/27/23 2130 -- -- -- 71 18 97 % -- --   12/27/23 2115 -- -- -- 70 18 97 % -- --   12/27/23 2100 -- -- -- 70 18 98 % -- --   12/27/23 2057 (!) 95/48 -- -- -- -- -- -- --   12/27/23 2045 -- -- -- 67 18 98 % -- --   12/27/23 2030 -- -- -- 66 18 98 % -- --   12/27/23 2015 -- -- -- 67 18 100 % -- --   12/27/23 2011 -- -- -- 62 20 97 % -- --   12/27/23 2000 (!) 103/55 36.8 °C (98.2 °F) Oral 68 18 97 % -- --   12/27/23 1958 (!) 100/50 -- -- -- -- -- -- --   12/27/23 1945 -- -- -- 63 18 98 % -- --   12/27/23 1930 -- -- -- 60 18 98 % -- --   12/27/23 1915 -- -- -- 60 (!) 4 98 % -- --   12/27/23 1900 -- -- -- 60 18 98 % -- --   12/27/23 1800 137/63 -- -- (!) 58 18 99 % -- --   12/27/23 1700 -- -- -- 63 18 98 % -- --   12/27/23 1600 123/61 -- -- (!) 59 18 100 % -- --   12/27/23 1513 (!) 152/65 -- -- 60 18 100 % -- --   12/27/23 1500 -- -- -- 60 18 99 % -- --   12/27/23 1400 (!) 155/71 -- -- (!) 59 18  98 % -- --   12/27/23 1300 -- -- -- 61 18 98 % -- --   12/27/23 1205 -- -- -- 64 18 96 % -- --   12/27/23 1200 (!) 113/54 -- -- 65 18 95 % -- --   12/27/23 1140 -- -- -- 82 18 99 % -- --   12/27/23 1136 -- -- -- 88 18 100 % -- --   12/27/23 1135 -- -- -- 92 15 100 % -- --   12/27/23 1130 -- -- -- 70 17 100 % -- --   12/27/23 1125 -- -- -- 80 (!) 24 99 % -- --   12/27/23 1120 -- -- -- 82 12 (!) 84 % -- --   12/27/23 1115 -- -- -- 78 14 96 % -- --   12/27/23 1100 -- -- -- 76 13 100 % -- --   12/27/23 1000 (!) 147/68 -- -- (!) 59 18 99 % -- --   12/27/23 0900 -- -- -- (!) 59 18 98 % -- --   12/27/23 0819 -- -- -- 72 18 97 % -- --   12/27/23 0800 (!) 141/65 -- -- (!) 58 18 99 % -- --   12/27/23 0600 (!) 125/58 -- -- 70 18 96 % -- --   12/27/23 0530 -- -- -- (!) 117 18 97 % -- --   12/27/23 0500 -- -- -- 60 18 99 % -- --   12/27/23 0432 -- -- -- (!) 111 19 100 % -- --   12/27/23 0430 (!) 166/90 -- -- 61 18 99 % -- --   12/27/23 0400 134/60 -- Bladder 69 18 99 % -- --   12/27/23 0330 -- -- -- 60 18 100 % -- --   12/27/23 0300 -- -- -- 61 18 100 % -- --   12/27/23 0230 -- -- -- 62 18 100 % -- --   12/27/23 0200 129/68 -- -- 61 18 99 % -- --   12/27/23 0151 -- -- -- 62 18 100 % -- --   12/27/23 0130 -- -- -- 71 13 100 % -- --   12/27/23 0114 -- -- -- 62 18 100 % -- --   12/27/23 0103 -- -- -- 66 18 100 % -- --   12/27/23 0100 -- -- -- 63 18 100 % -- --   12/27/23 0030 -- -- -- 66 18 100 % -- --   12/27/23 0000 (!) 115/56 -- Bladder 66 18 100 % -- --   12/26/23 2333 -- -- -- 65 18 100 % -- --   12/26/23 2330 -- -- -- 67 18 100 % -- --   12/26/23 2300 -- -- -- 68 18 100 % -- --   12/26/23 2230 -- -- -- 70 16 100 % -- --   12/26/23 2200 (!) 112/53 -- -- 72 18 100 % -- --   12/26/23 2130 -- -- -- 73 18 99 % -- --   12/26/23 2100 -- -- -- 77 18 99 % -- --   12/26/23 2045 -- -- -- 76 18 98 % -- --   12/26/23 2030 -- -- -- 75 18 98 % -- --   12/26/23 2015 -- -- -- 75 18 98 % -- --   12/26/23 2000 (!) 117/56 -- Bladder 76 18 98 %  "-- --   12/26/23 1949 -- -- -- 79 18 98 % -- --   12/26/23 1931 -- -- -- 86 18 98 % -- --   12/26/23 1930 -- -- -- 85 18 97 % -- --   12/26/23 1900 -- -- -- 79 18 98 % -- --   12/26/23 1800 (!) 103/54 -- -- 78 (!) 10 97 % -- --   12/26/23 1700 -- -- -- 83 (!) 26 95 % -- --   12/26/23 1600 (!) 121/55 -- -- 82 (!) 23 98 % -- --   12/26/23 1506 -- -- -- 84 (!) 26 100 % -- --   12/26/23 1500 -- -- -- 86 (!) 26 100 % -- --   12/26/23 1400 (!) 104/52 -- -- 83 (!) 24 94 % -- --   12/26/23 1303 -- -- -- 81 18 94 % -- --   12/26/23 1300 -- -- -- 82 17 96 % -- --   12/26/23 1200 -- -- -- 80 19 97 % -- --   12/26/23 1100 -- -- -- 78 19 97 % -- --   12/26/23 1000 -- -- -- 78 18 97 % -- --   12/26/23 0946 (!) 144/60 -- -- -- -- -- -- --   12/26/23 0900 (!) 117/55 -- -- 79 (!) 22 98 % -- --   12/26/23 0823 (!) 144/60 -- -- 76 (!) 33 98 % -- --   12/26/23 0800 (!) 152/62 -- -- 72 18 97 % -- --   12/26/23 0737 (!) 144/60 -- -- -- -- -- 1.651 m (5' 5\") 80.3 kg (177 lb)   12/26/23 0700 135/73 -- -- 73 (!) 24 96 % -- --   12/26/23 0630 121/66 -- -- 72 (!) 26 96 % -- --   12/26/23 0600 109/85 -- -- 71 (!) 29 96 % -- --   12/26/23 0530 138/67 -- -- 69 (!) 27 96 % -- --   12/26/23 0500 134/62 -- -- 69 (!) 24 96 % -- --   12/26/23 0430 120/63 -- -- 69 (!) 24 96 % -- --   12/26/23 0400 (!) 112/55 -- Bladder 70 (!) 24 96 % -- --   12/26/23 0330 116/61 -- -- 78 (!) 26 97 % -- --   12/26/23 0300 101/60 -- -- 70 (!) 24 96 % -- --   12/26/23 0230 (!) 95/54 -- -- 69 (!) 25 97 % -- --   12/26/23 0200 (!) 115/56 -- -- 70 (!) 26 96 % -- --   12/26/23 0130 (!) 115/57 -- -- 69 (!) 26 97 % -- --   12/26/23 0100 (!) 103/55 -- -- 69 (!) 24 97 % -- --   12/26/23 0030 (!) 100/56 -- -- 69 (!) 25 97 % -- --   12/26/23 0000 110/60 -- Bladder 69 (!) 24 96 % -- --   12/25/23 2333 (!) 100/58 -- -- 69 (!) 22 97 % -- --   12/25/23 2330 (!) 100/58 -- -- 69 (!) 24 97 % -- --   12/25/23 2315 (!) 103/59 -- -- 69 (!) 24 97 % -- --   12/25/23 2310 119/61 -- -- 69 (!) " 24 96 % -- --   12/25/23 2304 -- -- -- 87 (!) 26 97 % -- --   12/25/23 2301 -- -- -- 69 (!) 24 97 % -- --   12/25/23 2300 (!) 74/46 -- -- 69 (!) 24 97 % -- --   12/25/23 2200 (!) 114/58 -- -- 69 (!) 24 97 % -- --   12/25/23 2100 109/81 -- -- 69 (!) 24 96 % -- --   12/25/23 2025 -- -- -- 69 (!) 22 97 % -- --   12/25/23 2000 119/67 -- Bladder 69 (!) 24 96 % -- --   12/25/23 1915 116/69 -- -- 69 (!) 28 97 % -- --   12/25/23 1900 106/61 -- -- 69 (!) 24 98 % -- --   12/25/23 1845 (!) 107/59 -- -- 69 (!) 24 98 % -- --   12/25/23 1830 (!) 105/55 -- -- 69 (!) 24 98 % -- --   12/25/23 1815 (!) 108/59 -- -- 69 (!) 24 97 % -- --   12/25/23 1800 (!) 105/59 -- -- 69 (!) 24 98 % -- --   12/25/23 1745 (!) 114/58 -- -- 69 (!) 24 97 % -- --   12/25/23 1741 -- -- -- 69 (!) 24 97 % -- --   12/25/23 1730 (!) 119/58 -- -- 69 (!) 25 96 % -- --   12/25/23 1715 (!) 128/58 -- -- 69 (!) 24 97 % -- --   12/25/23 1700 (!) 118/56 -- -- 69 15 96 % -- --   12/25/23 1645 (!) 109/59 -- -- 69 (!) 22 96 % -- --   12/25/23 1630 119/62 -- -- 69 (!) 24 97 % -- --   12/25/23 1615 (!) 117/58 -- -- 69 (!) 24 97 % -- --   12/25/23 1600 (!) 117/58 -- -- 69 (!) 24 97 % -- --   12/25/23 1545 (!) 118/59 -- -- 69 (!) 24 96 % -- --   12/25/23 1530 (!) 123/59 -- -- 69 (!) 24 95 % -- --   12/25/23 1515 126/76 -- -- 69 (!) 23 94 % -- --   12/25/23 1500 127/71 -- -- 69 19 98 % -- --   12/25/23 1456 -- -- -- 69 14 95 % -- --   12/25/23 1450 -- -- -- 69 12 98 % -- --   12/25/23 1445 116/70 -- -- 69 14 98 % -- --   12/25/23 1442 -- -- -- 69 13 98 % -- --   12/25/23 1440 -- -- -- 69 13 98 % -- --   12/25/23 1435 (!) 114/59 -- -- 69 14 98 % -- --   12/25/23 1430 -- -- -- 69 14 98 % -- --   12/25/23 1425 (!) 102/59 -- -- 69 16 98 % -- --   12/25/23 1420 -- -- -- 68 18 98 % -- --   12/25/23 1225 -- -- -- 60 20 98 % -- --   12/25/23 1220 -- -- -- (!) 59 15 98 % -- --   12/25/23 1215 -- -- -- 60 18 98 % -- --   12/25/23 1210 -- -- -- 73 (!) 25 98 % -- --   12/25/23 1200  136/86 -- -- (!) 59 (!) 21 98 % -- --   23 1145 130/74 -- -- (!) 59 (!) 25 98 % -- --   23 1140 -- -- -- (!) 40 (!) 25 99 % -- --   23 1130 115/63 -- -- (!) 42 (!) 24 100 % -- --       Temp (72hrs), Av.8 °C (98.3 °F), Min:36.7 °C (98 °F), Max:36.9 °C (98.5 °F)      Physical Exam:    General appearance: sedated on vent  Head: ETT in place  Eyes: anicteric sclera  Lungs: decreased breath sounds b/l  Heart: regular rate and rhythm  Abdomen: soft, non-tender  Extremities: edema none  Joints: no swelling  Skin: no rashes  Neurologic: not arousable       Lines, Drains, Airways, Wounds:  Peripheral IV (Adult) 23 Right Antecubital (Active)   Number of days: 2       Peripheral IV (Adult) 23 Left;Posterior Forearm (Active)   Number of days: 32       NG/OG Tube 23 Left nostril (Active)   Number of days: 3       Urethral Catheter 20 Fr (Active)   Number of days: 0       ETT  (Active)   Number of days: 2       Wound Other (comment) Right Pretibial (Active)   Number of days: 4       Wound Venous Ulcer Left Pretibial (Active)   Number of days: 4       Wound Perineum (Active)   Number of days: 4       Catheterization Site - Arterial Right Femoral 6 Fr. (Active)   Number of days: 3       Catheterization Site - Venous Right Femoral 6 Fr. (Active)   Number of days: 3       Labs:    CBC Results       23     0426 0519 0347    WBC 4.13 5.31 8.09    RBC 2.53 2.58 2.75    HGB 8.5 8.8 9.4    HCT 26.5 27.8 29.0    .7 107.8 105.5    MCH 33.6 34.1 34.2    MCHC 32.1 31.7 32.4    PLT 94 94 112         Comment for PLT at 0426 on 12/28/23: ALL RESULTS HAVE BEEN RECHECKEDSPECIMEN QUALITY CHECKEDRESULTS OBTAINED AFTER VORTEXING TO ELIMINATE PLT CLUMPS    Comment for PLT at 0519 on 23: RESULTS CHECKED SPECIMEN QUALITY CHECKED      BMP Results       23     0426 0519 0347     149 148    K 3.3 3.8 3.5    Cl 114 117 114    CO2 24 23 26    Glucose  201 138 117    BUN 40 28 21    Creatinine 2.2 1.9 1.6    Calcium 7.8 7.5 7.3    Anion Gap 11 9 8    EGFR 30.9 36.8 45.2         Comment for EGFR at 0426 on 12/28/23: Calculation based on the Chronic Kidney Disease Epidemiology Collaboration (CKD-EPI) equation refit without adjustment for race.    Comment for EGFR at 0519 on 12/27/23: Calculation based on the Chronic Kidney Disease Epidemiology Collaboration (CKD-EPI) equation refit without adjustment for race.    Comment for EGFR at 0347 on 12/26/23: Calculation based on the Chronic Kidney Disease Epidemiology Collaboration (CKD-EPI) equation refit without adjustment for race.      PT/PTT Results       12/25/23 12/25/23 12/24/23     0934 0410 1647    PT -- 17.4 21.2    INR -- 1.4 1.9    PTT 26 33 --         Comment for INR at 0410 on 12/25/23: Moderate Intensity Anticoagulation = 2.0 to 3.0, High Intensity = 2.5 to 3.5    Comment for INR at 1647 on 12/24/23: Moderate Intensity Anticoagulation = 2.0 to 3.0, High Intensity = 2.5 to 3.5      UA Results       12/25/23     0414    Color Yellow    Clarity Clear    Glucose Negative    Bilirubin Negative    Ketones Negative    Sp Grav >1.035    Blood Trace    Ph 5.5    Protein Trace    Urobilinogen 0.2    Nitrite Negative    Leuk Est Negative    WBC 0 TO 3    RBC 0 TO 4    Bacteria Rare         Comment for Blood at 0414 on 12/25/23: The sensitivity of the occult blood test is equivalent to approximately 4 intact RBC/HPF.    Comment for Protein at 0414 on 12/25/23: Trace False Positive Protein can be seen with alkaline or highly buffered urines or urine with high specific gravity. Suggest clinical correlation.    Comment for Leuk Est at 0414 on 12/25/23: Results can be falsely negative due to high specific gravity, some antibiotics, glucose >3 g/dl, or WBC other than neutrophils.      Lactate Results       12/25/23 12/25/23 12/24/23 2011 1729 2012    Lactate 2.4 3.1 3.8          Microbiology Results     Procedure  Component Value Units Date/Time    Sputum Gram Stain (Lab Only) Expectorated Sputum [[id number]] Collected: 12/27/23 1417    Specimen: Bronch Lavage from Expectorated Sputum Updated: 12/27/23 2032     Gram Stain Result 2+ WBC      No Epithelial Cells Seen      No organisms seen    Sputum Culture (Lab Only) Expectorated Sputum [[id number]]  (Normal) Collected: 12/27/23 1417    Specimen: Bronch Lavage from Expectorated Sputum Updated: 12/28/23 0731     Culture No growth to date    Sputum culture / smear Expectorated Sputum [[id number]] Collected: 12/25/23 0418    Specimen: Aspirate from Expectorated Sputum Updated: 12/27/23 0845    Narrative:      The following orders were created for panel order Sputum culture / smear Expectorated Sputum.  Procedure                               Abnormality         Status                     ---------                               -----------         ------                     Sputum Gram Stain (Lab O...[[id number]]                      Final result               Sputum Culture (Lab Only...[[id number]]  Normal              Final result                 Please view results for these tests on the individual orders.    Sputum Gram Stain (Lab Only) Expectorated Sputum [[id number]] Collected: 12/25/23 0418    Specimen: Aspirate from Expectorated Sputum Updated: 12/25/23 0956     Gram Stain Result 3+ WBC      No Epithelial Cells Seen      3+ Gram positive bacilli      2+ Gram positive cocci in pairs, chains and clusters    Sputum Culture (Lab Only) Expectorated Sputum [[id number]]  (Normal) Collected: 12/25/23 0418    Specimen: Aspirate from Expectorated Sputum Updated: 12/27/23 0845     Culture Normal Park    MRSA Screen, Nares Only Nose [[id number]]  (Normal) Collected: 12/25/23 0352    Specimen: Nasal Swab from Nose Updated: 12/25/23 0906     MRSA DNA, Nares Negative    Blood Culture Blood, Venous [[id number]]  (Normal) Collected: 12/24/23 1652    Specimen: Blood, Venous Updated: 12/28/23 0000      Culture No growth at 72 hours    SARS-CoV-2 (COVID-19) Nasopharynx [[id number]]  (Abnormal) Collected: 12/24/23 1649    Specimen: Nasopharyngeal Swab from Nasopharynx Updated: 12/24/23 1736    Narrative:      The following orders were created for panel order SARS-CoV-2 (COVID-19) Nasopharynx.  Procedure                               Abnormality         Status                     ---------                               -----------         ------                     SARS-COV-2 (COVID-19)/ F...[[id number]]  Abnormal            Final result                 Please view results for these tests on the individual orders.    SARS-COV-2 (COVID-19)/ FLU A/B, AND RSV, PCR Nasopharynx [[id number]]  (Abnormal) Collected: 12/24/23 1649    Specimen: Nasopharyngeal Swab from Nasopharynx Updated: 12/24/23 1736     SARS-CoV-2 (COVID-19) Negative     Influenza A Positive     Influenza B Negative     Respiratory Syncytial Virus Negative    Narrative:      Testing performed using real-time PCR for detection of COVID-19. EUA approved validation studies performed on site.     Blood Culture Blood, Venous [[id number]]  (Normal) Collected: 12/24/23 1647    Specimen: Blood, Venous Updated: 12/28/23 0100     Culture No growth at 72 hours          Pathology Results     ** No results found for the last 720 hours. **          Echo:     Cardiac Imaging    TRANSTHORACIC ECHO (TTE) COMPLETE 12/26/2023    Interpretation Summary  •  Left Ventricle: Normal ventricle size. Normal wall thickness. Preserved systolic function. Estimated EF 40-45%. Hypokinesis of the apex. 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  •  Right Ventricle: Normal ventricle size. Pacer wire present. Normal systolic function.  •  Right Atrium: Normal sized atrium.  •  Aortic Valve: Tricuspid valve. Trace regurgitation. No stenosis.  •  Mitral Valve: Normal leaflet structure. Normal leaflet motion. Trace regurgitation. No  stenosis.  •  Tricuspid Valve: Normal structure. Mild regurgitation. Estimated RVSP = 43 mmHg. No significant stenosis.  •  Pulmonic Valve: Normal structure. No regurgitation. No stenosis.  •  Aorta: Aortic root normal. Sinuses of Valsalva normal-sized. Ascending aorta normal-sized. Aortic arch normal-sized. Descending aorta normal-sized.  •  Pericardium: Normal structure. No evidence of pericardial effusion. No cardiac tamponade.  •  Left Atrium: Mildly dilated atrium.      Imaging:    Radiology Imaging    XR CHEST 1 VW    Narrative  CLINICAL HISTORY:  VDRF    Impression  IMPRESSION:  Reduced lung volumes. No acute cardiopulmonary process. Stable  cardiac enlargement.    COMPARISON: Portable chest studies 12/25 and 12/26/2023.    COMMENT:  Upright portable imaging completed 0548 hours.  Endotracheal tube 4.7 cm above.  Enteric tube follows a normal course into left upper quadrant, directed to the  left.  Mild stable cardiac enlargement. Mitral annular prosthesis again noted. Stable  hilar and mediastinal contours.  Reduced lung volumes. No definite consolidation or pleural fluid.  Unremarkable upper abdomen.      Assessment     1. Pneumonia - in the setting of VF arrest and remains on pip-tazo with pt remaining afebrile without leukocytosis now. Repeat CXR shows no definite consolidation     2. Influenza A - on  day course of oseltamivir     3. Acute hypoxic respiratory failure requiring intubation - mgmt per Primary team     Plan      1. Continue pip-tazo for 3 more days and oseltamivir until tomorrow with no worsening findings being noted on repeat imaging.

## 2023-12-28 NOTE — POST-PROCEDURE NOTE
Interventional Radiology Brief Postprocedure Note    Cam Rosas     Attending: Abrahan Gomez MD PhD     Assistant: None    Diagnosis: pneumonia    Description of procedure: tunneled PICC placement    Contrast: None     Anesthesia:  Local (Lidocaine)    Volume of Lidocaine Utilized (ml): 10     Medications: None     Complications: None      Estimated Blood Loss: Estimated Blood Loss: 0-10 ml    Anticoagulation: None    Specimens: None    Findings: Status post tunneled PICC placement.     12/28/2023 4:15 PM

## 2023-12-28 NOTE — CONSULTS
Subjective     Cam Rosas is a 73 y.o. male who was admitted for Influenza A [J10.1]  Severe sepsis (CMS/HCC) [A41.9, R65.20]  Acute on chronic congestive heart failure, unspecified heart failure type (CMS/HCC) [I50.9]. Patient was referred evaluation of GH.    Patient is a 72 yo male admitted with Influenza A, AHRF s/p intubation, pneumonia, V fib arrest 12/25/23. Per RN yesterday pt developed GH in his 16F jones and noted reduced urine output. When 16F jones was removed clot noted at tip of catheter. 20F jones was placed and draining well at present time w/o GH or clots. Jones irrigates easily.     Pt with hx CaP s/p RALP 7/27/22 with Dr. Torrez. He had rising PSA and underwent XRT which he completed in 6/2023. PSA 7/20/23 was <0.02.     Creat rising and now 2.2  Hgb 8.5  UA 12/25/23 not concerning for infection    Outside records reviewed. Pertinent radiology results reviewed. Pertinent lab results reviewed..    Medical History:   Past Medical History:   Diagnosis Date   • Acute systolic heart failure (CMS/HCC) 02/15/2023   • Ambulates with cane     and walker   • Atrial fibrillation (CMS/HCC)    • Breast cancer (CMS/HCC) October 2022   • CHF (congestive heart failure) (CMS/HCC)    • Chronic kidney disease    • CKD (chronic kidney disease) 10/19/2022   • History of radiation therapy    • Hx antineoplastic chemo    • Prostate cancer (CMS/HCC)        Surgical History:   Past Surgical History:   Procedure Laterality Date   • CARDIAC SURGERY      mitral valve repair 2006   • CARDIOVERSION  12/05/2022    Successful DC cardioversion   • KNEE CARTILAGE SURGERY Left    • MASTECTOMY     • PROSTATE SURGERY  July 2022   • PROSTATECTOMY  07/15/2022   • TONSILLECTOMY         Social History:   Social History     Social History Narrative   • Not on file       Family History:   Family History   Problem Relation Age of Onset   • Hyperlipidemia Biological Mother    • Hypertension Biological Mother    • Breast cancer Biological  Mother    • Cancer Biological Mother         gyn? cervical   • Hyperlipidemia Biological Father    • Hypertension Biological Father    • Arthritis Biological Father    • No Known Problems Biological Sister    • No Known Problems Biological Brother    • Heart disease Maternal Grandmother    • Arthritis Maternal Grandfather    • COPD Maternal Grandfather    • Diabetes Maternal Grandfather    • No Known Problems Paternal Grandmother    • No Known Problems Paternal Grandfather    • Cancer less than or equal to age 50 Other    • Esophageal cancer Other         47, in abd,chemo q 3 weeks       Allergies: Azithromycin, Prednisone, and Lorazepam    Current Facility-Administered Medications   Medication Dose Route Frequency Provider Last Rate Last Admin   • acetaminophen (TYLENOL) 650 mg/20.3 mL solution 650 mg  650 mg feeding tube q4h PRN Angie Almendarez MD   650 mg at 12/25/23 1004   • albuterol nebulizer solution 2.5 mg  2.5 mg nebulization q4h PRN Ishaan Juares PA C       • amiodarone (PACERONE) tablet 200 mg  200 mg feeding tube Daily Angie Almendarez MD   200 mg at 12/27/23 0825   • atorvastatin (LIPITOR) tablet 10 mg  10 mg oral Nightly Angie Almendarez MD   10 mg at 12/27/23 2138   • atropine injection 1 mg  1 mg intravenous Once PRN Angie Almendarez MD       • betamethasone valerate (VALISONE) 0.1 % ointment   Topical 2x daily PRN Angie Almendarez MD       • calcium gluconate 1 gram/50 mL IVPB in NSS 1 g  1 g intravenous q6h PRN Angie Almendarez MD   Stopped at 12/28/23 0543   • cetirizine (ZyrTEC) 1 mg/mL solution 10 mg  10 mg feeding tube Nightly Angie Almendarez MD   10 mg at 12/27/23 2138   • chlorhexidine (PERIDEX) 0.12 % mouthwash 15 mL  15 mL Mouth/Throat 2 times per day Merced Mcconnell CRNP   15 mL at 12/27/23 2138   • cholecalciferol (vitamin D3) tablet 1,000 Units  1,000 Units feeding tube Daily Angie Almendarez MD   1,000 Units at 12/27/23 0825   • glucose  chewable tablet 16-32 g of dextrose  16-32 g of dextrose oral PRN Angie Almendarez MD        Or   • dextrose 40 % oral gel 15-30 g of dextrose  15-30 g of dextrose oral PRN Angie Almendarez MD        Or   • glucagon (GLUCAGEN) injection 1 mg  1 mg intramuscular PRN Angie Almendarez MD        Or   • dextrose 50 % in water (D50) injection 12.5 g  25 mL intravenous PRN Angie Almendarez MD       • fentaNYL (SUBLIMAZE) 2500 mcg/50 mL (50 mcg/mL) syringe  0-200 mcg/hr intravenous Titrated Angie Almendarez MD 2 mL/hr at 12/28/23 0600 100 mcg/hr at 12/28/23 0600    And   • fentaNYL bolus from bag 25 mcg  25 mcg intravenous q5 min PRN Angie Almendarez MD   50 mcg at 12/27/23 1135   • [Provider Managed Hold] furosemide (LASIX) injection 40 mg  40 mg intravenous q12h Angie Parry MD       • guaiFENesin (ROBITUSSIN) 100 mg/5 mL liquid 400 mg  400 mg oral q6h Angie Parry MD   400 mg at 12/28/23 0622   • heparin (porcine) 5,000 unit/mL injection 5,000 Units  5,000 Units subcutaneous q12h (6a, 6p) Angie Almendarez MD   5,000 Units at 12/28/23 0622   • insulin lispro U-100 (HumaLOG) pen 1-10 Units  1-10 Units subcutaneous q6h Santhosh Ceron MD   2 Units at 12/28/23 0624   • ipratropium HFA (ATROVENT HFA) 17 mcg/actuation inhaler 2 puff  2 puff inhalation QID (8a, 12p, 4p, 8p) Angie Almendarez MD   2 puff at 12/27/23 2011   • magnesium sulfate IVPB 2g in 50 mL NSS/D5W/SWFI  2 g intravenous PRN Angie Almendarez MD   Stopped at 12/25/23 0828   • methylPREDNISolone sod suc(PF) (SOLU-Medrol) injection 60 mg  60 mg intravenous q6h INT Merced Mcconnell CRNP   60 mg at 12/28/23 0215   • metoclopramide (REGLAN) injection 10 mg  10 mg intravenous q6h PRN Angie Almendarez MD       • midazolam (VERSED) 100 mg/100 mL infusion in NSS  0-15 mg/hr intravenous Titrated Alma De La Garza CRNP 2 mL/hr at 12/28/23 0600 2 mg/hr at 12/28/23 0600    And   •  midazolam (VERSED) bolus from bag 1 mg  1 mg intravenous q5 min PRN Alma De La Garza CRNP   1 mg at 12/27/23 1155   • milrinone (PRIMACOR) infusion 40 mg/200 mL (premix)  0.125 mcg/kg/min intravenous Continuous Ceci Jaeger CRNP 3.01 mL/hr at 12/28/23 0600 0.125 mcg/kg/min at 12/28/23 0600   • norepinephrine (LEVOPHED) 4 mg/250 mL (16 mcg/mL) in 0.9 % NaCl infusion  0-90 mcg/min intravenous Titrated Angie Almendarez MD 7.5 mL/hr at 12/28/23 0600 2 mcg/min at 12/28/23 0600   • oseltamivir (TAMIFLU) 6 mg/mL suspension 30 mg  30 mg oral BID Ishaan Juares PA C   30 mg at 12/27/23 2034   • pantoprazole (PROTONIX) injection 40 mg  40 mg intravenous q12h GISELLE Angie Almendarez MD   40 mg at 12/27/23 2034   • piperacillin-tazobactam (ZOSYN) 4.5 g/100 mL IVPB in NSS  4.5 g intravenous q6h INT Angie Almendarez MD   Stopped at 12/28/23 0451   • potassium chloride (KLOR-CON M) ER tablet (particles/crystals) 20 mEq  20 mEq oral PRN Angie Almendarez MD       • potassium chloride (KLOR-CON M) ER tablet (particles/crystals) 40 mEq  40 mEq oral PRN Angie Almendarez MD   40 mEq at 12/28/23 0622   • potassium chloride 20 mEq in 100 mL IVPB  (premix)  20 mEq intravenous PRN Angie Almendarez MD   Stopped at 12/26/23 0827   • potassium chloride 20 mEq in 100 mL IVPB  (premix)  20 mEq intravenous PRN Angie Almendarez MD        And   • potassium chloride 20 mEq in 100 mL IVPB  (premix)  20 mEq intravenous PRN Angie Almendarez MD       • propofoL (DIPRIVAN) 10 mg/mL continuous infusion  0-80 mcg/kg/min intravenous Titrated Ishaan Juares PA C   Stopped at 12/27/23 1644   • silver sulfadiazine (SILVADENE) 1 % cream   Topical BID Angie Almendarez MD   Given at 12/27/23 2034   • tamoxifen (NOLVADEX) tablet 20 mg  20 mg feeding tube Daily Angie Almendarez MD   20 mg at 12/27/23 0828        Medication List      ASK your doctor about these medications    amiodarone 200 mg  tablet  Commonly known as: PACERONE  Take 1 tablet (200 mg total) by mouth daily.  Dose: 200 mg     betamethasone dipropionate 0.05 % cream  Apply topically as needed.     cetirizine 10 mg tablet  Commonly known as: ZyrTEC  Take 10 mg by mouth nightly.  Dose: 10 mg     cholecalciferol (vitamin D3) 1,000 unit (25 mcg) tablet  Take 1,000 Units by mouth daily.  Dose: 1,000 Units     guaiFENesin 600 mg 12 hr tablet  Commonly known as: MUCINEX  Take 1,200 mg by mouth 2 (two) times a day.  Dose: 1,200 mg     metoprolol succinate XL 50 mg 24 hr tablet  Commonly known as: TOPROL-XL  Take 1 tablet (50 mg total) by mouth daily.  Dose: 50 mg     pantoprazole 40 mg EC tablet  Commonly known as: PROTONIX  Take 1 tablet (40 mg total) by mouth 2 (two) times a day.  Dose: 40 mg     potassium chloride 20 mEq CR tablet  Commonly known as: KLOR-CON M  Take 1 tablet (20 mEq) daily when you take as needed  torsemide.     rivaroxaban 20 mg tablet  Commonly known as: XARELTO  Take 1 tablet (20 mg total) by mouth daily with dinner.  Dose: 20 mg     silver sulfadiazine 1 % cream  Commonly known as: SILVADENE  Apply topically 2 (two) times a day. Apply to affected area.     simvastatin 20 mg tablet  Commonly known as: ZOCOR  Take 1 tablet (20 mg total) by mouth nightly.  Dose: 20 mg     tamoxifen 20 mg chemo tablet  Commonly known as: NOLVADEX  Take  1 tablet (20 mg total) daily  Dose: 20 mg     torsemide 20 mg tablet  Commonly known as: DEMADEX  Take 1 tablet (20 mg total) by mouth daily as needed (for weight gain 3 lbs overnight, 5 lbs in a week, lower extremity edema).  Dose: 20 mg     TYLENOL EX STR RAPID RELEASE ORAL  Take 500 mg by mouth as needed.  Dose: 500 mg          Review of Systems  Pertinent items are noted in HPI. Unable to obtain ROS as pt is sedated/intubated    Objective   Labs  CBC Results       12/28/23 12/27/23 12/26/23     0426 0519 0347    WBC 4.13 5.31 8.09    RBC 2.53 2.58 2.75    HGB 8.5 8.8 9.4    HCT 26.5 27.8  29.0    .7 107.8 105.5    MCH 33.6 34.1 34.2    MCHC 32.1 31.7 32.4    PLT 94 94 112         Comment for PLT at 0426 on 12/28/23: ALL RESULTS HAVE BEEN RECHECKEDSPECIMEN QUALITY CHECKEDRESULTS OBTAINED AFTER VORTEXING TO ELIMINATE PLT CLUMPS    Comment for PLT at 0519 on 12/27/23: RESULTS CHECKED SPECIMEN QUALITY CHECKED        BMP Results       12/28/23 12/27/23 12/26/23     0426 0519 0347     149 148    K 3.3 3.8 3.5    Cl 114 117 114    CO2 24 23 26    Glucose 201 138 117    BUN 40 28 21    Creatinine 2.2 1.9 1.6    Calcium 7.8 7.5 7.3    Anion Gap 11 9 8    EGFR 30.9 36.8 45.2         Comment for EGFR at 0426 on 12/28/23: Calculation based on the Chronic Kidney Disease Epidemiology Collaboration (CKD-EPI) equation refit without adjustment for race.    Comment for EGFR at 0519 on 12/27/23: Calculation based on the Chronic Kidney Disease Epidemiology Collaboration (CKD-EPI) equation refit without adjustment for race.    Comment for EGFR at 0347 on 12/26/23: Calculation based on the Chronic Kidney Disease Epidemiology Collaboration (CKD-EPI) equation refit without adjustment for race.        UA Results       12/25/23     0414    Color Yellow    Clarity Clear    Glucose Negative    Bilirubin Negative    Ketones Negative    Sp Grav >1.035    Blood Trace    Ph 5.5    Protein Trace    Urobilinogen 0.2    Nitrite Negative    Leuk Est Negative    WBC 0 TO 3    RBC 0 TO 4    Bacteria Rare         Comment for Blood at 0414 on 12/25/23: The sensitivity of the occult blood test is equivalent to approximately 4 intact RBC/HPF.    Comment for Protein at 0414 on 12/25/23: Trace False Positive Protein can be seen with alkaline or highly buffered urines or urine with high specific gravity. Suggest clinical correlation.    Comment for Leuk Est at 0414 on 12/25/23: Results can be falsely negative due to high specific gravity, some antibiotics, glucose >3 g/dl, or WBC other than neutrophils.        Physicial  Exam  Vitals: 98.5, 196/82, 68, 18  Gen: WDWN  Psych: sedated on vent  HEENT: NC/AT  Pulm: intubated  Abd: soft, NT/ND  Gu: 20F jones draining yellow urine, jones irrigated with 120cc sterile water and no blood or clot noted. Jones irrigates easily.   Ext: no c/t/e  Neuro: CNS intact     A/P: 74 yo male with hx CaP s/p RALP 7/2022 with rising PSA s/p XRT 6/2023,  resolved GH, ELEANOR  - Maintain 20F jones, irrigate PRN. Monitor UOP  - LINDA ordered to check for any hydro given rising Creat. ELEANOR could be due to his other underlying medical issues but will confirm no hydro given jones may have been obstructed in the last 24 hours.   - OP f/u with Dr. Torrez. Consider OP cysto given recent issue with GH.

## 2023-12-28 NOTE — PROGRESS NOTES
Critical Care/Pulmonary  Daily Progress Note        Subjective    Interval History:    Intubated and sedated on 100 mcg fentanyl and 2 mg versed  Remains on Levophed at 2 mcg/min and milrinone at 0.125 mcg    Overnight, noted period hematuria, now resolved  Afebrile    I and O  Since admission: 2.5 liters  Last 24 hours: 259 ml  Urine output:1285 ml       Objective       Allergies: Azithromycin, Prednisone, and Lorazepam    CURRENT MEDS  •  acetaminophen, 650 mg, feeding tube, q4h PRN  •  albuterol, 2.5 mg, nebulization, q4h PRN  •  amiodarone, 200 mg, feeding tube, Daily  •  atorvastatin, 10 mg, oral, Nightly  •  atropine, 1 mg, intravenous, Once PRN  •  betamethasone valerate, , Topical, 2x daily PRN  •  calcium gluconate, 1 g, intravenous, q6h PRN  •  cetirizine, 10 mg, feeding tube, Nightly  •  chlorhexidine, 15 mL, Mouth/Throat, 2 times per day  •  cholecalciferol (vitamin D3), 1,000 Units, feeding tube, Daily  •  glucose, 16-32 g of dextrose, oral, PRN **OR** dextrose, 15-30 g of dextrose, oral, PRN **OR** glucagon, 1 mg, intramuscular, PRN **OR** dextrose 50 % in water (D50), 25 mL, intravenous, PRN  •  fentaNYL, 0-200 mcg/hr, intravenous, Titrated **AND** fentaNYL, 25 mcg, intravenous, q5 min PRN  •  [Provider Managed Hold] furosemide, 40 mg, intravenous, q12h GISELLE  •  guaiFENesin, 400 mg, oral, q6h GISELLE  •  heparin (porcine), 5,000 Units, subcutaneous, q12h (6a, 6p)  •  insulin lispro U-100, 1-10 Units, subcutaneous, q6h GISELLE  •  ipratropium HFA, 2 puff, inhalation, QID (8a, 12p, 4p, 8p)  •  magnesium sulfate, 2 g, intravenous, PRN  •  methylPREDNISolone sodium succinate, 60 mg, intravenous, q6h INT  •  metoclopramide, 10 mg, intravenous, q6h PRN  •  [COMPLETED] midazolam, 2 mg, intravenous, Once **AND** midazolam, 0-15 mg/hr, intravenous, Titrated **AND** midazolam, 1 mg, intravenous, q5 min PRN  •  milrinone, 0.125 mcg/kg/min, intravenous, Continuous  •  norepinephrine, 0-90 mcg/min, intravenous,  Titrated  •  oseltamivir, 30 mg, oral, BID  •  pantoprazole, 40 mg, intravenous, q12h GISELLE  •  piperacillin-tazobactam, 4.5 g, intravenous, q6h INT  •  potassium chloride, 20 mEq, oral, PRN  •  potassium chloride, 40 mEq, oral, PRN  •  potassium chloride, 20 mEq, intravenous, PRN  •  potassium chloride in water, 20 mEq, intravenous, PRN **AND** potassium chloride in water, 20 mEq, intravenous, PRN  •  propofoL, 0-80 mcg/kg/min, intravenous, Titrated  •  silver sulfadiazine, , Topical, BID  •  sodium chloride, 10 mL, intravenous, q8h INT  •  sodium chloride, 10 mL, intravenous, PRN  •  tamoxifen, 20 mg, feeding tube, Daily    VITAL SIGNS  Vitals:    12/28/23 1318 12/28/23 1321 12/28/23 1333 12/28/23 1400   BP:    (!) 101/59   Pulse: 67 66 (!) 59 66   Resp: 18 18 18 18   Temp:       TempSrc:       SpO2: 98% 98% 99% 98%   Weight:       Height:           VENTILATOR SETTINGS  Vent Mode: Assist control/volume control  FiO2 (%) (Set):  [40 %] 40 %  S RR:  [18] 18  S VT:  [450 mL] 450 mL  PEEP/CPAP (Set, cmH2O):  [10 cm H20] 10 cm H20  MAP (Observed, cmH2O):  [15-17] 15    Oxygen:  Oxygen Therapy: Supplemental oxygen  O2 Delivery Method: Endotracheal tube  FiO2 (%) (Set): 40 %  O2 Flow Rate (L/min): 6 L/min     INTAKE/OUTPUT    Intake/Output Summary (Last 24 hours) at 12/28/2023 1444  Last data filed at 12/28/2023 1400  Gross per 24 hour   Intake 1906.35 ml   Output 1240 ml   Net 666.35 ml       Lines, Drains, Airways, Wounds:  Peripheral IV (Adult) 12/25/23 Anterior;Right Hand (Active)   Number of days: 2       Peripheral IV (Adult) 12/26/23 Right Antecubital (Active)   Number of days: 1       Peripheral IV (Adult) 11/26/23 Left;Posterior Forearm (Active)   Number of days: 31       NG/OG Tube 12/25/23 Left nostril (Active)   Number of days: 2       Urethral Catheter Temperature probe 16 Fr (Active)   Number of days: 3       ETT  (Active)   Number of days: 1       Wound Other (comment) Right Pretibial (Active)   Number of  days: 3       Wound Venous Ulcer Left Pretibial (Active)   Number of days: 3       Wound Perineum (Active)   Number of days: 3       Catheterization Site - Arterial Right Femoral 6 Fr. (Active)   Number of days: 2       Catheterization Site - Venous Right Femoral 6 Fr. (Active)   Number of days: 2         Physical Exam:  Physical Exam  Vitals and nursing note reviewed.   Constitutional:       Interventions: He is sedated and intubated.   HENT:      Head: Normocephalic and atraumatic.      Mouth/Throat:      Mouth: Mucous membranes are moist.   Eyes:      General: No scleral icterus.     Pupils: Pupils are equal, round, and reactive to light.   Cardiovascular:      Rate and Rhythm: Normal rate. Rhythm irregular.      Heart sounds: Normal heart sounds. No murmur heard.  Pulmonary:      Effort: No respiratory distress. He is intubated.      Breath sounds: No wheezing or rales.      Comments: Mechanical breath sounds bilaterally  Abdominal:      General: Bowel sounds are normal.      Palpations: Abdomen is soft.   Musculoskeletal:      Right lower leg: No edema.      Left lower leg: No edema.   Skin:     General: Skin is warm and dry.   Neurological:      Comments: Intubated and sedated          Labs:  See Labs Below:  ABG  Results from last 7 days   Lab Units 12/28/23  0425 12/26/23  1609 12/26/23  1435 12/24/23  2332 12/24/23 2012 12/24/23  1753 12/24/23  1753 12/24/23  1647   PH ART pH 7.44 7.26* 7.17*   < > 7.27*   < > 7.27*  --    PCO2 ART mm Hg 36 59* 66*   < > 46   < > 48  --    PO2 ART mm Hg 102* 79* 84   < > 100   < > 151*  --    HCO3 ART mEQ/L 25 24 21   < > 20.3*   < > 21.0  --    O2 SAT ART % 97 93 93   < >  --   --   --   --    BASE EXC ART mEQ/L 0.4 -1.1 -5.2   < > -5.9   < > -5.1  --    SOURCE OF OXYGEN   --   --   --   --  Bipap  --  bipap -    < > = values in this interval not displayed.     CBC  Results from last 7 days   Lab Units 12/28/23  0426 12/27/23  0519 12/26/23  0347   WBC K/uL 4.13 5.31 8.09    RBC M/uL 2.53* 2.58* 2.75*   HEMOGLOBIN g/dL 8.5* 8.8* 9.4*   HEMATOCRIT % 26.5* 27.8* 29.0*   MCV fL 104.7* 107.8* 105.5*   MCH pg 33.6* 34.1* 34.2*   MCHC g/dL 32.1* 31.7* 32.4   PLATELETS K/uL 94* 94* 112*   RDW % 16.0* 16.0* 15.6*   MPV fL 10.1 10.1 9.7     BMP  Results from last 7 days   Lab Units 12/28/23  0426 12/27/23  0519 12/26/23  0347   SODIUM mEQ/L 149* 149* 148*   POTASSIUM mEQ/L 3.3* 3.8 3.5   CHLORIDE mEQ/L 114* 117* 114*   CO2 mEQ/L 24 23 26   BUN mg/dL 40* 28* 21   CREATININE mg/dL 2.2* 1.9* 1.6*   CALCIUM mg/dL 7.8* 7.5* 7.3*   ALBUMIN g/dL 2.5* 2.5* 2.6*   BILIRUBIN TOTAL mg/dL 0.9 0.9 0.9   ALK PHOS IU/L 47 49 53   ALT IU/L 553* 516* 268*   AST IU/L 822* 1,028* 711*   GLUCOSE mg/dL 201* 138* 117*     Coag  Results from last 7 days   Lab Units 12/25/23  0934 12/25/23  0410 12/24/23  1647   PROTIME sec  --  17.4* 21.2*   INR   --  1.4 1.9   PTT sec 26 33  --        Imaging:     US Kidneys 12/28/2023  Pending    X-RAY CHEST 1 VIEW    Result Date: 12/27/2023  IMPRESSION:  Reduced lung volumes. No acute cardiopulmonary process. Stable cardiac enlargement. COMPARISON: Portable chest studies 12/25 and 12/26/2023. COMMENT:  Upright portable imaging completed 0548 hours. Endotracheal tube 4.7 cm above. Enteric tube follows a normal course into left upper quadrant, directed to the left. Mild stable cardiac enlargement. Mitral annular prosthesis again noted. Stable hilar and mediastinal contours. Reduced lung volumes. No definite consolidation or pleural fluid. Unremarkable upper abdomen.    X-RAY CHEST 1 VIEW    Result Date: 12/26/2023  IMPRESSION: Endotracheal tube has been repositioned; the tip is now approximately 3.3 cm above the leo. COMMENT: A semierect AP portable view the chest was performed at 1615 and is compared to a prior study from 1503. Since the prior study, the endotracheal tube has been repositioned and the tip is now approximately 3.3 cm above the leo. There has been no other  significant interval change.    X-RAY CHEST 1 VIEW    Result Date: 12/26/2023  IMPRESSION: ET tube 2 cm above leo. NG tube tip in proximal stomach. Probable right lower lobe atelectasis; attention on follow-up.    X-RAY CHEST 1 VIEW    Result Date: 12/26/2023  IMPRESSION: Status post extubation. Slightly improved vascular congestion since earlier in the day. Suspect developing atelectasis or airspace disease in the right midlung zone. COMMENT: Semierect AP portable view of the chest was performed at 1428 and is compared to a prior study from 5:27 AM. In the interval, the endotracheal tube has been removed. An enteric tube remains in place with the tip in the stomach. There is a vascular catheter/line with the tip projecting near the junction of the IVC and right atrium; it is difficult to determine whether this was present previously but differences in technique. Mild cardiomegaly is again noted. Cardiac valvular prosthesis is again seen. The pulmonary vasculature and interstitial markings have improved slightly since earlier in the day. There is questionable developing airspace disease or atelectasis in the right midlung zone. Trace bilateral pleural effusions are suspected. The hilar and mediastinal structures appear stable. Surgical clips are again seen in the right axilla.    X-RAY CHEST 1 VIEW    Result Date: 12/26/2023  IMPRESSION: ET tube 4 cm above leo, NG tube tip not well seen, likely in proximal stomach. Stable cardiomegaly, mitral valve replacement. Clear lungs, with interstitial crowding secondary to low lung volumes.    X-RAY CHEST 1 VIEW    Result Date: 12/26/2023  IMPRESSION: Improved pulmonary vascular congestion. ET tube 3 cm above leo, NG tube tip below inferior edge of film.    X-RAY CHEST 1 VIEW    Result Date: 12/25/2023  IMPRESSION: Interval retraction of the endotracheal tube now in satisfactory position, as below. Stable satisfactory positioning of the enteric tube. Progressive  pulmonary interstitial edema with stable enlargement of the cardiac silhouette. No pneumothorax. COMMENT: Comparison: Chest x-ray 12/24/2023. Technique: A single frontal AP portable upright projection of the chest was obtained. FINDINGS: There has been interval retraction of the endotracheal tube now terminating 2.7 cm above the leo. An enteric tube courses to the stomach with the distal tip collimated from the field-of-view beneath the left hemidiaphragm in satisfactory position. There are defibrillator pad projecting over the left hemithorax. There are progressively increased interstitial opacities seen projecting over both lungs. There is no pleural effusion or pneumothorax. The cardiac silhouette is stably enlarged. The mediastinal contours are unchanged. Again noted is a prosthetic mitral valve. The upper abdomen is unremarkable. There is no acute osseous abnormality. Surgical clips overlie the right axillary region.     X-RAY CHEST 1 VIEW    Result Date: 12/25/2023  IMPRESSION: Satisfactory positioning of the endotracheal and enteric tubes. No pneumothorax. Stable pulmonary edema and enlargement of the cardiac silhouette. COMMENT: Comparison: Chest x-ray 12/25/2023. Technique: A single frontal AP portable upright projection of the chest was obtained. FINDINGS: The tip of an endotracheal tube terminates 4.0 cm above the leo. An enteric tube courses to the stomach with the distal tip collimated from the field-of-view beneath the left hemidiaphragm in satisfactory position. There are defibrillator pad projecting over the left hemithorax. There are mildly increased interstitial opacities again seen projecting over both lungs. No pleural effusion or pneumothorax is seen. There is stable enlargement of the cardiac silhouette. Again noted is a prosthetic mitral valve. The mediastinal contours are unchanged. The upper abdomen is unremarkable. There is no acute osseous abnormality. There are surgical clips  overlying the right axillary region.     X-RAY CHEST 1 VIEW    Result Date: 12/25/2023  IMPRESSION: Low-lying endotracheal tube terminating over the proximal right mainstem bronchus. Recommend retraction. Satisfactory positioning of the enteric tube. This finding and recommendation was discussed with nurse Sahara at 11:27 AM on 12/25/2023 by Dr. Martinez by telephone. Mild pulmonary interstitial edema and stable enlargement of the cardiac silhouette. No pneumothorax. COMMENT: Comparison: Chest x-ray 12/24/2023. Technique: A single frontal AP portable upright projection of the chest was obtained. FINDINGS: The tip of the intervally placed endotracheal tube terminates over the proximal right mainstem bronchus. The tip of the enteric tube terminates over the left upper quadrant of the abdomen. There are mildly increased interstitial opacities noted within both lungs. There is no pleural effusion or pneumothorax. There is stable enlargement of the cardiac silhouette. Again noted is a mitral valve prosthesis. Surgical clips overlie the right axilla. The upper abdomen is unremarkable. There is no acute osseous abnormality.    X-RAY CHEST 1 VIEW    Result Date: 12/25/2023  IMPRESSION: Vascular congestion.  Left basal atelectasis. COMMENT: AP radiograph the chest is compared to previous study dated 8/17/2023. There is vascular congestion and small effusions.  Findings may represent mild congestive heart failure.  Cardiac silhouette is unchanged.  Prosthetic valve ring seen.  Surgical staples seen in the right axilla.  There is minimal left basal atelectasis.    CT ANGIOGRAPHY CHEST PULMONARY EMBOLISM WITH IV CONTRAST    Result Date: 12/24/2023  IMPRESSION: 1. No pulmonary embolism in the main or proximal segmental pulmonary arteries. 2. Right upper lobe and left lower lobe infiltrate with patchy airspace opacities in the right middle lobe.      Micro:   Microbiology Results     Procedure Component Value Units Date/Time     Sputum culture / smear Expectorated Sputum [[id number]] Collected: 12/25/23 0418    Specimen: Aspirate from Expectorated Sputum Updated: 12/27/23 0845    Narrative:      The following orders were created for panel order Sputum culture / smear Expectorated Sputum.  Procedure                               Abnormality         Status                     ---------                               -----------         ------                     Sputum Gram Stain (Lab O...[[id number]]                      Final result               Sputum Culture (Lab Only...[[id number]]  Normal              Final result                 Please view results for these tests on the individual orders.    Sputum Gram Stain (Lab Only) Expectorated Sputum [[id number]] Collected: 12/25/23 0418    Specimen: Aspirate from Expectorated Sputum Updated: 12/25/23 0956     Gram Stain Result 3+ WBC      No Epithelial Cells Seen      3+ Gram positive bacilli      2+ Gram positive cocci in pairs, chains and clusters    Sputum Culture (Lab Only) Expectorated Sputum [[id number]]  (Normal) Collected: 12/25/23 0418    Specimen: Aspirate from Expectorated Sputum Updated: 12/27/23 0845     Culture Normal [place]    MRSA Screen, Nares Only Nose [[id number]]  (Normal) Collected: 12/25/23 0352    Specimen: Nasal Swab from Nose Updated: 12/25/23 0906     MRSA DNA, Nares Negative    Blood Culture Blood, Venous [[id number]]  (Normal) Collected: 12/24/23 1652    Specimen: Blood, Venous Updated: 12/27/23 0000     Culture No growth at 48 hours    SARS-CoV-2 (COVID-19) Nasopharynx [[id number]]  (Abnormal) Collected: 12/24/23 1649    Specimen: Nasopharyngeal Swab from Nasopharynx Updated: 12/24/23 1736    Narrative:      The following orders were created for panel order SARS-CoV-2 (COVID-19) Nasopharynx.  Procedure                               Abnormality         Status                     ---------                               -----------         ------                     SARS-COV-2  (COVID-19)/ F...[[id number]]  Abnormal            Final result                 Please view results for these tests on the individual orders.    SARS-COV-2 (COVID-19)/ FLU A/B, AND RSV, PCR Nasopharynx [[id number]]  (Abnormal) Collected: 12/24/23 1649    Specimen: Nasopharyngeal Swab from Nasopharynx Updated: 12/24/23 1736     SARS-CoV-2 (COVID-19) Negative     Influenza A Positive     Influenza B Negative     Respiratory Syncytial Virus Negative    Narrative:      Testing performed using real-time PCR for detection of COVID-19. EUA approved validation studies performed on site.     Blood Culture Blood, Venous [[id number]]  (Normal) Collected: 12/24/23 1647    Specimen: Blood, Venous Updated: 12/27/23 0100     Culture No growth at 48 hours          ECG/Telemetry  I have independently reviewed the telemetry. No events for the last 24 hours.         ASSESSMENT    73 y.o. male with history atrial fibrillation, CHF, prostate and breast CA, history of GI bleed who presented to the emergency room with shortness of breath and found to be influenza A positive. During day 2 of his hospital stay he had a v fib arrest with ROSC. He was transferred to the ICU for further monitoring     PLAN   #Acute Hypoxic respiratory failure  -Failed Bipap on admission, became bradycardic and was intubated  - extubated 12/26, however became hypoxic, ? Secondary to upper airway edema ve acute bronchospasm vs flash pulmonary edema  - treated with lasix 40 mg and solumedrol q 6  - Ultimately required re-intubation 12/26. Anesthesia noted some swelling around cords  - s/p bronchoscopy post re-intubation 12/27 which noted patient 'biting on tube'  - now sedated with fentanyl and versed  - Maintain RASS -2 to 0  - begin wean steroid in AM     #Shock  -Requiring low dose Levophed to maintain MAP > 65    # s/p V fib cardiac arrest  - Likely related to Takosubo's cardiomyopthy, EF 40-45%  - ROSC achieved after receiving 3 epi, 3 shocks, 2 bicarb, and  Magnesium replacement  - Following commands post-arrest. No TTM  -Taken by interventional cardiology for the placement of a temporary pacemaker  - Also noted no significant CAD  - PPM removed secondary to dyssynchrony and misplacement. No further marcellus arrythmias, removed 12/27  -holding further diuresis     #Takotsubo Cardiomyopathy  -TTE 12/26 suggestive of Takotsubos with EF 40-45%, confirmed by LV gram   - Start losartan 25mg daily and low dose carvedilol once extubated, bp stabilizes and off milrinone  - Continue milrinone for now  -Cardiology following    # Acute kidney injury  - worsening renal function, creat initially 1.2 on admission, now 2.2  - baseline creat 1.2-1.3  - ? Secondary to IV dye load s/p cardiac cath vs over-diuresis  - Will increase FWF, serial BMP, may need to increase milrinone gtt  - Can consider SGC if not improving to monitor preload and CI    # Elevated liver enzymes  - likely in setting of hypotension, shock liver  - serial labs, trending down     #Pneumonia  -CT Chest 12/24 with left lower lobe infiltrate and right upper lobe infiltrate  -Blood/ sputum cultures negative   -Continue on Pip-tazo for additional 3 days  - ID following    #Afib  - appears to be in AF today  - EP following  - recommend starting BID lovenox or heparin gtt today  -Continue on amiodarone     #Influenza A  -Completed 5 day course Oseltamivir     # Prostate/breast cancer  - s/p radiation therapy for prostate cancer spring 2023  - s/p R mastectomy 8/2023 and radiation therapy 9/2023  - continue tamoxifen    VTE Prophylaxis Plan: SCDs SQH  GI Prophylaxis Plan: Protonix  Lines/Drains/Airway: PIV x 1, OET/OGT (12/25), PICC placement pending  Fluids/Electrolytes/Nutrition: NPO     Disposition Planning: Remain in the ICU    CODE STATUS  Full Code       The case was reviewed this morning at the patient's beside multidisciplinary rounds with the patient's nurse, dietician, pharmacist, respiratory therapist, physical  therapist and critical care nurse coordinator. The patient's clinical status with regards to diagnosis, treatment plans including disposition of any IV, arterial lines, Lloyd and tubes were discussed, as well as dietary, respiratory therapy and mobilization needs.     This case was discussed with consultants.    Total critical time spent managing 50 minutes.    This note was scribed by JONNY Obregon  in my presence on my behalf.       Vince Colbert MD  12/28/2023  2:44 PM

## 2023-12-29 LAB
ALBUMIN SERPL-MCNC: 2.4 G/DL (ref 3.5–5.7)
ALP SERPL-CCNC: 54 IU/L (ref 34–125)
ALT SERPL-CCNC: 414 IU/L (ref 7–52)
ANION GAP SERPL CALC-SCNC: 11 MEQ/L (ref 3–15)
APTT PPP: 103 SEC (ref 23–35)
APTT PPP: 112 SEC (ref 23–35)
APTT PPP: 166 SEC (ref 23–35)
APTT PPP: 91 SEC (ref 23–35)
AST SERPL-CCNC: 383 IU/L (ref 13–39)
ATRIAL RATE: 111
ATRIAL RATE: 214
ATRIAL RATE: 64
ATRIAL RATE: 68
ATRIAL RATE: 70
ATRIAL RATE: 83
BACTERIA BLD CULT: NORMAL
BACTERIA BLD CULT: NORMAL
BASOPHILS # BLD: 0 K/UL (ref 0.01–0.1)
BASOPHILS NFR BLD: 0 %
BILIRUB SERPL-MCNC: 0.6 MG/DL (ref 0.3–1.2)
BUN SERPL-MCNC: 53 MG/DL (ref 7–25)
CALCIUM SERPL-MCNC: 7.8 MG/DL (ref 8.6–10.3)
CHLORIDE SERPL-SCNC: 115 MEQ/L (ref 98–107)
CO2 SERPL-SCNC: 23 MEQ/L (ref 21–31)
CREAT SERPL-MCNC: 2.3 MG/DL (ref 0.7–1.3)
DACRYOCYTES BLD QL SMEAR: ABNORMAL
DIFFERENTIAL METHOD BLD: ABNORMAL
EGFRCR SERPLBLD CKD-EPI 2021: 29.3 ML/MIN/1.73M*2
EOSINOPHIL # BLD: 0 K/UL (ref 0.04–0.54)
EOSINOPHIL NFR BLD: 0 %
ERYTHROCYTE [DISTWIDTH] IN BLOOD BY AUTOMATED COUNT: 15.7 % (ref 11.6–14.4)
GLUCOSE BLD-MCNC: 208 MG/DL (ref 70–99)
GLUCOSE BLD-MCNC: 232 MG/DL (ref 70–99)
GLUCOSE BLD-MCNC: 263 MG/DL (ref 70–99)
GLUCOSE SERPL-MCNC: 200 MG/DL (ref 70–99)
HCT VFR BLDCO AUTO: 23.7 % (ref 40.1–51)
HGB BLD-MCNC: 7.6 G/DL (ref 13.7–17.5)
IMM GRANULOCYTES # BLD AUTO: 0.04 K/UL (ref 0–0.08)
IMM GRANULOCYTES NFR BLD AUTO: 1.2 %
LYMPHOCYTES # BLD: 0.13 K/UL (ref 1.2–3.5)
LYMPHOCYTES NFR BLD: 3.9 %
MACROCYTES BLD QL SMEAR: ABNORMAL
MCH RBC QN AUTO: 33.2 PG (ref 28–33.2)
MCHC RBC AUTO-ENTMCNC: 32.1 G/DL (ref 32.2–36.5)
MCV RBC AUTO: 103.5 FL (ref 83–98)
MICROORGANISM SPEC CULT: ABNORMAL
MICROORGANISM SPEC CULT: ABNORMAL
MONOCYTES # BLD: 0.19 K/UL (ref 0.3–1)
MONOCYTES NFR BLD: 5.7 %
NEUTROPHILS # BLD: 3 K/UL (ref 1.7–7)
NEUTS SEG NFR BLD: 89.2 %
NRBC BLD-RTO: 4.5 %
OVALOCYTES BLD QL SMEAR: ABNORMAL
P AXIS: 47
PDW BLD AUTO: 11 FL (ref 9.4–12.4)
PLAT MORPH BLD: NORMAL
PLATELET # BLD AUTO: 88 K/UL (ref 150–350)
PLATELET # BLD EST: ABNORMAL 10*3/UL
POCT TEST: ABNORMAL
POTASSIUM SERPL-SCNC: 3.4 MEQ/L (ref 3.5–5.1)
PR INTERVAL: 180
PR INTERVAL: 328
PR INTERVAL: 376
PROT SERPL-MCNC: 4.5 G/DL (ref 6–8.2)
QRS DURATION: 124
QRS DURATION: 128
QRS DURATION: 130
QT INTERVAL: 438
QT INTERVAL: 480
QT INTERVAL: 510
QT INTERVAL: 544
QT INTERVAL: 594
QT INTERVAL: 616
QTC CALCULATION(BAZETT): 518
QTC CALCULATION(BAZETT): 542
QTC CALCULATION(BAZETT): 595
QTC CALCULATION(BAZETT): 623
QTC CALCULATION(BAZETT): 627
QTC CALCULATION(BAZETT): 650
R AXIS: -15
R AXIS: -15
R AXIS: -17
R AXIS: -39
R AXIS: -42
R AXIS: -43
RBC # BLD AUTO: 2.29 M/UL (ref 4.5–5.8)
SODIUM SERPL-SCNC: 149 MEQ/L (ref 136–145)
T WAVE AXIS: 145
T WAVE AXIS: 149
T WAVE AXIS: 154
T WAVE AXIS: 161
T WAVE AXIS: 174
T WAVE AXIS: 180
TARGETS BLD QL SMEAR: ABNORMAL
VENTRICULAR RATE: 111
VENTRICULAR RATE: 67
VENTRICULAR RATE: 67
VENTRICULAR RATE: 68
VENTRICULAR RATE: 70
VENTRICULAR RATE: 79
WBC # BLD AUTO: 3.36 K/UL (ref 3.8–10.5)

## 2023-12-29 PROCEDURE — 63600000 HC DRUGS/DETAIL CODE: Mod: JZ | Performed by: NURSE PRACTITIONER

## 2023-12-29 PROCEDURE — 63700000 HC SELF-ADMINISTRABLE DRUG

## 2023-12-29 PROCEDURE — 94003 VENT MGMT INPAT SUBQ DAY: CPT

## 2023-12-29 PROCEDURE — 85025 COMPLETE CBC W/AUTO DIFF WBC: CPT | Performed by: NURSE PRACTITIONER

## 2023-12-29 PROCEDURE — 25800000 HC PHARMACY IV SOLUTIONS: Performed by: INTERNAL MEDICINE

## 2023-12-29 PROCEDURE — 63600000 HC DRUGS/DETAIL CODE: Mod: JZ

## 2023-12-29 PROCEDURE — 63600000 HC DRUGS/DETAIL CODE: Mod: JZ | Performed by: INTERNAL MEDICINE

## 2023-12-29 PROCEDURE — 85730 THROMBOPLASTIN TIME PARTIAL: CPT | Performed by: INTERNAL MEDICINE

## 2023-12-29 PROCEDURE — 20000000 HC ROOM AND CARE ICU

## 2023-12-29 PROCEDURE — 25000000 HC PHARMACY GENERAL: Performed by: HOSPITALIST

## 2023-12-29 PROCEDURE — 63700000 HC SELF-ADMINISTRABLE DRUG: Performed by: HOSPITALIST

## 2023-12-29 PROCEDURE — 63600000 HC DRUGS/DETAIL CODE: Mod: JZ | Performed by: HOSPITALIST

## 2023-12-29 PROCEDURE — 36415 COLL VENOUS BLD VENIPUNCTURE: CPT | Performed by: INTERNAL MEDICINE

## 2023-12-29 PROCEDURE — 82040 ASSAY OF SERUM ALBUMIN: CPT | Performed by: NURSE PRACTITIONER

## 2023-12-29 PROCEDURE — 94640 AIRWAY INHALATION TREATMENT: CPT

## 2023-12-29 PROCEDURE — 25800000 HC PHARMACY IV SOLUTIONS: Performed by: HOSPITALIST

## 2023-12-29 PROCEDURE — 80053 COMPREHEN METABOLIC PANEL: CPT | Performed by: NURSE PRACTITIONER

## 2023-12-29 RX ORDER — HYDRALAZINE HYDROCHLORIDE 20 MG/ML
5 INJECTION INTRAMUSCULAR; INTRAVENOUS EVERY 6 HOURS PRN
Status: DISCONTINUED | OUTPATIENT
Start: 2023-12-29 | End: 2024-01-10 | Stop reason: HOSPADM

## 2023-12-29 RX ADMIN — IPRATROPIUM BROMIDE 2 PUFF: 17 AEROSOL, METERED RESPIRATORY (INHALATION) at 19:50

## 2023-12-29 RX ADMIN — Medication 10 MG: at 21:02

## 2023-12-29 RX ADMIN — SILVER SULFADIAZINE: 10 CREAM TOPICAL at 21:08

## 2023-12-29 RX ADMIN — PIPERACILLIN AND TAZOBACTAM 4.5 G: 4; .5 INJECTION, POWDER, LYOPHILIZED, FOR SOLUTION INTRAVENOUS; PARENTERAL at 21:29

## 2023-12-29 RX ADMIN — PIPERACILLIN AND TAZOBACTAM 4.5 G: 4; .5 INJECTION, POWDER, LYOPHILIZED, FOR SOLUTION INTRAVENOUS; PARENTERAL at 16:09

## 2023-12-29 RX ADMIN — HYDRALAZINE HYDROCHLORIDE 5 MG: 20 INJECTION INTRAMUSCULAR; INTRAVENOUS at 21:50

## 2023-12-29 RX ADMIN — CHLORHEXIDINE GLUCONATE ORAL RINSE 15 ML: 1.2 SOLUTION DENTAL at 10:28

## 2023-12-29 RX ADMIN — POTASSIUM CHLORIDE 20 MEQ: 200 INJECTION, SOLUTION INTRAVENOUS at 07:02

## 2023-12-29 RX ADMIN — AMIODARONE HYDROCHLORIDE 200 MG: 200 TABLET ORAL at 08:39

## 2023-12-29 RX ADMIN — METHYLPREDNISOLONE SODIUM SUCCINATE 60 MG: 125 INJECTION, POWDER, FOR SOLUTION INTRAMUSCULAR; INTRAVENOUS at 20:55

## 2023-12-29 RX ADMIN — PANTOPRAZOLE SODIUM 40 MG: 40 INJECTION, POWDER, LYOPHILIZED, FOR SOLUTION INTRAVENOUS at 08:39

## 2023-12-29 RX ADMIN — POTASSIUM CHLORIDE 20 MEQ: 14.9 INJECTION, SOLUTION INTRAVENOUS at 10:28

## 2023-12-29 RX ADMIN — METHYLPREDNISOLONE SODIUM SUCCINATE 60 MG: 125 INJECTION, POWDER, FOR SOLUTION INTRAMUSCULAR; INTRAVENOUS at 01:56

## 2023-12-29 RX ADMIN — GUAIFENESIN 400 MG: 100 SOLUTION ORAL at 17:57

## 2023-12-29 RX ADMIN — ATORVASTATIN CALCIUM 10 MG: 10 TABLET, FILM COATED ORAL at 20:55

## 2023-12-29 RX ADMIN — IPRATROPIUM BROMIDE 2 PUFF: 17 AEROSOL, METERED RESPIRATORY (INHALATION) at 08:49

## 2023-12-29 RX ADMIN — INSULIN LISPRO 2 UNITS: 100 INJECTION, SOLUTION INTRAVENOUS; SUBCUTANEOUS at 12:15

## 2023-12-29 RX ADMIN — Medication 10 ML: at 18:11

## 2023-12-29 RX ADMIN — OSELTAMIVIR PHOSPHATE 30 MG: 6 POWDER, FOR SUSPENSION ORAL at 08:45

## 2023-12-29 RX ADMIN — GUAIFENESIN 400 MG: 100 SOLUTION ORAL at 12:15

## 2023-12-29 RX ADMIN — GUAIFENESIN 400 MG: 100 SOLUTION ORAL at 06:24

## 2023-12-29 RX ADMIN — PIPERACILLIN AND TAZOBACTAM 4.5 G: 4; .5 INJECTION, POWDER, LYOPHILIZED, FOR SOLUTION INTRAVENOUS; PARENTERAL at 04:21

## 2023-12-29 RX ADMIN — Medication 10 ML: at 03:15

## 2023-12-29 RX ADMIN — PIPERACILLIN AND TAZOBACTAM 4.5 G: 4; .5 INJECTION, POWDER, LYOPHILIZED, FOR SOLUTION INTRAVENOUS; PARENTERAL at 10:28

## 2023-12-29 RX ADMIN — CHOLECALCIFEROL TAB 25 MCG (1000 UNIT) 1000 UNITS: 25 TAB at 08:39

## 2023-12-29 RX ADMIN — METHYLPREDNISOLONE SODIUM SUCCINATE 60 MG: 125 INJECTION, POWDER, FOR SOLUTION INTRAMUSCULAR; INTRAVENOUS at 08:38

## 2023-12-29 RX ADMIN — HEPARIN SODIUM 900 UNITS/HR: 10000 INJECTION, SOLUTION INTRAVENOUS at 12:56

## 2023-12-29 RX ADMIN — HYDRALAZINE HYDROCHLORIDE 5 MG: 20 INJECTION INTRAMUSCULAR; INTRAVENOUS at 16:09

## 2023-12-29 RX ADMIN — SILVER SULFADIAZINE: 10 CREAM TOPICAL at 08:40

## 2023-12-29 RX ADMIN — TAMOXIFEN CITRATE 20 MG: 10 TABLET, FILM COATED ORAL at 08:41

## 2023-12-29 RX ADMIN — INSULIN LISPRO 4 UNITS: 100 INJECTION, SOLUTION INTRAVENOUS; SUBCUTANEOUS at 17:57

## 2023-12-29 RX ADMIN — PANTOPRAZOLE SODIUM 40 MG: 40 INJECTION, POWDER, LYOPHILIZED, FOR SOLUTION INTRAVENOUS at 20:55

## 2023-12-29 RX ADMIN — CHLORHEXIDINE GLUCONATE ORAL RINSE 15 ML: 1.2 SOLUTION DENTAL at 21:07

## 2023-12-29 RX ADMIN — Medication 10 ML: at 10:28

## 2023-12-29 RX ADMIN — INSULIN LISPRO 2 UNITS: 100 INJECTION, SOLUTION INTRAVENOUS; SUBCUTANEOUS at 06:24

## 2023-12-29 RX ADMIN — IPRATROPIUM BROMIDE 2 PUFF: 17 AEROSOL, METERED RESPIRATORY (INHALATION) at 11:30

## 2023-12-29 RX ADMIN — OSELTAMIVIR PHOSPHATE 30 MG: 6 POWDER, FOR SUSPENSION ORAL at 22:00

## 2023-12-29 RX ADMIN — IPRATROPIUM BROMIDE 2 PUFF: 17 AEROSOL, METERED RESPIRATORY (INHALATION) at 15:49

## 2023-12-29 ASSESSMENT — COGNITIVE AND FUNCTIONAL STATUS - GENERAL
MOVING TO AND FROM BED TO CHAIR: 1 - TOTAL
WALKING IN HOSPITAL ROOM: 1 - TOTAL
STANDING UP FROM CHAIR USING ARMS: 1 - TOTAL
CLIMB 3 TO 5 STEPS WITH RAILING: 1 - TOTAL

## 2023-12-29 NOTE — PROGRESS NOTES
Infectious Disease Progress Note    Patient Name: Cam Rosas  MR#: [id number]  : 1950  Admission Date: 2023  Date: 23   Time: 10:34 AM   Author: Adam Tucker MD    Major Events: none    Antibiotics:    Anti-infectives (From admission, onward)    Start     Dose/Rate Route Frequency Ordered Stop    23 09  silver sulfadiazine (SILVADENE) 1 % cream          Topical 2 times daily 23 0344 10/30/24 0859    23 09  oseltamivir (TAMIFLU) 6 mg/mL suspension 30 mg         30 mg oral 2 times daily 23 0802 23 0859    23 0415  piperacillin-tazobactam (ZOSYN) 4.5 g/100 mL IVPB in NSS         4.5 g  200 mL/hr over 30 Minutes intravenous Every 6 hours interval 23 0317            Subjective     Review of Systems    Remains intubated without acute events overnight and is afebrile. Remainder of 12 ROS is unchanged.    Objective     Vital Signs:    Patient Vitals for the past 72 hrs:   BP Temp Temp src Pulse Resp SpO2 Weight   23 0900 -- -- -- 60 18 98 % --   23 0845 -- -- -- (!) 57 18 98 % --   23 0800 (!) 143/68 -- -- (!) 58 18 98 % --   23 0600 123/64 -- -- 68 (!) 22 93 % 89.1 kg (196 lb 6.9 oz)   23 0500 -- -- -- 61 18 98 % --   23 0400 133/63 36.8 °C (98.3 °F) Oral (!) 59 18 97 % --   23 0300 -- -- -- 60 18 97 % --   23 0200 113/66 -- -- 61 12 97 % --   23 0100 -- -- -- 63 17 98 % --   23 0000 127/62 36.8 °C (98.2 °F) Oral 61 18 98 % --   23 2300 -- -- -- 61 18 99 % --   23 2200 (!) 107/53 -- -- 62 18 99 % --   23 -- -- -- 62 18 99 % --   23 -- -- -- 62 18 99 % --   23 (!) 115/56 36.9 °C (98.5 °F) Oral 64 18 99 % --   23 1800 (!) 105/53 -- -- 68 -- 99 % --   23 1700 -- -- -- 72 18 98 % --   23 1615 136/74 -- -- 69 18 100 % --   23 1607 127/65 -- -- 67 18 100 % --   23 1601 (!) 141/72 -- -- 69 (!) 61 100 % --   23 1600 --  36.3 °C (97.4 °F) Axillary 70 18 100 % --   12/28/23 1511 -- -- -- 62 18 99 % --   12/28/23 1500 -- -- -- 63 18 99 % --   12/28/23 1400 (!) 101/59 -- -- 66 18 98 % --   12/28/23 1333 -- -- -- (!) 59 18 99 % --   12/28/23 1321 -- -- -- 66 18 98 % --   12/28/23 1318 -- -- -- 67 18 98 % --   12/28/23 1300 -- -- -- 61 (!) 7 99 % --   12/28/23 1200 (!) 100/58 36.3 °C (97.4 °F) Axillary 61 18 100 % --   12/28/23 1115 -- -- -- 63 18 100 % --   12/28/23 1100 -- -- -- 64 18 100 % --   12/28/23 1000 (!) 109/59 -- -- 66 18 100 % --   12/28/23 0908 (!) 161/64 -- -- -- -- -- --   12/28/23 0900 -- -- -- 68 18 99 % --   12/28/23 0800 132/63 36.9 °C (98.4 °F) Axillary 67 18 99 % --   12/28/23 0749 (!) 135/59 -- -- 68 18 99 % --   12/28/23 0700 -- -- -- 68 18 98 % --   12/28/23 0600 -- -- -- -- -- -- 88.3 kg (194 lb 10.7 oz)   12/28/23 0545 -- -- -- 69 18 99 % --   12/28/23 0515 -- -- -- 69 18 99 % --   12/28/23 0500 -- -- -- 67 18 98 % --   12/28/23 0445 -- -- -- 67 18 97 % --   12/28/23 0435 -- -- -- 66 18 98 % --   12/28/23 0430 -- -- -- 73 18 99 % --   12/28/23 0427 (!) 196/82 -- -- -- -- -- --   12/28/23 0400 (!) 111/57 36.9 °C (98.5 °F) Oral 70 18 97 % --   12/28/23 0345 -- -- -- 68 18 97 % --   12/28/23 0330 -- -- -- 67 18 99 % --   12/28/23 0315 -- -- -- 67 18 98 % --   12/28/23 0300 -- -- -- 67 18 98 % --   12/28/23 0245 -- -- -- 66 18 98 % --   12/28/23 0230 -- -- -- 66 18 99 % --   12/28/23 0215 -- -- -- 67 18 98 % --   12/28/23 0200 106/61 -- -- -- 18 98 % --   12/28/23 0145 -- -- -- 66 18 98 % --   12/28/23 0130 -- -- -- 67 18 99 % --   12/28/23 0115 -- -- -- 68 18 98 % --   12/28/23 0100 -- -- -- 69 18 98 % --   12/28/23 0045 -- -- -- 71 17 99 % --   12/28/23 0030 -- -- -- 75 18 98 % --   12/28/23 0015 -- -- -- 73 18 98 % --   12/28/23 0000 132/65 36.7 °C (98 °F) Oral 72 12 98 % --   12/27/23 2345 -- -- -- 72 18 99 % --   12/27/23 2330 -- -- -- 72 17 99 % --   12/27/23 2318 -- -- -- 73 18 99 % --   12/27/23 2315 -- --  -- 73 18 99 % --   12/27/23 2300 -- -- -- 73 18 100 % --   12/27/23 2245 -- -- -- 72 18 100 % --   12/27/23 2230 -- -- -- 68 18 97 % --   12/27/23 2215 (!) 92/51 -- -- 71 18 97 % --   12/27/23 2200 (!) 162/70 -- -- 70 18 99 % --   12/27/23 2145 (!) 166/79 -- -- 71 18 99 % --   12/27/23 2144 (!) 178/78 -- -- -- -- -- --   12/27/23 2130 -- -- -- 71 18 97 % --   12/27/23 2115 -- -- -- 70 18 97 % --   12/27/23 2100 -- -- -- 70 18 98 % --   12/27/23 2057 (!) 95/48 -- -- -- -- -- --   12/27/23 2045 -- -- -- 67 18 98 % --   12/27/23 2030 -- -- -- 66 18 98 % --   12/27/23 2015 -- -- -- 67 18 100 % --   12/27/23 2011 -- -- -- 62 20 97 % --   12/27/23 2000 (!) 103/55 36.8 °C (98.2 °F) Oral 68 18 97 % --   12/27/23 1958 (!) 100/50 -- -- -- -- -- --   12/27/23 1945 -- -- -- 63 18 98 % --   12/27/23 1930 -- -- -- 60 18 98 % --   12/27/23 1915 -- -- -- 60 (!) 4 98 % --   12/27/23 1900 -- -- -- 60 18 98 % --   12/27/23 1800 137/63 -- -- (!) 58 18 99 % --   12/27/23 1700 -- -- -- 63 18 98 % --   12/27/23 1600 123/61 -- -- (!) 59 18 100 % --   12/27/23 1513 (!) 152/65 -- -- 60 18 100 % --   12/27/23 1500 -- -- -- 60 18 99 % --   12/27/23 1400 (!) 155/71 -- -- (!) 59 18 98 % --   12/27/23 1300 -- -- -- 61 18 98 % --   12/27/23 1205 -- -- -- 64 18 96 % --   12/27/23 1200 (!) 113/54 -- -- 65 18 95 % --   12/27/23 1140 -- -- -- 82 18 99 % --   12/27/23 1136 -- -- -- 88 18 100 % --   12/27/23 1135 -- -- -- 92 15 100 % --   12/27/23 1130 -- -- -- 70 17 100 % --   12/27/23 1125 -- -- -- 80 (!) 24 99 % --   12/27/23 1120 -- -- -- 82 12 (!) 84 % --   12/27/23 1115 -- -- -- 78 14 96 % --   12/27/23 1100 -- -- -- 76 13 100 % --   12/27/23 1000 (!) 147/68 -- -- (!) 59 18 99 % --   12/27/23 0900 -- -- -- (!) 59 18 98 % --   12/27/23 0819 -- -- -- 72 18 97 % --   12/27/23 0800 (!) 141/65 -- -- (!) 58 18 99 % --   12/27/23 0600 (!) 125/58 -- -- 70 18 96 % --   12/27/23 0530 -- -- -- (!) 117 18 97 % --   12/27/23 0500 -- -- -- 60 18 99 % --    23 -- -- -- (!) 111 19 100 % --   230 (!) 166/90 -- -- 61 18 99 % --   23 0400 134/60 -- Bladder 69 18 99 % --   23 0330 -- -- -- 60 18 100 % --   23 0300 -- -- -- 61 18 100 % --   23 0230 -- -- -- 62 18 100 % --   23 0200 129/68 -- -- 61 18 99 % --   23 0151 -- -- -- 62 18 100 % --   23 0130 -- -- -- 71 13 100 % --   23 0114 -- -- -- 62 18 100 % --   23 0103 -- -- -- 66 18 100 % --   230 -- -- -- 63 18 100 % --   23 0030 -- -- -- 66 18 100 % --   23 0000 (!) 115/56 -- Bladder 66 18 100 % --   23 -- -- -- 65 18 100 % --   23 -- -- -- 67 18 100 % --   23 -- -- -- 68 18 100 % --   23 -- -- -- 70 16 100 % --   23 (!) 112/53 -- -- 72 18 100 % --   23 -- -- -- 73 18 99 % --   23 -- -- -- 77 18 99 % --   23 -- -- -- 76 18 98 % --   23 -- -- -- 75 18 98 % --   23 -- -- -- 75 18 98 % --   23 (!) 117/56 -- Bladder 76 18 98 % --   23 -- -- -- 79 18 98 % --   23 193 -- -- -- 86 18 98 % --   23 1930 -- -- -- 85 18 97 % --   23 1900 -- -- -- 79 18 98 % --   23 1800 (!) 103/54 -- -- 78 (!) 10 97 % --   23 1700 -- -- -- 83 (!) 26 95 % --   23 1600 (!) 121/55 -- -- 82 (!) 23 98 % --   23 1506 -- -- -- 84 (!) 26 100 % --   23 1500 -- -- -- 86 (!) 26 100 % --   23 1400 (!) 104/52 -- -- 83 (!) 24 94 % --   23 1303 -- -- -- 81 18 94 % --   23 1300 -- -- -- 82 17 96 % --   23 1200 -- -- -- 80 19 97 % --   23 1100 -- -- -- 78 19 97 % --       Temp (72hrs), Av.7 °C (98.1 °F), Min:36.3 °C (97.4 °F), Max:36.9 °C (98.5 °F)      Physical Exam:    General appearance: intubated  Head: ETT in place  Eyes: anicteric sclera  Lungs: ventilated respirations b/l  Heart: regular rate and rhythm  Abdomen: soft,  non-tender  Extremities: edema none  Joints: no swelling  Skin: no rashes  Neurologic: sedated    Lines, Drains, Airways, Wounds:  PICC Triple Lumen 12/28/23 Right (Active)   Number of days: 1       Peripheral IV (Adult) 12/26/23 Right Antecubital (Active)   Number of days: 3       Peripheral IV (Adult) 12/28/23 Posterior;Right Hand (Active)   Number of days: 1       NG/OG Tube 12/25/23 Left nostril (Active)   Number of days: 4       Urethral Catheter 20 Fr (Active)   Number of days: 1       ETT  (Active)   Number of days: 3       Wound Other (comment) Right Pretibial (Active)   Number of days: 5       Wound Venous Ulcer Left Pretibial (Active)   Number of days: 5       Wound Perineum (Active)   Number of days: 5       Wound Puncture Anterior;Proximal;Right Groin (Active)   Number of days: 1       Wound Pressure Injury Right Heel (Active)   Number of days: 1       Catheterization Site - Arterial Right Femoral 6 Fr. (Active)   Number of days: 4       Catheterization Site - Venous Right Femoral 6 Fr. (Active)   Number of days: 4       Labs:    CBC Results       12/29/23 12/28/23 12/27/23     0422 0426 0519    WBC 3.36 4.13 5.31    RBC 2.29 2.53 2.58    HGB 7.6 8.5 8.8    HCT 23.7 26.5 27.8    .5 104.7 107.8    MCH 33.2 33.6 34.1    MCHC 32.1 32.1 31.7    PLT 88 94 94         Comment for PLT at 0426 on 12/28/23: ALL RESULTS HAVE BEEN RECHECKEDSPECIMEN QUALITY CHECKEDRESULTS OBTAINED AFTER VORTEXING TO ELIMINATE PLT CLUMPS    Comment for PLT at 0519 on 12/27/23: RESULTS CHECKED SPECIMEN QUALITY CHECKED      BMP Results       12/29/23 12/28/23 12/27/23     0422 0426 0519     149 149    K 3.4 3.3 3.8    Cl 115 114 117    CO2 23 24 23    Glucose 200 201 138    BUN 53 40 28    Creatinine 2.3 2.2 1.9    Calcium 7.8 7.8 7.5    Anion Gap 11 11 9    EGFR 29.3 30.9 36.8         Comment for EGFR at 0422 on 12/29/23: Calculation based on the Chronic Kidney Disease Epidemiology Collaboration (CKD-EPI) equation refit  without adjustment for race.    Comment for EGFR at 0426 on 12/28/23: Calculation based on the Chronic Kidney Disease Epidemiology Collaboration (CKD-EPI) equation refit without adjustment for race.    Comment for EGFR at 0519 on 12/27/23: Calculation based on the Chronic Kidney Disease Epidemiology Collaboration (CKD-EPI) equation refit without adjustment for race.      PT/PTT Results       12/29/23 12/29/23 12/28/23     0859 0155 2346    PTT 91 112 166         Comment for PTT at 0859 on 12/29/23: The Standard Therapeutic Range for Heparin is 74 to 106 seconds.    Comment for PTT at 0155 on 12/29/23: The Standard Therapeutic Range for Heparin is 74 to 106 seconds.    Comment for PTT at 2346 on 12/28/23: The Standard Therapeutic Range for Heparin is 74 to 106 seconds.      UA Results       12/25/23     0414    Color Yellow    Clarity Clear    Glucose Negative    Bilirubin Negative    Ketones Negative    Sp Grav >1.035    Blood Trace    Ph 5.5    Protein Trace    Urobilinogen 0.2    Nitrite Negative    Leuk Est Negative    WBC 0 TO 3    RBC 0 TO 4    Bacteria Rare         Comment for Blood at 0414 on 12/25/23: The sensitivity of the occult blood test is equivalent to approximately 4 intact RBC/HPF.    Comment for Protein at 0414 on 12/25/23: Trace False Positive Protein can be seen with alkaline or highly buffered urines or urine with high specific gravity. Suggest clinical correlation.    Comment for Leuk Est at 0414 on 12/25/23: Results can be falsely negative due to high specific gravity, some antibiotics, glucose >3 g/dl, or WBC other than neutrophils.      Lactate Results       12/25/23 12/25/23 12/24/23 2011 1729 2012    Lactate 2.4 3.1 3.8          Microbiology Results     Procedure Component Value Units Date/Time    Sputum culture / smear Expectorated Sputum [[id number]]  (Abnormal) Collected: 12/27/23 1417    Specimen: Bronch Lavage from Expectorated Sputum Updated: 12/29/23 0723    Narrative:      The  following orders were created for panel order Sputum culture / smear Expectorated Sputum.  Procedure                               Abnormality         Status                     ---------                               -----------         ------                     Sputum Gram Stain (Lab O...[[id number]]                      Final result               Sputum Culture (Lab Only...[[id number]]  Abnormal            Final result                 Please view results for these tests on the individual orders.    Sputum Gram Stain (Lab Only) Expectorated Sputum [[id number]] Collected: 12/27/23 1417    Specimen: Bronch Lavage from Expectorated Sputum Updated: 12/27/23 2032     Gram Stain Result 2+ WBC      No Epithelial Cells Seen      No organisms seen    Sputum Culture (Lab Only) Expectorated Sputum [[id number]]  (Abnormal) Collected: 12/27/23 1417    Specimen: Bronch Lavage from Expectorated Sputum Updated: 12/29/23 0723     Culture **Positive Culture**      1+ Yeast, No Further Identification    Sputum culture / smear Expectorated Sputum [[id number]] Collected: 12/25/23 0418    Specimen: Aspirate from Expectorated Sputum Updated: 12/27/23 0845    Narrative:      The following orders were created for panel order Sputum culture / smear Expectorated Sputum.  Procedure                               Abnormality         Status                     ---------                               -----------         ------                     Sputum Gram Stain (Lab O...[[id number]]                      Final result               Sputum Culture (Lab Only...[[id number]]  Normal              Final result                 Please view results for these tests on the individual orders.    Sputum Gram Stain (Lab Only) Expectorated Sputum [[id number]] Collected: 12/25/23 0418    Specimen: Aspirate from Expectorated Sputum Updated: 12/25/23 0956     Gram Stain Result 3+ WBC      No Epithelial Cells Seen      3+ Gram positive bacilli      2+ Gram positive  cocci in pairs, chains and clusters    Sputum Culture (Lab Only) Expectorated Sputum [[id number]]  (Normal) Collected: 12/25/23 0418    Specimen: Aspirate from Expectorated Sputum Updated: 12/27/23 0845     Culture Normal Park    MRSA Screen, Nares Only Nose [[id number]]  (Normal) Collected: 12/25/23 0352    Specimen: Nasal Swab from Nose Updated: 12/25/23 0906     MRSA DNA, Nares Negative    Blood Culture Blood, Venous [[id number]]  (Normal) Collected: 12/24/23 1652    Specimen: Blood, Venous Updated: 12/29/23 0000     Culture No growth at 96 hours    SARS-CoV-2 (COVID-19) Nasopharynx [[id number]]  (Abnormal) Collected: 12/24/23 1649    Specimen: Nasopharyngeal Swab from Nasopharynx Updated: 12/24/23 1736    Narrative:      The following orders were created for panel order SARS-CoV-2 (COVID-19) Nasopharynx.  Procedure                               Abnormality         Status                     ---------                               -----------         ------                     SARS-COV-2 (COVID-19)/ F...[[id number]]  Abnormal            Final result                 Please view results for these tests on the individual orders.    SARS-COV-2 (COVID-19)/ FLU A/B, AND RSV, PCR Nasopharynx [[id number]]  (Abnormal) Collected: 12/24/23 1649    Specimen: Nasopharyngeal Swab from Nasopharynx Updated: 12/24/23 1736     SARS-CoV-2 (COVID-19) Negative     Influenza A Positive     Influenza B Negative     Respiratory Syncytial Virus Negative    Narrative:      Testing performed using real-time PCR for detection of COVID-19. EUA approved validation studies performed on site.     Blood Culture Blood, Venous [[id number]]  (Normal) Collected: 12/24/23 1647    Specimen: Blood, Venous Updated: 12/29/23 0100     Culture No growth at 96 hours          Pathology Results     ** No results found for the last 720 hours. **          Echo:     Cardiac Imaging    TRANSTHORACIC ECHO (TTE) COMPLETE 12/26/2023    Interpretation Summary  •   Left Ventricle: Normal ventricle size. Normal wall thickness. Preserved systolic function. Estimated EF 40-45%. Hypokinesis of the apex. 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  •  Right Ventricle: Normal ventricle size. Pacer wire present. Normal systolic function.  •  Right Atrium: Normal sized atrium.  •  Aortic Valve: Tricuspid valve. Trace regurgitation. No stenosis.  •  Mitral Valve: Normal leaflet structure. Normal leaflet motion. Trace regurgitation. No stenosis.  •  Tricuspid Valve: Normal structure. Mild regurgitation. Estimated RVSP = 43 mmHg. No significant stenosis.  •  Pulmonic Valve: Normal structure. No regurgitation. No stenosis.  •  Aorta: Aortic root normal. Sinuses of Valsalva normal-sized. Ascending aorta normal-sized. Aortic arch normal-sized. Descending aorta normal-sized.  •  Pericardium: Normal structure. No evidence of pericardial effusion. No cardiac tamponade.  •  Left Atrium: Mildly dilated atrium.      Imaging:    Radiology Imaging    XR CHEST 1 VW    Narrative  CLINICAL HISTORY:  VDRF    Impression  IMPRESSION:  Reduced lung volumes. No acute cardiopulmonary process. Stable  cardiac enlargement.    COMPARISON: Portable chest studies 12/25 and 12/26/2023.    COMMENT:  Upright portable imaging completed 0548 hours.  Endotracheal tube 4.7 cm above.  Enteric tube follows a normal course into left upper quadrant, directed to the  left.  Mild stable cardiac enlargement. Mitral annular prosthesis again noted. Stable  hilar and mediastinal contours.  Reduced lung volumes. No definite consolidation or pleural fluid.  Unremarkable upper abdomen.      Assessment      1. Pneumonia - in the setting of VF arrest and completing 7 day course of pip-tazo s/p bronch now with cx growing yeast which represents colonization     2. Influenza A - completing 5 day course of oseltamivir     3. Acute hypoxic respiratory failure requiring intubation -  mgmt per Primary team     Plan      1. Continue pip-tazo for 2 more days to complete 7 day course and can monitor off oseltamivir after today's dose.

## 2023-12-29 NOTE — PLAN OF CARE
Care Coordination Discharge Plan Note     Discharge Needs Assessment  Concerns to be Addressed: adjustment to diagnosis/illness, care coordination/care conferences, discharge planning  Current Discharge Risk: chronically ill, physical impairment    Anticipated Discharge Plan  Anticipated Discharge Disposition: skilled nursing facility, home with home health  Type of Home Care Services: home OT, home PT, nursing    Type of Skilled Nursing Care Services: SLP, OT, PT, nursing        Patient Choice  Offered/Gave Vendor List: yes  Patient's Choice of Community Agency(s): discussed with family that care coordiantin will follow for approrpaite aftecare needs    Patient and/or patient guardian/advocate was made aware of their right to choose a provider. A list of eligible providers was presented and reviewed with the patient and/or patient guardian/advocate in written and/or verbal form. The list delineates providers in the patient’s desired geographic area who are participating in the Medicare program and/or providers contracted with the patient’s primary insurance. The Medicare list and quality ratings were obtained from the Medicare.gov [medicare.gov] website.    ---------------------------------------------------------------------------------------------------------------------    Interdisciplinary Discharge Plan Review:  Participants:     Concerns Comments: shireen met with patient & his 2 children at bedside & spouse. shireen went over role of care coordiatnon & d/c plans. shireen confirmed pcp & pharamcy. [facility]. david has hx of prostate cancer & was receing treatment in past. per son his brother has been staying at patient's home to assist with care fro david. david stays mostly in family room . shireen discussed with wife that care coordination will continue to follow for appropriate aftecare needs. patient is currently on vent     12/29 patient remains on vent. Shireen met with patient's sons  & wife at bedside. Went over role of care coordination again. Patient is on iv abx for 2 more days  & ID is following. cardiology is following for VF arrest. Support offered family .     Discharge Plan:   Disposition/Destination:   /    Discharge Facility:    Community Resources:      Discharge Transportation:  Is Out of Hospital DNR needed at Discharge: no  Does patient need discharge transport?

## 2023-12-29 NOTE — PROGRESS NOTES
Daily Progress Note    Subjective   Lloyd draining well  Creat 2.3-->2.3  RBUS w/o hydro  Objective     Vital signs in last 24 hours:  Temp:  [36.3 °C (97.4 °F)-36.9 °C (98.5 °F)] 36.8 °C (98.3 °F)  Heart Rate:  [59-72] 68  Resp:  [7-61] 22  BP: (100-161)/(53-74) 123/64  FiO2 (%) (Set):  [40 %] 40 %      Intake/Output Summary (Last 24 hours) at 12/29/2023 0713  Last data filed at 12/29/2023 0702  Gross per 24 hour   Intake 2810.25 ml   Output 680 ml   Net 2130.25 ml     Intake/Output this shift:  I/O this shift:  In: 100 [IV Piggyback:100]  Out: -     Labs  CBC Results       12/29/23 12/28/23 12/27/23     0422 0426 0519    WBC 3.36 4.13 5.31    RBC 2.29 2.53 2.58    HGB 7.6 8.5 8.8    HCT 23.7 26.5 27.8    .5 104.7 107.8    MCH 33.2 33.6 34.1    MCHC 32.1 32.1 31.7    PLT 88 94 94         Comment for PLT at 0426 on 12/28/23: ALL RESULTS HAVE BEEN RECHECKEDSPECIMEN QUALITY CHECKEDRESULTS OBTAINED AFTER VORTEXING TO ELIMINATE PLT CLUMPS    Comment for PLT at 0519 on 12/27/23: RESULTS CHECKED SPECIMEN QUALITY CHECKED        BMP Results       12/29/23 12/28/23 12/27/23     0422 0426 0519     149 149    K 3.4 3.3 3.8    Cl 115 114 117    CO2 23 24 23    Glucose 200 201 138    BUN 53 40 28    Creatinine 2.3 2.2 1.9    Calcium 7.8 7.8 7.5    Anion Gap 11 11 9    EGFR 29.3 30.9 36.8         Comment for EGFR at 0422 on 12/29/23: Calculation based on the Chronic Kidney Disease Epidemiology Collaboration (CKD-EPI) equation refit without adjustment for race.    Comment for EGFR at 0426 on 12/28/23: Calculation based on the Chronic Kidney Disease Epidemiology Collaboration (CKD-EPI) equation refit without adjustment for race.    Comment for EGFR at 0519 on 12/27/23: Calculation based on the Chronic Kidney Disease Epidemiology Collaboration (CKD-EPI) equation refit without adjustment for race.        Imaging  RBUS: no hydro, Bladder is nearly completely collapsed precluding adequate evaluation for calculi or mass. No  ureteral jets identified on color Doppler, perhaps due to renal dysfunction and/or hydration status    Physical Exam:  Gen: WDWN  Pulm: intubated  Gu: 20F jones draining yellow urine      A/P: 74 yo male with hx CaP s/p RALP 7/2022 with rising PSA s/p XRT 6/2023,  resolved GH, ELEANOR w/o obstruction by RBUS  - Maintain 20F jones, irrigate PRN. Can VT once extubated and ambulatory  - OP f/u with Dr. Torrez. Consider OP cysto given recent issue with GH

## 2023-12-29 NOTE — PROGRESS NOTES
Daily Progress Note (LOS: 5)    ?    Interval History: Patient remains intubated and sedated.  He is on 40% FiO2.        Current Medications:  • amiodarone  200 mg feeding tube Daily   • atorvastatin  10 mg oral Nightly   • cetirizine  10 mg feeding tube Nightly   • chlorhexidine  15 mL Mouth/Throat 2 times per day   • cholecalciferol (vitamin D3)  1,000 Units feeding tube Daily   • [Provider Managed Hold] furosemide  40 mg intravenous q12h GISELLE   • guaiFENesin  400 mg oral q6h GISELLE   • insulin lispro U-100  1-10 Units subcutaneous q6h GISELLE   • ipratropium HFA  2 puff inhalation QID (8a, 12p, 4p, 8p)   • methylPREDNISolone sodium succinate  60 mg intravenous q6h INT   • oseltamivir  30 mg oral BID   • pantoprazole  40 mg intravenous q12h GISELLE   • piperacillin-tazobactam  4.5 g intravenous q6h INT   • silver sulfadiazine   Topical BID   • sodium chloride  10 mL intravenous q8h INT   • tamoxifen  20 mg feeding tube Daily       Physical Exam:  Vitals:    12/29/23 0600   BP: 123/64   Pulse: 68   Resp: (!) 22   Temp:    SpO2: 93%       General appearance: Sedated on the vent   head: without obvious abnormality  Eyes: PERRLA, EOM's intact  Lungs: clear to auscultation bilaterally, no rales or wheezing  Heart: Distant heart sounds, irregular heart rhythm  Abdomen: soft, non-tender; bowel sounds normal; no masses  Extremities: Anasarca  Skin: Skin color, texture, turgor normal. No rashes or lesions  Neurologic: Sedated  Psychiatric: Sedated endocrine: No thyromegaly    I&Os:    Intake/Output Summary (Last 24 hours) at 12/29/2023 0833  Last data filed at 12/29/2023 0702  Gross per 24 hour   Intake 2705.74 ml   Output 605 ml   Net 2100.74 ml       Weights:   Wt Readings from Last 3 Encounters:   12/29/23 89.1 kg (196 lb 6.9 oz)   12/07/23 80.3 kg (177 lb)   11/27/23 80.3 kg (177 lb)       Labs:  Results from last 7 days   Lab Units 12/29/23  0422 12/28/23  0426 12/27/23  0519   SODIUM mEQ/L 149* 149* 149*   POTASSIUM mEQ/L 3.4*  "3.3* 3.8   CO2 mEQ/L 23 24 23   BUN mg/dL 53* 40* 28*   CREATININE mg/dL 2.3* 2.2* 1.9*   CALCIUM mg/dL 7.8* 7.8* 7.5*   ALT IU/L 414* 553* 516*   AST IU/L 383* 822* 1,028*     Results from last 7 days   Lab Units 12/29/23  0422 12/28/23  0426 12/27/23  0519   WBC K/uL 3.36* 4.13 5.31   HEMOGLOBIN g/dL 7.6* 8.5* 8.8*   HEMATOCRIT % 23.7* 26.5* 27.8*   PLATELETS K/uL 88* 94* 94*     Results from last 7 days   Lab Units 12/29/23  0155 12/28/23  2346 12/28/23  1659 12/25/23  0934 12/25/23  0410 12/24/23  1647   PTT sec 112* 166* 31   < > 33  --    INR   --   --  1.1  --  1.4 1.9   PROTIME sec  --   --  13.7  --  17.4* 21.2*    < > = values in this interval not displayed.         No lab exists for component: \"CPK\"  0   Lab Value Date/Time    BNP 1,135 (H) 12/24/2023 1647     (H) 02/07/2023 1326     (H) 10/19/2022 1305       Data Review:  EKG Impression: Sinus with PACs, intraventricular conduction delay and deep T wave inversions with QT prolongation  Telemetry Review: Possibly 1 episode of nonsustained VT    Plan:  Cam Rosas is a 73 y.o. male with the following problems,      1.  VF arrest- likely precipitated by Takotsubo syndrome with torsade.  Patient's QT interval remains prolonged.  He has deep T wave inversions.  He is at risk of further episodes of polymorphic ventricular tachycardia.  If he has any further issues, resume IV lidocaine.  If he is bradycardic, he will need a new temporary wire.    2.  Hypoxemia- chest x-ray on admission revealed mild pulmonary interstitial edema.    The patient's creatinine is increasing and his sodium is 149.  Avoid overdiuresis.  The patient is oxygenating adequately.     3.  Prostate cancer-completed therapy     4.  Right breast cancer-radiation therapy     5.  Atrial fibrillation-patient has a history of atrial fibrillation-continue on oral/nasogastric amiodarone.    Patient is on IV heparin.    6.  Takotsubo syndrome- patient's blood pressure is elevated.  " He remains on milrinone and Levophed.  I recommend trying to wean off of Levophed and milrinone today.    7.  Hypoxemia/influenza A- avoid hypoxemia.  This will result in further bradycardia and ventricular arrhythmias.     8.  Elevated liver enzymes-this is likely due to shock liver.  Patient has had periods of hypotension.  The patient was biting on his ET tube yesterday and was acidotic.     10.  Swollen right arm- the IV is functioning appropriately.  He has anasarca.    I spent more than 30 minutes of ICU time reviewing the patient's issues overnight.  I have discussed my recommendations with the ICU staff.  Electronic signature  Michele Estrada MD   12/29/23

## 2023-12-29 NOTE — PLAN OF CARE
Problem: Adult Inpatient Plan of Care  Goal: Plan of Care Review  Flowsheets (Taken 12/29/2023 1123)  Progress: no change  Plan of Care Reviewed With: patient     Problem: Skin Injury Risk Increased  Goal: Skin Health and Integrity  Outcome: Not progressing   Nutrition Interventions/ Recommendations:   1. Continue nutrition supper as recommended:       - Peptamen AF at goal of 60 mL/hr x 24 hours which provides 1440ml TV, 1728kcal (22kcal/kg), 109g protein (1.4g/kg), and 1170ml free water        -Suggest FWF 100mL q 4 hrs to meet hydration needs, adjust pending Na  2. Monitor and treat labs/lytes, replete prn       - trend Na (currently 149)       - replete K+  3. Monitor BG and accu-checks       - recommend endocrinology consult for better BG control (on Humalog and solu-medrol)  4. Monitor GI function       - monitor BM (liquid stool today); currently receiving Robitussion which can increase incidence of diarrhea d/t high sorbitol content; recommend alternate medication if diarrhea persists  5. Daily weights recommended  6. Monitor I/O  7. SLP consult prior to diet advance

## 2023-12-29 NOTE — PROGRESS NOTES
Critical Care/Pulmonary  Daily Progress Note        Subjective    Interval History:    Intubated and sedated on 100 mcg fentanyl and 2 mg versed  Off levophed  Milrinone stopped per cardiology  Failed PSV this afternoon    Afebrile    I and O  Since admission: 4.6 liters  Last 24 hours: 2134 ml  Urine output: 650 ml       Objective       Allergies: Azithromycin, Prednisone, and Lorazepam    CURRENT MEDS  •  acetaminophen, 650 mg, feeding tube, q4h PRN  •  albuterol, 2.5 mg, nebulization, q4h PRN  •  amiodarone, 200 mg, feeding tube, Daily  •  atorvastatin, 10 mg, oral, Nightly  •  atropine, 1 mg, intravenous, Once PRN  •  betamethasone valerate, , Topical, 2x daily PRN  •  calcium gluconate, 1 g, intravenous, q6h PRN  •  cetirizine, 10 mg, feeding tube, Nightly  •  chlorhexidine, 15 mL, Mouth/Throat, 2 times per day  •  cholecalciferol (vitamin D3), 1,000 Units, feeding tube, Daily  •  glucose, 16-32 g of dextrose, oral, PRN **OR** dextrose, 15-30 g of dextrose, oral, PRN **OR** glucagon, 1 mg, intramuscular, PRN **OR** dextrose 50 % in water (D50), 25 mL, intravenous, PRN  •  fentaNYL, 0-200 mcg/hr, intravenous, Titrated **AND** fentaNYL, 25 mcg, intravenous, q5 min PRN  •  [Provider Managed Hold] furosemide, 40 mg, intravenous, q12h GISELLE  •  guaiFENesin, 400 mg, oral, q6h GISELLE  •  heparin (porcine), 40-80 Units/kg (Adjusted), intravenous, q6h PRN  •  heparin infusion - MAR calculator by PTT, 100-4,000 Units/hr, intravenous, Titrated  •  hydrALAZINE, 5 mg, intravenous, q6h PRN  •  insulin lispro U-100, 1-10 Units, subcutaneous, q6h GISELLE  •  ipratropium HFA, 2 puff, inhalation, QID (8a, 12p, 4p, 8p)  •  magnesium sulfate, 2 g, intravenous, PRN  •  methylPREDNISolone sodium succinate, 60 mg, intravenous, q12h INT  •  metoclopramide, 10 mg, intravenous, q6h PRN  •  [COMPLETED] midazolam, 2 mg, intravenous, Once **AND** midazolam, 0-15 mg/hr, intravenous, Titrated **AND** midazolam, 1 mg, intravenous, q5 min PRN  •   norepinephrine, 0-90 mcg/min, intravenous, Titrated  •  oseltamivir, 30 mg, oral, BID  •  pantoprazole, 40 mg, intravenous, q12h GISELLE  •  piperacillin-tazobactam, 4.5 g, intravenous, q6h INT  •  potassium chloride, 20 mEq, oral, PRN  •  potassium chloride, 40 mEq, oral, PRN  •  potassium chloride, 20 mEq, intravenous, PRN  •  potassium chloride in water, 20 mEq, intravenous, PRN **AND** potassium chloride in water, 20 mEq, intravenous, PRN  •  silver sulfadiazine, , Topical, BID  •  sodium chloride, 10 mL, intravenous, q8h INT  •  sodium chloride, 10 mL, intravenous, PRN  •  tamoxifen, 20 mg, feeding tube, Daily    VITAL SIGNS  Vitals:    12/29/23 1100 12/29/23 1127 12/29/23 1200 12/29/23 1300   BP:   (!) 161/74    BP Location:       Patient Position:       Pulse: (!) 55 (!) 56 (!) 57 (!) 58   Resp: 18 (!) 30 (!) 51 18   Temp:       TempSrc:       SpO2: 98% 98% 98% 98%   Weight:       Height:           VENTILATOR SETTINGS  Vent Mode: Assist control/volume control  FiO2 (%) (Set):  [40 %] 40 %  S RR:  [18] 18  S VT:  [450 mL] 450 mL  PEEP/CPAP (Set, cmH2O):  [6 cm H20-10 cm H20] 6 cm H20  MAP (Observed, cmH2O):  [10-16] 10    Oxygen:  Oxygen Therapy: Supplemental oxygen  O2 Delivery Method: Endotracheal tube  FiO2 (%) (Set): 40 %  O2 Flow Rate (L/min): 6 L/min     INTAKE/OUTPUT    Intake/Output Summary (Last 24 hours) at 12/29/2023 1333  Last data filed at 12/29/2023 1200  Gross per 24 hour   Intake 2949.19 ml   Output 690 ml   Net 2259.19 ml       Lines, Drains, Airways, Wounds:  Peripheral IV (Adult) 12/25/23 Anterior;Right Hand (Active)   Number of days: 2       Peripheral IV (Adult) 12/26/23 Right Antecubital (Active)   Number of days: 1       Peripheral IV (Adult) 11/26/23 Left;Posterior Forearm (Active)   Number of days: 31       NG/OG Tube 12/25/23 Left nostril (Active)   Number of days: 2       Urethral Catheter Temperature probe 16 Fr (Active)   Number of days: 3       ETT  (Active)   Number of days: 1        Wound Other (comment) Right Pretibial (Active)   Number of days: 3       Wound Venous Ulcer Left Pretibial (Active)   Number of days: 3       Wound Perineum (Active)   Number of days: 3       Catheterization Site - Arterial Right Femoral 6 Fr. (Active)   Number of days: 2       Catheterization Site - Venous Right Femoral 6 Fr. (Active)   Number of days: 2         Physical Exam:  Physical Exam  Vitals and nursing note reviewed.   Constitutional:       Interventions: He is sedated and intubated.   HENT:      Head: Normocephalic and atraumatic.      Mouth/Throat:      Mouth: Mucous membranes are moist.   Eyes:      General: No scleral icterus.     Pupils: Pupils are equal, round, and reactive to light.   Cardiovascular:      Rate and Rhythm: Bradycardia present. Rhythm irregular.      Heart sounds: Normal heart sounds. No murmur heard.  Pulmonary:      Effort: No respiratory distress. He is intubated.      Breath sounds: No wheezing or rales.      Comments: Mechanical breath sounds bilaterally  Abdominal:      General: Bowel sounds are normal.      Palpations: Abdomen is soft.   Musculoskeletal:      Right lower leg: No edema.      Left lower leg: No edema.   Skin:     General: Skin is warm and dry.   Neurological:      Comments: Intubated and sedated          Labs:  See Labs Below:  ABG  Results from last 7 days   Lab Units 12/28/23  0425 12/26/23  1609 12/26/23  1435 12/24/23  2332 12/24/23  2012 12/24/23  1753 12/24/23  1753 12/24/23  1647   PH ART pH 7.44 7.26* 7.17*   < > 7.27*   < > 7.27*  --    PCO2 ART mm Hg 36 59* 66*   < > 46   < > 48  --    PO2 ART mm Hg 102* 79* 84   < > 100   < > 151*  --    HCO3 ART mEQ/L 25 24 21   < > 20.3*   < > 21.0  --    O2 SAT ART % 97 93 93   < >  --   --   --   --    BASE EXC ART mEQ/L 0.4 -1.1 -5.2   < > -5.9   < > -5.1  --    SOURCE OF OXYGEN   --   --   --   --  Bipap  --  bipap -    < > = values in this interval not displayed.     CBC  Results from last 7 days   Lab Units  12/29/23  0422 12/28/23 0426 12/27/23  0519   WBC K/uL 3.36* 4.13 5.31   RBC M/uL 2.29* 2.53* 2.58*   HEMOGLOBIN g/dL 7.6* 8.5* 8.8*   HEMATOCRIT % 23.7* 26.5* 27.8*   MCV fL 103.5* 104.7* 107.8*   MCH pg 33.2 33.6* 34.1*   MCHC g/dL 32.1* 32.1* 31.7*   PLATELETS K/uL 88* 94* 94*   RDW % 15.7* 16.0* 16.0*   MPV fL 11.0 10.1 10.1     BMP  Results from last 7 days   Lab Units 12/29/23 0422 12/28/23 0426 12/27/23  0519   SODIUM mEQ/L 149* 149* 149*   POTASSIUM mEQ/L 3.4* 3.3* 3.8   CHLORIDE mEQ/L 115* 114* 117*   CO2 mEQ/L 23 24 23   BUN mg/dL 53* 40* 28*   CREATININE mg/dL 2.3* 2.2* 1.9*   CALCIUM mg/dL 7.8* 7.8* 7.5*   ALBUMIN g/dL 2.4* 2.5* 2.5*   BILIRUBIN TOTAL mg/dL 0.6 0.9 0.9   ALK PHOS IU/L 54 47 49   ALT IU/L 414* 553* 516*   AST IU/L 383* 822* 1,028*   GLUCOSE mg/dL 200* 201* 138*     Coag  Results from last 7 days   Lab Units 12/29/23  0859 12/29/23  0155 12/28/23  2346 12/28/23  1659 12/25/23  0934 12/25/23  0410 12/24/23  1647   PROTIME sec  --   --   --  13.7  --  17.4* 21.2*   INR   --   --   --  1.1  --  1.4 1.9   PTT sec 91* 112* 166* 31   < > 33  --     < > = values in this interval not displayed.       Imaging:     US Kidneys 12/28/2023  1. No sonographic abnormality in the kidneys.  2. Nearly completely collapsed bladder as discussed below. No large intraluminal  bladder thrombus/clot as clinically questioned (given the limitations of near  complete bladder collapse).    X-RAY CHEST 1 VIEW    Result Date: 12/27/2023  IMPRESSION:  Reduced lung volumes. No acute cardiopulmonary process. Stable cardiac enlargement. COMPARISON: Portable chest studies 12/25 and 12/26/2023. COMMENT:  Upright portable imaging completed 0548 hours. Endotracheal tube 4.7 cm above. Enteric tube follows a normal course into left upper quadrant, directed to the left. Mild stable cardiac enlargement. Mitral annular prosthesis again noted. Stable hilar and mediastinal contours. Reduced lung volumes. No definite consolidation  or pleural fluid. Unremarkable upper abdomen.    X-RAY CHEST 1 VIEW    Result Date: 12/26/2023  IMPRESSION: Endotracheal tube has been repositioned; the tip is now approximately 3.3 cm above the leo. COMMENT: A semierect AP portable view the chest was performed at 1615 and is compared to a prior study from 1503. Since the prior study, the endotracheal tube has been repositioned and the tip is now approximately 3.3 cm above the leo. There has been no other significant interval change.    X-RAY CHEST 1 VIEW    Result Date: 12/26/2023  IMPRESSION: ET tube 2 cm above leo. NG tube tip in proximal stomach. Probable right lower lobe atelectasis; attention on follow-up.    X-RAY CHEST 1 VIEW    Result Date: 12/26/2023  IMPRESSION: Status post extubation. Slightly improved vascular congestion since earlier in the day. Suspect developing atelectasis or airspace disease in the right midlung zone. COMMENT: Semierect AP portable view of the chest was performed at 1428 and is compared to a prior study from 5:27 AM. In the interval, the endotracheal tube has been removed. An enteric tube remains in place with the tip in the stomach. There is a vascular catheter/line with the tip projecting near the junction of the IVC and right atrium; it is difficult to determine whether this was present previously but differences in technique. Mild cardiomegaly is again noted. Cardiac valvular prosthesis is again seen. The pulmonary vasculature and interstitial markings have improved slightly since earlier in the day. There is questionable developing airspace disease or atelectasis in the right midlung zone. Trace bilateral pleural effusions are suspected. The hilar and mediastinal structures appear stable. Surgical clips are again seen in the right axilla.    X-RAY CHEST 1 VIEW    Result Date: 12/26/2023  IMPRESSION: ET tube 4 cm above leo, NG tube tip not well seen, likely in proximal stomach. Stable cardiomegaly, mitral valve  replacement. Clear lungs, with interstitial crowding secondary to low lung volumes.    X-RAY CHEST 1 VIEW    Result Date: 12/26/2023  IMPRESSION: Improved pulmonary vascular congestion. ET tube 3 cm above leo, NG tube tip below inferior edge of film.    X-RAY CHEST 1 VIEW    Result Date: 12/25/2023  IMPRESSION: Interval retraction of the endotracheal tube now in satisfactory position, as below. Stable satisfactory positioning of the enteric tube. Progressive pulmonary interstitial edema with stable enlargement of the cardiac silhouette. No pneumothorax. COMMENT: Comparison: Chest x-ray 12/24/2023. Technique: A single frontal AP portable upright projection of the chest was obtained. FINDINGS: There has been interval retraction of the endotracheal tube now terminating 2.7 cm above the leo. An enteric tube courses to the stomach with the distal tip collimated from the field-of-view beneath the left hemidiaphragm in satisfactory position. There are defibrillator pad projecting over the left hemithorax. There are progressively increased interstitial opacities seen projecting over both lungs. There is no pleural effusion or pneumothorax. The cardiac silhouette is stably enlarged. The mediastinal contours are unchanged. Again noted is a prosthetic mitral valve. The upper abdomen is unremarkable. There is no acute osseous abnormality. Surgical clips overlie the right axillary region.     X-RAY CHEST 1 VIEW    Result Date: 12/25/2023  IMPRESSION: Satisfactory positioning of the endotracheal and enteric tubes. No pneumothorax. Stable pulmonary edema and enlargement of the cardiac silhouette. COMMENT: Comparison: Chest x-ray 12/25/2023. Technique: A single frontal AP portable upright projection of the chest was obtained. FINDINGS: The tip of an endotracheal tube terminates 4.0 cm above the leo. An enteric tube courses to the stomach with the distal tip collimated from the field-of-view beneath the left hemidiaphragm  in satisfactory position. There are defibrillator pad projecting over the left hemithorax. There are mildly increased interstitial opacities again seen projecting over both lungs. No pleural effusion or pneumothorax is seen. There is stable enlargement of the cardiac silhouette. Again noted is a prosthetic mitral valve. The mediastinal contours are unchanged. The upper abdomen is unremarkable. There is no acute osseous abnormality. There are surgical clips overlying the right axillary region.     X-RAY CHEST 1 VIEW    Result Date: 12/25/2023  IMPRESSION: Low-lying endotracheal tube terminating over the proximal right mainstem bronchus. Recommend retraction. Satisfactory positioning of the enteric tube. This finding and recommendation was discussed with nurse Shoemaker at 11:27 AM on 12/25/2023 by Dr. Martinez by telephone. Mild pulmonary interstitial edema and stable enlargement of the cardiac silhouette. No pneumothorax. COMMENT: Comparison: Chest x-ray 12/24/2023. Technique: A single frontal AP portable upright projection of the chest was obtained. FINDINGS: The tip of the intervally placed endotracheal tube terminates over the proximal right mainstem bronchus. The tip of the enteric tube terminates over the left upper quadrant of the abdomen. There are mildly increased interstitial opacities noted within both lungs. There is no pleural effusion or pneumothorax. There is stable enlargement of the cardiac silhouette. Again noted is a mitral valve prosthesis. Surgical clips overlie the right axilla. The upper abdomen is unremarkable. There is no acute osseous abnormality.    X-RAY CHEST 1 VIEW    Result Date: 12/25/2023  IMPRESSION: Vascular congestion.  Left basal atelectasis. COMMENT: AP radiograph the chest is compared to previous study dated 8/17/2023. There is vascular congestion and small effusions.  Findings may represent mild congestive heart failure.  Cardiac silhouette is unchanged.  Prosthetic valve ring seen.   Surgical staples seen in the right axilla.  There is minimal left basal atelectasis.    CT ANGIOGRAPHY CHEST PULMONARY EMBOLISM WITH IV CONTRAST    Result Date: 12/24/2023  IMPRESSION: 1. No pulmonary embolism in the main or proximal segmental pulmonary arteries. 2. Right upper lobe and left lower lobe infiltrate with patchy airspace opacities in the right middle lobe.      Micro:   Microbiology Results     Procedure Component Value Units Date/Time    Sputum culture / smear Expectorated Sputum [[id number]] Collected: 12/25/23 0418    Specimen: Aspirate from Expectorated Sputum Updated: 12/27/23 0845    Narrative:      The following orders were created for panel order Sputum culture / smear Expectorated Sputum.  Procedure                               Abnormality         Status                     ---------                               -----------         ------                     Sputum Gram Stain (Lab O...[[id number]]                      Final result               Sputum Culture (Lab Only...[[id number]]  Normal              Final result                 Please view results for these tests on the individual orders.    Sputum Gram Stain (Lab Only) Expectorated Sputum [[id number]] Collected: 12/25/23 0418    Specimen: Aspirate from Expectorated Sputum Updated: 12/25/23 0956     Gram Stain Result 3+ WBC      No Epithelial Cells Seen      3+ Gram positive bacilli      2+ Gram positive cocci in pairs, chains and clusters    Sputum Culture (Lab Only) Expectorated Sputum [[id number]]  (Normal) Collected: 12/25/23 0418    Specimen: Aspirate from Expectorated Sputum Updated: 12/27/23 0845     Culture Normal [place]    MRSA Screen, Nares Only Nose [[id number]]  (Normal) Collected: 12/25/23 0352    Specimen: Nasal Swab from Nose Updated: 12/25/23 0906     MRSA DNA, Nares Negative    Blood Culture Blood, Venous [[id number]]  (Normal) Collected: 12/24/23 1652    Specimen: Blood, Venous Updated: 12/27/23 0000     Culture No growth at 48  hours    SARS-CoV-2 (COVID-19) Nasopharynx [[id number]]  (Abnormal) Collected: 12/24/23 1649    Specimen: Nasopharyngeal Swab from Nasopharynx Updated: 12/24/23 1736    Narrative:      The following orders were created for panel order SARS-CoV-2 (COVID-19) Nasopharynx.  Procedure                               Abnormality         Status                     ---------                               -----------         ------                     SARS-COV-2 (COVID-19)/ F...[[id number]]  Abnormal            Final result                 Please view results for these tests on the individual orders.    SARS-COV-2 (COVID-19)/ FLU A/B, AND RSV, PCR Nasopharynx [[id number]]  (Abnormal) Collected: 12/24/23 1649    Specimen: Nasopharyngeal Swab from Nasopharynx Updated: 12/24/23 1736     SARS-CoV-2 (COVID-19) Negative     Influenza A Positive     Influenza B Negative     Respiratory Syncytial Virus Negative    Narrative:      Testing performed using real-time PCR for detection of COVID-19. EUA approved validation studies performed on site.     Blood Culture Blood, Venous [[id number]]  (Normal) Collected: 12/24/23 1647    Specimen: Blood, Venous Updated: 12/27/23 0100     Culture No growth at 48 hours          ECG/Telemetry  I have independently reviewed the telemetry. No events for the last 24 hours.         ASSESSMENT    73 y.o. male with history atrial fibrillation, CHF, prostate and breast CA, history of GI bleed who presented to the emergency room with shortness of breath and found to be influenza A positive. During day 2 of his hospital stay he had a v fib arrest with ROSC. He was transferred to the ICU for further monitoring     PLAN   #Acute Hypoxic respiratory failure  -Failed Bipap on admission, became bradycardic and was intubated  - extubated 12/26, however became hypoxic, ? Secondary to upper airway edema ve acute bronchospasm vs flash pulmonary edema  - treated with lasix 40 mg and solumedrol q 6  - Ultimately required  re-intubation 12/26. Anesthesia noted some swelling around cords  - s/p bronchoscopy post re-intubation 12/27 which noted patient 'biting on tube'  - now sedated with fentanyl and versed  - weaned peep to 6  - Will attempt PSV wean today and start decreasing sedation, failed PSV initially, will re-attempt later today  - Maintain RASS -2 to 0  - Wean solumedrol to 60 mg q 12, + cuff leak on exam     #Shock  -improved, off levophed,  maintain MAP > 65    # s/p V fib cardiac arrest  - Likely related to Takosubo's cardiomyopthy, EF 40-45%  - ROSC achieved after receiving 3 epi, 3 shocks, 2 bicarb, and Magnesium replacement  - Following commands post-arrest. No TTM  -Taken by interventional cardiology for the placement of a temporary pacemaker  - Also noted no significant CAD  - PPM removed secondary to dyssynchrony and misplacement. No further marcellus arrythmias, removed 12/27, however bradycardic may need new temp wire.   - Is bradycardic today but VS stable. Will monitor  - per cardiology of patient has further episodes of polymorphic ventricular tachycardia, please resume IV lidocaine  -holding diuresis     #Takotsubo Cardiomyopathy  -TTE 12/26 suggestive of Takotsubos with EF 40-45%, confirmed by LV gram   - Start losartan 25mg daily and low dose carvedilol once extubated, bp stabilizes and off milrinone  -Dc milrinone this am, monitor closely  -Cardiology following    # Acute kidney injury  - worsening renal function, creat initially 1.2 on admission, now 2.3  - baseline creat 1.2-1.3  - ? Secondary to IV dye load s/p cardiac cath vs over-diuresis  - Will increase FWF, serial BMP  - Can consider SGC if not improving to monitor preload and CI    # Elevated liver enzymes  - likely in setting of hypotension, shock liver  - serial labs, trending down     #Pneumonia  -CT Chest 12/24 with left lower lobe infiltrate and right upper lobe infiltrate  -Blood/ sputum cultures negative   -Continue on Pip-tazo for additional 2  days  - ID following    #Afib  - AF on telemetry, rate controlled  - EP following  -Continue IV heparin  -Continue on amiodarone     #Influenza A  -Completed 5 day course Oseltamivir     # Prostate/breast cancer  - s/p radiation therapy for prostate cancer spring 2023  - s/p R mastectomy 8/2023 and radiation therapy 9/2023  - continue tamoxifen    VTE Prophylaxis Plan: SCDs SQH  GI Prophylaxis Plan: Protonix  Lines/Drains/Airway: PIV x 1, OET/OGT (12/25), PICC (12/28)  Fluids/Electrolytes/Nutrition: TF     Disposition Planning: Remain in the ICU    CODE STATUS  Full Code       The case was reviewed this morning at the patient's beside multidisciplinary rounds with the patient's nurse, dietician, pharmacist, respiratory therapist, physical therapist and critical care nurse coordinator. The patient's clinical status with regards to diagnosis, treatment plans including disposition of any IV, arterial lines, Lloyd and tubes were discussed, as well as dietary, respiratory therapy and mobilization needs.     This case was discussed with consultants.    Total critical time spent managing 50 minutes.    This note was scribed by JONNY Obregon  in my presence on my behalf.       Vince Colbert MD  12/29/2023  1:33 PM

## 2023-12-29 NOTE — PROGRESS NOTES
Nutrition Note           Clinical Course: Patient is a 73 y.o. male who was admitted on 12/24/2023 with a diagnosis of Influenza A [J10.1]  Severe sepsis (CMS/[facility]) [A41.9, R65.20]  Acute on chronic congestive heart failure, unspecified heart failure type (CMS/[facility]) [I50.9].   Past Medical History:   Diagnosis Date   • Acute systolic heart failure (CMS/[facility]) 02/15/2023   • Ambulates with cane     and walker   • Atrial fibrillation (CMS/[facility])    • Breast cancer (CMS/[facility]) October 2022   • CHF (congestive heart failure) (CMS/[facility])    • Chronic kidney disease    • CKD (chronic kidney disease) 10/19/2022   • History of radiation therapy    • Hx antineoplastic chemo    • Prostate cancer (CMS/[facility])      Past Surgical History:   Procedure Laterality Date   • CARDIAC SURGERY      mitral valve repair 2006   • CARDIOVERSION  12/05/2022    Successful DC cardioversion   • KNEE CARTILAGE SURGERY Left    • MASTECTOMY     • PROSTATE SURGERY  July 2022   • PROSTATECTOMY  07/15/2022   • TONSILLECTOMY         Nutrition Interventions/ Recommendations:   1. Continue nutrition supper as recommended:       - Peptamen AF at goal of 60 mL/hr x 24 hours which provides 1440ml TV, 1728kcal (22kcal/kg), 109g protein (1.4g/kg), and 1170ml free water        -Suggest FWF 100mL q 4 hrs to meet hydration needs, adjust pending Na  2. Monitor and treat labs/lytes, replete prn       - trend Na (currently 149)       - replete K+  3. Monitor BG and accu-checks       - recommend endocrinology consult for better BG control (on Humalog and solu-medrol)  4. Monitor GI function       - monitor BM (liquid stool today); currently receiving Robitussion which can increase incidence of diarrhea d/t high sorbitol content; recommend alternate medication if diarrhea persists  5. Daily weights recommended  6. Monitor I/O  7. SLP consult prior to diet advance        Nutrition Status Classification: Moderate nutritional compromise       Dietary Orders   (From admission,  "onward)             Start     Ordered    12/28/23 1151  Tube Feed with NPO Peptamen AF; Continuous; 20; 60; increase bby 10 mL/hr every 4 hours until goal rate achieved; Water; 100; Every 4 hours; RD/LDN may adjust order; AM Snack  (Tube Feed with NPO Diet Orders with insertion and maintenance panels)  Diet effective now        Question Answer Comment   Tube Feeding Formula (PH): Peptamen AF    Administration Type Continuous    Tube Feeding Start rate (mL/hr): 20    Tube Feeding Goal rate (mL/hr): 60    Nursing Instruction increase bby 10 mL/hr every 4 hours until goal rate achieved    Flush type: Water    Flush amount (mL): 100    Flush frequency: Every 4 hours    Delegation of Authority. Diet orders written by PA/CRHomar may not be adjusted by RD/LDNs. RD/LDN may adjust order    Meal period (AM Snack required for CBORD, do not remove: Not clinically relevant) AM Snack       See Zachary for full Linked Orders Report.    12/28/23 1150                    Reason for Assessment  Reason For Assessment: identified at risk by screening criteria, per organizational policy, other (see comments) (follow-up)  Diagnosis:  (hypoxia, flu code blue)         Nutrition/Diet History  Typical Food/Fluid Intake: pt from home  Diet Prior to Admission: unkown  Food Allergies: no known food allergies  Factors Affecting Nutritional Intake: compromised airway, impaired cognitive status/motor control    Physical Findings  Overall Physical Appearance: overweight, on ventilator support  Gastrointestinal: other (see comments) (loose BM 12/29)  Last Bowel Movement: 12/29/23  Tubes: Nasogastric tube  Skin: edema, other (see comments), pressure injury (b/l upper +2; b/l lower +1; b/l pretibial blisters; DTI R heel)    Last Bowel Movement: 12/29/23    Nutrition Order  Nutrition Order: does not meet nutritional requirements  Nutrition Order Comments: NPO    Anthropometrics  Height: 165.1 cm (5' 5\")    Weights (last 7 days)     Date/Time Weight    " "12/29/23 0600 89.1 kg (196 lb 6.9 oz)    12/28/23 0600 88.3 kg (194 lb 10.7 oz)    12/26/23 0737 80.3 kg (177 lb)    12/25/23 0600 80.3 kg (177 lb 0.5 oz)    12/25/23 0345 82.9 kg (182 lb 12.2 oz)    12/24/23 1745 81.6 kg (180 lb)               Current Weight  Weight Method: Bed scale  Weight: 89.1 kg (196 lb 6.9 oz)    Ideal Body Weight (IBW)  Ideal Body Weight (IBW) (kg): 62.51  % Ideal Body Weight: 128.44    Usual Body Weight (UBW)  Usual Body Weight:  (177-185# per EMR)    Body Mass Index (BMI)  BMI (Calculated): 32.7  BMI Assessment: BMI 30-34.9: obesity grade I                        Labs/Procedures/Meds  Lab Results Reviewed: reviewed, pertinent      Results from last 7 days   Lab Units 12/29/23 0422 12/28/23 0426 12/27/23  0519   SODIUM mEQ/L 149* 149* 149*   POTASSIUM mEQ/L 3.4* 3.3* 3.8   CHLORIDE mEQ/L 115* 114* 117*   CO2 mEQ/L 23 24 23   BUN mg/dL 53* 40* 28*   CREATININE mg/dL 2.3* 2.2* 1.9*   GLUCOSE mg/dL 200* 201* 138*   CALCIUM mg/dL 7.8* 7.8* 7.5*      Results from last 7 days   Lab Units 12/29/23 0422 12/28/23 0426 12/27/23  0519   ALK PHOS IU/L 54 47 49   BILIRUBIN TOTAL mg/dL 0.6 0.9 0.9   ALBUMIN g/dL 2.4* 2.5* 2.5*   ALT IU/L 414* 553* 516*   AST IU/L 383* 822* 1,028*          No results found for: \"HGBA1C\"  No results found for: \"[id number]\"  Lab Results   Component Value Date    CALCIUM 7.8 (L) 12/29/2023    PHOS 3.6 12/27/2023     Results from last 7 days   Lab Units 12/29/23 0422 12/28/23 0426 12/27/23  0519   WBC K/uL 3.36* 4.13 5.31   HEMOGLOBIN g/dL 7.6* 8.5* 8.8*   HEMATOCRIT % 23.7* 26.5* 27.8*   PLATELETS K/uL 88* 94* 94*           Results from last 7 days   Lab Units 12/27/23  1231 12/27/23  0519   TRIGLYCERIDES mg/dL 303* 234*     Results from last 7 days   Lab Units 12/28/23  0426 12/27/23  0519 12/26/23  0347   MAGNESIUM mg/dL 2.0 2.0 2.2          Diagnostic Tests/Procedures  Diagnostic Test/Procedure Reviewed: reviewed    Medications  Pertinent Medications Reviewed: " reviewed, pertinent  • amiodarone  200 mg feeding tube Daily   • atorvastatin  10 mg oral Nightly   • cetirizine  10 mg feeding tube Nightly   • chlorhexidine  15 mL Mouth/Throat 2 times per day   • cholecalciferol (vitamin D3)  1,000 Units feeding tube Daily   • [Provider Managed Hold] furosemide  40 mg intravenous q12h GISELLE   • guaiFENesin  400 mg oral q6h GISELLE   • insulin lispro U-100  1-10 Units subcutaneous q6h GISELLE   • ipratropium HFA  2 puff inhalation QID (8a, 12p, 4p, 8p)   • methylPREDNISolone sodium succinate  60 mg intravenous q6h INT   • oseltamivir  30 mg oral BID   • pantoprazole  40 mg intravenous q12h GISELLE   • piperacillin-tazobactam  4.5 g intravenous q6h INT   • silver sulfadiazine   Topical BID   • sodium chloride  10 mL intravenous q8h INT   • tamoxifen  20 mg feeding tube Daily     • fentaNYL  0-200 mcg/hr 100 mcg/hr (12/29/23 0600)   • heparin infusion - MAR calculator by PTT  100-4,000 Units/hr 900 Units/hr (12/29/23 0900)   • midazolam  0-15 mg/hr 2 mg/hr (12/29/23 0600)   • norepinephrine  0-90 mcg/min Stopped (12/28/23 1333)       Estimated/Assessed Needs  Additional Documentation: Calorie Requirements (Group), Fluid Requirements (Group), Protein Requirements (Group)    Calorie Requirements  Estimated kCal Needs: Actual Body Weight  Estimated Calorie Need Method: kcal/kg  Calorie/kg Recommended: 22-25  Calorie Recommendations: 4801-4853 kcal                Protein Requirements  Recommended Dosing Weight (Estimated Protein Needs): Actual Body Weight  Est Protein Requirement Amount (gms/kg):  (1.2-1.5g)  Protein Recommendations: 96-120g    Fluid Requirements  Fluid Recommendation (mL):  (22-25ml/kg)  Recommended Fluid Needs Dosing Weight: Actual Body Weight  Fluid Requirements (mL/day): 1750-2000ml  [place] Method (over 20 kg): 1103        PES  Statement: PES Statement  Nutrition Diagnosis: Inadequate Oral Intake  Related To:: VDRF  As Evidenced By:: TF to meet 100% of estimated  needs  Nutritional Needs Met?: Yes                                         Clinical Comments:   Pt seen for follow-up. He is a 72 yo male adm with hypoxia, +Flu A, pt required intubation 12/24 after failing bipap. Pt s/p Code Blue 12/25 AM, then had a cardiac cath and temporary pacer placed later that day. Pmhx includes chronic atrial fibrillation, right breast cancer post mastectomy and postradiation therapy currently on chemo, prostate cancer, combined systolic and diastolic heart failure, CKD 3.   At time of visit:  Pt vented and sedated; fentanyl given (100 mcg/hr); levophed stopped 12/28; versed given 12/29 at 2mg/hr  MAP 98mmHg  TF infusing at goal of 60 mL/hr  Skin: DTI R heel  Meds: vitamin D3, Robitussin, prednisone, reglan  BM: 12/29 (liquid)  I/O: UOP 0.3 mL/kg/hr; Net I/O +4.6L since admit  Labs: Na 149H, K+ 3.4, BYN 53, Creat 2.3,  mg/dL, , , -208 mg/dL          Goals: Pt will continue to tolerate TF at goal to meet 100% of needs      Monitor and Evaluation:   TF tolerance  Trends in labs/lytes  meds  Weights  I/O  Medical course    Discussed with: family, ICU team    Date: 12/29/23  Signature: PETE Lao

## 2023-12-29 NOTE — PLAN OF CARE
Plan of Care Review  Plan of Care Reviewed With: patient  Progress: no change  Outcome Evaluation: Pt. sedated on vent, Pt. having diarrhea. Pt. desat with turns, mainly when head is lower. Heparin gtt and Milrinone gtt are infusing.

## 2023-12-30 ENCOUNTER — APPOINTMENT (INPATIENT)
Dept: RADIOLOGY | Facility: HOSPITAL | Age: 73
DRG: 004 | End: 2023-12-30
Attending: NURSE PRACTITIONER
Payer: MEDICARE

## 2023-12-30 ENCOUNTER — ANESTHESIA (INPATIENT)
Dept: INTENSIVE CARE | Facility: HOSPITAL | Age: 73
DRG: 004 | End: 2023-12-30
Payer: MEDICARE

## 2023-12-30 ENCOUNTER — ANESTHESIA EVENT (INPATIENT)
Dept: INTENSIVE CARE | Facility: HOSPITAL | Age: 73
DRG: 004 | End: 2023-12-30
Payer: MEDICARE

## 2023-12-30 LAB
ALBUMIN SERPL-MCNC: 2.4 G/DL (ref 3.5–5.7)
ALP SERPL-CCNC: 54 IU/L (ref 34–125)
ALT SERPL-CCNC: 298 IU/L (ref 7–52)
ANION GAP SERPL CALC-SCNC: 8 MEQ/L (ref 3–15)
APTT PPP: 87 SEC (ref 23–35)
AST SERPL-CCNC: 184 IU/L (ref 13–39)
BASE EXCESS BLDA CALC-SCNC: -1.6 MEQ/L
BASE EXCESS BLDA CALC-SCNC: -3 MEQ/L
BASE EXCESS BLDA CALC-SCNC: -4.7 MEQ/L
BASOPHILS # BLD: 0.01 K/UL (ref 0.01–0.1)
BASOPHILS NFR BLD: 0.2 %
BILIRUB SERPL-MCNC: 0.7 MG/DL (ref 0.3–1.2)
BUN SERPL-MCNC: 55 MG/DL (ref 7–25)
CA-I BLD-SCNC: 1.13 MMOL/L (ref 1.15–1.27)
CA-I BLD-SCNC: 1.19 MMOL/L (ref 1.15–1.27)
CA-I BLD-SCNC: 1.21 MMOL/L (ref 1.15–1.27)
CALCIUM SERPL-MCNC: 7.5 MG/DL (ref 8.6–10.3)
CHLORIDE BLDA-SCNC: 114 MEQ/L (ref 98–109)
CHLORIDE BLDA-SCNC: 116 MEQ/L (ref 98–109)
CHLORIDE BLDA-SCNC: 117 MEQ/L (ref 98–109)
CHLORIDE SERPL-SCNC: 115 MEQ/L (ref 98–107)
CO2 BLDA-SCNC: 23 MEQ/L (ref 22–32)
CO2 BLDA-SCNC: 24 MEQ/L (ref 22–32)
CO2 BLDA-SCNC: 25 MEQ/L (ref 22–32)
CO2 SERPL-SCNC: 23 MEQ/L (ref 21–31)
CREAT SERPL-MCNC: 2 MG/DL (ref 0.7–1.3)
DACRYOCYTES BLD QL SMEAR: ABNORMAL
DIFFERENTIAL METHOD BLD: ABNORMAL
EGFRCR SERPLBLD CKD-EPI 2021: 34.6 ML/MIN/1.73M*2
EOSINOPHIL # BLD: 0 K/UL (ref 0.04–0.54)
EOSINOPHIL NFR BLD: 0 %
ERYTHROCYTE [DISTWIDTH] IN BLOOD BY AUTOMATED COUNT: 16.1 % (ref 11.6–14.4)
GLUCOSE BLD-MCNC: 234 MG/DL (ref 70–99)
GLUCOSE BLD-MCNC: 239 MG/DL (ref 70–99)
GLUCOSE BLD-MCNC: 272 MG/DL (ref 70–99)
GLUCOSE BLD-MCNC: 286 MG/DL (ref 70–99)
GLUCOSE BLDA-MCNC: 204 MG/DL (ref 70–99)
GLUCOSE BLDA-MCNC: 215 MG/DL (ref 70–99)
GLUCOSE BLDA-MCNC: 225 MG/DL (ref 70–99)
GLUCOSE SERPL-MCNC: 250 MG/DL (ref 70–99)
HCO3 BLDA-SCNC: 21 MEQ/L (ref 21–28)
HCO3 BLDA-SCNC: 23 MEQ/L (ref 21–28)
HCO3 BLDA-SCNC: 24 MEQ/L (ref 21–28)
HCT VFR BLDCO AUTO: 25.6 % (ref 40.1–51)
HGB BLD-MCNC: 8.4 G/DL (ref 13.7–17.5)
HGB BLDA-MCNC: 10.3 G/DL (ref 14–17.5)
HGB BLDA-MCNC: 10.8 G/DL (ref 14–17.5)
HGB BLDA-MCNC: 9 G/DL (ref 14–17.5)
IMM GRANULOCYTES # BLD AUTO: 0.07 K/UL (ref 0–0.08)
IMM GRANULOCYTES NFR BLD AUTO: 1.5 %
LACTATE BLDA-SCNC: 1.3 MMOL/L (ref 0.4–1.6)
LACTATE BLDA-SCNC: 1.4 MMOL/L (ref 0.4–1.6)
LACTATE BLDA-SCNC: 1.5 MMOL/L (ref 0.4–1.6)
LYMPHOCYTES # BLD: 0.14 K/UL (ref 1.2–3.5)
LYMPHOCYTES NFR BLD: 3 %
MACROCYTES BLD QL SMEAR: ABNORMAL
MANUAL DIF COMMENT BLD-IMP: ABNORMAL
MCH RBC QN AUTO: 34.1 PG (ref 28–33.2)
MCHC RBC AUTO-ENTMCNC: 32.8 G/DL (ref 32.2–36.5)
MCV RBC AUTO: 104.1 FL (ref 83–98)
MONOCYTES # BLD: 0.35 K/UL (ref 0.3–1)
MONOCYTES NFR BLD: 7.5 %
NEUTROPHILS # BLD: 4.07 K/UL (ref 1.7–7)
NEUTS SEG NFR BLD: 87.8 %
NRBC BLD-RTO: 5.4 %
OVALOCYTES BLD QL SMEAR: ABNORMAL
PCO2 BLDA: 36 MM HG (ref 35–48)
PCO2 BLDA: 44 MM HG (ref 35–48)
PCO2 BLDA: 48 MM HG (ref 35–48)
PDW BLD AUTO: 12.3 FL (ref 9.4–12.4)
PH BLDA: 7.3 PH (ref 7.35–7.45)
PH BLDA: 7.3 PH (ref 7.35–7.45)
PH BLDA: 7.41 PH (ref 7.35–7.45)
PLAT MORPH BLD: NORMAL
PLATELET # BLD AUTO: 180 K/UL (ref 150–350)
PLATELET # BLD EST: ABNORMAL 10*3/UL
PO2 BLDA: 102 MM HG (ref 83–100)
PO2 BLDA: 164 MM HG (ref 83–100)
PO2 BLDA: 83 MM HG (ref 83–100)
POCT PATIENT TEMPERATURE: 98.4 °F (ref 97–99)
POCT PATIENT TEMPERATURE: 98.6 °F (ref 97–99)
POCT PATIENT TEMPERATURE: 98.6 °F (ref 97–99)
POCT TEST (BLD GAS): ABNORMAL
POCT TEST: ABNORMAL
POLYCHROMASIA BLD QL SMEAR: ABNORMAL
POTASSIUM BLDA-SCNC: 3.8 MEQ/L (ref 3.4–4.5)
POTASSIUM BLDA-SCNC: 3.9 MEQ/L (ref 3.4–4.5)
POTASSIUM BLDA-SCNC: 3.9 MEQ/L (ref 3.4–4.5)
POTASSIUM SERPL-SCNC: 4.9 MEQ/L (ref 3.5–5.1)
PROT SERPL-MCNC: 4.8 G/DL (ref 6–8.2)
RBC # BLD AUTO: 2.46 M/UL (ref 4.5–5.8)
SAO2 % BLDA: 94 % (ref 93–98)
SAO2 % BLDA: 97 % (ref 93–98)
SAO2 % BLDA: 98 % (ref 93–98)
SODIUM BLDA-SCNC: 144 MEQ/L (ref 136–145)
SODIUM SERPL-SCNC: 146 MEQ/L (ref 136–145)
WBC # BLD AUTO: 4.64 K/UL (ref 3.8–10.5)

## 2023-12-30 PROCEDURE — 0BH17EZ INSERTION OF ENDOTRACHEAL AIRWAY INTO TRACHEA, VIA NATURAL OR ARTIFICIAL OPENING: ICD-10-PCS | Performed by: ANESTHESIOLOGY

## 2023-12-30 PROCEDURE — 74018 RADEX ABDOMEN 1 VIEW: CPT

## 2023-12-30 PROCEDURE — 71045 X-RAY EXAM CHEST 1 VIEW: CPT

## 2023-12-30 PROCEDURE — 63600000 HC DRUGS/DETAIL CODE: Mod: JZ | Performed by: INTERNAL MEDICINE

## 2023-12-30 PROCEDURE — 63600000 HC DRUGS/DETAIL CODE: Mod: JZ

## 2023-12-30 PROCEDURE — 85730 THROMBOPLASTIN TIME PARTIAL: CPT | Performed by: INTERNAL MEDICINE

## 2023-12-30 PROCEDURE — 5A1955Z RESPIRATORY VENTILATION, GREATER THAN 96 CONSECUTIVE HOURS: ICD-10-PCS | Performed by: INTERNAL MEDICINE

## 2023-12-30 PROCEDURE — 85025 COMPLETE CBC W/AUTO DIFF WBC: CPT | Performed by: NURSE PRACTITIONER

## 2023-12-30 PROCEDURE — 80053 COMPREHEN METABOLIC PANEL: CPT | Performed by: NURSE PRACTITIONER

## 2023-12-30 PROCEDURE — 36415 COLL VENOUS BLD VENIPUNCTURE: CPT | Performed by: INTERNAL MEDICINE

## 2023-12-30 PROCEDURE — 25800000 HC PHARMACY IV SOLUTIONS: Performed by: INTERNAL MEDICINE

## 2023-12-30 PROCEDURE — 20000000 HC ROOM AND CARE ICU

## 2023-12-30 PROCEDURE — 63600000 HC DRUGS/DETAIL CODE: Mod: JW | Performed by: NURSE ANESTHETIST, CERTIFIED REGISTERED

## 2023-12-30 PROCEDURE — 63700000 HC SELF-ADMINISTRABLE DRUG: Performed by: HOSPITALIST

## 2023-12-30 PROCEDURE — 94640 AIRWAY INHALATION TREATMENT: CPT

## 2023-12-30 PROCEDURE — G1004 CDSM NDSC: HCPCS

## 2023-12-30 PROCEDURE — 70450 CT HEAD/BRAIN W/O DYE: CPT | Mod: MG

## 2023-12-30 PROCEDURE — 94003 VENT MGMT INPAT SUBQ DAY: CPT

## 2023-12-30 PROCEDURE — 25000000 HC PHARMACY GENERAL: Performed by: HOSPITALIST

## 2023-12-30 PROCEDURE — 63600000 HC DRUGS/DETAIL CODE: Mod: JZ | Performed by: NURSE PRACTITIONER

## 2023-12-30 RX ORDER — FENTANYL CITRATE/PF 100MCG/2ML
0-200 SYRINGE (ML) INTRAVENOUS
Status: DISCONTINUED | OUTPATIENT
Start: 2023-12-30 | End: 2024-01-01

## 2023-12-30 RX ORDER — PROPOFOL 200MG/20ML
SYRINGE (ML) INTRAVENOUS
Status: COMPLETED | OUTPATIENT
Start: 2023-12-30 | End: 2023-12-30

## 2023-12-30 RX ORDER — GUAIFENESIN 100 MG/5ML
400 SOLUTION ORAL EVERY 6 HOURS
Status: DISCONTINUED | OUTPATIENT
Start: 2023-12-30 | End: 2023-12-30 | Stop reason: SDUPTHER

## 2023-12-30 RX ADMIN — IPRATROPIUM BROMIDE 2 PUFF: 17 AEROSOL, METERED RESPIRATORY (INHALATION) at 14:50

## 2023-12-30 RX ADMIN — Medication 10 ML: at 04:00

## 2023-12-30 RX ADMIN — METHYLPREDNISOLONE SODIUM SUCCINATE 60 MG: 125 INJECTION, POWDER, FOR SOLUTION INTRAMUSCULAR; INTRAVENOUS at 08:25

## 2023-12-30 RX ADMIN — HYDRALAZINE HYDROCHLORIDE 5 MG: 20 INJECTION INTRAMUSCULAR; INTRAVENOUS at 05:15

## 2023-12-30 RX ADMIN — SUCCINYLCHOLINE CHLORIDE 160 MG: 20 INJECTION, SOLUTION INTRAMUSCULAR; INTRAVENOUS at 14:40

## 2023-12-30 RX ADMIN — CHLORHEXIDINE GLUCONATE ORAL RINSE 15 ML: 1.2 SOLUTION DENTAL at 22:13

## 2023-12-30 RX ADMIN — Medication 10 MG: at 22:16

## 2023-12-30 RX ADMIN — SILVER SULFADIAZINE: 10 CREAM TOPICAL at 08:26

## 2023-12-30 RX ADMIN — CHOLECALCIFEROL TAB 25 MCG (1000 UNIT) 1000 UNITS: 25 TAB at 08:25

## 2023-12-30 RX ADMIN — AMIODARONE HYDROCHLORIDE 200 MG: 200 TABLET ORAL at 08:25

## 2023-12-30 RX ADMIN — HYDRALAZINE HYDROCHLORIDE 5 MG: 20 INJECTION INTRAMUSCULAR; INTRAVENOUS at 11:01

## 2023-12-30 RX ADMIN — INSULIN LISPRO 4 UNITS: 100 INJECTION, SOLUTION INTRAVENOUS; SUBCUTANEOUS at 00:37

## 2023-12-30 RX ADMIN — IPRATROPIUM BROMIDE 2 PUFF: 17 AEROSOL, METERED RESPIRATORY (INHALATION) at 08:52

## 2023-12-30 RX ADMIN — PANTOPRAZOLE SODIUM 40 MG: 40 INJECTION, POWDER, LYOPHILIZED, FOR SOLUTION INTRAVENOUS at 08:25

## 2023-12-30 RX ADMIN — HEPARIN SODIUM 900 UNITS/HR: 10000 INJECTION, SOLUTION INTRAVENOUS at 11:13

## 2023-12-30 RX ADMIN — ATORVASTATIN CALCIUM 10 MG: 10 TABLET, FILM COATED ORAL at 22:13

## 2023-12-30 RX ADMIN — GUAIFENESIN 400 MG: 100 SOLUTION ORAL at 06:18

## 2023-12-30 RX ADMIN — INSULIN LISPRO 2 UNITS: 100 INJECTION, SOLUTION INTRAVENOUS; SUBCUTANEOUS at 17:32

## 2023-12-30 RX ADMIN — SILVER SULFADIAZINE: 10 CREAM TOPICAL at 19:59

## 2023-12-30 RX ADMIN — IPRATROPIUM BROMIDE 2 PUFF: 17 AEROSOL, METERED RESPIRATORY (INHALATION) at 19:50

## 2023-12-30 RX ADMIN — CHLORHEXIDINE GLUCONATE ORAL RINSE 15 ML: 1.2 SOLUTION DENTAL at 10:31

## 2023-12-30 RX ADMIN — PIPERACILLIN AND TAZOBACTAM 4.5 G: 4; .5 INJECTION, POWDER, LYOPHILIZED, FOR SOLUTION INTRAVENOUS; PARENTERAL at 03:56

## 2023-12-30 RX ADMIN — PIPERACILLIN AND TAZOBACTAM 4.5 G: 4; .5 INJECTION, POWDER, LYOPHILIZED, FOR SOLUTION INTRAVENOUS; PARENTERAL at 22:12

## 2023-12-30 RX ADMIN — PANTOPRAZOLE SODIUM 40 MG: 40 INJECTION, POWDER, LYOPHILIZED, FOR SOLUTION INTRAVENOUS at 20:44

## 2023-12-30 RX ADMIN — PIPERACILLIN AND TAZOBACTAM 4.5 G: 4; .5 INJECTION, POWDER, LYOPHILIZED, FOR SOLUTION INTRAVENOUS; PARENTERAL at 15:52

## 2023-12-30 RX ADMIN — PROPOFOL 100 MG: 10 INJECTION, EMULSION INTRAVENOUS at 14:40

## 2023-12-30 RX ADMIN — TAMOXIFEN CITRATE 20 MG: 10 TABLET, FILM COATED ORAL at 08:25

## 2023-12-30 RX ADMIN — Medication 10 ML: at 11:03

## 2023-12-30 RX ADMIN — GUAIFENESIN 400 MG: 100 SOLUTION ORAL at 00:22

## 2023-12-30 RX ADMIN — INSULIN LISPRO 2 UNITS: 100 INJECTION, SOLUTION INTRAVENOUS; SUBCUTANEOUS at 12:08

## 2023-12-30 RX ADMIN — Medication 25 MCG/HR: at 15:51

## 2023-12-30 RX ADMIN — GUAIFENESIN 400 MG: 100 SOLUTION ORAL at 17:32

## 2023-12-30 RX ADMIN — GUAIFENESIN 400 MG: 100 SOLUTION ORAL at 12:18

## 2023-12-30 RX ADMIN — IPRATROPIUM BROMIDE 2 PUFF: 17 AEROSOL, METERED RESPIRATORY (INHALATION) at 11:02

## 2023-12-30 RX ADMIN — INSULIN LISPRO 4 UNITS: 100 INJECTION, SOLUTION INTRAVENOUS; SUBCUTANEOUS at 06:02

## 2023-12-30 RX ADMIN — PIPERACILLIN AND TAZOBACTAM 4.5 G: 4; .5 INJECTION, POWDER, LYOPHILIZED, FOR SOLUTION INTRAVENOUS; PARENTERAL at 10:31

## 2023-12-30 RX ADMIN — Medication 1 MG/HR: at 15:26

## 2023-12-30 RX ADMIN — Medication 10 ML: at 19:23

## 2023-12-30 ASSESSMENT — COGNITIVE AND FUNCTIONAL STATUS - GENERAL
MOVING TO AND FROM BED TO CHAIR: 1 - TOTAL
WALKING IN HOSPITAL ROOM: 1 - TOTAL
WALKING IN HOSPITAL ROOM: 1 - TOTAL
STANDING UP FROM CHAIR USING ARMS: 1 - TOTAL
STANDING UP FROM CHAIR USING ARMS: 1 - TOTAL
MOVING TO AND FROM BED TO CHAIR: 1 - TOTAL
CLIMB 3 TO 5 STEPS WITH RAILING: 1 - TOTAL
CLIMB 3 TO 5 STEPS WITH RAILING: 1 - TOTAL

## 2023-12-30 NOTE — ANESTHESIA PROCEDURE NOTES
Airway  Urgency: elective    Start Time: 12/30/2023 2:40 PM  Airway not difficult    General Information and Staff    Patient location during procedure: OR  Anesthesiologist: Sergio Borges MD  Resident/CRNA: Olimpia Estevez CRNA  Performed: resident/CRNA   Performed by: Olimpia Estevez CRNA  Authorized by: Olimpia Estevez CRNA      Indications and Patient Condition  Indications for airway management: respiratory distress and airway protection  Sedation level: general  Preoxygenated: yes  Patient position: ramp and sniffing  Mask difficulty assessment: 1 - vent by mask    Final Airway Details  Final airway type: endotracheal airway      Successful airway: ETT  Cuffed: yes   Successful intubation technique: video laryngoscopy  Facilitating devices/methods: intubating stylet  Endotracheal tube insertion site: oral  Blade: Carlos  Blade size: #3  ETT size (mm): 7.5  Placement verified by: chest auscultation and capnometry   Measured from: lips  ETT to lips (cm): 24  Number of attempts at approach: 1  Atraumatic airway insertion      Additional Comments  Feeding tube coiled in posterior pharynx and removed to enable intubation    Medications Administered -   propofol (DIPRIVAN) bolus injection 10 mg/mL - intravenous   100 mg - 12/30/2023 2:40:00 PM  succinylcholine (ANECTINE) injection 20 mg/mL - intravenous   160 mg - 12/30/2023 2:40:00 PM

## 2023-12-30 NOTE — PROGRESS NOTES
"   Cardiology Progress Note       Subjective       Objective     Vital signs in last 24 hours    Temp:  [36.6 °C (97.9 °F)-37.4 °C (99.3 °F)] 36.6 °C (97.9 °F)  Heart Rate:  [] 82  Resp:  [] 20  BP: (132-214)/(51-92) 149/69  FiO2 (%) (Set):  [40 %] 40 %  I/O this shift:  In: 983 [I.V.:63; NG/GT:820; IV Piggyback:100]  Out: 250 [Urine:250]      Intake/Output Summary (Last 24 hours) at 12/30/2023 1339  Last data filed at 12/30/2023 1300  Gross per 24 hour   Intake 3566.02 ml   Output 820 ml   Net 2746.02 ml         Physical Exam     Constitutional:  Well-developed and well-nourished. NAD  Head: atraumatic, normocephalic   Neck: No JVD present. Carotid bruit is not present.   Cardiovascular: Regular rate and rhythm.  no murmur, rubs, gallops.  Pulmonary/Chest: Clear to auscultation bilaterally.  Abdomen: soft, NT/ND.  Extremities: No clubbing/cyanosis/edema.   Neurological: Alert and oriented to person, place, and time. Appropriate affect  Skin: warm, dry    Labs  Lab Results   Component Value Date    GLUCOSE 250 (H) 12/30/2023    CALCIUM 7.5 (L) 12/30/2023     (H) 12/30/2023    K 4.9 12/30/2023    CO2 23 12/30/2023     (H) 12/30/2023    BUN 55 (H) 12/30/2023    CREATININE 2.0 (H) 12/30/2023    MG 2.0 12/28/2023     No results found for: \"CKTOTAL\", \"CKMB\", \"CKMBINDEX\", \"TROPONINI\"  Results from last 7 days   Lab Units 12/25/23 2011 12/25/23  1729 12/25/23  1413   HIGH SENSITIVE TROPONIN I pg/mL 706.3* 792.0* 860.4*     Lab Results   Component Value Date    BNP 1,135 (H) 12/24/2023     Lab Results   Component Value Date    WBC 4.64 12/30/2023    HGB 8.4 (L) 12/30/2023    HCT 25.6 (L) 12/30/2023    .1 (H) 12/30/2023     12/30/2023    INR 1.1 12/28/2023     Results from last 7 days   Lab Units 12/27/23  1231 12/27/23  0519   TRIGLYCERIDES mg/dL 303* 234*       Current Meds  • amiodarone  200 mg feeding tube Daily   • atorvastatin  10 mg oral Nightly   • cetirizine  10 mg feeding tube " Nightly   • chlorhexidine  15 mL Mouth/Throat 2 times per day   • cholecalciferol (vitamin D3)  1,000 Units feeding tube Daily   • [Provider Managed Hold] furosemide  40 mg intravenous q12h GISELLE   • guaiFENesin  400 mg oral q6h GISELLE   • insulin lispro U-100  1-10 Units subcutaneous q6h GISELLE   • ipratropium HFA  2 puff inhalation QID (8a, 12p, 4p, 8p)   • pantoprazole  40 mg intravenous q12h GISELLE   • piperacillin-tazobactam  4.5 g intravenous q6h INT   • silver sulfadiazine   Topical BID   • sodium chloride  10 mL intravenous q8h INT   • tamoxifen  20 mg feeding tube Daily       Recent Cardiac Studies    Cardiac Imaging    TRANSTHORACIC ECHO (TTE) COMPLETE 12/26/2023    Interpretation Summary  •  Left Ventricle: Normal ventricle size. Normal wall thickness. Preserved systolic function. Estimated EF 40-45%. Hypokinesis of the apex. 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  •  Right Ventricle: Normal ventricle size. Pacer wire present. Normal systolic function.  •  Right Atrium: Normal sized atrium.  •  Aortic Valve: Tricuspid valve. Trace regurgitation. No stenosis.  •  Mitral Valve: Normal leaflet structure. Normal leaflet motion. Trace regurgitation. No stenosis.  •  Tricuspid Valve: Normal structure. Mild regurgitation. Estimated RVSP = 43 mmHg. No significant stenosis.  •  Pulmonic Valve: Normal structure. No regurgitation. No stenosis.  •  Aorta: Aortic root normal. Sinuses of Valsalva normal-sized. Ascending aorta normal-sized. Aortic arch normal-sized. Descending aorta normal-sized.  •  Pericardium: Normal structure. No evidence of pericardial effusion. No cardiac tamponade.  •  Left Atrium: Mildly dilated atrium.    Cardiac Imaging    CARDIAC CATHETERIZATION 12/25/2023    Conclusion  •  Angiographically normal epicardial coronary arteries  •  Left ventricular ejection fraction is approximately 25-30%.Wall motion abnormalities consistent with Takotsubo  Cardiomyopathy  •  Severely elevated biventricular filling pressures  •  Reduced Cardiac Output and Index; Reduced Stroke volume and stroke volume index by Chelsi Calculation  •  Pulmonary venous post-capillary hypertension primarily due to left heart dysfunction  •  No peak to peak gradient on pullback to suggest aortic stenosis    RECOMMENDATIONS:  1. Routine post-procedure and access site care  2. Optimal medical therapy and primary prevention for CAD  3. IV Milrinone  4. Bedrest        Telemetry  Sinus tachycardia versus atrial tachycardia heart rate up to 120/min.      Assessment & Plan     1.  VF arrest-suspect precipitated by Takotsubo syndrome with torsade.  Patient's QT interval remains prolonged.  He has deep T wave inversions.  He is at risk of further episodes of polymorphic ventricular tachycardia.  If he has any further issues, will resume IV lidocaine per EP recommendation.  If he is bradycardic, he will need a new temporary pacemaker.     2.  Hypoxemia- chest x-ray on admission revealed mild pulmonary interstitial edema.    The patient's creatinine is increasing and his sodium is 149.  Avoid overdiuresis.  The patient is oxygenating adequately.     3.  Prostate cancer-completed therapy     4.  Right breast cancer-radiation therapy     5.  Atrial fibrillation-patient has a history of atrial fibrillation-continue on oral/nasogastric amiodarone.    Patient is on IV heparin.     6.  Takotsubo syndrome- patient's blood pressure is elevated. Pressure at times is quite elevated 214/92, currently 149/69.  He is off milrinone and Levophed.  May consider limited echo to be repeated tomorrow sometime next week.    Dr. Michele Estrada will resume care of this patient as of next week.      Orestes Chapman MD  12/30/2023  Pager #[phone]

## 2023-12-30 NOTE — PROGRESS NOTES
Critical Care/Pulmonary  Daily Progress Note        Subjective    Interval History:    Intubated. With sedation off, demonstrated Vt >500cc. Extubated  Mental status remains fair, with intermittent following commands and fair attention  Off levophed and milrinone  Difficulty in clearing secretions    Afebrile    I and O  Since admission: +7.3 liters  Last 24 hours: +2.6 liters  Urine output: 830 ml       Objective       Allergies: Azithromycin, Prednisone, and Lorazepam    CURRENT MEDS  •  acetaminophen, 650 mg, feeding tube, q4h PRN  •  albuterol, 2.5 mg, nebulization, q4h PRN  •  amiodarone, 200 mg, feeding tube, Daily  •  atorvastatin, 10 mg, oral, Nightly  •  atropine, 1 mg, intravenous, Once PRN  •  betamethasone valerate, , Topical, 2x daily PRN  •  calcium gluconate, 1 g, intravenous, q6h PRN  •  cetirizine, 10 mg, feeding tube, Nightly  •  chlorhexidine, 15 mL, Mouth/Throat, 2 times per day  •  cholecalciferol (vitamin D3), 1,000 Units, feeding tube, Daily  •  glucose, 16-32 g of dextrose, oral, PRN **OR** dextrose, 15-30 g of dextrose, oral, PRN **OR** glucagon, 1 mg, intramuscular, PRN **OR** dextrose 50 % in water (D50), 25 mL, intravenous, PRN  •  [Provider Managed Hold] furosemide, 40 mg, intravenous, q12h GISELLE  •  guaiFENesin, 400 mg, oral, q6h GISELLE  •  heparin (porcine), 40-80 Units/kg (Adjusted), intravenous, q6h PRN  •  heparin infusion - MAR calculator by PTT, 100-4,000 Units/hr, intravenous, Titrated  •  hydrALAZINE, 5 mg, intravenous, q6h PRN  •  insulin lispro U-100, 1-10 Units, subcutaneous, q6h GISELLE  •  ipratropium HFA, 2 puff, inhalation, QID (8a, 12p, 4p, 8p)  •  magnesium sulfate, 2 g, intravenous, PRN  •  metoclopramide, 10 mg, intravenous, q6h PRN  •  pantoprazole, 40 mg, intravenous, q12h GISELLE  •  piperacillin-tazobactam, 4.5 g, intravenous, q6h INT  •  potassium chloride, 20 mEq, oral, PRN  •  potassium chloride, 40 mEq, oral, PRN  •  potassium chloride, 20 mEq, intravenous, PRN  •   potassium chloride in water, 20 mEq, intravenous, PRN **AND** potassium chloride in water, 20 mEq, intravenous, PRN  •  silver sulfadiazine, , Topical, BID  •  sodium chloride, 10 mL, intravenous, q8h INT  •  sodium chloride, 10 mL, intravenous, PRN  •  tamoxifen, 20 mg, feeding tube, Daily    VITAL SIGNS  Vitals:    12/30/23 1100 12/30/23 1101 12/30/23 1200 12/30/23 1244   BP: (!) 214/92 (!) 190/78 (!) 149/69    Pulse: 87 84 82 82   Resp: 15  (!) 23 20   Temp:       TempSrc:       SpO2: 98%  97% 98%   Weight:       Height:           VENTILATOR SETTINGS  Vent Mode: Pressure support  FiO2 (%) (Set):  [40 %] 40 %  S RR:  [18] 18  S VT:  [450 mL] 450 mL  PEEP/CPAP (Set, cmH2O):  [6 cm H20] 6 cm H20  MAP (Observed, cmH2O):  [8-11] 8    Oxygen:  Oxygen Therapy: Supplemental oxygen  O2 Delivery Method: Endotracheal tube  FiO2 (%) (Set): 40 %  O2 Flow Rate (L/min): 6 L/min     INTAKE/OUTPUT    Intake/Output Summary (Last 24 hours) at 12/30/2023 1348  Last data filed at 12/30/2023 1300  Gross per 24 hour   Intake 3566.02 ml   Output 820 ml   Net 2746.02 ml       Lines, Drains, Airways, Wounds:  Peripheral IV (Adult) 12/25/23 Anterior;Right Hand (Active)   Number of days: 2       Peripheral IV (Adult) 12/26/23 Right Antecubital (Active)   Number of days: 1       Peripheral IV (Adult) 11/26/23 Left;Posterior Forearm (Active)   Number of days: 31       NG/OG Tube 12/25/23 Left nostril (Active)   Number of days: 2       Urethral Catheter Temperature probe 16 Fr (Active)   Number of days: 3       ETT  (Active)   Number of days: 1       Wound Other (comment) Right Pretibial (Active)   Number of days: 3       Wound Venous Ulcer Left Pretibial (Active)   Number of days: 3       Wound Perineum (Active)   Number of days: 3       Catheterization Site - Arterial Right Femoral 6 Fr. (Active)   Number of days: 2       Catheterization Site - Venous Right Femoral 6 Fr. (Active)   Number of days: 2         Physical Exam:  Physical  Exam  Vitals and nursing note reviewed.   Constitutional:       Interventions: He is sedated.   HENT:      Head: Normocephalic and atraumatic.      Mouth/Throat:      Mouth: Mucous membranes are moist.   Eyes:      General: No scleral icterus.     Pupils: Pupils are equal, round, and reactive to light.   Cardiovascular:      Rate and Rhythm: Bradycardia present. Rhythm irregular.      Heart sounds: Normal heart sounds. No murmur heard.  Pulmonary:      Effort: No respiratory distress.      Comments: Coarse breath sounds  Abdominal:      General: Bowel sounds are normal.      Palpations: Abdomen is soft.   Musculoskeletal:      Right lower leg: No edema.      Left lower leg: No edema.   Skin:     General: Skin is warm and dry.   Neurological:      Comments: Awake with poor attention          Labs:  See Labs Below:  ABG  Results from last 7 days   Lab Units 12/28/23  0425 12/26/23  1609 12/26/23  1435 12/24/23  2332 12/24/23  2012 12/24/23  1753 12/24/23  1753 12/24/23  1647   PH ART pH 7.44 7.26* 7.17*   < > 7.27*   < > 7.27*  --    PCO2 ART mm Hg 36 59* 66*   < > 46   < > 48  --    PO2 ART mm Hg 102* 79* 84   < > 100   < > 151*  --    HCO3 ART mEQ/L 25 24 21   < > 20.3*   < > 21.0  --    O2 SAT ART % 97 93 93   < >  --   --   --   --    BASE EXC ART mEQ/L 0.4 -1.1 -5.2   < > -5.9   < > -5.1  --    SOURCE OF OXYGEN   --   --   --   --  Bipap  --  bipap -    < > = values in this interval not displayed.     CBC  Results from last 7 days   Lab Units 12/30/23  0355 12/29/23  0422 12/28/23  0426   WBC K/uL 4.64 3.36* 4.13   RBC M/uL 2.46* 2.29* 2.53*   HEMOGLOBIN g/dL 8.4* 7.6* 8.5*   HEMATOCRIT % 25.6* 23.7* 26.5*   MCV fL 104.1* 103.5* 104.7*   MCH pg 34.1* 33.2 33.6*   MCHC g/dL 32.8 32.1* 32.1*   PLATELETS K/uL 180 88* 94*   RDW % 16.1* 15.7* 16.0*   MPV fL 12.3 11.0 10.1     BMP  Results from last 7 days   Lab Units 12/30/23  0355 12/29/23  0422 12/28/23  0426   SODIUM mEQ/L 146* 149* 149*   POTASSIUM mEQ/L 4.9 3.4*  3.3*   CHLORIDE mEQ/L 115* 115* 114*   CO2 mEQ/L 23 23 24   BUN mg/dL 55* 53* 40*   CREATININE mg/dL 2.0* 2.3* 2.2*   CALCIUM mg/dL 7.5* 7.8* 7.8*   ALBUMIN g/dL 2.4* 2.4* 2.5*   BILIRUBIN TOTAL mg/dL 0.7 0.6 0.9   ALK PHOS IU/L 54 54 47   ALT IU/L 298* 414* 553*   AST IU/L 184* 383* 822*   GLUCOSE mg/dL 250* 200* 201*     Coag  Results from last 7 days   Lab Units 12/30/23  0355 12/29/23  1512 12/29/23  0859 12/28/23  2346 12/28/23  1659 12/25/23  0934 12/25/23  0410 12/24/23  1647   PROTIME sec  --   --   --   --  13.7  --  17.4* 21.2*   INR   --   --   --   --  1.1  --  1.4 1.9   PTT sec 87* 103* 91*   < > 31   < > 33  --     < > = values in this interval not displayed.       Imaging:     US Kidneys 12/28/2023  1. No sonographic abnormality in the kidneys.  2. Nearly completely collapsed bladder as discussed below. No large intraluminal  bladder thrombus/clot as clinically questioned (given the limitations of near  complete bladder collapse).    X-RAY CHEST 1 VIEW    Result Date: 12/27/2023  IMPRESSION:  Reduced lung volumes. No acute cardiopulmonary process. Stable cardiac enlargement. COMPARISON: Portable chest studies 12/25 and 12/26/2023. COMMENT:  Upright portable imaging completed 0548 hours. Endotracheal tube 4.7 cm above. Enteric tube follows a normal course into left upper quadrant, directed to the left. Mild stable cardiac enlargement. Mitral annular prosthesis again noted. Stable hilar and mediastinal contours. Reduced lung volumes. No definite consolidation or pleural fluid. Unremarkable upper abdomen.    X-RAY CHEST 1 VIEW    Result Date: 12/26/2023  IMPRESSION: Endotracheal tube has been repositioned; the tip is now approximately 3.3 cm above the leo. COMMENT: A semierect AP portable view the chest was performed at 1615 and is compared to a prior study from 1503. Since the prior study, the endotracheal tube has been repositioned and the tip is now approximately 3.3 cm above the leo. There has  been no other significant interval change.    X-RAY CHEST 1 VIEW    Result Date: 12/26/2023  IMPRESSION: ET tube 2 cm above leo. NG tube tip in proximal stomach. Probable right lower lobe atelectasis; attention on follow-up.    X-RAY CHEST 1 VIEW    Result Date: 12/26/2023  IMPRESSION: Status post extubation. Slightly improved vascular congestion since earlier in the day. Suspect developing atelectasis or airspace disease in the right midlung zone. COMMENT: Semierect AP portable view of the chest was performed at 1428 and is compared to a prior study from 5:27 AM. In the interval, the endotracheal tube has been removed. An enteric tube remains in place with the tip in the stomach. There is a vascular catheter/line with the tip projecting near the junction of the IVC and right atrium; it is difficult to determine whether this was present previously but differences in technique. Mild cardiomegaly is again noted. Cardiac valvular prosthesis is again seen. The pulmonary vasculature and interstitial markings have improved slightly since earlier in the day. There is questionable developing airspace disease or atelectasis in the right midlung zone. Trace bilateral pleural effusions are suspected. The hilar and mediastinal structures appear stable. Surgical clips are again seen in the right axilla.    X-RAY CHEST 1 VIEW    Result Date: 12/26/2023  IMPRESSION: ET tube 4 cm above leo, NG tube tip not well seen, likely in proximal stomach. Stable cardiomegaly, mitral valve replacement. Clear lungs, with interstitial crowding secondary to low lung volumes.    X-RAY CHEST 1 VIEW    Result Date: 12/26/2023  IMPRESSION: Improved pulmonary vascular congestion. ET tube 3 cm above leo, NG tube tip below inferior edge of film.    X-RAY CHEST 1 VIEW    Result Date: 12/25/2023  IMPRESSION: Interval retraction of the endotracheal tube now in satisfactory position, as below. Stable satisfactory positioning of the enteric tube.  Progressive pulmonary interstitial edema with stable enlargement of the cardiac silhouette. No pneumothorax. COMMENT: Comparison: Chest x-ray 12/24/2023. Technique: A single frontal AP portable upright projection of the chest was obtained. FINDINGS: There has been interval retraction of the endotracheal tube now terminating 2.7 cm above the leo. An enteric tube courses to the stomach with the distal tip collimated from the field-of-view beneath the left hemidiaphragm in satisfactory position. There are defibrillator pad projecting over the left hemithorax. There are progressively increased interstitial opacities seen projecting over both lungs. There is no pleural effusion or pneumothorax. The cardiac silhouette is stably enlarged. The mediastinal contours are unchanged. Again noted is a prosthetic mitral valve. The upper abdomen is unremarkable. There is no acute osseous abnormality. Surgical clips overlie the right axillary region.     X-RAY CHEST 1 VIEW    Result Date: 12/25/2023  IMPRESSION: Satisfactory positioning of the endotracheal and enteric tubes. No pneumothorax. Stable pulmonary edema and enlargement of the cardiac silhouette. COMMENT: Comparison: Chest x-ray 12/25/2023. Technique: A single frontal AP portable upright projection of the chest was obtained. FINDINGS: The tip of an endotracheal tube terminates 4.0 cm above the leo. An enteric tube courses to the stomach with the distal tip collimated from the field-of-view beneath the left hemidiaphragm in satisfactory position. There are defibrillator pad projecting over the left hemithorax. There are mildly increased interstitial opacities again seen projecting over both lungs. No pleural effusion or pneumothorax is seen. There is stable enlargement of the cardiac silhouette. Again noted is a prosthetic mitral valve. The mediastinal contours are unchanged. The upper abdomen is unremarkable. There is no acute osseous abnormality. There are surgical  clips overlying the right axillary region.     X-RAY CHEST 1 VIEW    Result Date: 12/25/2023  IMPRESSION: Low-lying endotracheal tube terminating over the proximal right mainstem bronchus. Recommend retraction. Satisfactory positioning of the enteric tube. This finding and recommendation was discussed with nurse Sahara at 11:27 AM on 12/25/2023 by Dr. Martinez by telephone. Mild pulmonary interstitial edema and stable enlargement of the cardiac silhouette. No pneumothorax. COMMENT: Comparison: Chest x-ray 12/24/2023. Technique: A single frontal AP portable upright projection of the chest was obtained. FINDINGS: The tip of the intervally placed endotracheal tube terminates over the proximal right mainstem bronchus. The tip of the enteric tube terminates over the left upper quadrant of the abdomen. There are mildly increased interstitial opacities noted within both lungs. There is no pleural effusion or pneumothorax. There is stable enlargement of the cardiac silhouette. Again noted is a mitral valve prosthesis. Surgical clips overlie the right axilla. The upper abdomen is unremarkable. There is no acute osseous abnormality.    X-RAY CHEST 1 VIEW    Result Date: 12/25/2023  IMPRESSION: Vascular congestion.  Left basal atelectasis. COMMENT: AP radiograph the chest is compared to previous study dated 8/17/2023. There is vascular congestion and small effusions.  Findings may represent mild congestive heart failure.  Cardiac silhouette is unchanged.  Prosthetic valve ring seen.  Surgical staples seen in the right axilla.  There is minimal left basal atelectasis.    CT ANGIOGRAPHY CHEST PULMONARY EMBOLISM WITH IV CONTRAST    Result Date: 12/24/2023  IMPRESSION: 1. No pulmonary embolism in the main or proximal segmental pulmonary arteries. 2. Right upper lobe and left lower lobe infiltrate with patchy airspace opacities in the right middle lobe.      Micro:   Microbiology Results     Procedure Component Value Units Date/Time     Sputum culture / smear Expectorated Sputum [[id number]] Collected: 12/25/23 0418    Specimen: Aspirate from Expectorated Sputum Updated: 12/27/23 0845    Narrative:      The following orders were created for panel order Sputum culture / smear Expectorated Sputum.  Procedure                               Abnormality         Status                     ---------                               -----------         ------                     Sputum Gram Stain (Lab O...[[id number]]                      Final result               Sputum Culture (Lab Only...[[id number]]  Normal              Final result                 Please view results for these tests on the individual orders.    Sputum Gram Stain (Lab Only) Expectorated Sputum [[id number]] Collected: 12/25/23 0418    Specimen: Aspirate from Expectorated Sputum Updated: 12/25/23 0956     Gram Stain Result 3+ WBC      No Epithelial Cells Seen      3+ Gram positive bacilli      2+ Gram positive cocci in pairs, chains and clusters    Sputum Culture (Lab Only) Expectorated Sputum [[id number]]  (Normal) Collected: 12/25/23 0418    Specimen: Aspirate from Expectorated Sputum Updated: 12/27/23 0845     Culture Normal [place]    MRSA Screen, Nares Only Nose [[id number]]  (Normal) Collected: 12/25/23 0352    Specimen: Nasal Swab from Nose Updated: 12/25/23 0906     MRSA DNA, Nares Negative    Blood Culture Blood, Venous [[id number]]  (Normal) Collected: 12/24/23 1652    Specimen: Blood, Venous Updated: 12/27/23 0000     Culture No growth at 48 hours    SARS-CoV-2 (COVID-19) Nasopharynx [[id number]]  (Abnormal) Collected: 12/24/23 1649    Specimen: Nasopharyngeal Swab from Nasopharynx Updated: 12/24/23 1736    Narrative:      The following orders were created for panel order SARS-CoV-2 (COVID-19) Nasopharynx.  Procedure                               Abnormality         Status                     ---------                               -----------         ------                      SARS-COV-2 (COVID-19)/ F...[[id number]]  Abnormal            Final result                 Please view results for these tests on the individual orders.    SARS-COV-2 (COVID-19)/ FLU A/B, AND RSV, PCR Nasopharynx [[id number]]  (Abnormal) Collected: 12/24/23 1649    Specimen: Nasopharyngeal Swab from Nasopharynx Updated: 12/24/23 1736     SARS-CoV-2 (COVID-19) Negative     Influenza A Positive     Influenza B Negative     Respiratory Syncytial Virus Negative    Narrative:      Testing performed using real-time PCR for detection of COVID-19. EUA approved validation studies performed on site.     Blood Culture Blood, Venous [[id number]]  (Normal) Collected: 12/24/23 1647    Specimen: Blood, Venous Updated: 12/27/23 0100     Culture No growth at 48 hours          ECG/Telemetry  I have independently reviewed the telemetry. No events for the last 24 hours.         ASSESSMENT    73 y.o. male with history atrial fibrillation, CHF, prostate and breast CA, history of GI bleed who presented to the emergency room with shortness of breath and found to be influenza A positive. During day 2 of his hospital stay he had a v fib arrest with ROSC. He was transferred to the ICU for further monitoring     PLAN   #Acute Hypoxic respiratory failure  -Failed Bipap on admission, became bradycardic and was intubated  - extubated 12/26, however became hypoxic, ? Secondary to upper airway edema ve acute bronchospasm vs flash pulmonary edema  - treated with lasix 40 mg and solumedrol q 6  - Ultimately required re-intubation 12/26. Anesthesia noted some swelling around cords  - s/p bronchoscopy post re-intubation 12/27 which noted patient 'biting on tube'  - passed SBT 12/30, extubated  - post-extubation ABG 7.3/48, consider BiPAP if in distress  - Completed steroids     #Shock  -improved, off levophed,  maintain MAP > 65    # s/p V fib cardiac arrest  - Likely related to Takosubo's cardiomyopthy, EF 40-45%  - ROSC achieved after receiving 3 epi,  3 shocks, 2 bicarb, and Magnesium replacement  - Following commands post-arrest. No TTM  -Taken by interventional cardiology for the placement of a temporary pacemaker  - Also noted no significant CAD  - PPM removed secondary to dyssynchrony and misplacement. No further marcellus arrythmias, removed 12/27, however bradycardic may need new temp wire.   - bradycardic today but VS stable. Will monitor  -per cardiology of patient has further episodes of polymorphic ventricular tachycardia, please resume IV lidocaine  - holding diuresis, with I>>O 12/29  - mental status remains fair; possibly delirium vs 2/2 sedatives vs anoxic encephalopathy. Monitor, may require MRI if not improving      #Takotsubo Cardiomyopathy  -TTE 12/26 suggestive of Takotsubos with EF 40-45%, confirmed by LV gram   - Start losartan 25mg daily and low dose carvedilol once extubated, bp stabilizes and off milrinone  -Off milrinone 12/29  -Cardiology following    # Acute kidney injury  - worsening renal function, creat initially 1.2 on admission, then 2.3, now 2.0  - baseline creat 1.2-1.3  - ? Secondary to IV dye load s/p cardiac cath vs over-diuresis  - Will increase FWF, serial BMP  - Can consider SGC if worsening  -holding diuresis    # Elevated liver enzymes  - likely in setting of hypotension, shock liver  - serial labs, trending down     #Pneumonia  -CT Chest 12/24 with left lower lobe infiltrate and right upper lobe infiltrate  -Blood/ sputum cultures negative   -Continue on Pip-tazo for additional 2 days  - ID following    #Afib  - AF on telemetry, rate controlled  - EP following  -Continue IV heparin  -Continue on amiodarone     #Influenza A  -Completed 5 day course Oseltamivir     # Prostate/breast cancer  - s/p radiation therapy for prostate cancer spring 2023  - s/p R mastectomy 8/2023 and radiation therapy 9/2023  - continue tamoxifen    VTE Prophylaxis Plan: SCDs SQH  GI Prophylaxis Plan: Protonix  Lines/Drains/Airway: PIV x 1, PICC  (12/28), DHT  Fluids/Electrolytes/Nutrition: TF     Disposition Planning: Remain in the ICU    CODE STATUS  Full Code       The case was reviewed this morning at the patient's beside multidisciplinary rounds with the patient's nurse, dietician, pharmacist, respiratory therapist, physical therapist and critical care nurse coordinator. The patient's clinical status with regards to diagnosis, treatment plans including disposition of any IV, arterial lines, Lloyd and tubes were discussed, as well as dietary, respiratory therapy and mobilization needs.     This case was discussed with consultants.    Total critical time spent managing 75 minutes.       Vince Colbert MD  12/30/2023  1:48 PM

## 2023-12-31 LAB
ALBUMIN SERPL-MCNC: 2.5 G/DL (ref 3.5–5.7)
ALP SERPL-CCNC: 57 IU/L (ref 34–125)
ALT SERPL-CCNC: 201 IU/L (ref 7–52)
ANION GAP SERPL CALC-SCNC: 11 MEQ/L (ref 3–15)
APTT PPP: 100 SEC (ref 23–35)
AST SERPL-CCNC: 76 IU/L (ref 13–39)
BASOPHILS # BLD: 0.01 K/UL (ref 0.01–0.1)
BASOPHILS NFR BLD: 0.2 %
BILIRUB SERPL-MCNC: 0.6 MG/DL (ref 0.3–1.2)
BUN SERPL-MCNC: 57 MG/DL (ref 7–25)
CALCIUM SERPL-MCNC: 8 MG/DL (ref 8.6–10.3)
CHLORIDE SERPL-SCNC: 116 MEQ/L (ref 98–107)
CO2 SERPL-SCNC: 21 MEQ/L (ref 21–31)
CREAT SERPL-MCNC: 1.8 MG/DL (ref 0.7–1.3)
DIFFERENTIAL METHOD BLD: ABNORMAL
EGFRCR SERPLBLD CKD-EPI 2021: 39.3 ML/MIN/1.73M*2
EOSINOPHIL # BLD: 0 K/UL (ref 0.04–0.54)
EOSINOPHIL NFR BLD: 0 %
ERYTHROCYTE [DISTWIDTH] IN BLOOD BY AUTOMATED COUNT: 16.2 % (ref 11.6–14.4)
GIANT PLATELETS BLD QL SMEAR: ABNORMAL
GLUCOSE BLD-MCNC: 179 MG/DL (ref 70–99)
GLUCOSE BLD-MCNC: 187 MG/DL (ref 70–99)
GLUCOSE BLD-MCNC: 199 MG/DL (ref 70–99)
GLUCOSE BLD-MCNC: 213 MG/DL (ref 70–99)
GLUCOSE BLD-MCNC: 219 MG/DL (ref 70–99)
GLUCOSE SERPL-MCNC: 168 MG/DL (ref 70–99)
HCT VFR BLDCO AUTO: 26.2 % (ref 40.1–51)
HGB BLD-MCNC: 8.3 G/DL (ref 13.7–17.5)
IMM GRANULOCYTES # BLD AUTO: 0.1 K/UL (ref 0–0.08)
IMM GRANULOCYTES NFR BLD AUTO: 2.1 %
LYMPHOCYTES # BLD: 0.34 K/UL (ref 1.2–3.5)
LYMPHOCYTES NFR BLD: 7.2 %
MACROCYTES BLD QL SMEAR: ABNORMAL
MCH RBC QN AUTO: 33.2 PG (ref 28–33.2)
MCHC RBC AUTO-ENTMCNC: 31.7 G/DL (ref 32.2–36.5)
MCV RBC AUTO: 104.8 FL (ref 83–98)
MONOCYTES # BLD: 0.44 K/UL (ref 0.3–1)
MONOCYTES NFR BLD: 9.3 %
NEUTROPHILS # BLD: 3.83 K/UL (ref 1.7–7)
NEUTS SEG NFR BLD: 81.2 %
NEUTS VAC BLD QL SMEAR: ABNORMAL
NRBC BLD-RTO: 2.1 %
OVALOCYTES BLD QL SMEAR: ABNORMAL
PDW BLD AUTO: 11.4 FL (ref 9.4–12.4)
PLAT MORPH BLD: NORMAL
PLATELET # BLD AUTO: 114 K/UL (ref 150–350)
PLATELET # BLD EST: ABNORMAL 10*3/UL
PLATELET CLUMP BLD QL SMEAR: PRESENT
POCT TEST: ABNORMAL
POLYCHROMASIA BLD QL SMEAR: ABNORMAL
POTASSIUM SERPL-SCNC: 3.8 MEQ/L (ref 3.5–5.1)
PROT SERPL-MCNC: 4.6 G/DL (ref 6–8.2)
RBC # BLD AUTO: 2.5 M/UL (ref 4.5–5.8)
SODIUM SERPL-SCNC: 148 MEQ/L (ref 136–145)
TOXIC GRANULES BLD QL SMEAR: SLIGHT
WBC # BLD AUTO: 4.72 K/UL (ref 3.8–10.5)

## 2023-12-31 PROCEDURE — 80053 COMPREHEN METABOLIC PANEL: CPT | Performed by: NURSE PRACTITIONER

## 2023-12-31 PROCEDURE — 63600000 HC DRUGS/DETAIL CODE: Mod: JZ | Performed by: INTERNAL MEDICINE

## 2023-12-31 PROCEDURE — 85730 THROMBOPLASTIN TIME PARTIAL: CPT | Performed by: STUDENT IN AN ORGANIZED HEALTH CARE EDUCATION/TRAINING PROGRAM

## 2023-12-31 PROCEDURE — 25000000 HC PHARMACY GENERAL: Performed by: HOSPITALIST

## 2023-12-31 PROCEDURE — 63600000 HC DRUGS/DETAIL CODE: Mod: JZ | Performed by: NURSE PRACTITIONER

## 2023-12-31 PROCEDURE — 94003 VENT MGMT INPAT SUBQ DAY: CPT

## 2023-12-31 PROCEDURE — 94640 AIRWAY INHALATION TREATMENT: CPT

## 2023-12-31 PROCEDURE — 63600000 HC DRUGS/DETAIL CODE: Mod: JW

## 2023-12-31 PROCEDURE — 36415 COLL VENOUS BLD VENIPUNCTURE: CPT | Performed by: STUDENT IN AN ORGANIZED HEALTH CARE EDUCATION/TRAINING PROGRAM

## 2023-12-31 PROCEDURE — 25000000 HC PHARMACY GENERAL: Performed by: STUDENT IN AN ORGANIZED HEALTH CARE EDUCATION/TRAINING PROGRAM

## 2023-12-31 PROCEDURE — 63600000 HC DRUGS/DETAIL CODE: Mod: JZ

## 2023-12-31 PROCEDURE — 63700000 HC SELF-ADMINISTRABLE DRUG: Performed by: INTERNAL MEDICINE

## 2023-12-31 PROCEDURE — 20000000 HC ROOM AND CARE ICU

## 2023-12-31 PROCEDURE — 85025 COMPLETE CBC W/AUTO DIFF WBC: CPT | Performed by: NURSE PRACTITIONER

## 2023-12-31 PROCEDURE — 25800000 HC PHARMACY IV SOLUTIONS: Performed by: INTERNAL MEDICINE

## 2023-12-31 PROCEDURE — 63700000 HC SELF-ADMINISTRABLE DRUG: Performed by: HOSPITALIST

## 2023-12-31 RX ORDER — DEXMEDETOMIDINE HYDROCHLORIDE 4 UG/ML
0-1 INJECTION, SOLUTION INTRAVENOUS
Status: DISCONTINUED | OUTPATIENT
Start: 2023-12-31 | End: 2024-01-07

## 2023-12-31 RX ORDER — METOPROLOL TARTRATE 25 MG/1
25 TABLET, FILM COATED ORAL 2 TIMES DAILY
Status: DISCONTINUED | OUTPATIENT
Start: 2023-12-31 | End: 2024-01-10 | Stop reason: HOSPADM

## 2023-12-31 RX ADMIN — PIPERACILLIN AND TAZOBACTAM 4.5 G: 4; .5 INJECTION, POWDER, LYOPHILIZED, FOR SOLUTION INTRAVENOUS; PARENTERAL at 21:58

## 2023-12-31 RX ADMIN — CHLORHEXIDINE GLUCONATE ORAL RINSE 15 ML: 1.2 SOLUTION DENTAL at 10:26

## 2023-12-31 RX ADMIN — INSULIN LISPRO 2 UNITS: 100 INJECTION, SOLUTION INTRAVENOUS; SUBCUTANEOUS at 23:46

## 2023-12-31 RX ADMIN — IPRATROPIUM BROMIDE 2 PUFF: 17 AEROSOL, METERED RESPIRATORY (INHALATION) at 11:59

## 2023-12-31 RX ADMIN — Medication 10 ML: at 03:07

## 2023-12-31 RX ADMIN — IPRATROPIUM BROMIDE 2 PUFF: 17 AEROSOL, METERED RESPIRATORY (INHALATION) at 15:53

## 2023-12-31 RX ADMIN — HYDRALAZINE HYDROCHLORIDE 5 MG: 20 INJECTION INTRAMUSCULAR; INTRAVENOUS at 03:16

## 2023-12-31 RX ADMIN — METOPROLOL TARTRATE 25 MG: 25 TABLET, FILM COATED ORAL at 20:35

## 2023-12-31 RX ADMIN — ATORVASTATIN CALCIUM 10 MG: 10 TABLET, FILM COATED ORAL at 21:58

## 2023-12-31 RX ADMIN — DEXMEDETOMIDINE 0.2 MCG/KG/HR: 200 INJECTION, SOLUTION INTRAVENOUS at 22:45

## 2023-12-31 RX ADMIN — GUAIFENESIN 400 MG: 100 SOLUTION ORAL at 18:18

## 2023-12-31 RX ADMIN — PIPERACILLIN AND TAZOBACTAM 4.5 G: 4; .5 INJECTION, POWDER, LYOPHILIZED, FOR SOLUTION INTRAVENOUS; PARENTERAL at 15:54

## 2023-12-31 RX ADMIN — HEPARIN SODIUM 900 UNITS/HR: 10000 INJECTION, SOLUTION INTRAVENOUS at 19:24

## 2023-12-31 RX ADMIN — HYDRALAZINE HYDROCHLORIDE 5 MG: 20 INJECTION INTRAMUSCULAR; INTRAVENOUS at 09:21

## 2023-12-31 RX ADMIN — SILVER SULFADIAZINE: 10 CREAM TOPICAL at 20:38

## 2023-12-31 RX ADMIN — GUAIFENESIN 400 MG: 100 SOLUTION ORAL at 05:46

## 2023-12-31 RX ADMIN — INSULIN LISPRO 1 UNITS: 100 INJECTION, SOLUTION INTRAVENOUS; SUBCUTANEOUS at 18:22

## 2023-12-31 RX ADMIN — Medication 25 MCG/HR: at 12:13

## 2023-12-31 RX ADMIN — TAMOXIFEN CITRATE 20 MG: 10 TABLET, FILM COATED ORAL at 18:17

## 2023-12-31 RX ADMIN — SILVER SULFADIAZINE: 10 CREAM TOPICAL at 09:25

## 2023-12-31 RX ADMIN — PANTOPRAZOLE SODIUM 40 MG: 40 INJECTION, POWDER, LYOPHILIZED, FOR SOLUTION INTRAVENOUS at 20:35

## 2023-12-31 RX ADMIN — Medication 10 ML: at 12:00

## 2023-12-31 RX ADMIN — CHLORHEXIDINE GLUCONATE ORAL RINSE 15 ML: 1.2 SOLUTION DENTAL at 21:58

## 2023-12-31 RX ADMIN — CHOLECALCIFEROL TAB 25 MCG (1000 UNIT) 1000 UNITS: 25 TAB at 09:24

## 2023-12-31 RX ADMIN — INSULIN LISPRO 1 UNITS: 100 INJECTION, SOLUTION INTRAVENOUS; SUBCUTANEOUS at 12:23

## 2023-12-31 RX ADMIN — AMIODARONE HYDROCHLORIDE 200 MG: 200 TABLET ORAL at 09:24

## 2023-12-31 RX ADMIN — Medication 10 ML: at 19:25

## 2023-12-31 RX ADMIN — PANTOPRAZOLE SODIUM 40 MG: 40 INJECTION, POWDER, LYOPHILIZED, FOR SOLUTION INTRAVENOUS at 09:24

## 2023-12-31 RX ADMIN — INSULIN LISPRO 1 UNITS: 100 INJECTION, SOLUTION INTRAVENOUS; SUBCUTANEOUS at 05:46

## 2023-12-31 RX ADMIN — PIPERACILLIN AND TAZOBACTAM 4.5 G: 4; .5 INJECTION, POWDER, LYOPHILIZED, FOR SOLUTION INTRAVENOUS; PARENTERAL at 09:27

## 2023-12-31 RX ADMIN — GUAIFENESIN 400 MG: 100 SOLUTION ORAL at 00:05

## 2023-12-31 RX ADMIN — GUAIFENESIN 400 MG: 100 SOLUTION ORAL at 12:18

## 2023-12-31 RX ADMIN — IPRATROPIUM BROMIDE 2 PUFF: 17 AEROSOL, METERED RESPIRATORY (INHALATION) at 08:41

## 2023-12-31 RX ADMIN — Medication 10 MG: at 21:59

## 2023-12-31 RX ADMIN — IPRATROPIUM BROMIDE 2 PUFF: 17 AEROSOL, METERED RESPIRATORY (INHALATION) at 19:48

## 2023-12-31 RX ADMIN — INSULIN LISPRO 2 UNITS: 100 INJECTION, SOLUTION INTRAVENOUS; SUBCUTANEOUS at 00:06

## 2023-12-31 RX ADMIN — METOPROLOL TARTRATE 25 MG: 25 TABLET, FILM COATED ORAL at 12:18

## 2023-12-31 RX ADMIN — PIPERACILLIN AND TAZOBACTAM 4.5 G: 4; .5 INJECTION, POWDER, LYOPHILIZED, FOR SOLUTION INTRAVENOUS; PARENTERAL at 04:07

## 2023-12-31 ASSESSMENT — COGNITIVE AND FUNCTIONAL STATUS - GENERAL
MOVING TO AND FROM BED TO CHAIR: 1 - TOTAL
STANDING UP FROM CHAIR USING ARMS: 1 - TOTAL
WALKING IN HOSPITAL ROOM: 1 - TOTAL
CLIMB 3 TO 5 STEPS WITH RAILING: 1 - TOTAL
STANDING UP FROM CHAIR USING ARMS: 1 - TOTAL
WALKING IN HOSPITAL ROOM: 1 - TOTAL
CLIMB 3 TO 5 STEPS WITH RAILING: 1 - TOTAL
MOVING TO AND FROM BED TO CHAIR: 1 - TOTAL

## 2023-12-31 NOTE — PROGRESS NOTES
Critical Care/Pulmonary  Daily Progress Note        Subjective    Interval History:    Yesterday, patient was extubated and promptly failed despite HHF and BiPAP therapy. Re-intubated, sedated.    No events overnight.  Sedation held on rounds to allow for neurological exam and SBT potential  Tolerating PS 6  CTH yesterday unremarkable  Afebrile, 99F    Net: +1.0L 24 Hr, +8.3 L since admission  UOP 1295 mL 24 Hr     Objective       Allergies: Azithromycin, Prednisone, and Lorazepam    CURRENT MEDS  •  acetaminophen, 650 mg, feeding tube, q4h PRN  •  albuterol, 2.5 mg, nebulization, q4h PRN  •  amiodarone, 200 mg, feeding tube, Daily  •  atorvastatin, 10 mg, oral, Nightly  •  atropine, 1 mg, intravenous, Once PRN  •  betamethasone valerate, , Topical, 2x daily PRN  •  calcium gluconate, 1 g, intravenous, q6h PRN  •  cetirizine, 10 mg, feeding tube, Nightly  •  chlorhexidine, 15 mL, Mouth/Throat, 2 times per day  •  cholecalciferol (vitamin D3), 1,000 Units, feeding tube, Daily  •  glucose, 16-32 g of dextrose, oral, PRN **OR** dextrose, 15-30 g of dextrose, oral, PRN **OR** glucagon, 1 mg, intramuscular, PRN **OR** dextrose 50 % in water (D50), 25 mL, intravenous, PRN  •  fentaNYL, 0-200 mcg/hr, intravenous, Titrated **AND** fentaNYL, 25 mcg, intravenous, q5 min PRN  •  [Provider Managed Hold] furosemide, 40 mg, intravenous, q12h GISELLE  •  guaiFENesin, 400 mg, oral, q6h GISELLE  •  heparin (porcine), 40-80 Units/kg (Adjusted), intravenous, q6h PRN  •  heparin infusion - MAR calculator by PTT, 100-4,000 Units/hr, intravenous, Titrated  •  hydrALAZINE, 5 mg, intravenous, q6h PRN  •  insulin lispro U-100, 1-10 Units, subcutaneous, q6h GISELLE  •  ipratropium HFA, 2 puff, inhalation, QID (8a, 12p, 4p, 8p)  •  magnesium sulfate, 2 g, intravenous, PRN  •  metoclopramide, 10 mg, intravenous, q6h PRN  •  metoprolol tartrate, 25 mg, feeding tube, BID  •  pantoprazole, 40 mg, intravenous, q12h GISELLE  •  piperacillin-tazobactam, 4.5 g,  intravenous, q6h INT  •  potassium chloride, 20 mEq, oral, PRN  •  potassium chloride, 40 mEq, oral, PRN  •  potassium chloride, 20 mEq, intravenous, PRN  •  potassium chloride in water, 20 mEq, intravenous, PRN **AND** potassium chloride in water, 20 mEq, intravenous, PRN  •  silver sulfadiazine, , Topical, BID  •  sodium chloride, 10 mL, intravenous, q8h INT  •  sodium chloride, 10 mL, intravenous, PRN  •  tamoxifen, 20 mg, feeding tube, Daily    VITAL SIGNS  Vitals:    12/31/23 1100 12/31/23 1159 12/31/23 1200 12/31/23 1300   BP:  107/84 123/63    Pulse: 76 79 77 81   Resp: 14 13 20 12   Temp:   36.6 °C (97.8 °F)    TempSrc:   Axillary    SpO2: 99% 98% 98% 97%   Weight:       Height:           VENTILATOR SETTINGS  Vent Mode: Pressure support  FiO2 (%) (Set):  [40 %] 40 %  S RR:  [18] 18  S VT:  [450 mL] 450 mL  PEEP/CPAP (Set, cmH2O):  [6 cm H20] 6 cm H20  MAP (Observed, cmH2O):  [7.6-14] 8.8    Oxygen:  Oxygen Therapy: Supplemental oxygen  O2 Delivery Method: Endotracheal tube  FiO2 (%) (Set): 40 %  O2 Flow Rate (L/min): 6 L/min     INTAKE/OUTPUT    Intake/Output Summary (Last 24 hours) at 12/31/2023 1305  Last data filed at 12/31/2023 1300  Gross per 24 hour   Intake 2825.03 ml   Output 1920 ml   Net 905.03 ml       Lines, Drains, Airways, Wounds:  Peripheral IV (Adult) 12/25/23 Anterior;Right Hand (Active)   Number of days: 2       Peripheral IV (Adult) 12/26/23 Right Antecubital (Active)   Number of days: 1       Peripheral IV (Adult) 11/26/23 Left;Posterior Forearm (Active)   Number of days: 31       NG/OG Tube 12/25/23 Left nostril (Active)   Number of days: 2       Urethral Catheter Temperature probe 16 Fr (Active)   Number of days: 3       ETT  (Active)   Number of days: 1       Wound Other (comment) Right Pretibial (Active)   Number of days: 3       Wound Venous Ulcer Left Pretibial (Active)   Number of days: 3       Wound Perineum (Active)   Number of days: 3       Catheterization Site - Arterial Right  Femoral 6 Fr. (Active)   Number of days: 2       Catheterization Site - Venous Right Femoral 6 Fr. (Active)   Number of days: 2         Physical Exam:  Physical Exam  Vitals and nursing note reviewed.   Constitutional:       General: He is not in acute distress.     Appearance: He is ill-appearing.      Interventions: He is sedated, intubated and restrained.   HENT:      Head: Normocephalic and atraumatic.      Nose:      Comments: NGT     Mouth/Throat:      Mouth: Mucous membranes are moist.      Comments: ETT  Eyes:      General: No scleral icterus.     Pupils: Pupils are equal, round, and reactive to light.   Cardiovascular:      Rate and Rhythm: Normal rate. Rhythm irregular.      Pulses: Normal pulses.      Heart sounds: Normal heart sounds, S1 normal and S2 normal. No murmur heard.     No friction rub. No gallop.   Pulmonary:      Effort: No respiratory distress. He is intubated.      Breath sounds: No wheezing, rhonchi or rales.      Comments: Mechanical breath sounds, coarse bilaterally  Abdominal:      General: Bowel sounds are normal. There is no distension.      Palpations: Abdomen is soft.      Tenderness: There is no abdominal tenderness.   Genitourinary:     Comments: Lloyd/FMS  Musculoskeletal:      Right lower leg: No edema.      Left lower leg: No edema.   Skin:     General: Skin is warm and dry.   Neurological:      Mental Status: He is lethargic.          Labs:  See Labs Below:  ABG  Results from last 7 days   Lab Units 12/30/23  2240 12/30/23  1520 12/30/23  1353 12/24/23  2332 12/24/23  2012 12/24/23  1753 12/24/23  1753 12/24/23  1647   PH ART pH 7.41 7.30* 7.30*   < > 7.27*   < > 7.27*  --    PCO2 ART mm Hg 36 44 48   < > 46   < > 48  --    PO2 ART mm Hg 164* 83 102*   < > 100   < > 151*  --    HCO3 ART mEQ/L 24 21 23   < > 20.3*   < > 21.0  --    O2 SAT ART % 98 94 97   < >  --   --   --   --    BASE EXC ART mEQ/L -1.6 -4.7 -3.0   < > -5.9   < > -5.1  --    SOURCE OF OXYGEN   --   --   --    --  Bipap  --  bipap -    < > = values in this interval not displayed.     CBC  Results from last 7 days   Lab Units 12/31/23  0545 12/30/23  0355 12/29/23  0422   WBC K/uL 4.72 4.64 3.36*   RBC M/uL 2.50* 2.46* 2.29*   HEMOGLOBIN g/dL 8.3* 8.4* 7.6*   HEMATOCRIT % 26.2* 25.6* 23.7*   MCV fL 104.8* 104.1* 103.5*   MCH pg 33.2 34.1* 33.2   MCHC g/dL 31.7* 32.8 32.1*   PLATELETS K/uL 114* 180 88*   RDW % 16.2* 16.1* 15.7*   MPV fL 11.4 12.3 11.0     BMP  Results from last 7 days   Lab Units 12/31/23  0545 12/30/23  0355 12/29/23 0422   SODIUM mEQ/L 148* 146* 149*   POTASSIUM mEQ/L 3.8 4.9 3.4*   CHLORIDE mEQ/L 116* 115* 115*   CO2 mEQ/L 21 23 23   BUN mg/dL 57* 55* 53*   CREATININE mg/dL 1.8* 2.0* 2.3*   CALCIUM mg/dL 8.0* 7.5* 7.8*   ALBUMIN g/dL 2.5* 2.4* 2.4*   BILIRUBIN TOTAL mg/dL 0.6 0.7 0.6   ALK PHOS IU/L 57 54 54   ALT IU/L 201* 298* 414*   AST IU/L 76* 184* 383*   GLUCOSE mg/dL 168* 250* 200*     Coag  Results from last 7 days   Lab Units 12/31/23  0545 12/30/23  0355 12/29/23  1512 12/28/23  2346 12/28/23  1659 12/25/23  0934 12/25/23  0410 12/24/23  1647   PROTIME sec  --   --   --   --  13.7  --  17.4* 21.2*   INR   --   --   --   --  1.1  --  1.4 1.9   PTT sec 100* 87* 103*   < > 31   < > 33  --     < > = values in this interval not displayed.       Imaging:   Imaging personally reviewed  X-RAY ABDOMEN 1 VIEW    Result Date: 12/30/2023  CLINICAL HISTORY: placement of enteral feeding tube with weighted end COMMENT: AP frontal view(s) of the abdomen. No recent comparison exam. The feeding tube tip extends to level of the proximal stomach.  Imaged bowel gas pattern is nonobstructive.  Partially imaged is prosthetic cardiac valve.     IMPRESSION: Weighted feeding tube tip at the level of the proximal stomach.    CT HEAD WITHOUT IV CONTRAST    Result Date: 12/30/2023  CLINICAL HISTORY: Encephalopathy status post V. fib arrest CT DOSE:  One or more dose reduction techniques (e.g. automated exposure control,  adjustment of the mA and/or kV according to patient size, use of iterative reconstruction technique) utilized for this examination. COMPARISON STUDIES:  04/27/2023 COMMENT: Noncontrast CT of the brain was performed. There is no evidence of intracranial mass or hemorrhage. No edema is seen.  No extra-axial collections are seen. There is age appropriate involutional  change.  There is periventricular hypodensity representing  small vessel ischemic changes. There does appear to be preservation of gray-white differentiation. No acute territorial infarct. The bony calvarium is intact. Mucosal thickening within the maxillary, ethmoid and sphenoid sinuses.     IMPRESSION: No acute intracranial abnormality.  Chronic ischemic white matter changes are noted.    X-RAY CHEST 1 VIEW    Result Date: 12/30/2023  CLINICAL HISTORY: ETT tube placement COMMENT:  AP semierect view of the chest is compared the x-ray from 12/27/2023. The endotracheal tube appears to been repositioned with the tip approximately 2 cm above the leo and situated lower position since the prior study. Right central venous catheter is in place with the tip in the area of the superior vena cava. The heart is stable in size. Small pleural effusions.     IMPRESSION: 1. The endotracheal tube is lower in position situated 2 cm above level of the elo.    IR TUNNELED PICC PLACEMENT    Result Date: 12/29/2023  CLINICAL HISTORY: 73-year-old male with pneumonia. COMMENT: COMPARISON: None. PROCEDURE: Tunneled Catheter Placement Fluoro Time: 0.1 minutes Number of Images: 2 Reference Air kerma: 0.56 mGy Contrast: None PROCEDURE PERFORMED: 1. Vascular ultrasound examination of the neck. 2. Ultrasound and fluoroscopically-guided 5 Fr triple lumen tunneled PICC placement. 3. Final fluoroscopic image confirming placement. ANESTHESIA: 1% lidocaine subcutaneously. DESCRIPTION OF PROCEDURE: The risks, benefits and alternatives of the procedure were explained to the patient  and informed consent was obtained. Sonographic evaluation of the neck was performed to determine patency of the jugular veins. Ultrasound image was permanently archived.  The right neck and chest were then prepped and draped in the usual sterile fashion. Following the achievement of local anesthesia with 1% lidocaine subcutaneously, a small neck incision was made with a #11 blade. Uneventful access into the right internal jugular vein was then obtained under direct, real-time sonographic guidance through the neck incision with a micropuncture needle. A 0.018 inch microwire was threaded through the needle under direct fluoroscopic guidance into the superior vena cava.  A peel-away sheath was advanced over the wire. A right chest wall exit site was then marked and local anesthesia was then achieved along the planned subcutaneous tunnel, extending to the neck incision. A dermatotomy was then made at the chest wall exit site with a #11 blade and the catheter was then tunneled subcutaneously to the neck incision utilizing a tunneling device. The appropriate catheter length was determined fluoroscopically. The guidewire and dilator were removed and the catheter tip was placed into the high right atrium via the peel-away sheath which was subsequently removed. The catheter was flushed with heparinized saline. Sterile dressings were applied to the neck and chest. The patient tolerated the procedure well. No immediate complications were identified. No retained wire was noted on the final fluoroscopic images. DESCRIPTION OF FINDINGS: Sonographic examination of the neck demonstrates patency of the right internal jugular vein. Final fluoroscopic images depict satisfactory position of the catheter.     IMPRESSION: Successful placement of 5 Fr triple lumen tunneled PICC.     ULTRASOUND GUIDED VASCULAR ACCESS    Result Date: 12/29/2023  CLINICAL HISTORY: 73-year-old male with pneumonia. COMMENT: COMPARISON: None. PROCEDURE:  Tunneled Catheter Placement Fluoro Time: 0.1 minutes Number of Images: 2 Reference Air kerma: 0.56 mGy Contrast: None PROCEDURE PERFORMED: 1. Vascular ultrasound examination of the neck. 2. Ultrasound and fluoroscopically-guided 5 Fr triple lumen tunneled PICC placement. 3. Final fluoroscopic image confirming placement. ANESTHESIA: 1% lidocaine subcutaneously. DESCRIPTION OF PROCEDURE: The risks, benefits and alternatives of the procedure were explained to the patient and informed consent was obtained. Sonographic evaluation of the neck was performed to determine patency of the jugular veins. Ultrasound image was permanently archived.  The right neck and chest were then prepped and draped in the usual sterile fashion. Following the achievement of local anesthesia with 1% lidocaine subcutaneously, a small neck incision was made with a #11 blade. Uneventful access into the right internal jugular vein was then obtained under direct, real-time sonographic guidance through the neck incision with a micropuncture needle. A 0.018 inch microwire was threaded through the needle under direct fluoroscopic guidance into the superior vena cava.  A peel-away sheath was advanced over the wire. A right chest wall exit site was then marked and local anesthesia was then achieved along the planned subcutaneous tunnel, extending to the neck incision. A dermatotomy was then made at the chest wall exit site with a #11 blade and the catheter was then tunneled subcutaneously to the neck incision utilizing a tunneling device. The appropriate catheter length was determined fluoroscopically. The guidewire and dilator were removed and the catheter tip was placed into the high right atrium via the peel-away sheath which was subsequently removed. The catheter was flushed with heparinized saline. Sterile dressings were applied to the neck and chest. The patient tolerated the procedure well. No immediate complications were identified. No retained  wire was noted on the final fluoroscopic images. DESCRIPTION OF FINDINGS: Sonographic examination of the neck demonstrates patency of the right internal jugular vein. Final fluoroscopic images depict satisfactory position of the catheter.     IMPRESSION: Successful placement of 5 Fr triple lumen tunneled PICC.     ULTRASOUND KIDNEYS BLADDER/NO POST VOID    Result Date: 12/28/2023  CLINICAL HISTORY: Kidney failure, acute hx GH, confirm no clot in bladder Comparison: PET/CT dated 03/06/23.     IMPRESSION: 1. No sonographic abnormality in the kidneys. 2. Nearly completely collapsed bladder as discussed below. No large intraluminal bladder thrombus/clot as clinically questioned (given the limitations of near complete bladder collapse). COMMENT: Real-time sonographic evaluation of the kidneys and bladder was performed. There is normal cortical echogenicity and corticomedullary differentiation. The right kidney measures 10.8 x 5.2 x 4.6 cm. The left kidney measures 10.8 x 5.8 x 4.3 cm. There is no hydronephrosis, calculi, masses or perinephric collections. Bladder is nearly completely collapsed, measuring approximately 2.5 x 1.3 x 2.5 cm, for a calculated volume of approximately 5.5 cc, precluding adequate evaluation for calculi or mass. No ureteral jets identified on color Doppler, perhaps due to renal dysfunction and/or hydration status.    X-RAY CHEST 1 VIEW    Result Date: 12/27/2023  CLINICAL HISTORY:  VDRF     IMPRESSION:  Reduced lung volumes. No acute cardiopulmonary process. Stable cardiac enlargement. COMPARISON: Portable chest studies 12/25 and 12/26/2023. COMMENT:  Upright portable imaging completed 0548 hours. Endotracheal tube 4.7 cm above. Enteric tube follows a normal course into left upper quadrant, directed to the left. Mild stable cardiac enlargement. Mitral annular prosthesis again noted. Stable hilar and mediastinal contours. Reduced lung volumes. No definite consolidation or pleural fluid.  Unremarkable upper abdomen.    X-RAY CHEST 1 VIEW    Result Date: 12/26/2023  CLINICAL HISTORY: Endotracheal tube repositioning     IMPRESSION: Endotracheal tube has been repositioned; the tip is now approximately 3.3 cm above the leo. COMMENT: A semierect AP portable view the chest was performed at 1615 and is compared to a prior study from 1503. Since the prior study, the endotracheal tube has been repositioned and the tip is now approximately 3.3 cm above the leo. There has been no other significant interval change.    X-RAY CHEST 1 VIEW    Result Date: 12/26/2023  CLINICAL HISTORY: 73-year-old, post intubation TECHNIQUE: 1 view of the chest acquired. COMPARISON: 12/26/2023 COMMENT: LINES: ET tube 2 cm above leo. NG tube tip in proximal stomach. LUNGS: Low lung volumes with interstitial crowding. Hazy opacities in right lower lung, probable atelectasis. Previously visualized right upper lung opacities improved. MEDIASTINUM/OTHER: Stable cardiomediastinal silhouette. MVR. Surgical clips in right axilla.     IMPRESSION: ET tube 2 cm above leo. NG tube tip in proximal stomach. Probable right lower lobe atelectasis; attention on follow-up.    X-RAY CHEST 1 VIEW    Result Date: 12/26/2023  CLINICAL HISTORY: Postop; extubation     IMPRESSION: Status post extubation. Slightly improved vascular congestion since earlier in the day. Suspect developing atelectasis or airspace disease in the right midlung zone. COMMENT: Semierect AP portable view of the chest was performed at 1428 and is compared to a prior study from 5:27 AM. In the interval, the endotracheal tube has been removed. An enteric tube remains in place with the tip in the stomach. There is a vascular catheter/line with the tip projecting near the junction of the IVC and right atrium; it is difficult to determine whether this was present previously but differences in technique. Mild cardiomegaly is again noted. Cardiac valvular prosthesis is again  seen. The pulmonary vasculature and interstitial markings have improved slightly since earlier in the day. There is questionable developing airspace disease or atelectasis in the right midlung zone. Trace bilateral pleural effusions are suspected. The hilar and mediastinal structures appear stable. Surgical clips are again seen in the right axilla.    X-RAY CHEST 1 VIEW    Result Date: 12/26/2023  CLINICAL HISTORY: 73-year-old, follow-up TECHNIQUE: 1 view of the chest acquired. COMPARISON: 12/25/2023 COMMENT: LINES: ET tube 4 cm above leo. NG tube tip not well seen secondary to underpenetration, likely in proximal stomach. LUNGS: Low lung volumes with crowding of interstitial markings. Stable compared to previous examination without focal airspace consolidation or pulmonary edema. MEDIASTINUM/OTHER: Cardiomegaly. Mitral valve replacement.     IMPRESSION: ET tube 4 cm above leo, NG tube tip not well seen, likely in proximal stomach. Stable cardiomegaly, mitral valve replacement. Clear lungs, with interstitial crowding secondary to low lung volumes.    Transthoracic echo (TTE) complete    Result Date: 12/26/2023  •  Left Ventricle: Normal ventricle size. Normal wall thickness. Preserved systolic function. Estimated EF 40-45%. Hypokinesis of the apex. 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 •  Right Ventricle: Normal ventricle size. Pacer wire present. Normal systolic function. •  Right Atrium: Normal sized atrium. •  Aortic Valve: Tricuspid valve. Trace regurgitation. No stenosis. •  Mitral Valve: Normal leaflet structure. Normal leaflet motion. Trace regurgitation. No stenosis. •  Tricuspid Valve: Normal structure. Mild regurgitation. Estimated RVSP = 43 mmHg. No significant stenosis. •  Pulmonic Valve: Normal structure. No regurgitation. No stenosis. •  Aorta: Aortic root normal. Sinuses of Valsalva normal-sized. Ascending aorta normal-sized. Aortic  arch normal-sized. Descending aorta normal-sized. •  Pericardium: Normal structure. No evidence of pericardial effusion. No cardiac tamponade. •  Left Atrium: Mildly dilated atrium.     X-RAY CHEST 1 VIEW    Result Date: 12/26/2023  CLINICAL HISTORY: 73-year-old, ETT adjustment TECHNIQUE: 1 view of the chest acquired. COMPARISON: Earlier same day COMMENT: LINES: ET tube 3 cm above leo. NG tube tip below inferior edge of film. LUNGS: Improved pulmonary vascular congestion. No pleural effusion. MEDIASTINUM/OTHER: Prominent cardiomediastinal silhouette. Overlying defibrillator pads and EKG leads. Prosthetic cardiac valve. Surgical clips in right axilla.     IMPRESSION: Improved pulmonary vascular congestion. ET tube 3 cm above leo, NG tube tip below inferior edge of film.    X-RAY CHEST 1 VIEW    Result Date: 12/25/2023  CLINICAL HISTORY: s/p cardiac arrest     IMPRESSION: Interval retraction of the endotracheal tube now in satisfactory position, as below. Stable satisfactory positioning of the enteric tube. Progressive pulmonary interstitial edema with stable enlargement of the cardiac silhouette. No pneumothorax. COMMENT: Comparison: Chest x-ray 12/24/2023. Technique: A single frontal AP portable upright projection of the chest was obtained. FINDINGS: There has been interval retraction of the endotracheal tube now terminating 2.7 cm above the leo. An enteric tube courses to the stomach with the distal tip collimated from the field-of-view beneath the left hemidiaphragm in satisfactory position. There are defibrillator pad projecting over the left hemithorax. There are progressively increased interstitial opacities seen projecting over both lungs. There is no pleural effusion or pneumothorax. The cardiac silhouette is stably enlarged. The mediastinal contours are unchanged. Again noted is a prosthetic mitral valve. The upper abdomen is unremarkable. There is no acute osseous abnormality. Surgical clips  overlie the right axillary region.     X-RAY CHEST 1 VIEW    Result Date: 12/25/2023  CLINICAL HISTORY: OTHER     IMPRESSION: Satisfactory positioning of the endotracheal and enteric tubes. No pneumothorax. Stable pulmonary edema and enlargement of the cardiac silhouette. COMMENT: Comparison: Chest x-ray 12/25/2023. Technique: A single frontal AP portable upright projection of the chest was obtained. FINDINGS: The tip of an endotracheal tube terminates 4.0 cm above the leo. An enteric tube courses to the stomach with the distal tip collimated from the field-of-view beneath the left hemidiaphragm in satisfactory position. There are defibrillator pad projecting over the left hemithorax. There are mildly increased interstitial opacities again seen projecting over both lungs. No pleural effusion or pneumothorax is seen. There is stable enlargement of the cardiac silhouette. Again noted is a prosthetic mitral valve. The mediastinal contours are unchanged. The upper abdomen is unremarkable. There is no acute osseous abnormality. There are surgical clips overlying the right axillary region.     Cardiac catheterization    Result Date: 12/25/2023  •  Angiographically normal epicardial coronary arteries •  Left ventricular ejection fraction is approximately 25-30%.Wall motion abnormalities consistent with Takotsubo Cardiomyopathy •  Severely elevated biventricular filling pressures •  Reduced Cardiac Output and Index; Reduced Stroke volume and stroke volume index by Chelsi Calculation •  Pulmonary venous post-capillary hypertension primarily due to left heart dysfunction •  No peak to peak gradient on pullback to suggest aortic stenosis RECOMMENDATIONS:  1. Routine post-procedure and access site care 2. Optimal medical therapy and primary prevention for CAD 3. IV Milrinone 4. Bedrest     X-RAY CHEST 1 VIEW    Result Date: 12/25/2023  CLINICAL HISTORY: intubated     IMPRESSION: Low-lying endotracheal tube terminating over the  proximal right mainstem bronchus. Recommend retraction. Satisfactory positioning of the enteric tube. This finding and recommendation was discussed with nurse Sahara at 11:27 AM on 12/25/2023 by Dr. Martinez by telephone. Mild pulmonary interstitial edema and stable enlargement of the cardiac silhouette. No pneumothorax. COMMENT: Comparison: Chest x-ray 12/24/2023. Technique: A single frontal AP portable upright projection of the chest was obtained. FINDINGS: The tip of the intervally placed endotracheal tube terminates over the proximal right mainstem bronchus. The tip of the enteric tube terminates over the left upper quadrant of the abdomen. There are mildly increased interstitial opacities noted within both lungs. There is no pleural effusion or pneumothorax. There is stable enlargement of the cardiac silhouette. Again noted is a mitral valve prosthesis. Surgical clips overlie the right axilla. The upper abdomen is unremarkable. There is no acute osseous abnormality.    X-RAY CHEST 1 VIEW    Result Date: 12/25/2023  CLINICAL HISTORY: Shortness of breath.     IMPRESSION: Vascular congestion.  Left basal atelectasis. COMMENT: AP radiograph the chest is compared to previous study dated 8/17/2023. There is vascular congestion and small effusions.  Findings may represent mild congestive heart failure.  Cardiac silhouette is unchanged.  Prosthetic valve ring seen.  Surgical staples seen in the right axilla.  There is minimal left basal atelectasis.    CT ANGIOGRAPHY CHEST PULMONARY EMBOLISM WITH IV CONTRAST    Result Date: 12/24/2023  CLINICAL HISTORY: Shortness of breath.  Hypoxia.  Rule out pulmonary embolism. Study: CT angiography of the chest with contrast for PE protocol.  3D MIP and/or volume rendered image reconstruction performed and reviewed. Axial CT images of the chest are obtained from the thoracic inlet to the upper abdomen with IV contrast.  100 cc of Isovue-370 IV contrast were given. CT DOSE:  One or more  dose reduction techniques (e.g. automated exposure control, adjustment of the mA and/or kV according to patient size, use of iterative reconstruction technique) utilized for this examination. COMPARISON: CT chest dated 10/19/2022. COMMENT: Left lower lobe infiltrate with surrounding groundglass opacities and additional satellite lesions in the left lower lobe.  Patchy airspace disease in the right middle lobe.  Minimal right lower lobe atelectatic changes.  There is groundglass nodular infiltrates in the right upper lobe.  No pericardial effusion is seen. The trachea and main bronchial segments are clear. Mild herniation of the right lung between the third and fourth rib possibly postsurgical. No embolus is seen in the main pulmonary artery or proximal segmental pulmonary arteries.  No intrathoracic aortic dissection is visualized.  No mediastinal or hilar lymphadenopathy is seen. The visualized portions of the upper abdomen demonstrates multiple calcified gallstones.  The right heart is opacified with retrograde opacification of the IVC and hepatic veins. Disc osteophyte complex at T11-T12.     IMPRESSION: 1. No pulmonary embolism in the main or proximal segmental pulmonary arteries. 2. Right upper lobe and left lower lobe infiltrate with patchy airspace opacities in the right middle lobe.      Micro:   Microbiology Results     Procedure Component Value Units Date/Time    Sputum culture / smear Expectorated Sputum [[id number]] Collected: 12/25/23 0418    Specimen: Aspirate from Expectorated Sputum Updated: 12/27/23 0845    Narrative:      The following orders were created for panel order Sputum culture / smear Expectorated Sputum.  Procedure                               Abnormality         Status                     ---------                               -----------         ------                     Sputum Gram Stain (Lab O...[[id number]]                      Final result               Sputum Culture (Lab  Only...[[id number]]  Normal              Final result                 Please view results for these tests on the individual orders.    Sputum Gram Stain (Lab Only) Expectorated Sputum [[id number]] Collected: 12/25/23 0418    Specimen: Aspirate from Expectorated Sputum Updated: 12/25/23 0956     Gram Stain Result 3+ WBC      No Epithelial Cells Seen      3+ Gram positive bacilli      2+ Gram positive cocci in pairs, chains and clusters    Sputum Culture (Lab Only) Expectorated Sputum [[id number]]  (Normal) Collected: 12/25/23 0418    Specimen: Aspirate from Expectorated Sputum Updated: 12/27/23 0845     Culture Normal [place]    MRSA Screen, Nares Only Nose [[id number]]  (Normal) Collected: 12/25/23 0352    Specimen: Nasal Swab from Nose Updated: 12/25/23 0906     MRSA DNA, Nares Negative    Blood Culture Blood, Venous [[id number]]  (Normal) Collected: 12/24/23 1652    Specimen: Blood, Venous Updated: 12/27/23 0000     Culture No growth at 48 hours    SARS-CoV-2 (COVID-19) Nasopharynx [[id number]]  (Abnormal) Collected: 12/24/23 1649    Specimen: Nasopharyngeal Swab from Nasopharynx Updated: 12/24/23 1736    Narrative:      The following orders were created for panel order SARS-CoV-2 (COVID-19) Nasopharynx.  Procedure                               Abnormality         Status                     ---------                               -----------         ------                     SARS-COV-2 (COVID-19)/ F...[[id number]]  Abnormal            Final result                 Please view results for these tests on the individual orders.    SARS-COV-2 (COVID-19)/ FLU A/B, AND RSV, PCR Nasopharynx [[id number]]  (Abnormal) Collected: 12/24/23 1649    Specimen: Nasopharyngeal Swab from Nasopharynx Updated: 12/24/23 1736     SARS-CoV-2 (COVID-19) Negative     Influenza A Positive     Influenza B Negative     Respiratory Syncytial Virus Negative    Narrative:      Testing performed using real-time PCR for detection of COVID-19. EUA approved  validation studies performed on site.     Blood Culture Blood, Venous [[id number]]  (Normal) Collected: 12/24/23 1647    Specimen: Blood, Venous Updated: 12/27/23 0100     Culture No growth at 48 hours          ECG/Telemetry  I have independently reviewed the telemetry. No events for the last 24 hours.         ASSESSMENT    73 y.o. male with history atrial fibrillation, CHF, prostate and breast CA, history of GI bleed who presented to the emergency room with shortness of breath and found to be influenza A positive. During day 2 of his hospital stay he had a v fib arrest with ROSC. He was transferred to the ICU for further monitoring     PLAN   # Acute Hypoxic respiratory failure  - Failed Bipap on admission, became bradycardic and was intubated  - Extubated 12/26, however became hypoxic, ?Secondary to upper airway edema vs acute bronchospasm vs flash pulmonary edema  - Treated with lasix 40 mg and solumedrol q 6  - Ultimately required re-intubation 12/26. Anesthesia noted some swelling around cords  - S/p bronchoscopy post re-intubation 12/27 which noted patient 'biting on tube'  - Initially passed SBT 12/30, was extubated to nasal cannula, failed HF and BIPAP and ultimately required re-intubation in the setting of hypoxia and poor mental status  - Completed steroids  - Now tolerating PS 6  - Decrease sedation as able to maintain RAAS 0 to -2  - He is volume overloaded, but still with ELEANOR.  May need to consider diuretics prior to next extubation attempt. Plan for limited echo this week     # Shock  - Improved, off levophed,  maintain MAP > 65    # S/p V fib cardiac arrest  - Likely related to Takosubo's cardiomyopthy, EF 40-45%  - ROSC achieved after receiving 3 epi, 3 shocks, 2 bicarb, and Magnesium replacement  - Following commands post-arrest. No TTM  - Taken by interventional cardiology for the placement of a temporary pacemaker  - Also noted no significant CAD  - PPM removed secondary to dyssynchrony and  misplacement. No further marcellus arrythmias, removed 12/27, however if bradycardic may need new temp wire.   - Per cardiology. if patient has further episodes of polymorphic ventricular tachycardia, resume IV lidocaine  - Holding diuresis, with I>>O 12/29  - Mental status remains fair; possibly delirium vs 2/2 sedatives vs anoxic encephalopathy. Monitor, may require MRI if not improving  - CTH 12/30 negative. Will minimize sedation to allow for adequate neurological exam     # Takotsubo Cardiomyopathy  - TTE 12/26 suggestive of Takotsubos with EF 40-45%, confirmed by LV gram   - Off milrinone 12/29  - Will consider repeat TTE tomorrow or this week to reevaluate  - Cardiology following    # Acute kidney injury  - Renal function slowly improving. Initial creatinine 1.2, peaked 2.3, now 1.8 today and downtrending  - Baseline creatinine appears to be 1.2-1.4  - ?Secondary to IV dye load s/p cardiac cath vs over-diuresis vs   - Monitor BMP  - Holding diuresis    # Elevated liver enzymes  - Likely in setting of hypotension, shock liver  - Serial labs, trending down.  - Monitor CMP     # Pneumonia  - CT Chest 12/24 with left lower lobe infiltrate and right upper lobe infiltrate  - Blood/ sputum cultures negative   - Last day of pip-tazo today, 12/31  - ID following    # Afib  - AF on telemetry, rate controlled  - EP following  - Continue IV heparin  - Continue amiodarone     # Influenza A  - Completed 5 day course Oseltamivir     # Prostate/breast cancer  - S/p radiation therapy for prostate cancer spring 2023  - S/p R mastectomy 8/2023 and radiation therapy 9/2023  - Continue tamoxifen      VTE Prophylaxis Plan: SCDs SQH  GI Prophylaxis Plan: Protonix  Lines/Drains/Airway: R PICC (12/28), DHT, jones, FMS, ETT  Fluids/Electrolytes/Nutrition: TF     Disposition Planning: Remain in the ICU    CODE STATUS  Full Code       The case was reviewed this morning at the patient's beside multidisciplinary rounds with the patient's  nurse, dietician, pharmacist, respiratory therapist, physical therapist and critical care nurse coordinator. The patient's clinical status with regards to diagnosis, treatment plans including disposition of any IV, arterial lines, Lloyd and tubes were discussed, as well as dietary, respiratory therapy and mobilization needs.     This case was discussed with consultants.    Total critical time spent managing acute hypoxic respiratory failure, VF arrest, atrial fibrillation, totals 40 minutes.    This note was scribed by JONNY Lee in my presence and on my behalf.     Megan Rico,   12/31/2023  1:05 PM

## 2023-12-31 NOTE — PROGRESS NOTES
"   Cardiology Progress Note       Subjective   Reintubated yesterday after unsuccessful extubation trial.  Does not respond to verbal commands    Objective     Vital signs in last 24 hours    Temp:  [36.7 °C (98 °F)-37.2 °C (99 °F)] 37 °C (98.6 °F)  Heart Rate:  [] 62  Resp:  [10-36] 18  BP: (112-214)/(51-92) 137/66  FiO2 (%) (Set):  [40 %] 40 %  No intake/output data recorded.      Intake/Output Summary (Last 24 hours) at 12/31/2023 0811  Last data filed at 12/31/2023 0600  Gross per 24 hour   Intake 2473 ml   Output 1695 ml   Net 778 ml         Physical Exam     Constitutional:  Well-developed and well-nourished.  Intubated and sedated  Head: atraumatic, normocephalic   Neck: No obvious JVD present.   Cardiovascular: Regular rate and rhythm.  no murmur.  Pulmonary/Chest: Distant breath sounds, no obvious rales or wheezes  Abdomen: soft, NT/ND.  Extremities: No significant  Neurological: Intubated and sedated  Skin: warm, dry    Labs  Lab Results   Component Value Date    GLUCOSE 168 (H) 12/31/2023    CALCIUM 8.0 (L) 12/31/2023     (H) 12/31/2023    K 3.8 12/31/2023    CO2 21 12/31/2023     (H) 12/31/2023    BUN 57 (H) 12/31/2023    CREATININE 1.8 (H) 12/31/2023    MG 2.0 12/28/2023     No results found for: \"CKTOTAL\", \"CKMB\", \"CKMBINDEX\", \"TROPONINI\"  Results from last 7 days   Lab Units 12/25/23 2011 12/25/23  1729 12/25/23  1413   HIGH SENSITIVE TROPONIN I pg/mL 706.3* 792.0* 860.4*     Lab Results   Component Value Date    BNP 1,135 (H) 12/24/2023     Lab Results   Component Value Date    WBC 4.72 12/31/2023    HGB 8.3 (L) 12/31/2023    HCT 26.2 (L) 12/31/2023    .8 (H) 12/31/2023     (L) 12/31/2023    INR 1.1 12/28/2023     Results from last 7 days   Lab Units 12/27/23  1231 12/27/23  0519   TRIGLYCERIDES mg/dL 303* 234*       Current Meds  • amiodarone  200 mg feeding tube Daily   • atorvastatin  10 mg oral Nightly   • cetirizine  10 mg feeding tube Nightly   • chlorhexidine  " Physical Medicine and Rehabilitation    This patient has been accepted for [facility]P admission and insurance authorization is not required.  Medically cleared for admission to [facility] IR. Anticipate having a bed today, 12/16/21.  Please enter a dc order when patient is medically ready to admit to IRP.    COVID recovered, no need for retest.    Transportation via: ambulance. Pick-up time: 11:00. Admitting to : .    Acute Hospital: [facility]  Floor RN: [phone]  SW: Aroldo [phone]    [facility]  Admitting/Attending MD: Dr. Carbajal: [phone]  IRP RN: [phone]  RN to RN & MD to MD reports must be completed prior to pt leaving [facility].    Mirta Odom, OT  Rehab Referrals Coordinator  [facility]  [phone]       15 mL Mouth/Throat 2 times per day   • cholecalciferol (vitamin D3)  1,000 Units feeding tube Daily   • [Provider Managed Hold] furosemide  40 mg intravenous q12h GISELLE   • guaiFENesin  400 mg oral q6h GISELLE   • insulin lispro U-100  1-10 Units subcutaneous q6h GISELLE   • ipratropium HFA  2 puff inhalation QID (8a, 12p, 4p, 8p)   • pantoprazole  40 mg intravenous q12h GISELLE   • piperacillin-tazobactam  4.5 g intravenous q6h INT   • silver sulfadiazine   Topical BID   • sodium chloride  10 mL intravenous q8h INT   • tamoxifen  20 mg feeding tube Daily       Recent Cardiac Studies    Cardiac Imaging    TRANSTHORACIC ECHO (TTE) COMPLETE 12/26/2023    Interpretation Summary  •  Left Ventricle: Normal ventricle size. Normal wall thickness. Preserved systolic function. Estimated EF 40-45%. Hypokinesis of the apex. 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  •  Right Ventricle: Normal ventricle size. Pacer wire present. Normal systolic function.  •  Right Atrium: Normal sized atrium.  •  Aortic Valve: Tricuspid valve. Trace regurgitation. No stenosis.  •  Mitral Valve: Normal leaflet structure. Normal leaflet motion. Trace regurgitation. No stenosis.  •  Tricuspid Valve: Normal structure. Mild regurgitation. Estimated RVSP = 43 mmHg. No significant stenosis.  •  Pulmonic Valve: Normal structure. No regurgitation. No stenosis.  •  Aorta: Aortic root normal. Sinuses of Valsalva normal-sized. Ascending aorta normal-sized. Aortic arch normal-sized. Descending aorta normal-sized.  •  Pericardium: Normal structure. No evidence of pericardial effusion. No cardiac tamponade.  •  Left Atrium: Mildly dilated atrium.    Cardiac Imaging    CARDIAC CATHETERIZATION 12/25/2023    Conclusion  •  Angiographically normal epicardial coronary arteries  •  Left ventricular ejection fraction is approximately 25-30%.Wall motion abnormalities consistent with Takotsubo Cardiomyopathy  •  Severely  elevated biventricular filling pressures  •  Reduced Cardiac Output and Index; Reduced Stroke volume and stroke volume index by Chelsi Calculation  •  Pulmonary venous post-capillary hypertension primarily due to left heart dysfunction  •  No peak to peak gradient on pullback to suggest aortic stenosis    RECOMMENDATIONS:  1. Routine post-procedure and access site care  2. Optimal medical therapy and primary prevention for CAD  3. IV Milrinone  4. Bedrest        Telemetry  Normal sinus rhythm, no ventricular tachycardia.  No A-fib noted      Assessment & Plan     1.  VF arrest-suspect precipitated by Takotsubo syndrome with torsade.  Patient's QT interval remains prolonged.  He has deep T wave inversions.  He is at risk of further episodes of polymorphic ventricular tachycardia.  If he has any further issues, will resume IV lidocaine per EP recommendation.  If he is bradycardic, he will need a new temporary pacemaker.     2.  Hypoxemia- unsuccessful extubation trial yesterday.     3.  Atrial fibrillation- currently in sinus rhythm on telemetry, patient has a history of atrial fibrillation-continue on oral/nasogastric amiodarone.    Patient is on IV heparin.     4.  Takotsubo syndrome- patient's blood pressure is elevated. Pressure at times is quite elevated 214/92, currently 149/69.  He is off milrinone and Levophed.  May consider limited echo to be repeated tomorrow sometime next week.    5.  Hypertension blood pressure has been elevated since last night.  Suggest starting low-dose of metoprolol to tartrate.  May consider ARB however renal function is quite compromised at present.     Dr. Michele Estrada will resume care of this patient as of next week.      Orestes Chapman MD  12/31/2023  Pager #[phone]

## 2024-01-01 LAB
ALBUMIN SERPL-MCNC: 2.2 G/DL (ref 3.5–5.7)
ALP SERPL-CCNC: 55 IU/L (ref 34–125)
ALT SERPL-CCNC: 134 IU/L (ref 7–52)
ANION GAP SERPL CALC-SCNC: 8 MEQ/L (ref 3–15)
APTT PPP: 79 SEC (ref 23–35)
AST SERPL-CCNC: 52 IU/L (ref 13–39)
BACTERIA URNS QL MICRO: ABNORMAL /HPF
BASOPHILS # BLD: 0 K/UL (ref 0.01–0.1)
BASOPHILS NFR BLD: 0 %
BILIRUB SERPL-MCNC: 0.5 MG/DL (ref 0.3–1.2)
BILIRUB UR QL STRIP.AUTO: NEGATIVE MG/DL
BUN SERPL-MCNC: 49 MG/DL (ref 7–25)
CALCIUM SERPL-MCNC: 7.8 MG/DL (ref 8.6–10.3)
CHLORIDE SERPL-SCNC: 115 MEQ/L (ref 98–107)
CLARITY UR REFRACT.AUTO: ABNORMAL
CO2 SERPL-SCNC: 22 MEQ/L (ref 21–31)
COLOR UR AUTO: YELLOW
CREAT SERPL-MCNC: 1.6 MG/DL (ref 0.7–1.3)
DIFFERENTIAL METHOD BLD: ABNORMAL
EGFRCR SERPLBLD CKD-EPI 2021: 45.2 ML/MIN/1.73M*2
EOSINOPHIL # BLD: 0.04 K/UL (ref 0.04–0.54)
EOSINOPHIL NFR BLD: 1.1 %
ERYTHROCYTE [DISTWIDTH] IN BLOOD BY AUTOMATED COUNT: 16.3 % (ref 11.6–14.4)
GLUCOSE BLD-MCNC: 188 MG/DL (ref 70–99)
GLUCOSE BLD-MCNC: 199 MG/DL (ref 70–99)
GLUCOSE BLD-MCNC: 235 MG/DL (ref 70–99)
GLUCOSE SERPL-MCNC: 177 MG/DL (ref 70–99)
GLUCOSE UR STRIP.AUTO-MCNC: NEGATIVE MG/DL
GRAM STN SPEC: NORMAL
GRAN CASTS #/AREA URNS LPF: ABNORMAL /LPF
HCT VFR BLDCO AUTO: 24.7 % (ref 40.1–51)
HGB BLD-MCNC: 7.6 G/DL (ref 13.7–17.5)
HGB UR QL STRIP.AUTO: 3
HYALINE CASTS #/AREA URNS LPF: ABNORMAL /LPF
IMM GRANULOCYTES # BLD AUTO: 0.09 K/UL (ref 0–0.08)
IMM GRANULOCYTES NFR BLD AUTO: 2.5 %
KETONES UR STRIP.AUTO-MCNC: NEGATIVE MG/DL
LEUKOCYTE ESTERASE UR QL STRIP.AUTO: ABNORMAL
LYMPHOCYTES # BLD: 0.3 K/UL (ref 1.2–3.5)
LYMPHOCYTES NFR BLD: 8.3 %
MANUAL DIF COMMENT BLD-IMP: ABNORMAL
MCH RBC QN AUTO: 32.9 PG (ref 28–33.2)
MCHC RBC AUTO-ENTMCNC: 30.8 G/DL (ref 32.2–36.5)
MCV RBC AUTO: 106.9 FL (ref 83–98)
MONOCYTES # BLD: 0.31 K/UL (ref 0.3–1)
MONOCYTES NFR BLD: 8.6 %
MUCOUS THREADS URNS QL MICRO: ABNORMAL /LPF
NEUTROPHILS # BLD: 2.86 K/UL (ref 1.7–7)
NEUTS SEG NFR BLD: 79.5 %
NITRITE UR QL STRIP.AUTO: NEGATIVE
NRBC BLD-RTO: 2.2 %
PDW BLD AUTO: 11.7 FL (ref 9.4–12.4)
PH UR STRIP.AUTO: 6 [PH]
PLAT MORPH BLD: NORMAL
PLATELET # BLD AUTO: 104 K/UL (ref 150–350)
PLATELET # BLD EST: ABNORMAL 10*3/UL
POCT TEST: ABNORMAL
POTASSIUM SERPL-SCNC: 3.9 MEQ/L (ref 3.5–5.1)
PROT SERPL-MCNC: 4.1 G/DL (ref 6–8.2)
PROT UR QL STRIP.AUTO: 1
RBC # BLD AUTO: 2.31 M/UL (ref 4.5–5.8)
RBC #/AREA URNS HPF: ABNORMAL /HPF
SODIUM SERPL-SCNC: 145 MEQ/L (ref 136–145)
SP GR UR REFRACT.AUTO: 1.02
SQUAMOUS URNS QL MICRO: ABNORMAL /HPF
UROBILINOGEN UR STRIP-ACNC: 0.2 EU/DL
WBC # BLD AUTO: 3.6 K/UL (ref 3.8–10.5)
WBC #/AREA URNS HPF: ABNORMAL /HPF

## 2024-01-01 PROCEDURE — 63700000 HC SELF-ADMINISTRABLE DRUG: Performed by: HOSPITALIST

## 2024-01-01 PROCEDURE — 87040 BLOOD CULTURE FOR BACTERIA: CPT

## 2024-01-01 PROCEDURE — 81001 URINALYSIS AUTO W/SCOPE: CPT

## 2024-01-01 PROCEDURE — 94003 VENT MGMT INPAT SUBQ DAY: CPT

## 2024-01-01 PROCEDURE — 36415 COLL VENOUS BLD VENIPUNCTURE: CPT | Performed by: STUDENT IN AN ORGANIZED HEALTH CARE EDUCATION/TRAINING PROGRAM

## 2024-01-01 PROCEDURE — 85730 THROMBOPLASTIN TIME PARTIAL: CPT | Performed by: STUDENT IN AN ORGANIZED HEALTH CARE EDUCATION/TRAINING PROGRAM

## 2024-01-01 PROCEDURE — 25000000 HC PHARMACY GENERAL: Performed by: PHYSICIAN ASSISTANT

## 2024-01-01 PROCEDURE — 63600000 HC DRUGS/DETAIL CODE: Mod: JZ

## 2024-01-01 PROCEDURE — 20000000 HC ROOM AND CARE ICU

## 2024-01-01 PROCEDURE — 25000000 HC PHARMACY GENERAL: Performed by: HOSPITALIST

## 2024-01-01 PROCEDURE — 80053 COMPREHEN METABOLIC PANEL: CPT | Performed by: NURSE PRACTITIONER

## 2024-01-01 PROCEDURE — 81003 URINALYSIS AUTO W/O SCOPE: CPT

## 2024-01-01 PROCEDURE — 87070 CULTURE OTHR SPECIMN AEROBIC: CPT

## 2024-01-01 PROCEDURE — 63700000 HC SELF-ADMINISTRABLE DRUG: Performed by: INTERNAL MEDICINE

## 2024-01-01 PROCEDURE — 94640 AIRWAY INHALATION TREATMENT: CPT

## 2024-01-01 PROCEDURE — 25800000 HC PHARMACY IV SOLUTIONS: Performed by: PHYSICIAN ASSISTANT

## 2024-01-01 PROCEDURE — 85025 COMPLETE CBC W/AUTO DIFF WBC: CPT | Performed by: NURSE PRACTITIONER

## 2024-01-01 PROCEDURE — 87205 SMEAR GRAM STAIN: CPT

## 2024-01-01 RX ORDER — FUROSEMIDE 10 MG/ML
40 INJECTION INTRAMUSCULAR; INTRAVENOUS ONCE
Status: COMPLETED | OUTPATIENT
Start: 2024-01-01 | End: 2024-01-01

## 2024-01-01 RX ADMIN — GUAIFENESIN 400 MG: 100 SOLUTION ORAL at 12:10

## 2024-01-01 RX ADMIN — METOPROLOL TARTRATE 25 MG: 25 TABLET, FILM COATED ORAL at 20:03

## 2024-01-01 RX ADMIN — ATORVASTATIN CALCIUM 10 MG: 10 TABLET, FILM COATED ORAL at 22:44

## 2024-01-01 RX ADMIN — FUROSEMIDE 40 MG: 10 INJECTION, SOLUTION INTRAMUSCULAR; INTRAVENOUS at 09:14

## 2024-01-01 RX ADMIN — SILVER SULFADIAZINE: 10 CREAM TOPICAL at 08:44

## 2024-01-01 RX ADMIN — IPRATROPIUM BROMIDE 2 PUFF: 17 AEROSOL, METERED RESPIRATORY (INHALATION) at 11:40

## 2024-01-01 RX ADMIN — CHOLECALCIFEROL TAB 25 MCG (1000 UNIT) 1000 UNITS: 25 TAB at 08:44

## 2024-01-01 RX ADMIN — METOPROLOL TARTRATE 25 MG: 25 TABLET, FILM COATED ORAL at 08:50

## 2024-01-01 RX ADMIN — IPRATROPIUM BROMIDE 2 PUFF: 17 AEROSOL, METERED RESPIRATORY (INHALATION) at 19:54

## 2024-01-01 RX ADMIN — Medication 10 ML: at 18:19

## 2024-01-01 RX ADMIN — CHLORHEXIDINE GLUCONATE ORAL RINSE 15 ML: 1.2 SOLUTION DENTAL at 09:26

## 2024-01-01 RX ADMIN — ACETAMINOPHEN 650 MG: 650 SOLUTION ORAL at 08:42

## 2024-01-01 RX ADMIN — Medication 10 ML: at 03:24

## 2024-01-01 RX ADMIN — IPRATROPIUM BROMIDE 2 PUFF: 17 AEROSOL, METERED RESPIRATORY (INHALATION) at 08:48

## 2024-01-01 RX ADMIN — PANTOPRAZOLE SODIUM 40 MG: 40 INJECTION, POWDER, LYOPHILIZED, FOR SOLUTION INTRAVENOUS at 20:03

## 2024-01-01 RX ADMIN — INSULIN LISPRO 1 UNITS: 100 INJECTION, SOLUTION INTRAVENOUS; SUBCUTANEOUS at 12:09

## 2024-01-01 RX ADMIN — INSULIN LISPRO 1 UNITS: 100 INJECTION, SOLUTION INTRAVENOUS; SUBCUTANEOUS at 05:16

## 2024-01-01 RX ADMIN — SILVER SULFADIAZINE: 10 CREAM TOPICAL at 20:05

## 2024-01-01 RX ADMIN — PANTOPRAZOLE SODIUM 40 MG: 40 INJECTION, POWDER, LYOPHILIZED, FOR SOLUTION INTRAVENOUS at 08:44

## 2024-01-01 RX ADMIN — GUAIFENESIN 400 MG: 100 SOLUTION ORAL at 05:18

## 2024-01-01 RX ADMIN — CHLORHEXIDINE GLUCONATE ORAL RINSE 15 ML: 1.2 SOLUTION DENTAL at 22:45

## 2024-01-01 RX ADMIN — INSULIN LISPRO 2 UNITS: 100 INJECTION, SOLUTION INTRAVENOUS; SUBCUTANEOUS at 18:05

## 2024-01-01 RX ADMIN — Medication 10 MG: at 22:44

## 2024-01-01 RX ADMIN — AMIODARONE HYDROCHLORIDE 200 MG: 200 TABLET ORAL at 08:43

## 2024-01-01 RX ADMIN — TAMOXIFEN CITRATE 20 MG: 10 TABLET, FILM COATED ORAL at 08:45

## 2024-01-01 RX ADMIN — GUAIFENESIN 400 MG: 100 SOLUTION ORAL at 00:09

## 2024-01-01 RX ADMIN — Medication 10 ML: at 12:02

## 2024-01-01 RX ADMIN — ACETAMINOPHEN 650 MG: 650 SOLUTION ORAL at 17:02

## 2024-01-01 RX ADMIN — IPRATROPIUM BROMIDE 2 PUFF: 17 AEROSOL, METERED RESPIRATORY (INHALATION) at 16:02

## 2024-01-01 RX ADMIN — DEXMEDETOMIDINE 0.2 MCG/KG/HR: 200 INJECTION, SOLUTION INTRAVENOUS at 21:00

## 2024-01-01 RX ADMIN — GUAIFENESIN 400 MG: 100 SOLUTION ORAL at 17:59

## 2024-01-01 ASSESSMENT — COGNITIVE AND FUNCTIONAL STATUS - GENERAL
MOVING TO AND FROM BED TO CHAIR: 1 - TOTAL
WALKING IN HOSPITAL ROOM: 1 - TOTAL
CLIMB 3 TO 5 STEPS WITH RAILING: 1 - TOTAL
STANDING UP FROM CHAIR USING ARMS: 1 - TOTAL

## 2024-01-01 NOTE — PROGRESS NOTES
Cam Rosas is a 73 y.o. male who presents with Principal Problem:    Severe sepsis (CMS/HCC)      SUBJECTIVE:  Intubated and sedated.  Patient's wife and son by the bedside    PE:  Physical Exam:  VS reviewed:  Temp:  [36.7 °C (98.1 °F)-38.4 °C (101.1 °F)] 38.4 °C (101.1 °F)  Heart Rate:  [49-81] 63  Resp:  [12-28] 21  BP: (103-155)/(47-68) 108/53  FiO2 (%) (Set):  [40 %] 40 %    Intake/Output Summary (Last 24 hours) at 1/1/2024 1204  Last data filed at 1/1/2024 1202  Gross per 24 hour   Intake 2305.79 ml   Output 1525 ml   Net 780.79 ml     Intubated and sedated, no change in his cardiovascular examination    LABS:  Results from last 7 days   Lab Units 01/01/24  0517 12/31/23  0545 12/30/23  0355 12/29/23  0422 12/28/23  0426 12/27/23  0519 12/26/23  0347   SODIUM mEQ/L 145 148* 146*   < > 149* 149* 148*   POTASSIUM mEQ/L 3.9 3.8 4.9   < > 3.3* 3.8 3.5   MAGNESIUM mg/dL  --   --   --   --  2.0 2.0 2.2   CHLORIDE mEQ/L 115* 116* 115*   < > 114* 117* 114*   CO2 mEQ/L 22 21 23   < > 24 23 26   BUN mg/dL 49* 57* 55*   < > 40* 28* 21   CREATININE mg/dL 1.6* 1.8* 2.0*   < > 2.2* 1.9* 1.6*   AST IU/L 52* 76* 184*   < > 822* 1,028* 711*   ALT IU/L 134* 201* 298*   < > 553* 516* 268*    < > = values in this interval not displayed.     Results from last 7 days   Lab Units 12/25/23 2011 12/25/23  1729 12/25/23  1413   HIGH SENSITIVE TROPONIN I pg/mL 706.3* 792.0* 860.4*     Results from last 7 days   Lab Units 01/01/24  0517 12/31/23  0545 12/30/23  0355 12/29/23  0422 12/28/23  1659   WBC K/uL 3.60* 4.72 4.64   < >  --    HEMOGLOBIN g/dL 7.6* 8.3* 8.4*   < >  --    HEMATOCRIT % 24.7* 26.2* 25.6*   < >  --    PLATELETS K/uL 104* 114* 180   < >  --    INR   --   --   --   --  1.1    < > = values in this interval not displayed.     Lab Results   Component Value Date    TSH 0.40 11/03/2023     Lab Results   Component Value Date    TRIG 303 (H) 12/27/2023     Lab Results   Component Value Date    BNP 1,135 (H) 12/24/2023        TELEMETRY:  Sinus rhythm    MEDICATIONS:       • amiodarone  200 mg feeding tube Daily   • atorvastatin  10 mg oral Nightly   • cetirizine  10 mg feeding tube Nightly   • chlorhexidine  15 mL Mouth/Throat 2 times per day   • cholecalciferol (vitamin D3)  1,000 Units feeding tube Daily   • [Provider Managed Hold] furosemide  40 mg intravenous q12h GISELLE   • guaiFENesin  400 mg oral q6h GISELLE   • insulin lispro U-100  1-10 Units subcutaneous q6h GISELLE   • ipratropium HFA  2 puff inhalation QID (8a, 12p, 4p, 8p)   • metoprolol tartrate  25 mg feeding tube BID   • pantoprazole  40 mg intravenous q12h GISELLE   • silver sulfadiazine   Topical BID   • sodium chloride  10 mL intravenous q8h INT   • tamoxifen  20 mg feeding tube Daily       IMPRESSION:    Principal Problem:    Severe sepsis (CMS/HCC)     1.  VF arrest-suspect precipitated by Takotsubo syndrome with torsade.  Patient's QT interval remains prolonged.  He has deep T wave inversions.  He is at risk of further episodes of polymorphic ventricular tachycardia.  If he has any further issues, will resume IV lidocaine per EP recommendation.       2.  Hypoxemia- unsuccessful extubation trial yesterday.     3.  Atrial fibrillation- currently in sinus rhythm on telemetry, patient has a history of atrial fibrillation-continue on oral/nasogastric amiodarone.    Patient is on IV heparin.     4.  Takotsubo syndrome- patient's blood pressure is elevated. Pressure at times is quite elevated 214/92, currently 149/69.  He is off milrinone and Levophed.  I ordered limited echo to be repeated tomorrow to reevaluate LV and RV systolic function.     5.  Hypertension blood pressure is at goal on metoprolol.    I personally reviewed patient's cardiac status with patient's wife and son by the bedside.  They agree with limited echo tomorrow.  Dr. Michele Estrada will resume care of this patient as of next week.      Orestes Chapman MD  1/1/2024    Pager #[phone]

## 2024-01-02 ENCOUNTER — APPOINTMENT (INPATIENT)
Dept: CARDIOLOGY | Facility: HOSPITAL | Age: 74
DRG: 004 | End: 2024-01-02
Attending: INTERNAL MEDICINE
Payer: MEDICARE

## 2024-01-02 ENCOUNTER — APPOINTMENT (INPATIENT)
Dept: RADIOLOGY | Facility: HOSPITAL | Age: 74
DRG: 004 | End: 2024-01-02
Attending: NURSE PRACTITIONER
Payer: MEDICARE

## 2024-01-02 LAB
ALBUMIN SERPL-MCNC: 2.2 G/DL (ref 3.5–5.7)
ALP SERPL-CCNC: 53 IU/L (ref 34–125)
ALT SERPL-CCNC: 95 IU/L (ref 7–52)
ANION GAP SERPL CALC-SCNC: 9 MEQ/L (ref 3–15)
APTT PPP: 77 SEC (ref 23–35)
AST SERPL-CCNC: 37 IU/L (ref 13–39)
ATRIAL RATE: 340
BASOPHILS # BLD: 0.01 K/UL (ref 0.01–0.1)
BASOPHILS NFR BLD: 0.2 %
BILIRUB SERPL-MCNC: 0.5 MG/DL (ref 0.3–1.2)
BSA FOR ECHO PROCEDURE: 2.07 M2
BUN SERPL-MCNC: 43 MG/DL (ref 7–25)
CALCIUM SERPL-MCNC: 8.2 MG/DL (ref 8.6–10.3)
CHLORIDE SERPL-SCNC: 113 MEQ/L (ref 98–107)
CO2 SERPL-SCNC: 21 MEQ/L (ref 21–31)
CREAT SERPL-MCNC: 1.5 MG/DL (ref 0.7–1.3)
DIFFERENTIAL METHOD BLD: ABNORMAL
EDV (BP): 57.2 CM3
EF (A4C): 70.8 %
EF A2C: 61.5 %
EGFRCR SERPLBLD CKD-EPI 2021: 48.9 ML/MIN/1.73M*2
EJECTION FRACTION: 65.2 %
EOSINOPHIL # BLD: 0.09 K/UL (ref 0.04–0.54)
EOSINOPHIL NFR BLD: 1.9 %
ERYTHROCYTE [DISTWIDTH] IN BLOOD BY AUTOMATED COUNT: 16.1 % (ref 11.6–14.4)
ESV (BP): 19.9 CM3
GLUCOSE BLD-MCNC: 167 MG/DL (ref 70–99)
GLUCOSE BLD-MCNC: 173 MG/DL (ref 70–99)
GLUCOSE BLD-MCNC: 187 MG/DL (ref 70–99)
GLUCOSE BLD-MCNC: 194 MG/DL (ref 70–99)
GLUCOSE BLD-MCNC: 209 MG/DL (ref 70–99)
GLUCOSE SERPL-MCNC: 181 MG/DL (ref 70–99)
HCT VFR BLDCO AUTO: 24 % (ref 40.1–51)
HGB BLD-MCNC: 7.6 G/DL (ref 13.7–17.5)
IMM GRANULOCYTES # BLD AUTO: 0.09 K/UL (ref 0–0.08)
IMM GRANULOCYTES NFR BLD AUTO: 1.9 %
LEFT VENTRICLE DIASTOLIC VOLUME INDEX: 32.85 CM3/M2
LEFT VENTRICLE DIASTOLIC VOLUME: 68 CM3
LEFT VENTRICLE SYSTOLIC VOLUME INDEX: 9.61 CM3/M2
LEFT VENTRICLE SYSTOLIC VOLUME: 19.9 CM3
LV DIASTOLIC VOLUME: 48.6 CM3
LV ESV (APICAL 2 CHAMBER): 18.8 CM3
LVAD-AP2: 20.9 CM2
LVAD-AP4: 24.7 CM2
LVAS-AP2: 12.3 CM2
LVAS-AP4: 12.4 CM2
LVEDVI(A2C): 23.48 CM3/M2
LVEDVI(BP): 27.63 CM3/M2
LVESVI(A2C): 9.08 CM3/M2
LVESVI(BP): 9.61 CM3/M2
LVLD-AP2: 7.54 CM
LVLD-AP4: 7.59 CM
LVLS-AP2: 7.07 CM
LVLS-AP4: 6.63 CM
LYMPHOCYTES # BLD: 0.36 K/UL (ref 1.2–3.5)
LYMPHOCYTES NFR BLD: 7.5 %
MAGNESIUM SERPL-MCNC: 1.9 MG/DL (ref 1.8–2.5)
MCH RBC QN AUTO: 33.5 PG (ref 28–33.2)
MCHC RBC AUTO-ENTMCNC: 31.7 G/DL (ref 32.2–36.5)
MCV RBC AUTO: 105.7 FL (ref 83–98)
MONOCYTES # BLD: 0.32 K/UL (ref 0.3–1)
MONOCYTES NFR BLD: 6.6 %
NEUTROPHILS # BLD: 3.96 K/UL (ref 1.7–7)
NEUTS SEG NFR BLD: 81.9 %
NRBC BLD-RTO: 0.8 %
PDW BLD AUTO: 10.9 FL (ref 9.4–12.4)
PLATELET # BLD AUTO: 101 K/UL (ref 150–350)
POCT TEST: ABNORMAL
POTASSIUM SERPL-SCNC: 4 MEQ/L (ref 3.5–5.1)
PROT SERPL-MCNC: 4.3 G/DL (ref 6–8.2)
QRS DURATION: 126
QT INTERVAL: 542
QTC CALCULATION(BAZETT): 527
R AXIS: -15
RBC # BLD AUTO: 2.27 M/UL (ref 4.5–5.8)
SODIUM SERPL-SCNC: 143 MEQ/L (ref 136–145)
T WAVE AXIS: 203
VENTRICULAR RATE: 57
WBC # BLD AUTO: 4.83 K/UL (ref 3.8–10.5)

## 2024-01-02 PROCEDURE — 93308 TTE F-UP OR LMTD: CPT

## 2024-01-02 PROCEDURE — 25000000 HC PHARMACY GENERAL: Performed by: PHYSICIAN ASSISTANT

## 2024-01-02 PROCEDURE — 83735 ASSAY OF MAGNESIUM: CPT | Performed by: HOSPITALIST

## 2024-01-02 PROCEDURE — 93005 ELECTROCARDIOGRAM TRACING: CPT | Performed by: PHYSICIAN ASSISTANT

## 2024-01-02 PROCEDURE — 25000000 HC PHARMACY GENERAL: Performed by: HOSPITALIST

## 2024-01-02 PROCEDURE — 94640 AIRWAY INHALATION TREATMENT: CPT

## 2024-01-02 PROCEDURE — 25800000 HC PHARMACY IV SOLUTIONS: Performed by: NURSE PRACTITIONER

## 2024-01-02 PROCEDURE — 63600000 HC DRUGS/DETAIL CODE: Mod: JZ | Performed by: INTERNAL MEDICINE

## 2024-01-02 PROCEDURE — 63600000 HC DRUGS/DETAIL CODE: Mod: JZ | Performed by: NURSE PRACTITIONER

## 2024-01-02 PROCEDURE — 63700000 HC SELF-ADMINISTRABLE DRUG: Performed by: INTERNAL MEDICINE

## 2024-01-02 PROCEDURE — 85730 THROMBOPLASTIN TIME PARTIAL: CPT | Performed by: STUDENT IN AN ORGANIZED HEALTH CARE EDUCATION/TRAINING PROGRAM

## 2024-01-02 PROCEDURE — 71045 X-RAY EXAM CHEST 1 VIEW: CPT

## 2024-01-02 PROCEDURE — 25800000 HC PHARMACY IV SOLUTIONS: Performed by: PHYSICIAN ASSISTANT

## 2024-01-02 PROCEDURE — 63700000 HC SELF-ADMINISTRABLE DRUG: Performed by: HOSPITALIST

## 2024-01-02 PROCEDURE — 70551 MRI BRAIN STEM W/O DYE: CPT | Mod: ME

## 2024-01-02 PROCEDURE — 83735 ASSAY OF MAGNESIUM: CPT | Performed by: INTERNAL MEDICINE

## 2024-01-02 PROCEDURE — 94003 VENT MGMT INPAT SUBQ DAY: CPT

## 2024-01-02 PROCEDURE — 20000000 HC ROOM AND CARE ICU

## 2024-01-02 PROCEDURE — 80053 COMPREHEN METABOLIC PANEL: CPT | Performed by: NURSE PRACTITIONER

## 2024-01-02 PROCEDURE — 36415 COLL VENOUS BLD VENIPUNCTURE: CPT | Performed by: NURSE PRACTITIONER

## 2024-01-02 PROCEDURE — 85025 COMPLETE CBC W/AUTO DIFF WBC: CPT | Performed by: NURSE PRACTITIONER

## 2024-01-02 RX ORDER — FUROSEMIDE 10 MG/ML
40 INJECTION INTRAMUSCULAR; INTRAVENOUS ONCE
Status: COMPLETED | OUTPATIENT
Start: 2024-01-02 | End: 2024-01-02

## 2024-01-02 RX ORDER — LANOLIN ALCOHOL/MO/W.PET/CERES
400 CREAM (GRAM) TOPICAL 2 TIMES DAILY
Status: DISCONTINUED | OUTPATIENT
Start: 2024-01-02 | End: 2024-01-10 | Stop reason: HOSPADM

## 2024-01-02 RX ORDER — FUROSEMIDE 10 MG/ML
40 INJECTION INTRAMUSCULAR; INTRAVENOUS
Status: DISCONTINUED | OUTPATIENT
Start: 2024-01-02 | End: 2024-01-03

## 2024-01-02 RX ADMIN — FUROSEMIDE 40 MG: 10 INJECTION, SOLUTION INTRAMUSCULAR; INTRAVENOUS at 10:12

## 2024-01-02 RX ADMIN — SILVER SULFADIAZINE: 10 CREAM TOPICAL at 08:20

## 2024-01-02 RX ADMIN — DEXMEDETOMIDINE 0.2 MCG/KG/HR: 200 INJECTION, SOLUTION INTRAVENOUS at 19:57

## 2024-01-02 RX ADMIN — ACETAMINOPHEN 650 MG: 650 SOLUTION ORAL at 16:41

## 2024-01-02 RX ADMIN — GUAIFENESIN 400 MG: 100 SOLUTION ORAL at 18:10

## 2024-01-02 RX ADMIN — PANTOPRAZOLE SODIUM 40 MG: 40 INJECTION, POWDER, LYOPHILIZED, FOR SOLUTION INTRAVENOUS at 20:10

## 2024-01-02 RX ADMIN — GUAIFENESIN 400 MG: 100 SOLUTION ORAL at 23:35

## 2024-01-02 RX ADMIN — CEFTRIAXONE SODIUM 1 G: 1 INJECTION, POWDER, FOR SOLUTION INTRAMUSCULAR; INTRAVENOUS at 19:58

## 2024-01-02 RX ADMIN — HEPARIN SODIUM 900 UNITS/HR: 10000 INJECTION, SOLUTION INTRAVENOUS at 02:52

## 2024-01-02 RX ADMIN — IPRATROPIUM BROMIDE 2 PUFF: 17 AEROSOL, METERED RESPIRATORY (INHALATION) at 08:10

## 2024-01-02 RX ADMIN — FUROSEMIDE 40 MG: 10 INJECTION, SOLUTION INTRAMUSCULAR; INTRAVENOUS at 16:03

## 2024-01-02 RX ADMIN — IPRATROPIUM BROMIDE 2 PUFF: 17 AEROSOL, METERED RESPIRATORY (INHALATION) at 19:50

## 2024-01-02 RX ADMIN — Medication 10 ML: at 18:21

## 2024-01-02 RX ADMIN — Medication 10 ML: at 10:28

## 2024-01-02 RX ADMIN — GUAIFENESIN 400 MG: 100 SOLUTION ORAL at 00:09

## 2024-01-02 RX ADMIN — ACETAMINOPHEN 650 MG: 650 SOLUTION ORAL at 12:17

## 2024-01-02 RX ADMIN — CHLORHEXIDINE GLUCONATE ORAL RINSE 15 ML: 1.2 SOLUTION DENTAL at 22:09

## 2024-01-02 RX ADMIN — METOPROLOL TARTRATE 25 MG: 25 TABLET, FILM COATED ORAL at 20:10

## 2024-01-02 RX ADMIN — ACETAMINOPHEN 650 MG: 650 SOLUTION ORAL at 23:49

## 2024-01-02 RX ADMIN — INSULIN LISPRO 2 UNITS: 100 INJECTION, SOLUTION INTRAVENOUS; SUBCUTANEOUS at 00:12

## 2024-01-02 RX ADMIN — Medication 2 MG/HR: at 12:31

## 2024-01-02 RX ADMIN — CHOLECALCIFEROL TAB 25 MCG (1000 UNIT) 1000 UNITS: 25 TAB at 08:20

## 2024-01-02 RX ADMIN — GUAIFENESIN 400 MG: 100 SOLUTION ORAL at 05:37

## 2024-01-02 RX ADMIN — INSULIN LISPRO 1 UNITS: 100 INJECTION, SOLUTION INTRAVENOUS; SUBCUTANEOUS at 18:09

## 2024-01-02 RX ADMIN — Medication 10 MG: at 22:09

## 2024-01-02 RX ADMIN — CHLORHEXIDINE GLUCONATE ORAL RINSE 15 ML: 1.2 SOLUTION DENTAL at 10:14

## 2024-01-02 RX ADMIN — METOPROLOL TARTRATE 25 MG: 25 TABLET, FILM COATED ORAL at 08:20

## 2024-01-02 RX ADMIN — AMIODARONE HYDROCHLORIDE 200 MG: 200 TABLET ORAL at 08:20

## 2024-01-02 RX ADMIN — PANTOPRAZOLE SODIUM 40 MG: 40 INJECTION, POWDER, LYOPHILIZED, FOR SOLUTION INTRAVENOUS at 08:20

## 2024-01-02 RX ADMIN — Medication 400 MG: at 20:09

## 2024-01-02 RX ADMIN — Medication 10 ML: at 03:20

## 2024-01-02 RX ADMIN — SILVER SULFADIAZINE: 10 CREAM TOPICAL at 20:13

## 2024-01-02 RX ADMIN — INSULIN LISPRO 1 UNITS: 100 INJECTION, SOLUTION INTRAVENOUS; SUBCUTANEOUS at 11:46

## 2024-01-02 RX ADMIN — IPRATROPIUM BROMIDE 2 PUFF: 17 AEROSOL, METERED RESPIRATORY (INHALATION) at 11:35

## 2024-01-02 RX ADMIN — Medication 400 MG: at 10:22

## 2024-01-02 RX ADMIN — INSULIN LISPRO 1 UNITS: 100 INJECTION, SOLUTION INTRAVENOUS; SUBCUTANEOUS at 23:37

## 2024-01-02 RX ADMIN — IPRATROPIUM BROMIDE 2 PUFF: 17 AEROSOL, METERED RESPIRATORY (INHALATION) at 15:27

## 2024-01-02 RX ADMIN — TAMOXIFEN CITRATE 20 MG: 10 TABLET, FILM COATED ORAL at 08:25

## 2024-01-02 RX ADMIN — GUAIFENESIN 400 MG: 100 SOLUTION ORAL at 11:48

## 2024-01-02 RX ADMIN — INSULIN LISPRO 1 UNITS: 100 INJECTION, SOLUTION INTRAVENOUS; SUBCUTANEOUS at 05:43

## 2024-01-02 RX ADMIN — ATORVASTATIN CALCIUM 10 MG: 10 TABLET, FILM COATED ORAL at 22:10

## 2024-01-02 ASSESSMENT — COGNITIVE AND FUNCTIONAL STATUS - GENERAL
STANDING UP FROM CHAIR USING ARMS: 1 - TOTAL
CLIMB 3 TO 5 STEPS WITH RAILING: 1 - TOTAL
WALKING IN HOSPITAL ROOM: 1 - TOTAL
STANDING UP FROM CHAIR USING ARMS: 1 - TOTAL
CLIMB 3 TO 5 STEPS WITH RAILING: 1 - TOTAL
MOVING TO AND FROM BED TO CHAIR: 1 - TOTAL
WALKING IN HOSPITAL ROOM: 1 - TOTAL
MOVING TO AND FROM BED TO CHAIR: 1 - TOTAL

## 2024-01-02 NOTE — PLAN OF CARE
Care Coordination Discharge Plan Note     Discharge Needs Assessment  Concerns to be Addressed: adjustment to diagnosis/illness, care coordination/care conferences, discharge planning  Current Discharge Risk: chronically ill, physical impairment    Anticipated Discharge Plan  Anticipated Discharge Disposition: skilled nursing facility, home with home health  Type of Home Care Services: home OT, home PT, nursing    Type of Skilled Nursing Care Services: SLP, OT, PT, nursing        Patient Choice  Offered/Gave Vendor List: yes  Patient's Choice of Community Agency(s): discussed with family that care coordiantin will follow for approrpaite aftecare needs    Patient and/or patient guardian/advocate was made aware of their right to choose a provider. A list of eligible providers was presented and reviewed with the patient and/or patient guardian/advocate in written and/or verbal form. The list delineates providers in the patient’s desired geographic area who are participating in the Medicare program and/or providers contracted with the patient’s primary insurance. The Medicare list and quality ratings were obtained from the Medicare.gov [medicare.gov] website.    ---------------------------------------------------------------------------------------------------------------------    Interdisciplinary Discharge Plan Review:  Participants:     Concerns Comments: shireen met with patient & his 2 children at bedside & spouse. sw went over role of care coordiatnon & d/c plans. sw confirmed pcp & pharamcy. [facility]. david has hx of prostate cancer & was receing treatment in past. per son his brother has been staying at patient's home to assist with care fro david. david stays mostly in family room . shireen discussed with wife that care coordination will continue to follow for appropriate aftecare needs. patient is currently on vent     1/2 patient remains vented & with uriel molina. Patient remains  lethargic with LUE weakness. Sw met with patient's wife & son at bedside. Per wife she is aware of role of care coordination & support offered. Will need to follow for appropriate aftercare  plans & if paitent would need ltac post d/c.       Discharge Plan:   Disposition/Destination:   /    Discharge Facility:    Community Resources:      Discharge Transportation:  Is Out of Hospital DNR needed at Discharge: no  Does patient need discharge transport?

## 2024-01-02 NOTE — PROGRESS NOTES
Critical Care/Pulmonary  Daily Progress Note        Subjective    Interval History:   Remains lethargic with LUE weakness  Sedation off on AM rounds  T.max 101.2 yesterday evening    I/O since admission: +10L  In 24 hours: +368 mL  Urine 2.4L     Objective       Allergies: Azithromycin, Prednisone, and Lorazepam    CURRENT MEDS  •  acetaminophen, 650 mg, feeding tube, q4h PRN  •  albuterol, 2.5 mg, nebulization, q4h PRN  •  amiodarone, 200 mg, feeding tube, Daily  •  atorvastatin, 10 mg, oral, Nightly  •  atropine, 1 mg, intravenous, Once PRN  •  betamethasone valerate, , Topical, 2x daily PRN  •  calcium gluconate, 1 g, intravenous, q6h PRN  •  cetirizine, 10 mg, feeding tube, Nightly  •  chlorhexidine, 15 mL, Mouth/Throat, 2 times per day  •  cholecalciferol (vitamin D3), 1,000 Units, feeding tube, Daily  •  dexmedetomidine in 0.9 % NaCl, 0-1 mcg/kg/hr, intravenous, Titrated  •  glucose, 16-32 g of dextrose, oral, PRN **OR** dextrose, 15-30 g of dextrose, oral, PRN **OR** glucagon, 1 mg, intramuscular, PRN **OR** dextrose 50 % in water (D50), 25 mL, intravenous, PRN  •  [Provider Managed Hold] furosemide, 40 mg, intravenous, q12h GISELLE  •  furosemide, 40 mg, intravenous, BID (am, 4p)  •  guaiFENesin, 400 mg, oral, q6h GISELLE  •  heparin (porcine), 40-80 Units/kg (Adjusted), intravenous, q6h PRN  •  heparin infusion - MAR calculator by PTT, 100-4,000 Units/hr, intravenous, Titrated  •  hydrALAZINE, 5 mg, intravenous, q6h PRN  •  insulin lispro U-100, 1-10 Units, subcutaneous, q6h GISELLE  •  ipratropium HFA, 2 puff, inhalation, QID (8a, 12p, 4p, 8p)  •  magnesium oxide, 400 mg, Nasogastric, BID  •  magnesium sulfate, 2 g, intravenous, PRN  •  metoclopramide, 10 mg, intravenous, q6h PRN  •  metoprolol tartrate, 25 mg, feeding tube, BID  •  midazolam, 0-15 mg/hr, intravenous, Titrated **AND** midazolam, 1 mg, intravenous, q5 min PRN  •  pantoprazole, 40 mg, intravenous, q12h GISELLE  •  potassium chloride, 20 mEq, oral,  PRN  •  potassium chloride, 40 mEq, oral, PRN  •  potassium chloride, 20 mEq, intravenous, PRN  •  potassium chloride in water, 20 mEq, intravenous, PRN **AND** potassium chloride in water, 20 mEq, intravenous, PRN  •  silver sulfadiazine, , Topical, BID  •  sodium chloride, 10 mL, intravenous, q8h INT  •  sodium chloride, 10 mL, intravenous, PRN  •  tamoxifen, 20 mg, feeding tube, Daily    VITAL SIGNS  Vitals:    01/02/24 1100 01/02/24 1137 01/02/24 1200 01/02/24 1217   BP:   (!) 161/72    Pulse: 61 68 72    Resp: 19 (!) 23 (!) 31    Temp:   (!) 38.1 °C (100.5 °F) (!) 38.1 °C (100.5 °F)   TempSrc:   Oral    SpO2: 98% 95% 100%    Weight:       Height:           VENTILATOR SETTINGS  Vent Mode: Pressure support  FiO2 (%) (Set):  [40 %] 40 %  S RR:  [18] 18  S VT:  [450 mL] 450 mL  PEEP/CPAP (Set, cmH2O):  [6 cm H20] 6 cm H20  MAP (Observed, cmH2O):  [10-11] 11    Oxygen:  Oxygen Therapy: Supplemental oxygen  O2 Delivery Method: Endotracheal tube  FiO2 (%) (Set): 40 %  O2 Flow Rate (L/min): 6 L/min     INTAKE/OUTPUT    Intake/Output Summary (Last 24 hours) at 1/2/2024 1228  Last data filed at 1/2/2024 1200  Gross per 24 hour   Intake 3103.91 ml   Output 2670 ml   Net 433.91 ml       Lines, Drains, Airways, Wounds:  PICC Triple Lumen 12/28/23 Right (Active)   Number of days: 5       NG/OG Tube 12/30/23 Left nostril (Active)   Number of days: 3       Urethral Catheter 20 Fr (Active)   Number of days: 5       ETT  (Active)   Number of days: 3       Wound Other (comment) Right Pretibial (Active)   Number of days: 9       Wound Venous Ulcer Left Pretibial (Active)   Number of days: 9       Wound Perineum (Active)   Number of days: 9       Wound Puncture Anterior;Proximal;Right Groin (Active)   Number of days: 5       Wound Pressure Injury Right Heel (Active)   Number of days: 5       Wound Pressure Injury Left Heel (Active)   Number of days: 2       Catheterization Site - Arterial Right Femoral 6 Fr. (Active)   Number of  days: 8       Catheterization Site - Venous Right Femoral 6 Fr. (Active)   Number of days: 8         Physical Exam:  Physical Exam  Vitals and nursing note reviewed.   Constitutional:       Appearance: He is ill-appearing.   HENT:      Head: Normocephalic and atraumatic.      Nose:      Comments: NGT     Mouth/Throat:      Comments: ETT  Eyes:      Pupils: Pupils are equal, round, and reactive to light.   Cardiovascular:      Rate and Rhythm: Bradycardia present. Rhythm irregular.      Pulses: Normal pulses.      Heart sounds: Normal heart sounds.   Pulmonary:      Comments: Coarse breath sounds. Mechanically initiated breaths  Abdominal:      General: Bowel sounds are normal.      Palpations: Abdomen is soft.      Comments: FMS   Genitourinary:     Comments: Lloyd,  Musculoskeletal:      Right lower leg: Edema present.      Left lower leg: Edema present.      Comments: BUE edema   Skin:     General: Skin is warm and dry.      Capillary Refill: Capillary refill takes less than 2 seconds.   Neurological:      Comments: Does not open eyes spontaneously. Intermittently follows commands per nursing staff.   +cough, gag, corneals  Pupils 2 mm and sluggish bilaterally  Wiggles toes to command bilaterally  No response to painful stimuli on LUE  Spontaneously moves RUE at least antigravity          Labs:  See Labs Below:  ABG  Results from last 7 days   Lab Units 12/30/23  2240 12/30/23  1520 12/30/23  1353   PH ART pH 7.41 7.30* 7.30*   PCO2 ART mm Hg 36 44 48   PO2 ART mm Hg 164* 83 102*   HCO3 ART mEQ/L 24 21 23   O2 SAT ART % 98 94 97   BASE EXC ART mEQ/L -1.6 -4.7 -3.0     CBC  Results from last 7 days   Lab Units 01/02/24  0400 01/01/24  0517 12/31/23  0545   WBC K/uL 4.83 3.60* 4.72   RBC M/uL 2.27* 2.31* 2.50*   HEMOGLOBIN g/dL 7.6* 7.6* 8.3*   HEMATOCRIT % 24.0* 24.7* 26.2*   MCV fL 105.7* 106.9* 104.8*   MCH pg 33.5* 32.9 33.2   MCHC g/dL 31.7* 30.8* 31.7*   PLATELETS K/uL 101* 104* 114*   RDW % 16.1* 16.3* 16.2*    MPV fL 10.9 11.7 11.4     BMP  Results from last 7 days   Lab Units 01/02/24  0400 01/01/24  0517 12/31/23  0545   SODIUM mEQ/L 143 145 148*   POTASSIUM mEQ/L 4.0 3.9 3.8   CHLORIDE mEQ/L 113* 115* 116*   CO2 mEQ/L 21 22 21   BUN mg/dL 43* 49* 57*   CREATININE mg/dL 1.5* 1.6* 1.8*   CALCIUM mg/dL 8.2* 7.8* 8.0*   ALBUMIN g/dL 2.2* 2.2* 2.5*   BILIRUBIN TOTAL mg/dL 0.5 0.5 0.6   ALK PHOS IU/L 53 55 57   ALT IU/L 95* 134* 201*   AST IU/L 37 52* 76*   GLUCOSE mg/dL 181* 177* 168*     Coag  Results from last 7 days   Lab Units 01/02/24  0400 01/01/24  0517 12/31/23  0545 12/28/23  2346 12/28/23  1659   PROTIME sec  --   --   --   --  13.7   INR   --   --   --   --  1.1   PTT sec 77* 79* 100*   < > 31    < > = values in this interval not displayed.       Imaging:   X-RAY ABDOMEN 1 VIEW    Result Date: 12/30/2023  IMPRESSION: Weighted feeding tube tip at the level of the proximal stomach.    CT HEAD WITHOUT IV CONTRAST    Result Date: 12/30/2023  IMPRESSION: No acute intracranial abnormality.  Chronic ischemic white matter changes are noted.    X-RAY CHEST 1 VIEW    Result Date: 12/30/2023  IMPRESSION: 1. The endotracheal tube is lower in position situated 2 cm above level of the leo.    IR TUNNELED PICC PLACEMENT    Result Date: 12/29/2023  IMPRESSION: Successful placement of 5 Fr triple lumen tunneled PICC.     ULTRASOUND GUIDED VASCULAR ACCESS    Result Date: 12/29/2023  IMPRESSION: Successful placement of 5 Fr triple lumen tunneled PICC.     ULTRASOUND KIDNEYS BLADDER/NO POST VOID    Result Date: 12/28/2023  IMPRESSION: 1. No sonographic abnormality in the kidneys. 2. Nearly completely collapsed bladder as discussed below. No large intraluminal bladder thrombus/clot as clinically questioned (given the limitations of near complete bladder collapse). COMMENT: Real-time sonographic evaluation of the kidneys and bladder was performed. There is normal cortical echogenicity and corticomedullary differentiation. The  right kidney measures 10.8 x 5.2 x 4.6 cm. The left kidney measures 10.8 x 5.8 x 4.3 cm. There is no hydronephrosis, calculi, masses or perinephric collections. Bladder is nearly completely collapsed, measuring approximately 2.5 x 1.3 x 2.5 cm, for a calculated volume of approximately 5.5 cc, precluding adequate evaluation for calculi or mass. No ureteral jets identified on color Doppler, perhaps due to renal dysfunction and/or hydration status.    X-RAY CHEST 1 VIEW    Result Date: 12/27/2023  IMPRESSION:  Reduced lung volumes. No acute cardiopulmonary process. Stable cardiac enlargement. COMPARISON: Portable chest studies 12/25 and 12/26/2023. COMMENT:  Upright portable imaging completed 0548 hours. Endotracheal tube 4.7 cm above. Enteric tube follows a normal course into left upper quadrant, directed to the left. Mild stable cardiac enlargement. Mitral annular prosthesis again noted. Stable hilar and mediastinal contours. Reduced lung volumes. No definite consolidation or pleural fluid. Unremarkable upper abdomen.    X-RAY CHEST 1 VIEW    Result Date: 12/26/2023  IMPRESSION: Endotracheal tube has been repositioned; the tip is now approximately 3.3 cm above the leo. COMMENT: A semierect AP portable view the chest was performed at 1615 and is compared to a prior study from 1503. Since the prior study, the endotracheal tube has been repositioned and the tip is now approximately 3.3 cm above the leo. There has been no other significant interval change.    X-RAY CHEST 1 VIEW    Result Date: 12/26/2023  IMPRESSION: ET tube 2 cm above leo. NG tube tip in proximal stomach. Probable right lower lobe atelectasis; attention on follow-up.    X-RAY CHEST 1 VIEW    Result Date: 12/26/2023  IMPRESSION: Status post extubation. Slightly improved vascular congestion since earlier in the day. Suspect developing atelectasis or airspace disease in the right midlung zone. COMMENT: Semierect AP portable view of the chest was  performed at 1428 and is compared to a prior study from 5:27 AM. In the interval, the endotracheal tube has been removed. An enteric tube remains in place with the tip in the stomach. There is a vascular catheter/line with the tip projecting near the junction of the IVC and right atrium; it is difficult to determine whether this was present previously but differences in technique. Mild cardiomegaly is again noted. Cardiac valvular prosthesis is again seen. The pulmonary vasculature and interstitial markings have improved slightly since earlier in the day. There is questionable developing airspace disease or atelectasis in the right midlung zone. Trace bilateral pleural effusions are suspected. The hilar and mediastinal structures appear stable. Surgical clips are again seen in the right axilla.    X-RAY CHEST 1 VIEW    Result Date: 12/26/2023  IMPRESSION: ET tube 4 cm above leo, NG tube tip not well seen, likely in proximal stomach. Stable cardiomegaly, mitral valve replacement. Clear lungs, with interstitial crowding secondary to low lung volumes.    X-RAY CHEST 1 VIEW    Result Date: 12/26/2023  IMPRESSION: Improved pulmonary vascular congestion. ET tube 3 cm above leo, NG tube tip below inferior edge of film.    X-RAY CHEST 1 VIEW    Result Date: 12/25/2023  IMPRESSION: Interval retraction of the endotracheal tube now in satisfactory position, as below. Stable satisfactory positioning of the enteric tube. Progressive pulmonary interstitial edema with stable enlargement of the cardiac silhouette. No pneumothorax. COMMENT: Comparison: Chest x-ray 12/24/2023. Technique: A single frontal AP portable upright projection of the chest was obtained. FINDINGS: There has been interval retraction of the endotracheal tube now terminating 2.7 cm above the leo. An enteric tube courses to the stomach with the distal tip collimated from the field-of-view beneath the left hemidiaphragm in satisfactory position. There are  defibrillator pad projecting over the left hemithorax. There are progressively increased interstitial opacities seen projecting over both lungs. There is no pleural effusion or pneumothorax. The cardiac silhouette is stably enlarged. The mediastinal contours are unchanged. Again noted is a prosthetic mitral valve. The upper abdomen is unremarkable. There is no acute osseous abnormality. Surgical clips overlie the right axillary region.     X-RAY CHEST 1 VIEW    Result Date: 12/25/2023  IMPRESSION: Satisfactory positioning of the endotracheal and enteric tubes. No pneumothorax. Stable pulmonary edema and enlargement of the cardiac silhouette. COMMENT: Comparison: Chest x-ray 12/25/2023. Technique: A single frontal AP portable upright projection of the chest was obtained. FINDINGS: The tip of an endotracheal tube terminates 4.0 cm above the leo. An enteric tube courses to the stomach with the distal tip collimated from the field-of-view beneath the left hemidiaphragm in satisfactory position. There are defibrillator pad projecting over the left hemithorax. There are mildly increased interstitial opacities again seen projecting over both lungs. No pleural effusion or pneumothorax is seen. There is stable enlargement of the cardiac silhouette. Again noted is a prosthetic mitral valve. The mediastinal contours are unchanged. The upper abdomen is unremarkable. There is no acute osseous abnormality. There are surgical clips overlying the right axillary region.     X-RAY CHEST 1 VIEW    Result Date: 12/25/2023  IMPRESSION: Low-lying endotracheal tube terminating over the proximal right mainstem bronchus. Recommend retraction. Satisfactory positioning of the enteric tube. This finding and recommendation was discussed with nurse Shoemaker at 11:27 AM on 12/25/2023 by Dr. Martinez by telephone. Mild pulmonary interstitial edema and stable enlargement of the cardiac silhouette. No pneumothorax. COMMENT: Comparison: Chest x-ray  12/24/2023. Technique: A single frontal AP portable upright projection of the chest was obtained. FINDINGS: The tip of the intervally placed endotracheal tube terminates over the proximal right mainstem bronchus. The tip of the enteric tube terminates over the left upper quadrant of the abdomen. There are mildly increased interstitial opacities noted within both lungs. There is no pleural effusion or pneumothorax. There is stable enlargement of the cardiac silhouette. Again noted is a mitral valve prosthesis. Surgical clips overlie the right axilla. The upper abdomen is unremarkable. There is no acute osseous abnormality.    X-RAY CHEST 1 VIEW    Result Date: 12/25/2023  IMPRESSION: Vascular congestion.  Left basal atelectasis. COMMENT: AP radiograph the chest is compared to previous study dated 8/17/2023. There is vascular congestion and small effusions.  Findings may represent mild congestive heart failure.  Cardiac silhouette is unchanged.  Prosthetic valve ring seen.  Surgical staples seen in the right axilla.  There is minimal left basal atelectasis.    CT ANGIOGRAPHY CHEST PULMONARY EMBOLISM WITH IV CONTRAST    Result Date: 12/24/2023  IMPRESSION: 1. No pulmonary embolism in the main or proximal segmental pulmonary arteries. 2. Right upper lobe and left lower lobe infiltrate with patchy airspace opacities in the right middle lobe.      Micro:   Microbiology Results     Procedure Component Value Units Date/Time    Sputum Gram Stain (Lab Only) Tracheal Aspirate [[id number]] Collected: 01/01/24 0937    Specimen: Tracheal Aspirate Updated: 01/01/24 1817     Gram Stain Result 4+ WBC      1+ Epithelial cells      No organisms seen    Sputum Culture (Lab Only) Tracheal Aspirate [[id number]]  (Normal) Collected: 01/01/24 0937    Specimen: Tracheal Aspirate Updated: 01/02/24 0724     Culture No growth at 18-24 hours    Sputum culture / smear Expectorated Sputum [[id number]]  (Abnormal) Collected: 12/27/23 0607     Specimen: Bronch Lavage from Expectorated Sputum Updated: 12/29/23 0723    Narrative:      The following orders were created for panel order Sputum culture / smear Expectorated Sputum.  Procedure                               Abnormality         Status                     ---------                               -----------         ------                     Sputum Gram Stain (Lab O...[[id number]]                      Final result               Sputum Culture (Lab Only...[[id number]]  Abnormal            Final result                 Please view results for these tests on the individual orders.    Sputum Gram Stain (Lab Only) Expectorated Sputum [[id number]] Collected: 12/27/23 1417    Specimen: Bronch Lavage from Expectorated Sputum Updated: 12/27/23 2032     Gram Stain Result 2+ WBC      No Epithelial Cells Seen      No organisms seen    Sputum Culture (Lab Only) Expectorated Sputum [[id number]]  (Abnormal) Collected: 12/27/23 1417    Specimen: Bronch Lavage from Expectorated Sputum Updated: 12/29/23 0723     Culture **Positive Culture**      1+ Yeast, No Further Identification    Sputum culture / smear Expectorated Sputum [[id number]] Collected: 12/25/23 0418    Specimen: Aspirate from Expectorated Sputum Updated: 12/27/23 0845    Narrative:      The following orders were created for panel order Sputum culture / smear Expectorated Sputum.  Procedure                               Abnormality         Status                     ---------                               -----------         ------                     Sputum Gram Stain (Lab O...[[id number]]                      Final result               Sputum Culture (Lab Only...[[id number]]  Normal              Final result                 Please view results for these tests on the individual orders.    Sputum Gram Stain (Lab Only) Expectorated Sputum [[id number]] Collected: 12/25/23 0418    Specimen: Aspirate from Expectorated Sputum Updated: 12/25/23 0956     Gram Stain  Result 3+ WBC      No Epithelial Cells Seen      3+ Gram positive bacilli      2+ Gram positive cocci in pairs, chains and clusters    Sputum Culture (Lab Only) Expectorated Sputum [[id number]]  (Normal) Collected: 12/25/23 0418    Specimen: Aspirate from Expectorated Sputum Updated: 12/27/23 0845     Culture Normal [place]    MRSA Screen, Nares Only Nose [[id number]]  (Normal) Collected: 12/25/23 0352    Specimen: Nasal Swab from Nose Updated: 12/25/23 0906     MRSA DNA, Nares Negative    Blood Culture Blood, Venous [[id number]]  (Normal) Collected: 12/24/23 1652    Specimen: Blood, Venous Updated: 12/29/23 0000     Culture No growth at 96 hours    SARS-CoV-2 (COVID-19) Nasopharynx [[id number]]  (Abnormal) Collected: 12/24/23 1649    Specimen: Nasopharyngeal Swab from Nasopharynx Updated: 12/24/23 1736    Narrative:      The following orders were created for panel order SARS-CoV-2 (COVID-19) Nasopharynx.  Procedure                               Abnormality         Status                     ---------                               -----------         ------                     SARS-COV-2 (COVID-19)/ F...[[id number]]  Abnormal            Final result                 Please view results for these tests on the individual orders.    SARS-COV-2 (COVID-19)/ FLU A/B, AND RSV, PCR Nasopharynx [[id number]]  (Abnormal) Collected: 12/24/23 1649    Specimen: Nasopharyngeal Swab from Nasopharynx Updated: 12/24/23 1736     SARS-CoV-2 (COVID-19) Negative     Influenza A Positive     Influenza B Negative     Respiratory Syncytial Virus Negative    Narrative:      Testing performed using real-time PCR for detection of COVID-19. EUA approved validation studies performed on site.     Blood Culture Blood, Venous [[id number]]  (Normal) Collected: 12/24/23 1647    Specimen: Blood, Venous Updated: 12/29/23 0100     Culture No growth at 96 hours          ECG/Telemetry  I have independently reviewed the telemetry. No events for the last 24 hours.          ASSESSMENT    73 y.o. male with history atrial fibrillation, CHF, prostate and breast CA, history of GI bleed who presented to the emergency room with shortness of breath and found to be influenza A positive. During day 2 of his hospital stay he had a v fib arrest with ROSC. He was transferred to the ICU for further monitoring     PLAN   # Acute Hypoxic respiratory failure  - Failed Bipap on admission, became bradycardic and was intubated  - Extubated 12/26, however became hypoxic, ?Secondary to upper airway edema vs acute bronchospasm vs flash pulmonary edema  - Treated with lasix 40 mg and solumedrol q 6  - Ultimately required re-intubation 12/26. Anesthesia noted some swelling around cords  - S/p bronchoscopy post re-intubation 12/27 which noted patient 'biting on tube'  - Initially passed SBT 12/30, was extubated to nasal cannula, failed HF and BIPAP and ultimately required re-intubation in the setting of hypoxia and poor mental status  - Completed steroids  - Sedation stopped this morning. Continues with lethargy and LUE weakness. Will obtain MRI brain today  - He is volume overloaded, with resolving ELEANOR.    Received Lasix 40 mg IV yesterday.  Will dose with lasix 40 mg BID today  - Cont PS trials     # Shock  - Improved, remains off of levophed,  maintain MAP > 65     # S/p V fib cardiac arrest  - Likely related to Takosubo's cardiomyopthy, EF 40-45%, now improved to 60-65%  - ROSC achieved after receiving 3 epi, 3 shocks, 2 bicarb, and Magnesium replacement  - Following commands post-arrest. No TTM  - Taken by interventional cardiology for the placement of a temporary pacemaker  - Also noted no significant CAD  - PPM removed secondary to dyssynchrony and misplacement. No further marcellus arrythmias, removed 12/27  - Per cardiology. if patient has further episodes of polymorphic ventricular tachycardia, resume IV lidocaine  - Mental status remains poor; possibly delirium vs 2/2 sedatives vs anoxic  encephalopathy. Sedation off this morning and continues with poor neuro exam. Will obtain MRI brain today  - CTH 12/30 negative. Will minimize sedation to allow for adequate neurological exam     # Takotsubo Cardiomyopathy  - TTE 12/26 suggestive of Takotsubos with EF 40-45%, confirmed by LV gram   - Off milrinone 12/29  - Repeat TTE today with EF 65%  - Cardiology following     # Acute kidney injury  - Renal function slowly improving. Initial creatinine 1.2, peaked 2.3, now stable at 1.5  - Baseline creatinine appears to be 1.2-1.4  - ?Secondary to IV dye load s/p cardiac cath vs over-diuresis   - Monitor BMP  - Cr trending down  - Dosing with lasix today as above     # Elevated liver enzymes  - Likely in setting of hypotension, shock liver  - Serial labs, trending down.  - Monitor CMP     # Pneumonia  - CT Chest 12/24 with left lower lobe infiltrate and right upper lobe infiltrate  - Blood/ sputum cultures negative   - Completed course of zosyn 12/31.   - Febrile again last night. Tmax 101.2F, repeat Bcx, sputum cx pending  - ID following     # Afib  - AF on telemetry, rate controlled  - EP following  - Continue IV heparin  - Continue amiodarone     # Influenza A  - Completed 5 day course Oseltamivir      # Prostate/breast cancer  - S/p radiation therapy for prostate cancer spring 2023  - S/p R mastectomy 8/2023 and radiation therapy 9/2023  - Continue tamoxifen        VTE Prophylaxis Plan: SCDs, SQH  GI Prophylaxis Plan: Protonix  Lines/Drains/Airway: R PICC (12/28), DHT, jones, FMS, ETT  Fluids/Electrolytes/Nutrition: TF     Disposition Planning: Remain in the ICU    CODE STATUS  Full Code       The case was reviewed this morning at the patient's beside multidisciplinary rounds with the patient's nurse, dietician, pharmacist, respiratory therapist, physical therapist and critical care nurse coordinator. The patient's clinical status with regards to diagnosis, treatment plans including disposition of any IV,  arterial lines, Lloyd and tubes were discussed, as well as dietary, respiratory therapy and mobilization needs.     This case was discussed with consultants.    Total critical time spent managing acute hypoxemic respiratory failure, cardiac arrest, encephalopathy, totals 35 minutes.    This note was scribed by JONNY Rodarte in my presence on my behalf.       Megan Rico, DO  1/2/2024  12:28 PM

## 2024-01-02 NOTE — PROGRESS NOTES
"    Daily Progress Note (LOS: 9)    Interval History:   Patient did well overnight. Remains intubated and sedated. RN denies any overnight events. Denies any new CV symptoms including but not limited to any new chest pain at rest or with exertion. He has no new shortness of breath. He has no new palpitations. He has had no syncope or near-syncope. He has been compliant with all medical therapy listed below without new side effects or intolerances.     Review of Systems:  All organ systems normal except as per HPI.    Current Medications:  • amiodarone  200 mg feeding tube Daily   • atorvastatin  10 mg oral Nightly   • cetirizine  10 mg feeding tube Nightly   • chlorhexidine  15 mL Mouth/Throat 2 times per day   • cholecalciferol (vitamin D3)  1,000 Units feeding tube Daily   • [Provider Managed Hold] furosemide  40 mg intravenous q12h GISELLE   • furosemide  40 mg intravenous Once   • guaiFENesin  400 mg oral q6h GISELLE   • insulin lispro U-100  1-10 Units subcutaneous q6h GISELLE   • ipratropium HFA  2 puff inhalation QID (8a, 12p, 4p, 8p)   • metoprolol tartrate  25 mg feeding tube BID   • pantoprazole  40 mg intravenous q12h GISELLE   • silver sulfadiazine   Topical BID   • sodium chloride  10 mL intravenous q8h INT   • tamoxifen  20 mg feeding tube Daily     Physical Exam:  Constitutional:   Visit Vitals  BP (!) 170/59   Pulse (!) 59   Temp 37.4 °C (99.3 °F) (Oral)   Resp 20   Ht 1.651 m (5' 5\")   Wt 93.4 kg (206 lb)   SpO2 99%   BMI 34.28 kg/m²     General appearance: Remains intubated and sedated  HEENT: PERRLA, EOM's intact. Neck supple. No JVD noted.   Lungs: Clear to auscultation bilaterally, with no rales or wheezing.  Heart: S1, S2 normal, no murmur, click, rub or gallop, regular rate and rhythm, no JVD noted.  Abdomen: Soft, non-tender; bowel sounds normal; no masses. No rebound or guarding.  Lymph: No adenopathy noted.  Extremities: Peripheral pulses intact x 4.  No lower extremity edema noted.  Skin: Skin color, " texture, turgor normal. No rashes or lesions  Neurologic: Remains intubated and sedated.  Psych: Remains intubated and sedated.    I&Os:    Intake/Output Summary (Last 24 hours) at 1/2/2024 0952  Last data filed at 1/2/2024 0900  Gross per 24 hour   Intake 3012.34 ml   Output 2545 ml   Net 467.34 ml     Weights:   Wt Readings from Last 3 Encounters:   01/02/24 93.4 kg (206 lb)   12/07/23 80.3 kg (177 lb)   11/27/23 80.3 kg (177 lb)       Labs:  Results from last 7 days   Lab Units 01/02/24  0400 01/01/24  0517 12/31/23  0545 12/29/23  0422 12/28/23  0426 12/27/23  0519   SODIUM mEQ/L 143 145 148*   < > 149* 149*   POTASSIUM mEQ/L 4.0 3.9 3.8   < > 3.3* 3.8   CHLORIDE mEQ/L 113* 115* 116*   < > 114* 117*   CO2 mEQ/L 21 22 21   < > 24 23   BUN mg/dL 43* 49* 57*   < > 40* 28*   CREATININE mg/dL 1.5* 1.6* 1.8*   < > 2.2* 1.9*   GLUCOSE mg/dL 181* 177* 168*   < > 201* 138*   CALCIUM mg/dL 8.2* 7.8* 8.0*   < > 7.8* 7.5*   MAGNESIUM mg/dL  --   --   --   --  2.0 2.0    < > = values in this interval not displayed.             Results from last 7 days   Lab Units 01/02/24  0400 01/01/24  0517 12/31/23  0545   WBC K/uL 4.83 3.60* 4.72   HEMOGLOBIN g/dL 7.6* 7.6* 8.3*   HEMATOCRIT % 24.0* 24.7* 26.2*   PLATELETS K/uL 101* 104* 114*     Lab Results   Component Value Date    BNP 1,135 (H) 12/24/2023     Lab Results   Component Value Date    INR 1.1 12/28/2023    INR 1.4 12/25/2023    INR 1.9 12/24/2023     Data Review:  Telemetry Review: SR and SB with significant ST depressions    Plan:   1.  VF arrest / Takatsubo CM / Abnormal EKG / QT prolongation:  - Thought to be secondary toTakotsubo syndrome with torsade de points.    - QT interval remains prolonged with deep T wave inversions.    - He remains at risk of further episodes of polymorphic ventricular tachycardia.  If he has any further issues, will resume IV lidocaine.    - Echo today.     2.  Hypoxemia:  - S/p recent unsuccessful extubation trial.     3.  Atrial  fibrillation:  - Remains in sinus rhythm.  - Continue on oral/nasogastric amiodarone and IV heparin.     4.  Hypertension:  - At goal on metoprolol.    5. CHF:  - Undergoing diuresis. Add 40 IV BID today.     35 min CC time.

## 2024-01-03 ENCOUNTER — APPOINTMENT (INPATIENT)
Dept: RADIOLOGY | Facility: HOSPITAL | Age: 74
DRG: 004 | End: 2024-01-03
Payer: MEDICARE

## 2024-01-03 ENCOUNTER — ANESTHESIA EVENT (INPATIENT)
Dept: OPERATING ROOM | Facility: HOSPITAL | Age: 74
DRG: 004 | End: 2024-01-03
Payer: MEDICARE

## 2024-01-03 ENCOUNTER — APPOINTMENT (INPATIENT)
Dept: RADIOLOGY | Facility: HOSPITAL | Age: 74
DRG: 004 | End: 2024-01-03
Attending: HOSPITALIST
Payer: MEDICARE

## 2024-01-03 PROBLEM — I46.9 CARDIAC ARREST (CMS/HCC): Status: ACTIVE | Noted: 2023-12-24

## 2024-01-03 PROBLEM — E44.0 MODERATE PROTEIN-CALORIE MALNUTRITION (CMS/HCC): Status: ACTIVE | Noted: 2023-12-24

## 2024-01-03 PROBLEM — J10.1 INFLUENZA A: Status: ACTIVE | Noted: 2023-12-24

## 2024-01-03 LAB
ALBUMIN SERPL-MCNC: 2.3 G/DL (ref 3.5–5.7)
ALP SERPL-CCNC: 59 IU/L (ref 34–125)
ALT SERPL-CCNC: 74 IU/L (ref 7–52)
ANION GAP SERPL CALC-SCNC: 7 MEQ/L (ref 3–15)
APTT PPP: 74 SEC (ref 23–35)
AST SERPL-CCNC: 34 IU/L (ref 13–39)
ATRIAL RATE: 56
BASOPHILS # BLD: 0.01 K/UL (ref 0.01–0.1)
BASOPHILS NFR BLD: 0.2 %
BILIRUB SERPL-MCNC: 0.5 MG/DL (ref 0.3–1.2)
BUN SERPL-MCNC: 37 MG/DL (ref 7–25)
CALCIUM SERPL-MCNC: 7.6 MG/DL (ref 8.6–10.3)
CHLORIDE SERPL-SCNC: 110 MEQ/L (ref 98–107)
CO2 SERPL-SCNC: 24 MEQ/L (ref 21–31)
CREAT SERPL-MCNC: 1.4 MG/DL (ref 0.7–1.3)
DIFFERENTIAL METHOD BLD: ABNORMAL
EGFRCR SERPLBLD CKD-EPI 2021: 53.1 ML/MIN/1.73M*2
EOSINOPHIL # BLD: 0.08 K/UL (ref 0.04–0.54)
EOSINOPHIL NFR BLD: 1.3 %
ERYTHROCYTE [DISTWIDTH] IN BLOOD BY AUTOMATED COUNT: 15.8 % (ref 11.6–14.4)
GLUCOSE BLD-MCNC: 135 MG/DL (ref 70–99)
GLUCOSE BLD-MCNC: 164 MG/DL (ref 70–99)
GLUCOSE BLD-MCNC: 174 MG/DL (ref 70–99)
GLUCOSE SERPL-MCNC: 159 MG/DL (ref 70–99)
HCT VFR BLDCO AUTO: 25 % (ref 40.1–51)
HGB BLD-MCNC: 7.8 G/DL (ref 13.7–17.5)
IMM GRANULOCYTES # BLD AUTO: 0.09 K/UL (ref 0–0.08)
IMM GRANULOCYTES NFR BLD AUTO: 1.5 %
LYMPHOCYTES # BLD: 0.45 K/UL (ref 1.2–3.5)
LYMPHOCYTES NFR BLD: 7.3 %
MAGNESIUM SERPL-MCNC: 1.8 MG/DL (ref 1.8–2.5)
MCH RBC QN AUTO: 32.6 PG (ref 28–33.2)
MCHC RBC AUTO-ENTMCNC: 31.2 G/DL (ref 32.2–36.5)
MCV RBC AUTO: 104.6 FL (ref 83–98)
MICROORGANISM SPEC CULT: ABNORMAL
MONOCYTES # BLD: 0.49 K/UL (ref 0.3–1)
MONOCYTES NFR BLD: 7.9 %
NEUTROPHILS # BLD: 5.06 K/UL (ref 1.7–7)
NEUTS SEG NFR BLD: 81.8 %
NRBC BLD-RTO: 0.5 %
PDW BLD AUTO: 11.7 FL (ref 9.4–12.4)
PLATELET # BLD AUTO: 125 K/UL (ref 150–350)
POCT TEST: ABNORMAL
POTASSIUM SERPL-SCNC: 3.8 MEQ/L (ref 3.5–5.1)
PROT SERPL-MCNC: 4.6 G/DL (ref 6–8.2)
QRS DURATION: 124
QT INTERVAL: 526
QTC CALCULATION(BAZETT): 507
R AXIS: -16
RBC # BLD AUTO: 2.39 M/UL (ref 4.5–5.8)
SODIUM SERPL-SCNC: 141 MEQ/L (ref 136–145)
T WAVE AXIS: 214
VENTRICULAR RATE: 56
WBC # BLD AUTO: 6.18 K/UL (ref 3.8–10.5)

## 2024-01-03 PROCEDURE — 63600000 HC DRUGS/DETAIL CODE: Mod: JZ | Performed by: NURSE PRACTITIONER

## 2024-01-03 PROCEDURE — 63700000 HC SELF-ADMINISTRABLE DRUG: Performed by: HOSPITALIST

## 2024-01-03 PROCEDURE — 63700000 HC SELF-ADMINISTRABLE DRUG: Performed by: INTERNAL MEDICINE

## 2024-01-03 PROCEDURE — 93971 EXTREMITY STUDY: CPT | Mod: LT

## 2024-01-03 PROCEDURE — 94640 AIRWAY INHALATION TREATMENT: CPT

## 2024-01-03 PROCEDURE — 63600000 HC DRUGS/DETAIL CODE: Mod: JZ | Performed by: HOSPITALIST

## 2024-01-03 PROCEDURE — 25800000 HC PHARMACY IV SOLUTIONS: Performed by: INTERNAL MEDICINE

## 2024-01-03 PROCEDURE — 63600000 HC DRUGS/DETAIL CODE: Mod: JZ | Performed by: INTERNAL MEDICINE

## 2024-01-03 PROCEDURE — P9045 ALBUMIN (HUMAN), 5%, 250 ML: HCPCS | Mod: JZ | Performed by: HOSPITALIST

## 2024-01-03 PROCEDURE — 74018 RADEX ABDOMEN 1 VIEW: CPT

## 2024-01-03 PROCEDURE — 85025 COMPLETE CBC W/AUTO DIFF WBC: CPT | Performed by: NURSE PRACTITIONER

## 2024-01-03 PROCEDURE — 25000000 HC PHARMACY GENERAL: Performed by: PHYSICIAN ASSISTANT

## 2024-01-03 PROCEDURE — 25800000 HC PHARMACY IV SOLUTIONS: Performed by: PHYSICIAN ASSISTANT

## 2024-01-03 PROCEDURE — 85730 THROMBOPLASTIN TIME PARTIAL: CPT | Performed by: HOSPITALIST

## 2024-01-03 PROCEDURE — 94003 VENT MGMT INPAT SUBQ DAY: CPT

## 2024-01-03 PROCEDURE — 93971 EXTREMITY STUDY: CPT | Mod: RT

## 2024-01-03 PROCEDURE — 25000000 HC PHARMACY GENERAL: Performed by: HOSPITALIST

## 2024-01-03 PROCEDURE — 80053 COMPREHEN METABOLIC PANEL: CPT | Performed by: NURSE PRACTITIONER

## 2024-01-03 PROCEDURE — 63600000 HC DRUGS/DETAIL CODE: Mod: JW | Performed by: NURSE PRACTITIONER

## 2024-01-03 PROCEDURE — 36415 COLL VENOUS BLD VENIPUNCTURE: CPT | Performed by: HOSPITALIST

## 2024-01-03 PROCEDURE — 20000000 HC ROOM AND CARE ICU

## 2024-01-03 PROCEDURE — 99232 SBSQ HOSP IP/OBS MODERATE 35: CPT | Performed by: SURGERY

## 2024-01-03 PROCEDURE — 83735 ASSAY OF MAGNESIUM: CPT | Performed by: HOSPITALIST

## 2024-01-03 RX ORDER — ALBUMIN HUMAN 50 G/1000ML
25 SOLUTION INTRAVENOUS ONCE
Status: COMPLETED | OUTPATIENT
Start: 2024-01-03 | End: 2024-01-03

## 2024-01-03 RX ORDER — FENTANYL CITRATE 50 UG/ML
50 INJECTION, SOLUTION INTRAMUSCULAR; INTRAVENOUS EVERY 4 HOURS PRN
Status: DISCONTINUED | OUTPATIENT
Start: 2024-01-03 | End: 2024-01-10 | Stop reason: HOSPADM

## 2024-01-03 RX ORDER — SODIUM BICARBONATE 1 MEQ/ML
SYRINGE (ML) INTRAVENOUS
Status: DISCONTINUED
Start: 2024-01-03 | End: 2024-01-03 | Stop reason: WASHOUT

## 2024-01-03 RX ORDER — FUROSEMIDE 10 MG/ML
60 INJECTION INTRAMUSCULAR; INTRAVENOUS
Status: DISCONTINUED | OUTPATIENT
Start: 2024-01-03 | End: 2024-01-05

## 2024-01-03 RX ADMIN — PANTOPRAZOLE SODIUM 40 MG: 40 INJECTION, POWDER, LYOPHILIZED, FOR SOLUTION INTRAVENOUS at 20:35

## 2024-01-03 RX ADMIN — CHLORHEXIDINE GLUCONATE ORAL RINSE 15 ML: 1.2 SOLUTION DENTAL at 09:42

## 2024-01-03 RX ADMIN — Medication 400 MG: at 19:51

## 2024-01-03 RX ADMIN — FENTANYL CITRATE 50 MCG: 0.05 INJECTION, SOLUTION INTRAMUSCULAR; INTRAVENOUS at 20:36

## 2024-01-03 RX ADMIN — GUAIFENESIN 400 MG: 100 SOLUTION ORAL at 12:10

## 2024-01-03 RX ADMIN — IPRATROPIUM BROMIDE 2 PUFF: 17 AEROSOL, METERED RESPIRATORY (INHALATION) at 15:18

## 2024-01-03 RX ADMIN — PANTOPRAZOLE SODIUM 40 MG: 40 INJECTION, POWDER, LYOPHILIZED, FOR SOLUTION INTRAVENOUS at 08:27

## 2024-01-03 RX ADMIN — SILVER SULFADIAZINE: 10 CREAM TOPICAL at 08:31

## 2024-01-03 RX ADMIN — Medication 10 MG: at 21:30

## 2024-01-03 RX ADMIN — Medication 10 ML: at 11:21

## 2024-01-03 RX ADMIN — CHLORHEXIDINE GLUCONATE ORAL RINSE 15 ML: 1.2 SOLUTION DENTAL at 21:28

## 2024-01-03 RX ADMIN — FUROSEMIDE 40 MG: 10 INJECTION, SOLUTION INTRAMUSCULAR; INTRAVENOUS at 08:27

## 2024-01-03 RX ADMIN — GUAIFENESIN 400 MG: 100 SOLUTION ORAL at 18:30

## 2024-01-03 RX ADMIN — ALBUMIN (HUMAN) 25 G: 12.5 SOLUTION INTRAVENOUS at 21:17

## 2024-01-03 RX ADMIN — INSULIN LISPRO 1 UNITS: 100 INJECTION, SOLUTION INTRAVENOUS; SUBCUTANEOUS at 12:09

## 2024-01-03 RX ADMIN — HEPARIN SODIUM 900 UNITS/HR: 10000 INJECTION, SOLUTION INTRAVENOUS at 02:03

## 2024-01-03 RX ADMIN — FUROSEMIDE 60 MG: 10 INJECTION, SOLUTION INTRAMUSCULAR; INTRAVENOUS at 16:18

## 2024-01-03 RX ADMIN — VANCOMYCIN HYDROCHLORIDE 1750 MG: 1 INJECTION, POWDER, LYOPHILIZED, FOR SOLUTION INTRAVENOUS at 10:15

## 2024-01-03 RX ADMIN — IPRATROPIUM BROMIDE 2 PUFF: 17 AEROSOL, METERED RESPIRATORY (INHALATION) at 08:13

## 2024-01-03 RX ADMIN — CEFTAZIDIME 2 G: 2 INJECTION, POWDER, FOR SOLUTION INTRAVENOUS at 20:34

## 2024-01-03 RX ADMIN — GUAIFENESIN 400 MG: 100 SOLUTION ORAL at 05:58

## 2024-01-03 RX ADMIN — IPRATROPIUM BROMIDE 2 PUFF: 17 AEROSOL, METERED RESPIRATORY (INHALATION) at 11:23

## 2024-01-03 RX ADMIN — Medication 400 MG: at 08:28

## 2024-01-03 RX ADMIN — INSULIN LISPRO 1 UNITS: 100 INJECTION, SOLUTION INTRAVENOUS; SUBCUTANEOUS at 18:29

## 2024-01-03 RX ADMIN — IPRATROPIUM BROMIDE 2 PUFF: 17 AEROSOL, METERED RESPIRATORY (INHALATION) at 20:01

## 2024-01-03 RX ADMIN — METOPROLOL TARTRATE 25 MG: 25 TABLET, FILM COATED ORAL at 08:28

## 2024-01-03 RX ADMIN — TAMOXIFEN CITRATE 20 MG: 10 TABLET, FILM COATED ORAL at 08:30

## 2024-01-03 RX ADMIN — DEXMEDETOMIDINE 0.2 MCG/KG/HR: 200 INJECTION, SOLUTION INTRAVENOUS at 10:26

## 2024-01-03 RX ADMIN — ATORVASTATIN CALCIUM 10 MG: 10 TABLET, FILM COATED ORAL at 21:22

## 2024-01-03 RX ADMIN — Medication 10 ML: at 18:37

## 2024-01-03 RX ADMIN — METOPROLOL TARTRATE 25 MG: 25 TABLET, FILM COATED ORAL at 19:51

## 2024-01-03 RX ADMIN — SILVER SULFADIAZINE: 10 CREAM TOPICAL at 19:52

## 2024-01-03 RX ADMIN — AMIODARONE HYDROCHLORIDE 200 MG: 200 TABLET ORAL at 08:28

## 2024-01-03 RX ADMIN — CHOLECALCIFEROL TAB 25 MCG (1000 UNIT) 1000 UNITS: 25 TAB at 08:28

## 2024-01-03 RX ADMIN — Medication 10 ML: at 03:06

## 2024-01-03 RX ADMIN — CEFTAZIDIME 2 G: 2 INJECTION, POWDER, FOR SOLUTION INTRAVENOUS at 09:40

## 2024-01-03 RX ADMIN — ACETAMINOPHEN 650 MG: 650 SOLUTION ORAL at 12:10

## 2024-01-03 RX ADMIN — ACETAMINOPHEN 650 MG: 650 SOLUTION ORAL at 19:51

## 2024-01-03 RX ADMIN — VANCOMYCIN HYDROCHLORIDE 750 MG: 750 INJECTION, POWDER, LYOPHILIZED, FOR SOLUTION INTRAVENOUS at 22:27

## 2024-01-03 ASSESSMENT — COGNITIVE AND FUNCTIONAL STATUS - GENERAL
STANDING UP FROM CHAIR USING ARMS: 1 - TOTAL
MOVING TO AND FROM BED TO CHAIR: 1 - TOTAL
WALKING IN HOSPITAL ROOM: 1 - TOTAL
CLIMB 3 TO 5 STEPS WITH RAILING: 1 - TOTAL
WALKING IN HOSPITAL ROOM: 1 - TOTAL
MOVING TO AND FROM BED TO CHAIR: 1 - TOTAL
CLIMB 3 TO 5 STEPS WITH RAILING: 1 - TOTAL
STANDING UP FROM CHAIR USING ARMS: 1 - TOTAL

## 2024-01-03 NOTE — PLAN OF CARE
Problem: Adult Inpatient Plan of Care  Goal: Plan of Care Review  Outcome: Progressing  Flowsheets (Taken 1/3/2024 9625)  Progress: no change  Outcome Evaluation: Pt extremely dysynchronous with vent, sedation restarted. BPs labile. Heparin gtt infusing, PTT therapuetic. Hematuria resolved, good UO overnight. Tmax 100.9 tylenol given once. New DHT inserted, xray obtained with confirmation by provider - TF restarted.  Plan of Care Reviewed With: other (see comments)

## 2024-01-03 NOTE — CONSULTS
Subjective     Cam Rosas is a 73 y.o. male who was admitted for Influenza A [J10.1]  Severe sepsis (CMS/HCC) [A41.9, R65.20]  Acute on chronic congestive heart failure, unspecified heart failure type (CMS/HCC) [I50.9]. Patient was referred by arabella for management recommendations. Patient is 72 yo with inf a and resp fail failling extubation several times    Pertinent radiology results reviewed..    Medical History:   Past Medical History:   Diagnosis Date   • Acute systolic heart failure (CMS/HCC) 02/15/2023   • Ambulates with cane     and walker   • Atrial fibrillation (CMS/HCC)    • Breast cancer (CMS/HCC) October 2022   • CHF (congestive heart failure) (CMS/HCC)    • Chronic kidney disease    • CKD (chronic kidney disease) 10/19/2022   • History of radiation therapy    • Hx antineoplastic chemo    • Prostate cancer (CMS/HCC)        Surgical History:   Past Surgical History:   Procedure Laterality Date   • CARDIAC SURGERY      mitral valve repair 2006   • CARDIOVERSION  12/05/2022    Successful DC cardioversion   • KNEE CARTILAGE SURGERY Left    • MASTECTOMY     • PROSTATE SURGERY  July 2022   • PROSTATECTOMY  07/15/2022   • TONSILLECTOMY         Social History:   Social History     Social History Narrative   • Not on file       Family History:   Family History   Problem Relation Age of Onset   • Hyperlipidemia Biological Mother    • Hypertension Biological Mother    • Breast cancer Biological Mother    • Cancer Biological Mother         gyn? cervical   • Hyperlipidemia Biological Father    • Hypertension Biological Father    • Arthritis Biological Father    • No Known Problems Biological Sister    • No Known Problems Biological Brother    • Heart disease Maternal Grandmother    • Arthritis Maternal Grandfather    • COPD Maternal Grandfather    • Diabetes Maternal Grandfather    • No Known Problems Paternal Grandmother    • No Known Problems Paternal Grandfather    • Cancer less than or equal to age 50 Other     • Esophageal cancer Other         47, in abd,chemo q 3 weeks    Reviewed and non contributory to case.    Allergies: Azithromycin, Prednisone, and Lorazepam    Current Facility-Administered Medications   Medication Dose Route Frequency Provider Last Rate Last Admin   • acetaminophen (TYLENOL) 650 mg/20.3 mL solution 650 mg  650 mg feeding tube q4h PRN Angie Almendarez MD   650 mg at 01/02/24 2349   • albuterol nebulizer solution 2.5 mg  2.5 mg nebulization q4h PRN Ishaan Juares PA C       • amiodarone (PACERONE) tablet 200 mg  200 mg feeding tube Daily Angie Almendarez MD   200 mg at 01/03/24 0828   • atorvastatin (LIPITOR) tablet 10 mg  10 mg oral Nightly Angie Almendarez MD   10 mg at 01/02/24 2210   • atropine injection 1 mg  1 mg intravenous Once PRN Angie Almendarez MD       • betamethasone valerate (VALISONE) 0.1 % ointment   Topical 2x daily PRN Angie Almendarez MD       • calcium gluconate 1 gram/50 mL IVPB in NSS 1 g  1 g intravenous q6h PRN Angie Almendarez MD   Stopped at 12/28/23 0543   • cefTAZidime (FORTAZ) IVPB 2 g/100 mL NSS vial in bag  2 g intravenous q12h INT Megan Rico DO   Stopped at 01/03/24 1010   • cetirizine (ZyrTEC) 1 mg/mL solution 10 mg  10 mg feeding tube Nightly Angie Almendarez MD   10 mg at 01/02/24 2209   • chlorhexidine (PERIDEX) 0.12 % mouthwash 15 mL  15 mL Mouth/Throat 2 times per day Merced Mcconnell CRNP   15 mL at 01/03/24 0942   • cholecalciferol (vitamin D3) tablet 1,000 Units  1,000 Units feeding tube Daily Angie Almendarez MD   1,000 Units at 01/03/24 0828   • dexmedeTOMIDine in 0.9 % NaCL (PRECEDEX) 400 mcg/100 mL (4 mcg/mL) infusion  0-1 mcg/kg/hr intravenous Titrated Jennifer Canales PA C 4.6 mL/hr at 01/03/24 1100 0.2 mcg/kg/hr at 01/03/24 1100   • glucose chewable tablet 16-32 g of dextrose  16-32 g of dextrose oral PRN Angie Almendarez MD        Or   • dextrose 40 % oral gel 15-30 g of dextrose   15-30 g of dextrose oral PRN Angie Almendarez MD        Or   • glucagon (GLUCAGEN) injection 1 mg  1 mg intramuscular PRN Angie Alemndarez MD        Or   • dextrose 50 % in water (D50) injection 12.5 g  25 mL intravenous PRN Angie Almendarez MD       • furosemide (LASIX) injection 60 mg  60 mg intravenous BID (am, 4p) Ceci Jaeger CRNP       • guaiFENesin (ROBITUSSIN) 100 mg/5 mL liquid 400 mg  400 mg oral q6h [facility] Angie Almendarez MD   400 mg at 01/03/24 0558   • heparin (porcine) bolus from bag 2,900-5,800 Units  40-80 Units/kg (Adjusted) intravenous q6h PRN Kyleigh Cool CRNP       • heparin infusion in D5W 100 units/mL  100-4,000 Units/hr intravenous Titrated Kyleigh Cool CRNP 9 mL/hr at 01/03/24 1100 900 Units/hr at 01/03/24 1100   • hydrALAZINE (APRESOLINE) injection 5 mg  5 mg intravenous q6h PRN Merced Mcconnell CRNP   5 mg at 12/31/23 0921   • insulin lispro U-100 (HumaLOG) pen 1-10 Units  1-10 Units subcutaneous q6h [facility] Santhosh Diaz MD   1 Units at 01/02/24 2337   • ipratropium HFA (ATROVENT HFA) 17 mcg/actuation inhaler 2 puff  2 puff inhalation QID (8a, 12p, 4p, 8p) Angie Almendarez MD   2 puff at 01/03/24 1123   • magnesium oxide (MAG-OX) tablet 400 mg  400 mg Nasogastric BID Gonzalo Shelton MD   400 mg at 01/03/24 0828   • magnesium sulfate IVPB 2g in 50 mL NSS/D5W/SWFI  2 g intravenous PRN Angie Almendarez MD   Stopped at 12/25/23 0828   • metoclopramide (REGLAN) injection 10 mg  10 mg intravenous q6h PRN Angie Almendarez MD       • metoprolol tartrate (LOPRESSOR) tablet 25 mg  25 mg feeding tube BID Orestes Chapman MD   25 mg at 01/03/24 0828   • pantoprazole (PROTONIX) injection 40 mg  40 mg intravenous q12h GISELLE Angie Almendarez MD   40 mg at 01/03/24 0827   • potassium chloride (KLOR-CON M) ER tablet (particles/crystals) 20 mEq  20 mEq oral PRN Angie Almendarez MD       • potassium chloride (KLOR-CON M) ER tablet  (particles/crystals) 40 mEq  40 mEq oral PRN Angie Almendarez MD   40 mEq at 12/28/23 0622   • potassium chloride 20 mEq in 100 mL IVPB  (premix)  20 mEq intravenous PRN Angie Almendarez MD   Stopped at 12/29/23 0900   • potassium chloride 20 mEq in 100 mL IVPB  (premix)  20 mEq intravenous PRN Angie Almendarez MD        And   • potassium chloride 20 mEq in 100 mL IVPB  (premix)  20 mEq intravenous PRN Angie Almendarez MD   Stopped at 12/29/23 1229   • silver sulfadiazine (SILVADENE) 1 % cream   Topical BID Angie Almendarez MD   Given at 01/03/24 0831   • sodium chloride flush 10 mL  10 mL intravenous q8h INT Merced Mcconnell CRNP   10 mL at 01/03/24 1121   • sodium chloride flush 10 mL  10 mL intravenous PRN Merced Mcconnell CRNP       • tamoxifen (NOLVADEX) tablet 20 mg  20 mg feeding tube Daily Angie Almendarez MD   20 mg at 01/03/24 0830   • vancomycin (VANCOCIN) 1,750 mg in sodium chloride 0.9 % 500 mL IVPB  20 mg/kg intravenous Once Megan Rico  mL/hr at 01/03/24 1015 1,750 mg at 01/03/24 1015   • vancomycin 750 mg/250 mL IVPB in NSS  750 mg intravenous q12h INT Megan Rico DO            Medication List      ASK your doctor about these medications    amiodarone 200 mg tablet  Commonly known as: PACERONE  Take 1 tablet (200 mg total) by mouth daily.  Dose: 200 mg     betamethasone dipropionate 0.05 % cream  Apply topically as needed.     cetirizine 10 mg tablet  Commonly known as: ZyrTEC  Take 10 mg by mouth nightly.  Dose: 10 mg     cholecalciferol (vitamin D3) 1,000 unit (25 mcg) tablet  Take 1,000 Units by mouth daily.  Dose: 1,000 Units     guaiFENesin 600 mg 12 hr tablet  Commonly known as: MUCINEX  Take 1,200 mg by mouth 2 (two) times a day.  Dose: 1,200 mg     metoprolol succinate XL 50 mg 24 hr tablet  Commonly known as: TOPROL-XL  Take 1 tablet (50 mg total) by mouth daily.  Dose: 50 mg     pantoprazole 40 mg EC tablet  Commonly known as:  "PROTONIX  Take 1 tablet (40 mg total) by mouth 2 (two) times a day.  Dose: 40 mg     potassium chloride 20 mEq CR tablet  Commonly known as: KLOR-CON M  Take 1 tablet (20 mEq) daily when you take as needed  torsemide.     rivaroxaban 20 mg tablet  Commonly known as: XARELTO  Take 1 tablet (20 mg total) by mouth daily with dinner.  Dose: 20 mg     silver sulfadiazine 1 % cream  Commonly known as: SILVADENE  Apply topically 2 (two) times a day. Apply to affected area.     simvastatin 20 mg tablet  Commonly known as: ZOCOR  Take 1 tablet (20 mg total) by mouth nightly.  Dose: 20 mg     tamoxifen 20 mg chemo tablet  Commonly known as: NOLVADEX  Take  1 tablet (20 mg total) daily  Dose: 20 mg     torsemide 20 mg tablet  Commonly known as: DEMADEX  Take 1 tablet (20 mg total) by mouth daily as needed (for weight gain 3 lbs overnight, 5 lbs in a week, lower extremity edema).  Dose: 20 mg     TYLENOL EX STR RAPID RELEASE ORAL  Take 500 mg by mouth as needed.  Dose: 500 mg          Review of Systems  Review of systems not obtained due to patient factors.    Objective   Labs  I have reviewed the patients labs until the time of note; no clinical concerns    Imaging  I have indepedently reviewed patient's imaging; any concerning findings adressed below    VTE Assessment  Increased risk from baseline; VTE Prophylaxis as ordered    ECG   sinus rhythm    Physicial Exam  Visit Vitals  /61   Pulse 80   Temp 38 °C (100.4 °F) (Oral)   Resp (!) 26   Ht 1.651 m (5' 5\")   Wt 91.3 kg (201 lb 4.5 oz)   SpO2 97%   BMI 33.49 kg/m²       General Appearance:    Intubated and sedated   Head:    Normocephalic, without obvious abnormality, atraumatic   Eyes:    PERRL, conjunctiva/corneas dinorah        Ears:    Normal TM's and external ear canals, both ears   Nose:   Nares normal, septum midline, mucosa normal, no drainage    or sinus   tenderness   Throat:   Lips, mucosa, and tongue normal; teeth and gums normal   Neck:   Supple, symmetrical, " trachea midline, no adenopathy;        thyroid:  No enlargement/tenderness/nodules; no carotid    bruit or JVD   Back:     Symmetric, no curvature, ROM normal, no CVA tenderness   Lungs:     Clear to auscultation bilaterally, respirations unlabored   Chest wall:    No tenderness or deformity   Heart:    Regular rate and rhythm, S1 and S2 normal, no murmur, rub   or gallop   Abdomen:     Soft, non-tender, bowel sounds active all four quadrants,     no masses, no organomegaly            Extremities:  Musculoskeletal:   Extremities normal, atraumatic, no cyanosis or edema    No injury or deformity   Pulses:   2+ and symmetric all extremities   Skin:   Skin color, texture, turgor normal, no rashes or lesions   Lymph nodes:   Cervical, supraclavicular, and axillary nodes normal   Neurologic:    Behavior/  Emotional:   CNII-XII intact. Normal strength, sensation and reflexes       throughout    Appropriate, cooperative            Assessment   73 y.o. male being consulted for management recommendations       Plan     Pts wife consented for trach and peg,  Hold heparin and tube feeds at [place].

## 2024-01-03 NOTE — PROGRESS NOTES
"    Daily Progress Note (LOS: 10)    Interval History:   Patient did well overnight. RN denies any overnight events. Denies any new CV symptoms including but not limited to any new chest pain at rest or with exertion. He has no new shortness of breath. He has no new palpitations. He has had no syncope or near-syncope. He has been compliant with all medical therapy listed below without new side effects or intolerances.     Review of Systems:  All organ systems normal except as per HPI.    Current Medications:  • amiodarone  200 mg feeding tube Daily   • atorvastatin  10 mg oral Nightly   • cefTRIAXone  1 g intravenous q24h INT   • cetirizine  10 mg feeding tube Nightly   • chlorhexidine  15 mL Mouth/Throat 2 times per day   • cholecalciferol (vitamin D3)  1,000 Units feeding tube Daily   • furosemide  40 mg intravenous BID (am, 4p)   • guaiFENesin  400 mg oral q6h GISELLE   • insulin lispro U-100  1-10 Units subcutaneous q6h GISELLE   • ipratropium HFA  2 puff inhalation QID (8a, 12p, 4p, 8p)   • magnesium oxide  400 mg Nasogastric BID   • metoprolol tartrate  25 mg feeding tube BID   • pantoprazole  40 mg intravenous q12h GISELLE   • silver sulfadiazine   Topical BID   • sodium chloride  10 mL intravenous q8h INT   • tamoxifen  20 mg feeding tube Daily       Physical Exam:  Constitutional:   Visit Vitals  BP (!) 158/57   Pulse 66   Temp 37.8 °C (100 °F) (Oral)   Resp 18   Ht 1.651 m (5' 5\")   Wt 91.3 kg (201 lb 4.5 oz)   SpO2 97%   BMI 33.49 kg/m²     General appearance: Awake, alert, and appears stated age. Cooperative  HEENT: PERRLA, EOM's intact. Neck supple. No JVD noted.   Lungs: Clear to auscultation bilaterally, with no rales or wheezing.  Heart: S1, S2 normal, no murmur, click, rub or gallop, regular rate and rhythm, no JVD noted.  Abdomen: Soft, non-tender; bowel sounds normal; no masses. No rebound or guarding.  Lymph: No adenopathy noted.  Extremities: Peripheral pulses intact x 4.  No lower extremity edema " noted.  Skin: Skin color, texture, turgor normal. No rashes or lesions  Neurologic: No focal deficits noted. Full strength and sensation.  Psych: Normal affect. Alert and oriented X 3.    I&Os:    Intake/Output Summary (Last 24 hours) at 1/3/2024 0753  Last data filed at 1/3/2024 0700  Gross per 24 hour   Intake 3173.36 ml   Output 3710 ml   Net -536.64 ml       Weights:   Wt Readings from Last 3 Encounters:   01/03/24 91.3 kg (201 lb 4.5 oz)   12/07/23 80.3 kg (177 lb)   11/27/23 80.3 kg (177 lb)       Labs:  Results from last 7 days   Lab Units 01/03/24  0321 01/02/24  1019 01/02/24  0400 01/01/24  0517 12/29/23  0422 12/28/23  0426   SODIUM mEQ/L 141  --  143 145   < > 149*   POTASSIUM mEQ/L 3.8  --  4.0 3.9   < > 3.3*   CHLORIDE mEQ/L 110*  --  113* 115*   < > 114*   CO2 mEQ/L 24  --  21 22   < > 24   BUN mg/dL 37*  --  43* 49*   < > 40*   CREATININE mg/dL 1.4*  --  1.5* 1.6*   < > 2.2*   GLUCOSE mg/dL 159*  --  181* 177*   < > 201*   CALCIUM mg/dL 7.6*  --  8.2* 7.8*   < > 7.8*   MAGNESIUM mg/dL 1.8 1.9  --   --   --  2.0    < > = values in this interval not displayed.             Results from last 7 days   Lab Units 01/03/24  0321 01/02/24  0400 01/01/24  0517   WBC K/uL 6.18 4.83 3.60*   HEMOGLOBIN g/dL 7.8* 7.6* 7.6*   HEMATOCRIT % 25.0* 24.0* 24.7*   PLATELETS K/uL 125* 101* 104*     Lab Results   Component Value Date    BNP 1,135 (H) 12/24/2023     Lab Results   Component Value Date    INR 1.1 12/28/2023    INR 1.4 12/25/2023    INR 1.9 12/24/2023     Data Review:  Telemetry Review: SR and SB with significant ST depressions    Plan:  1.  VF arrest / Takatsubo CM / Abnormal EKG / QT prolongation:  - Thought to be secondary toTakotsubo syndrome with torsade de points.    - QT interval remains prolonged with deep T wave inversions.  Currently 520 ms uncorrected.  - He remains at risk of further episodes of polymorphic ventricular tachycardia.  If he has any further issues, will resume IV lidocaine.    - Echo  yesterday shows FULL resolution of CM (EF now 60-65%).  - Keep Mg > 2.5     2.  Hypoxemia:  - S/p recent unsuccessful extubation trial.     3.  Atrial fibrillation:  - Remains in sinus rhythm.  - Continue on oral/nasogastric amiodarone and IV heparin.     4.  Hypertension:  - At goal on metoprolol.     5. CHF:  - Undergoing diuresis. Continue 40 IV BID for today.      35 min CC time.

## 2024-01-03 NOTE — PLAN OF CARE
Problem: Adult Inpatient Plan of Care  Goal: Plan of Care Review  Flowsheets (Taken 1/3/2024 1052)  Progress: no change  Plan of Care Reviewed With: patient     Problem: Enteral Nutrition  Goal: Feeding Tolerance  Outcome: Progressing   Nutrition Interventions/ Recommendations:   1. Continue nutrition supper as recommended:       - Peptamen AF at goal of 60 mL/hr x 24 hours which provides 1440ml TV, 1728kcal (22kcal/kg), 109g protein (1.4g/kg), and 1170ml free water        -FWF currently running at 200 mL q 4 hours providing an additional 1200 mL free water (total 2360 mL with TF)       - Na 141 today  2. Monitor and treat labs/lytes, replete prn       - trend Na       - trend BUN and Cr   3. Monitor BG and accu-checks       - BG and accu-checks well-controlled  4. Monitor GI function       - monitor BM (liquid stool); FMS in place; currently receiving Robitussion which can increase incidence of diarrhea d/t high sorbitol content; recommend alternate medication if diarrhea persists  5. Daily weights recommended  6. Monitor I/O       - FMS output  7. SLP consult prior to diet advance

## 2024-01-03 NOTE — PROGRESS NOTES
"Vancomycin Dosing by Pharmacy Consult Initiated    Cam Rosas is a 73 y.o. male who has been consulted for vancomycin dosing for pneumonia prescribed by Dr. Rico .    Reviewed relevant clinical data including weight, renal function, previous vancomycin doses, and vancomycin levels:  Creatinine   Date/Time Value Ref Range Status   01/03/2024 0321 1.4 (H) 0.7 - 1.3 mg/dL Final   01/02/2024 0400 1.5 (H) 0.7 - 1.3 mg/dL Final   01/01/2024 0517 1.6 (H) 0.7 - 1.3 mg/dL Final     No results found for: \"VANCORANDOM\", \"VANCOTROUGH\", \"VANCOPEAK\"      Vancomycin Administrations (last 96 hours)       None            Assessment/Plan  The patient is ordered vancomycin dosing by pharmacy.    The dose will be based on:         Wt Readings from Last 1 Encounters:   01/03/24 91.3 kg (201 lb 4.5 oz)         Estimated Creatinine Clearance: 48.8 mL/min by (C-G formula)      Will initiate a loading dose   1750 mg IV x1 and maintenance dose of 750 mg IV every 12 hours.    Target a trough of 15-20 ug/mL per vancomycin dosing per pharmacy order.  Pharmacy will continue to follow the patient's vancomycin dosing daily.      Please call vancomycin levels to the pharmacy.  Deedee Gasca, PharmD    "

## 2024-01-03 NOTE — PROGRESS NOTES
Nutrition Note           Clinical Course: Patient is a 73 y.o. male who was admitted on 12/24/2023 with a diagnosis of Influenza A [J10.1]  Severe sepsis (CMS/HCC) [A41.9, R65.20]  Acute on chronic congestive heart failure, unspecified heart failure type (CMS/HCC) [I50.9].   Past Medical History:   Diagnosis Date   • Acute systolic heart failure (CMS/HCC) 02/15/2023   • Ambulates with cane     and walker   • Atrial fibrillation (CMS/HCC)    • Breast cancer (CMS/[facility]) October 2022   • CHF (congestive heart failure) (CMS/[facility])    • Chronic kidney disease    • CKD (chronic kidney disease) 10/19/2022   • History of radiation therapy    • Hx antineoplastic chemo    • Prostate cancer (CMS/[facility])      Past Surgical History:   Procedure Laterality Date   • CARDIAC SURGERY      mitral valve repair 2006   • CARDIOVERSION  12/05/2022    Successful DC cardioversion   • KNEE CARTILAGE SURGERY Left    • MASTECTOMY     • PROSTATE SURGERY  July 2022   • PROSTATECTOMY  07/15/2022   • TONSILLECTOMY         Nutrition Interventions/ Recommendations:   1. Continue nutrition supper as recommended:       - Peptamen AF at goal of 60 mL/hr x 24 hours which provides 1440ml TV, 1728kcal (22kcal/kg), 109g protein (1.4g/kg), and 1170ml free water        -FWF currently running at 200 mL q 4 hours providing an additional 1200 mL free water (total 2360 mL with TF)       - Na 141 today  2. Monitor and treat labs/lytes, replete prn       - trend Na       - trend BUN and Cr   3. Monitor BG and accu-checks       - BG and accu-checks well-controlled  4. Monitor GI function       - monitor BM (liquid stool); FMS in place; currently receiving Robitussion which can increase incidence of diarrhea d/t high sorbitol content; recommend alternate medication if diarrhea persists  5. Daily weights recommended  6. Monitor I/O       - FMS output  7. SLP consult prior to diet advance        Nutrition Status Classification: Moderate nutritional compromise       Dietary  Orders   (From admission, onward)             Start     Ordered    12/29/23 1111  Tube Feed with NPO Peptamen AF; Continuous; 20; 60; increase bby 10 mL/hr every 4 hours until goal rate achieved; Water; 200; Every 4 hours; RD/LDN may adjust order; AM Snack  (Tube Feed with NPO Diet Orders with insertion and maintenance panels)  Diet effective now        Question Answer Comment   Tube Feeding Formula (PH): Peptamen AF    Administration Type Continuous    Tube Feeding Start rate (mL/hr): 20    Tube Feeding Goal rate (mL/hr): 60    Nursing Instruction increase bby 10 mL/hr every 4 hours until goal rate achieved    Flush type: Water    Flush amount (mL): 200    Flush frequency: Every 4 hours    Delegation of Authority. Diet orders written by PA/CRHomar may not be adjusted by RD/LDNs. RD/LDN may adjust order    Meal period (AM Snack required for CBORD, do not remove: Not clinically relevant) AM Snack       See [facility] for full Linked Orders Report.    12/29/23 1110                    Reason for Assessment  Reason For Assessment: per organizational policy, other (see comments) (follow-up)  Diagnosis:  (hypoxia, flu code blue)         Nutrition/Diet History  Typical Food/Fluid Intake: pt from home  Diet Prior to Admission: unkown  Food Allergies: no known food allergies  Factors Affecting Nutritional Intake: compromised airway, impaired cognitive status/motor control    Physical Findings  Overall Physical Appearance: overweight, on ventilator support  Gastrointestinal: other (see comments) (FMS, BM 1/2)  Last Bowel Movement: 01/02/24  Tubes: Nasogastric tube  Skin: edema, other (see comments), pressure injury (b/l upper +3, b/l lower +2; L pretibial blister; wound to perineum; b/l heel DTI's)    Last Bowel Movement: 01/02/24    Nutrition Order  Nutrition Order: meets nutritional requirements  Nutrition Order Comments: NPO with TF  Current TF/TPN Regimen: Peptamen AF @ 60 mL/hr    Anthropometrics  Height: 165.1 cm (5'  "5\")    Weights (last 7 days)     Date/Time Weight    01/03/24 0331 91.3 kg (201 lb 4.5 oz)    01/02/24 0842 93.4 kg (206 lb)    01/02/24 0550 93.6 kg (206 lb 5.6 oz)    12/31/23 0509 91.4 kg (201 lb 8 oz)    12/29/23 0600 89.1 kg (196 lb 6.9 oz)    12/28/23 0600 88.3 kg (194 lb 10.7 oz)               Current Weight  Weight Method: Bed scale  Weight: 91.3 kg (201 lb 4.5 oz)    Ideal Body Weight (IBW)  Ideal Body Weight (IBW) (kg): 62.51  % Ideal Body Weight: 149.48    Usual Body Weight (UBW)  Usual Body Weight:  (177-185# per EMR)    Body Mass Index (BMI)  BMI (Calculated): 33.5  BMI Assessment: BMI 30-34.9: obesity grade I                        Labs/Procedures/Meds  Lab Results Reviewed: reviewed, pertinent  Lab Results Comments: , BUN 37, Cr 1.4, Hgb 7.8, -135 mg/dL      Results from last 7 days   Lab Units 01/03/24  0321 01/02/24  0400 01/01/24  0517   SODIUM mEQ/L 141 143 145   POTASSIUM mEQ/L 3.8 4.0 3.9   CHLORIDE mEQ/L 110* 113* 115*   CO2 mEQ/L 24 21 22   BUN mg/dL 37* 43* 49*   CREATININE mg/dL 1.4* 1.5* 1.6*   GLUCOSE mg/dL 159* 181* 177*   CALCIUM mg/dL 7.6* 8.2* 7.8*      Results from last 7 days   Lab Units 01/03/24  0321 01/02/24  0400 01/01/24  0517   ALK PHOS IU/L 59 53 55   BILIRUBIN TOTAL mg/dL 0.5 0.5 0.5   ALBUMIN g/dL 2.3* 2.2* 2.2*   ALT IU/L 74* 95* 134*   AST IU/L 34 37 52*          No results found for: \"HGBA1C\"  No results found for: \"RMSVJSIR31\"  Lab Results   Component Value Date    CALCIUM 7.6 (L) 01/03/2024    PHOS 3.6 12/27/2023     Results from last 7 days   Lab Units 01/03/24  0321 01/02/24  0400 01/01/24  0517   WBC K/uL 6.18 4.83 3.60*   HEMOGLOBIN g/dL 7.8* 7.6* 7.6*   HEMATOCRIT % 25.0* 24.0* 24.7*   PLATELETS K/uL 125* 101* 104*           Results from last 7 days   Lab Units 12/27/23  1231   TRIGLYCERIDES mg/dL 303*     Results from last 7 days   Lab Units 01/03/24  0321 01/02/24  1019 12/28/23  0426   MAGNESIUM mg/dL 1.8 1.9 2.0          Diagnostic " Tests/Procedures  Diagnostic Test/Procedure Reviewed: reviewed    Medications  Pertinent Medications Reviewed: reviewed, pertinent  Pertinent Medications Comments: Robitussin, D3, lasix, insulin, protonix, precedex  • amiodarone  200 mg feeding tube Daily   • atorvastatin  10 mg oral Nightly   • cefTAZidime  2 g intravenous q12h INT   • cetirizine  10 mg feeding tube Nightly   • chlorhexidine  15 mL Mouth/Throat 2 times per day   • cholecalciferol (vitamin D3)  1,000 Units feeding tube Daily   • furosemide  60 mg intravenous BID (am, 4p)   • guaiFENesin  400 mg oral q6h GISELLE   • insulin lispro U-100  1-10 Units subcutaneous q6h GISELLE   • ipratropium HFA  2 puff inhalation QID (8a, 12p, 4p, 8p)   • magnesium oxide  400 mg Nasogastric BID   • metoprolol tartrate  25 mg feeding tube BID   • pantoprazole  40 mg intravenous q12h GISELLE   • silver sulfadiazine   Topical BID   • sodium chloride  10 mL intravenous q8h INT   • tamoxifen  20 mg feeding tube Daily   • vancomycin  20 mg/kg intravenous Once     • dexmedetomidine in 0.9 % NaCl  0-1 mcg/kg/hr 0.2 mcg/kg/hr (01/03/24 1026)   • heparin infusion - MAR calculator by PTT  100-4,000 Units/hr 900 Units/hr (01/03/24 0900)       Estimated/Assessed Needs  Additional Documentation: Calorie Requirements (Group), Fluid Requirements (Group), Protein Requirements (Group)    Calorie Requirements  Estimated kCal Needs: Actual Body Weight  Estimated Calorie Need Method: kcal/kg  Calorie/kg Recommended: 22-25  Calorie Recommendations: 3050-5161 kcal                Protein Requirements  Recommended Dosing Weight (Estimated Protein Needs): Actual Body Weight  Est Protein Requirement Amount (gms/kg):  (1.2-1.5g)  Protein Recommendations: 96-120g    Fluid Requirements  Fluid Recommendation (mL):  (22-25ml/kg)  Recommended Fluid Needs Dosing Weight: Actual Body Weight  Fluid Requirements (mL/day): 1750-2000ml  [place] Method (over 20 kg): 4356      Nutrient/Fluid Evaluation  Additional  Documentation: Tube Feeding Assessment      Tube Feeding Assessment  Active Tube Feed Order: Yes  Delivery Method: Continuous per pump  TF Protein Grams: 109 grams  TF Total kCals: 1728 kcals  Projected Tube Feed Volume: 1440  TF Free Water: 1170  Tolerance: tolerating           PES  Statement: PES Statement  Nutrition Diagnosis: Inadequate Oral Intake  Related To:: VDRF  Related To:: Decreased ability to consume sufficient energy  As Evidenced By:: TF running to meet 100% of estimated needs  Nutritional Needs Met?: Yes                                         Clinical Comments:  Pt seen for follow-up. He is a 72 yo male admitted with hypoxia, +Flu A, pt required intubation 12/24 after failing bipap. Pt s/p Code Blue 12/25 AM, then had a cardiac cath and temporary pacer placed later that day. Pmhx includes chronic atrial fibrillation, right breast cancer post mastectomy and postradiation therapy currently on chemo, prostate cancer, combined systolic and diastolic heart failure, CKD 3.   Patient passed SBT on 12/30 and was extubated to NC however failed high flow and BiPAP and was re-intubated in the setting of hypoxia and poor mental status. TF was restarted and running at previous goal of 60 mL/hr.  At time of visit:   Pt vented, precedex at 0.2 mcg/kg/hr  Peptamen AF running at 60 mL/hr x 24 hours; 200 mL FWF q 4 hours  Skin: b/l upper and lower extremities +2-3; DTI b/l R heels  Net I/O: -564 mL x 24 hours; +9.5L since admit  UOP: 1.5 mL/kg/hr  FMS placed 12/30 with 200 mL output (1/3)  Labs:  mg/dL, BUN 37, Cr 1.4, Hgb 7.8, -135 mg/dL  Weight: admission: 12/24 180#; 12/29: 196#; 1/2: 206#; 1/3: 201#  Pt currently being diuresed, 40mg IV lasix BID          Goals: Pt will continue to tolerate 100% of needs via TF      Monitor and Evaluation:   TF tolerance  Trends in labs/lytes  meds  Weights  I/O  Medical course      Discussed with: caregiver                            Verbalizes  understanding        Date: 01/03/24  Signature: PETE Lao

## 2024-01-03 NOTE — PROGRESS NOTES
Critical Care/Pulmonary  Daily Progress Note        Subjective    Interval History:    Versed infusion started overnight for agitation    Sedated this morning on rounds.  Versed stopped  Remains febrile with Tmax overnight of 100.9    Last 24 hr I/O  I- 3175ml  O-3740ml  N- -564.7ml        Objective       Allergies: Azithromycin, Prednisone, and Lorazepam    CURRENT MEDS  •  acetaminophen, 650 mg, feeding tube, q4h PRN  •  albuterol, 2.5 mg, nebulization, q4h PRN  •  amiodarone, 200 mg, feeding tube, Daily  •  atorvastatin, 10 mg, oral, Nightly  •  atropine, 1 mg, intravenous, Once PRN  •  betamethasone valerate, , Topical, 2x daily PRN  •  calcium gluconate, 1 g, intravenous, q6h PRN  •  cefTAZidime, 2 g, intravenous, q12h INT  •  cetirizine, 10 mg, feeding tube, Nightly  •  chlorhexidine, 15 mL, Mouth/Throat, 2 times per day  •  cholecalciferol (vitamin D3), 1,000 Units, feeding tube, Daily  •  dexmedetomidine in 0.9 % NaCl, 0-1 mcg/kg/hr, intravenous, Titrated  •  glucose, 16-32 g of dextrose, oral, PRN **OR** dextrose, 15-30 g of dextrose, oral, PRN **OR** glucagon, 1 mg, intramuscular, PRN **OR** dextrose 50 % in water (D50), 25 mL, intravenous, PRN  •  fentaNYL, 50 mcg, intravenous, q4h PRN  •  furosemide, 60 mg, intravenous, BID (am, 4p)  •  guaiFENesin, 400 mg, oral, q6h GISELLE  •  heparin (porcine), 40-80 Units/kg (Adjusted), intravenous, q6h PRN  •  heparin infusion - MAR calculator by PTT, 100-4,000 Units/hr, intravenous, Titrated  •  hydrALAZINE, 5 mg, intravenous, q6h PRN  •  insulin lispro U-100, 1-10 Units, subcutaneous, q6h GISELLE  •  ipratropium HFA, 2 puff, inhalation, QID (8a, 12p, 4p, 8p)  •  magnesium oxide, 400 mg, Nasogastric, BID  •  magnesium sulfate, 2 g, intravenous, PRN  •  metoclopramide, 10 mg, intravenous, q6h PRN  •  metoprolol tartrate, 25 mg, feeding tube, BID  •  pantoprazole, 40 mg, intravenous, q12h GISELLE  •  potassium chloride, 20 mEq, oral, PRN  •  potassium chloride, 40 mEq,  oral, PRN  •  potassium chloride, 20 mEq, intravenous, PRN  •  potassium chloride in water, 20 mEq, intravenous, PRN **AND** potassium chloride in water, 20 mEq, intravenous, PRN  •  silver sulfadiazine, , Topical, BID  •  sodium chloride, 10 mL, intravenous, q8h INT  •  sodium chloride, 10 mL, intravenous, PRN  •  tamoxifen, 20 mg, feeding tube, Daily  •  vancomycin, 750 mg, intravenous, q12h INT **AND** [] IV Vancomycin Therapy by Pharmacy Protocol, , , Once    VITAL SIGNS  Vitals:    24 1400 24 1500 24 1600 24 1700   BP: (!) 95/53  128/60    Pulse: 64 72 75 86   Resp: (!) 21 (!) 24 (!) 26 (!) 25   Temp:   37.7 °C (99.8 °F)    TempSrc:   Oral    SpO2: 95% 100% 97% 95%   Weight:       Height:           VENTILATOR SETTINGS  Vent Mode: Pressure support  FiO2 (%) (Set):  [40 %] 40 %  S RR:  [18] 18  S VT:  [450 mL] 450 mL  PEEP/CPAP (Set, cmH2O):  [6 cm H20] 6 cm H20  MAP (Observed, cmH2O):  [9.8-12] 9.8    Oxygen:  Oxygen Therapy: Supplemental oxygen  O2 Delivery Method: Endotracheal tube  FiO2 (%) (Set): 40 %  O2 Flow Rate (L/min): 6 L/min     INTAKE/OUTPUT    Intake/Output Summary (Last 24 hours) at 1/3/2024 1802  Last data filed at 1/3/2024 1700  Gross per 24 hour   Intake 3615.96 ml   Output 3295 ml   Net 320.96 ml       Lines, Drains, Airways, Wounds:  PICC Triple Lumen 23 Right (Active)   Number of days: 6       NG/OG Tube 24 Left nostril (Active)   Number of days: 0       Urethral Catheter 20 Fr (Active)   Number of days: 6       ETT  (Active)   Number of days: 4       Wound Venous Ulcer Left Pretibial (Active)   Number of days: 10       Wound Perineum (Active)   Number of days: 10       Wound Puncture Anterior;Proximal;Right Groin (Active)   Number of days: 6       Wound Pressure Injury Right Heel (Active)   Number of days: 6       Wound Pressure Injury Left Heel (Active)   Number of days: 3       Catheterization Site - Arterial Right Femoral 6 Fr. (Active)   Number  of days: 9       Catheterization Site - Venous Right Femoral 6 Fr. (Active)   Number of days: 9         Physical Exam:  Physical Exam  Vitals and nursing note reviewed.   Constitutional:       Appearance: He is ill-appearing.   HENT:      Mouth/Throat:      Comments: ETT in place  Eyes:      Pupils: Pupils are equal, round, and reactive to light.   Cardiovascular:      Rate and Rhythm: Normal rate. Rhythm irregular.      Pulses: Normal pulses.      Heart sounds: Normal heart sounds.   Pulmonary:      Breath sounds: Rhonchi (Bilateral) present.   Abdominal:      General: Bowel sounds are normal. There is distension.      Palpations: Abdomen is soft.   Musculoskeletal:         General: No swelling (R hand swelling).      Right lower leg: Edema present.      Left lower leg: Edema present.      Comments: R hand swollen and feels cold on exam. Right radial pulse strong.      Skin:     Capillary Refill: Capillary refill takes 2 to 3 seconds.   Neurological:      Mental Status: He is alert.      Comments: Sedated at time of exam          Labs:  See Labs Below:  ABG  Results from last 7 days   Lab Units 12/30/23  2240 12/30/23  1520 12/30/23  1353   PH ART pH 7.41 7.30* 7.30*   PCO2 ART mm Hg 36 44 48   PO2 ART mm Hg 164* 83 102*   HCO3 ART mEQ/L 24 21 23   O2 SAT ART % 98 94 97   BASE EXC ART mEQ/L -1.6 -4.7 -3.0     CBC  Results from last 7 days   Lab Units 01/03/24  0321 01/02/24  0400 01/01/24  0517   WBC K/uL 6.18 4.83 3.60*   RBC M/uL 2.39* 2.27* 2.31*   HEMOGLOBIN g/dL 7.8* 7.6* 7.6*   HEMATOCRIT % 25.0* 24.0* 24.7*   MCV fL 104.6* 105.7* 106.9*   MCH pg 32.6 33.5* 32.9   MCHC g/dL 31.2* 31.7* 30.8*   PLATELETS K/uL 125* 101* 104*   RDW % 15.8* 16.1* 16.3*   MPV fL 11.7 10.9 11.7     BMP  Results from last 7 days   Lab Units 01/03/24  0321 01/02/24  0400 01/01/24  0517   SODIUM mEQ/L 141 143 145   POTASSIUM mEQ/L 3.8 4.0 3.9   CHLORIDE mEQ/L 110* 113* 115*   CO2 mEQ/L 24 21 22   BUN mg/dL 37* 43* 49*   CREATININE  mg/dL 1.4* 1.5* 1.6*   CALCIUM mg/dL 7.6* 8.2* 7.8*   ALBUMIN g/dL 2.3* 2.2* 2.2*   BILIRUBIN TOTAL mg/dL 0.5 0.5 0.5   ALK PHOS IU/L 59 53 55   ALT IU/L 74* 95* 134*   AST IU/L 34 37 52*   GLUCOSE mg/dL 159* 181* 177*     Coag  Results from last 7 days   Lab Units 01/03/24  0444 01/02/24  0400 01/01/24  0517 12/28/23  2346 12/28/23  1659   PROTIME sec  --   --   --   --  13.7   INR   --   --   --   --  1.1   PTT sec 74* 77* 79*   < > 31    < > = values in this interval not displayed.       Imaging:   Ultrasound venous arm left    Result Date: 1/3/2024  IMPRESSION: 1.  No evidence for deep vein thrombosis bilaterally. 2.  Superficial thrombus involving the right cephalic vein.    Ultrasound venous arm right    Result Date: 1/3/2024  IMPRESSION: 1.  No evidence for deep vein thrombosis bilaterally. 2.  Superficial thrombus involving the right cephalic vein.    X-RAY ABDOMEN 1 VIEW    Result Date: 1/3/2024  IMPRESSION: Dobbhoff tube enters below left hemidiaphragm with its tip likely in the region of the stomach.     X-RAY CHEST 1 VIEW    Result Date: 1/2/2024  IMPRESSION: Hazy opacity seen at right midlung, new from the prior study, may represent ammonia or aspiration. Endotracheal tube with the tip 9 mm above the leo. Consider repositioning.     MRI BRAIN WITHOUT CONTRAST    Result Date: 1/2/2024  IMPRESSION: No acute infarct. Mild chronic microvascular ischemia, moderate volume loss.    X-RAY ABDOMEN 1 VIEW    Result Date: 12/30/2023  IMPRESSION: Weighted feeding tube tip at the level of the proximal stomach.    CT HEAD WITHOUT IV CONTRAST    Result Date: 12/30/2023  IMPRESSION: No acute intracranial abnormality.  Chronic ischemic white matter changes are noted.    X-RAY CHEST 1 VIEW    Result Date: 12/30/2023  IMPRESSION: 1. The endotracheal tube is lower in position situated 2 cm above level of the leo.    IR TUNNELED PICC PLACEMENT    Result Date: 12/29/2023  IMPRESSION: Successful placement of 5 Fr triple  lumen tunneled PICC.     ULTRASOUND GUIDED VASCULAR ACCESS    Result Date: 12/29/2023  IMPRESSION: Successful placement of 5 Fr triple lumen tunneled PICC.     ULTRASOUND KIDNEYS BLADDER/NO POST VOID    Result Date: 12/28/2023  IMPRESSION: 1. No sonographic abnormality in the kidneys. 2. Nearly completely collapsed bladder as discussed below. No large intraluminal bladder thrombus/clot as clinically questioned (given the limitations of near complete bladder collapse). COMMENT: Real-time sonographic evaluation of the kidneys and bladder was performed. There is normal cortical echogenicity and corticomedullary differentiation. The right kidney measures 10.8 x 5.2 x 4.6 cm. The left kidney measures 10.8 x 5.8 x 4.3 cm. There is no hydronephrosis, calculi, masses or perinephric collections. Bladder is nearly completely collapsed, measuring approximately 2.5 x 1.3 x 2.5 cm, for a calculated volume of approximately 5.5 cc, precluding adequate evaluation for calculi or mass. No ureteral jets identified on color Doppler, perhaps due to renal dysfunction and/or hydration status.    X-RAY CHEST 1 VIEW    Result Date: 12/27/2023  IMPRESSION:  Reduced lung volumes. No acute cardiopulmonary process. Stable cardiac enlargement. COMPARISON: Portable chest studies 12/25 and 12/26/2023. COMMENT:  Upright portable imaging completed 0548 hours. Endotracheal tube 4.7 cm above. Enteric tube follows a normal course into left upper quadrant, directed to the left. Mild stable cardiac enlargement. Mitral annular prosthesis again noted. Stable hilar and mediastinal contours. Reduced lung volumes. No definite consolidation or pleural fluid. Unremarkable upper abdomen.    X-RAY CHEST 1 VIEW    Result Date: 12/26/2023  IMPRESSION: Endotracheal tube has been repositioned; the tip is now approximately 3.3 cm above the leo. COMMENT: A semierect AP portable view the chest was performed at 1615 and is compared to a prior study from 1503. Since  the prior study, the endotracheal tube has been repositioned and the tip is now approximately 3.3 cm above the leo. There has been no other significant interval change.    X-RAY CHEST 1 VIEW    Result Date: 12/26/2023  IMPRESSION: ET tube 2 cm above leo. NG tube tip in proximal stomach. Probable right lower lobe atelectasis; attention on follow-up.    X-RAY CHEST 1 VIEW    Result Date: 12/26/2023  IMPRESSION: Status post extubation. Slightly improved vascular congestion since earlier in the day. Suspect developing atelectasis or airspace disease in the right midlung zone. COMMENT: Semierect AP portable view of the chest was performed at 1428 and is compared to a prior study from 5:27 AM. In the interval, the endotracheal tube has been removed. An enteric tube remains in place with the tip in the stomach. There is a vascular catheter/line with the tip projecting near the junction of the IVC and right atrium; it is difficult to determine whether this was present previously but differences in technique. Mild cardiomegaly is again noted. Cardiac valvular prosthesis is again seen. The pulmonary vasculature and interstitial markings have improved slightly since earlier in the day. There is questionable developing airspace disease or atelectasis in the right midlung zone. Trace bilateral pleural effusions are suspected. The hilar and mediastinal structures appear stable. Surgical clips are again seen in the right axilla.    X-RAY CHEST 1 VIEW    Result Date: 12/26/2023  IMPRESSION: ET tube 4 cm above leo, NG tube tip not well seen, likely in proximal stomach. Stable cardiomegaly, mitral valve replacement. Clear lungs, with interstitial crowding secondary to low lung volumes.    X-RAY CHEST 1 VIEW    Result Date: 12/26/2023  IMPRESSION: Improved pulmonary vascular congestion. ET tube 3 cm above leo, NG tube tip below inferior edge of film.    X-RAY CHEST 1 VIEW    Result Date: 12/25/2023  IMPRESSION: Interval  retraction of the endotracheal tube now in satisfactory position, as below. Stable satisfactory positioning of the enteric tube. Progressive pulmonary interstitial edema with stable enlargement of the cardiac silhouette. No pneumothorax. COMMENT: Comparison: Chest x-ray 12/24/2023. Technique: A single frontal AP portable upright projection of the chest was obtained. FINDINGS: There has been interval retraction of the endotracheal tube now terminating 2.7 cm above the leo. An enteric tube courses to the stomach with the distal tip collimated from the field-of-view beneath the left hemidiaphragm in satisfactory position. There are defibrillator pad projecting over the left hemithorax. There are progressively increased interstitial opacities seen projecting over both lungs. There is no pleural effusion or pneumothorax. The cardiac silhouette is stably enlarged. The mediastinal contours are unchanged. Again noted is a prosthetic mitral valve. The upper abdomen is unremarkable. There is no acute osseous abnormality. Surgical clips overlie the right axillary region.     X-RAY CHEST 1 VIEW    Result Date: 12/25/2023  IMPRESSION: Satisfactory positioning of the endotracheal and enteric tubes. No pneumothorax. Stable pulmonary edema and enlargement of the cardiac silhouette. COMMENT: Comparison: Chest x-ray 12/25/2023. Technique: A single frontal AP portable upright projection of the chest was obtained. FINDINGS: The tip of an endotracheal tube terminates 4.0 cm above the leo. An enteric tube courses to the stomach with the distal tip collimated from the field-of-view beneath the left hemidiaphragm in satisfactory position. There are defibrillator pad projecting over the left hemithorax. There are mildly increased interstitial opacities again seen projecting over both lungs. No pleural effusion or pneumothorax is seen. There is stable enlargement of the cardiac silhouette. Again noted is a prosthetic mitral valve. The  mediastinal contours are unchanged. The upper abdomen is unremarkable. There is no acute osseous abnormality. There are surgical clips overlying the right axillary region.     X-RAY CHEST 1 VIEW    Result Date: 12/25/2023  IMPRESSION: Low-lying endotracheal tube terminating over the proximal right mainstem bronchus. Recommend retraction. Satisfactory positioning of the enteric tube. This finding and recommendation was discussed with nurse Sahara at 11:27 AM on 12/25/2023 by Dr. Martinez by telephone. Mild pulmonary interstitial edema and stable enlargement of the cardiac silhouette. No pneumothorax. COMMENT: Comparison: Chest x-ray 12/24/2023. Technique: A single frontal AP portable upright projection of the chest was obtained. FINDINGS: The tip of the intervally placed endotracheal tube terminates over the proximal right mainstem bronchus. The tip of the enteric tube terminates over the left upper quadrant of the abdomen. There are mildly increased interstitial opacities noted within both lungs. There is no pleural effusion or pneumothorax. There is stable enlargement of the cardiac silhouette. Again noted is a mitral valve prosthesis. Surgical clips overlie the right axilla. The upper abdomen is unremarkable. There is no acute osseous abnormality.    X-RAY CHEST 1 VIEW    Result Date: 12/25/2023  IMPRESSION: Vascular congestion.  Left basal atelectasis. COMMENT: AP radiograph the chest is compared to previous study dated 8/17/2023. There is vascular congestion and small effusions.  Findings may represent mild congestive heart failure.  Cardiac silhouette is unchanged.  Prosthetic valve ring seen.  Surgical staples seen in the right axilla.  There is minimal left basal atelectasis.    CT ANGIOGRAPHY CHEST PULMONARY EMBOLISM WITH IV CONTRAST    Result Date: 12/24/2023  IMPRESSION: 1. No pulmonary embolism in the main or proximal segmental pulmonary arteries. 2. Right upper lobe and left lower lobe infiltrate with  patchy airspace opacities in the right middle lobe.      Micro:   Microbiology Results     Procedure Component Value Units Date/Time    Blood Culture Blood, Venous [[id number]]  (Normal) Collected: 01/01/24 1201    Specimen: Blood, Venous Updated: 01/02/24 1701     Culture No growth at 18-24 hours    Blood Culture Blood, Venous [[id number]]  (Normal) Collected: 01/01/24 1201    Specimen: Blood, Venous Updated: 01/02/24 1701     Culture No growth at 18-24 hours    Sputum culture / smear Tracheal Aspirate [[id number]]  (Abnormal) Collected: 01/01/24 0937    Specimen: Tracheal Aspirate Updated: 01/03/24 0744    Narrative:      The following orders were created for panel order Sputum culture / smear Tracheal Aspirate.  Procedure                               Abnormality         Status                     ---------                               -----------         ------                     Sputum Gram Stain (Lab O...[[id number]]                      Final result               Sputum Culture (Lab Only...[[id number]]  Abnormal            Final result                 Please view results for these tests on the individual orders.    Sputum Gram Stain (Lab Only) Tracheal Aspirate [[id number]] Collected: 01/01/24 0937    Specimen: Tracheal Aspirate Updated: 01/01/24 1817     Gram Stain Result 4+ WBC      1+ Epithelial cells      No organisms seen    Sputum Culture (Lab Only) Tracheal Aspirate [[id number]]  (Abnormal) Collected: 01/01/24 0937    Specimen: Tracheal Aspirate Updated: 01/03/24 0744     Culture **Positive Culture**      1+ Yeast, No Further Identification      No Normal Park    Sputum culture / smear Expectorated Sputum [[id number]]  (Abnormal) Collected: 12/27/23 1417    Specimen: Bronch Lavage from Expectorated Sputum Updated: 12/29/23 0723    Narrative:      The following orders were created for panel order Sputum culture / smear Expectorated Sputum.  Procedure                               Abnormality         Status                      ---------                               -----------         ------                     Sputum Gram Stain (Lab O...[[id number]]                      Final result               Sputum Culture (Lab Only...[[id number]]  Abnormal            Final result                 Please view results for these tests on the individual orders.    Sputum Gram Stain (Lab Only) Expectorated Sputum [[id number]] Collected: 12/27/23 1417    Specimen: Bronch Lavage from Expectorated Sputum Updated: 12/27/23 2032     Gram Stain Result 2+ WBC      No Epithelial Cells Seen      No organisms seen    Sputum Culture (Lab Only) Expectorated Sputum [[id number]]  (Abnormal) Collected: 12/27/23 1417    Specimen: Bronch Lavage from Expectorated Sputum Updated: 12/29/23 0723     Culture **Positive Culture**      1+ Yeast, No Further Identification    Sputum culture / smear Expectorated Sputum [[id number]] Collected: 12/25/23 0418    Specimen: Aspirate from Expectorated Sputum Updated: 12/27/23 0845    Narrative:      The following orders were created for panel order Sputum culture / smear Expectorated Sputum.  Procedure                               Abnormality         Status                     ---------                               -----------         ------                     Sputum Gram Stain (Lab O...[[id number]]                      Final result               Sputum Culture (Lab Only...[[id number]]  Normal              Final result                 Please view results for these tests on the individual orders.    Sputum Gram Stain (Lab Only) Expectorated Sputum [[id number]] Collected: 12/25/23 0418    Specimen: Aspirate from Expectorated Sputum Updated: 12/25/23 0956     Gram Stain Result 3+ WBC      No Epithelial Cells Seen      3+ Gram positive bacilli      2+ Gram positive cocci in pairs, chains and clusters    Sputum Culture (Lab Only) Expectorated Sputum [[id number]]  (Normal) Collected: 12/25/23 0418    Specimen: Aspirate from  Expectorated Sputum Updated: 12/27/23 0845     Culture Normal Park    MRSA Screen, Nares Only Nose [[id number]]  (Normal) Collected: 12/25/23 0352    Specimen: Nasal Swab from Nose Updated: 12/25/23 0906     MRSA DNA, Nares Negative    Blood Culture Blood, Venous [[id number]]  (Normal) Collected: 12/24/23 1652    Specimen: Blood, Venous Updated: 12/29/23 0000     Culture No growth at 96 hours    SARS-CoV-2 (COVID-19) Nasopharynx [[id number]]  (Abnormal) Collected: 12/24/23 1649    Specimen: Nasopharyngeal Swab from Nasopharynx Updated: 12/24/23 1736    Narrative:      The following orders were created for panel order SARS-CoV-2 (COVID-19) Nasopharynx.  Procedure                               Abnormality         Status                     ---------                               -----------         ------                     SARS-COV-2 (COVID-19)/ F...[[id number]]  Abnormal            Final result                 Please view results for these tests on the individual orders.    SARS-COV-2 (COVID-19)/ FLU A/B, AND RSV, PCR Nasopharynx [[id number]]  (Abnormal) Collected: 12/24/23 1649    Specimen: Nasopharyngeal Swab from Nasopharynx Updated: 12/24/23 1736     SARS-CoV-2 (COVID-19) Negative     Influenza A Positive     Influenza B Negative     Respiratory Syncytial Virus Negative    Narrative:      Testing performed using real-time PCR for detection of COVID-19. EUA approved validation studies performed on site.     Blood Culture Blood, Venous [[id number]]  (Normal) Collected: 12/24/23 1647    Specimen: Blood, Venous Updated: 12/29/23 0100     Culture No growth at 96 hours          ECG/Telemetry  I have independently reviewed the telemetry. No events for the last 24 hours.         ASSESSMENT    73 y.o. male with history atrial fibrillation, CHF, prostate and breast CA, history of GI bleed who presented to the emergency room with shortness of breath and found to be influenza A positive. During day 2 of his hospital stay he had  a v fib arrest with ROSC. He was transferred to the ICU for further monitoring     PLAN   # Acute Hypoxic respiratory failure  - Failed Bipap on admission, became bradycardic and was intubated  - Extubated 12/26, however became hypoxic, ?Secondary to upper airway edema vs acute bronchospasm vs flash pulmonary edema  - Treated with lasix 40 mg and solumedrol q 6  - Ultimately required re-intubation 12/26. Anesthesia noted some swelling around cords  - S/p bronchoscopy post re-intubation 12/27 which noted patient 'biting on tube'  - Initially passed SBT 12/30, was extubated to nasal cannula, failed HF and BIPAP and ultimately required re-intubation in the setting of hypoxia and poor mental status  - Completed steroids  - Sedation changed to Precedex with boluses of fentanyl and versed as needed  -MRI brain without contrast 1/2 with no acute infarct  - He is volume overloaded, with resolving ELEANOR.      -Increased lasix to 60 mg BID today  - Cont PS trials  -Discussed with family at bedside the benefits of trach PEG placement and the likelihood that he will require a trach in the future.  Family agrees to proceed with trach and PEG placement  -Trauma consulted     # Shock  - Improved, remains off of levophed,  maintain MAP > 65     # S/p V fib cardiac arrest  - Likely related to Takosubo's cardiomyopthy, EF 40-45%, now improved to 60-65%  - ROSC achieved after receiving 3 epi, 3 shocks, 2 bicarb, and Magnesium replacement  - Following commands post-arrest. No TTM  - Taken by interventional cardiology for the placement of a temporary pacemaker  - Also noted no significant CAD  - PPM removed secondary to dyssynchrony and misplacement. No further marcellus arrythmias, removed 12/27  - Per cardiology. if patient has further episodes of polymorphic ventricular tachycardia, resume IV lidocaine  - Mental status remains poor; possibly delirium vs 2/2 sedatives vs less likely anoxic encephalopathy.   - CTH 12/30 negative.  MRI brain  1/2 with no acute infarct.  Mild chronic microvascular ischemia.  No evidence of anoxia     # Takotsubo Cardiomyopathy  - TTE 12/26 suggestive of Takotsubos with EF 40-45%, confirmed by LV gram   - Off milrinone 12/29  - Repeat TTE 1/1 with EF 65%  - Cardiology following     # Acute kidney injury  - Renal function slowly improving. Initial creatinine 1.2, peaked 2.3, now stable at 1.4  - Baseline creatinine appears to be 1.2-1.4  - ?Secondary to IV dye load s/p cardiac cath vs over-diuresis   - Monitor BMP  - Cr trending down  - Dosing with lasix today as above, tolerating diuresis     # Elevated liver enzymes  - Likely in setting of hypotension, shock liver  - Serial labs, trending down.  - Monitor CMP     # Pneumonia  - CT Chest 12/24 with left lower lobe infiltrate and right upper lobe infiltrate  - Blood/ sputum cultures negative   - Completed course of zosyn 12/31.   - Febrile again last night. Tmax 100F, repeat Bcx, sputum cx with 1+ Yeast  -Given Cxr on 1/2 with new hazy opacity seen in the right lung will begin Fortaz and vancomycin       # Afib  - AF on telemetry, rate controlled  - EP following  - Continue IV heparin  - Continue amiodarone     # Influenza A  - Completed 5 day course Oseltamivir      # Prostate/breast cancer  - S/p radiation therapy for prostate cancer spring 2023  - S/p R mastectomy 8/2023 and radiation therapy 9/2023  - Continue tamoxifen     #Generalized UE Edema  -Today with UE edema, Right hand swollen and cold on exam, strong radial pulse, cap refill 2-3 seconds  -Bilateral UE ultrasounds ordered     VTE Prophylaxis Plan: SCDs, SQH  GI Prophylaxis Plan: Protonix  Lines/Drains/Airway: R PICC (12/28), DHT, jones, FMS, ETT  Fluids/Electrolytes/Nutrition: TF     Disposition Planning: Remain in the ICU    CODE STATUS  Full Code       The case was reviewed this morning at the patient's beside multidisciplinary rounds with the patient's nurse, dietician, pharmacist, respiratory therapist,  physical therapist and critical care nurse coordinator. The patient's clinical status with regards to diagnosis, treatment plans including disposition of any IV, arterial lines, Lloyd and tubes were discussed, as well as dietary, respiratory therapy and mobilization needs.     This case was discussed with consultants.    Total critical time spent managing acute hypoxemic respiratory failure, encephalopathy, totals 35 minutes.    This note was scribed by Ishaan Juares PA-C in my presence on my behalf.       Megan Rico,   1/3/2024  6:02 PM

## 2024-01-03 NOTE — PLAN OF CARE
Care Coordination Discharge Plan Note     Discharge Needs Assessment  Concerns to be Addressed: adjustment to diagnosis/illness, care coordination/care conferences, discharge planning  Current Discharge Risk: chronically ill, physical impairment    Anticipated Discharge Plan  Anticipated Discharge Disposition: skilled nursing facility, home with home health  Type of Home Care Services: home OT, home PT, nursing    Type of Skilled Nursing Care Services: SLP, OT, PT, nursing        Patient Choice  Offered/Gave Vendor List: yes  Patient's Choice of Community Agency(s): discussed with family that care coordiantin will follow for approrpaite aftecare needs    Patient and/or patient guardian/advocate was made aware of their right to choose a provider. A list of eligible providers was presented and reviewed with the patient and/or patient guardian/advocate in written and/or verbal form. The list delineates providers in the patient’s desired geographic area who are participating in the Medicare program and/or providers contracted with the patient’s primary insurance. The Medicare list and quality ratings were obtained from the Medicare.gov [medicare.gov] website.    ---------------------------------------------------------------------------------------------------------------------    Interdisciplinary Discharge Plan Review:  Participants:     Concerns Comments: shireen met with patient & his 2 children at bedside & spouse. shireen went over role of care coordiatnon & d/c plans. sw confirmed pcp & pharamcy. [facility]. david has hx of prostate cancer & was receing treatment in past. per son his brother has been staying at patient's home to assist with care fro david. david stays mostly in family room . shireen discussed with wife that care coordination will continue to follow for appropriate aftecare needs. patient is currently on vent     1/3 shireen updated in rounds that patient is for trach & peg  tomorrow. Sw discussed that patient would be ltac appropriate if needed.     Discharge Plan:   Disposition/Destination:   /    Discharge Facility:    Community Resources:      Discharge Transportation:  Is Out of Hospital DNR needed at Discharge: no  Does patient need discharge transport?

## 2024-01-03 NOTE — CONSULTS
Visited the room of Mr. Rosas while rounding on ICU.  His sons and wife were at the bedside.  She shared her story with emotion.  Prayer was offered and given.    Ashley Bolton  Spiritual Care Specialist  #[phone]

## 2024-01-04 ENCOUNTER — APPOINTMENT (INPATIENT)
Dept: RADIOLOGY | Facility: HOSPITAL | Age: 74
DRG: 004 | End: 2024-01-04
Payer: MEDICARE

## 2024-01-04 ENCOUNTER — ANESTHESIA (INPATIENT)
Dept: OPERATING ROOM | Facility: HOSPITAL | Age: 74
DRG: 004 | End: 2024-01-04
Payer: MEDICARE

## 2024-01-04 LAB
ABO + RH BLD: NORMAL
ALBUMIN SERPL-MCNC: 2.7 G/DL (ref 3.5–5.7)
ALP SERPL-CCNC: 55 IU/L (ref 34–125)
ALT SERPL-CCNC: 50 IU/L (ref 7–52)
ANION GAP SERPL CALC-SCNC: 6 MEQ/L (ref 3–15)
ANISOCYTOSIS BLD QL SMEAR: ABNORMAL
APTT PPP: 33 SEC (ref 23–35)
APTT PPP: 49 SEC (ref 23–35)
AST SERPL-CCNC: 28 IU/L (ref 13–39)
BASOPHILS # BLD: 0.01 K/UL (ref 0.01–0.1)
BASOPHILS # BLD: 0.01 K/UL (ref 0.01–0.1)
BASOPHILS NFR BLD: 0.1 %
BASOPHILS NFR BLD: 0.2 %
BILIRUB SERPL-MCNC: 0.6 MG/DL (ref 0.3–1.2)
BLD GP AB SCN SERPL QL: NEGATIVE
BUN SERPL-MCNC: 31 MG/DL (ref 7–25)
CALCIUM SERPL-MCNC: 8 MG/DL (ref 8.6–10.3)
CHLORIDE SERPL-SCNC: 108 MEQ/L (ref 98–107)
CO2 SERPL-SCNC: 25 MEQ/L (ref 21–31)
CREAT SERPL-MCNC: 1.4 MG/DL (ref 0.7–1.3)
D AG BLD QL: POSITIVE
DATE+TIME DOSE: 1047
DATE+TIME DOSE: ABNORMAL
DIFFERENTIAL METHOD BLD: ABNORMAL
DIFFERENTIAL METHOD BLD: ABNORMAL
EGFRCR SERPLBLD CKD-EPI 2021: 53.1 ML/MIN/1.73M*2
EOSINOPHIL # BLD: 0.06 K/UL (ref 0.04–0.54)
EOSINOPHIL # BLD: 0.06 K/UL (ref 0.04–0.54)
EOSINOPHIL NFR BLD: 0.7 %
EOSINOPHIL NFR BLD: 0.9 %
ERYTHROCYTE [DISTWIDTH] IN BLOOD BY AUTOMATED COUNT: 15.7 % (ref 11.6–14.4)
ERYTHROCYTE [DISTWIDTH] IN BLOOD BY AUTOMATED COUNT: 17.9 % (ref 11.6–14.4)
GLUCOSE BLD-MCNC: 113 MG/DL (ref 70–99)
GLUCOSE BLD-MCNC: 118 MG/DL (ref 70–99)
GLUCOSE BLD-MCNC: 148 MG/DL (ref 70–99)
GLUCOSE BLD-MCNC: 156 MG/DL (ref 70–99)
GLUCOSE SERPL-MCNC: 115 MG/DL (ref 70–99)
HCT VFR BLDCO AUTO: 21.1 % (ref 40.1–51)
HCT VFR BLDCO AUTO: 26.2 % (ref 40.1–51)
HGB BLD-MCNC: 6.7 G/DL (ref 13.7–17.5)
HGB BLD-MCNC: 8.3 G/DL (ref 13.7–17.5)
HYPOCHROMIA BLD QL SMEAR: ABNORMAL
IMM GRANULOCYTES # BLD AUTO: 0.08 K/UL (ref 0–0.08)
IMM GRANULOCYTES # BLD AUTO: 0.1 K/UL (ref 0–0.08)
IMM GRANULOCYTES NFR BLD AUTO: 0.9 %
IMM GRANULOCYTES NFR BLD AUTO: 1.6 %
INR PPP: 1.1
INR PPP: 1.2
LABORATORY COMMENT REPORT: NORMAL
LYMPHOCYTES # BLD: 0.51 K/UL (ref 1.2–3.5)
LYMPHOCYTES # BLD: 0.56 K/UL (ref 1.2–3.5)
LYMPHOCYTES NFR BLD: 6.4 %
LYMPHOCYTES NFR BLD: 8 %
MACROCYTES BLD QL SMEAR: ABNORMAL
MAGNESIUM SERPL-MCNC: 2 MG/DL (ref 1.8–2.5)
MCH RBC QN AUTO: 31.9 PG (ref 28–33.2)
MCH RBC QN AUTO: 32.8 PG (ref 28–33.2)
MCHC RBC AUTO-ENTMCNC: 31.7 G/DL (ref 32.2–36.5)
MCHC RBC AUTO-ENTMCNC: 31.8 G/DL (ref 32.2–36.5)
MCV RBC AUTO: 100.8 FL (ref 83–98)
MCV RBC AUTO: 103.4 FL (ref 83–98)
MONOCYTES # BLD: 0.5 K/UL (ref 0.3–1)
MONOCYTES # BLD: 0.65 K/UL (ref 0.3–1)
MONOCYTES NFR BLD: 7.4 %
MONOCYTES NFR BLD: 7.9 %
NEUTROPHILS # BLD: 5.17 K/UL (ref 1.7–7)
NEUTROPHILS # BLD: 7.4 K/UL (ref 1.7–7)
NEUTS SEG NFR BLD: 81.4 %
NEUTS SEG NFR BLD: 84.5 %
NRBC BLD-RTO: 0.5 %
NRBC BLD-RTO: 0.6 %
OVALOCYTES BLD QL SMEAR: ABNORMAL
PDW BLD AUTO: 11.9 FL (ref 9.4–12.4)
PDW BLD AUTO: 12.2 FL (ref 9.4–12.4)
PLATELET # BLD AUTO: 116 K/UL (ref 150–350)
PLATELET # BLD AUTO: 138 K/UL (ref 150–350)
PLATELET # BLD EST: ABNORMAL 10*3/UL
PLATELET CLUMP BLD QL SMEAR: PRESENT
POCT TEST: ABNORMAL
POTASSIUM SERPL-SCNC: 3.7 MEQ/L (ref 3.5–5.1)
PROT SERPL-MCNC: 4.7 G/DL (ref 6–8.2)
PROTHROMBIN TIME: 14.3 SEC (ref 12.2–14.5)
PROTHROMBIN TIME: 15 SEC (ref 12.2–14.5)
RBC # BLD AUTO: 2.04 M/UL (ref 4.5–5.8)
RBC # BLD AUTO: 2.6 M/UL (ref 4.5–5.8)
SODIUM SERPL-SCNC: 139 MEQ/L (ref 136–145)
SPECIMEN EXP DATE BLD: NORMAL
VANCOMYCIN TROUGH SERPL-MCNC: 17.9 UG/ML (ref 10–15)
WBC # BLD AUTO: 6.35 K/UL (ref 3.8–10.5)
WBC # BLD AUTO: 8.76 K/UL (ref 3.8–10.5)

## 2024-01-04 PROCEDURE — 25000000 HC PHARMACY GENERAL: Performed by: HOSPITALIST

## 2024-01-04 PROCEDURE — 94003 VENT MGMT INPAT SUBQ DAY: CPT

## 2024-01-04 PROCEDURE — 63600000 HC DRUGS/DETAIL CODE: Performed by: NURSE PRACTITIONER

## 2024-01-04 PROCEDURE — P9016 RBC LEUKOCYTES REDUCED: HCPCS

## 2024-01-04 PROCEDURE — 63700000 HC SELF-ADMINISTRABLE DRUG: Performed by: INTERNAL MEDICINE

## 2024-01-04 PROCEDURE — 37000001 HC ANESTHESIA GENERAL: Performed by: SURGERY

## 2024-01-04 PROCEDURE — 85730 THROMBOPLASTIN TIME PARTIAL: CPT | Performed by: INTERNAL MEDICINE

## 2024-01-04 PROCEDURE — 25800000 HC PHARMACY IV SOLUTIONS: Performed by: NURSE ANESTHETIST, CERTIFIED REGISTERED

## 2024-01-04 PROCEDURE — 20000000 HC ROOM AND CARE ICU

## 2024-01-04 PROCEDURE — 25800000 HC PHARMACY IV SOLUTIONS: Performed by: INTERNAL MEDICINE

## 2024-01-04 PROCEDURE — 27200000 HC STERILE SUPPLY: Performed by: SURGERY

## 2024-01-04 PROCEDURE — 80202 ASSAY OF VANCOMYCIN: CPT

## 2024-01-04 PROCEDURE — 43246 EGD PLACE GASTROSTOMY TUBE: CPT | Performed by: SURGERY

## 2024-01-04 PROCEDURE — 85025 COMPLETE CBC W/AUTO DIFF WBC: CPT | Performed by: NURSE PRACTITIONER

## 2024-01-04 PROCEDURE — 93970 EXTREMITY STUDY: CPT

## 2024-01-04 PROCEDURE — 85610 PROTHROMBIN TIME: CPT | Performed by: HOSPITALIST

## 2024-01-04 PROCEDURE — 36000013 HC OR LEVEL 3 EA ADDL MIN: Performed by: SURGERY

## 2024-01-04 PROCEDURE — 86901 BLOOD TYPING SEROLOGIC RH(D): CPT

## 2024-01-04 PROCEDURE — 63600000 HC DRUGS/DETAIL CODE: Mod: JZ | Performed by: HOSPITALIST

## 2024-01-04 PROCEDURE — 25000000 HC PHARMACY GENERAL: Performed by: NURSE ANESTHETIST, CERTIFIED REGISTERED

## 2024-01-04 PROCEDURE — 63700000 HC SELF-ADMINISTRABLE DRUG

## 2024-01-04 PROCEDURE — 63700000 HC SELF-ADMINISTRABLE DRUG: Performed by: HOSPITALIST

## 2024-01-04 PROCEDURE — 99232 SBSQ HOSP IP/OBS MODERATE 35: CPT | Mod: 25 | Performed by: SURGERY

## 2024-01-04 PROCEDURE — 63600000 HC DRUGS/DETAIL CODE: Mod: JZ

## 2024-01-04 PROCEDURE — 86920 COMPATIBILITY TEST SPIN: CPT

## 2024-01-04 PROCEDURE — 25800000 HC PHARMACY IV SOLUTIONS: Performed by: PHYSICIAN ASSISTANT

## 2024-01-04 PROCEDURE — 25000000 HC PHARMACY GENERAL

## 2024-01-04 PROCEDURE — 36000003 HC OR LEVEL 3 INITIAL 30MIN: Performed by: SURGERY

## 2024-01-04 PROCEDURE — A7521 TRACH/LARYN TUBE CUFFED: HCPCS | Performed by: SURGERY

## 2024-01-04 PROCEDURE — 25800000 HC PHARMACY IV SOLUTIONS

## 2024-01-04 PROCEDURE — 63600000 HC DRUGS/DETAIL CODE: Performed by: INTERNAL MEDICINE

## 2024-01-04 PROCEDURE — 85730 THROMBOPLASTIN TIME PARTIAL: CPT | Performed by: HOSPITALIST

## 2024-01-04 PROCEDURE — 80053 COMPREHEN METABOLIC PANEL: CPT | Performed by: NURSE PRACTITIONER

## 2024-01-04 PROCEDURE — 63600000 HC DRUGS/DETAIL CODE: Mod: JZ | Performed by: NURSE ANESTHETIST, CERTIFIED REGISTERED

## 2024-01-04 PROCEDURE — 0B110Z4 BYPASS TRACHEA TO CUTANEOUS, OPEN APPROACH: ICD-10-PCS | Performed by: SURGERY

## 2024-01-04 PROCEDURE — 36415 COLL VENOUS BLD VENIPUNCTURE: CPT | Performed by: HOSPITALIST

## 2024-01-04 PROCEDURE — 0DH63UZ INSERTION OF FEEDING DEVICE INTO STOMACH, PERCUTANEOUS APPROACH: ICD-10-PCS | Performed by: SURGERY

## 2024-01-04 PROCEDURE — 25000000 HC PHARMACY GENERAL: Performed by: PHYSICIAN ASSISTANT

## 2024-01-04 PROCEDURE — 85025 COMPLETE CBC W/AUTO DIFF WBC: CPT

## 2024-01-04 PROCEDURE — 31600 PLANNED TRACHEOSTOMY: CPT | Performed by: SURGERY

## 2024-01-04 PROCEDURE — 94640 AIRWAY INHALATION TREATMENT: CPT

## 2024-01-04 PROCEDURE — 27800000 HC SUPPLY/IMPLANTS: Performed by: SURGERY

## 2024-01-04 PROCEDURE — 85610 PROTHROMBIN TIME: CPT

## 2024-01-04 PROCEDURE — 63600000 HC DRUGS/DETAIL CODE: Mod: JW

## 2024-01-04 RX ORDER — SODIUM CHLORIDE 9 MG/ML
INJECTION, SOLUTION INTRAVENOUS CONTINUOUS PRN
Status: DISCONTINUED | OUTPATIENT
Start: 2024-01-04 | End: 2024-01-04 | Stop reason: SURG

## 2024-01-04 RX ORDER — NOREPINEPHRINE BITARTRATE 0.02 MG/ML
INJECTION, SOLUTION INTRAVENOUS
Status: COMPLETED
Start: 2024-01-04 | End: 2024-01-04

## 2024-01-04 RX ORDER — SODIUM CHLORIDE 9 MG/ML
5 INJECTION, SOLUTION INTRAVENOUS AS NEEDED
Status: DISCONTINUED | OUTPATIENT
Start: 2024-01-04 | End: 2024-01-10 | Stop reason: HOSPADM

## 2024-01-04 RX ORDER — ONDANSETRON HYDROCHLORIDE 2 MG/ML
INJECTION, SOLUTION INTRAVENOUS AS NEEDED
Status: DISCONTINUED | OUTPATIENT
Start: 2024-01-04 | End: 2024-01-04 | Stop reason: SURG

## 2024-01-04 RX ORDER — ROCURONIUM BROMIDE 10 MG/ML
INJECTION, SOLUTION INTRAVENOUS AS NEEDED
Status: DISCONTINUED | OUTPATIENT
Start: 2024-01-04 | End: 2024-01-04 | Stop reason: SURG

## 2024-01-04 RX ORDER — FENTANYL CITRATE 50 UG/ML
INJECTION, SOLUTION INTRAMUSCULAR; INTRAVENOUS AS NEEDED
Status: DISCONTINUED | OUTPATIENT
Start: 2024-01-04 | End: 2024-01-04 | Stop reason: SURG

## 2024-01-04 RX ORDER — NOREPINEPHRINE BITARTRATE 0.02 MG/ML
0-90 INJECTION, SOLUTION INTRAVENOUS
Status: DISCONTINUED | OUTPATIENT
Start: 2024-01-04 | End: 2024-01-06

## 2024-01-04 RX ADMIN — Medication 10 ML: at 20:05

## 2024-01-04 RX ADMIN — GUAIFENESIN 400 MG: 100 SOLUTION ORAL at 19:17

## 2024-01-04 RX ADMIN — POTASSIUM CHLORIDE 20 MEQ: 200 INJECTION, SOLUTION INTRAVENOUS at 08:27

## 2024-01-04 RX ADMIN — CHLORHEXIDINE GLUCONATE ORAL RINSE 15 ML: 1.2 SOLUTION DENTAL at 10:59

## 2024-01-04 RX ADMIN — ONDANSETRON 4 MG: 2 INJECTION INTRAMUSCULAR; INTRAVENOUS at 11:55

## 2024-01-04 RX ADMIN — AMIODARONE HYDROCHLORIDE 200 MG: 200 TABLET ORAL at 08:38

## 2024-01-04 RX ADMIN — NOREPINEPHRINE BITARTRATE 2 MCG/MIN: 0.02 INJECTION, SOLUTION INTRAVENOUS at 08:14

## 2024-01-04 RX ADMIN — SODIUM CHLORIDE: 9 INJECTION, SOLUTION INTRAVENOUS at 11:22

## 2024-01-04 RX ADMIN — Medication 400 MG: at 08:39

## 2024-01-04 RX ADMIN — DEXMEDETOMIDINE 0.3 MCG/KG/HR: 200 INJECTION, SOLUTION INTRAVENOUS at 00:27

## 2024-01-04 RX ADMIN — TAMOXIFEN CITRATE 20 MG: 10 TABLET, FILM COATED ORAL at 08:40

## 2024-01-04 RX ADMIN — ROCURONIUM BROMIDE 50 MG: 10 SOLUTION INTRAVENOUS at 11:29

## 2024-01-04 RX ADMIN — FUROSEMIDE 60 MG: 10 INJECTION, SOLUTION INTRAMUSCULAR; INTRAVENOUS at 08:39

## 2024-01-04 RX ADMIN — IPRATROPIUM BROMIDE 2 PUFF: 17 AEROSOL, METERED RESPIRATORY (INHALATION) at 15:44

## 2024-01-04 RX ADMIN — ATORVASTATIN CALCIUM 10 MG: 10 TABLET, FILM COATED ORAL at 21:50

## 2024-01-04 RX ADMIN — DEXMEDETOMIDINE 0.2 MCG/KG/HR: 200 INJECTION, SOLUTION INTRAVENOUS at 08:31

## 2024-01-04 RX ADMIN — CHLORHEXIDINE GLUCONATE ORAL RINSE 15 ML: 1.2 SOLUTION DENTAL at 22:20

## 2024-01-04 RX ADMIN — FUROSEMIDE 60 MG: 10 INJECTION, SOLUTION INTRAMUSCULAR; INTRAVENOUS at 15:43

## 2024-01-04 RX ADMIN — SILVER SULFADIAZINE: 10 CREAM TOPICAL at 20:16

## 2024-01-04 RX ADMIN — GUAIFENESIN 400 MG: 100 SOLUTION ORAL at 05:02

## 2024-01-04 RX ADMIN — FENTANYL CITRATE 50 MCG: 0.05 INJECTION, SOLUTION INTRAMUSCULAR; INTRAVENOUS at 14:48

## 2024-01-04 RX ADMIN — METOPROLOL TARTRATE 25 MG: 25 TABLET, FILM COATED ORAL at 20:15

## 2024-01-04 RX ADMIN — IPRATROPIUM BROMIDE 2 PUFF: 17 AEROSOL, METERED RESPIRATORY (INHALATION) at 21:00

## 2024-01-04 RX ADMIN — CEFTAZIDIME 2 G: 2 INJECTION, POWDER, FOR SOLUTION INTRAVENOUS at 08:40

## 2024-01-04 RX ADMIN — FENTANYL CITRATE 100 MCG: 50 INJECTION, SOLUTION INTRAMUSCULAR; INTRAVENOUS at 11:40

## 2024-01-04 RX ADMIN — Medication 10 MG: at 22:34

## 2024-01-04 RX ADMIN — PANTOPRAZOLE SODIUM 40 MG: 40 INJECTION, POWDER, LYOPHILIZED, FOR SOLUTION INTRAVENOUS at 08:39

## 2024-01-04 RX ADMIN — Medication 10 ML: at 03:54

## 2024-01-04 RX ADMIN — CEFTAZIDIME 2 G: 2 INJECTION, POWDER, FOR SOLUTION INTRAVENOUS at 21:50

## 2024-01-04 RX ADMIN — GUAIFENESIN 400 MG: 100 SOLUTION ORAL at 23:51

## 2024-01-04 RX ADMIN — VANCOMYCIN HYDROCHLORIDE 750 MG: 750 INJECTION, POWDER, LYOPHILIZED, FOR SOLUTION INTRAVENOUS at 22:36

## 2024-01-04 RX ADMIN — CHOLECALCIFEROL TAB 25 MCG (1000 UNIT) 1000 UNITS: 25 TAB at 08:39

## 2024-01-04 RX ADMIN — SILVER SULFADIAZINE: 10 CREAM TOPICAL at 08:51

## 2024-01-04 RX ADMIN — IPRATROPIUM BROMIDE 2 PUFF: 17 AEROSOL, METERED RESPIRATORY (INHALATION) at 09:14

## 2024-01-04 RX ADMIN — Medication 10 ML: at 10:59

## 2024-01-04 RX ADMIN — GUAIFENESIN 400 MG: 100 SOLUTION ORAL at 00:22

## 2024-01-04 RX ADMIN — PANTOPRAZOLE SODIUM 40 MG: 40 INJECTION, POWDER, LYOPHILIZED, FOR SOLUTION INTRAVENOUS at 20:15

## 2024-01-04 RX ADMIN — Medication 400 MG: at 20:15

## 2024-01-04 RX ADMIN — FENTANYL CITRATE 50 MCG: 0.05 INJECTION, SOLUTION INTRAMUSCULAR; INTRAVENOUS at 02:14

## 2024-01-04 RX ADMIN — VANCOMYCIN HYDROCHLORIDE 750 MG: 750 INJECTION, POWDER, LYOPHILIZED, FOR SOLUTION INTRAVENOUS at 10:57

## 2024-01-04 ASSESSMENT — ENCOUNTER SYMPTOMS: DYSRHYTHMIAS: 1

## 2024-01-04 NOTE — ANESTHESIOLOGIST PRE-PROCEDURE ATTESTATION
Pre-Procedure Patient Identification:  I am the Primary Anesthesiologist and have identified the patient on 01/04/24 at 11:20 AM.   I have confirmed the procedure(s) will be performed by the following surgeon/proceduralist Edison Oneil MD.

## 2024-01-04 NOTE — PROGRESS NOTES
HD stable.   For tracheostomy tube placement and PEG tube placement today.     Vital signs reviewed to time of note.     Intubated/sedated.     Laboratory evaluation reviewed to time of note.     Hgb 6.7 from 7.8 - blood ordered.   INR 1.1  Platelet count 116.     73 year old male with influenza.     Respiratory failure - for tracheostomy tube placement today.   Inanition - for PEG tube placement today.   Anemia - Hgb 6.7 from 7.8. Blood ordered.

## 2024-01-04 NOTE — ANESTHESIA PREPROCEDURE EVALUATION
Relevant Problems   CARDIOVASCULAR   (+) APC (atrial premature contractions)   (+) Cardiac arrest (CMS/HCC)   (+) Essential hypertension   (+) Paroxysmal atrial fibrillation (CMS/HCC)      GASTROINTESTINAL   (+) Gastrointestinal hemorrhage with melena      MUSCULOSKELETAL   (+) Spondylosis of lumbosacral region      NEUROLOGY   (+) Herniation of lumbar intervertebral disc with radiculopathy      URINARY SYSTEM   (+) Acute renal failure superimposed on stage 3a chronic kidney disease (CMS/HCC)   (+) Electrolyte abnormality      Other   (+) Influenza A   (+) Malignant neoplasm of right male breast (CMS/HCC)   (+) Prostate cancer (CMS/HCC)       Anesthesia ROS/MED HX      Cardiovascular   Valvular problems/murmurs   hypertension  Dysrhythmias   CHF   ECG reviewed  Abnormal ECG  Hematological    anemia  GI/Hepatic- neg  Musculoskeletal   Osteoarthritis   Arthritis  Renal Disease   acute renal failure   electrolyte problem  Endo/Other  History of cancer, breast cancer and prostate cancer  ROS/MED HX Comments:    Pulmonary: Vent failure intubated failedattempted extubation   Cardiology: Vf with takasubo arrest       Past Surgical History:   Procedure Laterality Date   • CARDIAC SURGERY      mitral valve repair 2006   • CARDIOVERSION  12/05/2022    Successful DC cardioversion   • KNEE CARTILAGE SURGERY Left    • MASTECTOMY     • PROSTATE SURGERY  July 2022   • PROSTATECTOMY  07/15/2022   • TONSILLECTOMY         Physical Exam      Anesthesia  Exam Comments:   Exam Airway: intubated          Anesthesia Plan    Plan: general    Technique: general endotracheal   4 ASA  Anesthetic plan and risks discussed with: spouse

## 2024-01-04 NOTE — PROGRESS NOTES
Critical Care/Pulmonary  Daily Progress Note        Subjective    Interval History:     Tmax 100.5 at 8 PM yesterday.  Afebrile this morning  Somnolent, but slightly more arousable this morning    Low-dose Precedex    Briefly required low-dose vasopressor this morning, which was quickly weaned off    24 hour I/O  I- 3127.8 ml  O-3670 ml  N- -542ml         Objective       Allergies: Azithromycin, Prednisone, and Lorazepam    CURRENT MEDS  •  acetaminophen, 650 mg, feeding tube, q4h PRN  •  albuterol, 2.5 mg, nebulization, q4h PRN  •  amiodarone, 200 mg, feeding tube, Daily  •  atorvastatin, 10 mg, oral, Nightly  •  atropine, 1 mg, intravenous, Once PRN  •  betamethasone valerate, , Topical, 2x daily PRN  •  calcium gluconate, 1 g, intravenous, q6h PRN  •  cefTAZidime, 2 g, intravenous, q12h INT  •  cetirizine, 10 mg, feeding tube, Nightly  •  chlorhexidine, 15 mL, Mouth/Throat, 2 times per day  •  cholecalciferol (vitamin D3), 1,000 Units, feeding tube, Daily  •  dexmedetomidine in 0.9 % NaCl, 0-1 mcg/kg/hr, intravenous, Titrated  •  glucose, 16-32 g of dextrose, oral, PRN **OR** dextrose, 15-30 g of dextrose, oral, PRN **OR** glucagon, 1 mg, intramuscular, PRN **OR** dextrose 50 % in water (D50), 25 mL, intravenous, PRN  •  fentaNYL, 50 mcg, intravenous, q4h PRN  •  furosemide, 60 mg, intravenous, BID (am, 4p)  •  guaiFENesin, 400 mg, oral, q6h GISELLE  •  heparin (porcine), 40-80 Units/kg (Adjusted), intravenous, q6h PRN  •  heparin infusion - MAR calculator by PTT, 100-4,000 Units/hr, intravenous, Titrated  •  hydrALAZINE, 5 mg, intravenous, q6h PRN  •  insulin lispro U-100, 1-10 Units, subcutaneous, q6h GISELLE  •  ipratropium HFA, 2 puff, inhalation, QID (8a, 12p, 4p, 8p)  •  magnesium oxide, 400 mg, Nasogastric, BID  •  magnesium sulfate, 2 g, intravenous, PRN  •  metoclopramide, 10 mg, intravenous, q6h PRN  •  metoprolol tartrate, 25 mg, feeding tube, BID  •  norepinephrine, 0-90 mcg/min, intravenous, Titrated  •   pantoprazole, 40 mg, intravenous, q12h GISELLE  •  potassium chloride, 20 mEq, oral, PRN  •  potassium chloride, 40 mEq, oral, PRN  •  potassium chloride, 20 mEq, intravenous, PRN  •  potassium chloride in water, 20 mEq, intravenous, PRN **AND** potassium chloride in water, 20 mEq, intravenous, PRN  •  silver sulfadiazine, , Topical, BID  •  sodium chloride 0.9 %, 5 mL/hr, intravenous, PRN  •  sodium chloride, 10 mL, intravenous, q8h INT  •  sodium chloride, 10 mL, intravenous, PRN  •  tamoxifen, 20 mg, feeding tube, Daily  •  vancomycin, 750 mg, intravenous, q12h INT **AND** [] IV Vancomycin Therapy by Pharmacy Protocol, , , Once    VITAL SIGNS  Vitals:    24 1420 24 1429 24 1431 24 1547   BP:       BP Location:       Pulse: (!) 48 74 74 79   Resp: 18 (!) 23 (!) 23 (!) 28   Temp:       TempSrc:       SpO2: 98% 99% 99% 99%   Weight:       Height:           VENTILATOR SETTINGS  Vent Mode: Pressure support  FiO2 (%) (Set):  [40 %] 40 %  S RR:  [18] 18  S VT:  [450 mL] 450 mL  PEEP/CPAP (Set, cmH2O):  [6 cm H20] 6 cm H20  MAP (Observed, cmH2O):  [11-14] 11    Oxygen:  Oxygen Therapy: Supplemental oxygen  O2 Delivery Method: Trach tube  FiO2 (%) (Set): 40 %  O2 Flow Rate (L/min): 6 L/min     INTAKE/OUTPUT    Intake/Output Summary (Last 24 hours) at 2024 1708  Last data filed at 2024 1700  Gross per 24 hour   Intake 1865.78 ml   Output 4400 ml   Net -2534.22 ml       Lines, Drains, Airways, Wounds:  PICC Triple Lumen 23 Right (Active)   Number of days: 7       Urethral Catheter 20 Fr (Active)   Number of days: 7       ETT  (Active)   Number of days: 5       Wound Venous Ulcer Left Pretibial (Active)   Number of days: 11       Wound Perineum (Active)   Number of days: 11       Wound Puncture Anterior;Proximal;Right Groin (Active)   Number of days: 7       Wound Pressure Injury Right Heel (Active)   Number of days: 7       Wound Pressure Injury Left Heel (Active)   Number of days:  4       Catheterization Site - Arterial Right Femoral 6 Fr. (Active)   Number of days: 10       Catheterization Site - Venous Right Femoral 6 Fr. (Active)   Number of days: 10         Physical Exam:  Physical Exam  Vitals and nursing note reviewed.   Constitutional:       Appearance: He is ill-appearing.      Comments: Somnolent   HENT:      Head: Normocephalic and atraumatic.      Mouth/Throat:      Comments: Orally intubated  Eyes:      Pupils: Pupils are equal, round, and reactive to light.   Cardiovascular:      Rate and Rhythm: Normal rate. Rhythm irregular.      Pulses: Normal pulses.      Heart sounds: Normal heart sounds. No murmur heard.  Pulmonary:      Comments: Coarse breath sounds bilaterally    Abdominal:      General: Abdomen is flat. Bowel sounds are normal.      Palpations: Abdomen is soft.   Musculoskeletal:         General: Swelling (Bilateral UE ) present.      Right lower leg: Edema present.      Left lower leg: Edema present.   Skin:     General: Skin is warm and dry.      Comments: R hand warmer today   Neurological:      Comments: Somnolent, but slightly more arousable today.  Moving left hand, bilateral lower extremities to command.  Withdraws on the right          Labs:  See Labs Below:  ABG  Results from last 7 days   Lab Units 12/30/23  2240 12/30/23  1520 12/30/23  1353   PH ART pH 7.41 7.30* 7.30*   PCO2 ART mm Hg 36 44 48   PO2 ART mm Hg 164* 83 102*   HCO3 ART mEQ/L 24 21 23   O2 SAT ART % 98 94 97   BASE EXC ART mEQ/L -1.6 -4.7 -3.0     CBC  Results from last 7 days   Lab Units 01/04/24  1413 01/04/24  0510 01/03/24  0321   WBC K/uL 8.76 6.35 6.18   RBC M/uL 2.60* 2.04* 2.39*   HEMOGLOBIN g/dL 8.3* 6.7* 7.8*   HEMATOCRIT % 26.2* 21.1* 25.0*   MCV fL 100.8* 103.4* 104.6*   MCH pg 31.9 32.8 32.6   MCHC g/dL 31.7* 31.8* 31.2*   PLATELETS K/uL 138* 116* 125*   RDW % 17.9* 15.7* 15.8*   MPV fL 12.2 11.9 11.7     BMP  Results from last 7 days   Lab Units 01/04/24  0510 01/03/24  0321  01/02/24  0400   SODIUM mEQ/L 139 141 143   POTASSIUM mEQ/L 3.7 3.8 4.0   CHLORIDE mEQ/L 108* 110* 113*   CO2 mEQ/L 25 24 21   BUN mg/dL 31* 37* 43*   CREATININE mg/dL 1.4* 1.4* 1.5*   CALCIUM mg/dL 8.0* 7.6* 8.2*   ALBUMIN g/dL 2.7* 2.3* 2.2*   BILIRUBIN TOTAL mg/dL 0.6 0.5 0.5   ALK PHOS IU/L 55 59 53   ALT IU/L 50 74* 95*   AST IU/L 28 34 37   GLUCOSE mg/dL 115* 159* 181*     Coag  Results from last 7 days   Lab Units 01/04/24  0510 01/03/24  0444 01/02/24  0400   PROTIME sec 14.3  --   --    INR  1.1  --   --    PTT sec 33 74* 77*       Imaging:   Ultrasound venous leg bilateral    Result Date: 1/4/2024  IMPRESSION: No DVT identified in either lower extremity.     Ultrasound venous arm left    Result Date: 1/3/2024  IMPRESSION: 1.  No evidence for deep vein thrombosis bilaterally. 2.  Superficial thrombus involving the right cephalic vein.    Ultrasound venous arm right    Result Date: 1/3/2024  IMPRESSION: 1.  No evidence for deep vein thrombosis bilaterally. 2.  Superficial thrombus involving the right cephalic vein.    X-RAY ABDOMEN 1 VIEW    Result Date: 1/3/2024  IMPRESSION: Dobbhoff tube enters below left hemidiaphragm with its tip likely in the region of the stomach.     X-RAY CHEST 1 VIEW    Result Date: 1/2/2024  IMPRESSION: Hazy opacity seen at right midlung, new from the prior study, may represent ammonia or aspiration. Endotracheal tube with the tip 9 mm above the leo. Consider repositioning.     MRI BRAIN WITHOUT CONTRAST    Result Date: 1/2/2024  IMPRESSION: No acute infarct. Mild chronic microvascular ischemia, moderate volume loss.    X-RAY ABDOMEN 1 VIEW    Result Date: 12/30/2023  IMPRESSION: Weighted feeding tube tip at the level of the proximal stomach.    CT HEAD WITHOUT IV CONTRAST    Result Date: 12/30/2023  IMPRESSION: No acute intracranial abnormality.  Chronic ischemic white matter changes are noted.    X-RAY CHEST 1 VIEW    Result Date: 12/30/2023  IMPRESSION: 1. The endotracheal  tube is lower in position situated 2 cm above level of the leo.    IR TUNNELED PICC PLACEMENT    Result Date: 12/29/2023  IMPRESSION: Successful placement of 5 Fr triple lumen tunneled PICC.     ULTRASOUND GUIDED VASCULAR ACCESS    Result Date: 12/29/2023  IMPRESSION: Successful placement of 5 Fr triple lumen tunneled PICC.     ULTRASOUND KIDNEYS BLADDER/NO POST VOID    Result Date: 12/28/2023  IMPRESSION: 1. No sonographic abnormality in the kidneys. 2. Nearly completely collapsed bladder as discussed below. No large intraluminal bladder thrombus/clot as clinically questioned (given the limitations of near complete bladder collapse). COMMENT: Real-time sonographic evaluation of the kidneys and bladder was performed. There is normal cortical echogenicity and corticomedullary differentiation. The right kidney measures 10.8 x 5.2 x 4.6 cm. The left kidney measures 10.8 x 5.8 x 4.3 cm. There is no hydronephrosis, calculi, masses or perinephric collections. Bladder is nearly completely collapsed, measuring approximately 2.5 x 1.3 x 2.5 cm, for a calculated volume of approximately 5.5 cc, precluding adequate evaluation for calculi or mass. No ureteral jets identified on color Doppler, perhaps due to renal dysfunction and/or hydration status.    X-RAY CHEST 1 VIEW    Result Date: 12/27/2023  IMPRESSION:  Reduced lung volumes. No acute cardiopulmonary process. Stable cardiac enlargement. COMPARISON: Portable chest studies 12/25 and 12/26/2023. COMMENT:  Upright portable imaging completed 0548 hours. Endotracheal tube 4.7 cm above. Enteric tube follows a normal course into left upper quadrant, directed to the left. Mild stable cardiac enlargement. Mitral annular prosthesis again noted. Stable hilar and mediastinal contours. Reduced lung volumes. No definite consolidation or pleural fluid. Unremarkable upper abdomen.    X-RAY CHEST 1 VIEW    Result Date: 12/26/2023  IMPRESSION: Endotracheal tube has been repositioned; the  tip is now approximately 3.3 cm above the leo. COMMENT: A semierect AP portable view the chest was performed at 1615 and is compared to a prior study from 1503. Since the prior study, the endotracheal tube has been repositioned and the tip is now approximately 3.3 cm above the leo. There has been no other significant interval change.    X-RAY CHEST 1 VIEW    Result Date: 12/26/2023  IMPRESSION: ET tube 2 cm above leo. NG tube tip in proximal stomach. Probable right lower lobe atelectasis; attention on follow-up.    X-RAY CHEST 1 VIEW    Result Date: 12/26/2023  IMPRESSION: Status post extubation. Slightly improved vascular congestion since earlier in the day. Suspect developing atelectasis or airspace disease in the right midlung zone. COMMENT: Semierect AP portable view of the chest was performed at 1428 and is compared to a prior study from 5:27 AM. In the interval, the endotracheal tube has been removed. An enteric tube remains in place with the tip in the stomach. There is a vascular catheter/line with the tip projecting near the junction of the IVC and right atrium; it is difficult to determine whether this was present previously but differences in technique. Mild cardiomegaly is again noted. Cardiac valvular prosthesis is again seen. The pulmonary vasculature and interstitial markings have improved slightly since earlier in the day. There is questionable developing airspace disease or atelectasis in the right midlung zone. Trace bilateral pleural effusions are suspected. The hilar and mediastinal structures appear stable. Surgical clips are again seen in the right axilla.    X-RAY CHEST 1 VIEW    Result Date: 12/26/2023  IMPRESSION: ET tube 4 cm above leo, NG tube tip not well seen, likely in proximal stomach. Stable cardiomegaly, mitral valve replacement. Clear lungs, with interstitial crowding secondary to low lung volumes.    X-RAY CHEST 1 VIEW    Result Date: 12/26/2023  IMPRESSION: Improved  pulmonary vascular congestion. ET tube 3 cm above leo, NG tube tip below inferior edge of film.    X-RAY CHEST 1 VIEW    Result Date: 12/25/2023  IMPRESSION: Interval retraction of the endotracheal tube now in satisfactory position, as below. Stable satisfactory positioning of the enteric tube. Progressive pulmonary interstitial edema with stable enlargement of the cardiac silhouette. No pneumothorax. COMMENT: Comparison: Chest x-ray 12/24/2023. Technique: A single frontal AP portable upright projection of the chest was obtained. FINDINGS: There has been interval retraction of the endotracheal tube now terminating 2.7 cm above the leo. An enteric tube courses to the stomach with the distal tip collimated from the field-of-view beneath the left hemidiaphragm in satisfactory position. There are defibrillator pad projecting over the left hemithorax. There are progressively increased interstitial opacities seen projecting over both lungs. There is no pleural effusion or pneumothorax. The cardiac silhouette is stably enlarged. The mediastinal contours are unchanged. Again noted is a prosthetic mitral valve. The upper abdomen is unremarkable. There is no acute osseous abnormality. Surgical clips overlie the right axillary region.     X-RAY CHEST 1 VIEW    Result Date: 12/25/2023  IMPRESSION: Satisfactory positioning of the endotracheal and enteric tubes. No pneumothorax. Stable pulmonary edema and enlargement of the cardiac silhouette. COMMENT: Comparison: Chest x-ray 12/25/2023. Technique: A single frontal AP portable upright projection of the chest was obtained. FINDINGS: The tip of an endotracheal tube terminates 4.0 cm above the leo. An enteric tube courses to the stomach with the distal tip collimated from the field-of-view beneath the left hemidiaphragm in satisfactory position. There are defibrillator pad projecting over the left hemithorax. There are mildly increased interstitial opacities again seen  projecting over both lungs. No pleural effusion or pneumothorax is seen. There is stable enlargement of the cardiac silhouette. Again noted is a prosthetic mitral valve. The mediastinal contours are unchanged. The upper abdomen is unremarkable. There is no acute osseous abnormality. There are surgical clips overlying the right axillary region.     X-RAY CHEST 1 VIEW    Result Date: 12/25/2023  IMPRESSION: Low-lying endotracheal tube terminating over the proximal right mainstem bronchus. Recommend retraction. Satisfactory positioning of the enteric tube. This finding and recommendation was discussed with nurse Shoemaker at 11:27 AM on 12/25/2023 by Dr. Martinez by telephone. Mild pulmonary interstitial edema and stable enlargement of the cardiac silhouette. No pneumothorax. COMMENT: Comparison: Chest x-ray 12/24/2023. Technique: A single frontal AP portable upright projection of the chest was obtained. FINDINGS: The tip of the intervally placed endotracheal tube terminates over the proximal right mainstem bronchus. The tip of the enteric tube terminates over the left upper quadrant of the abdomen. There are mildly increased interstitial opacities noted within both lungs. There is no pleural effusion or pneumothorax. There is stable enlargement of the cardiac silhouette. Again noted is a mitral valve prosthesis. Surgical clips overlie the right axilla. The upper abdomen is unremarkable. There is no acute osseous abnormality.    X-RAY CHEST 1 VIEW    Result Date: 12/25/2023  IMPRESSION: Vascular congestion.  Left basal atelectasis. COMMENT: AP radiograph the chest is compared to previous study dated 8/17/2023. There is vascular congestion and small effusions.  Findings may represent mild congestive heart failure.  Cardiac silhouette is unchanged.  Prosthetic valve ring seen.  Surgical staples seen in the right axilla.  There is minimal left basal atelectasis.    CT ANGIOGRAPHY CHEST PULMONARY EMBOLISM WITH IV  CONTRAST    Result Date: 12/24/2023  IMPRESSION: 1. No pulmonary embolism in the main or proximal segmental pulmonary arteries. 2. Right upper lobe and left lower lobe infiltrate with patchy airspace opacities in the right middle lobe.      Micro:   Microbiology Results     Procedure Component Value Units Date/Time    Blood Culture Blood, Venous [[id number]]  (Normal) Collected: 01/01/24 1201    Specimen: Blood, Venous Updated: 01/03/24 1701     Culture No growth at 48 hours    Blood Culture Blood, Venous [[id number]]  (Normal) Collected: 01/01/24 1201    Specimen: Blood, Venous Updated: 01/03/24 1701     Culture No growth at 48 hours    Sputum culture / smear Tracheal Aspirate [[id number]]  (Abnormal) Collected: 01/01/24 0937    Specimen: Tracheal Aspirate Updated: 01/03/24 0744    Narrative:      The following orders were created for panel order Sputum culture / smear Tracheal Aspirate.  Procedure                               Abnormality         Status                     ---------                               -----------         ------                     Sputum Gram Stain (Lab O...[[id number]]                      Final result               Sputum Culture (Lab Only...[[id number]]  Abnormal            Final result                 Please view results for these tests on the individual orders.    Sputum Gram Stain (Lab Only) Tracheal Aspirate [[id number]] Collected: 01/01/24 0937    Specimen: Tracheal Aspirate Updated: 01/01/24 1817     Gram Stain Result 4+ WBC      1+ Epithelial cells      No organisms seen    Sputum Culture (Lab Only) Tracheal Aspirate [[id number]]  (Abnormal) Collected: 01/01/24 0937    Specimen: Tracheal Aspirate Updated: 01/03/24 0744     Culture **Positive Culture**      1+ Yeast, No Further Identification      No Normal Park    Sputum culture / smear Expectorated Sputum [[id number]]  (Abnormal) Collected: 12/27/23 1417    Specimen: Bronch Lavage from Expectorated Sputum Updated: 12/29/23 0723     Narrative:      The following orders were created for panel order Sputum culture / smear Expectorated Sputum.  Procedure                               Abnormality         Status                     ---------                               -----------         ------                     Sputum Gram Stain (Lab O...[[id number]]                      Final result               Sputum Culture (Lab Only...[[id number]]  Abnormal            Final result                 Please view results for these tests on the individual orders.    Sputum Gram Stain (Lab Only) Expectorated Sputum [[id number]] Collected: 12/27/23 1417    Specimen: Bronch Lavage from Expectorated Sputum Updated: 12/27/23 2032     Gram Stain Result 2+ WBC      No Epithelial Cells Seen      No organisms seen    Sputum Culture (Lab Only) Expectorated Sputum [[id number]]  (Abnormal) Collected: 12/27/23 1417    Specimen: Bronch Lavage from Expectorated Sputum Updated: 12/29/23 0723     Culture **Positive Culture**      1+ Yeast, No Further Identification    Sputum culture / smear Expectorated Sputum [[id number]] Collected: 12/25/23 0418    Specimen: Aspirate from Expectorated Sputum Updated: 12/27/23 0845    Narrative:      The following orders were created for panel order Sputum culture / smear Expectorated Sputum.  Procedure                               Abnormality         Status                     ---------                               -----------         ------                     Sputum Gram Stain (Lab O...[[id number]]                      Final result               Sputum Culture (Lab Only...[[id number]]  Normal              Final result                 Please view results for these tests on the individual orders.    Sputum Gram Stain (Lab Only) Expectorated Sputum [[id number]] Collected: 12/25/23 0418    Specimen: Aspirate from Expectorated Sputum Updated: 12/25/23 0997     Gram Stain Result 3+ WBC      No Epithelial Cells Seen      3+ Gram positive bacilli       2+ Gram positive cocci in pairs, chains and clusters    Sputum Culture (Lab Only) Expectorated Sputum [[id number]]  (Normal) Collected: 12/25/23 0418    Specimen: Aspirate from Expectorated Sputum Updated: 12/27/23 0845     Culture Normal Park    MRSA Screen, Nares Only Nose [[id number]]  (Normal) Collected: 12/25/23 0352    Specimen: Nasal Swab from Nose Updated: 12/25/23 0906     MRSA DNA, Nares Negative    Blood Culture Blood, Venous [[id number]]  (Normal) Collected: 12/24/23 1652    Specimen: Blood, Venous Updated: 12/29/23 0000     Culture No growth at 96 hours    SARS-CoV-2 (COVID-19) Nasopharynx [[id number]]  (Abnormal) Collected: 12/24/23 1649    Specimen: Nasopharyngeal Swab from Nasopharynx Updated: 12/24/23 1736    Narrative:      The following orders were created for panel order SARS-CoV-2 (COVID-19) Nasopharynx.  Procedure                               Abnormality         Status                     ---------                               -----------         ------                     SARS-COV-2 (COVID-19)/ F...[[id number]]  Abnormal            Final result                 Please view results for these tests on the individual orders.    SARS-COV-2 (COVID-19)/ FLU A/B, AND RSV, PCR Nasopharynx [[id number]]  (Abnormal) Collected: 12/24/23 1649    Specimen: Nasopharyngeal Swab from Nasopharynx Updated: 12/24/23 1736     SARS-CoV-2 (COVID-19) Negative     Influenza A Positive     Influenza B Negative     Respiratory Syncytial Virus Negative    Narrative:      Testing performed using real-time PCR for detection of COVID-19. EUA approved validation studies performed on site.     Blood Culture Blood, Venous [[id number]]  (Normal) Collected: 12/24/23 1647    Specimen: Blood, Venous Updated: 12/29/23 0100     Culture No growth at 96 hours          ECG/Telemetry  I have independently reviewed the telemetry. No events for the last 24 hours.         ASSESSMENT    73 y.o. male with history atrial fibrillation, CHF,  prostate and breast CA, history of GI bleed who presented to the emergency room with shortness of breath and found to be influenza A positive. During day 2 of his hospital stay he had a v fib arrest with ROSC. He was transferred to the ICU for further monitoring     PLAN   # Acute Hypoxic respiratory failure  - Failed Bipap on admission, became bradycardic and was intubated  - Extubated 12/26, however became hypoxic, ?Secondary to upper airway edema vs acute bronchospasm vs flash pulmonary edema  - Treated with lasix 40 mg and solumedrol q 6  - Ultimately required re-intubation 12/26. Anesthesia noted some swelling around cords  - S/p bronchoscopy post re-intubation 12/27 which noted patient 'biting on tube'  - Initially passed SBT 12/30, was extubated to nasal cannula, failed HF and BIPAP and ultimately required re-intubation in the setting of hypoxia and poor mental status  - Sedation changed to Precedex with boluses of fentanyl and versed as needed  - He is volume overloaded, with resolving ELEANOR.      -Continue lasix 60 mg BID   -Decision made to proceed with trach/PEG, which was performed today  -Continue PS trials     # Shock  - Improved, briefly required Levophed this morning, which was quickly titrated off,  maintain MAP > 65     # S/p V fib cardiac arrest  - Likely related to Takosubo's cardiomyopthy, EF 40-45%, now improved to 60-65%  - ROSC achieved after receiving 3 epi, 3 shocks, 2 bicarb, and Magnesium replacement  - Following commands post-arrest. No TTM  - Taken by interventional cardiology for the placement of a temporary pacemaker  - Also noted no significant CAD  - PPM removed secondary to dyssynchrony and misplacement. No further marcellus arrythmias, removed 12/27  - Per cardiology. if patient has further episodes of polymorphic ventricular tachycardia, resume IV lidocaine  - Mental status remains poor; possibly delirium vs 2/2 sedatives vs less likely anoxic encephalopathy.   - CTH 12/30 negative.   MRI brain 1/2/24 with no acute infarct.  Mild chronic microvascular ischemia.  No evidence of anoxia     # Takotsubo Cardiomyopathy  - TTE 12/26 suggestive of Takotsubos with EF 40-45%, confirmed by LV gram   - Off milrinone 12/29  - Repeat TTE 1/1 with EF 65%  - Cardiology following     # Acute kidney injury  - Renal function slowly improving. Initial creatinine 1.2, peaked 2.3, now stable at 1.4  - Baseline creatinine appears to be 1.2-1.4  - ?Secondary to IV dye load s/p cardiac cath vs over-diuresis   - Monitor BMP  - Cr trending down  - Dosing with lasix today as above, tolerating diuresis     # Elevated liver enzymes  - Likely in setting of hypotension, shock liver  - Serial labs, trending down.  - Monitor CMP     # Pneumonia  - CT Chest 12/24 with left lower lobe infiltrate and right upper lobe infiltrate  - Blood/ sputum cultures negative   - Completed course of zosyn 12/31.   - Febrile again last night. Tmax 100.5F, repeat Bcx, sputum cx with 1+ Yeast  -Given Cxr on 1/2 with new hazy opacity seen in the right lung  - Cont ceftaz/Vanc for now (day 2)     # Afib  - rate controlled  - EP following  - Continue IV heparin  - Continue amiodarone     # Influenza A  - Completed 5 day course Oseltamivir      # Prostate/breast cancer  - S/p radiation therapy for prostate cancer spring 2023  - S/p R mastectomy 8/2023 and radiation therapy 9/2023  - Continue tamoxifen     #Generalized Edema  -Today with UE edema, Right hand swollen and cool on exam, strong radial pulse, cap refill 2-3 seconds  -Bilateral UE ultrasounds 1/3  with R superficial thrombus involving right cephalic vein  -Will order bilateral LE ultrasounds today    #Anemia  -Hgb down to 6.7 this am   -Given 1 unit PRBC   -Repeat CBC this afternoon   -Follow daily CBC  -Transfuse for Hgb less than 7.0 or less than 8.0 with active bleeding     VTE Prophylaxis Plan: SCDs, SQH (held for trach/peg placement)  GI Prophylaxis Plan: Protonix  Lines/Drains/Airway: R  PICC (12/28), DHT, jones, FMS, ETT  Fluids/Electrolytes/Nutrition: TF     Disposition Planning: Remain in the ICU    CODE STATUS  Full Code       The case was reviewed this morning at the patient's beside multidisciplinary rounds with the patient's nurse, dietician, pharmacist, respiratory therapist, physical therapist and critical care nurse coordinator. The patient's clinical status with regards to diagnosis, treatment plans including disposition of any IV, arterial lines, Jones and tubes were discussed, as well as dietary, respiratory therapy and mobilization needs.     This case was discussed with consultants.    Total critical time spent managing acute hypoxic respiratory failure, totaling 35 minutes.    This note was scribed by Ishaan Juares PA-C in my presence on my behalf.       Megan Rico DO  1/4/2024  5:08 PM

## 2024-01-04 NOTE — PROGRESS NOTES
Daily Progress Note (LOS: 11)    ?    Interval History: Patient remains sedated and intubated.  He is on Levophed for relative hypotension.  He is scheduled to undergo tracheostomy and PEG tube placement today.          Current Medications:  • amiodarone  200 mg feeding tube Daily   • atorvastatin  10 mg oral Nightly   • cefTAZidime  2 g intravenous q12h INT   • cetirizine  10 mg feeding tube Nightly   • chlorhexidine  15 mL Mouth/Throat 2 times per day   • cholecalciferol (vitamin D3)  1,000 Units feeding tube Daily   • furosemide  60 mg intravenous BID (am, 4p)   • guaiFENesin  400 mg oral q6h GISELLE   • insulin lispro U-100  1-10 Units subcutaneous q6h GISELLE   • ipratropium HFA  2 puff inhalation QID (8a, 12p, 4p, 8p)   • magnesium oxide  400 mg Nasogastric BID   • metoprolol tartrate  25 mg feeding tube BID   • pantoprazole  40 mg intravenous q12h GISELLE   • silver sulfadiazine   Topical BID   • sodium chloride  10 mL intravenous q8h INT   • tamoxifen  20 mg feeding tube Daily   • vancomycin  750 mg intravenous q12h INT       Physical Exam:  Vitals:    01/04/24 0800   BP: (!) 83/51   Pulse: (!) 58   Resp: 18   Temp:    SpO2: 99%       General appearance: Intubated and sedated   head: without obvious abnormality  Eyes: PERRLA, EOM's intact  Lungs: clear to auscultation bilaterally, no rales or wheezing on ventilator  Heart: Distant heart sounds abdomen: soft, non-tender; bowel sounds normal; no masses  Extremities: Peripheral edema   skin: Skin color, texture, turgor normal. No rashes or lesions  Neurologic: Intubated and sedated  Psychiatric: Sedated endocrine: No thyromegaly    I&Os:    Intake/Output Summary (Last 24 hours) at 1/4/2024 0842  Last data filed at 1/4/2024 0600  Gross per 24 hour   Intake 2782.84 ml   Output 3570 ml   Net -787.16 ml       Weights:   Wt Readings from Last 3 Encounters:   01/04/24 89.4 kg (197 lb 1.5 oz)   12/07/23 80.3 kg (177 lb)   11/27/23 80.3 kg (177 lb)       Labs:  Results from last 7  "days   Lab Units 01/04/24  0510 01/03/24  0321 01/02/24  0400   SODIUM mEQ/L 139 141 143   POTASSIUM mEQ/L 3.7 3.8 4.0   CO2 mEQ/L 25 24 21   BUN mg/dL 31* 37* 43*   CREATININE mg/dL 1.4* 1.4* 1.5*   CALCIUM mg/dL 8.0* 7.6* 8.2*   ALT IU/L 50 74* 95*   AST IU/L 28 34 37     Results from last 7 days   Lab Units 01/04/24  0510 01/03/24  0321 01/02/24  0400   WBC K/uL 6.35 6.18 4.83   HEMOGLOBIN g/dL 6.7* 7.8* 7.6*   HEMATOCRIT % 21.1* 25.0* 24.0*   PLATELETS K/uL 116* 125* 101*     Results from last 7 days   Lab Units 01/04/24  0510 01/03/24  0444 01/02/24  0400 12/28/23  2346 12/28/23  1659   PTT sec 33 74* 77*   < > 31   INR  1.1  --   --   --  1.1   PROTIME sec 14.3  --   --   --  13.7    < > = values in this interval not displayed.         No lab exists for component: \"CPK\"  0   Lab Value Date/Time    BNP 1,135 (H) 12/24/2023 1647     (H) 02/07/2023 1326     (H) 10/19/2022 1305       Data Review:  Telemetry Review: Sinus rhythm with deep T wave inversions    Plan:  1.  VF arrest / Takatsubo CM / Abnormal EKG / QT prolongation:  - Thought to be secondary toTakotsubo syndrome with torsade de points.    - QT interval remains prolonged with deep T wave inversions.   - He remains at risk of further episodes of polymorphic ventricular tachycardia.  If he has any further issues, will resume IV lidocaine.    - Echo yesterday shows FULL resolution of CM (EF now 60-65%).     2.  Hypoxemia:  -Unsuccessful extubation  - Tracheostomy today    3.  Atrial fibrillation:  - Remains in sinus rhythm.  - Continue on oral/nasogastric amiodarone and IV heparin.     4.  Hypertension:  - At goal on metoprolol.     5. CHF:  - Undergoing diuresis. Continue 40 IV BID for today.  Patient's renal function is improving.  His sodium has come down.    6.  Anemia  Hemoglobin down to 6.7 g%.  Patient will need to be transfused.  Because of anemia needs to be further investigated.  Check Hemoccult.  Patient is already on intravenous " pantoprazole.  Electronic signature  Michele Estrada MD   01/04/24

## 2024-01-04 NOTE — ANESTHESIA POSTPROCEDURE EVALUATION
Patient: Cam Rosas    Procedure Summary     Date: 01/04/24 Room / Location:  OR 2 / PH OR    Anesthesia Start: 1122 Anesthesia Stop:     Procedures:       TRACHEOSTOMY (Neck)      GASTROSTOMY PERCUTANEOUS ENDOSCOPIC (Abdomen) Diagnosis:       Influenza A      Cardiac arrest (CMS/HCC)      Moderate protein-calorie malnutrition (CMS/HCC)      (Influenza A [J10.1])      (Cardiac arrest (CMS/HCC) [I46.9])      (Moderate protein-calorie malnutrition (CMS/HCC) [E44.0])    Surgeons: Edison Oneil MD Responsible Provider: Martita Fried MD    Anesthesia Type: general ASA Status: 4          Anesthesia Type: general  PACU Vitals    No data found in the last 10 encounters.           Anesthesia Post Evaluation    Pain management: adequate  Cardiovascular status: acceptable  Airway Patency: adequate  Respiratory status: acceptable  Hydration status: acceptable  Anesthetic complications: no  Comments: Pt to go direct to icu ventilated   stable

## 2024-01-04 NOTE — OR SURGEON
Pre-Procedure patient identification:  I am the primary operating surgeon/proceduralist and I have reviewed the applicable pathology reports and radiology studies for this procedure. I have identified the patient on 01/04/24 at 10:47 AM Ezequiel alfaro

## 2024-01-04 NOTE — PLAN OF CARE
Care Coordination Discharge Plan Note     Discharge Needs Assessment  Concerns to be Addressed: adjustment to diagnosis/illness, care coordination/care conferences, discharge planning  Current Discharge Risk: chronically ill, physical impairment    Anticipated Discharge Plan  Anticipated Discharge Disposition: skilled nursing facility, home with home health  Type of Home Care Services: home OT, home PT, nursing    Type of Skilled Nursing Care Services: SLP, OT, PT, nursing        Patient Choice  Offered/Gave Vendor List: yes  Patient's Choice of Community Agency(s): discussed with family that care coordiantin will follow for approrpaite aftecare needs    Patient and/or patient guardian/advocate was made aware of their right to choose a provider. A list of eligible providers was presented and reviewed with the patient and/or patient guardian/advocate in written and/or verbal form. The list delineates providers in the patient’s desired geographic area who are participating in the Medicare program and/or providers contracted with the patient’s primary insurance. The Medicare list and quality ratings were obtained from the Medicare.gov [medicare.gov] website.    ---------------------------------------------------------------------------------------------------------------------    Interdisciplinary Discharge Plan Review:  Participants:     Concerns Comments: shireen met with patient & his 2 children at bedside & spouse. shireen went over role of care coordiatnon & d/c plans. shireen confirmed pcp & pharamcy. [facility]. david has hx of prostate cancer & was receing treatment in past. per son his brother has been staying at patient's home to assist with care fro david. david stays mostly in family room . shireen discussed with wife that care coordination will continue to follow for appropriate aftecare needs. patient is currently on vent     1/4 patient is s/p trach & peg yesterday. Shireen stopped by  patient's room & met with wife & offered support. Wife discussed journey with patient. Patient seemed a little more alert. Sw discussed with team that patient could be ltac appropriate . Support offered wife.     Discharge Plan:   Disposition/Destination:   /    Discharge Facility:    Community Resources:      Discharge Transportation:  Is Out of Hospital DNR needed at Discharge: no  Does patient need discharge transport?

## 2024-01-04 NOTE — OP NOTE
Preop diagnosis respiratory failure and inanition    Postoperative diagnosis same    Procedure performed open tracheostomy tube placement and gastrostomy tube placement    Surgeon GORDY TODD    Assistant Herber Bliss    Blood loss minimal    General endotracheal and anesthesia    No pathology specimens    8.  Shiley tracheostomy tube    20.  Turkmen gastrostomy tube    Patient was brought to the operating room correctly identified placed on the operative table in supine position with appropriate monitoring devices in place underwent full induction of general anesthesia and had his neck prepped and draped in usual sterile fashion a timeout was performed and then a horizontal incision was created dissection was carried through the platysma and the strap muscles divided along the midline pretracheal fascia was incised allowing access to the third tracheal ring tracheotomy was performed the endotracheal tube pulled back above the level of the tracheotomy dilatation performed and then a #8 Shiley tracheostomy tube was placed patient was oxygenated and ventilated well it was secured with a standard trach ties.    Next the patient underwent an upper endoscopy which revealed no mucosal lesions in esophagus or stomach stomach was fully insufflated the abdominal wall easily transilluminated and interdigitated and needle was passed through the prepped abdominal wall into the gastric lumen a wire through the needle was grasped by the endoscope brought out to the oropharynx PEG tube was applied drawback through the alimentary tract and out through the abdominal wall was secured at 2-1/2 cm position standard flange devices are in place for endoscopy confirmed good placement of the tube.    At the conclusion of the PEG tube it was noted the patient had a significant leak from his tracheostomy tube the cuff balloon refused to hold pressure as there is most likely a valve in his function.  We cut our trach ties passed a wire  distally and exchanged the trach to a new #8 Shiley tracheostomy that held pressure.

## 2024-01-05 ENCOUNTER — APPOINTMENT (INPATIENT)
Dept: RADIOLOGY | Facility: HOSPITAL | Age: 74
DRG: 004 | End: 2024-01-05
Payer: MEDICARE

## 2024-01-05 LAB
ALBUMIN SERPL-MCNC: 2.7 G/DL (ref 3.5–5.7)
ALP SERPL-CCNC: 64 IU/L (ref 34–125)
ALT SERPL-CCNC: 47 IU/L (ref 7–52)
ANION GAP SERPL CALC-SCNC: 12 MEQ/L (ref 3–15)
APTT PPP: 104 SEC (ref 23–35)
APTT PPP: 67 SEC (ref 23–35)
APTT PPP: 96 SEC (ref 23–35)
AST SERPL-CCNC: 32 IU/L (ref 13–39)
BACTERIA BLD CULT: NORMAL
BACTERIA BLD CULT: NORMAL
BASOPHILS # BLD: 0.01 K/UL (ref 0.01–0.1)
BASOPHILS NFR BLD: 0.1 %
BILIRUB SERPL-MCNC: 0.7 MG/DL (ref 0.3–1.2)
BUN SERPL-MCNC: 29 MG/DL (ref 7–25)
CALCIUM SERPL-MCNC: 7.9 MG/DL (ref 8.6–10.3)
CHLORIDE SERPL-SCNC: 104 MEQ/L (ref 98–107)
CO2 SERPL-SCNC: 25 MEQ/L (ref 21–31)
CREAT SERPL-MCNC: 1.6 MG/DL (ref 0.7–1.3)
CROSSMATCH: NORMAL
DIFFERENTIAL METHOD BLD: ABNORMAL
EGFRCR SERPLBLD CKD-EPI 2021: 45.2 ML/MIN/1.73M*2
EOSINOPHIL # BLD: 0.06 K/UL (ref 0.04–0.54)
EOSINOPHIL NFR BLD: 0.8 %
ERYTHROCYTE [DISTWIDTH] IN BLOOD BY AUTOMATED COUNT: 17.6 % (ref 11.6–14.4)
GLUCOSE BLD-MCNC: 156 MG/DL (ref 70–99)
GLUCOSE BLD-MCNC: 172 MG/DL (ref 70–99)
GLUCOSE BLD-MCNC: 183 MG/DL (ref 70–99)
GLUCOSE SERPL-MCNC: 149 MG/DL (ref 70–99)
HCT VFR BLDCO AUTO: 25 % (ref 40.1–51)
HGB BLD-MCNC: 8.1 G/DL (ref 13.7–17.5)
IMM GRANULOCYTES # BLD AUTO: 0.08 K/UL (ref 0–0.08)
IMM GRANULOCYTES NFR BLD AUTO: 1.1 %
ISBT CODE: 9500
LYMPHOCYTES # BLD: 0.54 K/UL (ref 1.2–3.5)
LYMPHOCYTES NFR BLD: 7.3 %
MCH RBC QN AUTO: 32.1 PG (ref 28–33.2)
MCHC RBC AUTO-ENTMCNC: 32.4 G/DL (ref 32.2–36.5)
MCV RBC AUTO: 99.2 FL (ref 83–98)
MONOCYTES # BLD: 0.5 K/UL (ref 0.3–1)
MONOCYTES NFR BLD: 6.7 %
NEUTROPHILS # BLD: 6.23 K/UL (ref 1.7–7)
NEUTS SEG NFR BLD: 84 %
NRBC BLD-RTO: 0.4 %
PDW BLD AUTO: 11 FL (ref 9.4–12.4)
PLATELET # BLD AUTO: 139 K/UL (ref 150–350)
POCT TEST: ABNORMAL
POTASSIUM SERPL-SCNC: 3.4 MEQ/L (ref 3.5–5.1)
PRODUCT CODE: NORMAL
PRODUCT STATUS: NORMAL
PROT SERPL-MCNC: 5.1 G/DL (ref 6–8.2)
RBC # BLD AUTO: 2.52 M/UL (ref 4.5–5.8)
SODIUM SERPL-SCNC: 141 MEQ/L (ref 136–145)
SPECIMEN EXP DATE BLD: NORMAL
UNIT ABO: NORMAL
UNIT ID: NORMAL
UNIT RH: NEGATIVE
WBC # BLD AUTO: 7.42 K/UL (ref 3.8–10.5)

## 2024-01-05 PROCEDURE — 94003 VENT MGMT INPAT SUBQ DAY: CPT

## 2024-01-05 PROCEDURE — 63600000 HC DRUGS/DETAIL CODE: Mod: JZ

## 2024-01-05 PROCEDURE — 94640 AIRWAY INHALATION TREATMENT: CPT

## 2024-01-05 PROCEDURE — 25800000 HC PHARMACY IV SOLUTIONS

## 2024-01-05 PROCEDURE — 63600000 HC DRUGS/DETAIL CODE: Mod: JZ | Performed by: INTERNAL MEDICINE

## 2024-01-05 PROCEDURE — 63600000 HC DRUGS/DETAIL CODE: Mod: JW

## 2024-01-05 PROCEDURE — 85730 THROMBOPLASTIN TIME PARTIAL: CPT | Performed by: STUDENT IN AN ORGANIZED HEALTH CARE EDUCATION/TRAINING PROGRAM

## 2024-01-05 PROCEDURE — 25000000 HC PHARMACY GENERAL

## 2024-01-05 PROCEDURE — 63700000 HC SELF-ADMINISTRABLE DRUG

## 2024-01-05 PROCEDURE — 71045 X-RAY EXAM CHEST 1 VIEW: CPT

## 2024-01-05 PROCEDURE — 25800000 HC PHARMACY IV SOLUTIONS: Performed by: INTERNAL MEDICINE

## 2024-01-05 PROCEDURE — 80053 COMPREHEN METABOLIC PANEL: CPT

## 2024-01-05 PROCEDURE — 85025 COMPLETE CBC W/AUTO DIFF WBC: CPT

## 2024-01-05 PROCEDURE — 36415 COLL VENOUS BLD VENIPUNCTURE: CPT

## 2024-01-05 PROCEDURE — 20000000 HC ROOM AND CARE ICU

## 2024-01-05 PROCEDURE — 85730 THROMBOPLASTIN TIME PARTIAL: CPT | Performed by: INTERNAL MEDICINE

## 2024-01-05 RX ORDER — FUROSEMIDE 10 MG/ML
40 INJECTION INTRAMUSCULAR; INTRAVENOUS
Status: DISCONTINUED | OUTPATIENT
Start: 2024-01-05 | End: 2024-01-05

## 2024-01-05 RX ORDER — FUROSEMIDE 10 MG/ML
60 INJECTION INTRAMUSCULAR; INTRAVENOUS DAILY
Status: DISCONTINUED | OUTPATIENT
Start: 2024-01-05 | End: 2024-01-06

## 2024-01-05 RX ADMIN — Medication 400 MG: at 19:59

## 2024-01-05 RX ADMIN — CEFTAZIDIME 2 G: 2 INJECTION, POWDER, FOR SOLUTION INTRAVENOUS at 09:16

## 2024-01-05 RX ADMIN — Medication 10 ML: at 11:01

## 2024-01-05 RX ADMIN — SILVER SULFADIAZINE: 10 CREAM TOPICAL at 19:59

## 2024-01-05 RX ADMIN — CHOLECALCIFEROL TAB 25 MCG (1000 UNIT) 1000 UNITS: 25 TAB at 09:16

## 2024-01-05 RX ADMIN — INSULIN LISPRO 1 UNITS: 100 INJECTION, SOLUTION INTRAVENOUS; SUBCUTANEOUS at 05:15

## 2024-01-05 RX ADMIN — HEPARIN SODIUM 1200 UNITS/HR: 10000 INJECTION, SOLUTION INTRAVENOUS at 10:37

## 2024-01-05 RX ADMIN — GUAIFENESIN 400 MG: 100 SOLUTION ORAL at 05:09

## 2024-01-05 RX ADMIN — AMIODARONE HYDROCHLORIDE 200 MG: 200 TABLET ORAL at 09:15

## 2024-01-05 RX ADMIN — GUAIFENESIN 400 MG: 100 SOLUTION ORAL at 23:47

## 2024-01-05 RX ADMIN — INSULIN LISPRO 1 UNITS: 100 INJECTION, SOLUTION INTRAVENOUS; SUBCUTANEOUS at 12:36

## 2024-01-05 RX ADMIN — CHLORHEXIDINE GLUCONATE ORAL RINSE 15 ML: 1.2 SOLUTION DENTAL at 21:56

## 2024-01-05 RX ADMIN — PANTOPRAZOLE SODIUM 40 MG: 40 INJECTION, POWDER, LYOPHILIZED, FOR SOLUTION INTRAVENOUS at 20:58

## 2024-01-05 RX ADMIN — Medication 10 ML: at 19:58

## 2024-01-05 RX ADMIN — METOPROLOL TARTRATE 25 MG: 25 TABLET, FILM COATED ORAL at 19:59

## 2024-01-05 RX ADMIN — IPRATROPIUM BROMIDE 2 PUFF: 17 AEROSOL, METERED RESPIRATORY (INHALATION) at 08:00

## 2024-01-05 RX ADMIN — METOPROLOL TARTRATE 25 MG: 25 TABLET, FILM COATED ORAL at 09:16

## 2024-01-05 RX ADMIN — ATORVASTATIN CALCIUM 10 MG: 10 TABLET, FILM COATED ORAL at 21:55

## 2024-01-05 RX ADMIN — Medication 400 MG: at 09:16

## 2024-01-05 RX ADMIN — GUAIFENESIN 400 MG: 100 SOLUTION ORAL at 12:37

## 2024-01-05 RX ADMIN — FUROSEMIDE 60 MG: 10 INJECTION, SOLUTION INTRAMUSCULAR; INTRAVENOUS at 09:15

## 2024-01-05 RX ADMIN — VANCOMYCIN HYDROCHLORIDE 750 MG: 750 INJECTION, POWDER, LYOPHILIZED, FOR SOLUTION INTRAVENOUS at 11:00

## 2024-01-05 RX ADMIN — DEXMEDETOMIDINE 0.2 MCG/KG/HR: 200 INJECTION, SOLUTION INTRAVENOUS at 04:30

## 2024-01-05 RX ADMIN — INSULIN LISPRO 1 UNITS: 100 INJECTION, SOLUTION INTRAVENOUS; SUBCUTANEOUS at 17:05

## 2024-01-05 RX ADMIN — Medication 10 ML: at 03:45

## 2024-01-05 RX ADMIN — GUAIFENESIN 400 MG: 100 SOLUTION ORAL at 18:03

## 2024-01-05 RX ADMIN — CEFTRIAXONE SODIUM 1 G: 1 INJECTION, POWDER, FOR SOLUTION INTRAMUSCULAR; INTRAVENOUS at 12:39

## 2024-01-05 RX ADMIN — IPRATROPIUM BROMIDE 2 PUFF: 17 AEROSOL, METERED RESPIRATORY (INHALATION) at 14:33

## 2024-01-05 RX ADMIN — Medication 10 MG: at 21:55

## 2024-01-05 RX ADMIN — SILVER SULFADIAZINE: 10 CREAM TOPICAL at 09:16

## 2024-01-05 RX ADMIN — CHLORHEXIDINE GLUCONATE ORAL RINSE 15 ML: 1.2 SOLUTION DENTAL at 10:00

## 2024-01-05 RX ADMIN — IPRATROPIUM BROMIDE 2 PUFF: 17 AEROSOL, METERED RESPIRATORY (INHALATION) at 11:01

## 2024-01-05 RX ADMIN — ACETAMINOPHEN 650 MG: 650 SOLUTION ORAL at 00:03

## 2024-01-05 RX ADMIN — PANTOPRAZOLE SODIUM 40 MG: 40 INJECTION, POWDER, LYOPHILIZED, FOR SOLUTION INTRAVENOUS at 09:15

## 2024-01-05 RX ADMIN — POTASSIUM CHLORIDE 40 MEQ: 750 TABLET, EXTENDED RELEASE ORAL at 06:10

## 2024-01-05 RX ADMIN — IPRATROPIUM BROMIDE 2 PUFF: 17 AEROSOL, METERED RESPIRATORY (INHALATION) at 20:56

## 2024-01-05 RX ADMIN — FENTANYL CITRATE 50 MCG: 0.05 INJECTION, SOLUTION INTRAMUSCULAR; INTRAVENOUS at 20:49

## 2024-01-05 RX ADMIN — TAMOXIFEN CITRATE 20 MG: 10 TABLET, FILM COATED ORAL at 09:16

## 2024-01-05 NOTE — PROGRESS NOTES
Daily Progress Note (LOS: 12)    ?    Interval History: Patient remains on the ventilator.  He has undergone placement of a tracheostomy tube.  He is not following commands.  He is sedated.        Current Medications:  • amiodarone  200 mg feeding tube Daily   • atorvastatin  10 mg oral Nightly   • cefTAZidime  2 g intravenous q12h INT   • cetirizine  10 mg feeding tube Nightly   • chlorhexidine  15 mL Mouth/Throat 2 times per day   • cholecalciferol (vitamin D3)  1,000 Units feeding tube Daily   • furosemide  60 mg intravenous BID (am, 4p)   • guaiFENesin  400 mg oral q6h GISELLE   • insulin lispro U-100  1-10 Units subcutaneous q6h GISELLE   • ipratropium HFA  2 puff inhalation QID (8a, 12p, 4p, 8p)   • magnesium oxide  400 mg Nasogastric BID   • metoprolol tartrate  25 mg feeding tube BID   • pantoprazole  40 mg intravenous q12h GISELLE   • silver sulfadiazine   Topical BID   • sodium chloride  10 mL intravenous q8h INT   • tamoxifen  20 mg feeding tube Daily   • vancomycin  750 mg intravenous q12h INT       Physical Exam:  Vitals:    01/05/24 0756   BP:    Pulse: 67   Resp: 20   Temp:    SpO2: 99%       General appearance: Sedated on ventilator head: without obvious abnormality  Eyes: PERRLA, EOM's intact  Lungs: clear to auscultation bilaterally, no rales or wheezing  Heart: Distant S1, S2 normal, no murmur, click, rub or gallop, regular rate and rhythm, no JVD  Abdomen: soft, non-tender; bowel sounds normal; no masses  Extremities: Anasarca  Skin: Skin color, texture, turgor normal. No rashes or lesions  Neurologic: Not responding to commands, not interactive  Psychiatric: Sedated   endocrine: No thyromegaly    I&Os:    Intake/Output Summary (Last 24 hours) at 1/5/2024 0803  Last data filed at 1/5/2024 0600  Gross per 24 hour   Intake 1429.03 ml   Output 3905 ml   Net -2475.97 ml       Weights:   Wt Readings from Last 3 Encounters:   01/05/24 87.8 kg (193 lb 9 oz)   12/07/23 80.3 kg (177 lb)   11/27/23 80.3 kg (177 lb)  "      Labs:  Results from last 7 days   Lab Units 01/05/24 0329 01/04/24  0510 01/03/24  0321   SODIUM mEQ/L 141 139 141   POTASSIUM mEQ/L 3.4* 3.7 3.8   CO2 mEQ/L 25 25 24   BUN mg/dL 29* 31* 37*   CREATININE mg/dL 1.6* 1.4* 1.4*   CALCIUM mg/dL 7.9* 8.0* 7.6*   ALT IU/L 47 50 74*   AST IU/L 32 28 34     Results from last 7 days   Lab Units 01/05/24 0329 01/04/24  1413 01/04/24  0510   WBC K/uL 7.42 8.76 6.35   HEMOGLOBIN g/dL 8.1* 8.3* 6.7*   HEMATOCRIT % 25.0* 26.2* 21.1*   PLATELETS K/uL 139* 138* 116*     Results from last 7 days   Lab Units 01/05/24 0329 01/04/24 2030 01/04/24  0510   PTT sec 96* 49* 33   INR   --  1.2 1.1   PROTIME sec  --  15.0* 14.3         No lab exists for component: \"CPK\"  0   Lab Value Date/Time    BNP 1,135 (H) 12/24/2023 1647     (H) 02/07/2023 1326     (H) 10/19/2022 1305       Data Review:  Telemetry Review: Sinus rhythm with QT prolongation and PACs    Plan:  1.  VF arrest / Takatsubo CM / Abnormal EKG / QT prolongation:  - Thought to be secondary toTakotsubo syndrome with torsade de points.    - QT interval remains prolonged with deep T wave inversions.   - He remains at risk of further episodes of polymorphic ventricular tachycardia.  If he has any further issues, will resume IV lidocaine.    - Echo shows FULL resolution of CM (EF now 60-65%).     2.  Hypoxemia:  - Tracheostomy today  - Adequate oxygenation on 40% FiO2     3.  Atrial fibrillation:  - Remains in sinus rhythm.  - Continue on oral/nasogastric amiodarone and consider switching over to Eliquis    4.  Hypertension:  - At goal on metoprolol.     5. CHF:  - Undergoing diuresis.   Creatinine is creeping up again.  May need to be less aggressive with diuresis.  Replace potassium    6.  Anemia  Hemoglobin now 8 g%. Patient is already on intravenous pantoprazole.    Cardiology will sign off at this time.  Please call us if there are any new issues.  Electronic signature  Michele Estrada MD "   01/05/24

## 2024-01-05 NOTE — PROGRESS NOTES
Vancomycin Dosing by Pharmacy Consult Discontinued    IV Vancomycin Dosing by Pharmacy Protocol was discontinued.  Pharmacy will sign off and no longer dose vancomycin for this patient.    Ward TaverasD

## 2024-01-05 NOTE — PROGRESS NOTES
Critical Care/Pulmonary  Daily Progress Note        Subjective    Interval History:    Much more awake today   Able to follow commands  T max overnight 100.3 F   Remains on low dose of precedex    I/O 24hr  I-1441.9 ml  O-4055ml  N- -2613ml       Objective       Allergies: Azithromycin, Prednisone, and Lorazepam    CURRENT MEDS  •  acetaminophen, 650 mg, feeding tube, q4h PRN  •  albuterol, 2.5 mg, nebulization, q4h PRN  •  amiodarone, 200 mg, feeding tube, Daily  •  atorvastatin, 10 mg, oral, Nightly  •  atropine, 1 mg, intravenous, Once PRN  •  betamethasone valerate, , Topical, 2x daily PRN  •  calcium gluconate, 1 g, intravenous, q6h PRN  •  cefTRIAXone, 1 g, intravenous, q24h INT  •  cetirizine, 10 mg, feeding tube, Nightly  •  chlorhexidine, 15 mL, Mouth/Throat, 2 times per day  •  cholecalciferol (vitamin D3), 1,000 Units, feeding tube, Daily  •  dexmedetomidine in 0.9 % NaCl, 0-1 mcg/kg/hr, intravenous, Titrated  •  glucose, 16-32 g of dextrose, oral, PRN **OR** dextrose, 15-30 g of dextrose, oral, PRN **OR** glucagon, 1 mg, intramuscular, PRN **OR** dextrose 50 % in water (D50), 25 mL, intravenous, PRN  •  fentaNYL, 50 mcg, intravenous, q4h PRN  •  furosemide, 60 mg, intravenous, Daily  •  guaiFENesin, 400 mg, oral, q6h GISELLE  •  heparin (porcine), 40-80 Units/kg (Adjusted), intravenous, q6h PRN  •  heparin infusion - MAR calculator by PTT, 100-4,000 Units/hr, intravenous, Titrated  •  hydrALAZINE, 5 mg, intravenous, q6h PRN  •  insulin lispro U-100, 1-10 Units, subcutaneous, q6h GISELLE  •  ipratropium HFA, 2 puff, inhalation, QID (8a, 12p, 4p, 8p)  •  magnesium oxide, 400 mg, Nasogastric, BID  •  magnesium sulfate, 2 g, intravenous, PRN  •  metoclopramide, 10 mg, intravenous, q6h PRN  •  metoprolol tartrate, 25 mg, feeding tube, BID  •  norepinephrine, 0-90 mcg/min, intravenous, Titrated  •  pantoprazole, 40 mg, intravenous, q12h GISELLE  •  potassium chloride, 20 mEq, oral, PRN  •  potassium chloride, 40 mEq,  oral, PRN  •  potassium chloride, 20 mEq, intravenous, PRN  •  potassium chloride in water, 20 mEq, intravenous, PRN **AND** potassium chloride in water, 20 mEq, intravenous, PRN  •  silver sulfadiazine, , Topical, BID  •  sodium chloride 0.9 %, 5 mL/hr, intravenous, PRN  •  sodium chloride, 10 mL, intravenous, q8h INT  •  sodium chloride, 10 mL, intravenous, PRN  •  tamoxifen, 20 mg, feeding tube, Daily    VITAL SIGNS  Vitals:    01/05/24 1300 01/05/24 1400 01/05/24 1500 01/05/24 1600   BP:  124/60  (!) 117/57   Pulse: 62 65 67 66   Resp: (!) 23 (!) 22 (!) 22 17   Temp:       TempSrc:       SpO2: 98% 96% 99% 96%   Weight:       Height:           VENTILATOR SETTINGS  Vent Mode: Pressure support  FiO2 (%) (Set):  [40 %] 40 %  S RR:  [18] 18  S VT:  [450 mL] 450 mL  PEEP/CPAP (Set, cmH2O):  [6 cm H20] 6 cm H20  MAP (Observed, cmH2O):  [10-11] 10    Oxygen:  Oxygen Therapy: Supplemental oxygen  O2 Delivery Method: Trach tube  FiO2 (%) (Set): 40 %  O2 Flow Rate (L/min): 6 L/min     INTAKE/OUTPUT    Intake/Output Summary (Last 24 hours) at 1/5/2024 1727  Last data filed at 1/5/2024 1500  Gross per 24 hour   Intake 1446.65 ml   Output 2425 ml   Net -978.35 ml       Lines, Drains, Airways, Wounds:  PICC Triple Lumen 12/28/23 Right (Active)   Number of days: 8       Gastrostomy/Enterostomy Gastrostomy 20 Fr LUQ (Active)   Number of days: 1       Urethral Catheter 20 Fr (Active)   Number of days: 8       ETT  (Active)   Number of days: 6       Surgical Airway Shiley Cuffed 8 (Active)   Number of days: 1       Wound Venous Ulcer Left Pretibial (Active)   Number of days: 12       Wound Perineum (Active)   Number of days: 12       Wound Puncture Anterior;Proximal;Right Groin (Active)   Number of days: 8       Wound Pressure Injury Right Heel (Active)   Number of days: 8       Wound Pressure Injury Left Heel (Active)   Number of days: 5       Catheterization Site - Arterial Right Femoral 6 Fr. (Active)   Number of days: 11        Catheterization Site - Venous Right Femoral 6 Fr. (Active)   Number of days: 11         Physical Exam:  Physical Exam  Vitals and nursing note reviewed.   Constitutional:       Appearance: He is ill-appearing.   Cardiovascular:      Rate and Rhythm: Normal rate. Rhythm irregular.      Pulses: Normal pulses.      Heart sounds: Normal heart sounds. No murmur heard.  Pulmonary:      Effort: Pulmonary effort is normal.      Breath sounds: Normal breath sounds.   Abdominal:      General: Abdomen is flat. Bowel sounds are normal. There is no distension.      Palpations: Abdomen is soft.   Musculoskeletal:      Right lower leg: Edema present.      Left lower leg: Edema present.   Skin:     General: Skin is warm and dry.   Neurological:      Mental Status: He is alert.      Comments: Awake and able to follow commands   Able to move all extremities            Labs:  See Labs Below:  ABG  Results from last 7 days   Lab Units 12/30/23  2240 12/30/23  1520 12/30/23  1353   PH ART pH 7.41 7.30* 7.30*   PCO2 ART mm Hg 36 44 48   PO2 ART mm Hg 164* 83 102*   HCO3 ART mEQ/L 24 21 23   O2 SAT ART % 98 94 97   BASE EXC ART mEQ/L -1.6 -4.7 -3.0     CBC  Results from last 7 days   Lab Units 01/05/24  0329 01/04/24  1413 01/04/24  0510   WBC K/uL 7.42 8.76 6.35   RBC M/uL 2.52* 2.60* 2.04*   HEMOGLOBIN g/dL 8.1* 8.3* 6.7*   HEMATOCRIT % 25.0* 26.2* 21.1*   MCV fL 99.2* 100.8* 103.4*   MCH pg 32.1 31.9 32.8   MCHC g/dL 32.4 31.7* 31.8*   PLATELETS K/uL 139* 138* 116*   RDW % 17.6* 17.9* 15.7*   MPV fL 11.0 12.2 11.9     BMP  Results from last 7 days   Lab Units 01/05/24  0329 01/04/24  0510 01/03/24  0321   SODIUM mEQ/L 141 139 141   POTASSIUM mEQ/L 3.4* 3.7 3.8   CHLORIDE mEQ/L 104 108* 110*   CO2 mEQ/L 25 25 24   BUN mg/dL 29* 31* 37*   CREATININE mg/dL 1.6* 1.4* 1.4*   CALCIUM mg/dL 7.9* 8.0* 7.6*   ALBUMIN g/dL 2.7* 2.7* 2.3*   BILIRUBIN TOTAL mg/dL 0.7 0.6 0.5   ALK PHOS IU/L 64 55 59   ALT IU/L 47 50 74*   AST IU/L 32 28 34    GLUCOSE mg/dL 149* 115* 159*     Coag  Results from last 7 days   Lab Units 01/05/24  0935 01/05/24  0329 01/04/24 2030 01/04/24  0510   PROTIME sec  --   --  15.0* 14.3   INR   --   --  1.2 1.1   PTT sec 67* 96* 49* 33       Imaging:   X-RAY CHEST 1 VIEW    Result Date: 1/5/2024  IMPRESSION: Satisfactory positioning of the recently placed tracheostomy device. Right IJ central line appears stable terminating in the region the cavoatrial junction. Removal of the Dobbhoff tube. Redemonstration of moderate patchy airspace disease in the right mid to upper lung zone without significant change with lesser degrees of left greater than right basilar lung opacities not significant changed. These are nonspecific correlate for multifocal pneumonia versus edema or aspiration. No evidence of a pneumothorax. Small left pleural effusion not significantly changed. No sizable right pleural effusion. Stable cardiomediastinal contours with valve replacement. No upper abdominal or gross bone findings of concern. Right axilla surgical clips redemonstrated. COMMENT: Portable AP view the chest performed and compared with January 2, 2024 study. See impression.    Ultrasound venous leg bilateral    Result Date: 1/4/2024  IMPRESSION: No DVT identified in either lower extremity.     Ultrasound venous arm left    Result Date: 1/3/2024  IMPRESSION: 1.  No evidence for deep vein thrombosis bilaterally. 2.  Superficial thrombus involving the right cephalic vein.    Ultrasound venous arm right    Result Date: 1/3/2024  IMPRESSION: 1.  No evidence for deep vein thrombosis bilaterally. 2.  Superficial thrombus involving the right cephalic vein.    X-RAY ABDOMEN 1 VIEW    Result Date: 1/3/2024  IMPRESSION: Dobbhoff tube enters below left hemidiaphragm with its tip likely in the region of the stomach.     X-RAY CHEST 1 VIEW    Result Date: 1/2/2024  IMPRESSION: Hazy opacity seen at right midlung, new from the prior study, may represent ammonia or  aspiration. Endotracheal tube with the tip 9 mm above the leo. Consider repositioning.     MRI BRAIN WITHOUT CONTRAST    Result Date: 1/2/2024  IMPRESSION: No acute infarct. Mild chronic microvascular ischemia, moderate volume loss.    X-RAY ABDOMEN 1 VIEW    Result Date: 12/30/2023  IMPRESSION: Weighted feeding tube tip at the level of the proximal stomach.    CT HEAD WITHOUT IV CONTRAST    Result Date: 12/30/2023  IMPRESSION: No acute intracranial abnormality.  Chronic ischemic white matter changes are noted.    X-RAY CHEST 1 VIEW    Result Date: 12/30/2023  IMPRESSION: 1. The endotracheal tube is lower in position situated 2 cm above level of the leo.    IR TUNNELED PICC PLACEMENT    Result Date: 12/29/2023  IMPRESSION: Successful placement of 5 Fr triple lumen tunneled PICC.     ULTRASOUND GUIDED VASCULAR ACCESS    Result Date: 12/29/2023  IMPRESSION: Successful placement of 5 Fr triple lumen tunneled PICC.     ULTRASOUND KIDNEYS BLADDER/NO POST VOID    Result Date: 12/28/2023  IMPRESSION: 1. No sonographic abnormality in the kidneys. 2. Nearly completely collapsed bladder as discussed below. No large intraluminal bladder thrombus/clot as clinically questioned (given the limitations of near complete bladder collapse). COMMENT: Real-time sonographic evaluation of the kidneys and bladder was performed. There is normal cortical echogenicity and corticomedullary differentiation. The right kidney measures 10.8 x 5.2 x 4.6 cm. The left kidney measures 10.8 x 5.8 x 4.3 cm. There is no hydronephrosis, calculi, masses or perinephric collections. Bladder is nearly completely collapsed, measuring approximately 2.5 x 1.3 x 2.5 cm, for a calculated volume of approximately 5.5 cc, precluding adequate evaluation for calculi or mass. No ureteral jets identified on color Doppler, perhaps due to renal dysfunction and/or hydration status.    X-RAY CHEST 1 VIEW    Result Date: 12/27/2023  IMPRESSION:  Reduced lung volumes. No  acute cardiopulmonary process. Stable cardiac enlargement. COMPARISON: Portable chest studies 12/25 and 12/26/2023. COMMENT:  Upright portable imaging completed 0548 hours. Endotracheal tube 4.7 cm above. Enteric tube follows a normal course into left upper quadrant, directed to the left. Mild stable cardiac enlargement. Mitral annular prosthesis again noted. Stable hilar and mediastinal contours. Reduced lung volumes. No definite consolidation or pleural fluid. Unremarkable upper abdomen.    X-RAY CHEST 1 VIEW    Result Date: 12/26/2023  IMPRESSION: Endotracheal tube has been repositioned; the tip is now approximately 3.3 cm above the leo. COMMENT: A semierect AP portable view the chest was performed at 1615 and is compared to a prior study from 1503. Since the prior study, the endotracheal tube has been repositioned and the tip is now approximately 3.3 cm above the leo. There has been no other significant interval change.    X-RAY CHEST 1 VIEW    Result Date: 12/26/2023  IMPRESSION: ET tube 2 cm above leo. NG tube tip in proximal stomach. Probable right lower lobe atelectasis; attention on follow-up.    X-RAY CHEST 1 VIEW    Result Date: 12/26/2023  IMPRESSION: Status post extubation. Slightly improved vascular congestion since earlier in the day. Suspect developing atelectasis or airspace disease in the right midlung zone. COMMENT: Semierect AP portable view of the chest was performed at 1428 and is compared to a prior study from 5:27 AM. In the interval, the endotracheal tube has been removed. An enteric tube remains in place with the tip in the stomach. There is a vascular catheter/line with the tip projecting near the junction of the IVC and right atrium; it is difficult to determine whether this was present previously but differences in technique. Mild cardiomegaly is again noted. Cardiac valvular prosthesis is again seen. The pulmonary vasculature and interstitial markings have improved slightly  since earlier in the day. There is questionable developing airspace disease or atelectasis in the right midlung zone. Trace bilateral pleural effusions are suspected. The hilar and mediastinal structures appear stable. Surgical clips are again seen in the right axilla.    X-RAY CHEST 1 VIEW    Result Date: 12/26/2023  IMPRESSION: ET tube 4 cm above leo, NG tube tip not well seen, likely in proximal stomach. Stable cardiomegaly, mitral valve replacement. Clear lungs, with interstitial crowding secondary to low lung volumes.    X-RAY CHEST 1 VIEW    Result Date: 12/26/2023  IMPRESSION: Improved pulmonary vascular congestion. ET tube 3 cm above leo, NG tube tip below inferior edge of film.    X-RAY CHEST 1 VIEW    Result Date: 12/25/2023  IMPRESSION: Interval retraction of the endotracheal tube now in satisfactory position, as below. Stable satisfactory positioning of the enteric tube. Progressive pulmonary interstitial edema with stable enlargement of the cardiac silhouette. No pneumothorax. COMMENT: Comparison: Chest x-ray 12/24/2023. Technique: A single frontal AP portable upright projection of the chest was obtained. FINDINGS: There has been interval retraction of the endotracheal tube now terminating 2.7 cm above the leo. An enteric tube courses to the stomach with the distal tip collimated from the field-of-view beneath the left hemidiaphragm in satisfactory position. There are defibrillator pad projecting over the left hemithorax. There are progressively increased interstitial opacities seen projecting over both lungs. There is no pleural effusion or pneumothorax. The cardiac silhouette is stably enlarged. The mediastinal contours are unchanged. Again noted is a prosthetic mitral valve. The upper abdomen is unremarkable. There is no acute osseous abnormality. Surgical clips overlie the right axillary region.     X-RAY CHEST 1 VIEW    Result Date: 12/25/2023  IMPRESSION: Satisfactory positioning of the  endotracheal and enteric tubes. No pneumothorax. Stable pulmonary edema and enlargement of the cardiac silhouette. COMMENT: Comparison: Chest x-ray 12/25/2023. Technique: A single frontal AP portable upright projection of the chest was obtained. FINDINGS: The tip of an endotracheal tube terminates 4.0 cm above the leo. An enteric tube courses to the stomach with the distal tip collimated from the field-of-view beneath the left hemidiaphragm in satisfactory position. There are defibrillator pad projecting over the left hemithorax. There are mildly increased interstitial opacities again seen projecting over both lungs. No pleural effusion or pneumothorax is seen. There is stable enlargement of the cardiac silhouette. Again noted is a prosthetic mitral valve. The mediastinal contours are unchanged. The upper abdomen is unremarkable. There is no acute osseous abnormality. There are surgical clips overlying the right axillary region.     X-RAY CHEST 1 VIEW    Result Date: 12/25/2023  IMPRESSION: Low-lying endotracheal tube terminating over the proximal right mainstem bronchus. Recommend retraction. Satisfactory positioning of the enteric tube. This finding and recommendation was discussed with nurse Shoemaker at 11:27 AM on 12/25/2023 by Dr. Martinez by telephone. Mild pulmonary interstitial edema and stable enlargement of the cardiac silhouette. No pneumothorax. COMMENT: Comparison: Chest x-ray 12/24/2023. Technique: A single frontal AP portable upright projection of the chest was obtained. FINDINGS: The tip of the intervally placed endotracheal tube terminates over the proximal right mainstem bronchus. The tip of the enteric tube terminates over the left upper quadrant of the abdomen. There are mildly increased interstitial opacities noted within both lungs. There is no pleural effusion or pneumothorax. There is stable enlargement of the cardiac silhouette. Again noted is a mitral valve prosthesis. Surgical clips overlie  the right axilla. The upper abdomen is unremarkable. There is no acute osseous abnormality.    X-RAY CHEST 1 VIEW    Result Date: 12/25/2023  IMPRESSION: Vascular congestion.  Left basal atelectasis. COMMENT: AP radiograph the chest is compared to previous study dated 8/17/2023. There is vascular congestion and small effusions.  Findings may represent mild congestive heart failure.  Cardiac silhouette is unchanged.  Prosthetic valve ring seen.  Surgical staples seen in the right axilla.  There is minimal left basal atelectasis.    CT ANGIOGRAPHY CHEST PULMONARY EMBOLISM WITH IV CONTRAST    Result Date: 12/24/2023  IMPRESSION: 1. No pulmonary embolism in the main or proximal segmental pulmonary arteries. 2. Right upper lobe and left lower lobe infiltrate with patchy airspace opacities in the right middle lobe.      Micro:   Microbiology Results     Procedure Component Value Units Date/Time    Blood Culture Blood, Venous [[id number]]  (Normal) Collected: 01/01/24 1201    Specimen: Blood, Venous Updated: 01/04/24 1701     Culture No growth at 72 hours    Blood Culture Blood, Venous [[id number]]  (Normal) Collected: 01/01/24 1201    Specimen: Blood, Venous Updated: 01/04/24 1701     Culture No growth at 72 hours    Sputum culture / smear Tracheal Aspirate [[id number]]  (Abnormal) Collected: 01/01/24 0937    Specimen: Tracheal Aspirate Updated: 01/03/24 0744    Narrative:      The following orders were created for panel order Sputum culture / smear Tracheal Aspirate.  Procedure                               Abnormality         Status                     ---------                               -----------         ------                     Sputum Gram Stain (Lab O...[[id number]]                      Final result               Sputum Culture (Lab Only...[[id number]]  Abnormal            Final result                 Please view results for these tests on the individual orders.    Sputum Gram Stain (Lab Only) Tracheal Aspirate  [[id number]] Collected: 01/01/24 0937    Specimen: Tracheal Aspirate Updated: 01/01/24 1817     Gram Stain Result 4+ WBC      1+ Epithelial cells      No organisms seen    Sputum Culture (Lab Only) Tracheal Aspirate [[id number]]  (Abnormal) Collected: 01/01/24 0937    Specimen: Tracheal Aspirate Updated: 01/03/24 0744     Culture **Positive Culture**      1+ Yeast, No Further Identification      No Normal Park    Sputum culture / smear Expectorated Sputum [[id number]]  (Abnormal) Collected: 12/27/23 1417    Specimen: Bronch Lavage from Expectorated Sputum Updated: 12/29/23 0723    Narrative:      The following orders were created for panel order Sputum culture / smear Expectorated Sputum.  Procedure                               Abnormality         Status                     ---------                               -----------         ------                     Sputum Gram Stain (Lab O...[[id number]]                      Final result               Sputum Culture (Lab Only...[[id number]]  Abnormal            Final result                 Please view results for these tests on the individual orders.    Sputum Gram Stain (Lab Only) Expectorated Sputum [[id number]] Collected: 12/27/23 1417    Specimen: Bronch Lavage from Expectorated Sputum Updated: 12/27/23 2032     Gram Stain Result 2+ WBC      No Epithelial Cells Seen      No organisms seen    Sputum Culture (Lab Only) Expectorated Sputum [[id number]]  (Abnormal) Collected: 12/27/23 1417    Specimen: Bronch Lavage from Expectorated Sputum Updated: 12/29/23 0723     Culture **Positive Culture**      1+ Yeast, No Further Identification    Sputum culture / smear Expectorated Sputum [[id number]] Collected: 12/25/23 0418    Specimen: Aspirate from Expectorated Sputum Updated: 12/27/23 0845    Narrative:      The following orders were created for panel order Sputum culture / smear Expectorated Sputum.  Procedure                               Abnormality         Status                      ---------                               -----------         ------                     Sputum Gram Stain (Lab O...[[id number]]                      Final result               Sputum Culture (Lab Only...[[id number]]  Normal              Final result                 Please view results for these tests on the individual orders.    Sputum Gram Stain (Lab Only) Expectorated Sputum [[id number]] Collected: 12/25/23 0418    Specimen: Aspirate from Expectorated Sputum Updated: 12/25/23 0956     Gram Stain Result 3+ WBC      No Epithelial Cells Seen      3+ Gram positive bacilli      2+ Gram positive cocci in pairs, chains and clusters    Sputum Culture (Lab Only) Expectorated Sputum [[id number]]  (Normal) Collected: 12/25/23 0418    Specimen: Aspirate from Expectorated Sputum Updated: 12/27/23 0845     Culture Normal Park    MRSA Screen, Nares Only Nose [[id number]]  (Normal) Collected: 12/25/23 0352    Specimen: Nasal Swab from Nose Updated: 12/25/23 0906     MRSA DNA, Nares Negative    Blood Culture Blood, Venous [[id number]]  (Normal) Collected: 12/24/23 1652    Specimen: Blood, Venous Updated: 12/29/23 0000     Culture No growth at 96 hours    SARS-CoV-2 (COVID-19) Nasopharynx [[id number]]  (Abnormal) Collected: 12/24/23 1649    Specimen: Nasopharyngeal Swab from Nasopharynx Updated: 12/24/23 1736    Narrative:      The following orders were created for panel order SARS-CoV-2 (COVID-19) Nasopharynx.  Procedure                               Abnormality         Status                     ---------                               -----------         ------                     SARS-COV-2 (COVID-19)/ F...[[id number]]  Abnormal            Final result                 Please view results for these tests on the individual orders.    SARS-COV-2 (COVID-19)/ FLU A/B, AND RSV, PCR Nasopharynx [[id number]]  (Abnormal) Collected: 12/24/23 1649    Specimen: Nasopharyngeal Swab from Nasopharynx Updated: 12/24/23 1736     SARS-CoV-2  (COVID-19) Negative     Influenza A Positive     Influenza B Negative     Respiratory Syncytial Virus Negative    Narrative:      Testing performed using real-time PCR for detection of COVID-19. EUA approved validation studies performed on site.     Blood Culture Blood, Venous [[id number]]  (Normal) Collected: 12/24/23 1647    Specimen: Blood, Venous Updated: 12/29/23 0100     Culture No growth at 96 hours          ECG/Telemetry  I have independently reviewed the telemetry. No events for the last 24 hours.         ASSESSMENT    73 y.o. male with history atrial fibrillation, CHF, prostate and breast CA, history of GI bleed who presented to the emergency room with shortness of breath and found to be influenza A positive. During day 2 of his hospital stay he had a v fib arrest with ROSC. He was transferred to the ICU for further monitoring     PLAN   # Acute Hypoxic respiratory failure  - Failed Bipap on admission, became bradycardic and was intubated  - Extubated 12/26, however became hypoxic, ?Secondary to upper airway edema vs acute bronchospasm vs flash pulmonary edema  - Treated with lasix 40 mg and solumedrol q 6  - Ultimately required re-intubation 12/26. Anesthesia noted some swelling around cords  - S/p bronchoscopy post re-intubation 12/27 which noted patient 'biting on tube'  - Initially passed SBT 12/30, was extubated to nasal cannula, failed HF and BIPAP and ultimately required re-intubation in the setting of hypoxia and poor mental status  - Sedation changed to Precedex with boluses of fentanyl and versed as needed  - He is volume overloaded, with resolving ELEANOR.      -Tolerating diuretics, with slight bump in creatinine today.  Changed lasix to 60 mg daily, from 60 twice daily  -trach/PEG placed 1/5  -Continue PS trials     # Shock  - Improved, off pressors  -  maintain MAP > 65     # S/p V fib cardiac arrest  - Likely related to Takosubo's cardiomyopthy, EF 40-45%, now improved to 60-65%  - ROSC achieved  after receiving 3 epi, 3 shocks, 2 bicarb, and Magnesium replacement  - Following commands post-arrest. No TTM  - Taken by interventional cardiology for the placement of a temporary pacemaker  - Also noted no significant CAD  - PPM removed secondary to dyssynchrony and misplacement. No further marcellus arrythmias, removed 12/27  - Per cardiology. if patient has further episodes of polymorphic ventricular tachycardia, resume IV lidocaine  - Mental status which was initially poor up until 1/5. Today was much more alert and responsive   - CTH 12/30 negative.  MRI brain 1/2/24 with no acute infarct.  Mild chronic microvascular ischemia.  No evidence of anoxia     # Takotsubo Cardiomyopathy  - TTE 12/26 suggestive of Takotsubos with EF 40-45%, confirmed by LV gram   - Off milrinone 12/29  - Repeat TTE 1/1 with EF 65%  - Cardiology following     # Acute kidney injury  - Renal function slowly improving. Initial creatinine 1.2, peaked 2.3, today at 1.6  - Baseline creatinine appears to be 1.2-1.4  - ?Secondary to IV dye load s/p cardiac cath vs over-diuresis   - Monitor BMP  - Cr trending down  - Dosing with lasix today as above, slight increase in creatinine 1.4---1.6.  If further increase in creatinine tomorrow, may need to back off further on diuretics.  Overall volume status is improving     # Elevated liver enzymes  - Likely in setting of hypotension, shock liver  - Serial labs, trending down.  - Monitor CMP     # Pneumonia  - CT Chest 12/24 with left lower lobe infiltrate and right upper lobe infiltrate  - Blood/ sputum cultures negative   - Completed course of zosyn 12/31.   -Has had low-grade fevers for several days, although fever curve now downtrending.  Repeat Bcx, sputum cx with 1+ Yeast  -Given Cxr on 1/2 with new hazy opacity seen in the right lung  -No change in Cxr 1/5  -Started on Vanco/ceftaz on 1/3 with concern for new infiltrate.  Narrowed to ceftriaxone today, with sputum culture growing normal nixon     #  Afib  - rate controlled  - EP following  - Continue IV heparin  - Continue amiodarone     # Influenza A  - Completed 5 day course Oseltamivir      # Prostate/breast cancer  - S/p radiation therapy for prostate cancer spring 2023  - S/p R mastectomy 8/2023 and radiation therapy 9/2023  - Continue tamoxifen     #Generalized Edema  -1/2 with UE edema, Right hand swollen and cool on exam, strong radial pulse, cap refill 2-3 seconds  -Bilateral UE ultrasounds 1/3  with R superficial thrombus involving right cephalic vein  -Bilateral LE ultrasound done 1/4 with no evidence of DVT     #Anemia  -Hgb down to 6.7 on 1/4   -Given 1 unit PRBC    -Follow daily CBC  -Transfuse for Hgb less than 7.0 or less than 8.0 with active bleeding  -Today Hgb stable at 8.1     VTE Prophylaxis Plan: SCDs, Restarted heparin  GI Prophylaxis Plan: Protonix  Lines/Drains/Airway: R PICC (12/28), DHT, jones, FMS, ETT  Fluids/Electrolytes/Nutrition: TF     Disposition Planning: Remain in the ICU    CODE STATUS  Full Code       The case was reviewed this morning at the patient's beside multidisciplinary rounds with the patient's nurse, dietician, pharmacist, respiratory therapist, physical therapist and critical care nurse coordinator. The patient's clinical status with regards to diagnosis, treatment plans including disposition of any IV, arterial lines, Jones and tubes were discussed, as well as dietary, respiratory therapy and mobilization needs.     This case was discussed with consultants.    Total critical time spent managing acute hypoxic ventilator dependent respiratory failure, pneumonia, cardiomyopathy, totals 35 minutes.    This note was scribed by Ishaan Juares PA-C in my presence on my behalf.       Megan Rico DO  1/5/2024  5:27 PM

## 2024-01-05 NOTE — PROGRESS NOTES
Vancomycin Dosing by Pharmacy Consult Follow up    Cam Rosas is a 73 y.o. male who has been consulted for vancomycin dosing for pneumonia.    Reviewed relevant clinical data including weight, renal function, previous vancomycin doses, and vancomycin levels  Creatinine   Date/Time Value Ref Range Status   01/04/2024 0510 1.4 (H) 0.7 - 1.3 mg/dL Final   01/03/2024 0321 1.4 (H) 0.7 - 1.3 mg/dL Final   01/02/2024 0400 1.5 (H) 0.7 - 1.3 mg/dL Final     Vancomycin Tr   Date/Time Value Ref Range Status   01/04/2024 2030 17.9 (H) 10.0 - 15.0 ug/mL Final     Comment:     For all patients with complicated infections with methicillin resistant Staphylococcus aureus (MRSA), e.g. endocarditis, osteomyelitis, meningitis, pneumonia; the vancomycin trough therpeutic range is 15-20 ug/mL.          Vancomycin Administrations (last 96 hours)       Date/Time Action Medication Dose Rate    01/04/24 2236 New Bag    vancomycin 750 mg/250 mL IVPB in  mg 250 mL/hr    01/04/24 1057 New Bag    vancomycin 750 mg/250 mL IVPB in  mg 250 mL/hr    01/03/24 2227 New Bag    vancomycin 750 mg/250 mL IVPB in  mg 250 mL/hr    01/03/24 1015 New Bag    vancomycin (VANCOCIN) 1,750 mg in sodium chloride 0.9 % 500 mL IVPB 1,750 mg 250 mL/hr              Assessment/Plan  The patient is ordered vancomycin dosing by pharmacy.      The patient’s renal function; is stable    Vancomycin trough level 17.9 on maintenance dose of 750 mg IV Q 12 H with a goal trough 15-20 ug/mL.      Will continue vancomycin 750 mg IV Q 12 H .      Next trough:  01/06/24 @ 10am    Pharmacy will continue to follow the patient’s vancomycin dosing daily during this course of therapy.      Please call vancomycin levels to the pharmacy.  Suma Waldrop, WardD

## 2024-01-06 LAB
ALBUMIN SERPL-MCNC: 2.5 G/DL (ref 3.5–5.7)
ALP SERPL-CCNC: 73 IU/L (ref 34–125)
ALT SERPL-CCNC: 36 IU/L (ref 7–52)
ANION GAP SERPL CALC-SCNC: 9 MEQ/L (ref 3–15)
APTT PPP: 102 SEC (ref 23–35)
APTT PPP: 61 SEC (ref 23–35)
APTT PPP: 84 SEC (ref 23–35)
APTT PPP: 84 SEC (ref 23–35)
AST SERPL-CCNC: 27 IU/L (ref 13–39)
BILIRUB SERPL-MCNC: 0.5 MG/DL (ref 0.3–1.2)
BUN SERPL-MCNC: 29 MG/DL (ref 7–25)
CALCIUM SERPL-MCNC: 7.5 MG/DL (ref 8.6–10.3)
CHLORIDE SERPL-SCNC: 103 MEQ/L (ref 98–107)
CO2 SERPL-SCNC: 26 MEQ/L (ref 21–31)
CREAT SERPL-MCNC: 1.4 MG/DL (ref 0.7–1.3)
EGFRCR SERPLBLD CKD-EPI 2021: 53.1 ML/MIN/1.73M*2
ERYTHROCYTE [DISTWIDTH] IN BLOOD BY AUTOMATED COUNT: 17 % (ref 11.6–14.4)
GLUCOSE BLD-MCNC: 140 MG/DL (ref 70–99)
GLUCOSE BLD-MCNC: 143 MG/DL (ref 70–99)
GLUCOSE BLD-MCNC: 154 MG/DL (ref 70–99)
GLUCOSE BLD-MCNC: 168 MG/DL (ref 70–99)
GLUCOSE SERPL-MCNC: 142 MG/DL (ref 70–99)
HCT VFR BLDCO AUTO: 23.8 % (ref 40.1–51)
HGB BLD-MCNC: 7.6 G/DL (ref 13.7–17.5)
MCH RBC QN AUTO: 32.3 PG (ref 28–33.2)
MCHC RBC AUTO-ENTMCNC: 31.9 G/DL (ref 32.2–36.5)
MCV RBC AUTO: 101.3 FL (ref 83–98)
PDW BLD AUTO: 11.3 FL (ref 9.4–12.4)
PLATELET # BLD AUTO: 142 K/UL (ref 150–350)
POCT TEST: ABNORMAL
POTASSIUM SERPL-SCNC: 3.4 MEQ/L (ref 3.5–5.1)
PROT SERPL-MCNC: 4.9 G/DL (ref 6–8.2)
RBC # BLD AUTO: 2.35 M/UL (ref 4.5–5.8)
SODIUM SERPL-SCNC: 138 MEQ/L (ref 136–145)
WBC # BLD AUTO: 6.83 K/UL (ref 3.8–10.5)

## 2024-01-06 PROCEDURE — 25000000 HC PHARMACY GENERAL

## 2024-01-06 PROCEDURE — 63700000 HC SELF-ADMINISTRABLE DRUG

## 2024-01-06 PROCEDURE — 94640 AIRWAY INHALATION TREATMENT: CPT

## 2024-01-06 PROCEDURE — 63600000 HC DRUGS/DETAIL CODE: Mod: JZ | Performed by: INTERNAL MEDICINE

## 2024-01-06 PROCEDURE — 80053 COMPREHEN METABOLIC PANEL: CPT

## 2024-01-06 PROCEDURE — 25800000 HC PHARMACY IV SOLUTIONS

## 2024-01-06 PROCEDURE — 63600000 HC DRUGS/DETAIL CODE

## 2024-01-06 PROCEDURE — 20000000 HC ROOM AND CARE ICU

## 2024-01-06 PROCEDURE — 85730 THROMBOPLASTIN TIME PARTIAL: CPT | Performed by: HOSPITALIST

## 2024-01-06 PROCEDURE — 85027 COMPLETE CBC AUTOMATED: CPT

## 2024-01-06 PROCEDURE — 36415 COLL VENOUS BLD VENIPUNCTURE: CPT

## 2024-01-06 PROCEDURE — 63600000 HC DRUGS/DETAIL CODE: Performed by: NURSE PRACTITIONER

## 2024-01-06 PROCEDURE — 25800000 HC PHARMACY IV SOLUTIONS: Performed by: INTERNAL MEDICINE

## 2024-01-06 PROCEDURE — 63600000 HC DRUGS/DETAIL CODE: Mod: JZ

## 2024-01-06 PROCEDURE — 85730 THROMBOPLASTIN TIME PARTIAL: CPT | Performed by: NURSE PRACTITIONER

## 2024-01-06 PROCEDURE — 94003 VENT MGMT INPAT SUBQ DAY: CPT

## 2024-01-06 RX ORDER — FUROSEMIDE 10 MG/ML
60 INJECTION INTRAMUSCULAR; INTRAVENOUS
Status: DISCONTINUED | OUTPATIENT
Start: 2024-01-06 | End: 2024-01-10 | Stop reason: HOSPADM

## 2024-01-06 RX ADMIN — Medication 10 MG: at 22:58

## 2024-01-06 RX ADMIN — IPRATROPIUM BROMIDE 2 PUFF: 17 AEROSOL, METERED RESPIRATORY (INHALATION) at 08:41

## 2024-01-06 RX ADMIN — CEFTRIAXONE SODIUM 1 G: 1 INJECTION, POWDER, FOR SOLUTION INTRAMUSCULAR; INTRAVENOUS at 12:49

## 2024-01-06 RX ADMIN — IPRATROPIUM BROMIDE 2 PUFF: 17 AEROSOL, METERED RESPIRATORY (INHALATION) at 20:10

## 2024-01-06 RX ADMIN — Medication 400 MG: at 20:43

## 2024-01-06 RX ADMIN — PANTOPRAZOLE SODIUM 40 MG: 40 INJECTION, POWDER, LYOPHILIZED, FOR SOLUTION INTRAVENOUS at 09:46

## 2024-01-06 RX ADMIN — TAMOXIFEN CITRATE 20 MG: 10 TABLET, FILM COATED ORAL at 09:46

## 2024-01-06 RX ADMIN — CHOLECALCIFEROL TAB 25 MCG (1000 UNIT) 1000 UNITS: 25 TAB at 09:44

## 2024-01-06 RX ADMIN — SILVER SULFADIAZINE: 10 CREAM TOPICAL at 20:51

## 2024-01-06 RX ADMIN — Medication 10 ML: at 12:33

## 2024-01-06 RX ADMIN — Medication 400 MG: at 09:46

## 2024-01-06 RX ADMIN — GUAIFENESIN 400 MG: 100 SOLUTION ORAL at 05:18

## 2024-01-06 RX ADMIN — SILVER SULFADIAZINE: 10 CREAM TOPICAL at 09:47

## 2024-01-06 RX ADMIN — METOPROLOL TARTRATE 25 MG: 25 TABLET, FILM COATED ORAL at 09:45

## 2024-01-06 RX ADMIN — HEPARIN SODIUM 1350 UNITS/HR: 10000 INJECTION, SOLUTION INTRAVENOUS at 06:42

## 2024-01-06 RX ADMIN — ATORVASTATIN CALCIUM 10 MG: 10 TABLET, FILM COATED ORAL at 20:43

## 2024-01-06 RX ADMIN — CHLORHEXIDINE GLUCONATE ORAL RINSE 15 ML: 1.2 SOLUTION DENTAL at 23:00

## 2024-01-06 RX ADMIN — PANTOPRAZOLE SODIUM 40 MG: 40 INJECTION, POWDER, LYOPHILIZED, FOR SOLUTION INTRAVENOUS at 20:42

## 2024-01-06 RX ADMIN — POTASSIUM CHLORIDE 40 MEQ: 750 TABLET, EXTENDED RELEASE ORAL at 22:52

## 2024-01-06 RX ADMIN — GUAIFENESIN 400 MG: 100 SOLUTION ORAL at 12:49

## 2024-01-06 RX ADMIN — IPRATROPIUM BROMIDE 2 PUFF: 17 AEROSOL, METERED RESPIRATORY (INHALATION) at 15:18

## 2024-01-06 RX ADMIN — IPRATROPIUM BROMIDE 2 PUFF: 17 AEROSOL, METERED RESPIRATORY (INHALATION) at 11:45

## 2024-01-06 RX ADMIN — FUROSEMIDE 60 MG: 10 INJECTION, SOLUTION INTRAMUSCULAR; INTRAVENOUS at 16:01

## 2024-01-06 RX ADMIN — DEXMEDETOMIDINE 0.3 MCG/KG/HR: 200 INJECTION, SOLUTION INTRAVENOUS at 06:35

## 2024-01-06 RX ADMIN — INSULIN LISPRO 1 UNITS: 100 INJECTION, SOLUTION INTRAVENOUS; SUBCUTANEOUS at 05:30

## 2024-01-06 RX ADMIN — FUROSEMIDE 60 MG: 10 INJECTION, SOLUTION INTRAMUSCULAR; INTRAVENOUS at 09:45

## 2024-01-06 RX ADMIN — INSULIN LISPRO 1 UNITS: 100 INJECTION, SOLUTION INTRAVENOUS; SUBCUTANEOUS at 00:01

## 2024-01-06 RX ADMIN — Medication 10 ML: at 03:30

## 2024-01-06 RX ADMIN — ACETAMINOPHEN 650 MG: 650 SOLUTION ORAL at 18:41

## 2024-01-06 RX ADMIN — CHLORHEXIDINE GLUCONATE ORAL RINSE 15 ML: 1.2 SOLUTION DENTAL at 09:46

## 2024-01-06 RX ADMIN — GUAIFENESIN 400 MG: 100 SOLUTION ORAL at 18:41

## 2024-01-06 RX ADMIN — METOPROLOL TARTRATE 25 MG: 25 TABLET, FILM COATED ORAL at 20:43

## 2024-01-06 RX ADMIN — Medication 10 ML: at 18:25

## 2024-01-06 RX ADMIN — ACETAMINOPHEN 650 MG: 650 SOLUTION ORAL at 22:52

## 2024-01-06 RX ADMIN — AMIODARONE HYDROCHLORIDE 200 MG: 200 TABLET ORAL at 09:44

## 2024-01-06 NOTE — PROGRESS NOTES
Critical Care/Pulmonary  Daily Progress Note        Subjective    Interval History:    Awake, alert, following commands  Remains on low dose of precedex    I/O   Since admission; 6.7 L  Last 24 hours: (+) 413ml  Urine output: 1685 ml       Objective       Allergies: Azithromycin, Prednisone, and Lorazepam    CURRENT MEDS  •  acetaminophen, 650 mg, feeding tube, q4h PRN  •  albuterol, 2.5 mg, nebulization, q4h PRN  •  amiodarone, 200 mg, feeding tube, Daily  •  atorvastatin, 10 mg, oral, Nightly  •  atropine, 1 mg, intravenous, Once PRN  •  betamethasone valerate, , Topical, 2x daily PRN  •  calcium gluconate, 1 g, intravenous, q6h PRN  •  cefTRIAXone, 1 g, intravenous, q24h INT  •  cetirizine, 10 mg, feeding tube, Nightly  •  chlorhexidine, 15 mL, Mouth/Throat, 2 times per day  •  cholecalciferol (vitamin D3), 1,000 Units, feeding tube, Daily  •  dexmedetomidine in 0.9 % NaCl, 0-1 mcg/kg/hr, intravenous, Titrated  •  glucose, 16-32 g of dextrose, oral, PRN **OR** dextrose, 15-30 g of dextrose, oral, PRN **OR** glucagon, 1 mg, intramuscular, PRN **OR** dextrose 50 % in water (D50), 25 mL, intravenous, PRN  •  fentaNYL, 50 mcg, intravenous, q4h PRN  •  furosemide, 60 mg, intravenous, BID (am, 4p)  •  guaiFENesin, 400 mg, oral, q6h GISELLE  •  heparin (porcine), 40-80 Units/kg (Adjusted), intravenous, q6h PRN  •  heparin infusion - MAR calculator by PTT, 100-4,000 Units/hr, intravenous, Titrated  •  hydrALAZINE, 5 mg, intravenous, q6h PRN  •  insulin lispro U-100, 1-10 Units, subcutaneous, q6h GISELLE  •  ipratropium HFA, 2 puff, inhalation, QID (8a, 12p, 4p, 8p)  •  magnesium oxide, 400 mg, Nasogastric, BID  •  magnesium sulfate, 2 g, intravenous, PRN  •  metoclopramide, 10 mg, intravenous, q6h PRN  •  metoprolol tartrate, 25 mg, feeding tube, BID  •  pantoprazole, 40 mg, intravenous, q12h GISELLE  •  potassium chloride, 20 mEq, oral, PRN  •  potassium chloride, 40 mEq, oral, PRN  •  potassium chloride, 20 mEq, intravenous,  PRN  •  potassium chloride in water, 20 mEq, intravenous, PRN **AND** potassium chloride in water, 20 mEq, intravenous, PRN  •  silver sulfadiazine, , Topical, BID  •  sodium chloride 0.9 %, 5 mL/hr, intravenous, PRN  •  sodium chloride, 10 mL, intravenous, q8h INT  •  sodium chloride, 10 mL, intravenous, PRN  •  tamoxifen, 20 mg, feeding tube, Daily    VITAL SIGNS  Vitals:    01/06/24 1200 01/06/24 1215 01/06/24 1234 01/06/24 1300   BP: (!) 111/55      BP Location:       Patient Position:       Pulse: (!) 56 (!) 55  63   Resp: 18 18  19   Temp:   36.7 °C (98.1 °F)    TempSrc:   Oral    SpO2: 99% 100%  99%   Weight:       Height:           VENTILATOR SETTINGS  Vent Mode: Pressure support  FiO2 (%) (Set):  [40 %] 40 %  S RR:  [18] 18  S VT:  [450 mL] 450 mL  PEEP/CPAP (Set, cmH2O):  [6 cm H20] 6 cm H20  MAP (Observed, cmH2O):  [9.7-15] 9.7    Oxygen:  Oxygen Therapy: Supplemental oxygen  O2 Delivery Method: Trach tube  FiO2 (%) (Set): 40 %  O2 Flow Rate (L/min): 6 L/min     INTAKE/OUTPUT    Intake/Output Summary (Last 24 hours) at 1/6/2024 1306  Last data filed at 1/6/2024 1300  Gross per 24 hour   Intake 2252.84 ml   Output 2125 ml   Net 127.84 ml       Lines, Drains, Airways, Wounds:  PICC Triple Lumen 12/28/23 Right (Active)   Number of days: 8       Gastrostomy/Enterostomy Gastrostomy 20 Fr LUQ (Active)   Number of days: 1       Urethral Catheter 20 Fr (Active)   Number of days: 8       ETT  (Active)   Number of days: 6       Surgical Airway Shiley Cuffed 8 (Active)   Number of days: 1       Wound Venous Ulcer Left Pretibial (Active)   Number of days: 12       Wound Perineum (Active)   Number of days: 12       Wound Puncture Anterior;Proximal;Right Groin (Active)   Number of days: 8       Wound Pressure Injury Right Heel (Active)   Number of days: 8       Wound Pressure Injury Left Heel (Active)   Number of days: 5       Catheterization Site - Arterial Right Femoral 6 Fr. (Active)   Number of days: 11        Catheterization Site - Venous Right Femoral 6 Fr. (Active)   Number of days: 11         Physical Exam:  Physical Exam  Vitals and nursing note reviewed.   Constitutional:       Appearance: He is ill-appearing.   Cardiovascular:      Rate and Rhythm: Normal rate. Rhythm irregular.      Pulses: Normal pulses.      Heart sounds: Normal heart sounds. No murmur heard.  Pulmonary:      Effort: Pulmonary effort is normal.      Breath sounds: Normal breath sounds.   Abdominal:      General: Abdomen is flat. Bowel sounds are normal. There is no distension.      Palpations: Abdomen is soft.   Musculoskeletal:      Right lower leg: Edema present.      Left lower leg: Edema present.   Skin:     General: Skin is warm and dry.   Neurological:      Mental Status: He is alert.      Comments: Awake and able to follow commands   Able to move all extremities            Labs:  See Labs Below:  ABG  Results from last 7 days   Lab Units 12/30/23  2240 12/30/23  1520 12/30/23  1353   PH ART pH 7.41 7.30* 7.30*   PCO2 ART mm Hg 36 44 48   PO2 ART mm Hg 164* 83 102*   HCO3 ART mEQ/L 24 21 23   O2 SAT ART % 98 94 97   BASE EXC ART mEQ/L -1.6 -4.7 -3.0     CBC  Results from last 7 days   Lab Units 01/06/24  0532 01/05/24  0329 01/04/24  1413   WBC K/uL 6.83 7.42 8.76   RBC M/uL 2.35* 2.52* 2.60*   HEMOGLOBIN g/dL 7.6* 8.1* 8.3*   HEMATOCRIT % 23.8* 25.0* 26.2*   MCV fL 101.3* 99.2* 100.8*   MCH pg 32.3 32.1 31.9   MCHC g/dL 31.9* 32.4 31.7*   PLATELETS K/uL 142* 139* 138*   RDW % 17.0* 17.6* 17.9*   MPV fL 11.3 11.0 12.2     BMP  Results from last 7 days   Lab Units 01/06/24  0532 01/05/24  0329 01/04/24  0510   SODIUM mEQ/L 138 141 139   POTASSIUM mEQ/L 3.4* 3.4* 3.7   CHLORIDE mEQ/L 103 104 108*   CO2 mEQ/L 26 25 25   BUN mg/dL 29* 29* 31*   CREATININE mg/dL 1.4* 1.6* 1.4*   CALCIUM mg/dL 7.5* 7.9* 8.0*   ALBUMIN g/dL 2.5* 2.7* 2.7*   BILIRUBIN TOTAL mg/dL 0.5 0.7 0.6   ALK PHOS IU/L 73 64 55   ALT IU/L 36 47 50   AST IU/L 27 32 28    GLUCOSE mg/dL 142* 149* 115*     Coag  Results from last 7 days   Lab Units 01/06/24  0532 01/05/24  2353 01/05/24  1705 01/05/24  0329 01/04/24  2030 01/04/24  0510   PROTIME sec  --   --   --   --  15.0* 14.3   INR   --   --   --   --  1.2 1.1   PTT sec 61* 84* 104*   < > 49* 33    < > = values in this interval not displayed.       Imaging:   X-RAY CHEST 1 VIEW    Result Date: 1/5/2024  IMPRESSION: Satisfactory positioning of the recently placed tracheostomy device. Right IJ central line appears stable terminating in the region the cavoatrial junction. Removal of the Dobbhoff tube. Redemonstration of moderate patchy airspace disease in the right mid to upper lung zone without significant change with lesser degrees of left greater than right basilar lung opacities not significant changed. These are nonspecific correlate for multifocal pneumonia versus edema or aspiration. No evidence of a pneumothorax. Small left pleural effusion not significantly changed. No sizable right pleural effusion. Stable cardiomediastinal contours with valve replacement. No upper abdominal or gross bone findings of concern. Right axilla surgical clips redemonstrated. COMMENT: Portable AP view the chest performed and compared with January 2, 2024 study. See impression.    Ultrasound venous leg bilateral    Result Date: 1/4/2024  IMPRESSION: No DVT identified in either lower extremity.     Ultrasound venous arm left    Result Date: 1/3/2024  IMPRESSION: 1.  No evidence for deep vein thrombosis bilaterally. 2.  Superficial thrombus involving the right cephalic vein.    Ultrasound venous arm right    Result Date: 1/3/2024  IMPRESSION: 1.  No evidence for deep vein thrombosis bilaterally. 2.  Superficial thrombus involving the right cephalic vein.    X-RAY ABDOMEN 1 VIEW    Result Date: 1/3/2024  IMPRESSION: Dobbhoff tube enters below left hemidiaphragm with its tip likely in the region of the stomach.     X-RAY CHEST 1 VIEW    Result Date:  1/2/2024  IMPRESSION: Hazy opacity seen at right midlung, new from the prior study, may represent ammonia or aspiration. Endotracheal tube with the tip 9 mm above the leo. Consider repositioning.     MRI BRAIN WITHOUT CONTRAST    Result Date: 1/2/2024  IMPRESSION: No acute infarct. Mild chronic microvascular ischemia, moderate volume loss.    X-RAY ABDOMEN 1 VIEW    Result Date: 12/30/2023  IMPRESSION: Weighted feeding tube tip at the level of the proximal stomach.    CT HEAD WITHOUT IV CONTRAST    Result Date: 12/30/2023  IMPRESSION: No acute intracranial abnormality.  Chronic ischemic white matter changes are noted.    X-RAY CHEST 1 VIEW    Result Date: 12/30/2023  IMPRESSION: 1. The endotracheal tube is lower in position situated 2 cm above level of the leo.    IR TUNNELED PICC PLACEMENT    Result Date: 12/29/2023  IMPRESSION: Successful placement of 5 Fr triple lumen tunneled PICC.     ULTRASOUND GUIDED VASCULAR ACCESS    Result Date: 12/29/2023  IMPRESSION: Successful placement of 5 Fr triple lumen tunneled PICC.     ULTRASOUND KIDNEYS BLADDER/NO POST VOID    Result Date: 12/28/2023  IMPRESSION: 1. No sonographic abnormality in the kidneys. 2. Nearly completely collapsed bladder as discussed below. No large intraluminal bladder thrombus/clot as clinically questioned (given the limitations of near complete bladder collapse). COMMENT: Real-time sonographic evaluation of the kidneys and bladder was performed. There is normal cortical echogenicity and corticomedullary differentiation. The right kidney measures 10.8 x 5.2 x 4.6 cm. The left kidney measures 10.8 x 5.8 x 4.3 cm. There is no hydronephrosis, calculi, masses or perinephric collections. Bladder is nearly completely collapsed, measuring approximately 2.5 x 1.3 x 2.5 cm, for a calculated volume of approximately 5.5 cc, precluding adequate evaluation for calculi or mass. No ureteral jets identified on color Doppler, perhaps due to renal dysfunction  and/or hydration status.    X-RAY CHEST 1 VIEW    Result Date: 12/27/2023  IMPRESSION:  Reduced lung volumes. No acute cardiopulmonary process. Stable cardiac enlargement. COMPARISON: Portable chest studies 12/25 and 12/26/2023. COMMENT:  Upright portable imaging completed 0548 hours. Endotracheal tube 4.7 cm above. Enteric tube follows a normal course into left upper quadrant, directed to the left. Mild stable cardiac enlargement. Mitral annular prosthesis again noted. Stable hilar and mediastinal contours. Reduced lung volumes. No definite consolidation or pleural fluid. Unremarkable upper abdomen.    X-RAY CHEST 1 VIEW    Result Date: 12/26/2023  IMPRESSION: Endotracheal tube has been repositioned; the tip is now approximately 3.3 cm above the leo. COMMENT: A semierect AP portable view the chest was performed at 1615 and is compared to a prior study from 1503. Since the prior study, the endotracheal tube has been repositioned and the tip is now approximately 3.3 cm above the leo. There has been no other significant interval change.    X-RAY CHEST 1 VIEW    Result Date: 12/26/2023  IMPRESSION: ET tube 2 cm above leo. NG tube tip in proximal stomach. Probable right lower lobe atelectasis; attention on follow-up.    X-RAY CHEST 1 VIEW    Result Date: 12/26/2023  IMPRESSION: Status post extubation. Slightly improved vascular congestion since earlier in the day. Suspect developing atelectasis or airspace disease in the right midlung zone. COMMENT: Semierect AP portable view of the chest was performed at 1428 and is compared to a prior study from 5:27 AM. In the interval, the endotracheal tube has been removed. An enteric tube remains in place with the tip in the stomach. There is a vascular catheter/line with the tip projecting near the junction of the IVC and right atrium; it is difficult to determine whether this was present previously but differences in technique. Mild cardiomegaly is again noted. Cardiac  valvular prosthesis is again seen. The pulmonary vasculature and interstitial markings have improved slightly since earlier in the day. There is questionable developing airspace disease or atelectasis in the right midlung zone. Trace bilateral pleural effusions are suspected. The hilar and mediastinal structures appear stable. Surgical clips are again seen in the right axilla.    X-RAY CHEST 1 VIEW    Result Date: 12/26/2023  IMPRESSION: ET tube 4 cm above leo, NG tube tip not well seen, likely in proximal stomach. Stable cardiomegaly, mitral valve replacement. Clear lungs, with interstitial crowding secondary to low lung volumes.    X-RAY CHEST 1 VIEW    Result Date: 12/26/2023  IMPRESSION: Improved pulmonary vascular congestion. ET tube 3 cm above leo, NG tube tip below inferior edge of film.    X-RAY CHEST 1 VIEW    Result Date: 12/25/2023  IMPRESSION: Interval retraction of the endotracheal tube now in satisfactory position, as below. Stable satisfactory positioning of the enteric tube. Progressive pulmonary interstitial edema with stable enlargement of the cardiac silhouette. No pneumothorax. COMMENT: Comparison: Chest x-ray 12/24/2023. Technique: A single frontal AP portable upright projection of the chest was obtained. FINDINGS: There has been interval retraction of the endotracheal tube now terminating 2.7 cm above the leo. An enteric tube courses to the stomach with the distal tip collimated from the field-of-view beneath the left hemidiaphragm in satisfactory position. There are defibrillator pad projecting over the left hemithorax. There are progressively increased interstitial opacities seen projecting over both lungs. There is no pleural effusion or pneumothorax. The cardiac silhouette is stably enlarged. The mediastinal contours are unchanged. Again noted is a prosthetic mitral valve. The upper abdomen is unremarkable. There is no acute osseous abnormality. Surgical clips overlie the right  axillary region.     X-RAY CHEST 1 VIEW    Result Date: 12/25/2023  IMPRESSION: Satisfactory positioning of the endotracheal and enteric tubes. No pneumothorax. Stable pulmonary edema and enlargement of the cardiac silhouette. COMMENT: Comparison: Chest x-ray 12/25/2023. Technique: A single frontal AP portable upright projection of the chest was obtained. FINDINGS: The tip of an endotracheal tube terminates 4.0 cm above the leo. An enteric tube courses to the stomach with the distal tip collimated from the field-of-view beneath the left hemidiaphragm in satisfactory position. There are defibrillator pad projecting over the left hemithorax. There are mildly increased interstitial opacities again seen projecting over both lungs. No pleural effusion or pneumothorax is seen. There is stable enlargement of the cardiac silhouette. Again noted is a prosthetic mitral valve. The mediastinal contours are unchanged. The upper abdomen is unremarkable. There is no acute osseous abnormality. There are surgical clips overlying the right axillary region.     X-RAY CHEST 1 VIEW    Result Date: 12/25/2023  IMPRESSION: Low-lying endotracheal tube terminating over the proximal right mainstem bronchus. Recommend retraction. Satisfactory positioning of the enteric tube. This finding and recommendation was discussed with nurse Shoemaker at 11:27 AM on 12/25/2023 by Dr. Martinez by telephone. Mild pulmonary interstitial edema and stable enlargement of the cardiac silhouette. No pneumothorax. COMMENT: Comparison: Chest x-ray 12/24/2023. Technique: A single frontal AP portable upright projection of the chest was obtained. FINDINGS: The tip of the intervally placed endotracheal tube terminates over the proximal right mainstem bronchus. The tip of the enteric tube terminates over the left upper quadrant of the abdomen. There are mildly increased interstitial opacities noted within both lungs. There is no pleural effusion or pneumothorax. There is  stable enlargement of the cardiac silhouette. Again noted is a mitral valve prosthesis. Surgical clips overlie the right axilla. The upper abdomen is unremarkable. There is no acute osseous abnormality.    X-RAY CHEST 1 VIEW    Result Date: 12/25/2023  IMPRESSION: Vascular congestion.  Left basal atelectasis. COMMENT: AP radiograph the chest is compared to previous study dated 8/17/2023. There is vascular congestion and small effusions.  Findings may represent mild congestive heart failure.  Cardiac silhouette is unchanged.  Prosthetic valve ring seen.  Surgical staples seen in the right axilla.  There is minimal left basal atelectasis.    CT ANGIOGRAPHY CHEST PULMONARY EMBOLISM WITH IV CONTRAST    Result Date: 12/24/2023  IMPRESSION: 1. No pulmonary embolism in the main or proximal segmental pulmonary arteries. 2. Right upper lobe and left lower lobe infiltrate with patchy airspace opacities in the right middle lobe.      Micro:   Microbiology Results     Procedure Component Value Units Date/Time    Blood Culture Blood, Venous [[id number]]  (Normal) Collected: 01/01/24 1201    Specimen: Blood, Venous Updated: 01/04/24 1701     Culture No growth at 72 hours    Blood Culture Blood, Venous [[id number]]  (Normal) Collected: 01/01/24 1201    Specimen: Blood, Venous Updated: 01/04/24 1701     Culture No growth at 72 hours    Sputum culture / smear Tracheal Aspirate [[id number]]  (Abnormal) Collected: 01/01/24 0937    Specimen: Tracheal Aspirate Updated: 01/03/24 0744    Narrative:      The following orders were created for panel order Sputum culture / smear Tracheal Aspirate.  Procedure                               Abnormality         Status                     ---------                               -----------         ------                     Sputum Gram Stain (Lab O...[[id number]]                      Final result               Sputum Culture (Lab Only...[[id number]]  Abnormal            Final result                  Please view results for these tests on the individual orders.    Sputum Gram Stain (Lab Only) Tracheal Aspirate [[id number]] Collected: 01/01/24 0937    Specimen: Tracheal Aspirate Updated: 01/01/24 1817     Gram Stain Result 4+ WBC      1+ Epithelial cells      No organisms seen    Sputum Culture (Lab Only) Tracheal Aspirate [[id number]]  (Abnormal) Collected: 01/01/24 0937    Specimen: Tracheal Aspirate Updated: 01/03/24 0744     Culture **Positive Culture**      1+ Yeast, No Further Identification      No Normal Park    Sputum culture / smear Expectorated Sputum [[id number]]  (Abnormal) Collected: 12/27/23 1417    Specimen: Bronch Lavage from Expectorated Sputum Updated: 12/29/23 0723    Narrative:      The following orders were created for panel order Sputum culture / smear Expectorated Sputum.  Procedure                               Abnormality         Status                     ---------                               -----------         ------                     Sputum Gram Stain (Lab O...[[id number]]                      Final result               Sputum Culture (Lab Only...[[id number]]  Abnormal            Final result                 Please view results for these tests on the individual orders.    Sputum Gram Stain (Lab Only) Expectorated Sputum [[id number]] Collected: 12/27/23 1417    Specimen: Bronch Lavage from Expectorated Sputum Updated: 12/27/23 2032     Gram Stain Result 2+ WBC      No Epithelial Cells Seen      No organisms seen    Sputum Culture (Lab Only) Expectorated Sputum [[id number]]  (Abnormal) Collected: 12/27/23 1417    Specimen: Bronch Lavage from Expectorated Sputum Updated: 12/29/23 0723     Culture **Positive Culture**      1+ Yeast, No Further Identification    Sputum culture / smear Expectorated Sputum [[id number]] Collected: 12/25/23 0418    Specimen: Aspirate from Expectorated Sputum Updated: 12/27/23 0845    Narrative:      The following orders were created for panel order Sputum  culture / smear Expectorated Sputum.  Procedure                               Abnormality         Status                     ---------                               -----------         ------                     Sputum Gram Stain (Lab O...[[id number]]                      Final result               Sputum Culture (Lab Only...[[id number]]  Normal              Final result                 Please view results for these tests on the individual orders.    Sputum Gram Stain (Lab Only) Expectorated Sputum [[id number]] Collected: 12/25/23 0418    Specimen: Aspirate from Expectorated Sputum Updated: 12/25/23 0956     Gram Stain Result 3+ WBC      No Epithelial Cells Seen      3+ Gram positive bacilli      2+ Gram positive cocci in pairs, chains and clusters    Sputum Culture (Lab Only) Expectorated Sputum [[id number]]  (Normal) Collected: 12/25/23 0418    Specimen: Aspirate from Expectorated Sputum Updated: 12/27/23 0845     Culture Normal Park    MRSA Screen, Nares Only Nose [[id number]]  (Normal) Collected: 12/25/23 0352    Specimen: Nasal Swab from Nose Updated: 12/25/23 0906     MRSA DNA, Nares Negative    Blood Culture Blood, Venous [[id number]]  (Normal) Collected: 12/24/23 1652    Specimen: Blood, Venous Updated: 12/29/23 0000     Culture No growth at 96 hours    SARS-CoV-2 (COVID-19) Nasopharynx [[id number]]  (Abnormal) Collected: 12/24/23 1649    Specimen: Nasopharyngeal Swab from Nasopharynx Updated: 12/24/23 1736    Narrative:      The following orders were created for panel order SARS-CoV-2 (COVID-19) Nasopharynx.  Procedure                               Abnormality         Status                     ---------                               -----------         ------                     SARS-COV-2 (COVID-19)/ F...[[id number]]  Abnormal            Final result                 Please view results for these tests on the individual orders.    SARS-COV-2 (COVID-19)/ FLU A/B, AND RSV, PCR Nasopharynx [[id number]]  (Abnormal)  Collected: 12/24/23 1649    Specimen: Nasopharyngeal Swab from Nasopharynx Updated: 12/24/23 1736     SARS-CoV-2 (COVID-19) Negative     Influenza A Positive     Influenza B Negative     Respiratory Syncytial Virus Negative    Narrative:      Testing performed using real-time PCR for detection of COVID-19. EUA approved validation studies performed on site.     Blood Culture Blood, Venous [[id number]]  (Normal) Collected: 12/24/23 1647    Specimen: Blood, Venous Updated: 12/29/23 0100     Culture No growth at 96 hours          ECG/Telemetry  I have independently reviewed the telemetry. No events for the last 24 hours.         ASSESSMENT    73 y.o. male with history atrial fibrillation, CHF, prostate and breast CA, history of GI bleed who presented to the emergency room with shortness of breath and found to be influenza A positive. During day 2 of his hospital stay he had a v fib arrest with ROSC. He was transferred to the ICU for further monitoring     PLAN   # Acute Hypoxic respiratory failure  - Failed Bipap on admission, became bradycardic and was intubated  - Extubated 12/26, however became hypoxic, ?Secondary to upper airway edema vs acute bronchospasm vs flash pulmonary edema. Ultimately required re-intubation 12/26.   - Initially passed SBT 12/30, was extubated to nasal cannula, failed HF and BIPAP and ultimately required re-intubation in the setting of hypoxia and poor mental status  - s/p trach/PEG on 1/4   - He is volume overloaded, with resolving ELEANOR.      -PS/CPAP and transition to TC if tolerated  - Furosemide BID      # S/p V fib cardiac arrest  - In setting of Takosubo's cardiomyopthy  - ROSC achieved after receiving 3 epi, 3 shocks, 2 bicarb, and Magnesium replacement  - Following commands post-arrest. No TTM initiated  - Taken by interventional cardiology for the placement of a temporary pacemaker  - PPM removed secondary to dyssynchrony and misplacement. No further marcellus arrythmias, removed 12/27  -  Continue amiodarone   - Per cardiology. if patient has further episodes of polymorphic ventricular tachycardia, resume IV lidocaine    # Takotsubo Cardiomyopathy  - TTE 12/26 suggestive of Takotsubos with EF 40-45%, confirmed by LV gram,improved to 65%  - Cardiology following  - Furosemide BID   - Continue metoprolol      # Acute kidney injury  - Renal function slowly improving. Initial creatinine 1.2, peaked 2.3, today at 1.4  - Baseline creatinine appears to be 1.2-1.4  - ?Secondary to IV dye load s/p cardiac cath vs over-diuresis   - Monitor BMP  - Cr trending down  - Overall fluid balance yesterday positive.  Increased lasix to BID dosing      # Elevated liver enzymes  - Likely in setting of hypotension, shock liver  - Serial labs, trending down.  - Monitor CMP     # Septic Shock  - Resolved, off pressors  -  maintain MAP > 65    # Pneumonia  - CT Chest 12/24 with left lower lobe infiltrate and right upper lobe infiltrate  - Blood/ sputum cultures negative   - Completed course of zosyn 12/31.   - New fever noted with Cxr on 1/2 with new hazy opacity seen in the right lung  -Started on Vanco/ceftaz on 1/3 with concern for new infiltrate.  Narrowed to ceftriaxone 1/5, with sputum culture growing normal nixon. Will complete 5 days.      # Afib  - EP following  - Continue IV heparin, transition to DOAC soon   - Continue amiodarone and metoprolol      # Influenza A  - Completed 5 day course Oseltamivir      # Prostate/breast cancer  - S/p radiation therapy for prostate cancer spring 2023  - S/p R mastectomy 8/2023 and radiation therapy 9/2023  - Continue tamoxifen     #Generalized Edema  -1/2 with UE edema, Right hand swollen and cool on exam, strong radial pulse, cap refill 2-3 seconds  -Bilateral UE ultrasounds 1/3  with R superficial thrombus involving right cephalic vein  -Bilateral LE ultrasound done 1/4 with no evidence of DVT     #Anemia  -Hgb 6.7 on 1/4   -Given 1 unit PRBC    -Trend daily CBC  -Transfuse for  Hgb less than 7.0 or less than 8.0 with active bleeding  -Today Hgb stable at 7.6     VTE Prophylaxis Plan: SCDs, IV heparin  GI Prophylaxis Plan: Protonix  Lines/Drains/Airway: R PICC (12/28), Trach/PEG (1/4), jones, FMS  Fluids/Electrolytes/Nutrition: TF     Disposition Planning: Remain in the ICU    CODE STATUS  Full Code       The case was reviewed this morning at the patient's beside multidisciplinary rounds with the patient's nurse, dietician, pharmacist, respiratory therapist, physical therapist and critical care nurse coordinator. The patient's clinical status with regards to diagnosis, treatment plans including disposition of any IV, arterial lines, Jones and tubes were discussed, as well as dietary, respiratory therapy and mobilization needs.     This case was discussed with consultants.    Total critical time spent managing acute hypoxic ventilator dependent respiratory failure, pneumonia, cardiomyopathy, totals 45 minutes.    This note was scribed by JONNY Obregon  in my presence on my behalf.       Filipe Casper DO  1/6/2024  1:06 PM

## 2024-01-07 LAB
ALBUMIN SERPL-MCNC: 2.6 G/DL (ref 3.5–5.7)
ALP SERPL-CCNC: 74 IU/L (ref 34–125)
ALT SERPL-CCNC: 33 IU/L (ref 7–52)
ANION GAP SERPL CALC-SCNC: 12 MEQ/L (ref 3–15)
APTT PPP: 46 SEC (ref 23–35)
APTT PPP: 64 SEC (ref 23–35)
APTT PPP: 95 SEC (ref 23–35)
AST SERPL-CCNC: 29 IU/L (ref 13–39)
ATRIAL RATE: 61
ATRIAL RATE: 64
ATRIAL RATE: 65
BILIRUB SERPL-MCNC: 0.5 MG/DL (ref 0.3–1.2)
BUN SERPL-MCNC: 29 MG/DL (ref 7–25)
CALCIUM SERPL-MCNC: 7.6 MG/DL (ref 8.6–10.3)
CHLORIDE SERPL-SCNC: 103 MEQ/L (ref 98–107)
CO2 SERPL-SCNC: 25 MEQ/L (ref 21–31)
CREAT SERPL-MCNC: 1.4 MG/DL (ref 0.7–1.3)
EGFRCR SERPLBLD CKD-EPI 2021: 53.1 ML/MIN/1.73M*2
ERYTHROCYTE [DISTWIDTH] IN BLOOD BY AUTOMATED COUNT: 16.3 % (ref 11.6–14.4)
ERYTHROCYTE [DISTWIDTH] IN BLOOD BY AUTOMATED COUNT: 16.5 % (ref 11.6–14.4)
GLUCOSE BLD-MCNC: 140 MG/DL (ref 70–99)
GLUCOSE BLD-MCNC: 149 MG/DL (ref 70–99)
GLUCOSE BLD-MCNC: 169 MG/DL (ref 70–99)
GLUCOSE BLD-MCNC: 185 MG/DL (ref 70–99)
GLUCOSE SERPL-MCNC: 122 MG/DL (ref 70–99)
HCT VFR BLDCO AUTO: 23.7 % (ref 40.1–51)
HCT VFR BLDCO AUTO: 25.5 % (ref 40.1–51)
HGB BLD-MCNC: 7.5 G/DL (ref 13.7–17.5)
HGB BLD-MCNC: 8 G/DL (ref 13.7–17.5)
INR PPP: 1.8
MCH RBC QN AUTO: 31.7 PG (ref 28–33.2)
MCH RBC QN AUTO: 32.2 PG (ref 28–33.2)
MCHC RBC AUTO-ENTMCNC: 31.4 G/DL (ref 32.2–36.5)
MCHC RBC AUTO-ENTMCNC: 31.6 G/DL (ref 32.2–36.5)
MCV RBC AUTO: 101.2 FL (ref 83–98)
MCV RBC AUTO: 101.7 FL (ref 83–98)
P AXIS: 49
P AXIS: 87
P AXIS: 94
PDW BLD AUTO: 10.8 FL (ref 9.4–12.4)
PDW BLD AUTO: 10.9 FL (ref 9.4–12.4)
PLATELET # BLD AUTO: 154 K/UL (ref 150–350)
PLATELET # BLD AUTO: 184 K/UL (ref 150–350)
POCT TEST: ABNORMAL
POTASSIUM SERPL-SCNC: 3.7 MEQ/L (ref 3.5–5.1)
PR INTERVAL: 152
PR INTERVAL: 164
PROT SERPL-MCNC: 4.9 G/DL (ref 6–8.2)
PROTHROMBIN TIME: 21.1 SEC (ref 12.2–14.5)
QRS DURATION: 120
QRS DURATION: 122
QRS DURATION: 124
QT INTERVAL: 544
QT INTERVAL: 554
QT INTERVAL: 556
QTC CALCULATION(BAZETT): 552
QTC CALCULATION(BAZETT): 559
QTC CALCULATION(BAZETT): 562
R AXIS: -11
R AXIS: -12
R AXIS: -4
RBC # BLD AUTO: 2.33 M/UL (ref 4.5–5.8)
RBC # BLD AUTO: 2.52 M/UL (ref 4.5–5.8)
SODIUM SERPL-SCNC: 140 MEQ/L (ref 136–145)
T WAVE AXIS: 159
T WAVE AXIS: 180
T WAVE AXIS: 184
VENTRICULAR RATE: 61
VENTRICULAR RATE: 62
VENTRICULAR RATE: 62
WBC # BLD AUTO: 7.49 K/UL (ref 3.8–10.5)
WBC # BLD AUTO: 9.3 K/UL (ref 3.8–10.5)

## 2024-01-07 PROCEDURE — 63600000 HC DRUGS/DETAIL CODE: Mod: JZ | Performed by: HOSPITALIST

## 2024-01-07 PROCEDURE — 94640 AIRWAY INHALATION TREATMENT: CPT

## 2024-01-07 PROCEDURE — 85027 COMPLETE CBC AUTOMATED: CPT

## 2024-01-07 PROCEDURE — 80053 COMPREHEN METABOLIC PANEL: CPT

## 2024-01-07 PROCEDURE — 85610 PROTHROMBIN TIME: CPT | Performed by: HOSPITALIST

## 2024-01-07 PROCEDURE — 63600000 HC DRUGS/DETAIL CODE: Mod: JW | Performed by: NURSE PRACTITIONER

## 2024-01-07 PROCEDURE — 63600000 HC DRUGS/DETAIL CODE: Mod: JZ

## 2024-01-07 PROCEDURE — 63600000 HC DRUGS/DETAIL CODE: Mod: JZ | Performed by: STUDENT IN AN ORGANIZED HEALTH CARE EDUCATION/TRAINING PROGRAM

## 2024-01-07 PROCEDURE — 63700000 HC SELF-ADMINISTRABLE DRUG: Performed by: NURSE PRACTITIONER

## 2024-01-07 PROCEDURE — 63700000 HC SELF-ADMINISTRABLE DRUG

## 2024-01-07 PROCEDURE — 85027 COMPLETE CBC AUTOMATED: CPT | Performed by: HOSPITALIST

## 2024-01-07 PROCEDURE — 85730 THROMBOPLASTIN TIME PARTIAL: CPT | Performed by: HOSPITALIST

## 2024-01-07 PROCEDURE — 20000000 HC ROOM AND CARE ICU

## 2024-01-07 PROCEDURE — 25000000 HC PHARMACY GENERAL

## 2024-01-07 PROCEDURE — 94003 VENT MGMT INPAT SUBQ DAY: CPT

## 2024-01-07 PROCEDURE — 25800000 HC PHARMACY IV SOLUTIONS: Performed by: STUDENT IN AN ORGANIZED HEALTH CARE EDUCATION/TRAINING PROGRAM

## 2024-01-07 PROCEDURE — 36415 COLL VENOUS BLD VENIPUNCTURE: CPT

## 2024-01-07 PROCEDURE — 93005 ELECTROCARDIOGRAM TRACING: CPT

## 2024-01-07 RX ORDER — DIPHENHYDRAMINE HCL 50 MG/ML
25 VIAL (ML) INJECTION ONCE
Status: COMPLETED | OUTPATIENT
Start: 2024-01-07 | End: 2024-01-07

## 2024-01-07 RX ORDER — IBUPROFEN/PSEUDOEPHEDRINE HCL 200MG-30MG
3 TABLET ORAL NIGHTLY
Status: DISCONTINUED | OUTPATIENT
Start: 2024-01-07 | End: 2024-01-08

## 2024-01-07 RX ADMIN — GUAIFENESIN 400 MG: 100 SOLUTION ORAL at 05:48

## 2024-01-07 RX ADMIN — Medication 10 MG: at 21:05

## 2024-01-07 RX ADMIN — GUAIFENESIN 400 MG: 100 SOLUTION ORAL at 00:23

## 2024-01-07 RX ADMIN — GUAIFENESIN 400 MG: 100 SOLUTION ORAL at 23:55

## 2024-01-07 RX ADMIN — DIPHENHYDRAMINE HYDROCHLORIDE 25 MG: 50 INJECTION, SOLUTION INTRAMUSCULAR; INTRAVENOUS at 22:48

## 2024-01-07 RX ADMIN — FUROSEMIDE 60 MG: 10 INJECTION, SOLUTION INTRAMUSCULAR; INTRAVENOUS at 16:00

## 2024-01-07 RX ADMIN — CHLORHEXIDINE GLUCONATE ORAL RINSE 15 ML: 1.2 SOLUTION DENTAL at 10:00

## 2024-01-07 RX ADMIN — FENTANYL CITRATE 50 MCG: 0.05 INJECTION, SOLUTION INTRAMUSCULAR; INTRAVENOUS at 18:21

## 2024-01-07 RX ADMIN — CEFTRIAXONE SODIUM 1 G: 1 INJECTION, POWDER, FOR SOLUTION INTRAMUSCULAR; INTRAVENOUS at 12:25

## 2024-01-07 RX ADMIN — PANTOPRAZOLE SODIUM 40 MG: 40 INJECTION, POWDER, LYOPHILIZED, FOR SOLUTION INTRAVENOUS at 08:59

## 2024-01-07 RX ADMIN — ACETAMINOPHEN 650 MG: 650 SOLUTION ORAL at 16:08

## 2024-01-07 RX ADMIN — Medication 3 MG: at 21:05

## 2024-01-07 RX ADMIN — SILVER SULFADIAZINE: 10 CREAM TOPICAL at 21:03

## 2024-01-07 RX ADMIN — Medication 10 ML: at 18:38

## 2024-01-07 RX ADMIN — ATORVASTATIN CALCIUM 10 MG: 10 TABLET, FILM COATED ORAL at 21:02

## 2024-01-07 RX ADMIN — PANTOPRAZOLE SODIUM 40 MG: 40 INJECTION, POWDER, LYOPHILIZED, FOR SOLUTION INTRAVENOUS at 21:02

## 2024-01-07 RX ADMIN — INSULIN LISPRO 1 UNITS: 100 INJECTION, SOLUTION INTRAVENOUS; SUBCUTANEOUS at 00:21

## 2024-01-07 RX ADMIN — FUROSEMIDE 60 MG: 10 INJECTION, SOLUTION INTRAMUSCULAR; INTRAVENOUS at 08:58

## 2024-01-07 RX ADMIN — TAMOXIFEN CITRATE 20 MG: 10 TABLET, FILM COATED ORAL at 08:59

## 2024-01-07 RX ADMIN — CHLORHEXIDINE GLUCONATE ORAL RINSE 15 ML: 1.2 SOLUTION DENTAL at 22:39

## 2024-01-07 RX ADMIN — CHOLECALCIFEROL TAB 25 MCG (1000 UNIT) 1000 UNITS: 25 TAB at 08:58

## 2024-01-07 RX ADMIN — Medication 400 MG: at 08:58

## 2024-01-07 RX ADMIN — RIVAROXABAN 20 MG: 10 TABLET, FILM COATED ORAL at 18:24

## 2024-01-07 RX ADMIN — HEPARIN SODIUM 1500 UNITS/HR: 10000 INJECTION, SOLUTION INTRAVENOUS at 05:46

## 2024-01-07 RX ADMIN — HEPARIN SODIUM 1350 UNITS/HR: 10000 INJECTION, SOLUTION INTRAVENOUS at 03:57

## 2024-01-07 RX ADMIN — AMIODARONE HYDROCHLORIDE 200 MG: 200 TABLET ORAL at 08:58

## 2024-01-07 RX ADMIN — GUAIFENESIN 400 MG: 100 SOLUTION ORAL at 18:24

## 2024-01-07 RX ADMIN — GUAIFENESIN 400 MG: 100 SOLUTION ORAL at 12:25

## 2024-01-07 RX ADMIN — Medication 400 MG: at 21:02

## 2024-01-07 RX ADMIN — Medication 10 ML: at 12:05

## 2024-01-07 RX ADMIN — METOPROLOL TARTRATE 25 MG: 25 TABLET, FILM COATED ORAL at 08:58

## 2024-01-07 RX ADMIN — Medication 10 ML: at 02:17

## 2024-01-07 RX ADMIN — METOPROLOL TARTRATE 25 MG: 25 TABLET, FILM COATED ORAL at 21:02

## 2024-01-07 RX ADMIN — IPRATROPIUM BROMIDE 2 PUFF: 17 AEROSOL, METERED RESPIRATORY (INHALATION) at 07:36

## 2024-01-07 RX ADMIN — INSULIN LISPRO 1 UNITS: 100 INJECTION, SOLUTION INTRAVENOUS; SUBCUTANEOUS at 18:37

## 2024-01-07 RX ADMIN — SILVER SULFADIAZINE: 10 CREAM TOPICAL at 08:59

## 2024-01-07 ASSESSMENT — COGNITIVE AND FUNCTIONAL STATUS - GENERAL
STANDING UP FROM CHAIR USING ARMS: 1 - TOTAL
WALKING IN HOSPITAL ROOM: 1 - TOTAL
WALKING IN HOSPITAL ROOM: 1 - TOTAL
CLIMB 3 TO 5 STEPS WITH RAILING: 1 - TOTAL
STANDING UP FROM CHAIR USING ARMS: 1 - TOTAL
MOVING TO AND FROM BED TO CHAIR: 1 - TOTAL
CLIMB 3 TO 5 STEPS WITH RAILING: 1 - TOTAL
MOVING TO AND FROM BED TO CHAIR: 1 - TOTAL

## 2024-01-07 NOTE — NURSING NOTE
Trial patient on trach collar desat to 79% pressure went up to 220/100. Placed back on vent full support AC/VC. Sat 100%.

## 2024-01-07 NOTE — PROGRESS NOTES
Critical Care/Pulmonary  Daily Progress Note        Subjective    Interval History:    Awake, alert, following commands  Remains on low dose of precedex  Tolerating PSV 8    I/O   Since admission; 5.1 L  Last 24 hours: (-) 1729 ml  Urine output: 3625 ml       Objective       Allergies: Azithromycin, Prednisone, and Lorazepam    CURRENT MEDS  •  acetaminophen, 650 mg, feeding tube, q4h PRN  •  albuterol, 2.5 mg, nebulization, q4h PRN  •  amiodarone, 200 mg, feeding tube, Daily  •  atorvastatin, 10 mg, oral, Nightly  •  atropine, 1 mg, intravenous, Once PRN  •  betamethasone valerate, , Topical, 2x daily PRN  •  calcium gluconate, 1 g, intravenous, q6h PRN  •  cefTRIAXone, 1 g, intravenous, q24h INT  •  cetirizine, 10 mg, feeding tube, Nightly  •  chlorhexidine, 15 mL, Mouth/Throat, 2 times per day  •  cholecalciferol (vitamin D3), 1,000 Units, feeding tube, Daily  •  glucose, 16-32 g of dextrose, oral, PRN **OR** dextrose, 15-30 g of dextrose, oral, PRN **OR** glucagon, 1 mg, intramuscular, PRN **OR** dextrose 50 % in water (D50), 25 mL, intravenous, PRN  •  fentaNYL, 50 mcg, intravenous, q4h PRN  •  furosemide, 60 mg, intravenous, BID (am, 4p)  •  guaiFENesin, 400 mg, oral, q6h GISELLE  •  heparin (porcine), 40-80 Units/kg (Adjusted), intravenous, q6h PRN  •  heparin infusion - MAR calculator by PTT, 100-4,000 Units/hr, intravenous, Titrated  •  hydrALAZINE, 5 mg, intravenous, q6h PRN  •  insulin lispro U-100, 1-10 Units, subcutaneous, q6h GISELLE  •  magnesium oxide, 400 mg, Nasogastric, BID  •  magnesium sulfate, 2 g, intravenous, PRN  •  melatonin, 3 mg, peg tube, Nightly  •  metoclopramide, 10 mg, intravenous, q6h PRN  •  metoprolol tartrate, 25 mg, feeding tube, BID  •  pantoprazole, 40 mg, intravenous, q12h GISELLE  •  potassium chloride, 20 mEq, oral, PRN  •  potassium chloride, 40 mEq, oral, PRN  •  potassium chloride, 20 mEq, intravenous, PRN  •  potassium chloride in water, 20 mEq, intravenous, PRN **AND**  potassium chloride in water, 20 mEq, intravenous, PRN  •  rivaroxaban, 20 mg, oral, Daily with dinner  •  silver sulfadiazine, , Topical, BID  •  sodium chloride 0.9 %, 5 mL/hr, intravenous, PRN  •  sodium chloride, 10 mL, intravenous, q8h INT  •  sodium chloride, 10 mL, intravenous, PRN  •  tamoxifen, 20 mg, feeding tube, Daily    VITAL SIGNS  Vitals:    01/07/24 1100 01/07/24 1200 01/07/24 1300 01/07/24 1310   BP: 118/81 (!) 114/55 (!) 220/103 127/60   BP Location:       Patient Position:       Pulse: 67 61 92 65   Resp: (!) 25 18 (!) 27 19   Temp:  37.6 °C (99.7 °F)     TempSrc:       SpO2: 100% 100% (!) 81% 100%   Weight:       Height:           VENTILATOR SETTINGS  Vent Mode: Assist control/volume control  FiO2 (%) (Set):  [40 %] 40 %  S RR:  [18] 18  S VT:  [450 mL] 450 mL  PEEP/CPAP (Set, cmH2O):  [6 cm H20] 6 cm H20  MAP (Observed, cmH2O):  [8.5-12] 8.7    Oxygen:  Oxygen Therapy: Supplemental oxygen  O2 Delivery Method: Trach tube  FiO2 (%) (Set): 40 %  O2 Flow Rate (L/min): 6 L/min     INTAKE/OUTPUT    Intake/Output Summary (Last 24 hours) at 1/7/2024 1409  Last data filed at 1/7/2024 1255  Gross per 24 hour   Intake 1831.16 ml   Output 3595 ml   Net -1763.84 ml       Lines, Drains, Airways, Wounds:  PICC Triple Lumen 12/28/23 Right (Active)   Number of days: 8       Gastrostomy/Enterostomy Gastrostomy 20 Fr LUQ (Active)   Number of days: 1       Urethral Catheter 20 Fr (Active)   Number of days: 8       ETT  (Active)   Number of days: 6       Surgical Airway Shiley Cuffed 8 (Active)   Number of days: 1       Wound Venous Ulcer Left Pretibial (Active)   Number of days: 12       Wound Perineum (Active)   Number of days: 12       Wound Puncture Anterior;Proximal;Right Groin (Active)   Number of days: 8       Wound Pressure Injury Right Heel (Active)   Number of days: 8       Wound Pressure Injury Left Heel (Active)   Number of days: 5       Catheterization Site - Arterial Right Femoral 6 Fr. (Active)    Number of days: 11       Catheterization Site - Venous Right Femoral 6 Fr. (Active)   Number of days: 11         Physical Exam:  Physical Exam  Vitals and nursing note reviewed.   Constitutional:       Appearance: He is ill-appearing.   HENT:      Head: Normocephalic and atraumatic.      Mouth/Throat:      Mouth: Mucous membranes are moist.   Eyes:      Pupils: Pupils are equal, round, and reactive to light.   Cardiovascular:      Rate and Rhythm: Normal rate. Rhythm irregular.      Pulses: Normal pulses.      Heart sounds: Normal heart sounds. No murmur heard.  Pulmonary:      Effort: Pulmonary effort is normal.      Breath sounds: Normal breath sounds.   Abdominal:      General: Abdomen is flat. Bowel sounds are normal. There is no distension.      Palpations: Abdomen is soft.   Musculoskeletal:      Right lower leg: Edema present.      Left lower leg: Edema present.   Skin:     General: Skin is warm and dry.   Neurological:      Mental Status: He is alert.      Comments: Awake and able to follow commands   Able to move all extremities            Labs:  See Labs Below:  ABG      CBC  Results from last 7 days   Lab Units 01/07/24  0431 01/06/24  0532 01/05/24  0329   WBC K/uL 7.49 6.83 7.42   RBC M/uL 2.33* 2.35* 2.52*   HEMOGLOBIN g/dL 7.5* 7.6* 8.1*   HEMATOCRIT % 23.7* 23.8* 25.0*   MCV fL 101.7* 101.3* 99.2*   MCH pg 32.2 32.3 32.1   MCHC g/dL 31.6* 31.9* 32.4   PLATELETS K/uL 154 142* 139*   RDW % 16.5* 17.0* 17.6*   MPV fL 10.9 11.3 11.0     BMP  Results from last 7 days   Lab Units 01/07/24  0431 01/06/24  0532 01/05/24  0329   SODIUM mEQ/L 140 138 141   POTASSIUM mEQ/L 3.7 3.4* 3.4*   CHLORIDE mEQ/L 103 103 104   CO2 mEQ/L 25 26 25   BUN mg/dL 29* 29* 29*   CREATININE mg/dL 1.4* 1.4* 1.6*   CALCIUM mg/dL 7.6* 7.5* 7.9*   ALBUMIN g/dL 2.6* 2.5* 2.7*   BILIRUBIN TOTAL mg/dL 0.5 0.5 0.7   ALK PHOS IU/L 74 73 64   ALT IU/L 33 36 47   AST IU/L 29 27 32   GLUCOSE mg/dL 122* 142* 149*     Coag  Results from last  7 days   Lab Units 01/07/24  1210 01/07/24  0431 01/06/24  1828 01/05/24  0329 01/04/24 2030 01/04/24  0510   PROTIME sec  --   --   --   --  15.0* 14.3   INR   --   --   --   --  1.2 1.1   PTT sec 95* 64* 84*   < > 49* 33    < > = values in this interval not displayed.       Imaging:   X-RAY CHEST 1 VIEW    Result Date: 1/5/2024  IMPRESSION: Satisfactory positioning of the recently placed tracheostomy device. Right IJ central line appears stable terminating in the region the cavoatrial junction. Removal of the Dobbhoff tube. Redemonstration of moderate patchy airspace disease in the right mid to upper lung zone without significant change with lesser degrees of left greater than right basilar lung opacities not significant changed. These are nonspecific correlate for multifocal pneumonia versus edema or aspiration. No evidence of a pneumothorax. Small left pleural effusion not significantly changed. No sizable right pleural effusion. Stable cardiomediastinal contours with valve replacement. No upper abdominal or gross bone findings of concern. Right axilla surgical clips redemonstrated. COMMENT: Portable AP view the chest performed and compared with January 2, 2024 study. See impression.    Ultrasound venous leg bilateral    Result Date: 1/4/2024  IMPRESSION: No DVT identified in either lower extremity.     Ultrasound venous arm left    Result Date: 1/3/2024  IMPRESSION: 1.  No evidence for deep vein thrombosis bilaterally. 2.  Superficial thrombus involving the right cephalic vein.    Ultrasound venous arm right    Result Date: 1/3/2024  IMPRESSION: 1.  No evidence for deep vein thrombosis bilaterally. 2.  Superficial thrombus involving the right cephalic vein.    X-RAY ABDOMEN 1 VIEW    Result Date: 1/3/2024  IMPRESSION: Dobbhoff tube enters below left hemidiaphragm with its tip likely in the region of the stomach.     X-RAY CHEST 1 VIEW    Result Date: 1/2/2024  IMPRESSION: Hazy opacity seen at right midlung,  new from the prior study, may represent ammonia or aspiration. Endotracheal tube with the tip 9 mm above the leo. Consider repositioning.     MRI BRAIN WITHOUT CONTRAST    Result Date: 1/2/2024  IMPRESSION: No acute infarct. Mild chronic microvascular ischemia, moderate volume loss.    X-RAY ABDOMEN 1 VIEW    Result Date: 12/30/2023  IMPRESSION: Weighted feeding tube tip at the level of the proximal stomach.    CT HEAD WITHOUT IV CONTRAST    Result Date: 12/30/2023  IMPRESSION: No acute intracranial abnormality.  Chronic ischemic white matter changes are noted.    X-RAY CHEST 1 VIEW    Result Date: 12/30/2023  IMPRESSION: 1. The endotracheal tube is lower in position situated 2 cm above level of the leo.    IR TUNNELED PICC PLACEMENT    Result Date: 12/29/2023  IMPRESSION: Successful placement of 5 Fr triple lumen tunneled PICC.     ULTRASOUND GUIDED VASCULAR ACCESS    Result Date: 12/29/2023  IMPRESSION: Successful placement of 5 Fr triple lumen tunneled PICC.     ULTRASOUND KIDNEYS BLADDER/NO POST VOID    Result Date: 12/28/2023  IMPRESSION: 1. No sonographic abnormality in the kidneys. 2. Nearly completely collapsed bladder as discussed below. No large intraluminal bladder thrombus/clot as clinically questioned (given the limitations of near complete bladder collapse). COMMENT: Real-time sonographic evaluation of the kidneys and bladder was performed. There is normal cortical echogenicity and corticomedullary differentiation. The right kidney measures 10.8 x 5.2 x 4.6 cm. The left kidney measures 10.8 x 5.8 x 4.3 cm. There is no hydronephrosis, calculi, masses or perinephric collections. Bladder is nearly completely collapsed, measuring approximately 2.5 x 1.3 x 2.5 cm, for a calculated volume of approximately 5.5 cc, precluding adequate evaluation for calculi or mass. No ureteral jets identified on color Doppler, perhaps due to renal dysfunction and/or hydration status.    X-RAY CHEST 1 VIEW    Result  Date: 12/27/2023  IMPRESSION:  Reduced lung volumes. No acute cardiopulmonary process. Stable cardiac enlargement. COMPARISON: Portable chest studies 12/25 and 12/26/2023. COMMENT:  Upright portable imaging completed 0548 hours. Endotracheal tube 4.7 cm above. Enteric tube follows a normal course into left upper quadrant, directed to the left. Mild stable cardiac enlargement. Mitral annular prosthesis again noted. Stable hilar and mediastinal contours. Reduced lung volumes. No definite consolidation or pleural fluid. Unremarkable upper abdomen.    X-RAY CHEST 1 VIEW    Result Date: 12/26/2023  IMPRESSION: Endotracheal tube has been repositioned; the tip is now approximately 3.3 cm above the leo. COMMENT: A semierect AP portable view the chest was performed at 1615 and is compared to a prior study from 1503. Since the prior study, the endotracheal tube has been repositioned and the tip is now approximately 3.3 cm above the leo. There has been no other significant interval change.    X-RAY CHEST 1 VIEW    Result Date: 12/26/2023  IMPRESSION: ET tube 2 cm above leo. NG tube tip in proximal stomach. Probable right lower lobe atelectasis; attention on follow-up.    X-RAY CHEST 1 VIEW    Result Date: 12/26/2023  IMPRESSION: Status post extubation. Slightly improved vascular congestion since earlier in the day. Suspect developing atelectasis or airspace disease in the right midlung zone. COMMENT: Semierect AP portable view of the chest was performed at 1428 and is compared to a prior study from 5:27 AM. In the interval, the endotracheal tube has been removed. An enteric tube remains in place with the tip in the stomach. There is a vascular catheter/line with the tip projecting near the junction of the IVC and right atrium; it is difficult to determine whether this was present previously but differences in technique. Mild cardiomegaly is again noted. Cardiac valvular prosthesis is again seen. The pulmonary  vasculature and interstitial markings have improved slightly since earlier in the day. There is questionable developing airspace disease or atelectasis in the right midlung zone. Trace bilateral pleural effusions are suspected. The hilar and mediastinal structures appear stable. Surgical clips are again seen in the right axilla.    X-RAY CHEST 1 VIEW    Result Date: 12/26/2023  IMPRESSION: ET tube 4 cm above leo, NG tube tip not well seen, likely in proximal stomach. Stable cardiomegaly, mitral valve replacement. Clear lungs, with interstitial crowding secondary to low lung volumes.    X-RAY CHEST 1 VIEW    Result Date: 12/26/2023  IMPRESSION: Improved pulmonary vascular congestion. ET tube 3 cm above leo, NG tube tip below inferior edge of film.    X-RAY CHEST 1 VIEW    Result Date: 12/25/2023  IMPRESSION: Interval retraction of the endotracheal tube now in satisfactory position, as below. Stable satisfactory positioning of the enteric tube. Progressive pulmonary interstitial edema with stable enlargement of the cardiac silhouette. No pneumothorax. COMMENT: Comparison: Chest x-ray 12/24/2023. Technique: A single frontal AP portable upright projection of the chest was obtained. FINDINGS: There has been interval retraction of the endotracheal tube now terminating 2.7 cm above the leo. An enteric tube courses to the stomach with the distal tip collimated from the field-of-view beneath the left hemidiaphragm in satisfactory position. There are defibrillator pad projecting over the left hemithorax. There are progressively increased interstitial opacities seen projecting over both lungs. There is no pleural effusion or pneumothorax. The cardiac silhouette is stably enlarged. The mediastinal contours are unchanged. Again noted is a prosthetic mitral valve. The upper abdomen is unremarkable. There is no acute osseous abnormality. Surgical clips overlie the right axillary region.     X-RAY CHEST 1 VIEW    Result  Date: 12/25/2023  IMPRESSION: Satisfactory positioning of the endotracheal and enteric tubes. No pneumothorax. Stable pulmonary edema and enlargement of the cardiac silhouette. COMMENT: Comparison: Chest x-ray 12/25/2023. Technique: A single frontal AP portable upright projection of the chest was obtained. FINDINGS: The tip of an endotracheal tube terminates 4.0 cm above the leo. An enteric tube courses to the stomach with the distal tip collimated from the field-of-view beneath the left hemidiaphragm in satisfactory position. There are defibrillator pad projecting over the left hemithorax. There are mildly increased interstitial opacities again seen projecting over both lungs. No pleural effusion or pneumothorax is seen. There is stable enlargement of the cardiac silhouette. Again noted is a prosthetic mitral valve. The mediastinal contours are unchanged. The upper abdomen is unremarkable. There is no acute osseous abnormality. There are surgical clips overlying the right axillary region.     X-RAY CHEST 1 VIEW    Result Date: 12/25/2023  IMPRESSION: Low-lying endotracheal tube terminating over the proximal right mainstem bronchus. Recommend retraction. Satisfactory positioning of the enteric tube. This finding and recommendation was discussed with nurse Shoemaker at 11:27 AM on 12/25/2023 by Dr. Martinez by telephone. Mild pulmonary interstitial edema and stable enlargement of the cardiac silhouette. No pneumothorax. COMMENT: Comparison: Chest x-ray 12/24/2023. Technique: A single frontal AP portable upright projection of the chest was obtained. FINDINGS: The tip of the intervally placed endotracheal tube terminates over the proximal right mainstem bronchus. The tip of the enteric tube terminates over the left upper quadrant of the abdomen. There are mildly increased interstitial opacities noted within both lungs. There is no pleural effusion or pneumothorax. There is stable enlargement of the cardiac silhouette.  Again noted is a mitral valve prosthesis. Surgical clips overlie the right axilla. The upper abdomen is unremarkable. There is no acute osseous abnormality.    X-RAY CHEST 1 VIEW    Result Date: 12/25/2023  IMPRESSION: Vascular congestion.  Left basal atelectasis. COMMENT: AP radiograph the chest is compared to previous study dated 8/17/2023. There is vascular congestion and small effusions.  Findings may represent mild congestive heart failure.  Cardiac silhouette is unchanged.  Prosthetic valve ring seen.  Surgical staples seen in the right axilla.  There is minimal left basal atelectasis.    CT ANGIOGRAPHY CHEST PULMONARY EMBOLISM WITH IV CONTRAST    Result Date: 12/24/2023  IMPRESSION: 1. No pulmonary embolism in the main or proximal segmental pulmonary arteries. 2. Right upper lobe and left lower lobe infiltrate with patchy airspace opacities in the right middle lobe.      Micro:   Microbiology Results     Procedure Component Value Units Date/Time    Blood Culture Blood, Venous [[id number]]  (Normal) Collected: 01/01/24 1201    Specimen: Blood, Venous Updated: 01/04/24 1701     Culture No growth at 72 hours    Blood Culture Blood, Venous [[id number]]  (Normal) Collected: 01/01/24 1201    Specimen: Blood, Venous Updated: 01/04/24 1701     Culture No growth at 72 hours    Sputum culture / smear Tracheal Aspirate [[id number]]  (Abnormal) Collected: 01/01/24 0937    Specimen: Tracheal Aspirate Updated: 01/03/24 0744    Narrative:      The following orders were created for panel order Sputum culture / smear Tracheal Aspirate.  Procedure                               Abnormality         Status                     ---------                               -----------         ------                     Sputum Gram Stain (Lab O...[[id number]]                      Final result               Sputum Culture (Lab Only...[[id number]]  Abnormal            Final result                 Please view results for these tests on the  individual orders.    Sputum Gram Stain (Lab Only) Tracheal Aspirate [[id number]] Collected: 01/01/24 0937    Specimen: Tracheal Aspirate Updated: 01/01/24 1817     Gram Stain Result 4+ WBC      1+ Epithelial cells      No organisms seen    Sputum Culture (Lab Only) Tracheal Aspirate [[id number]]  (Abnormal) Collected: 01/01/24 0937    Specimen: Tracheal Aspirate Updated: 01/03/24 0744     Culture **Positive Culture**      1+ Yeast, No Further Identification      No Normal Park    Sputum culture / smear Expectorated Sputum [[id number]]  (Abnormal) Collected: 12/27/23 1417    Specimen: Bronch Lavage from Expectorated Sputum Updated: 12/29/23 0723    Narrative:      The following orders were created for panel order Sputum culture / smear Expectorated Sputum.  Procedure                               Abnormality         Status                     ---------                               -----------         ------                     Sputum Gram Stain (Lab O...[[id number]]                      Final result               Sputum Culture (Lab Only...[[id number]]  Abnormal            Final result                 Please view results for these tests on the individual orders.    Sputum Gram Stain (Lab Only) Expectorated Sputum [[id number]] Collected: 12/27/23 1417    Specimen: Bronch Lavage from Expectorated Sputum Updated: 12/27/23 2032     Gram Stain Result 2+ WBC      No Epithelial Cells Seen      No organisms seen    Sputum Culture (Lab Only) Expectorated Sputum [[id number]]  (Abnormal) Collected: 12/27/23 1417    Specimen: Bronch Lavage from Expectorated Sputum Updated: 12/29/23 0723     Culture **Positive Culture**      1+ Yeast, No Further Identification    Sputum culture / smear Expectorated Sputum [[id number]] Collected: 12/25/23 0418    Specimen: Aspirate from Expectorated Sputum Updated: 12/27/23 0845    Narrative:      The following orders were created for panel order Sputum culture / smear Expectorated Sputum.  Procedure                                Abnormality         Status                     ---------                               -----------         ------                     Sputum Gram Stain (Lab O...[[id number]]                      Final result               Sputum Culture (Lab Only...[[id number]]  Normal              Final result                 Please view results for these tests on the individual orders.    Sputum Gram Stain (Lab Only) Expectorated Sputum [[id number]] Collected: 12/25/23 0418    Specimen: Aspirate from Expectorated Sputum Updated: 12/25/23 0956     Gram Stain Result 3+ WBC      No Epithelial Cells Seen      3+ Gram positive bacilli      2+ Gram positive cocci in pairs, chains and clusters    Sputum Culture (Lab Only) Expectorated Sputum [[id number]]  (Normal) Collected: 12/25/23 0418    Specimen: Aspirate from Expectorated Sputum Updated: 12/27/23 0845     Culture Normal Park    MRSA Screen, Nares Only Nose [[id number]]  (Normal) Collected: 12/25/23 0352    Specimen: Nasal Swab from Nose Updated: 12/25/23 0906     MRSA DNA, Nares Negative    Blood Culture Blood, Venous [[id number]]  (Normal) Collected: 12/24/23 1652    Specimen: Blood, Venous Updated: 12/29/23 0000     Culture No growth at 96 hours    SARS-CoV-2 (COVID-19) Nasopharynx [[id number]]  (Abnormal) Collected: 12/24/23 1649    Specimen: Nasopharyngeal Swab from Nasopharynx Updated: 12/24/23 1736    Narrative:      The following orders were created for panel order SARS-CoV-2 (COVID-19) Nasopharynx.  Procedure                               Abnormality         Status                     ---------                               -----------         ------                     SARS-COV-2 (COVID-19)/ F...[[id number]]  Abnormal            Final result                 Please view results for these tests on the individual orders.    SARS-COV-2 (COVID-19)/ FLU A/B, AND RSV, PCR Nasopharynx [[id number]]  (Abnormal) Collected: 12/24/23 1649    Specimen: Nasopharyngeal  Swab from Nasopharynx Updated: 12/24/23 6929     SARS-CoV-2 (COVID-19) Negative     Influenza A Positive     Influenza B Negative     Respiratory Syncytial Virus Negative    Narrative:      Testing performed using real-time PCR for detection of COVID-19. EUA approved validation studies performed on site.     Blood Culture Blood, Venous [[id number]]  (Normal) Collected: 12/24/23 1647    Specimen: Blood, Venous Updated: 12/29/23 0100     Culture No growth at 96 hours          ECG/Telemetry  I have independently reviewed the telemetry. No events for the last 24 hours.         ASSESSMENT    73 y.o. male with history atrial fibrillation, CHF, prostate and breast CA, history of GI bleed who presented to the emergency room with shortness of breath and found to be influenza A positive. During day 2 of his hospital stay he had a v fib arrest with ROSC. He was transferred to the ICU for further monitoring     PLAN   # Acute Hypoxic respiratory failure  - Failed Bipap on admission, became bradycardic and was intubated  - Extubated 12/26, however became hypoxic, ?Secondary to upper airway edema vs acute bronchospasm vs flash pulmonary edema. Ultimately required re-intubation 12/26.   - Initially passed SBT 12/30, was extubated to nasal cannula, failed HF and BIPAP and ultimately required re-intubation in the setting of hypoxia and poor mental status  - s/p trach/PEG on 1/4   - He is volume overloaded, with resolving ELAENOR.      -Tolerating PSV 8 this morning. Attempt trach collar. Start LTACH planning.   - Furosemide BID      # S/p V fib cardiac arrest  - In setting of Takosubo's cardiomyopthy  - ROSC achieved after receiving 3 epi, 3 shocks, 2 bicarb, and Magnesium replacement  - Following commands post-arrest. No TTM initiated  - Taken by interventional cardiology for the placement of a temporary pacemaker  - PPM removed secondary to dyssynchrony and misplacement. No further marcellus arrythmias, removed 12/27  - Continue amiodarone    - Per cardiology. if patient has further episodes of polymorphic ventricular tachycardia, resume IV lidocaine    # Takotsubo Cardiomyopathy  - TTE 12/26 suggestive of Takotsubos with EF 40-45%, confirmed by LV gram,improved to 65%  - Cardiology following  - Furosemide BID   - Continue metoprolol      # Acute kidney injury  - Renal function slowly improving. Initial creatinine 1.2, peaked 2.3, today at 1.4  - Baseline creatinine appears to be 1.2-1.4  - ?Secondary to IV dye load s/p cardiac cath vs over-diuresis   - Monitor BMP  - Cr trending down, continue lasix 60 mg BID  - monitor I and O     # Elevated liver enzymes  - Likely in setting of hypotension, shock liver  - Serial labs, resolved    # Septic Shock  - Resolved, off pressors  -  maintain MAP > 65    # Pneumonia  - CT Chest 12/24 with left lower lobe infiltrate and right upper lobe infiltrate  - Blood/ sputum cultures negative   - Completed course of zosyn 12/31.   - New fever noted with Cxr on 1/2 with new hazy opacity seen in the right lung  -Started on Vanco/ceftaz on 1/3 with concern for new infiltrate.  Narrowed to ceftriaxone 1/5, with sputum culture growing normal nixon. Last day antibiotics 1/8     # Afib  - EP following  - will transition back to xarelto today   - Continue amiodarone and metoprolol      # Influenza A  - Completed 5 day course Oseltamivir      # Prostate/breast cancer  - S/p radiation therapy for prostate cancer spring 2023  - S/p R mastectomy 8/2023 and radiation therapy 9/2023  - Continue tamoxifen     #Generalized Edema  -1/2 with UE edema, Right hand swollen and cool on exam, strong radial pulse, cap refill 2-3 seconds  -Bilateral UE ultrasounds 1/3  with R superficial thrombus involving right cephalic vein  -Bilateral LE ultrasound done 1/4 with no evidence of DVT     #Anemia  -Hgb 6.7 on 1/4   -Given 1 unit PRBC    -Trend daily CBC  -Transfuse for Hgb less than 7.0 or less than 8.0 with active bleeding       VTE Prophylaxis  Plan: SCDs, Zarelto  GI Prophylaxis Plan: Protonix  Lines/Drains/Airway: R PICC (12/28), Trach/PEG (1/4), jones, FMS  Fluids/Electrolytes/Nutrition: TF     Disposition Planning: Remain in the ICU    CODE STATUS  Full Code       The case was reviewed this morning at the patient's beside multidisciplinary rounds with the patient's nurse, dietician, pharmacist, respiratory therapist, physical therapist and critical care nurse coordinator. The patient's clinical status with regards to diagnosis, treatment plans including disposition of any IV, arterial lines, Jones and tubes were discussed, as well as dietary, respiratory therapy and mobilization needs.     This case was discussed with consultants.    Total critical time spent managing acute hypoxic ventilator dependent respiratory failure, pneumonia, cardiomyopathy, totals 35 minutes.    This note was scribed by JONNY Obregon  in my presence on my behalf.       Filipe Casper DO  1/7/2024  2:09 PM

## 2024-01-08 ENCOUNTER — APPOINTMENT (INPATIENT)
Dept: RADIOLOGY | Facility: HOSPITAL | Age: 74
DRG: 004 | End: 2024-01-08
Payer: MEDICARE

## 2024-01-08 LAB
ANION GAP SERPL CALC-SCNC: 9 MEQ/L (ref 3–15)
BUN SERPL-MCNC: 30 MG/DL (ref 7–25)
CALCIUM SERPL-MCNC: 8.1 MG/DL (ref 8.6–10.3)
CHLORIDE SERPL-SCNC: 101 MEQ/L (ref 98–107)
CO2 SERPL-SCNC: 31 MEQ/L (ref 21–31)
CREAT SERPL-MCNC: 1.5 MG/DL (ref 0.7–1.3)
EGFRCR SERPLBLD CKD-EPI 2021: 48.9 ML/MIN/1.73M*2
ERYTHROCYTE [DISTWIDTH] IN BLOOD BY AUTOMATED COUNT: 16 % (ref 11.6–14.4)
GLUCOSE BLD-MCNC: 120 MG/DL (ref 70–99)
GLUCOSE BLD-MCNC: 137 MG/DL (ref 70–99)
GLUCOSE BLD-MCNC: 144 MG/DL (ref 70–99)
GLUCOSE BLD-MCNC: 164 MG/DL (ref 70–99)
GLUCOSE BLD-MCNC: 167 MG/DL (ref 70–99)
GLUCOSE SERPL-MCNC: 132 MG/DL (ref 70–99)
HCT VFR BLDCO AUTO: 24.1 % (ref 40.1–51)
HGB BLD-MCNC: 7.4 G/DL (ref 13.7–17.5)
MCH RBC QN AUTO: 31.5 PG (ref 28–33.2)
MCHC RBC AUTO-ENTMCNC: 30.7 G/DL (ref 32.2–36.5)
MCV RBC AUTO: 102.6 FL (ref 83–98)
PDW BLD AUTO: 11.1 FL (ref 9.4–12.4)
PLATELET # BLD AUTO: 177 K/UL (ref 150–350)
POCT TEST: ABNORMAL
POTASSIUM SERPL-SCNC: 3.4 MEQ/L (ref 3.5–5.1)
RBC # BLD AUTO: 2.35 M/UL (ref 4.5–5.8)
SODIUM SERPL-SCNC: 141 MEQ/L (ref 136–145)
WBC # BLD AUTO: 8.49 K/UL (ref 3.8–10.5)

## 2024-01-08 PROCEDURE — 71045 X-RAY EXAM CHEST 1 VIEW: CPT

## 2024-01-08 PROCEDURE — 87070 CULTURE OTHR SPECIMN AEROBIC: CPT

## 2024-01-08 PROCEDURE — 85027 COMPLETE CBC AUTOMATED: CPT

## 2024-01-08 PROCEDURE — 63600000 HC DRUGS/DETAIL CODE: Mod: JZ

## 2024-01-08 PROCEDURE — 63600000 HC DRUGS/DETAIL CODE: Mod: JZ | Performed by: STUDENT IN AN ORGANIZED HEALTH CARE EDUCATION/TRAINING PROGRAM

## 2024-01-08 PROCEDURE — 63700000 HC SELF-ADMINISTRABLE DRUG: Performed by: NURSE PRACTITIONER

## 2024-01-08 PROCEDURE — 63700000 HC SELF-ADMINISTRABLE DRUG

## 2024-01-08 PROCEDURE — 63600000 HC DRUGS/DETAIL CODE: Mod: JW | Performed by: NURSE PRACTITIONER

## 2024-01-08 PROCEDURE — 80048 BASIC METABOLIC PNL TOTAL CA: CPT | Performed by: NURSE PRACTITIONER

## 2024-01-08 PROCEDURE — 36415 COLL VENOUS BLD VENIPUNCTURE: CPT

## 2024-01-08 PROCEDURE — 25000000 HC PHARMACY GENERAL

## 2024-01-08 PROCEDURE — 20000000 HC ROOM AND CARE ICU

## 2024-01-08 PROCEDURE — 94003 VENT MGMT INPAT SUBQ DAY: CPT

## 2024-01-08 PROCEDURE — 25800000 HC PHARMACY IV SOLUTIONS: Performed by: STUDENT IN AN ORGANIZED HEALTH CARE EDUCATION/TRAINING PROGRAM

## 2024-01-08 PROCEDURE — 87205 SMEAR GRAM STAIN: CPT

## 2024-01-08 RX ORDER — GUAIFENESIN 100 MG/5ML
400 SOLUTION ORAL EVERY 6 HOURS PRN
Status: DISCONTINUED | OUTPATIENT
Start: 2024-01-08 | End: 2024-01-10 | Stop reason: HOSPADM

## 2024-01-08 RX ORDER — IBUPROFEN/PSEUDOEPHEDRINE HCL 200MG-30MG
3 TABLET ORAL NIGHTLY PRN
Status: DISCONTINUED | OUTPATIENT
Start: 2024-01-08 | End: 2024-01-10 | Stop reason: HOSPADM

## 2024-01-08 RX ADMIN — CHLORHEXIDINE GLUCONATE ORAL RINSE 15 ML: 1.2 SOLUTION DENTAL at 22:15

## 2024-01-08 RX ADMIN — FUROSEMIDE 60 MG: 10 INJECTION, SOLUTION INTRAMUSCULAR; INTRAVENOUS at 09:03

## 2024-01-08 RX ADMIN — TAMOXIFEN CITRATE 20 MG: 10 TABLET, FILM COATED ORAL at 09:05

## 2024-01-08 RX ADMIN — RIVAROXABAN 20 MG: 10 TABLET, FILM COATED ORAL at 16:11

## 2024-01-08 RX ADMIN — Medication 10 ML: at 03:58

## 2024-01-08 RX ADMIN — CEFTRIAXONE SODIUM 1 G: 1 INJECTION, POWDER, FOR SOLUTION INTRAMUSCULAR; INTRAVENOUS at 12:26

## 2024-01-08 RX ADMIN — FUROSEMIDE 60 MG: 10 INJECTION, SOLUTION INTRAMUSCULAR; INTRAVENOUS at 16:11

## 2024-01-08 RX ADMIN — SILVER SULFADIAZINE: 10 CREAM TOPICAL at 09:05

## 2024-01-08 RX ADMIN — CHLORHEXIDINE GLUCONATE ORAL RINSE 15 ML: 1.2 SOLUTION DENTAL at 09:16

## 2024-01-08 RX ADMIN — Medication 400 MG: at 20:06

## 2024-01-08 RX ADMIN — PANTOPRAZOLE SODIUM 40 MG: 40 INJECTION, POWDER, LYOPHILIZED, FOR SOLUTION INTRAVENOUS at 09:03

## 2024-01-08 RX ADMIN — GUAIFENESIN 400 MG: 100 SOLUTION ORAL at 12:26

## 2024-01-08 RX ADMIN — GUAIFENESIN 400 MG: 100 SOLUTION ORAL at 05:48

## 2024-01-08 RX ADMIN — Medication 10 ML: at 12:18

## 2024-01-08 RX ADMIN — AMIODARONE HYDROCHLORIDE 200 MG: 200 TABLET ORAL at 09:05

## 2024-01-08 RX ADMIN — METOPROLOL TARTRATE 25 MG: 25 TABLET, FILM COATED ORAL at 20:06

## 2024-01-08 RX ADMIN — ATORVASTATIN CALCIUM 10 MG: 10 TABLET, FILM COATED ORAL at 20:06

## 2024-01-08 RX ADMIN — FENTANYL CITRATE 50 MCG: 0.05 INJECTION, SOLUTION INTRAMUSCULAR; INTRAVENOUS at 16:11

## 2024-01-08 RX ADMIN — ACETAMINOPHEN 650 MG: 650 SOLUTION ORAL at 00:01

## 2024-01-08 RX ADMIN — CHOLECALCIFEROL TAB 25 MCG (1000 UNIT) 1000 UNITS: 25 TAB at 09:04

## 2024-01-08 RX ADMIN — Medication 400 MG: at 09:04

## 2024-01-08 RX ADMIN — FENTANYL CITRATE 50 MCG: 0.05 INJECTION, SOLUTION INTRAMUSCULAR; INTRAVENOUS at 23:00

## 2024-01-08 RX ADMIN — SILVER SULFADIAZINE: 10 CREAM TOPICAL at 20:10

## 2024-01-08 RX ADMIN — METOPROLOL TARTRATE 25 MG: 25 TABLET, FILM COATED ORAL at 09:04

## 2024-01-08 RX ADMIN — FENTANYL CITRATE 50 MCG: 0.05 INJECTION, SOLUTION INTRAMUSCULAR; INTRAVENOUS at 09:45

## 2024-01-08 RX ADMIN — POTASSIUM CHLORIDE 40 MEQ: 750 TABLET, EXTENDED RELEASE ORAL at 05:44

## 2024-01-08 RX ADMIN — ACETAMINOPHEN 650 MG: 650 SOLUTION ORAL at 20:06

## 2024-01-08 RX ADMIN — INSULIN LISPRO 1 UNITS: 100 INJECTION, SOLUTION INTRAVENOUS; SUBCUTANEOUS at 23:54

## 2024-01-08 RX ADMIN — Medication 10 MG: at 22:15

## 2024-01-08 RX ADMIN — PANTOPRAZOLE SODIUM 40 MG: 40 INJECTION, POWDER, LYOPHILIZED, FOR SOLUTION INTRAVENOUS at 20:06

## 2024-01-08 RX ADMIN — Medication 10 ML: at 20:09

## 2024-01-08 RX ADMIN — ACETAMINOPHEN 650 MG: 650 SOLUTION ORAL at 13:06

## 2024-01-08 RX ADMIN — INSULIN LISPRO 1 UNITS: 100 INJECTION, SOLUTION INTRAVENOUS; SUBCUTANEOUS at 12:22

## 2024-01-08 ASSESSMENT — COGNITIVE AND FUNCTIONAL STATUS - GENERAL
STANDING UP FROM CHAIR USING ARMS: 1 - TOTAL
WALKING IN HOSPITAL ROOM: 1 - TOTAL
MOVING TO AND FROM BED TO CHAIR: 1 - TOTAL
MOVING TO AND FROM BED TO CHAIR: 1 - TOTAL
WALKING IN HOSPITAL ROOM: 1 - TOTAL
STANDING UP FROM CHAIR USING ARMS: 1 - TOTAL
CLIMB 3 TO 5 STEPS WITH RAILING: 1 - TOTAL
CLIMB 3 TO 5 STEPS WITH RAILING: 1 - TOTAL

## 2024-01-08 NOTE — CONSULTS
Visited the room of Cam Rosas.    Cam was supported at the bedside by family.    Offered empathetic, supportive listening and words of encouragement.    The Spiritual Care team will remain available for spiritual and emotional needs during this hospital stay.    Marlin Reyes  Spiritual Care Specialist  Pager [phone]  Office [phone]

## 2024-01-08 NOTE — PROGRESS NOTES
Nutrition Note           Clinical Course: Patient is a 73 y.o. male who was admitted on 12/24/2023 with a diagnosis of Influenza A [J10.1]  Severe sepsis (CMS/HCC) [A41.9, R65.20]  Acute on chronic congestive heart failure, unspecified heart failure type (CMS/[facility]) [I50.9].   Past Medical History:   Diagnosis Date   • Acute systolic heart failure (CMS/HCC) 02/15/2023   • Ambulates with cane     and walker   • Atrial fibrillation (CMS/HCC)    • Breast cancer (CMS/[facility]) October 2022   • CHF (congestive heart failure) (CMS/[facility])    • Chronic kidney disease    • CKD (chronic kidney disease) 10/19/2022   • History of radiation therapy    • Hx antineoplastic chemo    • Prostate cancer (CMS/[facility])      Past Surgical History:   Procedure Laterality Date   • CARDIAC SURGERY      mitral valve repair 2006   • CARDIOVERSION  12/05/2022    Successful DC cardioversion   • KNEE CARTILAGE SURGERY Left    • MASTECTOMY     • PROSTATE SURGERY  July 2022   • PROSTATECTOMY  07/15/2022   • TONSILLECTOMY         Nutrition Interventions/ Recommendations:   1. Continue EN Rx: Peptamen AF at goal of 60 mL/hr x 24 hours to provide 1440 ml TV, 1728 kcal (22kcal/kg), 109 g protein (1.4g/kg), and 1170 ml free water  -FWF of 200 mL q 4 hrs providing an additional 1200 mL free water (total 2360 mL with TF)  - consider 1 packet of Banatrol Plus TID every 6 to 8 hours of feeding.   - Mix in 120 mL water. Flush with 30 mL water before and after administration. (40 kcal/10 gm CHO/125 mg K/9 mg Phos each)  2. Treat labs/lytes  - correct/replace prn  - K: 3.4  3. Daily weight w/hx CHF  - monitor I/O  4. Diarrhea  - likely related to Mg Oxide, robitussin, and tylenol elixir, with the latter two having high sorbitol contents.  Consider adjusting medicine/route of administration  - add bulking agent as above  5. SLP eval prior to diet advance if/when appropriate      Nutrition Status Classification: Moderate nutritional compromise       Dietary Orders   (From  "admission, onward)             Start     Ordered    01/04/24 1835  Tube Feed with NPO Peptamen AF; Continuous; 20; 60; increase by 10 mL/hr every 4 hours until goal rate achieved; Water; 200; Every 4 hours; RD/LDN may adjust order; AM Snack  Diet effective now        Question Answer Comment   Tube Feeding Formula (PH): Peptamen AF    Administration Type Continuous    Tube Feeding Start rate (mL/hr): 20    Tube Feeding Goal rate (mL/hr): 60    Nursing Instruction increase by 10 mL/hr every 4 hours until goal rate achieved    Flush type: Water    Flush amount (mL): 200    Flush frequency: Every 4 hours    Delegation of Authority. Diet orders written by PA/CRHomar may not be adjusted by RD/LDNs. RD/LDN may adjust order    Meal period (AM Snack required for CBORD, do not remove: Not clinically relevant) AM Snack        01/04/24 1834                    Reason for Assessment  Reason For Assessment: per organizational policy (follow up)  Diagnosis:  (hypoxia, flu code blue)         Nutrition/Diet History  Typical Food/Fluid Intake: pt from home  Diet Prior to Admission: unkown  Food Allergies: no known food allergies  Factors Affecting Nutritional Intake: chewing difficulties/inability to chew food, difficulty/impaired swallowing    Physical Findings  Overall Physical Appearance: overweight, on ventilator support (via trach)  Gastrointestinal: diarrhea, feeding tube (FMS: 400 ml x 24 hrs)  Last Bowel Movement: 01/08/24  Tubes: PEG (placed 1/4)  Skin: edema, pressure injury (B/L heels: DTI; +1 B/L UE; +1-2 B/L LE)    Last Bowel Movement: 01/08/24    Nutrition Order  Nutrition Order: meets nutritional requirements  Nutrition Order Comments: NPO with TF  Current TF/TPN Regimen: Peptamen AF @ 60 mL/hr    Anthropometrics  Height: 165.1 cm (5' 5\")    Weights (last 7 days)     Date/Time Weight    01/07/24 0407 86.4 kg (190 lb 7.6 oz)    01/06/24 0600 87.3 kg (192 lb 7.4 oz)    01/05/24 0514 87.8 kg (193 lb 9 oz)    01/04/24 0350 " "89.4 kg (197 lb 1.5 oz)    01/03/24 0331 91.3 kg (201 lb 4.5 oz)    01/02/24 0842 93.4 kg (206 lb)    01/02/24 0550 93.6 kg (206 lb 5.6 oz)          Admit Weight  Admit Weight: 81.6 kg (179 lb 14.3 oz)    Current Weight  Weight Method: Bed scale  Weight: 86.4 kg (190 lb 7.6 oz)    Ideal Body Weight (IBW)  Ideal Body Weight (IBW) (kg): 62.51  % Ideal Body Weight: 149.48    Usual Body Weight (UBW)  Usual Body Weight:  (177-185# per EMR)    Body Mass Index (BMI)  BMI (Calculated): 31.7  BMI Assessment: BMI 30-34.9: obesity grade I                        Labs/Procedures/Meds  Lab Results Reviewed: reviewed  Lab Results Comments: as below      Results from last 7 days   Lab Units 01/08/24  0330 01/07/24  0431 01/06/24  0532   SODIUM mEQ/L 141 140 138   POTASSIUM mEQ/L 3.4* 3.7 3.4*   CHLORIDE mEQ/L 101 103 103   CO2 mEQ/L 31 25 26   BUN mg/dL 30* 29* 29*   CREATININE mg/dL 1.5* 1.4* 1.4*   GLUCOSE mg/dL 132* 122* 142*   CALCIUM mg/dL 8.1* 7.6* 7.5*      Results from last 7 days   Lab Units 01/07/24  0431 01/06/24  0532 01/05/24  0329   ALK PHOS IU/L 74 73 64   BILIRUBIN TOTAL mg/dL 0.5 0.5 0.7   ALBUMIN g/dL 2.6* 2.5* 2.7*   ALT IU/L 33 36 47   AST IU/L 29 27 32          No results found for: \"HGBA1C\"  No results found for: \"YNHHXKCL30\"  Lab Results   Component Value Date    CALCIUM 8.1 (L) 01/08/2024    PHOS 3.6 12/27/2023     Results from last 7 days   Lab Units 01/08/24  0330 01/07/24  2109 01/07/24  0431   WBC K/uL 8.49 9.30 7.49   HEMOGLOBIN g/dL 7.4* 8.0* 7.5*   HEMATOCRIT % 24.1* 25.5* 23.7*   PLATELETS K/uL 177 184 154               Results from last 7 days   Lab Units 01/03/24  0321 01/02/24  1019 01/02/24  0400   MAGNESIUM mg/dL 1.8 1.9 2.0          Diagnostic Tests/Procedures  Diagnostic Test/Procedure Reviewed: reviewed    Medications  Pertinent Medications Reviewed: reviewed  Pertinent Medications Comments: as below  • amiodarone  200 mg feeding tube Daily   • atorvastatin  10 mg oral Nightly   • cefTRIAXone "  1 g intravenous q24h INT   • cetirizine  10 mg feeding tube Nightly   • chlorhexidine  15 mL Mouth/Throat 2 times per day   • cholecalciferol (vitamin D3)  1,000 Units feeding tube Daily   • furosemide  60 mg intravenous BID (am, 4p)   • guaiFENesin  400 mg oral q6h GISELLE   • insulin lispro U-100  1-10 Units subcutaneous q6h GISELLE   • magnesium oxide  400 mg Nasogastric BID   • melatonin  3 mg peg tube Nightly   • metoprolol tartrate  25 mg feeding tube BID   • pantoprazole  40 mg intravenous q12h GISELLE   • rivaroxaban  20 mg oral Daily with dinner   • silver sulfadiazine   Topical BID   • sodium chloride  10 mL intravenous q8h INT   • tamoxifen  20 mg feeding tube Daily         Estimated/Assessed Needs  Additional Documentation: Calorie Requirements (Group), Fluid Requirements (Group), Protein Requirements (Group)    Calorie Requirements  Estimated kCal Needs: Actual Body Weight  Estimated Calorie Need Method: kcal/kg  Calorie/kg Recommended: 22-25  Calorie Recommendations: 9768-7166 kcal      Protein Requirements  Recommended Dosing Weight (Estimated Protein Needs): Actual Body Weight  Est Protein Requirement Amount (gms/kg):  (1.2-1.5g)  Protein Recommendations: 96-120g    Fluid Requirements  Fluid Recommendation (mL):  (22-25ml/kg)  Recommended Fluid Needs Dosing Weight: Actual Body Weight  Fluid Requirements (mL/day): 1750-2000ml  [place] Method (over 20 kg): 3228      Nutrient/Fluid Evaluation  Additional Documentation: Tube Feeding Assessment      Tube Feeding Assessment  Active Tube Feed Order: Yes  Delivery Method: Continuous per pump  TF Protein Grams: 109 grams  TF Total kCals: 1728 kcals  Projected Tube Feed Volume: 1440  TF Free Water: 1170  Tolerance: tolerating       PES  Statement: PES Statement  Nutrition Diagnosis: Inadequate Oral Intake  Related To:: VDRF  Related To:: Decreased ability to consume sufficient energy  As Evidenced By:: TF running to meet 100% of estimated needs  Nutritional Needs  Met?: Yes, Stays the same                                   Clinical Comments:   Pt visited in f/u. 74 yo male admitted with hypoxia, + Flu A.  Required intubation 12/24 after failing BiPAP, then was a Code Blue 12/25. Had a cardiac cath and temporary pacer placed later that day.  Will likely need LTAC placement.  At time of visit:     Intubated 12/24; extubated 12/26 then re-intubated same day.  Extubated again 12/30 and again re-intubated.  Had trach placed 1/4, failed wean yesterday.  TF at 60 mL/hr via PEG (placed 1/4)  I/O: + 3.3 L  UOP: 1.5 mL/kg/hr  FMS placed 12/30; 400 ml output past 24 hrs  Weight: up 10 kg from admission. 12/24 180#; 12/29: 196#; 1/2: 206#; 1/3: 201#; 1/7: 190#  Pt being diuresed, 60 mg IV lasix BID    Tolerating TF at goal.  Diarrhea likely related to Mg Oxide, robitussin, and tylenol elixir, with the latter two having high sorbitol contents.  Could consider changing the administration route of the tylenol or adding Banatrol for stool bulking.  RD will continue to follow.    Goals: EN to meet % estimated needs      Monitor and Evaluation:   TF tolerance  Labs/lytes  Weight  I/O  Medical course    Discussed with:ICU Team    Date: 01/08/24  Signature: Shannon Strauss, LDN

## 2024-01-08 NOTE — PLAN OF CARE
Problem: Adult Inpatient Plan of Care  Goal: Plan of Care Review  Outcome: Progressing     Problem: Skin Injury Risk Increased  Goal: Skin Health and Integrity  Outcome: Progressing     Problem: Enteral Nutrition  Goal: Feeding Tolerance  Outcome: Progressing     Nutrition Interventions/ Recommendations:   1. Continue EN Rx: Peptamen AF at goal of 60 mL/hr x 24 hours to provide 1440 ml TV, 1728 kcal (22kcal/kg), 109 g protein (1.4g/kg), and 1170 ml free water  -FWF of 200 mL q 4 hrs providing an additional 1200 mL free water (total 2360 mL with TF)  - consider 1 packet of Banatrol Plus TID every 6 to 8 hours of feeding.   - Mix in 120 mL water. Flush with 30 mL water before and after administration. (40 kcal/10 gm CHO/125 mg K/9 mg Phos each)  2. Treat labs/lytes  - correct/replace prn  - K: 3.4  3. Daily weight w/hx CHF  - monitor I/O  4. Diarrhea  - likely related to Mg Oxide, robitussin, and tylenol elixir, with the latter two having high sorbitol contents.  Consider adjusting medicine/route of administration  - add bulking agent as above  5. SLP eval prior to diet advance if/when appropriate       DVT (deep venous thrombosis) DVT (deep venous thrombosis) DVT (deep venous thrombosis) DVT (deep venous thrombosis)

## 2024-01-08 NOTE — NURSING NOTE
Patient failed trach collar wean earlier in the day. Tolerated PS 8 for a few hours and required full vent support and fentanyl 50mcg given due to increased restlessness. Vitals otherwise stable.

## 2024-01-08 NOTE — PROGRESS NOTES
Critical Care/Pulmonary  Daily Progress Note        Subjective    Interval History:    Awake, alert, following commands  Off sedation/pressors   Not tolerating PSV this morning,    Tmax 101.4    I/O   Since admission; (+) 3735 L  Last 24 hours: (-) 1400 ml  Urine output: 3010 ml       Objective       Allergies: Azithromycin, Prednisone, and Lorazepam    CURRENT MEDS  •  acetaminophen, 650 mg, feeding tube, q4h PRN  •  albuterol, 2.5 mg, nebulization, q4h PRN  •  amiodarone, 200 mg, feeding tube, Daily  •  atorvastatin, 10 mg, oral, Nightly  •  atropine, 1 mg, intravenous, Once PRN  •  betamethasone valerate, , Topical, 2x daily PRN  •  calcium gluconate, 1 g, intravenous, q6h PRN  •  cefTRIAXone, 1 g, intravenous, q24h INT  •  cetirizine, 10 mg, feeding tube, Nightly  •  chlorhexidine, 15 mL, Mouth/Throat, 2 times per day  •  cholecalciferol (vitamin D3), 1,000 Units, feeding tube, Daily  •  glucose, 16-32 g of dextrose, oral, PRN **OR** dextrose, 15-30 g of dextrose, oral, PRN **OR** glucagon, 1 mg, intramuscular, PRN **OR** dextrose 50 % in water (D50), 25 mL, intravenous, PRN  •  fentaNYL, 50 mcg, intravenous, q4h PRN  •  furosemide, 60 mg, intravenous, BID (am, 4p)  •  guaiFENesin, 400 mg, oral, q6h GISELLE  •  hydrALAZINE, 5 mg, intravenous, q6h PRN  •  insulin lispro U-100, 1-10 Units, subcutaneous, q6h GISELLE  •  magnesium oxide, 400 mg, Nasogastric, BID  •  magnesium sulfate, 2 g, intravenous, PRN  •  melatonin, 3 mg, peg tube, Nightly  •  metoclopramide, 10 mg, intravenous, q6h PRN  •  metoprolol tartrate, 25 mg, feeding tube, BID  •  pantoprazole, 40 mg, intravenous, q12h GISELLE  •  potassium chloride, 20 mEq, oral, PRN  •  potassium chloride, 40 mEq, oral, PRN  •  potassium chloride, 20 mEq, intravenous, PRN  •  potassium chloride in water, 20 mEq, intravenous, PRN **AND** potassium chloride in water, 20 mEq, intravenous, PRN  •  rivaroxaban, 20 mg, oral, Daily with dinner  •  silver sulfadiazine, , Topical,  BID  •  sodium chloride 0.9 %, 5 mL/hr, intravenous, PRN  •  sodium chloride, 10 mL, intravenous, q8h INT  •  sodium chloride, 10 mL, intravenous, PRN  •  tamoxifen, 20 mg, feeding tube, Daily    VITAL SIGNS  Vitals:    01/08/24 0600 01/08/24 0700 01/08/24 0832 01/08/24 1206   BP: (!) 141/74 (!) 163/66  (!) 142/65   BP Location:       Patient Position:       Pulse: 82 85 94 83   Resp: (!) 29 (!) 27 (!) 34 (!) 41   Temp:       TempSrc:       SpO2: 97% 100% (!) 88% 100%   Weight:       Height:           VENTILATOR SETTINGS  Vent Mode: Assist control/volume control  FiO2 (%) (Set):  [40 %] 40 %  S RR:  [18] 18  S VT:  [450 mL] 450 mL  PEEP/CPAP (Set, cmH2O):  [6 cm H20] 6 cm H20  MAP (Observed, cmH2O):  [9.5-14] 12    Oxygen:  Oxygen Therapy: Supplemental oxygen  O2 Delivery Method: Trach tube  FiO2 (%) (Set): 40 %  O2 Flow Rate (L/min): 6 L/min     INTAKE/OUTPUT    Intake/Output Summary (Last 24 hours) at 1/8/2024 1229  Last data filed at 1/8/2024 1000  Gross per 24 hour   Intake 1757 ml   Output 2925 ml   Net -1168 ml       Lines, Drains, Airways, Wounds:  PICC Triple Lumen 12/28/23 Right (Active)   Number of days: 8       Gastrostomy/Enterostomy Gastrostomy 20 Fr LUQ (Active)   Number of days: 1       Urethral Catheter 20 Fr (Active)   Number of days: 8       ETT  (Active)   Number of days: 6       Surgical Airway Shiley Cuffed 8 (Active)   Number of days: 1       Wound Venous Ulcer Left Pretibial (Active)   Number of days: 12       Wound Perineum (Active)   Number of days: 12       Wound Puncture Anterior;Proximal;Right Groin (Active)   Number of days: 8       Wound Pressure Injury Right Heel (Active)   Number of days: 8       Wound Pressure Injury Left Heel (Active)   Number of days: 5       Catheterization Site - Arterial Right Femoral 6 Fr. (Active)   Number of days: 11       Catheterization Site - Venous Right Femoral 6 Fr. (Active)   Number of days: 11         Physical Exam:  Physical Exam  Vitals and nursing  note reviewed.   Constitutional:       Appearance: He is ill-appearing.   HENT:      Head: Normocephalic and atraumatic.      Mouth/Throat:      Mouth: Mucous membranes are moist.   Eyes:      Pupils: Pupils are equal, round, and reactive to light.   Cardiovascular:      Rate and Rhythm: Normal rate. Rhythm irregular.      Pulses: Normal pulses.      Heart sounds: Normal heart sounds. No murmur heard.  Pulmonary:      Effort: Pulmonary effort is normal.      Breath sounds: Normal breath sounds.   Abdominal:      General: Abdomen is flat. Bowel sounds are normal. There is no distension.      Palpations: Abdomen is soft.   Musculoskeletal:      Right lower leg: Edema present.      Left lower leg: Edema present.   Skin:     General: Skin is warm and dry.   Neurological:      Mental Status: He is alert.      Comments: Awake and able to follow commands   Able to move all extremities            Labs:  See Labs Below:  ABG      CBC  Results from last 7 days   Lab Units 01/08/24  0330 01/07/24  2109 01/07/24  0431   WBC K/uL 8.49 9.30 7.49   RBC M/uL 2.35* 2.52* 2.33*   HEMOGLOBIN g/dL 7.4* 8.0* 7.5*   HEMATOCRIT % 24.1* 25.5* 23.7*   MCV fL 102.6* 101.2* 101.7*   MCH pg 31.5 31.7 32.2   MCHC g/dL 30.7* 31.4* 31.6*   PLATELETS K/uL 177 184 154   RDW % 16.0* 16.3* 16.5*   MPV fL 11.1 10.8 10.9     BMP  Results from last 7 days   Lab Units 01/08/24  0330 01/07/24  0431 01/06/24  0532 01/05/24  0329   SODIUM mEQ/L 141 140 138 141   POTASSIUM mEQ/L 3.4* 3.7 3.4* 3.4*   CHLORIDE mEQ/L 101 103 103 104   CO2 mEQ/L 31 25 26 25   BUN mg/dL 30* 29* 29* 29*   CREATININE mg/dL 1.5* 1.4* 1.4* 1.6*   CALCIUM mg/dL 8.1* 7.6* 7.5* 7.9*   ALBUMIN g/dL  --  2.6* 2.5* 2.7*   BILIRUBIN TOTAL mg/dL  --  0.5 0.5 0.7   ALK PHOS IU/L  --  74 73 64   ALT IU/L  --  33 36 47   AST IU/L  --  29 27 32   GLUCOSE mg/dL 132* 122* 142* 149*     Coag  Results from last 7 days   Lab Units 01/07/24  2109 01/07/24  1210 01/07/24  0431 01/05/24  0324  01/04/24 2030 01/04/24  0510   PROTIME sec 21.1*  --   --   --  15.0* 14.3   INR  1.8  --   --   --  1.2 1.1   PTT sec 46* 95* 64*   < > 49* 33    < > = values in this interval not displayed.       Imaging:   X-RAY CHEST 1 VIEW    Result Date: 1/8/2024  IMPRESSION: Scattered nodular opacities of the right lung.    X-RAY CHEST 1 VIEW    Result Date: 1/5/2024  IMPRESSION: Satisfactory positioning of the recently placed tracheostomy device. Right IJ central line appears stable terminating in the region the cavoatrial junction. Removal of the Dobbhoff tube. Redemonstration of moderate patchy airspace disease in the right mid to upper lung zone without significant change with lesser degrees of left greater than right basilar lung opacities not significant changed. These are nonspecific correlate for multifocal pneumonia versus edema or aspiration. No evidence of a pneumothorax. Small left pleural effusion not significantly changed. No sizable right pleural effusion. Stable cardiomediastinal contours with valve replacement. No upper abdominal or gross bone findings of concern. Right axilla surgical clips redemonstrated. COMMENT: Portable AP view the chest performed and compared with January 2, 2024 study. See impression.    Ultrasound venous leg bilateral    Result Date: 1/4/2024  IMPRESSION: No DVT identified in either lower extremity.     Ultrasound venous arm left    Result Date: 1/3/2024  IMPRESSION: 1.  No evidence for deep vein thrombosis bilaterally. 2.  Superficial thrombus involving the right cephalic vein.    Ultrasound venous arm right    Result Date: 1/3/2024  IMPRESSION: 1.  No evidence for deep vein thrombosis bilaterally. 2.  Superficial thrombus involving the right cephalic vein.    X-RAY ABDOMEN 1 VIEW    Result Date: 1/3/2024  IMPRESSION: Dobbhoff tube enters below left hemidiaphragm with its tip likely in the region of the stomach.     X-RAY CHEST 1 VIEW    Result Date: 1/2/2024  IMPRESSION: Hazy  opacity seen at right midlung, new from the prior study, may represent ammonia or aspiration. Endotracheal tube with the tip 9 mm above the leo. Consider repositioning.     MRI BRAIN WITHOUT CONTRAST    Result Date: 1/2/2024  IMPRESSION: No acute infarct. Mild chronic microvascular ischemia, moderate volume loss.    X-RAY ABDOMEN 1 VIEW    Result Date: 12/30/2023  IMPRESSION: Weighted feeding tube tip at the level of the proximal stomach.    CT HEAD WITHOUT IV CONTRAST    Result Date: 12/30/2023  IMPRESSION: No acute intracranial abnormality.  Chronic ischemic white matter changes are noted.    X-RAY CHEST 1 VIEW    Result Date: 12/30/2023  IMPRESSION: 1. The endotracheal tube is lower in position situated 2 cm above level of the leo.    IR TUNNELED PICC PLACEMENT    Result Date: 12/29/2023  IMPRESSION: Successful placement of 5 Fr triple lumen tunneled PICC.     ULTRASOUND GUIDED VASCULAR ACCESS    Result Date: 12/29/2023  IMPRESSION: Successful placement of 5 Fr triple lumen tunneled PICC.             Micro:   Microbiology Results     Procedure Component Value Units Date/Time    Blood Culture Blood, Venous [[id number]]  (Normal) Collected: 01/01/24 1201    Specimen: Blood, Venous Updated: 01/04/24 1701     Culture No growth at 72 hours    Blood Culture Blood, Venous [[id number]]  (Normal) Collected: 01/01/24 1201    Specimen: Blood, Venous Updated: 01/04/24 1701     Culture No growth at 72 hours    Sputum culture / smear Tracheal Aspirate [[id number]]  (Abnormal) Collected: 01/01/24 0937    Specimen: Tracheal Aspirate Updated: 01/03/24 0744    Narrative:      The following orders were created for panel order Sputum culture / smear Tracheal Aspirate.  Procedure                               Abnormality         Status                     ---------                               -----------         ------                     Sputum Gram Stain (Lab O...[[id number]]                      Final result                Sputum Culture (Lab Only...[[id number]]  Abnormal            Final result                 Please view results for these tests on the individual orders.    Sputum Gram Stain (Lab Only) Tracheal Aspirate [[id number]] Collected: 01/01/24 0937    Specimen: Tracheal Aspirate Updated: 01/01/24 1817     Gram Stain Result 4+ WBC      1+ Epithelial cells      No organisms seen    Sputum Culture (Lab Only) Tracheal Aspirate [[id number]]  (Abnormal) Collected: 01/01/24 0937    Specimen: Tracheal Aspirate Updated: 01/03/24 0744     Culture **Positive Culture**      1+ Yeast, No Further Identification      No Normal Park    Sputum culture / smear Expectorated Sputum [[id number]]  (Abnormal) Collected: 12/27/23 1417    Specimen: Bronch Lavage from Expectorated Sputum Updated: 12/29/23 0723    Narrative:      The following orders were created for panel order Sputum culture / smear Expectorated Sputum.  Procedure                               Abnormality         Status                     ---------                               -----------         ------                     Sputum Gram Stain (Lab O...[[id number]]                      Final result               Sputum Culture (Lab Only...[[id number]]  Abnormal            Final result                 Please view results for these tests on the individual orders.    Sputum Gram Stain (Lab Only) Expectorated Sputum [[id number]] Collected: 12/27/23 1417    Specimen: Bronch Lavage from Expectorated Sputum Updated: 12/27/23 2032     Gram Stain Result 2+ WBC      No Epithelial Cells Seen      No organisms seen    Sputum Culture (Lab Only) Expectorated Sputum [[id number]]  (Abnormal) Collected: 12/27/23 1417    Specimen: Bronch Lavage from Expectorated Sputum Updated: 12/29/23 0723     Culture **Positive Culture**      1+ Yeast, No Further Identification    Sputum culture / smear Expectorated Sputum [[id number]] Collected: 12/25/23 0418    Specimen: Aspirate from Expectorated Sputum Updated:  12/27/23 0845    Narrative:      The following orders were created for panel order Sputum culture / smear Expectorated Sputum.  Procedure                               Abnormality         Status                     ---------                               -----------         ------                     Sputum Gram Stain (Lab O...[[id number]]                      Final result               Sputum Culture (Lab Only...[[id number]]  Normal              Final result                 Please view results for these tests on the individual orders.    Sputum Gram Stain (Lab Only) Expectorated Sputum [[id number]] Collected: 12/25/23 0418    Specimen: Aspirate from Expectorated Sputum Updated: 12/25/23 0956     Gram Stain Result 3+ WBC      No Epithelial Cells Seen      3+ Gram positive bacilli      2+ Gram positive cocci in pairs, chains and clusters    Sputum Culture (Lab Only) Expectorated Sputum [[id number]]  (Normal) Collected: 12/25/23 0418    Specimen: Aspirate from Expectorated Sputum Updated: 12/27/23 0845     Culture Normal Park    MRSA Screen, Nares Only Nose [[id number]]  (Normal) Collected: 12/25/23 0352    Specimen: Nasal Swab from Nose Updated: 12/25/23 0906     MRSA DNA, Nares Negative    Blood Culture Blood, Venous [[id number]]  (Normal) Collected: 12/24/23 1652    Specimen: Blood, Venous Updated: 12/29/23 0000     Culture No growth at 96 hours    SARS-CoV-2 (COVID-19) Nasopharynx [[id number]]  (Abnormal) Collected: 12/24/23 1649    Specimen: Nasopharyngeal Swab from Nasopharynx Updated: 12/24/23 1736    Narrative:      The following orders were created for panel order SARS-CoV-2 (COVID-19) Nasopharynx.  Procedure                               Abnormality         Status                     ---------                               -----------         ------                     SARS-COV-2 (COVID-19)/ F...[[id number]]  Abnormal            Final result                 Please view results for these tests on the individual  orders.    SARS-COV-2 (COVID-19)/ FLU A/B, AND RSV, PCR Nasopharynx [[id number]]  (Abnormal) Collected: 12/24/23 1649    Specimen: Nasopharyngeal Swab from Nasopharynx Updated: 12/24/23 1736     SARS-CoV-2 (COVID-19) Negative     Influenza A Positive     Influenza B Negative     Respiratory Syncytial Virus Negative    Narrative:      Testing performed using real-time PCR for detection of COVID-19. EUA approved validation studies performed on site.     Blood Culture Blood, Venous [[id number]]  (Normal) Collected: 12/24/23 1647    Specimen: Blood, Venous Updated: 12/29/23 0100     Culture No growth at 96 hours          ECG/Telemetry  I have independently reviewed the telemetry. No events for the last 24 hours.         ASSESSMENT    73 y.o. male with history atrial fibrillation, CHF, prostate and breast CA, history of GI bleed who presented to the emergency room with shortness of breath and found to be influenza A positive. During day 2 of his hospital stay he had a v fib arrest with ROSC. He was transferred to the ICU for further monitoring     PLAN   # Acute Hypoxic respiratory failure  - Failed Bipap on admission, became bradycardic and was intubated  - Extubated 12/26, however became hypoxic, ?Secondary to upper airway edema vs acute bronchospasm vs flash pulmonary edema. Ultimately required re-intubation 12/26.   - Initially passed SBT 12/30, was extubated to nasal cannula, failed HF and BIPAP and ultimately required re-intubation in the setting of hypoxia and poor mental status  - s/p trach/PEG on 1/4   - He is volume overloaded, (overall 3.7 liters positive)  with resolving ELEANOR.     - Not tolerating PSV this am not entirely clear why. Chest x-ray improving but still with residual RUL disease.   - Start LTACH planning.   - Continue Furosemide BID      # S/p V fib cardiac arrest  - In setting of Takosubo's cardiomyopthy  - ROSC achieved after receiving 3 epi, 3 shocks, 2 bicarb, and Magnesium replacement  -  Following commands post-arrest. No TTM initiated  - Taken by interventional cardiology for the placement of a temporary pacemaker  - PPM removed secondary to dyssynchrony and misplacement. No further marcellus arrythmias, removed 12/27  - Continue amiodarone     # Takotsubo Cardiomyopathy  - TTE 12/26 suggestive of Takotsubos with EF 40-45%, confirmed by LV gram,improved to 65%  - Cardiology following  - Furosemide BID   - Continue metoprolol      # Acute kidney injury  - Renal function slowly improving. Initial creatinine 1.2, peaked 2.3, today at 1.4  - Baseline creatinine appears to be 1.2-1.4  - ?Secondary to IV dye load s/p cardiac cath vs over-diuresis   - Monitor BMP, I and O  - Cr trending down, continue lasix 60 mg BID    # Elevated liver enzymes  - Likely in setting of hypotension, shock liver  - Serial labs, resolved    # Septic Shock  - Resolved, off pressors  -  maintain MAP > 65    # Pneumonia  - CT Chest 12/24 with left lower lobe infiltrate and right upper lobe infiltrate  - Blood/ sputum cultures negative   - Completed course of zosyn 12/31.   - New fever noted with Cxr on 1/2 with new hazy opacity seen in the right lung  -Started on Vanco/ceftaz on 1/3 with concern for new infiltrate.  Narrowed to ceftriaxone 1/5, with sputum culture growing normal nixon. Last day antibiotics 1/8  - repeat CXR today Improved but still with RUL infiltrates.      # Afib  - EP following  - Continue zarelto  - Continue amiodarone and metoprolol      # Influenza A  - Completed 5 day course Oseltamivir      # Prostate/breast cancer  - S/p radiation therapy for prostate cancer spring 2023  - S/p R mastectomy 8/2023 and radiation therapy 9/2023  - Continue tamoxifen     #Generalized Edema  -1/2 with UE edema, Right hand swollen and cool on exam, strong radial pulse, cap refill 2-3 seconds  -Bilateral UE ultrasounds 1/3  with R superficial thrombus involving right cephalic vein  -Bilateral LE ultrasound done 1/4 with no  evidence of DVT     #Anemia  -Hgb 6.7 on 1/4   -Given 1 unit PRBC    -Trend daily CBC  -Transfuse for Hgb less than 7.0 or less than 8.0 with active bleeding    VTE Prophylaxis Plan: SCDs, Zarelto  GI Prophylaxis Plan: Protonix  Lines/Drains/Airway: R PICC (12/28), Trach/PEG (1/4), jones, FMS  Fluids/Electrolytes/Nutrition: TF     Disposition Planning: Remain in the ICU    CODE STATUS  Full Code       The case was reviewed this morning at the patient's beside multidisciplinary rounds with the patient's nurse, dietician, pharmacist, respiratory therapist, physical therapist and critical care nurse coordinator. The patient's clinical status with regards to diagnosis, treatment plans including disposition of any IV, arterial lines, Jones and tubes were discussed, as well as dietary, respiratory therapy and mobilization needs.     This case was discussed with consultants.    Total critical time spent managing acute hypoxic ventilator dependent respiratory failure, pneumonia, cardiomyopathy, totals 35 minutes.    This note was scribed by JONNY Obregon  in my presence on my behalf.       Filipe Casper DO  1/8/2024  12:29 PM

## 2024-01-08 NOTE — PLAN OF CARE
Care Coordination Discharge Plan Note     Discharge Needs Assessment  Concerns to be Addressed: adjustment to diagnosis/illness, care coordination/care conferences, discharge planning  Current Discharge Risk: chronically ill, physical impairment    Anticipated Discharge Plan  Anticipated Discharge Disposition: skilled nursing facility, home with home health  Type of Home Care Services: home OT, home PT, nursing    Type of Skilled Nursing Care Services: SLP, OT, PT, nursing        Patient Choice  Offered/Gave Vendor List: yes  Patient's Choice of Community Agency(s): discussed with family that care coordiantin will follow for approrpaite aftecare needs    Patient and/or patient guardian/advocate was made aware of their right to choose a provider. A list of eligible providers was presented and reviewed with the patient and/or patient guardian/advocate in written and/or verbal form. The list delineates providers in the patient’s desired geographic area who are participating in the Medicare program and/or providers contracted with the patient’s primary insurance. The Medicare list and quality ratings were obtained from the Medicare.gov [medicare.gov] website.    ---------------------------------------------------------------------------------------------------------------------    Interdisciplinary Discharge Plan Review:  Participants:     Concerns Comments: shireen met with patient & his 2 children at bedside & spouse. shireen went over role of care coordiatnon & d/c plans. sw confirmed pcp & pharamcy. [facility]. david has hx of prostate cancer & was receing treatment in past. per son his brother has been staying at patient's home to assist with care fro david. david stays mostly in family room . shireen discussed with wife that care coordination will continue to follow for appropriate aftecare needs. patient is currently on vent     1/8 sw spoke to MD & wife was discussed about LTAC  placement. Sw met with patient's wife & also son to discuss what ltac is & locations. Sw discussed that ltac is a bridge facility tilll another level of care such as rehab or skilled nursing. Sw provided brochures on ltac & medicare.gov long term nursing list to wife. Sw discussed the time frame for ltacs & provided information on long term care facilities. Sw encouraged wife to contact the facilities for a tour if she wants & discussed the facilities. Sw discussed the purpose of ltacs & vent weaning . Per wife she will speak to her family & sw to follow up tomorrow. Sw provided brochures on ltac's on facilities.     Discharge Plan:   Disposition/Destination:   /    Discharge Facility:    Community Resources:      Discharge Transportation:  Is Out of Hospital DNR needed at Discharge: no  Does patient need discharge transport?

## 2024-01-09 LAB
ANION GAP SERPL CALC-SCNC: 9 MEQ/L (ref 3–15)
BUN SERPL-MCNC: 33 MG/DL (ref 7–25)
CALCIUM SERPL-MCNC: 8.1 MG/DL (ref 8.6–10.3)
CHLORIDE SERPL-SCNC: 101 MEQ/L (ref 98–107)
CO2 SERPL-SCNC: 31 MEQ/L (ref 21–31)
CREAT SERPL-MCNC: 1.4 MG/DL (ref 0.7–1.3)
EGFRCR SERPLBLD CKD-EPI 2021: 53.1 ML/MIN/1.73M*2
ERYTHROCYTE [DISTWIDTH] IN BLOOD BY AUTOMATED COUNT: 15.9 % (ref 11.6–14.4)
GLUCOSE BLD-MCNC: 143 MG/DL (ref 70–99)
GLUCOSE BLD-MCNC: 155 MG/DL (ref 70–99)
GLUCOSE SERPL-MCNC: 130 MG/DL (ref 70–99)
GRAM STN SPEC: NORMAL
HCT VFR BLDCO AUTO: 23.8 % (ref 40.1–51)
HGB BLD-MCNC: 7.4 G/DL (ref 13.7–17.5)
MAGNESIUM SERPL-MCNC: 1.4 MG/DL (ref 1.8–2.5)
MCH RBC QN AUTO: 32.5 PG (ref 28–33.2)
MCHC RBC AUTO-ENTMCNC: 31.1 G/DL (ref 32.2–36.5)
MCV RBC AUTO: 104.4 FL (ref 83–98)
PDW BLD AUTO: 10.8 FL (ref 9.4–12.4)
PLATELET # BLD AUTO: 177 K/UL (ref 150–350)
POCT TEST: ABNORMAL
POCT TEST: ABNORMAL
POTASSIUM SERPL-SCNC: 3.9 MEQ/L (ref 3.5–5.1)
RBC # BLD AUTO: 2.28 M/UL (ref 4.5–5.8)
SODIUM SERPL-SCNC: 141 MEQ/L (ref 136–145)
WBC # BLD AUTO: 9.49 K/UL (ref 3.8–10.5)

## 2024-01-09 PROCEDURE — 25000000 HC PHARMACY GENERAL

## 2024-01-09 PROCEDURE — 63700000 HC SELF-ADMINISTRABLE DRUG: Performed by: NURSE PRACTITIONER

## 2024-01-09 PROCEDURE — 83735 ASSAY OF MAGNESIUM: CPT | Performed by: NURSE PRACTITIONER

## 2024-01-09 PROCEDURE — 20000000 HC ROOM AND CARE ICU

## 2024-01-09 PROCEDURE — 63700000 HC SELF-ADMINISTRABLE DRUG

## 2024-01-09 PROCEDURE — 63600000 HC DRUGS/DETAIL CODE: Mod: JZ

## 2024-01-09 PROCEDURE — 85027 COMPLETE CBC AUTOMATED: CPT

## 2024-01-09 PROCEDURE — 63600000 HC DRUGS/DETAIL CODE: Mod: JW | Performed by: NURSE PRACTITIONER

## 2024-01-09 PROCEDURE — 36415 COLL VENOUS BLD VENIPUNCTURE: CPT

## 2024-01-09 PROCEDURE — 94003 VENT MGMT INPAT SUBQ DAY: CPT

## 2024-01-09 PROCEDURE — 80048 BASIC METABOLIC PNL TOTAL CA: CPT | Performed by: NURSE PRACTITIONER

## 2024-01-09 RX ADMIN — AMIODARONE HYDROCHLORIDE 200 MG: 200 TABLET ORAL at 08:11

## 2024-01-09 RX ADMIN — CHLORHEXIDINE GLUCONATE ORAL RINSE 15 ML: 1.2 SOLUTION DENTAL at 08:39

## 2024-01-09 RX ADMIN — Medication 400 MG: at 08:12

## 2024-01-09 RX ADMIN — RIVAROXABAN 20 MG: 10 TABLET, FILM COATED ORAL at 16:42

## 2024-01-09 RX ADMIN — FENTANYL CITRATE 50 MCG: 0.05 INJECTION, SOLUTION INTRAMUSCULAR; INTRAVENOUS at 08:12

## 2024-01-09 RX ADMIN — POTASSIUM CHLORIDE 20 MEQ: 750 TABLET, EXTENDED RELEASE ORAL at 06:35

## 2024-01-09 RX ADMIN — TAMOXIFEN CITRATE 20 MG: 10 TABLET, FILM COATED ORAL at 08:12

## 2024-01-09 RX ADMIN — FENTANYL CITRATE 50 MCG: 0.05 INJECTION, SOLUTION INTRAMUSCULAR; INTRAVENOUS at 14:35

## 2024-01-09 RX ADMIN — METOPROLOL TARTRATE 25 MG: 25 TABLET, FILM COATED ORAL at 08:12

## 2024-01-09 RX ADMIN — Medication 10 ML: at 04:14

## 2024-01-09 RX ADMIN — SILVER SULFADIAZINE: 10 CREAM TOPICAL at 08:22

## 2024-01-09 RX ADMIN — FUROSEMIDE 60 MG: 10 INJECTION, SOLUTION INTRAMUSCULAR; INTRAVENOUS at 08:11

## 2024-01-09 RX ADMIN — FENTANYL CITRATE 50 MCG: 0.05 INJECTION, SOLUTION INTRAMUSCULAR; INTRAVENOUS at 22:15

## 2024-01-09 RX ADMIN — Medication 10 ML: at 11:20

## 2024-01-09 RX ADMIN — Medication 10 ML: at 20:36

## 2024-01-09 RX ADMIN — CHOLECALCIFEROL TAB 25 MCG (1000 UNIT) 1000 UNITS: 25 TAB at 08:11

## 2024-01-09 RX ADMIN — Medication 400 MG: at 20:35

## 2024-01-09 RX ADMIN — PANTOPRAZOLE SODIUM 40 MG: 40 INJECTION, POWDER, LYOPHILIZED, FOR SOLUTION INTRAVENOUS at 08:12

## 2024-01-09 RX ADMIN — ACETAMINOPHEN 650 MG: 650 SOLUTION ORAL at 20:41

## 2024-01-09 RX ADMIN — INSULIN LISPRO 1 UNITS: 100 INJECTION, SOLUTION INTRAVENOUS; SUBCUTANEOUS at 11:19

## 2024-01-09 RX ADMIN — ATORVASTATIN CALCIUM 10 MG: 10 TABLET, FILM COATED ORAL at 20:35

## 2024-01-09 RX ADMIN — Medication 10 MG: at 22:12

## 2024-01-09 RX ADMIN — ACETAMINOPHEN 650 MG: 650 SOLUTION ORAL at 04:48

## 2024-01-09 RX ADMIN — SILVER SULFADIAZINE: 10 CREAM TOPICAL at 20:35

## 2024-01-09 RX ADMIN — CHLORHEXIDINE GLUCONATE ORAL RINSE 15 ML: 1.2 SOLUTION DENTAL at 22:12

## 2024-01-09 RX ADMIN — ACETAMINOPHEN 650 MG: 650 SOLUTION ORAL at 12:45

## 2024-01-09 RX ADMIN — METOPROLOL TARTRATE 25 MG: 25 TABLET, FILM COATED ORAL at 20:35

## 2024-01-09 RX ADMIN — PANTOPRAZOLE SODIUM 40 MG: 40 INJECTION, POWDER, LYOPHILIZED, FOR SOLUTION INTRAVENOUS at 20:35

## 2024-01-09 RX ADMIN — FUROSEMIDE 60 MG: 10 INJECTION, SOLUTION INTRAMUSCULAR; INTRAVENOUS at 16:42

## 2024-01-09 RX ADMIN — MAGNESIUM SULFATE HEPTAHYDRATE 2 G: 40 INJECTION, SOLUTION INTRAVENOUS at 06:35

## 2024-01-09 ASSESSMENT — COGNITIVE AND FUNCTIONAL STATUS - GENERAL
STANDING UP FROM CHAIR USING ARMS: 1 - TOTAL
MOVING TO AND FROM BED TO CHAIR: 1 - TOTAL
WALKING IN HOSPITAL ROOM: 1 - TOTAL
CLIMB 3 TO 5 STEPS WITH RAILING: 1 - TOTAL

## 2024-01-09 NOTE — CONSULTS
Visited pt. Wife ( Mrs. Rosas) and son, Ezequiel who were in room at time of visit.  Mrs. Rosas said they were in process of discussing which facility was best choice for patient. She indicated a need to perhaps know more to make decision. In response, son offered to take her this afternoon for site visit(s).    Offered supportive listening and in further discussion, wife showed not sure how to make decision as one of sites is far from her ( [facility], [place]) and other one, [place], has a double room set up.She said it might be helpful to discuss it further with Jody.      and Ezequiel thanked me for stopping by.     I followed up w/ Jody and updated her on my visit and where family is on the decision.  She said she planned to check in with them today as decision needs to be made.    Spiritual Care will follow up per staff or family request .      Gypsy Bunch  Spiritual Care Team  [phone]  Pager: [phone]

## 2024-01-09 NOTE — PROGRESS NOTES
Critical Care/Pulmonary  Daily Progress Note        Subjective    Interval History:    Awake and alert   Denies CP, abd pain SOB  Febrile overnight Tmax 101.4  On PS 15/6 at time of exam    I/O 24hrs  I-2090ml  O-3275ml  N- -1185ml    Stable for dc to LTAC facility pending placement       Objective       Allergies: Azithromycin, Prednisone, and Lorazepam    CURRENT MEDS  •  acetaminophen, 650 mg, feeding tube, q4h PRN  •  albuterol, 2.5 mg, nebulization, q4h PRN  •  amiodarone, 200 mg, feeding tube, Daily  •  atorvastatin, 10 mg, oral, Nightly  •  atropine, 1 mg, intravenous, Once PRN  •  betamethasone valerate, , Topical, 2x daily PRN  •  calcium gluconate, 1 g, intravenous, q6h PRN  •  cetirizine, 10 mg, feeding tube, Nightly  •  chlorhexidine, 15 mL, Mouth/Throat, 2 times per day  •  cholecalciferol (vitamin D3), 1,000 Units, feeding tube, Daily  •  glucose, 16-32 g of dextrose, oral, PRN **OR** dextrose, 15-30 g of dextrose, oral, PRN **OR** glucagon, 1 mg, intramuscular, PRN **OR** dextrose 50 % in water (D50), 25 mL, intravenous, PRN  •  fentaNYL, 50 mcg, intravenous, q4h PRN  •  furosemide, 60 mg, intravenous, BID (am, 4p)  •  guaiFENesin, 400 mg, oral, q6h PRN  •  hydrALAZINE, 5 mg, intravenous, q6h PRN  •  insulin lispro U-100, 1-10 Units, subcutaneous, q6h GISELLE  •  magnesium oxide, 400 mg, Nasogastric, BID  •  magnesium sulfate, 2 g, intravenous, PRN  •  melatonin, 3 mg, peg tube, Nightly PRN  •  metoclopramide, 10 mg, intravenous, q6h PRN  •  metoprolol tartrate, 25 mg, feeding tube, BID  •  pantoprazole, 40 mg, intravenous, q12h GISELLE  •  potassium chloride, 20 mEq, oral, PRN  •  potassium chloride, 40 mEq, oral, PRN  •  potassium chloride, 20 mEq, intravenous, PRN  •  potassium chloride in water, 20 mEq, intravenous, PRN **AND** potassium chloride in water, 20 mEq, intravenous, PRN  •  rivaroxaban, 20 mg, oral, Daily with dinner  •  silver sulfadiazine, , Topical, BID  •  sodium chloride 0.9 %, 5  mL/hr, intravenous, PRN  •  sodium chloride, 10 mL, intravenous, q8h INT  •  sodium chloride, 10 mL, intravenous, PRN  •  tamoxifen, 20 mg, feeding tube, Daily    VITAL SIGNS  Vitals:    01/09/24 0445 01/09/24 0500 01/09/24 0600 01/09/24 0700   BP:  (!) 123/57 133/61 (!) 113/55   BP Location:  Left upper arm Left upper arm    Patient Position:  Lying Lying    Pulse:  97 89 79   Resp:  (!) 25 (!) 34 20   Temp: (!) 38.3 °C (100.9 °F)      TempSrc: Axillary      SpO2:  99% 94% 98%   Weight:       Height:           VENTILATOR SETTINGS  Vent Mode: Pressure regulated volume control  FiO2 (%) (Set):  [40 %] 40 %  S RR:  [18] 18  S VT:  [450 mL] 450 mL  PEEP/CPAP (Set, cmH2O):  [6 cm H20] 6 cm H20  MAP (Observed, cmH2O):  [10-15] 15    Oxygen:  Oxygen Therapy: Supplemental oxygen  O2 Delivery Method: Trach tube  FiO2 (%) (Set): 40 %  O2 Flow Rate (L/min): 6 L/min     INTAKE/OUTPUT    Intake/Output Summary (Last 24 hours) at 1/9/2024 0800  Last data filed at 1/9/2024 0722  Gross per 24 hour   Intake 2052 ml   Output 3175 ml   Net -1123 ml       Lines, Drains, Airways, Wounds:  PICC Triple Lumen 12/28/23 Right (Active)   Number of days: 12       Gastrostomy/Enterostomy Gastrostomy 20 Fr LUQ (Active)   Number of days: 5       Urethral Catheter 20 Fr (Active)   Number of days: 12       Surgical Airway Shiley Cuffed 8 (Active)   Number of days: 5       Wound Venous Ulcer Left Pretibial (Active)   Number of days: 16       Wound Perineum (Active)   Number of days: 16       Wound Puncture Anterior;Proximal;Right Groin (Active)   Number of days: 12       Wound Pressure Injury Right Heel (Active)   Number of days: 12       Wound Pressure Injury Left Heel (Active)   Number of days: 9         Physical Exam:  Physical Exam  Vitals and nursing note reviewed.   Constitutional:       Appearance: He is ill-appearing.   HENT:      Mouth/Throat:      Comments: Trach in place  Cardiovascular:      Rate and Rhythm: Normal rate and regular  rhythm.      Pulses: Normal pulses.      Heart sounds: Normal heart sounds.   Pulmonary:      Comments: Coarse breath sounds throughout  Abdominal:      General: Bowel sounds are normal. There is distension.      Palpations: Abdomen is soft.      Tenderness: There is no abdominal tenderness.   Musculoskeletal:      Right lower leg: Edema present.      Left lower leg: Edema present.   Skin:     General: Skin is warm and dry.   Neurological:      Mental Status: He is alert.      Comments: Awake and following commands          Labs:  See Labs Below:  ABG    CBC  Results from last 7 days   Lab Units 01/09/24  0425 01/08/24  0330 01/07/24  2109   WBC K/uL 9.49 8.49 9.30   RBC M/uL 2.28* 2.35* 2.52*   HEMOGLOBIN g/dL 7.4* 7.4* 8.0*   HEMATOCRIT % 23.8* 24.1* 25.5*   MCV fL 104.4* 102.6* 101.2*   MCH pg 32.5 31.5 31.7   MCHC g/dL 31.1* 30.7* 31.4*   PLATELETS K/uL 177 177 184   RDW % 15.9* 16.0* 16.3*   MPV fL 10.8 11.1 10.8     BMP  Results from last 7 days   Lab Units 01/09/24  0425 01/08/24  0330 01/07/24  0431 01/06/24  0532 01/05/24  0329   SODIUM mEQ/L 141 141 140 138 141   POTASSIUM mEQ/L 3.9 3.4* 3.7 3.4* 3.4*   CHLORIDE mEQ/L 101 101 103 103 104   CO2 mEQ/L 31 31 25 26 25   BUN mg/dL 33* 30* 29* 29* 29*   CREATININE mg/dL 1.4* 1.5* 1.4* 1.4* 1.6*   CALCIUM mg/dL 8.1* 8.1* 7.6* 7.5* 7.9*   ALBUMIN g/dL  --   --  2.6* 2.5* 2.7*   BILIRUBIN TOTAL mg/dL  --   --  0.5 0.5 0.7   ALK PHOS IU/L  --   --  74 73 64   ALT IU/L  --   --  33 36 47   AST IU/L  --   --  29 27 32   GLUCOSE mg/dL 130* 132* 122* 142* 149*     Coag  Results from last 7 days   Lab Units 01/07/24  2109 01/07/24  1210 01/07/24  0431 01/05/24  0329 01/04/24  2030 01/04/24  0510   PROTIME sec 21.1*  --   --   --  15.0* 14.3   INR  1.8  --   --   --  1.2 1.1   PTT sec 46* 95* 64*   < > 49* 33    < > = values in this interval not displayed.       Imaging:   X-RAY CHEST 1 VIEW    Result Date: 1/8/2024  IMPRESSION: Scattered nodular opacities of the right  lung.    X-RAY CHEST 1 VIEW    Result Date: 1/5/2024  IMPRESSION: Satisfactory positioning of the recently placed tracheostomy device. Right IJ central line appears stable terminating in the region the cavoatrial junction. Removal of the Dobbhoff tube. Redemonstration of moderate patchy airspace disease in the right mid to upper lung zone without significant change with lesser degrees of left greater than right basilar lung opacities not significant changed. These are nonspecific correlate for multifocal pneumonia versus edema or aspiration. No evidence of a pneumothorax. Small left pleural effusion not significantly changed. No sizable right pleural effusion. Stable cardiomediastinal contours with valve replacement. No upper abdominal or gross bone findings of concern. Right axilla surgical clips redemonstrated. COMMENT: Portable AP view the chest performed and compared with January 2, 2024 study. See impression.    Ultrasound venous leg bilateral    Result Date: 1/4/2024  IMPRESSION: No DVT identified in either lower extremity.     Ultrasound venous arm left    Result Date: 1/3/2024  IMPRESSION: 1.  No evidence for deep vein thrombosis bilaterally. 2.  Superficial thrombus involving the right cephalic vein.    Ultrasound venous arm right    Result Date: 1/3/2024  IMPRESSION: 1.  No evidence for deep vein thrombosis bilaterally. 2.  Superficial thrombus involving the right cephalic vein.    X-RAY ABDOMEN 1 VIEW    Result Date: 1/3/2024  IMPRESSION: Dobbhoff tube enters below left hemidiaphragm with its tip likely in the region of the stomach.     X-RAY CHEST 1 VIEW    Result Date: 1/2/2024  IMPRESSION: Hazy opacity seen at right midlung, new from the prior study, may represent ammonia or aspiration. Endotracheal tube with the tip 9 mm above the leo. Consider repositioning.     MRI BRAIN WITHOUT CONTRAST    Result Date: 1/2/2024  IMPRESSION: No acute infarct. Mild chronic microvascular ischemia, moderate volume  loss.    X-RAY ABDOMEN 1 VIEW    Result Date: 12/30/2023  IMPRESSION: Weighted feeding tube tip at the level of the proximal stomach.    CT HEAD WITHOUT IV CONTRAST    Result Date: 12/30/2023  IMPRESSION: No acute intracranial abnormality.  Chronic ischemic white matter changes are noted.    X-RAY CHEST 1 VIEW    Result Date: 12/30/2023  IMPRESSION: 1. The endotracheal tube is lower in position situated 2 cm above level of the leo.    IR TUNNELED PICC PLACEMENT    Result Date: 12/29/2023  IMPRESSION: Successful placement of 5 Fr triple lumen tunneled PICC.     ULTRASOUND GUIDED VASCULAR ACCESS    Result Date: 12/29/2023  IMPRESSION: Successful placement of 5 Fr triple lumen tunneled PICC.     ULTRASOUND KIDNEYS BLADDER/NO POST VOID    Result Date: 12/28/2023  IMPRESSION: 1. No sonographic abnormality in the kidneys. 2. Nearly completely collapsed bladder as discussed below. No large intraluminal bladder thrombus/clot as clinically questioned (given the limitations of near complete bladder collapse). COMMENT: Real-time sonographic evaluation of the kidneys and bladder was performed. There is normal cortical echogenicity and corticomedullary differentiation. The right kidney measures 10.8 x 5.2 x 4.6 cm. The left kidney measures 10.8 x 5.8 x 4.3 cm. There is no hydronephrosis, calculi, masses or perinephric collections. Bladder is nearly completely collapsed, measuring approximately 2.5 x 1.3 x 2.5 cm, for a calculated volume of approximately 5.5 cc, precluding adequate evaluation for calculi or mass. No ureteral jets identified on color Doppler, perhaps due to renal dysfunction and/or hydration status.    X-RAY CHEST 1 VIEW    Result Date: 12/27/2023  IMPRESSION:  Reduced lung volumes. No acute cardiopulmonary process. Stable cardiac enlargement. COMPARISON: Portable chest studies 12/25 and 12/26/2023. COMMENT:  Upright portable imaging completed 0548 hours. Endotracheal tube 4.7 cm above. Enteric tube follows a  normal course into left upper quadrant, directed to the left. Mild stable cardiac enlargement. Mitral annular prosthesis again noted. Stable hilar and mediastinal contours. Reduced lung volumes. No definite consolidation or pleural fluid. Unremarkable upper abdomen.    X-RAY CHEST 1 VIEW    Result Date: 12/26/2023  IMPRESSION: Endotracheal tube has been repositioned; the tip is now approximately 3.3 cm above the leo. COMMENT: A semierect AP portable view the chest was performed at 1615 and is compared to a prior study from 1503. Since the prior study, the endotracheal tube has been repositioned and the tip is now approximately 3.3 cm above the leo. There has been no other significant interval change.    X-RAY CHEST 1 VIEW    Result Date: 12/26/2023  IMPRESSION: ET tube 2 cm above leo. NG tube tip in proximal stomach. Probable right lower lobe atelectasis; attention on follow-up.    X-RAY CHEST 1 VIEW    Result Date: 12/26/2023  IMPRESSION: Status post extubation. Slightly improved vascular congestion since earlier in the day. Suspect developing atelectasis or airspace disease in the right midlung zone. COMMENT: Semierect AP portable view of the chest was performed at 1428 and is compared to a prior study from 5:27 AM. In the interval, the endotracheal tube has been removed. An enteric tube remains in place with the tip in the stomach. There is a vascular catheter/line with the tip projecting near the junction of the IVC and right atrium; it is difficult to determine whether this was present previously but differences in technique. Mild cardiomegaly is again noted. Cardiac valvular prosthesis is again seen. The pulmonary vasculature and interstitial markings have improved slightly since earlier in the day. There is questionable developing airspace disease or atelectasis in the right midlung zone. Trace bilateral pleural effusions are suspected. The hilar and mediastinal structures appear stable. Surgical  clips are again seen in the right axilla.    X-RAY CHEST 1 VIEW    Result Date: 12/26/2023  IMPRESSION: ET tube 4 cm above leo, NG tube tip not well seen, likely in proximal stomach. Stable cardiomegaly, mitral valve replacement. Clear lungs, with interstitial crowding secondary to low lung volumes.    X-RAY CHEST 1 VIEW    Result Date: 12/26/2023  IMPRESSION: Improved pulmonary vascular congestion. ET tube 3 cm above leo, NG tube tip below inferior edge of film.    X-RAY CHEST 1 VIEW    Result Date: 12/25/2023  IMPRESSION: Interval retraction of the endotracheal tube now in satisfactory position, as below. Stable satisfactory positioning of the enteric tube. Progressive pulmonary interstitial edema with stable enlargement of the cardiac silhouette. No pneumothorax. COMMENT: Comparison: Chest x-ray 12/24/2023. Technique: A single frontal AP portable upright projection of the chest was obtained. FINDINGS: There has been interval retraction of the endotracheal tube now terminating 2.7 cm above the leo. An enteric tube courses to the stomach with the distal tip collimated from the field-of-view beneath the left hemidiaphragm in satisfactory position. There are defibrillator pad projecting over the left hemithorax. There are progressively increased interstitial opacities seen projecting over both lungs. There is no pleural effusion or pneumothorax. The cardiac silhouette is stably enlarged. The mediastinal contours are unchanged. Again noted is a prosthetic mitral valve. The upper abdomen is unremarkable. There is no acute osseous abnormality. Surgical clips overlie the right axillary region.     X-RAY CHEST 1 VIEW    Result Date: 12/25/2023  IMPRESSION: Satisfactory positioning of the endotracheal and enteric tubes. No pneumothorax. Stable pulmonary edema and enlargement of the cardiac silhouette. COMMENT: Comparison: Chest x-ray 12/25/2023. Technique: A single frontal AP portable upright projection of the  chest was obtained. FINDINGS: The tip of an endotracheal tube terminates 4.0 cm above the leo. An enteric tube courses to the stomach with the distal tip collimated from the field-of-view beneath the left hemidiaphragm in satisfactory position. There are defibrillator pad projecting over the left hemithorax. There are mildly increased interstitial opacities again seen projecting over both lungs. No pleural effusion or pneumothorax is seen. There is stable enlargement of the cardiac silhouette. Again noted is a prosthetic mitral valve. The mediastinal contours are unchanged. The upper abdomen is unremarkable. There is no acute osseous abnormality. There are surgical clips overlying the right axillary region.     X-RAY CHEST 1 VIEW    Result Date: 12/25/2023  IMPRESSION: Low-lying endotracheal tube terminating over the proximal right mainstem bronchus. Recommend retraction. Satisfactory positioning of the enteric tube. This finding and recommendation was discussed with nurse Shoemaker at 11:27 AM on 12/25/2023 by Dr. Martinez by telephone. Mild pulmonary interstitial edema and stable enlargement of the cardiac silhouette. No pneumothorax. COMMENT: Comparison: Chest x-ray 12/24/2023. Technique: A single frontal AP portable upright projection of the chest was obtained. FINDINGS: The tip of the intervally placed endotracheal tube terminates over the proximal right mainstem bronchus. The tip of the enteric tube terminates over the left upper quadrant of the abdomen. There are mildly increased interstitial opacities noted within both lungs. There is no pleural effusion or pneumothorax. There is stable enlargement of the cardiac silhouette. Again noted is a mitral valve prosthesis. Surgical clips overlie the right axilla. The upper abdomen is unremarkable. There is no acute osseous abnormality.    X-RAY CHEST 1 VIEW    Result Date: 12/25/2023  IMPRESSION: Vascular congestion.  Left basal atelectasis. COMMENT: AP radiograph the  chest is compared to previous study dated 8/17/2023. There is vascular congestion and small effusions.  Findings may represent mild congestive heart failure.  Cardiac silhouette is unchanged.  Prosthetic valve ring seen.  Surgical staples seen in the right axilla.  There is minimal left basal atelectasis.    CT ANGIOGRAPHY CHEST PULMONARY EMBOLISM WITH IV CONTRAST    Result Date: 12/24/2023  IMPRESSION: 1. No pulmonary embolism in the main or proximal segmental pulmonary arteries. 2. Right upper lobe and left lower lobe infiltrate with patchy airspace opacities in the right middle lobe.      Micro:   Microbiology Results     Procedure Component Value Units Date/Time    Sputum Gram Stain (Lab Only) Sputum [[id number]] Collected: 01/08/24 1527    Specimen: Sputum Updated: 01/09/24 0621     Gram Stain Result 4+ WBC      3+ Epithelial cells      1+ Gram positive cocci in pairs and clusters      1+ Yeast    Blood Culture Blood, Venous [[id number]]  (Normal) Collected: 01/01/24 1201    Specimen: Blood, Venous Updated: 01/05/24 1701     Culture No growth at 96 hours    Blood Culture Blood, Venous [[id number]]  (Normal) Collected: 01/01/24 1201    Specimen: Blood, Venous Updated: 01/05/24 1701     Culture No growth at 96 hours    Sputum culture / smear Tracheal Aspirate [[id number]]  (Abnormal) Collected: 01/01/24 0937    Specimen: Tracheal Aspirate Updated: 01/03/24 0744    Narrative:      The following orders were created for panel order Sputum culture / smear Tracheal Aspirate.  Procedure                               Abnormality         Status                     ---------                               -----------         ------                     Sputum Gram Stain (Lab O...[[id number]]                      Final result               Sputum Culture (Lab Only...[[id number]]  Abnormal            Final result                 Please view results for these tests on the individual orders.    Sputum Gram Stain (Lab Only) Tracheal  Aspirate [[id number]] Collected: 01/01/24 0937    Specimen: Tracheal Aspirate Updated: 01/01/24 1817     Gram Stain Result 4+ WBC      1+ Epithelial cells      No organisms seen    Sputum Culture (Lab Only) Tracheal Aspirate [[id number]]  (Abnormal) Collected: 01/01/24 0937    Specimen: Tracheal Aspirate Updated: 01/03/24 0744     Culture **Positive Culture**      1+ Yeast, No Further Identification      No Normal Park    Sputum culture / smear Expectorated Sputum [[id number]]  (Abnormal) Collected: 12/27/23 1417    Specimen: Bronch Lavage from Expectorated Sputum Updated: 12/29/23 0723    Narrative:      The following orders were created for panel order Sputum culture / smear Expectorated Sputum.  Procedure                               Abnormality         Status                     ---------                               -----------         ------                     Sputum Gram Stain (Lab O...[[id number]]                      Final result               Sputum Culture (Lab Only...[[id number]]  Abnormal            Final result                 Please view results for these tests on the individual orders.    Sputum Gram Stain (Lab Only) Expectorated Sputum [[id number]] Collected: 12/27/23 1417    Specimen: Bronch Lavage from Expectorated Sputum Updated: 12/27/23 2032     Gram Stain Result 2+ WBC      No Epithelial Cells Seen      No organisms seen    Sputum Culture (Lab Only) Expectorated Sputum [[id number]]  (Abnormal) Collected: 12/27/23 1417    Specimen: Bronch Lavage from Expectorated Sputum Updated: 12/29/23 0723     Culture **Positive Culture**      1+ Yeast, No Further Identification    Sputum culture / smear Expectorated Sputum [[id number]] Collected: 12/25/23 0418    Specimen: Aspirate from Expectorated Sputum Updated: 12/27/23 0845    Narrative:      The following orders were created for panel order Sputum culture / smear Expectorated Sputum.  Procedure                               Abnormality         Status                      ---------                               -----------         ------                     Sputum Gram Stain (Lab O...[[id number]]                      Final result               Sputum Culture (Lab Only...[[id number]]  Normal              Final result                 Please view results for these tests on the individual orders.    Sputum Gram Stain (Lab Only) Expectorated Sputum [[id number]] Collected: 12/25/23 0418    Specimen: Aspirate from Expectorated Sputum Updated: 12/25/23 0956     Gram Stain Result 3+ WBC      No Epithelial Cells Seen      3+ Gram positive bacilli      2+ Gram positive cocci in pairs, chains and clusters    Sputum Culture (Lab Only) Expectorated Sputum [[id number]]  (Normal) Collected: 12/25/23 0418    Specimen: Aspirate from Expectorated Sputum Updated: 12/27/23 0845     Culture Normal Park    MRSA Screen, Nares Only Nose [[id number]]  (Normal) Collected: 12/25/23 0352    Specimen: Nasal Swab from Nose Updated: 12/25/23 0906     MRSA DNA, Nares Negative    Blood Culture Blood, Venous [[id number]]  (Normal) Collected: 12/24/23 1652    Specimen: Blood, Venous Updated: 12/29/23 0000     Culture No growth at 96 hours    SARS-CoV-2 (COVID-19) Nasopharynx [[id number]]  (Abnormal) Collected: 12/24/23 1649    Specimen: Nasopharyngeal Swab from Nasopharynx Updated: 12/24/23 1736    Narrative:      The following orders were created for panel order SARS-CoV-2 (COVID-19) Nasopharynx.  Procedure                               Abnormality         Status                     ---------                               -----------         ------                     SARS-COV-2 (COVID-19)/ F...[[id number]]  Abnormal            Final result                 Please view results for these tests on the individual orders.    SARS-COV-2 (COVID-19)/ FLU A/B, AND RSV, PCR Nasopharynx [[id number]]  (Abnormal) Collected: 12/24/23 1649    Specimen: Nasopharyngeal Swab from Nasopharynx Updated: 12/24/23 1736     SARS-CoV-2  (COVID-19) Negative     Influenza A Positive     Influenza B Negative     Respiratory Syncytial Virus Negative    Narrative:      Testing performed using real-time PCR for detection of COVID-19. EUA approved validation studies performed on site.     Blood Culture Blood, Venous [[id number]]  (Normal) Collected: 12/24/23 1647    Specimen: Blood, Venous Updated: 12/29/23 0100     Culture No growth at 96 hours          ECG/Telemetry  I have independently reviewed the telemetry. No events for the last 24 hours.         ASSESSMENT    73 y.o. male with history atrial fibrillation, CHF, prostate and breast CA, history of GI bleed who presented to the emergency room with shortness of breath and found to be influenza A positive. During day 2 of his hospital stay he had a v fib arrest with ROSC. He was transferred to the ICU for further monitoring     PLAN   # Acute Hypoxic respiratory failure  - Failed Bipap on admission, became bradycardic and was intubated  - Extubated 12/26, however became hypoxic, ?Secondary to upper airway edema vs acute bronchospasm vs flash pulmonary edema. Ultimately required re-intubation 12/26.   - Initially passed SBT 12/30, was extubated to nasal cannula, failed HF and BIPAP and ultimately required re-intubation in the setting of hypoxia and poor mental status  - s/p trach/PEG on 1/4   - He is volume overloaded, (overall 2.4 liters positive)  - Intermittent trials with PS today.   - continue LTACH planning.   - Continue Furosemide BID      # S/p V fib cardiac arrest  - In setting of Takosubo's cardiomyopthy  - ROSC achieved after receiving 3 epi, 3 shocks, 2 bicarb, and Magnesium replacement  - Following commands post-arrest. No TTM initiated  - Taken by interventional cardiology for the placement of a temporary pacemaker  - PPM removed secondary to dyssynchrony and misplacement. No further marcellus arrythmias, removed 12/27  - Continue amiodarone      # Takotsubo Cardiomyopathy  - TTE 12/26  suggestive of Takotsubos with EF 40-45%, confirmed by LV gram,improved to 65%  - Cardiology following  - Furosemide BID   - Continue metoprolol      # Acute kidney injury  - Renal function slowly improving. Initial creatinine 1.2, peaked 2.3, today at 1.4  - Baseline creatinine appears to be 1.2-1.4  - ?Secondary to IV dye load s/p cardiac cath vs over-diuresis   - Monitor BMP, I and O  - Cr trending down, continue lasix 60 mg BID     # Elevated liver enzymes  - Likely in setting of hypotension, shock liver  - Serial labs, resolved     # Septic Shock  - Resolved, off pressors  -  maintain MAP > 65     # Pneumonia  - CT Chest 12/24 with left lower lobe infiltrate and right upper lobe infiltrate  - Blood/ sputum cultures negative   - Completed course of zosyn 12/31.   - New fever noted with Cxr on 1/2 with new hazy opacity seen in the right lung  -Started on Vanco/ceftaz on 1/3 with concern for new infiltrate.  Narrowed to ceftriaxone 1/5, with sputum culture growing normal nixon. Last day antibiotics 1/8  - repeat CXR today Improved but still with RUL infiltrates.      # Afib  - EP following  - Continue zarelto  - Continue amiodarone and metoprolol      # Influenza A  - Completed 5 day course Oseltamivir      # Prostate/breast cancer  - S/p radiation therapy for prostate cancer spring 2023  - S/p R mastectomy 8/2023 and radiation therapy 9/2023  - Continue tamoxifen     #Generalized Edema  -1/2 with UE edema, Right hand swollen and cool on exam, strong radial pulse, cap refill 2-3 seconds  -Bilateral UE ultrasounds 1/3  with R superficial thrombus involving right cephalic vein  -Bilateral LE ultrasound done 1/4 with no evidence of DVT     #Anemia  -Hgb 6.7 on 1/4   -Given 1 unit PRBC    -Trend daily CBC  -Transfuse for Hgb less than 7.0 or less than 8.0 with active bleeding     VTE Prophylaxis Plan: SCDs, Sharath  GI Prophylaxis Plan: Protonix  Lines/Drains/Airway: R PICC (12/28), Trach/PEG (1/4), jones,  FMS  Fluids/Electrolytes/Nutrition: TF     Disposition Planning: Remain in the ICU    CODE STATUS  Full Code       The case was reviewed this morning at the patient's beside multidisciplinary rounds with the patient's nurse, dietician, pharmacist, respiratory therapist, physical therapist and critical care nurse coordinator. The patient's clinical status with regards to diagnosis, treatment plans including disposition of any IV, arterial lines, Lloyd and tubes were discussed, as well as dietary, respiratory therapy and mobilization needs.     This case was discussed with consultants.    Total critical time spent managing VDR[facility] minutes.    This note was scribed by Ishaan Juares PA-C in my presence on my behalf.       Filipe Casper DO  1/9/2024  8:00 AM

## 2024-01-09 NOTE — PLAN OF CARE
Plan of Care Review  Plan of Care Reviewed With: patient  Progress: no change  Outcome Evaluation: Pt educated on plan of care, pt with trach on vent, PRVC settings overnight, asynchronous with the vent at times due to frequent coughing, minimal secretions, febrile overnight Tmax 101.4F axillary, PRN tylenol given, TF continued, jones draining yellow urine adequate output, FMS with brown liquid output 200ml overnight, turned and repositioned Q2H, Q6H accuchecks, for am labs.

## 2024-01-09 NOTE — PLAN OF CARE
Care Coordination Discharge Plan Note     Discharge Needs Assessment  Concerns to be Addressed: adjustment to diagnosis/illness, care coordination/care conferences, discharge planning  Current Discharge Risk: chronically ill, physical impairment    Anticipated Discharge Plan  Anticipated Discharge Disposition: skilled nursing facility, home with home health  Type of Home Care Services: home OT, home PT, nursing    Type of Skilled Nursing Care Services: SLP, OT, PT, nursing        Patient Choice  Offered/Gave Vendor List: yes  Patient's Choice of Community Agency(s): discussed with family that care coordiantin will follow for approrpaite aftecare needs    Patient and/or patient guardian/advocate was made aware of their right to choose a provider. A list of eligible providers was presented and reviewed with the patient and/or patient guardian/advocate in written and/or verbal form. The list delineates providers in the patient’s desired geographic area who are participating in the Medicare program and/or providers contracted with the patient’s primary insurance. The Medicare list and quality ratings were obtained from the Medicare.gov [medicare.gov] website.    ---------------------------------------------------------------------------------------------------------------------    Interdisciplinary Discharge Plan Review:  Participants:     Concerns Comments: shireen met with patient & his 2 children at bedside & spouse. shireen went over role of care coordiatnon & d/c plans. shireen confirmed pcp & pharamcy. [facility]. david has hx of prostate cancer & was receing treatment in past. per son his brother has been staying at patient's home to assist with care fro david. david stays mostly in family room . shireen discussed with wife that care coordination will continue to follow for appropriate aftecare needs. patient is currently on vent  1/9 shireen met with patient's wife & son at bedside. Shireen  discussed there choice for ltac placement. Per wife she thinks she is leaning [facility]. Per wife she will have a tour today & let shireen know. Shireen discussed that she can also tour [place] also. [facility] & spoke to chon. Shireen heard that patients son & wife are touring around 13:30 today. If wife is agreeable to [facility] will have a bed at 13:00 tomorrow . Sw setup ambulnz pickup for then & will verify choice from wie when she comes back from tour.  Shireen confirmed that patient is on pressure support during day & full vent at night. ambulnz trip number is [id number]. Await to hear from family on ltac placement decision. Also MD has spoken to patient's wife about ltac & reason for placement.   Shireen spoke to patient's wife & son who did tour [facility] in [facility] & would like patient go to there. Per wife she does not need to tour [place]. Shireen stated that shireen will f/u & that sw setup time for tomorrow at 13:00 & will confirm in am. Shireen spoke to chon/[facility] to make aware of tour & that shireen setup time for 13:00 tomorrow. Per chon there eis a bed tomorrow & aware that shireen will call in am. Sw to [facility] in am with bed & wife.   Discharge Plan:   Disposition/Destination:   /    Discharge Facility:    [facility] Resources:      Discharge Transportation:  Is Out of Hospital DNR needed at Discharge: no  Does patient need discharge transport?

## 2024-01-10 VITALS
WEIGHT: 180.78 LBS | HEIGHT: 65 IN | SYSTOLIC BLOOD PRESSURE: 104 MMHG | HEART RATE: 72 BPM | OXYGEN SATURATION: 100 % | BODY MASS INDEX: 30.12 KG/M2 | TEMPERATURE: 100.7 F | DIASTOLIC BLOOD PRESSURE: 55 MMHG | RESPIRATION RATE: 23 BRPM

## 2024-01-10 PROBLEM — Z93.0 TRACHEOSTOMY IN PLACE (CMS/HCC): Status: ACTIVE | Noted: 2024-01-10

## 2024-01-10 PROBLEM — R79.89 ELEVATED LFTS: Status: ACTIVE | Noted: 2024-01-10

## 2024-01-10 PROBLEM — Z93.1 STATUS POST INSERTION OF PERCUTANEOUS ENDOSCOPIC GASTROSTOMY (PEG) TUBE (CMS/HCC): Status: ACTIVE | Noted: 2024-01-10

## 2024-01-10 PROBLEM — J18.9 PNEUMONIA: Status: ACTIVE | Noted: 2024-01-10

## 2024-01-10 PROBLEM — I51.81 TAKOTSUBO CARDIOMYOPATHY: Status: ACTIVE | Noted: 2024-01-10

## 2024-01-10 LAB
ANION GAP SERPL CALC-SCNC: 8 MEQ/L (ref 3–15)
BACTERIA URNS QL MICRO: ABNORMAL /HPF
BILIRUB UR QL STRIP.AUTO: NEGATIVE MG/DL
BUN SERPL-MCNC: 36 MG/DL (ref 7–25)
CALCIUM SERPL-MCNC: 7.8 MG/DL (ref 8.6–10.3)
CHLORIDE SERPL-SCNC: 101 MEQ/L (ref 98–107)
CLARITY UR REFRACT.AUTO: ABNORMAL
CO2 SERPL-SCNC: 29 MEQ/L (ref 21–31)
COLOR UR AUTO: YELLOW
CREAT SERPL-MCNC: 1.4 MG/DL (ref 0.7–1.3)
EGFRCR SERPLBLD CKD-EPI 2021: 53.1 ML/MIN/1.73M*2
ERYTHROCYTE [DISTWIDTH] IN BLOOD BY AUTOMATED COUNT: 15.6 % (ref 11.6–14.4)
GLUCOSE BLD-MCNC: 153 MG/DL (ref 70–99)
GLUCOSE BLD-MCNC: 161 MG/DL (ref 70–99)
GLUCOSE SERPL-MCNC: 137 MG/DL (ref 70–99)
GLUCOSE UR STRIP.AUTO-MCNC: NEGATIVE MG/DL
HCT VFR BLDCO AUTO: 23.2 % (ref 40.1–51)
HGB BLD-MCNC: 7.3 G/DL (ref 13.7–17.5)
HGB UR QL STRIP.AUTO: 3
HYALINE CASTS #/AREA URNS LPF: ABNORMAL /LPF
KETONES UR STRIP.AUTO-MCNC: NEGATIVE MG/DL
LEUKOCYTE ESTERASE UR QL STRIP.AUTO: 3
MCH RBC QN AUTO: 32.3 PG (ref 28–33.2)
MCHC RBC AUTO-ENTMCNC: 31.5 G/DL (ref 32.2–36.5)
MCV RBC AUTO: 102.7 FL (ref 83–98)
MUCOUS THREADS URNS QL MICRO: 2 /LPF
NITRITE UR QL STRIP.AUTO: NEGATIVE
PDW BLD AUTO: 10.4 FL (ref 9.4–12.4)
PH UR STRIP.AUTO: 6.5 [PH]
PLATELET # BLD AUTO: 185 K/UL (ref 150–350)
POCT TEST: ABNORMAL
POCT TEST: ABNORMAL
POTASSIUM SERPL-SCNC: 4 MEQ/L (ref 3.5–5.1)
PROT UR QL STRIP.AUTO: 2
RBC # BLD AUTO: 2.26 M/UL (ref 4.5–5.8)
RBC #/AREA URNS HPF: ABNORMAL /HPF
SODIUM SERPL-SCNC: 138 MEQ/L (ref 136–145)
SP GR UR REFRACT.AUTO: 1.01
SQUAMOUS URNS QL MICRO: ABNORMAL /HPF
UROBILINOGEN UR STRIP-ACNC: 0.2 EU/DL
WBC # BLD AUTO: 9.5 K/UL (ref 3.8–10.5)
WBC #/AREA URNS HPF: ABNORMAL /HPF
YEAST #/AREA URNS HPF: 1 /HPF

## 2024-01-10 PROCEDURE — 85027 COMPLETE CBC AUTOMATED: CPT

## 2024-01-10 PROCEDURE — 87106 FUNGI IDENTIFICATION YEAST: CPT | Performed by: NURSE PRACTITIONER

## 2024-01-10 PROCEDURE — 63700000 HC SELF-ADMINISTRABLE DRUG

## 2024-01-10 PROCEDURE — 80048 BASIC METABOLIC PNL TOTAL CA: CPT | Performed by: NURSE PRACTITIONER

## 2024-01-10 PROCEDURE — 81001 URINALYSIS AUTO W/SCOPE: CPT

## 2024-01-10 PROCEDURE — 25000000 HC PHARMACY GENERAL

## 2024-01-10 PROCEDURE — 63600000 HC DRUGS/DETAIL CODE: Performed by: NURSE PRACTITIONER

## 2024-01-10 PROCEDURE — 94003 VENT MGMT INPAT SUBQ DAY: CPT

## 2024-01-10 PROCEDURE — 63600000 HC DRUGS/DETAIL CODE

## 2024-01-10 PROCEDURE — 36415 COLL VENOUS BLD VENIPUNCTURE: CPT | Performed by: NURSE PRACTITIONER

## 2024-01-10 PROCEDURE — 87086 URINE CULTURE/COLONY COUNT: CPT | Performed by: NURSE PRACTITIONER

## 2024-01-10 RX ORDER — INSULIN LISPRO 100 [IU]/ML
1-10 INJECTION, SOLUTION INTRAVENOUS; SUBCUTANEOUS EVERY 6 HOURS
Start: 2024-01-10 | End: 2024-05-13 | Stop reason: ALTCHOICE

## 2024-01-10 RX ORDER — IBUPROFEN/PSEUDOEPHEDRINE HCL 200MG-30MG
3 TABLET ORAL NIGHTLY PRN
Start: 2024-01-10 | End: 2025-03-27

## 2024-01-10 RX ORDER — ATORVASTATIN CALCIUM 10 MG/1
10 TABLET, FILM COATED ORAL NIGHTLY
Start: 2024-01-10 | End: 2024-04-11

## 2024-01-10 RX ORDER — BETAMETHASONE VALERATE 1.2 MG/G
OINTMENT TOPICAL 2 TIMES DAILY PRN
Start: 2024-01-10 | End: 2024-05-13 | Stop reason: ALTCHOICE

## 2024-01-10 RX ORDER — ALBUTEROL SULFATE 0.83 MG/ML
2.5 SOLUTION RESPIRATORY (INHALATION) EVERY 4 HOURS PRN
Start: 2024-01-10 | End: 2024-05-13 | Stop reason: ALTCHOICE

## 2024-01-10 RX ORDER — LANOLIN ALCOHOL/MO/W.PET/CERES
400 CREAM (GRAM) TOPICAL 2 TIMES DAILY
Start: 2024-01-10 | End: 2024-05-13 | Stop reason: ALTCHOICE

## 2024-01-10 RX ORDER — CHLORHEXIDINE GLUCONATE ORAL RINSE 1.2 MG/ML
15 SOLUTION DENTAL 2 TIMES DAILY
Start: 2024-01-10 | End: 2024-02-09

## 2024-01-10 RX ORDER — METOPROLOL TARTRATE 25 MG/1
25 TABLET, FILM COATED ORAL 2 TIMES DAILY
Start: 2024-01-10 | End: 2024-04-12 | Stop reason: DRUGHIGH

## 2024-01-10 RX ORDER — SODIUM CHLORIDE 0.9 % (FLUSH) 0.9 %
10 SYRINGE (ML) INJECTION
Start: 2024-01-10 | End: 2024-02-09

## 2024-01-10 RX ORDER — PANTOPRAZOLE SODIUM 40 MG/10ML
40 INJECTION, POWDER, LYOPHILIZED, FOR SOLUTION INTRAVENOUS EVERY 12 HOURS
Start: 2024-01-10 | End: 2024-05-13 | Stop reason: ALTCHOICE

## 2024-01-10 RX ORDER — SODIUM CHLORIDE 0.9 % (FLUSH) 0.9 %
10 SYRINGE (ML) INJECTION AS NEEDED
Start: 2024-01-10 | End: 2024-02-09

## 2024-01-10 RX ORDER — FUROSEMIDE 10 MG/ML
60 INJECTION INTRAMUSCULAR; INTRAVENOUS EVERY 12 HOURS
Start: 2024-01-10 | End: 2024-02-09

## 2024-01-10 RX ORDER — CETIRIZINE HYDROCHLORIDE 1 MG/ML
10 SOLUTION ORAL NIGHTLY
Start: 2024-01-10 | End: 2024-12-16

## 2024-01-10 RX ADMIN — METOPROLOL TARTRATE 25 MG: 25 TABLET, FILM COATED ORAL at 08:01

## 2024-01-10 RX ADMIN — Medication 10 ML: at 02:42

## 2024-01-10 RX ADMIN — FENTANYL CITRATE 50 MCG: 0.05 INJECTION, SOLUTION INTRAMUSCULAR; INTRAVENOUS at 09:31

## 2024-01-10 RX ADMIN — Medication 400 MG: at 08:01

## 2024-01-10 RX ADMIN — CHOLECALCIFEROL TAB 25 MCG (1000 UNIT) 1000 UNITS: 25 TAB at 08:01

## 2024-01-10 RX ADMIN — INSULIN LISPRO 1 UNITS: 100 INJECTION, SOLUTION INTRAVENOUS; SUBCUTANEOUS at 11:25

## 2024-01-10 RX ADMIN — INSULIN LISPRO 1 UNITS: 100 INJECTION, SOLUTION INTRAVENOUS; SUBCUTANEOUS at 00:57

## 2024-01-10 RX ADMIN — PANTOPRAZOLE SODIUM 40 MG: 40 INJECTION, POWDER, LYOPHILIZED, FOR SOLUTION INTRAVENOUS at 08:01

## 2024-01-10 RX ADMIN — Medication 10 ML: at 11:12

## 2024-01-10 RX ADMIN — SILVER SULFADIAZINE: 10 CREAM TOPICAL at 08:12

## 2024-01-10 RX ADMIN — CHLORHEXIDINE GLUCONATE ORAL RINSE 15 ML: 1.2 SOLUTION DENTAL at 09:05

## 2024-01-10 RX ADMIN — TAMOXIFEN CITRATE 20 MG: 10 TABLET, FILM COATED ORAL at 08:01

## 2024-01-10 RX ADMIN — FENTANYL CITRATE 50 MCG: 0.05 INJECTION, SOLUTION INTRAMUSCULAR; INTRAVENOUS at 05:11

## 2024-01-10 RX ADMIN — ACETAMINOPHEN 650 MG: 650 SOLUTION ORAL at 05:11

## 2024-01-10 RX ADMIN — AMIODARONE HYDROCHLORIDE 200 MG: 200 TABLET ORAL at 08:01

## 2024-01-10 RX ADMIN — FUROSEMIDE 60 MG: 10 INJECTION, SOLUTION INTRAMUSCULAR; INTRAVENOUS at 08:12

## 2024-01-10 ASSESSMENT — COGNITIVE AND FUNCTIONAL STATUS - GENERAL
STANDING UP FROM CHAIR USING ARMS: 1 - TOTAL
CLIMB 3 TO 5 STEPS WITH RAILING: 1 - TOTAL
MOVING TO AND FROM BED TO CHAIR: 1 - TOTAL
WALKING IN HOSPITAL ROOM: 1 - TOTAL

## 2024-01-10 NOTE — PLAN OF CARE
Plan of Care Review  Plan of Care Reviewed With: patient  Progress: no change  Outcome Evaluation: Pt educated on plan of care, pt with trach on vent overnight, minimal secretions, febrile overnight, Tmax 101.6F, PRN tylenol given, jones draining yellow output, FMS with brown liquid output, turned and repositioned Q2H overnight, Q6H accuchecks, for am labs, for possible d/c today to LTAC.

## 2024-01-10 NOTE — PLAN OF CARE
Care Coordination Discharge Plan Summary    Admission Assessment Summary    General Information  Readmission Within the last 30 days: no previous admission in last 30 days  Does patient have a : No  Patient-Specific Goals (include timeframe): on vent unable to state    Living Arrangements  Arrived From: home  Current Living Arrangements: home  People in Home: child(angelia), adult, spouse  Home Accessibility: stairs to enter home (Group), stairs within home (Group)  Living Arrangement Comments: patient is in 2 story home with 2 steps to enter & 13 steps to 2nd floor. fabion is staying mostly on first floor & 2 steps into family room & patient holds onto a pole to step down    Social Determinants of Health - Screenings  Housing Stability  In the last 12 months, was there a time when you were not able to pay the mortgage or rent on time?: No  In the last 12 months, how many places have you lived?: 1  In the last 12 months, was there a time when you did not have a steady place to sleep or slept in a shelter (including now)?: No  Utility Access  In the past 12 months has the electric, gas, oil, or water company threatened to shut off services in your home?: No  Transportation Needs  In the past 12 months, has lack of transportation kept you from medical appointments or from getting medications?: No  In the past 12 months, has lack of transportation kept you from meetings, work, or from getting things needed for daily living?: No    Functional Status Prior to Admission  Assistive Device/Animal Currently Used at Home: walker, front-wheeled, shower chair, cane, straight  Functional Status Comments: patient was needing some assistance for adls & wife does home mangement  IADL Comments:      Discharge Needs Assessment    Concerns to be Addressed: adjustment to diagnosis/illness, care coordination/care conferences, discharge planning  Current Discharge Risk: chronically ill, physical impairment  Anticipated  Changes Related to Illness: inability to care for self    Discharge Plan Summary    Patient Choice  Offered/Gave Vendor List: yes  Patient's Choice of Community Agency(s): discussed with family that care coordiantin will follow for approrpaite aftecare needs  Patient and/or patient guardian/advocate was made aware of their right to choose a provider. A list of eligible providers was presented and reviewed with the patient and/or patient guardian/advocate in written and/or verbal form. The list delineates providers in the patient’s desired geographic area who are participating in the Medicare program and/or providers contracted with the patient’s primary insurance. The Medicare list and quality ratings were obtained from the Medicare.gov [medicare.gov] website.    Concerns / Comments: shireen met with patient & his 2 children at bedside & spouse. sw went over role of care coordiatnon & d/c plans. sw confirmed pcp & pharamcy. [facility]. david has hx of prostate cancer & was receing treatment in past. per son his brother has been staying at patient's home to assist with care fro david. david stays mostly in family room . shireen discussed with wife that care coordination will continue to follow for appropriate aftecare needs. patient is currently on vent  1/10  Shireen confirmed that patient is cleared for d/c to [facility] today. Shireen confirmed ambulnz p/u time. Shireen obtained number for rn to rn report which is [phone] which sw sent to rn. Sw sent updated clinical & also sent AVS as requested. Wife asked for information on hotels in that are & this was provided. Shireen spoke to chon/иван desouza who is also working on hotel list for wife. Sw went over all d/c plans with patient & wife & they are in agreement. D/c order written. rn to call report. D/c plan in place for d/c today to [facility]. Patient is medicare & no auth needed.  Shireen confirmed that no covid test  is needed & [facility] obtained all information that was sent. Wife has sw contact information for futher needs.   Discharge Plan:  Disposition/Destination: [facility] - Other / [facility] Acute [facility] Hospital  Destination - Admitted Since 12/24/2023     Service Provider Selected Services Address Phone Fax Patient Preferred Last Updated    Other - [facility] Care Acute [facility] -- -- -- -- Yue Castro, JASON 1/9/2024 1733       Internal Comment last updated by Yue Castro, JASON 1/9/2024 1733    [facility] [address] [place]  [place], [place],  [address]  [phone]                     Connection to Community  Not applicable    Community Resources:      Discharge Transportation:  Is Out of Hospital DNR needed at Discharge: no  Does patient need discharge transport?

## 2024-01-10 NOTE — PROGRESS NOTES
Critical Care/Pulmonary  Daily Progress Note        Subjective    Interval History:    Awake, alert, following commands  Offers no complaints, denies SOB  Tolerating PSV 12/6    Tmax 101.4  I and O  Last 24 hours: (+) 55 ml  Since admission: (+)  2.6 L  Urine output: 2145cc       Objective       Allergies: Azithromycin, Prednisone, and Lorazepam    CURRENT MEDS  •  acetaminophen, 650 mg, feeding tube, q4h PRN  •  albuterol, 2.5 mg, nebulization, q4h PRN  •  amiodarone, 200 mg, feeding tube, Daily  •  atorvastatin, 10 mg, oral, Nightly  •  atropine, 1 mg, intravenous, Once PRN  •  betamethasone valerate, , Topical, 2x daily PRN  •  calcium gluconate, 1 g, intravenous, q6h PRN  •  cetirizine, 10 mg, feeding tube, Nightly  •  chlorhexidine, 15 mL, Mouth/Throat, 2 times per day  •  cholecalciferol (vitamin D3), 1,000 Units, feeding tube, Daily  •  glucose, 16-32 g of dextrose, oral, PRN **OR** dextrose, 15-30 g of dextrose, oral, PRN **OR** glucagon, 1 mg, intramuscular, PRN **OR** dextrose 50 % in water (D50), 25 mL, intravenous, PRN  •  fentaNYL, 50 mcg, intravenous, q4h PRN  •  furosemide, 60 mg, intravenous, BID (am, 4p)  •  guaiFENesin, 400 mg, oral, q6h PRN  •  hydrALAZINE, 5 mg, intravenous, q6h PRN  •  insulin lispro U-100, 1-10 Units, subcutaneous, q6h GISELLE  •  magnesium oxide, 400 mg, Nasogastric, BID  •  magnesium sulfate, 2 g, intravenous, PRN  •  melatonin, 3 mg, peg tube, Nightly PRN  •  metoclopramide, 10 mg, intravenous, q6h PRN  •  metoprolol tartrate, 25 mg, feeding tube, BID  •  pantoprazole, 40 mg, intravenous, q12h GISELLE  •  potassium chloride, 20 mEq, oral, PRN  •  potassium chloride, 40 mEq, oral, PRN  •  potassium chloride, 20 mEq, intravenous, PRN  •  potassium chloride in water, 20 mEq, intravenous, PRN **AND** potassium chloride in water, 20 mEq, intravenous, PRN  •  rivaroxaban, 20 mg, oral, Daily with dinner  •  silver sulfadiazine, , Topical, BID  •  sodium chloride 0.9 %, 5 mL/hr,  intravenous, PRN  •  sodium chloride, 10 mL, intravenous, q8h INT  •  sodium chloride, 10 mL, intravenous, PRN  •  tamoxifen, 20 mg, feeding tube, Daily    VITAL SIGNS  Vitals:    01/10/24 0931 01/10/24 1000 01/10/24 1100 01/10/24 1126   BP:  (!) 100/55 (!) 122/58    BP Location:       Patient Position:       Pulse: 76 70 80 80   Resp:  18 (!) 31 (!) 26   Temp:    (!) 38.2 °C (100.7 °F)   TempSrc:    Oral   SpO2: 100% 100% 100% 100%   Weight:       Height:           VENTILATOR SETTINGS  Vent Mode: Pressure support  FiO2 (%) (Set):  [40 %] 40 %  S RR:  [18] 18  S VT:  [450 mL] 450 mL  PEEP/CPAP (Set, cmH2O):  [6 cm H20] 6 cm H20  MAP (Observed, cmH2O):  [10-13] 11    Oxygen:  Oxygen Therapy: Supplemental oxygen  O2 Delivery Method: Trach tube  FiO2 (%) (Set): 40 %  O2 Flow Rate (L/min): 6 L/min     INTAKE/OUTPUT    Intake/Output Summary (Last 24 hours) at 1/10/2024 1141  Last data filed at 1/10/2024 1130  Gross per 24 hour   Intake 2619 ml   Output 2270 ml   Net 349 ml       Lines, Drains, Airways, Wounds:  PICC Triple Lumen 12/28/23 Right (Active)   Number of days: 12       Gastrostomy/Enterostomy Gastrostomy 20 Fr LUQ (Active)   Number of days: 5       Urethral Catheter 20 Fr (Active)   Number of days: 12       Surgical Airway Shiley Cuffed 8 (Active)   Number of days: 5       Wound Venous Ulcer Left Pretibial (Active)   Number of days: 16       Wound Perineum (Active)   Number of days: 16       Wound Puncture Anterior;Proximal;Right Groin (Active)   Number of days: 12       Wound Pressure Injury Right Heel (Active)   Number of days: 12       Wound Pressure Injury Left Heel (Active)   Number of days: 9         Physical Exam:  Physical Exam  Vitals and nursing note reviewed.   Constitutional:       General: He is not in acute distress.  HENT:      Head: Normocephalic and atraumatic.      Mouth/Throat:      Mouth: Mucous membranes are moist.      Comments: Trach in place  Eyes:      Pupils: Pupils are equal, round,  and reactive to light.   Cardiovascular:      Rate and Rhythm: Normal rate and regular rhythm.      Pulses: Normal pulses.      Heart sounds: Normal heart sounds.   Pulmonary:      Comments: Coarse breath sounds throughout  Abdominal:      General: Bowel sounds are normal.      Palpations: Abdomen is soft.      Tenderness: There is no abdominal tenderness.   Skin:     General: Skin is warm and dry.   Neurological:      Mental Status: He is alert.      Comments: Awake and following commands          Labs:  See Labs Below:  ABG    CBC  Results from last 7 days   Lab Units 01/10/24  0516 01/09/24  0425 01/08/24  0330   WBC K/uL 9.50 9.49 8.49   RBC M/uL 2.26* 2.28* 2.35*   HEMOGLOBIN g/dL 7.3* 7.4* 7.4*   HEMATOCRIT % 23.2* 23.8* 24.1*   MCV fL 102.7* 104.4* 102.6*   MCH pg 32.3 32.5 31.5   MCHC g/dL 31.5* 31.1* 30.7*   PLATELETS K/uL 185 177 177   RDW % 15.6* 15.9* 16.0*   MPV fL 10.4 10.8 11.1     BMP  Results from last 7 days   Lab Units 01/10/24  0516 01/09/24  0425 01/08/24  0330 01/07/24  0431 01/06/24  0532 01/05/24  0329   SODIUM mEQ/L 138 141 141 140 138 141   POTASSIUM mEQ/L 4.0 3.9 3.4* 3.7 3.4* 3.4*   CHLORIDE mEQ/L 101 101 101 103 103 104   CO2 mEQ/L 29 31 31 25 26 25   BUN mg/dL 36* 33* 30* 29* 29* 29*   CREATININE mg/dL 1.4* 1.4* 1.5* 1.4* 1.4* 1.6*   CALCIUM mg/dL 7.8* 8.1* 8.1* 7.6* 7.5* 7.9*   ALBUMIN g/dL  --   --   --  2.6* 2.5* 2.7*   BILIRUBIN TOTAL mg/dL  --   --   --  0.5 0.5 0.7   ALK PHOS IU/L  --   --   --  74 73 64   ALT IU/L  --   --   --  33 36 47   AST IU/L  --   --   --  29 27 32   GLUCOSE mg/dL 137* 130* 132* 122* 142* 149*     Coag  Results from last 7 days   Lab Units 01/07/24  2109 01/07/24  1210 01/07/24  0431 01/05/24  0329 01/04/24  2030 01/04/24  0510   PROTIME sec 21.1*  --   --   --  15.0* 14.3   INR  1.8  --   --   --  1.2 1.1   PTT sec 46* 95* 64*   < > 49* 33    < > = values in this interval not displayed.       Imaging:   X-RAY CHEST 1 VIEW    Result Date:  1/8/2024  IMPRESSION: Scattered nodular opacities of the right lung.    X-RAY CHEST 1 VIEW    Result Date: 1/5/2024  IMPRESSION: Satisfactory positioning of the recently placed tracheostomy device. Right IJ central line appears stable terminating in the region the cavoatrial junction. Removal of the Dobbhoff tube. Redemonstration of moderate patchy airspace disease in the right mid to upper lung zone without significant change with lesser degrees of left greater than right basilar lung opacities not significant changed. These are nonspecific correlate for multifocal pneumonia versus edema or aspiration. No evidence of a pneumothorax. Small left pleural effusion not significantly changed. No sizable right pleural effusion. Stable cardiomediastinal contours with valve replacement. No upper abdominal or gross bone findings of concern. Right axilla surgical clips redemonstrated. COMMENT: Portable AP view the chest performed and compared with January 2, 2024 study. See impression.    Ultrasound venous leg bilateral    Result Date: 1/4/2024  IMPRESSION: No DVT identified in either lower extremity.     Ultrasound venous arm left    Result Date: 1/3/2024  IMPRESSION: 1.  No evidence for deep vein thrombosis bilaterally. 2.  Superficial thrombus involving the right cephalic vein.    Ultrasound venous arm right    Result Date: 1/3/2024  IMPRESSION: 1.  No evidence for deep vein thrombosis bilaterally. 2.  Superficial thrombus involving the right cephalic vein.    X-RAY ABDOMEN 1 VIEW    Result Date: 1/3/2024  IMPRESSION: Dobbhoff tube enters below left hemidiaphragm with its tip likely in the region of the stomach.     X-RAY CHEST 1 VIEW    Result Date: 1/2/2024  IMPRESSION: Hazy opacity seen at right midlung, new from the prior study, may represent ammonia or aspiration. Endotracheal tube with the tip 9 mm above the leo. Consider repositioning.     MRI BRAIN WITHOUT CONTRAST    Result Date: 1/2/2024  IMPRESSION: No acute  infarct. Mild chronic microvascular ischemia, moderate volume loss.    X-RAY ABDOMEN 1 VIEW    Result Date: 12/30/2023  IMPRESSION: Weighted feeding tube tip at the level of the proximal stomach.    CT HEAD WITHOUT IV CONTRAST    Result Date: 12/30/2023  IMPRESSION: No acute intracranial abnormality.  Chronic ischemic white matter changes are noted.    X-RAY CHEST 1 VIEW    Result Date: 12/30/2023  IMPRESSION: 1. The endotracheal tube is lower in position situated 2 cm above level of the leo.    IR TUNNELED PICC PLACEMENT    Result Date: 12/29/2023  IMPRESSION: Successful placement of 5 Fr triple lumen tunneled PICC.     ULTRASOUND GUIDED VASCULAR ACCESS    Result Date: 12/29/2023  IMPRESSION: Successful placement of 5 Fr triple lumen tunneled PICC.     ULTRASOUND KIDNEYS BLADDER/NO POST VOID    Result Date: 12/28/2023  IMPRESSION: 1. No sonographic abnormality in the kidneys. 2. Nearly completely collapsed bladder as discussed below. No large intraluminal bladder thrombus/clot as clinically questioned (given the limitations of near complete bladder collapse). COMMENT: Real-time sonographic evaluation of the kidneys and bladder was performed. There is normal cortical echogenicity and corticomedullary differentiation. The right kidney measures 10.8 x 5.2 x 4.6 cm. The left kidney measures 10.8 x 5.8 x 4.3 cm. There is no hydronephrosis, calculi, masses or perinephric collections. Bladder is nearly completely collapsed, measuring approximately 2.5 x 1.3 x 2.5 cm, for a calculated volume of approximately 5.5 cc, precluding adequate evaluation for calculi or mass. No ureteral jets identified on color Doppler, perhaps due to renal dysfunction and/or hydration status.    X-RAY CHEST 1 VIEW    Result Date: 12/27/2023  IMPRESSION:  Reduced lung volumes. No acute cardiopulmonary process. Stable cardiac enlargement. COMPARISON: Portable chest studies 12/25 and 12/26/2023. COMMENT:  Upright portable imaging completed 0548  hours. Endotracheal tube 4.7 cm above. Enteric tube follows a normal course into left upper quadrant, directed to the left. Mild stable cardiac enlargement. Mitral annular prosthesis again noted. Stable hilar and mediastinal contours. Reduced lung volumes. No definite consolidation or pleural fluid. Unremarkable upper abdomen.    X-RAY CHEST 1 VIEW    Result Date: 12/26/2023  IMPRESSION: Endotracheal tube has been repositioned; the tip is now approximately 3.3 cm above the leo. COMMENT: A semierect AP portable view the chest was performed at 1615 and is compared to a prior study from 1503. Since the prior study, the endotracheal tube has been repositioned and the tip is now approximately 3.3 cm above the leo. There has been no other significant interval change.    X-RAY CHEST 1 VIEW    Result Date: 12/26/2023  IMPRESSION: ET tube 2 cm above leo. NG tube tip in proximal stomach. Probable right lower lobe atelectasis; attention on follow-up.    X-RAY CHEST 1 VIEW    Result Date: 12/26/2023  IMPRESSION: Status post extubation. Slightly improved vascular congestion since earlier in the day. Suspect developing atelectasis or airspace disease in the right midlung zone. COMMENT: Semierect AP portable view of the chest was performed at 1428 and is compared to a prior study from 5:27 AM. In the interval, the endotracheal tube has been removed. An enteric tube remains in place with the tip in the stomach. There is a vascular catheter/line with the tip projecting near the junction of the IVC and right atrium; it is difficult to determine whether this was present previously but differences in technique. Mild cardiomegaly is again noted. Cardiac valvular prosthesis is again seen. The pulmonary vasculature and interstitial markings have improved slightly since earlier in the day. There is questionable developing airspace disease or atelectasis in the right midlung zone. Trace bilateral pleural effusions are suspected.  The hilar and mediastinal structures appear stable. Surgical clips are again seen in the right axilla.    X-RAY CHEST 1 VIEW    Result Date: 12/26/2023  IMPRESSION: ET tube 4 cm above leo, NG tube tip not well seen, likely in proximal stomach. Stable cardiomegaly, mitral valve replacement. Clear lungs, with interstitial crowding secondary to low lung volumes.    X-RAY CHEST 1 VIEW    Result Date: 12/26/2023  IMPRESSION: Improved pulmonary vascular congestion. ET tube 3 cm above leo, NG tube tip below inferior edge of film.    X-RAY CHEST 1 VIEW    Result Date: 12/25/2023  IMPRESSION: Interval retraction of the endotracheal tube now in satisfactory position, as below. Stable satisfactory positioning of the enteric tube. Progressive pulmonary interstitial edema with stable enlargement of the cardiac silhouette. No pneumothorax. COMMENT: Comparison: Chest x-ray 12/24/2023. Technique: A single frontal AP portable upright projection of the chest was obtained. FINDINGS: There has been interval retraction of the endotracheal tube now terminating 2.7 cm above the leo. An enteric tube courses to the stomach with the distal tip collimated from the field-of-view beneath the left hemidiaphragm in satisfactory position. There are defibrillator pad projecting over the left hemithorax. There are progressively increased interstitial opacities seen projecting over both lungs. There is no pleural effusion or pneumothorax. The cardiac silhouette is stably enlarged. The mediastinal contours are unchanged. Again noted is a prosthetic mitral valve. The upper abdomen is unremarkable. There is no acute osseous abnormality. Surgical clips overlie the right axillary region.     X-RAY CHEST 1 VIEW    Result Date: 12/25/2023  IMPRESSION: Satisfactory positioning of the endotracheal and enteric tubes. No pneumothorax. Stable pulmonary edema and enlargement of the cardiac silhouette. COMMENT: Comparison: Chest x-ray 12/25/2023.  Technique: A single frontal AP portable upright projection of the chest was obtained. FINDINGS: The tip of an endotracheal tube terminates 4.0 cm above the leo. An enteric tube courses to the stomach with the distal tip collimated from the field-of-view beneath the left hemidiaphragm in satisfactory position. There are defibrillator pad projecting over the left hemithorax. There are mildly increased interstitial opacities again seen projecting over both lungs. No pleural effusion or pneumothorax is seen. There is stable enlargement of the cardiac silhouette. Again noted is a prosthetic mitral valve. The mediastinal contours are unchanged. The upper abdomen is unremarkable. There is no acute osseous abnormality. There are surgical clips overlying the right axillary region.     X-RAY CHEST 1 VIEW    Result Date: 12/25/2023  IMPRESSION: Low-lying endotracheal tube terminating over the proximal right mainstem bronchus. Recommend retraction. Satisfactory positioning of the enteric tube. This finding and recommendation was discussed with nurse Shoemaker at 11:27 AM on 12/25/2023 by Dr. Martinez by telephone. Mild pulmonary interstitial edema and stable enlargement of the cardiac silhouette. No pneumothorax. COMMENT: Comparison: Chest x-ray 12/24/2023. Technique: A single frontal AP portable upright projection of the chest was obtained. FINDINGS: The tip of the intervally placed endotracheal tube terminates over the proximal right mainstem bronchus. The tip of the enteric tube terminates over the left upper quadrant of the abdomen. There are mildly increased interstitial opacities noted within both lungs. There is no pleural effusion or pneumothorax. There is stable enlargement of the cardiac silhouette. Again noted is a mitral valve prosthesis. Surgical clips overlie the right axilla. The upper abdomen is unremarkable. There is no acute osseous abnormality.    X-RAY CHEST 1 VIEW    Result Date: 12/25/2023  IMPRESSION:  Vascular congestion.  Left basal atelectasis. COMMENT: AP radiograph the chest is compared to previous study dated 8/17/2023. There is vascular congestion and small effusions.  Findings may represent mild congestive heart failure.  Cardiac silhouette is unchanged.  Prosthetic valve ring seen.  Surgical staples seen in the right axilla.  There is minimal left basal atelectasis.    CT ANGIOGRAPHY CHEST PULMONARY EMBOLISM WITH IV CONTRAST    Result Date: 12/24/2023  IMPRESSION: 1. No pulmonary embolism in the main or proximal segmental pulmonary arteries. 2. Right upper lobe and left lower lobe infiltrate with patchy airspace opacities in the right middle lobe.      Micro:   Microbiology Results     Procedure Component Value Units Date/Time    Sputum Gram Stain (Lab Only) Sputum [[id number]] Collected: 01/08/24 1527    Specimen: Sputum Updated: 01/09/24 0621     Gram Stain Result 4+ WBC      3+ Epithelial cells      1+ Gram positive cocci in pairs and clusters      1+ Yeast    Blood Culture Blood, Venous [[id number]]  (Normal) Collected: 01/01/24 1201    Specimen: Blood, Venous Updated: 01/05/24 1701     Culture No growth at 96 hours    Blood Culture Blood, Venous [[id number]]  (Normal) Collected: 01/01/24 1201    Specimen: Blood, Venous Updated: 01/05/24 1701     Culture No growth at 96 hours    Sputum culture / smear Tracheal Aspirate [[id number]]  (Abnormal) Collected: 01/01/24 0937    Specimen: Tracheal Aspirate Updated: 01/03/24 0744    Narrative:      The following orders were created for panel order Sputum culture / smear Tracheal Aspirate.  Procedure                               Abnormality         Status                     ---------                               -----------         ------                     Sputum Gram Stain (Lab O...[[id number]]                      Final result               Sputum Culture (Lab Only...[[id number]]  Abnormal            Final result                 Please view results for these  tests on the individual orders.    Sputum Gram Stain (Lab Only) Tracheal Aspirate [[id number]] Collected: 01/01/24 0937    Specimen: Tracheal Aspirate Updated: 01/01/24 1817     Gram Stain Result 4+ WBC      1+ Epithelial cells      No organisms seen    Sputum Culture (Lab Only) Tracheal Aspirate [[id number]]  (Abnormal) Collected: 01/01/24 0937    Specimen: Tracheal Aspirate Updated: 01/03/24 0744     Culture **Positive Culture**      1+ Yeast, No Further Identification      No Normal Park    Sputum culture / smear Expectorated Sputum [[id number]]  (Abnormal) Collected: 12/27/23 1417    Specimen: Bronch Lavage from Expectorated Sputum Updated: 12/29/23 0723    Narrative:      The following orders were created for panel order Sputum culture / smear Expectorated Sputum.  Procedure                               Abnormality         Status                     ---------                               -----------         ------                     Sputum Gram Stain (Lab O...[[id number]]                      Final result               Sputum Culture (Lab Only...[[id number]]  Abnormal            Final result                 Please view results for these tests on the individual orders.    Sputum Gram Stain (Lab Only) Expectorated Sputum [[id number]] Collected: 12/27/23 1417    Specimen: Bronch Lavage from Expectorated Sputum Updated: 12/27/23 2032     Gram Stain Result 2+ WBC      No Epithelial Cells Seen      No organisms seen    Sputum Culture (Lab Only) Expectorated Sputum [[id number]]  (Abnormal) Collected: 12/27/23 1417    Specimen: Bronch Lavage from Expectorated Sputum Updated: 12/29/23 0723     Culture **Positive Culture**      1+ Yeast, No Further Identification    Sputum culture / smear Expectorated Sputum [[id number]] Collected: 12/25/23 0418    Specimen: Aspirate from Expectorated Sputum Updated: 12/27/23 0845    Narrative:      The following orders were created for panel order Sputum culture / smear Expectorated  Sputum.  Procedure                               Abnormality         Status                     ---------                               -----------         ------                     Sputum Gram Stain (Lab O...[[id number]]                      Final result               Sputum Culture (Lab Only...[[id number]]  Normal              Final result                 Please view results for these tests on the individual orders.    Sputum Gram Stain (Lab Only) Expectorated Sputum [[id number]] Collected: 12/25/23 0418    Specimen: Aspirate from Expectorated Sputum Updated: 12/25/23 0956     Gram Stain Result 3+ WBC      No Epithelial Cells Seen      3+ Gram positive bacilli      2+ Gram positive cocci in pairs, chains and clusters    Sputum Culture (Lab Only) Expectorated Sputum [[id number]]  (Normal) Collected: 12/25/23 0418    Specimen: Aspirate from Expectorated Sputum Updated: 12/27/23 0845     Culture Normal Park    MRSA Screen, Nares Only Nose [[id number]]  (Normal) Collected: 12/25/23 0352    Specimen: Nasal Swab from Nose Updated: 12/25/23 0906     MRSA DNA, Nares Negative    Blood Culture Blood, Venous [[id number]]  (Normal) Collected: 12/24/23 1652    Specimen: Blood, Venous Updated: 12/29/23 0000     Culture No growth at 96 hours    SARS-CoV-2 (COVID-19) Nasopharynx [[id number]]  (Abnormal) Collected: 12/24/23 1649    Specimen: Nasopharyngeal Swab from Nasopharynx Updated: 12/24/23 1736    Narrative:      The following orders were created for panel order SARS-CoV-2 (COVID-19) Nasopharynx.  Procedure                               Abnormality         Status                     ---------                               -----------         ------                     SARS-COV-2 (COVID-19)/ F...[[id number]]  Abnormal            Final result                 Please view results for these tests on the individual orders.    SARS-COV-2 (COVID-19)/ FLU A/B, AND RSV, PCR Nasopharynx [[id number]]  (Abnormal) Collected: 12/24/23 5876     Specimen: Nasopharyngeal Swab from Nasopharynx Updated: 12/24/23 3340     SARS-CoV-2 (COVID-19) Negative     Influenza A Positive     Influenza B Negative     Respiratory Syncytial Virus Negative    Narrative:      Testing performed using real-time PCR for detection of COVID-19. EUA approved validation studies performed on site.     Blood Culture Blood, Venous [[id number]]  (Normal) Collected: 12/24/23 1647    Specimen: Blood, Venous Updated: 12/29/23 0100     Culture No growth at 96 hours          ECG/Telemetry  I have independently reviewed the telemetry. No events for the last 24 hours.         ASSESSMENT    73 y.o. male with history atrial fibrillation, CHF, prostate and breast CA, history of GI bleed who presented to the emergency room with shortness of breath and found to be influenza A positive. During day 2 of his hospital stay he had a v fib arrest with ROSC. He was transferred to the ICU for further monitoring     PLAN   # Acute Hypoxic respiratory failure in setting of Influenza A  - Failed Bipap on admission, became bradycardic and was intubated 12/25  - Extubated 12/26, however became hypoxic, ?Secondary to upper airway edema vs acute bronchospasm vs flash pulmonary edema. Ultimately required re-intubation 12/26.   - On 12/30, was extubated to nasal cannula, failed HF and BIPAP and ultimately required re-intubation in the setting of hypoxia and poor mental status  - s/p trach/PEG on 1/4   - He is volume overloaded, (overall 2.6 liters positive)  - Tolerating PSV 12/6 today  - Continue Furosemide BID   - Stable for transfer to LTAC today for further vent weaning/rehab     # S/p V fib cardiac arrest  - In setting of Takosubo's cardiomyopathy secondary to Influenza A  - ROSC achieved after receiving 3 epi, 3 shocks, 2 bicarb, and magnesium replacement  - Following commands post-arrest. No TTM initiated  - Taken by interventional cardiology for the placement of a temporary pacemaker  - PPM removed secondary  to dyssynchrony and misplacement. No further marcellus arrythmias, removed 12/27  - Continue amiodarone      # Takotsubo Cardiomyopathy  - TTE 12/26 suggestive of Takotsubos with EF 40-45%, confirmed by LV gram,improved to 65%  - Cardiology following  - Furosemide BID   - Continue metoprolol      # Acute kidney injury  - Renal function slowly improving. Initial creatinine 1.2, peaked 2.3, today at 1.4  - Baseline creatinine appears to be 1.2-1.4  - ?Secondary to IV dye load s/p cardiac cath vs over-diuresis   - Monitor BMP, I and O  - Cr trending down, continue lasix 60 mg BID     # Elevated liver enzymes  - Likely in setting of hypotension, shock liver  - Serial labs, resolved     # Septic Shock  - Resolved, off pressors  -  maintain MAP > 65     # Pneumonia  - CT Chest 12/24 with left lower lobe infiltrate and right upper lobe infiltrate  - Blood/ sputum cultures negative   - Completed course of zosyn 12/31.   - New fever noted with Cxr on 1/2 with new hazy opacity seen in the right lung  -Started on Vanco/ceftaz on 1/3 with concern for new infiltrate.  Narrowed to ceftriaxone 1/5, with sputum culture growing normal nixon. Last day antibiotics 1/8  - repeat CXR today Improved but still with RUL infiltrates.      # Afib  - EP following  - Continue zarelto  - Continue amiodarone and metoprolol      # Influenza A  - Completed 5 day course Oseltamivir      # Prostate/breast cancer  - S/p radiation therapy for prostate cancer spring 2023  - S/p R mastectomy 8/2023 and radiation therapy 9/2023  - Continue tamoxifen     #Generalized Edema  -1/2 with UE edema, Right hand swollen and cool on exam, strong radial pulse, cap refill 2-3 seconds  -Bilateral UE ultrasounds 1/3  with R superficial thrombus involving right cephalic vein  -Bilateral LE ultrasound done 1/4 with no evidence of DVT     #Anemia  -Hgb 6.7 on 1/4   -Given 1 unit PRBC    -Trend daily CBC  -Transfuse for Hgb less than 7.0 or less than 8.0 with active  bleeding     VTE Prophylaxis Plan: SCDs, Zarelto  GI Prophylaxis Plan: Protonix  Lines/Drains/Airway: R PICC (12/28), Trach/PEG (1/4), jones, FMS  Fluids/Electrolytes/Nutrition: TF     Disposition Planning: Remain in the ICU    CODE STATUS  Full Code       The case was reviewed this morning at the patient's beside multidisciplinary rounds with the patient's nurse, dietician, pharmacist, respiratory therapist, physical therapist and critical care nurse coordinator. The patient's clinical status with regards to diagnosis, treatment plans including disposition of any IV, arterial lines, Jones and tubes were discussed, as well as dietary, respiratory therapy and mobilization needs.     This case was discussed with consultants.    Total critical time spent managing VDRF minutes.    This note was scribed by Ishaan Juares PA-C in my presence on my behalf.       JONNY Pedro  1/10/2024  11:41 AM

## 2024-01-10 NOTE — PROGRESS NOTES
Critical Care/Pulmonary  Daily Progress Note        Subjective    Interval History:    Tolerating PSV 12  Awake alert, following commands   Tmax 101.6    I and O  Last 24 hours: (+) 55 ml  Since admission: (+) 2.6 liters  Urine output 2145cc     Objective       Allergies: Azithromycin, Prednisone, and Lorazepam    CURRENT MEDS  •  acetaminophen, 650 mg, feeding tube, q4h PRN  •  albuterol, 2.5 mg, nebulization, q4h PRN  •  amiodarone, 200 mg, feeding tube, Daily  •  atorvastatin, 10 mg, oral, Nightly  •  atropine, 1 mg, intravenous, Once PRN  •  betamethasone valerate, , Topical, 2x daily PRN  •  calcium gluconate, 1 g, intravenous, q6h PRN  •  cetirizine, 10 mg, feeding tube, Nightly  •  chlorhexidine, 15 mL, Mouth/Throat, 2 times per day  •  cholecalciferol (vitamin D3), 1,000 Units, feeding tube, Daily  •  glucose, 16-32 g of dextrose, oral, PRN **OR** dextrose, 15-30 g of dextrose, oral, PRN **OR** glucagon, 1 mg, intramuscular, PRN **OR** dextrose 50 % in water (D50), 25 mL, intravenous, PRN  •  fentaNYL, 50 mcg, intravenous, q4h PRN  •  furosemide, 60 mg, intravenous, BID (am, 4p)  •  guaiFENesin, 400 mg, oral, q6h PRN  •  hydrALAZINE, 5 mg, intravenous, q6h PRN  •  insulin lispro U-100, 1-10 Units, subcutaneous, q6h GISELLE  •  magnesium oxide, 400 mg, Nasogastric, BID  •  magnesium sulfate, 2 g, intravenous, PRN  •  melatonin, 3 mg, peg tube, Nightly PRN  •  metoclopramide, 10 mg, intravenous, q6h PRN  •  metoprolol tartrate, 25 mg, feeding tube, BID  •  pantoprazole, 40 mg, intravenous, q12h GISELLE  •  potassium chloride, 20 mEq, oral, PRN  •  potassium chloride, 40 mEq, oral, PRN  •  potassium chloride, 20 mEq, intravenous, PRN  •  potassium chloride in water, 20 mEq, intravenous, PRN **AND** potassium chloride in water, 20 mEq, intravenous, PRN  •  rivaroxaban, 20 mg, oral, Daily with dinner  •  silver sulfadiazine, , Topical, BID  •  sodium chloride 0.9 %, 5 mL/hr, intravenous, PRN  •  sodium chloride, 10  mL, intravenous, q8h INT  •  sodium chloride, 10 mL, intravenous, PRN  •  tamoxifen, 20 mg, feeding tube, Daily    VITAL SIGNS  Vitals:    01/10/24 0800 01/10/24 0827 01/10/24 0900 01/10/24 0931   BP: 137/63  (!) 165/68    BP Location:       Patient Position:       Pulse: 84 71 81 76   Resp: (!) 29 (!) 21 (!) 38    Temp: 37 °C (98.6 °F)      TempSrc: Oral      SpO2: 100% 100% 100% 100%   Weight:       Height:           VENTILATOR SETTINGS  Vent Mode: Pressure support  FiO2 (%) (Set):  [40 %] 40 %  S RR:  [18] 18  S VT:  [450 mL] 450 mL  PEEP/CPAP (Set, cmH2O):  [6 cm H20] 6 cm H20  MAP (Observed, cmH2O):  [10-13] 10    Oxygen:  Oxygen Therapy: Supplemental oxygen  O2 Delivery Method: Trach tube  FiO2 (%) (Set): 40 %  O2 Flow Rate (L/min): 6 L/min     INTAKE/OUTPUT    Intake/Output Summary (Last 24 hours) at 1/10/2024 0950  Last data filed at 1/10/2024 0931  Gross per 24 hour   Intake 2524 ml   Output 2645 ml   Net -121 ml       Lines, Drains, Airways, Wounds:  PICC Triple Lumen 12/28/23 Right (Active)   Number of days: 12       Gastrostomy/Enterostomy Gastrostomy 20 Fr LUQ (Active)   Number of days: 5       Urethral Catheter 20 Fr (Active)   Number of days: 12       Surgical Airway Shiley Cuffed 8 (Active)   Number of days: 5       Wound Venous Ulcer Left Pretibial (Active)   Number of days: 16       Wound Perineum (Active)   Number of days: 16       Wound Puncture Anterior;Proximal;Right Groin (Active)   Number of days: 12       Wound Pressure Injury Right Heel (Active)   Number of days: 12       Wound Pressure Injury Left Heel (Active)   Number of days: 9         Physical Exam:  Physical Exam  Vitals and nursing note reviewed.   Constitutional:       General: He is not in acute distress.     Appearance: Normal appearance.   HENT:      Head: Normocephalic and atraumatic.      Mouth/Throat:      Mouth: Mucous membranes are moist.      Comments: Trach in place  Eyes:      General: No scleral icterus.     Pupils:  Pupils are equal, round, and reactive to light.   Cardiovascular:      Rate and Rhythm: Normal rate and regular rhythm.      Pulses: Normal pulses.      Heart sounds: Normal heart sounds.   Pulmonary:      Breath sounds: No wheezing or rales.      Comments: Coarse breath sounds throughout  Abdominal:      General: Bowel sounds are normal.      Palpations: Abdomen is soft.      Tenderness: There is no abdominal tenderness.   Musculoskeletal:      Right lower leg: Edema present.      Left lower leg: Edema present.   Skin:     General: Skin is warm and dry.   Neurological:      Mental Status: He is alert.      Comments: Awake and following commands          Labs:  See Labs Below:  ABG    CBC  Results from last 7 days   Lab Units 01/10/24  0516 01/09/24  0425 01/08/24  0330   WBC K/uL 9.50 9.49 8.49   RBC M/uL 2.26* 2.28* 2.35*   HEMOGLOBIN g/dL 7.3* 7.4* 7.4*   HEMATOCRIT % 23.2* 23.8* 24.1*   MCV fL 102.7* 104.4* 102.6*   MCH pg 32.3 32.5 31.5   MCHC g/dL 31.5* 31.1* 30.7*   PLATELETS K/uL 185 177 177   RDW % 15.6* 15.9* 16.0*   MPV fL 10.4 10.8 11.1     BMP  Results from last 7 days   Lab Units 01/10/24  0516 01/09/24  0425 01/08/24  0330 01/07/24  0431 01/06/24  0532 01/05/24  0329   SODIUM mEQ/L 138 141 141 140 138 141   POTASSIUM mEQ/L 4.0 3.9 3.4* 3.7 3.4* 3.4*   CHLORIDE mEQ/L 101 101 101 103 103 104   CO2 mEQ/L 29 31 31 25 26 25   BUN mg/dL 36* 33* 30* 29* 29* 29*   CREATININE mg/dL 1.4* 1.4* 1.5* 1.4* 1.4* 1.6*   CALCIUM mg/dL 7.8* 8.1* 8.1* 7.6* 7.5* 7.9*   ALBUMIN g/dL  --   --   --  2.6* 2.5* 2.7*   BILIRUBIN TOTAL mg/dL  --   --   --  0.5 0.5 0.7   ALK PHOS IU/L  --   --   --  74 73 64   ALT IU/L  --   --   --  33 36 47   AST IU/L  --   --   --  29 27 32   GLUCOSE mg/dL 137* 130* 132* 122* 142* 149*     Coag  Results from last 7 days   Lab Units 01/07/24  2109 01/07/24  1210 01/07/24  0431 01/05/24  0329 01/04/24  2030 01/04/24  0510   PROTIME sec 21.1*  --   --   --  15.0* 14.3   INR  1.8  --   --   --   1.2 1.1   PTT sec 46* 95* 64*   < > 49* 33    < > = values in this interval not displayed.       Imaging:   X-RAY CHEST 1 VIEW    Result Date: 1/8/2024  IMPRESSION: Scattered nodular opacities of the right lung.    X-RAY CHEST 1 VIEW    Result Date: 1/5/2024  IMPRESSION: Satisfactory positioning of the recently placed tracheostomy device. Right IJ central line appears stable terminating in the region the cavoatrial junction. Removal of the Dobbhoff tube. Redemonstration of moderate patchy airspace disease in the right mid to upper lung zone without significant change with lesser degrees of left greater than right basilar lung opacities not significant changed. These are nonspecific correlate for multifocal pneumonia versus edema or aspiration. No evidence of a pneumothorax. Small left pleural effusion not significantly changed. No sizable right pleural effusion. Stable cardiomediastinal contours with valve replacement. No upper abdominal or gross bone findings of concern. Right axilla surgical clips redemonstrated. COMMENT: Portable AP view the chest performed and compared with January 2, 2024 study. See impression.    Ultrasound venous leg bilateral    Result Date: 1/4/2024  IMPRESSION: No DVT identified in either lower extremity.     Ultrasound venous arm left    Result Date: 1/3/2024  IMPRESSION: 1.  No evidence for deep vein thrombosis bilaterally. 2.  Superficial thrombus involving the right cephalic vein.    Ultrasound venous arm right    Result Date: 1/3/2024  IMPRESSION: 1.  No evidence for deep vein thrombosis bilaterally. 2.  Superficial thrombus involving the right cephalic vein.    X-RAY ABDOMEN 1 VIEW    Result Date: 1/3/2024  IMPRESSION: Dobbhoff tube enters below left hemidiaphragm with its tip likely in the region of the stomach.     X-RAY CHEST 1 VIEW    Result Date: 1/2/2024  IMPRESSION: Hazy opacity seen at right midlung, new from the prior study, may represent ammonia or aspiration. Endotracheal  tube with the tip 9 mm above the leo. Consider repositioning.     MRI BRAIN WITHOUT CONTRAST    Result Date: 1/2/2024  IMPRESSION: No acute infarct. Mild chronic microvascular ischemia, moderate volume loss.      Micro:   Microbiology Results     Procedure Component Value Units Date/Time    Sputum Gram Stain (Lab Only) Sputum [[id number]] Collected: 01/08/24 1527    Specimen: Sputum Updated: 01/09/24 0621     Gram Stain Result 4+ WBC      3+ Epithelial cells      1+ Gram positive cocci in pairs and clusters      1+ Yeast    Blood Culture Blood, Venous [[id number]]  (Normal) Collected: 01/01/24 1201    Specimen: Blood, Venous Updated: 01/05/24 1701     Culture No growth at 96 hours    Blood Culture Blood, Venous [[id number]]  (Normal) Collected: 01/01/24 1201    Specimen: Blood, Venous Updated: 01/05/24 1701     Culture No growth at 96 hours    Sputum culture / smear Tracheal Aspirate [[id number]]  (Abnormal) Collected: 01/01/24 0937    Specimen: Tracheal Aspirate Updated: 01/03/24 0744    Narrative:      The following orders were created for panel order Sputum culture / smear Tracheal Aspirate.  Procedure                               Abnormality         Status                     ---------                               -----------         ------                     Sputum Gram Stain (Lab O...[[id number]]                      Final result               Sputum Culture (Lab Only...[[id number]]  Abnormal            Final result                 Please view results for these tests on the individual orders.    Sputum Gram Stain (Lab Only) Tracheal Aspirate [[id number]] Collected: 01/01/24 0937    Specimen: Tracheal Aspirate Updated: 01/01/24 1817     Gram Stain Result 4+ WBC      1+ Epithelial cells      No organisms seen    Sputum Culture (Lab Only) Tracheal Aspirate [[id number]]  (Abnormal) Collected: 01/01/24 0937    Specimen: Tracheal Aspirate Updated: 01/03/24 0744     Culture **Positive Culture**      1+ Yeast, No  Further Identification      No Normal Park    Sputum culture / smear Expectorated Sputum [[id number]]  (Abnormal) Collected: 12/27/23 1417    Specimen: Bronch Lavage from Expectorated Sputum Updated: 12/29/23 0723    Narrative:      The following orders were created for panel order Sputum culture / smear Expectorated Sputum.  Procedure                               Abnormality         Status                     ---------                               -----------         ------                     Sputum Gram Stain (Lab O...[[id number]]                      Final result               Sputum Culture (Lab Only...[[id number]]  Abnormal            Final result                 Please view results for these tests on the individual orders.    Sputum Gram Stain (Lab Only) Expectorated Sputum [[id number]] Collected: 12/27/23 1417    Specimen: Bronch Lavage from Expectorated Sputum Updated: 12/27/23 2032     Gram Stain Result 2+ WBC      No Epithelial Cells Seen      No organisms seen    Sputum Culture (Lab Only) Expectorated Sputum [[id number]]  (Abnormal) Collected: 12/27/23 1417    Specimen: Bronch Lavage from Expectorated Sputum Updated: 12/29/23 0723     Culture **Positive Culture**      1+ Yeast, No Further Identification    Sputum culture / smear Expectorated Sputum [[id number]] Collected: 12/25/23 0418    Specimen: Aspirate from Expectorated Sputum Updated: 12/27/23 0845    Narrative:      The following orders were created for panel order Sputum culture / smear Expectorated Sputum.  Procedure                               Abnormality         Status                     ---------                               -----------         ------                     Sputum Gram Stain (Lab O...[[id number]]                      Final result               Sputum Culture (Lab Only...[[id number]]  Normal              Final result                 Please view results for these tests on the individual orders.    Sputum Gram Stain (Lab Only)  Expectorated Sputum [[id number]] Collected: 12/25/23 0418    Specimen: Aspirate from Expectorated Sputum Updated: 12/25/23 0956     Gram Stain Result 3+ WBC      No Epithelial Cells Seen      3+ Gram positive bacilli      2+ Gram positive cocci in pairs, chains and clusters    Sputum Culture (Lab Only) Expectorated Sputum [[id number]]  (Normal) Collected: 12/25/23 0418    Specimen: Aspirate from Expectorated Sputum Updated: 12/27/23 0845     Culture Normal Park    MRSA Screen, Nares Only Nose [[id number]]  (Normal) Collected: 12/25/23 0352    Specimen: Nasal Swab from Nose Updated: 12/25/23 0906     MRSA DNA, Nares Negative    Blood Culture Blood, Venous [[id number]]  (Normal) Collected: 12/24/23 1652    Specimen: Blood, Venous Updated: 12/29/23 0000     Culture No growth at 96 hours    SARS-CoV-2 (COVID-19) Nasopharynx [[id number]]  (Abnormal) Collected: 12/24/23 1649    Specimen: Nasopharyngeal Swab from Nasopharynx Updated: 12/24/23 1736    Narrative:      The following orders were created for panel order SARS-CoV-2 (COVID-19) Nasopharynx.  Procedure                               Abnormality         Status                     ---------                               -----------         ------                     SARS-COV-2 (COVID-19)/ F...[[id number]]  Abnormal            Final result                 Please view results for these tests on the individual orders.    SARS-COV-2 (COVID-19)/ FLU A/B, AND RSV, PCR Nasopharynx [[id number]]  (Abnormal) Collected: 12/24/23 1649    Specimen: Nasopharyngeal Swab from Nasopharynx Updated: 12/24/23 1736     SARS-CoV-2 (COVID-19) Negative     Influenza A Positive     Influenza B Negative     Respiratory Syncytial Virus Negative    Narrative:      Testing performed using real-time PCR for detection of COVID-19. EUA approved validation studies performed on site.     Blood Culture Blood, Venous [[id number]]  (Normal) Collected: 12/24/23 1647    Specimen: Blood, Venous Updated: 12/29/23  0100     Culture No growth at 96 hours          ECG/Telemetry  I have independently reviewed the telemetry. No events for the last 24 hours.         ASSESSMENT    73 y.o. male with history atrial fibrillation, CHF, prostate and breast CA, history of GI bleed who presented to the emergency room with shortness of breath and found to be influenza A positive. During day 2 of his hospital stay he had a v fib arrest with ROSC. He was transferred to the ICU for further monitoring     PLAN   # Acute Hypoxic respiratory failure in the setting of Influenza A  - Failed Bipap on admission, became bradycardic and was intubated  - Extubated 12/26, however became hypoxic, ?Secondary to upper airway edema vs acute bronchospasm vs flash pulmonary edema. Ultimately required re-intubation 12/26.   -On 12/30, was re-extubated to nasal cannula, however failed high manda and BIPAP and ultimately required re-intubation in the setting of hypoxia and poor mental status  - s/p trach/PEG on 1/4   - He is volume overloaded, (overall 2.6 liters)  - tolerating PSV 12 today  - Continue Furosemide BID   - accepted in transfer to LTAC, plan at 1pm today    # Fever  - Fever yesterday and overnight. UA with hold suggestive of infection. Start ceftazidime and follow-up culture data.         # S/p V fib cardiac arrest (12/25)  - In setting of Takosubo's cardiomyopathy and influenza A  - ROSC achieved after receiving 3 epi, 3 shocks, 2 bicarb, and magnesium replacement  - Following commands post-arrest. No TTM initiated  - Taken by interventional cardiology for the placement of a temporary pacemaker secondary to bradycardia  - PPM removed secondary to dyssynchrony and misplacement. No further marcellus arrythmias, removed 12/27  - Continue amiodarone      # Takotsubo Cardiomyopathy  - TTE 12/26 suggestive of Takotsubos with EF 40-45%, confirmed by LV gram,improved to 65%  - Cardiology following  - Furosemide BID   - Continue metoprolol      # Acute kidney  injury  - Renal function slowly improving. Initial creatinine 1.2, peaked 2.3, today at 1.4  - Baseline creatinine appears to be 1.2-1.4  - ?Secondary to IV dye load s/p cardiac cath vs over-diuresis   - Monitor BMP, I and O  - Cr trending down, continue lasix 60 mg BID     # Elevated liver enzymes  - Likely in setting of hypotension, shock liver  - Serial labs, resolved     # Septic Shock  - Resolved, off pressors  -  maintain MAP > 65     # Pneumonia  - CT Chest 12/24 with left lower lobe infiltrate and right upper lobe infiltrate  - Blood/ sputum cultures negative   - Completed course of zosyn 12/31.   - New fever noted with Cxr on 1/2 with new hazy opacity seen in the right lung  -Started on Vanco/ceftaz on 1/3 with concern for new infiltrate.  Narrowed to ceftriaxone 1/5, with sputum culture growing normal nixon. Last day antibiotics 1/8  - repeat CXR today Improved but still with RUL infiltrates.   - fever overnight 101.6, will send UA to eval potential source fever.  Sputum cx 1/8 : 1+ yeast.     # Afib  - EP following  - Continue xarelto  - Continue amiodarone and metoprolol      # Influenza A  - Influenza A  (12/25)  - Completed 5 day course Oseltamivir      # Prostate/breast cancer  - S/p radiation therapy for prostate cancer spring 2023  - S/p R mastectomy 8/2023 and radiation therapy 9/2023  - Continue tamoxifen     #Generalized Edema  -1/2 with UE edema, Right hand swollen and cool on exam, strong radial pulse, cap refill 2-3 seconds  -Bilateral UE ultrasounds 1/3  with R superficial thrombus involving right cephalic vein  -Bilateral LE ultrasound done 1/4 with no evidence of DVT     #Anemia  -Hgb 6.7 on 1/4   -Given 1 unit PRBC    -Trend daily CBC, last 7.3  -Transfuse for Hgb less than 7.0 or less than 8.0 with active bleeding     VTE Prophylaxis Plan: SCDs, Xarelto  GI Prophylaxis Plan: Protonix  Lines/Drains/Airway: R PICC (12/28), Trach/PEG (1/4), jones,  FMS  Fluids/Electrolytes/Nutrition: TF     Disposition Planning: transfer [facility]    CODE STATUS  Full Code       The case was reviewed this morning at the patient's beside multidisciplinary rounds with the patient's nurse, dietician, pharmacist, respiratory therapist, physical therapist and critical care nurse coordinator. The patient's clinical status with regards to diagnosis, treatment plans including disposition of any IV, arterial lines, Lloyd and tubes were discussed, as well as dietary, respiratory therapy and mobilization needs.     This case was discussed with consultants.    Total critical time spent managing VDRF minutes.    This note was scribed by JONNY Obregon in my presence on my behalf.       Filipe Casper DO  1/10/2024  9:50 AM

## 2024-01-10 NOTE — DISCHARGE SUMMARY
Critical Care Discharge Summary    BRIEF OVERVIEW  Admitting Provider:  H&P Notes 12/11/2023 to 1/10/2024             Date of Service Author Author Type Status Note Type File Time    12/24/23 2295 Angie Almendarez MD Physician Signed H&P 12/25/23 1529            Attending Provider: Filipe Casper DO Attending phys phone: [phone]  Primary Care Physician at Discharge: Usman Collins [phone]    Admission Date: 12/24/2023     Discharge Date: 1/10/2024    Primary Discharge Diagnosis  Acute hypoxic respiratory failure in setting of Influenza A    Secondary Discharge Diagnosis  Active Hospital Problems    Diagnosis Date Noted    Takotsubo cardiomyopathy 01/10/2024    Pneumonia 01/10/2024    Elevated LFTs 01/10/2024    Tracheostomy in place (CMS/[facility]) 01/10/2024    Status post insertion of percutaneous endoscopic gastrostomy (PEG) tube (CMS/[facility]) 01/10/2024    Severe sepsis (CMS/[facility]) 12/24/2023    Cardiac arrest (CMS/[facility]) 12/24/2023    Influenza A 12/24/2023    Acute renal failure superimposed on stage 3a chronic kidney disease (CMS/[facility]) 10/02/2023    Chronic combined systolic and diastolic CHF (congestive heart failure) (CMS/[facility]) 10/19/2022    CKD (chronic kidney disease) 10/19/2022    Paroxysmal atrial fibrillation (CMS/[facility]) 10/19/2022    Hyperlipidemia 08/31/2021      Resolved Hospital Problems   No resolved problems to display.       DETAILS OF HOSPITAL STAY    Operative Procedures Performed  Procedure(s):  TRACHEOSTOMY  GASTROSTOMY PERCUTANEOUS ENDOSCOPIC    Consults:   Consult Notes 12/11/2023 to 1/10/2024             Date of Service Author Author Type Status Note Type File Time    01/09/24 1110 Gypsy Bunch Addendum Consults 01/10/24 1017    01/08/24 1040 Marlin Reyes Signed Consults 01/08/24 1219    01/03/24 1515 Kriss Bolton Signed Consults 01/03/24 1615    01/03/24 1207 Edison Oneil MD Physician Signed Consults 01/03/24 1209    12/28/23 0758  Ailyn Baker PA C Physician Assistant Cosign Needed Consults 12/28/23 0809    12/26/23 1313 Nellie Yates LDN Registered Dietitian Signed Consults 12/26/23 1331    12/25/23 1215 Oppenheimer, Mindy Chaplain Signed Consults 12/25/23 1406    12/25/23 1204 Michele Estrada MD Physician Signed Consults 12/25/23 1222    12/25/23 1142 Adam Tucker MD Physician Signed Consults 12/25/23 1148    12/25/23 1048 Almaz Dorsey MD Physician Signed Consults 12/25/23 1155    12/25/23 0930 Oppenheimer, Mindy Chaplain Signed Consults 12/25/23 1017            Consult Orders During Admission:  IP CONSULT TO NUTRITION SERVICES  IP CONSULT TO CARDIOLOGY  IP CONSULT TO INFECTIOUS DISEASE  IP CONSULT TO UROLOGY  IP CONSULT TO IV TEAM  IP CONSULT TO TRAUMA SURGERY       Pertinent Test Results:   See Labs Below:  ABG    CBC         Results from last 7 days   Lab Units 01/10/24  0516 01/09/24  0425 01/08/24  0330   WBC K/uL 9.50 9.49 8.49   RBC M/uL 2.26* 2.28* 2.35*   HEMOGLOBIN g/dL 7.3* 7.4* 7.4*   HEMATOCRIT % 23.2* 23.8* 24.1*   MCV fL 102.7* 104.4* 102.6*   MCH pg 32.3 32.5 31.5   MCHC g/dL 31.5* 31.1* 30.7*   PLATELETS K/uL 185 177 177   RDW % 15.6* 15.9* 16.0*   MPV fL 10.4 10.8 11.1      BMP            Results from last 7 days   Lab Units 01/10/24  0516 01/09/24  0425 01/08/24  0330 01/07/24  0431 01/06/24  0532 01/05/24  0329   SODIUM mEQ/L 138 141 141 140 138 141   POTASSIUM mEQ/L 4.0 3.9 3.4* 3.7 3.4* 3.4*   CHLORIDE mEQ/L 101 101 101 103 103 104   CO2 mEQ/L 29 31 31 25 26 25   BUN mg/dL 36* 33* 30* 29* 29* 29*   CREATININE mg/dL 1.4* 1.4* 1.5* 1.4* 1.4* 1.6*   CALCIUM mg/dL 7.8* 8.1* 8.1* 7.6* 7.5* 7.9*   ALBUMIN g/dL  --   --   --  2.6* 2.5* 2.7*   BILIRUBIN TOTAL mg/dL  --   --   --  0.5 0.5 0.7   ALK PHOS IU/L  --   --   --  74 73 64   ALT IU/L  --   --   --  33 36 47   AST IU/L  --   --   --  29 27 32   GLUCOSE mg/dL 137* 130* 132* 122* 142* 149*      Coag            Results from last 7 days   Lab  Units 01/07/24  2109 01/07/24  1210 01/07/24  0431 01/05/24  0329 01/04/24  2030 01/04/24  0510   PROTIME sec 21.1*  --   --   --  15.0* 14.3   INR   1.8  --   --   --  1.2 1.1   PTT sec 46* 95* 64*   < > 49* 33    < > = values in this interval not displayed.                  Microbiology Results      Procedure Component Value Units Date/Time     Sputum Gram Stain (Lab Only) Sputum [[id number]] Collected: 01/08/24 1527     Specimen: Sputum Updated: 01/09/24 0621       Gram Stain Result 4+ WBC         3+ Epithelial cells         1+ Gram positive cocci in pairs and clusters         1+ Yeast     Blood Culture Blood, Venous [[id number]]  (Normal) Collected: 01/01/24 1201     Specimen: Blood, Venous Updated: 01/05/24 1701       Culture No growth at 96 hours     Blood Culture Blood, Venous [[id number]]  (Normal) Collected: 01/01/24 1201     Specimen: Blood, Venous Updated: 01/05/24 1701       Culture No growth at 96 hours     Sputum culture / smear Tracheal Aspirate [[id number]]  (Abnormal) Collected: 01/01/24 0937     Specimen: Tracheal Aspirate Updated: 01/03/24 0744     Narrative:       The following orders were created for panel order Sputum culture / smear Tracheal Aspirate.  Procedure                               Abnormality         Status                     ---------                               -----------         ------                     Sputum Gram Stain (Lab O...[[id number]]                      Final result               Sputum Culture (Lab Only...[[id number]]  Abnormal            Final result                  Please view results for these tests on the individual orders.     Sputum Gram Stain (Lab Only) Tracheal Aspirate [[id number]] Collected: 01/01/24 0937     Specimen: Tracheal Aspirate Updated: 01/01/24 1817       Gram Stain Result 4+ WBC         1+ Epithelial cells         No organisms seen     Sputum Culture (Lab Only) Tracheal Aspirate [[id number]]  (Abnormal) Collected: 01/01/24 0937     Specimen:  Tracheal Aspirate Updated: 01/03/24 0744       Culture **Positive Culture**         1+ Yeast, No Further Identification         No Normal Park     Sputum culture / smear Expectorated Sputum [[id number]]  (Abnormal) Collected: 12/27/23 1417     Specimen: Bronch Lavage from Expectorated Sputum Updated: 12/29/23 0723     Narrative:       The following orders were created for panel order Sputum culture / smear Expectorated Sputum.  Procedure                               Abnormality         Status                     ---------                               -----------         ------                     Sputum Gram Stain (Lab O...[[id number]]                      Final result               Sputum Culture (Lab Only...[[id number]]  Abnormal            Final result                  Please view results for these tests on the individual orders.     Sputum Gram Stain (Lab Only) Expectorated Sputum [[id number]] Collected: 12/27/23 1417     Specimen: Bronch Lavage from Expectorated Sputum Updated: 12/27/23 2032       Gram Stain Result 2+ WBC         No Epithelial Cells Seen         No organisms seen     Sputum Culture (Lab Only) Expectorated Sputum [[id number]]  (Abnormal) Collected: 12/27/23 1417     Specimen: Bronch Lavage from Expectorated Sputum Updated: 12/29/23 0723       Culture **Positive Culture**         1+ Yeast, No Further Identification     Sputum culture / smear Expectorated Sputum [[id number]] Collected: 12/25/23 0418     Specimen: Aspirate from Expectorated Sputum Updated: 12/27/23 0845     Narrative:       The following orders were created for panel order Sputum culture / smear Expectorated Sputum.  Procedure                               Abnormality         Status                     ---------                               -----------         ------                     Sputum Gram Stain (Lab O...[[id number]]                      Final result               Sputum Culture (Lab Only...[[id number]]  Normal               Final result                  Please view results for these tests on the individual orders.     Sputum Gram Stain (Lab Only) Expectorated Sputum [[id number]] Collected: 12/25/23 0418     Specimen: Aspirate from Expectorated Sputum Updated: 12/25/23 0956       Gram Stain Result 3+ WBC         No Epithelial Cells Seen         3+ Gram positive bacilli         2+ Gram positive cocci in pairs, chains and clusters     Sputum Culture (Lab Only) Expectorated Sputum [[id number]]  (Normal) Collected: 12/25/23 0418     Specimen: Aspirate from Expectorated Sputum Updated: 12/27/23 0845       Culture Normal Park     MRSA Screen, Nares Only Nose [[id number]]  (Normal) Collected: 12/25/23 0352     Specimen: Nasal Swab from Nose Updated: 12/25/23 0906       MRSA DNA, Nares Negative     Blood Culture Blood, Venous [[id number]]  (Normal) Collected: 12/24/23 1652     Specimen: Blood, Venous Updated: 12/29/23 0000       Culture No growth at 96 hours     SARS-CoV-2 (COVID-19) Nasopharynx [[id number]]  (Abnormal) Collected: 12/24/23 1649     Specimen: Nasopharyngeal Swab from Nasopharynx Updated: 12/24/23 1736     Narrative:       The following orders were created for panel order SARS-CoV-2 (COVID-19) Nasopharynx.  Procedure                               Abnormality         Status                     ---------                               -----------         ------                     SARS-COV-2 (COVID-19)/ F...[[id number]]  Abnormal            Final result                  Please view results for these tests on the individual orders.     SARS-COV-2 (COVID-19)/ FLU A/B, AND RSV, PCR Nasopharynx [[id number]]  (Abnormal) Collected: 12/24/23 1649     Specimen: Nasopharyngeal Swab from Nasopharynx Updated: 12/24/23 1736       SARS-CoV-2 (COVID-19) Negative       Influenza A Positive       Influenza B Negative       Respiratory Syncytial Virus Negative     Narrative:       Testing performed using real-time PCR for detection of COVID-19. EUA  approved validation studies performed on site.      Blood Culture Blood, Venous [[id number]]  (Normal) Collected: 12/24/23 1647     Specimen: Blood, Venous Updated: 12/29/23 0100       Culture No growth at 96 hours       Imaging  X-RAY CHEST 1 VIEW    Result Date: 1/8/2024  IMPRESSION: Scattered nodular opacities of the right lung.    X-RAY CHEST 1 VIEW    Result Date: 1/5/2024  IMPRESSION: Satisfactory positioning of the recently placed tracheostomy device. Right IJ central line appears stable terminating in the region the cavoatrial junction. Removal of the Dobbhoff tube. Redemonstration of moderate patchy airspace disease in the right mid to upper lung zone without significant change with lesser degrees of left greater than right basilar lung opacities not significant changed. These are nonspecific correlate for multifocal pneumonia versus edema or aspiration. No evidence of a pneumothorax. Small left pleural effusion not significantly changed. No sizable right pleural effusion. Stable cardiomediastinal contours with valve replacement. No upper abdominal or gross bone findings of concern. Right axilla surgical clips redemonstrated. COMMENT: Portable AP view the chest performed and compared with January 2, 2024 study. See impression.    Ultrasound venous leg bilateral    Result Date: 1/4/2024  IMPRESSION: No DVT identified in either lower extremity.     Ultrasound venous arm left    Result Date: 1/3/2024  IMPRESSION: 1.  No evidence for deep vein thrombosis bilaterally. 2.  Superficial thrombus involving the right cephalic vein.    Ultrasound venous arm right    Result Date: 1/3/2024  IMPRESSION: 1.  No evidence for deep vein thrombosis bilaterally. 2.  Superficial thrombus involving the right cephalic vein.    X-RAY ABDOMEN 1 VIEW    Result Date: 1/3/2024  IMPRESSION: Dobbhoff tube enters below left hemidiaphragm with its tip likely in the region of the stomach.     X-RAY CHEST 1 VIEW    Result Date:  1/2/2024  IMPRESSION: Hazy opacity seen at right midlung, new from the prior study, may represent ammonia or aspiration. Endotracheal tube with the tip 9 mm above the leo. Consider repositioning.     MRI BRAIN WITHOUT CONTRAST    Result Date: 1/2/2024  IMPRESSION: No acute infarct. Mild chronic microvascular ischemia, moderate volume loss.    X-RAY ABDOMEN 1 VIEW    Result Date: 12/30/2023  IMPRESSION: Weighted feeding tube tip at the level of the proximal stomach.    CT HEAD WITHOUT IV CONTRAST    Result Date: 12/30/2023  IMPRESSION: No acute intracranial abnormality.  Chronic ischemic white matter changes are noted.    X-RAY CHEST 1 VIEW    Result Date: 12/30/2023  IMPRESSION: 1. The endotracheal tube is lower in position situated 2 cm above level of the leo.    IR TUNNELED PICC PLACEMENT    Result Date: 12/29/2023  IMPRESSION: Successful placement of 5 Fr triple lumen tunneled PICC.     ULTRASOUND GUIDED VASCULAR ACCESS    Result Date: 12/29/2023  IMPRESSION: Successful placement of 5 Fr triple lumen tunneled PICC.     ULTRASOUND KIDNEYS BLADDER/NO POST VOID    Result Date: 12/28/2023  IMPRESSION: 1. No sonographic abnormality in the kidneys. 2. Nearly completely collapsed bladder as discussed below. No large intraluminal bladder thrombus/clot as clinically questioned (given the limitations of near complete bladder collapse). COMMENT: Real-time sonographic evaluation of the kidneys and bladder was performed. There is normal cortical echogenicity and corticomedullary differentiation. The right kidney measures 10.8 x 5.2 x 4.6 cm. The left kidney measures 10.8 x 5.8 x 4.3 cm. There is no hydronephrosis, calculi, masses or perinephric collections. Bladder is nearly completely collapsed, measuring approximately 2.5 x 1.3 x 2.5 cm, for a calculated volume of approximately 5.5 cc, precluding adequate evaluation for calculi or mass. No ureteral jets identified on color Doppler, perhaps due to renal dysfunction  and/or hydration status.    X-RAY CHEST 1 VIEW    Result Date: 12/27/2023  IMPRESSION:  Reduced lung volumes. No acute cardiopulmonary process. Stable cardiac enlargement. COMPARISON: Portable chest studies 12/25 and 12/26/2023. COMMENT:  Upright portable imaging completed 0548 hours. Endotracheal tube 4.7 cm above. Enteric tube follows a normal course into left upper quadrant, directed to the left. Mild stable cardiac enlargement. Mitral annular prosthesis again noted. Stable hilar and mediastinal contours. Reduced lung volumes. No definite consolidation or pleural fluid. Unremarkable upper abdomen.    X-RAY CHEST 1 VIEW    Result Date: 12/26/2023  IMPRESSION: Endotracheal tube has been repositioned; the tip is now approximately 3.3 cm above the leo. COMMENT: A semierect AP portable view the chest was performed at 1615 and is compared to a prior study from 1503. Since the prior study, the endotracheal tube has been repositioned and the tip is now approximately 3.3 cm above the leo. There has been no other significant interval change.    X-RAY CHEST 1 VIEW    Result Date: 12/26/2023  IMPRESSION: ET tube 2 cm above leo. NG tube tip in proximal stomach. Probable right lower lobe atelectasis; attention on follow-up.    X-RAY CHEST 1 VIEW    Result Date: 12/26/2023  IMPRESSION: Status post extubation. Slightly improved vascular congestion since earlier in the day. Suspect developing atelectasis or airspace disease in the right midlung zone. COMMENT: Semierect AP portable view of the chest was performed at 1428 and is compared to a prior study from 5:27 AM. In the interval, the endotracheal tube has been removed. An enteric tube remains in place with the tip in the stomach. There is a vascular catheter/line with the tip projecting near the junction of the IVC and right atrium; it is difficult to determine whether this was present previously but differences in technique. Mild cardiomegaly is again noted. Cardiac  valvular prosthesis is again seen. The pulmonary vasculature and interstitial markings have improved slightly since earlier in the day. There is questionable developing airspace disease or atelectasis in the right midlung zone. Trace bilateral pleural effusions are suspected. The hilar and mediastinal structures appear stable. Surgical clips are again seen in the right axilla.    X-RAY CHEST 1 VIEW    Result Date: 12/26/2023  IMPRESSION: ET tube 4 cm above leo, NG tube tip not well seen, likely in proximal stomach. Stable cardiomegaly, mitral valve replacement. Clear lungs, with interstitial crowding secondary to low lung volumes.    X-RAY CHEST 1 VIEW    Result Date: 12/26/2023  IMPRESSION: Improved pulmonary vascular congestion. ET tube 3 cm above leo, NG tube tip below inferior edge of film.    X-RAY CHEST 1 VIEW    Result Date: 12/25/2023  IMPRESSION: Interval retraction of the endotracheal tube now in satisfactory position, as below. Stable satisfactory positioning of the enteric tube. Progressive pulmonary interstitial edema with stable enlargement of the cardiac silhouette. No pneumothorax. COMMENT: Comparison: Chest x-ray 12/24/2023. Technique: A single frontal AP portable upright projection of the chest was obtained. FINDINGS: There has been interval retraction of the endotracheal tube now terminating 2.7 cm above the leo. An enteric tube courses to the stomach with the distal tip collimated from the field-of-view beneath the left hemidiaphragm in satisfactory position. There are defibrillator pad projecting over the left hemithorax. There are progressively increased interstitial opacities seen projecting over both lungs. There is no pleural effusion or pneumothorax. The cardiac silhouette is stably enlarged. The mediastinal contours are unchanged. Again noted is a prosthetic mitral valve. The upper abdomen is unremarkable. There is no acute osseous abnormality. Surgical clips overlie the right  axillary region.     X-RAY CHEST 1 VIEW    Result Date: 12/25/2023  IMPRESSION: Satisfactory positioning of the endotracheal and enteric tubes. No pneumothorax. Stable pulmonary edema and enlargement of the cardiac silhouette. COMMENT: Comparison: Chest x-ray 12/25/2023. Technique: A single frontal AP portable upright projection of the chest was obtained. FINDINGS: The tip of an endotracheal tube terminates 4.0 cm above the leo. An enteric tube courses to the stomach with the distal tip collimated from the field-of-view beneath the left hemidiaphragm in satisfactory position. There are defibrillator pad projecting over the left hemithorax. There are mildly increased interstitial opacities again seen projecting over both lungs. No pleural effusion or pneumothorax is seen. There is stable enlargement of the cardiac silhouette. Again noted is a prosthetic mitral valve. The mediastinal contours are unchanged. The upper abdomen is unremarkable. There is no acute osseous abnormality. There are surgical clips overlying the right axillary region.     X-RAY CHEST 1 VIEW    Result Date: 12/25/2023  IMPRESSION: Low-lying endotracheal tube terminating over the proximal right mainstem bronchus. Recommend retraction. Satisfactory positioning of the enteric tube. This finding and recommendation was discussed with nurse Shoemaker at 11:27 AM on 12/25/2023 by Dr. Martinez by telephone. Mild pulmonary interstitial edema and stable enlargement of the cardiac silhouette. No pneumothorax. COMMENT: Comparison: Chest x-ray 12/24/2023. Technique: A single frontal AP portable upright projection of the chest was obtained. FINDINGS: The tip of the intervally placed endotracheal tube terminates over the proximal right mainstem bronchus. The tip of the enteric tube terminates over the left upper quadrant of the abdomen. There are mildly increased interstitial opacities noted within both lungs. There is no pleural effusion or pneumothorax. There is  stable enlargement of the cardiac silhouette. Again noted is a mitral valve prosthesis. Surgical clips overlie the right axilla. The upper abdomen is unremarkable. There is no acute osseous abnormality.    X-RAY CHEST 1 VIEW    Result Date: 12/25/2023  IMPRESSION: Vascular congestion.  Left basal atelectasis. COMMENT: AP radiograph the chest is compared to previous study dated 8/17/2023. There is vascular congestion and small effusions.  Findings may represent mild congestive heart failure.  Cardiac silhouette is unchanged.  Prosthetic valve ring seen.  Surgical staples seen in the right axilla.  There is minimal left basal atelectasis.    CT ANGIOGRAPHY CHEST PULMONARY EMBOLISM WITH IV CONTRAST    Result Date: 12/24/2023  IMPRESSION: 1. No pulmonary embolism in the main or proximal segmental pulmonary arteries. 2. Right upper lobe and left lower lobe infiltrate with patchy airspace opacities in the right middle lobe.        Presenting Problem/History of Present Illness  Cam Rosas is a 73 y.o. male with past medical history of prostate cancer and breast cancer, status post right mastectomy, prior chemotherapy, history atrial fibrillation status post cardioversion on Xarelto, CHF, CKD, hypertension who presented to the emergency room on 12/24/2023 with shortness of breath-found to be saturating 82% on room air.    Vital signs in the ER-temperature 35.3 heart rate 70 respiratory 30 /86 O2 sat 82% room air-subsequently desaturated to 63% and placed on nonrebreather then on BiPAP.  He had episode of bradycardia and was intubated overnight.  Subsequently he became more febrile-temperature 38.9  Viral swab-flu positive  CT angio chest- no PE.  Left lower lobe infiltrate with surrounding groundglass opacities patchy airspace disease right middle lobe.  Groundglass nodular infiltrate right upper lobe.     Exam on Day of Discharge  Patient seen and examined on day of discharge.    Constitutional:       General: He  is not in acute distress.     Appearance: Normal appearance.   HENT:      Head: Normocephalic and atraumatic.      Mouth/Throat:      Mouth: Mucous membranes are moist.      Comments: Trach in place  Eyes:      General: No scleral icterus.     Pupils: Pupils are equal, round, and reactive to light.   Cardiovascular:      Rate and Rhythm: Normal rate and regular rhythm.      Pulses: Normal pulses.      Heart sounds: Normal heart sounds.   Pulmonary:      Breath sounds: No wheezing or rales.      Comments: Coarse breath sounds throughout  Abdominal:      General: Bowel sounds are normal.      Palpations: Abdomen is soft.      Tenderness: There is no abdominal tenderness.   Musculoskeletal:      Right lower leg: Edema present.      Left lower leg: Edema present.   Skin:     General: Skin is warm and dry.   Neurological:      Mental Status: He is alert.      Comments: Awake and following commands       Hospital Course  12/25: vtach arrest. CODE BLUE called. 3 epi, 3 shocks, 2 bicarb, and magnesium given. Taken to cath lab for temp wire placement. S/p Heart cath revealed takosubo's cardiomyopathy.   Following commands post-arrest. No targeted temperature management initiated.   12/26: Successful wean off vent,  extubated. Shortly after became hypoxic with diffuse rhonchi, re intubated, given steroids and lasix x1   12/27: Remains intubated, Emergent bronch today with concerns of mucus plugging,  pacer wire removed today, hematuria in urine secondary to jones change  12/28: started IV heparin for AF, cont levophed, milrinone 0.125, worsening renal function (2.2 from 1.2 on admit. Hold diuresis, may be 2/2 dye load. Increase FWF (na 149) PICC line placed.  12/29: DC milrinone. +cuff  leak: decrease solumedrol 60 q 12. Attempt wean: failed. Increase FWF Na 149.    12/30: PS, dc steroids. Dc droplet isolation. Extubated, failed, reintubated, CTH unremarkable  12/31: dec versed/fent, PS 8/6  1/1: heavily sedated overnight,  sedation weaned today. More awake. Will reassess tomorrow, may need MRI. Capping sedation overnight.   1/2: poor neuro exam off sedation. MRI brain negative. Febrile  1/3: Planned for trach/peg 1/4. NPO after midnight, holding AC at midnight, sedation switched to precedex, Lasix increased to 60 mg BID, Started on fortaz and vanc today  1/4:Bilateral LE US Ordered -  negative for DVT.   1/5: More awake today.  slight increase in creat Lasix changed to 60mg daily.   1/6: increased lasix 60 mg BID, wean as able  1/7: off heparin, restarted zarelto. Failed TC today  1/8 failed PSV, CXR: no change.  Started banana flakes, continue diuresis. Look toward LTAC.   1/9: Febrile overnight 101.6.  No obvious source infection.  WBCs 9.50. Sent urinalysis: WBC TNTC. Urine cx sent. Will start on ceftazidime and await culture result. UTI not established after review of culture. No UTI present to explain fever.     DTPI bilateral heels was established and not present on admission    Discharge Orders  Discharge Orders Needing Review       Order Details Provider Order Origin    cefTAZidime (FORTAZ) 1 g/100 mL NSS IVPB 1 g 1 g, intravenous, at 200 mL/hr, Administer over 30 Minutes, Every 8 hours interval, First dose on Wed 1/10/24 at 1345Authorizing ID: Other (specify)Explanatory Comment: Pulm CCIndications: urinary tract infection Kyleigh Cool CRNP Inpatient          Released Discharge Orders       Order Details Provider Status    acetaminophen (TYLENOL EX STR RAPID RELEASE ORAL) Take 500 mg by mouth as needed. Kyleigh Cool CRNP Do Not Resume at Discharge    albuterol 2.5 mg /3 mL (0.083 %) nebulizer solution Take 3 mL (2.5 mg total) by nebulization every 4 (four) hours as needed for wheezing or shortness of breath. Kyleigh Cool CRNP Prescribed    amiodarone (PACERONE) 200 mg tablet Take 1 tablet (200 mg total) by mouth daily. Kyleigh Cool CRNP Resume at Discharge (Prescription)    atorvastatin (LIPITOR) 10 mg tablet Take  1 tablet (10 mg total) by mouth nightly. Kyleigh Cool CRNP Prescribed    betamethasone valerate (VALISONE) 0.1 % ointment Apply topically 2 (two) times a day as needed for rash. Kyleigh Cool CRNP Prescribed    cetirizine (ZyrTEC) 1 mg/mL syrup 10 mL (10 mg total) by feeding tube route nightly. Kyleigh Cool CRNP Prescribed    cetirizine (ZyrTEC) 10 mg tablet Take 10 mg by mouth nightly. Kyleigh Cool CRNP Do Not Resume at Discharge    chlorhexidine (PERIDEX) 0.12 % solution Apply 15 mL to the mouth or throat 2 (two) times a day. Kyleigh Cool CRNP Prescribed    cholecalciferol, vitamin D3, 1,000 unit (25 mcg) tablet Take 1,000 Units by mouth daily. Kyleigh Cool CRNP Resume at Discharge (Patient Reported)    furosemide (LASIX) 10 mg/mL injection Infuse 6 mL (60 mg total) into a venous catheter every 12 (twelve) hours. Kyleigh Cool CRNP Prescribed    guaiFENesin (MUCINEX) 600 mg 12 hr tablet Take 1,200 mg by mouth 2 (two) times a day. Kyleigh Cool CRNP Do Not Resume at Discharge    insulin lispro U-100 (HumaLOG) 100 unit/mL pen Inject 1-10 Units under the skin every 6 (six) hours. Kyleigh Cool CRNP Prescribed    magnesium oxide (MAG-OX) 400 mg (241.3 mg magnesium) tablet 1 tablet (400 mg total) by Nasogastric route 2 (two) times a day. Kyleigh Cool CRNP Prescribed    melatonin ODT 1 tablet (3 mg total) by peg tube route nightly as needed (sleep). Kyleigh Cool CRNP Prescribed    metoprolol succinate XL (TOPROL-XL) 50 mg 24 hr tablet Take 1 tablet (50 mg total) by mouth daily. Kyleigh Cool CRNP Do Not Resume at Discharge    metoprolol tartrate (LOPRESSOR) 25 mg tablet 1 tablet (25 mg total) by feeding tube route 2 (two) times a day. Kyleigh Cool CRNP Prescribed    pantoprazole (PROTONIX) 40 mg EC tablet Take 1 tablet (40 mg total) by mouth 2 (two) times a day. Kyleigh Cool CRNP Do Not Resume at Discharge    pantoprazole (PROTONIX) 40 mg injection Infuse 40 mg into a venous catheter  every 12 (twelve) hours Indications: gastroesophageal reflux disease. Kyleigh Cool CRNP Prescribed    rivaroxaban (XARELTO) 20 mg tablet Take 1 tablet (20 mg total) by mouth daily with dinner. Kyleigh Cool CRNP Resume at Discharge (Prescription)    simvastatin (ZOCOR) 20 mg tablet Take 1 tablet (20 mg total) by mouth nightly. Kyleigh Cool CRNP Do Not Resume at Discharge    sodium chloride injection Infuse 10 mL into a venous catheter every 8 (eight) hours Indications: prevent clot from blocking an intravenous catheter. Kyleigh Cool CRNP Prescribed    sodium chloride injection Infuse 10 mL into a venous catheter as needed for line care (PICC care and maintenance) Indications: prevent clot from blocking an intravenous catheter. Kyleigh Cool CRNP Prescribed    tamoxifen (NOLVADEX) 20 mg chemo tablet Take  1 tablet (20 mg total) daily Kyleigh Cool CRNP Resume at Discharge (Prescription)    torsemide (DEMADEX) 20 mg tablet Take 1 tablet (20 mg total) by mouth daily as needed (for weight gain 3 lbs overnight, 5 lbs in a week, lower extremity edema). Kyleigh Cool CRNP Do Not Resume at Discharge    acetaminophen (TYLENOL) 650 mg/20.3 mL solution 650 mg 650 mg, feeding tube, Every 4 hours PRN, fever (specify temp in comments):, pain, temp > 100.4°F / 38°C (If treating new fever, call physician)., Starting on Mon 12/25/23 at 0343Max acetaminophen 4000 mg/day Kyleigh Cool CRNP Do Not Order at Discharge    amiodarone (PACERONE) tablet 200 mg 200 mg, feeding tube, Daily, First dose on Mon 12/25/23 at 0900 Kyleigh Cool CRNP Do Not Order at Discharge    atropine injection 1 mg 1 mg, intravenous, Once as needed, symptomatic bradycardia, Starting on Mon 12/25/23 at 0318, For 1 doseRN IV Administration: must be ALS trained Kyleigh Cool CRNP Do Not Order at Discharge    betamethasone dipropionate 0.05 % cream Apply topically as needed. Kyleigh Cool CRNP Do Not Resume at Discharge    calcium gluconate 1  gram/50 mL IVPB in NSS 1 g 1 g, intravenous, at 100 mL/hr, Administer over 30 Minutes, Every 6 hours PRN, ionized Ca less than 1.0 mmol/L, Starting on Mon 12/25/23 at 0344** AVOID MIDLINE ** Kyleigh Cool CRNP Do Not Order at Discharge    cefTAZidime (FORTAZ) 1 g/100 mL NSS IVPB 1 g 1 g, intravenous, at 200 mL/hr, Administer over 30 Minutes, Every 8 hours interval, First dose on Wed 1/10/24 at 1345Authorizing ID: Other (specify)Explanatory Comment: pulmonary critical careIndications: urinary tract infection Kyleigh Cool CRNP Do Not Order at Discharge    cholecalciferol (vitamin D3) tablet 1,000 Units 1,000 Units, feeding tube, Daily, First dose on Mon 12/25/23 at 29409,000 units is equal to 25 mcg. Kyleigh Cool CRNP Do Not Order at Discharge    dextrose 40 % oral gel 15-30 g of dextrose 15-30 g of dextrose, oral, As needed, low blood glucose (specify in comments), Blood Glucose less than or equal to 70, Starting on Mon 12/25/23 at 0330* If unable to chew but able to take PO * Hypoglycemia (Adult)  Blood Glucose 51 mg/dL - 70 mg/dL -- Administer 15 grams of simple carbohydrates (1 tube of glucose gel) Blood Glucose less than or equal to 50 mg/dL -- Administer 30 grams of simple carbohydrates (2 tubes of glucose gel)  Repeat POC blood glucose 15 minutes after treatment.   Fifteen (15) minutes after any hypoglycemia treatments, repeat blood glucose with the unit meter. If the repeat blood glucose level is above 70mg/dL, repeat the blood glucose level in one hour if the patient has not had a meal. If the patient has eaten, follow the standard protocol to perform POC testing as ordered.   If the repeat blood glucose level is less than 70mg/dL, repeat the hypoglycemia treatment. Perform blood glucose again in 15 minutes after retreating. Repeat treatment for a third time as needed and notify physician immediately.   Notify the physician of the clinical situation and document all interventions. Kyleigh Cool CRNP  Do Not Order at Discharge    dextrose 50 % in water (D50) injection 12.5 g 12.5 g (25 mL), intravenous, As needed, low blood glucose (specify in comments), Blood Glucose less than or equal to 70, Starting on Mon 12/25/23 at 0330* If NPO or unable to swallow and has IV access *  Repeat POC blood glucose 15 minutes after treatment.   Fifteen (15) minutes after any hypoglycemia treatments, repeat blood glucose with the unit meter. If the repeat blood glucose level is above 70mg/dL, repeat the blood glucose level in one hour if the patient has not had a meal. If the patient has eaten, follow the standard protocol to perform POC testing as ordered.   If the repeat blood glucose level is less than 70mg/dL, repeat the hypoglycemia treatment. Perform blood glucose again in 15 minutes after retreating. Repeat treatment for a third time as needed and notify physician immediately.   Notify the physician of the clinical situation and document all interventions. Kyleigh Cool CRNP Do Not Order at Discharge    fentaNYL (PF) (SUBLIMAZE) injection 50 mcg 50 mcg, intravenous, Every 4 hours PRN, pain, anxiety, sedation, Starting on Wed 1/3/24 at 1759 Kyleigh Cool CRNP Do Not Order at Discharge    glucagon (GLUCAGEN) injection 1 mg 1 mg, intramuscular, As needed, low blood glucose (specify in comments), Blood Glucose less than or equal to 70, Starting on Mon 12/25/23 at 0330* If NPO or unable to swallow and does not have IV access *  Repeat POC blood glucose 15 minutes after treatment.   Fifteen (15) minutes after any hypoglycemia treatments, repeat blood glucose with the unit meter. If the repeat blood glucose level is above 70mg/dL, repeat the blood glucose level in one hour if the patient has not had a meal. If the patient has eaten, follow the standard protocol to perform POC testing as ordered.   If the repeat blood glucose level is less than 70mg/dL, repeat the hypoglycemia treatment. Perform blood glucose again in 15  minutes after retreating. Repeat treatment for a third time as needed and notify physician immediately.   Notify the physician of the clinical situation and document all interventions. Kyleigh Cool CRNP Do Not Order at Discharge    glucose chewable tablet 16-32 g of dextrose 16-32 g of dextrose, oral, As needed, low blood glucose (specify in comments), Blood Glucose less than or equal to 70, Starting on Mon 12/25/23 at 0330* If able to take PO * Hypoglycemia (Adult)  Blood Glucose 51 mg/dL - 70 mg/dL -- Administer 16 grams of dextrose (4 tablets) Blood Glucose less than or equal to 50 mg/dL -- Administer 32 grams glucose (8 tablets) Repeat POC blood glucose 15 minutes after treatment.   ·  Fifteen (15) minutes after any hypoglycemia treatments, repeat blood glucose with the unit meter. If the repeat blood glucose level is above 70mg/dL, repeat the blood glucose level in one hour if the patient has not had a meal. If the patient has eaten, follow the standard protocol to perform POC testing as ordered. ·  If the repeat blood glucose level is less than 70mg/dL, repeat the hypoglycemia treatment. Perform blood glucose again in 15 minutes after retreating. Repeat treatment for a third time as needed and notify physician immediately. ·  Notify the physician of the clinical situation and document all interventions. Kyleigh Cool CRNP Do Not Order at Discharge    guaiFENesin (ROBITUSSIN) 100 mg/5 mL liquid 400 mg 400 mg, oral, Every 6 hours PRN, congestion, Starting on Mon 1/8/24 at 1500 Kyleigh Cool CRNP Do Not Order at Discharge    hydrALAZINE (APRESOLINE) injection 5 mg 5 mg, intravenous, Every 6 hours PRN, blood pressure greater than, 180, Starting on Fri 12/29/23 at 1116Maximum IVP administration rate: 10 mg/minute Kyleigh Cool CRNP Do Not Order at Discharge    magnesium sulfate IVPB 2g in 50 mL NSS/D5W/SWFI 2 g, intravenous, at 25 mL/hr, Administer over 120 Minutes, As needed, mag less than or equal to 2  mEq/L, Starting on Mon 12/25/23 at 0344 Kyleigh Cool CRNP Do Not Order at Discharge    metoclopramide (REGLAN) injection 10 mg 10 mg, intravenous, Every 6 hours PRN, Nausea/Vomiting, Starting on Mon 12/25/23 at 0343If given IV push, administer dose over at least 2 minutes Kyleigh Cool CRNP Do Not Order at Discharge    potassium chloride (KLOR-CON M) 20 mEq CR tablet Take 1 tablet (20 mEq) daily when you take as needed  torsemide. Kyleigh Cool CRNP Do Not Resume at Discharge     Patient taking differently: as needed. Take 1 tablet (20 mEq) daily when taking torsemide (also taken as needed)..      potassium chloride (KLOR-CON M) ER tablet (particles/crystals) 20 mEq 20 mEq, oral, As needed, for K 3.5 - 3.9 mEq/L, Starting on Mon 12/25/23 at 0344** This oral dosage form should NOT be crushed or pierced **  For patients unable to swallow whole, may split tablet or dissolve.   To dissolve: Place tablet in 4 oz of water and allow 2 minutes to dissolve.  Stir for 30 seconds and administer immediately.  Rinse cup with 1 oz of water and administer immediately. Kyleigh Cool CRNP Do Not Order at Discharge    potassium chloride (KLOR-CON M) ER tablet (particles/crystals) 40 mEq 40 mEq, oral, As needed, for K less than 3.5 mEq/L, Starting on Mon 12/25/23 at 0344** This oral dosage form should NOT be crushed or pierced **  For patients unable to swallow whole, may split tablet or dissolve.   To dissolve: Place tablet in 4 oz of water and allow 2 minutes to dissolve.  Stir for 30 seconds and administer immediately.  Rinse cup with 1 oz of water and administer immediately. Kyleigh Cool CRNP Do Not Order at Discharge    potassium chloride 20 mEq in 100 mL IVPB  (premix) 20 mEq, intravenous, at 50 mL/hr, Administer over 120 Minutes, As needed, for K 3.5 - 3.9 mEq/L if unable to take orally, Starting on Mon 12/25/23 at 0344** CENTRAL LINE PREFERRED ** Kyleigh Cool CRNP Do Not Order at Discharge    potassium chloride  20 mEq in 100 mL IVPB  (premix) 20 mEq, intravenous, at 50 mL/hr, Administer over 120 Minutes, As needed, for K less than 3.5 mEq/L if unable to take orally, Starting on Mon 12/25/23 at 0344Bag 1 of 2 (total dose = 40 mEq) * DO NOT GIVE BOTH BAGS OF 20 MEQ IV POTASSIUM AT THE SAME TIME * Give the first 20 mEq bag over 2 hours FOLLOWED BY the second 20 mEq bag over 2 hours NOTE: the total 40 mEq dose NEEDS to be run over 4 hours ** CENTRAL LINE PREFERRED ** Kyleigh Cool CRNP Do Not Order at Discharge    potassium chloride 20 mEq in 100 mL IVPB  (premix) 20 mEq, intravenous, at 50 mL/hr, Administer over 120 Minutes, As needed, for K less than 3.5 mEq/L if unable to take orally, Starting on Mon 12/25/23 at 0344Bag 2 of 2 (total dose = 40 mEq) * DO NOT GIVE BOTH BAGS OF 20 MEQ IV POTASSIUM AT THE SAME TIME * Give the first 20 mEq bag over 2 hours FOLLOWED BY the second 20 mEq bag over 2 hours NOTE: the total 40 mEq dose NEEDS to be run over 4 hours ** CENTRAL LINE PREFERRED ** Kyleigh Cool CRNP Do Not Order at Discharge    rivaroxaban (XARELTO) tablet 20 mg 20 mg, oral, Daily with dinner, First dose on Sun 1/7/24 at 1700Indications: prevent thromboembolism in chronic atrial fibrillation Kyleigh Cool CRNP Do Not Order at Discharge    silver sulfadiazine (SILVADENE) 1 % cream Apply topically 2 (two) times a day. Apply to affected area. Kyleigh Cool CRNP Resume at Discharge (Prescription)    silver sulfadiazine (SILVADENE) 1 % cream Topical, 2 times daily, First dose on Mon 12/25/23 at 0900, For 310 daysApply to left leg Kyleigh Cool CRNP Do Not Order at Discharge    sodium chloride 0.9 % infusion 5 mL/hr, intravenous, As needed, Use to Prime and Flush the Line During Blood Administration, Starting on Thu 1/4/24 at 0605, For 7 daysPrime and Flush the administration set with normal saline solution using a Y type Blood administration set with standard blood filter when infusing blood.  May discontinue after  administration of blood is complete. Kyleigh Cool CRNP Do Not Order at Discharge    tamoxifen (NOLVADEX) tablet 20 mg 20 mg, feeding tube, Daily, First dose (after last modification) on Mon 12/25/23 at 0900 Kyleigh Cool CRNP Do Not Order at Discharge    Tube Feeding Nursing Documentation feeding tube, Continuous, Starting on Wed 1/3/24 at 1245See tube feeding order for additional instructions. Edison Oneil MD Reconciliation Not Required            Outpatient Follow-Ups            In 1 month Gregory J. Ochsner, MD [facility] Radiation Therapy    In 2 months [facility] - Mammography    In 2 months Barb Osuna MD [facility] in [place]    In 3 months Gary Aceves MD [facility] in [place]    In 4 months Gary Moran MD [facility] in [place]    In 6 months Kailey Gale MD [facility] [place]            Active Issues Requiring Follow-up  Issue: Likely UTI/fever  What is Needed: started on ceftazidime.  Please follow up with urine culture results.  Please call microbiology lab for results: [phone] ([facility] micro lab)        Test Results Pending at Discharge  1/10/2024: Urine culture: pending.       Discharge Disposition  [facility] - Other  Code Status at Discharge: Full Code  Physician Order for Life-Sustaining Treatment Document Status        No documents found

## 2024-01-11 LAB
MICROORGANISM SPEC CULT: ABNORMAL

## 2024-01-12 LAB
BACTERIA UR CULT: ABNORMAL
BACTERIA UR CULT: ABNORMAL

## 2024-01-12 ASSESSMENT — ENCOUNTER SYMPTOMS
DIARRHEA: 1
FEVER: 1

## 2024-02-09 ENCOUNTER — TRANSCRIBE ORDERS (OUTPATIENT)
Dept: SCHEDULING | Age: 74
End: 2024-02-09

## 2024-02-09 DIAGNOSIS — R13.10 DYSPHAGIA, UNSPECIFIED: Primary | ICD-10-CM

## 2024-02-11 PROCEDURE — 83735 ASSAY OF MAGNESIUM: CPT | Performed by: INTERNAL MEDICINE

## 2024-02-11 PROCEDURE — 85027 COMPLETE CBC AUTOMATED: CPT | Performed by: INTERNAL MEDICINE

## 2024-02-12 PROCEDURE — 87493 C DIFF AMPLIFIED PROBE: CPT | Performed by: INTERNAL MEDICINE

## 2024-02-12 PROCEDURE — 85027 COMPLETE CBC AUTOMATED: CPT | Performed by: INTERNAL MEDICINE

## 2024-02-12 PROCEDURE — 83735 ASSAY OF MAGNESIUM: CPT | Performed by: INTERNAL MEDICINE

## 2024-02-12 PROCEDURE — 80048 BASIC METABOLIC PNL TOTAL CA: CPT | Performed by: INTERNAL MEDICINE

## 2024-02-14 ENCOUNTER — DOCUMENTATION (OUTPATIENT)
Dept: RADIATION ONCOLOGY | Facility: HOSPITAL | Age: 74
End: 2024-02-14
Payer: MEDICARE

## 2024-02-14 NOTE — PROGRESS NOTES
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

## 2024-02-19 PROCEDURE — 80048 BASIC METABOLIC PNL TOTAL CA: CPT | Performed by: INTERNAL MEDICINE

## 2024-02-26 PROCEDURE — 80048 BASIC METABOLIC PNL TOTAL CA: CPT | Performed by: INTERNAL MEDICINE

## 2024-02-26 PROCEDURE — 85027 COMPLETE CBC AUTOMATED: CPT | Performed by: INTERNAL MEDICINE

## 2024-03-01 PROCEDURE — 81003 URINALYSIS AUTO W/O SCOPE: CPT | Performed by: INTERNAL MEDICINE

## 2024-03-01 PROCEDURE — 81001 URINALYSIS AUTO W/SCOPE: CPT | Performed by: INTERNAL MEDICINE

## 2024-03-05 PROCEDURE — 80048 BASIC METABOLIC PNL TOTAL CA: CPT | Performed by: INTERNAL MEDICINE

## 2024-03-18 PROCEDURE — 82247 BILIRUBIN TOTAL: CPT | Performed by: INTERNAL MEDICINE

## 2024-03-18 PROCEDURE — 85027 COMPLETE CBC AUTOMATED: CPT | Performed by: INTERNAL MEDICINE

## 2024-03-18 PROCEDURE — 82040 ASSAY OF SERUM ALBUMIN: CPT | Performed by: INTERNAL MEDICINE

## 2024-03-21 ENCOUNTER — TELEPHONE (OUTPATIENT)
Dept: SCHEDULING | Facility: CLINIC | Age: 74
End: 2024-03-21
Payer: MEDICARE

## 2024-03-21 NOTE — TELEPHONE ENCOUNTER
Looking at hospital d/c summary he was discharged on his usual Xarelto 20mg Daily . There is no reason for a rehab to every change this type of thing and as noted, the wrong dose at that. Please have him continue on his long-term and hospital discharge regiment of Xarelto 20mg with dinner.

## 2024-03-21 NOTE — TELEPHONE ENCOUNTER
Pt's spouse calling to discuss pt's medications that was changed while he was in the hospital    Fiorella :  [phone]    Ty

## 2024-03-21 NOTE — TELEPHONE ENCOUNTER
I called Mrs. Rosas back and she informed me she would prefer Chops stays on Xarelto 20 mg at dinner time because they have a lot of this medication.  I informed her Dr. Moran said to keep him on the xarelto and do not give him the eliquis.  I told her to keep him on the atorvastatin 10 mg at bedtime.  She was thankful for the call.

## 2024-03-21 NOTE — TELEPHONE ENCOUNTER
I called Mrs. Rosas back.  She informed me [facility]  She said the medication discharge list is different from the one they have at home and wanted to double check on the medications.      Simvastatin 20 mg was changed to atorvastatin 10 mg.  I reviewed the chart and the simvastatin 20 mg was changed to atorvastatin 10 mg during his hospitalization in January.      Xarelto 20 mg daily with dinner was changed to Eliquis 2.5 mg BID. This must of happened at rehab because she does not know it was changed.  He is 74 years old, creatinine 1.1 on 3/5/24,  Most recent documented weight is 89.4 kg from January.     Please advise if medications should be changed.  Thank you.

## 2024-03-26 ENCOUNTER — OFFICE VISIT (OUTPATIENT)
Dept: SURGERY | Facility: CLINIC | Age: 74
End: 2024-03-26
Payer: MEDICARE

## 2024-03-26 VITALS
BODY MASS INDEX: 27.99 KG/M2 | SYSTOLIC BLOOD PRESSURE: 101 MMHG | DIASTOLIC BLOOD PRESSURE: 50 MMHG | HEART RATE: 58 BPM | WEIGHT: 168 LBS | TEMPERATURE: 97.1 F | HEIGHT: 65 IN

## 2024-03-26 DIAGNOSIS — Z93.0 TRACHEOSTOMY IN PLACE (CMS/HCC): Primary | ICD-10-CM

## 2024-03-26 PROCEDURE — 99024 POSTOP FOLLOW-UP VISIT: CPT | Performed by: SURGERY

## 2024-03-26 NOTE — PROGRESS NOTES
Patient ID: Cam Rosas Jr.                              : 1950  MRN: [id number]                                            Visit Date: 3/26/2024  Encounter Provider: [facility]  Referring Provider: No ref. provider found    Subjective   Chief Complaint: Post-op and Follow-up    Cam Rosas Jr. is a 74 y.o. old male presenting today for G-tube removal.      Medications:   Current Outpatient Medications:     albuterol 2.5 mg /3 mL (0.083 %) nebulizer solution, Take 3 mL (2.5 mg total) by nebulization every 4 (four) hours as needed for wheezing or shortness of breath., Disp: , Rfl:     amiodarone (PACERONE) 200 mg tablet, Take 1 tablet (200 mg total) by mouth daily., Disp: 90 tablet, Rfl: 3    atorvastatin (LIPITOR) 10 mg tablet, Take 1 tablet (10 mg total) by mouth nightly., Disp: , Rfl:     cholecalciferol, vitamin D3, 1,000 unit (25 mcg) tablet, Take 1,000 Units by mouth daily., Disp: , Rfl:     melatonin ODT, 1 tablet (3 mg total) by peg tube route nightly as needed (sleep)., Disp: , Rfl:     rivaroxaban (XARELTO) 20 mg tablet, Take 1 tablet (20 mg total) by mouth daily with dinner., Disp: 90 tablet, Rfl: 3    tamoxifen (NOLVADEX) 20 mg chemo tablet, Take  1 tablet (20 mg total) daily, Disp: 90 tablet, Rfl: 3    betamethasone valerate (VALISONE) 0.1 % ointment, Apply topically 2 (two) times a day as needed for rash., Disp: , Rfl:     cefTAZidime (FORTAZ) 1 g/100 mL IVPB, Infuse 100 mL (1 g total) into a venous catheter every 8 (eight) hours Indications: urinary tract infection. (Patient not taking: Reported on 3/26/2024), Disp: , Rfl:     cetirizine (ZyrTEC) 1 mg/mL syrup, 10 mL (10 mg total) by feeding tube route nightly., Disp: , Rfl:     insulin lispro U-100 (HumaLOG) 100 unit/mL pen, Inject 1-10 Units under the skin every 6 (six) hours. (Patient not taking: Reported on 3/26/2024), Disp: , Rfl:     magnesium oxide (MAG-OX) 400 mg (241.3 mg magnesium) tablet, 1 tablet (400 mg  "total) by Nasogastric route 2 (two) times a day. (Patient not taking: Reported on 3/26/2024), Disp: , Rfl:     metoprolol tartrate (LOPRESSOR) 25 mg tablet, 1 tablet (25 mg total) by feeding tube route 2 (two) times a day., Disp: , Rfl:     pantoprazole (PROTONIX) 40 mg injection, Infuse 40 mg into a venous catheter every 12 (twelve) hours Indications: gastroesophageal reflux disease. (Patient not taking: Reported on 3/26/2024), Disp: , Rfl:     silver sulfadiazine (SILVADENE) 1 % cream, Apply topically 2 (two) times a day. Apply to affected area. (Patient not taking: Reported on 3/26/2024), Disp: 50 g, Rfl: 1    Past Medical History:  has a past medical history of Acute systolic heart failure (CMS/HCC) (02/15/2023), Ambulates with cane, Atrial fibrillation (CMS/HCC), Breast cancer (CMS/HCC) (October 2022), CHF (congestive heart failure) (CMS/HCC), Chronic kidney disease, CKD (chronic kidney disease) (10/19/2022), History of radiation therapy, antineoplastic chemo, and Prostate cancer (CMS/HCC).    He has no past medical history of Malignant hyperthermia, Motion sickness, PONV (postoperative nausea and vomiting), or Sleep apnea.    Objective   Vitals: Visit Vitals  BP (!) 101/50   Pulse (!) 58   Temp 36.2 °C (97.1 °F)   Ht 1.651 m (5' 5\")   Wt 76.2 kg (168 lb)   BMI 27.96 kg/m²     Physical Exam  Constitutional:       Appearance: Normal appearance.   HENT:      Head: Normocephalic.      Mouth/Throat:      Mouth: Mucous membranes are moist.   Eyes:      Extraocular Movements: Extraocular movements intact.      Pupils: Pupils are equal, round, and reactive to light.   Cardiovascular:      Rate and Rhythm: Normal rate.   Pulmonary:      Effort: Pulmonary effort is normal.      Breath sounds: Normal breath sounds.   Abdominal:      General: Abdomen is flat.      Palpations: Abdomen is soft.   Musculoskeletal:         General: Normal range of motion.   Skin:     General: Skin is warm.   Neurological:      Mental Status: " He is alert and oriented to person, place, and time.   Psychiatric:         Mood and Affect: Mood normal.            Assessment/Plan   Problem List Items Addressed This Visit          Respiratory    Tracheostomy in place (CMS/[facility]) - Primary     Tracheostomy has been removed his G-tube was removed in the office and I gave him standard eating instructions             No follow-ups on file.       Edison Oneil MD

## 2024-03-26 NOTE — ASSESSMENT & PLAN NOTE
Tracheostomy has been removed his G-tube was removed in the office and I gave him standard eating instructions

## 2024-03-28 NOTE — TELEPHONE ENCOUNTER
GERARD Flynn is calling in to provide an update    BP sitting 111/58  BP standing 85/45   HR 60   lbs    Pt is asymptomatic    Oral intake is poor this date  Tube feeding was discontinued as of Tues 3/26/24    Spouse has been providing Mr Rosas w/ Metoprolol 50 mg daily since his dc home from rehab following her conversation w/ MD Moran.     She is inquiring if she should split the dose into 25 mg BID at this time or lower the dosing more    Please advise    GERARD Flynn can be reached at [phone]

## 2024-03-28 NOTE — TELEPHONE ENCOUNTER
SD/MARITZA: KENYATTA- thank you.      I called the wife Fiorella regarding the patient-     I advised the following:     -push oral hydration 48-64 oz fluids/daily   -calorie count- keep a daily log of each meal   I asked her to keep a daily log of each meal & [employer] calories, if not provided. I verbalized the importance of having him increase his caloric intake   -decrease metoprolol to 25 mg daily  -monitor BP closely - check daily at different times of the day, after resting in chair with   feet flat for 5 mins.       Reminder placed for Monday.

## 2024-03-28 NOTE — TELEPHONE ENCOUNTER
Discussed with Dr Moran     Given the discontinuation of tube feedings and poor oral intake he is likely dehydration   Please advise to push oral hydration and keep a calorie count   If he can’t improve nutrition he may require admission for failure to thrive     For the orthostasis and given his heart rate of 60 please decrease metoprolol to 25 mg daily only , this is half dose of current and please advise to monitor BP angel sorensen     Thanks

## 2024-03-28 NOTE — TELEPHONE ENCOUNTER
I called the number provided & did not work.     I called the home care main number & asked if they can provide the number. She paged the nurse to call me back directly. I thanked her.

## 2024-04-01 NOTE — TELEPHONE ENCOUNTER
"SD/JG- Please advise, if needed    I called the patient's wife to f/u    S/S:  Denies lightheadedness, swelling, SOB.     Wt:   \"Stable\"    BP:  4/1- taken when prompted   111/65   3/31- not taken   3/30- not taken     Fluids:  Drinking soda only, Jarad dry zero sugar   Encourage water, but the patient fuchs not like it.     NSAIDS/Steroids/Med changes:    -decreased metoprolol 25 mg daily on 3/28     Christiano count:  -Not normally a huge eater  -Not 2000 cals daily yet  -Drinking boost  -1500 cals/day estimating      "

## 2024-04-09 ENCOUNTER — TELEPHONE (OUTPATIENT)
Dept: CARDIOLOGY | Facility: CLINIC | Age: 74
End: 2024-04-09
Payer: MEDICARE

## 2024-04-09 NOTE — TELEPHONE ENCOUNTER
"I received the following message from the pt's [facility] HC nurse Anila:     \"Patient of Dr. Moran, Cam Rosas [birth year]. His blood pressure sitting and standing running in the low 90's. 98/45 sitting, 98/49 standing, hr 56. Patients' blood pressure was low last week also.  The patient denied dizziness with standing. Visit was in the am both weeks. I did instruct him on slow position changes and to drink 64oz of fluids daily. Weight stable at 168.5lbs, no edema, cough, or sob. He only takes metoprolol succ 25mg in am & amiodarone 200mg in am. Just wanted to give update. Thanks.\"    I see notes in the chart that the metoprolol was decreased to 25 mg daily around 3/21/24 but I called the pt and left a VM requesting a callback to see if it is metoprolol succinate or metoprolol tartrate, as the dispenses only has tartrate. Awaiting callback.     "

## 2024-04-11 ENCOUNTER — TELEPHONE (OUTPATIENT)
Dept: SCHEDULING | Facility: CLINIC | Age: 74
End: 2024-04-11
Payer: MEDICARE

## 2024-04-11 DIAGNOSIS — K21.00 GASTRO-ESOPHAGEAL REFLUX DISEASE WITH ESOPHAGITIS, WITHOUT BLEEDING: ICD-10-CM

## 2024-04-11 DIAGNOSIS — E78.5 HYPERLIPIDEMIA, UNSPECIFIED: ICD-10-CM

## 2024-04-11 DIAGNOSIS — F41.9 ANXIETY DISORDER, UNSPECIFIED: ICD-10-CM

## 2024-04-11 DIAGNOSIS — I49.01 VENTRICULAR FIBRILLATION (CMS/HCC): ICD-10-CM

## 2024-04-11 RX ORDER — PANTOPRAZOLE SODIUM 40 MG/1
40 TABLET, DELAYED RELEASE ORAL DAILY
Qty: 30 TABLET | OUTPATIENT
Start: 2024-04-11

## 2024-04-11 RX ORDER — AMIODARONE HYDROCHLORIDE 200 MG/1
200 TABLET ORAL DAILY
Qty: 30 TABLET | Refills: 6 | Status: SHIPPED | OUTPATIENT
Start: 2024-04-11 | End: 2024-04-25

## 2024-04-11 RX ORDER — ATORVASTATIN CALCIUM 10 MG/1
TABLET, FILM COATED ORAL
Qty: 30 TABLET | Refills: 6 | Status: SHIPPED | OUTPATIENT
Start: 2024-04-11 | End: 2024-05-16 | Stop reason: SDUPTHER

## 2024-04-11 RX ORDER — BUSPIRONE HYDROCHLORIDE 10 MG/1
TABLET ORAL
Qty: 90 TABLET | OUTPATIENT
Start: 2024-04-11

## 2024-04-11 NOTE — TELEPHONE ENCOUNTER
SD: KENYATTA    I called the wife back a reviewed a number of medications. The amiodarone & atorvastatin have been filled.     I deferred the buspirone 10 mg BID & Pantoprazole 40 mg daily to PCP, as these are not cardiac medications.     I do not see buspirone on medication list & communicated this.     The patient's wife verbalizes understanding.

## 2024-04-11 NOTE — TELEPHONE ENCOUNTER
DR PRESCOTT PT req refills BUSPAR and PANTOPRAZOLE    Not a cardiac med--per protocol not escribed    Please advise    Thank you    Cardiac meds escribed to [facility]

## 2024-04-11 NOTE — TELEPHONE ENCOUNTER
Pt's spouse returning call from office :      Pt currently taking metoprolol tartrate 25 mg once a day.     Can Dr. Moran write a script for Buspirone 10 mg twice a day.     Pt's spouse want to know if pt need  continue with Pantoprazole 40mg.     Please advise @ [phone]

## 2024-04-11 NOTE — TELEPHONE ENCOUNTER
Medicine Refill Insert    Name of Patient: Cam Rosas Jr.    Caller's name/Relationship: spouse     Callback number: [phone]     Medication Name, Dosage, Supply: Amiodarone 200 mg. 90#     atorvastatin (LIPITOR) 10 mg tablet       Quantity left: few left     Pharmacy: [facility]    Medicine Refill Request    Last Office Visit: Visit date not found   Last Consult Visit: Visit date not found  Last Telemedicine Visit: Visit date not found    Next Appointment: Visit date not found      Current Outpatient Medications:     albuterol 2.5 mg /3 mL (0.083 %) nebulizer solution, Take 3 mL (2.5 mg total) by nebulization every 4 (four) hours as needed for wheezing or shortness of breath., Disp: , Rfl:     amiodarone (PACERONE) 200 mg tablet, TAKE 1 TABLET BY MOUTH EVERY DAY, Disp: 30 tablet, Rfl: 6    atorvastatin (LIPITOR) 10 mg tablet, TAKE 1 TABLET (10 MG) ONCE DAILY AT BEDTIME, Disp: 30 tablet, Rfl: 6    betamethasone valerate (VALISONE) 0.1 % ointment, Apply topically 2 (two) times a day as needed for rash., Disp: , Rfl:     cefTAZidime (FORTAZ) 1 g/100 mL IVPB, Infuse 100 mL (1 g total) into a venous catheter every 8 (eight) hours Indications: urinary tract infection. (Patient not taking: Reported on 3/26/2024), Disp: , Rfl:     cetirizine (ZyrTEC) 1 mg/mL syrup, 10 mL (10 mg total) by feeding tube route nightly., Disp: , Rfl:     cholecalciferol, vitamin D3, 1,000 unit (25 mcg) tablet, Take 1,000 Units by mouth daily., Disp: , Rfl:     insulin lispro U-100 (HumaLOG) 100 unit/mL pen, Inject 1-10 Units under the skin every 6 (six) hours. (Patient not taking: Reported on 3/26/2024), Disp: , Rfl:     magnesium oxide (MAG-OX) 400 mg (241.3 mg magnesium) tablet, 1 tablet (400 mg total) by Nasogastric route 2 (two) times a day. (Patient not taking: Reported on 3/26/2024), Disp: , Rfl:     melatonin ODT, 1 tablet (3 mg total) by peg tube route nightly as needed (sleep)., Disp: , Rfl:     metoprolol tartrate (LOPRESSOR) 25 mg  "tablet, 1 tablet (25 mg total) by feeding tube route 2 (two) times a day., Disp: , Rfl:     pantoprazole (PROTONIX) 40 mg injection, Infuse 40 mg into a venous catheter every 12 (twelve) hours Indications: gastroesophageal reflux disease. (Patient not taking: Reported on 3/26/2024), Disp: , Rfl:     rivaroxaban (XARELTO) 20 mg tablet, Take 1 tablet (20 mg total) by mouth daily with dinner., Disp: 90 tablet, Rfl: 3    silver sulfadiazine (SILVADENE) 1 % cream, Apply topically 2 (two) times a day. Apply to affected area. (Patient not taking: Reported on 3/26/2024), Disp: 50 g, Rfl: 1    tamoxifen (NOLVADEX) 20 mg chemo tablet, Take  1 tablet (20 mg total) daily, Disp: 90 tablet, Rfl: 3      BP Readings from Last 3 Encounters:   03/26/24 (!) 101/50   01/10/24 (!) 104/55   12/07/23 127/62       Recent Lab results:  No results found for: \"CHOL\", No results found for: \"HDL\", No results found for: \"LDLCALC\",   Lab Results   Component Value Date    TRIG 303 (H) 12/27/2023        Lab Results   Component Value Date    GLUCOSE 87 03/18/2024   , No results found for: \"HGBA1C\"      Lab Results   Component Value Date    CREATININE 1.3 03/18/2024       Lab Results   Component Value Date    TSH 0.40 11/03/2023         No results found for: \"HGBA1C\"  "

## 2024-04-11 NOTE — TELEPHONE ENCOUNTER
The pt's wife returned my call in an alternate encounter and said he takes metoprolol tartrate 25 mg daily.     SD/CHLOÉG: Should this be changed to succinate? TY

## 2024-04-12 RX ORDER — METOPROLOL SUCCINATE 25 MG/1
25 TABLET, EXTENDED RELEASE ORAL NIGHTLY
Qty: 90 TABLET | Refills: 3 | Status: SHIPPED | OUTPATIENT
Start: 2024-04-12

## 2024-04-12 NOTE — TELEPHONE ENCOUNTER
I called the pt and advised his wife Fiorella that he should stop metoprolol tartrate 25 mg daily in the AM and start metoprolol succinate 25 mg daily in the PM. She said she has a metoprolol succinate 50 mg tablet that is scored and plans to give 1/2 tablet nightly until she obtains the 25 mg tablets from the pharmacy. He already took his metoprolol tartrate today so will start the succinate tomorrow.

## 2024-04-22 ASSESSMENT — ENCOUNTER SYMPTOMS
PALPITATIONS: 0
ORTHOPNEA: 0
PND: 0
COUGH: 0
SHORTNESS OF BREATH: 0
DYSPNEA ON EXERTION: 0
HEMATURIA: 0
NEAR-SYNCOPE: 0
WEAKNESS: 0
ABDOMINAL PAIN: 0
TREMORS: 0
PARESTHESIAS: 0
SYNCOPE: 0
IRREGULAR HEARTBEAT: 0
FOCAL WEAKNESS: 0
ALTERED MENTAL STATUS: 0

## 2024-04-22 NOTE — PROGRESS NOTES
Electrophysiology  Outpatient Note         HPI   Cam Rosas Jr. is a 74 y.o. male who is seen today for follow-up and management of atrial fibrillation.  He underwent a cardioversion on December 5, 2022.  He was loaded with amiodarone and now comes to the office on amiodarone 200 mg daily, metoprolol 100 mg daily and diltiazem 360 mg .  He returned to the office 1/2023 in sinus bradycardia. Diltizem was decreased. During the month of August 2023  he underwent right breast mastectomy with lymph node removal.      He was admitted to [facility] (10/19-10/23) with acute decompensated systolic heart failure. He was then diagnosed with atrial fibrillation when he was seen at [facility].  He was placed on metoprolol and discharged.  He was not anticoagulated due to recent melena requiring transfusion.  EGD at outside hospital showed no active bleeding.    While admitted he started on IV diltiazem in the ED and was noted to be both hypokalemic and hypomagnesemic.  He was placed on IV heparin with close monitoring of his hemoglobin and discussed possible ROLF guided cardioversion. He then refused ROLF guided cardioversion and wanted to be discharged home.  He was discharged on metoprolol, diltiazem, and Xarelto with plans for cardioversion in 4 weeks.      Since our last visit he has had a prolonged admission to [facility] on 12/24/2023 with dyspnea which started the day before admission. He required intubation on admission for respiratory distress. He had a fever of 102 with WBC count of 14K and influenza. A PCR is positive. He was started on vancomycin, pip-tazo along with oseltamivir with CTA chest showing right upper lobe and left lower lobe infiltrate with patchy airspace. He has chronic renal insufficiency with a creatinine of 1.1 to 1.4 mg/dL.     During this admission he became hypoxic and bradycardic.  He was treated with atropine. He was subsequently intubated and again had bradycardia with  polymorphic ventricular tachycardia that degenerated into sustained ventricular flutter.  He was treated with intravenous lidocaine.  He received 4 external shocks with a resultant slow wide-complex rhythm. He was given 300 mg of IV amiodarone and bolused with lidocaine.  He had persistent bradycardia.  He had a persistently wide QRS.   He then underwent placement of a temporary wire and had a cardiac catheterization to rule out coronary disease. Cardiac catheterization revealed no significant coronary disease. His LV gram was consistent with Takotsubo syndrome. He was started on intravenous milrinone as his intracardiac pressures were elevated. He was eventually discharged with trach and G-tube removal.     He has a past medical history of CKD, breast cancer on chemotherapy underwent BX/REMV,LYMPH NODE,DEEP AXILLARY on 8/17/2023,  melena, and history of mitral annular ring placement by Dr. Pastor in 2006.    Past Medical History:   Diagnosis Date    Acute systolic heart failure (CMS/HCC) 02/15/2023    Ambulates with cane     and walker    Atrial fibrillation (CMS/HCC)     Breast cancer (CMS/HCC) October 2022    CHF (congestive heart failure) (CMS/HCC)     Chronic kidney disease     CKD (chronic kidney disease) 10/19/2022    History of radiation therapy     Hx antineoplastic chemo     Prostate cancer (CMS/HCC)        Past Surgical History:   Procedure Laterality Date    CARDIAC SURGERY      mitral valve repair 2006    CARDIOVERSION  12/05/2022    Successful DC cardioversion    KNEE CARTILAGE SURGERY Left     MASTECTOMY      PROSTATE SURGERY  July 2022    PROSTATECTOMY  07/15/2022    TONSILLECTOMY         Allergies: Azithromycin, Prednisone, and Lorazepam    Current Outpatient Medications   Medication Sig Dispense Refill    amiodarone (PACERONE) 200 mg tablet Take 1 tablet (200 mg total) by mouth 3 (three) times a week (Mon, Wed, Fri).      apixaban (ELIQUIS) 5 mg tablet 5 mg by g-tube route 2 times daily.       aspirin 81 mg chewable tablet Take 81 mg by mouth daily.      atorvastatin (LIPITOR) 10 mg tablet TAKE 1 TABLET (10 MG) ONCE DAILY AT BEDTIME 30 tablet 6    busPIRone (BUSPAR) 10 mg tablet TAKE 1 TABLET (10 MG) 3 TIMES PER DAY      cetirizine (ZyrTEC) 1 mg/mL syrup 10 mL (10 mg total) by feeding tube route nightly.      cholecalciferol, vitamin D3, 1,000 unit (25 mcg) tablet Take 1,000 Units by mouth daily.      melatonin ODT 1 tablet (3 mg total) by peg tube route nightly as needed (sleep).      metoprolol succinate XL (TOPROL-XL) 25 mg 24 hr tablet Take 1 tablet (25 mg total) by mouth nightly. 90 tablet 3    pantoprazole (PROTONIX) 40 mg injection Infuse 40 mg into a venous catheter every 12 (twelve) hours Indications: gastroesophageal reflux disease. (Patient taking differently: Infuse 40 mg into a venous catheter every 12 (twelve) hours.)      tamoxifen (NOLVADEX) 20 mg chemo tablet Take  1 tablet (20 mg total) daily 90 tablet 3    albuterol 2.5 mg /3 mL (0.083 %) nebulizer solution Take 3 mL (2.5 mg total) by nebulization every 4 (four) hours as needed for wheezing or shortness of breath.      betamethasone valerate (VALISONE) 0.1 % ointment Apply topically 2 (two) times a day as needed for rash.      cefTAZidime (FORTAZ) 1 g/100 mL IVPB Infuse 100 mL (1 g total) into a venous catheter every 8 (eight) hours Indications: urinary tract infection. (Patient taking differently: Infuse 1 g into a venous catheter every 8 (eight) hours.)      insulin lispro U-100 (HumaLOG) 100 unit/mL pen Inject 1-10 Units under the skin every 6 (six) hours.      magnesium oxide (MAG-OX) 400 mg (241.3 mg magnesium) tablet 1 tablet (400 mg total) by Nasogastric route 2 (two) times a day.      rivaroxaban (XARELTO) 20 mg tablet Take 1 tablet (20 mg total) by mouth daily with dinner. 90 tablet 3    silver sulfadiazine (SILVADENE) 1 % cream Apply topically 2 (two) times a day. Apply to affected area. 50 g 1     No current  facility-administered medications for this visit.       Social History     Tobacco Use    Smoking status: Never    Smokeless tobacco: Never   Vaping Use    Vaping Use: Never used   Substance Use Topics    Alcohol use: Yes     Alcohol/week: 14.0 standard drinks of alcohol     Types: 14 Shots of liquor per week     Comment: 2 drinks/day - scotch    Drug use: Never       Family History   Problem Relation Age of Onset    Hyperlipidemia Biological Mother     Hypertension Biological Mother     Breast cancer Biological Mother     Cancer Biological Mother         gyn? cervical    Hyperlipidemia Biological Father     Hypertension Biological Father     Arthritis Biological Father     No Known Problems Biological Sister     No Known Problems Biological Brother     Heart disease Maternal Grandmother     Arthritis Maternal Grandfather     COPD Maternal Grandfather     Diabetes Maternal Grandfather     No Known Problems Paternal Grandmother     No Known Problems Paternal Grandfather     Cancer less than or equal to age 50 Other     Esophageal cancer Other         47, in abd,chemo q 3 weeks       Review of Systems   Constitutional: Negative for malaise/fatigue.   HENT:  Negative for hearing loss.    Eyes:  Negative for visual disturbance.   Cardiovascular:  Negative for chest pain, dyspnea on exertion, irregular heartbeat, leg swelling, near-syncope, orthopnea, palpitations, paroxysmal nocturnal dyspnea and syncope.   Respiratory:  Negative for cough and shortness of breath.    Hematologic/Lymphatic: Negative for bleeding problem.   Skin:  Negative for rash.   Musculoskeletal:  Negative for joint pain and muscle weakness.   Gastrointestinal:  Negative for abdominal pain.   Genitourinary:  Negative for hematuria.   Neurological:  Negative for focal weakness, paresthesias, tremors and weakness.   Psychiatric/Behavioral:  Negative for altered mental status.        Objective     Vitals:    04/25/24 1327   BP: 118/72   Pulse: (!) 51    SpO2: 98%         Physical Exam  Constitutional:       Appearance: He is well-developed.   HENT:      Head: Normocephalic and atraumatic.   Neck:      Vascular: No JVD.   Cardiovascular:      Rate and Rhythm: Regular rhythm. Bradycardia present.      Heart sounds: No murmur heard.  Pulmonary:      Breath sounds: Normal breath sounds.   Abdominal:      General: Bowel sounds are normal.      Palpations: Abdomen is soft.      Tenderness: There is no abdominal tenderness.   Musculoskeletal:      Right lower leg: No edema.      Left lower leg: No edema.   Skin:     General: Skin is warm and dry.   Neurological:      Mental Status: He is alert and oriented to person, place, and time.          Labs   Lab Results   Component Value Date    WBC 5.73 03/18/2024    HGB 10.7 (L) 03/18/2024    HCT 35.1 (L) 03/18/2024     03/18/2024    TRIG 303 (H) 12/27/2023    ALT 13 03/18/2024    AST 19 03/18/2024     03/18/2024    K 4.2 03/18/2024     03/18/2024    CREATININE 1.3 03/18/2024    BUN 26 (H) 03/18/2024    CO2 28 03/18/2024    TSH 0.40 11/03/2023    INR 1.8 01/07/2024       Echocardiogram   Transthoracic echocardiogram on 10/18/2022 at outside hospital showed an ejection fraction of 40-45%, global left ventricular hypokinesis, mildly dilated left atrium.    Echocardiogram 1/2/2024    Left Ventricle: Normal ventricle size. Normal wall thickness. Estimated EF 60-65%. No regional wall motion abnormalities.    Limited followup study shows normal LV function now- compared with apical hypokinesis on 12/26/23    Cardioversion 12/5/2022. amiodarone.     Cardiac Catheterization 12/25/2023    Angiographically normal epicardial coronary arteries    Left ventricular ejection fraction is approximately 25-30%.Wall motion abnormalities consistent with Takotsubo Cardiomyopathy    Severely elevated biventricular filling pressures    Reduced Cardiac Output and Index; Reduced Stroke volume and stroke volume index by Chelsi  Calculation    Pulmonary venous post-capillary hypertension primarily due to left heart dysfunction    No peak to peak gradient on pullback to suggest aortic stenosis    ECG   Sinus rhythm   Left axis deviation   Nonspecific intraventricular conduction block   Nonspecific T wave abnormality       Assessment/Plan   Problem List Items Addressed This Visit          Circulatory    Paroxysmal atrial fibrillation (CMS/HCC)     He is maintaining sinus rhythm/bradycardia.He is consistently bradycardic. Todays electrocardiogram also shows a prolonged QT interval. Gin asked him to decrease the amiodarone to 200 mg 3X/wee. This prolonged QT may be related to amiodarone along with  Tamoxifen he takes for his history of breast cancer.  Thyroid and liver enzymes lab work is ordered.          Relevant Medications    apixaban (ELIQUIS) 5 mg tablet    aspirin 81 mg chewable tablet    amiodarone (PACERONE) 200 mg tablet    Acute systolic heart failure (CMS/HCC)     Takotsubo cardiomyopathy during admission 12/23. Ef vis cardiac cath 12/25/2023 was 25-30%. Echocardiogram 1/2/2024 resumption of normal ejection fraction at 60-65%. There have been no symptoms or signs of heart failure.           Relevant Medications    aspirin 81 mg chewable tablet    amiodarone (PACERONE) 200 mg tablet    Cardiac arrest (CMS/HCC)     During the 12/2023 [place] admission he became hypoxic and bradycardic.  He was treated with atropine. He was subsequently intubated and again had bradycardia followed by polymorphic ventricular tachycardia that degenerated into sustained ventricular flutter.  He was treated with intravenous lidocaine.  He received 4 external shocks with a resultant slow wide-complex rhythm. He was given 300 mg of IV amiodarone and bolused with lidocaine. There were no further episodes of VF.          Relevant Medications    apixaban (ELIQUIS) 5 mg tablet    aspirin 81 mg chewable tablet    amiodarone (PACERONE) 200 mg tablet    Takotsubo  cardiomyopathy     He underwent a cardiac catheterization to rule out coronary disease during 12/2023 admission. Cardiac catheterization revealed no significant coronary disease. His LV gram was consistent with Takotsubo syndrome. He was started on intravenous milrinone as his intracardiac pressures were elevated. The EF has recovered with no further signs of Takotsubo.          Relevant Medications    aspirin 81 mg chewable tablet    amiodarone (PACERONE) 200 mg tablet       Other    Electrolyte abnormality     His magnesium down to 1.4 during admission to [facility].An order was placed for repeat labs. He is seeing Dr Moran upcoming and will be able to review results at that appointment.         Long term current use of amiodarone    Relevant Orders    TSH w reflex FT4    Comprehensive metabolic panel     Other Visit Diagnoses       Acute systolic congestive heart failure (CMS/HCC)    -  Primary    Relevant Medications    aspirin 81 mg chewable tablet    amiodarone (PACERONE) 200 mg tablet    Other Relevant Orders    [facility] MUSE ECG 12 lead (clinic performed) (Completed)    Magnesium    Comprehensive metabolic panel    Ventricular fibrillation (CMS/HCC)        Relevant Medications    apixaban (ELIQUIS) 5 mg tablet    aspirin 81 mg chewable tablet    amiodarone (PACERONE) 200 mg tablet    Other Relevant Orders    Magnesium    TSH w reflex FT4    Comprehensive metabolic panel              Gary Aceves MD   04/25/2024

## 2024-04-23 ENCOUNTER — TELEPHONE (OUTPATIENT)
Dept: SCHEDULING | Facility: CLINIC | Age: 74
End: 2024-04-23
Payer: MEDICARE

## 2024-04-23 NOTE — TELEPHONE ENCOUNTER
I called Mrs. Rosas.  She informed me she has both 15 mg bottles of Xarelto and 20 mg Xarelto.  She wanted to know how much she should be taking.     I reviewed his chart and at his OV with  on 11/27/23 the Xarelto was increased to 20 mg due to a creatinine clearance>50.      I called Mrs. Rosas back and left a message informing her Chops should be taking 20 mg Daily.  I told her to call me back with any questions.

## 2024-04-23 NOTE — TELEPHONE ENCOUNTER
Pt's spouse calling to verify dosage of medication Xarelto. Would like to know if currently taking 15mg or 20mg.  Please advise @ [phone]

## 2024-04-25 ENCOUNTER — OFFICE VISIT (OUTPATIENT)
Dept: CARDIOLOGY | Facility: CLINIC | Age: 74
End: 2024-04-25
Payer: MEDICARE

## 2024-04-25 VITALS
OXYGEN SATURATION: 98 % | WEIGHT: 172 LBS | HEIGHT: 66 IN | DIASTOLIC BLOOD PRESSURE: 72 MMHG | SYSTOLIC BLOOD PRESSURE: 118 MMHG | BODY MASS INDEX: 27.64 KG/M2 | HEART RATE: 51 BPM

## 2024-04-25 DIAGNOSIS — I50.21 ACUTE SYSTOLIC CONGESTIVE HEART FAILURE (CMS/HCC): Primary | ICD-10-CM

## 2024-04-25 DIAGNOSIS — I46.9 CARDIAC ARREST (CMS/HCC): ICD-10-CM

## 2024-04-25 DIAGNOSIS — I48.0 PAROXYSMAL ATRIAL FIBRILLATION (CMS/HCC): ICD-10-CM

## 2024-04-25 DIAGNOSIS — Z79.899 LONG TERM CURRENT USE OF AMIODARONE: ICD-10-CM

## 2024-04-25 DIAGNOSIS — I50.21 ACUTE SYSTOLIC HEART FAILURE (CMS/HCC): ICD-10-CM

## 2024-04-25 DIAGNOSIS — I51.81 TAKOTSUBO CARDIOMYOPATHY: ICD-10-CM

## 2024-04-25 DIAGNOSIS — I49.01 VENTRICULAR FIBRILLATION (CMS/HCC): ICD-10-CM

## 2024-04-25 DIAGNOSIS — E87.8 ELECTROLYTE ABNORMALITY: ICD-10-CM

## 2024-04-25 PROCEDURE — G8754 DIAS BP LESS 90: HCPCS | Performed by: INTERNAL MEDICINE

## 2024-04-25 PROCEDURE — G8752 SYS BP LESS 140: HCPCS | Performed by: INTERNAL MEDICINE

## 2024-04-25 PROCEDURE — 99214 OFFICE O/P EST MOD 30 MIN: CPT | Performed by: INTERNAL MEDICINE

## 2024-04-25 PROCEDURE — 93000 ELECTROCARDIOGRAM COMPLETE: CPT | Performed by: INTERNAL MEDICINE

## 2024-04-25 RX ORDER — BUSPIRONE HYDROCHLORIDE 10 MG/1
10 TABLET ORAL 2 TIMES DAILY
COMMUNITY

## 2024-04-25 RX ORDER — NAPROXEN SODIUM 220 MG/1
81 TABLET, FILM COATED ORAL DAILY
COMMUNITY
End: 2024-05-13 | Stop reason: ALTCHOICE

## 2024-04-25 RX ORDER — AMIODARONE HYDROCHLORIDE 200 MG/1
200 TABLET ORAL 3 TIMES WEEKLY
COMMUNITY
Start: 2024-04-26 | End: 2024-07-29 | Stop reason: SDUPTHER

## 2024-04-29 LAB
QRS DURATION: 128
QT INTERVAL: 646
QTC CALCULATION(BAZETT): 600
R AXIS: -51
T WAVE AXIS: -3
VENTRICULAR RATE: 52

## 2024-05-01 NOTE — ASSESSMENT & PLAN NOTE
During the 12/2023 [place] admission he became hypoxic and bradycardic.  He was treated with atropine. He was subsequently intubated and again had bradycardia followed by polymorphic ventricular tachycardia that degenerated into sustained ventricular flutter.  He was treated with intravenous lidocaine.  He received 4 external shocks with a resultant slow wide-complex rhythm. He was given 300 mg of IV amiodarone and bolused with lidocaine. There were no further episodes of VF.

## 2024-05-01 NOTE — ASSESSMENT & PLAN NOTE
Takotsubo cardiomyopathy during admission 12/23. Ef vis cardiac cath 12/25/2023 was 25-30%. Echocardiogram 1/2/2024 resumption of normal ejection fraction at 60-65%. There have been no symptoms or signs of heart failure.

## 2024-05-01 NOTE — ASSESSMENT & PLAN NOTE
He underwent a cardiac catheterization to rule out coronary disease during 12/2023 admission. Cardiac catheterization revealed no significant coronary disease. His LV gram was consistent with Takotsubo syndrome. He was started on intravenous milrinone as his intracardiac pressures were elevated. The EF has recovered with no further signs of Takotsubo.

## 2024-05-01 NOTE — ASSESSMENT & PLAN NOTE
His magnesium down to 1.4 during admission to [facility].An order was placed for repeat labs. He is seeing Dr Moran upcoming and will be able to review results at that appointment.

## 2024-05-01 NOTE — ASSESSMENT & PLAN NOTE
He is maintaining sinus rhythm/bradycardia.He is consistently bradycardic. Todays electrocardiogram also shows a prolonged QT interval. Gin asked him to decrease the amiodarone to 200 mg 3X/wee. This prolonged QT may be related to amiodarone along with  Tamoxifen he takes for his history of breast cancer.  Thyroid and liver enzymes lab work is ordered.

## 2024-05-03 ENCOUNTER — HOSPITAL ENCOUNTER (OUTPATIENT)
Dept: RADIOLOGY | Age: 74
Discharge: HOME | End: 2024-05-03
Attending: INTERNAL MEDICINE
Payer: MEDICARE

## 2024-05-03 DIAGNOSIS — C50.021 MALIGNANT NEOPLASM INVOLVING BOTH NIPPLE AND AREOLA OF RIGHT BREAST IN MALE, ESTROGEN RECEPTOR POSITIVE (CMS/HCC): ICD-10-CM

## 2024-05-03 DIAGNOSIS — Z17.0 MALIGNANT NEOPLASM INVOLVING BOTH NIPPLE AND AREOLA OF RIGHT BREAST IN MALE, ESTROGEN RECEPTOR POSITIVE (CMS/HCC): ICD-10-CM

## 2024-05-03 PROCEDURE — 76642 ULTRASOUND BREAST LIMITED: CPT | Mod: LT

## 2024-05-03 PROCEDURE — G0279 TOMOSYNTHESIS, MAMMO: HCPCS | Mod: LT

## 2024-05-13 ENCOUNTER — OFFICE VISIT (OUTPATIENT)
Dept: SURGERY | Facility: CLINIC | Age: 74
End: 2024-05-13
Payer: MEDICARE

## 2024-05-13 VITALS
BODY MASS INDEX: 29.12 KG/M2 | HEIGHT: 65 IN | DIASTOLIC BLOOD PRESSURE: 68 MMHG | OXYGEN SATURATION: 99 % | SYSTOLIC BLOOD PRESSURE: 112 MMHG | TEMPERATURE: 98 F | HEART RATE: 61 BPM | WEIGHT: 174.8 LBS

## 2024-05-13 DIAGNOSIS — C50.021 MALIGNANT NEOPLASM INVOLVING BOTH NIPPLE AND AREOLA OF RIGHT BREAST IN MALE, ESTROGEN RECEPTOR POSITIVE (CMS/HCC): Primary | ICD-10-CM

## 2024-05-13 DIAGNOSIS — Z17.0 MALIGNANT NEOPLASM INVOLVING BOTH NIPPLE AND AREOLA OF RIGHT BREAST IN MALE, ESTROGEN RECEPTOR POSITIVE (CMS/HCC): Primary | ICD-10-CM

## 2024-05-13 DIAGNOSIS — Z15.09 BRCA1 POSITIVE: ICD-10-CM

## 2024-05-13 DIAGNOSIS — Z15.89 MONOALLELIC MUTATION OF ATM GENE: ICD-10-CM

## 2024-05-13 DIAGNOSIS — Z15.01 MONOALLELIC MUTATION OF ATM GENE: ICD-10-CM

## 2024-05-13 DIAGNOSIS — R92.8 ABNORMAL MAMMOGRAM: ICD-10-CM

## 2024-05-13 DIAGNOSIS — Z15.09 MONOALLELIC MUTATION OF ATM GENE: ICD-10-CM

## 2024-05-13 DIAGNOSIS — Z15.01 BRCA1 POSITIVE: ICD-10-CM

## 2024-05-13 PROCEDURE — G8754 DIAS BP LESS 90: HCPCS | Performed by: SURGERY

## 2024-05-13 PROCEDURE — G8752 SYS BP LESS 140: HCPCS | Performed by: SURGERY

## 2024-05-13 PROCEDURE — 99214 OFFICE O/P EST MOD 30 MIN: CPT | Performed by: SURGERY

## 2024-05-13 RX ORDER — PANTOPRAZOLE SODIUM 40 MG/1
40 TABLET, DELAYED RELEASE ORAL 2 TIMES DAILY
COMMUNITY
Start: 2024-05-01

## 2024-05-13 NOTE — LETTER
May 15, 2024     Usman Collins MD  [address] [facility] [address]    Patient: Cam Rosas Jr.  YOB: 1950  Date of Visit: 2024      Dear Dr. Collins:    Thank you for referring Cam Rosas Jr. to me for evaluation. Below are my notes for this consultation.    If you have questions, please do not hesitate to call me. I look forward to following your patient along with you.         Sincerely,        Barb Osuna MD        CC: MD Enrrique Rollins Catherine D, MD  5/15/2024  5:34 PM  Sign when Signing Visit      [facility]  Dr. Barb Osuna  [address] [place] [address]  Phone: [phone]  Fax: [phone]      Patient ID: Cam Rosas Jr.                              : 1950    Visit Date: 2024  Referring Provider: No ref. provider found   PCP: Usman Collins MD  GYN: No care team member to display    Subjective: Radha presents for 6-month follow-up of right breast cancer.  He carries a mutation in BRCA1 as well as OK.  He originally was treated for breast cancer with chemotherapy for HER2 positive, but ended up having a reaction to the HER2 therapy and developed heart issues.  He stopped his chemotherapy after hospitalization, and on 2023 proceeded with a right breast mastectomy and sentinel node biopsy.  Final pathology revealed in the right breast invasive ductal carcinoma, grade 3, with multiple scattered foci of disease, the largest measuring 12 mm.  Cancer involved the dermis and dermal lymphatic invasion was present but no skin ulceration.  In his sentinel nodes 1 out of 4 nodes were positive with a 2.5 mm metastasis but no extranodal extension.  He declined any additional chemotherapy, he was treated with chest wall radiation and is on tamoxifen.  He was hospitalized with respiratory failure and had a tracheostomy placed.  This was removed as was his G-tube.  He reports he is  feeling pretty good.     Oncology History:    Cancer Staging   Malignant neoplasm of right male breast (CMS/HCC)  Staging form: Breast, AJCC 8th Edition  - Clinical stage from 3/20/2023: Stage IA (cT1c, cN0, cM0, G3, ER+, AZ+, HER2+) - Signed by Barb Osuna MD on 3/20/2023  - Pathologic stage from 8/28/2023: No Stage Recommended (ypT1c, pN1a(sn), cM0, G3, ER+, AZ+, HER2+) - Signed by Barb Osuna MD on 8/28/2023    Prostate cancer (CMS/HCC)  Staging form: Prostate, AJCC 8th Edition  - Pathologic stage from 7/27/2022: Stage Unknown (pT3b, pNX, cM0, PSA: 6, Grade Group: 3) - Signed by [facility], Gregory J, MD on 3/8/2023         Oncology History   Malignant neoplasm of right male breast (CMS/HCC)   8/29/2022 Biopsy     1.  Right breast mass:     Invasive ductal carcinoma, [place] grade 3 (3+ 3+2).   Invasive carcinoma is 13 mm in maximum dimension in core biopsy.    ER+ 90%; AZ+ 50%; Tno1qfg+3; SH8483%     9/30/2022 -  Chemotherapy    9/30/2022 initiated first cycle TCP H at [facility].  Questionable reaction during the anti-HER2/jhonatan therapy with Herceptin.  Taxotere and carboplatin given on 10/3/2022.  Patient hospitalized after first cycle.     10/19/2022 Initial Diagnosis    Malignant neoplasm of right male breast (CMS/HCC)     11/15/2022 -  Hormone Therapy    Tamoxifen 20 mg daily while undergoes evaluation of cardiac issues     3/20/2023 Cancer Staged    Staging form: Breast, AJCC 8th Edition  - Clinical stage from 3/20/2023: Stage IA (cT1c, cN0, cM0, G3, ER+, AZ+, HER2+)     8/28/2023 Cancer Staged    Staging form: Breast, AJCC 8th Edition  - Pathologic stage from 8/28/2023: No Stage Recommended (ypT1c, pN1a(sn), cM0, G3, ER+, AZ+, HER2+)     Prostate cancer (CMS/HCC)   7/27/2022 Cancer Staged    Staging form: Prostate, AJCC 8th Edition  - Pathologic stage from 7/27/2022: Stage Unknown (pT3b, pNX, cM0, PSA: 6, Grade Group: 3)     3/21/2023 - 6/20/2023 Chemotherapy     "LEUPROLIDE (LUPRON) 22.5 MG EVERY 3 MONTHS  Plan Provider: Paz Cleveland MD     3/21/2023 -  Chemotherapy    MEDIPORT FLUSH (SINGLE LUMEN) - PATIENT ON TREATMENT  Plan Provider: Paz Cleveland MD     3/21/2023 - 3/21/2023 Chemotherapy    LEUPROLIDE (LUPRON) 22.5 MG EVERY 3 MONTHS  Plan Provider: Paz Cleveland MD           Review of Systems   Musculoskeletal:  Positive for back pain.   All other systems reviewed and are negative.         Physical Exam:    Vitals: Visit Vitals  /68   Pulse 61   Temp 36.7 °C (98 °F)   Ht 1.651 m (5' 5\")   Wt 79.3 kg (174 lb 12.8 oz)   SpO2 99%   BMI 29.09 kg/m²     Body mass index is 29.09 kg/m².    Physical Exam  Constitutional:       Appearance: Normal appearance.   HENT:      Head: Normocephalic and atraumatic.   Neck:      Comments: Tracheostomy scar well-healed  Cardiovascular:      Rate and Rhythm: Normal rate and regular rhythm.   Pulmonary:      Effort: Pulmonary effort is normal.      Breath sounds: Normal breath sounds.   Chest:      Comments: Status post right mastectomy with well-healed scar, left breast normal in appearance.  No masses noted over the right chest wall or in the left breast.  No axillary or supraclavicular adenopathy.  No skin changes noted.  Abdominal:      Palpations: Abdomen is soft.   Musculoskeletal:         General: Normal range of motion.   Skin:     General: Skin is warm and dry.   Neurological:      General: No focal deficit present.      Mental Status: He is alert and oriented to person, place, and time.   Psychiatric:         Mood and Affect: Mood normal.         Behavior: Behavior normal.         Breast Imaging: I have personally reviewed the reports and images as follows.  Mammogram was reviewed, there was some asymmetry noted in the left breast which appeared to resolve with additional views, ultrasound was negative, 6-month follow-up recommended BI-RADS 3  Impression:  Malignant neoplasm of right breast  BRCA1 " positive  OK positive  Abnormal mammogram  Recommendation and Plan:   Radha presents for 6-month follow-up of right breast cancer status post mastectomy, radiation, and currently on tamoxifen.  He has no evidence of disease at this time, his mammogram was reviewed and showed an asymmetry initially in the left breast but this appeared to resolve with additional imaging.  I reassured him that this appears benign, but did recommend a follow-up mammogram in 6 months for completeness.  He was given a prescription for an ultrasound at that time if indicated.  He will continue tamoxifen, self exams, healthy diet and exercise as able.  I will plan on seeing him back in the office in 6 months unless he has any concerns in the meantime.    All of the questions were answered.  The patient is in agreement with the treatment plan.      No follow-ups on file.        5/13/2024   2:16 PM        Barb Osuna MD

## 2024-05-13 NOTE — PROGRESS NOTES
[facility]  Dr. Barb Osuna  [address] [place] [address]  Phone: [phone]  Fax: [phone]      Patient ID: Cam Rosas Jr.                              : 1950    Visit Date: 2024  Referring Provider: No ref. provider found   PCP: Usman Collins MD  GYN: No care team member to display    Subjective: Radha presents for 6-month follow-up of right breast cancer.  He carries a mutation in BRCA1 as well as OK.  He originally was treated for breast cancer with chemotherapy for HER2 positive, but ended up having a reaction to the HER2 therapy and developed heart issues.  He stopped his chemotherapy after hospitalization, and on 2023 proceeded with a right breast mastectomy and sentinel node biopsy.  Final pathology revealed in the right breast invasive ductal carcinoma, grade 3, with multiple scattered foci of disease, the largest measuring 12 mm.  Cancer involved the dermis and dermal lymphatic invasion was present but no skin ulceration.  In his sentinel nodes 1 out of 4 nodes were positive with a 2.5 mm metastasis but no extranodal extension.  He declined any additional chemotherapy, he was treated with chest wall radiation and is on tamoxifen.  He was hospitalized with respiratory failure and had a tracheostomy placed.  This was removed as was his G-tube.  He reports he is feeling pretty good.     Oncology History:    Cancer Staging   Malignant neoplasm of right male breast (CMS/HCC)  Staging form: Breast, AJCC 8th Edition  - Clinical stage from 3/20/2023: Stage IA (cT1c, cN0, cM0, G3, ER+, WA+, HER2+) - Signed by Barb Osuna MD on 3/20/2023  - Pathologic stage from 2023: No Stage Recommended (ypT1c, pN1a(sn), cM0, G3, ER+, WA+, HER2+) - Signed by Barb Osuna MD on 2023    Prostate cancer (CMS/HCC)  Staging form: Prostate, AJCC 8th Edition  - Pathologic stage from 2022: Stage Unknown (pT3b, pNX,  "cM0, PSA: 6, Grade Group: 3) - Signed by [facility], Gregory J, MD on 3/8/2023         Oncology History   Malignant neoplasm of right male breast (CMS/HCC)   8/29/2022 Biopsy     1.  Right breast mass:     Invasive ductal carcinoma, [place] grade 3 (3+ 3+2).   Invasive carcinoma is 13 mm in maximum dimension in core biopsy.    ER+ 90%; WV+ 50%; Anc1mno+3; OE7309%     9/30/2022 -  Chemotherapy    9/30/2022 initiated first cycle TCP H at [facility].  Questionable reaction during the anti-HER2/jhonatan therapy with Herceptin.  Taxotere and carboplatin given on 10/3/2022.  Patient hospitalized after first cycle.     10/19/2022 Initial Diagnosis    Malignant neoplasm of right male breast (CMS/HCC)     11/15/2022 -  Hormone Therapy    Tamoxifen 20 mg daily while undergoes evaluation of cardiac issues     3/20/2023 Cancer Staged    Staging form: Breast, AJCC 8th Edition  - Clinical stage from 3/20/2023: Stage IA (cT1c, cN0, cM0, G3, ER+, WV+, HER2+)     8/28/2023 Cancer Staged    Staging form: Breast, AJCC 8th Edition  - Pathologic stage from 8/28/2023: No Stage Recommended (ypT1c, pN1a(sn), cM0, G3, ER+, WV+, HER2+)     Prostate cancer (CMS/HCC)   7/27/2022 Cancer Staged    Staging form: Prostate, AJCC 8th Edition  - Pathologic stage from 7/27/2022: Stage Unknown (pT3b, pNX, cM0, PSA: 6, Grade Group: 3)     3/21/2023 - 6/20/2023 Chemotherapy    LEUPROLIDE (LUPRON) 22.5 MG EVERY 3 MONTHS  Plan Provider: Paz Cleveland MD     3/21/2023 -  Chemotherapy    MEDIPORT FLUSH (SINGLE LUMEN) - PATIENT ON TREATMENT  Plan Provider: Paz Cleveland MD     3/21/2023 - 3/21/2023 Chemotherapy    LEUPROLIDE (LUPRON) 22.5 MG EVERY 3 MONTHS  Plan Provider: Paz Cleveland MD           Review of Systems   Musculoskeletal:  Positive for back pain.   All other systems reviewed and are negative.         Physical Exam:    Vitals: Visit Vitals  /68   Pulse 61   Temp 36.7 °C (98 °F)   Ht 1.651 m (5' 5\")   Wt 79.3 kg (174 " lb 12.8 oz)   SpO2 99%   BMI 29.09 kg/m²     Body mass index is 29.09 kg/m².    Physical Exam  Constitutional:       Appearance: Normal appearance.   HENT:      Head: Normocephalic and atraumatic.   Neck:      Comments: Tracheostomy scar well-healed  Cardiovascular:      Rate and Rhythm: Normal rate and regular rhythm.   Pulmonary:      Effort: Pulmonary effort is normal.      Breath sounds: Normal breath sounds.   Chest:      Comments: Status post right mastectomy with well-healed scar, left breast normal in appearance.  No masses noted over the right chest wall or in the left breast.  No axillary or supraclavicular adenopathy.  No skin changes noted.  Abdominal:      Palpations: Abdomen is soft.   Musculoskeletal:         General: Normal range of motion.   Skin:     General: Skin is warm and dry.   Neurological:      General: No focal deficit present.      Mental Status: He is alert and oriented to person, place, and time.   Psychiatric:         Mood and Affect: Mood normal.         Behavior: Behavior normal.         Breast Imaging: I have personally reviewed the reports and images as follows.  Mammogram was reviewed, there was some asymmetry noted in the left breast which appeared to resolve with additional views, ultrasound was negative, 6-month follow-up recommended BI-RADS 3  Impression:  Malignant neoplasm of right breast  BRCA1 positive  OK positive  Abnormal mammogram  Recommendation and Plan:   [facility] presents for 6-month follow-up of right breast cancer status post mastectomy, radiation, and currently on tamoxifen.  He has no evidence of disease at this time, his mammogram was reviewed and showed an asymmetry initially in the left breast but this appeared to resolve with additional imaging.  I reassured him that this appears benign, but did recommend a follow-up mammogram in 6 months for completeness.  He was given a prescription for an ultrasound at that time if indicated.  He will continue tamoxifen,  self exams, healthy diet and exercise as able.  I will plan on seeing him back in the office in 6 months unless he has any concerns in the meantime.    All of the questions were answered.  The patient is in agreement with the treatment plan.      No follow-ups on file.        5/13/2024   2:16 PM        Barb Osuna MD

## 2024-05-14 ENCOUNTER — DOCUMENTATION (OUTPATIENT)
Dept: RADIATION ONCOLOGY | Facility: HOSPITAL | Age: 74
End: 2024-05-14
Payer: MEDICARE

## 2024-05-14 NOTE — PROGRESS NOTES
Malignant Neoplasm Of Right Male Breast  Prostate cancer     LOV 2/15/24  EOT 11/1/23     [facility] 5/13/24 next appt 12/16/24     Dr Gale appt LOV 12/7/23 next appt 7/16/24    Dr Moran cardiology next appt 5/20/24  Dr Aceves cardiology LOV 4/25/24 next appt 9/13/24       Labs 2/12/24      5/3/24 Diagnostic mammogram left and Us breast limited left  Narrative & Impression   CLINICAL HISTORY: 74-year-old man with a personal history of right breast cancer  status post mastectomy. No reported symptoms.     COMPARISON: Prior available studies.     TECHNIQUE:  Full field left digital mammography was performed including digital breast  tomosynthesis imaging. Computer assisted detection was utilized during the  interpretation of this examination. Left breast ultrasound was also performed.     COMMENT:  Diagnostic Left Mammogram  There is a tiny focal asymmetry in the upper outer left breast/axilla which is  slightly more prominent compared to prior studies, although it does not persist  on additional views.     Diagnostic Left Ultrasound  Sonogram was directed to the location of mammographic concern, from 1-3:00, 4-9  cm from the nipple. There are no suspicious findings.     --  IMPRESSION: Periodic imaging surveillance is recommended for a probably benign  focal asymmetry in the left breast without a sonographic correlate. A short  interval follow-up diagnostic left mammogram will be due in 6 months.     These results and recommendations were discussed with and acknowledged by the  patient and were given to the patient in writing at the time of the examination.

## 2024-05-15 ASSESSMENT — ENCOUNTER SYMPTOMS: BACK PAIN: 1

## 2024-05-16 ENCOUNTER — TELEPHONE (OUTPATIENT)
Dept: SCHEDULING | Facility: CLINIC | Age: 74
End: 2024-05-16
Payer: MEDICARE

## 2024-05-16 ENCOUNTER — HOSPITAL ENCOUNTER (OUTPATIENT)
Dept: RADIATION ONCOLOGY | Facility: HOSPITAL | Age: 74
Setting detail: RADIATION/ONCOLOGY SERIES
Discharge: HOME | End: 2024-05-16
Attending: RADIOLOGY
Payer: MEDICARE

## 2024-05-16 VITALS
HEART RATE: 57 BPM | DIASTOLIC BLOOD PRESSURE: 62 MMHG | SYSTOLIC BLOOD PRESSURE: 120 MMHG | WEIGHT: 174.4 LBS | BODY MASS INDEX: 29.02 KG/M2 | OXYGEN SATURATION: 98 %

## 2024-05-16 DIAGNOSIS — Z17.0 MALIGNANT NEOPLASM INVOLVING BOTH NIPPLE AND AREOLA OF RIGHT BREAST IN MALE, ESTROGEN RECEPTOR POSITIVE (CMS/HCC): Primary | ICD-10-CM

## 2024-05-16 DIAGNOSIS — C61 PROSTATE CANCER (CMS/HCC): ICD-10-CM

## 2024-05-16 DIAGNOSIS — E78.5 HYPERLIPIDEMIA, UNSPECIFIED: ICD-10-CM

## 2024-05-16 DIAGNOSIS — C61 MALIGNANT NEOPLASM OF PROSTATE (CMS/HCC): ICD-10-CM

## 2024-05-16 DIAGNOSIS — C50.021 MALIGNANT NEOPLASM INVOLVING BOTH NIPPLE AND AREOLA OF RIGHT BREAST IN MALE, ESTROGEN RECEPTOR POSITIVE (CMS/HCC): Primary | ICD-10-CM

## 2024-05-16 RX ORDER — ATORVASTATIN CALCIUM 10 MG/1
10 TABLET, FILM COATED ORAL DAILY
Qty: 90 TABLET | Refills: 3 | Status: SHIPPED | OUTPATIENT
Start: 2024-05-16

## 2024-05-16 ASSESSMENT — ENCOUNTER SYMPTOMS
CARDIOVASCULAR NEGATIVE: 1
GASTROINTESTINAL NEGATIVE: 1
PSYCHIATRIC NEGATIVE: 1
ENDOCRINE NEGATIVE: 1
RESPIRATORY NEGATIVE: 1
HEMATOLOGIC/LYMPHATIC NEGATIVE: 1
MUSCULOSKELETAL NEGATIVE: 1
EYES NEGATIVE: 1
OCCASIONAL FEELINGS OF UNSTEADINESS: 1
CONSTITUTIONAL NEGATIVE: 1
NEUROLOGICAL NEGATIVE: 1

## 2024-05-16 ASSESSMENT — PAIN SCALES - GENERAL: PAINLEVEL: 0-NO PAIN

## 2024-05-16 ASSESSMENT — PATIENT HEALTH QUESTIONNAIRE - PHQ9: SUM OF ALL RESPONSES TO PHQ9 QUESTIONS 1 & 2: 0

## 2024-05-16 NOTE — TELEPHONE ENCOUNTER
"Medicine Refill Request [facility]    Name of Patient: Cam Rosas Jr.    Caller's name/Relationship: Spouse/ Fiorella    Callback number: [phone]      Medication Name, Dosage, Supply:  atorvastatin (LIPITOR) 10 mg tablet [[id number]]     Quantity left: 6 left. Pt requesting 90 day supply     Pharmacy:   [facility]/pharmacy #[id number] - [place] - [address]  [address], [place] [address]  Phone: [phone]  Fax: [phone]       Last Office Visit: upcoming 05.20.24  Last Consult Visit: Visit date not found  Last Telemedicine Visit: Visit date not found    Next Appointment: Visit date not found      Current Outpatient Medications:     amiodarone (PACERONE) 200 mg tablet, Take 1 tablet (200 mg total) by mouth 3 (three) times a week (Mon, Wed, Fri)., Disp: , Rfl:     atorvastatin (LIPITOR) 10 mg tablet, TAKE 1 TABLET (10 MG) ONCE DAILY AT BEDTIME, Disp: 30 tablet, Rfl: 6    busPIRone (BUSPAR) 10 mg tablet, Take 10 mg by mouth 2 (two) times a day., Disp: , Rfl:     cetirizine (ZyrTEC) 1 mg/mL syrup, 10 mL (10 mg total) by feeding tube route nightly., Disp: , Rfl:     cholecalciferol, vitamin D3, 1,000 unit (25 mcg) tablet, Take 1,000 Units by mouth daily., Disp: , Rfl:     melatonin ODT, 1 tablet (3 mg total) by peg tube route nightly as needed (sleep)., Disp: , Rfl:     metoprolol succinate XL (TOPROL-XL) 25 mg 24 hr tablet, Take 1 tablet (25 mg total) by mouth nightly., Disp: 90 tablet, Rfl: 3    pantoprazole (PROTONIX) 40 mg EC tablet, Take 40 mg by mouth 2 (two) times a day., Disp: , Rfl:     rivaroxaban (XARELTO) 20 mg tablet, Take 1 tablet (20 mg total) by mouth daily with dinner., Disp: 90 tablet, Rfl: 3    tamoxifen (NOLVADEX) 20 mg chemo tablet, Take  1 tablet (20 mg total) daily, Disp: 90 tablet, Rfl: 3      BP Readings from Last 3 Encounters:   05/13/24 112/68   04/25/24 118/72   03/26/24 (!) 101/50       Recent Lab results:  No results found for: \"CHOL\", No results found for: " "\"HDL\", No results found for: \"LDLCALC\",   Lab Results   Component Value Date    TRIG 303 (H) 12/27/2023        Lab Results   Component Value Date    GLUCOSE 87 03/18/2024   , No results found for: \"HGBA1C\"      Lab Results   Component Value Date    CREATININE 1.3 03/18/2024       Lab Results   Component Value Date    TSH 0.40 11/03/2023           "

## 2024-05-16 NOTE — LETTER
May 16, 2024                                                          Patient: Radha Rosas Jr.   YOB: 1950   Date of Visit: 5/16/2024       Dear Dr. Collins:    The patient is seen at the [facility] today. Attached is my assessment and plan of care.  Thank you for the opportunity to share in Radha Rosas Jr.'s care.       Sincerely,        Gregory J. [facility], MD      CC: No Recipients

## 2024-05-16 NOTE — PROGRESS NOTES
Patient ID:  Radha Rosas Jr. is a 74 y.o. male.  1950   Diagnosis Plan   1. 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 Prostate cancer (CMS/HCC)        3. Malignant neoplasm of prostate (CMS/HCC)  PSA         Referring Physician:   No referring provider defined for this encounter.    Primary Care Provider:  Usman Collins MD    Problem List:  Patient Active Problem List    Diagnosis Date Noted    Takotsubo cardiomyopathy 01/10/2024    Pneumonia 01/10/2024    Elevated LFTs 01/10/2024    Tracheostomy in place (CMS/HCC) 01/10/2024    Status post insertion of percutaneous endoscopic gastrostomy (PEG) tube (CMS/HCC) 01/10/2024    Severe sepsis (CMS/HCC) 12/24/2023    Influenza A 12/24/2023    Cardiac arrest (CMS/HCC) 12/24/2023    Moderate protein-calorie malnutrition (CMS/HCC) 12/24/2023    Long term current use of amiodarone 10/02/2023    Acute renal failure superimposed on stage 3a chronic kidney disease (CMS/HCC) 10/02/2023    BRCA1 positive 08/28/2023    Monoallelic mutation of [place] gene 08/28/2023    Herniation of lumbar intervertebral disc with radiculopathy 07/18/2023    Spinal stenosis of lumbar region 07/18/2023    Spondylosis of lumbosacral region 07/18/2023    Acute systolic heart failure (CMS/HCC) 02/15/2023    Paroxysmal atrial fibrillation (CMS/HCC) 10/19/2022    Chronic combined systolic and diastolic CHF (congestive heart failure) (CMS/HCC) 10/19/2022    Malignant neoplasm of right male breast (CMS/HCC) 10/19/2022    Electrolyte abnormality 10/19/2022    Gastrointestinal hemorrhage with melena 10/19/2022    CKD (chronic kidney disease) 10/19/2022    Prostate cancer (CMS/HCC) 10/19/2022    Dehydration determined by examination 10/10/2022    APC (atrial premature contractions) 03/30/2022    Essential hypertension 08/31/2021    Hyperlipidemia 08/31/2021  "   Stage 3a chronic kidney disease (CMS/HCC) 08/31/2021        Cancer Staging   Malignant neoplasm of right male breast (CMS/[facility])  Staging form: Breast, AJCC 8th Edition  - Clinical stage from 3/20/2023: Stage IA (cT1c, cN0, cM0, G3, ER+, AK+, HER2+) - Signed by Barb Osuna MD on 3/20/2023  - Pathologic stage from 8/28/2023: No Stage Recommended (ypT1c, pN1a(sn), cM0, G3, ER+, AK+, HER2+) - Signed by Barb Osuna MD on 8/28/2023    Prostate cancer (CMS/[facility])  Staging form: Prostate, AJCC 8th Edition  - Pathologic stage from 7/27/2022: Stage Unknown (pT3b, pNX, cM0, PSA: 6, Grade Group: 3) - Signed by [facility], Gregory J, MD on 3/8/2023      Subjective    History of Present Illness:  The following portions of the patient's history were reviewed and updated as appropriate: allergies, current medications, past family history, past medical history, past social history, and past surgical history.   HPI patient presents with spouse without complaints.  Recent left breast mammogram and ultrasound shows no evidence of malignancy recommend 6-month follow-up.  Patient continues on tamoxifen.  Patient no longer on hormone therapy for prostate cancer no recent PSA level.  Patient denies bone pain.  Review of Systems   Constitutional: Negative.    HENT:  Negative.     Eyes: Negative.    Respiratory: Negative.     Cardiovascular: Negative.    Gastrointestinal: Negative.    Endocrine: Negative.    Genitourinary: Negative.     Musculoskeletal: Negative.    Skin: Negative.    Neurological: Negative.    Hematological: Negative.    Psychiatric/Behavioral: Negative.              Objective      Physical Exam:  Vital Signs for this encounter:  BP: 120/62  Temp: 36.7 °C (98 °F)  Heart Rate: 57  SpO2: 98 %  Height: 165.1 cm (5' 5\")  Weight: 79.1 kg (174 lb 6.4 oz) (05/13 1403-05/16 1346)    Physical Exam healed right-sided mastectomy incision.  Dry skin right chest wall with increased pigmentation consistent with " radiation changes.  No suspicious lesions noted in the left breast.  No adenopathy appreciated nor arm swelling noted.  Lungs clear.  Abdomen soft nontender.  Back nontender.  Performance Status:80     PAIN ASSESSMENT:  Score Zero    Location    Pain Intervention      LABS:  No results found for this or any previous visit (from the past 336 hour(s)).       Assessment/Plan history of intermediate risk prostate cancer status post hormonal therapy and radiation therapy.  Patient stopped hormonal therapy early secondary weakness.  Patient due for PSA level given prescription for PSA level and testosterone level and to call for results.  Patient also with history of male right breast cancer status postmastectomy followed by radiation therapy to the chest wall and nicolette areas.  Patient on tamoxifen.  Genetic testing apparently showed BRCA 1 is aware.  Follow-up in 6 months repeat mammogram and ultrasound left breast and follow-up afterwards.    Diagnoses and Orders Associated With This Visit:  There are no diagnoses linked to this encounter.  TREATMENT PLAN SUMMARY:  Radiation Treatments       Active   No active radiation treatments to show.     Historical   Plans   1a_part pelv   Most recent treatment: Dose planned: 180 cGy (fraction 28 of 28 on 5/22/2023)   Total: Dose planned: 5,040 cGy   Elapsed Days: 55 days as of 2023-06-07 @ 14:2817      1b_cd prosbed   Most recent treatment: Dose planned: 180 cGy (fraction 11 of 11 on 6/7/2023)   Total: Dose planned: 1,980 cGy   Elapsed Days: 55 days as of 2023-06-07 @ 14:2817      2a_right nds   Most recent treatment: Dose planned: 200 cGy (fraction 25 of 25 on 11/1/2023)   Total: Dose planned: 5,000 cGy   Elapsed Days: 34 days as of 2023-11-01 @ 10:3209      2b_R CW NoBol   Most recent treatment: Dose planned: 200 cGy (fraction 10 of 10 on 11/1/2023)   Total: Dose planned: 2,000 cGy   Elapsed Days: 34 days as of 2023-11-01 @ 10:3209      2b_right CW   Most recent treatment: Dose  planned: 200 cGy (fraction 15 of 15 on 10/18/2023)   Total: Dose planned: 5,000 cGy   Elapsed Days: 34 days as of 2023-11-01 @ 10:3209      1a_part pelv   Most recent treatment: Dose planned: 180 cGy (fraction 0 of 28 on 10/18/2023)   Total: Dose planned: 5,040 cGy   Elapsed Days: : @ --      1a_part pelv1   Most recent treatment: Dose planned: 180 cGy (fraction 0 of 28 on 10/18/2023)   Total: Dose planned: 5,040 cGy   Elapsed Days: : @ --      1b_cd prosbd1   Most recent treatment: Dose planned: 180 cGy (fraction 0 of 11 on 10/18/2023)   Total: Dose planned: 1,980 cGy   Elapsed Days: : @ --      1b_cd prosbed   Most recent treatment: Dose planned: 180 cGy (fraction 0 of 11 on 10/18/2023)   Total: Dose planned: 1,980 cGy   Elapsed Days: : @ --      2b_RtCWBol 15   Most recent treatment: Dose planned: 200 cGy (fraction 0 of 15 on 10/18/2023)   Total: Dose planned: 3,000 cGy   Elapsed Days: : @ --      2 flds   Most recent treatment: Dose planned: -- (fraction 0 of 25 on 9/18/2023)   Total: Dose planned: --   Elapsed Days: : @ --      2a_rt nodEZ0   Most recent treatment: Dose planned: 200 cGy (fraction 0 of 25 on 9/18/2023)   Total: Dose planned: 5,000 cGy   Elapsed Days: : @ --      2a_rt nodes   Most recent treatment: Dose planned: 200 cGy (fraction 0 of 25 on 9/18/2023)   Total: Dose planned: 5,000 cGy   Elapsed Days: : @ --      2b_flds   Most recent treatment: Dose planned: 200 cGy (fraction 0 of 25 on 9/18/2023)   Total: Dose planned: 5,000 cGy   Elapsed Days: : @ --      2b_fldsEZ0   Most recent treatment: Dose planned: 200 cGy (fraction 0 of 25 on 9/18/2023)   Total: Dose planned: 5,000 cGy   Elapsed Days: : @ --      Reference Points   1_calc   Most recent treatment: Dose given: 183 cGy (on 6/7/2023)   Total: Dose given: 7,127 cGy   Elapsed Days: 55 days as of 2023-06-07 @ 14:2817      1a_part pelv   Most recent treatment: Dose given: 180 cGy (on 5/22/2023)   Total: Dose given: 5,040 cGy   Elapsed Days: 55  days as of 2023-06-07 @ 14:2817      1b_cd prosbed   Most recent treatment: Dose given: 180 cGy (on 6/7/2023)   Total: Dose given: 1,980 cGy   Elapsed Days: 55 days as of 2023-06-07 @ 14:2817      1c_trgt prosbed   Most recent treatment: Dose given: 180 cGy (on 6/7/2023)   Total: Dose given: 7,020 cGy   Elapsed Days: 55 days as of 2023-06-07 @ 14:2817      2a_calc Nodes   Most recent treatment: Dose given: 200 cGy (on 11/1/2023)   Total: Dose given: 5,000 cGy   Elapsed Days: 34 days as of 2023-11-01 @ 10:3209      2a_rt nodes   Most recent treatment: Dose given: 200 cGy (on 11/1/2023)   Total: Dose given: 5,000 cGy   Elapsed Days: 34 days as of 2023-11-01 @ 10:3209      2b_calc CW   Most recent treatment: Dose given: 202 cGy (on 11/1/2023)   Total: Dose given: 5,051 cGy   Elapsed Days: 34 days as of 2023-11-01 @ 10:3209      2b_right CW   Most recent treatment: Dose given: 200 cGy (on 11/1/2023)   Total: Dose given: 5,000 cGy   Elapsed Days: 34 days as of 2023-11-01 @ 10:3209      1_calc   Most recent treatment: Course completed on 10/18/2023   Total: Dose given: 0 cGy   Elapsed Days: : @ --      1a_part pelv   Most recent treatment: Course completed on 10/18/2023   Total: Dose given: 0 cGy   Elapsed Days: : @ --      1b_cd prosbed   Most recent treatment: Course completed on 10/18/2023   Total: Dose given: 0 cGy   Elapsed Days: : @ --      1c_trgt prosbed   Most recent treatment: Course completed on 10/18/2023   Total: Dose given: 0 cGy   Elapsed Days: : @ --      2b_calc CW   Most recent treatment: Course completed on 10/18/2023   Total: Dose given: 0 cGy   Elapsed Days: : @ --      2b_right CW   Most recent treatment: Course completed on 10/18/2023   Total: Dose given: 0 cGy   Elapsed Days: : @ --      2a_calc Nodes   Most recent treatment: Course completed on 9/18/2023   Total: Dose given: 0 cGy   Elapsed Days: : @ --      2a_rt nodes   Most recent treatment: Course completed on 9/18/2023   Total: Dose given: 0  cGy   Elapsed Days: : @ --      2b_calc CW   Most recent treatment: Course completed on 9/18/2023   Total: Dose given: 0 cGy   Elapsed Days: : @ --                     PO CHEMOTHERAPY:  Tamoxifen 20mg daily no side effects noted     THERAPY PLAN:  No therapy plan of the specified type found.

## 2024-05-20 ENCOUNTER — OFFICE VISIT (OUTPATIENT)
Dept: CARDIOLOGY | Facility: CLINIC | Age: 74
End: 2024-05-20
Payer: MEDICARE

## 2024-05-20 ENCOUNTER — DOCUMENTATION (OUTPATIENT)
Dept: CARDIOLOGY | Facility: CLINIC | Age: 74
End: 2024-05-20
Payer: MEDICARE

## 2024-05-20 VITALS
SYSTOLIC BLOOD PRESSURE: 144 MMHG | OXYGEN SATURATION: 99 % | DIASTOLIC BLOOD PRESSURE: 70 MMHG | RESPIRATION RATE: 18 BRPM | HEIGHT: 65 IN | WEIGHT: 177 LBS | BODY MASS INDEX: 29.49 KG/M2 | HEART RATE: 50 BPM

## 2024-05-20 DIAGNOSIS — I50.32 CHRONIC DIASTOLIC CONGESTIVE HEART FAILURE (CMS/HCC): Primary | ICD-10-CM

## 2024-05-20 DIAGNOSIS — I51.81 TAKOTSUBO CARDIOMYOPATHY: ICD-10-CM

## 2024-05-20 DIAGNOSIS — I48.0 PAROXYSMAL ATRIAL FIBRILLATION (CMS/HCC): ICD-10-CM

## 2024-05-20 DIAGNOSIS — Z79.899 LONG TERM CURRENT USE OF AMIODARONE: ICD-10-CM

## 2024-05-20 DIAGNOSIS — Z87.19 HISTORY OF GI BLEED: ICD-10-CM

## 2024-05-20 DIAGNOSIS — N18.31 STAGE 3A CHRONIC KIDNEY DISEASE (CMS/HCC): ICD-10-CM

## 2024-05-20 DIAGNOSIS — C61 PROSTATE CANCER (CMS/HCC): ICD-10-CM

## 2024-05-20 PROCEDURE — 99214 OFFICE O/P EST MOD 30 MIN: CPT | Performed by: INTERNAL MEDICINE

## 2024-05-20 PROCEDURE — G8753 SYS BP > OR = 140: HCPCS | Performed by: INTERNAL MEDICINE

## 2024-05-20 PROCEDURE — G8754 DIAS BP LESS 90: HCPCS | Performed by: INTERNAL MEDICINE

## 2024-05-20 RX ORDER — TORSEMIDE 20 MG/1
10 TABLET ORAL DAILY PRN
Start: 2024-05-20

## 2024-05-20 NOTE — LETTER
"May 31, 2024     Dr. Shade Ortiz    Patient: Cam Rosas Jr.  YOB: 1950  Date of Visit: 5/20/2024      Dear Dr. Ortiz:    Thank you for referring Cam Rosas Jr. to me for evaluation. Below are my notes for this consultation.    If you have questions, please do not hesitate to call me. I look forward to following your patient along with you.         Sincerely,        Gary Moran MD        CC: Chel Medel CRNP  5/31/2024 10:39 AM  Signed  5/31/2024    ADDENDUM    Preoperative cardiovascular risk assessment:    Procedure: spine injection    Date of procedure: TBD    Physician performing procedure: Shade Ortiz MD    Fax number: [phone]    Assessment/Recommendations:    This patient is an acceptable risk for the proposed procedure. He may hold rivaroxaban/Xarelto for 3 days before the procedure if you so wish.  Please resume once deemed safe from a bleeding perspective.  He does not require antibiotic premedication.  He requires no further cardiac testing. Please see accompanying letter for further cardiovascular issues noted below.    JONNY Escobar/MD Luisa Clemons Steven M, MD  5/20/2024  3:50 PM  Signed   Cardiology Note          HPI   Cam Rosas Jr. \"CHOPS\" is a 74 y.o. man who presents followup of heart failure to our advanced heart failure clinic, originally referred by Dr. Aceves.    \"Chops\" has a past medical history of newly diagnosed atrial fibrillation in October with RVR and hospital admission where they also found his EF to be down to 40% s/p new chemotherapy for breast cancer; prostate cancer, CKD Stage 2, recent melena with no active bleeding on EGD, and s/p mitral annual ring placement by Dr. Pastor in 2006.  He was following with Dr. Ra Barajas, cardiologist, for 15 years, who unfortunately passed away late 2022.     \[facility] [place] [facility]\"" reports he has not had a weak heart nor atrial fibrillation prior to " these events in the late fall of 2022. He was cardioverted after being on amiodarone and Xarelto for 4 weeks by Dr. Aceves on December 5th, 2022. Since that time he has had no further atrial fibrillation. He has been feeing much improved as his weight is down to 186 from 204 lbs. He had an increase in his weight for months since he was initiated on chemo and decadron. He had one dose of Cancian T in September and one dose of Taxotere and carboplastin. He has had no further chemo as he did not tolerate these medications and this is what lead to him going into atrial fibrillation.  He had a mastectomy August 2023 with 1 positive node.  He is now on Tamoxifen for his breat cancer and radiation treatments for his prostate cancer. He is post prostatectomy.     Since his last visit 6 months ago he reports fair cardiovascular stability.  He was hospitalized for 3 weeks around the holidays for respiratory failure secondary to influenza A.  His hospital course was complicated by Tako-tsubo cardiomyopathy, VT and VF requiring multiple shocks that has since recovered.  Cardiac catheterization showed no obstructive disease.  It was so severe he had to undergo a tracheostomy and a PEG tube was placed.  He was then in an LTAC associated with [place] for about 1 month. He was weaned off the ventilator there and sent to [facility] rehab where he finally was sent home on March 20. He finally feels fully recovered. He is doing a little walking for exercise, although not long distances. He is doing physicial therapy 3x/week.  He denies chest pain, dyspnea at rest, orthopnea, PND, edema or abdominal bloating. He denies dizziness or lightheadedness.          Past Medical History:   Diagnosis Date   • Acute systolic heart failure (CMS/HCC) 02/15/2023   • Ambulates with cane     and walker   • Atrial fibrillation (CMS/HCC)    • Breast cancer (CMS/HCC) October 2022   • CHF (congestive heart failure) (CMS/HCC)    • Chronic kidney  disease    • CKD (chronic kidney disease) 10/19/2022   • History of radiation therapy    • Hx antineoplastic chemo    • Prostate cancer (CMS/HCC)      Past Surgical History:   Procedure Laterality Date   • CARDIAC SURGERY      mitral valve repair 2006   • CARDIOVERSION  12/05/2022    Successful DC cardioversion   • KNEE CARTILAGE SURGERY Left    • MASTECTOMY     • PROSTATE SURGERY  July 2022   • PROSTATECTOMY  07/15/2022   • TONSILLECTOMY       Social History     Tobacco Use   • Smoking status: Never   • Smokeless tobacco: Never   Vaping Use   • Vaping Use: Never used   Substance Use Topics   • Alcohol use: Yes     Alcohol/week: 14.0 standard drinks of alcohol     Types: 14 Shots of liquor per week     Comment: 2 drinks/day - scotch   • Drug use: Never       Family Status   Relation Name Status   • Bio Mother mother (Not Specified)   • Bio Father Dad (Not Specified)   • Bio Sister  (Not Specified)   • Bio Brother  (Not Specified)   • MGM Belle (Not Specified)   • MGF  (Not Specified)   • PGM  (Not Specified)   • PGF  (Not Specified)   • Other son (Not Specified)        ALLERGIES  Azithromycin, Prednisone, and Lorazepam      Outpatient Encounter Medications as of 5/20/2024:   •  amiodarone (PACERONE) 200 mg tablet, Take 1 tablet (200 mg total) by mouth 3 (three) times a week (Mon, Wed, Fri).  •  atorvastatin (LIPITOR) 10 mg tablet, Take 1 tablet (10 mg total) by mouth daily.  •  busPIRone (BUSPAR) 10 mg tablet, Take 10 mg by mouth 2 (two) times a day.  •  cetirizine (ZyrTEC) 1 mg/mL syrup, 10 mL (10 mg total) by feeding tube route nightly.  •  cholecalciferol, vitamin D3, 1,000 unit (25 mcg) tablet, Take 1,000 Units by mouth daily.  •  melatonin ODT, 1 tablet (3 mg total) by peg tube route nightly as needed (sleep).  •  metoprolol succinate XL (TOPROL-XL) 25 mg 24 hr tablet, Take 1 tablet (25 mg total) by mouth nightly.  •  pantoprazole (PROTONIX) 40 mg EC tablet, Take 40 mg by mouth 2 (two) times a day.  •   rivaroxaban (XARELTO) 20 mg tablet, Take 1 tablet (20 mg total) by mouth daily with dinner.  •  tamoxifen (NOLVADEX) 20 mg chemo tablet, Take  1 tablet (20 mg total) daily  •  [DISCONTINUED] albuterol 2.5 mg /3 mL (0.083 %) nebulizer solution, Take 3 mL (2.5 mg total) by nebulization every 4 (four) hours as needed for wheezing or shortness of breath. (Patient not taking: Reported on 5/13/2024)  •  [DISCONTINUED] amiodarone (PACERONE) 200 mg tablet, TAKE 1 TABLET BY MOUTH EVERY DAY  •  [DISCONTINUED] apixaban (ELIQUIS) 5 mg tablet, 5 mg by g-tube route 2 times daily.  •  [DISCONTINUED] aspirin 81 mg chewable tablet, Take 81 mg by mouth daily.  •  [DISCONTINUED] atorvastatin (LIPITOR) 10 mg tablet, TAKE 1 TABLET (10 MG) ONCE DAILY AT BEDTIME  •  [DISCONTINUED] betamethasone valerate (VALISONE) 0.1 % ointment, Apply topically 2 (two) times a day as needed for rash. (Patient not taking: Reported on 5/13/2024)  •  [DISCONTINUED] cefTAZidime (FORTAZ) 1 g/100 mL IVPB, Infuse 100 mL (1 g total) into a venous catheter every 8 (eight) hours Indications: urinary tract infection. (Patient not taking: Reported on 5/13/2024)  •  [DISCONTINUED] insulin lispro U-100 (HumaLOG) 100 unit/mL pen, Inject 1-10 Units under the skin every 6 (six) hours. (Patient not taking: Reported on 5/13/2024)  •  [DISCONTINUED] magnesium oxide (MAG-OX) 400 mg (241.3 mg magnesium) tablet, 1 tablet (400 mg total) by Nasogastric route 2 (two) times a day. (Patient not taking: Reported on 5/13/2024)  •  [DISCONTINUED] pantoprazole (PROTONIX) 40 mg injection, Infuse 40 mg into a venous catheter every 12 (twelve) hours Indications: gastroesophageal reflux disease. (Patient not taking: Reported on 5/13/2024)  •  [DISCONTINUED] silver sulfadiazine (SILVADENE) 1 % cream, Apply topically 2 (two) times a day. Apply to affected area. (Patient not taking: Reported on 5/13/2024)    ROS as above in HPI.  Additionally, depression, rash and muscle weakness.  Otherwise  "all relevant systems were reviewed and are negative.    Objective   Visit Vitals  Resp 18   Ht 1.651 m (5' 5\")   Wt 80.3 kg (177 lb)   BMI 29.45 kg/m²       Wt Readings from Last 3 Encounters:   05/20/24 80.3 kg (177 lb)   05/16/24 79.1 kg (174 lb 6.4 oz)   05/13/24 79.3 kg (174 lb 12.8 oz)       Physical Exam  HENT:      Head: Normocephalic and atraumatic.      Nose: Nose normal.   Eyes:      Pupils: Pupils are equal, round, and reactive to light.   Neck:      Vascular: No carotid bruit or JVD.      Comments: JVP 7cm  Cardiovascular:      Rate and Rhythm: Normal rate and regular rhythm.      Pulses: Intact distal pulses.      Heart sounds: Normal heart sounds, S1 normal and S2 normal. No murmur heard.     No friction rub. No gallop.   Pulmonary:      Effort: No respiratory distress.      Breath sounds: Normal breath sounds. No wheezing or rales.   Abdominal:      Palpations: Abdomen is soft.      Comments: PEG scar   Musculoskeletal:         General: Swelling (trace-1+ b/l LE edema to lower shin with venous stasis changes) present. Normal range of motion.   Skin:     General: Skin is warm and dry.   Neurological:      Mental Status: He is alert and oriented to person, place, and time.   Psychiatric:         Behavior: Behavior normal.         Judgment: Judgment normal.         Lab Results   Component Value Date    GLUCOSE 87 03/18/2024    CALCIUM 8.8 03/18/2024     03/18/2024    K 4.2 03/18/2024    CO2 28 03/18/2024     03/18/2024    BUN 26 (H) 03/18/2024    CREATININE 1.3 03/18/2024     Lab Results   Component Value Date    ALT 13 03/18/2024    AST 19 03/18/2024    ALKPHOS 41 03/18/2024    BILITOT 0.6 03/18/2024     Lab Results   Component Value Date    WBC 5.73 03/18/2024    HGB 10.7 (L) 03/18/2024    HCT 35.1 (L) 03/18/2024    .2 (H) 03/18/2024     03/18/2024     Lab Results   Component Value Date    TSH 0.40 11/03/2023     No results found for: \"CHOL\"  No results found for: \"HDL\"  No " "results found for: \"LDLCALC\"  Lab Results   Component Value Date    TRIG 303 (H) 12/27/2023    TRIG 234 (H) 12/27/2023     No results found for: \"CHOLHDL\"  No results found for: \"HGBA1C\"  Labs October 2022:\      Labs May 18, 2024:  Sodium 145, potassium 4.6, BUN 19, creatinine 1.38, LFTs normal, hemoglobin 11.6, cholesterol 185, HDL 75, triglycerides 106, LDL 90, TSH 3.07, PSA undetectable.     EKG    Performed on 4/25/2024 and personally reviewed:  Sinus  Bradycardia at 52bpm  Left axis   -Nonspecific QRS widening.    -Nonspecific ST depression   +   T-abnormality  -Nondiagnostic     Imaging  TTE Sept 2022  Conclusions:   Ejection fraction is visually estimated at 50-55 %. No regional wall motion abnormalities   were appreciated on today's study.   Strain evaluation was technically difficult due to presence of PVC.   The mitral valve leaflets are status post repair. A mitral annuloplasty ring is present.   Mild mitral regurgitation.   Estimated PASP is 33 mmHg. No evidence of pulmonary hypertension.   Indeterminate rhythm on ECG. cannot rule out AF. Clinical correlation suggested.   No prior for comparison.     TTE October 2022  Conclusions:   Ejection fraction is visually estimated at 40-45 %. There is global left ventricular   hypokinesis. Patient was tachycardiac with a rate of 130 bpm.   Mild mitral annular calcification. The mitral valve leaflets are status post repair. A   mitral annuloplasty ring is present. Mild mitral regurgitation.   Compared to prior echocardiogram, EF has now declined from 55% to 40%.     TTE April 2023  •  Left Ventricle: Normal ventricle size. Mild concentric left ventricular hypertrophy. No outflow tract obstruction present. Low normal systolic function. Estimated EF 55%. Wall motion appears grossly normal. Grade II diastolic dysfunction.  •  Right Ventricle: Normal ventricle size. Increased wall thickness. Normal systolic function.  •  Left Atrium: Moderately dilated atrium. Cannot " exclude patent foramen ovale (PFO).  •  Right Atrium: Mildly dilated atrium.  •  Aortic Valve: Tricuspid valve.  Sclerotic leaflets. Trace regurgitation. No stenosis.  •  Mitral Valve: Mitral annuloplasty ring present. Mild regurgitation.  •  Tricuspid Valve: Normal structure. Mild regurgitation. The regurgitation jet is eccentric. Estimated RVSP = 52 mmHg.  •  Pulmonic Valve: Normal structure. Trace regurgitation. Mildly dilated pulmonary artery.  •  Aorta: Aortic root normal. Sinuses of Valsalva normal-sized. Ascending aorta normal-sized.  •  IVC/SVC: Inferior vena cava is <2.1cm. Inferior vena cava demonstrates normal respiratory collapse.  •  Pericardium: Normal structure.  •  Compared with echo report from October 2022, LV function has normalized.  •  I personally reviewed results with him today in the office.    TTE 11/27/2023:  •  Left Ventricle: Normal ventricle size. Mild concentric left ventricular hypertrophy. No outflow tract obstruction present. Low normal systolic function. Estimated EF 55%. Wall motion appears grossly normal. Grade II diastolic dysfunction.  •  Right Ventricle: Mildly to moderately dilated ventricle size. Increased wall thickness. RVOT doppler shows VTI =10, possible systolic notching, time to peak acceleration of 70-80ms. This suggests normal output and elevated pulmonary vascular resistance. RV s'=10cm/s. TAPSE=2.1cm. Normal systolic function.  •  Left Atrium: Moderately dilated atrium.  •  Right Atrium: Mildly dilated atrium.  •  Aortic Valve: Tricuspid valve.  Sclerotic leaflets. Trace regurgitation. No stenosis. Calculated dimensionless index = 0.63.  •  Mitral Valve: Mitral annuloplasty ring present. Mild regurgitation. The jet is centrally directed.  •  Tricuspid Valve: Normal structure. Mild regurgitation. The regurgitation jet is eccentric. Estimated RVSP = 54 mmHg.  •  Pulmonic Valve: Normal structure. Trace regurgitation. Mildly dilated pulmonary artery.  •  Aorta: Aortic  root normal. Sinuses of Valsalva normal-sized. Ascending aorta normal-sized.  •  IVC/SVC: Inferior vena cava is <2.1cm. Inferior vena cava collapses <50% during inspiration.  •  Pericardium: There is a trivial circumferential pericardial effusion.  •  Compared with April 2023, RV is now dilated.  •  I personally reviewed results with him today in the office.    Left and right heart catheterization December 2023:  •  Angiographically normal epicardial coronary arteries  •  Left ventricular ejection fraction is approximately 25-30%.Wall motion abnormalities consistent with Takotsubo Cardiomyopathy  •  Severely elevated biventricular filling pressures  •  Reduced Cardiac Output and Index; Reduced Stroke volume and stroke volume index by Chelsi Calculation  •  Pulmonary venous post-capillary hypertension primarily due to left heart dysfunction  •  No peak to peak gradient on pullback to suggest aortic stenosis     Echo 12/26/2024:  •  Left Ventricle: Normal ventricle size. Normal wall thickness. Preserved systolic function. Estimated EF 40-45%. Hypokinesis of the apex. 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  •  Right Ventricle: Normal ventricle size. Pacer wire present. Normal systolic function.  •  Right Atrium: Normal sized atrium.  •  Aortic Valve: Tricuspid valve. Trace regurgitation. No stenosis.  •  Mitral Valve: Normal leaflet structure. Normal leaflet motion. Trace regurgitation. No stenosis.  •  Tricuspid Valve: Normal structure. Mild regurgitation. Estimated RVSP = 43 mmHg. No significant stenosis.  •  Pulmonic Valve: Normal structure. No regurgitation. No stenosis.  •  Aorta: Aortic root normal. Sinuses of Valsalva normal-sized. Ascending aorta normal-sized. Aortic arch normal-sized. Descending aorta normal-sized.  •  Pericardium: Normal structure. No evidence of pericardial effusion. No cardiac tamponade.  •  Left Atrium: Mildly dilated atrium.      Echo 1/2/2024:  •  Left Ventricle: Normal ventricle size. Normal wall thickness. Estimated EF 60-65%. No regional wall motion abnormalities.  •  Limited followup study shows normal LV function now- compared with apical hypokinesis on 12/26/23        Assessment   1.  Chronic diastolic heart failure, ACC Stage C, NYHA class 2. HFrecEF.   Acute change within one month with the only change being new atrial fibrillation with RVR at the time of the echocardiogram in October 2022.  Repeat echocardiogram shows his ejection fraction has rebounded from 40% back to his baseline of 55%.  He also had Tako-tsubo cardiomyopathy when critically ill December 2023 that recovered fully before discharge.  He is no longer requiring torsemide but I reminded him to use it as needed for increased edema.  He was previously taking 10 mg as needed.    2. Paroxysmal atrial fibrillation  Rhythm control and following with Dr. Aceves. On Xarelto.  On his last several checks of his renal function his GFR has been above 50, thus I increased his Xarelto to 20 mg daily at last visit but I discussed with them that at some point he may need to be reduced again to 15 mg.    3.  CKD Stage 2-3a  On his last several checks of his renal function his GFR has been above 50, thus I increased his Xarelto to 20 mg daily last visit. Baseline creatinine 1.1-1.4.      4. GI bleed  EGD with no active bleeding.    5. Breast and Prostate Cancer  I reviewed interim correspondence.  Things are stable.    6.  Long-term use of amiodarone  TSH and LFTs were checked 2 days ago and are essentially normal.       Thank you for allowing me to participate in the care of this patient.  I reviewed notes from Ep, oncology, ENT and surgical oncology.  I reviewed interim labs, as well as hospital and LTAC records.  I will plan to see him back in 6 months or sooner should the need arise.  Please do not hesitate to contact me with any questions or concerns.    Sincerely,    Gary  ANISH Moran MD  5/20/2024

## 2024-05-20 NOTE — PROGRESS NOTES
" Cardiology Note          HPI   Cam Rosas Jr. \"RADHA\" is a 74 y.o. man who presents followup of heart failure to our advanced heart failure clinic, originally referred by Dr. Aceves.    \"Radha\" has a past medical history of newly diagnosed atrial fibrillation in October with RVR and hospital admission where they also found his EF to be down to 40% s/p new chemotherapy for breast cancer; prostate cancer, CKD Stage 2, recent melena with no active bleeding on EGD, and s/p mitral annual ring placement by Dr. Pastor in 2006.  He was following with Dr. Ra Barajas, cardiologist, for 15 years, who unfortunately passed away late 2022.     [facility] reports he has not had a weak heart nor atrial fibrillation prior to these events in the late fall of 2022. He was cardioverted after being on amiodarone and Xarelto for 4 weeks by Dr. Aceves on December 5th, 2022. Since that time he has had no further atrial fibrillation. He has been feeing much improved as his weight is down to 186 from 204 lbs. He had an increase in his weight for months since he was initiated on chemo and decadron. He had one dose of Cancian T in September and one dose of Taxotere and carboplastin. He has had no further chemo as he did not tolerate these medications and this is what lead to him going into atrial fibrillation.  He had a mastectomy August 2023 with 1 positive node.  He is now on Tamoxifen for his breat cancer and radiation treatments for his prostate cancer. He is post prostatectomy.     Since his last visit 6 months ago he reports fair cardiovascular stability.  He was hospitalized for 3 weeks around the holidays for respiratory failure secondary to influenza A.  His hospital course was complicated by Tako-tsubo cardiomyopathy, VT and VF requiring multiple shocks that has since recovered.  Cardiac catheterization showed no obstructive disease.  It was so severe he had to undergo a tracheostomy and a PEG tube was placed.  He was then " in an LTAC associated with [facility] for about 1 month. He was weaned off the ventilator there and sent to [facility] rehab where he finally was sent home on March 20. He finally feels fully recovered. He is doing a little walking for exercise, although not long distances. He is doing physicial therapy 3x/week.  He denies chest pain, dyspnea at rest, orthopnea, PND, edema or abdominal bloating. He denies dizziness or lightheadedness.          Past Medical History:   Diagnosis Date    Acute systolic heart failure (CMS/[facility]) 02/15/2023    Ambulates with cane     and walker    Atrial fibrillation (CMS/[facility])     Breast cancer (CMS/[facility]) October 2022    CHF (congestive heart failure) (CMS/[facility])     Chronic kidney disease     CKD (chronic kidney disease) 10/19/2022    History of radiation therapy     Hx antineoplastic chemo     Prostate cancer (CMS/[facility])      Past Surgical History:   Procedure Laterality Date    CARDIAC SURGERY      mitral valve repair 2006    CARDIOVERSION  12/05/2022    Successful DC cardioversion    KNEE CARTILAGE SURGERY Left     MASTECTOMY      PROSTATE SURGERY  July 2022    PROSTATECTOMY  07/15/2022    TONSILLECTOMY       Social History     Tobacco Use    Smoking status: Never    Smokeless tobacco: Never   Vaping Use    Vaping Use: Never used   Substance Use Topics    Alcohol use: Yes     Alcohol/week: 14.0 standard drinks of alcohol     Types: 14 Shots of liquor per week     Comment: 2 drinks/day - scotch    Drug use: Never       Family Status   Relation Name Status    Bio Mother mother (Not Specified)    Bio Father Dad (Not Specified)    Bio Sister  (Not Specified)    Bio Brother  (Not Specified)    MGM Belle (Not Specified)    MGF  (Not Specified)    PGM  (Not Specified)    PGF  (Not Specified)    Other son (Not Specified)        ALLERGIES  Azithromycin, Prednisone, and Lorazepam      Outpatient Encounter Medications as of 5/20/2024:     amiodarone (PACERONE) 200 mg tablet, Take 1 tablet (200 mg  total) by mouth 3 (three) times a week (Mon, Wed, Fri).    atorvastatin (LIPITOR) 10 mg tablet, Take 1 tablet (10 mg total) by mouth daily.    busPIRone (BUSPAR) 10 mg tablet, Take 10 mg by mouth 2 (two) times a day.    cetirizine (ZyrTEC) 1 mg/mL syrup, 10 mL (10 mg total) by feeding tube route nightly.    cholecalciferol, vitamin D3, 1,000 unit (25 mcg) tablet, Take 1,000 Units by mouth daily.    melatonin ODT, 1 tablet (3 mg total) by peg tube route nightly as needed (sleep).    metoprolol succinate XL (TOPROL-XL) 25 mg 24 hr tablet, Take 1 tablet (25 mg total) by mouth nightly.    pantoprazole (PROTONIX) 40 mg EC tablet, Take 40 mg by mouth 2 (two) times a day.    rivaroxaban (XARELTO) 20 mg tablet, Take 1 tablet (20 mg total) by mouth daily with dinner.    tamoxifen (NOLVADEX) 20 mg chemo tablet, Take  1 tablet (20 mg total) daily    [DISCONTINUED] albuterol 2.5 mg /3 mL (0.083 %) nebulizer solution, Take 3 mL (2.5 mg total) by nebulization every 4 (four) hours as needed for wheezing or shortness of breath. (Patient not taking: Reported on 5/13/2024)    [DISCONTINUED] amiodarone (PACERONE) 200 mg tablet, TAKE 1 TABLET BY MOUTH EVERY DAY    [DISCONTINUED] apixaban (ELIQUIS) 5 mg tablet, 5 mg by g-tube route 2 times daily.    [DISCONTINUED] aspirin 81 mg chewable tablet, Take 81 mg by mouth daily.    [DISCONTINUED] atorvastatin (LIPITOR) 10 mg tablet, TAKE 1 TABLET (10 MG) ONCE DAILY AT BEDTIME    [DISCONTINUED] betamethasone valerate (VALISONE) 0.1 % ointment, Apply topically 2 (two) times a day as needed for rash. (Patient not taking: Reported on 5/13/2024)    [DISCONTINUED] cefTAZidime (FORTAZ) 1 g/100 mL IVPB, Infuse 100 mL (1 g total) into a venous catheter every 8 (eight) hours Indications: urinary tract infection. (Patient not taking: Reported on 5/13/2024)    [DISCONTINUED] insulin lispro U-100 (HumaLOG) 100 unit/mL pen, Inject 1-10 Units under the skin every 6 (six) hours. (Patient not taking: Reported  "on 5/13/2024)    [DISCONTINUED] magnesium oxide (MAG-OX) 400 mg (241.3 mg magnesium) tablet, 1 tablet (400 mg total) by Nasogastric route 2 (two) times a day. (Patient not taking: Reported on 5/13/2024)    [DISCONTINUED] pantoprazole (PROTONIX) 40 mg injection, Infuse 40 mg into a venous catheter every 12 (twelve) hours Indications: gastroesophageal reflux disease. (Patient not taking: Reported on 5/13/2024)    [DISCONTINUED] silver sulfadiazine (SILVADENE) 1 % cream, Apply topically 2 (two) times a day. Apply to affected area. (Patient not taking: Reported on 5/13/2024)    ROS as above in HPI.  Additionally, depression, rash and muscle weakness.  Otherwise all relevant systems were reviewed and are negative.    Objective   Visit Vitals  Resp 18   Ht 1.651 m (5' 5\")   Wt 80.3 kg (177 lb)   BMI 29.45 kg/m²       Wt Readings from Last 3 Encounters:   05/20/24 80.3 kg (177 lb)   05/16/24 79.1 kg (174 lb 6.4 oz)   05/13/24 79.3 kg (174 lb 12.8 oz)       Physical Exam  HENT:      Head: Normocephalic and atraumatic.      Nose: Nose normal.   Eyes:      Pupils: Pupils are equal, round, and reactive to light.   Neck:      Vascular: No carotid bruit or JVD.      Comments: JVP 7cm  Cardiovascular:      Rate and Rhythm: Normal rate and regular rhythm.      Pulses: Intact distal pulses.      Heart sounds: Normal heart sounds, S1 normal and S2 normal. No murmur heard.     No friction rub. No gallop.   Pulmonary:      Effort: No respiratory distress.      Breath sounds: Normal breath sounds. No wheezing or rales.   Abdominal:      Palpations: Abdomen is soft.      Comments: PEG scar   Musculoskeletal:         General: Swelling (trace-1+ b/l LE edema to lower shin with venous stasis changes) present. Normal range of motion.   Skin:     General: Skin is warm and dry.   Neurological:      Mental Status: He is alert and oriented to person, place, and time.   Psychiatric:         Behavior: Behavior normal.         Judgment: Judgment " "normal.         Lab Results   Component Value Date    GLUCOSE 87 03/18/2024    CALCIUM 8.8 03/18/2024     03/18/2024    K 4.2 03/18/2024    CO2 28 03/18/2024     03/18/2024    BUN 26 (H) 03/18/2024    CREATININE 1.3 03/18/2024     Lab Results   Component Value Date    ALT 13 03/18/2024    AST 19 03/18/2024    ALKPHOS 41 03/18/2024    BILITOT 0.6 03/18/2024     Lab Results   Component Value Date    WBC 5.73 03/18/2024    HGB 10.7 (L) 03/18/2024    HCT 35.1 (L) 03/18/2024    .2 (H) 03/18/2024     03/18/2024     Lab Results   Component Value Date    TSH 0.40 11/03/2023     No results found for: \"CHOL\"  No results found for: \"HDL\"  No results found for: \"LDLCALC\"  Lab Results   Component Value Date    TRIG 303 (H) 12/27/2023    TRIG 234 (H) 12/27/2023     No results found for: \"CHOLHDL\"  No results found for: \"HGBA1C\"  Labs October 2022:\      Labs May 18, 2024:  Sodium 145, potassium 4.6, BUN 19, creatinine 1.38, LFTs normal, hemoglobin 11.6, cholesterol 185, HDL 75, triglycerides 106, LDL 90, TSH 3.07, PSA undetectable.     EKG    Performed on 4/25/2024 and personally reviewed:  Sinus  Bradycardia at 52bpm  Left axis   -Nonspecific QRS widening.    -Nonspecific ST depression   +   T-abnormality  -Nondiagnostic     Imaging  TTE Sept 2022  Conclusions:   Ejection fraction is visually estimated at 50-55 %. No regional wall motion abnormalities   were appreciated on today's study.   Strain evaluation was technically difficult due to presence of PVC.   The mitral valve leaflets are status post repair. A mitral annuloplasty ring is present.   Mild mitral regurgitation.   Estimated PASP is 33 mmHg. No evidence of pulmonary hypertension.   Indeterminate rhythm on ECG. cannot rule out AF. Clinical correlation suggested.   No prior for comparison.     TTE October 2022  Conclusions:   Ejection fraction is visually estimated at 40-45 %. There is global left ventricular   hypokinesis. Patient was " tachycardiac with a rate of 130 bpm.   Mild mitral annular calcification. The mitral valve leaflets are status post repair. A   mitral annuloplasty ring is present. Mild mitral regurgitation.   Compared to prior echocardiogram, EF has now declined from 55% to 40%.     TTE April 2023    Left Ventricle: Normal ventricle size. Mild concentric left ventricular hypertrophy. No outflow tract obstruction present. Low normal systolic function. Estimated EF 55%. Wall motion appears grossly normal. Grade II diastolic dysfunction.    Right Ventricle: Normal ventricle size. Increased wall thickness. Normal systolic function.    Left Atrium: Moderately dilated atrium. Cannot exclude patent foramen ovale (PFO).    Right Atrium: Mildly dilated atrium.    Aortic Valve: Tricuspid valve.  Sclerotic leaflets. Trace regurgitation. No stenosis.    Mitral Valve: Mitral annuloplasty ring present. Mild regurgitation.    Tricuspid Valve: Normal structure. Mild regurgitation. The regurgitation jet is eccentric. Estimated RVSP = 52 mmHg.    Pulmonic Valve: Normal structure. Trace regurgitation. Mildly dilated pulmonary artery.    Aorta: Aortic root normal. Sinuses of Valsalva normal-sized. Ascending aorta normal-sized.    IVC/SVC: Inferior vena cava is <2.1cm. Inferior vena cava demonstrates normal respiratory collapse.    Pericardium: Normal structure.    Compared with echo report from October 2022, LV function has normalized.    I personally reviewed results with him today in the office.    TTE 11/27/2023:    Left Ventricle: Normal ventricle size. Mild concentric left ventricular hypertrophy. No outflow tract obstruction present. Low normal systolic function. Estimated EF 55%. Wall motion appears grossly normal. Grade II diastolic dysfunction.    Right Ventricle: Mildly to moderately dilated ventricle size. Increased wall thickness. RVOT doppler shows VTI =10, possible systolic notching, time to peak acceleration of 70-80ms. This suggests  normal output and elevated pulmonary vascular resistance. RV s'=10cm/s. TAPSE=2.1cm. Normal systolic function.    Left Atrium: Moderately dilated atrium.    Right Atrium: Mildly dilated atrium.    Aortic Valve: Tricuspid valve.  Sclerotic leaflets. Trace regurgitation. No stenosis. Calculated dimensionless index = 0.63.    Mitral Valve: Mitral annuloplasty ring present. Mild regurgitation. The jet is centrally directed.    Tricuspid Valve: Normal structure. Mild regurgitation. The regurgitation jet is eccentric. Estimated RVSP = 54 mmHg.    Pulmonic Valve: Normal structure. Trace regurgitation. Mildly dilated pulmonary artery.    Aorta: Aortic root normal. Sinuses of Valsalva normal-sized. Ascending aorta normal-sized.    IVC/SVC: Inferior vena cava is <2.1cm. Inferior vena cava collapses <50% during inspiration.    Pericardium: There is a trivial circumferential pericardial effusion.    Compared with April 2023, RV is now dilated.    I personally reviewed results with him today in the office.    Left and right heart catheterization December 2023:    Angiographically normal epicardial coronary arteries    Left ventricular ejection fraction is approximately 25-30%.Wall motion abnormalities consistent with Takotsubo Cardiomyopathy    Severely elevated biventricular filling pressures    Reduced Cardiac Output and Index; Reduced Stroke volume and stroke volume index by Chelsi Calculation    Pulmonary venous post-capillary hypertension primarily due to left heart dysfunction    No peak to peak gradient on pullback to suggest aortic stenosis     Echo 12/26/2024:    Left Ventricle: Normal ventricle size. Normal wall thickness. Preserved systolic function. Estimated EF 40-45%. Hypokinesis of the apex. 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    Right Ventricle: Normal ventricle size. Pacer wire present. Normal systolic function.    Right Atrium: Normal sized  atrium.    Aortic Valve: Tricuspid valve. Trace regurgitation. No stenosis.    Mitral Valve: Normal leaflet structure. Normal leaflet motion. Trace regurgitation. No stenosis.    Tricuspid Valve: Normal structure. Mild regurgitation. Estimated RVSP = 43 mmHg. No significant stenosis.    Pulmonic Valve: Normal structure. No regurgitation. No stenosis.    Aorta: Aortic root normal. Sinuses of Valsalva normal-sized. Ascending aorta normal-sized. Aortic arch normal-sized. Descending aorta normal-sized.    Pericardium: Normal structure. No evidence of pericardial effusion. No cardiac tamponade.    Left Atrium: Mildly dilated atrium.     Echo 1/2/2024:    Left Ventricle: Normal ventricle size. Normal wall thickness. Estimated EF 60-65%. No regional wall motion abnormalities.    Limited followup study shows normal LV function now- compared with apical hypokinesis on 12/26/23        Assessment   1.  Chronic diastolic heart failure, ACC Stage C, NYHA class 2. HFrecEF.   Acute change within one month with the only change being new atrial fibrillation with RVR at the time of the echocardiogram in October 2022.  Repeat echocardiogram shows his ejection fraction has rebounded from 40% back to his baseline of 55%.  He also had Tako-tsubo cardiomyopathy when critically ill December 2023 that recovered fully before discharge.  He is no longer requiring torsemide but I reminded him to use it as needed for increased edema.  He was previously taking 10 mg as needed.    2. Paroxysmal atrial fibrillation  Rhythm control and following with Dr. Aceves. On Xarelto.  On his last several checks of his renal function his GFR has been above 50, thus I increased his Xarelto to 20 mg daily at last visit but I discussed with them that at some point he may need to be reduced again to 15 mg.    3.  CKD Stage 2-3a  On his last several checks of his renal function his GFR has been above 50, thus I increased his Xarelto to 20 mg daily last visit.  Baseline creatinine 1.1-1.4.      4. GI bleed  EGD with no active bleeding.    5. Breast and Prostate Cancer  I reviewed interim correspondence.  Things are stable.    6.  Long-term use of amiodarone  TSH and LFTs were checked 2 days ago and are essentially normal.       Thank you for allowing me to participate in the care of this patient.  I reviewed notes from Ep, oncology, ENT and surgical oncology.  I reviewed interim labs, as well as hospital and LTAC records.  I will plan to see him back in 6 months or sooner should the need arise.  Please do not hesitate to contact me with any questions or concerns.    Sincerely,    Gary Moran MD  5/20/2024

## 2024-05-20 NOTE — LETTER
"May 20, 2024     Usman Collins MD  [facility] [place] [address]    Patient: Cam Rosas Jr.  YOB: 1950  Date of Visit: 5/20/2024      Dear Dr. Collins:    Thank you for referring Cam Rosas Jr. to me for evaluation. Below are my notes for this consultation.    If you have questions, please do not hesitate to call me. I look forward to following your patient along with you.         Sincerely,        Gary Moran MD        CC: MD Kailey Ngo MD Domsky, Steven M, MD  5/20/2024  3:49 PM  Sign when Signing Visit   Cardiology Note          HPI   Cam Rosas Jr. \"CHOPS\" is a 74 y.o. man who presents followup of heart failure to our advanced heart failure clinic, originally referred by Dr. Aceves.    \"Chops\" has a past medical history of newly diagnosed atrial fibrillation in October with RVR and hospital admission where they also found his EF to be down to 40% s/p new chemotherapy for breast cancer; prostate cancer, CKD Stage 2, recent melena with no active bleeding on EGD, and s/p mitral annual ring placement by Dr. Pastor in 2006.  He was following with Dr. Ra Barajas, cardiologist, for 15 years, who unfortunately passed away late 2022.     [facility] reports he has not had a weak heart nor atrial fibrillation prior to these events in the late fall of 2022. He was cardioverted after being on amiodarone and Xarelto for 4 weeks by Dr. Aceves on December 5th, 2022. Since that time he has had no further atrial fibrillation. He has been feeing much improved as his weight is down to 186 from 204 lbs. He had an increase in his weight for months since he was initiated on chemo and decadron. He had one dose of Cancian T in September and one dose of Taxotere and carboplastin. He has had no further chemo as he did not tolerate these medications and this is what lead to him going into atrial fibrillation.  He had a mastectomy August 2023 with 1 positive node.  " He is now on Tamoxifen for his breat cancer and radiation treatments for his prostate cancer. He is post prostatectomy.     Since his last visit 6 months ago he reports fair cardiovascular stability.  He was hospitalized for 3 weeks around the holidays for respiratory failure secondary to influenza A.  His hospital course was complicated by Tako-tsubo cardiomyopathy, VT and VF requiring multiple shocks that has since recovered.  Cardiac catheterization showed no obstructive disease.  It was so severe he had to undergo a tracheostomy and a PEG tube was placed.  He was then in an LTAC associated with [facility] for about 1 month. He was weaned off the ventilator there and sent to [facility] rehab where he finally was sent home on March 20. He finally feels fully recovered. He is doing a little walking for exercise, although not long distances. He is doing physicial therapy 3x/week.  He denies chest pain, dyspnea at rest, orthopnea, PND, edema or abdominal bloating. He denies dizziness or lightheadedness.          Past Medical History:   Diagnosis Date   • Acute systolic heart failure (CMS/HCC) 02/15/2023   • Ambulates with cane     and walker   • Atrial fibrillation (CMS/HCC)    • Breast cancer (CMS/HCC) October 2022   • CHF (congestive heart failure) (CMS/HCC)    • Chronic kidney disease    • CKD (chronic kidney disease) 10/19/2022   • History of radiation therapy    • Hx antineoplastic chemo    • Prostate cancer (CMS/HCC)      Past Surgical History:   Procedure Laterality Date   • CARDIAC SURGERY      mitral valve repair 2006   • CARDIOVERSION  12/05/2022    Successful DC cardioversion   • KNEE CARTILAGE SURGERY Left    • MASTECTOMY     • PROSTATE SURGERY  July 2022   • PROSTATECTOMY  07/15/2022   • TONSILLECTOMY       Social History     Tobacco Use   • Smoking status: Never   • Smokeless tobacco: Never   Vaping Use   • Vaping Use: Never used   Substance Use Topics   • Alcohol use: Yes     Alcohol/week: 14.0  standard drinks of alcohol     Types: 14 Shots of liquor per week     Comment: 2 drinks/day - scotch   • Drug use: Never       Family Status   Relation Name Status   • Bio Mother mother (Not Specified)   • Bio Father Dad (Not Specified)   • Bio Sister  (Not Specified)   • Bio Brother  (Not Specified)   • MGM Belle (Not Specified)   • MGF  (Not Specified)   • PGM  (Not Specified)   • PGF  (Not Specified)   • Other son (Not Specified)        ALLERGIES  Azithromycin, Prednisone, and Lorazepam      Outpatient Encounter Medications as of 5/20/2024:   •  amiodarone (PACERONE) 200 mg tablet, Take 1 tablet (200 mg total) by mouth 3 (three) times a week (Mon, Wed, Fri).  •  atorvastatin (LIPITOR) 10 mg tablet, Take 1 tablet (10 mg total) by mouth daily.  •  busPIRone (BUSPAR) 10 mg tablet, Take 10 mg by mouth 2 (two) times a day.  •  cetirizine (ZyrTEC) 1 mg/mL syrup, 10 mL (10 mg total) by feeding tube route nightly.  •  cholecalciferol, vitamin D3, 1,000 unit (25 mcg) tablet, Take 1,000 Units by mouth daily.  •  melatonin ODT, 1 tablet (3 mg total) by peg tube route nightly as needed (sleep).  •  metoprolol succinate XL (TOPROL-XL) 25 mg 24 hr tablet, Take 1 tablet (25 mg total) by mouth nightly.  •  pantoprazole (PROTONIX) 40 mg EC tablet, Take 40 mg by mouth 2 (two) times a day.  •  rivaroxaban (XARELTO) 20 mg tablet, Take 1 tablet (20 mg total) by mouth daily with dinner.  •  tamoxifen (NOLVADEX) 20 mg chemo tablet, Take  1 tablet (20 mg total) daily  •  [DISCONTINUED] albuterol 2.5 mg /3 mL (0.083 %) nebulizer solution, Take 3 mL (2.5 mg total) by nebulization every 4 (four) hours as needed for wheezing or shortness of breath. (Patient not taking: Reported on 5/13/2024)  •  [DISCONTINUED] amiodarone (PACERONE) 200 mg tablet, TAKE 1 TABLET BY MOUTH EVERY DAY  •  [DISCONTINUED] apixaban (ELIQUIS) 5 mg tablet, 5 mg by g-tube route 2 times daily.  •  [DISCONTINUED] aspirin 81 mg chewable tablet, Take 81 mg by mouth  "daily.  •  [DISCONTINUED] atorvastatin (LIPITOR) 10 mg tablet, TAKE 1 TABLET (10 MG) ONCE DAILY AT BEDTIME  •  [DISCONTINUED] betamethasone valerate (VALISONE) 0.1 % ointment, Apply topically 2 (two) times a day as needed for rash. (Patient not taking: Reported on 5/13/2024)  •  [DISCONTINUED] cefTAZidime (FORTAZ) 1 g/100 mL IVPB, Infuse 100 mL (1 g total) into a venous catheter every 8 (eight) hours Indications: urinary tract infection. (Patient not taking: Reported on 5/13/2024)  •  [DISCONTINUED] insulin lispro U-100 (HumaLOG) 100 unit/mL pen, Inject 1-10 Units under the skin every 6 (six) hours. (Patient not taking: Reported on 5/13/2024)  •  [DISCONTINUED] magnesium oxide (MAG-OX) 400 mg (241.3 mg magnesium) tablet, 1 tablet (400 mg total) by Nasogastric route 2 (two) times a day. (Patient not taking: Reported on 5/13/2024)  •  [DISCONTINUED] pantoprazole (PROTONIX) 40 mg injection, Infuse 40 mg into a venous catheter every 12 (twelve) hours Indications: gastroesophageal reflux disease. (Patient not taking: Reported on 5/13/2024)  •  [DISCONTINUED] silver sulfadiazine (SILVADENE) 1 % cream, Apply topically 2 (two) times a day. Apply to affected area. (Patient not taking: Reported on 5/13/2024)    ROS as above in HPI.  Additionally, depression, rash and muscle weakness.  Otherwise all relevant systems were reviewed and are negative.    Objective   Visit Vitals  Resp 18   Ht 1.651 m (5' 5\")   Wt 80.3 kg (177 lb)   BMI 29.45 kg/m²       Wt Readings from Last 3 Encounters:   05/20/24 80.3 kg (177 lb)   05/16/24 79.1 kg (174 lb 6.4 oz)   05/13/24 79.3 kg (174 lb 12.8 oz)       Physical Exam  HENT:      Head: Normocephalic and atraumatic.      Nose: Nose normal.   Eyes:      Pupils: Pupils are equal, round, and reactive to light.   Neck:      Vascular: No carotid bruit or JVD.      Comments: JVP 7cm  Cardiovascular:      Rate and Rhythm: Normal rate and regular rhythm.      Pulses: Intact distal pulses.      Heart " "sounds: Normal heart sounds, S1 normal and S2 normal. No murmur heard.     No friction rub. No gallop.   Pulmonary:      Effort: No respiratory distress.      Breath sounds: Normal breath sounds. No wheezing or rales.   Abdominal:      Palpations: Abdomen is soft.      Comments: PEG scar   Musculoskeletal:         General: Swelling (trace-1+ b/l LE edema to lower shin with venous stasis changes) present. Normal range of motion.   Skin:     General: Skin is warm and dry.   Neurological:      Mental Status: He is alert and oriented to person, place, and time.   Psychiatric:         Behavior: Behavior normal.         Judgment: Judgment normal.         Lab Results   Component Value Date    GLUCOSE 87 03/18/2024    CALCIUM 8.8 03/18/2024     03/18/2024    K 4.2 03/18/2024    CO2 28 03/18/2024     03/18/2024    BUN 26 (H) 03/18/2024    CREATININE 1.3 03/18/2024     Lab Results   Component Value Date    ALT 13 03/18/2024    AST 19 03/18/2024    ALKPHOS 41 03/18/2024    BILITOT 0.6 03/18/2024     Lab Results   Component Value Date    WBC 5.73 03/18/2024    HGB 10.7 (L) 03/18/2024    HCT 35.1 (L) 03/18/2024    .2 (H) 03/18/2024     03/18/2024     Lab Results   Component Value Date    TSH 0.40 11/03/2023     No results found for: \"CHOL\"  No results found for: \"HDL\"  No results found for: \"LDLCALC\"  Lab Results   Component Value Date    TRIG 303 (H) 12/27/2023    TRIG 234 (H) 12/27/2023     No results found for: \"CHOLHDL\"  No results found for: \"HGBA1C\"  Labs October 2022:\      Labs May 18, 2024:  Sodium 145, potassium 4.6, BUN 19, creatinine 1.38, LFTs normal, hemoglobin 11.6, cholesterol 185, HDL 75, triglycerides 106, LDL 90, TSH 3.07, PSA undetectable.     EKG    Performed on 4/25/2024 and personally reviewed:  Sinus  Bradycardia at 52bpm  Left axis   -Nonspecific QRS widening.    -Nonspecific ST depression   +   T-abnormality  -Nondiagnostic     Imaging  TTE Sept 2022  Conclusions:   Ejection " fraction is visually estimated at 50-55 %. No regional wall motion abnormalities   were appreciated on today's study.   Strain evaluation was technically difficult due to presence of PVC.   The mitral valve leaflets are status post repair. A mitral annuloplasty ring is present.   Mild mitral regurgitation.   Estimated PASP is 33 mmHg. No evidence of pulmonary hypertension.   Indeterminate rhythm on ECG. cannot rule out AF. Clinical correlation suggested.   No prior for comparison.     TTE October 2022  Conclusions:   Ejection fraction is visually estimated at 40-45 %. There is global left ventricular   hypokinesis. Patient was tachycardiac with a rate of 130 bpm.   Mild mitral annular calcification. The mitral valve leaflets are status post repair. A   mitral annuloplasty ring is present. Mild mitral regurgitation.   Compared to prior echocardiogram, EF has now declined from 55% to 40%.     TTE April 2023  •  Left Ventricle: Normal ventricle size. Mild concentric left ventricular hypertrophy. No outflow tract obstruction present. Low normal systolic function. Estimated EF 55%. Wall motion appears grossly normal. Grade II diastolic dysfunction.  •  Right Ventricle: Normal ventricle size. Increased wall thickness. Normal systolic function.  •  Left Atrium: Moderately dilated atrium. Cannot exclude patent foramen ovale (PFO).  •  Right Atrium: Mildly dilated atrium.  •  Aortic Valve: Tricuspid valve.  Sclerotic leaflets. Trace regurgitation. No stenosis.  •  Mitral Valve: Mitral annuloplasty ring present. Mild regurgitation.  •  Tricuspid Valve: Normal structure. Mild regurgitation. The regurgitation jet is eccentric. Estimated RVSP = 52 mmHg.  •  Pulmonic Valve: Normal structure. Trace regurgitation. Mildly dilated pulmonary artery.  •  Aorta: Aortic root normal. Sinuses of Valsalva normal-sized. Ascending aorta normal-sized.  •  IVC/SVC: Inferior vena cava is <2.1cm. Inferior vena cava demonstrates normal respiratory  collapse.  •  Pericardium: Normal structure.  •  Compared with echo report from October 2022, LV function has normalized.  •  I personally reviewed results with him today in the office.    TTE 11/27/2023:  •  Left Ventricle: Normal ventricle size. Mild concentric left ventricular hypertrophy. No outflow tract obstruction present. Low normal systolic function. Estimated EF 55%. Wall motion appears grossly normal. Grade II diastolic dysfunction.  •  Right Ventricle: Mildly to moderately dilated ventricle size. Increased wall thickness. RVOT doppler shows VTI =10, possible systolic notching, time to peak acceleration of 70-80ms. This suggests normal output and elevated pulmonary vascular resistance. RV s'=10cm/s. TAPSE=2.1cm. Normal systolic function.  •  Left Atrium: Moderately dilated atrium.  •  Right Atrium: Mildly dilated atrium.  •  Aortic Valve: Tricuspid valve.  Sclerotic leaflets. Trace regurgitation. No stenosis. Calculated dimensionless index = 0.63.  •  Mitral Valve: Mitral annuloplasty ring present. Mild regurgitation. The jet is centrally directed.  •  Tricuspid Valve: Normal structure. Mild regurgitation. The regurgitation jet is eccentric. Estimated RVSP = 54 mmHg.  •  Pulmonic Valve: Normal structure. Trace regurgitation. Mildly dilated pulmonary artery.  •  Aorta: Aortic root normal. Sinuses of Valsalva normal-sized. Ascending aorta normal-sized.  •  IVC/SVC: Inferior vena cava is <2.1cm. Inferior vena cava collapses <50% during inspiration.  •  Pericardium: There is a trivial circumferential pericardial effusion.  •  Compared with April 2023, RV is now dilated.  •  I personally reviewed results with him today in the office.    Left and right heart catheterization December 2023:  •  Angiographically normal epicardial coronary arteries  •  Left ventricular ejection fraction is approximately 25-30%.Wall motion abnormalities consistent with Takotsubo Cardiomyopathy  •  Severely elevated biventricular  filling pressures  •  Reduced Cardiac Output and Index; Reduced Stroke volume and stroke volume index by Chelsi Calculation  •  Pulmonary venous post-capillary hypertension primarily due to left heart dysfunction  •  No peak to peak gradient on pullback to suggest aortic stenosis     Echo 12/26/2024:  •  Left Ventricle: Normal ventricle size. Normal wall thickness. Preserved systolic function. Estimated EF 40-45%. Hypokinesis of the apex. 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  •  Right Ventricle: Normal ventricle size. Pacer wire present. Normal systolic function.  •  Right Atrium: Normal sized atrium.  •  Aortic Valve: Tricuspid valve. Trace regurgitation. No stenosis.  •  Mitral Valve: Normal leaflet structure. Normal leaflet motion. Trace regurgitation. No stenosis.  •  Tricuspid Valve: Normal structure. Mild regurgitation. Estimated RVSP = 43 mmHg. No significant stenosis.  •  Pulmonic Valve: Normal structure. No regurgitation. No stenosis.  •  Aorta: Aortic root normal. Sinuses of Valsalva normal-sized. Ascending aorta normal-sized. Aortic arch normal-sized. Descending aorta normal-sized.  •  Pericardium: Normal structure. No evidence of pericardial effusion. No cardiac tamponade.  •  Left Atrium: Mildly dilated atrium.     Echo 1/2/2024:  •  Left Ventricle: Normal ventricle size. Normal wall thickness. Estimated EF 60-65%. No regional wall motion abnormalities.  •  Limited followup study shows normal LV function now- compared with apical hypokinesis on 12/26/23        Assessment   1.  Chronic diastolic heart failure, ACC Stage C, NYHA class 2. HFrecEF.   Acute change within one month with the only change being new atrial fibrillation with RVR at the time of the echocardiogram in October 2022.  Repeat echocardiogram shows his ejection fraction has rebounded from 40% back to his baseline of 55%.  He also had Tako-tsubo cardiomyopathy when critically  ill December 2023 that recovered fully before discharge.  He is no longer requiring torsemide but I reminded him to use it as needed for increased edema.  He was previously taking 10 mg as needed.    2. Paroxysmal atrial fibrillation  Rhythm control and following with Dr. Aceves. On Xarelto.  On his last several checks of his renal function his GFR has been above 50, thus I increased his Xarelto to 20 mg daily at last visit but I discussed with them that at some point he may need to be reduced again to 15 mg.    3.  CKD Stage 2-3a  On his last several checks of his renal function his GFR has been above 50, thus I increased his Xarelto to 20 mg daily last visit. Baseline creatinine 1.1-1.4.      4. GI bleed  EGD with no active bleeding.    5. Breast and Prostate Cancer  I reviewed interim correspondence.  Things are stable.    6.  Long-term use of amiodarone  TSH and LFTs were checked 2 days ago and are essentially normal.       Thank you for allowing me to participate in the care of this patient.  I reviewed notes from Ep, oncology, ENT and surgical oncology.  I reviewed interim labs, as well as hospital and LTAC records.  I will plan to see him back in 6 months or sooner should the need arise.  Please do not hesitate to contact me with any questions or concerns.    Sincerely,    Gary Moran MD  5/20/2024

## 2024-05-22 LAB
PSA SERPL-MCNC: <0.04 NG/ML
TESTOST SERPL-MCNC: 478 NG/DL (ref 250–1100)

## 2024-05-25 NOTE — ASSESSMENT & PLAN NOTE
12/1 bloodwork shows magnesium 1.4 and K 3.9  
Improving, weight down to 189 (was as high as 200s)  ECHO at [facility] showed EF 40%  Continues on lasix 40mg daily   
Newly diagnosed following chemotherapy for breast cancer in September Declined prior ROLF cardioversion   Has been compliant with Xarelto without missed doses  Has been on Cardizem 360QD and Toprol XL 100BID   NPO since last evening   Will follow-up with Dr. Aceves   
Unable to continue chemotherapy at this time due to cardiac issues  Started on Tamoxifen   
"I fell on my hands and right hip in the park 30 minutes ago"

## 2024-05-30 ENCOUNTER — TELEPHONE (OUTPATIENT)
Dept: SCHEDULING | Facility: CLINIC | Age: 74
End: 2024-05-30
Payer: MEDICARE

## 2024-05-31 NOTE — PROGRESS NOTES
5/31/2024    ADDENDUM    Preoperative cardiovascular risk assessment:    Procedure: spine injection    Date of procedure: TBD    Physician performing procedure: Shade Ortiz MD    Fax number: [phone]    Assessment/Recommendations:    This patient is an acceptable risk for the proposed procedure. He may hold rivaroxaban/Xarelto for 3 days before the procedure if you so wish.  Please resume once deemed safe from a bleeding perspective.  He does not require antibiotic premedication.  He requires no further cardiac testing. Please see accompanying letter for further cardiovascular issues noted below.    JONNY Escobar/Gary Moran MD

## 2024-07-29 DIAGNOSIS — I49.01 VENTRICULAR FIBRILLATION (CMS/HCC): ICD-10-CM

## 2024-07-29 RX ORDER — AMIODARONE HYDROCHLORIDE 200 MG/1
200 TABLET ORAL 3 TIMES WEEKLY
Qty: 39 TABLET | Refills: 3 | Status: SHIPPED | OUTPATIENT
Start: 2024-07-29

## 2024-07-29 NOTE — TELEPHONE ENCOUNTER
"Medicine Refill Request [facility]    Name of Patient: Cam Rosas Jr.    Caller's name/Relationship: spouse     Callback number: [phone]    Medication Name, Dosage, Supply: amiodarone (PACERONE) 200 mg tablet     Quantity left: out     Pharmacy: [facility]     Last Consult Visit: Visit date not found  Last Telemedicine Visit: Visit date not found    Next Appointment: Visit date not found      Current Outpatient Medications:     amiodarone (PACERONE) 200 mg tablet, Take 1 tablet (200 mg total) by mouth 3 (three) times a week (Mon, Wed, Fri)., Disp: , Rfl:     atorvastatin (LIPITOR) 10 mg tablet, Take 1 tablet (10 mg total) by mouth daily., Disp: 90 tablet, Rfl: 3    busPIRone (BUSPAR) 10 mg tablet, Take 10 mg by mouth 2 (two) times a day., Disp: , Rfl:     cetirizine (ZyrTEC) 1 mg/mL syrup, 10 mL (10 mg total) by feeding tube route nightly., Disp: , Rfl:     cholecalciferol, vitamin D3, 1,000 unit (25 mcg) tablet, Take 1,000 Units by mouth daily., Disp: , Rfl:     melatonin ODT, 1 tablet (3 mg total) by peg tube route nightly as needed (sleep)., Disp: , Rfl:     metoprolol succinate XL (TOPROL-XL) 25 mg 24 hr tablet, Take 1 tablet (25 mg total) by mouth nightly., Disp: 90 tablet, Rfl: 3    pantoprazole (PROTONIX) 40 mg EC tablet, Take 40 mg by mouth 2 (two) times a day., Disp: , Rfl:     rivaroxaban (XARELTO) 20 mg tablet, Take 1 tablet (20 mg total) by mouth daily with dinner., Disp: 90 tablet, Rfl: 3    tamoxifen (NOLVADEX) 20 mg chemo tablet, Take  1 tablet (20 mg total) daily, Disp: 90 tablet, Rfl: 3    torsemide (DEMADEX) 20 mg tablet, Take 0.5 tablets (10 mg total) by mouth daily as needed (edema)., Disp: , Rfl:       BP Readings from Last 3 Encounters:   05/20/24 (!) 144/70   05/16/24 120/62   05/13/24 112/68       Recent Lab results:  No results found for: \"CHOL\", No results found for: \"HDL\", No results found for: \"LDLCALC\",   Lab Results   Component Value Date    TRIG 303 (H) 12/27/2023        Lab Results " "  Component Value Date    GLUCOSE 87 03/18/2024   , No results found for: \"HGBA1C\"      Lab Results   Component Value Date    CREATININE 1.3 03/18/2024       Lab Results   Component Value Date    TSH 0.40 11/03/2023           "

## 2024-09-09 ENCOUNTER — TELEPHONE (OUTPATIENT)
Dept: SCHEDULING | Facility: CLINIC | Age: 74
End: 2024-09-09
Payer: MEDICARE

## 2024-09-09 NOTE — TELEPHONE ENCOUNTER
Pt's spouse returning call from the office regarding upcoming appt and labs. Please advise pt @ [phone]

## 2024-09-11 LAB
ALBUMIN SERPL-MCNC: 3.7 G/DL (ref 3.6–5.1)
ALBUMIN/GLOB SERPL: 1.5 (CALC) (ref 1–2.5)
ALP SERPL-CCNC: 53 U/L (ref 35–144)
ALT SERPL-CCNC: 9 U/L (ref 9–46)
AST SERPL-CCNC: 23 U/L (ref 10–35)
BILIRUB SERPL-MCNC: 0.8 MG/DL (ref 0.2–1.2)
BUN SERPL-MCNC: 13 MG/DL (ref 7–25)
BUN/CREAT SERPL: 10 (CALC) (ref 6–22)
CALCIUM SERPL-MCNC: 8.2 MG/DL (ref 8.6–10.3)
CHLORIDE SERPL-SCNC: 105 MMOL/L (ref 98–110)
CO2 SERPL-SCNC: 29 MMOL/L (ref 20–32)
CREAT SERPL-MCNC: 1.3 MG/DL (ref 0.7–1.28)
EGFRCR SERPLBLD CKD-EPI 2021: 58 ML/MIN/1.73M2
GLOBULIN SER CALC-MCNC: 2.5 G/DL (CALC) (ref 1.9–3.7)
GLUCOSE SERPL-MCNC: 107 MG/DL (ref 65–139)
MAGNESIUM SERPL-MCNC: 1.4 MG/DL (ref 1.5–2.5)
POTASSIUM SERPL-SCNC: 3.7 MMOL/L (ref 3.5–5.3)
PROT SERPL-MCNC: 6.2 G/DL (ref 6.1–8.1)
SODIUM SERPL-SCNC: 145 MMOL/L (ref 135–146)
TSH SERPL-ACNC: 2.83 MIU/L (ref 0.4–4.5)

## 2024-09-11 ASSESSMENT — ENCOUNTER SYMPTOMS
HEMATURIA: 0
IRREGULAR HEARTBEAT: 0
PALPITATIONS: 0
SHORTNESS OF BREATH: 0
TREMORS: 0
SYNCOPE: 0
PND: 0
PARESTHESIAS: 0
NEAR-SYNCOPE: 0
FOCAL WEAKNESS: 0
DYSPNEA ON EXERTION: 0
ORTHOPNEA: 0
ALTERED MENTAL STATUS: 0
COUGH: 0
ABDOMINAL PAIN: 0
WEAKNESS: 0

## 2024-09-11 NOTE — PROGRESS NOTES
Electrophysiology  Outpatient Note         HPI   Cam Rosas Jr. is a 74 y.o. male who is seen today for follow-up and management of atrial fibrillation.  He underwent a cardioversion on December 5, 2022.  He was loaded with amiodarone and now comes to the office on amiodarone 200 mg daily, metoprolol 100 mg daily and diltiazem 360 mg .  He returned to the office 1/2023 in sinus bradycardia. Diltizem was decreased. During the month of August 2023  he underwent right breast mastectomy with lymph node removal.      He has had a prolonged admission to [facility] on 12/24/2023 with dyspnea which started the day before admission. He required intubation on admission for respiratory distress. He had a fever of 102 with WBC count of 14K and influenza. A PCR is positive. He was started on vancomycin, pip-tazo along with oseltamivir with CTA chest showing right upper lobe and left lower lobe infiltrate with patchy airspace.   It was so severe he had to undergo a tracheostomy and a PEG tube was placed. He has chronic renal insufficiency with a creatinine of 1.1 to 1.4 mg/dL. During the admission he became hypoxic and bradycardic.  He was treated with atropine. He was subsequently intubated and again had bradycardia with polymorphic ventricular tachycardia that degenerated into sustained ventricular flutter.  He was treated with intravenous lidocaine.  He received 4 external shocks with a resultant slow wide-complex rhythm. He was given 300 mg of IV amiodarone and bolused with lidocaine.  He had persistent bradycardia.  He had a persistently wide QRS.   He then underwent placement of a temporary wire and had a cardiac catheterization to rule out coronary disease. Cardiac catheterization revealed no significant coronary disease. His LV gram was consistent with Takotsubo syndrome. He was started on intravenous milrinone as his intracardiac pressures were elevated. He was eventually discharged with trach and G-tube  removal.     Since our last office visit in April 2024 he has had no further hospitalizations. He is accompanied by his wife who reports last week he woke with bilateral edema and took extra Bumex 10 TID for what the recall was just one day. Lab work from 9/10/2024 shows increased BUN/creat, hypomagnesemia and hypokalemia.     Past Medical History:   Diagnosis Date    Acute systolic heart failure (CMS/HCC) 02/15/2023    Ambulates with cane     and walker    Atrial fibrillation (CMS/HCC)     Breast cancer (CMS/HCC) October 2022    CHF (congestive heart failure) (CMS/HCC)     Chronic kidney disease     CKD (chronic kidney disease) 10/19/2022    History of radiation therapy     Hx antineoplastic chemo     Prostate cancer (CMS/HCC)        Past Surgical History   Procedure Laterality Date    Cardiac surgery      mitral valve repair 2006    Cardioversion  12/05/2022    Successful DC cardioversion    CARDIOVERSION EXTERNAL N/A 12/5/2022    Performed by Gary Aceves MD at [facility] CARDIAC CATH/EP    Cath lab temporary pacemaker insertion N/A 12/25/2023    Performed by Haja Onofre MD at  CARDIAC CATH/EP/NEURO    GASTROSTOMY PERCUTANEOUS ENDOSCOPIC N/A 1/4/2024    Performed by Edison Oneil MD at  OR    Knee cartilage surgery Left     Mastectomy      PORT/PERMACATH REMOVAL Left 11/9/2023    Performed by Barb Osuna MD at  OR    Prostate surgery  July 2022    Prostatectomy  07/15/2022    Right & left heart cath with coronary angiography N/A 12/25/2023    Performed by Haja Onofre MD at  CARDIAC CATH/EP/NEURO    RIGHT BREAST MASTECTOMY; RIGHT SENTINEL NODE IDENTIFICATION, MAPPING, AND BIOPSY; Right 8/17/2023    Performed by Barb Osuna MD at  OR    Tonsillectomy      TRACHEOSTOMY N/A 1/4/2024    Performed by Edison Oneil MD at  OR       Allergies: Azithromycin, Prednisone, and Lorazepam    Current Outpatient Medications   Medication Sig Dispense Refill    amiodarone  (PACERONE) 200 mg tablet Take 1 tablet (200 mg total) by mouth 3 (three) times a week (Mon, Wed, Fri). 39 tablet 3    atorvastatin (LIPITOR) 10 mg tablet Take 1 tablet (10 mg total) by mouth daily. 90 tablet 3    busPIRone (BUSPAR) 10 mg tablet Take 10 mg by mouth 2 (two) times a day.      cholecalciferol, vitamin D3, 1,000 unit (25 mcg) tablet Take 1,000 Units by mouth daily.      magnesium oxide (MAG-OX) 400 mg (241.3 mg magnesium) tablet Take 1 tablet (400 mg total) by mouth 2 (two) times a day. 180 tablet 1    metoprolol succinate XL (TOPROL-XL) 25 mg 24 hr tablet Take 1 tablet (25 mg total) by mouth nightly. 90 tablet 3    pantoprazole (PROTONIX) 40 mg EC tablet Take 40 mg by mouth 2 (two) times a day.      rivaroxaban (XARELTO) 20 mg tablet Take 1 tablet (20 mg total) by mouth daily with dinner. 90 tablet 3    tamoxifen (NOLVADEX) 20 mg chemo tablet Take  1 tablet (20 mg total) daily 90 tablet 3    torsemide (DEMADEX) 20 mg tablet Take 0.5 tablets (10 mg total) by mouth daily as needed (edema).      cetirizine (ZyrTEC) 1 mg/mL syrup 10 mL (10 mg total) by feeding tube route nightly.      melatonin ODT 1 tablet (3 mg total) by peg tube route nightly as needed (sleep).      potassium chloride (KLOR-CON) 20 mEq packet Take 20 mEq by mouth daily. 90 packet 3     No current facility-administered medications for this visit.       Social History     Tobacco Use    Smoking status: Never    Smokeless tobacco: Never   Vaping Use    Vaping status: Never Used   Substance Use Topics    Alcohol use: Yes     Alcohol/week: 14.0 standard drinks of alcohol     Types: 14 Shots of liquor per week     Comment: 2 drinks/day - scotch    Drug use: Never       Family History   Problem Relation Name Age of Onset    Hyperlipidemia Biological Mother mother     Hypertension Biological Mother mother     Breast cancer Biological Mother mother     Cancer Biological Mother mother         gyn? cervical    Hyperlipidemia Biological Father Dad      Hypertension Biological Father Dad     Arthritis Biological Father Dad     No Known Problems Biological Sister      No Known Problems Biological Brother      Heart disease Maternal Grandmother Belle     Arthritis Maternal Grandfather      COPD Maternal Grandfather      Diabetes Maternal Grandfather      No Known Problems Paternal Grandmother      No Known Problems Paternal Grandfather      Cancer less than or equal to age 50 Other son     Esophageal cancer Other son         47, in abd,chemo q 3 weeks       Review of Systems   Constitutional: Negative for malaise/fatigue.   HENT:  Negative for hearing loss.    Eyes:  Negative for visual disturbance.   Cardiovascular:  Negative for chest pain, dyspnea on exertion, irregular heartbeat, leg swelling, near-syncope, orthopnea, palpitations, paroxysmal nocturnal dyspnea and syncope.   Respiratory:  Negative for cough and shortness of breath.    Hematologic/Lymphatic: Negative for bleeding problem.   Skin:  Negative for rash.   Musculoskeletal:  Negative for joint pain and muscle weakness.   Gastrointestinal:  Negative for abdominal pain.   Genitourinary:  Negative for hematuria.   Neurological:  Negative for focal weakness, paresthesias, tremors and weakness.   Psychiatric/Behavioral:  Negative for altered mental status.        Objective     Vitals:    09/13/24 1552   BP: (!) 142/80   Pulse: 68   SpO2: 94%           Physical Exam  Constitutional:       Appearance: He is well-developed.   HENT:      Head: Normocephalic and atraumatic.   Neck:      Vascular: No JVD.   Cardiovascular:      Rate and Rhythm: Regular rhythm. Bradycardia present.      Heart sounds: No murmur heard.  Pulmonary:      Breath sounds: Normal breath sounds.   Abdominal:      General: Bowel sounds are normal.      Palpations: Abdomen is soft.      Tenderness: There is no abdominal tenderness.   Musculoskeletal:      Right lower leg: Edema present.      Left lower leg: Edema present.      Comments:  Mild bilateral ankle edema   Skin:     General: Skin is warm and dry.   Neurological:      Mental Status: He is alert and oriented to person, place, and time.          Labs   Lab Results   Component Value Date    WBC 5.73 03/18/2024    HGB 10.7 (L) 03/18/2024    HCT 35.1 (L) 03/18/2024     03/18/2024    TRIG 303 (H) 12/27/2023    ALT 9 09/10/2024    AST 23 09/10/2024     09/10/2024    K 3.7 09/10/2024     09/10/2024    CREATININE 1.30 (H) 09/10/2024    BUN 13 09/10/2024    CO2 29 09/10/2024    TSH 2.83 09/10/2024    INR 1.8 01/07/2024       Echocardiogram 10/18/2022   Transthoracic echocardiogram on at outside hospital showed an ejection fraction of 40-45%, global left ventricular hypokinesis, mildly dilated left atrium.    Cardioversion 12/5/2022. amiodarone.     Cardiac Catheterization 12/25/2023    Angiographically normal epicardial coronary arteries    Left ventricular ejection fraction is approximately 25-30%.Wall motion abnormalities consistent with Takotsubo Cardiomyopathy    Severely elevated biventricular filling pressures    Reduced Cardiac Output and Index; Reduced Stroke volume and stroke volume index by Chelsi Calculation    Pulmonary venous post-capillary hypertension primarily due to left heart dysfunction    No peak to peak gradient on pullback to suggest aortic stenosis    Echocardiogram 1/2/2024    Left Ventricle: Normal ventricle size. Normal wall thickness. Estimated EF 60-65%. No regional wall motion abnormalities.    Limited followup study shows normal LV function now- compared with apical hypokinesis on 12/26/23    ECG Sinus rhythm with wide QRS QT ~570 msecs    Assessment/Plan   Problem List Items Addressed This Visit          Circulatory    Paroxysmal atrial fibrillation (CMS/HCC)     He is maintaining sinus rhythm/bradycardia.He is consistently bradycardic. Todays electrocardiogram also shows a prolonged QT interval. Gin asked him to decrease the amiodarone to 200 mg  3X/wee. This prolonged QT may be related to amiodarone along with  Tamoxifen he takes for his history of breast cancer.  Thyroid and liver enzymes lab work is ordered.          Acute systolic heart failure (CMS/[facility])     Takotsubo cardiomyopathy during admission 12/23. EF via cardiac cath 12/25/2023 was 25-30%. Echocardiogram 1/2/2024 resumption of normal ejection fraction at 60-65%. There have been no symptoms or signs of heart failure.           Cardiac arrest (CMS/[facility])     During the 12/2023 [place] admission he became hypoxic and bradycardic.  He was treated with atropine. He was subsequently intubated and again had bradycardia followed by polymorphic ventricular tachycardia that degenerated into sustained ventricular flutter.  He was treated with intravenous lidocaine.  He received 4 external shocks with a resultant slow wide-complex rhythm. He was given 300 mg of IV amiodarone and bolused with lidocaine. There were no further episodes of VF.          Takotsubo cardiomyopathy     He underwent a cardiac catheterization to rule out coronary disease during 12/2023 admission. Cardiac catheterization revealed no significant coronary disease. His LV gram was consistent with Takotsubo syndrome. He was started on intravenous milrinone as his intracardiac pressures were elevated. The EF has recovered with no further signs of Takotsubo.             Genitourinary    Stage 3a chronic kidney disease (CMS/[facility]) - Primary     Current BUN 13/creat 1.3.          Relevant Orders    [facility] MUSE ECG 12 lead (clinic performed) (Completed)    Comprehensive metabolic panel    Magnesium       Other    Electrolyte abnormality     His magnesium continues to be low. He will begin magnesium 400 mg BID and KCL will be used daily as ordered but increase dose if taking additional Bumex. Gin ordered repeat labs for 4-6 weeks.          Long term current use of amiodarone    Relevant Orders    TSH 3rd Generation    Comprehensive metabolic panel            Gary Aceves MD   09/13/2024

## 2024-09-13 ENCOUNTER — OFFICE VISIT (OUTPATIENT)
Dept: CARDIOLOGY | Facility: CLINIC | Age: 74
End: 2024-09-13
Payer: MEDICARE

## 2024-09-13 VITALS
OXYGEN SATURATION: 94 % | DIASTOLIC BLOOD PRESSURE: 80 MMHG | HEART RATE: 68 BPM | HEIGHT: 65 IN | BODY MASS INDEX: 29.82 KG/M2 | WEIGHT: 179 LBS | SYSTOLIC BLOOD PRESSURE: 142 MMHG

## 2024-09-13 DIAGNOSIS — I48.0 PAROXYSMAL ATRIAL FIBRILLATION (CMS/HCC): ICD-10-CM

## 2024-09-13 DIAGNOSIS — N18.31 STAGE 3A CHRONIC KIDNEY DISEASE (CMS/HCC): Primary | ICD-10-CM

## 2024-09-13 DIAGNOSIS — Z79.899 LONG TERM CURRENT USE OF AMIODARONE: ICD-10-CM

## 2024-09-13 DIAGNOSIS — I46.9 CARDIAC ARREST (CMS/HCC): ICD-10-CM

## 2024-09-13 DIAGNOSIS — I51.81 TAKOTSUBO CARDIOMYOPATHY: ICD-10-CM

## 2024-09-13 DIAGNOSIS — E87.8 ELECTROLYTE ABNORMALITY: ICD-10-CM

## 2024-09-13 DIAGNOSIS — I50.21 ACUTE SYSTOLIC HEART FAILURE (CMS/HCC): ICD-10-CM

## 2024-09-13 PROCEDURE — 93000 ELECTROCARDIOGRAM COMPLETE: CPT | Performed by: INTERNAL MEDICINE

## 2024-09-13 PROCEDURE — G8753 SYS BP > OR = 140: HCPCS | Performed by: INTERNAL MEDICINE

## 2024-09-13 PROCEDURE — G8754 DIAS BP LESS 90: HCPCS | Performed by: INTERNAL MEDICINE

## 2024-09-13 PROCEDURE — 99214 OFFICE O/P EST MOD 30 MIN: CPT | Performed by: INTERNAL MEDICINE

## 2024-09-13 RX ORDER — POTASSIUM CHLORIDE 20 MEQ/1
20 TABLET, EXTENDED RELEASE ORAL DAILY
Qty: 90 TABLET | Refills: 1 | Status: SHIPPED | OUTPATIENT
Start: 2024-09-13 | End: 2024-09-26

## 2024-09-13 RX ORDER — LANOLIN ALCOHOL/MO/W.PET/CERES
400 CREAM (GRAM) TOPICAL 2 TIMES DAILY
Qty: 180 TABLET | Refills: 1 | Status: SHIPPED | OUTPATIENT
Start: 2024-09-13 | End: 2025-01-23 | Stop reason: SDUPTHER

## 2024-09-16 NOTE — ASSESSMENT & PLAN NOTE
His magnesium continues to be low. He will begin magnesium 400 mg BID and KCL will be used daily as ordered but increase dose if taking additional Bumex. Gin ordered repeat labs for 4-6 weeks.

## 2024-09-16 NOTE — ASSESSMENT & PLAN NOTE
Takotsubo cardiomyopathy during admission 12/23. EF via cardiac cath 12/25/2023 was 25-30%. Echocardiogram 1/2/2024 resumption of normal ejection fraction at 60-65%. There have been no symptoms or signs of heart failure.

## 2024-09-16 NOTE — ASSESSMENT & PLAN NOTE
During the 12/2023 [place] admission he became hypoxic and bradycardic.  He was treated with atropine. He was subsequently intubated and again had bradycardia followed by polymorphic ventricular tachycardia that degenerated into sustained ventricular flutter.  He was treated with intravenous lidocaine.  He received 4 external shocks with a resultant slow wide-complex rhythm. He was given 300 mg of IV amiodarone and bolused with lidocaine. There were no further episodes of VF.

## 2024-09-17 LAB
QRS DURATION: 132
QT INTERVAL: 570
QTC CALCULATION(BAZETT): 597
R AXIS: -31
T WAVE AXIS: 192
VENTRICULAR RATE: 66

## 2024-09-26 RX ORDER — POTASSIUM CHLORIDE 1.5 G/1.58G
20 POWDER, FOR SOLUTION ORAL DAILY
Qty: 90 PACKET | Refills: 3 | Status: SHIPPED | OUTPATIENT
Start: 2024-09-26 | End: 2024-11-18

## 2024-09-26 NOTE — TELEPHONE ENCOUNTER
Pt's spouse Fiorella called requests a call back regarding an alternative medication to pt's potassium due to the pills being too big    Fiorella can be reached at [phone]

## 2024-09-30 NOTE — ASSESSMENT & PLAN NOTE
Diagnosed with invasive ductal carcinoma of the right breast, grade 3, ER positive DE positive HER2/jhonatan +3.  Initiated 1 cycle TC pH in September 2022 at [facility].  Questionable reaction to the anti-HER2/jhonatan therapy.  Second cycle with Taxotere and carboplatin alone.  Patient hospitalized.  Patient decays she will never take systemic therapy again for his breast cancer.  Had multiple medical issues including cardiac issues.  Was agreeable to initiating tamoxifen November 2022.  Remained on tamoxifen while underwent treatment for his prostate cancer.  Has been seen by our breast surgery group.    He presents today in follow-up.  He continues on the tamoxifen.  He is continue to have improvement in his breast exam.Recommend that he proceed with surgical intervention.  He does have a follow-up ultrasound of the breast and appoint with Dr. Barb Osuna in approximately a month.  Following that appointment he will have follow-up with my colleague Dr. Kailey Gale regarding further management.  All questions answered.   no

## 2024-10-07 ENCOUNTER — TELEPHONE (OUTPATIENT)
Dept: SCHEDULING | Facility: CLINIC | Age: 74
End: 2024-10-07
Payer: MEDICARE

## 2024-10-07 NOTE — TELEPHONE ENCOUNTER
Spoke to patient's spouse. Since his magnesium continues to be low Dr. Aceves would like him to take 400 mg bid.

## 2024-10-07 NOTE — TELEPHONE ENCOUNTER
Medication Clarification     Name of caller: Cam Rosas Jr.    Relationship to patient: Self    Name of patient: Cam ANU Alison Almendarez.    Name of physician: Gary Aceves MD    Name of medication:   magnesium oxide (MAG-OX) 400 mg (241.3 mg magnesium) tablet [[id number]]   What needs to be clarified: Patient Spouse Unsure if Medication is 400 mg total or 800 mg total.    Instructions states take one tablet twice a day. Pharmacy states its take one 400 mg tablet twice a day which is 800 MG total. Spouse stated that is a high Dose and should be one tablet twice a day to total 400mg     Best contact number: [phone]

## 2024-11-04 ENCOUNTER — OFFICE VISIT (OUTPATIENT)
Dept: HEMATOLOGY/ONCOLOGY | Facility: CLINIC | Age: 74
End: 2024-11-04
Attending: INTERNAL MEDICINE
Payer: MEDICARE

## 2024-11-04 VITALS
DIASTOLIC BLOOD PRESSURE: 71 MMHG | HEART RATE: 66 BPM | WEIGHT: 182.8 LBS | TEMPERATURE: 98.1 F | BODY MASS INDEX: 30.42 KG/M2 | SYSTOLIC BLOOD PRESSURE: 138 MMHG | OXYGEN SATURATION: 97 %

## 2024-11-04 DIAGNOSIS — C61 PROSTATE CANCER (CMS/HCC): ICD-10-CM

## 2024-11-04 DIAGNOSIS — Z15.01 BRCA1 POSITIVE: ICD-10-CM

## 2024-11-04 DIAGNOSIS — C50.021 MALIGNANT NEOPLASM INVOLVING BOTH NIPPLE AND AREOLA OF RIGHT BREAST IN MALE, ESTROGEN RECEPTOR POSITIVE (CMS/HCC): Primary | ICD-10-CM

## 2024-11-04 DIAGNOSIS — Z17.0 MALIGNANT NEOPLASM INVOLVING BOTH NIPPLE AND AREOLA OF RIGHT BREAST IN MALE, ESTROGEN RECEPTOR POSITIVE (CMS/HCC): Primary | ICD-10-CM

## 2024-11-04 DIAGNOSIS — Z15.09 BRCA1 POSITIVE: ICD-10-CM

## 2024-11-04 PROCEDURE — G8752 SYS BP LESS 140: HCPCS | Performed by: INTERNAL MEDICINE

## 2024-11-04 PROCEDURE — 99214 OFFICE O/P EST MOD 30 MIN: CPT | Performed by: INTERNAL MEDICINE

## 2024-11-04 PROCEDURE — G8754 DIAS BP LESS 90: HCPCS | Performed by: INTERNAL MEDICINE

## 2024-11-04 RX ORDER — TAMOXIFEN CITRATE 20 MG/1
20 TABLET ORAL DAILY
Qty: 90 TABLET | Refills: 3 | Status: SHIPPED | OUTPATIENT
Start: 2024-11-04

## 2024-11-04 ASSESSMENT — ENCOUNTER SYMPTOMS
LEG SWELLING: 1
SLEEP DISTURBANCE: 1
CONSTITUTIONAL NEGATIVE: 1
MUSCULOSKELETAL NEGATIVE: 1
NEUROLOGICAL NEGATIVE: 1
ENDOCRINE NEGATIVE: 1
RESPIRATORY NEGATIVE: 1
EYES NEGATIVE: 1
HEMATOLOGIC/LYMPHATIC NEGATIVE: 1
DIFFICULTY URINATING: 1

## 2024-11-04 ASSESSMENT — PAIN SCALES - GENERAL: PAINLEVEL_OUTOF10: 0-NO PAIN

## 2024-11-04 NOTE — PROGRESS NOTES
Cam Rosas Jr. is a 74 y.o. male,   :  1950    Encounter Diagnoses   Name Primary?    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, estrogen receptor positive (CMS/HCC) Yes    BRCA1 positive     Prostate cancer (CMS/HCC)         Cancer Staging   Malignant neoplasm of right male breast (CMS/HCC)  Staging form: Breast, AJCC 8th Edition  - Clinical stage from 3/20/2023: Stage IA (cT1c, cN0, cM0, G3, ER+, NE+, HER2+) - Signed by Barb Osuna MD on 3/20/2023  - Pathologic stage from 2023: No Stage Recommended (ypT1c, pN1a(sn), cM0, G3, ER+, NE+, HER2+) - Signed by Barb Osuna MD on 2023    Prostate cancer (CMS/HCC)  Staging form: Prostate, AJCC 8th Edition  - Pathologic stage from 2022: Stage Unknown (pT3b, pNX, cM0, PSA: 6, Grade Group: 3) - Signed by [facility], Gregory J, MD on 3/8/2023      Treatment Plans       No treatment plans exist            Oncology History   Malignant neoplasm of right male breast (CMS/HCC)   2022 Biopsy     1.  Right breast mass:     Invasive ductal carcinoma, [place] grade 3 (3+ 3+2).   Invasive carcinoma is 13 mm in maximum dimension in core biopsy.    ER+ 90%; NE+ 50%; Ltr1pbb+3; XL1043%     2022 -  Chemotherapy    2022 initiated first cycle TCP H at [facility].  Questionable reaction during the anti-HER2/jhonatan therapy with Herceptin.  Taxotere and carboplatin given on 10/3/2022.  Patient hospitalized after first cycle.     10/19/2022 Initial Diagnosis    Malignant neoplasm of right male breast (CMS/HCC)     11/15/2022 -  Hormone Therapy    Tamoxifen 20 mg daily while undergoes evaluation of cardiac issues     3/20/2023 Cancer Staged    Staging form: Breast, AJCC 8th Edition  - Clinical stage from 3/20/2023: Stage IA (cT1c, cN0, cM0, G3, ER+, NE+, HER2+)     2023 Cancer Staged    Staging form: Breast, AJCC 8th Edition  - Pathologic stage from 2023: No Stage Recommended  (ypT1c, pN1a(sn), cM0, G3, ER+, CT+, HER2+)     Prostate cancer (CMS/HCC)   7/27/2022 Cancer Staged    Staging form: Prostate, AJCC 8th Edition  - Pathologic stage from 7/27/2022: Stage Unknown (pT3b, pNX, cM0, PSA: 6, Grade Group: 3)     3/21/2023 - 6/20/2023 Chemotherapy    LEUPROLIDE (LUPRON) 22.5 MG EVERY 3 MONTHS  Plan Provider: Paz Cleveland MD     3/21/2023 -  Chemotherapy    MEDIPORT FLUSH (SINGLE LUMEN) - PATIENT ON TREATMENT  Plan Provider: Paz Cleveland MD     3/21/2023 - 3/21/2023 Chemotherapy    LEUPROLIDE (LUPRON) 22.5 MG EVERY 3 MONTHS  Plan Provider: Paz Cleveland MD         Patient Active Problem List   Diagnosis    Paroxysmal atrial fibrillation (CMS/HCC)    Chronic combined systolic and diastolic CHF (congestive heart failure) (CMS/HCC)    Malignant neoplasm of right male breast (CMS/HCC)    Electrolyte abnormality    Gastrointestinal hemorrhage with melena    CKD (chronic kidney disease)    Prostate cancer (CMS/HCC)    Acute systolic heart failure (CMS/HCC)    APC (atrial premature contractions)    Dehydration determined by examination    Essential hypertension    Herniation of lumbar intervertebral disc with radiculopathy    Hyperlipidemia    Spinal stenosis of lumbar region    Spondylosis of lumbosacral region    Stage 3a chronic kidney disease (CMS/HCC)    BRCA1 positive    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 is seen today in follow up.  Overall he reports he is feeling about the same.  He notes some  intermittent leg swelling which is managed with the use of his diuretic.  He had left-sided mammogram in May which showed probable benign findings recommendations for a 6-month follow-up.  That is scheduled for later this month.    Review of Systems - Oncology    Review of Systems:  Nursing assessment reviewed. Pertinent positive and negative symptoms noted in HPI, all others negative.    Temp:  [36.7 °C (98.1 °F)] 36.7 °C (98.1 °F)  Heart Rate:  [66] 66  BP: (138)/(71) 138/71  Visit Vitals  /71 (BP Location: Right upper arm, Patient Position: Sitting)   Pulse 66   Temp 36.7 °C (98.1 °F) (Temporal)   Wt 82.9 kg (182 lb 12.8 oz)   SpO2 97%   BMI 30.42 kg/m²     Physical Exam  Constitutional:       General: He is not in acute distress.     Appearance: He is well-developed.   HENT:      Head: Normocephalic.      Mouth/Throat:      Pharynx: Oropharynx is clear. No oropharyngeal exudate.   Eyes:      General: No scleral icterus.     Pupils: Pupils are equal, round, and reactive to light.   Cardiovascular:      Rate and Rhythm: Normal rate and regular rhythm.   Pulmonary:      Effort: Pulmonary effort is normal.      Breath sounds: Normal breath sounds.   Chest:      Comments: Status post right mastectomy.  No chest wall masses.  No left breast masses or nipple discharge.  No axillary adenopathy.  Abdominal:      Palpations: Abdomen is soft.      Tenderness: There is no abdominal tenderness.   Musculoskeletal:         General: No tenderness.      Cervical back: Neck supple.   Lymphadenopathy:      Cervical: No cervical adenopathy.   Skin:     Findings: No rash.   Neurological:      Mental Status: He is alert.         Past Medical History:   Diagnosis Date    Acute systolic heart failure (CMS/HCC) 02/15/2023    Ambulates with cane     and walker    Atrial fibrillation (CMS/HCC)     Breast cancer (CMS/HCC) October 2022    CHF (congestive heart failure) (CMS/HCC)     Chronic kidney disease     CKD (chronic kidney  disease) 10/19/2022    History of radiation therapy     Hx antineoplastic chemo     Prostate cancer (CMS/HCC)        Past Surgical History   Procedure Laterality Date    Cardiac surgery      mitral valve repair 2006    Cardioversion  12/05/2022    Successful DC cardioversion    CARDIOVERSION EXTERNAL N/A 12/5/2022    Performed by Gary Aceves MD at [facility] CARDIAC CATH/EP    Cath lab temporary pacemaker insertion N/A 12/25/2023    Performed by Haja Onofre MD at  CARDIAC CATH/EP/NEURO    GASTROSTOMY PERCUTANEOUS ENDOSCOPIC N/A 1/4/2024    Performed by Edison Oneil MD at  OR    Knee cartilage surgery Left     Mastectomy      PORT/PERMACATH REMOVAL Left 11/9/2023    Performed by Barb Osuna MD at  OR    Prostate surgery  July 2022    Prostatectomy  07/15/2022    Right & left heart cath with coronary angiography N/A 12/25/2023    Performed by Haja Onofre MD at  CARDIAC CATH/EP/NEURO    RIGHT BREAST MASTECTOMY; RIGHT SENTINEL NODE IDENTIFICATION, MAPPING, AND BIOPSY; Right 8/17/2023    Performed by Barb Osuna MD at  OR    Tonsillectomy      TRACHEOSTOMY N/A 1/4/2024    Performed by Edison Oneil MD at  OR       Social History     Tobacco Use    Smoking status: Never    Smokeless tobacco: Never   Vaping Use    Vaping status: Never Used   Substance Use Topics    Alcohol use: Yes     Alcohol/week: 14.0 standard drinks of alcohol     Types: 14 Shots of liquor per week     Comment: 2 drinks/day - scotch    Drug use: Never       Family History   Problem Relation Name Age of Onset    Hyperlipidemia Biological Mother mother     Hypertension Biological Mother mother     Breast cancer Biological Mother mother     Cancer Biological Mother mother         gyn? cervical    Hyperlipidemia Biological Father Dad     Hypertension Biological Father Dad     Arthritis Biological Father Dad     No Known Problems Biological Sister      No Known Problems Biological Brother      Heart  disease Maternal Grandmother Belle     Arthritis Maternal Grandfather      COPD Maternal Grandfather      Diabetes Maternal Grandfather      No Known Problems Paternal Grandmother      No Known Problems Paternal Grandfather      Cancer less than or equal to age 50 Other son     Esophageal cancer Other son         47, in abd,chemo q 3 weeks         Allergies  Azithromycin, Prednisone, and Lorazepam    Medications  Current Outpatient Medications   Medication Sig Dispense Refill    amiodarone (PACERONE) 200 mg tablet Take 1 tablet (200 mg total) by mouth 3 (three) times a week (Mon, Wed, Fri). 39 tablet 3    atorvastatin (LIPITOR) 10 mg tablet Take 1 tablet (10 mg total) by mouth daily. 90 tablet 3    busPIRone (BUSPAR) 10 mg tablet Take 10 mg by mouth 2 (two) times a day.      cetirizine (ZyrTEC) 1 mg/mL syrup 10 mL (10 mg total) by feeding tube route nightly.      cholecalciferol, vitamin D3, 1,000 unit (25 mcg) tablet Take 1,000 Units by mouth daily.      magnesium oxide (MAG-OX) 400 mg (241.3 mg magnesium) tablet Take 1 tablet (400 mg total) by mouth 2 (two) times a day. 180 tablet 1    melatonin ODT 1 tablet (3 mg total) by peg tube route nightly as needed (sleep).      metoprolol succinate XL (TOPROL-XL) 25 mg 24 hr tablet Take 1 tablet (25 mg total) by mouth nightly. 90 tablet 3    pantoprazole (PROTONIX) 40 mg EC tablet Take 40 mg by mouth 2 (two) times a day.      potassium chloride (KLOR-CON) 20 mEq packet Take 20 mEq by mouth daily. 90 packet 3    rivaroxaban (XARELTO) 20 mg tablet Take 1 tablet (20 mg total) by mouth daily with dinner. 90 tablet 3    tamoxifen (NOLVADEX) 20 mg chemo tablet Take  1 tablet (20 mg total) daily 90 tablet 3    torsemide (DEMADEX) 20 mg tablet Take 0.5 tablets (10 mg total) by mouth daily as needed (edema).       No current facility-administered medications for this visit.          Laboratory  No results found for this or any previous visit (from the past 3  weeks).      Radiology  I have reviewed the patient's Radiology report(s).  Significant abnormals are probable benign left breast abnormality for which 6-month follow-up recommended.  BI DIAGNOSTIC MAMMOGRAM LEFT (TOMOSYNTHESIS), ULTRASOUND BREAST LIMITED LEFT    Result Date: 5/3/2024  CLINICAL HISTORY: 74-year-old man with a personal history of right breast cancer status post mastectomy. No reported symptoms. COMPARISON: Prior available studies. TECHNIQUE: Full field left digital mammography was performed including digital breast tomosynthesis imaging. Computer assisted detection was utilized during the interpretation of this examination. Left breast ultrasound was also performed. COMMENT: Diagnostic Left Mammogram There is a tiny focal asymmetry in the upper outer left breast/axilla which is slightly more prominent compared to prior studies, although it does not persist on additional views. Diagnostic Left Ultrasound Sonogram was directed to the location of mammographic concern, from 1-3:00, 4-9 cm from the nipple. There are no suspicious findings.     IMPRESSION: Periodic imaging surveillance is recommended for a probably benign focal asymmetry in the left breast without a sonographic correlate. A short interval follow-up diagnostic left mammogram will be due in 6 months. These results and recommendations were discussed with and acknowledged by the patient and were given to the patient in writing at the time of the examination. FINAL ASSESSMENT BIRADS CATEGORY 3 - PROBABLY BENIGN, SHORT INTERVAL FOLLOW-UP IS RECOMMENDED (6MOS LT)        Assessment and Plan  BRCA1 positive  Patient has a history of both breast and prostate cancer and BRCA1 mutation.  With regards to screening for contralateral breast cancer, we discussed recommendations for men are less clear than for women.  Patient is not interested in breast MRI as he is unable to tolerate MRI testing.  We reviewed his mammogram from May and recommendations for  6-month follow-up which is scheduled later this month.    Malignant neoplasm of right male breast (CMS/[facility])  Diagnosed with invasive ductal carcinoma of the right breast, grade 3, ER positive [place] positive HER2/jhonatan +3.  Initiated 1 cycle TCHP in September 2022 at [facility].  Questionable reaction to the anti-HER2/jhonatan therapy.  Second cycle with Taxotere and carboplatin alone.  Patient hospitalized.  Patient declined further intravenous systemic therapy for his breast cancer.  Had multiple medical issues including cardiac issues.  Was agreeable to initiating tamoxifen November 2022.  Remained on tamoxifen while underwent treatment for his prostate cancer.  Right mastectomy performed August 17, 2023.  Pathologic stage ypT1c N1a, ER positive, NH positive and HER2/jhonatan positive.  Completed adjuvant radiation to the chest wall and axilla in November 2023.    Medically he is stable without any new signs or symptoms to suggest recurrence.  No evidence of recurrence on exam.  He continues to be followed expectantly.  He continues on tamoxifen and is not having any side effects.  New prescription was sent to his pharmacy today.  He will be following up with Dr. [facility] earlier this month and Dr. Mustafa in December.  Of asked him to return to see me in 6 months.  We reviewed his mammogram from May and recommendations for 6-month follow-up which is scheduled for later this month.  He was asked to call with questions or concerns prior to his next visit.    Prostate cancer (CMS/[facility])    July 27, 2022 underwent prostatectomy for prostate cancer at outside institution.  [place] score 4+3.  February 2023 PSA increasing.  Urology recommended radiation therapy.  Received Lupron while receiving treatment with radiation.  Completed radiotherapy June 2023.    He is going to continue surveillance under the direction of radiation oncology and his urologist.  Most recent PSA from May 2024 was undetectable.  He will be having  follow-up blood work later this month.      Kailey Gale MD

## 2024-11-04 NOTE — ASSESSMENT & PLAN NOTE
July 27, 2022 underwent prostatectomy for prostate cancer at outside institution.  [place] score 4+3.  February 2023 PSA increasing.  Urology recommended radiation therapy.  Received Lupron while receiving treatment with radiation.  Completed radiotherapy June 2023.    He is going to continue surveillance under the direction of radiation oncology and his urologist.  Most recent PSA from May 2024 was undetectable.  He will be having follow-up blood work later this month.

## 2024-11-04 NOTE — ASSESSMENT & PLAN NOTE
Patient has a history of both breast and prostate cancer and BRCA1 mutation.  With regards to screening for contralateral breast cancer, we discussed recommendations for men are less clear than for women.  Patient is not interested in breast MRI as he is unable to tolerate MRI testing.  We reviewed his mammogram from May and recommendations for 6-month follow-up which is scheduled later this month.

## 2024-11-04 NOTE — ASSESSMENT & PLAN NOTE
Diagnosed with invasive ductal carcinoma of the right breast, grade 3, ER positive [place] positive HER2/jhonatan +3.  Initiated 1 cycle TCHP in September 2022 at [facility].  Questionable reaction to the anti-HER2/jhonatan therapy.  Second cycle with Taxotere and carboplatin alone.  Patient hospitalized.  Patient declined further intravenous systemic therapy for his breast cancer.  Had multiple medical issues including cardiac issues.  Was agreeable to initiating tamoxifen November 2022.  Remained on tamoxifen while underwent treatment for his prostate cancer.  Right mastectomy performed August 17, 2023.  Pathologic stage ypT1c N1a, ER positive, [place] positive and HER2/jhonatan positive.  Completed adjuvant radiation to the chest wall and axilla in November 2023.    Medically he is stable without any new signs or symptoms to suggest recurrence.  No evidence of recurrence on exam.  He continues to be followed expectantly.  He continues on tamoxifen and is not having any side effects.  New prescription was sent to his pharmacy today.  He will be following up with Dr. [facility] earlier this month and Dr. Mustafa in December.  Of asked him to return to see me in 6 months.  We reviewed his mammogram from May and recommendations for 6-month follow-up which is scheduled for later this month.  He was asked to call with questions or concerns prior to his next visit.

## 2024-11-04 NOTE — PROGRESS NOTES
Review of Systems   Constitutional: Negative.    HENT:  Negative.     Eyes: Negative.    Respiratory: Negative.     Cardiovascular:  Positive for leg swelling.   Endocrine: Negative.    Genitourinary:  Positive for difficulty urinating.    Musculoskeletal: Negative.    Skin: Negative.    Neurological: Negative.    Hematological: Negative.    Psychiatric/Behavioral:  Positive for sleep disturbance (sleeps in recliner).

## 2024-11-12 LAB
ALBUMIN SERPL-MCNC: 3.7 G/DL (ref 3.6–5.1)
ALBUMIN/GLOB SERPL: 1.7 (CALC) (ref 1–2.5)
ALP SERPL-CCNC: 58 U/L (ref 35–144)
ALT SERPL-CCNC: 10 U/L (ref 9–46)
AST SERPL-CCNC: 21 U/L (ref 10–35)
BILIRUB SERPL-MCNC: 0.6 MG/DL (ref 0.2–1.2)
BUN SERPL-MCNC: 18 MG/DL (ref 7–25)
BUN/CREAT SERPL: 12 (CALC) (ref 6–22)
CALCIUM SERPL-MCNC: 8.9 MG/DL (ref 8.6–10.3)
CHLORIDE SERPL-SCNC: 105 MMOL/L (ref 98–110)
CO2 SERPL-SCNC: 27 MMOL/L (ref 20–32)
CREAT SERPL-MCNC: 1.53 MG/DL (ref 0.7–1.28)
EGFRCR SERPLBLD CKD-EPI 2021: 47 ML/MIN/1.73M2
GLOBULIN SER CALC-MCNC: 2.2 G/DL (CALC) (ref 1.9–3.7)
GLUCOSE SERPL-MCNC: 109 MG/DL (ref 65–139)
MAGNESIUM SERPL-MCNC: 2 MG/DL (ref 1.5–2.5)
POTASSIUM SERPL-SCNC: 4.7 MMOL/L (ref 3.5–5.3)
PROT SERPL-MCNC: 5.9 G/DL (ref 6.1–8.1)
SODIUM SERPL-SCNC: 141 MMOL/L (ref 135–146)
TSH SERPL-ACNC: 3.47 MIU/L (ref 0.4–4.5)

## 2024-11-14 NOTE — PROGRESS NOTES
[facility]  JONNY Sánchez  [address], [address]  [place] [address]  Phone: [phone]  Fax: [phone]      Patient ID: Cam Rosas Jr.                              : 1950    Visit Date: 11/15/2024  Referring Provider: No ref. provider found   PCP: Usman Collins MD  GYN: No care team member to display    Subjective:    Radha is a 74-year-old who presents for evaluation of new right chest tightness and swelling. He notes about a week ago when he woke up he had some tightness in his right upper chest and slight swelling.  He notes this resolved within 24 hours and has not recurred.  He denies palpating any masses/lumps, skin changes, or any left breast complaints.       Oncology History:     Cancer Staging   Malignant neoplasm of right male breast (CMS/[facility])  Staging form: Breast, AJCC 8th Edition  - Clinical stage from 3/20/2023: Stage IA (cT1c, cN0, cM0, G3, ER+, OH+, HER2+) - Signed by Barb Osuna MD on 3/20/2023  - Pathologic stage from 2023: No Stage Recommended (ypT1c, pN1a(sn), cM0, G3, ER+, OH+, HER2+) - Signed by Barb Osuna MD on 2023    Prostate cancer (CMS/[facility])  Staging form: Prostate, AJCC 8th Edition  - Pathologic stage from 2022: Stage Unknown (pT3b, pNX, cM0, PSA: 6, Grade Group: 3) - Signed by [facility], Gregory J, MD on 3/8/2023         Oncology History   Malignant neoplasm of right male breast (CMS/HCC)   2022 Biopsy     1.  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   Invasive carcinoma is 13 mm in maximum dimension in core biopsy.    ER+ 90%; OH+ 50%; [id number]+3; [id number]%     2022 -  Chemotherapy    2022 initiated first cycle TCP H at [facility].  Questionable reaction during the anti-HER2/jhonatan therapy with Herceptin.  Taxotere and carboplatin given on 10/3/2022.  Patient hospitalized after first cycle.     10/19/2022 Initial Diagnosis     Malignant neoplasm of right male breast (CMS/HCC)     11/15/2022 -  Hormone Therapy    Tamoxifen 20 mg daily while undergoes evaluation of cardiac issues     3/20/2023 Cancer Staged    Staging form: Breast, AJCC 8th Edition  - Clinical stage from 3/20/2023: Stage IA (cT1c, cN0, cM0, G3, ER+, UT+, HER2+)     8/28/2023 Cancer Staged    Staging form: Breast, AJCC 8th Edition  - Pathologic stage from 8/28/2023: No Stage Recommended (ypT1c, pN1a(sn), cM0, G3, ER+, UT+, HER2+)     Prostate cancer (CMS/HCC)   7/27/2022 Cancer Staged    Staging form: Prostate, AJCC 8th Edition  - Pathologic stage from 7/27/2022: Stage Unknown (pT3b, pNX, cM0, PSA: 6, Grade Group: 3)     3/21/2023 - 6/20/2023 Chemotherapy    LEUPROLIDE (LUPRON) 22.5 MG EVERY 3 MONTHS  Plan Provider: Paz Cleveland MD     3/21/2023 -  Chemotherapy    MEDIPORT FLUSH (SINGLE LUMEN) - PATIENT ON TREATMENT  Plan Provider: Paz Cleveland MD     3/21/2023 - 3/21/2023 Chemotherapy    LEUPROLIDE (LUPRON) 22.5 MG EVERY 3 MONTHS  Plan Provider: Paz Cleveland MD               Breast Health:  Age at menarche: No age on file  Age at first live birth: No age on file   Age of menopause: No age on file       Allergies: Azithromycin, Prednisone, and Lorazepam    Current Medications: has a current medication list which includes the following prescription(s): amiodarone, atorvastatin, buspirone, cholecalciferol (vitamin d3), magnesium oxide, melatonin, metoprolol succinate xl, pantoprazole, potassium chloride, rivaroxaban, tamoxifen, torsemide, and cetirizine.    Past Medical History:  has a past medical history of Acute systolic heart failure (CMS/HCC) (02/15/2023), Ambulates with cane, Atrial fibrillation (CMS/HCC), Breast cancer (CMS/HCC) (October 2022), CHF (congestive heart failure) (CMS/[facility]), Chronic kidney disease, CKD (chronic kidney disease) (10/19/2022), History of radiation therapy, antineoplastic chemo, and Prostate cancer (CMS/HCC).    Past Surgical  "History:  has a past surgical history that includes Cardiac surgery; Cardioversion (12/05/2022); Prostate surgery (July 2022); Tonsillectomy; Prostatectomy (07/15/2022); Knee cartilage surgery (Left); and Mastectomy.    Social History:   Social History     Tobacco Use    Smoking status: Never    Smokeless tobacco: Never   Vaping Use    Vaping status: Never Used   Substance Use Topics    Alcohol use: Yes     Alcohol/week: 14.0 standard drinks of alcohol     Types: 14 Shots of liquor per week     Comment: 2 drinks/day - scotch    Drug use: Never       Family History: family history includes Arthritis in his biological father and maternal grandfather; Breast cancer in his biological mother; COPD in his maternal grandfather; Cancer in his biological mother; Cancer less than or equal to age 50 in an other family member; Diabetes in his maternal grandfather; Esophageal cancer in an other family member; Heart disease in his maternal grandmother; Hyperlipidemia in his biological father and biological mother; Hypertension in his biological father and biological mother; No Known Problems in his biological brother, biological sister, paternal grandfather, and paternal grandmother.        Physical Exam:    Vitals: Visit Vitals  /68 (BP Location: Left upper arm, Patient Position: Sitting)   Pulse (!) 55   Temp 36.4 °C (97.5 °F) (Temporal)   Ht 1.651 m (5' 5\")   Wt 83.5 kg (184 lb) Comment: with shoes   SpO2 99%   BMI 30.62 kg/m²     Body mass index is 30.62 kg/m².    Physical Exam  Constitutional:       Appearance: Normal appearance.   HENT:      Head: Normocephalic.   Pulmonary:      Effort: Pulmonary effort is normal.   Chest:       Abdominal:      Palpations: Abdomen is soft.   Musculoskeletal:         General: Normal range of motion.      Cervical back: Normal range of motion.   Lymphadenopathy:      Upper Body:      Right upper body: No supraclavicular or axillary adenopathy.      Left upper body: No supraclavicular " or axillary adenopathy.   Skin:     General: Skin is warm.   Neurological:      General: No focal deficit present.      Mental Status: He is alert and oriented to person, place, and time.   Psychiatric:         Attention and Perception: Attention normal.         Mood and Affect: Mood and affect normal.         Speech: Speech normal.         Behavior: Behavior normal. Behavior is cooperative.       Physical Examination performed in the supine and sitting position  BREAST SIZE:small  SYMMETRY  no, status post right mastectomy      SKIN - On the right:  mild erythema over entire chest status post radiation.  At the far upper outer quadrant, about 1.5 cm circular erythema surrounding central tick partially embedded into skin. On the left: Skin color, texture, turgor normal. No rashes or lesions. Skin is without tethering, dimpling, retraction or increased vascularity  AREOLA - right surgically absent, left normal    NIPPLES -  right surgically absent , left: normal without retraction and without discharge  LYMPH NODES: infra, supra, cervical, parasternal and axillary nodes normal bilaterally  BREAST TISSUE: Right breast surgically absent, flat closure normal without discrete lesions. Left Breast  normal without discrete lesions.       Breast Imaging: I have personally reviewed the reports and images as follows.  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  On clinical exam today, at the far upper outer quadrant of the right chest there is a 1.5 cm circular erythematous region with a central tick embedded.  With Radha verbal consent this was removed with forceps and put in a container.  On additional examination, it appears the legs of the tick may still be embedded.  Additional attempt  was made to remove without success.  He tolerated the removal very well.  His PCP office was contacted and he was assisted in scheduling an appointment for today at 3 PM for further assessment and treatment.  The tick that was removed was given to him in a bag to take to this appointment.  He is scheduled for follow-up left breast imaging on November 19.  He will keep his follow-up appointment scheduled with Dr. Osuna on December 16.  Otherwise he will continue with any treatment for tick bite at the direction of his PCP.  Lastly he was reminded to continue with his routine and preventative health care.    All of the questions were answered.  The patient is in agreement with the treatment plan.      Return in about 4 weeks (around 12/13/2024).        11/15/2024   11:44 AM        JONNY Sánchez

## 2024-11-15 ENCOUNTER — OFFICE VISIT (OUTPATIENT)
Dept: SURGERY | Facility: CLINIC | Age: 74
End: 2024-11-15
Payer: MEDICARE

## 2024-11-15 VITALS
DIASTOLIC BLOOD PRESSURE: 68 MMHG | WEIGHT: 184 LBS | TEMPERATURE: 97.5 F | BODY MASS INDEX: 30.66 KG/M2 | SYSTOLIC BLOOD PRESSURE: 130 MMHG | HEART RATE: 55 BPM | HEIGHT: 65 IN | OXYGEN SATURATION: 99 %

## 2024-11-15 DIAGNOSIS — Z15.89 MONOALLELIC MUTATION OF ATM GENE: ICD-10-CM

## 2024-11-15 DIAGNOSIS — Z17.0: Primary | ICD-10-CM

## 2024-11-15 DIAGNOSIS — Z15.09 MONOALLELIC MUTATION OF ATM GENE: ICD-10-CM
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
## 2024-11-15 DIAGNOSIS — S20.361A TICK BITE OF RIGHT SIDE OF CHEST WALL, INITIAL ENCOUNTER: ICD-10-CM

## 2024-11-15 DIAGNOSIS — Z15.01 BRCA1 POSITIVE: ICD-10-CM

## 2024-11-15 DIAGNOSIS — C61 PROSTATE CANCER (CMS/HCC): ICD-10-CM

## 2024-11-15 DIAGNOSIS — Z15.09 BRCA1 POSITIVE: ICD-10-CM

## 2024-11-15 DIAGNOSIS — Z15.01 MONOALLELIC MUTATION OF ATM GENE: ICD-10-CM

## 2024-11-15 DIAGNOSIS — C50.021: Primary | ICD-10-CM

## 2024-11-15 PROCEDURE — G8752 SYS BP LESS 140: HCPCS | Performed by: NURSE PRACTITIONER

## 2024-11-15 PROCEDURE — G8754 DIAS BP LESS 90: HCPCS | Performed by: NURSE PRACTITIONER

## 2024-11-15 PROCEDURE — 99213 OFFICE O/P EST LOW 20 MIN: CPT | Performed by: NURSE PRACTITIONER

## 2024-11-18 ENCOUNTER — OFFICE VISIT (OUTPATIENT)
Dept: CARDIOLOGY | Facility: CLINIC | Age: 74
End: 2024-11-18
Payer: MEDICARE

## 2024-11-18 ENCOUNTER — TELEPHONE (OUTPATIENT)
Dept: CARDIOLOGY | Facility: CLINIC | Age: 74
End: 2024-11-18

## 2024-11-18 VITALS
WEIGHT: 185 LBS | BODY MASS INDEX: 30.82 KG/M2 | DIASTOLIC BLOOD PRESSURE: 62 MMHG | HEIGHT: 65 IN | SYSTOLIC BLOOD PRESSURE: 126 MMHG | HEART RATE: 60 BPM | OXYGEN SATURATION: 96 %

## 2024-11-18 DIAGNOSIS — I50.32 CHRONIC DIASTOLIC (CONGESTIVE) HEART FAILURE: Primary | ICD-10-CM

## 2024-11-18 DIAGNOSIS — N18.31 STAGE 3A CHRONIC KIDNEY DISEASE (CMS/HCC): ICD-10-CM

## 2024-11-18 DIAGNOSIS — E83.42 HYPOMAGNESEMIA: ICD-10-CM

## 2024-11-18 DIAGNOSIS — E87.6 HYPOKALEMIA: ICD-10-CM

## 2024-11-18 DIAGNOSIS — Z79.899 LONG TERM CURRENT USE OF AMIODARONE: ICD-10-CM

## 2024-11-18 DIAGNOSIS — I48.0 PAROXYSMAL ATRIAL FIBRILLATION (CMS/HCC): ICD-10-CM

## 2024-11-18 LAB
ATRIAL RATE: 58
P AXIS: 61
PR INTERVAL: 170
QRS DURATION: 136
QT INTERVAL: 536
QTC CALCULATION(BAZETT): 526
R AXIS: 4
T WAVE AXIS: 77
VENTRICULAR RATE: 58

## 2024-11-18 PROCEDURE — 99214 OFFICE O/P EST MOD 30 MIN: CPT | Performed by: INTERNAL MEDICINE

## 2024-11-18 PROCEDURE — G8752 SYS BP LESS 140: HCPCS | Performed by: INTERNAL MEDICINE

## 2024-11-18 PROCEDURE — G8754 DIAS BP LESS 90: HCPCS | Performed by: INTERNAL MEDICINE

## 2024-11-18 PROCEDURE — 93000 ELECTROCARDIOGRAM COMPLETE: CPT | Performed by: INTERNAL MEDICINE

## 2024-11-18 RX ORDER — POTASSIUM CHLORIDE 1.5 G/1.58G
20 POWDER, FOR SOLUTION ORAL 3 TIMES WEEKLY
Start: 2024-11-18 | End: 2025-11-18

## 2024-11-18 RX ORDER — CETIRIZINE HYDROCHLORIDE 10 MG/1
10 TABLET ORAL DAILY
COMMUNITY

## 2024-11-18 NOTE — LETTER
"November 18, 2024     Usman Collins MD  [facility] [address]    Patient: Cam Rosas Jr.  YOB: 1950  Date of Visit: 11/18/2024      Dear Dr. Collins:    Thank you for referring Cam Rosas Jr. to me for evaluation. Below are my notes for this consultation.    If you have questions, please do not hesitate to call me. I look forward to following your patient along with you.         Sincerely,        Gary Moran MD        CC: MD Sienna Ngo Kathleen J, CRNP  11/18/2024  9:35 AM  Sign when Signing Visit   Cardiology Note          HPI   Cam Rosas Jr. \"CHOPS\" is a 74 y.o. man who presents followup of heart failure to our advanced heart failure clinic, originally referred by Dr. Aceves.    \"Chops\" has a past medical history of newly diagnosed atrial fibrillation in October with RVR and hospital admission where they also found his EF to be down to 40% s/p new chemotherapy for breast cancer; prostate cancer, CKD Stage 2, recent melena with no active bleeding on EGD, and s/p mitral annual ring placement by Dr. Pastor in 2006.  He was following with Dr. Ra Barajas, cardiologist, for 15 years, who unfortunately passed away late 2022.     [facility] reports he has not had a weak heart nor atrial fibrillation prior to these events in the late fall of 2022. He was cardioverted after being on amiodarone and Xarelto for 4 weeks by Dr. Aceves on December 5th, 2022. Since that time he has had no further atrial fibrillation. He has been feeing much improved as his weight is down to 186 from 204 lbs. He had an increase in his weight for months since he was initiated on chemo and decadron. He had one dose of Cancian T in September and one dose of Taxotere and carboplastin. He has had no further chemo as he did not tolerate these medications and this is what lead to him going into atrial fibrillation.  He had a mastectomy August 2023 with 1 positive node.  He is " now on Tamoxifen for his breat cancer and radiation treatments for his prostate cancer. He is post prostatectomy.  He was hospitalized for 3 weeks around the holidays 2023 for respiratory failure secondary to influenza A.  His hospital course was complicated by Tako-tsubo cardiomyopathy, VT and VF requiring multiple shocks that has since recovered.  Cardiac catheterization showed no obstructive disease.  It was so severe he had to undergo a tracheostomy and a PEG tube was placed.  He was then in an LTAC associated with [facility] for about 1 month. He was weaned off the ventilator there and sent to [facility] rehab where he finally was sent home on March 20.     Since his last visit in May, he had a tick removed from his right chest wall on Friday when he sought care for chest pain.  He had some blood work today in response.  He has been taking torsemide approximately every other day.  He has noticed more swelling in his legs the last couple of days. He denies chest pain, dyspnea at rest, orthopnea, PND, edema or abdominal bloating. He denies dizziness or lightheadedness.          Past Medical History:   Diagnosis Date   • Acute systolic heart failure (CMS/HCC) 02/15/2023   • Ambulates with cane     and walker   • Atrial fibrillation (CMS/HCC)    • Breast cancer (CMS/HCC) October 2022   • CHF (congestive heart failure) (CMS/HCC)    • Chronic kidney disease    • CKD (chronic kidney disease) 10/19/2022   • History of radiation therapy    • Hx antineoplastic chemo    • Prostate cancer (CMS/HCC)      Past Surgical History   Procedure Laterality Date   • Cardiac surgery      mitral valve repair 2006   • Cardioversion  12/05/2022    Successful DC cardioversion   • CARDIOVERSION EXTERNAL N/A 12/5/2022    Performed by Gary Aceves MD at [facility] [place]/EP   • Cath lab temporary pacemaker insertion N/A 12/25/2023    Performed by Haja Onofre MD at  CARDIAC CATH/EP/NEURO   • GASTROSTOMY PERCUTANEOUS ENDOSCOPIC  N/A 1/4/2024    Performed by Edison Oneil MD at  OR   • Knee cartilage surgery Left    • Mastectomy     • PORT/PERMACATH REMOVAL Left 11/9/2023    Performed by Barb Osuna MD at  OR   • Prostate surgery  July 2022   • Prostatectomy  07/15/2022   • Right & left heart cath with coronary angiography N/A 12/25/2023    Performed by Haja Onofre MD at  CARDIAC CATH/EP/NEURO   • RIGHT BREAST MASTECTOMY; RIGHT SENTINEL NODE IDENTIFICATION, MAPPING, AND BIOPSY; Right 8/17/2023    Performed by Barb Osuna MD at  OR   • Tonsillectomy     • TRACHEOSTOMY N/A 1/4/2024    Performed by Edison Oneil MD at  OR     Social History     Tobacco Use   • Smoking status: Never   • Smokeless tobacco: Never   Vaping Use   • Vaping status: Never Used   Substance Use Topics   • Alcohol use: Yes     Alcohol/week: 14.0 standard drinks of alcohol     Types: 14 Shots of liquor per week     Comment: 2 drinks/day - scotch   • Drug use: Never       Family Status   Relation Name Status   • Bio Mother mother (Not Specified)   • Bio Father Dad (Not Specified)   • Bio Sister  (Not Specified)   • Bio Brother  (Not Specified)   • MGM Belle (Not Specified)   • MGF  (Not Specified)   • PGM  (Not Specified)   • PGF  (Not Specified)   • Other son (Not Specified)   No partnership data on file        ALLERGIES  Azithromycin, Prednisone, and Lorazepam      Outpatient Encounter Medications as of 11/18/2024:   •  amiodarone (PACERONE) 200 mg tablet, Take 1 tablet (200 mg total) by mouth 3 (three) times a week (Mon, Wed, Fri). (Patient taking differently: Take 200 mg by mouth 2 (two) times a week (Mon, Thu). Patient takes Mon and Friday)  •  atorvastatin (LIPITOR) 10 mg tablet, Take 1 tablet (10 mg total) by mouth daily.  •  busPIRone (BUSPAR) 10 mg tablet, Take 10 mg by mouth 2 (two) times a day.  •  cetirizine (ZyrTEC) 10 mg tablet, Take 10 mg by mouth daily.  •  cholecalciferol, vitamin D3, 1,000 unit (25 mcg)  "tablet, Take 1,000 Units by mouth daily.  •  magnesium oxide (MAG-OX) 400 mg (241.3 mg magnesium) tablet, Take 1 tablet (400 mg total) by mouth 2 (two) times a day.  •  melatonin ODT, 1 tablet (3 mg total) by peg tube route nightly as needed (sleep). (Patient taking differently: Take 3 mg by mouth nightly as needed (sleep).)  •  metoprolol succinate XL (TOPROL-XL) 25 mg 24 hr tablet, Take 1 tablet (25 mg total) by mouth nightly.  •  pantoprazole (PROTONIX) 40 mg EC tablet, Take 40 mg by mouth 2 (two) times a day.  •  potassium chloride (KLOR-CON) 20 mEq packet, Take 20 mEq by mouth daily.  •  rivaroxaban (XARELTO) 20 mg tablet, Take 1 tablet (20 mg total) by mouth daily with dinner.  •  tamoxifen (NOLVADEX) 20 mg chemo tablet, Take  1 tablet (20 mg total) daily  •  torsemide (DEMADEX) 20 mg tablet, Take 0.5 tablets (10 mg total) by mouth daily as needed (edema).  •  cetirizine (ZyrTEC) 1 mg/mL syrup, 10 mL (10 mg total) by feeding tube route nightly. (Patient not taking: Reported on 11/15/2024)  •  [DISCONTINUED] tamoxifen (NOLVADEX) 20 mg chemo tablet, Take  1 tablet (20 mg total) daily    ROS as above in HPI.  Additionally, rash on his leg.  Otherwise all relevant systems were reviewed and are negative.    Objective   Visit Vitals  /62 (BP Location: Left upper arm, Patient Position: Sitting)   Pulse 60   Ht 1.651 m (5' 5\")   Wt 83.9 kg (185 lb)   SpO2 96%   BMI 30.79 kg/m²         Wt Readings from Last 3 Encounters:   11/18/24 83.9 kg (185 lb)   11/15/24 83.5 kg (184 lb)   11/04/24 82.9 kg (182 lb 12.8 oz)       Physical Exam  HENT:      Head: Normocephalic and atraumatic.      Nose: Nose normal.   Neck:      Vascular: No carotid bruit or JVD.      Comments: JVP 7cm  Cardiovascular:      Rate and Rhythm: Normal rate and regular rhythm.      Pulses: Intact distal pulses.      Heart sounds: Normal heart sounds, S1 normal and S2 normal. No murmur heard.     No friction rub. No gallop.   Pulmonary:      Effort: " "No respiratory distress.      Breath sounds: Normal breath sounds. No wheezing or rales.   Abdominal:      Palpations: Abdomen is soft.   Musculoskeletal:         General: Swelling (1+ b/l LE edema to lower shin with venous stasis changes) present. Normal range of motion.   Skin:     General: Skin is warm and dry.   Neurological:      Mental Status: He is alert and oriented to person, place, and time.   Psychiatric:         Behavior: Behavior normal.         Judgment: Judgment normal.         Lab Results   Component Value Date    GLUCOSE 109 11/12/2024    CALCIUM 8.9 11/12/2024     11/12/2024    K 4.7 11/12/2024    CO2 27 11/12/2024     11/12/2024    BUN 18 11/12/2024    CREATININE 1.53 (H) 11/12/2024     Lab Results   Component Value Date    ALT 10 11/12/2024    AST 21 11/12/2024    ALKPHOS 58 11/12/2024    BILITOT 0.6 11/12/2024     Lab Results   Component Value Date    WBC 5.73 03/18/2024    HGB 10.7 (L) 03/18/2024    HCT 35.1 (L) 03/18/2024    .2 (H) 03/18/2024     03/18/2024     Lab Results   Component Value Date    TSH 3.47 11/12/2024     No results found for: \"CHOL\"  No results found for: \"HDL\"  No results found for: \"LDLCALC\"  Lab Results   Component Value Date    TRIG 303 (H) 12/27/2023    TRIG 234 (H) 12/27/2023     No results found for: \"CHOLHDL\"  No results found for: \"HGBA1C\"  Labs October 2022:\      Labs May 18, 2024:  Sodium 145, potassium 4.6, BUN 19, creatinine 1.38, LFTs normal, hemoglobin 11.6, cholesterol 185, HDL 75, triglycerides 106, LDL 90, TSH 3.07, PSA undetectable.     EKG    Performed on 11/18/2024 and personally reviewed:  Sinus bradycardia at 58bpm  Nonspecific intraventricular conduction block  Nonsp ST-T wave abnormalities    Imaging  TTE Sept 2022  Conclusions:   Ejection fraction is visually estimated at 50-55 %. No regional wall motion abnormalities   were appreciated on today's study.   Strain evaluation was technically difficult due to presence of PVC. "   The mitral valve leaflets are status post repair. A mitral annuloplasty ring is present.   Mild mitral regurgitation.   Estimated PASP is 33 mmHg. No evidence of pulmonary hypertension.   Indeterminate rhythm on ECG. cannot rule out AF. Clinical correlation suggested.   No prior for comparison.     TTE October 2022  Conclusions:   Ejection fraction is visually estimated at 40-45 %. There is global left ventricular   hypokinesis. Patient was tachycardiac with a rate of 130 bpm.   Mild mitral annular calcification. The mitral valve leaflets are status post repair. A   mitral annuloplasty ring is present. Mild mitral regurgitation.   Compared to prior echocardiogram, EF has now declined from 55% to 40%.     TTE April 2023  •  Left Ventricle: Normal ventricle size. Mild concentric left ventricular hypertrophy. No outflow tract obstruction present. Low normal systolic function. Estimated EF 55%. Wall motion appears grossly normal. Grade II diastolic dysfunction.  •  Right Ventricle: Normal ventricle size. Increased wall thickness. Normal systolic function.  •  Left Atrium: Moderately dilated atrium. Cannot exclude patent foramen ovale (PFO).  •  Right Atrium: Mildly dilated atrium.  •  Aortic Valve: Tricuspid valve.  Sclerotic leaflets. Trace regurgitation. No stenosis.  •  Mitral Valve: Mitral annuloplasty ring present. Mild regurgitation.  •  Tricuspid Valve: Normal structure. Mild regurgitation. The regurgitation jet is eccentric. Estimated RVSP = 52 mmHg.  •  Pulmonic Valve: Normal structure. Trace regurgitation. Mildly dilated pulmonary artery.  •  Aorta: Aortic root normal. Sinuses of Valsalva normal-sized. Ascending aorta normal-sized.  •  IVC/SVC: Inferior vena cava is <2.1cm. Inferior vena cava demonstrates normal respiratory collapse.  •  Pericardium: Normal structure.  •  Compared with echo report from October 2022, LV function has normalized.  •  I personally reviewed results with him today in the  office.    TTE 11/27/2023:  •  Left Ventricle: Normal ventricle size. Mild concentric left ventricular hypertrophy. No outflow tract obstruction present. Low normal systolic function. Estimated EF 55%. Wall motion appears grossly normal. Grade II diastolic dysfunction.  •  Right Ventricle: Mildly to moderately dilated ventricle size. Increased wall thickness. RVOT doppler shows VTI =10, possible systolic notching, time to peak acceleration of 70-80ms. This suggests normal output and elevated pulmonary vascular resistance. RV s'=10cm/s. TAPSE=2.1cm. Normal systolic function.  •  Left Atrium: Moderately dilated atrium.  •  Right Atrium: Mildly dilated atrium.  •  Aortic Valve: Tricuspid valve.  Sclerotic leaflets. Trace regurgitation. No stenosis. Calculated dimensionless index = 0.63.  •  Mitral Valve: Mitral annuloplasty ring present. Mild regurgitation. The jet is centrally directed.  •  Tricuspid Valve: Normal structure. Mild regurgitation. The regurgitation jet is eccentric. Estimated RVSP = 54 mmHg.  •  Pulmonic Valve: Normal structure. Trace regurgitation. Mildly dilated pulmonary artery.  •  Aorta: Aortic root normal. Sinuses of Valsalva normal-sized. Ascending aorta normal-sized.  •  IVC/SVC: Inferior vena cava is <2.1cm. Inferior vena cava collapses <50% during inspiration.  •  Pericardium: There is a trivial circumferential pericardial effusion.  •  Compared with April 2023, RV is now dilated.  •  I personally reviewed results with him today in the office.    Left and right heart catheterization December 2023:  •  Angiographically normal epicardial coronary arteries  •  Left ventricular ejection fraction is approximately 25-30%.Wall motion abnormalities consistent with Takotsubo Cardiomyopathy  •  Severely elevated biventricular filling pressures  •  Reduced Cardiac Output and Index; Reduced Stroke volume and stroke volume index by Chelsi Calculation  •  Pulmonary venous post-capillary hypertension primarily  due to left heart dysfunction  •  No peak to peak gradient on pullback to suggest aortic stenosis     Echo 12/26/2024:  •  Left Ventricle: Normal ventricle size. Normal wall thickness. Preserved systolic function. Estimated EF 40-45%. Hypokinesis of the apex. 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  •  Right Ventricle: Normal ventricle size. Pacer wire present. Normal systolic function.  •  Right Atrium: Normal sized atrium.  •  Aortic Valve: Tricuspid valve. Trace regurgitation. No stenosis.  •  Mitral Valve: Normal leaflet structure. Normal leaflet motion. Trace regurgitation. No stenosis.  •  Tricuspid Valve: Normal structure. Mild regurgitation. Estimated RVSP = 43 mmHg. No significant stenosis.  •  Pulmonic Valve: Normal structure. No regurgitation. No stenosis.  •  Aorta: Aortic root normal. Sinuses of Valsalva normal-sized. Ascending aorta normal-sized. Aortic arch normal-sized. Descending aorta normal-sized.  •  Pericardium: Normal structure. No evidence of pericardial effusion. No cardiac tamponade.  •  Left Atrium: Mildly dilated atrium.     Echo 1/2/2024:  •  Left Ventricle: Normal ventricle size. Normal wall thickness. Estimated EF 60-65%. No regional wall motion abnormalities.  •  Limited followup study shows normal LV function now- compared with apical hypokinesis on 12/26/23        Assessment   1.  Chronic diastolic heart failure, ACC Stage C, NYHA class 2. HFrecEF.   Acute change within one month with the only change being new atrial fibrillation with RVR at the time of the echocardiogram in October 2022.  Repeat echocardiogram shows his ejection fraction has rebounded from 40% back to his baseline of 55%.  He also had Tako-tsubo cardiomyopathy when critically ill December 2023 that recovered fully before discharge.  He is taking torsemide approximately every other day.  It appears he needs it now and I advised him that take it more often  as needed for edema.  Otherwise examines relatively compensated.    2. Paroxysmal atrial fibrillation  Rhythm control and following with Dr. Aceves. On Xarelto.  Vast majority of his labs show a GFR above 50, though occasionally in high 40s.  If more persistently below 50 we will have to reduce Xarelto to 15 mg daily.    3.  CKD Stage 2-3a  On his last several checks of his renal function his GFR has been above 50, until last reading of 1.53 with GFR 47.  Will keep dose at 20 mg daily for now but will decrease to 15 mg if GFR is more than occasionally under 50.    4. GI bleed  EGD with no active bleeding.    5. Breast and Prostate Cancer  I reviewed interim correspondence.  Things are stable.    6.  Long-term use of amiodarone  TSH and LFTs were checked last week and are essentially normal.    7.  Hyperkalemia  He did not tolerate the pills so is using the powder.  Although he was supposed to take daily he is only been taking every other day which improved his potassium to 4.7.  I therefore asked him to take it only 3 times per week.    8.  Hypomagnesemia  Resolved with taking magnesium oxide 400 mg twice daily.  He has not had GI side effects.       Thank you for allowing me to participate in the care of this patient.  I reviewed notes from Ep, oncology, nephrology, and surgical oncology.  I reviewed interim labs.  I will plan to see him back in 6 months or sooner should the need arise.  Please do not hesitate to contact me with any questions or concerns.    Sincerely,    Gary Moran MD  11/18/2024

## 2024-11-18 NOTE — LETTER
"November 18, 2024     Kailey Gale MD  [address]  [address]  [place] [address]    Patient: Cam Rosas Jr.  YOB: 1950  Date of Visit: 11/18/2024      Dear Dr. Gale:    Thank you for referring Cam Rosas Jr. to me for evaluation. Below are my notes for this consultation.    If you have questions, please do not hesitate to call me. I look forward to following your patient along with you.         Sincerely,        Gary Moran MD        CC: No Recipients    Gary Moran MD  11/18/2024  5:19 PM  Sign when Signing Visit   Cardiology Note          HPI   Cam Rosas Jr. \"CHOPS\" is a 74 y.o. man who presents followup of heart failure to our advanced heart failure clinic, originally referred by Dr. Aceves.    \"Chops\" has a past medical history of newly diagnosed atrial fibrillation in October with RVR and hospital admission where they also found his EF to be down to 40% s/p new chemotherapy for breast cancer; prostate cancer, CKD Stage 2, recent melena with no active bleeding on EGD, and s/p mitral annual ring placement by Dr. Pastor in 2006.  He was following with Dr. Ra Barajas, cardiologist, for 15 years, who unfortunately passed away late 2022.     [facility] reports he has not had a weak heart nor atrial fibrillation prior to these events in the late fall of 2022. He was cardioverted after being on amiodarone and Xarelto for 4 weeks by Dr. Aceves on December 5th, 2022. Since that time he has had no further atrial fibrillation. He has been feeing much improved as his weight is down to 186 from 204 lbs. He had an increase in his weight for months since he was initiated on chemo and decadron. He had one dose of Cancian T in September and one dose of Taxotere and carboplastin. He has had no further chemo as he did not tolerate these medications and this is what lead to him going into atrial fibrillation.  He had a mastectomy August 2023 with 1 positive node.  " He is now on Tamoxifen for his breat cancer and radiation treatments for his prostate cancer. He is post prostatectomy.  He was hospitalized for 3 weeks around the holidays 2023 for respiratory failure secondary to influenza A.  His hospital course was complicated by Tako-tsubo cardiomyopathy, VT and VF requiring multiple shocks that has since recovered.  Cardiac catheterization showed no obstructive disease.  It was so severe he had to undergo a tracheostomy and a PEG tube was placed.  He was then in an LTAC associated with [place] for about 1 month. He was weaned off the ventilator there and sent to [facility] rehab where he finally was sent home on March 20.     Since his last visit in May, he had a tick removed from his right chest wall on Friday when he sought care for chest pain.  He had some blood work today in response.  He has been taking torsemide approximately every other day.  He has noticed more swelling in his legs the last couple of days. He denies chest pain, dyspnea at rest, orthopnea, PND, edema or abdominal bloating. He denies dizziness or lightheadedness.          Past Medical History:   Diagnosis Date   • Acute systolic heart failure (CMS/HCC) 02/15/2023   • Ambulates with cane     and walker   • Atrial fibrillation (CMS/HCC)    • Breast cancer (CMS/HCC) October 2022   • CHF (congestive heart failure) (CMS/HCC)    • Chronic kidney disease    • CKD (chronic kidney disease) 10/19/2022   • History of radiation therapy    • Hx antineoplastic chemo    • Prostate cancer (CMS/HCC)      Past Surgical History   Procedure Laterality Date   • Cardiac surgery      mitral valve repair 2006   • Cardioversion  12/05/2022    Successful DC cardioversion   • CARDIOVERSION EXTERNAL N/A 12/5/2022    Performed by Gary Aceves MD at [facility] CARDIAC CATH/EP   • Cath lab temporary pacemaker insertion N/A 12/25/2023    Performed by Haja Onofre MD at  CARDIAC CATH/EP/NEURO   • GASTROSTOMY PERCUTANEOUS  ENDOSCOPIC N/A 1/4/2024    Performed by Edison Oneil MD at  OR   • Knee cartilage surgery Left    • Mastectomy     • PORT/PERMACATH REMOVAL Left 11/9/2023    Performed by Barb Osuna MD at  OR   • Prostate surgery  July 2022   • Prostatectomy  07/15/2022   • Right & left heart cath with coronary angiography N/A 12/25/2023    Performed by Haja Onofre MD at  CARDIAC CATH/EP/NEURO   • RIGHT BREAST MASTECTOMY; RIGHT SENTINEL NODE IDENTIFICATION, MAPPING, AND BIOPSY; Right 8/17/2023    Performed by Barb Osuna MD at  OR   • Tonsillectomy     • TRACHEOSTOMY N/A 1/4/2024    Performed by Edison Oneil MD at  OR     Social History     Tobacco Use   • Smoking status: Never   • Smokeless tobacco: Never   Vaping Use   • Vaping status: Never Used   Substance Use Topics   • Alcohol use: Yes     Alcohol/week: 14.0 standard drinks of alcohol     Types: 14 Shots of liquor per week     Comment: 2 drinks/day - scotch   • Drug use: Never       Family Status   Relation Name Status   • Bio Mother mother (Not Specified)   • Bio Father Dad (Not Specified)   • Bio Sister  (Not Specified)   • Bio Brother  (Not Specified)   • MGM Belle (Not Specified)   • MGF  (Not Specified)   • PGM  (Not Specified)   • PGF  (Not Specified)   • Other son (Not Specified)   No partnership data on file        ALLERGIES  Azithromycin, Prednisone, and Lorazepam      Outpatient Encounter Medications as of 11/18/2024:   •  amiodarone (PACERONE) 200 mg tablet, Take 1 tablet (200 mg total) by mouth 3 (three) times a week (Mon, Wed, Fri). (Patient taking differently: Take 200 mg by mouth 2 (two) times a week (Mon, Thu). Patient takes Mon and Friday)  •  atorvastatin (LIPITOR) 10 mg tablet, Take 1 tablet (10 mg total) by mouth daily.  •  busPIRone (BUSPAR) 10 mg tablet, Take 10 mg by mouth 2 (two) times a day.  •  cetirizine (ZyrTEC) 10 mg tablet, Take 10 mg by mouth daily.  •  cholecalciferol, vitamin D3, 1,000 unit  "(25 mcg) tablet, Take 1,000 Units by mouth daily.  •  magnesium oxide (MAG-OX) 400 mg (241.3 mg magnesium) tablet, Take 1 tablet (400 mg total) by mouth 2 (two) times a day.  •  melatonin ODT, 1 tablet (3 mg total) by peg tube route nightly as needed (sleep). (Patient taking differently: Take 3 mg by mouth nightly as needed (sleep).)  •  metoprolol succinate XL (TOPROL-XL) 25 mg 24 hr tablet, Take 1 tablet (25 mg total) by mouth nightly.  •  pantoprazole (PROTONIX) 40 mg EC tablet, Take 40 mg by mouth 2 (two) times a day.  •  potassium chloride (KLOR-CON) 20 mEq packet, Take 20 mEq by mouth daily.  •  rivaroxaban (XARELTO) 20 mg tablet, Take 1 tablet (20 mg total) by mouth daily with dinner.  •  tamoxifen (NOLVADEX) 20 mg chemo tablet, Take  1 tablet (20 mg total) daily  •  torsemide (DEMADEX) 20 mg tablet, Take 0.5 tablets (10 mg total) by mouth daily as needed (edema).  •  cetirizine (ZyrTEC) 1 mg/mL syrup, 10 mL (10 mg total) by feeding tube route nightly. (Patient not taking: Reported on 11/15/2024)  •  [DISCONTINUED] tamoxifen (NOLVADEX) 20 mg chemo tablet, Take  1 tablet (20 mg total) daily    ROS as above in HPI.  Additionally, rash on his leg.  Otherwise all relevant systems were reviewed and are negative.    Objective   Visit Vitals  /62 (BP Location: Left upper arm, Patient Position: Sitting)   Pulse 60   Ht 1.651 m (5' 5\")   Wt 83.9 kg (185 lb)   SpO2 96%   BMI 30.79 kg/m²         Wt Readings from Last 3 Encounters:   11/18/24 83.9 kg (185 lb)   11/15/24 83.5 kg (184 lb)   11/04/24 82.9 kg (182 lb 12.8 oz)       Physical Exam  HENT:      Head: Normocephalic and atraumatic.      Nose: Nose normal.   Neck:      Vascular: No carotid bruit or JVD.      Comments: JVP 7cm  Cardiovascular:      Rate and Rhythm: Normal rate and regular rhythm.      Pulses: Intact distal pulses.      Heart sounds: Normal heart sounds, S1 normal and S2 normal. No murmur heard.     No friction rub. No gallop.   Pulmonary:      " "Effort: No respiratory distress.      Breath sounds: Normal breath sounds. No wheezing or rales.   Abdominal:      Palpations: Abdomen is soft.   Musculoskeletal:         General: Swelling (1+ b/l LE edema to lower shin with venous stasis changes) present. Normal range of motion.   Skin:     General: Skin is warm and dry.   Neurological:      Mental Status: He is alert and oriented to person, place, and time.   Psychiatric:         Behavior: Behavior normal.         Judgment: Judgment normal.         Lab Results   Component Value Date    GLUCOSE 109 11/12/2024    CALCIUM 8.9 11/12/2024     11/12/2024    K 4.7 11/12/2024    CO2 27 11/12/2024     11/12/2024    BUN 18 11/12/2024    CREATININE 1.53 (H) 11/12/2024     Lab Results   Component Value Date    ALT 10 11/12/2024    AST 21 11/12/2024    ALKPHOS 58 11/12/2024    BILITOT 0.6 11/12/2024     Lab Results   Component Value Date    WBC 5.73 03/18/2024    HGB 10.7 (L) 03/18/2024    HCT 35.1 (L) 03/18/2024    .2 (H) 03/18/2024     03/18/2024     Lab Results   Component Value Date    TSH 3.47 11/12/2024     No results found for: \"CHOL\"  No results found for: \"HDL\"  No results found for: \"LDLCALC\"  Lab Results   Component Value Date    TRIG 303 (H) 12/27/2023    TRIG 234 (H) 12/27/2023     No results found for: \"CHOLHDL\"  No results found for: \"HGBA1C\"  Labs October 2022:\      Labs May 18, 2024:  Sodium 145, potassium 4.6, BUN 19, creatinine 1.38, LFTs normal, hemoglobin 11.6, cholesterol 185, HDL 75, triglycerides 106, LDL 90, TSH 3.07, PSA undetectable.     EKG    Performed on 11/18/2024 and personally reviewed:  Sinus bradycardia at 58bpm  Nonspecific intraventricular conduction block  Nonsp ST-T wave abnormalities    Imaging  TTE Sept 2022  Conclusions:   Ejection fraction is visually estimated at 50-55 %. No regional wall motion abnormalities   were appreciated on today's study.   Strain evaluation was technically difficult due to presence " of PVC.   The mitral valve leaflets are status post repair. A mitral annuloplasty ring is present.   Mild mitral regurgitation.   Estimated PASP is 33 mmHg. No evidence of pulmonary hypertension.   Indeterminate rhythm on ECG. cannot rule out AF. Clinical correlation suggested.   No prior for comparison.     TTE October 2022  Conclusions:   Ejection fraction is visually estimated at 40-45 %. There is global left ventricular   hypokinesis. Patient was tachycardiac with a rate of 130 bpm.   Mild mitral annular calcification. The mitral valve leaflets are status post repair. A   mitral annuloplasty ring is present. Mild mitral regurgitation.   Compared to prior echocardiogram, EF has now declined from 55% to 40%.     TTE April 2023  •  Left Ventricle: Normal ventricle size. Mild concentric left ventricular hypertrophy. No outflow tract obstruction present. Low normal systolic function. Estimated EF 55%. Wall motion appears grossly normal. Grade II diastolic dysfunction.  •  Right Ventricle: Normal ventricle size. Increased wall thickness. Normal systolic function.  •  Left Atrium: Moderately dilated atrium. Cannot exclude patent foramen ovale (PFO).  •  Right Atrium: Mildly dilated atrium.  •  Aortic Valve: Tricuspid valve.  Sclerotic leaflets. Trace regurgitation. No stenosis.  •  Mitral Valve: Mitral annuloplasty ring present. Mild regurgitation.  •  Tricuspid Valve: Normal structure. Mild regurgitation. The regurgitation jet is eccentric. Estimated RVSP = 52 mmHg.  •  Pulmonic Valve: Normal structure. Trace regurgitation. Mildly dilated pulmonary artery.  •  Aorta: Aortic root normal. Sinuses of Valsalva normal-sized. Ascending aorta normal-sized.  •  IVC/SVC: Inferior vena cava is <2.1cm. Inferior vena cava demonstrates normal respiratory collapse.  •  Pericardium: Normal structure.  •  Compared with echo report from October 2022, LV function has normalized.  •  I personally reviewed results with him today in the  office.    TTE 11/27/2023:  •  Left Ventricle: Normal ventricle size. Mild concentric left ventricular hypertrophy. No outflow tract obstruction present. Low normal systolic function. Estimated EF 55%. Wall motion appears grossly normal. Grade II diastolic dysfunction.  •  Right Ventricle: Mildly to moderately dilated ventricle size. Increased wall thickness. RVOT doppler shows VTI =10, possible systolic notching, time to peak acceleration of 70-80ms. This suggests normal output and elevated pulmonary vascular resistance. RV s'=10cm/s. TAPSE=2.1cm. Normal systolic function.  •  Left Atrium: Moderately dilated atrium.  •  Right Atrium: Mildly dilated atrium.  •  Aortic Valve: Tricuspid valve.  Sclerotic leaflets. Trace regurgitation. No stenosis. Calculated dimensionless index = 0.63.  •  Mitral Valve: Mitral annuloplasty ring present. Mild regurgitation. The jet is centrally directed.  •  Tricuspid Valve: Normal structure. Mild regurgitation. The regurgitation jet is eccentric. Estimated RVSP = 54 mmHg.  •  Pulmonic Valve: Normal structure. Trace regurgitation. Mildly dilated pulmonary artery.  •  Aorta: Aortic root normal. Sinuses of Valsalva normal-sized. Ascending aorta normal-sized.  •  IVC/SVC: Inferior vena cava is <2.1cm. Inferior vena cava collapses <50% during inspiration.  •  Pericardium: There is a trivial circumferential pericardial effusion.  •  Compared with April 2023, RV is now dilated.  •  I personally reviewed results with him today in the office.    Left and right heart catheterization December 2023:  •  Angiographically normal epicardial coronary arteries  •  Left ventricular ejection fraction is approximately 25-30%.Wall motion abnormalities consistent with Takotsubo Cardiomyopathy  •  Severely elevated biventricular filling pressures  •  Reduced Cardiac Output and Index; Reduced Stroke volume and stroke volume index by Chelsi Calculation  •  Pulmonary venous post-capillary hypertension primarily  due to left heart dysfunction  •  No peak to peak gradient on pullback to suggest aortic stenosis     Echo 12/26/2024:  •  Left Ventricle: Normal ventricle size. Normal wall thickness. Preserved systolic function. Estimated EF 40-45%. Hypokinesis of the apex. 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  •  Right Ventricle: Normal ventricle size. Pacer wire present. Normal systolic function.  •  Right Atrium: Normal sized atrium.  •  Aortic Valve: Tricuspid valve. Trace regurgitation. No stenosis.  •  Mitral Valve: Normal leaflet structure. Normal leaflet motion. Trace regurgitation. No stenosis.  •  Tricuspid Valve: Normal structure. Mild regurgitation. Estimated RVSP = 43 mmHg. No significant stenosis.  •  Pulmonic Valve: Normal structure. No regurgitation. No stenosis.  •  Aorta: Aortic root normal. Sinuses of Valsalva normal-sized. Ascending aorta normal-sized. Aortic arch normal-sized. Descending aorta normal-sized.  •  Pericardium: Normal structure. No evidence of pericardial effusion. No cardiac tamponade.  •  Left Atrium: Mildly dilated atrium.     Echo 1/2/2024:  •  Left Ventricle: Normal ventricle size. Normal wall thickness. Estimated EF 60-65%. No regional wall motion abnormalities.  •  Limited followup study shows normal LV function now- compared with apical hypokinesis on 12/26/23        Assessment   1.  Chronic diastolic heart failure, ACC Stage C, NYHA class 2. HFrecEF.   Acute change within one month with the only change being new atrial fibrillation with RVR at the time of the echocardiogram in October 2022.  Repeat echocardiogram shows his ejection fraction has rebounded from 40% back to his baseline of 55%.  He also had Tako-tsubo cardiomyopathy when critically ill December 2023 that recovered fully before discharge.  He is taking torsemide approximately every other day.  It appears he needs it now and I advised him that take it more often  as needed for edema.  Otherwise examines relatively compensated.    2. Paroxysmal atrial fibrillation  Rhythm control and following with Dr. Aceves. On Xarelto.  Vast majority of his labs show a GFR above 50, though occasionally in high 40s.  If more persistently below 50 we will have to reduce Xarelto to 15 mg daily.    3.  CKD Stage 2-3a  On his last several checks of his renal function his GFR has been above 50, until last reading of 1.53 with GFR 47.  Will keep dose at 20 mg daily for now but will decrease to 15 mg if GFR is more than occasionally under 50.    4. GI bleed  EGD with no active bleeding.    5. Breast and Prostate Cancer  I reviewed interim correspondence.  Things are stable.    6.  Long-term use of amiodarone  TSH and LFTs were checked last week and are essentially normal.    7.  Hyperkalemia  He did not tolerate the pills so is using the powder.  Although he was supposed to take daily he is only been taking every other day which improved his potassium to 4.7.  I therefore asked him to take it only 3 times per week.    8.  Hypomagnesemia  Resolved with taking magnesium oxide 400 mg twice daily.  He has not had GI side effects.       Thank you for allowing me to participate in the care of this patient.  I reviewed notes from Ep, breast surgery and oncology.  I reviewed interim labs.  I will plan to see him back in 6 months or sooner should the need arise.  Please do not hesitate to contact me with any questions or concerns.    Sincerely,    Gary Moran MD  11/18/2024

## 2024-11-18 NOTE — PROGRESS NOTES
" Cardiology Note          HPI   Cam Rosas Jr. \"RADHA\" is a 74 y.o. man who presents followup of heart failure to our advanced heart failure clinic, originally referred by Dr. Aceves.    \"Radha\" has a past medical history of newly diagnosed atrial fibrillation in October with RVR and hospital admission where they also found his EF to be down to 40% s/p new chemotherapy for breast cancer; prostate cancer, CKD Stage 2, recent melena with no active bleeding on EGD, and s/p mitral annual ring placement by Dr. Pastor in 2006.  He was following with Dr. Ra Barajas, cardiologist, for 15 years, who unfortunately passed away late 2022.     [facility] reports he has not had a weak heart nor atrial fibrillation prior to these events in the late fall of 2022. He was cardioverted after being on amiodarone and Xarelto for 4 weeks by Dr. Aceves on December 5th, 2022. Since that time he has had no further atrial fibrillation. He has been feeing much improved as his weight is down to 186 from 204 lbs. He had an increase in his weight for months since he was initiated on chemo and decadron. He had one dose of Cancian T in September and one dose of Taxotere and carboplastin. He has had no further chemo as he did not tolerate these medications and this is what lead to him going into atrial fibrillation.  He had a mastectomy August 2023 with 1 positive node.  He is now on Tamoxifen for his breat cancer and radiation treatments for his prostate cancer. He is post prostatectomy.  He was hospitalized for 3 weeks around the holidays 2023 for respiratory failure secondary to influenza A.  His hospital course was complicated by Tako-tsubo cardiomyopathy, VT and VF requiring multiple shocks that has since recovered.  Cardiac catheterization showed no obstructive disease.  It was so severe he had to undergo a tracheostomy and a PEG tube was placed.  He was then in an LTAC associated with [place] for about 1 month. He was weaned off " the ventilator there and sent to [facility] where he finally was sent home on March 20.     Since his last visit in May, he had a tick removed from his right chest wall on Friday when he sought care for chest pain.  He had some blood work today in response.  He has been taking torsemide approximately every other day.  He has noticed more swelling in his legs the last couple of days. He denies chest pain, dyspnea at rest, orthopnea, PND, edema or abdominal bloating. He denies dizziness or lightheadedness.          Past Medical History:   Diagnosis Date    Acute systolic heart failure (CMS/HCC) 02/15/2023    Ambulates with cane     and walker    Atrial fibrillation (CMS/HCC)     Breast cancer (CMS/HCC) October 2022    CHF (congestive heart failure) (CMS/HCC)     Chronic kidney disease     CKD (chronic kidney disease) 10/19/2022    History of radiation therapy     Hx antineoplastic chemo     Prostate cancer (CMS/HCC)      Past Surgical History   Procedure Laterality Date    Cardiac surgery      mitral valve repair 2006    Cardioversion  12/05/2022    Successful DC cardioversion    CARDIOVERSION EXTERNAL N/A 12/5/2022    Performed by Gary Aceves MD at [facility] CARDIAC CATH/EP    Cath lab temporary pacemaker insertion N/A 12/25/2023    Performed by Haja Onofre MD at  CARDIAC CATH/EP/NEURO    GASTROSTOMY PERCUTANEOUS ENDOSCOPIC N/A 1/4/2024    Performed by Edison Oneil MD at  OR    Knee cartilage surgery Left     Mastectomy      PORT/PERMACATH REMOVAL Left 11/9/2023    Performed by Barb Osuna MD at  OR    Prostate surgery  July 2022    Prostatectomy  07/15/2022    Right & left heart cath with coronary angiography N/A 12/25/2023    Performed by Haja Onofre MD at  CARDIAC CATH/EP/NEURO    RIGHT BREAST MASTECTOMY; RIGHT SENTINEL NODE IDENTIFICATION, MAPPING, AND BIOPSY; Right 8/17/2023    Performed by Barb Osuna MD at  OR    Tonsillectomy      TRACHEOSTOMY N/A  1/4/2024    Performed by Edison Oneil MD at  OR     Social History     Tobacco Use    Smoking status: Never    Smokeless tobacco: Never   Vaping Use    Vaping status: Never Used   Substance Use Topics    Alcohol use: Yes     Alcohol/week: 14.0 standard drinks of alcohol     Types: 14 Shots of liquor per week     Comment: 2 drinks/day - scotch    Drug use: Never       Family Status   Relation Name Status    Bio Mother mother (Not Specified)    Bio Father Dad (Not Specified)    Bio Sister  (Not Specified)    Bio Brother  (Not Specified)    MGM [employer] (Not Specified)    MGF  (Not Specified)    PGM  (Not Specified)    PGF  (Not Specified)    Other son (Not Specified)   No partnership data on file        ALLERGIES  Azithromycin, Prednisone, and Lorazepam      Outpatient Encounter Medications as of 11/18/2024:     amiodarone (PACERONE) 200 mg tablet, Take 1 tablet (200 mg total) by mouth 3 (three) times a week (Mon, Wed, Fri). (Patient taking differently: Take 200 mg by mouth 2 (two) times a week (Mon, Thu). Patient takes Mon and Friday)    atorvastatin (LIPITOR) 10 mg tablet, Take 1 tablet (10 mg total) by mouth daily.    busPIRone (BUSPAR) 10 mg tablet, Take 10 mg by mouth 2 (two) times a day.    cetirizine (ZyrTEC) 10 mg tablet, Take 10 mg by mouth daily.    cholecalciferol, vitamin D3, 1,000 unit (25 mcg) tablet, Take 1,000 Units by mouth daily.    magnesium oxide (MAG-OX) 400 mg (241.3 mg magnesium) tablet, Take 1 tablet (400 mg total) by mouth 2 (two) times a day.    melatonin ODT, 1 tablet (3 mg total) by peg tube route nightly as needed (sleep). (Patient taking differently: Take 3 mg by mouth nightly as needed (sleep).)    metoprolol succinate XL (TOPROL-XL) 25 mg 24 hr tablet, Take 1 tablet (25 mg total) by mouth nightly.    pantoprazole (PROTONIX) 40 mg EC tablet, Take 40 mg by mouth 2 (two) times a day.    potassium chloride (KLOR-CON) 20 mEq packet, Take 20 mEq by mouth daily.    rivaroxaban (XARELTO)  "20 mg tablet, Take 1 tablet (20 mg total) by mouth daily with dinner.    tamoxifen (NOLVADEX) 20 mg chemo tablet, Take  1 tablet (20 mg total) daily    torsemide (DEMADEX) 20 mg tablet, Take 0.5 tablets (10 mg total) by mouth daily as needed (edema).    cetirizine (ZyrTEC) 1 mg/mL syrup, 10 mL (10 mg total) by feeding tube route nightly. (Patient not taking: Reported on 11/15/2024)    [DISCONTINUED] tamoxifen (NOLVADEX) 20 mg chemo tablet, Take  1 tablet (20 mg total) daily    ROS as above in HPI.  Additionally, rash on his leg.  Otherwise all relevant systems were reviewed and are negative.    Objective   Visit Vitals  /62 (BP Location: Left upper arm, Patient Position: Sitting)   Pulse 60   Ht 1.651 m (5' 5\")   Wt 83.9 kg (185 lb)   SpO2 96%   BMI 30.79 kg/m²         Wt Readings from Last 3 Encounters:   11/18/24 83.9 kg (185 lb)   11/15/24 83.5 kg (184 lb)   11/04/24 82.9 kg (182 lb 12.8 oz)       Physical Exam  HENT:      Head: Normocephalic and atraumatic.      Nose: Nose normal.   Neck:      Vascular: No carotid bruit or JVD.      Comments: JVP 7cm  Cardiovascular:      Rate and Rhythm: Normal rate and regular rhythm.      Pulses: Intact distal pulses.      Heart sounds: Normal heart sounds, S1 normal and S2 normal. No murmur heard.     No friction rub. No gallop.   Pulmonary:      Effort: No respiratory distress.      Breath sounds: Normal breath sounds. No wheezing or rales.   Abdominal:      Palpations: Abdomen is soft.   Musculoskeletal:         General: Swelling (1+ b/l LE edema to lower shin with venous stasis changes) present. Normal range of motion.   Skin:     General: Skin is warm and dry.   Neurological:      Mental Status: He is alert and oriented to person, place, and time.   Psychiatric:         Behavior: Behavior normal.         Judgment: Judgment normal.         Lab Results   Component Value Date    GLUCOSE 109 11/12/2024    CALCIUM 8.9 11/12/2024     11/12/2024    K 4.7 11/12/2024 " "   CO2 27 11/12/2024     11/12/2024    BUN 18 11/12/2024    CREATININE 1.53 (H) 11/12/2024     Lab Results   Component Value Date    ALT 10 11/12/2024    AST 21 11/12/2024    ALKPHOS 58 11/12/2024    BILITOT 0.6 11/12/2024     Lab Results   Component Value Date    WBC 5.73 03/18/2024    HGB 10.7 (L) 03/18/2024    HCT 35.1 (L) 03/18/2024    .2 (H) 03/18/2024     03/18/2024     Lab Results   Component Value Date    TSH 3.47 11/12/2024     No results found for: \"CHOL\"  No results found for: \"HDL\"  No results found for: \"LDLCALC\"  Lab Results   Component Value Date    TRIG 303 (H) 12/27/2023    TRIG 234 (H) 12/27/2023     No results found for: \"CHOLHDL\"  No results found for: \"HGBA1C\"  Labs October 2022:\      Labs May 18, 2024:  Sodium 145, potassium 4.6, BUN 19, creatinine 1.38, LFTs normal, hemoglobin 11.6, cholesterol 185, HDL 75, triglycerides 106, LDL 90, TSH 3.07, PSA undetectable.     EKG    Performed on 11/18/2024 and personally reviewed:  Sinus bradycardia at 58bpm  Nonspecific intraventricular conduction block  Nonsp ST-T wave abnormalities    Imaging  TTE Sept 2022  Conclusions:   Ejection fraction is visually estimated at 50-55 %. No regional wall motion abnormalities   were appreciated on today's study.   Strain evaluation was technically difficult due to presence of PVC.   The mitral valve leaflets are status post repair. A mitral annuloplasty ring is present.   Mild mitral regurgitation.   Estimated PASP is 33 mmHg. No evidence of pulmonary hypertension.   Indeterminate rhythm on ECG. cannot rule out AF. Clinical correlation suggested.   No prior for comparison.     TTE October 2022  Conclusions:   Ejection fraction is visually estimated at 40-45 %. There is global left ventricular   hypokinesis. Patient was tachycardiac with a rate of 130 bpm.   Mild mitral annular calcification. The mitral valve leaflets are status post repair. A   mitral annuloplasty ring is present. Mild mitral " regurgitation.   Compared to prior echocardiogram, EF has now declined from 55% to 40%.     TTE April 2023    Left Ventricle: Normal ventricle size. Mild concentric left ventricular hypertrophy. No outflow tract obstruction present. Low normal systolic function. Estimated EF 55%. Wall motion appears grossly normal. Grade II diastolic dysfunction.    Right Ventricle: Normal ventricle size. Increased wall thickness. Normal systolic function.    Left Atrium: Moderately dilated atrium. Cannot exclude patent foramen ovale (PFO).    Right Atrium: Mildly dilated atrium.    Aortic Valve: Tricuspid valve.  Sclerotic leaflets. Trace regurgitation. No stenosis.    Mitral Valve: Mitral annuloplasty ring present. Mild regurgitation.    Tricuspid Valve: Normal structure. Mild regurgitation. The regurgitation jet is eccentric. Estimated RVSP = 52 mmHg.    Pulmonic Valve: Normal structure. Trace regurgitation. Mildly dilated pulmonary artery.    Aorta: Aortic root normal. Sinuses of Valsalva normal-sized. Ascending aorta normal-sized.    IVC/SVC: Inferior vena cava is <2.1cm. Inferior vena cava demonstrates normal respiratory collapse.    Pericardium: Normal structure.    Compared with echo report from October 2022, LV function has normalized.    I personally reviewed results with him today in the office.    TTE 11/27/2023:    Left Ventricle: Normal ventricle size. Mild concentric left ventricular hypertrophy. No outflow tract obstruction present. Low normal systolic function. Estimated EF 55%. Wall motion appears grossly normal. Grade II diastolic dysfunction.    Right Ventricle: Mildly to moderately dilated ventricle size. Increased wall thickness. RVOT doppler shows VTI =10, possible systolic notching, time to peak acceleration of 70-80ms. This suggests normal output and elevated pulmonary vascular resistance. RV s'=10cm/s. TAPSE=2.1cm. Normal systolic function.    Left Atrium: Moderately dilated atrium.    Right Atrium: Mildly  dilated atrium.    Aortic Valve: Tricuspid valve.  Sclerotic leaflets. Trace regurgitation. No stenosis. Calculated dimensionless index = 0.63.    Mitral Valve: Mitral annuloplasty ring present. Mild regurgitation. The jet is centrally directed.    Tricuspid Valve: Normal structure. Mild regurgitation. The regurgitation jet is eccentric. Estimated RVSP = 54 mmHg.    Pulmonic Valve: Normal structure. Trace regurgitation. Mildly dilated pulmonary artery.    Aorta: Aortic root normal. Sinuses of Valsalva normal-sized. Ascending aorta normal-sized.    IVC/SVC: Inferior vena cava is <2.1cm. Inferior vena cava collapses <50% during inspiration.    Pericardium: There is a trivial circumferential pericardial effusion.    Compared with April 2023, RV is now dilated.    I personally reviewed results with him today in the office.    Left and right heart catheterization December 2023:    Angiographically normal epicardial coronary arteries    Left ventricular ejection fraction is approximately 25-30%.Wall motion abnormalities consistent with Takotsubo Cardiomyopathy    Severely elevated biventricular filling pressures    Reduced Cardiac Output and Index; Reduced Stroke volume and stroke volume index by Chelsi Calculation    Pulmonary venous post-capillary hypertension primarily due to left heart dysfunction    No peak to peak gradient on pullback to suggest aortic stenosis     Echo 12/26/2024:    Left Ventricle: Normal ventricle size. Normal wall thickness. Preserved systolic function. Estimated EF 40-45%. Hypokinesis of the apex. 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    Right Ventricle: Normal ventricle size. Pacer wire present. Normal systolic function.    Right Atrium: Normal sized atrium.    Aortic Valve: Tricuspid valve. Trace regurgitation. No stenosis.    Mitral Valve: Normal leaflet structure. Normal leaflet motion. Trace regurgitation. No stenosis.     Tricuspid Valve: Normal structure. Mild regurgitation. Estimated RVSP = 43 mmHg. No significant stenosis.    Pulmonic Valve: Normal structure. No regurgitation. No stenosis.    Aorta: Aortic root normal. Sinuses of Valsalva normal-sized. Ascending aorta normal-sized. Aortic arch normal-sized. Descending aorta normal-sized.    Pericardium: Normal structure. No evidence of pericardial effusion. No cardiac tamponade.    Left Atrium: Mildly dilated atrium.     Echo 1/2/2024:    Left Ventricle: Normal ventricle size. Normal wall thickness. Estimated EF 60-65%. No regional wall motion abnormalities.    Limited followup study shows normal LV function now- compared with apical hypokinesis on 12/26/23        Assessment   1.  Chronic diastolic heart failure, ACC Stage C, NYHA class 2. HFrecEF.   Acute change within one month with the only change being new atrial fibrillation with RVR at the time of the echocardiogram in October 2022.  Repeat echocardiogram shows his ejection fraction has rebounded from 40% back to his baseline of 55%.  He also had Tako-tsubo cardiomyopathy when critically ill December 2023 that recovered fully before discharge.  He is taking torsemide approximately every other day.  It appears he needs it now and I advised him that take it more often as needed for edema.  Otherwise examines relatively compensated.    2. Paroxysmal atrial fibrillation  Rhythm control and following with Dr. Aceves. On Xarelto.  Vast majority of his labs show a GFR above 50, though occasionally in high 40s.  If more persistently below 50 we will have to reduce Xarelto to 15 mg daily.    3.  CKD Stage 2-3a  On his last several checks of his renal function his GFR has been above 50, until last reading of 1.53 with GFR 47.  Will keep dose at 20 mg daily for now but will decrease to 15 mg if GFR is more than occasionally under 50.    4. GI bleed  EGD with no active bleeding.    5. Breast and Prostate Cancer  I reviewed interim  correspondence.  Things are stable.    6.  Long-term use of amiodarone  TSH and LFTs were checked last week and are essentially normal.    7.  Hyperkalemia  He did not tolerate the pills so is using the powder.  Although he was supposed to take daily he is only been taking every other day which improved his potassium to 4.7.  I therefore asked him to take it only 3 times per week.    8.  Hypomagnesemia  Resolved with taking magnesium oxide 400 mg twice daily.  He has not had GI side effects.       Thank you for allowing me to participate in the care of this patient.  I reviewed notes from Ep, breast surgery and oncology.  I reviewed interim labs.  I will plan to see him back in 6 months or sooner should the need arise.  Please do not hesitate to contact me with any questions or concerns.    Sincerely,    Gary Moran MD  11/18/2024

## 2024-11-19 ENCOUNTER — HOSPITAL ENCOUNTER (OUTPATIENT)
Dept: RADIOLOGY | Age: 74
Discharge: HOME | End: 2024-11-19
Attending: SURGERY
Payer: MEDICARE

## 2024-11-19 DIAGNOSIS — Z15.09 MONOALLELIC MUTATION OF ATM GENE: ICD-10-CM

## 2024-11-19 DIAGNOSIS — Z15.89 MONOALLELIC MUTATION OF ATM GENE: ICD-10-CM

## 2024-11-19 DIAGNOSIS — Z15.01 MONOALLELIC MUTATION OF ATM GENE: ICD-10-CM

## 2024-11-19 DIAGNOSIS — R92.8 ABNORMAL MAMMOGRAM: ICD-10-CM

## 2024-11-19 DIAGNOSIS — C50.021 MALIGNANT NEOPLASM INVOLVING BOTH NIPPLE AND AREOLA OF RIGHT BREAST IN MALE, ESTROGEN RECEPTOR POSITIVE (CMS/HCC): ICD-10-CM

## 2024-11-19 DIAGNOSIS — Z15.09 BRCA1 POSITIVE: ICD-10-CM

## 2024-11-19 DIAGNOSIS — Z17.0 MALIGNANT NEOPLASM INVOLVING BOTH NIPPLE AND AREOLA OF RIGHT BREAST IN MALE, ESTROGEN RECEPTOR POSITIVE (CMS/HCC): ICD-10-CM

## 2024-11-19 DIAGNOSIS — Z15.01 BRCA1 POSITIVE: ICD-10-CM

## 2024-11-19 PROCEDURE — G0279 TOMOSYNTHESIS, MAMMO: HCPCS | Mod: LT

## 2024-11-20 ENCOUNTER — DOCUMENTATION (OUTPATIENT)
Dept: RADIATION ONCOLOGY | Facility: HOSPITAL | Age: 74
End: 2024-11-20
Payer: MEDICARE

## 2024-11-20 NOTE — PROGRESS NOTES
EOT 11/1/23  Prostate    Also, hx Breast - right CW   EOT 6/7/23.    LOV with Dr. [facility]ner 5/16/24:  PSA/Testosterone Rx.   Follow up mammogram/US left breast.  Pt on Tamoxifen.    11/18/24  PSA  <0.04      Testosterone:  Still pending result.    5/13/24 Left Dx Mammogram/US:  No evidence of malignancy.  Annual mammogram is recommended.    11/4/24 Dr. Gale:  Continue Tamoxifen.

## 2024-11-21 ENCOUNTER — HOSPITAL ENCOUNTER (OUTPATIENT)
Dept: RADIATION ONCOLOGY | Facility: HOSPITAL | Age: 74
Setting detail: RADIATION/ONCOLOGY SERIES
Discharge: HOME | End: 2024-11-21
Attending: RADIOLOGY
Payer: MEDICARE

## 2024-11-21 VITALS
BODY MASS INDEX: 31.12 KG/M2 | DIASTOLIC BLOOD PRESSURE: 64 MMHG | OXYGEN SATURATION: 99 % | SYSTOLIC BLOOD PRESSURE: 138 MMHG | WEIGHT: 187 LBS | HEART RATE: 59 BPM

## 2024-11-21 DIAGNOSIS — C61 PROSTATE CANCER (CMS/HCC): Primary | ICD-10-CM

## 2024-11-21 RX ORDER — TAMSULOSIN HYDROCHLORIDE 0.4 MG/1
0.4 CAPSULE ORAL NIGHTLY
Qty: 30 CAPSULE | Refills: 6 | Status: SHIPPED | OUTPATIENT
Start: 2024-11-21 | End: 2025-06-19

## 2024-11-21 ASSESSMENT — ENCOUNTER SYMPTOMS
RESPIRATORY NEGATIVE: 1
OCCASIONAL FEELINGS OF UNSTEADINESS: 1
PSYCHIATRIC NEGATIVE: 1
CARDIOVASCULAR NEGATIVE: 1
CONSTITUTIONAL NEGATIVE: 1
GASTROINTESTINAL NEGATIVE: 1
NEUROLOGICAL NEGATIVE: 1
DEPRESSION: 0
LOSS OF SENSATION IN FEET: 0
MUSCULOSKELETAL NEGATIVE: 1

## 2024-11-21 ASSESSMENT — PATIENT HEALTH QUESTIONNAIRE - PHQ9: SUM OF ALL RESPONSES TO PHQ9 QUESTIONS 1 & 2: 0

## 2024-11-21 ASSESSMENT — PAIN SCALES - GENERAL: PAINLEVEL_OUTOF10: 0-NO PAIN

## 2024-11-21 NOTE — LETTER
November 21, 2024                                                          Patient: Radha Rosas Jr.   YOB: 1950   Date of Visit: 11/21/2024       Dear Dr. Collins:    The patient is seen at the [facility]ER[facility] today. Attached is my assessment and plan of care.  Thank you for the opportunity to share in Radha Rosas Jr.'s care.       Sincerely,        Gregory J. [facility]chsner, MD      CC: No Recipients

## 2024-11-21 NOTE — PROGRESS NOTES
Patient ID:  Radha Rosas Jr. is a 74 y.o. male.  1950   Diagnosis Plan   1. Prostate cancer (CMS/HCC)  PSA         Referring Physician:   No referring provider defined for this encounter.    Primary Care Provider:  Usman Collins MD    Problem List:  Patient Active Problem List    Diagnosis Date Noted    Takotsubo cardiomyopathy 01/10/2024    Pneumonia 01/10/2024    Elevated LFTs 01/10/2024    Tracheostomy in place (CMS/HCC) 01/10/2024    Status post insertion of percutaneous endoscopic gastrostomy (PEG) tube (CMS/HCC) 01/10/2024    Severe sepsis (CMS/HCC) 12/24/2023    Influenza A 12/24/2023    Cardiac arrest (CMS/HCC) 12/24/2023    Moderate protein-calorie malnutrition (CMS/HCC) 12/24/2023    Long term current use of amiodarone 10/02/2023    Acute renal failure superimposed on stage 3a chronic kidney disease (CMS/HCC) 10/02/2023    BRCA1 positive 08/28/2023    Monoallelic mutation of OK gene 08/28/2023    Herniation of lumbar intervertebral disc with radiculopathy 07/18/2023    Spinal stenosis of lumbar region 07/18/2023    Spondylosis of lumbosacral region 07/18/2023    Acute systolic heart failure (CMS/HCC) 02/15/2023    Paroxysmal atrial fibrillation (CMS/HCC) 10/19/2022    Chronic combined systolic and diastolic CHF (congestive heart failure) (CMS/HCC) 10/19/2022    Malignant neoplasm of right male breast (CMS/HCC) 10/19/2022    Electrolyte abnormality 10/19/2022    Gastrointestinal hemorrhage with melena 10/19/2022    CKD (chronic kidney disease) 10/19/2022    Prostate cancer (CMS/HCC) 10/19/2022    Dehydration determined by examination 10/10/2022    APC (atrial premature contractions) 03/30/2022    Essential hypertension 08/31/2021    Hyperlipidemia 08/31/2021    Stage 3a chronic kidney disease (CMS/HCC) 08/31/2021        Cancer Staging   Malignant neoplasm of right male breast (CMS/HCC)  Staging form: Breast, AJCC 8th Edition  - Clinical stage from 3/20/2023: Stage IA (cT1c, cN0, cM0, G3, ER+,  "GA+, HER2+) - Signed by Barb Osuna MD on 3/20/2023  - Pathologic stage from 8/28/2023: No Stage Recommended (ypT1c, pN1a(sn), cM0, G3, ER+, GA+, HER2+) - Signed by Barb Osuna MD on 8/28/2023    Prostate cancer (CMS/[facility])  Staging form: Prostate, AJCC 8th Edition  - Pathologic stage from 7/27/2022: Stage Unknown (pT3b, pNX, cM0, PSA: 6, Grade Group: 3) - Signed by [facility], Gregory J, MD on 3/8/2023      Subjective    History of Present Illness:  The following portions of the patient's history were reviewed and updated as appropriate: allergies, current medications, past family history, past medical history, past social history, and past surgical history.   HPI patient presents with wife complaining urine frequency and urgency and swollen legs.  Patient seen by his primary doctor recently started on diuretic.  Denies history of CHF.  Continues tamoxifen therapy per medical oncology.  Recent left breast mammogram clear.  Recent PSA level undetectable.  Review of Systems   Constitutional: Negative.    HENT:  Negative.     Respiratory: Negative.     Cardiovascular: Negative.    Gastrointestinal: Negative.    Genitourinary: Negative.     Musculoskeletal: Negative.    Skin: Negative.    Neurological: Negative.    Psychiatric/Behavioral: Negative.              Objective      Physical Exam:  Vital Signs for this encounter:  BP: 138/64  Heart Rate: 59  SpO2: 99 %  Height: 165.1 cm (5' 5\")  Weight: 84.8 kg (187 lb) (11/18 1407-11/21 1307)    Physical Exam elderly male sitting in wheelchair no apparent distress.  1-2+ bilateral pedal edema.  Lungs clear.  Healed right chest wall incision no suspicious lesions or adenopathy noted.  Abdomen soft nontender.  Performance Status:70     PAIN ASSESSMENT:  Score Zero    Location    Pain Intervention      LABS:         Assessment/Plan history of T3b prostate cancer status post radiation therapy stopped hormone therapy secondary to side effects.  Patient also " with synchronous right breast cancer status postmastectomy currently on tamoxifen therapy without evidence of recurrence.  Patient with excellent biochemical response to prostate radiation therapy.  Continue follow-up  and treatment with medical oncology for breast cancer and continue follow-up with myself for prostate cancer with PSA in 6 months.  Follow-up with primary doctor regarding leg swelling.  I ordered Flomax for urinary symptoms gave patient names of urologist in case needed.    Diagnoses and Orders Associated With This Visit:  There are no diagnoses linked to this encounter.  TREATMENT PLAN SUMMARY:  Radiation Treatments       Active   No active radiation treatments to show.     Historical   Plans   1a_part pelv   Most recent treatment: Dose planned: 180 cGy (fraction 28 of 28 on 5/22/2023)   Total: Dose planned: 5,040 cGy   Elapsed Days: 55 days as of 2023-06-07 @ 14:2817      1b_cd prosbed   Most recent treatment: Dose planned: 180 cGy (fraction 11 of 11 on 6/7/2023)   Total: Dose planned: 1,980 cGy   Elapsed Days: 55 days as of 2023-06-07 @ 14:2817      2a_right nds   Most recent treatment: Dose planned: 200 cGy (fraction 25 of 25 on 11/1/2023)   Total: Dose planned: 5,000 cGy   Elapsed Days: 34 days as of 2023-11-01 @ 10:3209      2b_R CW NoBol   Most recent treatment: Dose planned: 200 cGy (fraction 10 of 10 on 11/1/2023)   Total: Dose planned: 2,000 cGy   Elapsed Days: 34 days as of 2023-11-01 @ 10:3209      2b_right CW   Most recent treatment: Dose planned: 200 cGy (fraction 15 of 15 on 10/18/2023)   Total: Dose planned: 5,000 cGy   Elapsed Days: 34 days as of 2023-11-01 @ 10:3209      1a_part pelv   Most recent treatment: Dose planned: 180 cGy (fraction 0 of 28 on 10/18/2023)   Total: Dose planned: 5,040 cGy   Elapsed Days: : @ --      1a_part pelv1   Most recent treatment: Dose planned: 180 cGy (fraction 0 of 28 on 10/18/2023)   Total: Dose planned: 5,040 cGy   Elapsed Days: : @ --      1b_cd  prosbd1   Most recent treatment: Dose planned: 180 cGy (fraction 0 of 11 on 10/18/2023)   Total: Dose planned: 1,980 cGy   Elapsed Days: : @ --      1b_cd prosbed   Most recent treatment: Dose planned: 180 cGy (fraction 0 of 11 on 10/18/2023)   Total: Dose planned: 1,980 cGy   Elapsed Days: : @ --      2b_RtCWBol 15   Most recent treatment: Dose planned: 200 cGy (fraction 0 of 15 on 10/18/2023)   Total: Dose planned: 3,000 cGy   Elapsed Days: : @ --      2 flds   Most recent treatment: Dose planned: -- (fraction 0 of 25 on 9/18/2023)   Total: Dose planned: --   Elapsed Days: : @ --      2a_rt nodEZ0   Most recent treatment: Dose planned: 200 cGy (fraction 0 of 25 on 9/18/2023)   Total: Dose planned: 5,000 cGy   Elapsed Days: : @ --      2a_rt nodes   Most recent treatment: Dose planned: 200 cGy (fraction 0 of 25 on 9/18/2023)   Total: Dose planned: 5,000 cGy   Elapsed Days: : @ --      2b_flds   Most recent treatment: Dose planned: 200 cGy (fraction 0 of 25 on 9/18/2023)   Total: Dose planned: 5,000 cGy   Elapsed Days: : @ --      2b_fldsEZ0   Most recent treatment: Dose planned: 200 cGy (fraction 0 of 25 on 9/18/2023)   Total: Dose planned: 5,000 cGy   Elapsed Days: : @ --      Reference Points   1_calc   Most recent treatment: Dose given: 183 cGy (on 6/7/2023)   Total: Dose given: 7,127 cGy   Elapsed Days: 55 days as of 2023-06-07 @ 14:2817      1a_part pelv   Most recent treatment: Dose given: 180 cGy (on 5/22/2023)   Total: Dose given: 5,040 cGy   Elapsed Days: 55 days as of 2023-06-07 @ 14:2817      1b_cd prosbed   Most recent treatment: Dose given: 180 cGy (on 6/7/2023)   Total: Dose given: 1,980 cGy   Elapsed Days: 55 days as of 2023-06-07 @ 14:2817      1c_trgt prosbed   Most recent treatment: Dose given: 180 cGy (on 6/7/2023)   Total: Dose given: 7,020 cGy   Elapsed Days: 55 days as of 2023-06-07 @ 14:2817      2a_calc Nodes   Most recent treatment: Dose given: 200 cGy (on 11/1/2023)   Total: Dose given:  5,000 cGy   Elapsed Days: 34 days as of 2023-11-01 @ 10:3209      2a_rt nodes   Most recent treatment: Dose given: 200 cGy (on 11/1/2023)   Total: Dose given: 5,000 cGy   Elapsed Days: 34 days as of 2023-11-01 @ 10:3209      2b_calc CW   Most recent treatment: Dose given: 202 cGy (on 11/1/2023)   Total: Dose given: 5,051 cGy   Elapsed Days: 34 days as of 2023-11-01 @ 10:3209      2b_right CW   Most recent treatment: Dose given: 200 cGy (on 11/1/2023)   Total: Dose given: 5,000 cGy   Elapsed Days: 34 days as of 2023-11-01 @ 10:3209      1_calc   Most recent treatment: Course completed on 10/18/2023   Total: Dose given: 0 cGy   Elapsed Days: : @ --      1a_part pelv   Most recent treatment: Course completed on 10/18/2023   Total: Dose given: 0 cGy   Elapsed Days: : @ --      1b_cd prosbed   Most recent treatment: Course completed on 10/18/2023   Total: Dose given: 0 cGy   Elapsed Days: : @ --      1c_trgt prosbed   Most recent treatment: Course completed on 10/18/2023   Total: Dose given: 0 cGy   Elapsed Days: : @ --      2b_calc CW   Most recent treatment: Course completed on 10/18/2023   Total: Dose given: 0 cGy   Elapsed Days: : @ --      2b_right CW   Most recent treatment: Course completed on 10/18/2023   Total: Dose given: 0 cGy   Elapsed Days: : @ --      2a_calc Nodes   Most recent treatment: Course completed on 9/18/2023   Total: Dose given: 0 cGy   Elapsed Days: : @ --      2a_rt nodes   Most recent treatment: Course completed on 9/18/2023   Total: Dose given: 0 cGy   Elapsed Days: : @ --      2b_calc CW   Most recent treatment: Course completed on 9/18/2023   Total: Dose given: 0 cGy   Elapsed Days: : @ --                       THERAPY PLAN:  No therapy plan of the specified type found.

## 2024-11-22 LAB
PSA SERPL-MCNC: <0.04 NG/ML
TESTOST SERPL-MCNC: 443 NG/DL (ref 250–1100)

## 2024-12-16 ENCOUNTER — OFFICE VISIT (OUTPATIENT)
Dept: SURGERY | Facility: CLINIC | Age: 74
End: 2024-12-16
Payer: MEDICARE

## 2024-12-16 VITALS
SYSTOLIC BLOOD PRESSURE: 127 MMHG | TEMPERATURE: 98.1 F | HEART RATE: 68 BPM | OXYGEN SATURATION: 99 % | HEIGHT: 65 IN | WEIGHT: 186 LBS | BODY MASS INDEX: 30.99 KG/M2 | DIASTOLIC BLOOD PRESSURE: 68 MMHG

## 2024-12-16 DIAGNOSIS — Z15.01 BRCA1 POSITIVE: ICD-10-CM

## 2024-12-16 DIAGNOSIS — Z15.89 MONOALLELIC MUTATION OF ATM GENE: ICD-10-CM

## 2024-12-16 DIAGNOSIS — Z15.09 MONOALLELIC MUTATION OF ATM GENE: ICD-10-CM

## 2024-12-16 DIAGNOSIS — Z15.01 MONOALLELIC MUTATION OF ATM GENE: ICD-10-CM

## 2024-12-16 DIAGNOSIS — Z15.09 BRCA1 POSITIVE: ICD-10-CM

## 2024-12-16 DIAGNOSIS — Z17.0: Primary | ICD-10-CM

## 2024-12-16 DIAGNOSIS — C61 PROSTATE CANCER (CMS/HCC): ICD-10-CM

## 2024-12-16 DIAGNOSIS — C50.021: Primary | ICD-10-CM

## 2024-12-16 PROCEDURE — 99214 OFFICE O/P EST MOD 30 MIN: CPT | Performed by: SURGERY

## 2024-12-16 PROCEDURE — G8754 DIAS BP LESS 90: HCPCS | Performed by: SURGERY

## 2024-12-16 PROCEDURE — G8752 SYS BP LESS 140: HCPCS | Performed by: SURGERY

## 2024-12-16 NOTE — PROGRESS NOTES
[facility]  Dr. Barb Osuna  [address] [address]  Phone: [phone]  Fax: [phone]      Patient ID: Cam Rosas Jr.                              : 1950    Visit Date: 2024  Referring Provider: No ref. provider found   PCP: Usman Collins MD  GYN: No care team member to display    Subjective: Radha presents for follow-up of right breast cancer.  He was initially diagnosed in  with a HER2 positive, ER/HI positive right breast cancer.  He received chemotherapy but had an adverse reaction and was hospitalized.  He was having cardiac issues, he was placed on tamoxifen, and on 2023 had a mastectomy with sentinel lymph node biopsy.  He also received postmastectomy radiation.  He also was treated for prostate cancer.  He saw my nurse practitioner about a month ago with some complaints of pain in the right mastectomy site, he was noted to have an embedded tick on the right chest wall which was removed.  He had his initial blood testing for Lyme's which was negative but will be getting repeat testing in a month.     Oncology History:    Cancer Staging   Malignant neoplasm of right male breast (CMS/[facility])  Staging form: Breast, AJCC 8th Edition  - Clinical stage from 3/20/2023: Stage IA (cT1c, cN0, cM0, G3, ER+, HI+, HER2+) - Signed by Barb Osuna MD on 3/20/2023  - Pathologic stage from 2023: No Stage Recommended (ypT1c, pN1a(sn), cM0, G3, ER+, HI+, HER2+) - Signed by Barb Osuna MD on 2023    Prostate cancer (CMS/[facility])  Staging form: Prostate, AJCC 8th Edition  - Pathologic stage from 2022: Stage Unknown (pT3b, pNX, cM0, PSA: 6, Grade Group: 3) - Signed by [facility], Gregory J, MD on 3/8/2023         Oncology History   Malignant neoplasm of right male breast (CMS/[facility])   2022 Biopsy     1.  Right breast mass:     Invasive ductal carcinoma, [place] grade 3 (3+ 3+2).   Invasive  "carcinoma is 13 mm in maximum dimension in core biopsy.    ER+ 90%; MO+ 50%; Bfe4mia+3; YM1356%     9/30/2022 -  Chemotherapy    9/30/2022 initiated first cycle TCP H at [facility].  Questionable reaction during the anti-HER2/jhonatan therapy with Herceptin.  Taxotere and carboplatin given on 10/3/2022.  Patient hospitalized after first cycle.     10/19/2022 Initial Diagnosis    Malignant neoplasm of right male breast (CMS/HCC)     11/15/2022 -  Hormone Therapy    Tamoxifen 20 mg daily while undergoes evaluation of cardiac issues     3/20/2023 Cancer Staged    Staging form: Breast, AJCC 8th Edition  - Clinical stage from 3/20/2023: Stage IA (cT1c, cN0, cM0, G3, ER+, MO+, HER2+)     8/28/2023 Cancer Staged    Staging form: Breast, AJCC 8th Edition  - Pathologic stage from 8/28/2023: No Stage Recommended (ypT1c, pN1a(sn), cM0, G3, ER+, MO+, HER2+)     Prostate cancer (CMS/HCC)   7/27/2022 Cancer Staged    Staging form: Prostate, AJCC 8th Edition  - Pathologic stage from 7/27/2022: Stage Unknown (pT3b, pNX, cM0, PSA: 6, Grade Group: 3)     3/21/2023 - 6/20/2023 Chemotherapy    LEUPROLIDE (LUPRON) 22.5 MG EVERY 3 MONTHS  Plan Provider: Paz Cleveland MD     3/21/2023 -  Chemotherapy    MEDIPORT FLUSH (SINGLE LUMEN) - PATIENT ON TREATMENT  Plan Provider: Paz Cleveland MD     3/21/2023 - 3/21/2023 Chemotherapy    LEUPROLIDE (LUPRON) 22.5 MG EVERY 3 MONTHS  Plan Provider: Paz Cleveland MD           Review of Systems   Cardiovascular:  Positive for leg swelling.   Genitourinary:  Positive for difficulty urinating.   Musculoskeletal:  Positive for back pain.   All other systems reviewed and are negative.         Physical Exam:    Vitals: Visit Vitals  /68 (BP Location: Right upper arm, Patient Position: Sitting)   Pulse 68   Temp 36.7 °C (98.1 °F)   Ht 1.651 m (5' 5\")   Wt 84.4 kg (186 lb)   SpO2 99%   BMI 30.95 kg/m²     Body mass index is 30.95 kg/m².    Physical Exam  Constitutional:       " Appearance: Normal appearance.   HENT:      Head: Normocephalic and atraumatic.   Neck:      Comments: Tracheostomy scar well-healed  Cardiovascular:      Rate and Rhythm: Normal rate and regular rhythm.   Pulmonary:      Effort: Pulmonary effort is normal.      Breath sounds: Normal breath sounds.   Chest:      Comments: Status post right mastectomy with well-healed scar, left breast normal in appearance.  No masses noted over the right chest wall or in the left breast.  Left nipple and areola are normal.  No axillary or supraclavicular adenopathy.  Slight darkening of the skin of the right chest wall from radiation, healing sore from previous tick bite.  There is slight edema of the right chest wall.  Abdominal:      Palpations: Abdomen is soft.   Musculoskeletal:         General: Normal range of motion.      Right lower leg: Edema present.      Left lower leg: Edema present.      Comments: Tubigrip dressings on both legs with evidence of venous stasis   Skin:     General: Skin is warm and dry.   Neurological:      General: No focal deficit present.      Mental Status: He is alert and oriented to person, place, and time.   Psychiatric:         Mood and Affect: Mood normal.         Behavior: Behavior normal.         Breast Imaging: I have personally reviewed the reports and images as follows.  Left mammogram reviewed which shows no suspicious findings  Impression:  Right breast cancer  BRCA mutation  OK mutation  Prostate cancer  Probable venous stasis  Recommendation and Plan:   Radha presents for follow-up of his right breast cancer.  From the cancer standpoint, he seems to be doing well, with no evidence of local recurrence, new primary breast cancer, or metastatic disease.  He remains on tamoxifen and seems to be tolerating that well.  He does have some edema of the right chest wall post surgery and radiation.  His mammogram was reviewed and shows no suspicious findings in his left breast.  He does have what  appears to be venous stasis, likely associated with his congestive heart failure, this is currently being managed by his primary with some Silvadene and Tubigrip, he has not seen anyone from wound care.  I did refer him to wound care for a full evaluation.  He will continue his tamoxifen, continue self exams, and call with any concerns, otherwise he will follow-up in the office in about 6 months.  At that point he will be 2 years out from his surgical management of his right breast cancer and will begin an annual follow-up.    All of the questions were answered.  The patient is in agreement with the treatment plan.      No follow-ups on file.        12/16/2024   10:55 AM        Barb Osuna MD

## 2024-12-19 ASSESSMENT — ENCOUNTER SYMPTOMS
DIFFICULTY URINATING: 1
BACK PAIN: 1

## 2025-01-02 ENCOUNTER — OFFICE VISIT (OUTPATIENT)
Dept: WOUND CARE | Facility: HOSPITAL | Age: 75
End: 2025-01-02
Payer: MEDICARE

## 2025-01-02 VITALS
TEMPERATURE: 97.8 F | SYSTOLIC BLOOD PRESSURE: 160 MMHG | RESPIRATION RATE: 18 BRPM | BODY MASS INDEX: 29.99 KG/M2 | WEIGHT: 180 LBS | HEART RATE: 64 BPM | HEIGHT: 65 IN | DIASTOLIC BLOOD PRESSURE: 67 MMHG

## 2025-01-02 DIAGNOSIS — I83.11 VARICOSE VEINS OF RIGHT LOWER EXTREMITY WITH INFLAMMATION: ICD-10-CM

## 2025-01-02 DIAGNOSIS — I83.12 VARICOSE VEINS OF LEFT LOWER EXTREMITY WITH INFLAMMATION: Primary | ICD-10-CM

## 2025-01-02 PROCEDURE — G0463 HOSPITAL OUTPT CLINIC VISIT: HCPCS

## 2025-01-02 PROCEDURE — 29580 STRAPPING UNNA BOOT: CPT | Mod: 50 | Performed by: PODIATRIST

## 2025-01-02 PROCEDURE — 29580 STRAPPING UNNA BOOT: CPT | Mod: 50

## 2025-01-02 PROCEDURE — 2W1RX6Z COMPRESSION OF LEFT LOWER LEG USING PRESSURE DRESSING: ICD-10-PCS | Performed by: PODIATRIST

## 2025-01-02 PROCEDURE — 2W1QX6Z COMPRESSION OF RIGHT LOWER LEG USING PRESSURE DRESSING: ICD-10-PCS | Performed by: PODIATRIST

## 2025-01-02 PROCEDURE — 27200104 HC MEDICOPASTE UNNA BOOT

## 2025-01-02 NOTE — ASSESSMENT & PLAN NOTE
No significant open wounds.  No clinical signs of infection will dress it as above.  The leg was also thoroughly cleansed and any scale removed and the steroid and Unna boot applied.  Full consultation and discussion with Pt  concerning etiology of their > +1 edema/secondary lymphadema.  Pt to elevate, exercise or use medical grade compression.

## 2025-01-02 NOTE — PROGRESS NOTES
Patient ID: Radha Rosas Jr.                            : 1950  MRN: [id number]                                            Visit Date: 2025  Encounter Provider: Prateek Christina  Referring Provider: Prateek Christina DPM    Subjective      HPI  Radha is a 74 y.o. old male with a chief compliant of Chronic Venous Insufficiency w/ Ulceration   presenting today for patient seen today with his wife for concerns of swollen and weeping and scaling lower extremities.  They have been using some Silvadene cream on any of the weeping areas as of late and some light dressings.  Denies any fevers chills or night sweats.  Has no significant shortness of breath.      The following have been reviewed and updated as appropriate in this visit:   Allergies  Meds  Problems       Past Medical History:  has a past medical history of Acute systolic heart failure (CMS/HCC) (02/15/2023), Ambulates with cane, Atrial fibrillation (CMS/[facility]), Breast cancer (CMS/[facility]) (2022), CHF (congestive heart failure) (CMS/[facility]), Chronic kidney disease, CKD (chronic kidney disease) (10/19/2022), History of radiation therapy, antineoplastic chemo, and Prostate cancer (CMS/[facility]).  Past Surgical History:  has a past surgical history that includes Cardiac surgery; Cardioversion (2022); Prostate surgery (2022); Tonsillectomy; Prostatectomy (07/15/2022); Knee cartilage surgery (Left); and Mastectomy.  Social History:   Social History     Tobacco Use    Smoking status: Never    Smokeless tobacco: Never   Vaping Use    Vaping status: Never Used   Substance Use Topics    Alcohol use: Yes     Alcohol/week: 14.0 standard drinks of alcohol     Types: 14 Shots of liquor per week     Comment: 2 drinks/day - scotch    Drug use: Never     Family History: family history includes Arthritis in his biological father and maternal grandfather; Breast cancer in his biological mother; COPD in his maternal grandfather; Cancer in his biological mother;  Cancer less than or equal to age 50 in an other family member; Diabetes in his maternal grandfather; Esophageal cancer in an other family member; Heart disease in his maternal grandmother; Hyperlipidemia in his biological father and biological mother; Hypertension in his biological father and biological mother; No Known Problems in his biological brother, biological sister, paternal grandfather, and paternal grandmother.  Medications:   Current Outpatient Medications:     amiodarone (PACERONE) 200 mg tablet, Take 1 tablet (200 mg total) by mouth 3 (three) times a week (Mon, Wed, Fri). (Patient taking differently: Take 200 mg by mouth 2 (two) times a week (Mon, Thu). Patient takes Mon and Friday), Disp: 39 tablet, Rfl: 3    atorvastatin (LIPITOR) 10 mg tablet, Take 1 tablet (10 mg total) by mouth daily., Disp: 90 tablet, Rfl: 3    busPIRone (BUSPAR) 10 mg tablet, Take 10 mg by mouth 2 (two) times a day., Disp: , Rfl:     cetirizine (ZyrTEC) 1 mg/mL syrup, 10 mL (10 mg total) by feeding tube route nightly., Disp: , Rfl:     cetirizine (ZyrTEC) 10 mg tablet, Take 10 mg by mouth daily. (Patient not taking: Reported on 12/16/2024), Disp: , Rfl:     cholecalciferol, vitamin D3, 1,000 unit (25 mcg) tablet, Take 1,000 Units by mouth daily., Disp: , Rfl:     magnesium oxide (MAG-OX) 400 mg (241.3 mg magnesium) tablet, Take 1 tablet (400 mg total) by mouth 2 (two) times a day., Disp: 180 tablet, Rfl: 1    melatonin ODT, 1 tablet (3 mg total) by peg tube route nightly as needed (sleep). (Patient taking differently: Take 3 mg by mouth nightly as needed (sleep).), Disp: , Rfl:     metoprolol succinate XL (TOPROL-XL) 25 mg 24 hr tablet, Take 1 tablet (25 mg total) by mouth nightly., Disp: 90 tablet, Rfl: 3    pantoprazole (PROTONIX) 40 mg EC tablet, Take 40 mg by mouth 2 (two) times a day., Disp: , Rfl:     potassium chloride (KLOR-CON) 20 mEq packet, Take 20 mEq by mouth 3 (three) times a week (Mon, Wed, Fri)., Disp: , Rfl:      "rivaroxaban (XARELTO) 20 mg tablet, Take 1 tablet (20 mg total) by mouth daily with dinner., Disp: 90 tablet, Rfl: 3    tamoxifen (NOLVADEX) 20 mg chemo tablet, Take  1 tablet (20 mg total) daily, Disp: 90 tablet, Rfl: 3    tamsulosin (FLOMAX) 0.4 mg capsule, Take 1 capsule (0.4 mg total) by mouth nightly. (Patient not taking: Reported on 12/16/2024), Disp: 30 capsule, Rfl: 6    torsemide (DEMADEX) 20 mg tablet, Take 0.5 tablets (10 mg total) by mouth daily as needed (edema)., Disp: , Rfl:     Allergies: is allergic to azithromycin, prednisone, and lorazepam.     Review of Systems   All other systems reviewed and are negative.    Objective   Visit Vitals  BP (!) 160/67 (BP Location: Left upper arm, Patient Position: Sitting)   Pulse 64   Temp 36.6 °C (97.8 °F) (Temporal)   Resp 18   Ht 1.651 m (5' 5\")   Wt 81.6 kg (180 lb)   BMI 29.95 kg/m²       Physical Exam  Vitals and nursing note reviewed.       The DP and PT pulses are trace but palpable due to the edema.  Epicritic sensation and manual muscle strength appear to be intact.  No michelle pedal deformities present.  The legs are significantly swollen and slightly ruborous with fair amount of inflammatory scale.  No significant open wounds.  Some slight areas of seroma breaking through the skin.  No increase in temperature no ascending lymphangitis.    Assessment/Plan    Diagnosis Plan   1. Varicose veins of left lower extremity with inflammation        2. Varicose veins of right lower extremity with inflammation          Problem List Items Addressed This Visit          Infectious/Inflammatory    Varicose veins of right lower extremity with inflammation     No significant open wounds.  No clinical signs of infection will dress it as above.  The leg was also thoroughly cleansed and any scale removed and the steroid and Unna boot applied.  Full consultation and discussion with Pt  concerning etiology of their > +1 edema/secondary lymphadema.  Pt to elevate, exercise or " use medical grade compression.               Varicose veins of left lower extremity with inflammation - Primary     No clinical signs of infection.  Appears that he is just having a significant amount of stasis with dermatitis.  At this time we will discontinue the Silvadene and recommended compression.  Will also apply some clobetasol to his legs today and and Unna boots.  This can be left on for the next 7 days and will reevaluate.  Legs were thoroughly cleansed and the scale removed to tolerance and dressings as above.            Return in about 1 week (around 1/9/2025), or going to the [facility] office.     Prateek Christina DPM

## 2025-01-02 NOTE — ASSESSMENT & PLAN NOTE
No clinical signs of infection.  Appears that he is just having a significant amount of stasis with dermatitis.  At this time we will discontinue the Silvadene and recommended compression.  Will also apply some clobetasol to his legs today and and Unna boots.  This can be left on for the next 7 days and will reevaluate.  Legs were thoroughly cleansed and the scale removed to tolerance and dressings as above.

## 2025-01-02 NOTE — PATIENT INSTRUCTIONS
COMPRESSION THERAPY     Unna Boot  Both Legs  An UNNA BOOT (compression wrap) has been applied today   Unna Boot is the brown wrap on your leg(s).  Do not remove or unwrap. Keep boot dry at all times; cover with seal-tight device or a cast over if showering.  A wet Unna boot should be removed as soon as possible to prevent infection and any damage to healthy skin.  If numbness, tingling or increased pain develops, notify the [facility] at [phone].          Dr Christina's main office  [phone] call to schedule the [facility]la office visit next week    You can purchase a cast cover at the drug store or [employer]

## 2025-01-23 RX ORDER — LANOLIN ALCOHOL/MO/W.PET/CERES
400 CREAM (GRAM) TOPICAL 2 TIMES DAILY
Qty: 180 TABLET | Refills: 1 | Status: SHIPPED | OUTPATIENT
Start: 2025-01-23 | End: 2025-07-22

## 2025-01-23 NOTE — TELEPHONE ENCOUNTER
Medicine Refill Request  Pt is out of    magnesium oxide (MAG-OX) 400 mg (241.3 mg magnesium) tablet,      Current Outpatient Medications:     amiodarone (PACERONE) 200 mg tablet, Take 1 tablet (200 mg total) by mouth 3 (three) times a week (Mon, Wed, Fri). (Patient taking differently: Take 200 mg by mouth 2 (two) times a week (Mon, Thu). Patient takes Mon and Friday), Disp: 39 tablet, Rfl: 3    atorvastatin (LIPITOR) 10 mg tablet, Take 1 tablet (10 mg total) by mouth daily., Disp: 90 tablet, Rfl: 3    busPIRone (BUSPAR) 10 mg tablet, Take 10 mg by mouth 2 (two) times a day., Disp: , Rfl:     cetirizine (ZyrTEC) 1 mg/mL syrup, 10 mL (10 mg total) by feeding tube route nightly., Disp: , Rfl:     cetirizine (ZyrTEC) 10 mg tablet, Take 10 mg by mouth daily. (Patient not taking: Reported on 12/16/2024), Disp: , Rfl:     cholecalciferol, vitamin D3, 1,000 unit (25 mcg) tablet, Take 1,000 Units by mouth daily., Disp: , Rfl:     magnesium oxide (MAG-OX) 400 mg (241.3 mg magnesium) tablet, Take 1 tablet (400 mg total) by mouth 2 (two) times a day., Disp: 180 tablet, Rfl: 1    melatonin ODT, 1 tablet (3 mg total) by peg tube route nightly as needed (sleep). (Patient taking differently: Take 3 mg by mouth nightly as needed (sleep).), Disp: , Rfl:     metoprolol succinate XL (TOPROL-XL) 25 mg 24 hr tablet, Take 1 tablet (25 mg total) by mouth nightly., Disp: 90 tablet, Rfl: 3    pantoprazole (PROTONIX) 40 mg EC tablet, Take 40 mg by mouth 2 (two) times a day., Disp: , Rfl:     potassium chloride (KLOR-CON) 20 mEq packet, Take 20 mEq by mouth 3 (three) times a week (Mon, Wed, Fri)., Disp: , Rfl:     rivaroxaban (XARELTO) 20 mg tablet, Take 1 tablet (20 mg total) by mouth daily with dinner., Disp: 90 tablet, Rfl: 1    tamoxifen (NOLVADEX) 20 mg chemo tablet, Take  1 tablet (20 mg total) daily, Disp: 90 tablet, Rfl: 3    tamsulosin (FLOMAX) 0.4 mg capsule, Take 1 capsule (0.4 mg total) by mouth nightly. (Patient not taking:  "Reported on 12/16/2024), Disp: 30 capsule, Rfl: 6    torsemide (DEMADEX) 20 mg tablet, Take 0.5 tablets (10 mg total) by mouth daily as needed (edema)., Disp: , Rfl:       BP Readings from Last 3 Encounters:   01/02/25 (!) 160/67   12/16/24 127/68   11/21/24 138/64       Recent Lab results:  No results found for: \"CHOL\", No results found for: \"HDL\", No results found for: \"LDLCALC\",   Lab Results   Component Value Date    TRIG 303 (H) 12/27/2023        Lab Results   Component Value Date    GLUCOSE 109 11/12/2024   , No results found for: \"HGBA1C\"      Lab Results   Component Value Date    CREATININE 1.53 (H) 11/12/2024       Lab Results   Component Value Date    TSH 3.47 11/12/2024         No results found for: \"HGBA1C\"  "

## 2025-03-20 LAB — PSA SERPL-MCNC: <0.04 NG/ML

## 2025-03-27 ENCOUNTER — OFFICE VISIT (OUTPATIENT)
Dept: CARDIOLOGY | Facility: CLINIC | Age: 75
End: 2025-03-27
Payer: MEDICARE

## 2025-03-27 VITALS
BODY MASS INDEX: 29.99 KG/M2 | HEIGHT: 65 IN | DIASTOLIC BLOOD PRESSURE: 72 MMHG | WEIGHT: 180 LBS | SYSTOLIC BLOOD PRESSURE: 134 MMHG

## 2025-03-27 DIAGNOSIS — Z79.899 LONG TERM CURRENT USE OF AMIODARONE: ICD-10-CM

## 2025-03-27 DIAGNOSIS — I50.32 CHRONIC DIASTOLIC (CONGESTIVE) HEART FAILURE: ICD-10-CM

## 2025-03-27 DIAGNOSIS — I48.0 PAROXYSMAL ATRIAL FIBRILLATION (CMS/HCC): Primary | ICD-10-CM

## 2025-03-27 DIAGNOSIS — I51.81 TAKOTSUBO CARDIOMYOPATHY: ICD-10-CM

## 2025-03-27 DIAGNOSIS — I46.9 CARDIAC ARREST (CMS/HCC): ICD-10-CM

## 2025-03-27 PROCEDURE — G8754 DIAS BP LESS 90: HCPCS | Performed by: INTERNAL MEDICINE

## 2025-03-27 PROCEDURE — 93000 ELECTROCARDIOGRAM COMPLETE: CPT | Performed by: INTERNAL MEDICINE

## 2025-03-27 PROCEDURE — G8752 SYS BP LESS 140: HCPCS | Performed by: INTERNAL MEDICINE

## 2025-03-27 PROCEDURE — 99214 OFFICE O/P EST MOD 30 MIN: CPT | Performed by: INTERNAL MEDICINE

## 2025-03-27 ASSESSMENT — ENCOUNTER SYMPTOMS
IRREGULAR HEARTBEAT: 0
ABDOMINAL PAIN: 0
SHORTNESS OF BREATH: 0
ORTHOPNEA: 0
PALPITATIONS: 0
FOCAL WEAKNESS: 0
WEAKNESS: 0
TREMORS: 0
DYSPNEA ON EXERTION: 0
PND: 0
SYNCOPE: 0
NEAR-SYNCOPE: 0
ALTERED MENTAL STATUS: 0
HEMATURIA: 0
COUGH: 0
PARESTHESIAS: 0

## 2025-03-27 NOTE — PROGRESS NOTES
Electrophysiology  Outpatient Note         HPI   Cam Rosas Jr. is a 75 y.o. male who is seen today for follow-up and management of atrial fibrillation.  He comes today with his wife, neemaing [employer] gear for opening day!    He underwent a cardioversion on December 5, 2022.  He was loaded with amiodarone and now comes to the office on amiodarone 200 mg daily, metoprolol 100 mg daily and diltiazem 360 mg .  He returned to the office 1/2023 in sinus bradycardia. Diltizem was decreased. During the month of August 2023  he underwent right breast mastectomy with lymph node removal.      He has had a prolonged admission to [facility] on 12/24/2023 with dyspnea which started the day before admission. He required intubation on admission for respiratory distress. He had a fever of 102 with WBC count of 14K and influenza. A PCR is positive. He was started on vancomycin, pip-tazo along with oseltamivir with CTA chest showing right upper lobe and left lower lobe infiltrate with patchy airspace.   It was so severe he had to undergo a tracheostomy and a PEG tube was placed. He has chronic renal insufficiency with a creatinine of 1.1 to 1.4 mg/dL. During the admission he became hypoxic and bradycardic.  He was treated with atropine. He was subsequently intubated and again had bradycardia with polymorphic ventricular tachycardia that degenerated into sustained ventricular flutter.  He was treated with intravenous lidocaine.  He received 4 external shocks with a resultant slow wide-complex rhythm. He was given 300 mg of IV amiodarone and bolused with lidocaine.  He had persistent bradycardia.  He had a persistently wide QRS.   He then underwent placement of a temporary wire and had a cardiac catheterization to rule out coronary disease. Cardiac catheterization revealed no significant coronary disease. His LV gram was consistent with Takotsubo syndrome. He was started on intravenous milrinone as his intracardiac  pressures were elevated. He was eventually discharged with trach and G-tube removal.     At September 2024 appointment he had a prolonged QT interval. He was instructed to decrease amiodarone to 200 mg 3X/week. Instead he decreased to 2X/week. He has been doing well on this lower dose and his QT interval is no longer prolonged. Thyroid and liver lab work is stable.      Past Medical History:   Diagnosis Date    Acute systolic heart failure (CMS/HCC) 02/15/2023    Ambulates with cane     and walker    Atrial fibrillation (CMS/HCC)     Breast cancer (CMS/HCC) October 2022    CHF (congestive heart failure) (CMS/HCC)     Chronic kidney disease     CKD (chronic kidney disease) 10/19/2022    History of radiation therapy     Hx antineoplastic chemo     Prostate cancer (CMS/HCC)        Past Surgical History   Procedure Laterality Date    Cardiac surgery      mitral valve repair 2006    Cardioversion  12/05/2022    Successful DC cardioversion    CARDIOVERSION EXTERNAL N/A 12/5/2022    Performed by Gary Aceves MD at [facility]ARDIAC CATH/EP    Cath lab temporary pacemaker insertion N/A 12/25/2023    Performed by Haja Onofre MD at  CARDIAC CATH/EP/NEURO    GASTROSTOMY PERCUTANEOUS ENDOSCOPIC N/A 1/4/2024    Performed by Edison Oneil MD at  OR    Knee cartilage surgery Left     Mastectomy      PORT/PERMACATH REMOVAL Left 11/9/2023    Performed by Barb Osuna MD at  OR    Prostate surgery  July 2022    Prostatectomy  07/15/2022    Right & left heart cath with coronary angiography N/A 12/25/2023    Performed by Haja Onofre MD at  CARDIAC CATH/EP/NEURO    RIGHT BREAST MASTECTOMY; RIGHT SENTINEL NODE IDENTIFICATION, MAPPING, AND BIOPSY; Right 8/17/2023    Performed by Barb Osuna MD at  OR    Tonsillectomy      TRACHEOSTOMY N/A 1/4/2024    Performed by Edison Oneil MD at  OR       Allergies: Azithromycin, Prednisone, and Lorazepam    No current facility-administered  medications for this visit.     No current outpatient medications on file.     Facility-Administered Medications Ordered in Other Visits   Medication Dose Route Frequency Provider Last Rate Last Admin    amiodarone (PACERONE) tablet 200 mg  200 mg oral Once per day on Monday Wednesday Friday Meek Pizarro MD   200 mg at 04/07/25 0821    atorvastatin (LIPITOR) tablet 10 mg  10 mg oral Daily Meek Pizarro MD   10 mg at 04/07/25 0821    busPIRone (BUSPAR) tablet 10 mg  10 mg oral BID Meek Pizarro MD        dexAMETHasone (DECADRON) injection 6 mg  6 mg intravenous Daily Nory Jeronimo MD        glucose chewable tablet 16-32 g of dextrose  16-32 g of dextrose oral PRN Meek Pizarro MD        Or    dextrose 40 % oral gel 15-30 g of dextrose  15-30 g of dextrose oral PRN Meek Pizarro MD        Or    glucagon (GLUCAGEN) injection 1 mg  1 mg intramuscular PRN Meek Pizarro MD        Or    dextrose 50 % in water (D50) injection 12.5 g  25 mL intravenous PRN Meek Pizarro MD        enoxaparin (LOVENOX) syringe 90 mg  1 mg/kg subcutaneous q12h INT Meek Pizarro MD        furosemide (LASIX) injection 40 mg  40 mg intravenous BID (am, 4p) Meek Pizarro MD   40 mg at 04/07/25 1139    melatonin ODT 3 mg  3 mg peg tube Nightly PRN Meek Pizarro MD        metoprolol succinate XL (TOPROL-XL) 24 hr ER tablet 25 mg  25 mg oral Nightly Meek Pizarro MD        pantoprazole (PROTONIX) tablet,delayed release (DR/EC) 40 mg  40 mg oral BID Meek Pizarro MD   40 mg at 04/07/25 0821    tamoxifen (NOLVADEX) tablet 20 mg  20 mg oral Daily Meek Pizarro MD        tamsulosin (FLOMAX) 24 hr ER capsule 0.4 mg  0.4 mg oral Nightly Meek Pizarro MD           Social History     Tobacco Use    Smoking status: Never    Smokeless tobacco: Never   Vaping Use    Vaping status: Never Used   Substance Use Topics    Alcohol use: Yes     Alcohol/week: 14.0 standard drinks of alcohol     Types: 14 Shots  of liquor per week     Comment: 2 drinks/day - scotch    Drug use: Never       Family History   Problem Relation Name Age of Onset    Hyperlipidemia Biological Mother mother     Hypertension Biological Mother mother     Breast cancer Biological Mother mother     Cancer Biological Mother mother         gyn? cervical    Hyperlipidemia Biological Father Dad     Hypertension Biological Father Dad     Arthritis Biological Father Dad     No Known Problems Biological Sister      No Known Problems Biological Brother      Heart disease Maternal Grandmother Belle     Arthritis Maternal Grandfather      COPD Maternal Grandfather      Diabetes Maternal Grandfather      No Known Problems Paternal Grandmother      No Known Problems Paternal Grandfather      Cancer less than or equal to age 50 Other son     Esophageal cancer Other son         47, in abd,chemo q 3 weeks       Review of Systems   Constitutional: Negative for malaise/fatigue.   HENT:  Negative for hearing loss.    Eyes:  Negative for visual disturbance.   Cardiovascular:  Negative for chest pain, dyspnea on exertion, irregular heartbeat, leg swelling, near-syncope, orthopnea, palpitations, paroxysmal nocturnal dyspnea and syncope.   Respiratory:  Negative for cough and shortness of breath.    Hematologic/Lymphatic: Negative for bleeding problem.   Skin:  Negative for rash.   Musculoskeletal:  Negative for joint pain and muscle weakness.   Gastrointestinal:  Negative for abdominal pain.   Genitourinary:  Negative for hematuria.   Neurological:  Negative for focal weakness, paresthesias, tremors and weakness.   Psychiatric/Behavioral:  Negative for altered mental status.        Objective     Vitals:    03/27/25 1527   BP: 134/72             Physical Exam  Constitutional:       Appearance: He is well-developed.   HENT:      Head: Normocephalic and atraumatic.   Neck:      Vascular: No JVD.   Cardiovascular:      Rate and Rhythm: Normal rate and regular rhythm.       Heart sounds: No murmur heard.  Pulmonary:      Breath sounds: Normal breath sounds.   Abdominal:      General: Bowel sounds are normal.      Palpations: Abdomen is soft.      Tenderness: There is no abdominal tenderness.   Musculoskeletal:      Right lower leg: Edema present.      Left lower leg: Edema present.      Comments: Mild bilateral ankle edema   Skin:     General: Skin is warm and dry.   Neurological:      Mental Status: He is alert and oriented to person, place, and time.          Labs   Lab Results   Component Value Date    WBC 7.84 04/07/2025    HGB 11.3 (L) 04/07/2025    HCT 35.6 (L) 04/07/2025     04/07/2025    TRIG 303 (H) 12/27/2023    ALT 7 04/07/2025    AST 29 04/07/2025     04/07/2025    K 4.3 04/07/2025     04/07/2025    CREATININE 1.7 (H) 04/07/2025    BUN 19 04/07/2025    CO2 26 04/07/2025    TSH 3.47 11/12/2024    INR 1.8 01/07/2024       Echocardiogram 10/18/2022   Transthoracic echocardiogram on at outside hospital showed an ejection fraction of 40-45%, global left ventricular hypokinesis, mildly dilated left atrium.    Cardioversion 12/5/2022. amiodarone.     Cardiac Catheterization 12/25/2023    Angiographically normal epicardial coronary arteries    Left ventricular ejection fraction is approximately 25-30%.Wall motion abnormalities consistent with Takotsubo Cardiomyopathy    Severely elevated biventricular filling pressures    Reduced Cardiac Output and Index; Reduced Stroke volume and stroke volume index by Chelsi Calculation    Pulmonary venous post-capillary hypertension primarily due to left heart dysfunction    No peak to peak gradient on pullback to suggest aortic stenosis    Echocardiogram 1/2/2024    Left Ventricle: Normal ventricle size. Normal wall thickness. Estimated EF 60-65%. No regional wall motion abnormalities.    Limited followup study shows normal LV function now- compared with apical hypokinesis on 12/26/23    ECG Sinus rhythm with normal QT  interval.    Assessment/Plan   Problem List Items Addressed This Visit          Circulatory    Paroxysmal atrial fibrillation (CMS/HCC) - Primary    He is maintaining sinus rhythm on low dose amiodarone. He is anticoagulated with Xarelto.  According to Mrs. Rosas, Dr. Collins completed blood work and they were told everything was normal. We are unable to view these. A lab request is sent to [facility] to be added to his next PCP blood work for liver and thyroid levels.          Relevant Orders    [facility] MUSE ECG 12 lead (clinic performed) (Completed)    Comprehensive metabolic panel    TSH w reflex FT4    Cardiac arrest (CMS/HCC)    During the 12/2023 [place] admission he became hypoxic and bradycardic.  He was treated with atropine. He was subsequently intubated and again had bradycardia followed by polymorphic ventricular tachycardia that degenerated into sustained ventricular flutter.  He was treated with intravenous lidocaine.  He received 4 external shocks with a resultant slow wide-complex rhythm. He was given 300 mg of IV amiodarone and bolused with lidocaine. There were no further episodes of VF on amiodarone.          Relevant Orders    [facility] MUSE ECG 12 lead (clinic performed) (Completed)    Takotsubo cardiomyopathy    He underwent a cardiac catheterization to rule out coronary disease during 12/2023 admission. Cardiac catheterization revealed no significant coronary disease. His LV gram was consistent with Takotsubo syndrome. He was started on intravenous milrinone as his intracardiac pressures were elevated. The EF has recovered with no further signs of Takotsubo.          Relevant Orders    [facility] MUSE ECG 12 lead (clinic performed) (Completed)       Other    Long term current use of amiodarone    He is tolerating amiodarone, maintaining sinus rhythm on low dose amiodarone. At the past appointment in September 2024 his ECG showed sinus bradycardia with prolonged QT interval. Amiodarone is now at 200 mg  2X/week with no evidence of sinus bradycardia or prolonged QT. He takes Tamoxifen for his history of breast cancer and the combination of amiodarone higher dose and Tamoxifen may have resulted in the long QT.   His blood chemistries have been adequate with no evidence of any liver issues.  He is due for general blood work in the near future, and I have added a TSH for amiodarone follow-up.         Relevant Orders    [facility] MUSE ECG 12 lead (clinic performed) (Completed)    Comprehensive metabolic panel    TSH w reflex FT4     Other Visit Diagnoses         Chronic diastolic (congestive) heart failure (CMS/HCC)        Relevant Orders    [facility] MUSE ECG 12 lead (clinic performed) (Completed)                Gary Aceves MD   03/27/2025

## 2025-04-04 LAB
QRS DURATION: 126
QT INTERVAL: 456
QTC CALCULATION(BAZETT): 478
R AXIS: -1
T WAVE AXIS: 56
VENTRICULAR RATE: 66

## 2025-04-07 ENCOUNTER — APPOINTMENT (EMERGENCY)
Dept: RADIOLOGY | Facility: HOSPITAL | Age: 75
DRG: 177 | End: 2025-04-07
Attending: STUDENT IN AN ORGANIZED HEALTH CARE EDUCATION/TRAINING PROGRAM
Payer: MEDICARE

## 2025-04-07 ENCOUNTER — HOSPITAL ENCOUNTER (INPATIENT)
Facility: HOSPITAL | Age: 75
LOS: 4 days | Discharge: HOME HEALTH | DRG: 177 | End: 2025-04-11
Attending: STUDENT IN AN ORGANIZED HEALTH CARE EDUCATION/TRAINING PROGRAM | Admitting: STUDENT IN AN ORGANIZED HEALTH CARE EDUCATION/TRAINING PROGRAM
Payer: MEDICARE

## 2025-04-07 ENCOUNTER — APPOINTMENT (INPATIENT)
Dept: CARDIOLOGY | Facility: HOSPITAL | Age: 75
DRG: 177 | End: 2025-04-07
Payer: MEDICARE

## 2025-04-07 ENCOUNTER — APPOINTMENT (EMERGENCY)
Dept: RADIOLOGY | Facility: HOSPITAL | Age: 75
DRG: 177 | End: 2025-04-07
Payer: MEDICARE

## 2025-04-07 DIAGNOSIS — J96.01 ACUTE RESPIRATORY FAILURE WITH HYPOXIA (CMS/HCC): Primary | ICD-10-CM

## 2025-04-07 DIAGNOSIS — I48.91 ATRIAL FIBRILLATION, UNSPECIFIED TYPE (CMS/HCC): ICD-10-CM

## 2025-04-07 DIAGNOSIS — I50.33 ACUTE ON CHRONIC HEART FAILURE WITH PRESERVED EJECTION FRACTION (HFPEF) (CMS/HCC): ICD-10-CM

## 2025-04-07 DIAGNOSIS — U07.1 COVID-19: ICD-10-CM

## 2025-04-07 DIAGNOSIS — I26.93 SINGLE SUBSEGMENTAL PULMONARY EMBOLISM WITHOUT ACUTE COR PULMONALE (CMS/HCC): ICD-10-CM

## 2025-04-07 PROBLEM — I26.99 ACUTE PULMONARY EMBOLISM (CMS/HCC): Status: ACTIVE | Noted: 2025-04-07

## 2025-04-07 PROBLEM — E78.5 HYPERLIPIDEMIA: Status: ACTIVE | Noted: 2025-04-07

## 2025-04-07 PROBLEM — J96.02 ACUTE RESPIRATORY FAILURE WITH HYPOXIA AND HYPERCAPNIA (CMS/HCC): Status: ACTIVE | Noted: 2025-04-07

## 2025-04-07 PROBLEM — C61 PROSTATE CANCER (CMS/HCC): Status: ACTIVE | Noted: 2025-04-07

## 2025-04-07 PROBLEM — D64.9 ANEMIA: Status: ACTIVE | Noted: 2025-04-07

## 2025-04-07 PROBLEM — I48.0 PAROXYSMAL ATRIAL FIBRILLATION (CMS/HCC): Status: ACTIVE | Noted: 2025-04-07

## 2025-04-07 PROBLEM — N40.0 BPH (BENIGN PROSTATIC HYPERPLASIA): Status: ACTIVE | Noted: 2025-04-07

## 2025-04-07 PROBLEM — I50.23 ACUTE ON CHRONIC SYSTOLIC CONGESTIVE HEART FAILURE (CMS/HCC): Status: ACTIVE | Noted: 2025-04-07

## 2025-04-07 PROBLEM — Z86.74 HISTORY OF CARDIAC ARREST: Status: ACTIVE | Noted: 2025-04-07

## 2025-04-07 PROBLEM — I26.99 ACUTE PULMONARY EMBOLISM (CMS/HCC): Status: RESOLVED | Noted: 2025-04-07 | Resolved: 2025-04-07

## 2025-04-07 PROBLEM — S81.809A MULTIPLE OPEN WOUNDS OF LOWER EXTREMITY: Status: ACTIVE | Noted: 2025-04-07

## 2025-04-07 PROBLEM — E03.9 HYPOTHYROIDISM: Status: ACTIVE | Noted: 2025-04-07

## 2025-04-07 PROBLEM — Z87.11 HISTORY OF PEPTIC ULCER DISEASE: Status: ACTIVE | Noted: 2025-04-07

## 2025-04-07 PROBLEM — C50.919 BREAST CANCER (CMS/HCC): Status: ACTIVE | Noted: 2025-04-07

## 2025-04-07 LAB
ALBUMIN SERPL-MCNC: 3.9 G/DL (ref 3.5–5.7)
ALP SERPL-CCNC: 45 IU/L (ref 34–125)
ALT SERPL-CCNC: 7 IU/L (ref 7–52)
ANION GAP SERPL CALC-SCNC: 9 MEQ/L (ref 3–15)
AORTIC ROOT ANNULUS: 3.8 CM
ASCENDING AORTA: 3.3 CM
AST SERPL-CCNC: 29 IU/L (ref 13–39)
BASE EXCESS BLDA CALC-SCNC: -4.7 MEQ/L
BASE EXCESS BLDV CALC-SCNC: -4.6 MEQ/L
BASE EXCESS BLDV CALC-SCNC: -5.8 MEQ/L
BASOPHILS # BLD: 0.04 K/UL (ref 0.01–0.1)
BASOPHILS NFR BLD: 0.5 %
BILIRUB SERPL-MCNC: 0.6 MG/DL (ref 0.3–1.2)
BNP SERPL-MCNC: 502 PG/ML
BSA FOR ECHO PROCEDURE: 1.98 M2
BUN SERPL-MCNC: 19 MG/DL (ref 7–25)
CALCIUM SERPL-MCNC: 8.9 MG/DL (ref 8.6–10.3)
CHLORIDE SERPL-SCNC: 107 MEQ/L (ref 98–107)
CO2 BLDV-SCNC: 25.5 MEQ/L (ref 22–32)
CO2 BLDV-SCNC: 26.5 MEQ/L (ref 22–32)
CO2 SERPL-SCNC: 26 MEQ/L (ref 21–31)
CREAT SERPL-MCNC: 1.7 MG/DL (ref 0.7–1.3)
CRP SERPL-MCNC: 13.5 MG/L
DIFFERENTIAL METHOD BLD: ABNORMAL
E WAVE DECELERATION TIME: 149 MS
EGFRCR SERPLBLD CKD-EPI 2021: 41.5 ML/MIN/1.73M*2
EOSINOPHIL # BLD: 0.16 K/UL (ref 0.04–0.54)
EOSINOPHIL NFR BLD: 2 %
ERYTHROCYTE [DISTWIDTH] IN BLOOD BY AUTOMATED COUNT: 14.7 % (ref 11.6–14.4)
EST RIGHT VENT SYSTOLIC PRESSURE BY TRICUSPID REGURGITATION JET: 35 MMHG
FIO2 ON VENT: 50 %
FLUAV RNA SPEC QL NAA+PROBE: NEGATIVE
FLUBV RNA SPEC QL NAA+PROBE: NEGATIVE
FRACTIONAL SHORTENING: 30.92 %
GLUCOSE SERPL-MCNC: 182 MG/DL (ref 70–99)
HCO3 BLDA-SCNC: 21.3 MEQ/L (ref 21–28)
HCO3 BLDV-SCNC: 19.4 MEQ/L (ref 21–28)
HCO3 BLDV-SCNC: 20.6 MEQ/L (ref 21–28)
HCT VFR BLD AUTO: 35.6 % (ref 40.1–51)
HGB BLD-MCNC: 11.3 G/DL (ref 13.7–17.5)
IMM GRANULOCYTES # BLD AUTO: 0.04 K/UL (ref 0–0.08)
IMM GRANULOCYTES NFR BLD AUTO: 0.5 %
INHALED O2 CONCENTRATION: ABNORMAL %
INTERVENTRICULAR SEPTUM: 1.03 CM
LA ESV (BP): 99.6 CM3
LA ESV INDEX (A2C): 58.08 CM3/M2
LA ESV INDEX (BP): 50.3 CM3/M2
LA/AORTA RATIO: 1.34
LAAS-AP2: 30.3 CM2
LAAS-AP4: 23.3 CM2
LACTATE BLDA-SCNC: 1.7 MMOL/L (ref 0.4–1.6)
LACTATE SERPL-SCNC: 2.3 MMOL/L (ref 0.4–2)
LAD 2D: 5.1 CM
LAL MED-LAT (A4C): 5.77 CM
LAV-S: 115 CM3
LEFT ATRIAL LENGTH SUPERIOR-INFERIOR (APICAL 2-CHAMBER VIEW): 6.57 CM
LEFT ATRIUM VOLUME INDEX: 38.33 CM3/M2
LEFT ATRIUM VOLUME: 75.9 CM3
LEFT INTERNAL DIMENSION IN SYSTOLE: 3.62 CM (ref 2.73–4.14)
LEFT VENTRICULAR INTERNAL DIMENSION IN DIASTOLE: 5.24 CM (ref 4.64–6.44)
LEFT VENTRICULAR POSTERIOR WALL IN END DIASTOLE: 1.06 CM (ref 0.61–1.13)
LVOT 2D: 2.2 CM
LVOT A: 3.8 CM2
LYMPHOCYTES # BLD: 0.77 K/UL (ref 1.2–3.5)
LYMPHOCYTES NFR BLD: 9.8 %
MAGNESIUM SERPL-MCNC: 1.6 MG/DL (ref 1.8–2.5)
MCH RBC QN AUTO: 34 PG (ref 28–33.2)
MCHC RBC AUTO-ENTMCNC: 31.7 G/DL (ref 32.2–36.5)
MCV RBC AUTO: 107.2 FL (ref 83–98)
MONOCYTES # BLD: 0.58 K/UL (ref 0.3–1)
MONOCYTES NFR BLD: 7.4 %
MV PEAK E VEL: 1.19 M/S
MV STENOSIS PRESSURE HALF TIME: 44 MS
MV VALVE AREA P 1/2 METHOD: 5 CM2
NEUTROPHILS # BLD: 6.25 K/UL (ref 1.7–7)
NEUTS SEG NFR BLD: 79.8 %
NRBC BLD-RTO: 0.4 %
PCO2 BLDA: 44 MM HG (ref 35–48)
PCO2 BLDV: 58 MM HG (ref 41–51)
PCO2 BLDV: 73 MM HG (ref 41–51)
PH BLDA: 7.3 [PH] (ref 7.35–7.45)
PH BLDV: 7.13 [PH] (ref 7.32–7.42)
PH BLDV: 7.22 [PH] (ref 7.32–7.42)
PLATELET # BLD AUTO: 180 K/UL (ref 150–350)
PMV BLD AUTO: 8.8 FL (ref 9.4–12.4)
PO2 BLDA: 180 MM HG (ref 83–100)
PO2 BLDV: 37 MM HG (ref 25–40)
PO2 BLDV: 43 MM HG (ref 25–40)
POSTERIOR WALL: 1.06 CM
POTASSIUM SERPL-SCNC: 4.3 MEQ/L (ref 3.5–5.1)
PROT SERPL-MCNC: 7 G/DL (ref 6–8.2)
QRS DURATION: 126
QT INTERVAL: 392
QTC CALCULATION(BAZETT): 497
R AXIS: -45
RAP: 5 MMHG
RBC # BLD AUTO: 3.32 M/UL (ref 4.5–5.8)
RSV RNA SPEC QL NAA+PROBE: NEGATIVE
SARS-COV-2 RNA RESP QL NAA+PROBE: POSITIVE
SEPTAL TISSUE DOPPLER FREE WALL LATE DIA VELOCITY (APICAL 4 CHAMBER VIEW): 0.07 M/S
SODIUM SERPL-SCNC: 142 MEQ/L (ref 136–145)
T WAVE AXIS: 80
TAPSE: 1.42 CM
TR MAX PG: 29.59 MMHG
TRICUSPID VALVE PEAK REGURGITATION VELOCITY: 2.72 M/S
TROPONIN I SERPL HS-MCNC: 34.1 PG/ML
TROPONIN I SERPL HS-MCNC: 8.2 PG/ML
VENTRICULAR RATE: 97
WBC # BLD AUTO: 7.84 K/UL (ref 3.8–10.5)
Z-SCORE OF LEFT VENTRICULAR DIMENSION IN END DIASTOLE: -0.42
Z-SCORE OF LEFT VENTRICULAR DIMENSION IN END SYSTOLE: 0.58
Z-SCORE OF LEFT VENTRICULAR POSTERIOR WALL IN END DIASTOLE: 1.3

## 2025-04-07 PROCEDURE — 87637 SARSCOV2&INF A&B&RSV AMP PRB: CPT | Performed by: STUDENT IN AN ORGANIZED HEALTH CARE EDUCATION/TRAINING PROGRAM

## 2025-04-07 PROCEDURE — 63600000 HC DRUGS/DETAIL CODE: Mod: JZ,TB | Performed by: INTERNAL MEDICINE

## 2025-04-07 PROCEDURE — 8E0ZXY6 ISOLATION: ICD-10-PCS | Performed by: HOSPITALIST

## 2025-04-07 PROCEDURE — 63600000 HC DRUGS/DETAIL CODE: Mod: JW | Performed by: STUDENT IN AN ORGANIZED HEALTH CARE EDUCATION/TRAINING PROGRAM

## 2025-04-07 PROCEDURE — 63600105 HC IODINE BASED CONTRAST: Performed by: STUDENT IN AN ORGANIZED HEALTH CARE EDUCATION/TRAINING PROGRAM

## 2025-04-07 PROCEDURE — 83605 ASSAY OF LACTIC ACID: CPT | Performed by: STUDENT IN AN ORGANIZED HEALTH CARE EDUCATION/TRAINING PROGRAM

## 2025-04-07 PROCEDURE — 20600000 HC ROOM AND CARE INTERMEDIATE/TELEMETRY

## 2025-04-07 PROCEDURE — 83735 ASSAY OF MAGNESIUM: CPT | Performed by: STUDENT IN AN ORGANIZED HEALTH CARE EDUCATION/TRAINING PROGRAM

## 2025-04-07 PROCEDURE — 63700000 HC SELF-ADMINISTRABLE DRUG: Performed by: STUDENT IN AN ORGANIZED HEALTH CARE EDUCATION/TRAINING PROGRAM

## 2025-04-07 PROCEDURE — 96372 THER/PROPH/DIAG INJ SC/IM: CPT | Mod: 59

## 2025-04-07 PROCEDURE — 36600 WITHDRAWAL OF ARTERIAL BLOOD: CPT

## 2025-04-07 PROCEDURE — 25800000 HC PHARMACY IV SOLUTIONS: Performed by: INTERNAL MEDICINE

## 2025-04-07 PROCEDURE — 99285 EMERGENCY DEPT VISIT HI MDM: CPT | Mod: 25

## 2025-04-07 PROCEDURE — 82803 BLOOD GASES ANY COMBINATION: CPT | Performed by: STUDENT IN AN ORGANIZED HEALTH CARE EDUCATION/TRAINING PROGRAM

## 2025-04-07 PROCEDURE — 99223 1ST HOSP IP/OBS HIGH 75: CPT | Performed by: STUDENT IN AN ORGANIZED HEALTH CARE EDUCATION/TRAINING PROGRAM

## 2025-04-07 PROCEDURE — 63700000 HC SELF-ADMINISTRABLE DRUG: Performed by: HOSPITALIST

## 2025-04-07 PROCEDURE — 96374 THER/PROPH/DIAG INJ IV PUSH: CPT | Mod: 59

## 2025-04-07 PROCEDURE — 84484 ASSAY OF TROPONIN QUANT: CPT | Performed by: STUDENT IN AN ORGANIZED HEALTH CARE EDUCATION/TRAINING PROGRAM

## 2025-04-07 PROCEDURE — 93010 ELECTROCARDIOGRAM REPORT: CPT | Performed by: STUDENT IN AN ORGANIZED HEALTH CARE EDUCATION/TRAINING PROGRAM

## 2025-04-07 PROCEDURE — 93005 ELECTROCARDIOGRAM TRACING: CPT | Performed by: STUDENT IN AN ORGANIZED HEALTH CARE EDUCATION/TRAINING PROGRAM

## 2025-04-07 PROCEDURE — 93005 ELECTROCARDIOGRAM TRACING: CPT

## 2025-04-07 PROCEDURE — 85025 COMPLETE CBC W/AUTO DIFF WBC: CPT | Performed by: STUDENT IN AN ORGANIZED HEALTH CARE EDUCATION/TRAINING PROGRAM

## 2025-04-07 PROCEDURE — 25500000 HC DRUGS/INCIDENT RAD: Mod: JZ | Performed by: HOSPITALIST

## 2025-04-07 PROCEDURE — 86140 C-REACTIVE PROTEIN: CPT | Performed by: HOSPITALIST

## 2025-04-07 PROCEDURE — 36415 COLL VENOUS BLD VENIPUNCTURE: CPT | Performed by: STUDENT IN AN ORGANIZED HEALTH CARE EDUCATION/TRAINING PROGRAM

## 2025-04-07 PROCEDURE — 94660 CPAP INITIATION&MGMT: CPT

## 2025-04-07 PROCEDURE — 71045 X-RAY EXAM CHEST 1 VIEW: CPT

## 2025-04-07 PROCEDURE — 84484 ASSAY OF TROPONIN QUANT: CPT | Mod: 91 | Performed by: STUDENT IN AN ORGANIZED HEALTH CARE EDUCATION/TRAINING PROGRAM

## 2025-04-07 PROCEDURE — 63600000 HC DRUGS/DETAIL CODE: Mod: JZ | Performed by: PHYSICIAN ASSISTANT

## 2025-04-07 PROCEDURE — 80053 COMPREHEN METABOLIC PANEL: CPT | Performed by: STUDENT IN AN ORGANIZED HEALTH CARE EDUCATION/TRAINING PROGRAM

## 2025-04-07 PROCEDURE — 71275 CT ANGIOGRAPHY CHEST: CPT

## 2025-04-07 PROCEDURE — 83880 ASSAY OF NATRIURETIC PEPTIDE: CPT | Performed by: STUDENT IN AN ORGANIZED HEALTH CARE EDUCATION/TRAINING PROGRAM

## 2025-04-07 PROCEDURE — 96375 TX/PRO/DX INJ NEW DRUG ADDON: CPT | Mod: 59

## 2025-04-07 PROCEDURE — XW033E5 INTRODUCTION OF REMDESIVIR ANTI-INFECTIVE INTO PERIPHERAL VEIN, PERCUTANEOUS APPROACH, NEW TECHNOLOGY GROUP 5: ICD-10-PCS | Performed by: INTERNAL MEDICINE

## 2025-04-07 PROCEDURE — 87040 BLOOD CULTURE FOR BACTERIA: CPT | Performed by: STUDENT IN AN ORGANIZED HEALTH CARE EDUCATION/TRAINING PROGRAM

## 2025-04-07 PROCEDURE — 25000000 HC PHARMACY GENERAL: Performed by: HOSPITALIST

## 2025-04-07 PROCEDURE — 63600000 HC DRUGS/DETAIL CODE: Mod: JZ | Performed by: HOSPITALIST

## 2025-04-07 PROCEDURE — C8929 TTE W OR WO FOL WCON,DOPPLER: HCPCS

## 2025-04-07 RX ORDER — DEXAMETHASONE SODIUM PHOSPHATE 4 MG/ML
6 INJECTION, SOLUTION INTRA-ARTICULAR; INTRALESIONAL; INTRAMUSCULAR; INTRAVENOUS; SOFT TISSUE DAILY
Status: DISCONTINUED | OUTPATIENT
Start: 2025-04-07 | End: 2025-04-11 | Stop reason: HOSPADM

## 2025-04-07 RX ORDER — DOXYCYCLINE HYCLATE 100 MG
100 TABLET ORAL 2 TIMES DAILY
COMMUNITY
Start: 2025-04-02 | End: 2025-04-11 | Stop reason: HOSPADM

## 2025-04-07 RX ORDER — ENOXAPARIN SODIUM 100 MG/ML
1 INJECTION SUBCUTANEOUS ONCE
Status: COMPLETED | OUTPATIENT
Start: 2025-04-07 | End: 2025-04-07

## 2025-04-07 RX ORDER — FUROSEMIDE 10 MG/ML
40 INJECTION INTRAMUSCULAR; INTRAVENOUS
Status: DISCONTINUED | OUTPATIENT
Start: 2025-04-07 | End: 2025-04-09

## 2025-04-07 RX ORDER — ENOXAPARIN SODIUM 100 MG/ML
1 INJECTION SUBCUTANEOUS
Status: DISCONTINUED | OUTPATIENT
Start: 2025-04-07 | End: 2025-04-08

## 2025-04-07 RX ORDER — CETIRIZINE HYDROCHLORIDE 10 MG/1
10 TABLET ORAL EVERY MORNING
Status: DISCONTINUED | OUTPATIENT
Start: 2025-04-08 | End: 2025-04-11 | Stop reason: HOSPADM

## 2025-04-07 RX ORDER — TALC
3 POWDER (GRAM) TOPICAL NIGHTLY
COMMUNITY

## 2025-04-07 RX ORDER — PANTOPRAZOLE SODIUM 40 MG/1
40 TABLET, DELAYED RELEASE ORAL 2 TIMES DAILY
Status: DISCONTINUED | OUTPATIENT
Start: 2025-04-07 | End: 2025-04-11 | Stop reason: HOSPADM

## 2025-04-07 RX ORDER — LEVOTHYROXINE SODIUM 50 UG/1
50 TABLET ORAL DAILY
COMMUNITY
Start: 2025-03-28

## 2025-04-07 RX ORDER — IOPAMIDOL 755 MG/ML
100 INJECTION, SOLUTION INTRAVASCULAR
Status: COMPLETED | OUTPATIENT
Start: 2025-04-07 | End: 2025-04-07

## 2025-04-07 RX ORDER — CHOLECALCIFEROL (VITAMIN D3) 25 MCG
25 TABLET ORAL EVERY MORNING
Status: DISCONTINUED | OUTPATIENT
Start: 2025-04-08 | End: 2025-04-11 | Stop reason: HOSPADM

## 2025-04-07 RX ORDER — DEXTROSE 40 %
15-30 GEL (GRAM) ORAL AS NEEDED
Status: DISCONTINUED | OUTPATIENT
Start: 2025-04-07 | End: 2025-04-11 | Stop reason: HOSPADM

## 2025-04-07 RX ORDER — LANOLIN ALCOHOL/MO/W.PET/CERES
400 CREAM (GRAM) TOPICAL 2 TIMES DAILY
Status: DISCONTINUED | OUTPATIENT
Start: 2025-04-07 | End: 2025-04-11 | Stop reason: HOSPADM

## 2025-04-07 RX ORDER — IBUPROFEN 200 MG
16-32 TABLET ORAL AS NEEDED
Status: DISCONTINUED | OUTPATIENT
Start: 2025-04-07 | End: 2025-04-11 | Stop reason: HOSPADM

## 2025-04-07 RX ORDER — FUROSEMIDE 10 MG/ML
40 INJECTION INTRAMUSCULAR; INTRAVENOUS ONCE
Status: COMPLETED | OUTPATIENT
Start: 2025-04-07 | End: 2025-04-07

## 2025-04-07 RX ORDER — DEXAMETHASONE SODIUM PHOSPHATE 4 MG/ML
6 INJECTION, SOLUTION INTRA-ARTICULAR; INTRALESIONAL; INTRAMUSCULAR; INTRAVENOUS; SOFT TISSUE DAILY
Status: DISCONTINUED | OUTPATIENT
Start: 2025-04-07 | End: 2025-04-07

## 2025-04-07 RX ORDER — AMIODARONE HYDROCHLORIDE 200 MG/1
200 TABLET ORAL 2 TIMES WEEKLY
COMMUNITY

## 2025-04-07 RX ORDER — TAMSULOSIN HYDROCHLORIDE 0.4 MG/1
0.4 CAPSULE ORAL NIGHTLY
Status: DISCONTINUED | OUTPATIENT
Start: 2025-04-07 | End: 2025-04-11 | Stop reason: HOSPADM

## 2025-04-07 RX ORDER — DEXTROSE 50 % IN WATER (D50W) INTRAVENOUS SYRINGE
25 AS NEEDED
Status: DISCONTINUED | OUTPATIENT
Start: 2025-04-07 | End: 2025-04-11 | Stop reason: HOSPADM

## 2025-04-07 RX ORDER — LEVOTHYROXINE SODIUM 50 UG/1
50 TABLET ORAL
Status: DISCONTINUED | OUTPATIENT
Start: 2025-04-08 | End: 2025-04-11 | Stop reason: HOSPADM

## 2025-04-07 RX ORDER — TAMOXIFEN CITRATE 10 MG/1
20 TABLET ORAL DAILY
Status: DISCONTINUED | OUTPATIENT
Start: 2025-04-07 | End: 2025-04-11 | Stop reason: HOSPADM

## 2025-04-07 RX ORDER — AMIODARONE HYDROCHLORIDE 200 MG/1
200 TABLET ORAL 3 TIMES WEEKLY
Status: DISCONTINUED | OUTPATIENT
Start: 2025-04-07 | End: 2025-04-11 | Stop reason: HOSPADM

## 2025-04-07 RX ORDER — METOPROLOL SUCCINATE 25 MG/1
25 TABLET, EXTENDED RELEASE ORAL NIGHTLY
Status: DISCONTINUED | OUTPATIENT
Start: 2025-04-07 | End: 2025-04-11 | Stop reason: HOSPADM

## 2025-04-07 RX ORDER — ATORVASTATIN CALCIUM 10 MG/1
10 TABLET, FILM COATED ORAL DAILY
Status: DISCONTINUED | OUTPATIENT
Start: 2025-04-07 | End: 2025-04-11 | Stop reason: HOSPADM

## 2025-04-07 RX ORDER — IBUPROFEN/PSEUDOEPHEDRINE HCL 200MG-30MG
3 TABLET ORAL NIGHTLY PRN
Status: DISCONTINUED | OUTPATIENT
Start: 2025-04-07 | End: 2025-04-11 | Stop reason: HOSPADM

## 2025-04-07 RX ORDER — MIDAZOLAM HYDROCHLORIDE 2 MG/2ML
0.5 INJECTION, SOLUTION INTRAMUSCULAR; INTRAVENOUS ONCE
Status: COMPLETED | OUTPATIENT
Start: 2025-04-07 | End: 2025-04-07

## 2025-04-07 RX ORDER — BUSPIRONE HYDROCHLORIDE 10 MG/1
10 TABLET ORAL 2 TIMES DAILY
Status: DISCONTINUED | OUTPATIENT
Start: 2025-04-07 | End: 2025-04-11 | Stop reason: HOSPADM

## 2025-04-07 RX ADMIN — MIDAZOLAM HYDROCHLORIDE 0.5 MG: 1 INJECTION, SOLUTION INTRAMUSCULAR; INTRAVENOUS at 04:26

## 2025-04-07 RX ADMIN — SODIUM CHLORIDE: 9 INJECTION INTRAMUSCULAR; INTRAVENOUS; SUBCUTANEOUS at 15:45

## 2025-04-07 RX ADMIN — ATORVASTATIN CALCIUM 10 MG: 10 TABLET, FILM COATED ORAL at 08:21

## 2025-04-07 RX ADMIN — TAMSULOSIN HYDROCHLORIDE 0.4 MG: 0.4 CAPSULE ORAL at 21:36

## 2025-04-07 RX ADMIN — IOPAMIDOL 80 ML: 755 INJECTION, SOLUTION INTRAVENOUS at 04:42

## 2025-04-07 RX ADMIN — REMDESIVIR 200 MG: 100 INJECTION, POWDER, LYOPHILIZED, FOR SOLUTION INTRAVENOUS at 19:28

## 2025-04-07 RX ADMIN — METOPROLOL SUCCINATE 25 MG: 25 TABLET, EXTENDED RELEASE ORAL at 21:36

## 2025-04-07 RX ADMIN — DEXAMETHASONE SODIUM PHOSPHATE 6 MG: 4 INJECTION, SOLUTION INTRA-ARTICULAR; INTRALESIONAL; INTRAMUSCULAR; INTRAVENOUS; SOFT TISSUE at 17:11

## 2025-04-07 RX ADMIN — PANTOPRAZOLE SODIUM 40 MG: 40 TABLET, DELAYED RELEASE ORAL at 19:37

## 2025-04-07 RX ADMIN — ENOXAPARIN SODIUM 90 MG: 100 INJECTION SUBCUTANEOUS at 06:09

## 2025-04-07 RX ADMIN — Medication 400 MG: at 19:37

## 2025-04-07 RX ADMIN — PANTOPRAZOLE SODIUM 40 MG: 40 TABLET, DELAYED RELEASE ORAL at 08:21

## 2025-04-07 RX ADMIN — ENOXAPARIN SODIUM 90 MG: 100 INJECTION SUBCUTANEOUS at 18:47

## 2025-04-07 RX ADMIN — MAGNESIUM SULFATE HEPTAHYDRATE 2 G: 40 INJECTION, SOLUTION INTRAVENOUS at 17:11

## 2025-04-07 RX ADMIN — BUSPIRONE HYDROCHLORIDE 10 MG: 10 TABLET ORAL at 19:37

## 2025-04-07 RX ADMIN — AMIODARONE HYDROCHLORIDE 200 MG: 200 TABLET ORAL at 08:21

## 2025-04-07 RX ADMIN — FUROSEMIDE 40 MG: 10 INJECTION, SOLUTION INTRAMUSCULAR; INTRAVENOUS at 11:39

## 2025-04-07 RX ADMIN — FUROSEMIDE 40 MG: 10 INJECTION, SOLUTION INTRAMUSCULAR; INTRAVENOUS at 03:38

## 2025-04-07 ASSESSMENT — COGNITIVE AND FUNCTIONAL STATUS - GENERAL
WALKING IN HOSPITAL ROOM: 1 - TOTAL
CLIMB 3 TO 5 STEPS WITH RAILING: 1 - TOTAL
MOVING TO AND FROM BED TO CHAIR: 2 - A LOT
CLIMB 3 TO 5 STEPS WITH RAILING: 1 - TOTAL
MOVING TO AND FROM BED TO CHAIR: 2 - A LOT
STANDING UP FROM CHAIR USING ARMS: 1 - TOTAL
WALKING IN HOSPITAL ROOM: 1 - TOTAL
STANDING UP FROM CHAIR USING ARMS: 2 - A LOT

## 2025-04-07 ASSESSMENT — ENCOUNTER SYMPTOMS
ACTIVITY CHANGE: 0
NECK PAIN: 0
CHILLS: 0
AGITATION: 0
FEVER: 0
SEIZURES: 0
SORE THROAT: 0
PALPITATIONS: 0
DIARRHEA: 0
DIFFICULTY URINATING: 0
BACK PAIN: 0
VOMITING: 0
SPEECH DIFFICULTY: 0
NAUSEA: 0
HEADACHES: 0
CHEST TIGHTNESS: 0
COUGH: 1
COLOR CHANGE: 0
ABDOMINAL PAIN: 0
DIZZINESS: 0
WEAKNESS: 0
SHORTNESS OF BREATH: 1

## 2025-04-07 NOTE — PROGRESS NOTES
Reason for Consult: COVID, hypoxia    History of Present Illness:      Cam Roass Jr. is a 75 y.o. male with    CHF  MV repair  Afib  breastCA   Mastectomy   XRT, chemo  ProstateCA  CRF    Had derm surg on vertex of head 5d ago  Well when he to sleep last night   Awoke at night c sudden SOB  No cp, palp  No f, c, sweats  Dry cough  No emesis, diarrhea    COVID  Ct 15.8    Allergies:   Azithromycin, Prednisone, and Lorazepam    Current Facility-Administered Medications   Medication Dose Route Frequency Provider Last Rate Last Admin    amiodarone (PACERONE) tablet 200 mg  200 mg oral Once per day on Monday Wednesday Friday Meek Pizarro MD   200 mg at 04/07/25 0821    atorvastatin (LIPITOR) tablet 10 mg  10 mg oral Daily Meek Pizarro MD   10 mg at 04/07/25 0821    busPIRone (BUSPAR) tablet 10 mg  10 mg oral BID Meek Pizarro MD        [START ON 4/8/2025] cetirizine (ZyrTEC) tablet 10 mg  10 mg oral q AM Nory Jeronimo MD        [START ON 4/8/2025] cholecalciferol (vitamin D3) tablet 25 mcg  25 mcg oral q AM Nory Jeronimo MD        dexAMETHasone (DECADRON) injection 6 mg  6 mg intravenous Daily Nory Jeronimo MD   6 mg at 04/07/25 1711    glucose chewable tablet 16-32 g of dextrose  16-32 g of dextrose oral PRN Meek Pizarro MD        Or    dextrose 40 % oral gel 15-30 g of dextrose  15-30 g of dextrose oral PRN Meek Pizarro MD        Or    glucagon (GLUCAGEN) injection 1 mg  1 mg intramuscular PRN Meek Pizarro MD        Or    dextrose 50 % in water (D50) injection 12.5 g  25 mL intravenous PRN Meek Pizarro MD        enoxaparin (LOVENOX) syringe 90 mg  1 mg/kg subcutaneous q12h INT Meek Pizarro MD        furosemide (LASIX) injection 40 mg  40 mg intravenous BID (am, 4p) Meek Pizarro MD   40 mg at 04/07/25 1139    [START ON 4/8/2025] levothyroxine (SYNTHROID) tablet 50 mcg  50 mcg oral Daily (6:30a) Nory Jeronimo MD        magnesium oxide (MAG-OX) tablet  400 mg  400 mg oral BID Nory Jeronimo MD        magnesium sulfate IVPB 2g in 50 mL NSS/D5W/SWFI  2 g intravenous Once Nory Jeronimo MD 25 mL/hr at 04/07/25 1711 2 g at 04/07/25 1711    melatonin ODT 3 mg  3 mg peg tube Nightly PRN Meek Pizarro MD        metoprolol succinate XL (TOPROL-XL) 24 hr ER tablet 25 mg  25 mg oral Nightly Meek Pizarro MD        pantoprazole (PROTONIX) tablet,delayed release (DR/EC) 40 mg  40 mg oral BID Meek Pizarro MD   40 mg at 04/07/25 0821    tamoxifen (NOLVADEX) tablet 20 mg  20 mg oral Daily Meek Pizarro MD        tamsulosin (FLOMAX) 24 hr ER capsule 0.4 mg  0.4 mg oral Nightly Meek Pizarro MD          Social History:   Former   No tob  2 EtOH/day    Family History: NC    Review of Systems  Negative x as stated above    Temp:  [37.3 °C (99.1 °F)-37.3 °C (99.2 °F)] 37.3 °C (99.2 °F)  Heart Rate:  [] 75  Resp:  [19-43] 22  BP: ()/() 121/72  FiO2 (%) (Set):  [40 %-50 %] 40 %  SpO2 Readings from Last 3 Encounters:   04/07/25 99%   12/16/24 99%   11/21/24 99%     Physical Exam  WD obese NAD    Head: vertex c open wound, exposed skull  Mouth: Clear  Skin: No rash  Sclera: Anicteric  Neck: Supple  LN:  No significant enlargement  Lungs: rather clr  CV: Nl S1 and S2, can't hear m, no embolic changes  Abd: Soft, NT, no HSM  Extr: No jt effusions, no edema  Neuro: Alert, lucid    Labs  I have reviewed the patient's pertinent labs.     Imaging:     X-RAY CHEST 1 VIEW  Result Date: 4/7/2025  IMPRESSION:  Mild opacity in the right lung which has slightly improved since 1/8/2024.     CT ANGIOGRAPHY CHEST PULMONARY EMBOLISM WITH IV CONTRAST  Result Date: 4/7/2025  IMPRESSION: 1.  No definite evidence of pulmonary embolism. 2.  Cardiomegaly. 3.  Right chest wall soft tissue density which should be clinically correlated for neoplasm or infection. 4.  Peripheral right upper lobe consolidation, subjacent mass which may represent treatment  related change and/or infection. 5.  Slight progressive right lower lobe pleural thickening which may represent atelectasis, infection or neoplasm. 6.  Cardiomegaly.    Impression    COVID infection is very new as VL is v high  Suspect infected at Derm office  This and CT s much parenchymal dz is not sufficient to explain degree of hypoxemia  Can give remdesivir but this will not correct large A-a     No PE  CT does not look like amio  No pna on CT  No CHF on CT    Add remdes    ANN MARIE Hernández MD

## 2025-04-07 NOTE — H&P
Hospital Medicine Service -  History & Physical        CHIEF COMPLAINT   Chief Complaint   Patient presents with    Shortness of Breath    Cough      HISTORY OF PRESENT ILLNESS      Cam Rosas Jr. is a 75 y.o. male with a past medical history of HFpEF, Afib on xarelto, breast cancer s/p mastectomy, CKD who presents with shortness of breath, admitted for COVID, acute on chronic HFpEF    Patient presents with shortness of breath. Patient was in bed and woke up from his sleep suddenly with cough, shortness of breath. He tried taking cough medication with no relief and had progressive worsening of SOB. Given these symptoms he presented to the ED for evaluation. He was compliant with his home meds. Did get take out food on Saturday that was a bit salty. Lower extremity swelling which is slightly worse compared to baseline with blisters. Patient denies fever, chills, chest pain, palpitations, nausea, vomiting, abdominal pain, diarrhea, constipation, dysuria, hematuria.       ED course:  SpO2 65% on RA, placed on BIPAP. ABG 7.30/44/180/21. Lactate 2.3. Cr 1.7. . COVID positive. CT angio chest showing small subsegmental PE. Given Lovenox, lasix.             PAST MEDICAL AND SURGICAL HISTORY      Past Medical History:   Diagnosis Date    Acute systolic heart failure (CMS/HCC) 02/15/2023    Ambulates with cane     and walker    Atrial fibrillation (CMS/HCC)     Breast cancer (CMS/HCC) October 2022    CHF (congestive heart failure) (CMS/HCC)     Chronic kidney disease     CKD (chronic kidney disease) 10/19/2022    History of radiation therapy     Hx antineoplastic chemo     Prostate cancer (CMS/HCC)        Past Surgical History   Procedure Laterality Date    Cardiac surgery      mitral valve repair 2006    Cardioversion  12/05/2022    Successful DC cardioversion    CARDIOVERSION EXTERNAL N/A 12/5/2022    Performed by Gary Aceves MD at [facility] CARDIAC CATH/EP    Cath lab temporary pacemaker insertion N/A  12/25/2023    Performed by Hjaa Onofre MD at  CARDIAC CATH/EP/NEURO    GASTROSTOMY PERCUTANEOUS ENDOSCOPIC N/A 1/4/2024    Performed by Edison Oneil MD at  OR    Knee cartilage surgery Left     Mastectomy      PORT/PERMACATH REMOVAL Left 11/9/2023    Performed by Barb Osuna MD at  OR    Prostate surgery  July 2022    Prostatectomy  07/15/2022    Right & left heart cath with coronary angiography N/A 12/25/2023    Performed by Haja Onofre MD at  CARDIAC CATH/EP/NEURO    RIGHT BREAST MASTECTOMY; RIGHT SENTINEL NODE IDENTIFICATION, MAPPING, AND BIOPSY; Right 8/17/2023    Performed by Barb Osuna MD at  OR    Tonsillectomy      TRACHEOSTOMY N/A 1/4/2024    Performed by Edison Oneil MD at  OR       PCP: Usman Collins MD    MEDICATIONS      Prior to Admission medications    Medication Sig Start Date End Date Taking? Authorizing Provider   amiodarone (PACERONE) 200 mg tablet Take 1 tablet (200 mg total) by mouth 3 (three) times a week (Mon, Wed, Fri).  Patient taking differently: Take 200 mg by mouth 2 (two) times a week (Mon, Thu). Patient takes Mon and Friday 7/29/24   Gary Aceves MD   atorvastatin (LIPITOR) 10 mg tablet Take 1 tablet (10 mg total) by mouth daily. 5/16/24   Gary Moran MD   busPIRone (BUSPAR) 10 mg tablet Take 10 mg by mouth 2 (two) times a day.    Provider, Jorge L Barboza MD   cetirizine (ZyrTEC) 1 mg/mL syrup 10 mL (10 mg total) by feeding tube route nightly. 1/10/24 12/16/24  Kyleigh Cool CRNP   cetirizine (ZyrTEC) 10 mg tablet Take 10 mg by mouth daily.    Provider, Jorge L Barboza MD   cholecalciferol, vitamin D3, 1,000 unit (25 mcg) tablet Take 1,000 Units by mouth daily.    ProviderJorge L MD   magnesium oxide (MAG-OX) 400 mg (241.3 mg magnesium) tablet Take 1 tablet (400 mg total) by mouth 2 (two) times a day. 1/23/25 7/22/25  Gary Aceves MD   melatonin ODT 1 tablet (3 mg total) by peg tube route nightly  as needed (sleep).  Patient taking differently: Take 3 mg by mouth nightly as needed (sleep). 1/10/24 3/27/25  Kyleigh Cool CRNP   metoprolol succinate XL (TOPROL-XL) 25 mg 24 hr tablet Take 1 tablet (25 mg total) by mouth nightly. 4/12/24   Chel Sosa CRNP   pantoprazole (PROTONIX) 40 mg EC tablet Take 40 mg by mouth 2 (two) times a day. 5/1/24   Provider, Jorge L Barboza MD   potassium chloride (KLOR-CON) 20 mEq packet Take 20 mEq by mouth 3 (three) times a week (Mon, Wed, Fri). 11/18/24 11/18/25  Gary Moran MD   rivaroxaban (XARELTO) 20 mg tablet Take 1 tablet (20 mg total) by mouth daily with dinner. 1/22/25   Gary Moran MD   tamoxifen (NOLVADEX) 20 mg chemo tablet Take  1 tablet (20 mg total) daily 11/4/24   Kailey Gale MD   tamsulosin (FLOMAX) 0.4 mg capsule Take 1 capsule (0.4 mg total) by mouth nightly. 11/21/24 6/19/25  Ochsner, Gregory J, MD   torsemide (DEMADEX) 20 mg tablet Take 0.5 tablets (10 mg total) by mouth daily as needed (edema). 5/20/24   Gary Moran MD       ALLERGIES      Azithromycin, Prednisone, and Lorazepam    FAMILY HISTORY      Family History   Problem Relation Name Age of Onset    Hyperlipidemia Biological Mother mother     Hypertension Biological Mother mother     Breast cancer Biological Mother mother     Cancer Biological Mother mother         gyn? cervical    Hyperlipidemia Biological Father Dad     Hypertension Biological Father Dad     Arthritis Biological Father Dad     No Known Problems Biological Sister      No Known Problems Biological Brother      Heart disease Maternal Grandmother Belle     Arthritis Maternal Grandfather      COPD Maternal Grandfather      Diabetes Maternal Grandfather      No Known Problems Paternal Grandmother      No Known Problems Paternal Grandfather      Cancer less than or equal to age 50 Other son     Esophageal cancer Other son         47, in abd,chemo q 3 weeks       SOCIAL HISTORY      Social History      Substance and Sexual Activity   Drug Use Never     Tobacco Use: Low Risk  (4/7/2025)    Patient History     Smoking Tobacco Use: Never     Smokeless Tobacco Use: Never     Passive Exposure: Not on file     Alcohol Use: Not At Risk (12/24/2023)    AUDIT-C     Frequency of Alcohol Consumption: 4 or more times a week     Average Number of Drinks: 1 or 2     Frequency of Binge Drinking: Never     Within the past week have you used heroin or used opioid medications other than as prescribed? (OxyContin, Fentanyl, Subutex): No  Do you get sick if you cannot use heroin, methadone, or opioid medications?: No         REVIEW OF SYSTEMS      All systems reviewed and negative except as noted in HPI.    PHYSICAL EXAMINATION      Temp:  [37.3 °C (99.1 °F)] 37.3 °C (99.1 °F)  Heart Rate:  [] 120  Resp:  [28-43] 28  BP: (137-200)/() 163/72  FiO2 (%) (Set):  [40 %-50 %] 40 %  Body mass index is 31.37 kg/m².    Physical Exam  Vitals reviewed.   Constitutional:       General: He is not in acute distress.     Appearance: Normal appearance. He is not ill-appearing.   HENT:      Head: Atraumatic.      Right Ear: External ear normal.      Left Ear: External ear normal.      Nose: No rhinorrhea.   Eyes:      General:         Right eye: No discharge.         Left eye: No discharge.      Extraocular Movements: Extraocular movements intact.   Cardiovascular:      Rate and Rhythm: Tachycardia present. Rhythm irregular.      Heart sounds: Normal heart sounds.   Pulmonary:      Effort: Respiratory distress present.      Breath sounds: Normal breath sounds. No wheezing.   Abdominal:      General: Abdomen is flat. There is no distension.      Palpations: Abdomen is soft.      Tenderness: There is no abdominal tenderness.   Musculoskeletal:      Cervical back: Normal range of motion and neck supple.      Right lower leg: Edema present.      Left lower leg: Edema present.   Skin:     General: Skin is warm and dry.   Neurological:       General: No focal deficit present.      Mental Status: He is alert and oriented to person, place, and time.   Psychiatric:         Mood and Affect: Mood normal.         LABS / IMAGING / EKG        Labs    Lab Results   Component Value Date     04/07/2025    K 4.3 04/07/2025     04/07/2025    CO2 26 04/07/2025    BUN 19 04/07/2025    CREATININE 1.7 (H) 04/07/2025    ALBUMIN 3.9 04/07/2025    ALT 7 04/07/2025    AST 29 04/07/2025    CALCIUM 8.9 04/07/2025    WBC 7.84 04/07/2025    HGB 11.3 (L) 04/07/2025    HCT 35.6 (L) 04/07/2025     04/07/2025    MG 2.0 11/12/2024    PHOS 3.6 12/27/2023    LACTATE 2.3 (H) 04/07/2025    TSH 3.47 11/12/2024     (H) 04/07/2025       CBC Results         04/07/25 03/18/24 02/26/24     0329 0655 0624    WBC 7.84 5.73 3.97    RBC 3.32 3.47 3.46    HGB 11.3 10.7 10.4    HCT 35.6 35.1 34.1    .2 101.2 98.6    MCH 34.0 30.8 30.1    MCHC 31.7 30.5 30.5     200 210          CMP Results         04/07/25 11/12/24 09/10/24     0329 1059 1303     141 145    K 4.3 4.7 3.7    Cl 107 105 105    CO2 26 27 29    Glucose 182 109 107    BUN 19 18 13    Creatinine 1.7 1.53 1.30    Calcium 8.9 8.9 8.2    Anion Gap 9 -- --    AST 29 21 23    ALT 7 10 9    Albumin 3.9 3.7 3.7    EGFR 41.5 47 58           Comment for K at 0329 on 04/07/25: Results obtained on plasma. Plasma Potassium values may be up to 0.4 mEQ/L less than serum values. The differences may be greater for patients with high platelet or white cell counts.    Comment for Glucose at 1059 on 11/12/24:           Non-fasting reference interval         Comment for Glucose at 1303 on 09/10/24:           Non-fasting reference interval         Comment for EGFR at 0329 on 04/07/25: Calculation based on the Chronic Kidney Disease Epidemiology Collaboration (CKD-EPI) equation refit without adjustment for race.            Troponin I Results         04/07/25 04/07/25 12/25/23     0531 0329 2011    HS Troponin I  34.1 8.2 706.3            Imaging  No results found.    CT chest angio    There is a suspected filling defect in a right lower lobe segmental bifurcation (see axial slice 91, series 6), suspicious for PE. No other definite PE identified. Assessment of the left lower lobe vesselsis highly suboptimal due to respiratory motion.    Cardiomegaly. Reflux of contrast to the IVC and hepatic veins is compatible with right heart failure and was present also on a previous study from December 2023.    Increased density at the periphery of the right upper lobe, anteriorly-secondary to radiation? There is soft tissue fullness in the right chest wall at this location where previously there was a fluid-containing finding. Surgical clips at this location. Clinical correlation is needed.    Pleural thickening. The right lung base, present also previously.    Atelectatic changes left lung base.    Gallstones.                                 SARS-CoV-2 (COVID-19) (no units)   Date/Time Value   04/07/2025 0325 Positive (A)       ECG/Telemetry  Telemetry showing Afib    ASSESSMENT AND PLAN              Assessment & Plan  Acute respiratory failure with hypoxia and hypercapnia (CMS/HCC)  > Likely multifactorial in setting of COVID and heart failure exacerbation   continue BiPAP for now  Management as below  Pulmonology consult  COVID-19  > Patient unable to take prednisone due to history of allergic reaction causing angioedema  Supplemental oxygen to keep SPO2 > 88%  Continuous pulse ox  COVID isolation  Consult ID   Acute on chronic heart failure with preserved ejection fraction (HFpEF) (CMS/HCC)  Cardiology consult  Obtain TTE  Strict I&O  Cardiac diet  Start Lasix 40 mg IV twice daily  Acute pulmonary embolism (CMS/HCC)  > CT chest showing suspected PE  > Possible treatment failure?  Will consult pulmonology  Lovenox for now  F/u full CT chest results  Stage 3a chronic kidney disease (CMS/HCC)  > Cr 1.7, increased from 1.5 in 11/2024.    Daily BMP, Mg, Phos  Strict I&O  Avoid nephrotoxic agents    VTE Assessment: Pad[place] VTE Score: 5  VTE Prophylaxis: Current anticoagulants:  enoxaparin (LOVENOX) syringe 90 mg, subcutaneous, q12h INT      Code Status: Full Code      Discussed advanced care planning.  Estimated Discharge Date: 4/9/2025  Disposition Planning: TBD     I spent a total of 75 minutes providing patient care; including history taking, physical examination, reviewing and interpreting data, reviewing prior notes, placing orders, discussing with consultants and documenting.       This note is signed by the night admitting physician. Please contact this author for any issues or questions between 7PM-7AM (Secure chat preferred).  For any issues/questions after 7AM, please contact day time [facility] physician.     Meek Pizarro MD  Hospitalist/[facility] Medicine Service  Pager: [phone] (Secure chat preferred)  04/07/25

## 2025-04-07 NOTE — ASSESSMENT & PLAN NOTE
History of V-fib cardiac arrest in February 2023 in the setting of influenza  Had Takotsubo cardiomyopathy at that time  Has had prolonged hospitalization with trach and PEG now out

## 2025-04-07 NOTE — ASSESSMENT & PLAN NOTE
> Cr 1.7, increased from 1.5 in 11/2024.   Daily BMP, Mg, Phos  Strict I&O  Avoid nephrotoxic agents

## 2025-04-07 NOTE — ASSESSMENT & PLAN NOTE
Baseline creatinine 1.4-1.5  Creatinine is 1.7  Check urine analysis  Check bladder scan  Monitor closely with diuresis

## 2025-04-07 NOTE — ASSESSMENT & PLAN NOTE
> Likely multifactorial in setting of COVID and heart failure exacerbation     CAT scan of chest: No pulmonary embolism, perifissural right upper lobe consolidation, slight progression of right lower lobe pleural thickening which may represent atelectasis, infection or neoplasm, cardiomegaly right chest wall soft tissue density should be clinically correlated for neoplasm infection  COVID-positive  WBC 8      Discussed with Dr. Dorsey: Will start IV Decadron  ID and cardiology consults are pending  Continue IV diuresis    Continue BiPAP, transition to nasal cannula as tolerated

## 2025-04-07 NOTE — ED PROVIDER NOTES
HPI   HISTORY OF PRESENT ILLNESS     Patient with past medical history of congestive heart failure, atrial fibrillation on Xarelto, breast cancer status post mastectomy, prostate cancer, and CKD -presents to the emergency department with his wife, states that around 11 PM, he woke up coughing, then became increasingly short of breath.  Patient did take cough medication with no change decided to come in for evaluation.  Patient does state that he has had some swelling in his legs, but no worse -wife states that he does have wounds on his legs..  Patient denies any fevers chills or sweats..  States no recent changes in his medications.      History provided by:  Patient and spouse  History limited by:  Acuity of condition  Shortness of Breath  Severity:  Severe  Onset quality:  Sudden  Duration: Started around 11 PM this evening.  Timing:  Constant  Progression:  Worsening  Chronicity:  New  Relieved by:  Rest  Worsened by:  Activity  Ineffective treatments: Cough medication.  Associated symptoms: cough    Associated symptoms: no abdominal pain, no chest pain, no fever, no headaches, no neck pain, no rash, no sore throat and no vomiting    Cough  Associated symptoms: shortness of breath    Associated symptoms: no chest pain, no chills, no fever, no headaches, no rash and no sore throat          Patient History   PAST HISTORY     Reviewed from Nursing Triage:  Tobacco  Allergies  Meds  Problems  Med Hx  Surg Hx  Fam Hx  Soc   Hx      Past Medical History:   Diagnosis Date    Acute systolic heart failure (CMS/HCC) 02/15/2023    Ambulates with cane     and walker    Atrial fibrillation (CMS/HCC)     Breast cancer (CMS/HCC) October 2022    CHF (congestive heart failure) (CMS/HCC)     Chronic kidney disease     CKD (chronic kidney disease) 10/19/2022    History of radiation therapy     Hx antineoplastic chemo     Prostate cancer (CMS/HCC)        Past Surgical History   Procedure Laterality Date    Cardiac surgery       mitral valve repair 2006    Cardioversion  12/05/2022    Successful DC cardioversion    CARDIOVERSION EXTERNAL N/A 12/5/2022    Performed by Gary Aceves MD at [facility] [place] CARDIAC CATH/EP    Cath lab temporary pacemaker insertion N/A 12/25/2023    Performed by Haja Onofre MD at  CARDIAC CATH/EP/NEURO    GASTROSTOMY PERCUTANEOUS ENDOSCOPIC N/A 1/4/2024    Performed by Edison Oneil MD at  OR    Knee cartilage surgery Left     Mastectomy      PORT/PERMACATH REMOVAL Left 11/9/2023    Performed by Barb Osuna MD at  OR    Prostate surgery  July 2022    Prostatectomy  07/15/2022    Right & left heart cath with coronary angiography N/A 12/25/2023    Performed by Haja Onofre MD at  CARDIAC CATH/EP/NEURO    RIGHT BREAST MASTECTOMY; RIGHT SENTINEL NODE IDENTIFICATION, MAPPING, AND BIOPSY; Right 8/17/2023    Performed by Barb Osuna MD at  OR    Tonsillectomy      TRACHEOSTOMY N/A 1/4/2024    Performed by Edison Oneil MD at  OR       Family History   Problem Relation Name Age of Onset    Hyperlipidemia Biological Mother mother     Hypertension Biological Mother mother     Breast cancer Biological Mother mother     Cancer Biological Mother mother         gyn? cervical    Hyperlipidemia Biological Father Dad     Hypertension Biological Father Dad     Arthritis Biological Father Dad     No Known Problems Biological Sister      No Known Problems Biological Brother      Heart disease Maternal Grandmother Belle     Arthritis Maternal Grandfather      COPD Maternal Grandfather      Diabetes Maternal Grandfather      No Known Problems Paternal Grandmother      No Known Problems Paternal Grandfather      Cancer less than or equal to age 50 Other son     Esophageal cancer Other son         47, in abd,chemo q 3 weeks       Social History     Tobacco Use    Smoking status: Never    Smokeless tobacco: Never   Vaping Use    Vaping status: Never Used   Substance Use Topics     Alcohol use: Yes     Alcohol/week: 14.0 standard drinks of alcohol     Types: 14 Shots of liquor per week     Comment: 2 drinks/day - scotch    Drug use: Never         Review of Systems   REVIEW OF SYSTEMS     Review of Systems   Constitutional:  Negative for activity change, chills and fever.   HENT:  Negative for sore throat.    Respiratory:  Positive for cough and shortness of breath. Negative for chest tightness.    Cardiovascular:  Positive for leg swelling. Negative for chest pain and palpitations.   Gastrointestinal:  Negative for abdominal pain, diarrhea, nausea and vomiting.   Genitourinary:  Negative for difficulty urinating.   Musculoskeletal:  Negative for back pain and neck pain.   Skin:  Negative for color change and rash.   Neurological:  Negative for dizziness, seizures, syncope, speech difficulty, weakness and headaches.   Psychiatric/Behavioral:  Negative for agitation and behavioral problems.          VITALS     ED Vitals      Date/Time Temp Pulse Resp BP SpO2 [facility]   04/07/25 0445 -- 100 37 178/100 92 % [facility]   04/07/25 0345 -- 80 30 167/86 99 %    04/07/25 0318 37.3 °C (99.1 °F) 98 32 200/96 93 % [facility]   04/07/25 0315 -- -- -- -- 65 % [facility]          Pulse Ox %: 92 % (on BIPAP) (04/07/25 0451)  Pulse Ox Interpretation: Normal (04/07/25 0451)           Physical Exam   PHYSICAL EXAM     Physical Exam  Vitals and nursing note reviewed.   Constitutional:       General: He is in acute distress (tachypnic/ central cyanosis noted).      Appearance: He is well-developed.   HENT:      Head: Normocephalic and atraumatic.   Eyes:      Conjunctiva/sclera: Conjunctivae normal.   Cardiovascular:      Rate and Rhythm: Normal rate and regular rhythm.   Pulmonary:      Effort: Tachypnea and accessory muscle usage present.      Breath sounds: Examination of the right-upper field reveals rhonchi. Examination of the left-upper field reveals rhonchi. Examination of the right-middle field reveals rhonchi. Examination of the  left-middle field reveals rhonchi. Examination of the right-lower field reveals rhonchi. Examination of the left-lower field reveals rhonchi. Rhonchi present.   Chest:      Chest wall: No tenderness.   Abdominal:      General: There is no distension.      Palpations: Abdomen is soft. There is no mass.      Tenderness: There is no abdominal tenderness.   Musculoskeletal:         General: No tenderness or deformity. Normal range of motion.      Cervical back: Normal range of motion.      Right lower leg: Edema (mild, mild, ace wraps in place- not removed due to acuity of initial presentation) present.      Left lower leg: Edema (mild, ace wraps in place- not removed due to acuity of initial presentation) present.   Skin:     General: Skin is warm and dry.   Neurological:      Mental Status: He is alert and oriented to person, place, and time. Mental status is at baseline.   Psychiatric:         Behavior: Behavior normal.           PROCEDURES     Procedures     DATA     Results       Procedure Component Value Units Date/Time    Comprehensive metabolic panel [[id number]]  (Abnormal) Collected: 04/07/25 0329    Specimen: Blood, Venous Updated: 04/07/25 0423     Sodium 142 mEQ/L      Potassium 4.3 mEQ/L      Comment: Results obtained on plasma. Plasma Potassium values may be up to 0.4 mEQ/L less than serum values. The differences may be greater for patients with high platelet or white cell counts.        Chloride 107 mEQ/L      CO2 26 mEQ/L      BUN 19 mg/dL      Creatinine 1.7 mg/dL      Glucose 182 mg/dL      Calcium 8.9 mg/dL      AST (SGOT) 29 IU/L      ALT (SGPT) 7 IU/L      Alkaline Phosphatase 45 IU/L      Total Protein 7.0 g/dL      Comment: Test performed on plasma which typically contains approximately 0.4 g/dL more protein than serum.        Albumin 3.9 g/dL      Bilirubin, Total 0.6 mg/dL      eGFR 41.5 mL/min/1.73m*2      Comment: Calculation based on the Chronic Kidney Disease Epidemiology Collaboration  (CKD-EPI) equation refit without adjustment for race.        Anion Gap 9 mEQ/L     SARS-COV-2 (COVID-19)/ FLU A/B, AND RSV, PCR Nasopharynx [[id number]]  (Abnormal) Collected: 04/07/25 0325    Specimen: Nasopharyngeal Swab from Nasopharynx Updated: 04/07/25 0419     SARS-CoV-2 (COVID-19) Positive     Influenza A Negative     Influenza B Negative     Respiratory Syncytial Virus Negative    Narrative:      Testing performed using real-time PCR for detection of COVID-19. EUA approved validation studies performed on site.     HS Troponin (with 2 hour reflex) [[id number]]  (Normal) Collected: 04/07/25 0329    Specimen: Blood, Venous Updated: 04/07/25 0415     High Sens Troponin I 8.2 pg/mL     BNP (B-type natriuretic peptide) [[id number]]  (Abnormal) Collected: 04/07/25 0329    Specimen: Blood, Venous Updated: 04/07/25 0414      pg/mL     Blood Gas+Lactate, Arterial [[id number]]  (Abnormal) Collected: 04/07/25 0336    Specimen: Blood, Arterial Updated: 04/07/25 0351     pH, Arterial 7.30     pCO2, Arterial 44 mm Hg      pO2, Arterial 180 mm Hg      HCO3, Arterial 21.3 mEQ/L      Base Excess, Arterial -4.7 mEQ/L      Lactate, Art 1.7 mmol/L      Source Of Oxygen Bipap     FIO2 50    CBC and differential [[id number]]  (Abnormal) Collected: 04/07/25 0329    Specimen: Blood, Venous Updated: 04/07/25 0347     WBC 7.84 K/uL      RBC 3.32 M/uL      Hemoglobin 11.3 g/dL      Hematocrit 35.6 %      .2 fL      MCH 34.0 pg      MCHC 31.7 g/dL      RDW 14.7 %      Platelets 180 K/uL      MPV 8.8 fL      Differential Type Auto     nRBC 0.4 %      Immature Granulocytes 0.5 %      Neutrophils 79.8 %      Lymphocytes 9.8 %      Monocytes 7.4 %      Eosinophils 2.0 %      Basophils 0.5 %      Immature Granulocytes, Absolute 0.04 K/uL      Neutrophils, Absolute 6.25 K/uL      Lymphocytes, Absolute 0.77 K/uL      Monocytes, Absolute 0.58 K/uL      Eosinophils, Absolute 0.16 K/uL      Basophils, Absolute 0.04 K/uL     Extra Tubes  [[id number]] Collected: 04/07/25 0329    Specimen: Blood, Venous Updated: 04/07/25 0342    Narrative:      The following orders were created for panel order Extra Tubes.  Procedure                               Abnormality         Status                     ---------                               -----------         ------                     Extra Tube Lt Blue[[id number]]                               In process                   Please view results for these tests on the individual orders.    Extra Tube Lt Blue [[id number]] Collected: 04/07/25 0329    Specimen: Blood, Venous Updated: 04/07/25 0342    Lactate, w/ reflex repeat if > 2.0 [[id number]]  (Abnormal) Collected: 04/07/25 0329    Specimen: Blood, Venous Updated: 04/07/25 0340     Lactate 2.3 mmol/L     Blood Culture Blood, Venous [[id number]] Collected: 04/07/25 0331    Specimen: Blood, Venous Updated: 04/07/25 0338    Blood Culture Blood, Venous [[id number]] Collected: 04/07/25 0331    Specimen: Blood, Venous Updated: 04/07/25 0338            Imaging Results              CT ANGIOGRAPHY CHEST PULMONARY EMBOLISM WITH IV CONTRAST (In process)  Result time 04/07/25 04:42:43                     X-RAY CHEST 1 VIEW (In process)  Result time 04/07/25 03:28:12                    ECG 12 lead    (Results Pending)       Scoring tools                                  ED Course & MDM   MDM / ED COURSE / CLINICAL IMPRESSION / DISPO   Vital Signs Review: Vital signs have been reviewed.   The oxygen saturation is SpO2: (!) 65 % which is abnormal.    Medical Decision Making  Problems Addressed:  Acute respiratory failure with hypoxia (CMS/HCC): acute illness or injury    Amount and/or Complexity of Data Reviewed  Labs: ordered. Decision-making details documented in ED Course.  Radiology: ordered.  ECG/medicine tests: ordered.    Risk  Prescription drug management.          ED Course as of 04/07/25 0629 Mon Apr 07, 2025 0320 Upon initial evaluation, Dr. Garcia brought into  the room to help evaluate patient.  Patient was placed on nasal cannula at 6 L.  Respiratory has been called for ABG, and BiPAP. [ND]   0453 Patient care will be coordinated with Dr. Garcia for CT, re-evaluation  and dispo [ND]   0514 CT scan concerning for right lower lobe segmental PE.  There is cardiomegaly with reflux of contrast to the IVC and hepatic veins compatible with right heart failure that was also present on study from December 2023. [RR]   0525 Trialed patient off of BiPAP however work of breathing was too intense.  He was placed back on BiPAP. [RR]   0526 Lovenox ordered for PE.  Of note last dose of Xarelto was at 7 PM last night. [RR]   0615 I was called to bedside as patient was appearing to be in quite a bit of respiratory distress.  VBG was performed that demonstrated worsened pH from 7.3-7.13 with carbon dioxide retention of 73.  Given the concern for acute hypercapnic respiratory failure at this time, plan for intubation.  While patient was moving rooms, patient had very much improvement to the point where he was conversive and following commands.  Repeat VBG demonstrated improvement in pH to 7.22 with CO2 improved to 58. [RR]   0629 Pt was evaluated by Dr. Pizarro, hospitalist covering for ICU, who accepts patient for admission to ICU. [RR]      ED Course User Index  [ND] Mavis Dahl, IGLESIA GALLEGOS  [RR] Thierry Garcia,      Clinical Impression      Acute respiratory failure with hypoxia (CMS/HCC)   COVID-19                   Pt given copies of []Radiology study reports   [] Laboratory results     Patient seen during a time of significantly increased volumes, increased boarding in ED, decreased capacity and staff which may have contributed to lengthened stay in ED. Portion of management and initial evaluation may have been done while in the waiting room because of this.  Extensive detailed charting also may be limited secondary to high ED volumes and may not reflect all conversations and  decisions made between patient and provider.     This document was created using dragon dictation software.  There might be some typographical errors due to this technology.       Mavis Dahl PA C  04/07/25 4651       Mavis Dahl PA C  04/07/25 7243

## 2025-04-07 NOTE — ASSESSMENT & PLAN NOTE
Echocardiogram: Ejection fraction 25 to 30%, mildly reduced RV function, no significant valvular heart disease, RVSP 35  Echocardiogram in January 2024:Ejection fraction 60 to 65%    New cardiomyopathy  Cardiology is notified  Left heart catheterization in December 2023 with normal coronaries    Continue IV Lasix  Continue Metoprolol

## 2025-04-07 NOTE — ED ATTESTATION NOTE
I have personally seen and examined Cam Rosas Jr..  I was involved in the care and medical decision making for this patient.     I reviewed and agree with resident / physician assistant / nurse practitioners assessment and plan of care, with any exceptions as documented below.      My focused history, examination, assessment, and plan of care of is as follows:    Cam Rosas Jr. is a 75 y.o. male with A-fib on Xarelto, CKD, CHF, breast cancer on tamoxifen presenting for evaluation after awakening at 9 PM with shortness of breath and cough.  No chest pain or fever.  He has been in his normal state of health for the last few days and when he went to sleep at 9 PM.    ED Triage Vitals   Temp Heart Rate Resp BP SpO2   04/07/25 0318 04/07/25 0318 04/07/25 0318 04/07/25 0318 04/07/25 0315   37.3 °C (99.1 °F) 98 (!) 32 (!) 200/96 (!) 65 %      Temp Source Heart Rate Source Patient Position BP Location FiO2 (%) (Set)   04/07/25 0318 04/07/25 0318 04/07/25 0318 04/07/25 0318 --   Oral Monitor Sitting Right upper arm        Vital Signs Review: Vital signs have been reviewed. The oxygen saturation is SpO2: (!) 65 % which is inadequate.  Pulse ox 93% on 6 L nasal cannula indicating improved oxygenation    Physical Exam: Tachypneic.  Heart with regular rhythm, no murmurs.  Lungs with diffuse rales vs rhonchi.  Abdomen soft, nontender, nonperitoneal.  Neurological exam with no new focal deficits.  Normal motor and sensation.  2+ b/l LE pretitibal pitting edema.    Assessment/Plan/MDM: 75-year-old male presenting for acute hypoxic respiratory failure to 65% on room air.  I was called to bedside and BiPAP was initiated.  Patient has cardiomegaly on x-ray and x-ray is not much change as compared with prior otherwise.  However patient does have leg edema and rales versus rhonchi.  With BiPAP, Lasix ordered.    ED Course as of 04/07/25 0630   Mon Apr 07, 2025 0320 Upon initial evaluation, Dr. Garcia brought into the  room to help evaluate patient.  Patient was placed on nasal cannula at 6 L.  Respiratory has been called for ABG, and BiPAP. [ND]   0453 Patient care will be coordinated with Dr. Garcia for CT, re-evaluation  and dispo [ND]   0514 CT scan concerning for right lower lobe segmental PE.  There is cardiomegaly with reflux of contrast to the IVC and hepatic veins compatible with right heart failure that was also present on study from December 2023. [RR]   0525 Trialed patient off of BiPAP however work of breathing was too intense.  He was placed back on BiPAP. [RR]   0526 Lovenox ordered for PE.  Of note last dose of Xarelto was at 7 PM last night. [RR]   0615 I was called to bedside as patient was appearing to be in quite a bit of respiratory distress.  VBG was performed that demonstrated worsened pH from 7.3-7.13 with carbon dioxide retention of 73.  Given the concern for acute hypercapnic respiratory failure at this time, plan for intubation.  While patient was moving rooms, patient had very much improvement to the point where he was conversive and following commands.  Repeat VBG demonstrated improvement in pH to 7.22 with CO2 improved to 58. [RR]   0629 Pt was evaluated by Dr. Pizarro, hospitalist covering for ICU, who accepts patient for admission to ICU. [RR]      ED Course User Index  [ND] aMvis Dahl, IGLESIA GALLEGOS  [RR] Thierry Garcia DO     Critical Care    Performed by: Thierry Garcia DO  Authorized by: Thierry Garcia DO    Critical care provider statement:     Critical care time (minutes):  58    Critical care was necessary to treat or prevent imminent or life-threatening deterioration of the following conditions:  Respiratory failure    Critical care was time spent personally by me on the following activities:  Development of treatment plan with patient or surrogate, evaluation of patient's response to treatment, examination of patient, obtaining history from patient or surrogate, ordering and review of  radiographic studies, pulse oximetry, ordering and review of laboratory studies, ordering and performing treatments and interventions, re-evaluation of patient's condition and review of old charts    I assumed direction of critical care for this patient from another provider in my specialty: no      Care discussed with: admitting provider               Thierry Garcia DO  04/07/25 0630

## 2025-04-07 NOTE — PROGRESS NOTES
"Cam Rosas .   [id number]    Your doctor has referred you for a   that requires the injection of an iodinated contrast material into your bloodstream. This iodinated contrast material, sometimes referred to as \"x-ray dye\" allows for better interpretation of the x-ray films or CT images and results in a more accurate interpretation of the examination.     Without the use of iodinated contrast (x-ray dye), the examination may be less informative and result in a suboptimal examination, and possibly a delay in diagnosis and, if needed, treatment.     The iodinated contrast material is given through a small needle or catheter placed into a vein, usually on the inside of the elbow, on the back of hand, or in a vein in the foot or lower leg.    The most common, though still rare, potential reaction to an intravenous contrast injection is an allergic-like reaction. Most allergic-like reactions are mild, though a small subset of people can have more severe reactions. Mild reactions include mild / scattered hives, itching, scratchy throat, sneezing and nasal congestion. More severe reactions include facial swelling, severe difficulty breathing, wheezing and anaphylactic shock. Those with a prior history allergic-like reaction to the same class of contrast media (such as CT contrast or MRI contrast) are at the highest risk for an allergic reaction.     If you believe you had an allergic reaction to contrast in the past, please let our staff know. We can determine if this increases your risk for a future reaction and provide steps to decrease the risk. This may delay your examination, but it decreases the risk of having a new and possibly more severe reaction to the contrast injection.    People with a history of prior allergic reactions to other substances (such as unrelated medications and food) and patients with a history of asthma have slightly increased risk for an allergic reaction from contrast material when " compared with the general population, but do not require to be pretreated prior to a contrast injection.    You should notify the physician, nurse or technologist if you have ever had any of these conditions or if you have any questions.

## 2025-04-07 NOTE — PLAN OF CARE
Care Coordination Admission Assessment Note    General Information:  Readmission Within the last 30 days: no previous admission in last 30 days  Does patient have a :    Patient-Specific Goals (include timeframe): Medical stability    Living Arrangements:  Arrived From: home  Current Living Arrangements: home  People in Home: spouse  Home Accessibility:    Living Arrangement Comments: patient lives with wife in 2 [facility] with 2 SRINIVAS has chair lift    Housing Stability and Utility Access (SDOH):  In the last 12 months, was there a time when you were not able to pay the mortgage or rent on time?: No  In the past 12 months, how many times have you moved?: 0  At any time in the past 12 months, were you homeless or living in a shelter (including now)?: No  In the past 12 months has the electric, gas, oil, or water company threatened to shut off services in your home?: No    Functional Status Prior to Admission:   Assistive Device/Animal Currently Used at Home: bath bench, cane, straight, grab bar, stair glide, walker, front-wheeled, wheelchair  Functional Status Comments:    IADL Comments: patient requires help with ADLs, bathing and walking,  uses cane inside and walker/wheechair when outside     Supports and Services:  Current Outpatient/Agency/Support Group:    Type of Current Home Care Services:    History of home care episode or rehab stay: hx of [facility] and [facility]    Discharge Needs Assessment:   Concerns to be Addressed: discharge planning  Current Discharge Risk: chronically ill, physical impairment, dependent with mobility/activities of daily living  Anticipated Changes Related to Illness: inability to care for self    Patient/Family Anticipated Discharge Plan:  Patient/Family Anticipates Transition To: home with family  Patient/Family Anticipated Services at Transition: , rehabilitation services, home health care    Connection to Community  Patient declined offered  resources.    Patient Choice:   Offered/Gave Vendor List: yes  Patient and/or patient guardian/advocate was made aware of their right to choose a provider. A list of eligible providers was presented and reviewed with the patient and/or patient guardian/advocate in written and/or verbal form. The list delineates providers in the patients desired geographic area who are participating in the Medicare program and/or providers contracted with the patients primary insurance. The Medicare list and quality ratings were obtained from the Medicare.gov [medicare.gov] website.    Anticipated Discharge Plan:  Met with patient. Provided education and contact information for Care Coordination services.: yes  Anticipated Discharge Disposition: skilled nursing facility, home with assistance, home with home health  Type of Skilled Nursing Care Services: PT, OT, nursing    Transportation Needs (SDOH):  Transportation Concerns: none  Transportation Anticipated: family or friend will provide  Is Out of Hospital DNR needed at discharge?: no    In the past 12 months, has lack of transportation kept you from medical appointments or from getting medications?: No  In the past 12 months, has lack of transportation kept you from meetings, work, or from getting things needed for daily living?: No    Concerns - comments: CCRN met with the patient and wife at the bedside and explained role.  Demographics, POA, PCP and pharmacy verified.  The patient lives with wife in 2 [facility] with 2 SRINIVAS and chair glide.  DME WIS with shower chair, grab bars, st cane , walker and wheelchair.  The patient has hx of [facility] and [facility].  Wife helps with all adls, showering , meds and meals.  Patient sees Dr Campos as outpatient for unna boots for venous stasis.  Patient would like home care through [facility] when stable for discharge, but will follow for PT/OT recs.  No history of Oxygen at home.  Mary Behler CCRN

## 2025-04-07 NOTE — ASSESSMENT & PLAN NOTE
> CT chest showing suspected PE  > Possible treatment failure?  Will consult pulmonology  [facility]  F/u full CT chest results

## 2025-04-07 NOTE — CONSULTS
Podiatric Surgery Consult Note    Subjective      Mr. Rosas is a 75-year-old male that was evaluated bedside today for BLE venous stasis.  Patient is known to Dr. Christina and sees him outpatient for regular podiatric care.  Patient states that he used to get Unna boots applied to bilateral legs.  Patient's wife is present bedside helps relay history.  Patient states that he used to have blisters to the left leg which have now healed.  Patient states that he does not have any drainage coming from bilateral legs anymore.  Patient's wife helps with dressing changes at home and continues to use compression bandages to help relieve swelling bilaterally.      Review of Systems:   Constitutional:  Negative for fevers, chills   Cardiovascular: Negative for chest pain, SOB   Gastrointestinal: Negative for nausea and vomiting.   Musculoskeletal: Negative for tenderness in any matt prominences, joints, tendons, myalgias  Skin: Please see below      Medical History:   Past Medical History:   Diagnosis Date    Acute systolic heart failure (CMS/HCC) 02/15/2023    Ambulates with cane     and walker    Atrial fibrillation (CMS/HCC)     Breast cancer (CMS/HCC) October 2022    CHF (congestive heart failure) (CMS/HCC)     Chronic kidney disease     CKD (chronic kidney disease) 10/19/2022    History of radiation therapy     Hx antineoplastic chemo     Prostate cancer (CMS/HCC)        Surgical History:   Past Surgical History   Procedure Laterality Date    Cardiac surgery      mitral valve repair 2006    Cardioversion  12/05/2022    Successful DC cardioversion    CARDIOVERSION EXTERNAL N/A 12/5/2022    Performed by Gary Aceves MD at [facility] CARDIAC CATH/EP    Cath lab temporary pacemaker insertion N/A 12/25/2023    Performed by Haja Onofre MD at  CARDIAC CATH/EP/NEURO    GASTROSTOMY PERCUTANEOUS ENDOSCOPIC N/A 1/4/2024    Performed by Edison Oneil MD at  OR    Knee cartilage surgery Left     Mastectomy       PORT/PERMACATH REMOVAL Left 11/9/2023    Performed by Barb Osuna MD at  OR    Prostate surgery  July 2022    Prostatectomy  07/15/2022    Right & left heart cath with coronary angiography N/A 12/25/2023    Performed by Haja Onofre MD at  CARDIAC CATH/EP/NEURO    RIGHT BREAST MASTECTOMY; RIGHT SENTINEL NODE IDENTIFICATION, MAPPING, AND BIOPSY; Right 8/17/2023    Performed by Barb Osuna MD at  OR    Tonsillectomy      TRACHEOSTOMY N/A 1/4/2024    Performed by Edison Oneil MD at  OR       Social History:   Social History     Socioeconomic History    Marital status:      Spouse name: None    Number of children: None    Years of education: None    Highest education level: None   Occupational History    Occupation: insurance   Tobacco Use    Smoking status: Never    Smokeless tobacco: Never   Vaping Use    Vaping status: Never Used   Substance and Sexual Activity    Alcohol use: Yes     Alcohol/week: 14.0 standard drinks of alcohol     Types: 14 Shots of liquor per week     Comment: 2 drinks/day - scotch    Drug use: Never    Sexual activity: Not Currently     Partners: Female     Social Drivers of Health     Financial Resource Strain: Low Risk  (8/18/2023)    Overall Financial Resource Strain (CARDIA)     Difficulty of Paying Living Expenses: Not hard at all   Food Insecurity: No Food Insecurity (4/7/2025)    Hunger Vital Sign     Worried About Running Out of Food in the Last Year: Never true     Ran Out of Food in the Last Year: Never true   Transportation Needs: No Transportation Needs (4/7/2025)    PRAPARE - Transportation     Lack of Transportation (Medical): No     Lack of Transportation (Non-Medical): No   Housing Stability: Low Risk  (4/7/2025)    Housing Stability Vital Sign     Unable to Pay for Housing in the Last Year: No     Number of Times Moved in the Last Year: 0     Homeless in the Last Year: No       Family History:   Family History   Problem Relation  Name Age of Onset    Hyperlipidemia Biological Mother mother     Hypertension Biological Mother mother     Breast cancer Biological Mother mother     Cancer Biological Mother mother         gyn? cervical    Hyperlipidemia Biological Father Dad     Hypertension Biological Father Dad     Arthritis Biological Father Dad     No Known Problems Biological Sister      No Known Problems Biological Brother      Heart disease Maternal Grandmother Belle     Arthritis Maternal Grandfather      COPD Maternal Grandfather      Diabetes Maternal Grandfather      No Known Problems Paternal Grandmother      No Known Problems Paternal Grandfather      Cancer less than or equal to age 50 Other son     Esophageal cancer Other son         47, in abd,chemo q 3 weeks       Allergies:   Allergies   Allergen Reactions    Azithromycin Other (see comments)     Kidney issues      Prednisone Angioedema     All over swelling    Lorazepam Other (see comments)     WEAKNESS       Home Medications:   amiodarone (PACERONE) tablet 200 mg  200 mg oral Once per day on Monday Wednesday Friday    atorvastatin (LIPITOR) tablet 10 mg  10 mg oral Daily    busPIRone (BUSPAR) tablet 10 mg  10 mg oral BID    dexAMETHasone (DECADRON) injection 6 mg  6 mg intravenous Daily    glucose chewable tablet 16-32 g of dextrose  16-32 g of dextrose oral PRN    Or    dextrose 40 % oral gel 15-30 g of dextrose  15-30 g of dextrose oral PRN    Or    glucagon (GLUCAGEN) injection 1 mg  1 mg intramuscular PRN    Or    dextrose 50 % in water (D50) injection 12.5 g  25 mL intravenous PRN    enoxaparin (LOVENOX) syringe 90 mg  1 mg/kg subcutaneous q12h INT    furosemide (LASIX) injection 40 mg  40 mg intravenous BID (am, 4p)    melatonin ODT 3 mg  3 mg peg tube Nightly PRN    metoprolol succinate XL (TOPROL-XL) 24 hr ER tablet 25 mg  25 mg oral Nightly    pantoprazole (PROTONIX) tablet,delayed release (DR/EC) 40 mg  40 mg oral BID    tamoxifen (NOLVADEX) tablet 20 mg  20 mg oral  Daily    tamsulosin (FLOMAX) 24 hr ER capsule 0.4 mg  0.4 mg oral Nightly       Current Medications:  Current Facility-Administered Medications   Medication Dose Route Frequency Provider Last Rate Last Admin    amiodarone (PACERONE) tablet 200 mg  200 mg oral Once per day on Monday Wednesday Friday Meek Pizarro MD   200 mg at 04/07/25 0821    atorvastatin (LIPITOR) tablet 10 mg  10 mg oral Daily Meek Pizarro MD   10 mg at 04/07/25 0821    busPIRone (BUSPAR) tablet 10 mg  10 mg oral BID Meek Pizarro MD        dexAMETHasone (DECADRON) injection 6 mg  6 mg intravenous Daily Nory Jeronimo MD        glucose chewable tablet 16-32 g of dextrose  16-32 g of dextrose oral PRN Meek Pizarro MD        Or    dextrose 40 % oral gel 15-30 g of dextrose  15-30 g of dextrose oral PRN Meek Pizarro MD        Or    glucagon (GLUCAGEN) injection 1 mg  1 mg intramuscular PRN Meek Pizarro MD        Or    dextrose 50 % in water (D50) injection 12.5 g  25 mL intravenous PRN Meek Pizarro MD        enoxaparin (LOVENOX) syringe 90 mg  1 mg/kg subcutaneous q12h INT Meek Pizarro MD        furosemide (LASIX) injection 40 mg  40 mg intravenous BID (am, 4p) Meek Pizarro MD   40 mg at 04/07/25 1139    melatonin ODT 3 mg  3 mg peg tube Nightly PRN Meek Pizarro MD        metoprolol succinate XL (TOPROL-XL) 24 hr ER tablet 25 mg  25 mg oral Nightly Meek Pizarro MD        pantoprazole (PROTONIX) tablet,delayed release (DR/EC) 40 mg  40 mg oral BID Meek Pizarro MD   40 mg at 04/07/25 0821    tamoxifen (NOLVADEX) tablet 20 mg  20 mg oral Daily Meek Pizarro MD        tamsulosin (FLOMAX) 24 hr ER capsule 0.4 mg  0.4 mg oral Nightly Meek Pizarro MD             Last documented Vital Signs:  Vitals    04/07/25 0900 04/07/25 1015 04/07/25 1100 04/07/25 1200   BP: 101/65      Pulse: 84 78 80 80   Resp:       Temp: 37.3 °C (99.2 °F)      SpO2: 98% 100% 100% 100%       Labs:  CBC Results          04/07/25 03/18/24 02/26/24     0329 0655 0624    WBC 7.84 5.73 3.97    RBC 3.32 3.47 3.46    HGB 11.3 10.7 10.4    HCT 35.6 35.1 34.1    .2 101.2 98.6    MCH 34.0 30.8 30.1    MCHC 31.7 30.5 30.5     200 210          Microbiology Results       Procedure Component Value Units Date/Time    SARS-COV-2 (COVID-19)/ FLU A/B, AND RSV, PCR Nasopharynx [[id number]]  (Abnormal) Collected: 04/07/25 0325    Specimen: Nasopharyngeal Swab from Nasopharynx Updated: 04/07/25 0419     SARS-CoV-2 (COVID-19) Positive     Influenza A Negative     Influenza B Negative     Respiratory Syncytial Virus Negative    Narrative:      Testing performed using real-time PCR for detection of COVID-19. EUA approved validation studies performed on site.               Imaging:  I have reviewed the imaging from the last 24 hours    Objective     -Vascular: DP pulses are faintly palpable B/L. PT pulses are faintly palpable B/L.   B/L edema to the foot, ankle and leg. Capillary refill time is less than 3 seconds B/L.   Skin temperature is warm to warm from leg to toes B/L.Pedal hair is not noted B/L.     -Ortho: + active df/pf of ankle. MMT 5/5 in all planes tested. Equinus noted B/L.    -Neuro:Light touch and protective sensation is intact    -Derm: RLE: No open wounds appreciated.  No drainage appreciated.  No malodor appreciated.  No fluctuance or crepitus appreciated.  Venous stasis changes appreciated to the skin.  LLE: No open wounds appreciated.  Previously dried blistered areas.  No drainage appreciated.  No malodor appreciated.  No fluctuance or crepitus appreciated.  Venous stasis changes appreciated to the skin.  See media tab for clinical pictures.     ASSESSMENT/PLAN:  75 y.o. male being consulted for bilateral lower extremity venous stasis    -Plan to:  - Patient was assessed, treated, and evaluated with all questions and concerns answered.  -LLE dressed with Adaptic, 4 x 4 gauze, Brain.  Ace bandage applied to  bilateral legs.  Podiatry will continue to monitor patient while in house and dressings will be changed with the frequency of Q2-Q3.  -Recommend continued outpatient follow-up with Dr. Christina on discharge.  -Elevation recommended to BLE when possible  -Patient may weight bear to B/L LE as tolerated.  -Rest of care per primary team.  -Please contact on call podiatry resident with any questions or concerns.     Liliana Ingram PGY-1  #[id number]

## 2025-04-07 NOTE — ASSESSMENT & PLAN NOTE
> Likely multifactorial in setting of COVID and heart failure exacerbation   continue BiPAP for now  Management as below  Pulmonology consult

## 2025-04-07 NOTE — PROGRESS NOTES
[facility] Medicine Service -  Daily Progress Note       SUBJECTIVE   On BiPAP  Reports he feels better  Started with dry cough last night  Denies chest pain, abdominal pain, nausea, dizziness or lightheadedness, fever or chills  Wife is at bedside and reports nobody else is sick    Record reports allergy to prednisone.  The patient reports it causes lower extremities edema and worsening of A-fib, not angioedema    Afebrile   OBJECTIVE      Vital signs in last 24 hours:  Temp:  [37.3 °C (99.1 °F)-37.3 °C (99.2 °F)] 37.3 °C (99.2 °F)  Heart Rate:  [] 84  Resp:  [19-43] 22  BP: ()/() 101/65  FiO2 (%) (Set):  [40 %-50 %] 40 %    Intake/Output Summary (Last 24 hours) at 4/7/2025 1014  Last data filed at 4/7/2025 0823  Gross per 24 hour   Intake 90 ml   Output --   Net 90 ml       PHYSICAL EXAMINATION      Physical Exam  Vitals and nursing note reviewed.   Constitutional:       Appearance: Normal appearance. He is well-developed and normal weight.   Eyes:      General: No scleral icterus.  Cardiovascular:      Rate and Rhythm: Normal rate and regular rhythm.      Heart sounds: Normal heart sounds.   Pulmonary:      Effort: Pulmonary effort is normal.      Breath sounds: Rales present.      Comments: Few crackles at bases  Abdominal:      General: Bowel sounds are normal.      Palpations: Abdomen is soft. There is no mass.      Tenderness: There is no abdominal tenderness.   Musculoskeletal:         General: No swelling.      Cervical back: Neck supple.   Skin:     Comments: Bilateral lower extremities wounds   Neurological:      Mental Status: He is alert and oriented to person, place, and time.   Psychiatric:         Behavior: Behavior normal.        LABS / IMAGING / TELE      Labs  I have reviewed the patient's labs.     Imaging      ECG/Telemetry  Reviewed by me: No significant arrhythmia    ASSESSMENT AND PLAN           Assessment & Plan  Acute respiratory failure with hypoxia and hypercapnia  (CMS/HCC)  > Likely multifactorial in setting of COVID and heart failure exacerbation     CAT scan of chest: No pulmonary embolism, perifissural right upper lobe consolidation, slight progression of right lower lobe pleural thickening which may represent atelectasis, infection or neoplasm, cardiomegaly right chest wall soft tissue density should be clinically correlated for neoplasm infection  COVID-positive  WBC 8      Discussed with Dr. Dorsey: Will start IV Decadron  ID and cardiology consults are pending  Continue IV diuresis    Continue BiPAP, transition to nasal cannula as tolerated  COVID-19  As above  Started on Decadron  Centyl message to infectious disease regarding remdesivir  Acute on chronic systolic congestive heart failure (CMS/HCC)  Echocardiogram: Ejection fraction 25 to 30%, mildly reduced RV function, no significant valvular heart disease, RVSP 35  Echocardiogram in January 2024:Ejection fraction 60 to 65%    New cardiomyopathy  Cardiology is notified  Left heart catheterization in December 2023 with normal coronaries    Continue IV Lasix  Continue Metoprolol    Stage 3a chronic kidney disease (CMS/HCC)  Baseline creatinine 1.4-1.5  Creatinine is 1.7  Check urine analysis  Check bladder scan  Monitor closely with diuresis  Paroxysmal atrial fibrillation (CMS/HCC)  In sinus rhythm  On amiodarone and metoprolol  Usual on Xarelto, currently on therapeutic Lovenox  History of cardiac arrest  History of V-fib cardiac arrest in February 2023 in the setting of influenza  Had Takotsubo cardiomyopathy at that time  Has had prolonged hospitalization with trach and PEG now out  Prostate cancer (CMS/HCC)  Status post prostatectomy followed by radiation in 2022  Breast cancer (CMS/HCC)  Status post neoadjuvant chemotherapy in 2022, followed by mastectomy in August 2023, status postradiation.  On tamoxifen.  Declined HER2 directed therapy  History of peptic ulcer disease  In 2022  Continue PPI  BPH (benign  prostatic hyperplasia)  Continue Flomax  Hyperlipidemia  Continue statin  Hypothyroidism  Continue levothyroxine  Anemia  Mild   Macrocytic  Multiple open wounds of lower extremity  Will get podiatry consulr     VTE Assessment: Padua VTE Score: 5  Code Status: Full Code  Estimated discharge date: 4/9/2025     Nory Jeronimo MD  4/7/2025

## 2025-04-07 NOTE — ASSESSMENT & PLAN NOTE
> CT chest showing suspected PE  > Possible treatment failure?  Will consult pulmonology  [facility]  [facility]u full [facility] results

## 2025-04-07 NOTE — ASSESSMENT & PLAN NOTE
Status post neoadjuvant chemotherapy in 2022, followed by mastectomy in August 2023, status postradiation.  On tamoxifen.  Declined HER2 directed therapy

## 2025-04-07 NOTE — ASSESSMENT & PLAN NOTE
> Patient unable to take prednisone due to history of allergic reaction causing angioedema  Supplemental oxygen to keep SPO2 > 88%  Continuous pulse ox  COVID isolation  Consult ID

## 2025-04-08 ENCOUNTER — APPOINTMENT (INPATIENT)
Dept: RADIOLOGY | Facility: HOSPITAL | Age: 75
DRG: 177 | End: 2025-04-08
Attending: HOSPITALIST
Payer: MEDICARE

## 2025-04-08 PROBLEM — Z87.898 HISTORY OF PROLONGED Q-T INTERVAL ON ECG: Status: ACTIVE | Noted: 2025-04-08

## 2025-04-08 PROBLEM — S01.00XA SCALP WOUND: Status: ACTIVE | Noted: 2025-04-08

## 2025-04-08 PROBLEM — E87.5 HYPERKALEMIA: Status: ACTIVE | Noted: 2025-04-08

## 2025-04-08 PROBLEM — I51.81 TAKOTSUBO CARDIOMYOPATHY: Status: ACTIVE | Noted: 2025-04-08

## 2025-04-08 LAB
ALBUMIN SERPL-MCNC: 3.6 G/DL (ref 3.5–5.7)
ALP SERPL-CCNC: 40 IU/L (ref 34–125)
ALT SERPL-CCNC: 9 IU/L (ref 7–52)
ANION GAP SERPL CALC-SCNC: 9 MEQ/L (ref 3–15)
AST SERPL-CCNC: 33 IU/L (ref 13–39)
BASOPHILS # BLD: 0.02 K/UL (ref 0.01–0.1)
BASOPHILS NFR BLD: 0.3 %
BILIRUB DIRECT SERPL-MCNC: <0.1 MG/DL
BILIRUB SERPL-MCNC: 0.5 MG/DL (ref 0.3–1.2)
BUN SERPL-MCNC: 27 MG/DL (ref 7–25)
CALCIUM SERPL-MCNC: 8.5 MG/DL (ref 8.6–10.3)
CHLORIDE SERPL-SCNC: 106 MEQ/L (ref 98–107)
CO2 SERPL-SCNC: 29 MEQ/L (ref 21–31)
CREAT SERPL-MCNC: 1.8 MG/DL (ref 0.7–1.3)
DIFFERENTIAL METHOD BLD: ABNORMAL
EGFRCR SERPLBLD CKD-EPI 2021: 38.8 ML/MIN/1.73M*2
EOSINOPHIL # BLD: 0 K/UL (ref 0.04–0.54)
EOSINOPHIL NFR BLD: 0 %
ERYTHROCYTE [DISTWIDTH] IN BLOOD BY AUTOMATED COUNT: 14.5 % (ref 11.6–14.4)
FOLATE SERPL-MCNC: 9.1 NG/ML
GLUCOSE SERPL-MCNC: 152 MG/DL (ref 70–99)
HCT VFR BLD AUTO: 34 % (ref 40.1–51)
HGB BLD-MCNC: 10.7 G/DL (ref 13.7–17.5)
IMM GRANULOCYTES # BLD AUTO: 0.03 K/UL (ref 0–0.08)
IMM GRANULOCYTES NFR BLD AUTO: 0.5 %
LYMPHOCYTES # BLD: 0.46 K/UL (ref 1.2–3.5)
LYMPHOCYTES NFR BLD: 7.5 %
MAGNESIUM SERPL-MCNC: 2.2 MG/DL (ref 1.8–2.5)
MCH RBC QN AUTO: 33.8 PG (ref 28–33.2)
MCHC RBC AUTO-ENTMCNC: 31.5 G/DL (ref 32.2–36.5)
MCV RBC AUTO: 107.3 FL (ref 83–98)
MONOCYTES # BLD: 0.14 K/UL (ref 0.3–1)
MONOCYTES NFR BLD: 2.3 %
NEUTROPHILS # BLD: 5.51 K/UL (ref 1.7–7)
NEUTS SEG NFR BLD: 89.4 %
NRBC BLD-RTO: 0 %
PHOSPHATE SERPL-MCNC: 5.9 MG/DL (ref 2.4–4.7)
PLATELET # BLD AUTO: 154 K/UL (ref 150–350)
PMV BLD AUTO: 10 FL (ref 9.4–12.4)
POTASSIUM SERPL-SCNC: 5.3 MEQ/L (ref 3.5–5.1)
PROT SERPL-MCNC: 6.4 G/DL (ref 6–8.2)
RBC # BLD AUTO: 3.17 M/UL (ref 4.5–5.8)
SODIUM SERPL-SCNC: 144 MEQ/L (ref 136–145)
TROPONIN I SERPL HS-MCNC: 178.4 PG/ML
TROPONIN I SERPL HS-MCNC: 183.2 PG/ML
VIT B12 SERPL-MCNC: 204 PG/ML (ref 180–914)
WBC # BLD AUTO: 6.16 K/UL (ref 3.8–10.5)

## 2025-04-08 PROCEDURE — 63600000 HC DRUGS/DETAIL CODE: Mod: JW | Performed by: STUDENT IN AN ORGANIZED HEALTH CARE EDUCATION/TRAINING PROGRAM

## 2025-04-08 PROCEDURE — 97162 PT EVAL MOD COMPLEX 30 MIN: CPT | Mod: GP

## 2025-04-08 PROCEDURE — 83735 ASSAY OF MAGNESIUM: CPT | Performed by: HOSPITALIST

## 2025-04-08 PROCEDURE — 99233 SBSQ HOSP IP/OBS HIGH 50: CPT | Performed by: HOSPITALIST

## 2025-04-08 PROCEDURE — 63700000 HC SELF-ADMINISTRABLE DRUG: Performed by: HOSPITALIST

## 2025-04-08 PROCEDURE — 84484 ASSAY OF TROPONIN QUANT: CPT

## 2025-04-08 PROCEDURE — 25800000 HC PHARMACY IV SOLUTIONS: Performed by: INTERNAL MEDICINE

## 2025-04-08 PROCEDURE — 76775 US EXAM ABDO BACK WALL LIM: CPT

## 2025-04-08 PROCEDURE — 80076 HEPATIC FUNCTION PANEL: CPT | Performed by: HOSPITALIST

## 2025-04-08 PROCEDURE — 63600000 HC DRUGS/DETAIL CODE: Mod: JZ,TB | Performed by: INTERNAL MEDICINE

## 2025-04-08 PROCEDURE — 20600000 HC ROOM AND CARE INTERMEDIATE/TELEMETRY

## 2025-04-08 PROCEDURE — 63700000 HC SELF-ADMINISTRABLE DRUG

## 2025-04-08 PROCEDURE — 63700000 HC SELF-ADMINISTRABLE DRUG: Performed by: STUDENT IN AN ORGANIZED HEALTH CARE EDUCATION/TRAINING PROGRAM

## 2025-04-08 PROCEDURE — 80048 BASIC METABOLIC PNL TOTAL CA: CPT | Performed by: HOSPITALIST

## 2025-04-08 PROCEDURE — 97166 OT EVAL MOD COMPLEX 45 MIN: CPT | Mod: GO

## 2025-04-08 PROCEDURE — 84100 ASSAY OF PHOSPHORUS: CPT | Performed by: HOSPITALIST

## 2025-04-08 PROCEDURE — 82607 VITAMIN B-12: CPT | Performed by: HOSPITALIST

## 2025-04-08 PROCEDURE — 99223 1ST HOSP IP/OBS HIGH 75: CPT | Performed by: INTERNAL MEDICINE

## 2025-04-08 PROCEDURE — 85025 COMPLETE CBC W/AUTO DIFF WBC: CPT | Performed by: HOSPITALIST

## 2025-04-08 PROCEDURE — 93005 ELECTROCARDIOGRAM TRACING: CPT

## 2025-04-08 PROCEDURE — 82746 ASSAY OF FOLIC ACID SERUM: CPT | Performed by: HOSPITALIST

## 2025-04-08 PROCEDURE — 36415 COLL VENOUS BLD VENIPUNCTURE: CPT | Performed by: HOSPITALIST

## 2025-04-08 RX ORDER — GUAIFENESIN 600 MG/1
600 TABLET, EXTENDED RELEASE ORAL 2 TIMES DAILY
Status: DISCONTINUED | OUTPATIENT
Start: 2025-04-08 | End: 2025-04-11 | Stop reason: HOSPADM

## 2025-04-08 RX ORDER — VALSARTAN 80 MG/1
40 TABLET ORAL DAILY
Status: DISCONTINUED | OUTPATIENT
Start: 2025-04-08 | End: 2025-04-11 | Stop reason: HOSPADM

## 2025-04-08 RX ORDER — LANOLIN ALCOHOL/MO/W.PET/CERES
1000 CREAM (GRAM) TOPICAL DAILY
Status: DISCONTINUED | OUTPATIENT
Start: 2025-04-08 | End: 2025-04-11 | Stop reason: HOSPADM

## 2025-04-08 RX ORDER — BENZONATATE 100 MG/1
100 CAPSULE ORAL 3 TIMES DAILY PRN
Status: DISCONTINUED | OUTPATIENT
Start: 2025-04-08 | End: 2025-04-11 | Stop reason: HOSPADM

## 2025-04-08 RX ADMIN — GUAIFENESIN 600 MG: 600 TABLET ORAL at 09:59

## 2025-04-08 RX ADMIN — ATORVASTATIN CALCIUM 10 MG: 10 TABLET, FILM COATED ORAL at 08:22

## 2025-04-08 RX ADMIN — PANTOPRAZOLE SODIUM 40 MG: 40 TABLET, DELAYED RELEASE ORAL at 08:22

## 2025-04-08 RX ADMIN — RIVAROXABAN 15 MG: 15 TABLET, FILM COATED ORAL at 17:17

## 2025-04-08 RX ADMIN — BUSPIRONE HYDROCHLORIDE 10 MG: 10 TABLET ORAL at 20:19

## 2025-04-08 RX ADMIN — TAMOXIFEN CITRATE 20 MG: 10 TABLET, FILM COATED ORAL at 08:22

## 2025-04-08 RX ADMIN — BENZONATATE 100 MG: 100 CAPSULE ORAL at 23:10

## 2025-04-08 RX ADMIN — Medication 400 MG: at 08:22

## 2025-04-08 RX ADMIN — VALSARTAN 40 MG: 80 TABLET, FILM COATED ORAL at 14:04

## 2025-04-08 RX ADMIN — REMDESIVIR 100 MG: 100 INJECTION, POWDER, LYOPHILIZED, FOR SOLUTION INTRAVENOUS at 17:26

## 2025-04-08 RX ADMIN — Medication 1000 MCG: at 17:17

## 2025-04-08 RX ADMIN — ENOXAPARIN SODIUM 90 MG: 100 INJECTION SUBCUTANEOUS at 06:02

## 2025-04-08 RX ADMIN — FUROSEMIDE 40 MG: 10 INJECTION, SOLUTION INTRAMUSCULAR; INTRAVENOUS at 08:23

## 2025-04-08 RX ADMIN — BUSPIRONE HYDROCHLORIDE 10 MG: 10 TABLET ORAL at 08:22

## 2025-04-08 RX ADMIN — PANTOPRAZOLE SODIUM 40 MG: 40 TABLET, DELAYED RELEASE ORAL at 20:19

## 2025-04-08 RX ADMIN — CHOLECALCIFEROL TAB 25 MCG (1000 UNIT) 25 MCG: 25 TAB at 08:22

## 2025-04-08 RX ADMIN — GUAIFENESIN 600 MG: 600 TABLET ORAL at 20:19

## 2025-04-08 RX ADMIN — LEVOTHYROXINE SODIUM 50 MCG: 0.05 TABLET ORAL at 06:02

## 2025-04-08 RX ADMIN — CETIRIZINE HYDROCHLORIDE 10 MG: 10 TABLET, FILM COATED ORAL at 08:23

## 2025-04-08 RX ADMIN — BENZONATATE 100 MG: 100 CAPSULE ORAL at 09:59

## 2025-04-08 RX ADMIN — TAMSULOSIN HYDROCHLORIDE 0.4 MG: 0.4 CAPSULE ORAL at 22:37

## 2025-04-08 RX ADMIN — Medication 400 MG: at 20:19

## 2025-04-08 RX ADMIN — FUROSEMIDE 40 MG: 10 INJECTION, SOLUTION INTRAMUSCULAR; INTRAVENOUS at 17:00

## 2025-04-08 ASSESSMENT — ENCOUNTER SYMPTOMS
ALTERED MENTAL STATUS: 0
PALPITATIONS: 0
SYNCOPE: 0
IRREGULAR HEARTBEAT: 0
LIGHT-HEADEDNESS: 0
VERTIGO: 0
PND: 0
DYSURIA: 0
DECREASED APPETITE: 0
CHILLS: 0
APHONIA: 0
SHORTNESS OF BREATH: 0
NAUSEA: 0
DYSPNEA ON EXERTION: 0
DIAPHORESIS: 0
SPUTUM PRODUCTION: 0
NEAR-SYNCOPE: 0
CLAUDICATION: 0
DIZZINESS: 0
WHEEZING: 0
ORTHOPNEA: 0
COUGH: 1
VOMITING: 0

## 2025-04-08 ASSESSMENT — COGNITIVE AND FUNCTIONAL STATUS - GENERAL
STANDING UP FROM CHAIR USING ARMS: 1 - TOTAL
DRESSING REGULAR UPPER BODY CLOTHING: 3 - A LITTLE
WALKING IN HOSPITAL ROOM: 1 - TOTAL
DRESSING REGULAR LOWER BODY CLOTHING: 1 - TOTAL
MOVING TO AND FROM BED TO CHAIR: 2 - A LOT
MOVING TO AND FROM BED TO CHAIR: 2 - A LOT
CLIMB 3 TO 5 STEPS WITH RAILING: 1 - TOTAL
CLIMB 3 TO 5 STEPS WITH RAILING: 1 - TOTAL
HELP NEEDED FOR BATHING: 2 - A LOT
WALKING IN HOSPITAL ROOM: 2 - A LOT
AFFECT: WFL;CONFUSED
EATING MEALS: 4 - NONE
STANDING UP FROM CHAIR USING ARMS: 3 - A LITTLE
CLIMB 3 TO 5 STEPS WITH RAILING: 1 - TOTAL
HELP NEEDED FOR PERSONAL GROOMING: 3 - A LITTLE
WALKING IN HOSPITAL ROOM: 2 - A LOT
AFFECT: WFL;CONFUSED
MOVING TO AND FROM BED TO CHAIR: 2 - A LOT
STANDING UP FROM CHAIR USING ARMS: 3 - A LITTLE
TOILETING: 2 - A LOT

## 2025-04-08 NOTE — ASSESSMENT & PLAN NOTE
- s/p cardioversion 12/5/22  - on amiodarone twice a week (mon, Fri), metoprolol   - on xarelto at home, may resume  - currently NSR with occasional PVCs

## 2025-04-08 NOTE — PROGRESS NOTES
"    Major Events:  A-a improved    Subjective:  Less SOB  Looks more comfortable    Temp:  [36.6 °C (97.8 °F)-37.2 °C (99 °F)] 36.8 °C (98.2 °F)  Heart Rate:  [60-80] 80  Resp:  [16-20] 16  BP: (108-134)/(59-89) 114/59  FiO2 (%) (Set): 40 %  SpO2 Readings from Last 3 Encounters:   04/08/25 100%   12/16/24 99%   11/21/24 99%       Anti-infectives (From admission, onward)      Start     Dose/Rate Route Frequency Ordered Stop    04/08/25 1819  remdesivir (VEKLURY) 100 mg in sodium chloride 0.9 % 250 mL IVPB        Placed in \"Followed by\" Linked Group    100 mg  500 mL/hr over 30 Minutes intravenous Every 24 hours interval 04/07/25 1734 04/12/25 1829          Physical Exam:  Skin: No rash  Sclera: Anicteric  Lungs: rales at bases  CV: S1, S2 nl, no embolic changes  Abd: Soft, nt  Extr: No jt eff, no edema  Neuro: Alert, lucid    Lab Results   Component Value Date    GLUCOSE 152 (H) 04/08/2025    CALCIUM 8.5 (L) 04/08/2025     04/08/2025    K 5.3 (H) 04/08/2025    CO2 29 04/08/2025     04/08/2025    BUN 27 (H) 04/08/2025    CREATININE 1.8 (H) 04/08/2025     Lab Results   Component Value Date    WBC 6.16 04/08/2025    HGB 10.7 (L) 04/08/2025    HCT 34.0 (L) 04/08/2025    .3 (H) 04/08/2025     04/08/2025     Lab Results   Component Value Date    ALBUMIN 3.6 04/08/2025    BILITOT 0.5 04/08/2025    ALKPHOS 40 04/08/2025    BILIDIR <0.1 04/08/2025    AST 33 04/08/2025    ALT 9 04/08/2025    PROTEIN 6.4 04/08/2025     Peripheral IV (Adult) 04/07/25 Left Antecubital (Active)   Number of days: 1       Peripheral IV (Adult) 04/07/25 Right Antecubital (Active)   Number of days: 1       External Urinary Catheter Other (Comment) (Active)   Number of days: 1       Wound Venous Ulcer Left Pretibial (Active)   Number of days: 471       Wound Anterior Head (Active)   Number of days: 1     Lab Results   Component Value Date    WBC 6.16 04/08/2025    HGB 10.7 (L) 04/08/2025    HCT 34.0 (L) 04/08/2025    .3 " (H) 04/08/2025     04/08/2025     Lab Results   Component Value Date    GLUCOSE 152 (H) 04/08/2025    CALCIUM 8.5 (L) 04/08/2025     04/08/2025    K 5.3 (H) 04/08/2025    CO2 29 04/08/2025     04/08/2025    BUN 27 (H) 04/08/2025    CREATININE 1.8 (H) 04/08/2025     Lab Results   Component Value Date    ALBUMIN 3.6 04/08/2025    BILITOT 0.5 04/08/2025    ALKPHOS 40 04/08/2025    BILIDIR <0.1 04/08/2025    AST 33 04/08/2025    ALT 9 04/08/2025    PROTEIN 6.4 04/08/2025     Microbiology Results (last 3 days)       Procedure Component Value - Date/Time    Blood Culture Blood, Venous [[id number]]  (Normal) Collected: 04/07/25 0331    Lab Status: Preliminary result Specimen: Blood, Venous Updated: 04/08/25 0900     Culture No growth at 18-24 hours    Blood Culture Blood, Venous [[id number]]  (Normal) Collected: 04/07/25 0331    Lab Status: Preliminary result Specimen: Blood, Venous Updated: 04/08/25 0900     Culture No growth at 18-24 hours    SARS-COV-2 (COVID-19)/ FLU A/B, AND RSV, PCR Nasopharynx [[id number]]  (Abnormal) Collected: 04/07/25 0325    Lab Status: Final result Specimen: Nasopharyngeal Swab from Nasopharynx Updated: 04/07/25 0418     SARS-CoV-2 (COVID-19) Positive     Influenza A Negative     Influenza B Negative     Respiratory Syncytial Virus Negative    Narrative:      Testing performed using real-time PCR for detection of COVID-19. EUA approved validation studies performed on site.           Impression    Pul syndrome much improved  Cause is not clr to me  Min diuresis    COVID  New infection  Will cont remdesivir  Tolerating well    ANN MARIE Hernández MD

## 2025-04-08 NOTE — ASSESSMENT & PLAN NOTE
> Likely multifactorial in setting of COVID and heart failure exacerbation     CAT scan of chest: No pulmonary embolism, perifissural right upper lobe consolidation, slight progression of right lower lobe pleural thickening which may represent atelectasis, infection or neoplasm, cardiomegaly right chest wall soft tissue density should be clinically correlated for neoplasm infection  COVID-positive  WBC 8      Started on IV Decadron April 7 (no history of angioedema from prednisone, had lower extremity swelling and atrial fibrillation)  Continue IV diuresis  Continue remdesivir    Infectious disease and cardiology are following

## 2025-04-08 NOTE — CONSULTS
"Cardiology Consult Note      Reason for consultation: CHF, acute resp failure    Subjective     Cam Rosas Jr. is a 75 y.o. male known to Dr. Xiao, and Dr. Aceves, past medical history of HFrecEF (1/2024 EF 60-65% from 12/2023 40-45%), Afib on xarelto, breast cancer s/p mastectomy, CKD who presents with shortness of breath, admitted for COVID    Presented to ED from home c/o SOB and cough that started abruptly the night before. Pt adds that prior to this he was in his normal state of health. BP significantly elevated 200/96, 65% on RA, and temp 99.1F.  Bipap initiated, , CXR with cardiomegaly and noted LE swelling, IV lasix ordered and CT angio chest showing small subsegmental PE. Given Lovenox.  BP slowly improved 178/100 .  Tested + COVID. Pt admitted for acute respiratory failure and transferred to ICU for stabilization.  Cardiology consulted for CHF.      On exam pt resting comfortably, appears in no acute distress, on 2LNC.  Wife at bedside.  Overall pt states that he feels \"well minus an occasional cough.\"  He denies CP, SOB, palpitations, fluttering, dizziness, lightheadedness, PND, orthopnea.  Currently NSR on telemetry.    Medical History:   Past Medical History:   Diagnosis Date    Acute systolic heart failure (CMS/HCC) 02/15/2023    Ambulates with cane     and walker    Atrial fibrillation (CMS/HCC)     Breast cancer (CMS/HCC) October 2022    CHF (congestive heart failure) (CMS/HCC)     Chronic kidney disease     CKD (chronic kidney disease) 10/19/2022    History of radiation therapy     Hx antineoplastic chemo     Prostate cancer (CMS/HCC)        Surgical History:   Past Surgical History   Procedure Laterality Date    Cardiac surgery      mitral valve repair 2006    Cardioversion  12/05/2022    Successful DC cardioversion    CARDIOVERSION EXTERNAL N/A 12/5/2022    [place] [facility] [place]    Cath lab temporary pacemaker insertion N/A 12/25/2023    " Performed by Haja Onofre MD at  CARDIAC CATH/EP/NEURO    GASTROSTOMY PERCUTANEOUS ENDOSCOPIC N/A 1/4/2024    Performed by Edison Oneil MD at  OR    Knee cartilage surgery Left     Mastectomy      PORT/PERMACATH REMOVAL Left 11/9/2023    Performed by Barb Osuna MD at  OR    Prostate surgery  July 2022    Prostatectomy  07/15/2022    Right & left heart cath with coronary angiography N/A 12/25/2023    Performed by Haja Onofre MD at  CARDIAC CATH/EP/NEURO    RIGHT BREAST MASTECTOMY; RIGHT SENTINEL NODE IDENTIFICATION, MAPPING, AND BIOPSY; Right 8/17/2023    Performed by Barb Osuna MD at  OR    Tonsillectomy      TRACHEOSTOMY N/A 1/4/2024    Performed by Edison Oneil MD at  OR       Social History:   Social History     Social History Narrative    Not on file      Social History     Tobacco Use   Smoking Status Never   Smokeless Tobacco Never       Family History:   Family History   Problem Relation Name Age of Onset    Hyperlipidemia Biological Mother mother     Hypertension Biological Mother mother     Breast cancer Biological Mother mother     Cancer Biological Mother mother         gyn? cervical    Hyperlipidemia Biological Father Dad     Hypertension Biological Father Dad     Arthritis Biological Father Dad     No Known Problems Biological Sister      No Known Problems Biological Brother      Heart disease Maternal Grandmother Belle     Arthritis Maternal Grandfather      COPD Maternal Grandfather      Diabetes Maternal Grandfather      No Known Problems Paternal Grandmother      No Known Problems Paternal Grandfather      Cancer less than or equal to age 50 Other son     Esophageal cancer Other son         47, in abd,chemo q 3 weeks         Allergies:   Allergies   Allergen Reactions    Azithromycin Other (see comments)     Kidney issues      Prednisone Angioedema     All over swelling    Lorazepam Other (see comments)     WEAKNESS         Current  Facility-Administered Medications   Medication Dose Route Frequency Provider Last Rate Last Admin    amiodarone (PACERONE) tablet 200 mg  200 mg oral Once per day on Monday Wednesday Friday Meek Pizarro MD   200 mg at 04/07/25 0821    atorvastatin (LIPITOR) tablet 10 mg  10 mg oral Daily Meek Pizarro MD   10 mg at 04/08/25 0822    benzonatate (TESSALON) capsule 100 mg  100 mg oral 3x daily PRN Nory Jeronimo MD   100 mg at 04/08/25 0959    busPIRone (BUSPAR) tablet 10 mg  10 mg oral BID Meek Pizarro MD   10 mg at 04/08/25 0822    cetirizine (ZyrTEC) tablet 10 mg  10 mg oral q AM Nory Jeronimo MD   10 mg at 04/08/25 0823    cholecalciferol (vitamin D3) tablet 25 mcg  25 mcg oral q AM Nory Jeronimo MD   25 mcg at 04/08/25 0822    dexAMETHasone (DECADRON) injection 6 mg  6 mg intravenous Daily Nory Jeronimo MD   6 mg at 04/07/25 1711    glucose chewable tablet 16-32 g of dextrose  16-32 g of dextrose oral PRN Meek Pizarro MD        Or    dextrose 40 % oral gel 15-30 g of dextrose  15-30 g of dextrose oral PRN Meek Pizarro MD        Or    glucagon (GLUCAGEN) injection 1 mg  1 mg intramuscular PRN Meek Pizarro MD        Or    dextrose 50 % in water (D50) injection 12.5 g  25 mL intravenous PRN Meek Pizarro MD        enoxaparin (LOVENOX) syringe 90 mg  1 mg/kg subcutaneous q12h INT Meek Pizarro MD   90 mg at 04/08/25 0602    furosemide (LASIX) injection 40 mg  40 mg intravenous BID (am, 4p) Meek Pizarro MD   40 mg at 04/08/25 0823    guaiFENesin (MUCINEX) 12 hr ER tablet 600 mg  600 mg oral BID Nory Jeronimo MD   600 mg at 04/08/25 0959    levothyroxine (SYNTHROID) tablet 50 mcg  50 mcg oral Daily (6:30a) Nory Jeronimo MD   50 mcg at 04/08/25 0602    magnesium oxide (MAG-OX) tablet 400 mg  400 mg oral BID Nory Jeronimo MD   400 mg at 04/08/25 0822    melatonin ODT 3 mg  3 mg peg tube Nightly PRN Meek Pizarro MD        metoprolol succinate XL  "(TOPROL-XL) 24 hr ER tablet 25 mg  25 mg oral Nightly Meek Pizarro MD   25 mg at 04/07/25 2136    pantoprazole (PROTONIX) tablet,delayed release (DR/EC) 40 mg  40 mg oral BID Meek Pizarro MD   40 mg at 04/08/25 0822    remdesivir (VEKLURY) 100 mg in sodium chloride 0.9 % 250 mL IVPB  100 mg intravenous q24h INT Heber Hernández MD        tamoxifen (NOLVADEX) tablet 20 mg  20 mg oral Daily Meek Pizarro MD   20 mg at 04/08/25 0822    tamsulosin (FLOMAX) 24 hr ER capsule 0.4 mg  0.4 mg oral Nightly eMek Pizarro MD   0.4 mg at 04/07/25 2136    valsartan (DIOVAN) tablet 40 mg  40 mg oral Daily Hosgood, Megan M, CRNP             Review of Systems   Constitutional: Negative for chills, decreased appetite and diaphoresis.   Cardiovascular:  Positive for leg swelling. Negative for chest pain, claudication, cyanosis, dyspnea on exertion, irregular heartbeat, near-syncope, orthopnea, palpitations, paroxysmal nocturnal dyspnea and syncope.        Pt reports occiasional intermittent LE swelling for which he applies ace wraps and takes lasix at home   Respiratory:  Positive for cough. Negative for shortness of breath, sputum production and wheezing.    Gastrointestinal:  Negative for nausea and vomiting.   Genitourinary:  Negative for dysuria.   Neurological:  Negative for aphonia, dizziness, light-headedness and vertigo.   Psychiatric/Behavioral:  Negative for altered mental status.          Objective       Visit Vitals  /73 (BP Location: Left upper arm, Patient Position: Lying)   Pulse 80   Temp 36.7 °C (98.1 °F) (Oral)   Resp 16   Ht 1.651 m (5' 5\")   Wt 81.7 kg (180 lb 1.6 oz)   SpO2 100%   BMI 29.97 kg/m²       Physical Exam  Constitutional:       General: He is not in acute distress.     Appearance: Normal appearance.   HENT:      Mouth/Throat:      Mouth: Mucous membranes are moist.      Pharynx: Oropharynx is clear.   Cardiovascular:      Rate and Rhythm: Normal rate and regular rhythm.      Pulses: " Normal pulses.      Heart sounds: Normal heart sounds.   Pulmonary:      Comments: Crackls in b/l bases  Abdominal:      Palpations: Abdomen is soft.   Musculoskeletal:      Right lower leg: Edema present.      Left lower leg: Edema present.      Comments: Scant LE edema, wrapped with ace wrap   Skin:     General: Skin is warm and dry.      Capillary Refill: Capillary refill takes less than 2 seconds.      Comments: Thickened skin over chest wall likely rltd radiation for CA treatment   Neurological:      General: No focal deficit present.      Mental Status: He is alert and oriented to person, place, and time.   Psychiatric:         Mood and Affect: Mood normal.          Labs   Lab Results   Component Value Date    WBC 6.16 04/08/2025    HGB 10.7 (L) 04/08/2025    HCT 34.0 (L) 04/08/2025     04/08/2025    TRIG 303 (H) 12/27/2023    ALT 9 04/08/2025    AST 33 04/08/2025     04/08/2025    K 5.3 (H) 04/08/2025     04/08/2025    CREATININE 1.8 (H) 04/08/2025    BUN 27 (H) 04/08/2025    CO2 29 04/08/2025    TSH 3.47 11/12/2024    PSA <0.04 03/20/2025    INR 1.8 01/07/2024    GLUCOSE 152 (H) 04/08/2025       Imaging      4/7/2025 CT Chest   IMPRESSION:  1.  No definite evidence of pulmonary embolism.  2.  Cardiomegaly.  3.  Right chest wall soft tissue density which should be clinically correlated  for neoplasm or infection.  4.  Peripheral right upper lobe consolidation, subjacent mass which may  represent treatment related change and/or infection.  5.  Slight progressive right lower lobe pleural thickening which may represent  atelectasis, infection or neoplasm.  6.  Cardiomegaly.      Cardiac Imaging    TRANSTHORACIC ECHO (TTE) COMPLETE 04/07/2025    Interpretation Summary    Left Ventricle: Normal ventricle size. Normal wall thickness. Muscle mass is normal. Estimated EF 25-30%. Preserved funciton of the basal walls with persistent akinesis of the mid and distal walls. Diastolic function: patient  in a-fib.    Right Ventricle: Normal ventricle size. Normal wall thickness. Mildly reduced systolic function. TAPSE 14 mm.    Left Atrium: Mildly dilated atrium.    Right Atrium: Mildly dilated atrium.    Aortic Valve: Tricuspid valve.  Focal calcification present. Trace regurgitation. No stenosis.    Tricuspid Valve: Normal structure. Trace regurgitation. Estimated RVSP = 35 mmHg.    Aorta: Aortic root normal. Ascending aorta normal-sized. Mild dilatation at the sinuses of Valsalva.    IVC/SVC: Inferior vena cava is a small caliber vessel (<1.7cm). Inferior vena cava collapses >50% during inspiration.    TTE 1/02/2024 limited: Left Ventricle: Normal ventricle size. Normal wall thickness. Estimated EF 60-65%. No regional wall motion abnormalities.  Limited followup study shows normal LV function now- compared with apical hypokinesis on 12/26/23    TTE 12/26/2023       Left Ventricle: Normal ventricle size. Normal wall thickness. Preserved systolic function. Estimated EF 40-45%. Hypokinesis of the apex. 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    Right Ventricle: Normal ventricle size. Pacer wire present. Normal systolic function.    Right Atrium: Normal sized atrium.    Aortic Valve: Tricuspid valve. Trace regurgitation. No stenosis.    Mitral Valve: Normal leaflet structure. Normal leaflet motion. Trace regurgitation. No stenosis.    Tricuspid Valve: Normal structure. Mild regurgitation. Estimated RVSP = 43 mmHg. No significant stenosis.    Pulmonic Valve: Normal structure. No regurgitation. No stenosis.    Aorta: Aortic root normal. Sinuses of Valsalva normal-sized. Ascending aorta normal-sized. Aortic arch normal-sized. Descending aorta normal-sized.    Pericardium: Normal structure. No evidence of pericardial effusion. No cardiac tamponade.    Left Atrium: Mildly dilated atrium.     R/LHC 12/25/2023    Angiographically normal epicardial coronary  arteries    Left ventricular ejection fraction is approximately 25-30%.Wall motion abnormalities consistent with Takotsubo Cardiomyopathy    Severely elevated biventricular filling pressures    Reduced Cardiac Output and Index; Reduced Stroke volume and stroke volume index by Chelsi Calculation    Pulmonary venous post-capillary hypertension primarily due to left heart dysfunction    No peak to peak gradient on pullback to suggest aortic stenosis      ECG   3/27/25    4/7/25        Telemetry  NSR with PACs, occasional PVCs    Assessment & Plan    Assessment & Plan  Acute respiratory failure with hypoxia and hypercapnia (CMS/HCC)  - reason for admission  - likely multifactorial in setting of COVID, HF  - followed by pulm  - per [facility] [place]  COVID-19  - reason for admission  - ID following  - per [facility] [place]  Acute on chronic systolic congestive heart failure (CMS/HCC)  - previously HEFrecEF: TTE limited 1/2024: EF 60-65% with no regional wall motion abnormalities  - now 4/7/25 TTE: EF 25-30%, persistent akinesis of the mid and distal walls  - CT chest: cardiomegaly, no PE, R upper lob consolidation possibly infect/neoplasm   - CXR  Mild opacity in the right lung which has slightly improved from prev study 1/2024  - cardiac cath 1/2023: Angiographically normal epicardial coronary arteries  - examines hypervolemic, scant LE edema  - on 2LNC, denies use of supplemental O2 at home  - trop 34.1 trivially elevated, likely demand in setting of COVID  Plan  - EKG poor quality, will repeat  - repeat trop to peak   - cont diuresis for now with IV lasix 40mg BID  - strict I&O, daily weights  - monitor electrolytes   - pt with cardiac cath 2023 without evidence of ischemic arteries, reduced EF on TTE likely in setting of Takastubo/COVID, options to proceed with cardiac cath for ischemic eval discussed with pt, decision to proceed with medication management for now, will initiate GDMT with starting Valsartan 40mg daily now, if BP tolerated  increase to BID tomorrow  - cont metoprolol  - follow up out pt with Dr. Moran for GDMT optimization  Takotsubo cardiomyopathy  - cardiac catheterization during 12/2023 admission: revealed no significant coronary disease. His LV gram was consistent with Takotsubo syndrome. He was started on intravenous milrinone as his intracardiac pressures were elevated. The EF recovered with no further signs of Takotsubo.   - TTE from today with reduced EF 25-30%  plan  - see plan under systolic HF  History of cardiac arrest  - During hospitalization 12/24/2023 at , pt became hypoxic and bradycardic treated with atropine, subsequently intubated and developed polymorphic VT degenerating into sustained Vflutt.  Treated with IV lidocaine and 4 external shocks with resultant slow wide-complex rhythm.  Given 300mg IV amio with bolus lidocaine.  Had perisistent bradycardia requiring external pacing.  No further episodes of VF on amio   Plan  - currently NSR on telemetry, cont to monitor   Paroxysmal atrial fibrillation (CMS/HCC)  - s/p cardioversion 12/5/22  - on amiodarone twice a week (mon, Fri), metoprolol   - on xarelto at home, may resume  - currently NSR with occasional PVCs  History of prolonged Q-T interval on ECG  - record review: 9/2024 EKG in office with sinus marcellus with prolonged QT interval.    - now on amiodarone 2X/week (mon, fri)  - followed by Dr. Aceves  Stage 3a chronic kidney disease (CMS/HCC)  - baseline Cr (1.1-1.4)  - Cr 1.8 today, monitoring in setting of diuretics  Prostate cancer (CMS/HCC)  - treatment complete  Breast cancer (CMS/HCC)  - s/p R breast mastectomy with lymph node removal 8/2023  - on tamoxifen   Hyperlipidemia  - on atorvastatin      Megan M. Hosgood, CRNP  4/8/2025  12:00 PM

## 2025-04-08 NOTE — ASSESSMENT & PLAN NOTE
- During hospitalization 12/24/2023 at , pt became hypoxic and bradycardic treated with atropine, subsequently intubated and developed polymorphic VT degenerating into sustained Vflutt.  Treated with IV lidocaine and 4 external shocks with resultant slow wide-complex rhythm.  Given 300mg IV amio with bolus lidocaine.  Had perisistent bradycardia requiring external pacing.  No further episodes of VF on amio   Plan  - currently NSR on telemetry, cont to monitor

## 2025-04-08 NOTE — CONSULTS
Attempted visit, [facility] was unavailable (nurse at bedside) and no family was present.     Spiritual Care will continue to follow this patient.     [facility]  Spiritual Care Specialist  [phone]  #[phone]

## 2025-04-08 NOTE — HOSPITAL COURSE
Radha is a 75 y.o. male admitted on 4/7/2025 with Acute respiratory failure with hypoxia (CMS/HCC) [J96.01]  Single subsegmental pulmonary embolism without acute cor pulmonale (CMS/HCC) [I26.93]  COVID-19 [U07.1]. Principal problem is Acute respiratory failure with hypoxia and hypercapnia (CMS/HCC).    Past Medical History  Radha has a past medical history of Acute systolic heart failure (CMS/HCC) (02/15/2023), Ambulates with cane, Atrial fibrillation (CMS/HCC), Breast cancer (CMS/HCC) (October 2022), CHF (congestive heart failure) (CMS/HCC), Chronic kidney disease, CKD (chronic kidney disease) (10/19/2022), radiation therapy, antineoplastic chemo, and Prostate cancer (CMS/HCC).    History of Present Illness  75 y.o. male with past medical history atrial fibrillation on Xarelto, breast CA s/p mastectomy/XRT, CKD, HFpEF, history of respiratory failure secondary to influenza in 12/2023 requiring tracheostomy who presented to the emergency room with shortness of breath.  He was in bed and was awoken from his sleep suddenly with cough and dyspnea.  He reported feeling well yesterday-usual state of health  In ER he was afebrile heart rate 98 respirate 32 /96 O2 sat 65% on room air.  He was placed on 6 L O2 and then on BiPAP 50% with improved oxygenation  Labs notable for VBG-7.13/73 consistent with acute hypercapnic respiratory failure-subsequent VBG-7.2 2/58/43.  White cell count 7.8.  Viral panel: Positive for COVID.  CTA chest-no PE.  Cardiomegaly, right chest wall soft tissue density.  Peripheral right upper lobe consolidation with subjacent mass which may be treatment related change and/or infection.  Slightly progressive right lower lobe pleural thickening

## 2025-04-08 NOTE — PROGRESS NOTES
Occupational Therapy -  Initial Evaluation     Patient: Cam Rosas Jr.  Location: [facility]  MRN: [id number]  Today's date: 4/8/2025    HISTORY OF PRESENT ILLNESS     Radha is a 75 y.o. male admitted on 4/7/2025 with Acute respiratory failure with hypoxia (CMS/HCC) [J96.01]  Single subsegmental pulmonary embolism without acute cor pulmonale (CMS/HCC) [I26.93]  COVID-19 [U07.1]. Principal problem is Acute respiratory failure with hypoxia and hypercapnia (CMS/HCC).    Past Medical History  Radha has a past medical history of Acute systolic heart failure (CMS/HCC) (02/15/2023), Ambulates with cane, Atrial fibrillation (CMS/HCC), Breast cancer (CMS/HCC) (October 2022), CHF (congestive heart failure) (CMS/HCC), Chronic kidney disease, CKD (chronic kidney disease) (10/19/2022), radiation therapy, antineoplastic chemo, and Prostate cancer (CMS/HCC).    History of Present Illness  75 y.o. male with past medical history atrial fibrillation on Xarelto, breast CA s/p mastectomy/XRT, CKD, HFpEF, history of respiratory failure secondary to influenza in 12/2023 requiring tracheostomy who presented to the emergency room with shortness of breath.  He was in bed and was awoken from his sleep suddenly with cough and dyspnea.  He reported feeling well yesterday-usual state of health  In ER he was afebrile heart rate 98 respirate 32 /96 O2 sat 65% on room air.  He was placed on 6 L O2 and then on BiPAP 50% with improved oxygenation  Labs notable for VBG-7.13/73 consistent with acute hypercapnic respiratory failure-subsequent VBG-7.2 2/58/43.  White cell count 7.8.  Viral panel: Positive for COVID.  CTA chest-no PE.  Cardiomegaly, right chest wall soft tissue density.  Peripheral right upper lobe consolidation with subjacent mass which may be treatment related change and/or infection.  Slightly progressive right lower lobe pleural thickening  PRIOR LEVEL OF FUNCTION AND LIVING ENVIRONMENT      Prior Level of Function      Flowsheet Row Most Recent Value   Dominant Hand right   Ambulation assistive equipment   Transferring assistive equipment   Toileting independent   Bathing independent   Dressing assistive person   Eating independent   IADLs independent   Driving/Transportation    Communication understands/communicates without difficulty   Prior Level of Function Comment reports SPC in-home ambulation, RW community, retired , wife assists w/ LB dressing.   Assistive Device Currently Used at Home cane, straight, grab bar, stair glide, walker, front-wheeled, wheelchair, shower chair             Prior Living Environment      Flowsheet Row Most Recent Value   People in Home spouse   Current Living Arrangements home   Living Environment Comment 2SH, 2 SRINIVAS w/out railings, first floor toilet, bedroom and main bathroom upstairs, walk-in shower w/ shower chair and grab-bars          Occupational Profile      Flowsheet Row Most Recent Value   Successful Occupations Reports some assist w/ ADL   Occupational History/Life Experiences Retired,  owned insurance company   Environmental Supports and Barriers Lives w/ wife          VITALS AND PAIN     OT Vitals      Date/Time Pulse SpO2 Pt Activity O2 Therapy O2 Del Method O2 Flow Rate BP BP Location BP Method Pt Position [facility]   04/08/25 1437 81 99 % At rest Supplemental oxygen Nasal cannula 4 L/min 125/70 Left upper arm Automatic Lying MTU   04/08/25 1448 85 96 % At rest Supplemental oxygen Nasal cannula 4 L/min 126/64 Left upper arm Automatic Sitting MTU          OT Pain      Date/Time Pain Type Rating: Rest Rating: Activity [facility]   04/08/25 1437 Pain Assessment;Post Activity 0 - no pain 0 - no pain MTU             Objective   OBJECTIVE     Start time:  1433  End time:  1450  Session Length: 17 min  Mode of Treatment: co-treatment  Reason for co-treatment: Ramesh Kimbrough    General Observations  Patient received reclined, in bed. He was  agreeable to therapy, no issues or concerns identified by nurse prior to session. Awake, alert, NAD    Precautions: fall, enhanced contact and droplet, oxygen therapy device and L/min, limb restrictions       Limitations/Impairments: safety/cognitive   Services  Do You Speak a Language Other Than English at Home?: no      OT Eval and Treat - 04/08/25 1433          Cognition    Orientation Status oriented x 3     Affect/Mental Status WFL;confused     Behavioral Issues overwhelmed easily     Follows Commands follows one-step commands;75-90% accuracy;increased processing time needed;initiation impaired;physical/tactile prompts required;verbal cues/prompting required     Cognitive Function attention deficit;executive function deficit;safety deficit     Attention Deficit moderate deficit;distractible in quiet environment;focused/sustained attention;restless when unoccupied;requires cues/redirection to task     Executive Function Deficit moderate deficit;information processing;initiation;insight/awareness of deficits;judgment;organization/sequencing;self-monitoring/self-correction     Safety Deficit moderate deficit;awareness of need for assistance;impulsivity;insight into deficits/self-awareness;judgment;problem-solving     Comment, Cognition Pleasant and cooperative, confused and easily distracted        Vision Assessment/Intervention    Vision Assessment WFL;corrective lenses for reading        Hearing Assessment    Hearing Status WFL        Sensory Assessment    Sensory Assessment sensation intact, upper extremities        Upper Extremity Assessment    UE Assessment ROM and Strength WFL        Bed Mobility    Bed Mobility Activities left;supine to sit     [place] close supervision     Safety/Cues increased time to complete     Assistive Device head of bed elevated;bed rails        Mobility Belt    Mobility Belt Used During Session no - patient refused        Sit/Stand Transfer    Surface edge of bed      [place] minimum assist (75% or more patient effort);1 person assist     Safety/Cues increased time to complete;verbal cues;hand placement;proper use of assistive device     Assistive Device --   HHA       Surface-to-Surface Transfers    Transfer Location bed to chair     Transfer Technique stand pivot     [place] moderate assist (50-74% patient effort);1 person assist     Safety/Cues increased time to complete;verbal cues;hand placement;proper use of assistive device     Assistive Device walker, front-wheeled     Transfer Comments unsteady, ataxic. LOB w/ Mod A to recover        Lower Body Dressing    Tasks don;doff;socks     [place] dependent (less than 25% patient effort)     Position edge of bed sitting     Adaptive Equipment none        Grooming    Tasks washes, rinses and dries hands     [place] minimum assist (75% or more patient effort)     Safety/Cues increased time to complete     Position supported sitting;supported standing     Setup Assistance obtain supplies     Adaptive Equipment none        Balance    Static Sitting Balance WFL;sitting, edge of bed     Dynamic Sitting Balance mild impairment;sitting, edge of bed     Sit to Stand Dynamic Balance mild impairment;supported;standing     Static Standing Balance mild impairment;supported;standing     Dynamic Standing Balance mild impairment;supported;standing     Comment, Balance w/ RW        Impairments/Safety Issues    Impairments Affecting Function balance;endurance/activity tolerance;strength;pain;cognition     Cognitive Impairments, Safety/Performance awareness, need for assistance;attention;insight into deficits/self-awareness;problem-solving/reasoning     Functional Endurance Fair     Safety Issues Affecting Function insight into deficits/self-awareness;judgment;problem-solving                                              Education Documentation  Treatment Plan, taught by Prateek Andrade, ROMEO at 4/8/2025  4:19 PM.  Learner:  Patient  Readiness: Acceptance  Method: Explanation  Response: Needs Reinforcement  Comment: ROle of OT, tx technique, use of RW, energy conservation, OT POC        Session Outcome  Patient reclined, in chair at end of session, chair alarm on, all needs met, call light in reach, personal items in reach. Nursing notified about patient's performance, patient's position, and patient's response to therapy/activity.    AM-PAC - ADL (Current Function)     Putting on/taking off regular lower body clothing 1 - Total   Bathing 2 - A Lot   Toileting 2 - A Lot   Putting on/taking off regular upper body clothing 3 - A Little   Help for taking care of personal grooming 3 - A Little   Eating meals 4 - None   AM-PAC ADL Score 15      ASSESSMENT AND PLAN     Progress Summary  OT IE completed ([facility] 15). Pt limited by decreased balance, endurance, and strength, decreased insight into deficits and safety awareness, and decreased problem solving and attention. Currently ClS for bed mobility, Mod A for stand pivot tx, Mod A to recover from LOB, and Max A for LB dressing. benefits from continued OT to MAX functinal IND and ensure safety. Rec SNF    Patient/Family Therapy Goal Statement: To return to PLOF    OT Plan      Flowsheet Row Most Recent Value   Rehab Potential good, to achieve stated therapy goals at 04/08/2025 1433   Therapy Frequency 3 times/wk at 04/08/2025 1433   Planned Therapy Interventions activity tolerance training, adaptive equipment training, BADL retraining, functional balance retraining, occupation/activity based interventions, strengthening exercise, ROM/therapeutic exercise, transfer/mobility retraining at 04/08/2025 1433            OT Discharge Recommendations      Flowsheet Row Most Recent Value   OT Recommended Discharge Disposition skilled nursing facility at 04/08/2025 1433   Anticipated Equipment Needs if Discharged Home (OT) none at 04/08/2025 1433                 OT Goals      Flowsheet Row Most Recent  Value   Bed Mobility Goal 1    Activity/Assistive Device bed mobility activities, all at 04/08/2025 1433   [place] modified independence at 04/08/2025 1433   Time Frame by discharge at 04/08/2025 1433   Progress/Outcome goal ongoing at 04/08/2025 1433   Transfer Goal 1    Activity/Assistive Device all transfers at 04/08/2025 1433   [place] modified independence at 04/08/2025 1433   Time Frame by discharge at 04/08/2025 1433   Progress/Outcome goal ongoing at 04/08/2025 1433   Bathing Goal 1    Activity/Assistive Device bathing skills, all at 04/08/2025 1433   [place] modified independence at 04/08/2025 1433   Time Frame by discharge at 04/08/2025 1433   Progress/Outcome goal ongoing at 04/08/2025 1433   Dressing Goal 1    Activity/Adaptive Equipment dressing skills, all at 04/08/2025 1433   [place] modified independence at 04/08/2025 1433   Time Frame by discharge at 04/08/2025 1433   Progress/Outcome goal ongoing at 04/08/2025 1433   Grooming Goal 1    Activity/Assistive Device grooming skills, all at 04/08/2025 1433   [place] modified independence at 04/08/2025 1433   Time Frame by discharge at 04/08/2025 1433

## 2025-04-08 NOTE — CONSULTS
Clinical Nutrition Education Note    Clinical Comments:  Consult received for CHF diet education.  Pt is a 75 year old male admitted with acute respiratory failure with hypoxia, COVID positive. Pt visited, wife at bedside. RD discussed fluid restriction, 2gm sodium restriction and importance of weighing self daily. Reviewed foods recommended/foods to avoid, label reading, low sodium/sodium free seasoning tips. Pt with good understanding of topics discussed. Written educational materials provided along with RD contact information. Pt's wife says they do watch their salt intake at home and pt says he has been given low sodium ed before. Happy for the reminders. Pt's intake is not great today, declines nutrition supplements at this time. RD will assess at LOS x day 6, please consult prn.       Discussed with: patient and caregiver                            Verbalizes understanding

## 2025-04-08 NOTE — ASSESSMENT & PLAN NOTE
- previously HEFrecEF: TTE limited 1/2024: EF 60-65% with no regional wall motion abnormalities  - now 4/7/25 TTE: EF 25-30%, persistent akinesis of the mid and distal walls  - CT chest: cardiomegaly, no PE, R upper lob consolidation possibly infect/neoplasm   - CXR  Mild opacity in the right lung which has slightly improved from prev study 1/2024  - cardiac cath 1/2023: Angiographically normal epicardial coronary arteries  - examines hypervolemic, scant LE edema  - on 2LNC, denies use of supplemental O2 at home  - trop 34.1 trivially elevated, likely demand in setting of COVID  Plan  - EKG poor quality, will repeat  - repeat trop to peak   - cont diuresis for now with IV lasix 40mg BID  - strict I&O, daily weights  - monitor electrolytes   - pt with cardiac cath 2023 without evidence of ischemic arteries, reduced EF on TTE likely in setting of Takastubo/COVID, options to proceed with cardiac cath for ischemic eval discussed with pt, decision to proceed with medication management for now, will initiate GDMT with starting Valsartan 40mg daily now, if BP tolerated increase to BID tomorrow  - cont metoprolol  - follow up out pt with Dr. Moran for GDMT optimization

## 2025-04-08 NOTE — ASSESSMENT & PLAN NOTE
Baseline creatinine 1.4-1.5  Creatinine is 1.8 today  Urine analysis pending  Renal ultrasound: Limited visualization secondary to suboptimal acoustic windows.  Mild renal cortical thinning/scarring.  No hydronephrosis.  Monitor closely with diuresis    Will get nephrology consult

## 2025-04-08 NOTE — ASSESSMENT & PLAN NOTE
As above  Started on Decadron and remdesivir April 7  Infectious disease and pulmonary are following  Discussed with Dr. Hernández: Continue remdesivir    Will add Mucinex and Tessalon as needed

## 2025-04-08 NOTE — PROGRESS NOTES
Physical Therapy -  Initial Evaluation     Patient: Cam Rosas Jr.  Location: [facility]  MRN: [id number]  Today's date: 4/8/2025    HISTORY OF PRESENT ILLNESS     Radha is a 75 y.o. male admitted on 4/7/2025 with Acute respiratory failure with hypoxia (CMS/HCC) [J96.01]  Single subsegmental pulmonary embolism without acute cor pulmonale (CMS/HCC) [I26.93]  COVID-19 [U07.1]. Principal problem is Acute respiratory failure with hypoxia and hypercapnia (CMS/HCC).    Past Medical History  Radha has a past medical history of Acute systolic heart failure (CMS/HCC) (02/15/2023), Ambulates with cane, Atrial fibrillation (CMS/HCC), Breast cancer (CMS/HCC) (October 2022), CHF (congestive heart failure) (CMS/HCC), Chronic kidney disease, CKD (chronic kidney disease) (10/19/2022), radiation therapy, antineoplastic chemo, and Prostate cancer (CMS/HCC).    History of Present Illness  75 y.o. male with past medical history atrial fibrillation on Xarelto, breast CA s/p mastectomy/XRT, CKD, HFpEF, history of respiratory failure secondary to influenza in 12/2023 requiring tracheostomy who presented to the emergency room with shortness of breath.  He was in bed and was awoken from his sleep suddenly with cough and dyspnea.  He reported feeling well yesterday-usual state of health  In ER he was afebrile heart rate 98 respirate 32 /96 O2 sat 65% on room air.  He was placed on 6 L O2 and then on BiPAP 50% with improved oxygenation  Labs notable for VBG-7.13/73 consistent with acute hypercapnic respiratory failure-subsequent VBG-7.2 2/58/43.  White cell count 7.8.  Viral panel: Positive for COVID.  CTA chest-no PE.  Cardiomegaly, right chest wall soft tissue density.  Peripheral right upper lobe consolidation with subjacent mass which may be treatment related change and/or infection.  Slightly progressive right lower lobe pleural thickening  PRIOR LEVEL OF FUNCTION AND LIVING ENVIRONMENT      Prior Level of Function      Flowsheet Row Most Recent Value   Dominant Hand right   Ambulation assistive equipment   Transferring assistive equipment   Toileting independent   Bathing independent   Dressing assistive person   Eating independent   IADLs independent   Driving/Transportation    Communication understands/communicates without difficulty   Prior Level of Function Comment reports SPC in-home ambulation, RW community, retired , wife assists w/ LB dressing.   Assistive Device Currently Used at Home cane, straight, grab bar, stair glide, walker, front-wheeled, wheelchair, shower chair        History of Falls: No falls in the past year    Prior Living Environment      Flowsheet Row Most Recent Value   People in Home spouse   Current Living Arrangements home   Living Environment Comment 2SH, 2 SRINIVAS w/out railings, first floor toilet, bedroom and main bathroom upstairs, walk-in shower w/ shower chair and grab-bars          VITALS AND PAIN     PT Vitals      Date/Time Pulse SpO2 Pt Activity O2 Therapy O2 Del Method O2 Flow Rate BP BP Location BP Method Pt Position [facility]   04/08/25 1437 81 99 % At rest Supplemental oxygen Nasal cannula 4 L/min 125/70 Left upper arm Automatic Lying MTU   04/08/25 1448 85 96 % At rest Supplemental oxygen Nasal cannula 4 L/min 126/64 Left upper arm Automatic Sitting MTU          PT Pain      Date/Time Pain Type Rating: Rest Rating: Activity [facility]   04/08/25 1437 Pain Assessment;Post Activity 0 - no pain 0 - no pain MTU             Objective   OBJECTIVE     Start time:  1434  End time:  1450  Session Length: 16 min  Mode of Treatment: co-treatment  Reason for co-treatment: skilled assist x2, PT focus on mobility    General Observations  Patient received reclined, in bed (+bed alarm). He was agreeable to therapy, no issues or concerns identified by nurse prior to session.      Precautions: fall, enhanced contact and droplet, oxygen therapy device and  L/min, limb restrictions (+covid19, 4L O2nc, RUE limb restriction per previous admission?)       Limitations/Impairments: safety/cognitive      PT Eval and Treat - 04/08/25 1434          Cognition    Orientation Status oriented x 3     Affect/Mental Status WFL;confused     Behavioral Issues overwhelmed easily     Follows Commands follows one-step commands;75-90% accuracy;increased processing time needed;initiation impaired;physical/tactile prompts required;verbal cues/prompting required     Cognitive Function attention deficit;executive function deficit;safety deficit     Attention Deficit moderate deficit;distractible in quiet environment;focused/sustained attention;restless when unoccupied;requires cues/redirection to task     Executive Function Deficit moderate deficit;information processing;initiation;insight/awareness of deficits;judgment;organization/sequencing;self-monitoring/self-correction     Safety Deficit moderate deficit;awareness of need for assistance;impulsivity;insight into deficits/self-awareness;judgment;problem-solving     Comment, Cognition pleasant and cooperative but confused, easily distracted by IV and O2 lines, decreased safety and functional insight.        Vision Assessment/Intervention    Vision Assessment WFL;corrective lenses for reading        Hearing Assessment    Hearing Status WFL        Sensory Assessment    Sensory Assessment sensation intact, lower extremities        Lower Extremity Assessment    LE Assessment ROM and Strength WFL     General Observations based on functional assessment        Motor Skills    Coordination gross motor deficit;moderate impairment;ataxia        Bed Mobility    Bed Mobility Activities supine to sit     [place] close supervision     Safety/Cues increased time to complete;verbal cues;hand placement;sequencing;technique     Assistive Device head of bed elevated;bed rails     Comment out of bed to L w/ increased time        Mobility Belt    Mobility  Belt Used During Session no - patient refused        Sit/Stand Transfer    Surface edge of bed     [place] minimum assist (75% or more patient effort);1 person assist     Safety/Cues increased time to complete;verbal cues;technique     Assistive Device --   hand-hold assist    Transfer Comments unsteady in standing but no overt LOB, posterior support against EOB        Surface-to-Surface Transfers    Transfer Location bed to chair     Transfer Technique stand step     [place] moderate assist (50-74% patient effort);1 person assist     Safety/Cues increased time to complete;verbal cues;hand placement;sequencing;technique;maintaining center of gravity over base of support     Assistive Device --   hand-hold assist    Transfer Comments unsteady ataxic forward and lateral steps, PT assist to maintain upright posture due to posterior LOB, poor eccentric control w/ cues for safe positioning to chair surface        Gait Training    [place], Gait not tested     Comment deferred due to instability and poor command following, will assess as appropriate in future sessions        Balance    Static Sitting Balance WFL;sitting, edge of bed     Dynamic Sitting Balance mild impairment;sitting, edge of bed     Sit to Stand Dynamic Balance mild impairment;unsupported     Static Standing Balance mild impairment;unsupported     Dynamic Standing Balance moderate impairment;unsupported     Comment, Balance min-modA w/ hand-hold assist in standing        Impairments/Safety Issues    Impairments Affecting Function balance;cognition;coordination;endurance/activity tolerance;motor planning;postural/trunk control;strength;shortness of breath     Cognitive Impairments, Safety/Performance attention;awareness, need for assistance;insight into deficits/self-awareness;safety precaution follow-through;sequencing abilities     Functional Endurance fair-, dyspneic but SpO2 stable 96% on 4L O2nc     Safety Issues Affecting Function  insight into deficits/self-awareness;judgment;problem-solving;sequencing abilities                                              Education Documentation  Signs/Symptoms, taught by Irma Sebastian PT at 4/8/2025  3:40 PM.  Learner: Patient  Readiness: Acceptance  Method: Explanation  Response: Needs Reinforcement    Risk Factors, taught by Irma Sebastian PT at 4/8/2025  3:40 PM.  Learner: Patient  Readiness: Acceptance  Method: Explanation  Response: Needs Reinforcement    Fall Prevention, taught by Irma Sebastian PT at 4/8/2025  3:40 PM.  Learner: Patient  Readiness: Acceptance  Method: Explanation  Response: Needs Reinforcement    Call Light Use, taught by Irma Sebastian PT at 4/8/2025  3:40 PM.  Learner: Patient  Readiness: Acceptance  Method: Explanation  Response: Needs Reinforcement    Treatment Plan, taught by Irma Sebastian PT at 4/8/2025  3:40 PM.  Learner: Patient  Readiness: Acceptance  Method: Explanation  Response: Needs Reinforcement        Session Outcome  Patient upright, in chair at end of session, chair alarm on, all needs met, call light in reach, personal items in reach. Nursing notified about patient's performance, patient's position, and patient's response to therapy/activity.    AM-PAC - Mobility (Current Function)     Turning form your back to your side while in flat bed without using bedrails 4 - None   Moving from lying on your back to sitting on the side of a flat bed without using bedrails 3 - A Little   Moving to and from a bed to a chair 2 - A Lot   Standing up from a chair using your arms 3 - A Little   To walk in a hospital room 2 - A Lot   Climbing 3-5 steps with a railing 1 - Total   AM-PAC Mobility Score 15      ASSESSMENT AND PLAN     Progress Summary  PT evaluation complete. Patient performs bed mobility w/ close supervision, requires min-modA for sit<>stand transfers and limited steps w/ hand-hold assist. Demonstrates decreased strength, balance,  coordination and motor control. Further assessement deferred due to fatigue and endurance deficits, SOB w/ activity but SpO2 stable w/ supplemental O2. Patient is below his baseline and will benefit from continued skilled PT services to progress strength and mobiliyt deficits to improve functional safety and independence prior to returning home. Recommend SNF when medically appropriate. PT will continue to follow. AM-PAC 15/24.    Patient/Family Therapy Goals Statement: To get better.    PT Plan      Flowsheet Row Most Recent Value   Rehab Potential good, to achieve stated therapy goals at 04/08/2025 1434   Therapy Frequency 3 times/wk at 04/08/2025 1434   Planned Therapy Interventions balance training, bed mobility training, gait training, patient/family education, postural re-education, transfer training, stair training, motor coordination training at 04/08/2025 1434            PT Discharge Recommendations      Flowsheet Row Most Recent Value   PT Recommended Discharge Disposition skilled nursing facility at 04/08/2025 1434   Anticipated Equipment Needs if Discharged Home (PT) none at 04/08/2025 1434                 PT Goals      Flowsheet Row Most Recent Value   Bed Mobility Goal 1    Activity/Assistive Device bed mobility activities, all at 04/08/2025 1434   [place] modified independence at 04/08/2025 1434   Time Frame by discharge at 04/08/2025 1434   Progress/Outcome goal ongoing at 04/08/2025 1434   Transfer Goal 1    Activity/Assistive Device walker, front-wheeled, sit-to-stand/stand-to-sit, bed-to-chair/chair-to-bed at 04/08/2025 1434   [place] modified independence at 04/08/2025 1434   Time Frame by discharge at 04/08/2025 1434   Progress/Outcome goal ongoing at 04/08/2025 1434   Gait Training Goal 1    Activity/Assistive Device walker, front-wheeled, gait (walking locomotion) at 04/08/2025 1434   [place] modified independence at 04/08/2025 1434   Distance 100 ft at 04/08/2025 1434   Time  Frame by discharge at 04/08/2025 1434   Progress/Outcome goal ongoing at 04/08/2025 1434   Stairs Goal 1    Activity/Assistive Device ascending stairs, descending stairs, using handrail, right, using handrail, left at 04/08/2025 1434   [place] supervision required at 04/08/2025 1434   Number of Stairs 2 at 04/08/2025 1434   Time Frame by discharge at 04/08/2025 1434   Progress/Outcome goal ongoing at 04/08/2025 1434

## 2025-04-08 NOTE — CONSULTS
Wound Ostomy Continence Note    Subjective    HPI Patient is a 75 y.o. male who was admitted on 4/7/2025 with a diagnosis of Acute respiratory failure with hypoxia (CMS/HCC) [J96.01]  Single subsegmental pulmonary embolism without acute cor pulmonale (CMS/HCC) [I26.93]  COVID-19 [U07.1].    Problem list Principal Problem:    Acute respiratory failure with hypoxia and hypercapnia (CMS/HCC)  Active Problems:    Stage 3a chronic kidney disease (CMS/HCC)    COVID-19    Acute on chronic systolic congestive heart failure (CMS/HCC)    Paroxysmal atrial fibrillation (CMS/HCC)    History of cardiac arrest    Prostate cancer (CMS/HCC)    Breast cancer (CMS/HCC)    History of peptic ulcer disease    BPH (benign prostatic hyperplasia)    Hyperlipidemia    Hypothyroidism    Anemia    Multiple open wounds of lower extremity    Scalp wound     PMH/PSH Past Medical History:   Diagnosis Date    Acute systolic heart failure (CMS/HCC) 02/15/2023    Ambulates with cane     and walker    Atrial fibrillation (CMS/HCC)     Breast cancer (CMS/HCC) October 2022    CHF (congestive heart failure) (CMS/HCC)     Chronic kidney disease     CKD (chronic kidney disease) 10/19/2022    History of radiation therapy     Hx antineoplastic chemo     Prostate cancer (CMS/HCC)      Past Surgical History   Procedure Laterality Date    Cardiac surgery      mitral valve repair 2006    Cardioversion  12/05/2022    Successful DC cardioversion    CARDIOVERSION EXTERNAL N/A 12/5/2022    Performed by Gary Aceves MD at [facility] CARDIAC CATH/EP    Cath lab temporary pacemaker insertion N/A 12/25/2023    Performed by Haja Onofre MD at  CARDIAC CATH/EP/NEURO    GASTROSTOMY PERCUTANEOUS ENDOSCOPIC N/A 1/4/2024    Performed by Edison Oneil MD at  OR    Knee cartilage surgery Left     Mastectomy      PORT/PERMACATH REMOVAL Left 11/9/2023    Performed by Barb Osuna MD at  OR    Prostate surgery  July 2022    Prostatectomy  07/15/2022     "Right & left heart cath with coronary angiography N/A 12/25/2023    Performed by Haja Onofre MD at  CARDIAC CATH/EP/NEURO    RIGHT BREAST MASTECTOMY; RIGHT SENTINEL NODE IDENTIFICATION, MAPPING, AND BIOPSY; Right 8/17/2023    Performed by Barb Osuna MD at  OR    Tonsillectomy      TRACHEOSTOMY N/A 1/4/2024    Performed by Edison Oneil MD at  OR      Assessment and Recommendation     Consult by [facility] Physician for scalp wound care recs. Attempt made to see patient, having testing completed. EMR/media reviewed. Patient has scalp wound from surgical procedure at [facility] ~5 days ago. Primary scalp wound is full thickness, moist tan and red tissue, periwound is intact. There is another full thickness wound more anterior, moist tan slough tissue over moist red wound bed, no erythema, pink periwound. Charles score at time of last bedside nursing assessment-15. Recs noted below. Wound care to sign off, please re consult if needed.    Maintain PI PREVENT bundle, where applicable    Scalp wounds: Gently cleanse with VASHE solution at time of each dressing change, cover open wound with non adherent adaptic dressing then cover with silicone bordered foam dressing-change 3x/wk and PRN      Wound Prevention Bundle                                                             Positioning- If clinically able:  Reposition at a minimum of 2 hours.  Adjust to avoid lying on medical devices. Use positioning devices to offload bony prominences. When out of bed, use chair cushions. Monitor time OOB and encourage repositioning. Use transfer aids to reduce friction and shear. Use \"turn assist.\"                   Risk Assessment:              Utilize risk assessment scale per hospital guidelines.  Identify additional risk factors, for example, medications and comorbidities. Match interventions to subscales of hospital's Risk assessment scale.  communicate risk to interdisciplinary healthcare team.                 " "  Evaluate Surface/Pressure Redistribution:              Consider specialty sleep surface according to patient need.   Utilize \"less is best\" with linen usage.                   Vigilant Skin Inspection:              Inspect skin from head to toe, including areas under removable medical devices and dressings. Communicate skin issues to healthcare team and consult resources as needed.                              Evaluate incontinence/moisture/temperature:              Offer toileting when appropriate. Keep skin clean and dry (microclimate). Use moisture barrier products for incontinence care. Diapers/briefs for out of bed or off unit. Use absorbent under pads that wick away and hold moisture. Intervene for fecal and/or urinary incontinence (consider external containment devices. Use skin emollients (dry skin).                             Nutrition:              Consult dietician for at risk patients. Assist with menu preferences and feeding. Encourage snacks, fluid and supplements with rounding. Accurately record all intake where indicated.                       Teaching Patients and Families:              Encourage patients and families to partner with staff in preventing pressure injuries. Give patients and families pressure injury education, assess their understanding of education given, and document education.       Date: 04/08/25  Signature: Keira Ragland RN                  "

## 2025-04-08 NOTE — ASSESSMENT & PLAN NOTE
In sinus rhythm  On amiodarone and metoprolol  Usual on Xarelto, was given Lovenox in case needs catheterization but does not seem to be the case  We will restart Xarelto, decrease to 15 mg

## 2025-04-08 NOTE — PROGRESS NOTES
[facility] Medicine Service -  Daily Progress Note       SUBJECTIVE   Reports shortness of breath is better  Saturating 100% on 3 L, decreased to 2 L  Complaining of cough, seems to be productive but unable to expectorate phlegm  Denies chest pain, nausea, abdominal pain, dizziness or lightheadedness    Afebrile  Urine output 1.25 L, 400 cc negative       OBJECTIVE      Vital signs in last 24 hours:  Temp:  [36.6 °C (97.8 °F)-37.3 °C (99.2 °F)] 36.8 °C (98.2 °F)  Heart Rate:  [60-84] 80  Resp:  [16-20] 16  BP: (101-134)/(59-89) 114/59    Intake/Output Summary (Last 24 hours) at 4/8/2025 0854  Last data filed at 4/8/2025 0600  Gross per 24 hour   Intake 715 ml   Output 1250 ml   Net -535 ml       PHYSICAL EXAMINATION      Physical Exam  Vitals and nursing note reviewed.   Constitutional:       Appearance: Normal appearance. He is well-developed and normal weight.   Eyes:      General: No scleral icterus.  Cardiovascular:      Rate and Rhythm: Normal rate and regular rhythm.      Heart sounds: Normal heart sounds.   Pulmonary:      Effort: Respiratory distress present.      Breath sounds: Rales present.      Comments: Few crackles  Mildly tachypneic  Abdominal:      General: Bowel sounds are normal.      Palpations: Abdomen is soft. There is no mass.      Tenderness: There is no abdominal tenderness.   Musculoskeletal:         General: Swelling present.      Cervical back: Neck supple.      Comments: Bilateral lower extremities edema   Skin:     General: Skin is warm and dry.      Comments: Lower extremities are wrapped  Scalp wound   Neurological:      Mental Status: He is alert and oriented to person, place, and time.   Psychiatric:         Behavior: Behavior normal.        LABS / IMAGING / TELE      Labs  I have reviewed the patient's labs.  Potassium 5.3 creatinine 1.8 WBC 6 hemoglobin 10.7    Imaging renal ultrasound is reviewed      ECG/Telemetry  Reviewed by me: No significant arrhythmia    ASSESSMENT AND  PLAN           Assessment & Plan  Acute respiratory failure with hypoxia and hypercapnia (CMS/HCC)  > Likely multifactorial in setting of COVID and heart failure exacerbation     CAT scan of chest: No pulmonary embolism, perifissural right upper lobe consolidation, slight progression of right lower lobe pleural thickening which may represent atelectasis, infection or neoplasm, cardiomegaly right chest wall soft tissue density should be clinically correlated for neoplasm infection  COVID-positive  WBC 8      Started on IV Decadron April 7 (no history of angioedema from prednisone, had lower extremity swelling and atrial fibrillation)  Continue IV diuresis  Continue remdesivir    Infectious disease and cardiology are following    COVID-19  As above  Started on Decadron and remdesivir April 7  Infectious disease and pulmonary are following  Discussed with Dr. Hernández: Continue remdesivir    Will add Mucinex and Tessalon as needed  Acute on chronic systolic congestive heart failure (CMS/HCC)  Echocardiogram: Ejection fraction 25 to 30%, mildly reduced RV function, no significant valvular heart disease, RVSP 35  Echocardiogram in January 2024:Ejection fraction 60 to 65%    New cardiomyopathy  Left heart catheterization in December 2023 with normal coronaries    Continue IV Lasix  Continue Metoprolol    Cardiology consult is appreciated: No plans for cardiac cath, continue IV Lasix.  Started on Diovan and got a dose today but will hold pending potassium and kidney function tomorrow  Discussed with Dr. Renteria  Stage 3a chronic kidney disease (CMS/HCC)  Baseline creatinine 1.4-1.5  Creatinine is 1.8 today  Urine analysis pending  Renal ultrasound: Limited visualization secondary to suboptimal acoustic windows.  Mild renal cortical thinning/scarring.  No hydronephrosis.  Monitor closely with diuresis    Will get nephrology consult  Paroxysmal atrial fibrillation (CMS/HCC)  In sinus rhythm  On amiodarone and  metoprolol  Usual on Xarelto, was given Lovenox in case needs catheterization but does not seem to be the case  We will restart Xarelto, decrease to 15 mg  History of cardiac arrest  History of V-fib cardiac arrest in February 2023 in the setting of influenza  Had [place]ubo cardiomyopathy at that time  Has had prolonged hospitalization with trach and PEG, now out  Prostate cancer (CMS/HCC)  Status post prostatectomy followed by radiation in 2022  Breast cancer (CMS/HCC)  Status post neoadjuvant chemotherapy in 2022, followed by mastectomy in August 2023, status postradiation.  On tamoxifen.  Declined HER2 directed therapy  History of peptic ulcer disease  In 2022  Continue PPI  BPH (benign prostatic hyperplasia)  Continue Flomax  Hyperlipidemia  Continue statin  Hypothyroidism  Continue levothyroxine  Anemia  Mild   Macrocytic    Folate and TSH are normal  B12 is low normal, will start B12 supplementation  Multiple open wounds of lower extremity  Podiatry consulr is appreciated  Scalp wound  Status post recent surgery for cancer  Wound care consult is appreciated  Hyperkalemia  Will put on low potassium diet  Hold Diovan     VTE Assessment: [place] VTE Score: 5  Code Status: Full Code  Estimated discharge date: 4/11/2025     Nory Jeronimo MD  4/8/2025

## 2025-04-08 NOTE — ASSESSMENT & PLAN NOTE
Echocardiogram: Ejection fraction 25 to 30%, mildly reduced RV function, no significant valvular heart disease, RVSP 35  Echocardiogram in January 2024:Ejection fraction 60 to 65%    New cardiomyopathy  Left heart catheterization in December 2023 with normal coronaries    Continue IV Lasix  Continue Metoprolol    Cardiology consult is appreciated: No plans for cardiac cath, continue IV Lasix.  Started on Diovan and got a dose today but will hold pending potassium and kidney function tomorrow  Discussed with Dr. Renteria

## 2025-04-08 NOTE — PLAN OF CARE
Problem: Adult Inpatient Plan of Care  Goal: Plan of Care Review  Outcome: Progressing  Flowsheets (Taken 4/8/2025 6578)  Progress: improving  Outcome Evaluation: OT IE completed  Plan of Care Reviewed With: patient

## 2025-04-08 NOTE — ASSESSMENT & PLAN NOTE
History of V-fib cardiac arrest in February 2023 in the setting of influenza  Had Takotsubo cardiomyopathy at that time  Has had prolonged hospitalization with trach and PEG, now out

## 2025-04-08 NOTE — ASSESSMENT & PLAN NOTE
- cardiac catheterization during 12/2023 admission: revealed no significant coronary disease. His LV gram was consistent with Takotsubo syndrome. He was started on intravenous milrinone as his intracardiac pressures were elevated. The EF recovered with no further signs of Takotsubo.   - TTE from today with reduced EF 25-30%  plan  - see plan under systolic HF

## 2025-04-08 NOTE — PROGRESS NOTES
Pulmonary Progress Note     SUBJECTIVE  Interval History:     Sitting up at side of bed  Feels better; mild cough  O2 sat 91% on 2L O2      Allergies: Azithromycin, Prednisone, and Lorazepam    CURRENT MEDS  Current Facility-Administered Medications   Medication Dose Route Frequency Provider Last Rate Last Admin    amiodarone (PACERONE) tablet 200 mg  200 mg oral Once per day on Monday Wednesday Friday Meek Pizarro MD   200 mg at 04/07/25 0821    atorvastatin (LIPITOR) tablet 10 mg  10 mg oral Daily Meek Pizarro MD   10 mg at 04/08/25 0822    benzonatate (TESSALON) capsule 100 mg  100 mg oral 3x daily PRN Nory Jeronimo MD        busPIRone (BUSPAR) tablet 10 mg  10 mg oral BID Meek Pizarro MD   10 mg at 04/08/25 0822    cetirizine (ZyrTEC) tablet 10 mg  10 mg oral q AM Nory Jeronimo MD   10 mg at 04/08/25 0823    cholecalciferol (vitamin D3) tablet 25 mcg  25 mcg oral q AM Nory Jeronimo MD   25 mcg at 04/08/25 0822    dexAMETHasone (DECADRON) injection 6 mg  6 mg intravenous Daily Nory Jeronimo MD   6 mg at 04/07/25 1711    glucose chewable tablet 16-32 g of dextrose  16-32 g of dextrose oral PRN Meek Pizarro MD        Or    dextrose 40 % oral gel 15-30 g of dextrose  15-30 g of dextrose oral PRN Meek Pizarro MD        Or    glucagon (GLUCAGEN) injection 1 mg  1 mg intramuscular PRN Meek Pizarro MD        Or    dextrose 50 % in water (D50) injection 12.5 g  25 mL intravenous PRN Meek Pizarro MD        enoxaparin (LOVENOX) syringe 90 mg  1 mg/kg subcutaneous q12h INT Meek Pizarro MD   90 mg at 04/08/25 0602    furosemide (LASIX) injection 40 mg  40 mg intravenous BID (am, 4p) Meek Pizarro MD   40 mg at 04/08/25 0823    guaiFENesin (MUCINEX) 12 hr ER tablet 600 mg  600 mg oral BID Nory Jeronimo MD        levothyroxine (SYNTHROID) tablet 50 mcg  50 mcg oral Daily (6:30a) Nory Jeronimo MD   50 mcg at 04/08/25 0602    magnesium oxide (MAG-OX) tablet  400 mg  400 mg oral BID Nory Jeronimo MD   400 mg at 04/08/25 0822    melatonin ODT 3 mg  3 mg peg tube Nightly PRN Meek Pizarro MD        metoprolol succinate XL (TOPROL-XL) 24 hr ER tablet 25 mg  25 mg oral Nightly Meek Pizarro MD   25 mg at 04/07/25 2136    pantoprazole (PROTONIX) tablet,delayed release (DR/EC) 40 mg  40 mg oral BID Meek Pizarro MD   40 mg at 04/08/25 0822    remdesivir (VEKLURY) 100 mg in sodium chloride 0.9 % 250 mL IVPB  100 mg intravenous q24h INT Heber Hernández MD        tamoxifen (NOLVADEX) tablet 20 mg  20 mg oral Daily Meek Pizarro MD   20 mg at 04/08/25 0822    tamsulosin (FLOMAX) 24 hr ER capsule 0.4 mg  0.4 mg oral Nightly Meek Pizarro MD   0.4 mg at 04/07/25 2136       VITAL SIGNS  Vitals:    04/08/25 0600 04/08/25 0606 04/08/25 0800 04/08/25 0823   BP: 124/89  (!) 114/59 (!) 114/59   BP Location:   Right upper arm    Patient Position:   Lying    Pulse: 67  78 80   Resp:       Temp:       TempSrc:       SpO2: 100%  100%    Weight:  81.7 kg (180 lb 1.6 oz)     Height:           Oxygen:  Oxygen Therapy: Supplemental oxygen  O2 Delivery Method: Nasal cannula  FiO2 (%) (Set): 40 %  O2 Flow Rate (L/min): 4 L/min     INTAKE/OUTPUT    Intake/Output Summary (Last 24 hours) at 4/8/2025 0959  Last data filed at 4/8/2025 0600  Gross per 24 hour   Intake 715 ml   Output 1250 ml   Net -535 ml     PHYSICAL EXAM  Physical Exam  Constitutional:       General: He is not in acute distress.     Appearance: He is well-developed.   HENT:      Head: Normocephalic and atraumatic.   Eyes:      Conjunctiva/sclera: Conjunctivae normal.      Pupils: Pupils are equal, round, and reactive to light.   Cardiovascular:      Rate and Rhythm: Normal rate and regular rhythm.      Heart sounds: Normal heart sounds.   Pulmonary:      Effort: Pulmonary effort is normal.      Comments: Clear anteriorly, few bibasilar crackles  Abdominal:      General: Bowel sounds are normal.      Palpations:  Abdomen is soft.   Musculoskeletal:         General: Normal range of motion.      Cervical back: Normal range of motion.   Skin:     General: Skin is warm and dry.   Neurological:      Mental Status: He is alert and oriented to person, place, and time.         LAB RESULTS  ABG  Results from last 7 days   Lab Units 04/07/25  0606 04/07/25  0531 04/07/25  0336   PH ART   --   --  7.30*   PCO2 ART mm Hg  --   --  44   PO2 ART mm Hg  --   --  180*   HCO3 ART mEQ/L  --   --  21.3   BASE EXC ART mEQ/L  --   --  -4.7   SOURCE OF OXYGEN  b bipap Bipap     CBC  Results from last 7 days   Lab Units 04/08/25  0415 04/07/25  0329   WBC K/uL 6.16 7.84   RBC M/uL 3.17* 3.32*   HEMOGLOBIN g/dL 10.7* 11.3*   HEMATOCRIT % 34.0* 35.6*   MCV fL 107.3* 107.2*   MCH pg 33.8* 34.0*   MCHC g/dL 31.5* 31.7*   PLATELETS K/uL 154 180   RDW % 14.5* 14.7*   MPV fL 10.0 8.8*     BMP  Results from last 7 days   Lab Units 04/08/25  0415 04/07/25  0329   SODIUM mEQ/L 144 142   POTASSIUM mEQ/L 5.3* 4.3   CHLORIDE mEQ/L 106 107   CO2 mEQ/L 29 26   BUN mg/dL 27* 19   CREATININE mg/dL 1.8* 1.7*   CALCIUM mg/dL 8.5* 8.9   ALBUMIN g/dL 3.6 3.9   BILIRUBIN TOTAL mg/dL 0.5 0.6   ALK PHOS IU/L 40 45   ALT IU/L 9 7   AST IU/L 33 29   GLUCOSE mg/dL 152* 182*     Coag      Micro  Microbiology Results       Procedure Component Value Units Date/Time    Blood Culture Blood, Venous [[id number]]  (Normal) Collected: 04/07/25 0331    Specimen: Blood, Venous Updated: 04/08/25 0900     Culture No growth at 18-24 hours    Blood Culture Blood, Venous [[id number]]  (Normal) Collected: 04/07/25 0331    Specimen: Blood, Venous Updated: 04/08/25 0900     Culture No growth at 18-24 hours    SARS-COV-2 (COVID-19)/ FLU A/B, AND RSV, PCR Nasopharynx [[id number]]  (Abnormal) Collected: 04/07/25 0325    Specimen: Nasopharyngeal Swab from Nasopharynx Updated: 04/07/25 0419     SARS-CoV-2 (COVID-19) Positive     Influenza A Negative     Influenza B Negative     Respiratory Syncytial  Virus Negative    Narrative:      Testing performed using real-time PCR for detection of COVID-19. EUA approved validation studies performed on site.               IMAGING    All imaging reviewed by me  X-RAY CHEST 1 VIEW  Result Date: 4/7/2025  IMPRESSION:  Mild opacity in the right lung which has slightly improved since 1/8/2024. COMMENT:  Portable chest x-ray is compared with 1/8/2024. The tracheostomy and right IJ catheter has been removed. There is cardiomegaly with prosthetic valve. Mild hazy opacity in the right lung has slightly improved since prior. The remaining parenchyma is clear. No definite effusion. Right axillary surgical clips noted.     CT ANGIOGRAPHY CHEST PULMONARY EMBOLISM WITH IV CONTRAST  Result Date: 4/7/2025  IMPRESSION: 1.  No definite evidence of pulmonary embolism. 2.  Cardiomegaly. 3.  Right chest wall soft tissue density which should be clinically correlated for neoplasm or infection. 4.  Peripheral right upper lobe consolidation, subjacent mass which may represent treatment related change and/or infection. 5.  Slight progressive right lower lobe pleural thickening which may represent atelectasis, infection or neoplasm. 6.  Cardiomegaly. Findings reported to nurse Tavo in the ED at 8:35 AM on 4/7/2025. COMMENT: Technique: CT angiography of the chest was performed from the thoracic inlet through the upper abdomen using contiguous 1.25 mm transaxial sections utilizing a pulmonary embolism protocol. Images were acquired utilizing 80 cc Isovue-370 . 3D MIP and/or volume rendered image reconstruction performed and reviewed. CT DOSE:  One or more dose reduction techniques (e.g. automated exposure control, adjustment of the mA and/or kV according to patient size, use of iterative reconstruction technique) utilized for this examination. Comparisons studies: None. Lung parenchyma: Peripheral right upper lobe linear consolidation and bronchiectasis subjacent the right chest wall mass as noted  below.  There is peribronchial thickening.. Mediastinum: Normal. Zelda: Normal. Heart and pericardium: Cardiomegaly.. Pulmonary artery: Normal Aorta:Calcific atherosclerotic disease. Chest wall and pleura: There is a 2.9 x 11.1 cm focus of right chest wall soft tissue attenuation.  There is slight progressive right lower lobe pleural thickening. Axilla: Normal. Liver: Visualized portion is within normal limits. Spleen: Normal. Adrenals: Normal. Bones: Thoracolumbar degenerative change. Thyroid: Normal Other: Cholelithiasis.    Echo  Left Ventricle: Normal ventricle size. Normal wall thickness. Muscle mass is normal. Estimated EF 25-30%. Preserved funciton of the basal walls with persistent akinesis of the mid and distal walls. Diastolic function: patient in a-fib.    Right Ventricle: Normal ventricle size. Normal wall thickness. Mildly reduced systolic function. TAPSE 14 mm.    Left Atrium: Mildly dilated atrium.    Right Atrium: Mildly dilated atrium.    Aortic Valve: Tricuspid valve.  Focal calcification present. Trace regurgitation. No stenosis.    Tricuspid Valve: Normal structure. Trace regurgitation. Estimated RVSP = 35 mmHg.    Aorta: Aortic root normal. Ascending aorta normal-sized. Mild dilatation at the sinuses of Valsalva.    IVC/SVC: Inferior vena cava is a small caliber vessel (<1.7cm). Inferior vena cava collapses >50% during inspiration.  ASSESSMENT & PLAN      75 y.o. male with history HFrEF, paroxysmal atrial fibrillation, prior respiratory failure secondary to influenza, history of breast CA s/p mastectomy/XRT in the past who presents with sudden onset dyspnea and cough with finding of acute on chronic hypoxic/hypercapnic respiratory failure and COVID positive.  Minimal infiltrate on imaging.  No PE  May have had flash pulmonary edema as blood pressure markedly elevated on presentation-now improved  CHF- EF 25-30%     Plan  Treat for both CHF exacerbation as well as COVID infection  Decadron 6 mg  daily  Continue Remdesivir  Diuresis-Lasix 40 mg twice daily; BP control  On Lovenox-May resume Xarelto  Now on low flow O2: aim for O2 sats in the 89 to 93% range. BIPAP can be prn    Almaz Dorsey MD

## 2025-04-08 NOTE — PLAN OF CARE
Problem: Adult Inpatient Plan of Care  Goal: Plan of Care Review  Outcome: Progressing  Flowsheets (Taken 4/8/2025 8127)  Progress: no change  Outcome Evaluation: PT evaluation complete.  Plan of Care Reviewed With: patient     Problem: Mobility Impairment  Goal: Optimal Mobility  Outcome: Progressing

## 2025-04-08 NOTE — ASSESSMENT & PLAN NOTE
- reason for admission  - likely multifactorial in setting of COVID, HF  - followed by pulm  - per [facility]MS

## 2025-04-09 PROBLEM — R79.89 ELEVATED TROPONIN: Status: ACTIVE | Noted: 2025-04-09

## 2025-04-09 LAB
ANION GAP SERPL CALC-SCNC: 8 MEQ/L (ref 3–15)
ATRIAL RATE: 77
BUN SERPL-MCNC: 34 MG/DL (ref 7–25)
CALCIUM SERPL-MCNC: 9 MG/DL (ref 8.6–10.3)
CHLORIDE SERPL-SCNC: 101 MEQ/L (ref 98–107)
CO2 SERPL-SCNC: 32 MEQ/L (ref 21–31)
CREAT SERPL-MCNC: 1.7 MG/DL (ref 0.7–1.3)
EGFRCR SERPLBLD CKD-EPI 2021: 41.5 ML/MIN/1.73M*2
ERYTHROCYTE [DISTWIDTH] IN BLOOD BY AUTOMATED COUNT: 14.6 % (ref 11.6–14.4)
GLUCOSE SERPL-MCNC: 120 MG/DL (ref 70–99)
HCT VFR BLD AUTO: 35.3 % (ref 40.1–51)
HGB BLD-MCNC: 11.3 G/DL (ref 13.7–17.5)
MAGNESIUM SERPL-MCNC: 2.1 MG/DL (ref 1.8–2.5)
MCH RBC QN AUTO: 33.5 PG (ref 28–33.2)
MCHC RBC AUTO-ENTMCNC: 32 G/DL (ref 32.2–36.5)
MCV RBC AUTO: 104.7 FL (ref 83–98)
PLATELET # BLD AUTO: 157 K/UL (ref 150–350)
PMV BLD AUTO: 9.6 FL (ref 9.4–12.4)
POTASSIUM SERPL-SCNC: 4.3 MEQ/L (ref 3.5–5.1)
PR INTERVAL: 184
QRS DURATION: 140
QT INTERVAL: 510
QTC CALCULATION(BAZETT): 577
R AXIS: -43
RBC # BLD AUTO: 3.37 M/UL (ref 4.5–5.8)
SODIUM SERPL-SCNC: 141 MEQ/L (ref 136–145)
T WAVE AXIS: 166
TROPONIN I SERPL HS-MCNC: 137.1 PG/ML
VENTRICULAR RATE: 77
WBC # BLD AUTO: 7.34 K/UL (ref 3.8–10.5)

## 2025-04-09 PROCEDURE — 80048 BASIC METABOLIC PNL TOTAL CA: CPT | Performed by: HOSPITALIST

## 2025-04-09 PROCEDURE — 84484 ASSAY OF TROPONIN QUANT: CPT

## 2025-04-09 PROCEDURE — 99233 SBSQ HOSP IP/OBS HIGH 50: CPT | Performed by: INTERNAL MEDICINE

## 2025-04-09 PROCEDURE — 25800000 HC PHARMACY IV SOLUTIONS: Performed by: INTERNAL MEDICINE

## 2025-04-09 PROCEDURE — 63700000 HC SELF-ADMINISTRABLE DRUG: Performed by: HOSPITALIST

## 2025-04-09 PROCEDURE — 63600000 HC DRUGS/DETAIL CODE: Mod: JW | Performed by: HOSPITALIST

## 2025-04-09 PROCEDURE — 85027 COMPLETE CBC AUTOMATED: CPT | Performed by: HOSPITALIST

## 2025-04-09 PROCEDURE — 63600000 HC DRUGS/DETAIL CODE: Mod: JZ | Performed by: STUDENT IN AN ORGANIZED HEALTH CARE EDUCATION/TRAINING PROGRAM

## 2025-04-09 PROCEDURE — 20600000 HC ROOM AND CARE INTERMEDIATE/TELEMETRY

## 2025-04-09 PROCEDURE — 63600000 HC DRUGS/DETAIL CODE: Mod: JZ,TB | Performed by: INTERNAL MEDICINE

## 2025-04-09 PROCEDURE — 83735 ASSAY OF MAGNESIUM: CPT | Performed by: HOSPITALIST

## 2025-04-09 PROCEDURE — 63700000 HC SELF-ADMINISTRABLE DRUG: Performed by: NURSE PRACTITIONER

## 2025-04-09 PROCEDURE — 36415 COLL VENOUS BLD VENIPUNCTURE: CPT | Performed by: HOSPITALIST

## 2025-04-09 PROCEDURE — 99232 SBSQ HOSP IP/OBS MODERATE 35: CPT | Performed by: HOSPITALIST

## 2025-04-09 PROCEDURE — 63700000 HC SELF-ADMINISTRABLE DRUG: Performed by: STUDENT IN AN ORGANIZED HEALTH CARE EDUCATION/TRAINING PROGRAM

## 2025-04-09 PROCEDURE — 93010 ELECTROCARDIOGRAM REPORT: CPT | Performed by: STUDENT IN AN ORGANIZED HEALTH CARE EDUCATION/TRAINING PROGRAM

## 2025-04-09 RX ORDER — FUROSEMIDE 40 MG/1
40 TABLET ORAL DAILY
Status: DISCONTINUED | OUTPATIENT
Start: 2025-04-09 | End: 2025-04-11 | Stop reason: HOSPADM

## 2025-04-09 RX ADMIN — Medication 1000 MCG: at 08:43

## 2025-04-09 RX ADMIN — BENZONATATE 100 MG: 100 CAPSULE ORAL at 15:02

## 2025-04-09 RX ADMIN — PANTOPRAZOLE SODIUM 40 MG: 40 TABLET, DELAYED RELEASE ORAL at 08:42

## 2025-04-09 RX ADMIN — Medication 400 MG: at 19:49

## 2025-04-09 RX ADMIN — METOPROLOL SUCCINATE 25 MG: 25 TABLET, EXTENDED RELEASE ORAL at 21:24

## 2025-04-09 RX ADMIN — GUAIFENESIN 600 MG: 600 TABLET ORAL at 08:43

## 2025-04-09 RX ADMIN — REMDESIVIR 100 MG: 100 INJECTION, POWDER, LYOPHILIZED, FOR SOLUTION INTRAVENOUS at 18:17

## 2025-04-09 RX ADMIN — BENZONATATE 100 MG: 100 CAPSULE ORAL at 19:49

## 2025-04-09 RX ADMIN — BUSPIRONE HYDROCHLORIDE 10 MG: 10 TABLET ORAL at 08:43

## 2025-04-09 RX ADMIN — CETIRIZINE HYDROCHLORIDE 10 MG: 10 TABLET, FILM COATED ORAL at 08:43

## 2025-04-09 RX ADMIN — LEVOTHYROXINE SODIUM 50 MCG: 0.05 TABLET ORAL at 05:49

## 2025-04-09 RX ADMIN — CHOLECALCIFEROL TAB 25 MCG (1000 UNIT) 25 MCG: 25 TAB at 08:43

## 2025-04-09 RX ADMIN — FUROSEMIDE 40 MG: 40 TABLET ORAL at 15:02

## 2025-04-09 RX ADMIN — BENZONATATE 100 MG: 100 CAPSULE ORAL at 08:54

## 2025-04-09 RX ADMIN — ATORVASTATIN CALCIUM 10 MG: 10 TABLET, FILM COATED ORAL at 08:42

## 2025-04-09 RX ADMIN — TAMSULOSIN HYDROCHLORIDE 0.4 MG: 0.4 CAPSULE ORAL at 21:24

## 2025-04-09 RX ADMIN — PANTOPRAZOLE SODIUM 40 MG: 40 TABLET, DELAYED RELEASE ORAL at 19:50

## 2025-04-09 RX ADMIN — GUAIFENESIN 600 MG: 600 TABLET ORAL at 19:50

## 2025-04-09 RX ADMIN — AMIODARONE HYDROCHLORIDE 200 MG: 200 TABLET ORAL at 08:43

## 2025-04-09 RX ADMIN — DEXAMETHASONE SODIUM PHOSPHATE 6 MG: 4 INJECTION, SOLUTION INTRA-ARTICULAR; INTRALESIONAL; INTRAMUSCULAR; INTRAVENOUS; SOFT TISSUE at 18:16

## 2025-04-09 RX ADMIN — RIVAROXABAN 15 MG: 15 TABLET, FILM COATED ORAL at 18:28

## 2025-04-09 RX ADMIN — TAMOXIFEN CITRATE 20 MG: 10 TABLET, FILM COATED ORAL at 08:43

## 2025-04-09 RX ADMIN — Medication 400 MG: at 08:43

## 2025-04-09 RX ADMIN — BUSPIRONE HYDROCHLORIDE 10 MG: 10 TABLET ORAL at 19:50

## 2025-04-09 ASSESSMENT — ENCOUNTER SYMPTOMS
SHORTNESS OF BREATH: 1
CARDIOVASCULAR NEGATIVE: 1
GASTROINTESTINAL NEGATIVE: 1
NEUROLOGICAL NEGATIVE: 1
HEMATOLOGIC/LYMPHATIC NEGATIVE: 1
ENDOCRINE NEGATIVE: 1
PSYCHIATRIC NEGATIVE: 1
MUSCULOSKELETAL NEGATIVE: 1
CONSTITUTIONAL NEGATIVE: 1

## 2025-04-09 ASSESSMENT — COGNITIVE AND FUNCTIONAL STATUS - GENERAL
WALKING IN HOSPITAL ROOM: 2 - A LOT
MOVING TO AND FROM BED TO CHAIR: 2 - A LOT
CLIMB 3 TO 5 STEPS WITH RAILING: 1 - TOTAL
MOVING TO AND FROM BED TO CHAIR: 2 - A LOT
STANDING UP FROM CHAIR USING ARMS: 3 - A LITTLE
CLIMB 3 TO 5 STEPS WITH RAILING: 1 - TOTAL
WALKING IN HOSPITAL ROOM: 2 - A LOT
STANDING UP FROM CHAIR USING ARMS: 2 - A LOT

## 2025-04-09 NOTE — ASSESSMENT & PLAN NOTE
Baseline creatinine 1.4-1.5  Creatinine is 1.8 yesterday, 1.7 today  Urine analysis pending  Renal ultrasound: Limited visualization secondary to suboptimal acoustic windows.  Mild renal cortical thinning/scarring.  No hydronephrosis.    Nephrology consult is appreciated: Acute renal failure likely prerenal in the setting of COVID and cardiomyopathy with respiratory insufficiency. Patient does not appear to be volume overloaded. May need to decrease his furosemide. Agree with holding valsartan for now.

## 2025-04-09 NOTE — ASSESSMENT & PLAN NOTE
As above  Started on Decadron and remdesivir April 7  Infectious disease and pulmonary are following  Discussed with Dr. Hernández: Continue remdesivir day 3/3 today if remains off oxygen.  Dr. Hernández recommended to continue Decadron for 5 days, day 3  today    Continue Mucinex and Tessalon as needed

## 2025-04-09 NOTE — PROGRESS NOTES
[facility] recieved referral, chart reviewed.  Will continue to follow for discharge needs.

## 2025-04-09 NOTE — ASSESSMENT & PLAN NOTE
- During hospitalization 12/24/2023 at , pt became hypoxic and bradycardic treated with atropine, subsequently intubated and developed polymorphic VT degenerating into sustained VF.  Treated with IV lidocaine and 4 external shocks with resultant slow wide-complex rhythm.  Given 300mg IV amio with bolus lidocaine.  Had perisistent bradycardia requiring external pacing.  No further episodes of VF on amio   Plan  - currently NSR on telemetry, cont to monitor

## 2025-04-09 NOTE — CONSULTS
Inpatient consult to Nephrology  Consult performed by: Kyrie Mann MD  Consult ordered by: Nory Jeronimo MD  Reason for consult: Acute kidney injury      NEPHROLOGY CONSULTATION    Reason for consult: Acute kidney injury    Consulting Physician: Dr. Jeronimo    HPI: Thank you for this consultation on Cam Rosas Jr. who is a 75 y.o. male admitted with increasing shortness of breath associated with the cough.  On admission he was noted to be hypertensive as well with a blood pressure of 200/96.  Patient noted to be COVID-positive.  Was initially admitted to ICU on BiPAP.  His respiratory status has improved and he was weaned off the BiPAP.  In addition he was noted to have elevated troponins and is diagnosed with Takotsubo cardiomyopathy.  He is noted to have worsening renal function with a creatinine of 1.8 from a baseline of 1.3 requiring renal review.    Review of Systems   Constitutional: Negative.    HENT: Negative.     Respiratory:  Positive for shortness of breath.    Cardiovascular: Negative.    Gastrointestinal: Negative.    Endocrine: Negative.    Genitourinary: Negative.    Musculoskeletal: Negative.    Skin: Negative.    Neurological: Negative.    Hematological: Negative.    Psychiatric/Behavioral: Negative.         Past Medical History:   Diagnosis Date    Acute systolic heart failure (CMS/HCC) 02/15/2023    Ambulates with cane     and walker    Atrial fibrillation (CMS/HCC)     Breast cancer (CMS/HCC) October 2022    CHF (congestive heart failure) (CMS/HCC)     Chronic kidney disease     CKD (chronic kidney disease) 10/19/2022    History of radiation therapy     Hx antineoplastic chemo     Prostate cancer (CMS/HCC)        Past Surgical History   Procedure Laterality Date    Cardiac surgery      mitral valve repair 2006    Cardioversion  12/05/2022    Successful DC cardioversion    CARDIOVERSION EXTERNAL N/A 12/5/2022    Performed by Gary Aceves MD at [facility] CARDIAC CATH/EP    Cath lab  temporary pacemaker insertion N/A 12/25/2023    Performed by Haja Onofre MD at  CARDIAC CATH/EP/NEURO    GASTROSTOMY PERCUTANEOUS ENDOSCOPIC N/A 1/4/2024    Performed by Edison Oneil MD at  OR    Knee cartilage surgery Left     Mastectomy      PORT/PERMACATH REMOVAL Left 11/9/2023    Performed by Barb Osuna MD at  OR    Prostate surgery  July 2022    Prostatectomy  07/15/2022    Right & left heart cath with coronary angiography N/A 12/25/2023    Performed by Haja Onofre MD at  CARDIAC CATH/EP/NEURO    RIGHT BREAST MASTECTOMY; RIGHT SENTINEL NODE IDENTIFICATION, MAPPING, AND BIOPSY; Right 8/17/2023    Performed by Barb Osuna MD at  OR    Tonsillectomy      TRACHEOSTOMY N/A 1/4/2024    Performed by Edison Oneil MD at  OR       Social History     Tobacco Use    Smoking status: Never    Smokeless tobacco: Never   Vaping Use    Vaping status: Never Used   Substance Use Topics    Alcohol use: Yes     Alcohol/week: 14.0 standard drinks of alcohol     Types: 14 Shots of liquor per week     Comment: 2 drinks/day - scotch    Drug use: Never       Family History   Problem Relation Name Age of Onset    Hyperlipidemia Biological Mother mother     Hypertension Biological Mother mother     Breast cancer Biological Mother mother     Cancer Biological Mother mother         gyn? cervical    Hyperlipidemia Biological Father Dad     Hypertension Biological Father Dad     Arthritis Biological Father Dad     No Known Problems Biological Sister      No Known Problems Biological Brother      Heart disease Maternal Grandmother Belle     Arthritis Maternal Grandfather      COPD Maternal Grandfather      Diabetes Maternal Grandfather      No Known Problems Paternal Grandmother      No Known Problems Paternal Grandfather      Cancer less than or equal to age 50 Other son     Esophageal cancer Other son         47, in abd,chemo q 3 weeks       Allergies   Allergen Reactions     Azithromycin Other (see comments)     Kidney issues      Prednisone Angioedema     All over swelling    Lorazepam Other (see comments)     WEAKNESS       Home Medication List:   amiodarone (PACERONE) tablet 200 mg  200 mg oral Once per day on Monday Wednesday Friday    atorvastatin (LIPITOR) tablet 10 mg  10 mg oral Daily    benzonatate (TESSALON) capsule 100 mg  100 mg oral 3x daily PRN    busPIRone (BUSPAR) tablet 10 mg  10 mg oral BID    cetirizine (ZyrTEC) tablet 10 mg  10 mg oral q AM    cholecalciferol (vitamin D3) tablet 25 mcg  25 mcg oral q AM    cyanocobalamin (VITAMIN B12) tablet 1,000 mcg  1,000 mcg oral Daily    dexAMETHasone (DECADRON) injection 6 mg  6 mg intravenous Daily    glucose chewable tablet 16-32 g of dextrose  16-32 g of dextrose oral PRN    Or    dextrose 40 % oral gel 15-30 g of dextrose  15-30 g of dextrose oral PRN    Or    glucagon (GLUCAGEN) injection 1 mg  1 mg intramuscular PRN    Or    dextrose 50 % in water (D50) injection 12.5 g  25 mL intravenous PRN    furosemide (LASIX) injection 40 mg  40 mg intravenous BID (am, 4p)    guaiFENesin (MUCINEX) 12 hr ER tablet 600 mg  600 mg oral BID    levothyroxine (SYNTHROID) tablet 50 mcg  50 mcg oral Daily (6:30a)    magnesium oxide (MAG-OX) tablet 400 mg  400 mg oral BID    melatonin ODT 3 mg  3 mg peg tube Nightly PRN    metoprolol succinate XL (TOPROL-XL) 24 hr ER tablet 25 mg  25 mg oral Nightly    pantoprazole (PROTONIX) tablet,delayed release (DR/EC) 40 mg  40 mg oral BID    remdesivir (VEKLURY) 100 mg in sodium chloride 0.9 % 250 mL IVPB  100 mg intravenous q24h INT    rivaroxaban (XARELTO) tablet 15 mg  15 mg oral Daily with dinner    tamoxifen (NOLVADEX) tablet 20 mg  20 mg oral Daily    tamsulosin (FLOMAX) 24 hr ER capsule 0.4 mg  0.4 mg oral Nightly    [Provider Managed Hold] valsartan (DIOVAN) tablet 40 mg  40 mg oral Daily         Current Facility-Administered Medications:     amiodarone (PACERONE) tablet 200 mg, 200 mg, oral,  Once per day on Monday Wednesday Friday, Meek Pizarro MD, 200 mg at 04/09/25 0843    atorvastatin (LIPITOR) tablet 10 mg, 10 mg, oral, Daily, Meek Pizarro MD, 10 mg at 04/09/25 0842    benzonatate (TESSALON) capsule 100 mg, 100 mg, oral, 3x daily PRN, Nory Jeronimo MD, 100 mg at 04/09/25 0854    busPIRone (BUSPAR) tablet 10 mg, 10 mg, oral, BID, Meek Pizarro MD, 10 mg at 04/09/25 0843    cetirizine (ZyrTEC) tablet 10 mg, 10 mg, oral, q AM, Nory Jeronimo MD, 10 mg at 04/09/25 0843    cholecalciferol (vitamin D3) tablet 25 mcg, 25 mcg, oral, q AM, Nory Jeronimo MD, 25 mcg at 04/09/25 0843    cyanocobalamin (VITAMIN B12) tablet 1,000 mcg, 1,000 mcg, oral, Daily, Nory Jeronimo MD, 1,000 mcg at 04/09/25 0843    dexAMETHasone (DECADRON) injection 6 mg, 6 mg, intravenous, Daily, Nory Jeronimo MD, 6 mg at 04/07/25 1711    glucose chewable tablet 16-32 g of dextrose, 16-32 g of dextrose, oral, PRN **OR** dextrose 40 % oral gel 15-30 g of dextrose, 15-30 g of dextrose, oral, PRN **OR** glucagon (GLUCAGEN) injection 1 mg, 1 mg, intramuscular, PRN **OR** dextrose 50 % in water (D50) injection 12.5 g, 25 mL, intravenous, PRN, Meek Pizarro MD    furosemide (LASIX) injection 40 mg, 40 mg, intravenous, BID (am, 4p), Meek Pizarro MD, 40 mg at 04/08/25 1700    guaiFENesin (MUCINEX) 12 hr ER tablet 600 mg, 600 mg, oral, BID, Nory Jeronimo MD, 600 mg at 04/09/25 0843    levothyroxine (SYNTHROID) tablet 50 mcg, 50 mcg, oral, Daily (6:30a), Nory Jeronimo MD, 50 mcg at 04/09/25 0549    magnesium oxide (MAG-OX) tablet 400 mg, 400 mg, oral, BID, Nory Jeronimo MD, 400 mg at 04/09/25 0843    melatonin ODT 3 mg, 3 mg, peg tube, Nightly PRN, Meek Pizarro MD    metoprolol succinate XL (TOPROL-XL) 24 hr ER tablet 25 mg, 25 mg, oral, Nightly, Meek Pizarro MD, 25 mg at 04/07/25 2136    pantoprazole (PROTONIX) tablet,delayed release (DR/EC) 40 mg, 40 mg, oral, BID, Meek Pizarro  MD, 40 mg at 04/09/25 0842    [COMPLETED] remdesivir (VEKLURY) 200 mg in sodium chloride 0.9 % 250 mL IVPB, 200 mg, intravenous, Once, Stopped at 04/07/25 2105 **FOLLOWED BY** remdesivir (VEKLURY) 100 mg in sodium chloride 0.9 % 250 mL IVPB, 100 mg, intravenous, q24h INT, Heber Hernández MD, Stopped at 04/08/25 1756    rivaroxaban (XARELTO) tablet 15 mg, 15 mg, oral, Daily with dinner, Nory Jeronimo MD, 15 mg at 04/08/25 1717    tamoxifen (NOLVADEX) tablet 20 mg, 20 mg, oral, Daily, Meek Pizarro MD, 20 mg at 04/09/25 0843    tamsulosin (FLOMAX) 24 hr ER capsule 0.4 mg, 0.4 mg, oral, Nightly, Meek Pizarro MD, 0.4 mg at 04/08/25 2237    [Provider Managed Hold] valsartan (DIOVAN) tablet 40 mg, 40 mg, oral, Daily, Nory Jeronimo MD, 40 mg at 04/08/25 1404    PHYSICAL EXAM:  Vitals:    04/09/25 0252 04/09/25 0359 04/09/25 0720 04/09/25 0747   BP: (!) 90/58  110/65    BP Location: Left upper arm  Left upper arm    Patient Position: Sitting  Sitting    Pulse: 78 79 84 83   Resp: 17  18    Temp: 36.3 °C (97.4 °F)  36.8 °C (98.2 °F)    TempSrc: Oral  Oral    SpO2: 94%  95%    Weight:       Height:             Intake/Output Summary (Last 24 hours) at 4/9/2025 1057  Last data filed at 4/9/2025 0546  Gross per 24 hour   Intake 580 ml   Output 600 ml   Net -20 ml       Physical Exam  Constitutional:       Appearance: He is well-developed.   HENT:      Head: Normocephalic and atraumatic.   Eyes:      Pupils: Pupils are equal, round, and reactive to light.   Cardiovascular:      Rate and Rhythm: Normal rate and regular rhythm.   Pulmonary:      Effort: Pulmonary effort is normal.      Breath sounds: Normal breath sounds.   Abdominal:      General: Bowel sounds are normal.      Palpations: Abdomen is soft.   Musculoskeletal:         General: Normal range of motion.   Skin:     General: Skin is warm and dry.   Neurological:      Mental Status: He is alert and oriented to person, place, and time.   Psychiatric:          Behavior: Behavior normal.         Results from last 7 days   Lab Units 04/09/25  0248 04/08/25  0415   SODIUM mEQ/L 141 144   POTASSIUM mEQ/L 4.3 5.3*   CHLORIDE mEQ/L 101 106   CO2 mEQ/L 32* 29   BUN mg/dL 34* 27*   CREATININE mg/dL 1.7* 1.8*   EGFR mL/min/1.73m*2 41.5* 38.8*   GLUCOSE mg/dL 120* 152*   CALCIUM mg/dL 9.0 8.5*   ALBUMIN g/dL  --  3.6   PHOSPHORUS mg/dL  --  5.9*   WBC K/uL 7.34 6.16   HEMOGLOBIN g/dL 11.3* 10.7*   HEMATOCRIT % 35.3* 34.0*   PLATELETS K/uL 157 154       Pertinent radiology and labs reviewed    ASSESSMENT:  Principal Problem:    Acute respiratory failure with hypoxia and hypercapnia (CMS/HCC)  Active Problems:    Stage 3a chronic kidney disease (CMS/HCC)    COVID-19    Acute on chronic systolic congestive heart failure (CMS/HCC)    Paroxysmal atrial fibrillation (CMS/HCC)    History of cardiac arrest    Prostate cancer (CMS/HCC)    Breast cancer (CMS/HCC)    History of peptic ulcer disease    BPH (benign prostatic hyperplasia)    Hyperlipidemia    Hypothyroidism    Anemia    Multiple open wounds of lower extremity    Scalp wound    Takotsubo cardiomyopathy    History of prolonged Q-T interval on ECG    Hyperkalemia      PLAN:  Acute kidney injury likely prerenal in the setting of COVID and cardiomyopathy with respiratory insufficiency.  Patient does not appear to be volume overloaded.  May need to decrease his furosemide if renal function does not improve.  Agree with holding valsartan for now.  COVID-positive on remdesivir.  Takotsubo cardiomyopathy.  Management as per cardiology.    Kyrie Mann MD

## 2025-04-09 NOTE — PROGRESS NOTES
Subjective:   Pt seen at bedside for BLE venous stasis. NAD. No overnight events. Pt denies any pedal pain. Dressing clean, dry, and intact.      Past Medical/Surgical history, Allergies, Meds reviewed in detail as charted  FH/SH reviewed in detail as charted    ROS:  Head and Neck: No complaints  Chest : No complaints  Abdomen: No Complaints  Constitutional: Unremarkable     Vitals: I have reviewed the patient's pertinent vital signs.     Radiology: Reviewed     Labs:   CBC Results         04/09/25 04/08/25 04/07/25     0248 0415 0329    WBC 7.34 6.16 7.84    RBC 3.37 3.17 3.32    HGB 11.3 10.7 11.3    HCT 35.3 34.0 35.6    .7 107.3 107.2    MCH 33.5 33.8 34.0    MCHC 32.0 31.5 31.7     154 180          BMP Results         04/09/25 04/08/25 04/07/25     0248 0415 0329     144 142    K 4.3 5.3 4.3    Cl 101 106 107    CO2 32 29 26    Glucose 120 152 182    BUN 34 27 19    Creatinine 1.7 1.8 1.7    Calcium 9.0 8.5 8.9    Anion Gap 8 9 9    EGFR 41.5 38.8 41.5           Comment for K at 0329 on 04/07/25: Results obtained on plasma. Plasma Potassium values may be up to 0.4 mEQ/L less than serum values. The differences may be greater for patients with high platelet or white cell counts.    Comment for EGFR at 0248 on 04/09/25: Calculation based on the Chronic Kidney Disease Epidemiology Collaboration (CKD-EPI) equation refit without adjustment for race.    Comment for EGFR at 0415 on 04/08/25: Calculation based on the Chronic Kidney Disease Epidemiology Collaboration (CKD-EPI) equation refit without adjustment for race.    Comment for EGFR at 0329 on 04/07/25: Calculation based on the Chronic Kidney Disease Epidemiology Collaboration (CKD-EPI) equation refit without adjustment for race.              LE Objective:   -Vascular: DP pulses are faintly palpable B/L. PT pulses are faintly palpable B/L. B/L edema to the foot, ankle and leg. Capillary refill time is less than 3 seconds B/L. Skin temperature  is warm to warm from leg to toes B/L.Pedal hair is not noted B/L.                         -Ortho: + active df/pf of ankle. MMT 5/5 in all planes tested. Equinus noted B/L.    -Neuro:Light touch and protective sensation is intact    -Derm: RLE: No open wounds appreciated.  No drainage appreciated.  No malodor appreciated.  No fluctuance or crepitus appreciated.  Venous stasis changes appreciated to the skin.  LLE: No open wounds appreciated.  Previously dried blistered areas.  No drainage appreciated.  No malodor appreciated.  No fluctuance or crepitus appreciated.  Venous stasis changes appreciated to the skin.  See media tab for clinical pictures.     Assessment: Mr. West is a 75-year-old male being evaluated bedside for BLE venous stasis    Plan:   -Patient was assessed, treated, and evaluated with all questions and concerns answered.  -LLE dressed with Adaptic, 4 x 4 gauze, Brain.  Ace bandage applied to bilateral legs.  Podiatry will continue to monitor patient while in house and dressings will be changed with the frequency of Q2-Q3.  -Recommend continued outpatient follow-up with Dr. Christina on discharge.  -Elevation recommended to BLE when possible  -Patient may weight bear to B/L LE as tolerated.  -Rest of care per primary team.  -Please contact on call podiatry resident with any questions or concerns.      Liliana Ingram PGY-1  #[id number]

## 2025-04-09 NOTE — ASSESSMENT & PLAN NOTE
Troponin as high as 183  Echocardiogram as above  Cardiac cath in December 2023 with normal coronaries  Likely acute myocardial injury in the setting of respiratory distress, congestive heart failure  No plans for cardiac catheterization as per cardiology

## 2025-04-09 NOTE — ASSESSMENT & PLAN NOTE
Echocardiogram: Ejection fraction 25 to 30%, mildly reduced RV function, no significant valvular heart disease, RVSP 35  Echocardiogram in January 2024:Ejection fraction 60 to 65%    New cardiomyopathy  Left heart catheterization in December 2023 with normal coronaries    Initially on IV Lasix 40 twice daily April 7-8.  Switch to oral Lasix today.  Continue Metoprolol  Started on Diovan and got a dose 4/8 but was hold subsequently because of hyperkalemia and acute renal failure as per nephrology    Cardiology consult is appreciated: The clinical picture is consistent with Takotsubo cardiomyopathy in the setting of acute COVID infection and respiratory failure.  No plans for cardiac cath, continue IV Lasix.  Discussed with Dr. Renteria

## 2025-04-09 NOTE — PROGRESS NOTES
Cardiology Daily Progress Note       Subjective      Reason for consult: LV dysfunction with reduced EF    Overview:  Cam Rosas Jr. is a 75 y.o. male known to Dr. Xiao, and Dr. Aceves, past medical history of HFrecEF (1/2024 EF 60-65% from 12/2023 40-45%), Afib on xarelto, breast cancer s/p mastectomy, CKD who presents with shortness of breath, admitted for COVID and acute respiratory failure. CTA negative for PE. TTE demonstrated reduced LVEF 25-30%.     Interval history: Pt sitting up in chair, wife at bedside. He reports improved dyspnea and cough. Received 3 doses of IV lasix 40 mg thus far, but declined further IV diuresis this morning due to urinary frequency. He is on room air with oxygen sat 95%. Bibasilar crackles on exam. Continues remdesivir         Azithromycin, Prednisone, and Lorazepam  Current Facility-Administered Medications   Medication Dose Route Frequency Provider Last Rate Last Admin    amiodarone (PACERONE) tablet 200 mg  200 mg oral Once per day on Monday Wednesday Friday Meek Pizarro MD   200 mg at 04/09/25 0843    atorvastatin (LIPITOR) tablet 10 mg  10 mg oral Daily Meek Pizarro MD   10 mg at 04/09/25 0842    benzonatate (TESSALON) capsule 100 mg  100 mg oral 3x daily PRN Nory Jeronimo MD   100 mg at 04/09/25 1502    busPIRone (BUSPAR) tablet 10 mg  10 mg oral BID Meek Pizarro MD   10 mg at 04/09/25 0843    cetirizine (ZyrTEC) tablet 10 mg  10 mg oral q AM Nory Jeronimo MD   10 mg at 04/09/25 0843    cholecalciferol (vitamin D3) tablet 25 mcg  25 mcg oral q AM Nory Jeronimo MD   25 mcg at 04/09/25 0843    cyanocobalamin (VITAMIN B12) tablet 1,000 mcg  1,000 mcg oral Daily Nory Jeronimo MD   1,000 mcg at 04/09/25 0843    dexAMETHasone (DECADRON) injection 6 mg  6 mg intravenous Daily Nory Jeronimo MD   6 mg at 04/07/25 1711    glucose chewable tablet 16-32 g of dextrose  16-32 g of dextrose oral PRN Meek Pizarro MD        Or    dextrose  "40 % oral gel 15-30 g of dextrose  15-30 g of dextrose oral PRN Meek Pizarro MD        Or    glucagon (GLUCAGEN) injection 1 mg  1 mg intramuscular PRN Meek Pizarro MD        Or    dextrose 50 % in water (D50) injection 12.5 g  25 mL intravenous PRN Meek Pizarro MD        furosemide (LASIX) tablet 40 mg  40 mg oral Daily Jaclyn Douglas, CRNP   40 mg at 04/09/25 1502    guaiFENesin (MUCINEX) 12 hr ER tablet 600 mg  600 mg oral BID Nory Jeronimo MD   600 mg at 04/09/25 0843    levothyroxine (SYNTHROID) tablet 50 mcg  50 mcg oral Daily (6:30a) Nory Jeronimo MD   50 mcg at 04/09/25 0549    magnesium oxide (MAG-OX) tablet 400 mg  400 mg oral BID Nory Jeronimo MD   400 mg at 04/09/25 0843    melatonin ODT 3 mg  3 mg peg tube Nightly PRN Meek Pizarro MD        metoprolol succinate XL (TOPROL-XL) 24 hr ER tablet 25 mg  25 mg oral Nightly Meek Pizarro MD   25 mg at 04/07/25 2136    pantoprazole (PROTONIX) tablet,delayed release (DR/EC) 40 mg  40 mg oral BID Meek Pizarro MD   40 mg at 04/09/25 0842    remdesivir (VEKLURY) 100 mg in sodium chloride 0.9 % 250 mL IVPB  100 mg intravenous q24h INT Heber Hernández MD   Stopped at 04/08/25 1756    rivaroxaban (XARELTO) tablet 15 mg  15 mg oral Daily with dinner Nory Jeronimo MD   15 mg at 04/08/25 1717    tamoxifen (NOLVADEX) tablet 20 mg  20 mg oral Daily Meek Pizarro MD   20 mg at 04/09/25 0843    tamsulosin (FLOMAX) 24 hr ER capsule 0.4 mg  0.4 mg oral Nightly Meek Pizarro MD   0.4 mg at 04/08/25 2237    [Provider Managed Hold] valsartan (DIOVAN) tablet 40 mg  40 mg oral Daily Nory Jeronimo MD   40 mg at 04/08/25 1404         Objective   Visit Vitals  BP (!) 117/58 (BP Location: Left upper arm, Patient Position: Sitting)   Pulse 87   Temp 36.5 °C (97.7 °F) (Oral)   Resp 18   Ht 1.651 m (5' 5\")   Wt 81.7 kg (180 lb 1.6 oz)   SpO2 96%   BMI 29.97 kg/m²         Intake/Output Summary (Last 24 hours) at 4/9/2025 " 1553  Last data filed at 4/9/2025 1030  Gross per 24 hour   Intake 608 ml   Output --   Net 608 ml       Physical Exam  Constitutional:       General: He is not in acute distress.     Appearance: He is well-developed. He is not diaphoretic.   HENT:      Head: Normocephalic.   Cardiovascular:      Rate and Rhythm: Normal rate.   Pulmonary:      Effort: No respiratory distress.      Breath sounds: Rales present. No wheezing.   Chest:      Chest wall: No tenderness.   Abdominal:      General: There is no distension.      Palpations: Abdomen is soft.      Tenderness: There is no abdominal tenderness.   Musculoskeletal:         General: Normal range of motion.      Cervical back: Normal range of motion.   Skin:     General: Skin is warm and dry.   Neurological:      Mental Status: He is alert and oriented to person, place, and time.         Labs   Lab Results   Component Value Date    WBC 7.34 04/09/2025    HGB 11.3 (L) 04/09/2025    HCT 35.3 (L) 04/09/2025     04/09/2025    TRIG 303 (H) 12/27/2023    ALT 9 04/08/2025    AST 33 04/08/2025     04/09/2025    K 4.3 04/09/2025     04/09/2025    CREATININE 1.7 (H) 04/09/2025    BUN 34 (H) 04/09/2025    CO2 32 (H) 04/09/2025    TSH 3.47 11/12/2024    INR 1.8 01/07/2024    GLUCOSE 120 (H) 04/09/2025       Imaging  I have independently reviewed the pertinent imaging from the last 24 hrs.    Cardiac Imaging    TRANSTHORACIC ECHO (TTE) COMPLETE 04/07/2025    Interpretation Summary    Left Ventricle: Normal ventricle size. Normal wall thickness. Muscle mass is normal. Estimated EF 25-30%. Preserved funciton of the basal walls with persistent akinesis of the mid and distal walls. Diastolic function: patient in a-fib.    Right Ventricle: Normal ventricle size. Normal wall thickness. Mildly reduced systolic function. TAPSE 14 mm.    Left Atrium: Mildly dilated atrium.    Right Atrium: Mildly dilated atrium.    Aortic Valve: Tricuspid valve.  Focal calcification  present. Trace regurgitation. No stenosis.    Tricuspid Valve: Normal structure. Trace regurgitation. Estimated RVSP = 35 mmHg.    Aorta: Aortic root normal. Ascending aorta normal-sized. Mild dilatation at the sinuses of Valsalva.    IVC/SVC: Inferior vena cava is a small caliber vessel (<1.7cm). Inferior vena cava collapses >50% during inspiration.      Telemetry  SR 80s       Assessment & Plan  Acute respiratory failure with hypoxia and hypercapnia (CMS/HCC)  - reason for admission  - likely multifactorial in setting of COVID, HF  - followed by pulm  COVID-19  - reason for admission  - ID following  - per [facility]  Acute on chronic systolic congestive heart failure (CMS/HCC)  -Hx of takostubo in 2023, [facility] with patent coronary arteries at that time.  LVEF recovered to 60-65%.   -Presented here with COVID and flash pulmonary edema. TTE with LVEF 25-30%. Opted for medical management and therefore did not undergo ischemic evaluation again.   -Received IV lasix for 2 days. Declined further IV diuresis today due to urinary frequent.     4/9/25 updates:  -Started valsartan 40 mg daily, but currently on hold due to hyperkalemia.  -Repeat BMP tomorrow morning and resume valsartan if K <4.5.   -Mildly hypervolemic on exam with bibasilar crackles. Declines further IV diuresis. Pt agreeable to starting PO lasix 40 mg daily. Please continue to check standing weight in the morning and document intake/output.   -CXR tomorrow morning.   -Continue metoprolol XL 25 mg daily.   Takotsubo cardiomyopathy  See plan under systolic CHF section.     History of cardiac arrest  - During hospitalization 12/24/2023 at , pt became hypoxic and bradycardic treated with atropine, subsequently intubated and developed polymorphic VT degenerating into sustained VF.  Treated with IV lidocaine and 4 external shocks with resultant slow wide-complex rhythm.  Given 300mg IV amio with bolus lidocaine.  Had perisistent bradycardia requiring external pacing.   No further episodes of VF on amio   Plan  - currently NSR on telemetry, cont to monitor   Paroxysmal atrial fibrillation (CMS/[facility])  - s/p cardioversion 12/5/22  - on amiodarone twice a week (mon, Fri), metoprolol   -Continue Xarelto 15 mg daily  - currently NSR with occasional PVCs  History of prolonged Q-T interval on ECG  - record review: 9/2024 EKG in office with sinus marcellus with prolonged QT interval.    - now on amiodarone 2X/week (mon, fri)  - followed by Dr. Aceves  Stage 3a chronic kidney disease (CMS/[facility])  - baseline Cr (1.1-1.4)      JONNY Fonseca  4/9/2025  3:53 PM     This patient note has been dictated using speech recognition software. Inadvertent speech recognition errors should be disregarded. Please do not hesitate to call my office for clarifications.

## 2025-04-09 NOTE — ASSESSMENT & PLAN NOTE
> Likely multifactorial in setting of COVID and heart failure exacerbation   Cardiology: The respiratory failure was probably further compounded by heart failure which has responded well to BiPAP.     CAT scan of chest: No pulmonary embolism, perifissural right upper lobe consolidation, slight progression of right lower lobe pleural thickening which may represent atelectasis, infection or neoplasm, cardiomegaly, right chest wall soft tissue density should be clinically correlated for neoplasm or infection  COVID-positive  WBC 8      Started on IV Decadron April 7 (no history of angioedema from prednisone, had lower extremity swelling and atrial fibrillation)  Continue IV diuresis  Continue remdesivir    Infectious disease and cardiology are following

## 2025-04-09 NOTE — ASSESSMENT & PLAN NOTE
- record review: 9/2024 EKG in office with sinus marcellus with prolonged QT interval.    - now on amiodarone 2X/week (mon, fri)  - followed by Dr. Aceves

## 2025-04-09 NOTE — ASSESSMENT & PLAN NOTE
In sinus rhythm  On amiodarone and metoprolol  Usual on Xarelto, was given Lovenox initially in case needed catheterization   Restarted on Xarelto 4/8, decrease to 15 mg

## 2025-04-09 NOTE — PROGRESS NOTES
"    Major Events:  On RA    Subjective:  Less SOB  Looks more comfortable    Temp:  [36.3 °C (97.4 °F)-36.8 °C (98.2 °F)] 36.8 °C (98.2 °F)  Heart Rate:  [76-85] 83  Resp:  [17-18] 18  BP: ()/(58-70) 110/65  FiO2 (%) (Set): 40 %  SpO2 Readings from Last 3 Encounters:   04/09/25 95%   12/16/24 99%   11/21/24 99%       Anti-infectives (From admission, onward)      Start     Dose/Rate Route Frequency Ordered Stop    04/08/25 1819  remdesivir (VEKLURY) 100 mg in sodium chloride 0.9 % 250 mL IVPB        Placed in \"Followed by\" Linked Group    100 mg  500 mL/hr over 30 Minutes intravenous Every 24 hours interval 04/07/25 1734 04/12/25 1829          Physical Exam:  Skin: No rash  Sclera: Anicteric  Lungs: rales at bases  CV: S1, S2 nl, no embolic changes  Abd: Soft, nt  Extr: No jt eff, no edema  Neuro: Alert, lucid    Lab Results   Component Value Date    GLUCOSE 120 (H) 04/09/2025    CALCIUM 9.0 04/09/2025     04/09/2025    K 4.3 04/09/2025    CO2 32 (H) 04/09/2025     04/09/2025    BUN 34 (H) 04/09/2025    CREATININE 1.7 (H) 04/09/2025     Lab Results   Component Value Date    WBC 7.34 04/09/2025    HGB 11.3 (L) 04/09/2025    HCT 35.3 (L) 04/09/2025    .7 (H) 04/09/2025     04/09/2025     Lab Results   Component Value Date    ALBUMIN 3.6 04/08/2025    BILITOT 0.5 04/08/2025    ALKPHOS 40 04/08/2025    BILIDIR <0.1 04/08/2025    AST 33 04/08/2025    ALT 9 04/08/2025    PROTEIN 6.4 04/08/2025     Peripheral IV (Adult) 04/07/25 Left Antecubital (Active)   Number of days: 1       Peripheral IV (Adult) 04/07/25 Right Antecubital (Active)   Number of days: 1       External Urinary Catheter Other (Comment) (Active)   Number of days: 1       Wound Venous Ulcer Left Pretibial (Active)   Number of days: 471       Wound Anterior Head (Active)   Number of days: 1     Lab Results   Component Value Date    WBC 7.34 04/09/2025    HGB 11.3 (L) 04/09/2025    HCT 35.3 (L) 04/09/2025    .7 (H) " 04/09/2025     04/09/2025     Lab Results   Component Value Date    GLUCOSE 120 (H) 04/09/2025    CALCIUM 9.0 04/09/2025     04/09/2025    K 4.3 04/09/2025    CO2 32 (H) 04/09/2025     04/09/2025    BUN 34 (H) 04/09/2025    CREATININE 1.7 (H) 04/09/2025     Lab Results   Component Value Date    ALBUMIN 3.6 04/08/2025    BILITOT 0.5 04/08/2025    ALKPHOS 40 04/08/2025    BILIDIR <0.1 04/08/2025    AST 33 04/08/2025    ALT 9 04/08/2025    PROTEIN 6.4 04/08/2025     Microbiology Results (last 3 days)       Procedure Component Value - Date/Time    Blood Culture Blood, Venous [[id number]]  (Normal) Collected: 04/07/25 0331    Lab Status: Preliminary result Specimen: Blood, Venous Updated: 04/09/25 0900     Culture No growth at 48 hours    Blood Culture Blood, Venous [[id number]]  (Normal) Collected: 04/07/25 0331    Lab Status: Preliminary result Specimen: Blood, Venous Updated: 04/09/25 0900     Culture No growth at 48 hours    SARS-COV-2 (COVID-19)/ FLU A/B, AND RSV, PCR Nasopharynx [[id number]]  (Abnormal) Collected: 04/07/25 0325    Lab Status: Final result Specimen: Nasopharyngeal Swab from Nasopharynx Updated: 04/07/25 0419     SARS-CoV-2 (COVID-19) Positive     Influenza A Negative     Influenza B Negative     Respiratory Syncytial Virus Negative    Narrative:      Testing performed using real-time PCR for detection of COVID-19. EUA approved validation studies performed on site.           Impression    Pul syndrome much improved  Cause is not clr to me  Min diuresis    COVID  New infection  Will cont remdesivir now day3  Tolerating well    If cont off O2 then will dc remdes today  Would cont steroids for at least  5days    ANN MARIE Hernández MD

## 2025-04-09 NOTE — ASSESSMENT & PLAN NOTE
-Hx of takostubo in 2023, [facility] with patent coronary arteries at that time.  LVEF recovered to 60-65%.   -Presented here with COVID and flash pulmonary edema. TTE with LVEF 25-30%. Opted for medical management and therefore did not undergo ischemic evaluation again.   -Received IV lasix for 2 days. Declined further IV diuresis today due to urinary frequent.     4/9/25 updates:  -Started valsartan 40 mg daily, but currently on hold due to hyperkalemia.  -Repeat BMP tomorrow morning and resume valsartan if K <4.5.   -Mildly hypervolemic on exam with bibasilar crackles. Declines further IV diuresis. Pt agreeable to starting PO lasix 40 mg daily. Please continue to check standing weight in the morning and document intake/output.   -CXR tomorrow morning.   -Continue metoprolol XL 25 mg daily.

## 2025-04-09 NOTE — PROGRESS NOTES
[facility] Medicine Service -  Daily Progress Note       SUBJECTIVE   Reports no shortness of breath  Still with cough but improving  No chest pain  No dizziness or lightheadedness    Afebrile  On room air     OBJECTIVE      Vital signs in last 24 hours:  Temp:  [36.3 °C (97.4 °F)-36.8 °C (98.2 °F)] 36.8 °C (98.2 °F)  Heart Rate:  [76-85] 83  Resp:  [17-18] 18  BP: ()/(58-70) 110/65    Intake/Output Summary (Last 24 hours) at 4/9/2025 1058  Last data filed at 4/9/2025 0546  Gross per 24 hour   Intake 580 ml   Output 600 ml   Net -20 ml       PHYSICAL EXAMINATION      Physical Exam  Vitals and nursing note reviewed.   Constitutional:       Appearance: Normal appearance. He is well-developed and normal weight.   Eyes:      General: No scleral icterus.  Cardiovascular:      Rate and Rhythm: Normal rate and regular rhythm.      Heart sounds: Normal heart sounds.   Pulmonary:      Effort: Pulmonary effort is normal.      Breath sounds: Rhonchi and rales present.      Comments: Few rhonchi and crackles  Abdominal:      General: Bowel sounds are normal.      Palpations: Abdomen is soft. There is no mass.      Tenderness: There is no abdominal tenderness.   Musculoskeletal:         General: Swelling present.      Cervical back: Neck supple.      Comments: Lower extremities are wrapped   Neurological:      Mental Status: He is alert and oriented to person, place, and time.   Psychiatric:         Behavior: Behavior normal.        LABS / IMAGING / TELE      Labs  I have reviewed the patient's labs.  Creatinine 1.7 potassium 4.3 hemoglobin 11.3    Imaging      ECG/Telemetry  Reviewed by me: No significant arrhythmia    ASSESSMENT AND PLAN           Assessment & Plan  Acute respiratory failure with hypoxia and hypercapnia (CMS/HCC)  > Likely multifactorial in setting of COVID and heart failure exacerbation   Cardiology: The respiratory failure was probably further compounded by heart failure which has responded well to  BiPAP.     CAT scan of chest: No pulmonary embolism, perifissural right upper lobe consolidation, slight progression of right lower lobe pleural thickening which may represent atelectasis, infection or neoplasm, cardiomegaly, right chest wall soft tissue density should be clinically correlated for neoplasm or infection  COVID-positive  WBC 8      Started on IV Decadron April 7 (no history of angioedema from prednisone, had lower extremity swelling and atrial fibrillation)  Continue IV diuresis  Continue remdesivir    Infectious disease and cardiology are following  COVID-19  As above  Started on Decadron and remdesivir April 7  Infectious disease and pulmonary are following  Discussed with Dr. Hernández: Continue remdesivir day 3/3 today if remains off oxygen.  Dr. Hernández recommended to continue Decadron for 5 days, day 3  today    Continue Mucinex and Tessalon as needed  Acute on chronic systolic congestive heart failure (CMS/HCC)  Echocardiogram: Ejection fraction 25 to 30%, mildly reduced RV function, no significant valvular heart disease, RVSP 35  Echocardiogram in January 2024:Ejection fraction 60 to 65%    New cardiomyopathy  Left heart catheterization in December 2023 with normal coronaries    Initially on IV Lasix 40 twice daily April 7-8.  Switch to oral Lasix today.  Continue Metoprolol  Started on Diovan and got a dose 4/8 but was hold subsequently because of hyperkalemia and acute renal failure as per nephrology    Cardiology consult is appreciated: The clinical picture is consistent with Takotsubo cardiomyopathy in the setting of acute COVID infection and respiratory failure.  No plans for cardiac cath, continue IV Lasix.  Discussed with Dr. Renteria  Elevated troponin  Troponin as high as 183  Echocardiogram as above  Cardiac cath in December 2023 with normal coronaries  Likely acute myocardial injury in the setting of respiratory distress, congestive heart failure  No plans for cardiac catheterization as  per cardiology  Stage 3a chronic kidney disease (CMS/HCC)  Baseline creatinine 1.4-1.5  Creatinine is 1.8 yesterday, 1.7 today  Urine analysis pending  Renal ultrasound: Limited visualization secondary to suboptimal acoustic windows.  Mild renal cortical thinning/scarring.  No hydronephrosis.    Nephrology consult is appreciated: Acute renal failure likely prerenal in the setting of COVID and cardiomyopathy with respiratory insufficiency. Patient does not appear to be volume overloaded. May need to decrease his furosemide. Agree with holding valsartan for now.   Paroxysmal atrial fibrillation (CMS/HCC)  In sinus rhythm  On amiodarone and metoprolol  Usual on Xarelto, was given Lovenox initially in case needed catheterization   Restarted on Xarelto 4/8, decrease to 15 mg  History of cardiac arrest  History of V-fib cardiac arrest in February 2023 in the setting of influenza  Had Takotsubo cardiomyopathy at that time  Has had prolonged hospitalization with trach and PEG, now out  Prostate cancer (CMS/HCC)  Status post prostatectomy followed by radiation in 2022  Breast cancer (CMS/HCC)  Status post neoadjuvant chemotherapy in 2022, followed by mastectomy in August 2023, status postradiation.  On tamoxifen.  Declined HER2 directed therapy  History of peptic ulcer disease  In 2022  Continue PPI  BPH (benign prostatic hyperplasia)  Continue Flomax  Hyperlipidemia  Continue statin  Hypothyroidism  Continue levothyroxine  Anemia  Mild   Macrocytic    Folate and TSH are normal  B12 is low normal, will start B12 supplementation  Multiple open wounds of lower extremity  Podiatry consulr is appreciated  Scalp wound  Status post recent surgery for cancer  Wound care consult is appreciated  Hyperkalemia  Potassium was 5.3 4/8  Continue low potassium diet  Hold Diovan, possible restart tomorrow if potassium is stable and kidney function is improving     VTE Assessment: Padua VTE Score: 5  Code Status: Full Code  Estimated  discharge date: 4/11/2025   Discussed with wife at bedside  Nory Jeronimo MD  4/9/2025

## 2025-04-09 NOTE — ASSESSMENT & PLAN NOTE
Potassium was 5.3 4/8  Continue low potassium diet  Hold Diovan, possible restart tomorrow if potassium is stable and kidney function is improving

## 2025-04-09 NOTE — ASSESSMENT & PLAN NOTE
- s/p cardioversion 12/5/22  - on amiodarone twice a week (mon, Fri), metoprolol   -Continue Xarelto 15 mg daily  - currently NSR with occasional PVCs

## 2025-04-10 ENCOUNTER — APPOINTMENT (INPATIENT)
Dept: RADIOLOGY | Facility: HOSPITAL | Age: 75
DRG: 177 | End: 2025-04-10
Attending: NURSE PRACTITIONER
Payer: MEDICARE

## 2025-04-10 LAB
ANION GAP SERPL CALC-SCNC: 9 MEQ/L (ref 3–15)
BUN SERPL-MCNC: 40 MG/DL (ref 7–25)
CALCIUM SERPL-MCNC: 8.9 MG/DL (ref 8.6–10.3)
CHLORIDE SERPL-SCNC: 101 MEQ/L (ref 98–107)
CO2 SERPL-SCNC: 31 MEQ/L (ref 21–31)
CREAT SERPL-MCNC: 1.8 MG/DL (ref 0.7–1.3)
EGFRCR SERPLBLD CKD-EPI 2021: 38.8 ML/MIN/1.73M*2
ERYTHROCYTE [DISTWIDTH] IN BLOOD BY AUTOMATED COUNT: 14.7 % (ref 11.6–14.4)
GLUCOSE SERPL-MCNC: 127 MG/DL (ref 70–99)
HCT VFR BLD AUTO: 35.3 % (ref 40.1–51)
HGB BLD-MCNC: 11.4 G/DL (ref 13.7–17.5)
MAGNESIUM SERPL-MCNC: 2 MG/DL (ref 1.8–2.5)
MCH RBC QN AUTO: 33.4 PG (ref 28–33.2)
MCHC RBC AUTO-ENTMCNC: 32.3 G/DL (ref 32.2–36.5)
MCV RBC AUTO: 103.5 FL (ref 83–98)
PLATELET # BLD AUTO: 153 K/UL (ref 150–350)
PMV BLD AUTO: 9.8 FL (ref 9.4–12.4)
POTASSIUM SERPL-SCNC: 4.2 MEQ/L (ref 3.5–5.1)
RBC # BLD AUTO: 3.41 M/UL (ref 4.5–5.8)
SODIUM SERPL-SCNC: 141 MEQ/L (ref 136–145)
WBC # BLD AUTO: 4.12 K/UL (ref 3.8–10.5)

## 2025-04-10 PROCEDURE — 99232 SBSQ HOSP IP/OBS MODERATE 35: CPT | Performed by: HOSPITALIST

## 2025-04-10 PROCEDURE — 63600000 HC DRUGS/DETAIL CODE: Mod: JW | Performed by: HOSPITALIST

## 2025-04-10 PROCEDURE — 63700000 HC SELF-ADMINISTRABLE DRUG: Performed by: HOSPITALIST

## 2025-04-10 PROCEDURE — 71045 X-RAY EXAM CHEST 1 VIEW: CPT

## 2025-04-10 PROCEDURE — 63700000 HC SELF-ADMINISTRABLE DRUG: Performed by: NURSE PRACTITIONER

## 2025-04-10 PROCEDURE — 97530 THERAPEUTIC ACTIVITIES: CPT | Mod: GP,CQ

## 2025-04-10 PROCEDURE — 80048 BASIC METABOLIC PNL TOTAL CA: CPT | Performed by: HOSPITALIST

## 2025-04-10 PROCEDURE — 85027 COMPLETE CBC AUTOMATED: CPT | Performed by: HOSPITALIST

## 2025-04-10 PROCEDURE — 83735 ASSAY OF MAGNESIUM: CPT | Performed by: HOSPITALIST

## 2025-04-10 PROCEDURE — 20600000 HC ROOM AND CARE INTERMEDIATE/TELEMETRY

## 2025-04-10 PROCEDURE — 36415 COLL VENOUS BLD VENIPUNCTURE: CPT | Performed by: HOSPITALIST

## 2025-04-10 PROCEDURE — 63700000 HC SELF-ADMINISTRABLE DRUG: Performed by: STUDENT IN AN ORGANIZED HEALTH CARE EDUCATION/TRAINING PROGRAM

## 2025-04-10 RX ADMIN — Medication 400 MG: at 09:07

## 2025-04-10 RX ADMIN — GUAIFENESIN 600 MG: 600 TABLET ORAL at 20:04

## 2025-04-10 RX ADMIN — Medication 1000 MCG: at 09:07

## 2025-04-10 RX ADMIN — ATORVASTATIN CALCIUM 10 MG: 10 TABLET, FILM COATED ORAL at 09:07

## 2025-04-10 RX ADMIN — BUSPIRONE HYDROCHLORIDE 10 MG: 10 TABLET ORAL at 09:07

## 2025-04-10 RX ADMIN — PANTOPRAZOLE SODIUM 40 MG: 40 TABLET, DELAYED RELEASE ORAL at 20:04

## 2025-04-10 RX ADMIN — CHOLECALCIFEROL TAB 25 MCG (1000 UNIT) 25 MCG: 25 TAB at 09:07

## 2025-04-10 RX ADMIN — TAMOXIFEN CITRATE 20 MG: 10 TABLET, FILM COATED ORAL at 09:07

## 2025-04-10 RX ADMIN — BENZONATATE 100 MG: 100 CAPSULE ORAL at 05:54

## 2025-04-10 RX ADMIN — TAMSULOSIN HYDROCHLORIDE 0.4 MG: 0.4 CAPSULE ORAL at 21:24

## 2025-04-10 RX ADMIN — GUAIFENESIN 600 MG: 600 TABLET ORAL at 09:07

## 2025-04-10 RX ADMIN — METOPROLOL SUCCINATE 25 MG: 25 TABLET, EXTENDED RELEASE ORAL at 21:24

## 2025-04-10 RX ADMIN — PANTOPRAZOLE SODIUM 40 MG: 40 TABLET, DELAYED RELEASE ORAL at 09:07

## 2025-04-10 RX ADMIN — BUSPIRONE HYDROCHLORIDE 10 MG: 10 TABLET ORAL at 20:04

## 2025-04-10 RX ADMIN — DEXAMETHASONE SODIUM PHOSPHATE 6 MG: 4 INJECTION, SOLUTION INTRA-ARTICULAR; INTRALESIONAL; INTRAMUSCULAR; INTRAVENOUS; SOFT TISSUE at 17:49

## 2025-04-10 RX ADMIN — CETIRIZINE HYDROCHLORIDE 10 MG: 10 TABLET, FILM COATED ORAL at 09:07

## 2025-04-10 RX ADMIN — BENZONATATE 100 MG: 100 CAPSULE ORAL at 20:04

## 2025-04-10 RX ADMIN — BENZONATATE 100 MG: 100 CAPSULE ORAL at 11:56

## 2025-04-10 RX ADMIN — FUROSEMIDE 40 MG: 40 TABLET ORAL at 09:07

## 2025-04-10 RX ADMIN — RIVAROXABAN 15 MG: 15 TABLET, FILM COATED ORAL at 17:50

## 2025-04-10 RX ADMIN — LEVOTHYROXINE SODIUM 50 MCG: 0.05 TABLET ORAL at 05:42

## 2025-04-10 RX ADMIN — Medication 400 MG: at 20:04

## 2025-04-10 ASSESSMENT — COGNITIVE AND FUNCTIONAL STATUS - GENERAL
CLIMB 3 TO 5 STEPS WITH RAILING: 3 - A LITTLE
WALKING IN HOSPITAL ROOM: 3 - A LITTLE
MOVING TO AND FROM BED TO CHAIR: 4 - NONE
STANDING UP FROM CHAIR USING ARMS: 2 - A LOT
WALKING IN HOSPITAL ROOM: 4 - NONE
STANDING UP FROM CHAIR USING ARMS: 3 - A LITTLE
WALKING IN HOSPITAL ROOM: 2 - A LOT
AFFECT: WFL
STANDING UP FROM CHAIR USING ARMS: 4 - NONE
CLIMB 3 TO 5 STEPS WITH RAILING: 1 - TOTAL
MOVING TO AND FROM BED TO CHAIR: 2 - A LOT
CLIMB 3 TO 5 STEPS WITH RAILING: 2 - A LOT
MOVING TO AND FROM BED TO CHAIR: 3 - A LITTLE

## 2025-04-10 ASSESSMENT — ENCOUNTER SYMPTOMS: COUGH: 1

## 2025-04-10 NOTE — ASSESSMENT & PLAN NOTE
Baseline creatinine 1.4-1.5  Creatinine is 1.8     Renal ultrasound: Limited visualization secondary to suboptimal acoustic windows.  Mild renal cortical thinning/scarring.  No hydronephrosis.    Nephrology consult is appreciated: Acute renal failure likely prerenal in the setting of COVID and cardiomyopathy with respiratory insufficiency. Patient does not appear to be volume overloaded. May need to decrease his furosemide. Agree with holding valsartan for now.

## 2025-04-10 NOTE — PROGRESS NOTES
"    Major Events:  On RA still    Subjective:  No sob  Appetite nl    Temp:  [36.5 °C (97.7 °F)-36.9 °C (98.4 °F)] 36.8 °C (98.2 °F)  Heart Rate:  [70-97] 75  Resp:  [18-20] 18  BP: (102-132)/(57-68) 132/58  FiO2 (%) (Set): 40 %  SpO2 Readings from Last 3 Encounters:   04/10/25 97%   12/16/24 99%   11/21/24 99%       Anti-infectives (From admission, onward)      Start     Dose/Rate Route Frequency Ordered Stop    04/08/25 1819  remdesivir (VEKLURY) 100 mg in sodium chloride 0.9 % 250 mL IVPB        Placed in \"Followed by\" Linked Group    100 mg  500 mL/hr over 30 Minutes intravenous Every 24 hours interval 04/07/25 1734 04/12/25 1829          Physical Exam:  Skin: No rash  Sclera: Anicteric  Lungs: rales at bases  CV: S1, S2 nl, no embolic changes  Abd: Soft, nt  Extr: No jt eff, no edema  Neuro: Alert, lucid    Lab Results   Component Value Date    GLUCOSE 127 (H) 04/10/2025    CALCIUM 8.9 04/10/2025     04/10/2025    K 4.2 04/10/2025    CO2 31 04/10/2025     04/10/2025    BUN 40 (H) 04/10/2025    CREATININE 1.8 (H) 04/10/2025     Lab Results   Component Value Date    WBC 4.12 04/10/2025    HGB 11.4 (L) 04/10/2025    HCT 35.3 (L) 04/10/2025    .5 (H) 04/10/2025     04/10/2025     Lab Results   Component Value Date    ALBUMIN 3.6 04/08/2025    BILITOT 0.5 04/08/2025    ALKPHOS 40 04/08/2025    BILIDIR <0.1 04/08/2025    AST 33 04/08/2025    ALT 9 04/08/2025    PROTEIN 6.4 04/08/2025     Lab Results   Component Value Date    WBC 4.12 04/10/2025    HGB 11.4 (L) 04/10/2025    HCT 35.3 (L) 04/10/2025    .5 (H) 04/10/2025     04/10/2025     Lab Results   Component Value Date    GLUCOSE 127 (H) 04/10/2025    CALCIUM 8.9 04/10/2025     04/10/2025    K 4.2 04/10/2025    CO2 31 04/10/2025     04/10/2025    BUN 40 (H) 04/10/2025    CREATININE 1.8 (H) 04/10/2025     Lab Results   Component Value Date    ALBUMIN 3.6 04/08/2025    BILITOT 0.5 04/08/2025    ALKPHOS 40 04/08/2025 "    BILIDIR <0.1 04/08/2025    AST 33 04/08/2025    ALT 9 04/08/2025    PROTEIN 6.4 04/08/2025     Microbiology Results (last 3 days)       ** No results found for the last 72 hours. **          Impression    Pul syndrome much improved  Cause is not clr to me  Min diuresis    COVID  New infection  Finished 3d remdes  On RA  Would still complete 5d steroids    Call c questions    ANN MARIE Hernández MD

## 2025-04-10 NOTE — PROGRESS NOTES
Hospital Medicine Service  Daily Progress Note       SUBJECTIVE   Patient seen earlier today.  Sitting up in the chair.  Wife at bedside.  Reports cough improved and shortness of breath improved.  Patient very hard of hearing and difficult to communicate.     OBJECTIVE        Vital Signs  Temp:  [36.5 °C (97.7 °F)-37.2 °C (99 °F)] 37.2 °C (99 °F)  Heart Rate:  [70-97] 72  Resp:  [18-20] 18  BP: (102-132)/(56-68) 124/56  FiO2 (%) (Set): 40 %  SpO2 Readings from Last 3 Encounters:   04/10/25 97%   12/16/24 99%   11/21/24 99%       I/O last 3 completed shifts:  In: 1502 [P.O.:1252; IV Piggyback:250]  Out: -     PHYSICAL EXAMINATION        GEN:; not in acute distress  HEENT: normocephalic; atraumatic    CARDIO: regular rate and rhythm;   RESP: decreased at bases  ABD: soft,  non-tender, normal bowel sounds  EXT: no  edema  NEURO: alert and oriented to person, place.  Nonfocal       LABS / IMAGING / TELE        Labs  Lab Results   Component Value Date    WBC 4.12 04/10/2025    HGB 11.4 (L) 04/10/2025    HCT 35.3 (L) 04/10/2025    .5 (H) 04/10/2025     04/10/2025     Lab Results   Component Value Date    GLUCOSE 127 (H) 04/10/2025    CALCIUM 8.9 04/10/2025     04/10/2025    K 4.2 04/10/2025    CO2 31 04/10/2025     04/10/2025    BUN 40 (H) 04/10/2025    CREATININE 1.8 (H) 04/10/2025     Lab Results   Component Value Date    INR 1.8 01/07/2024    INR 1.2 01/04/2024    INR 1.1 01/04/2024       Imaging  X-RAY CHEST 1 VIEW  Result Date: 4/10/2025  IMPRESSION: Cardiomegaly. Patchy pleural-parenchymal opacity within the right mid to upper lung field     ULTRASOUND KIDNEYS  Result Date: 4/8/2025  IMPRESSION: Limited visualization secondary to suboptimal acoustic windows.  Mild renal cortical thinning/scarring.  No hydronephrosis.     X-RAY CHEST 1 VIEW  Result Date: 4/7/2025  IMPRESSION:  Mild opacity in the right lung which has slightly improved since 1/8/2024. COMMENT:  Portable chest x-ray is  compared with 1/8/2024. The tracheostomy and right IJ catheter has been removed. There is cardiomegaly with prosthetic valve. Mild hazy opacity in the right lung has slightly improved since prior. The remaining parenchyma is clear. No definite effusion. Right axillary surgical clips noted.     CT ANGIOGRAPHY CHEST PULMONARY EMBOLISM WITH IV CONTRAST  Result Date: 4/7/2025  IMPRESSION: 1.  No definite evidence of pulmonary embolism. 2.  Cardiomegaly. 3.  Right chest wall soft tissue density which should be clinically correlated for neoplasm or infection. 4.  Peripheral right upper lobe consolidation, subjacent mass which may represent treatment related change and/or infection. 5.  Slight progressive right lower lobe pleural thickening which may represent atelectasis, infection or neoplasm. 6.  Cardiomegaly. Findings reported to nurse Tavo in the ED at 8:35 AM on 4/7/2025. COMMENT: Technique: CT angiography of the chest was performed from the thoracic inlet through the upper abdomen using contiguous 1.25 mm transaxial sections utilizing a pulmonary embolism protocol. Images were acquired utilizing 80 cc Isovue-370 . 3D MIP and/or volume rendered image reconstruction performed and reviewed. CT DOSE:  One or more dose reduction techniques (e.g. automated exposure control, adjustment of the mA and/or kV according to patient size, use of iterative reconstruction technique) utilized for this examination. Comparisons studies: None. Lung parenchyma: Peripheral right upper lobe linear consolidation and bronchiectasis subjacent the right chest wall mass as noted below.  There is peribronchial thickening.. Mediastinum: Normal. Zelda: Normal. Heart and pericardium: Cardiomegaly.. Pulmonary artery: Normal Aorta:Calcific atherosclerotic disease. Chest wall and pleura: There is a 2.9 x 11.1 cm focus of right chest wall soft tissue attenuation.  There is slight progressive right lower lobe pleural thickening. Axilla: Normal.  Liver: Visualized portion is within normal limits. Spleen: Normal. Adrenals: Normal. Bones: Thoracolumbar degenerative change. Thyroid: Normal Other: Cholelithiasis.      ECG/Telemetry  Normal sinus rhythm    ASSESSMENT AND PLAN           Assessment & Plan  Acute respiratory failure with hypoxia and hypercapnia (CMS/HCC)  > Likely multifactorial in setting of COVID and heart failure exacerbation   Cardiology: The respiratory failure was probably further compounded by heart failure which has responded well to BiPAP.     CAT scan of chest: No pulmonary embolism, perifissural right upper lobe consolidation, slight progression of right lower lobe pleural thickening which may represent atelectasis, infection or neoplasm, cardiomegaly, right chest wall soft tissue density should be clinically correlated for neoplasm or infection  COVID-positive  WBC 8      Started on IV Decadron April 7 (no history of angioedema from prednisone, had lower extremity swelling and atrial fibrillation)  Continue IV diuresis  Continue remdesivir    Infectious disease and cardiology are following  COVID-19  As above  Started on Decadron and remdesivir April 7  Infectious disease and pulmonary are following  Discussed with Dr. Hernández: Finished remdesivir for 3 days on4/9/25.    Dr. Hernández recommended to continue Decadron for 5 days, day 4  today    Continue Mucinex and Tessalon as needed  Acute on chronic systolic congestive heart failure (CMS/HCC)  Echocardiogram: Ejection fraction 25 to 30%, mildly reduced RV function, no significant valvular heart disease, RVSP 35  Echocardiogram in January 2024:Ejection fraction 60 to 65%    New cardiomyopathy  Left heart catheterization in December 2023 with normal coronaries    Initially on IV Lasix 40 twice daily April 7-8.  Switch to oral Lasix 4/9/25  Continue Metoprolol  Started on Diovan and got a dose 4/8 but was hold subsequently because of hyperkalemia and acute renal failure as per  nephrology    Cardiology consult is appreciated: The clinical picture is consistent with Takotsubo cardiomyopathy in the setting of acute COVID infection and respiratory failure.  No plans for cardiac cath, continue oral Lasix  Discussed with Dr. Renteria  Elevated troponin  Troponin as high as 183  Echocardiogram as above  Cardiac cath in December 2023 with normal coronaries  Likely acute myocardial injury in the setting of respiratory distress, congestive heart failure  No plans for cardiac catheterization as per cardiology  Stage 3a chronic kidney disease (CMS/HCC)  Baseline creatinine 1.4-1.5  Creatinine is 1.8     Renal ultrasound: Limited visualization secondary to suboptimal acoustic windows.  Mild renal cortical thinning/scarring.  No hydronephrosis.    Nephrology consult is appreciated: Acute renal failure likely prerenal in the setting of COVID and cardiomyopathy with respiratory insufficiency. Patient does not appear to be volume overloaded. May need to decrease his furosemide. Agree with holding valsartan for now.   Paroxysmal atrial fibrillation (CMS/HCC)  In sinus rhythm  On amiodarone and metoprolol  Usual on Xarelto, was given Lovenox initially in case needed catheterization   Restarted on Xarelto 4/8, decrease to 15 mg  History of cardiac arrest  History of V-fib cardiac arrest in February 2023 in the setting of influenza  Had Takotsubo cardiomyopathy at that time  Has had prolonged hospitalization with trach and PEG, now out  Prostate cancer (CMS/HCC)  Status post prostatectomy followed by radiation in 2022  Breast cancer (CMS/HCC)  Status post neoadjuvant chemotherapy in 2022, followed by mastectomy in August 2023, status postradiation.  On tamoxifen.  Declined HER2 directed therapy  History of peptic ulcer disease  In 2022  Continue PPI  BPH (benign prostatic hyperplasia)  Continue Flomax  Hyperlipidemia  Continue statin  Hypothyroidism  Continue levothyroxine  Anemia  Mild   Macrocytic    Folate  and TSH are normal  B12 is low normal, will start B12 supplementation  Multiple open wounds of lower extremity  Podiatry consulr is appreciated  Scalp wound  Status post recent surgery for cancer  Wound care consult is appreciated  Hyperkalemia  Potassium was 5.3 4/8  Continue low potassium diet  Hold Diovan, possible restart tomorrow if potassium is stable and kidney function is improving  VTE Assessment: Rivaroxaban  Code Status: Full Code  Estimated discharge date: 4/11/2025   Will need SNF placement versus home with home care.  Discussed with patient's wife at bedside.  Malcolm Oglesby MD  4/10/2025  5:53 PM

## 2025-04-10 NOTE — PROGRESS NOTES
Physical Therapy -  Daily Treatment/Progress Note     Patient: Cam Rosas Jr.  Location: [place] [address]  MRN: [id number]  Today's date: 4/10/2025    HISTORY OF PRESENT ILLNESS     Radha is a 75 y.o. male admitted on 4/7/2025 with Acute respiratory failure with hypoxia (CMS/HCC) [J96.01]  Single subsegmental pulmonary embolism without acute cor pulmonale (CMS/HCC) [I26.93]  COVID-19 [U07.1]. Principal problem is Acute respiratory failure with hypoxia and hypercapnia (CMS/HCC).    Past Medical History  Radha has a past medical history of Acute systolic heart failure (CMS/HCC) (02/15/2023), Ambulates with cane, Atrial fibrillation (CMS/HCC), Breast cancer (CMS/HCC) (October 2022), CHF (congestive heart failure) (CMS/HCC), Chronic kidney disease, CKD (chronic kidney disease) (10/19/2022), radiation therapy, antineoplastic chemo, and Prostate cancer (CMS/HCC).    History of Present Illness  75 y.o. male with past medical history atrial fibrillation on Xarelto, breast CA s/p mastectomy/XRT, CKD, HFpEF, history of respiratory failure secondary to influenza in 12/2023 requiring tracheostomy who presented to the emergency room with shortness of breath.  He was in bed and was awoken from his sleep suddenly with cough and dyspnea.  He reported feeling well yesterday-usual state of health  In ER he was afebrile heart rate 98 respirate 32 /96 O2 sat 65% on room air.  He was placed on 6 L O2 and then on BiPAP 50% with improved oxygenation  Labs notable for VBG-7.13/73 consistent with acute hypercapnic respiratory failure-subsequent VBG-7.2 2/58/43.  White cell count 7.8.  Viral panel: Positive for COVID.  CTA chest-no PE.  Cardiomegaly, right chest wall soft tissue density.  Peripheral right upper lobe consolidation with subjacent mass which may be treatment related change and/or infection.  Slightly progressive right lower lobe pleural thickening  PRIOR LEVEL OF FUNCTION AND LIVING ENVIRONMENT     Prior  Level of Function      Flowsheet Row Most Recent Value   Dominant Hand right   Ambulation assistive equipment   Transferring assistive equipment   Toileting independent   Bathing independent   Dressing assistive person   Eating independent   IADLs independent   Driving/Transportation    Communication understands/communicates without difficulty   Prior Level of Function Comment reports SPC in-home ambulation, RW community, retired , wife assists w/ LB dressing.   Assistive Device Currently Used at Home cane, straight, grab bar, stair glide, walker, front-wheeled, wheelchair, shower chair             Prior Living Environment      Flowsheet Row Most Recent Value   People in Home spouse   Current Living Arrangements home   Living Environment Comment 2SH, 2 SRINIVAS w/out railings, first floor toilet, bedroom and main bathroom upstairs, walk-in shower w/ shower chair and grab-bars          VITALS AND PAIN     PT Vitals      Date/Time Pulse SpO2 Pt Position [facility]   04/10/25 1430 80 98 % Sitting MM          PT Pain      Date/Time Pain Type Side/Orientation Location Rating: Rest Rating: Activity Description [facility]   04/10/25 1430 Pain Assessment left knee 0 2 -- w/ flexion and activity MM             Objective   OBJECTIVE     Start time:  1430  End time:  1443  Session Length: 13 min       General Observations  Patient received upright, in chair. He was agreeable to therapy, no issues or concerns identified by nurse prior to session. awake alert engaged; on RA O2; spouse present and sppt'v    Precautions: fall, enhanced contact and droplet, oxygen therapy device and L/min, limb restrictions        Services  Do You Speak a Language Other Than English at Home?: no      PT Eval and Treat - 04/10/25 1430          Cognition    Orientation Status oriented x 4     Affect/Mental Status WFL     Follows Commands follows multi-step commands     Cognitive Function WFL     Comment, Cognition receptive,  engaged, appropriate and timely     Cognitive Interventions/Strategies occupation/activity based interventions        Bed Mobility    [place] other (see comments)     Comment rec'd OOB for PT; spouse present        Mobility Belt    Mobility Belt Used During Session no - independent with all mobility        Sit/Stand Transfer    [place] modified independence     Assistive Device walker, front-wheeled     Transfer Comments requires arm rest sppt; transitions to RW; posteriorly unsteady when fatigued, steady when not        Surface-to-Surface Transfers    Transfer Technique stand pivot     [place] modified independence     Assistive Device walker, front-wheeled        Gait Training    [place], Gait modified independence     Assistive Device walker, front-wheeled     Distance in Feet 46 feet   in room    Pattern step-through     Comment requires RW for balance and sppt     [place] (Gait Trial 2) supervision     Assistive Device (Gait Trial 2) none     Distance in Feet (Gait Trial 2) 8 feet     Comment (Gait Trial 2) WBOS and flexed hips/knees w/o asst dev but no LOB; pt is safer w/ RW; agreeable        Stairs Training    [place], Stairs other (see comments);minimum assist (75% or more patient effort);1 person assist     Safety/Cues minimal;verbal cues;technique     Comment simulated 2 SRINIVAS via 5 heel raises f/b (5) BLE short squats (+ L knee pain) then f/b (3) RLE sgl leg short knee squats; Min A x 1 and rail sppt        Balance    Static Sitting Balance WFL;unsupported;sitting in chair     Dynamic Sitting Balance WFL;supported;sitting in chair   self    Sit to Stand Dynamic Balance WFL;supported;mild impairment;other (see comments)   w/ arm rests    Static Standing Balance WFL;supported     Dynamic Standing Balance mild impairment;supported     Balance Interventions occupation based/functional task     Comment, Balance mob assessed w/ and w/o asst dev: steadier w/ asst dev; balance assessed  when fatigued and when not, unsteadier when fatigued; pt/spouse recognise signs; RW/arm rests recommended (see trxfs/locomotion        Lower Extremity (Therapeutic Exercise)    Exercise Position/Type seated;AROM (active range of motion)     General Exercise ankle pumps;LAQ (long arc quad);marching while seated     Reps and Sets 10 reps ea, all     Comment prep/assess        Impairments/Safety Issues    Impairments Affecting Function strength;endurance/activity tolerance;balance;pain     Functional Endurance fair     Safety Issues Affecting Function other (see comments)     Comment, Safety Issues/Impairments deconditioning/fatigue                                              Education Documentation  Joint Mobility/Strength, taught by Meek Chang PTA at 4/10/2025  2:49 PM.  Learner: Significant Other, Patient  Readiness: Acceptance  Method: Explanation  Response: Verbalizes Understanding  Comment: mob, safety, use of asst dev; pacing and progression    Energy Conservation, taught by Meek Chang PTA at 4/10/2025  2:49 PM.  Learner: Significant Other, Patient  Readiness: Acceptance  Method: Explanation  Response: Verbalizes Understanding  Comment: mob, safety, use of asst dev; pacing and progression    Assistive/Adaptive Devices, taught by Meek Chang PTA at 4/10/2025  2:49 PM.  Learner: Significant Other, Patient  Readiness: Acceptance  Method: Explanation  Response: Verbalizes Understanding  Comment: mob, safety, use of asst dev; pacing and progression    Signs/Symptoms, taught by Meek Chang PTA at 4/10/2025  2:49 PM.  Learner: Significant Other, Patient  Readiness: Acceptance  Method: Explanation  Response: Verbalizes Understanding  Comment: mob, safety, use of asst dev; pacing and progression        Session Outcome  Patient in chair at end of session, chair alarm on, all needs met, call light in reach, personal items in reach. Nursing notified about other (see comments), patient's response to  therapy/activity, and patient's performance. (RN prior, PT and SW post)    AM-PAC - Mobility (Current Function)     Turning form your back to your side while in flat bed without using bedrails 4 - None   Moving from lying on your back to sitting on the side of a flat bed without using bedrails 3 - A Little   Moving to and from a bed to a chair 4 - None   Standing up from a chair using your arms 4 - None   To walk in a hospital room 4 - None   Climbing 3-5 steps with a railing 3 - A Little   AM-PAC Mobility Score 22      ASSESSMENT AND PLAN     Progress Summary  [facility] mob 22/24; pt progressing nicely w/ PT; pt appears he will transition well to home w/ PT and asst; spouse onboard w/ change of dispo, pt in agreement; SW made aware after requesting PT assess; rec RW and (S) mob/gait in home distances; Min A stairs to enter/exit domicile; rec home w/ PT and asst and cont w/ home prog.    Patient/Family Therapy Goals Statement: go home    PT Plan      Flowsheet Row Most Recent Value   Rehab Potential good, to achieve stated therapy goals at 04/10/2025 1430   Therapy Frequency 3 times/wk at 04/10/2025 1430   Planned Therapy Interventions balance training, bed mobility training, gait training, patient/family education, postural re-education, transfer training, stair training, motor coordination training at 04/10/2025 1430            PT Discharge Recommendations      Flowsheet Row Most Recent Value   PT Recommended Discharge Disposition home with home health, home with assistance at 04/10/2025 1430   Anticipated Equipment Needs if Discharged Home (PT) none at 04/10/2025 1430                 PT Goals      Flowsheet Row Most Recent Value   Bed Mobility Goal 1    Activity/Assistive Device bed mobility activities, all at 04/08/2025 1434   [place] modified independence at 04/08/2025 1434   Time Frame by discharge at 04/08/2025 1434   Progress/Outcome goal ongoing at 04/08/2025 1434   Transfer Goal 1    Activity/Assistive  Device walker, front-wheeled, sit-to-stand/stand-to-sit, bed-to-chair/chair-to-bed at 04/08/2025 1434   [place] modified independence at 04/08/2025 1434   Time Frame by discharge at 04/08/2025 1434   Progress/Outcome goal ongoing at 04/08/2025 1434   Gait Training Goal 1    Activity/Assistive Device walker, front-wheeled, gait (walking locomotion) at 04/08/2025 1434   [place] modified independence at 04/08/2025 1434   Distance 100 ft at 04/08/2025 1434   Time Frame by discharge at 04/08/2025 1434   Progress/Outcome goal ongoing at 04/08/2025 1434   Stairs Goal 1    Activity/Assistive Device ascending stairs, descending stairs, using handrail, right, using handrail, left at 04/08/2025 1434   [place] supervision required at 04/08/2025 1434   Number of Stairs 2 at 04/08/2025 1434   Time Frame by discharge at 04/08/2025 1434   Progress/Outcome goal ongoing at 04/08/2025 1434

## 2025-04-10 NOTE — PROGRESS NOTES
NEPHROLOGY PROGRESS NOTE    Subjective:   Seen in f/up for ELEANOR. He feels better. Has a dry cough. Not on O2. Denies sob at rest. No fever, chills, myalgias.       Review of Systems   Respiratory:  Positive for cough.    All other systems reviewed and are negative.      Vitals:    04/10/25 0327 04/10/25 0330 04/10/25 0717 04/10/25 0845   BP: 103/68   (!) 132/58   BP Location: Left upper arm   Left upper arm   Patient Position: Sitting   Sitting   Pulse: 78 70 72 75   Resp: 18   18   Temp: 36.6 °C (97.9 °F)   36.8 °C (98.2 °F)   TempSrc: Oral   Oral   SpO2: 93%   97%   Weight:       Height:             Intake/Output Summary (Last 24 hours) at 4/10/2025 1132  Last data filed at 4/10/2025 0551  Gross per 24 hour   Intake 1144 ml   Output --   Net 1144 ml       Physical Exam  Vitals reviewed.   Constitutional:       General: He is not in acute distress.  HENT:      Head: Normocephalic.      Right Ear: External ear normal.      Left Ear: External ear normal.      Mouth/Throat:      Mouth: Mucous membranes are moist.   Eyes:      General: No scleral icterus.  Cardiovascular:      Rate and Rhythm: Regular rhythm.      Heart sounds:      No friction rub.   Pulmonary:      Effort: No respiratory distress.      Breath sounds: No wheezing.   Abdominal:      General: There is no distension.   Musculoskeletal:      Cervical back: No rigidity.      Right lower leg: No edema.      Left lower leg: No edema.   Skin:     Coloration: Skin is not jaundiced.   Neurological:      Mental Status: He is alert and oriented to person, place, and time.      Motor: No weakness.   Psychiatric:         Mood and Affect: Mood normal.         Behavior: Behavior normal.         LABS:  Results from last 7 days   Lab Units 04/10/25  0323 04/09/25  0248 04/08/25  0415   SODIUM mEQ/L 141   < > 144   POTASSIUM mEQ/L 4.2   < > 5.3*   CHLORIDE mEQ/L 101   < > 106   CO2 mEQ/L 31   < > 29   BUN mg/dL 40*   < > 27*   CREATININE mg/dL 1.8*   < > 1.8*   EGFR  mL/min/1.73m*2 38.8*   < > 38.8*   GLUCOSE mg/dL 127*   < > 152*   CALCIUM mg/dL 8.9   < > 8.5*   ALBUMIN g/dL  --   --  3.6   PHOSPHORUS mg/dL  --   --  5.9*   WBC K/uL 4.12   < > 6.16   HEMOGLOBIN g/dL 11.4*   < > 10.7*   HEMATOCRIT % 35.3*   < > 34.0*   PLATELETS K/uL 153   < > 154    < > = values in this interval not displayed.       Meds:    Current Facility-Administered Medications:     amiodarone (PACERONE) tablet 200 mg, 200 mg, oral, Once per day on Monday Wednesday Friday, Meek Pizarro MD, 200 mg at 04/09/25 0843    atorvastatin (LIPITOR) tablet 10 mg, 10 mg, oral, Daily, Meek Pizarro MD, 10 mg at 04/10/25 0907    benzonatate (TESSALON) capsule 100 mg, 100 mg, oral, 3x daily PRN, Nory Jeronimo MD, 100 mg at 04/10/25 0554    busPIRone (BUSPAR) tablet 10 mg, 10 mg, oral, BID, Meek Pizarro MD, 10 mg at 04/10/25 0907    cetirizine (ZyrTEC) tablet 10 mg, 10 mg, oral, q AM, Nory Jeronimo MD, 10 mg at 04/10/25 0907    cholecalciferol (vitamin D3) tablet 25 mcg, 25 mcg, oral, q AM, Nory Jeronimo MD, 25 mcg at 04/10/25 0907    cyanocobalamin (VITAMIN B12) tablet 1,000 mcg, 1,000 mcg, oral, Daily, Nory Jeronimo MD, 1,000 mcg at 04/10/25 0907    dexAMETHasone (DECADRON) injection 6 mg, 6 mg, intravenous, Daily, Nory Jeronimo MD, 6 mg at 04/09/25 1816    glucose chewable tablet 16-32 g of dextrose, 16-32 g of dextrose, oral, PRN **OR** dextrose 40 % oral gel 15-30 g of dextrose, 15-30 g of dextrose, oral, PRN **OR** glucagon (GLUCAGEN) injection 1 mg, 1 mg, intramuscular, PRN **OR** dextrose 50 % in water (D50) injection 12.5 g, 25 mL, intravenous, PRN, Meek Pizarro MD    furosemide (LASIX) tablet 40 mg, 40 mg, oral, Daily, Jaclyn Douglas, JONNY, 40 mg at 04/10/25 0907    guaiFENesin (MUCINEX) 12 hr ER tablet 600 mg, 600 mg, oral, BID, Nory Jeronimo MD, 600 mg at 04/10/25 0907    levothyroxine (SYNTHROID) tablet 50 mcg, 50 mcg, oral, Daily (6:30a), Nory Jeronimo,  MD, 50 mcg at 04/10/25 0542    magnesium oxide (MAG-OX) tablet 400 mg, 400 mg, oral, BID, Nory Jeronimo MD, 400 mg at 04/10/25 0907    melatonin ODT 3 mg, 3 mg, peg tube, Nightly PRN, Meek Pizarro MD    metoprolol succinate XL (TOPROL-XL) 24 hr ER tablet 25 mg, 25 mg, oral, Nightly, Meek Pizarro MD, 25 mg at 04/09/25 2124    pantoprazole (PROTONIX) tablet,delayed release (DR/EC) 40 mg, 40 mg, oral, BID, Meek Pizarro MD, 40 mg at 04/10/25 0907    rivaroxaban (XARELTO) tablet 15 mg, 15 mg, oral, Daily with dinner, Nory Jeronimo MD, 15 mg at 04/09/25 1828    tamoxifen (NOLVADEX) tablet 20 mg, 20 mg, oral, Daily, Meek Pizarro MD, 20 mg at 04/10/25 0907    tamsulosin (FLOMAX) 24 hr ER capsule 0.4 mg, 0.4 mg, oral, Nightly, Meek Pizarro MD, 0.4 mg at 04/09/25 2124    [Provider Managed Hold] valsartan (DIOVAN) tablet 40 mg, 40 mg, oral, Daily, Nory Jeronimo MD, 40 mg at 04/08/25 1404    ASSESSMENT:  Principal Problem:    Acute respiratory failure with hypoxia and hypercapnia (CMS/HCC)  Active Problems:    Stage 3a chronic kidney disease (CMS/HCC)    COVID-19    Acute on chronic systolic congestive heart failure (CMS/HCC)    Paroxysmal atrial fibrillation (CMS/HCC)    History of cardiac arrest    Prostate cancer (CMS/HCC)    Breast cancer (CMS/HCC)    History of peptic ulcer disease    BPH (benign prostatic hyperplasia)    Hyperlipidemia    Hypothyroidism    Anemia    Multiple open wounds of lower extremity    Scalp wound    Takotsubo cardiomyopathy    History of prolonged Q-T interval on ECG    Hyperkalemia    Elevated troponin    75 y.o. male admitted with increasing shortness of breath associated with the cough.  On admission he was noted to be hypertensive as well with a blood pressure of 200/96.  Patient noted to be COVID-positive.  Was initially admitted to ICU on BiPAP.  His respiratory status has improved and he was weaned off the BiPAP.  In addition he was noted to have elevated  troponins and is diagnosed with Takotsubo cardiomyopathy.  He is noted to have worsening renal function with a creatinine of 1.8 from a baseline of 1.3 requiring renal review.     PLAN:  Acute kidney injury likely prerenal in the setting of COVID and cardiomyopathy with respiratory insufficiency.  Patient does not appear to be volume overloaded.  Cr stable at 1.8. currently on lasix 40 mg po daily.  Agree with holding valsartan for now.  CKD3a: baseline Cr is 1.35. He sees Dr. Bill Amin in [facility].   COVID-positive on remdesivir.ID managing  Takotsubo cardiomyopathy. HFrEF 25-30% on echo.  Management as per cardiology.    Gary Willson MD

## 2025-04-10 NOTE — ASSESSMENT & PLAN NOTE
As above  Started on Decadron and remdesivir April 7  Infectious disease and pulmonary are following  Discussed with Dr. Hernández: Finished remdesivir for 3 days on4/9/25.    Dr. Hernández recommended to continue Decadron for 5 days, day 4  today    Continue Mucinex and Tessalon as needed

## 2025-04-10 NOTE — PROGRESS NOTES
Patient: Cam Rosas Jr.  Location: [facility] 4A 4010  MRN:  [id number]  Today's date:  4/10/2025    Attempted to see patient for therapy. Unable due to patient unavailable. Pt currently receiving B/S nursing care due to being incontinent of bowel.  OT will continue to follow.

## 2025-04-10 NOTE — PLAN OF CARE
Pt forgetful at times, RA, incontinence care provided, fluid restriction maintained, ax1 with walker, prn tessalon given x2, chair alarm on, call bell within reach.

## 2025-04-10 NOTE — PLAN OF CARE
Care Coordination Discharge Plan Note     Discharge Needs Assessment  Concerns to be Addressed: discharge planning  Current Discharge Risk: chronically ill, physical impairment, dependent with mobility/activities of daily living    Anticipated Discharge Plan  Anticipated Discharge Disposition: skilled nursing facility, home with assistance, home with home health  Type of Skilled Nursing Care Services: PT, OT, nursing      Patient Choice  Offered/Gave Vendor List: yes  Patient and/or patient guardian/advocate was made aware of their right to choose a provider. A list of eligible providers was presented and reviewed with the patient and/or patient guardian/advocate in written and/or verbal form. The list delineates providers in the patients desired geographic area who are participating in the Medicare program and/or providers contracted with the patients primary insurance. The Medicare list and quality ratings were obtained from the Medicare.gov [medicare.gov] website.    ---------------------------------------------------------------------------------------------------------------------    Interdisciplinary Discharge Plan Review:  Participants:     Concerns Comments: Discussed with [facility] and in rounds    Called and spoke with pt wife re  DCP  She relays pt doing better and she prefers him home with ML  Spoke with Jhon from PT who is scheduled to see pt this afernoon   Plan is probable discharge tomorrow pending medical stability   Await out come of PT  today  Did speak with Caroline from [facility] who has offered bed if pt needs rehab    1500  Pt was seen with wife present by PT and did well  Plan is home with ML  Called wife to confirm  Updated Caroline from [facility]    Discharge Plan:   Disposition/Destination:   /    Discharge Facility:   Community Resources:      Discharge Transportation:  Is Out of Hospital DNR needed at Discharge: no  Does patient need discharge transport?

## 2025-04-10 NOTE — PROGRESS NOTES
[facility] recieved referral, chart reviewed.  Will continue to follow for home care needs.    14

## 2025-04-10 NOTE — ASSESSMENT & PLAN NOTE
Echocardiogram: Ejection fraction 25 to 30%, mildly reduced RV function, no significant valvular heart disease, RVSP 35  Echocardiogram in January 2024:Ejection fraction 60 to 65%    New cardiomyopathy  Left heart catheterization in December 2023 with normal coronaries    Initially on IV Lasix 40 twice daily April 7-8.  Switch to oral Lasix 4/9/25  Continue Metoprolol  Started on Diovan and got a dose 4/8 but was hold subsequently because of hyperkalemia and acute renal failure as per nephrology    Cardiology consult is appreciated: The clinical picture is consistent with Takotsubo cardiomyopathy in the setting of acute COVID infection and respiratory failure.  No plans for cardiac cath, continue oral Lasix  Discussed with Dr. Renteria

## 2025-04-10 NOTE — PROGRESS NOTES
[facility] recieved referral, chart reviewed.  Spoke with the patient's wife who agreed for [facility] to provide services after discharge.  Will start referral for home RN, PT and OT. Will continue to follow for discharge needs.

## 2025-04-11 ENCOUNTER — HOSPITAL ENCOUNTER (INPATIENT)
Facility: HOSPITAL | Age: 75
LOS: 1 days | Discharge: HOME HEALTH | DRG: 312 | End: 2025-04-13
Attending: EMERGENCY MEDICINE | Admitting: HOSPITALIST
Payer: MEDICARE

## 2025-04-11 ENCOUNTER — APPOINTMENT (EMERGENCY)
Dept: RADIOLOGY | Facility: HOSPITAL | Age: 75
DRG: 312 | End: 2025-04-11
Attending: EMERGENCY MEDICINE
Payer: MEDICARE

## 2025-04-11 VITALS
SYSTOLIC BLOOD PRESSURE: 129 MMHG | BODY MASS INDEX: 27.66 KG/M2 | RESPIRATION RATE: 18 BRPM | WEIGHT: 166 LBS | TEMPERATURE: 97.8 F | HEIGHT: 65 IN | DIASTOLIC BLOOD PRESSURE: 72 MMHG | OXYGEN SATURATION: 97 % | HEART RATE: 107 BPM

## 2025-04-11 DIAGNOSIS — R41.82 ALTERED MENTAL STATUS, UNSPECIFIED ALTERED MENTAL STATUS TYPE: ICD-10-CM

## 2025-04-11 DIAGNOSIS — R55 NEAR SYNCOPE: Primary | ICD-10-CM

## 2025-04-11 LAB
ALBUMIN SERPL-MCNC: 3.5 G/DL (ref 3.5–5.7)
ALP SERPL-CCNC: 35 IU/L (ref 34–125)
ALT SERPL-CCNC: 8 IU/L (ref 7–52)
ANION GAP SERPL CALC-SCNC: 11 MEQ/L (ref 3–15)
ANION GAP SERPL CALC-SCNC: 14 MEQ/L (ref 3–15)
APAP SERPL-MCNC: <0.1 UG/ML (ref 10–30)
APTT PPP: 33 SEC (ref 23–35)
AST SERPL-CCNC: 20 IU/L (ref 13–39)
BACTERIA BLD CULT: NORMAL
BACTERIA BLD CULT: NORMAL
BASOPHILS # BLD: 0.01 K/UL (ref 0.01–0.1)
BASOPHILS NFR BLD: 0.1 %
BILIRUB SERPL-MCNC: 0.4 MG/DL (ref 0.3–1.2)
BNP SERPL-MCNC: 252 PG/ML
BUN SERPL-MCNC: 42 MG/DL (ref 7–25)
BUN SERPL-MCNC: 44 MG/DL (ref 7–25)
CALCIUM SERPL-MCNC: 8.7 MG/DL (ref 8.6–10.3)
CALCIUM SERPL-MCNC: 8.9 MG/DL (ref 8.6–10.3)
CHLORIDE SERPL-SCNC: 101 MEQ/L (ref 98–107)
CHLORIDE SERPL-SCNC: 102 MEQ/L (ref 98–107)
CO2 SERPL-SCNC: 26 MEQ/L (ref 21–31)
CO2 SERPL-SCNC: 28 MEQ/L (ref 21–31)
CREAT SERPL-MCNC: 1.8 MG/DL (ref 0.7–1.3)
CREAT SERPL-MCNC: 1.9 MG/DL (ref 0.7–1.3)
DIFFERENTIAL METHOD BLD: ABNORMAL
EGFRCR SERPLBLD CKD-EPI 2021: 36.3 ML/MIN/1.73M*2
EGFRCR SERPLBLD CKD-EPI 2021: 38.8 ML/MIN/1.73M*2
EOSINOPHIL # BLD: 0 K/UL (ref 0.04–0.54)
EOSINOPHIL NFR BLD: 0 %
ERYTHROCYTE [DISTWIDTH] IN BLOOD BY AUTOMATED COUNT: 14.4 % (ref 11.6–14.4)
ERYTHROCYTE [DISTWIDTH] IN BLOOD BY AUTOMATED COUNT: 14.5 % (ref 11.6–14.4)
ETHANOL SERPL-MCNC: 32 MG/DL
GLUCOSE BLD-MCNC: 139 MG/DL (ref 70–99)
GLUCOSE SERPL-MCNC: 137 MG/DL (ref 70–99)
GLUCOSE SERPL-MCNC: 145 MG/DL (ref 70–99)
HCT VFR BLD AUTO: 34 % (ref 40.1–51)
HCT VFR BLD AUTO: 34.5 % (ref 40.1–51)
HGB BLD-MCNC: 11 G/DL (ref 13.7–17.5)
HGB BLD-MCNC: 11.1 G/DL (ref 13.7–17.5)
IMM GRANULOCYTES # BLD AUTO: 0.04 K/UL (ref 0–0.08)
IMM GRANULOCYTES NFR BLD AUTO: 0.6 %
INR PPP: 2.6
LYMPHOCYTES # BLD: 0.59 K/UL (ref 1.2–3.5)
LYMPHOCYTES NFR BLD: 8.6 %
MAGNESIUM SERPL-MCNC: 2.1 MG/DL (ref 1.8–2.5)
MAGNESIUM SERPL-MCNC: 2.1 MG/DL (ref 1.8–2.5)
MCH RBC QN AUTO: 33.1 PG (ref 28–33.2)
MCH RBC QN AUTO: 33.4 PG (ref 28–33.2)
MCHC RBC AUTO-ENTMCNC: 31.9 G/DL (ref 32.2–36.5)
MCHC RBC AUTO-ENTMCNC: 32.6 G/DL (ref 32.2–36.5)
MCV RBC AUTO: 102.4 FL (ref 83–98)
MCV RBC AUTO: 103.9 FL (ref 83–98)
MONOCYTES # BLD: 0.5 K/UL (ref 0.3–1)
MONOCYTES NFR BLD: 7.3 %
NEUTROPHILS # BLD: 5.73 K/UL (ref 1.7–7)
NEUTS SEG NFR BLD: 83.4 %
NRBC BLD-RTO: 0 %
PLATELET # BLD AUTO: 155 K/UL (ref 150–350)
PLATELET # BLD AUTO: 155 K/UL (ref 150–350)
PMV BLD AUTO: 9.7 FL (ref 9.4–12.4)
PMV BLD AUTO: 9.8 FL (ref 9.4–12.4)
POCT TEST: ABNORMAL
POTASSIUM SERPL-SCNC: 3.3 MEQ/L (ref 3.5–5.1)
POTASSIUM SERPL-SCNC: 4 MEQ/L (ref 3.5–5.1)
PROT SERPL-MCNC: 6.4 G/DL (ref 6–8.2)
PROTHROMBIN TIME: 27.8 SEC (ref 12.2–14.5)
RBC # BLD AUTO: 3.32 M/UL (ref 4.5–5.8)
RBC # BLD AUTO: 3.32 M/UL (ref 4.5–5.8)
SALICYLATES SERPL-MCNC: <1.5 MG/DL
SODIUM SERPL-SCNC: 140 MEQ/L (ref 136–145)
SODIUM SERPL-SCNC: 142 MEQ/L (ref 136–145)
TROPONIN I SERPL HS-MCNC: 38.1 PG/ML
TSH SERPL DL<=0.05 MIU/L-ACNC: 1.59 MIU/L (ref 0.34–5.6)
WBC # BLD AUTO: 4.17 K/UL (ref 3.8–10.5)
WBC # BLD AUTO: 6.87 K/UL (ref 3.8–10.5)

## 2025-04-11 PROCEDURE — 83880 ASSAY OF NATRIURETIC PEPTIDE: CPT | Performed by: PHYSICIAN ASSISTANT

## 2025-04-11 PROCEDURE — 83735 ASSAY OF MAGNESIUM: CPT | Performed by: HOSPITALIST

## 2025-04-11 PROCEDURE — 36415 COLL VENOUS BLD VENIPUNCTURE: CPT | Performed by: HOSPITALIST

## 2025-04-11 PROCEDURE — 84443 ASSAY THYROID STIM HORMONE: CPT | Performed by: PHYSICIAN ASSISTANT

## 2025-04-11 PROCEDURE — 63700000 HC SELF-ADMINISTRABLE DRUG: Performed by: STUDENT IN AN ORGANIZED HEALTH CARE EDUCATION/TRAINING PROGRAM

## 2025-04-11 PROCEDURE — 83735 ASSAY OF MAGNESIUM: CPT | Performed by: PHYSICIAN ASSISTANT

## 2025-04-11 PROCEDURE — G0480 DRUG TEST DEF 1-7 CLASSES: HCPCS | Performed by: PHYSICIAN ASSISTANT

## 2025-04-11 PROCEDURE — 85025 COMPLETE CBC W/AUTO DIFF WBC: CPT | Performed by: PHYSICIAN ASSISTANT

## 2025-04-11 PROCEDURE — 63700000 HC SELF-ADMINISTRABLE DRUG: Performed by: NURSE PRACTITIONER

## 2025-04-11 PROCEDURE — 85610 PROTHROMBIN TIME: CPT | Performed by: PHYSICIAN ASSISTANT

## 2025-04-11 PROCEDURE — 71045 X-RAY EXAM CHEST 1 VIEW: CPT

## 2025-04-11 PROCEDURE — 84484 ASSAY OF TROPONIN QUANT: CPT | Mod: 91 | Performed by: PHYSICIAN ASSISTANT

## 2025-04-11 PROCEDURE — 99285 EMERGENCY DEPT VISIT HI MDM: CPT | Mod: 25

## 2025-04-11 PROCEDURE — 85027 COMPLETE CBC AUTOMATED: CPT | Performed by: HOSPITALIST

## 2025-04-11 PROCEDURE — 63700000 HC SELF-ADMINISTRABLE DRUG: Performed by: HOSPITALIST

## 2025-04-11 PROCEDURE — 99239 HOSP IP/OBS DSCHRG MGMT >30: CPT | Performed by: HOSPITALIST

## 2025-04-11 PROCEDURE — 84484 ASSAY OF TROPONIN QUANT: CPT | Performed by: PHYSICIAN ASSISTANT

## 2025-04-11 PROCEDURE — 85730 THROMBOPLASTIN TIME PARTIAL: CPT | Performed by: PHYSICIAN ASSISTANT

## 2025-04-11 PROCEDURE — 80053 COMPREHEN METABOLIC PANEL: CPT | Performed by: PHYSICIAN ASSISTANT

## 2025-04-11 PROCEDURE — 80048 BASIC METABOLIC PNL TOTAL CA: CPT | Performed by: HOSPITALIST

## 2025-04-11 PROCEDURE — 93005 ELECTROCARDIOGRAM TRACING: CPT | Performed by: PHYSICIAN ASSISTANT

## 2025-04-11 RX ORDER — LANOLIN ALCOHOL/MO/W.PET/CERES
1000 CREAM (GRAM) TOPICAL DAILY
Qty: 30 TABLET | Refills: 0 | Status: SHIPPED | OUTPATIENT
Start: 2025-04-12 | End: 2025-05-12

## 2025-04-11 RX ORDER — VALSARTAN 40 MG/1
40 TABLET ORAL DAILY
Qty: 30 TABLET | Refills: 0 | Status: SHIPPED | OUTPATIENT
Start: 2025-04-12 | End: 2025-04-15

## 2025-04-11 RX ORDER — FUROSEMIDE 40 MG/1
40 TABLET ORAL DAILY
Qty: 30 TABLET | Refills: 0 | Status: ON HOLD | OUTPATIENT
Start: 2025-04-12 | End: 2025-04-13

## 2025-04-11 RX ORDER — METOPROLOL SUCCINATE 25 MG/1
25 TABLET, EXTENDED RELEASE ORAL EVERY EVENING
Qty: 90 TABLET | Refills: 0 | Status: SHIPPED | OUTPATIENT
Start: 2025-04-11

## 2025-04-11 RX ORDER — BENZONATATE 100 MG/1
100 CAPSULE ORAL 3 TIMES DAILY PRN
Qty: 15 CAPSULE | Refills: 0 | Status: SHIPPED | OUTPATIENT
Start: 2025-04-11 | End: 2025-04-17 | Stop reason: ALTCHOICE

## 2025-04-11 RX ORDER — DEXAMETHASONE 6 MG/1
6 TABLET ORAL ONCE
Qty: 1 TABLET | Refills: 0 | Status: SHIPPED | OUTPATIENT
Start: 2025-04-11 | End: 2025-04-13 | Stop reason: HOSPADM

## 2025-04-11 RX ORDER — GUAIFENESIN 600 MG/1
600 TABLET, EXTENDED RELEASE ORAL 2 TIMES DAILY
Qty: 20 TABLET | Refills: 0 | Status: SHIPPED | OUTPATIENT
Start: 2025-04-11 | End: 2025-04-21

## 2025-04-11 RX ADMIN — GUAIFENESIN 600 MG: 600 TABLET ORAL at 09:28

## 2025-04-11 RX ADMIN — BUSPIRONE HYDROCHLORIDE 10 MG: 10 TABLET ORAL at 09:29

## 2025-04-11 RX ADMIN — PANTOPRAZOLE SODIUM 40 MG: 40 TABLET, DELAYED RELEASE ORAL at 09:29

## 2025-04-11 RX ADMIN — Medication 400 MG: at 09:29

## 2025-04-11 RX ADMIN — AMIODARONE HYDROCHLORIDE 200 MG: 200 TABLET ORAL at 09:29

## 2025-04-11 RX ADMIN — BENZONATATE 100 MG: 100 CAPSULE ORAL at 05:42

## 2025-04-11 RX ADMIN — ATORVASTATIN CALCIUM 10 MG: 10 TABLET, FILM COATED ORAL at 09:29

## 2025-04-11 RX ADMIN — LEVOTHYROXINE SODIUM 50 MCG: 0.05 TABLET ORAL at 05:32

## 2025-04-11 RX ADMIN — CETIRIZINE HYDROCHLORIDE 10 MG: 10 TABLET, FILM COATED ORAL at 09:29

## 2025-04-11 RX ADMIN — VALSARTAN 40 MG: 80 TABLET, FILM COATED ORAL at 09:28

## 2025-04-11 RX ADMIN — TAMOXIFEN CITRATE 20 MG: 10 TABLET, FILM COATED ORAL at 09:29

## 2025-04-11 RX ADMIN — FUROSEMIDE 40 MG: 40 TABLET ORAL at 09:29

## 2025-04-11 RX ADMIN — CHOLECALCIFEROL TAB 25 MCG (1000 UNIT) 25 MCG: 25 TAB at 09:29

## 2025-04-11 RX ADMIN — Medication 1000 MCG: at 09:29

## 2025-04-11 ASSESSMENT — COGNITIVE AND FUNCTIONAL STATUS - GENERAL
MOVING TO AND FROM BED TO CHAIR: 3 - A LITTLE
CLIMB 3 TO 5 STEPS WITH RAILING: 2 - A LOT
WALKING IN HOSPITAL ROOM: 3 - A LITTLE
STANDING UP FROM CHAIR USING ARMS: 3 - A LITTLE

## 2025-04-11 ASSESSMENT — ENCOUNTER SYMPTOMS
SPEECH DIFFICULTY: 0
NAUSEA: 0
DIARRHEA: 0
ALTERED MENTAL STATUS: 1
ACTIVITY CHANGE: 0
SEIZURES: 0
LIGHT-HEADEDNESS: 0
VOMITING: 0
COUGH: 1
WEAKNESS: 0
ABDOMINAL PAIN: 0
NECK PAIN: 0
AGITATION: 0
BACK PAIN: 0
SHORTNESS OF BREATH: 0
FEVER: 0
COLOR CHANGE: 0
HEADACHES: 0
DIFFICULTY URINATING: 0

## 2025-04-11 NOTE — DISCHARGE SUMMARY
[facility] Medicine Service -  Inpatient Discharge Summary        BRIEF OVERVIEW   Admitting Provider: Meek Pizarro MD  Attending Provider: No att. providers found Attending phys phone: N/A  PCP: Usman Collins [phone]    Admission Date: 4/7/2025  Discharge Date: 4/11/2025     DISCHARGE DIAGNOSES      Primary Discharge Diagnosis  Acute respiratory failure with hypoxia and hypercapnia (CMS/HCC)    Secondary Discharge Diagnoses  Active Hospital Problems    Diagnosis Date Noted    Elevated troponin 04/09/2025    Scalp wound 04/08/2025    Takotsubo cardiomyopathy 04/08/2025    History of prolonged Q-T interval on ECG 04/08/2025    Hyperkalemia 04/08/2025    Acute respiratory failure with hypoxia and hypercapnia (CMS/HCC) 04/07/2025    COVID-19 04/07/2025    Acute on chronic systolic congestive heart failure (CMS/HCC) 04/07/2025    Paroxysmal atrial fibrillation (CMS/HCC) 04/07/2025    History of cardiac arrest 04/07/2025    Prostate cancer (CMS/HCC) 04/07/2025    Breast cancer (CMS/HCC) 04/07/2025    History of peptic ulcer disease 04/07/2025    BPH (benign prostatic hyperplasia) 04/07/2025    Hyperlipidemia 04/07/2025    Hypothyroidism 04/07/2025    Anemia 04/07/2025    Multiple open wounds of lower extremity 04/07/2025    Stage 3a chronic kidney disease (CMS/HCC) 08/31/2021      Resolved Hospital Problems    Diagnosis Date Noted Date Resolved    Acute pulmonary embolism (CMS/HCC) 04/07/2025 04/07/2025     SUMMARY OF HOSPITALIZATION      Presenting Problem/History of Present Illness    75 y.o. male with a past medical history of HFpEF, Afib on xarelto, breast cancer s/p mastectomy, CKD who presents with shortness of breath, admitted for COVID, acute on chronic HFpEF .  Please see history and physical done by Dr. Pizarro on4/7/25 for details.      Hospital Course  See below.  Patient was seen by pulmonary critical care, podiatry, wound care, cardiology, nephrology.  Patient initially needed BiPAP and his  oxygenation quickly improved.  She was started on IV Decadron and IV diuresis and also remdesivir for 3 days.  Patient received IV Lasix for 2 days after which was switched to oral Lasix.  Patient had 2D echo which showed EF 25 to 30%, persistent akinesis of the mid and distal walls.  Right ventricle normal size and wall thickness.  Mildly reduced systolic function.  Right ventricular systolic pressure of 35 mmHg.  It was felt patient's cardiomyopathy related to Takotsubo cardiomyopathy from COVID.  Had this even in the past.  Will need repeat echo as outpatient in 4 to 6 weeks to see if ejection fraction improved.    Plan is to continue Decadron for 5 days and last day will be today.  Patient seen by physical therapy and recommended home with home care.  Patient had a right chest soft tissue abnormality and discussed with patient and wife to follow-up with medical oncology as outpatient and may need ultrasound right chest.  Patient reports regular mammograms for his history of breast cancer.  Patient had macrocytic anemia and vitamin B12 level was checked and slightly on low side of normal at 205.  Started on vitamin B12 supplementation.  Patient has chronic kidney disease and baseline creatinine of 1.3-1.4.  Patient's creatinine on discharge is 1.8.  Possibly new baseline.  Patient Xarelto dose was switched from 20 mg daily to 15 mg daily as his GFR had gone down.  Continued on oral Lasix 40 mg p.o. daily and was started on Diovan 40 mg p.o. daily.  Patient's potassium went up and Diovan was held for few days and was restarted again.  Will need repeat BMP in 1 week and follow-up with cardiology as outpatient.    Discussed with patient's wife on the phone on the day of discharge and agreeable for discharge plan to home.  Will follow-up with oncology regarding abnormal CT chest with soft tissue swelling.  Exam on Day of Discharge  Patient seen earlier today.  Sitting up in the chair.  Denied any shortness of breath.   Cough with occasional phlegm present.  Vital signs stable  HEENT-NC,AT-patient hard of hearing but able to communicate  CVS-  regular  RESPIRATORY-decreased at bases  GI-soft.non tender, bowel sounds present  EXT-no edema  CNS-no focal deficits.  Awake alert oriented x 3    Assessment & Plan  Acute respiratory failure with hypoxia and hypercapnia (CMS/HCC)  > Likely multifactorial in setting of COVID and heart failure exacerbation   Cardiology: The respiratory failure was probably further compounded by heart failure which has responded well to BiPAP.     CAT scan of chest: No pulmonary embolism, perifissural right upper lobe consolidation, slight progression of right lower lobe pleural thickening which may represent atelectasis, infection or neoplasm, cardiomegaly, right chest wall soft tissue density should be clinically correlated for neoplasm or infection  COVID-positive  WBC 8      Started on IV Decadron April 7 (no history of angioedema from prednisone, had lower extremity swelling and atrial fibrillation)  Continue IV diuresis  Continue remdesivir    Infectious disease and cardiology are following    COVID-19  As above  Started on Decadron and remdesivir April 7  Infectious disease and pulmonary are following  Discussed with Dr. Hernández: Finished remdesivir for 3 days on4/9/25.    Dr. Hernández recommended to continue Decadron for 5 days, day 4  today    Continue Mucinex and Tessalon as needed    Acute on chronic systolic congestive heart failure (CMS/HCC)  Echocardiogram: Ejection fraction 25 to 30%, mildly reduced RV function, no significant valvular heart disease, RVSP 35  Echocardiogram in January 2024:Ejection fraction 60 to 65%    New cardiomyopathy  Left heart catheterization in December 2023 with normal coronaries    Initially on IV Lasix 40 twice daily April 7-8.  Switch to oral Lasix 4/9/25  Continue Metoprolol  Started on Diovan and got a dose 4/8 but was hold subsequently because of hyperkalemia and  acute renal failure as per nephrology    Cardiology consult is appreciated: The clinical picture is consistent with Takotsubo cardiomyopathy in the setting of acute COVID infection and respiratory failure.  No plans for cardiac cath, continue oral Lasix  Discussed with Dr. Renteria    Elevated troponin  Troponin as high as 183  Echocardiogram as above  Cardiac cath in December 2023 with normal coronaries  Likely acute myocardial injury in the setting of respiratory distress, congestive heart failure  No plans for cardiac catheterization as per cardiology    Stage 3a chronic kidney disease (CMS/HCC)  Baseline creatinine 1.4-1.5  Creatinine is 1.8     Renal ultrasound: Limited visualization secondary to suboptimal acoustic windows.  Mild renal cortical thinning/scarring.  No hydronephrosis.    Nephrology consult is appreciated: Acute renal failure likely prerenal in the setting of COVID and cardiomyopathy with respiratory insufficiency. Patient does not appear to be volume overloaded. May need to decrease his furosemide. Agree with holding valsartan for now.     Paroxysmal atrial fibrillation (CMS/HCC)  In sinus rhythm  On amiodarone and metoprolol  Usual on Xarelto, was given Lovenox initially in case needed catheterization   Restarted on Xarelto 4/8, decrease to 15 mg    History of cardiac arrest  History of V-fib cardiac arrest in February 2023 in the setting of influenza  Had Takotsubo cardiomyopathy at that time  Has had prolonged hospitalization with trach and PEG, now out    Prostate cancer (CMS/HCC)  Status post prostatectomy followed by radiation in 2022    Breast cancer (CMS/HCC)  Status post neoadjuvant chemotherapy in 2022, followed by mastectomy in August 2023, status postradiation.  On tamoxifen.  Declined HER2 directed therapy    History of peptic ulcer disease  In 2022  Continue PPI    BPH (benign prostatic hyperplasia)  Continue Flomax    Hyperlipidemia  Continue statin    Hypothyroidism  Continue  levothyroxine    Anemia  Mild   Macrocytic    Folate and TSH are normal  B12 is low normal, will start B12 supplementation    Multiple open wounds of lower extremity  Podiatry consulr is appreciated    Scalp wound  Status post recent surgery for cancer  Wound care consult is appreciated    Hyperkalemia  Potassium was 5.3 4/8  Continue low potassium diet  Diovan was started and potassium went up and was held and was restarted at the time of discharge his potassium normalized    Consults During Admission  IP CONSULT TO CASE MANAGEMENT  IP CONSULT TO NUTRITION SERVICES  IP CONSULT TO INFECTIOUS DISEASE  IP CONSULT TO PULMONOLOGY/SLEEP MEDICINE  IP CONSULT TO CARDIOLOGY  IP CONSULT TO PODIATRY  IP CONSULT TO WOUND OSTOMY CONTINENCE  IP CONSULT TO NEPHROLOGY    DISCHARGE MEDICATIONS      Medication List        START taking these medications      benzonatate 100 mg capsule  Commonly known as: TESSALON  Take 1 capsule (100 mg total) by mouth 3 (three) times a day as needed for cough for up to 5 days.  Dose: 100 mg     cyanocobalamin 1,000 mcg tablet  Commonly known as: VITAMIN B12  Start taking on: April 12, 2025  Take 1 tablet (1,000 mcg total) by mouth daily.  Dose: 1,000 mcg     dexAMETHasone 6 mg tablet  Commonly known as: DECADRON  Take 1 tablet (6 mg total) by mouth once for 1 dose. Pt has leg swelling with prednisone. No angioedema. Pt took decadron in hospital without problems  Dose: 6 mg     furosemide 40 mg tablet  Commonly known as: LASIX  Start taking on: April 12, 2025  Take 1 tablet (40 mg total) by mouth daily.  Dose: 40 mg     guaiFENesin 600 mg 12 hr tablet  Commonly known as: MUCINEX  Take 1 tablet (600 mg total) by mouth 2 (two) times a day for 10 days.  Dose: 600 mg     tamsulosin 0.4 mg capsule  Commonly known as: FLOMAX  Take 1 capsule (0.4 mg total) by mouth nightly.  Dose: 0.4 mg     valsartan 40 mg tablet  Commonly known as: DIOVAN  Start taking on: April 12, 2025  Take 1 tablet (40 mg total) by  mouth daily.  Dose: 40 mg            CHANGE how you take these medications      rivaroxaban 15 mg tablet  Commonly known as: XARELTO  Take 1 tablet (15 mg total) by mouth daily with dinner.  Dose: 15 mg  What changed:   medication strength  how much to take            CONTINUE taking these medications      amiodarone 200 mg tablet  Commonly known as: PACERONE  Take 200 mg by mouth 2 (two) times a week (Mon, Fri).  Dose: 200 mg     atorvastatin 10 mg tablet  Commonly known as: LIPITOR  Take 1 tablet (10 mg total) by mouth daily.  Dose: 10 mg     busPIRone 10 mg tablet  Commonly known as: BUSPAR  Take 10 mg by mouth 2 (two) times a day.  Dose: 10 mg     cetirizine 10 mg tablet  Commonly known as: ZyrTEC  Take 10 mg by mouth every morning.  Dose: 10 mg     cholecalciferol (vitamin D3) 1,000 unit (25 mcg) tablet  Take 1,000 Units by mouth every morning.  Dose: 1,000 Units     levothyroxine 50 mcg tablet  Commonly known as: SYNTHROID  Take 50 mcg by mouth daily.  Dose: 50 mcg     magnesium oxide 400 mg (241.3 mg magnesium) tablet  Commonly known as: MAG-OX  Take 1 tablet (400 mg total) by mouth 2 (two) times a day.  Dose: 400 mg     melatonin 3 mg tablet  Take 3 mg by mouth nightly.  Dose: 3 mg     pantoprazole 40 mg EC tablet  Commonly known as: PROTONIX  Take 40 mg by mouth 2 (two) times a day before breakfast and dinner.  Dose: 40 mg     tamoxifen 20 mg chemo tablet  Commonly known as: NOLVADEX  Take  1 tablet (20 mg total) daily  Dose: 20 mg            STOP taking these medications      doxycycline hyclate 100 mg tablet  Commonly known as: VIBRA-TABS     potassium chloride 20 mEq packet  Commonly known as: KLOR-CON     torsemide 20 mg tablet  Commonly known as: DEMADEX               Medication List        START taking these medications      benzonatate 100 mg capsule  Commonly known as: TESSALON  Take 1 capsule (100 mg total) by mouth 3 (three) times a day as needed for cough for up to 5 days.  Dose: 100 mg      cyanocobalamin 1,000 mcg tablet  Commonly known as: VITAMIN B12  Start taking on: April 12, 2025  Take 1 tablet (1,000 mcg total) by mouth daily.  Dose: 1,000 mcg     dexAMETHasone 6 mg tablet  Commonly known as: DECADRON  Take 1 tablet (6 mg total) by mouth once for 1 dose. Pt has leg swelling with prednisone. No angioedema. Pt took decadron in hospital without problems  Dose: 6 mg     furosemide 40 mg tablet  Commonly known as: LASIX  Start taking on: April 12, 2025  Take 1 tablet (40 mg total) by mouth daily.  Dose: 40 mg     guaiFENesin 600 mg 12 hr tablet  Commonly known as: MUCINEX  Take 1 tablet (600 mg total) by mouth 2 (two) times a day for 10 days.  Dose: 600 mg     tamsulosin 0.4 mg capsule  Commonly known as: FLOMAX  Take 1 capsule (0.4 mg total) by mouth nightly.  Dose: 0.4 mg     valsartan 40 mg tablet  Commonly known as: DIOVAN  Start taking on: April 12, 2025  Take 1 tablet (40 mg total) by mouth daily.  Dose: 40 mg            CHANGE how you take these medications      rivaroxaban 15 mg tablet  Commonly known as: XARELTO  Take 1 tablet (15 mg total) by mouth daily with dinner.  Dose: 15 mg  What changed:   medication strength  how much to take            CONTINUE taking these medications      amiodarone 200 mg tablet  Commonly known as: PACERONE  Take 200 mg by mouth 2 (two) times a week (Mon, Fri).  Dose: 200 mg     atorvastatin 10 mg tablet  Commonly known as: LIPITOR  Take 1 tablet (10 mg total) by mouth daily.  Dose: 10 mg     busPIRone 10 mg tablet  Commonly known as: BUSPAR  Take 10 mg by mouth 2 (two) times a day.  Dose: 10 mg     cetirizine 10 mg tablet  Commonly known as: ZyrTEC  Take 10 mg by mouth every morning.  Dose: 10 mg     cholecalciferol (vitamin D3) 1,000 unit (25 mcg) tablet  Take 1,000 Units by mouth every morning.  Dose: 1,000 Units     levothyroxine 50 mcg tablet  Commonly known as: SYNTHROID  Take 50 mcg by mouth daily.  Dose: 50 mcg     magnesium oxide 400 mg (241.3 mg  magnesium) tablet  Commonly known as: MAG-OX  Take 1 tablet (400 mg total) by mouth 2 (two) times a day.  Dose: 400 mg     melatonin 3 mg tablet  Take 3 mg by mouth nightly.  Dose: 3 mg     pantoprazole 40 mg EC tablet  Commonly known as: PROTONIX  Take 40 mg by mouth 2 (two) times a day before breakfast and dinner.  Dose: 40 mg     tamoxifen 20 mg chemo tablet  Commonly known as: NOLVADEX  Take  1 tablet (20 mg total) daily  Dose: 20 mg            STOP taking these medications      doxycycline hyclate 100 mg tablet  Commonly known as: VIBRA-TABS     potassium chloride 20 mEq packet  Commonly known as: KLOR-CON     torsemide 20 mg tablet  Commonly known as: DEMADEX                 PROCEDURES / LABS / IMAGING          Pertinent Labs  WBC   Date Value Ref Range Status   04/11/2025 4.17 3.80 - 10.50 K/uL Final     Hemoglobin   Date Value Ref Range Status   04/11/2025 11.1 (L) 13.7 - 17.5 g/dL Final     Hematocrit   Date Value Ref Range Status   04/11/2025 34.0 (L) 40.1 - 51.0 % Final     MCV   Date Value Ref Range Status   04/11/2025 102.4 (H) 83.0 - 98.0 fL Final     Platelets   Date Value Ref Range Status   04/11/2025 155 150 - 350 K/uL Final     Glucose   Date Value Ref Range Status   04/11/2025 145 (H) 70 - 99 mg/dL Final     Calcium   Date Value Ref Range Status   04/11/2025 8.9 8.6 - 10.3 mg/dL Final     Sodium   Date Value Ref Range Status   04/11/2025 140 136 - 145 mEQ/L Final     Potassium   Date Value Ref Range Status   04/11/2025 4.0 3.5 - 5.1 mEQ/L Final     CO2   Date Value Ref Range Status   04/11/2025 28 21 - 31 mEQ/L Final     Chloride   Date Value Ref Range Status   04/11/2025 101 98 - 107 mEQ/L Final     BUN   Date Value Ref Range Status   04/11/2025 42 (H) 7 - 25 mg/dL Final     Creatinine   Date Value Ref Range Status   04/11/2025 1.8 (H) 0.7 - 1.3 mg/dL Final     INR   Date Value Ref Range Status   01/07/2024 1.8   Final     Comment:     Moderate Intensity Anticoagulation = 2.0 to 3.0, High  Intensity = 2.5 to 3.5   01/04/2024 1.2   Final     Comment:     Moderate Intensity Anticoagulation = 2.0 to 3.0, High Intensity = 2.5 to 3.5   01/04/2024 1.1   Final     Comment:     INR has no defined significance when PT is within Reference Range.       Pertinent Imaging  X-RAY CHEST 1 VIEW  Result Date: 4/10/2025  IMPRESSION: Cardiomegaly. Patchy pleural-parenchymal opacity within the right mid to upper lung field     ULTRASOUND KIDNEYS  Result Date: 4/8/2025  IMPRESSION: Limited visualization secondary to suboptimal acoustic windows.  Mild renal cortical thinning/scarring.  No hydronephrosis.     X-RAY CHEST 1 VIEW  Result Date: 4/7/2025  IMPRESSION:  Mild opacity in the right lung which has slightly improved since 1/8/2024. COMMENT:  Portable chest x-ray is compared with 1/8/2024. The tracheostomy and right IJ catheter has been removed. There is cardiomegaly with prosthetic valve. Mild hazy opacity in the right lung has slightly improved since prior. The remaining parenchyma is clear. No definite effusion. Right axillary surgical clips noted.     CT ANGIOGRAPHY CHEST PULMONARY EMBOLISM WITH IV CONTRAST  Result Date: 4/7/2025  IMPRESSION: 1.  No definite evidence of pulmonary embolism. 2.  Cardiomegaly. 3.  Right chest wall soft tissue density which should be clinically correlated for neoplasm or infection. 4.  Peripheral right upper lobe consolidation, subjacent mass which may represent treatment related change and/or infection. 5.  Slight progressive right lower lobe pleural thickening which may represent atelectasis, infection or neoplasm. 6.  Cardiomegaly. Findings reported to nurse Tavo in the ED at 8:35 AM on 4/7/2025. COMMENT: Technique: CT angiography of the chest was performed from the thoracic inlet through the upper abdomen using contiguous 1.25 mm transaxial sections utilizing a pulmonary embolism protocol. Images were acquired utilizing 80 cc Isovue-370 . 3D MIP and/or volume rendered image  reconstruction performed and reviewed. CT DOSE:  One or more dose reduction techniques (e.g. automated exposure control, adjustment of the mA and/or kV according to patient size, use of iterative reconstruction technique) utilized for this examination. Comparisons studies: None. Lung parenchyma: Peripheral right upper lobe linear consolidation and bronchiectasis subjacent the right chest wall mass as noted below.  There is peribronchial thickening.. Mediastinum: Normal. Zelda: Normal. Heart and pericardium: Cardiomegaly.. Pulmonary artery: Normal Aorta:Calcific atherosclerotic disease. Chest wall and pleura: There is a 2.9 x 11.1 cm focus of right chest wall soft tissue attenuation.  There is slight progressive right lower lobe pleural thickening. Axilla: Normal. Liver: Visualized portion is within normal limits. Spleen: Normal. Adrenals: Normal. Bones: Thoracolumbar degenerative change. Thyroid: Normal Other: Cholelithiasis.      OUTPATIENT  FOLLOW-UP / REFERRALS / PENDING TESTS          Follow up with Usman Collins  Specialty: Internal Medicine  In 1week [address] [facility] [place] [address]  [phone]           Follow up with Laurel Renteria  Specialty: Cardiology  Cardiology in 2wks. Call and make an appnt [address]  [address]  [place] [address]  [phone]          Follow up with Prateek Christina  Specialty: Podiatric Surgery, Podiatry  Podiatry in 2wks.call and make an appnt [address]  [place] [address]  [phone]          Follow up with Bill Amin  Specialty: Nephrology  Nephrology in 2wks.call and make an appnt [address]  [address]  [place] [address]  [phone]          Follow up with Kailey Gale  Specialty: Hematology and Oncology, Oncology, Hospice and Palliative Care, Internal Medicine, Hematology  Oncologist in 2 weeks.  Call and make an appointment [address]  [address]  [place] [address]  [phone]   Instructions  Right  chest wall soft tissue density-please follow-up with oncology and may need ultrasound right chest with primary care physician or oncologist     If any worsening shortness of breath seek medical advice     BMP in 1 week with PCP     Please do not take potassium supplement     You are started on Lasix 40 mg p.o. daily and Diovan 40 mg p.o. daily for your heart     On Mucinex and Tessalon for cough     Echo in 1 month to reevaluate ejection fraction with cardiology     CT chest in 4 to 6 weeks regarding follow-up on right upper lobe infiltrate and right chest soft tissue mass           DISCHARGE DISPOSITION      Disposition: Home Health Care - [facility]  Total discharge time 35 minutes in discussing with patient and wife regarding follow-up and doing paperwork and discussing with nursing, case management.  Code Status At Discharge: Prior    Physician Order for Life-Sustaining Treatment Document Status        No documents found

## 2025-04-11 NOTE — CONSULTS
Transitions of Care - Heart Failure  Pharmacy Counseling Note      SUMMARY   Met with Cam Rosas Jr. for Heart Failure/medication education. The patient states He is familiar with his medications. The nurse reviewed all patients discharge medications in detail and we discussed the following medication changes.     Medications:  Discussed that addition of valsartan. Reviewed MOA, benefits in HF, dosing and side effects.  Discussed addition of furosemide. Discussed MOA, largest effect on urine being the first 2-3 hours, dosing and monitoring of kidney function and electrolytes. Reminded patient that previous medication, torsemide, was discontinued and to stop taking at home.  Reviewed continued home medication, metoprolol succinate. Reviewed MOA, dosing and side effects.    Discussed the importance of compliance. The patient states compliance and currently uses a pill box to manage his medications.     Low sodium diet and fluid restriction: briefly reviewed low sodium diet and fluid restriction with patient. The patient's wife stated that she does most of the cooking and occasionally uses canned products. Recommended to rinse canned foods to remove as much salt as possible. Also recommended to ask for sauce or dressing on the side when eating at restaurants and to limit salt shaker use. Also discussed limiting fluids to 8 cups (64oz) per day.     Weight Management: discussed the importance of daily weights. Discussed recording weight each morning after voiding, but prior to taking diuretic.  Discussed contacting doctor if 2-3 lb weight gain in a day or 5 lb weight gain in a week.  Patient and wife stated understanding of weight monitoring recommendations.     Patient was provided with a [facility] Heart Health education packet including a calendar to track daily weights.   MEDICATIONS     Medication List   amiodarone  200 mg oral Once per day on Monday Wednesday Friday    atorvastatin  10 mg oral Daily    busPIRone   10 mg oral BID    cetirizine  10 mg oral q AM    cholecalciferol (vitamin D3)  25 mcg oral q AM    cyanocobalamin  1,000 mcg oral Daily    dexamethasone  6 mg intravenous Daily    furosemide  40 mg oral Daily    guaiFENesin  600 mg oral BID    levothyroxine  50 mcg oral Daily (6:30a)    magnesium oxide  400 mg oral BID    metoprolol succinate XL  25 mg oral Nightly    pantoprazole  40 mg oral BID    rivaroxaban  15 mg oral Daily with dinner    tamoxifen  20 mg oral Daily    tamsulosin  0.4 mg oral Nightly    valsartan  40 mg oral Daily       benzonatate    glucose **OR** dextrose **OR** glucagon **OR** dextrose 50 % in water (D50)    melatonin        Sofya Quintanilla, PharmD,    Pharmacy Resident  (Time Spent: 20 minutes)  4/11/2025

## 2025-04-11 NOTE — PROGRESS NOTES
04/11/25 1100   Hospital to Home   Patient Enrolled in [facility]? Yes   [facility] Coordinator Comment Spoke w/ pt's spouse (Fiorella) to discuss Hospital to Home program. She is agreeable to a phone visit. Telemed appt. with [facility] provider scheduled for Thursday, April 17, 2025 at 11am. She declined assistance making other follow-up appt's.

## 2025-04-11 NOTE — DISCHARGE INSTRUCTIONS
Right chest wall soft tissue density-please follow-up with oncology and may need ultrasound right chest with primary care physician or oncologist    If any worsening shortness of breath seek medical advice    BMP in 1 week with PCP    Please do not take potassium supplement    You are started on Lasix 40 mg p.o. daily and Diovan 40 mg p.o. daily for your heart    On Mucinex and Tessalon for cough    Echo in 1 month to reevaluate ejection fraction with cardiology    CT chest in 4 to 6 weeks regarding follow-up on right upper lobe infiltrate and right chest soft tissue mass

## 2025-04-11 NOTE — PLAN OF CARE
Care Coordination Discharge Plan Note     Discharge Needs Assessment  Concerns to be Addressed: discharge planning  Current Discharge Risk: chronically ill, physical impairment, dependent with mobility/activities of daily living    Anticipated Discharge Plan  Anticipated Discharge Disposition: skilled nursing facility, home with assistance, home with home health  Type of Skilled Nursing Care Services: PT, OT, nursing      Patient Choice  Offered/Gave Vendor List: yes  Patient and/or patient guardian/advocate was made aware of their right to choose a provider. A list of eligible providers was presented and reviewed with the patient and/or patient guardian/advocate in written and/or verbal form. The list delineates providers in the patients desired geographic area who are participating in the Medicare program and/or providers contracted with the patients primary insurance. The Medicare list and quality ratings were obtained from the Medicare.gov [medicare.gov] website.    ---------------------------------------------------------------------------------------------------------------------    Interdisciplinary Discharge Plan Review:  Participants:     Concerns Comments: Plan home today  with [facility]  Wife to transport    Discharge Plan:   Disposition/Destination: Home Health Care - [facility] / Home  Discharge Facility:   Community Resources:      Discharge Transportation:  Is Out of Hospital DNR needed at Discharge: no  Does patient need discharge transport?

## 2025-04-11 NOTE — TELEPHONE ENCOUNTER
Refill request received from     Last appt w/ Dr. Moran on 11/18/24    Upcoming appt on 6/30/25    Refill approved

## 2025-04-11 NOTE — PROGRESS NOTES
NEPHROLOGY PROGRESS NOTE    Subjective: Patient is a pleasant  75  yr old who is seen and examined for ELEANOR       Vitals:    04/11/25 0404 04/11/25 0542 04/11/25 0738 04/11/25 0817   BP:    129/72   BP Location:    Left upper arm   Patient Position:    Sitting   Pulse: 63  69 (!) 107   Resp:    18   Temp:    36.6 °C (97.8 °F)   TempSrc:    Oral   SpO2:    97%   Weight:  75.3 kg (166 lb)     Height:             Intake/Output Summary (Last 24 hours) at 4/11/2025 1049  Last data filed at 4/11/2025 1000  Gross per 24 hour   Intake 615 ml   Output --   Net 615 ml       Physical Exam    Comfortable and in no distress  Normal extraocular movements, pink conjunctivae, moist mucous membranes  no JVD, Midline trachea,  no thyromegaly   Lungs are clear to auscultation bilaterally  Regular rate and rhythm, no murmurs rubs or gallops  Abdomen is soft, nontender, with positive bowel sounds and no bruits  Nonfocal neurologic examination    No peripheral edema   Appropriate mood and affect    LABS:  Results from last 7 days   Lab Units 04/11/25  0340 04/09/25  0248 04/08/25  0415   SODIUM mEQ/L 140   < > 144   POTASSIUM mEQ/L 4.0   < > 5.3*   CHLORIDE mEQ/L 101   < > 106   CO2 mEQ/L 28   < > 29   BUN mg/dL 42*   < > 27*   CREATININE mg/dL 1.8*   < > 1.8*   EGFR mL/min/1.73m*2 38.8*   < > 38.8*   GLUCOSE mg/dL 145*   < > 152*   CALCIUM mg/dL 8.9   < > 8.5*   ALBUMIN g/dL  --   --  3.6   PHOSPHORUS mg/dL  --   --  5.9*   WBC K/uL 4.17   < > 6.16   HEMOGLOBIN g/dL 11.1*   < > 10.7*   HEMATOCRIT % 34.0*   < > 34.0*   PLATELETS K/uL 155   < > 154    < > = values in this interval not displayed.       Meds:    Current Facility-Administered Medications:     amiodarone (PACERONE) tablet 200 mg, 200 mg, oral, Once per day on Monday Wednesday Friday, Meek Pizarro MD, 200 mg at 04/11/25 0929    atorvastatin (LIPITOR) tablet 10 mg, 10 mg, oral, Daily, Meek Pizarro MD, 10 mg at 04/11/25 0929    benzonatate (TESSALON) capsule 100 mg, 100  mg, oral, 3x daily PRN, Nory Jeronimo MD, 100 mg at 04/11/25 0542    busPIRone (BUSPAR) tablet 10 mg, 10 mg, oral, BID, Meek Pizarro MD, 10 mg at 04/11/25 0929    cetirizine (ZyrTEC) tablet 10 mg, 10 mg, oral, q AM, Nory Jeronimo MD, 10 mg at 04/11/25 0929    cholecalciferol (vitamin D3) tablet 25 mcg, 25 mcg, oral, q AM, Nory Jeronimo MD, 25 mcg at 04/11/25 0929    cyanocobalamin (VITAMIN B12) tablet 1,000 mcg, 1,000 mcg, oral, Daily, Nory Jeronimo MD, 1,000 mcg at 04/11/25 0929    dexAMETHasone (DECADRON) injection 6 mg, 6 mg, intravenous, Daily, Nory Jeronimo MD, 6 mg at 04/10/25 1749    glucose chewable tablet 16-32 g of dextrose, 16-32 g of dextrose, oral, PRN **OR** dextrose 40 % oral gel 15-30 g of dextrose, 15-30 g of dextrose, oral, PRN **OR** glucagon (GLUCAGEN) injection 1 mg, 1 mg, intramuscular, PRN **OR** dextrose 50 % in water (D50) injection 12.5 g, 25 mL, intravenous, PRN, Meek Pizarro MD    furosemide (LASIX) tablet 40 mg, 40 mg, oral, Daily, Jaclyn Douglas CRNP, 40 mg at 04/11/25 0929    guaiFENesin (MUCINEX) 12 hr ER tablet 600 mg, 600 mg, oral, BID, Nory Jeronimo MD, 600 mg at 04/11/25 0928    levothyroxine (SYNTHROID) tablet 50 mcg, 50 mcg, oral, Daily (6:30a), Nory Jeronimo MD, 50 mcg at 04/11/25 0532    magnesium oxide (MAG-OX) tablet 400 mg, 400 mg, oral, BID, Nory Jeronimo MD, 400 mg at 04/11/25 0929    melatonin ODT 3 mg, 3 mg, peg tube, Nightly PRN, Meek Pizarro MD    metoprolol succinate XL (TOPROL-XL) 24 hr ER tablet 25 mg, 25 mg, oral, Nightly, Meek Pizarro MD, 25 mg at 04/10/25 2124    pantoprazole (PROTONIX) tablet,delayed release (DR/EC) 40 mg, 40 mg, oral, BID, Meek Pizarro MD, 40 mg at 04/11/25 0929    rivaroxaban (XARELTO) tablet 15 mg, 15 mg, oral, Daily with dinner, Nory Jeronimo MD, 15 mg at 04/10/25 1750    tamoxifen (NOLVADEX) tablet 20 mg, 20 mg, oral, Daily, Meek Pizarro MD, 20 mg at 04/11/25  0929    tamsulosin (FLOMAX) 24 hr ER capsule 0.4 mg, 0.4 mg, oral, Nightly, Meek Pizarro MD, 0.4 mg at 04/10/25 2124    valsartan (DIOVAN) tablet 40 mg, 40 mg, oral, Daily, Stella Jaclyn ARISTEONP, 40 mg at 04/11/25 0928    ASSESSMENT/PLAN:  Principal Problem:    Acute respiratory failure with hypoxia and hypercapnia (CMS/HCC)  Active Problems:    Stage 3a chronic kidney disease (CMS/HCC)    COVID-19    Acute on chronic systolic congestive heart failure (CMS/HCC)    Paroxysmal atrial fibrillation (CMS/HCC)    History of cardiac arrest    Prostate cancer (CMS/HCC)    Breast cancer (CMS/HCC)    History of peptic ulcer disease    BPH (benign prostatic hyperplasia)    Hyperlipidemia    Hypothyroidism    Anemia    Multiple open wounds of lower extremity    Scalp wound    Takotsubo cardiomyopathy    History of prolonged Q-T interval on ECG    Hyperkalemia    Elevated troponin      75 y.o. male admitted with increasing shortness of breath associated with the cough.  On admission he was noted to be hypertensive as well with a blood pressure of 200/96.  Patient noted to be COVID-positive.  Was initially admitted to ICU on BiPAP.  His respiratory status has improved and he was weaned off the BiPAP.  In addition he was noted to have elevated troponins and is diagnosed with Takotsubo cardiomyopathy.  He is noted to have worsening renal function with a creatinine of 1.8 from a baseline of 1.3 requiring renal review.      PLAN:  Acute kidney injury likely prerenal in the setting of COVID and cardiomyopathy with respiratory insufficiency.  Patient does not appear to be volume overloaded.  Cr stable at 1.8. currently on lasix 40 mg po daily.  valsartan restarted  CKD3a: baseline Cr is 1.35. He sees Dr. Bill Amin in [facility].   COVID-positive on remdesivir.ID managing  Takotsubo cardiomyopathy. HFrEF 25-30% on echo.  Management as per cardiology.    Gordo Atwood MD

## 2025-04-12 PROBLEM — R55 SYNCOPE AND COLLAPSE: Status: ACTIVE | Noted: 2025-04-12

## 2025-04-12 PROBLEM — R55 NEAR SYNCOPE: Status: ACTIVE | Noted: 2025-04-12

## 2025-04-12 LAB
ANION GAP SERPL CALC-SCNC: 9 MEQ/L (ref 3–15)
BUN SERPL-MCNC: 45 MG/DL (ref 7–25)
CALCIUM SERPL-MCNC: 8.5 MG/DL (ref 8.6–10.3)
CHLORIDE SERPL-SCNC: 104 MEQ/L (ref 98–107)
CO2 SERPL-SCNC: 30 MEQ/L (ref 21–31)
CREAT SERPL-MCNC: 1.9 MG/DL (ref 0.7–1.3)
EGFRCR SERPLBLD CKD-EPI 2021: 36.3 ML/MIN/1.73M*2
ERYTHROCYTE [DISTWIDTH] IN BLOOD BY AUTOMATED COUNT: 14.4 % (ref 11.6–14.4)
GLUCOSE SERPL-MCNC: 156 MG/DL (ref 70–99)
HCT VFR BLD AUTO: 34.2 % (ref 40.1–51)
HGB BLD-MCNC: 11 G/DL (ref 13.7–17.5)
MAGNESIUM SERPL-MCNC: 2.2 MG/DL (ref 1.8–2.5)
MCH RBC QN AUTO: 33.1 PG (ref 28–33.2)
MCHC RBC AUTO-ENTMCNC: 32.2 G/DL (ref 32.2–36.5)
MCV RBC AUTO: 103 FL (ref 83–98)
PLATELET # BLD AUTO: 150 K/UL (ref 150–350)
PMV BLD AUTO: 9.8 FL (ref 9.4–12.4)
POTASSIUM SERPL-SCNC: 3.9 MEQ/L (ref 3.5–5.1)
QRS DURATION: 140
QT INTERVAL: 514
QTC CALCULATION(BAZETT): 538
R AXIS: -42
RBC # BLD AUTO: 3.32 M/UL (ref 4.5–5.8)
SODIUM SERPL-SCNC: 143 MEQ/L (ref 136–145)
T WAVE AXIS: 218
TROPONIN I SERPL HS-MCNC: 32.4 PG/ML
VENTRICULAR RATE: 66
WBC # BLD AUTO: 4.72 K/UL (ref 3.8–10.5)

## 2025-04-12 PROCEDURE — 63700000 HC SELF-ADMINISTRABLE DRUG: Performed by: STUDENT IN AN ORGANIZED HEALTH CARE EDUCATION/TRAINING PROGRAM

## 2025-04-12 PROCEDURE — 63700000 HC SELF-ADMINISTRABLE DRUG: Performed by: HOSPITALIST

## 2025-04-12 PROCEDURE — G0378 HOSPITAL OBSERVATION PER HR: HCPCS

## 2025-04-12 PROCEDURE — 99999 PR OFFICE/OUTPT VISIT,PROCEDURE ONLY: CPT | Performed by: HOSPITALIST

## 2025-04-12 PROCEDURE — 36415 COLL VENOUS BLD VENIPUNCTURE: CPT | Performed by: STUDENT IN AN ORGANIZED HEALTH CARE EDUCATION/TRAINING PROGRAM

## 2025-04-12 PROCEDURE — 99223 1ST HOSP IP/OBS HIGH 75: CPT | Performed by: STUDENT IN AN ORGANIZED HEALTH CARE EDUCATION/TRAINING PROGRAM

## 2025-04-12 PROCEDURE — 80048 BASIC METABOLIC PNL TOTAL CA: CPT | Performed by: STUDENT IN AN ORGANIZED HEALTH CARE EDUCATION/TRAINING PROGRAM

## 2025-04-12 PROCEDURE — 83735 ASSAY OF MAGNESIUM: CPT | Performed by: STUDENT IN AN ORGANIZED HEALTH CARE EDUCATION/TRAINING PROGRAM

## 2025-04-12 PROCEDURE — 63700000 HC SELF-ADMINISTRABLE DRUG: Performed by: NURSE PRACTITIONER

## 2025-04-12 PROCEDURE — 85027 COMPLETE CBC AUTOMATED: CPT | Performed by: STUDENT IN AN ORGANIZED HEALTH CARE EDUCATION/TRAINING PROGRAM

## 2025-04-12 PROCEDURE — 20600000 HC ROOM AND CARE INTERMEDIATE/TELEMETRY

## 2025-04-12 RX ORDER — DEXTROSE 40 %
15-30 GEL (GRAM) ORAL AS NEEDED
Status: DISCONTINUED | OUTPATIENT
Start: 2025-04-12 | End: 2025-04-13 | Stop reason: HOSPADM

## 2025-04-12 RX ORDER — FUROSEMIDE 40 MG/1
40 TABLET ORAL DAILY
Status: DISCONTINUED | OUTPATIENT
Start: 2025-04-12 | End: 2025-04-13 | Stop reason: HOSPADM

## 2025-04-12 RX ORDER — CHOLECALCIFEROL (VITAMIN D3) 25 MCG
25 TABLET ORAL EVERY MORNING
Status: DISCONTINUED | OUTPATIENT
Start: 2025-04-12 | End: 2025-04-13 | Stop reason: HOSPADM

## 2025-04-12 RX ORDER — METOPROLOL SUCCINATE 25 MG/1
25 TABLET, EXTENDED RELEASE ORAL EVERY EVENING
Status: DISCONTINUED | OUTPATIENT
Start: 2025-04-12 | End: 2025-04-13 | Stop reason: HOSPADM

## 2025-04-12 RX ORDER — GUAIFENESIN 100 MG/5ML
200 LIQUID ORAL EVERY 6 HOURS PRN
Status: DISCONTINUED | OUTPATIENT
Start: 2025-04-12 | End: 2025-04-13 | Stop reason: HOSPADM

## 2025-04-12 RX ORDER — CETIRIZINE HYDROCHLORIDE 10 MG/1
10 TABLET ORAL EVERY MORNING
Status: DISCONTINUED | OUTPATIENT
Start: 2025-04-12 | End: 2025-04-13 | Stop reason: HOSPADM

## 2025-04-12 RX ORDER — BUSPIRONE HYDROCHLORIDE 10 MG/1
10 TABLET ORAL 2 TIMES DAILY
Status: DISCONTINUED | OUTPATIENT
Start: 2025-04-12 | End: 2025-04-13 | Stop reason: HOSPADM

## 2025-04-12 RX ORDER — GUAIFENESIN 600 MG/1
600 TABLET, EXTENDED RELEASE ORAL 2 TIMES DAILY
Status: DISCONTINUED | OUTPATIENT
Start: 2025-04-12 | End: 2025-04-12

## 2025-04-12 RX ORDER — BENZONATATE 100 MG/1
100 CAPSULE ORAL 2 TIMES DAILY
Status: DISCONTINUED | OUTPATIENT
Start: 2025-04-12 | End: 2025-04-12

## 2025-04-12 RX ORDER — VALSARTAN 40 MG/1
40 TABLET ORAL DAILY
Status: DISCONTINUED | OUTPATIENT
Start: 2025-04-12 | End: 2025-04-13 | Stop reason: HOSPADM

## 2025-04-12 RX ORDER — TAMOXIFEN CITRATE 10 MG/1
20 TABLET ORAL DAILY
Status: DISCONTINUED | OUTPATIENT
Start: 2025-04-12 | End: 2025-04-13 | Stop reason: HOSPADM

## 2025-04-12 RX ORDER — ATORVASTATIN CALCIUM 10 MG/1
10 TABLET, FILM COATED ORAL EVERY EVENING
Status: DISCONTINUED | OUTPATIENT
Start: 2025-04-12 | End: 2025-04-13 | Stop reason: HOSPADM

## 2025-04-12 RX ORDER — ACETAMINOPHEN 325 MG/1
650 TABLET ORAL EVERY 4 HOURS PRN
Status: DISCONTINUED | OUTPATIENT
Start: 2025-04-12 | End: 2025-04-13 | Stop reason: HOSPADM

## 2025-04-12 RX ORDER — TAMSULOSIN HYDROCHLORIDE 0.4 MG/1
0.4 CAPSULE ORAL NIGHTLY
Status: DISCONTINUED | OUTPATIENT
Start: 2025-04-12 | End: 2025-04-13 | Stop reason: HOSPADM

## 2025-04-12 RX ORDER — IBUPROFEN/PSEUDOEPHEDRINE HCL 200MG-30MG
3 TABLET ORAL NIGHTLY
Status: DISCONTINUED | OUTPATIENT
Start: 2025-04-12 | End: 2025-04-13 | Stop reason: HOSPADM

## 2025-04-12 RX ORDER — BENZONATATE 100 MG/1
100 CAPSULE ORAL 3 TIMES DAILY PRN
Status: DISCONTINUED | OUTPATIENT
Start: 2025-04-12 | End: 2025-04-12

## 2025-04-12 RX ORDER — AMIODARONE HYDROCHLORIDE 200 MG/1
200 TABLET ORAL 2 TIMES WEEKLY
Status: DISCONTINUED | OUTPATIENT
Start: 2025-04-14 | End: 2025-04-13 | Stop reason: HOSPADM

## 2025-04-12 RX ORDER — LANOLIN ALCOHOL/MO/W.PET/CERES
1000 CREAM (GRAM) TOPICAL DAILY
Status: DISCONTINUED | OUTPATIENT
Start: 2025-04-12 | End: 2025-04-13 | Stop reason: HOSPADM

## 2025-04-12 RX ORDER — BENZONATATE 100 MG/1
100 CAPSULE ORAL 3 TIMES DAILY
Status: DISCONTINUED | OUTPATIENT
Start: 2025-04-12 | End: 2025-04-13 | Stop reason: HOSPADM

## 2025-04-12 RX ORDER — LEVOTHYROXINE SODIUM 50 UG/1
50 TABLET ORAL
Status: DISCONTINUED | OUTPATIENT
Start: 2025-04-12 | End: 2025-04-13 | Stop reason: HOSPADM

## 2025-04-12 RX ORDER — DEXTROSE 50 % IN WATER (D50W) INTRAVENOUS SYRINGE
25 AS NEEDED
Status: DISCONTINUED | OUTPATIENT
Start: 2025-04-12 | End: 2025-04-13 | Stop reason: HOSPADM

## 2025-04-12 RX ORDER — PANTOPRAZOLE SODIUM 40 MG/1
40 TABLET, DELAYED RELEASE ORAL
Status: DISCONTINUED | OUTPATIENT
Start: 2025-04-12 | End: 2025-04-13 | Stop reason: HOSPADM

## 2025-04-12 RX ORDER — IBUPROFEN 200 MG
16-32 TABLET ORAL AS NEEDED
Status: DISCONTINUED | OUTPATIENT
Start: 2025-04-12 | End: 2025-04-13 | Stop reason: HOSPADM

## 2025-04-12 RX ADMIN — Medication 1000 MCG: at 08:27

## 2025-04-12 RX ADMIN — TAMOXIFEN CITRATE 20 MG: 10 TABLET, FILM COATED ORAL at 08:28

## 2025-04-12 RX ADMIN — METOPROLOL SUCCINATE 25 MG: 25 TABLET, EXTENDED RELEASE ORAL at 17:52

## 2025-04-12 RX ADMIN — RIVAROXABAN 15 MG: 15 TABLET, FILM COATED ORAL at 17:53

## 2025-04-12 RX ADMIN — BENZONATATE 100 MG: 100 CAPSULE ORAL at 16:31

## 2025-04-12 RX ADMIN — ATORVASTATIN CALCIUM 10 MG: 10 TABLET, FILM COATED ORAL at 17:52

## 2025-04-12 RX ADMIN — CETIRIZINE HYDROCHLORIDE 10 MG: 10 TABLET, FILM COATED ORAL at 08:27

## 2025-04-12 RX ADMIN — PANTOPRAZOLE SODIUM 40 MG: 40 TABLET, DELAYED RELEASE ORAL at 08:27

## 2025-04-12 RX ADMIN — BUSPIRONE HYDROCHLORIDE 10 MG: 10 TABLET ORAL at 20:18

## 2025-04-12 RX ADMIN — CHOLECALCIFEROL TAB 25 MCG (1000 UNIT) 25 MCG: 25 TAB at 08:27

## 2025-04-12 RX ADMIN — PANTOPRAZOLE SODIUM 40 MG: 40 TABLET, DELAYED RELEASE ORAL at 17:52

## 2025-04-12 RX ADMIN — LEVOTHYROXINE SODIUM 50 MCG: 50 TABLET ORAL at 05:51

## 2025-04-12 RX ADMIN — GUAIFENESIN 600 MG: 600 TABLET ORAL at 08:27

## 2025-04-12 RX ADMIN — VALSARTAN 40 MG: 40 TABLET, FILM COATED ORAL at 08:28

## 2025-04-12 RX ADMIN — Medication 3 MG: at 22:19

## 2025-04-12 RX ADMIN — GUAIFENESIN 200 MG: 100 SOLUTION ORAL at 22:19

## 2025-04-12 RX ADMIN — BENZONATATE 100 MG: 100 CAPSULE ORAL at 05:51

## 2025-04-12 RX ADMIN — BUSPIRONE HYDROCHLORIDE 10 MG: 10 TABLET ORAL at 08:27

## 2025-04-12 RX ADMIN — TAMSULOSIN HYDROCHLORIDE 0.4 MG: 0.4 CAPSULE ORAL at 22:19

## 2025-04-12 RX ADMIN — GUAIFENESIN 600 MG: 600 TABLET ORAL at 20:18

## 2025-04-12 RX ADMIN — BENZONATATE 100 MG: 100 CAPSULE ORAL at 13:01

## 2025-04-12 ASSESSMENT — COGNITIVE AND FUNCTIONAL STATUS - GENERAL
WALKING IN HOSPITAL ROOM: 3 - A LITTLE
MOVING TO AND FROM BED TO CHAIR: 3 - A LITTLE
CLIMB 3 TO 5 STEPS WITH RAILING: 2 - A LOT
CLIMB 3 TO 5 STEPS WITH RAILING: 2 - A LOT
STANDING UP FROM CHAIR USING ARMS: 3 - A LITTLE
WALKING IN HOSPITAL ROOM: 3 - A LITTLE
STANDING UP FROM CHAIR USING ARMS: 3 - A LITTLE
MOVING TO AND FROM BED TO CHAIR: 3 - A LITTLE

## 2025-04-12 NOTE — ED PROVIDER NOTES
HPI   HISTORY OF PRESENT ILLNESS     Patient with past medical history of congestive heart failure, atrial fibrillation on Xarelto, breast cancer status post mastectomy, prostate cancer, and CKD-just discharged today after CHF exacerbation/respiratory failure with COVID-found to have ejection fraction of 20% thought to be from Takotsubo cardiomyopathy secondary to COVID infection.  Patient presents to the emergency department today via EMS after a near syncopal event on the toilet.  Patient states he was feeling queasy, went to the bathroom, but then became lightheaded.  Wife states that patient with partial responsiveness on the toilet and then 911 was called-was unable to arouse him for about a minute and a half.  Patient did not fall, no injury.  States he feels much better now.  Still with persistent cough, but no fevers or chills.      History provided by:  Patient, spouse and EMS personnel  Altered Mental Status  Presenting symptoms comment:  Near syncope    Severity:  Moderate  Most recent episode:  Today  Episode history:  Single  Timing:  Intermittent  Chronicity:  New  Context: recent illness    Associated symptoms: no abdominal pain, no agitation, no depression, no fever, no headaches, no light-headedness, no nausea, no seizures, no vomiting and no weakness          Patient History   PAST HISTORY     Reviewed from Nursing Triage:       Past Medical History:   Diagnosis Date    Acute systolic heart failure (CMS/HCC) 02/15/2023    Ambulates with cane     and walker    Atrial fibrillation (CMS/HCC)     Breast cancer (CMS/HCC) October 2022    CHF (congestive heart failure) (CMS/HCC)     Chronic kidney disease     CKD (chronic kidney disease) 10/19/2022    History of radiation therapy     Hx antineoplastic chemo     Prostate cancer (CMS/HCC)        Past Surgical History   Procedure Laterality Date    Cardiac surgery      mitral valve repair 2006    Cardioversion  12/05/2022    Successful DC cardioversion     CARDIOVERSION EXTERNAL N/A 12/5/2022    [place] [facility] [place]    Cath lab temporary pacemaker insertion N/A 12/25/2023    Performed by Haja Onofre MD at  CARDIAC CATH/EP/NEURO    GASTROSTOMY PERCUTANEOUS ENDOSCOPIC N/A 1/4/2024    Performed by Edison Oneil MD at  OR    Knee cartilage surgery Left     Mastectomy      PORT/PERMACATH REMOVAL Left 11/9/2023    Performed by Barb Osuna MD at  OR    Prostate surgery  July 2022    Prostatectomy  07/15/2022    Right & left heart cath with coronary angiography N/A 12/25/2023    Performed by Haja Onofre MD at  CARDIAC CATH/EP/NEURO    RIGHT BREAST MASTECTOMY; RIGHT SENTINEL NODE IDENTIFICATION, MAPPING, AND BIOPSY; Right 8/17/2023    Performed by Barb Osuna MD at  OR    Tonsillectomy      TRACHEOSTOMY N/A 1/4/2024    Performed by Edison Oneil MD at  OR       Family History   Problem Relation Name Age of Onset    Hyperlipidemia Biological Mother mother     Hypertension Biological Mother mother     Breast cancer Biological Mother mother     Cancer Biological Mother mother         gyn? cervical    Hyperlipidemia Biological Father Dad     Hypertension Biological Father Dad     Arthritis Biological Father Dad     No Known Problems Biological Sister      No Known Problems Biological Brother      Heart disease Maternal Grandmother Belle     Arthritis Maternal Grandfather      COPD Maternal Grandfather      Diabetes Maternal Grandfather      No Known Problems Paternal Grandmother      No Known Problems Paternal Grandfather      Cancer less than or equal to age 50 Other son     Esophageal cancer Other son         47, in abd,chemo q 3 weeks       Social History     Tobacco Use    Smoking status: Never    Smokeless tobacco: Never   Vaping Use    Vaping status: Never Used   Substance Use Topics    Alcohol use: Yes     Alcohol/week: 14.0 standard drinks of alcohol     Types: 14 Shots of liquor  per week     Comment: 2 drinks/day - scotch    Drug use: Never         Review of Systems   REVIEW OF SYSTEMS     Review of Systems   Constitutional:  Negative for activity change and fever.   Respiratory:  Positive for cough. Negative for shortness of breath.    Cardiovascular:  Negative for chest pain and leg swelling.   Gastrointestinal:  Negative for abdominal pain, diarrhea, nausea and vomiting.   Genitourinary:  Negative for difficulty urinating.   Musculoskeletal:  Negative for back pain and neck pain.   Skin:  Negative for color change.   Neurological:  Negative for seizures, syncope, speech difficulty, weakness, light-headedness and headaches.        Near syncope     Psychiatric/Behavioral:  Negative for agitation and behavioral problems.          VITALS     ED Vitals      Date/Time Temp Pulse Resp BP SpO2 [facility]   04/12/25 0048 36.6 °C (97.8 °F) 71 18 122/58 95 % SMP   04/11/25 2329 36.5 °C (97.7 °F) 71 18 105/72 97 % SMP   04/11/25 2213 -- 63 18  93/51 95 % ZMS   04/11/25 2130 36.6 °C (97.8 °F) 68 16 105/57 96 % RPF          Pulse Ox %: 97 % (ON ROOM AIR) (04/11/25 2211)  Pulse Ox Interpretation: Normal (04/11/25 2211)           Physical Exam   PHYSICAL EXAM     Physical Exam  Vitals and nursing note reviewed.   Constitutional:       General: He is not in acute distress.     Appearance: He is well-developed. He is not ill-appearing or toxic-appearing.   HENT:      Head: Normocephalic and atraumatic.   Eyes:      Extraocular Movements: Extraocular movements intact.      Conjunctiva/sclera: Conjunctivae normal.      Pupils: Pupils are equal, round, and reactive to light.   Cardiovascular:      Rate and Rhythm: Normal rate and regular rhythm.      Heart sounds: No murmur heard.  Pulmonary:      Effort: Respiratory distress (MILD TACYPNEA NOTED) present.      Breath sounds: No stridor. No wheezing, rhonchi or rales.      Comments: Decreased breath sounds at bases.  Chest:      Chest wall: No tenderness.    Abdominal:      General: There is no distension.      Palpations: Abdomen is soft. There is no mass.      Tenderness: There is no abdominal tenderness. There is no right CVA tenderness or left CVA tenderness.   Musculoskeletal:         General: No tenderness or deformity. Normal range of motion.      Cervical back: Normal range of motion.      Right lower leg: No edema.      Left lower leg: No edema.   Skin:     General: Skin is warm and dry.      Findings: No rash.   Neurological:      General: No focal deficit present.      Mental Status: He is alert and oriented to person, place, and time. Mental status is at baseline.   Psychiatric:         Behavior: Behavior normal.           PROCEDURES     Procedures     DATA     Results       Procedure Component Value Units Date/Time    TSH w reflex FT4 [[id number]]  (Normal) Collected: 04/11/25 2146    Specimen: Blood, Venous Updated: 04/11/25 2236     TSH 1.59 mIU/L     HS Troponin (with 2 hour reflex) [[id number]]  (Abnormal) Collected: 04/11/25 2146    Specimen: Blood, Venous Updated: 04/11/25 2229     High Sens Troponin I 38.1 pg/mL     BNP (B-type natriuretic peptide) [[id number]]  (Abnormal) Collected: 04/11/25 2146    Specimen: Blood, Venous Updated: 04/11/25 2226      pg/mL     ER toxicology screen, serum [[id number]]  (Abnormal) Collected: 04/11/25 2146    Specimen: Blood, Venous Updated: 04/11/25 2224     Salicylate <1.5 mg/dL      Acetaminophen <0.1 ug/mL      Ethanol 32 mg/dL     Comprehensive metabolic panel [[id number]]  (Abnormal) Collected: 04/11/25 2146    Specimen: Blood, Venous Updated: 04/11/25 2222     Sodium 142 mEQ/L      Potassium 3.3 mEQ/L      Comment: Results obtained on plasma. Plasma Potassium values may be up to 0.4 mEQ/L less than serum values. The differences may be greater for patients with high platelet or white cell counts.        Chloride 102 mEQ/L      CO2 26 mEQ/L      BUN 44 mg/dL      Creatinine 1.9 mg/dL      Glucose 137 mg/dL      no known allergies  Calcium 8.7 mg/dL      AST (SGOT) 20 IU/L      ALT (SGPT) 8 IU/L      Alkaline Phosphatase 35 IU/L      Total Protein 6.4 g/dL      Comment: Test performed on plasma which typically contains approximately 0.4 g/dL more protein than serum.        Albumin 3.5 g/dL      Bilirubin, Total 0.4 mg/dL      eGFR 36.3 mL/min/1.73m*2      Comment: Calculation based on the Chronic Kidney Disease Epidemiology Collaboration (CKD-EPI) equation refit without adjustment for race.        Anion Gap 14 mEQ/L     Magnesium [[id number]]  (Normal) Collected: 04/11/25 2146    Specimen: Blood, Venous Updated: 04/11/25 2222     Magnesium 2.1 mg/dL     Protime-INR [[id number]]  (Abnormal) Collected: 04/11/25 2146    Specimen: Blood, Venous Updated: 04/11/25 2210     PT 27.8 sec      INR 2.6     Comment: Moderate Intensity Anticoagulation = 2.0 to 3.0, High Intensity = 2.5 to 3.5       PTT [[id number]]  (Normal) Collected: 04/11/25 2146    Specimen: Blood, Venous Updated: 04/11/25 2210     PTT 33 sec     CBC and differential [[id number]]  (Abnormal) Collected: 04/11/25 2146    Specimen: Blood, Venous Updated: 04/11/25 2200     WBC 6.87 K/uL      RBC 3.32 M/uL      Hemoglobin 11.0 g/dL      Hematocrit 34.5 %      .9 fL      MCH 33.1 pg      MCHC 31.9 g/dL      RDW 14.5 %      Platelets 155 K/uL      MPV 9.8 fL      Differential Type Auto     nRBC 0.0 %      Immature Granulocytes 0.6 %      Neutrophils 83.4 %      Lymphocytes 8.6 %      Monocytes 7.3 %      Eosinophils 0.0 %      Basophils 0.1 %      Immature Granulocytes, Absolute 0.04 K/uL      Neutrophils, Absolute 5.73 K/uL      Lymphocytes, Absolute 0.59 K/uL      Monocytes, Absolute 0.50 K/uL      Eosinophils, Absolute 0.00 K/uL      Basophils, Absolute 0.01 K/uL             Imaging Results              X-RAY CHEST 1 VIEW (Final result)  Result time 04/11/25 22:48:33      Final result                   Impression:    IMPRESSION: See above.                   Narrative:    CLINICAL  HISTORY: covid admission    PRIOR STUDY: Chest x-ray 4/10/2025    TECHNIQUE: Frontal chest    COMMENT:  There is cardiomegaly with a prosthetic valve and mildly increased  pulmonary vascular markings and small effusions.  No pneumothorax.                        Preliminary Interpretation    UNCHANGED                                    ECG 12 lead   Independent Interpretation by ED Provider   Rhythm: ATRIAL FIBRILLATION with pvcs  Rate: 66  P waves: normal interval  QRS: lbbb and LAD  ST Segments: no obvious ST elevation or ischemia    Read with Dr. Sepulveda        O2 sat: normal             Scoring tools                                  ED Course & MDM   MDM / ED COURSE / CLINICAL IMPRESSION / DISPO   Vital Signs Review: Vital signs have been reviewed.   The oxygen saturation is SpO2: 96 % which is normal.    Medical Decision Making  Patient with unresponsive episode for about 1.5 minutes just prior to ER arrival.  Differential is wide, includes ACS, hypoxic episode, arrhythmia, syncope, versus multiple others.  Will obtain labs including BNP, troponin, EKG, chest x-ray..    Problems Addressed:  Altered mental status, unspecified altered mental status type: acute illness or injury  Near syncope: acute illness or injury    Amount and/or Complexity of Data Reviewed  Independent Historian: spouse and EMS  External Data Reviewed: notes.     Details: Reviewed inpatient notes  Labs: ordered. Decision-making details documented in ED Course.  Radiology: ordered and independent interpretation performed. Decision-making details documented in ED Course.  ECG/medicine tests: ordered and independent interpretation performed. Decision-making details documented in ED Course.    Risk  Decision regarding hospitalization.          ED Course as of 04/12/25 0049   Sat Apr 12, 2025   0039 Spoke with [facility]- Dr. Bergeron- will admit [ND]      ED Course User Index  [ND] Mavis Dahl PA C     Clinical Impression      Near syncope    Altered mental status, unspecified altered mental status type     _________________       ED Disposition   Admit / Observation                       Pt given copies of []Radiology study reports   [] Laboratory results     Patient seen during a time of significantly increased volumes, increased boarding in ED, decreased capacity and staff which may have contributed to lengthened stay in ED. Portion of management and initial evaluation may have been done while in the waiting room because of this.  Extensive detailed charting also may be limited secondary to high ED volumes and may not reflect all conversations and decisions made between patient and provider.     This document was created using dragon dictation software.  There might be some typographical errors due to this technology.       Mavis Dahl PA C  04/12/25 0049

## 2025-04-12 NOTE — PROGRESS NOTES
Patient is alert, following, O to himself. Denied SOB /Dizziness.   Eating well.   /53.  Lasix and Valsartan are on hold.   PHYSICAL EXAMINATION   Temp:  [36.4 °C (97.5 °F)-37.1 °C (98.7 °F)] 36.8 °C (98.2 °F)  Heart Rate:  [63-80] 71  Resp:  [16-26] 20  BP: ()/(51-74) 105/53  Body mass index is 28.58 kg/m².    Physical Exam     General: Well appearing adult man in NAD.   HEENT: NCAT. No scleral icterus, EOMI. PERRL.   CV: RRR, no MRG.   Pulm: Bibasilar crackles. Normal WOB   Abdominal: Normoactive BS. No TTP.   Neuro: A&Ox3. Moving all 4 extremities.  Skin: Warm and well perfused, no rashes/bruising.   Ext: trace peripheral edema.     LABS / IMAGING / EKG   CHEMISTRIES   Results from last 7 days   Lab Units 04/12/25  0549 04/11/25 2146 04/11/25  0340 04/10/25  0323 04/09/25  0248   SODIUM mEQ/L 143 142 140 141 141   POTASSIUM mEQ/L 3.9 3.3* 4.0 4.2 4.3   CHLORIDE mEQ/L 104 102 101 101 101   CO2 mEQ/L 30 26 28 31 32*   BUN mg/dL 45* 44* 42* 40* 34*   CREATININE mg/dL 1.9* 1.9* 1.8* 1.8* 1.7*   GLUCOSE mg/dL 156* 137* 145* 127* 120*   CALCIUM mg/dL 8.5* 8.7 8.9 8.9 9.0   EGFR mL/min/1.73m*2 36.3* 36.3* 38.8* 38.8* 41.5*   ANION GAP mEQ/L 9 14 11 9 8   MAGNESIUM mg/dL 2.2 2.1 2.1 2.0 2.1     HEPATIC FUNCTION TESTS   Results from last 7 days   Lab Units 04/11/25 2146 04/08/25  0415 04/07/25  0329   AST IU/L 20 33 29   ALT IU/L 8 9 7   ALK PHOS IU/L 35 40 45   ALBUMIN g/dL 3.5 3.6 3.9   PROTEIN TOTAL g/dL 6.4 6.4 7.0   BILIRUBIN TOTAL mg/dL 0.4 0.5 0.6   BILIRUBIN DIRECT mg/dL  --  <0.1  --      CBC RESULTS   Results from last 7 days   Lab Units 04/12/25  0549 04/11/25  2146 04/11/25  0340 04/10/25  0323 04/09/25  0248   WBC K/uL 4.72 6.87 4.17 4.12 7.34   HEMOGLOBIN g/dL 11.0* 11.0* 11.1* 11.4* 11.3*   HEMATOCRIT % 34.2* 34.5* 34.0* 35.3* 35.3*   PLATELETS K/uL 150 155 155 153 157     ANEMIA STUDIES   Results from last 7 days   Lab Units 04/08/25  0415   FOLATE ng/mL 9.1   VITAMIN B 12 pg/mL 204      COAGULATION   Results from last 7 days   Lab Units 04/11/25  2146   PROTIME sec 27.8*   INR  2.6   PTT sec 33       Radiology Reports in last 24hX-RAY CHEST 1 VIEW  Result Date: 4/11/2025  CLINICAL HISTORY: covid admission PRIOR STUDY: Chest x-ray 4/10/2025 TECHNIQUE: Frontal chest COMMENT:  There is cardiomegaly with a prosthetic valve and mildly increased pulmonary vascular markings and small effusions.  No pneumothorax.     IMPRESSION: See above.        ASSESSMENT AND PLAN     Mr Rosas is a 76 y/o man with PMH HFrEF (EF 25-30% 4/2025) 2/2 NICM/Stress CM, (on xarelto), CKD3, pAF, BPH, HLD, hypothyroidism who presents to  ED following a syncopal event at home.      # Presyncope   Preceded by nausea/diaphoresis/weakness. Orthostasis vs c/f arrhythmogenic/cardiac cause given known Stress CM/EF 25%, risk of PVT/MMVT or SVT  - telemetry  - orthostatic vitals  - BMP, Mg  - hold lasix and Valsartan     # HFrEF 2/2 Stress CM  Found during previous admission, EF 25-30%, RV dysfunction. Per Cardiology 2/2 Stress CM. [facility] 12/2024 with no occlsuive CAD.  - LV ejection fraction: 25 % to 30 % (4/7/25)  - weight: 77.1 kg up from 75.3 kg (4/11/25), est. baseline 76 kg  - cont GDMT: metoprolol, diovan  - hold lasix for now (given c/f orthostatic syncope)  - cardiac diet  - daily weights  - strict I/O's  - BMP, Mg daily      # Stage 3a chronic kidney disease (CMS/HCC)  - BMP daily   - creatinine: 1.9 up from 1.8 (4/11/25)     # Paroxysmal atrial fibrillation (CMS/HCC)  - telemetry  - cont xarelto   - cont metoprolol  - cont amiodarone   - BMP daily      # Prostate cancer (CMS/HCC)  Status post prostatectomy followed by radiation in 2022     # Breast cancer (CMS/HCC)  Status post neoadjuvant chemotherapy in 2022, followed by mastectomy in August 2023, status postradiation. On tamoxifen. Declined HER2 directed therapy     # History of peptic ulcer disease  - PPI      # BPH (benign prostatic hyperplasia)  - Flomax     #  Hyperlipidemia  Continue statin     # Hypothyroidism  Continue levothyroxine           VTE Assessment: Padua    VTE Prophylaxis: Current anticoagulants:  rivaroxaban (XARELTO) tablet 15 mg, oral, Daily with dinner      Code Status: Full Code      Estimated Discharge Date: 4/14/2025  Disposition Planning: admit to telemetry     Nick Rich MD  4/12/2025

## 2025-04-12 NOTE — PLAN OF CARE
Plan of Care Review  Plan of Care Reviewed With: patient  Progress: no change  Outcome Evaluation: Pt is A/O X 4 but forgetfule, denies pain was addmited today tele box verified, skin check dual RN.Pt denies pain he is complaining of a dry non productive cough. Safety precautions maintained. PT'S and family needs are met.

## 2025-04-12 NOTE — ED ATTESTATION NOTE
Procedures  Physical Exam  Review of Systems    4/12/202512:12 AM  I have personally seen and examined the patient.  I reviewed and agree with the PA/NP/Resident's assessment and plan of care.    Vital Signs Review: Vital signs have been reviewed. The oxygen saturation is  SpO2: 96 % which is  Normal    My examination, assessment, and plan of care of Cam Rosas Jr. is as follows:      Patient Presents with 75-year-old male coming in today recently discharged from the hospital coded after an episode of being unresponsive while staying was unconscious for about a minute or 2    Exam: Patient is otherwise well-appearing does have a dry cough denies any fevers or chills belly is soft      Impression/Plan: Patient is 75-year-old male coming in today with concerns for an episode of unresponsiveness with significant past medical history at this time would recommend admission due to concerns for possible episode of hypotension or possible cardiac arrhythmia with the setting of a heart failure EF of 20% patient agreeable to plan      Please refer to work-up tab for further management and follow-up on laboratory and imaging evaluations    ED Course as of 04/12/25 0041   Sat Apr 12, 2025   0039 Spoke with [facility] [place], [place]- Dr. Bergeron- will admit [ND]      ED Course User Index  [ND] Mavis Dahl, IGLESIA GALLEGOS       I was physically present for the key/critical portions of the procedures documented by ZAC    This document was created using dragon dictation software.  There might be some typographical errors due to this technology.       Gaurav Fields MD  04/12/25 2323

## 2025-04-12 NOTE — UM PHYSICIAN REVIEW NOTE
Patient presented to the ER yesterday, recent admission, here with presyncope, BP running low.  Home meds held, BP monitoring.  Care will cross 2nd MN.  Inpatient is appropriate.    Arely Anderson, DO

## 2025-04-12 NOTE — H&P
Hospital Medicine    History & Physical        CHIEF COMPLAINT   Lightheadedness/presyncope   HISTORY OF PRESENT ILLNESS   Cam Rosas Jr. is a 75 y.o. man with PMH HFrEF (EF 25-30% 4/2025) 2/2 NICM/Stress CM, (on xarelto), CKD3, pAF, BPH, HLD, hypothyroidism who presents to  ED following a syncopal event at home.     Mr Rosas was admitted 4/7-4/11 for AHHRF 2/2 COVID-19. He was treated with remdesivir, decadron, & IV lasix with improvement. 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 He was restarted on diovan, lasix, and xarelto prior to discharge.     This evening after arriving home he spent most of the afternoon on the recliner. He went up to go to the bathroom when he felt lightheaded, weak, and diaphoretic. He sat down on the toilet and slumped forward. His wife noticed and held him up from falling. She reports he was mumbling unintelligibly and not responding to her. He remained this way for 15-20 minutes. EMS was called. He does have memory of these events and it appears he did not lose consciousness.  He denies chest pain, SOB, palpitations.     In the ED he was afebrile, HR 62, RR 18, BP 93/52, SpO2 95% on RA. Labs notable for K 3.3, BUN 44, Cr 1.9 (1.8 at discharge), troponin 38 (adynamic), , Hgb 11, INR 2.6. CXR with mild pulmonary edema. He is admitted to [facility] [place].      PAST MEDICAL AND SURGICAL HISTORY   Past Medical History:   Diagnosis Date    Acute systolic heart failure (CMS/HCC) 02/15/2023    Ambulates with cane     and walker    Atrial fibrillation (CMS/HCC)     Breast cancer (CMS/HCC) October 2022    CHF (congestive heart failure) (CMS/HCC)     Chronic kidney disease     CKD (chronic kidney disease) 10/19/2022    History of radiation therapy     Hx antineoplastic chemo     Prostate cancer (CMS/HCC)        Past Surgical History    Procedure Laterality Date    Cardiac surgery      mitral valve repair 2006    Cardioversion  12/05/2022    Successful DC cardioversion    CARDIOVERSION EXTERNAL N/A 12/5/2022    Performed by Gary Aceves MD at [facility] [place] CARDIAC CATH/EP    Cath lab temporary pacemaker insertion N/A 12/25/2023    Performed by Haja Onofre MD at  CARDIAC CATH/EP/NEURO    GASTROSTOMY PERCUTANEOUS ENDOSCOPIC N/A 1/4/2024    Performed by Edison Oneil MD at  OR    Knee cartilage surgery Left     Mastectomy      PORT/PERMACATH REMOVAL Left 11/9/2023    Performed by Barb Osuna MD at  OR    Prostate surgery  July 2022    Prostatectomy  07/15/2022    Right & left heart cath with coronary angiography N/A 12/25/2023    Performed by Haja Onofre MD at  CARDIAC CATH/EP/NEURO    RIGHT BREAST MASTECTOMY; RIGHT SENTINEL NODE IDENTIFICATION, MAPPING, AND BIOPSY; Right 8/17/2023    Performed by Barb Osuna MD at  OR    Tonsillectomy      TRACHEOSTOMY N/A 1/4/2024    Performed by Edison Oneil MD at  OR       PCP: Usman Collins MD   MEDICATIONS   Prior to Admission medications    Medication Sig Start Date End Date Taking? Authorizing Provider   amiodarone (PACERONE) 200 mg tablet Take 200 mg by mouth 2 (two) times a week (Mon, Fri).    Provider, Jorge L Barboza MD   atorvastatin (LIPITOR) 10 mg tablet Take 1 tablet (10 mg total) by mouth daily.  Patient taking differently: Take 10 mg by mouth every evening. 5/16/24   Gary Moran MD   benzonatate (TESSALON) 100 mg capsule Take 1 capsule (100 mg total) by mouth 3 (three) times a day as needed for cough for up to 5 days. 4/11/25 4/16/25  Malcolm Oglesby MD   busPIRone (BUSPAR) 10 mg tablet Take 10 mg by mouth 2 (two) times a day.    ProviderJorge L MD   cetirizine (ZyrTEC) 10 mg tablet Take 10 mg by mouth every morning.    Provider, Jorge L Barboza MD   cholecalciferol, vitamin D3, 1,000 unit (25 mcg) tablet Take 1,000  Units by mouth every morning.    Provider, Jorge L Barboza MD   cyanocobalamin (VITAMIN B12) 1,000 mcg tablet Take 1 tablet (1,000 mcg total) by mouth daily. 4/12/25 5/12/25  Malcolm Oglesby MD   dexAMETHasone (DECADRON) 6 mg tablet Take 1 tablet (6 mg total) by mouth once for 1 dose. Pt has leg swelling with prednisone. No angioedema. Pt took decadron in hospital without problems 4/11/25 4/11/25  Malcolm Oglesby MD   furosemide (LASIX) 40 mg tablet Take 1 tablet (40 mg total) by mouth daily. 4/12/25 5/12/25  Malcolm Oglesby MD   guaiFENesin (MUCINEX) 600 mg 12 hr tablet Take 1 tablet (600 mg total) by mouth 2 (two) times a day for 10 days. 4/11/25 4/21/25  Malcolm Oglesby MD   levothyroxine (SYNTHROID) 50 mcg tablet Take 50 mcg by mouth daily. 3/28/25   ProviderJorge L MD   magnesium oxide (MAG-OX) 400 mg (241.3 mg magnesium) tablet Take 1 tablet (400 mg total) by mouth 2 (two) times a day. 1/23/25 7/22/25  Gary Aceves MD   melatonin 3 mg tablet Take 3 mg by mouth nightly.    Provider, Jorge L Barboza MD   metoprolol succinate XL (TOPROL-XL) 25 mg 24 hr tablet TAKE ONE TABLET BY MOUTH EVERY EVENING 4/11/25   Gary Moran MD   pantoprazole (PROTONIX) 40 mg EC tablet Take 40 mg by mouth 2 (two) times a day before breakfast and dinner. 5/1/24   ProviderJorge L MD   rivaroxaban (XARELTO) 15 mg tablet Take 1 tablet (15 mg total) by mouth daily with dinner. 4/11/25 5/11/25  Malcolm Oglesby MD   tamoxifen (NOLVADEX) 20 mg chemo tablet Take  1 tablet (20 mg total) daily 11/4/24   Kailey Gale MD   tamsulosin (FLOMAX) 0.4 mg capsule Take 1 capsule (0.4 mg total) by mouth nightly. 11/21/24 6/19/25  [facility], Gregory J, MD   valsartan (DIOVAN) 40 mg tablet Take 1 tablet (40 mg total) by mouth daily. 4/12/25 5/12/25  Malcolm Oglesby MD      ALLERGIES   Azithromycin, Prednisone, and Lorazepam   FAMILY HISTORY   Family History   Problem  Relation Name Age of Onset    Hyperlipidemia Biological Mother mother     Hypertension Biological Mother mother     Breast cancer Biological Mother mother     Cancer Biological Mother mother         gyn? cervical    Hyperlipidemia Biological Father Dad     Hypertension Biological Father Dad     Arthritis Biological Father Dad     No Known Problems Biological Sister      No Known Problems Biological Brother      Heart disease Maternal Grandmother Belle     Arthritis Maternal Grandfather      COPD Maternal Grandfather      Diabetes Maternal Grandfather      No Known Problems Paternal Grandmother      No Known Problems Paternal Grandfather      Cancer less than or equal to age 50 Other son     Esophageal cancer Other son         47, in abd,chemo q 3 weeks      SOCIAL HISTORY   Social History     Substance and Sexual Activity   Drug Use Never     Tobacco Use: Low Risk  (4/11/2025)    Patient History     Smoking Tobacco Use: Never     Smokeless Tobacco Use: Never     Passive Exposure: Not on file     Alcohol Use: Not At Risk (4/7/2025)    AUDIT-C     Frequency of Alcohol Consumption: 4 or more times a week     Average Number of Drinks: 1 or 2     Frequency of Binge Drinking: Never     Within the past week have you used heroin or used opioid medications other than as prescribed? (OxyContin, Fentanyl, Subutex): No  Do you get sick if you cannot use heroin, methadone, or opioid medications?: No          REVIEW OF SYSTEMS   All other systems reviewed and negative except as noted in HPI   PHYSICAL EXAMINATION   Temp:  [36.3 °C (97.3 °F)-36.6 °C (97.8 °F)] 36.6 °C (97.8 °F)  Heart Rate:  [] 71  Resp:  [16-18] 18  BP: ()/(51-72) 122/58  Body mass index is 28.29 kg/m².    Physical Exam     General: Well appearing adult man in NAD.   HEENT: NCAT. No scleral icterus, EOMI. PERRL.   CV: RRR, no MRG.   Pulm: Bibasilar crackles. Normal WOB   Abdominal: Normoactive BS. No TTP.   Neuro: A&Ox3. Moving all 4  extremities.  Skin: Warm and well perfused, no rashes/bruising.   Ext: trace peripheral edema.     LABS / IMAGING / EKG   CHEMISTRIES   Results from last 7 days   Lab Units 04/11/25  2146 04/11/25  0340 04/10/25  0323 04/09/25  0248 04/08/25  0415   SODIUM mEQ/L 142 140 141 141 144   POTASSIUM mEQ/L 3.3* 4.0 4.2 4.3 5.3*   CHLORIDE mEQ/L 102 101 101 101 106   CO2 mEQ/L 26 28 31 32* 29   BUN mg/dL 44* 42* 40* 34* 27*   CREATININE mg/dL 1.9* 1.8* 1.8* 1.7* 1.8*   GLUCOSE mg/dL 137* 145* 127* 120* 152*   CALCIUM mg/dL 8.7 8.9 8.9 9.0 8.5*   EGFR mL/min/1.73m*2 36.3* 38.8* 38.8* 41.5* 38.8*   ANION GAP mEQ/L 14 11 9 8 9   MAGNESIUM mg/dL 2.1 2.1 2.0 2.1 2.2     HEPATIC FUNCTION TESTS   Results from last 7 days   Lab Units 04/11/25 2146 04/08/25 0415 04/07/25  0329   AST IU/L 20 33 29   ALT IU/L 8 9 7   ALK PHOS IU/L 35 40 45   ALBUMIN g/dL 3.5 3.6 3.9   PROTEIN TOTAL g/dL 6.4 6.4 7.0   BILIRUBIN TOTAL mg/dL 0.4 0.5 0.6   BILIRUBIN DIRECT mg/dL  --  <0.1  --      CBC RESULTS   Results from last 7 days   Lab Units 04/11/25  2146 04/11/25  0340 04/10/25  0323 04/09/25  0248 04/08/25  0415   WBC K/uL 6.87 4.17 4.12 7.34 6.16   HEMOGLOBIN g/dL 11.0* 11.1* 11.4* 11.3* 10.7*   HEMATOCRIT % 34.5* 34.0* 35.3* 35.3* 34.0*   PLATELETS K/uL 155 155 153 157 154     ANEMIA STUDIES   Results from last 7 days   Lab Units 04/08/25 0415   FOLATE ng/mL 9.1   VITAMIN B 12 pg/mL 204     COAGULATION   Results from last 7 days   Lab Units 04/11/25  2146   PROTIME sec 27.8*   INR  2.6   PTT sec 33       Radiology Reports in last 24hX-RAY CHEST 1 VIEW  Result Date: 4/11/2025  CLINICAL HISTORY: covid admission PRIOR STUDY: Chest x-ray 4/10/2025 TECHNIQUE: Frontal chest COMMENT:  There is cardiomegaly with a prosthetic valve and mildly increased pulmonary vascular markings and small effusions.  No pneumothorax.     IMPRESSION: See above.       ASSESSMENT AND PLAN     Mr Rosas is a 76 y/o man with PMH HFrEF (EF 25-30% 4/2025) 2/2 NICM/Stress  CM, (on xarelto), CKD3, pAF, BPH, HLD, hypothyroidism who presents to  ED following a syncopal event at home.      # Presyncope   Preceded by nausea/diaphoresis/weakness. Orthostasis vs c/f arrhythmogenic/cardiac cause given known Stress CM/EF 25%, risk of PVT/MMVT or SVT  - telemetry  - orthostatic vitals  - BMP, Mg  - hold lasix      # HFrEF 2/2 Stress CM  Found during previous admission, EF 25-30%, RV dysfunction. Per Cardiology 2/2 Stress CM. [facility] 12/2024 with no occlsuive CAD.  - LV ejection fraction: 25 % to 30 % (4/7/25)  - weight: 77.1 kg up from 75.3 kg (4/11/25), est. baseline 76 kg  - cont GDMT: metoprolol, diovan  - hold lasix for now (given c/f orthostatic syncope)  - cardiac diet  - daily weights  - strict I/O's  - BMP, Mg daily      # Stage 3a chronic kidney disease (CMS/HCC)  - BMP daily   - creatinine: 1.9 up from 1.8 (4/11/25)     # Paroxysmal atrial fibrillation (CMS/HCC)  - telemetry  - cont xarelto   - cont metoprolol  - cont amiodarone   - BMP daily      # Prostate cancer (CMS/HCC)  Status post prostatectomy followed by radiation in 2022     # Breast cancer (CMS/HCC)  Status post neoadjuvant chemotherapy in 2022, followed by mastectomy in August 2023, status postradiation. On tamoxifen. Declined HER2 directed therapy     # History of peptic ulcer disease  - PPI      # BPH (benign prostatic hyperplasia)  - Flomax     # Hyperlipidemia  Continue statin     # Hypothyroidism  Continue levothyroxine           VTE Assessment: Padua    VTE Prophylaxis: Current anticoagulants:    None      Code Status: Prior      Estimated Discharge Date: 4/14/2025  Disposition Planning: admit to telemetry     Kota Bergeron MD  4/12/2025

## 2025-04-13 VITALS
WEIGHT: 171.74 LBS | SYSTOLIC BLOOD PRESSURE: 103 MMHG | RESPIRATION RATE: 18 BRPM | DIASTOLIC BLOOD PRESSURE: 58 MMHG | OXYGEN SATURATION: 96 % | HEIGHT: 65 IN | TEMPERATURE: 98.3 F | BODY MASS INDEX: 28.61 KG/M2 | HEART RATE: 73 BPM

## 2025-04-13 LAB
ANION GAP SERPL CALC-SCNC: 8 MEQ/L (ref 3–15)
BUN SERPL-MCNC: 47 MG/DL (ref 7–25)
CALCIUM SERPL-MCNC: 9.1 MG/DL (ref 8.6–10.3)
CHLORIDE SERPL-SCNC: 104 MEQ/L (ref 98–107)
CO2 SERPL-SCNC: 30 MEQ/L (ref 21–31)
CREAT SERPL-MCNC: 1.8 MG/DL (ref 0.7–1.3)
EGFRCR SERPLBLD CKD-EPI 2021: 38.8 ML/MIN/1.73M*2
ERYTHROCYTE [DISTWIDTH] IN BLOOD BY AUTOMATED COUNT: 14.5 % (ref 11.6–14.4)
GLUCOSE SERPL-MCNC: 127 MG/DL (ref 70–99)
HCT VFR BLD AUTO: 35.6 % (ref 40.1–51)
HGB BLD-MCNC: 11.5 G/DL (ref 13.7–17.5)
MAGNESIUM SERPL-MCNC: 2.2 MG/DL (ref 1.8–2.5)
MCH RBC QN AUTO: 33.6 PG (ref 28–33.2)
MCHC RBC AUTO-ENTMCNC: 32.3 G/DL (ref 32.2–36.5)
MCV RBC AUTO: 104.1 FL (ref 83–98)
PLATELET # BLD AUTO: 168 K/UL (ref 150–350)
PMV BLD AUTO: 10 FL (ref 9.4–12.4)
POTASSIUM SERPL-SCNC: 4.1 MEQ/L (ref 3.5–5.1)
RBC # BLD AUTO: 3.42 M/UL (ref 4.5–5.8)
SODIUM SERPL-SCNC: 142 MEQ/L (ref 136–145)
WBC # BLD AUTO: 7.79 K/UL (ref 3.8–10.5)

## 2025-04-13 PROCEDURE — 97162 PT EVAL MOD COMPLEX 30 MIN: CPT | Mod: GP

## 2025-04-13 PROCEDURE — 36415 COLL VENOUS BLD VENIPUNCTURE: CPT | Performed by: STUDENT IN AN ORGANIZED HEALTH CARE EDUCATION/TRAINING PROGRAM

## 2025-04-13 PROCEDURE — 99238 HOSP IP/OBS DSCHRG MGMT 30/<: CPT | Performed by: HOSPITALIST

## 2025-04-13 PROCEDURE — 63700000 HC SELF-ADMINISTRABLE DRUG: Performed by: HOSPITALIST

## 2025-04-13 PROCEDURE — 80048 BASIC METABOLIC PNL TOTAL CA: CPT | Performed by: STUDENT IN AN ORGANIZED HEALTH CARE EDUCATION/TRAINING PROGRAM

## 2025-04-13 PROCEDURE — 85027 COMPLETE CBC AUTOMATED: CPT | Performed by: STUDENT IN AN ORGANIZED HEALTH CARE EDUCATION/TRAINING PROGRAM

## 2025-04-13 PROCEDURE — 83735 ASSAY OF MAGNESIUM: CPT | Performed by: STUDENT IN AN ORGANIZED HEALTH CARE EDUCATION/TRAINING PROGRAM

## 2025-04-13 PROCEDURE — 63700000 HC SELF-ADMINISTRABLE DRUG: Performed by: STUDENT IN AN ORGANIZED HEALTH CARE EDUCATION/TRAINING PROGRAM

## 2025-04-13 RX ORDER — FUROSEMIDE 40 MG/1
20 TABLET ORAL 3 TIMES WEEKLY
Qty: 6 TABLET | Refills: 0 | Status: SHIPPED | OUTPATIENT
Start: 2025-04-14 | End: 2025-04-15 | Stop reason: SDUPTHER

## 2025-04-13 RX ADMIN — Medication 1000 MCG: at 09:08

## 2025-04-13 RX ADMIN — PANTOPRAZOLE SODIUM 40 MG: 40 TABLET, DELAYED RELEASE ORAL at 06:41

## 2025-04-13 RX ADMIN — TAMOXIFEN CITRATE 20 MG: 10 TABLET, FILM COATED ORAL at 09:09

## 2025-04-13 RX ADMIN — LEVOTHYROXINE SODIUM 50 MCG: 50 TABLET ORAL at 06:41

## 2025-04-13 RX ADMIN — BUSPIRONE HYDROCHLORIDE 10 MG: 10 TABLET ORAL at 09:08

## 2025-04-13 RX ADMIN — BENZONATATE 100 MG: 100 CAPSULE ORAL at 09:08

## 2025-04-13 RX ADMIN — CETIRIZINE HYDROCHLORIDE 10 MG: 10 TABLET, FILM COATED ORAL at 09:08

## 2025-04-13 RX ADMIN — CHOLECALCIFEROL TAB 25 MCG (1000 UNIT) 25 MCG: 25 TAB at 09:08

## 2025-04-13 ASSESSMENT — COGNITIVE AND FUNCTIONAL STATUS - GENERAL
CLIMB 3 TO 5 STEPS WITH RAILING: 2 - A LOT
CLIMB 3 TO 5 STEPS WITH RAILING: 3 - A LITTLE
STANDING UP FROM CHAIR USING ARMS: 3 - A LITTLE
AFFECT: WFL
WALKING IN HOSPITAL ROOM: 3 - A LITTLE
MOVING TO AND FROM BED TO CHAIR: 3 - A LITTLE
MOVING TO AND FROM BED TO CHAIR: 4 - NONE
WALKING IN HOSPITAL ROOM: 3 - A LITTLE
STANDING UP FROM CHAIR USING ARMS: 4 - NONE

## 2025-04-13 NOTE — DISCHARGE SUMMARY
Hospital Medicine Service -  Inpatient Discharge Summary        BRIEF OVERVIEW   Admitting Provider: Nick Rich MD  Attending Provider: Nick Rich MD Attending phys phone: [phone]    PCP: Usman Collins [phone]    Admission Date: 4/11/2025  Discharge Date: 4/13/2025     DISCHARGE DIAGNOSES      Primary Discharge Diagnosis  Syncope and collapse    Secondary Discharge Diagnoses  Active Hospital Problems    Diagnosis Date Noted    Syncope and collapse 04/12/2025    Near syncope 04/12/2025    BPH (benign prostatic hyperplasia) 04/07/2025    Takotsubo cardiomyopathy 01/10/2024    CKD (chronic kidney disease) 10/19/2022    Paroxysmal atrial fibrillation (CMS/HCC) 10/19/2022    Essential hypertension 08/31/2021      Resolved Hospital Problems   No resolved problems to display.     SUMMARY OF HOSPITALIZATION      Presenting Problem/History of Present Illness  Syncope and collapse [R55]  Near syncope [R55]  Altered mental status, unspecified altered mental status type [R41.82]    This is a 75 y.o. year-old male admitted on 4/11/2025 with Syncope and collapse [R55]  Near syncope [R55]  Altered mental status, unspecified altered mental status type [R41.82].    Hospital Course  Patient's presyncope improved after stopped Lasix ans Diovan. His BP has been stronger today. No orthostatic symptoms. His A Fib was converted to SR last night .  He has been eating/drinking well. There is no difficulty walking. Patient felt comfortable going home today. I resumed his Diovan and only Lasix 20 mg 3x/week. He will be followed by PCP in a week.     Exam on Day of Discharge  Physical Exam    Consults During Admission  IP CONSULT TO WOUND OSTOMY CONTINENCE  IP CONSULT TO NEPHROLOGY    PROCEDURES / LABS / IMAGING      Operative Procedures      Pertinent Labs  Lab Results   Component Value Date    GLUCOSE 127 (H) 04/13/2025    CALCIUM 9.1 04/13/2025     04/13/2025    K 4.1 04/13/2025    CO2 30 04/13/2025      04/13/2025    BUN 47 (H) 04/13/2025    CREATININE 1.8 (H) 04/13/2025     Lab Results   Component Value Date    WBC 7.79 04/13/2025    HGB 11.5 (L) 04/13/2025    HCT 35.6 (L) 04/13/2025    .1 (H) 04/13/2025     04/13/2025       Pertinent Imaging  X-RAY CHEST 1 VIEW  Result Date: 4/11/2025  IMPRESSION: See above.     X-RAY CHEST 1 VIEW  Result Date: 4/10/2025  IMPRESSION: Cardiomegaly. Patchy pleural-parenchymal opacity within the right mid to upper lung field     ULTRASOUND KIDNEYS  Result Date: 4/8/2025  IMPRESSION: Limited visualization secondary to suboptimal acoustic windows.  Mild renal cortical thinning/scarring.  No hydronephrosis.     X-RAY CHEST 1 VIEW  Result Date: 4/7/2025  IMPRESSION:  Mild opacity in the right lung which has slightly improved since 1/8/2024. COMMENT:  Portable chest x-ray is compared with 1/8/2024. The tracheostomy and right IJ catheter has been removed. There is cardiomegaly with prosthetic valve. Mild hazy opacity in the right lung has slightly improved since prior. The remaining parenchyma is clear. No definite effusion. Right axillary surgical clips noted.     CT ANGIOGRAPHY CHEST PULMONARY EMBOLISM WITH IV CONTRAST  Result Date: 4/7/2025  IMPRESSION: 1.  No definite evidence of pulmonary embolism. 2.  Cardiomegaly. 3.  Right chest wall soft tissue density which should be clinically correlated for neoplasm or infection. 4.  Peripheral right upper lobe consolidation, subjacent mass which may represent treatment related change and/or infection. 5.  Slight progressive right lower lobe pleural thickening which may represent atelectasis, infection or neoplasm. 6.  Cardiomegaly. Findings reported to nurse Tavo in the ED at 8:35 AM on 4/7/2025. COMMENT: Technique: CT angiography of the chest was performed from the thoracic inlet through the upper abdomen using contiguous 1.25 mm transaxial sections utilizing a pulmonary embolism protocol. Images were acquired utilizing 80  cc Isovue-370 . 3D MIP and/or volume rendered image reconstruction performed and reviewed. CT DOSE:  One or more dose reduction techniques (e.g. automated exposure control, adjustment of the mA and/or kV according to patient size, use of iterative reconstruction technique) utilized for this examination. Comparisons studies: None. Lung parenchyma: Peripheral right upper lobe linear consolidation and bronchiectasis subjacent the right chest wall mass as noted below.  There is peribronchial thickening.. Mediastinum: Normal. Zelda: Normal. Heart and pericardium: Cardiomegaly.. Pulmonary artery: Normal Aorta:Calcific atherosclerotic disease. Chest wall and pleura: There is a 2.9 x 11.1 cm focus of right chest wall soft tissue attenuation.  There is slight progressive right lower lobe pleural thickening. Axilla: Normal. Liver: Visualized portion is within normal limits. Spleen: Normal. Adrenals: Normal. Bones: Thoracolumbar degenerative change. Thyroid: Normal Other: Cholelithiasis.      OUTPATIENT  FOLLOW-UP / REFERRALS / PENDING TESTS        Outpatient Follow-Up Appointments            In 4 days JONNY Ribera [facility] Home    In 3 weeks Kailey Gale MD [facility] [place]    In 2 months Gregory J. Ochsner, MD [facility] Radiation Therapy    In 2 months Gary Moran MD [facility] in [place]    In 3 months Barb Osuna MD [facility] in [place]    In 6 months Gary Aceves MD [facility] in [place]            Referrals  No orders of the defined types were placed in this encounter.      Test Results Pending at Discharge  Unresulted Labs (From admission, onward)       Start     Ordered    04/12/25 0600  Basic metabolic panel  (Basic metabolic panel + Mg)  Daily      Question:  Release to patient  Answer:  Immediate   Start Status   04/14/25 0600  "Scheduled   04/15/25 0600 Scheduled   04/16/25 0600 Scheduled   04/17/25 0600 Scheduled   04/18/25 0600 Scheduled      Placed in \"And\" Linked Group    04/12/25 0045    04/12/25 0600  Magnesium  (Basic metabolic panel + Mg)  Daily      Question:  Release to patient  Answer:  Immediate   Start Status   04/14/25 0600 Scheduled   04/15/25 0600 Scheduled   04/16/25 0600 Scheduled   04/17/25 0600 Scheduled   04/18/25 0600 Scheduled      Placed in \"And\" Linked Group    04/12/25 0045    04/12/25 0600  CBC  Daily      Question:  Release to patient  Answer:  Immediate   Start Status   04/14/25 0600 Scheduled   04/15/25 0600 Scheduled   04/16/25 0600 Scheduled   04/17/25 0600 Scheduled   04/18/25 0600 Scheduled       04/12/25 0045                    Important Issues to Address in Follow-Up      DISCHARGE DISPOSITION      Disposition:      Code Status At Discharge: Full Code    Physician Order for Life-Sustaining Treatment Document Status        No documents found                       "

## 2025-04-13 NOTE — NURSING NOTE
Discharge order in place with pt. Iv line and monitor removed. D/c instruction handed over to pt and all questions answered. Pt transferred via w/c with all belongings.

## 2025-04-13 NOTE — PLAN OF CARE
Care Coordination Admission Assessment Note    General Information:  Readmission Within the last 30 days: other (see comments), current reason for admission unrelated to previous admission  Does patient have a : No  Patient-Specific Goals (include timeframe): patient wants to return home when stable.    Living Arrangements:  Arrived From: home  Current Living Arrangements: home  People in Home: spouse  Home Accessibility: not wheelchair accessible  Living Arrangement Comments: patient lives with wife in house with 2 fabien,his bed and bath is on the second level.he has a stairglide to second floor.    Housing Stability and Utility Access (SDOH):  In the last 12 months, was there a time when you were not able to pay the mortgage or rent on time?: No  In the past 12 months, how many times have you moved?:    At any time in the past 12 months, were you homeless or living in a shelter (including now)?: No  In the past 12 months has the electric, gas, oil, or water company threatened to shut off services in your home?: No    Functional Status Prior to Admission:   Assistive Device/Animal Currently Used at Home: walker, front-wheeled, shower chair  Functional Status Comments: patient ambulates with a walker.  IADL Comments: patient is i with his adl.     Supports and Services:  Current Outpatient/Agency/Support Group: homecare agency  Type of Current Home Care Services: home OT, home PT, nursing  History of home care episode or rehab stay: david has had vn through [facility].no hx of ip rehab.    Discharge Needs Assessment:   Concerns to be Addressed: adjustment to diagnosis/illness, care coordination/care conferences, discharge planning  Current Discharge Risk: chronically ill, physical impairment  Anticipated Changes Related to Illness: none    Patient/Family Anticipated Discharge Plan:  Patient/Family Anticipates Transition To: home with help/services  Patient/Family Anticipated Services at Transition: home  health care    Connection to Community  Patient declined offered resources.    Patient Choice:   Offered/Gave Vendor List: yes  Patient and/or patient guardian/advocate was made aware of their right to choose a provider. A list of eligible providers was presented and reviewed with the patient and/or patient guardian/advocate in written and/or verbal form. The list delineates providers in the patients desired geographic area who are participating in the Medicare program and/or providers contracted with the patients primary insurance. The Medicare list and quality ratings were obtained from the Medicare.gov [medicare.gov] website.    Anticipated Discharge Plan:  Met with patient. Provided education and contact information for Care Coordination services.: yes  Anticipated Discharge Disposition: home with assistance, home with home health  Type of Home Care Services: home PT, home OT, nursing    Transportation Needs (SDOH):  Transportation Concerns: none  Transportation Anticipated: family or friend will provide  Is Out of Hospital DNR needed at discharge?: no    In the past 12 months, has lack of transportation kept you from medical appointments or from getting medications?: No  In the past 12 months, has lack of transportation kept you from meetings, work, or from getting things needed for daily living?: No    Concerns - comments:  patient lives with his wife in house with 2 fabien,his bed and bath iso n sescond level with stairgide to that floor,he amb with a walker and is I with his adl.pcp is dr elmer draper and he uses [facility] in [facility].patient is ad with syncope.and collapse.he is stable for dc home with [facility].referral sent via epic.

## 2025-04-13 NOTE — PLAN OF CARE
Problem: Adult Inpatient Plan of Care  Goal: Plan of Care Review  Outcome: Progressing  Flowsheets (Taken 4/13/2025 7763)  Progress: no change  Outcome Evaluation: Patient A&Ox4, pleasant & cooperative w/care, forgetful. Denies pain. Prn robitussion admin for cough. Resting in recliner overnight per patient request, chair pad alarmed. Safety checks performed. Care clustered to promote rest, call light in reach. Potential d/c today.  Plan of Care Reviewed With: patient   Plan of Care Review  Plan of Care Reviewed With: patient  Progress: no change  Outcome Evaluation: Patient A&Ox4, pleasant & cooperative w/care, forgetful. Denies pain. Prn robitussion admin for cough. Resting in recliner overnight per patient request, chair pad alarmed. Safety checks performed. Care clustered to promote rest, call light in reach. Potential d/c today.

## 2025-04-13 NOTE — CONSULTS
Inpatient consult to Nephrology  Consult performed by: Ceci Emery CRNP  Consult ordered by: Nick Rich MD  Reason for consult: CKD        NEPHROLOGY CONSULTATION    Reason for consult: CKD    Consulting Physician: Dr. Rich    HPI: Thank you for this consultation on Cam Rosas Jr. who is a 75 y.o. male with PMHx as noted below, who was recently admitted here 4/7/25-4/11/25 with acute respiratory failure associated with COVID-19. He was evaluated by nephrology during that admission for ELEANOR on CKD3a. He returned to the ED the night of 4/11/25 after a near syncopal event. Nephrology is consulted for monitoring of renal function. He reports he is feeling better now and planning for discharge today.     Review of Systems   All other systems reviewed and are negative.      Past Medical History:   Diagnosis Date    Acute systolic heart failure (CMS/HCC) 02/15/2023    Ambulates with cane     and walker    Atrial fibrillation (CMS/HCC)     Breast cancer (CMS/HCC) October 2022    CHF (congestive heart failure) (CMS/[facility])     Chronic kidney disease     CKD (chronic kidney disease) 10/19/2022    History of radiation therapy     Hx antineoplastic chemo     Prostate cancer (CMS/HCC)        Past Surgical History   Procedure Laterality Date    Cardiac surgery      mitral valve repair 2006    Cardioversion  12/05/2022    Successful DC cardioversion    CARDIOVERSION EXTERNAL N/A 12/5/2022    Performed by Gary Aceves MD at [facility] CARDIAC CATH/EP    Cath lab temporary pacemaker insertion N/A 12/25/2023    Performed by Haja Onofre MD at  CARDIAC CATH/EP/NEURO    GASTROSTOMY PERCUTANEOUS ENDOSCOPIC N/A 1/4/2024    Performed by Edison Oneil MD at  OR    Knee cartilage surgery Left     Mastectomy      PORT/PERMACATH REMOVAL Left 11/9/2023    Performed by Barb Osuna MD at  OR    Prostate surgery  July 2022    Prostatectomy  07/15/2022    Right & left heart cath with coronary angiography N/A  12/25/2023    Performed by Haja Onofre MD at  CARDIAC CATH/EP/NEURO    RIGHT BREAST MASTECTOMY; RIGHT SENTINEL NODE IDENTIFICATION, MAPPING, AND BIOPSY; Right 8/17/2023    Performed by Barb Osuna MD at  OR    Tonsillectomy      TRACHEOSTOMY N/A 1/4/2024    Performed by Edison Oneil MD at  OR       Social History     Tobacco Use    Smoking status: Never    Smokeless tobacco: Never   Vaping Use    Vaping status: Never Used   Substance Use Topics    Alcohol use: Yes     Alcohol/week: 14.0 standard drinks of alcohol     Types: 14 Shots of liquor per week     Comment: 2 drinks/day - scotch    Drug use: Never       Family History   Problem Relation Name Age of Onset    Hyperlipidemia Biological Mother mother     Hypertension Biological Mother mother     Breast cancer Biological Mother mother     Cancer Biological Mother mother         gyn? cervical    Hyperlipidemia Biological Father Dad     Hypertension Biological Father Dad     Arthritis Biological Father Dad     No Known Problems Biological Sister      No Known Problems Biological Brother      Heart disease Maternal Grandmother Belle     Arthritis Maternal Grandfather      COPD Maternal Grandfather      Diabetes Maternal Grandfather      No Known Problems Paternal Grandmother      No Known Problems Paternal Grandfather      Cancer less than or equal to age 50 Other son     Esophageal cancer Other son         47, in abd,chemo q 3 weeks       Allergies   Allergen Reactions    Azithromycin Other (see comments)     Kidney issues      Prednisone Angioedema     All over swelling    Lorazepam Other (see comments)     WEAKNESS       Home Medication List:   acetaminophen (TYLENOL) tablet 650 mg  650 mg oral q4h PRN    [START ON 4/14/2025] amiodarone (PACERONE) tablet 200 mg  200 mg oral Once per day on Monday Friday    atorvastatin (LIPITOR) tablet 10 mg  10 mg oral q PM    benzonatate (TESSALON) capsule 100 mg  100 mg oral TID    busPIRone  (BUSPAR) tablet 10 mg  10 mg oral BID    cetirizine (ZyrTEC) tablet 10 mg  10 mg oral q AM    cholecalciferol (vitamin D3) tablet 25 mcg  25 mcg oral q AM    cyanocobalamin (VITAMIN B12) tablet 1,000 mcg  1,000 mcg oral Daily    glucose chewable tablet 16-32 g of dextrose  16-32 g of dextrose oral PRN    Or    dextrose 40 % oral gel 15-30 g of dextrose  15-30 g of dextrose oral PRN    Or    glucagon (GLUCAGEN) injection 1 mg  1 mg intramuscular PRN    Or    dextrose 50 % in water (D50) injection 12.5 g  25 mL intravenous PRN    [Provider Managed Hold] furosemide (LASIX) tablet 40 mg  40 mg oral Daily    guaiFENesin (ROBITUSSIN) 100 mg/5 mL liquid 200 mg  200 mg oral q6h PRN    levothyroxine (SYNTHROID) tablet 50 mcg  50 mcg oral Daily (6:30a)    melatonin ODT 3 mg  3 mg oral Nightly    metoprolol succinate XL (TOPROL-XL) 24 hr ER tablet 25 mg  25 mg oral q PM    pantoprazole (PROTONIX) tablet,delayed release (DR/EC) 40 mg  40 mg oral BID AC    rivaroxaban (XARELTO) tablet 15 mg  15 mg oral Daily with dinner    tamoxifen (NOLVADEX) tablet 20 mg  20 mg oral Daily    tamsulosin (FLOMAX) 24 hr ER capsule 0.4 mg  0.4 mg oral Nightly    [Provider Managed Hold] valsartan (DIOVAN) tablet 40 mg  40 mg oral Daily         Current Facility-Administered Medications:     acetaminophen (TYLENOL) tablet 650 mg, 650 mg, oral, q4h PRN, Kota Bergeron MD    [START ON 4/14/2025] amiodarone (PACERONE) tablet 200 mg, 200 mg, oral, Once per day on Monday Friday, Kota Bergeron MD    atorvastatin (LIPITOR) tablet 10 mg, 10 mg, oral, q PM, Kota Bergeron MD, 10 mg at 04/12/25 1752    benzonatate (TESSALON) capsule 100 mg, 100 mg, oral, TID, Nick Rich MD, 100 mg at 04/13/25 0908    busPIRone (BUSPAR) tablet 10 mg, 10 mg, oral, BID, Kota Bergeron MD, 10 mg at 04/13/25 0908    cetirizine (ZyrTEC) tablet 10 mg, 10 mg, oral, q AM, Kota Bergeron MD, 10 mg at 04/13/25 0908    cholecalciferol (vitamin D3) tablet 25 mcg, 25 mcg, oral, q AM,  Kota Bergeron MD, 25 mcg at 04/13/25 0908    cyanocobalamin (VITAMIN B12) tablet 1,000 mcg, 1,000 mcg, oral, Daily, Kota Bergeron MD, 1,000 mcg at 04/13/25 0908    glucose chewable tablet 16-32 g of dextrose, 16-32 g of dextrose, oral, PRN **OR** dextrose 40 % oral gel 15-30 g of dextrose, 15-30 g of dextrose, oral, PRN **OR** glucagon (GLUCAGEN) injection 1 mg, 1 mg, intramuscular, PRN **OR** dextrose 50 % in water (D50) injection 12.5 g, 25 mL, intravenous, PRN, Kota Bergeron MD    [Provider Managed Hold] furosemide (LASIX) tablet 40 mg, 40 mg, oral, Daily, Kota Bergeron MD    guaiFENesin (ROBITUSSIN) 100 mg/5 mL liquid 200 mg, 200 mg, oral, q6h PRN, Татьяна Conner CRNP, 200 mg at 04/12/25 2219    levothyroxine (SYNTHROID) tablet 50 mcg, 50 mcg, oral, Daily (6:30a), Kota Bergeron MD, 50 mcg at 04/13/25 0641    melatonin ODT 3 mg, 3 mg, oral, Nightly, Kota Bergeron MD, 3 mg at 04/12/25 2219    metoprolol succinate XL (TOPROL-XL) 24 hr ER tablet 25 mg, 25 mg, oral, q PM, Kota Bergeron MD, 25 mg at 04/12/25 1752    pantoprazole (PROTONIX) tablet,delayed release (DR/EC) 40 mg, 40 mg, oral, BID AC, Kota Bergeron MD, 40 mg at 04/13/25 0641    rivaroxaban (XARELTO) tablet 15 mg, 15 mg, oral, Daily with dinner, Kota Bergeron MD, 15 mg at 04/12/25 1753    tamoxifen (NOLVADEX) tablet 20 mg, 20 mg, oral, Daily, Kota Bergeron MD, 20 mg at 04/13/25 0909    tamsulosin (FLOMAX) 24 hr ER capsule 0.4 mg, 0.4 mg, oral, Nightly, Kota Bergeron MD, 0.4 mg at 04/12/25 3603    [Provider Managed Hold] valsartan (DIOVAN) tablet 40 mg, 40 mg, oral, Daily, Kota Bergeron MD, 40 mg at 04/12/25 0828    PHYSICAL EXAM:  Vitals:    04/13/25 0750 04/13/25 0905 04/13/25 0906 04/13/25 0908   BP:  (!) 118/58 124/62 129/70   BP Location:  Left forearm Left forearm Left forearm   Patient Position:  Lying Sitting Standing   Pulse: 60 65 61 64   Resp:  18     Temp:  36.8 °C (98.3 °F)     TempSrc:  Oral     SpO2:  96% 97% 93%   Weight:        Height:             Intake/Output Summary (Last 24 hours) at 4/13/2025 1243  Last data filed at 4/12/2025 2000  Gross per 24 hour   Intake 10 ml   Output --   Net 10 ml       Physical Exam  Vitals reviewed.   Constitutional:       General: He is not in acute distress.  HENT:      Head: Normocephalic.      Mouth/Throat:      Mouth: Mucous membranes are moist.   Eyes:      General: No scleral icterus.  Cardiovascular:      Rate and Rhythm: Normal rate.   Pulmonary:      Effort: Pulmonary effort is normal. No respiratory distress.      Breath sounds: Rales present.   Abdominal:      Palpations: Abdomen is soft.      Tenderness: There is no abdominal tenderness.   Musculoskeletal:      Right lower leg: No edema.      Left lower leg: No edema.   Skin:     General: Skin is warm and dry.   Neurological:      General: No focal deficit present.      Mental Status: He is alert.   Psychiatric:         Mood and Affect: Mood normal.         Results from last 7 days   Lab Units 04/13/25  0314 04/12/25  0549 04/11/25  2146 04/09/25  0248 04/08/25  0415   SODIUM mEQ/L 142   < > 142   < > 144   POTASSIUM mEQ/L 4.1   < > 3.3*   < > 5.3*   CHLORIDE mEQ/L 104   < > 102   < > 106   CO2 mEQ/L 30   < > 26   < > 29   BUN mg/dL 47*   < > 44*   < > 27*   CREATININE mg/dL 1.8*   < > 1.9*   < > 1.8*   EGFR mL/min/1.73m*2 38.8*   < > 36.3*   < > 38.8*   GLUCOSE mg/dL 127*   < > 137*   < > 152*   CALCIUM mg/dL 9.1   < > 8.7   < > 8.5*   ALBUMIN g/dL  --   --  3.5  --  3.6   PHOSPHORUS mg/dL  --   --   --   --  5.9*   WBC K/uL 7.79   < > 6.87   < > 6.16   HEMOGLOBIN g/dL 11.5*   < > 11.0*   < > 10.7*   HEMATOCRIT % 35.6*   < > 34.5*   < > 34.0*   PLATELETS K/uL 168   < > 155   < > 154    < > = values in this interval not displayed.       Pertinent radiology and labs reviewed    ASSESSMENT:  Principal Problem:    Syncope and collapse  Active Problems:    Paroxysmal atrial fibrillation (CMS/HCC)    CKD (chronic kidney disease)    Essential  hypertension    Takotsubo cardiomyopathy    BPH (benign prostatic hyperplasia)    Near syncope      PLAN:  Acute kidney injury likely prerenal in the setting of COVID and cardiomyopathy with respiratory insufficiency. Patient does not appear to be volume overloaded. Cr remains stable at 1.8. Lasix and valsartan were resumed at discharge. Lasix now decreased to 20 mg 3x/week. Resume valsartan  CKD3a: baseline Cr is 1.35. He sees Dr. Bill Amin in [facility]. Discussed with patient the importance of renal follow up.   COVID-positive s/p remdesivir.  Takotsubo cardiomyopathy. HFrEF 25-30% on echo. Management as per cardiology.    JONNY Crespo

## 2025-04-13 NOTE — PLAN OF CARE
Care Coordination Admission Assessment Note    General Information:  Readmission Within the last 30 days: other (see comments) (adm 4/7 with acute resp failure)  Does patient have a : No  Patient-Specific Goals (include timeframe): patient anxious to return home    Living Arrangements:  Arrived From: home  Current Living Arrangements: home  People in Home: spouse  Home Accessibility: not wheelchair accessible  Living Arrangement Comments: patient lives with wife in 2 fabien with  2 fabien. the bed and bath is on the second level.    Housing Stability and Utility Access (SDOH):  In the last 12 months, was there a time when you were not able to pay the mortgage or rent on time?: No  In the past 12 months, how many times have you moved?:    At any time in the past 12 months, were you homeless or living in a shelter (including now)?: No  In the past 12 months has the electric, gas, oil, or water company threatened to shut off services in your home?: No    Functional Status Prior to Admission:   Assistive Device/Animal Currently Used at Home: walker, front-wheeled, shower chair  Functional Status Comments: patient ambulates with a walker.  IADL Comments: patient is I with his adl.     Supports and Services:  Current Outpatient/Agency/Support Group: homecare agency  Type of Current Home Care Services: nursing, home OT, home PT  History of home care episode or rehab stay:      Discharge Needs Assessment:   Concerns to be Addressed: adjustment to diagnosis/illness, care coordination/care conferences, discharge planning  Current Discharge Risk: chronically ill, physical impairment  Anticipated Changes Related to Illness: inability to care for self    Patient/Family Anticipated Discharge Plan:  Patient/Family Anticipates Transition To: home with help/services  Patient/Family Anticipated Services at Transition: rehabilitation services    Connection to Community  Patient declined offered resources.    Patient Choice:    Offered/Gave Vendor List: yes  Patient and/or patient guardian/advocate was made aware of their right to choose a provider. A list of eligible providers was presented and reviewed with the patient and/or patient guardian/advocate in written and/or verbal form. The list delineates providers in the patients desired geographic area who are participating in the Medicare program and/or providers contracted with the patients primary insurance. The Medicare list and quality ratings were obtained from the Medicare.gov [medicare.gov] website.    Anticipated Discharge Plan:  Met with patient. Provided education and contact information for Care Coordination services.: yes  Anticipated Discharge Disposition: home with assistance, home with home health  Type of Home Care Services: home OT, home PT, nursing    Transportation Needs (SDOH):  Transportation Concerns: none  Transportation Anticipated: family or friend will provide  Is Out of Hospital DNR needed at discharge?: no    In the past 12 months, has lack of transportation kept you from medical appointments or from getting medications?: No  In the past 12 months, has lack of transportation kept you from meetings, work, or from getting things needed for daily living?:      Concerns - comments:patient lives with his wife in house with 2 fabien,his bed and bath is on the second level.he amb with a walker and is I with his adl.pcp is dr elmer draper and he uses [facility] pharmacy in [place].  Patient is adm with syncope and collapse.he is stable for dc home with [facility].wife to transport home.

## 2025-04-13 NOTE — PLAN OF CARE
Problem: Adult Inpatient Plan of Care  Goal: Plan of Care Review  Outcome: Progressing  Flowsheets (Taken 4/13/2025 5839)  Progress: improving  Outcome Evaluation: PT eval complete.  Plan of Care Reviewed With: patient  Goal: Patient-Specific Goal (Individualized)  Outcome: Progressing     Problem: Mobility Impairment  Goal: Optimal Mobility  Outcome: Progressing

## 2025-04-13 NOTE — PROGRESS NOTES
Physical Therapy -  Initial Evaluation     Patient: Cam Rosas Jr.  Location: [place] [id number]  MRN: [id number]  Today's date: 4/13/2025    HISTORY OF PRESENT ILLNESS     Radha is a 75 y.o. male admitted on 4/11/2025 with Syncope and collapse [R55]  Near syncope [R55]  Altered mental status, unspecified altered mental status type [R41.82]. Principal problem is Syncope and collapse.    Past Medical History  Radha has a past medical history of Acute systolic heart failure (CMS/HCC) (02/15/2023), Ambulates with cane, Atrial fibrillation (CMS/HCC), Breast cancer (CMS/HCC) (October 2022), CHF (congestive heart failure) (CMS/HCC), Chronic kidney disease, CKD (chronic kidney disease) (10/19/2022), radiation therapy, antineoplastic chemo, and Prostate cancer (CMS/HCC).    History of Present Illness      Mr Rosas was admitted 4/7-4/11 for AHHRF 2/2 COVID-19. He was treated with remdesivir, decadron, & IV lasix with improvement. 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 He was restarted on diovan, lasix, and xarelto prior to discharge.      This evening after arriving home he spent most of the afternoon on the recliner. He went up to go to the bathroom when he felt lightheaded, weak, and diaphoretic. He sat down on the toilet and slumped forward. His wife noticed and held him up from falling. She reports he was mumbling unintelligibly and not responding to her. He remained this way for 15-20 minutes. EMS was called. He does have memory of these events and it appears he did not lose consciousness.  He denies chest pain, SOB, palpitations.      In the ED he was afebrile, HR 62, RR 18, BP 93/52, SpO2 95% on RA. Labs notable for K 3.3, BUN 44, Cr 1.9 (1.8 at discharge), troponin 38 (adynamic), , Hgb 11, INR 2.6. CXR with mild pulmonary edema. He is admitted  to [facility].   PRIOR LEVEL OF FUNCTION AND LIVING ENVIRONMENT     Prior Level of Function      Flowsheet Row Most Recent Value   Dominant Hand right   Ambulation assistive equipment   Transferring assistive equipment   Toileting independent   Bathing independent   Dressing assistive person   Eating independent   IADLs independent   Driving/Transportation    Communication understands/communicates without difficulty   Prior Level of Function Comment at baseline reports SPC in-home ambulation, RW community, retired , wife assists w/ LB dressing. using RW for all mobility prior to current admission   Assistive Device Currently Used at Home walker, front-wheeled, shower chair        History of Falls: No falls in the past year    Prior Living Environment      Flowsheet Row Most Recent Value   People in Home spouse   Current Living Arrangements home   Home Accessibility not wheelchair accessible   Living Environment Comment 2SH, 2 SRINIVAS w/out railings, first floor toilet, bedroom and main bathroom upstairs, walk-in shower w/ shower chair and grab-bars          VITALS AND PAIN     PT Vitals      Date/Time Pulse HR Source SpO2 Pt Activity O2 Therapy BP BP Location BP Method Pt Position [facility]   04/13/25 1334 68 Monitor 97 % At rest None (Room air) 113/57 Left upper arm Automatic Sitting NM   04/13/25 1347 73 Monitor 96 % At rest None (Room air) 103/58 Left upper arm Automatic Sitting NM          PT Pain      Date/Time Pain Type Rating: Rest Rating: Activity [facility]   04/13/25 1334 Pain Assessment 0 - no pain 0 - no pain NM             Objective   OBJECTIVE     Start time:  1333  End time:  1349  Session Length: 16 min       General Observations  Patient received upright, in chair. He was no issues or concerns identified by nurse prior to session, agreeable to therapy. wife present at bedside    Precautions: fall, enhanced contact and droplet, limb restrictions ((+) COVID; RUE limb restriction)        Limitations/Impairments: safety/cognitive   Services  Do You Speak a Language Other Than English at Home?: no      PT Eval and Treat - 04/13/25 1354          Cognition    Orientation Status oriented x 4     Affect/Mental Status WFL     Follows Commands WFL     Cognitive Function WFL     Comment, Cognition cooperative and pleasant        Vision Assessment/Intervention    Vision Assessment corrective lenses for reading        Hearing Assessment    Hearing Status WFL        Sensory Assessment    Sensory Assessment sensation intact, lower extremities        Lower Extremity Assessment    LE Assessment ROM and Strength WFL        Bed Mobility    Comment pt OOB throughout session        Mobility Belt    Mobility Belt Used During Session no - independent with all mobility        Sit/Stand Transfer    Surface chair with arm rests     [place] modified independence     Assistive Device walker, front-wheeled     Transfer Comments steady upon stand, denies dizziness        Gait Training    [place], Gait supervision     Safety/Cues increased time to complete;minimal;verbal cues;proper use of assistive device     Assistive Device walker, front-wheeled     Distance in Feet 50 feet     Pattern step-through     Deviations/Abnormal Patterns step length decreased;stride length decreased;gait speed decreased     Comment pt amb 50 ft w/in room w/ RW and supervision. reports no concerns upon return home regarding mobility. wife reports can provide supervision for all mobility as needed        Stairs Training    Comment patient deferred reporting no concerns. PT educated patient and patient's wife on having assist for stairs/all mobility upon return home to maximize safety. they verbalized understanding and agreement.        Balance    Static Sitting Balance WFL     Dynamic Sitting Balance WFL     Sit to Stand Dynamic Balance WFL     Static Standing Balance WFL     Dynamic Standing Balance mild impairment      Comment, Balance w/ RW        Impairments/Safety Issues    Impairments Affecting Function strength;endurance/activity tolerance;balance;pain     Functional Endurance fair                                              Education Documentation  Joint Mobility/Strength, taught by Linsey Barker, PT at 4/13/2025  1:54 PM.  Learner: Patient  Readiness: Acceptance  Method: Explanation  Response: Verbalizes Understanding  Comment: pt educated on PT role, POC, safety during transfers/ambulation/stairs, proper use of RW        Session Outcome  Patient upright, in chair at end of session, call light in reach, personal items in reach, all needs met, chair alarm on. Nursing notified about change in vital signs, patient's performance, patient's position, and patient's response to therapy/activity.    AM-PAC - Mobility (Current Function)     Turning form your back to your side while in flat bed without using bedrails 4 - None   Moving from lying on your back to sitting on the side of a flat bed without using bedrails 4 - None   Moving to and from a bed to a chair 4 - None   Standing up from a chair using your arms 4 - None   To walk in a hospital room 3 - A Little   Climbing 3-5 steps with a railing 3 - A Little   AM-PAC Mobility Score 22      ASSESSMENT AND PLAN     Progress Summary  PT eval complete. patient mod(I) for STS, supervision for ambulation 50 ft w/ RW. pt limited by impaired balance/endurance/strength. will continue to benefit from skilled PT to return to highest level of function. rec d/c home w/ HH and assist.    Patient/Family Therapy Goals Statement: go home    PT Plan      Flowsheet Row Most Recent Value   Rehab Potential good, to achieve stated therapy goals at 04/13/2025 1354   Therapy Frequency 3 times/wk at 04/13/2025 1354   Planned Therapy Interventions balance training, bed mobility training, gait training, patient/family education, postural re-education, transfer training, stair training, motor  coordination training at 04/13/2025 1354            PT Discharge Recommendations      Flowsheet Row Most Recent Value   PT Recommended Discharge Disposition home with home health, home with assistance at 04/13/2025 1354   Anticipated Equipment Needs if Discharged Home (PT) none at 04/13/2025 1354                 PT Goals      Flowsheet Row Most Recent Value   Bed Mobility Goal 1    Activity/Assistive Device bed mobility activities, all at 04/13/2025 1354   Jacob independent at 04/13/2025 1354   Time Frame by discharge at 04/13/2025 1354   Progress/Outcome goal ongoing at 04/13/2025 1354   Transfer Goal 1    Activity/Assistive Device all transfers, walker, front-wheeled at 04/13/2025 1354   Jacob modified independence at 04/13/2025 1354   Time Frame by discharge at 04/13/2025 1354   Progress/Outcome goal ongoing at 04/13/2025 1354   Gait Training Goal 1    Activity/Assistive Device gait (walking locomotion), walker, front-wheeled at 04/13/2025 1354   Jacob modified independence at 04/13/2025 1354   Distance 100 at 04/13/2025 1354   Time Frame by discharge at 04/13/2025 1354   Progress/Outcome goal ongoing at 04/13/2025 1354   Stairs Goal 1    Activity/Assistive Device stairs, all skills, ascending stairs, descending stairs, using handrail, left, using handrail, right, step-over step, step-to-step at 04/13/2025 1354   Jacob modified independence at 04/13/2025 1354   Number of Stairs 2 at 04/13/2025 1354   Time Frame by discharge at 04/13/2025 1354   Progress/Outcome goal ongoing at 04/13/2025 1354

## 2025-04-13 NOTE — HOSPITAL COURSE
Radha is a 75 y.o. male admitted on 4/11/2025 with Syncope and collapse [R55]  Near syncope [R55]  Altered mental status, unspecified altered mental status type [R41.82]. Principal problem is Syncope and collapse.    Past Medical History  Radha has a past medical history of Acute systolic heart failure (CMS/HCC) (02/15/2023), Ambulates with cane, Atrial fibrillation (CMS/HCC), Breast cancer (CMS/HCC) (October 2022), CHF (congestive heart failure) (CMS/HCC), Chronic kidney disease, CKD (chronic kidney disease) (10/19/2022), radiation therapy, antineoplastic chemo, and Prostate cancer (CMS/HCC).    History of Present Illness      Mr Rosas was admitted 4/7-4/11 for AHHRF 2/2 COVID-19. He was treated with remdesivir, decadron, & IV lasix with improvement. 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 He was restarted on diovan, lasix, and xarelto prior to discharge.      This evening after arriving home he spent most of the afternoon on the recliner. He went up to go to the bathroom when he felt lightheaded, weak, and diaphoretic. He sat down on the toilet and slumped forward. His wife noticed and held him up from falling. She reports he was mumbling unintelligibly and not responding to her. He remained this way for 15-20 minutes. EMS was called. He does have memory of these events and it appears he did not lose consciousness.  He denies chest pain, SOB, palpitations.      In the ED he was afebrile, HR 62, RR 18, BP 93/52, SpO2 95% on RA. Labs notable for K 3.3, BUN 44, Cr 1.9 (1.8 at discharge), troponin 38 (adynamic), , Hgb 11, INR 2.6. CXR with mild pulmonary edema. [place] [facility]

## 2025-04-14 ENCOUNTER — PATIENT OUTREACH (OUTPATIENT)
Dept: CASE MANAGEMENT | Facility: CLINIC | Age: 75
End: 2025-04-14
Payer: MEDICARE

## 2025-04-14 ASSESSMENT — ENCOUNTER SYMPTOMS
WOUND: 1
WEAKNESS: 1
ALLERGIC/IMMUNOLOGIC NEGATIVE: 1
PSYCHIATRIC NEGATIVE: 1
ENDOCRINE NEGATIVE: 1
COUGH: 1
HEMATOLOGIC/LYMPHATIC NEGATIVE: 1
APPETITE CHANGE: 1
EYES NEGATIVE: 1
MUSCULOSKELETAL NEGATIVE: 1
FATIGUE: 1

## 2025-04-14 NOTE — PROGRESS NOTES
NAME: Radha Rosas Jr.    MRN: [id number]    YOB: 1950    Event Review:    Initial TCM Patient Outreach Date: 04/14/25    Assessment completed with: Spouse or Significant Other  Patient stated reason for hospitalization: close to passing out/ EMT's came and decision was made to go back to ER  Discharge Diagnosis: Syncope and collapse    Patient readmitted in the last 30 days: (!) Yes  Discharging Facility: [facility]  Date of Last Admission: 04/12/25  Date of Last Discharge: 04/13/25    Discharging Facilty SNF/Rehab:     Acute respiratory failure with hypoxia and hypercapnia  Date of Admission: 04/07/25  Date of Discharge: 04/11/25    Patient's perception of their health status since discharge: Same (very tired)    Appointment Scheduling:    PCP appointment scheduled: No    Patient Scheduling Dispositions: Patient requested to schedule their own appointment     HPI:  Spoke with patient's spouse Fiorella today on number: [phone]   Pt presented to  on 4/11/25 with near syncope on toilet almost immediately after discharge from [facility] hospital.  (Admitted 4/7-4/11/2025 for acute respiratory failure with hypoxia and hypercapnia (CHF)). Pt reportedly unresponsive for 1.5 minutes. Admitted for observation for near syncope and altered mental status.   Pt felt better/stronger. Lasix changed to 3 x a week felt comfortable going home .    Pt improved and was discharged to home with resumption of services on 4/13/25.     Spoke with spouse today, pt ABOUT THE SAME, HE IS SLEEPING AND EXHAUSTED.  Pt lives in a 2 story home with 2 steps to enter, a 1st floor GA and a FF to bed/bath with a stair glide for his use. His spouse lives with him and assists with lower body dressing. Pt ambulates with a walker since these recent events. Pt needs assistance with ADL's which is provided by spouse who has now also tested positive for COVID.     Pt with no complaints of pain, chest pain, shortness of breath or  dizziness on call.    Pt's weight-- discharge weight was 171.11 informed spouse who was reminded of needs for daily weights.     Please follow CHF protocol and weigh yourself daily (same time, after your first morning void) and keep a record of your weights to discuss with your doctors.  Please contact your doctor if you gain more than 2-3 lbs in 1 day or more than 5 lbs in 1 week.  Please follow a heart healthy, low fat, low sodium, carb controlled, diabetic diet with a fluid restriction of 2 liters.     There was some confusion due to recent 2 discharges so ACM reminded spouse of previous discharge recommendations of FR and daily weights. She expressed understanding. It seems like a lot at home right now.  She will fare better once HH gets back on track. Phone number provided for [facility] and spouse calling upon the end of our call.     [facility] will provide nursing services to patient.    Discharge instructions reviewed with spouse, she verbalizes understanding. Reviewed general recommendations regarding fall safety, blood pressure, weight, fluid restrictions, salt restrictions.  Pt has to schedule a follow-up with PCP.  Pt prefers to make own appointment.      ACM explained hospital to home program and reminded patient of the upcoming   appointment with JONNY--  4/17/25 at 11:00 am with Jess FULLER     AC to follow-up in 1 week on/around 4/21/25     Review of Systems   Constitutional:  Positive for appetite change (decreased) and fatigue.   HENT: Negative.     Eyes: Negative.    Respiratory:  Positive for cough (dry non productive).    Cardiovascular:  Positive for leg swelling (couple of years/ right now down).   Endocrine: Negative.    Genitourinary: Negative.    Musculoskeletal: Negative.    Skin:  Positive for wound (b/l feet and ankles broken down/was seeing podiatry).   Allergic/Immunologic: Negative.    Neurological:  Positive for weakness.   Hematological: Negative.    Psychiatric/Behavioral: Negative.          Medication Review:  Medication Review: Yes (new change to lasix only)    Reported by: Spouse  Any new medications prescribed at discharge?: No  Is the patient having any side effects they believe may be caused by any medication additions or changes?: No    Do you have enough of your regularly prescribed medications?: Yes    Medication adherence problem?: No  Was a medication discrepancy indentified?: No    Reconciled the current and discharge medications: No (new change to lasix 3 x a week and NO dexamethasone)  Reviewed AVS (Discharge Instructions)?: Yes      Acute Pain:  Acute pain: No    Chronic Pain:  Chronic pain: No    Diet/Nutrition:  Type of Diet: Cardiac 2mg sodium, Regular, Fluid Restricted, Renal (2 liters)  Diet Adherence: Adherent with diet    Home Care Services:    Home Care Agency: [facility] ([facility] states SOC/resumption 4/15/25)  Type of Home Care Services: Home Nursing, Home PT  Home Care Interventions: No intervention required     Post-Discharge Durable Medical Equipment::  Durable Medical Equipment: Front wheeled walker, Cane, Grab bars, Shower/tub seat, Stair glide  Does patient's condition require a scale?: Yes  Working scale at home?: Yes (171.11 discharge)  Oxygen Use: No    Has all Durable Medical Equipment (DME) been delivered?: Other (N/A)  DME Interventions: No intervention required    Home Management:  Living Arrangement: Spouse  Support System:: Spouse  Type of Residence: 2 story house (1st floor OR, FF to bed/bath)  Home Monitoring: Weight  Any patient reported falls in the last 3 months?: Yes  Mosque or spiritual beliefs that impact treatment?: No    Follow-Ups:  Relevant Specialist Follow-ups: [facility] telephone call with Jess FULLER 4/17/2025 at 11:00 am; needs to schedule with PCP 1 week for BP, Lasix; 5/8/25 Dr Gale 11:30 am; 6/19/25 Dr [facility] f/u radiation 10:30 am; 6/30/25 Dr Moran 11:00 am;    Interventions/ Care Coordination:  Interventions/ Care Coordination: Encouraged  patient to call PCP/Specialist, Addressed a knowledge deficit  General Education: Respiratory precautions (flu, colds, pneumonia, RSV, COVID-19), Falls and home safety precautions, Infection prevention instructions, Skin integrity precautions, Nutrition and hydration instructions, Bleeding precautions  Disease Specific Education: Other (syncope/collapse/covid)  Initiated Care Plan: ANDREEA      Reviewed signs/symptoms of worsening condition or complication that necessitate a call to the Physician's office.  Educated patient on access to care.  RN phone number given for future care management.    Shellie Hammond MSN, RN, [facility],   [facility]  [phone]

## 2025-04-15 ENCOUNTER — TELEPHONE (OUTPATIENT)
Dept: CARDIOLOGY | Facility: CLINIC | Age: 75
End: 2025-04-15
Payer: MEDICARE

## 2025-04-15 RX ORDER — FUROSEMIDE 20 MG/1
20 TABLET ORAL DAILY PRN
Qty: 45 TABLET | Refills: 3 | Status: SHIPPED | OUTPATIENT
Start: 2025-04-15 | End: 2025-05-05 | Stop reason: ENTERED-IN-ERROR

## 2025-04-15 NOTE — TELEPHONE ENCOUNTER
Per NP Lickfeldt:   Take lasix prn for weight >180lbs.   Hold valsartan for now   Follow daily weights and bp   Does he have a follow up scheduled?    I called and s/w the pt's wife Fiorella and advised per NP Lickfeldt that he should only take Lasix 20 mg PRN wgt > 180 lbs and should hold/stop valsartan for now and continue to check daily wgts and Bps. She verbalized understanding.     Has a H appt w/ SUE Grant on 4/17, nexgt appt w/ Dr. Moran 6/30/25    I updated DANIELA Nieves RN and the med list.

## 2025-04-15 NOTE — TELEPHONE ENCOUNTER
Dr Moran patient. Last OV on 11/18/24.Next OV in June.  PMH: HFrEF, NICM/stress CM, CKD stage 3, PAF, BPH, HLD, hypothyroid.  In  from 4/7 to 4/11 and 4/12 to 4/13.    Call from ERA Nieves.   Saw pt today with hypotension.  100/60 sitting  80/40 standing   65 bpm, regular  96 % room air  Denies feeling dizzy with standing.   Drinking 48 to 64 oz fluid.  LCTA with slightly decreased bases.   Discharged w/o incentive spirometer.  Setting him up with telemonitor.  New med from  are Tamsulosin and Valsartan.    Ezequiel' number is [phone]

## 2025-04-15 NOTE — TELEPHONE ENCOUNTER
I called the [facility] HC nurse Ezequiel and inquired about his wgts and if he seemed dry. She said she suspects he may be dry.     4/15: 170 lbs   Wt Readings from Last 3 Encounters:   04/12/25 77.9 kg (171 lb 11.8 oz)   04/11/25 75.3 kg (166 lb)   03/27/25 81.6 kg (180 lb)     Diuretic and valsartan held while IP but furoseide resumed at 20 mg 3x/week and Valsartan resumed at 40 mg daily.     KL: Pls advise. Should he increase his fluid intake?

## 2025-04-17 ENCOUNTER — TELEMEDICINE (OUTPATIENT)
Dept: HOSPITALIST | Facility: CLINIC | Age: 75
End: 2025-04-17
Payer: MEDICARE

## 2025-04-17 DIAGNOSIS — I51.81 STRESS-INDUCED CARDIOMYOPATHY: ICD-10-CM

## 2025-04-17 DIAGNOSIS — I50.43 ACUTE ON CHRONIC COMBINED SYSTOLIC AND DIASTOLIC CONGESTIVE HEART FAILURE (CMS/HCC): Primary | ICD-10-CM

## 2025-04-17 NOTE — PROGRESS NOTES
Hospital to Home Program Visit       Hospital to Home Program Visit:  Audio and Video Encounter     DISCHARGE INFORMATION   Assessment completed with: Patient  Discharge Diagnosis: CHF  Discharging Facility: [facility]  Date of Last Discharge: 04/13/25  Discharge Disposition: Home  Patient's perception of their health status since discharge: Same    PCP: Usman Collins MD       HPI / OVERVIEW OF VISIT      Radha Rosas Jr. is a  chronically ill 75 y.o. male with a PMH of PAF, CKD, HTN, CHF, BPH, and prostate cancer. Recent hospital stay for Covid , CHF and stress induced cardiomyopathy. DC to home on 4/11, quickly returned after experiencing a witnessed syncopal event. In the ER, patient was hypOtensive with K 3.3 and . Pt felt to be dry despite elevated BNP. BP improved with gentle hydration and holding Lasix and Valsartan. DC to home on 4/13 with instructions to resume Valsartan and change Lasix to 3x a week.        Seen by [facility] RN on 4/15. BP soft, + Orthostatics. Instructed by Cardio team to change Lasix to prn if weight >180 lbs and remain OFF Valsartan for now. To be enrolled on [facility] remote tele monitoring. Weight trends below:    Weight 4/11-          166 lbs  Weight 4/12-          171 lbs  Weight 4/13 ( DC)   171 lbs  Weight 4/15            170 lbs  Weight 4/17            170.5 lbs       Spoke with patient wife Fiorella today. Patient has had no further syncope/pre-syncope. Continues with cough and expected level of fatigue. Denies SOB, MCGREGOR, edema or orthopnea. Requested he start PT/OT.        Patient with newly decreased EF now OFF diuresis given syncope. Would closely monitor his VS and weights suspect he may soon need to resume daily Lasix and Valsartan once he recovers from recent Covid infection. Will defer daily dose resumption of these meds to cardio team.         Overall, how was your care that was provided and do you have any suggestions for improvement?  Satisfied by the  care provided at [facility] 4/11- 4/13     Above Average -       PROBLEMS      Patient Active Problem List   Diagnosis    Paroxysmal atrial fibrillation (CMS/HCC)    Chronic combined systolic and diastolic CHF (congestive heart failure) (CMS/HCC)    Malignant neoplasm of right male breast (CMS/HCC)    Electrolyte abnormality    Gastrointestinal hemorrhage with melena    CKD (chronic kidney disease)    Prostate cancer (CMS/HCC)    Acute systolic heart failure (CMS/HCC)    APC (atrial premature contractions)    Dehydration determined by examination    Essential hypertension    Herniation of lumbar intervertebral disc with radiculopathy    Hyperlipidemia    Spinal stenosis of lumbar region    Spondylosis of lumbosacral region    Stage 3a chronic kidney disease (CMS/HCC)    BRCA1 positive    Monoallelic mutation of OK gene    Long term current use of amiodarone    Acute renal failure superimposed on stage 3a chronic kidney disease (CMS/HCC)    Severe sepsis (CMS/HCC)    Influenza A    Cardiac arrest (CMS/HCC)    Moderate protein-calorie malnutrition (CMS/HCC)    Takotsubo cardiomyopathy    Pneumonia    Elevated LFTs    Tracheostomy in place (CMS/HCC)    Status post insertion of percutaneous endoscopic gastrostomy (PEG) tube (CMS/HCC)    Varicose veins of right lower extremity with inflammation    Varicose veins of left lower extremity with inflammation    Acute respiratory failure with hypoxia and hypercapnia (CMS/HCC)    COVID-19    Acute on chronic systolic congestive heart failure (CMS/HCC)    Paroxysmal atrial fibrillation (CMS/HCC)    History of cardiac arrest    Prostate cancer (CMS/HCC)    Breast cancer (CMS/HCC)    History of peptic ulcer disease    BPH (benign prostatic hyperplasia)    Hyperlipidemia    Hypothyroidism    Anemia    Multiple open wounds of lower extremity    Scalp wound    Takotsubo cardiomyopathy    History of prolonged Q-T interval on ECG    Hyperkalemia    Elevated troponin    Syncope and collapse     Near syncope         MEDICATIONS      Reconciled the current and discharge medications: Yes      Current Outpatient Medications:     amiodarone (PACERONE) 200 mg tablet, Take 200 mg by mouth 2 (two) times a week (Mon, Fri)., Disp: , Rfl:     atorvastatin (LIPITOR) 10 mg tablet, Take 1 tablet (10 mg total) by mouth daily., Disp: 90 tablet, Rfl: 3    busPIRone (BUSPAR) 10 mg tablet, Take 10 mg by mouth 2 (two) times a day., Disp: , Rfl:     cetirizine (ZyrTEC) 10 mg tablet, Take 10 mg by mouth every morning., Disp: , Rfl:     cholecalciferol, vitamin D3, 1,000 unit (25 mcg) tablet, Take 1,000 Units by mouth every morning., Disp: , Rfl:     cyanocobalamin (VITAMIN B12) 1,000 mcg tablet, Take 1 tablet (1,000 mcg total) by mouth daily., Disp: 30 tablet, Rfl: 0    furosemide (LASIX) 20 mg tablet, Take 1 tablet (20 mg total) by mouth daily as needed (AM standing weight over 180 lbs)., Disp: 45 tablet, Rfl: 3    guaiFENesin (MUCINEX) 600 mg 12 hr tablet, Take 1 tablet (600 mg total) by mouth 2 (two) times a day for 10 days., Disp: 20 tablet, Rfl: 0    levothyroxine (SYNTHROID) 50 mcg tablet, Take 50 mcg by mouth daily., Disp: , Rfl:     magnesium oxide (MAG-OX) 400 mg (241.3 mg magnesium) tablet, Take 1 tablet (400 mg total) by mouth 2 (two) times a day., Disp: 180 tablet, Rfl: 1    melatonin 3 mg tablet, Take 3 mg by mouth nightly., Disp: , Rfl:     metoprolol succinate XL (TOPROL-XL) 25 mg 24 hr tablet, TAKE ONE TABLET BY MOUTH EVERY EVENING, Disp: 90 tablet, Rfl: 0    pantoprazole (PROTONIX) 40 mg EC tablet, Take 40 mg by mouth 2 (two) times a day before breakfast and dinner., Disp: , Rfl:     rivaroxaban (XARELTO) 15 mg tablet, Take 1 tablet (15 mg total) by mouth daily with dinner., Disp: 30 tablet, Rfl: 0    tamoxifen (NOLVADEX) 20 mg chemo tablet, Take  1 tablet (20 mg total) daily, Disp: 90 tablet, Rfl: 3    tamsulosin (FLOMAX) 0.4 mg capsule, Take 1 capsule (0.4 mg total) by mouth nightly., Disp: 30 capsule,  Rfl: 6    I am having Radha Rosas Jr. maintain his cholecalciferol (vitamin D3), busPIRone, pantoprazole, atorvastatin, tamoxifen, cetirizine, tamsulosin, magnesium oxide, melatonin, amiodarone, levothyroxine, metoprolol succinate XL, rivaroxaban, guaiFENesin, cyanocobalamin, and furosemide.    REVIEW OF SYSTEMS      All other systems reviewed and negative except as noted in HPI.      POST DISCHARGE MEDICAL EQUIPMENT         Oxygen Use: No          FOLLOW-UP APPOINTMENTS      Appointment Provider: needs PCP and Cardio appts, Dr Gale 5/8       INTERVENTIONS   Interventions needed: updated medication listing, communicated with another interdisciplinary provider            PATIENT INSTRUCTIONS      Patient Instructions   Close monitoring of daily weights  Valsartan on hold and Lasix now PRN, suspect they may need to be dosed daily once he recovers from Covid  Needs PCP and Cardio appt  [facility] following  Requesting PT and OT           JONNY Ribera  4/17/2025

## 2025-04-17 NOTE — Clinical Note
S/p recent Covid and stress CM. Home with soft pressures and Lasix now PRN, Valsartan held. May need to resume these daily once recovered from covid, defer to Cardio. To be enrolled in CV monitoring. Needs CV appt. Requesting PT and OT

## 2025-04-17 NOTE — PATIENT INSTRUCTIONS
Close monitoring of daily weights  Valsartan on hold and Lasix now PRN, suspect they may need to be dosed daily once he recovers from Covid  Needs PCP and Cardio appt  [facility]  Requesting PT and OT

## 2025-04-21 ENCOUNTER — PATIENT OUTREACH (OUTPATIENT)
Dept: CASE MANAGEMENT | Facility: CLINIC | Age: 75
End: 2025-04-21
Payer: MEDICARE

## 2025-04-21 NOTE — PROGRESS NOTES
H2H call placed to pt and reached spouse who shared a bit of concern about his lethargy post Covid. Tied to reassure her that it does take some time to recover but if she is too worried perhaps she could move up his cardiology appt ssoner. They are seeing PCP tomorrow and VN is due this afternoon so she will discuss with PCP and likely feel better once she has some VS's from VN.   They had a nice Easter dinner yesterday but he is not bouncing back as she would like.   Support provided.      Care Plan: Transition of Care Template   Updates made by Shellie Hammond RN since 4/21/2025 12:00 AM        Problem: Patient Education Knowledge Deficit         Goal: Patient will verbalize understanding of how their diagnosis affects the body         Task: Educate patient on the mechanics of their disease process Completed 4/21/2025   Responsible User: Shellie Hammond RN        Task: Educate patient on their diagnosis Completed 4/21/2025   Responsible User: Shellie Hammond RN        Problem: Progression and Care Needs         Goal: Patient will follow up with medical specialists and PCP         Task: Educate patient on importance of recommended medical follow up to prevent hospital readmission Completed 4/21/2025   Responsible User: Shellie Hammond RN        Problem: Diet         Goal: Patient will verbalize understanding of nutrition as it relates to their diagnosis         Task: Educate patient regarding diet Completed 4/21/2025   Responsible User: Shellie Hammond RN        Task: Educate patient on how diet affects their diagnosis Completed 4/21/2025   Responsible User: Shellie Hammond RN          ACM will follow up on or around 4/28/25.     Shellie Hammond MSN, RN, [facility], CM  [facility]  [phone]

## 2025-04-22 NOTE — TELEPHONE ENCOUNTER
I received a callback from Fiorella.     S/S:   Reports continued chronic LE edema that worsened when he got home from the hospital.     Denies SOB/MCGREGOR (though he doesn't walk far), lightheadedness and bloating. He doesn't lay down flat.     Meds:   -Confirmed Lasix PRN only for wgt > 180 lbs. NO use since changed to PRN on 4/15.   -Confirmed off of Valsartan since 4/15  -No additional med changes since last week     Appts:   -4/22: PCP at 1:30 - will discuss need for U/S RCW, repeat BMP and CT RCW (per D/C instructions)  -6/30 Dr. Moran (requesting sooner appt)     SD/KL: Pls advise on symptoms/meds, sooner appt and if an echo can be ordered (and where it should be done). D/C instructions from 4/11 (pg 10) state to get a repeat echo in 1 month to check EF. They prefer to go to [facility] [place] or [facility] if possible. TY

## 2025-04-22 NOTE — TELEPHONE ENCOUNTER
Greta: BERTHA    RPM:   4/21/25: ~15:00 173.0 lbs 105/57 HR 59, 97% SPO2  4/22/25: ~09:00 176 lbs (re-weighed at ~10:00 176 lbs), 133/76, HR 59, O2 81% (rechecked at ~10:00 95%)    Per [facility] nurse Greta SHIN. The pt's wife doesn't feel the wgt is accurate today but the recheck was consistent. She denies LE edema or SOB.    I called the pt (Fiorella's number only one listed) and left a VM requesting a callback to review his symptoms and medications. Awaiting callback.

## 2025-04-22 NOTE — TELEPHONE ENCOUNTER
AIM team: Are you able to provide the VS and Wgt (if available) from the HC visit yesterday please? I believe he has a PCP appt today as well.

## 2025-04-22 NOTE — TELEPHONE ENCOUNTER
Dulce El, NP: No echo, will have appt to come in this week. No other changes for now     I called and s/w Fiorella and advised of Dulce's instructions above. I advised someone will be offering him an appt w/ Dulce this week. She said they would prefer to see Dr. Moran if possible and asked if it would be at [facility] or [facility]. I said I wasn't sure but when scheduling calls she can ask what location is being offered. I said Dr. Moran may not have availability for a while. I wasn't clear whether she was willing to see Dulce or not.

## 2025-04-23 NOTE — TELEPHONE ENCOUNTER
Spoke with spouse, at this time, they will wait to see SD on 6/30/25, I advised them to please call if they have any questions and concerns or decide they would like to be seen sooner.  Spouse ok with plan.

## 2025-04-24 ENCOUNTER — TELEPHONE (OUTPATIENT)
Dept: SCHEDULING | Facility: CLINIC | Age: 75
End: 2025-04-24
Payer: MEDICARE

## 2025-04-24 NOTE — TELEPHONE ENCOUNTER
SD/KL- Patient's wife was previously advised no need for repeat echo, as plan for upcoming OV in a week. OV w/ SD on 6/30/25. Any changes due to this plan? Please advise, TY.

## 2025-04-24 NOTE — TELEPHONE ENCOUNTER
Pt was discharged from [facility] on 4/13. They had told the pt he should have an echo in one month to reassess his ejection fraction. Pt had an echo on 4/7/2025 please advise.   Pt can be reached at [phone]

## 2025-04-25 ENCOUNTER — TELEPHONE (OUTPATIENT)
Dept: HEMATOLOGY/ONCOLOGY | Facility: CLINIC | Age: 75
End: 2025-04-25
Payer: MEDICARE

## 2025-04-25 DIAGNOSIS — Z17.0 MALIGNANT NEOPLASM INVOLVING BOTH NIPPLE AND AREOLA OF RIGHT BREAST IN MALE, ESTROGEN RECEPTOR POSITIVE (CMS/HCC): Primary | ICD-10-CM

## 2025-04-25 DIAGNOSIS — C61 PROSTATE CANCER (CMS/HCC): ICD-10-CM

## 2025-04-25 DIAGNOSIS — C50.021 MALIGNANT NEOPLASM INVOLVING BOTH NIPPLE AND AREOLA OF RIGHT BREAST IN MALE, ESTROGEN RECEPTOR POSITIVE (CMS/HCC): Primary | ICD-10-CM

## 2025-04-25 NOTE — TELEPHONE ENCOUNTER
Pt's wife Fiorella chino- pt has 6m OV already scheduled for 5/8, but was recently inpatient 4/11-4/13 for syncope; heme/onc was not consulted for stay.

## 2025-04-25 NOTE — TELEPHONE ENCOUNTER
Spoke to pt's wife at this time regarding order for US  right breast, discussed how to schedule, Fiorella verbalized understanding

## 2025-04-25 NOTE — TELEPHONE ENCOUNTER
I reviewed the imaging from patient's recent hospitalizations.  I suspect the changes in the right chest wall and right lung seen on CT are related to his previous radiation treatment.  To be cautious, I will request an ultrasound of the right breast/chest wall.  Order was placed for this testing.  If patient is unable to have the testing done prior to his office visit, it can be done after.

## 2025-04-29 ENCOUNTER — PATIENT OUTREACH (OUTPATIENT)
Dept: CASE MANAGEMENT | Facility: CLINIC | Age: 75
End: 2025-04-29
Payer: MEDICARE

## 2025-04-29 NOTE — PROGRESS NOTES
[facility] call placed to pt and reached spouse who was happy to share his cough is almost gone! He is no longer consistently taking cough medicine. But his lethargy is continuing.   SOB-- none   CP-- none   WEIGHT-- 175.6 (HB)   COUGH-- less than before   VOIDING-- without difficulty  FEVER/CHILLS-- none   SPECIALIST-- trying to schedule with cardiology but they live a good distance away   [facility] visits--  continue       Made contact on number: [phone]  Spouse states no shortness of breath/chest pain/palpitations.  Pt continues low sodium diet, with daily weights.     Pt is compliant with medication regimen.   Pt is continuing with services through [facility]  Spouse and ACM were discussing distance involved getting to [facility] to see Dr Moran and although they like him as a doctor considering a doctor closer to home. Spouse mentioned  a an alternative and we discussed Dr Alvaro Estrada and Dr Usman Dietrich both out of   [phone]  [address]  [address]  [place] [address]     Spouse to discuss with the pt. She states no further questions or concerns today regarding care.     ACM to follow up in 1 week-on/around 5/5/25.    Shellie Hammond MSN, RN, [facility]-BC,   [facility]  [phone]

## 2025-05-01 ENCOUNTER — TELEPHONE (OUTPATIENT)
Dept: CARDIOLOGY | Facility: CLINIC | Age: 75
End: 2025-05-01
Payer: MEDICARE

## 2025-05-01 DIAGNOSIS — I50.21 ACUTE SYSTOLIC CONGESTIVE HEART FAILURE (CMS/HCC): Primary | ICD-10-CM

## 2025-05-05 ENCOUNTER — APPOINTMENT (EMERGENCY)
Dept: RADIOLOGY | Facility: HOSPITAL | Age: 75
DRG: 291 | End: 2025-05-05
Attending: EMERGENCY MEDICINE
Payer: MEDICARE

## 2025-05-05 ENCOUNTER — HOSPITAL ENCOUNTER (INPATIENT)
Facility: HOSPITAL | Age: 75
LOS: 2 days | Discharge: HOME HEALTH | DRG: 291 | End: 2025-05-08
Attending: EMERGENCY MEDICINE | Admitting: INTERNAL MEDICINE
Payer: MEDICARE

## 2025-05-05 DIAGNOSIS — J18.9 PNEUMONIA DUE TO INFECTIOUS ORGANISM, UNSPECIFIED LATERALITY, UNSPECIFIED PART OF LUNG: ICD-10-CM

## 2025-05-05 DIAGNOSIS — I51.81 TAKOTSUBO CARDIOMYOPATHY: ICD-10-CM

## 2025-05-05 DIAGNOSIS — J96.01 ACUTE RESPIRATORY FAILURE WITH HYPOXIA (CMS/HCC): Primary | ICD-10-CM

## 2025-05-05 PROBLEM — R09.02 HYPOXIA: Status: ACTIVE | Noted: 2025-05-05

## 2025-05-05 LAB
ALBUMIN SERPL-MCNC: 3.5 G/DL (ref 3.5–5.7)
ALP SERPL-CCNC: 51 IU/L (ref 34–125)
ALT SERPL-CCNC: 6 IU/L (ref 7–52)
ANION GAP SERPL CALC-SCNC: 8 MEQ/L (ref 3–15)
AST SERPL-CCNC: 14 IU/L (ref 13–39)
BASE EXCESS BLDV CALC-SCNC: 0.8 MEQ/L
BASOPHILS # BLD: 0.02 K/UL (ref 0.01–0.1)
BASOPHILS NFR BLD: 0.5 %
BILIRUB SERPL-MCNC: 0.5 MG/DL (ref 0.3–1.2)
BNP SERPL-MCNC: 532 PG/ML
BUN SERPL-MCNC: 11 MG/DL (ref 7–25)
CALCIUM SERPL-MCNC: 9.1 MG/DL (ref 8.6–10.3)
CHLORIDE SERPL-SCNC: 104 MEQ/L (ref 98–107)
CO2 BLDV-SCNC: 29.7 MEQ/L (ref 22–32)
CO2 SERPL-SCNC: 26 MEQ/L (ref 21–31)
CREAT SERPL-MCNC: 1.6 MG/DL (ref 0.7–1.3)
DIFFERENTIAL METHOD BLD: ABNORMAL
EGFRCR SERPLBLD CKD-EPI 2021: 44.7 ML/MIN/1.73M*2
EOSINOPHIL # BLD: 0.04 K/UL (ref 0.04–0.54)
EOSINOPHIL NFR BLD: 1 %
ERYTHROCYTE [DISTWIDTH] IN BLOOD BY AUTOMATED COUNT: 13.7 % (ref 11.6–14.4)
FIO2 ON VENT: ABNORMAL %
FLUAV RNA SPEC QL NAA+PROBE: NEGATIVE
FLUBV RNA SPEC QL NAA+PROBE: NEGATIVE
GLUCOSE SERPL-MCNC: 126 MG/DL (ref 70–99)
HCO3 BLDV-SCNC: 24.2 MEQ/L (ref 21–28)
HCT VFR BLD AUTO: 33.9 % (ref 40.1–51)
HGB BLD-MCNC: 10.8 G/DL (ref 13.7–17.5)
IMM GRANULOCYTES # BLD AUTO: 0.01 K/UL (ref 0–0.08)
IMM GRANULOCYTES NFR BLD AUTO: 0.2 %
INHALED O2 CONCENTRATION: ABNORMAL %
LACTATE SERPL-SCNC: 1.4 MMOL/L (ref 0.4–2)
LYMPHOCYTES # BLD: 0.4 K/UL (ref 1.2–3.5)
LYMPHOCYTES NFR BLD: 9.8 %
MCH RBC QN AUTO: 32.9 PG (ref 28–33.2)
MCHC RBC AUTO-ENTMCNC: 31.9 G/DL (ref 32.2–36.5)
MCV RBC AUTO: 103.4 FL (ref 83–98)
MONOCYTES # BLD: 0.15 K/UL (ref 0.3–1)
MONOCYTES NFR BLD: 3.7 %
NEUTROPHILS # BLD: 3.45 K/UL (ref 1.7–7)
NEUTS SEG NFR BLD: 84.8 %
NRBC BLD-RTO: 0 %
PCO2 BLDV: 57 MM HG (ref 41–51)
PH BLDV: 7.3 [PH] (ref 7.32–7.42)
PLATELET # BLD AUTO: 171 K/UL (ref 150–350)
PMV BLD AUTO: 9 FL (ref 9.4–12.4)
PO2 BLDV: 25 MM HG (ref 25–40)
POTASSIUM SERPL-SCNC: 4.2 MEQ/L (ref 3.5–5.1)
PROT SERPL-MCNC: 6.6 G/DL (ref 6–8.2)
RBC # BLD AUTO: 3.28 M/UL (ref 4.5–5.8)
RSV RNA SPEC QL NAA+PROBE: NEGATIVE
SARS-COV-2 RNA RESP QL NAA+PROBE: NEGATIVE
SODIUM SERPL-SCNC: 138 MEQ/L (ref 136–145)
TROPONIN I SERPL HS-MCNC: 9.3 PG/ML
TROPONIN I SERPL HS-MCNC: 9.4 PG/ML
WBC # BLD AUTO: 4.07 K/UL (ref 3.8–10.5)

## 2025-05-05 PROCEDURE — 93005 ELECTROCARDIOGRAM TRACING: CPT | Performed by: EMERGENCY MEDICINE

## 2025-05-05 PROCEDURE — 25800000 HC PHARMACY IV SOLUTIONS: Performed by: STUDENT IN AN ORGANIZED HEALTH CARE EDUCATION/TRAINING PROGRAM

## 2025-05-05 PROCEDURE — 84484 ASSAY OF TROPONIN QUANT: CPT | Mod: 91 | Performed by: EMERGENCY MEDICINE

## 2025-05-05 PROCEDURE — 71045 X-RAY EXAM CHEST 1 VIEW: CPT

## 2025-05-05 PROCEDURE — 25800000 HC PHARMACY IV SOLUTIONS: Performed by: INTERNAL MEDICINE

## 2025-05-05 PROCEDURE — 99223 1ST HOSP IP/OBS HIGH 75: CPT | Performed by: INTERNAL MEDICINE

## 2025-05-05 PROCEDURE — G0378 HOSPITAL OBSERVATION PER HR: HCPCS

## 2025-05-05 PROCEDURE — 80053 COMPREHEN METABOLIC PANEL: CPT | Performed by: EMERGENCY MEDICINE

## 2025-05-05 PROCEDURE — 99285 EMERGENCY DEPT VISIT HI MDM: CPT | Mod: 25

## 2025-05-05 PROCEDURE — 63600000 HC DRUGS/DETAIL CODE: Mod: JZ | Performed by: INTERNAL MEDICINE

## 2025-05-05 PROCEDURE — 36415 COLL VENOUS BLD VENIPUNCTURE: CPT | Performed by: EMERGENCY MEDICINE

## 2025-05-05 PROCEDURE — 96367 TX/PROPH/DG ADDL SEQ IV INF: CPT

## 2025-05-05 PROCEDURE — 87637 SARSCOV2&INF A&B&RSV AMP PRB: CPT | Performed by: EMERGENCY MEDICINE

## 2025-05-05 PROCEDURE — 83880 ASSAY OF NATRIURETIC PEPTIDE: CPT | Performed by: EMERGENCY MEDICINE

## 2025-05-05 PROCEDURE — 93005 ELECTROCARDIOGRAM TRACING: CPT

## 2025-05-05 PROCEDURE — 82803 BLOOD GASES ANY COMBINATION: CPT | Performed by: STUDENT IN AN ORGANIZED HEALTH CARE EDUCATION/TRAINING PROGRAM

## 2025-05-05 PROCEDURE — 84484 ASSAY OF TROPONIN QUANT: CPT | Performed by: EMERGENCY MEDICINE

## 2025-05-05 PROCEDURE — 63600000 HC DRUGS/DETAIL CODE: Mod: JZ | Performed by: STUDENT IN AN ORGANIZED HEALTH CARE EDUCATION/TRAINING PROGRAM

## 2025-05-05 PROCEDURE — 96365 THER/PROPH/DIAG IV INF INIT: CPT

## 2025-05-05 PROCEDURE — 85025 COMPLETE CBC W/AUTO DIFF WBC: CPT | Performed by: EMERGENCY MEDICINE

## 2025-05-05 PROCEDURE — 63700000 HC SELF-ADMINISTRABLE DRUG: Performed by: STUDENT IN AN ORGANIZED HEALTH CARE EDUCATION/TRAINING PROGRAM

## 2025-05-05 RX ORDER — DEXTROSE 40 %
15-30 GEL (GRAM) ORAL AS NEEDED
Status: DISCONTINUED | OUTPATIENT
Start: 2025-05-05 | End: 2025-05-08 | Stop reason: HOSPADM

## 2025-05-05 RX ORDER — CYANOCOBALAMIN (VITAMIN B-12) 500 MCG
3 TABLET ORAL NIGHTLY
Status: DISCONTINUED | OUTPATIENT
Start: 2025-05-05 | End: 2025-05-08 | Stop reason: HOSPADM

## 2025-05-05 RX ORDER — PANTOPRAZOLE SODIUM 40 MG/1
40 TABLET, DELAYED RELEASE ORAL
Status: DISCONTINUED | OUTPATIENT
Start: 2025-05-06 | End: 2025-05-08 | Stop reason: HOSPADM

## 2025-05-05 RX ORDER — FUROSEMIDE 10 MG/ML
40 INJECTION INTRAMUSCULAR; INTRAVENOUS ONCE
Status: DISCONTINUED | OUTPATIENT
Start: 2025-05-05 | End: 2025-05-06

## 2025-05-05 RX ORDER — AMIODARONE HYDROCHLORIDE 200 MG/1
200 TABLET ORAL 2 TIMES WEEKLY
Status: DISCONTINUED | OUTPATIENT
Start: 2025-05-09 | End: 2025-05-06

## 2025-05-05 RX ORDER — METOPROLOL SUCCINATE 25 MG/1
25 TABLET, EXTENDED RELEASE ORAL EVERY EVENING
Status: DISCONTINUED | OUTPATIENT
Start: 2025-05-06 | End: 2025-05-08 | Stop reason: HOSPADM

## 2025-05-05 RX ORDER — FUROSEMIDE 20 MG/1
20 TABLET ORAL DAILY PRN
Status: DISCONTINUED | OUTPATIENT
Start: 2025-05-05 | End: 2025-05-07

## 2025-05-05 RX ORDER — PREDNISONE 10 MG/1
10 TABLET ORAL DAILY
Status: DISCONTINUED | OUTPATIENT
Start: 2025-05-06 | End: 2025-05-06

## 2025-05-05 RX ORDER — TAMSULOSIN HYDROCHLORIDE 0.4 MG/1
0.4 CAPSULE ORAL NIGHTLY
Status: DISCONTINUED | OUTPATIENT
Start: 2025-05-05 | End: 2025-05-08 | Stop reason: HOSPADM

## 2025-05-05 RX ORDER — FUROSEMIDE 40 MG/1
20 TABLET ORAL DAILY PRN
Status: ON HOLD | COMMUNITY
End: 2025-05-08

## 2025-05-05 RX ORDER — ADHESIVE BANDAGE 7/8"
15-30 BANDAGE TOPICAL AS NEEDED
Status: DISCONTINUED | OUTPATIENT
Start: 2025-05-05 | End: 2025-05-08 | Stop reason: HOSPADM

## 2025-05-05 RX ORDER — DEXTROSE 50 % IN WATER (D50W) INTRAVENOUS SYRINGE
25 AS NEEDED
Status: DISCONTINUED | OUTPATIENT
Start: 2025-05-05 | End: 2025-05-08 | Stop reason: HOSPADM

## 2025-05-05 RX ORDER — ATORVASTATIN CALCIUM 10 MG/1
10 TABLET, FILM COATED ORAL DAILY
Status: DISCONTINUED | OUTPATIENT
Start: 2025-05-06 | End: 2025-05-08 | Stop reason: HOSPADM

## 2025-05-05 RX ORDER — BUSPIRONE HYDROCHLORIDE 10 MG/1
10 TABLET ORAL 2 TIMES DAILY
Status: DISCONTINUED | OUTPATIENT
Start: 2025-05-06 | End: 2025-05-08 | Stop reason: HOSPADM

## 2025-05-05 RX ORDER — CEPHALEXIN 500 MG/1
500 CAPSULE ORAL 4 TIMES DAILY
COMMUNITY
Start: 2025-05-05 | End: 2025-05-08 | Stop reason: HOSPADM

## 2025-05-05 RX ORDER — SOLIFENACIN SUCCINATE 5 MG/1
5 TABLET, FILM COATED ORAL DAILY
COMMUNITY

## 2025-05-05 RX ORDER — MUPIROCIN 20 MG/G
1 OINTMENT TOPICAL 2 TIMES DAILY
COMMUNITY
Start: 2025-04-30

## 2025-05-05 RX ORDER — LEVOTHYROXINE SODIUM 50 UG/1
50 TABLET ORAL
Status: DISCONTINUED | OUTPATIENT
Start: 2025-05-06 | End: 2025-05-08 | Stop reason: HOSPADM

## 2025-05-05 RX ORDER — BENZONATATE 200 MG/1
200 CAPSULE ORAL 3 TIMES DAILY PRN
COMMUNITY

## 2025-05-05 RX ORDER — TAMOXIFEN CITRATE 10 MG/1
20 TABLET ORAL DAILY
Status: DISCONTINUED | OUTPATIENT
Start: 2025-05-06 | End: 2025-05-08 | Stop reason: HOSPADM

## 2025-05-05 RX ORDER — GUAIFENESIN AND DEXTROMETHORPHAN HYDROBROMIDE 10; 100 MG/5ML; MG/5ML
5 SYRUP ORAL EVERY 12 HOURS PRN
COMMUNITY

## 2025-05-05 RX ORDER — PREDNISONE 10 MG/1
10-60 TABLET ORAL SEE ADMIN INSTRUCTIONS
COMMUNITY
Start: 2025-05-05 | End: 2025-05-08 | Stop reason: HOSPADM

## 2025-05-05 RX ORDER — GUAIFENESIN 100 MG/5ML
400 LIQUID ORAL ONCE
Status: COMPLETED | OUTPATIENT
Start: 2025-05-05 | End: 2025-05-05

## 2025-05-05 RX ADMIN — GUAIFENESIN 400 MG: 100 SOLUTION ORAL at 20:24

## 2025-05-05 RX ADMIN — CEFTRIAXONE SODIUM 1 G: 1 INJECTION, POWDER, FOR SOLUTION INTRAMUSCULAR; INTRAVENOUS at 22:12

## 2025-05-05 RX ADMIN — DOXYCYCLINE 100 MG: 100 INJECTION, POWDER, LYOPHILIZED, FOR SOLUTION INTRAVENOUS at 23:03

## 2025-05-05 NOTE — TELEPHONE ENCOUNTER
SHAMAR- I added pt on for Echo on 6/30 at 930am before appt. Can you reach out to provide patient with time scheduled? TY

## 2025-05-06 ENCOUNTER — PATIENT OUTREACH (OUTPATIENT)
Dept: CASE MANAGEMENT | Facility: CLINIC | Age: 75
End: 2025-05-06
Payer: MEDICARE

## 2025-05-06 ENCOUNTER — APPOINTMENT (OUTPATIENT)
Dept: RADIOLOGY | Facility: HOSPITAL | Age: 75
Setting detail: OBSERVATION
DRG: 291 | End: 2025-05-06
Attending: INTERNAL MEDICINE
Payer: MEDICARE

## 2025-05-06 ENCOUNTER — TELEPHONE (OUTPATIENT)
Dept: CARDIOLOGY | Facility: CLINIC | Age: 75
End: 2025-05-06
Payer: MEDICARE

## 2025-05-06 PROBLEM — J96.22 ACUTE ON CHRONIC RESPIRATORY FAILURE WITH HYPOXIA AND HYPERCAPNIA (CMS/HCC): Status: ACTIVE | Noted: 2025-05-06

## 2025-05-06 PROBLEM — I51.81 TAKOTSUBO CARDIOMYOPATHY: Status: ACTIVE | Noted: 2025-05-06

## 2025-05-06 PROBLEM — J96.21 ACUTE ON CHRONIC RESPIRATORY FAILURE WITH HYPOXIA AND HYPERCAPNIA (CMS/HCC): Status: ACTIVE | Noted: 2025-05-06

## 2025-05-06 PROBLEM — J96.01 ACUTE RESPIRATORY FAILURE WITH HYPOXIA (CMS/HCC): Status: ACTIVE | Noted: 2025-05-06

## 2025-05-06 LAB
ANION GAP SERPL CALC-SCNC: 7 MEQ/L (ref 3–15)
BASE EXCESS BLDA CALC-SCNC: 0.6 MEQ/L
BUN SERPL-MCNC: 14 MG/DL (ref 7–25)
CALCIUM SERPL-MCNC: 8.6 MG/DL (ref 8.6–10.3)
CHLORIDE SERPL-SCNC: 103 MEQ/L (ref 98–107)
CO2 SERPL-SCNC: 28 MEQ/L (ref 21–31)
CREAT SERPL-MCNC: 1.6 MG/DL (ref 0.7–1.3)
EGFRCR SERPLBLD CKD-EPI 2021: 44.7 ML/MIN/1.73M*2
ERYTHROCYTE [DISTWIDTH] IN BLOOD BY AUTOMATED COUNT: 13.4 % (ref 11.6–14.4)
FERRITIN SERPL-MCNC: 112 NG/ML (ref 24–250)
FERRITIN SERPL-MCNC: 96 NG/ML (ref 24–250)
FIO2 ON VENT: ABNORMAL %
GLUCOSE SERPL-MCNC: 181 MG/DL (ref 70–99)
HCO3 BLDA-SCNC: 25.4 MEQ/L (ref 21–28)
HCT VFR BLD AUTO: 33.6 % (ref 40.1–51)
HGB BLD-MCNC: 10.6 G/DL (ref 13.7–17.5)
INHALED O2 CONCENTRATION: ABNORMAL %
IRON SATN MFR SERPL: 14 % (ref 15–45)
IRON SATN MFR SERPL: 8 % (ref 15–45)
IRON SERPL-MCNC: 21 UG/DL (ref 35–150)
IRON SERPL-MCNC: 33 UG/DL (ref 35–150)
MCH RBC QN AUTO: 32.8 PG (ref 28–33.2)
MCHC RBC AUTO-ENTMCNC: 31.5 G/DL (ref 32.2–36.5)
MCV RBC AUTO: 104 FL (ref 83–98)
PCO2 BLDA: 53 MM HG (ref 35–48)
PH BLDA: 7.32 [PH] (ref 7.35–7.45)
PLATELET # BLD AUTO: 157 K/UL (ref 150–350)
PMV BLD AUTO: 9.2 FL (ref 9.4–12.4)
PO2 BLDA: 135 MM HG (ref 83–100)
POTASSIUM SERPL-SCNC: 5.2 MEQ/L (ref 3.5–5.1)
QRS DURATION: 136
QT INTERVAL: 514
QTC CALCULATION(BAZETT): 562
R AXIS: -21
RBC # BLD AUTO: 3.23 M/UL (ref 4.5–5.8)
SODIUM SERPL-SCNC: 138 MEQ/L (ref 136–145)
T WAVE AXIS: 211
TIBC SERPL-MCNC: 244 UG/DL (ref 270–460)
TIBC SERPL-MCNC: 262 UG/DL (ref 270–460)
UIBC SERPL-MCNC: 211 UG/DL (ref 180–360)
UIBC SERPL-MCNC: 241 UG/DL (ref 180–360)
VENTRICULAR RATE: 72
WBC # BLD AUTO: 3.36 K/UL (ref 3.8–10.5)

## 2025-05-06 PROCEDURE — 96375 TX/PRO/DX INJ NEW DRUG ADDON: CPT

## 2025-05-06 PROCEDURE — 63600000 HC DRUGS/DETAIL CODE: Mod: JZ | Performed by: HOSPITALIST

## 2025-05-06 PROCEDURE — 96367 TX/PROPH/DG ADDL SEQ IV INF: CPT

## 2025-05-06 PROCEDURE — 99222 1ST HOSP IP/OBS MODERATE 55: CPT | Performed by: INTERNAL MEDICINE

## 2025-05-06 PROCEDURE — 25000000 HC PHARMACY GENERAL: Performed by: INTERNAL MEDICINE

## 2025-05-06 PROCEDURE — 12000000 HC ROOM AND CARE MED/SURG

## 2025-05-06 PROCEDURE — 71045 X-RAY EXAM CHEST 1 VIEW: CPT

## 2025-05-06 PROCEDURE — 63700000 HC SELF-ADMINISTRABLE DRUG: Performed by: INTERNAL MEDICINE

## 2025-05-06 PROCEDURE — 25800000 HC PHARMACY IV SOLUTIONS: Performed by: INTERNAL MEDICINE

## 2025-05-06 PROCEDURE — 36415 COLL VENOUS BLD VENIPUNCTURE: CPT | Performed by: INTERNAL MEDICINE

## 2025-05-06 PROCEDURE — G0378 HOSPITAL OBSERVATION PER HR: HCPCS

## 2025-05-06 PROCEDURE — 80048 BASIC METABOLIC PNL TOTAL CA: CPT | Performed by: INTERNAL MEDICINE

## 2025-05-06 PROCEDURE — 94640 AIRWAY INHALATION TREATMENT: CPT

## 2025-05-06 PROCEDURE — 82803 BLOOD GASES ANY COMBINATION: CPT | Performed by: HOSPITALIST

## 2025-05-06 PROCEDURE — 27900059 OXYGEN DAILY

## 2025-05-06 PROCEDURE — 36600 WITHDRAWAL OF ARTERIAL BLOOD: CPT

## 2025-05-06 PROCEDURE — 82728 ASSAY OF FERRITIN: CPT | Performed by: HOSPITALIST

## 2025-05-06 PROCEDURE — 63600000 HC DRUGS/DETAIL CODE: Mod: JZ | Performed by: INTERNAL MEDICINE

## 2025-05-06 PROCEDURE — 99233 SBSQ HOSP IP/OBS HIGH 50: CPT | Performed by: HOSPITALIST

## 2025-05-06 PROCEDURE — 63700000 HC SELF-ADMINISTRABLE DRUG: Performed by: HOSPITALIST

## 2025-05-06 PROCEDURE — 85027 COMPLETE CBC AUTOMATED: CPT | Performed by: INTERNAL MEDICINE

## 2025-05-06 PROCEDURE — 25800000 HC PHARMACY IV SOLUTIONS: Performed by: HOSPITALIST

## 2025-05-06 PROCEDURE — 94660 CPAP INITIATION&MGMT: CPT

## 2025-05-06 PROCEDURE — 83540 ASSAY OF IRON: CPT | Performed by: HOSPITALIST

## 2025-05-06 PROCEDURE — 63600000 HC DRUGS/DETAIL CODE: Mod: JW | Performed by: INTERNAL MEDICINE

## 2025-05-06 RX ORDER — IPRATROPIUM BROMIDE AND ALBUTEROL SULFATE 2.5; .5 MG/3ML; MG/3ML
3 SOLUTION RESPIRATORY (INHALATION)
Status: DISCONTINUED | OUTPATIENT
Start: 2025-05-06 | End: 2025-05-08 | Stop reason: HOSPADM

## 2025-05-06 RX ORDER — RAMIPRIL 1.25 MG/1
1.25 CAPSULE ORAL DAILY
Status: DISCONTINUED | OUTPATIENT
Start: 2025-05-06 | End: 2025-05-07

## 2025-05-06 RX ORDER — PREDNISONE 20 MG/1
20 TABLET ORAL DAILY
Status: DISCONTINUED | OUTPATIENT
Start: 2025-05-09 | End: 2025-05-06

## 2025-05-06 RX ORDER — GUAIFENESIN 100 MG/5ML
200 LIQUID ORAL EVERY 4 HOURS PRN
Status: DISCONTINUED | OUTPATIENT
Start: 2025-05-06 | End: 2025-05-08 | Stop reason: HOSPADM

## 2025-05-06 RX ORDER — PREDNISONE 10 MG/1
10 TABLET ORAL DAILY
Status: DISCONTINUED | OUTPATIENT
Start: 2025-05-10 | End: 2025-05-06

## 2025-05-06 RX ORDER — PREDNISONE 20 MG/1
40 TABLET ORAL DAILY
Status: DISCONTINUED | OUTPATIENT
Start: 2025-05-07 | End: 2025-05-06

## 2025-05-06 RX ORDER — BENZONATATE 100 MG/1
100 CAPSULE ORAL 3 TIMES DAILY PRN
Status: DISCONTINUED | OUTPATIENT
Start: 2025-05-06 | End: 2025-05-08 | Stop reason: HOSPADM

## 2025-05-06 RX ORDER — VALSARTAN 40 MG/1
80 TABLET ORAL DAILY
Status: DISCONTINUED | OUTPATIENT
Start: 2025-05-06 | End: 2025-05-06

## 2025-05-06 RX ORDER — FUROSEMIDE 10 MG/ML
20 INJECTION INTRAMUSCULAR; INTRAVENOUS ONCE
Status: COMPLETED | OUTPATIENT
Start: 2025-05-06 | End: 2025-05-06

## 2025-05-06 RX ADMIN — IPRATROPIUM BROMIDE AND ALBUTEROL SULFATE 3 ML: .5; 3 SOLUTION RESPIRATORY (INHALATION) at 14:36

## 2025-05-06 RX ADMIN — RIVAROXABAN 15 MG: 15 TABLET, FILM COATED ORAL at 17:59

## 2025-05-06 RX ADMIN — LEVOTHYROXINE SODIUM 50 MCG: 0.05 TABLET ORAL at 06:01

## 2025-05-06 RX ADMIN — IPRATROPIUM BROMIDE AND ALBUTEROL SULFATE 3 ML: .5; 3 SOLUTION RESPIRATORY (INHALATION) at 21:36

## 2025-05-06 RX ADMIN — TAMSULOSIN HYDROCHLORIDE 0.4 MG: 0.4 CAPSULE ORAL at 00:49

## 2025-05-06 RX ADMIN — FUROSEMIDE 20 MG: 10 INJECTION, SOLUTION INTRAMUSCULAR; INTRAVENOUS at 15:06

## 2025-05-06 RX ADMIN — PANTOPRAZOLE SODIUM 40 MG: 40 TABLET, DELAYED RELEASE ORAL at 16:34

## 2025-05-06 RX ADMIN — BUSPIRONE HYDROCHLORIDE 10 MG: 10 TABLET ORAL at 21:36

## 2025-05-06 RX ADMIN — TAMOXIFEN CITRATE 20 MG: 10 TABLET, FILM COATED ORAL at 08:33

## 2025-05-06 RX ADMIN — GUAIFENESIN 200 MG: 100 SOLUTION ORAL at 21:58

## 2025-05-06 RX ADMIN — SODIUM CHLORIDE 250 MG: 9 INJECTION, SOLUTION INTRAVENOUS at 16:34

## 2025-05-06 RX ADMIN — ATORVASTATIN CALCIUM 10 MG: 10 TABLET, FILM COATED ORAL at 08:33

## 2025-05-06 RX ADMIN — CEFTRIAXONE SODIUM 1 G: 1 INJECTION, POWDER, FOR SOLUTION INTRAMUSCULAR; INTRAVENOUS at 21:36

## 2025-05-06 RX ADMIN — BUSPIRONE HYDROCHLORIDE 10 MG: 10 TABLET ORAL at 08:33

## 2025-05-06 RX ADMIN — Medication 3 MG: at 21:36

## 2025-05-06 RX ADMIN — GUAIFENESIN 200 MG: 100 SOLUTION ORAL at 18:38

## 2025-05-06 RX ADMIN — METOPROLOL SUCCINATE 25 MG: 25 TABLET, EXTENDED RELEASE ORAL at 17:59

## 2025-05-06 RX ADMIN — PANTOPRAZOLE SODIUM 40 MG: 40 TABLET, DELAYED RELEASE ORAL at 07:37

## 2025-05-06 RX ADMIN — TAMSULOSIN HYDROCHLORIDE 0.4 MG: 0.4 CAPSULE ORAL at 21:36

## 2025-05-06 RX ADMIN — Medication 3 MG: at 00:49

## 2025-05-07 ENCOUNTER — APPOINTMENT (INPATIENT)
Dept: CARDIOLOGY | Facility: HOSPITAL | Age: 75
DRG: 291 | End: 2025-05-07
Attending: HOSPITALIST
Payer: MEDICARE

## 2025-05-07 LAB
ANION GAP SERPL CALC-SCNC: 7 MEQ/L (ref 3–15)
BSA FOR ECHO PROCEDURE: 1.91 M2
BUN SERPL-MCNC: 15 MG/DL (ref 7–25)
CALCIUM SERPL-MCNC: 8.3 MG/DL (ref 8.6–10.3)
CHLORIDE SERPL-SCNC: 107 MEQ/L (ref 98–107)
CO2 SERPL-SCNC: 26 MEQ/L (ref 21–31)
CREAT SERPL-MCNC: 1.6 MG/DL (ref 0.7–1.3)
E WAVE DECELERATION TIME: 222 MS
EGFRCR SERPLBLD CKD-EPI 2021: 44.7 ML/MIN/1.73M*2
ERYTHROCYTE [DISTWIDTH] IN BLOOD BY AUTOMATED COUNT: 13.8 % (ref 11.6–14.4)
FRACTIONAL SHORTENING: 20.82 %
GLUCOSE SERPL-MCNC: 139 MG/DL (ref 70–99)
HCT VFR BLD AUTO: 29 % (ref 40.1–51)
HGB BLD-MCNC: 9.3 G/DL (ref 13.7–17.5)
INTERVENTRICULAR SEPTUM: 0.86 CM
LAD 2D: 5.3 CM
LEFT INTERNAL DIMENSION IN SYSTOLE: 4.45 CM (ref 2.6–3.93)
LEFT VENTRICULAR INTERNAL DIMENSION IN DIASTOLE: 5.62 CM (ref 4.39–6.1)
LEFT VENTRICULAR POSTERIOR WALL IN END DIASTOLE: 1.02 CM (ref 0.58–1.07)
MAGNESIUM SERPL-MCNC: 1.7 MG/DL (ref 1.8–2.5)
MCH RBC QN AUTO: 33.1 PG (ref 28–33.2)
MCHC RBC AUTO-ENTMCNC: 32.1 G/DL (ref 32.2–36.5)
MCV RBC AUTO: 103.2 FL (ref 83–98)
MV PEAK E VEL: 1.25 M/S
MV STENOSIS PRESSURE HALF TIME: 65 MS
MV VALVE AREA P 1/2 METHOD: 3.38 CM2
PLATELET # BLD AUTO: 166 K/UL (ref 150–350)
PMV BLD AUTO: 9.2 FL (ref 9.4–12.4)
POSTERIOR WALL: 1.02 CM
POTASSIUM SERPL-SCNC: 3.9 MEQ/L (ref 3.5–5.1)
RBC # BLD AUTO: 2.81 M/UL (ref 4.5–5.8)
SODIUM SERPL-SCNC: 140 MEQ/L (ref 136–145)
TAPSE: 1.53 CM
TR MAX PG: 33.18 MMHG
TRICUSPID VALVE PEAK REGURGITATION VELOCITY: 2.88 M/S
WBC # BLD AUTO: 4.71 K/UL (ref 3.8–10.5)
Z-SCORE OF LEFT VENTRICULAR DIMENSION IN END DIASTOLE: 0.82
Z-SCORE OF LEFT VENTRICULAR DIMENSION IN END SYSTOLE: 2.62
Z-SCORE OF LEFT VENTRICULAR POSTERIOR WALL IN END DIASTOLE: 1.36

## 2025-05-07 PROCEDURE — 99232 SBSQ HOSP IP/OBS MODERATE 35: CPT | Performed by: HOSPITALIST

## 2025-05-07 PROCEDURE — 97116 GAIT TRAINING THERAPY: CPT | Mod: GP

## 2025-05-07 PROCEDURE — 63700000 HC SELF-ADMINISTRABLE DRUG: Performed by: HOSPITALIST

## 2025-05-07 PROCEDURE — 25000000 HC PHARMACY GENERAL: Performed by: HOSPITALIST

## 2025-05-07 PROCEDURE — 25000000 HC PHARMACY GENERAL: Performed by: INTERNAL MEDICINE

## 2025-05-07 PROCEDURE — 83735 ASSAY OF MAGNESIUM: CPT | Performed by: NURSE PRACTITIONER

## 2025-05-07 PROCEDURE — 25500000 HC DRUGS/INCIDENT RAD: Mod: JZ | Performed by: HOSPITALIST

## 2025-05-07 PROCEDURE — 36415 COLL VENOUS BLD VENIPUNCTURE: CPT | Performed by: NURSE PRACTITIONER

## 2025-05-07 PROCEDURE — 63700000 HC SELF-ADMINISTRABLE DRUG: Performed by: INTERNAL MEDICINE

## 2025-05-07 PROCEDURE — 63700000 HC SELF-ADMINISTRABLE DRUG: Performed by: STUDENT IN AN ORGANIZED HEALTH CARE EDUCATION/TRAINING PROGRAM

## 2025-05-07 PROCEDURE — 97162 PT EVAL MOD COMPLEX 30 MIN: CPT | Mod: GP

## 2025-05-07 PROCEDURE — 97530 THERAPEUTIC ACTIVITIES: CPT | Mod: GP

## 2025-05-07 PROCEDURE — 63600000 HC DRUGS/DETAIL CODE: Mod: JZ | Performed by: HOSPITALIST

## 2025-05-07 PROCEDURE — 80048 BASIC METABOLIC PNL TOTAL CA: CPT | Performed by: NURSE PRACTITIONER

## 2025-05-07 PROCEDURE — 21400000 HC ROOM AND CARE CCU/INTERMEDIATE

## 2025-05-07 PROCEDURE — 85027 COMPLETE CBC AUTOMATED: CPT | Performed by: HOSPITALIST

## 2025-05-07 PROCEDURE — 99232 SBSQ HOSP IP/OBS MODERATE 35: CPT | Performed by: INTERNAL MEDICINE

## 2025-05-07 PROCEDURE — 93325 DOPPLER ECHO COLOR FLOW MAPG: CPT

## 2025-05-07 PROCEDURE — 25800000 HC PHARMACY IV SOLUTIONS: Performed by: HOSPITALIST

## 2025-05-07 RX ORDER — FUROSEMIDE 20 MG/1
20 TABLET ORAL DAILY
Status: DISCONTINUED | OUTPATIENT
Start: 2025-05-07 | End: 2025-05-08 | Stop reason: HOSPADM

## 2025-05-07 RX ADMIN — GUAIFENESIN 200 MG: 100 SOLUTION ORAL at 16:19

## 2025-05-07 RX ADMIN — FUROSEMIDE 20 MG: 20 TABLET ORAL at 16:19

## 2025-05-07 RX ADMIN — IPRATROPIUM BROMIDE AND ALBUTEROL SULFATE 3 ML: .5; 3 SOLUTION RESPIRATORY (INHALATION) at 07:34

## 2025-05-07 RX ADMIN — RIVAROXABAN 15 MG: 15 TABLET, FILM COATED ORAL at 17:41

## 2025-05-07 RX ADMIN — TAMSULOSIN HYDROCHLORIDE 0.4 MG: 0.4 CAPSULE ORAL at 21:07

## 2025-05-07 RX ADMIN — BENZONATATE 100 MG: 100 CAPSULE ORAL at 13:52

## 2025-05-07 RX ADMIN — BUSPIRONE HYDROCHLORIDE 10 MG: 10 TABLET ORAL at 08:24

## 2025-05-07 RX ADMIN — SODIUM CHLORIDE 250 MG: 9 INJECTION, SOLUTION INTRAVENOUS at 11:23

## 2025-05-07 RX ADMIN — LEVOTHYROXINE SODIUM 50 MCG: 0.05 TABLET ORAL at 04:46

## 2025-05-07 RX ADMIN — MAGNESIUM SULFATE IN DEXTROSE 1 G: 10 INJECTION, SOLUTION INTRAVENOUS at 17:42

## 2025-05-07 RX ADMIN — BENZONATATE 100 MG: 100 CAPSULE ORAL at 08:28

## 2025-05-07 RX ADMIN — IPRATROPIUM BROMIDE AND ALBUTEROL SULFATE 3 ML: .5; 3 SOLUTION RESPIRATORY (INHALATION) at 04:27

## 2025-05-07 RX ADMIN — Medication 3 MG: at 21:07

## 2025-05-07 RX ADMIN — SODIUM CHLORIDE: 9 INJECTION INTRAMUSCULAR; INTRAVENOUS; SUBCUTANEOUS at 12:25

## 2025-05-07 RX ADMIN — IPRATROPIUM BROMIDE AND ALBUTEROL SULFATE 3 ML: .5; 3 SOLUTION RESPIRATORY (INHALATION) at 13:22

## 2025-05-07 RX ADMIN — ATORVASTATIN CALCIUM 10 MG: 10 TABLET, FILM COATED ORAL at 08:24

## 2025-05-07 RX ADMIN — METOPROLOL SUCCINATE 25 MG: 25 TABLET, EXTENDED RELEASE ORAL at 17:41

## 2025-05-07 RX ADMIN — IPRATROPIUM BROMIDE AND ALBUTEROL SULFATE 3 ML: .5; 3 SOLUTION RESPIRATORY (INHALATION) at 20:33

## 2025-05-07 RX ADMIN — BUSPIRONE HYDROCHLORIDE 10 MG: 10 TABLET ORAL at 21:07

## 2025-05-07 RX ADMIN — GUAIFENESIN 200 MG: 100 SOLUTION ORAL at 11:23

## 2025-05-07 RX ADMIN — GUAIFENESIN 200 MG: 100 SOLUTION ORAL at 06:40

## 2025-05-07 RX ADMIN — PANTOPRAZOLE SODIUM 40 MG: 40 TABLET, DELAYED RELEASE ORAL at 06:41

## 2025-05-07 RX ADMIN — TAMOXIFEN CITRATE 20 MG: 10 TABLET, FILM COATED ORAL at 08:24

## 2025-05-07 RX ADMIN — PANTOPRAZOLE SODIUM 40 MG: 40 TABLET, DELAYED RELEASE ORAL at 16:19

## 2025-05-07 NOTE — PROGRESS NOTES
05/07/25      J[address]tt Diehl  [address] [address]      Home May concern/Lending hands:  Jarett has been under my care for several years. She suffers from polyneuropathy in both feet and has balance issues. As a result, she uses a walker for short distances and a wheelchair for longer distances.  Additionally, she experiences urinary incontinence. Although she is currently receiving treatment from a specialist, she continues to have accidents and relies on protective undergarments for daily management.  Please feel free to contact our office if you have any further questions.    Sincerely,     Zaria Keller MD    [facility]  [address] [address]  Phone: [phone]  Fax: [phone]                 Pt arrived for port flushed, port accessed without issues.  Port flushed per protocol

## 2025-05-08 VITALS
TEMPERATURE: 97.5 F | HEART RATE: 73 BPM | SYSTOLIC BLOOD PRESSURE: 115 MMHG | DIASTOLIC BLOOD PRESSURE: 56 MMHG | RESPIRATION RATE: 16 BRPM | WEIGHT: 172.4 LBS | BODY MASS INDEX: 28.72 KG/M2 | OXYGEN SATURATION: 97 % | HEIGHT: 65 IN

## 2025-05-08 LAB
ANION GAP SERPL CALC-SCNC: 6 MEQ/L (ref 3–15)
BUN SERPL-MCNC: 14 MG/DL (ref 7–25)
CALCIUM SERPL-MCNC: 8.3 MG/DL (ref 8.6–10.3)
CHLORIDE SERPL-SCNC: 105 MEQ/L (ref 98–107)
CO2 SERPL-SCNC: 31 MEQ/L (ref 21–31)
CREAT SERPL-MCNC: 1.5 MG/DL (ref 0.7–1.3)
EGFRCR SERPLBLD CKD-EPI 2021: 48.2 ML/MIN/1.73M*2
ERYTHROCYTE [DISTWIDTH] IN BLOOD BY AUTOMATED COUNT: 14 % (ref 11.6–14.4)
GLUCOSE SERPL-MCNC: 93 MG/DL (ref 70–99)
HCT VFR BLD AUTO: 30 % (ref 40.1–51)
HGB BLD-MCNC: 9.5 G/DL (ref 13.7–17.5)
MAGNESIUM SERPL-MCNC: 1.8 MG/DL (ref 1.8–2.5)
MCH RBC QN AUTO: 33.1 PG (ref 28–33.2)
MCHC RBC AUTO-ENTMCNC: 31.7 G/DL (ref 32.2–36.5)
MCV RBC AUTO: 104.5 FL (ref 83–98)
PLATELET # BLD AUTO: 173 K/UL (ref 150–350)
PMV BLD AUTO: 8.9 FL (ref 9.4–12.4)
POTASSIUM SERPL-SCNC: 3.8 MEQ/L (ref 3.5–5.1)
RBC # BLD AUTO: 2.87 M/UL (ref 4.5–5.8)
SODIUM SERPL-SCNC: 142 MEQ/L (ref 136–145)
WBC # BLD AUTO: 3.32 K/UL (ref 3.8–10.5)

## 2025-05-08 PROCEDURE — 63700000 HC SELF-ADMINISTRABLE DRUG: Performed by: HOSPITALIST

## 2025-05-08 PROCEDURE — 99239 HOSP IP/OBS DSCHRG MGMT >30: CPT | Performed by: HOSPITALIST

## 2025-05-08 PROCEDURE — 63600000 HC DRUGS/DETAIL CODE: Mod: JZ | Performed by: HOSPITALIST

## 2025-05-08 PROCEDURE — 80048 BASIC METABOLIC PNL TOTAL CA: CPT | Performed by: HOSPITALIST

## 2025-05-08 PROCEDURE — 85027 COMPLETE CBC AUTOMATED: CPT | Performed by: HOSPITALIST

## 2025-05-08 PROCEDURE — 83735 ASSAY OF MAGNESIUM: CPT | Performed by: HOSPITALIST

## 2025-05-08 PROCEDURE — 36415 COLL VENOUS BLD VENIPUNCTURE: CPT | Performed by: HOSPITALIST

## 2025-05-08 PROCEDURE — 25000000 HC PHARMACY GENERAL: Performed by: INTERNAL MEDICINE

## 2025-05-08 PROCEDURE — 25800000 HC PHARMACY IV SOLUTIONS: Performed by: HOSPITALIST

## 2025-05-08 PROCEDURE — 63700000 HC SELF-ADMINISTRABLE DRUG: Performed by: INTERNAL MEDICINE

## 2025-05-08 RX ORDER — FUROSEMIDE 40 MG/1
20 TABLET ORAL DAILY PRN
Start: 2025-05-08 | End: 2025-06-07

## 2025-05-08 RX ORDER — FUROSEMIDE 20 MG/1
20 TABLET ORAL DAILY PRN
Qty: 45 TABLET | Refills: 3 | Status: CANCELLED | OUTPATIENT
Start: 2025-05-08 | End: 2025-06-07

## 2025-05-08 RX ORDER — CEPHALEXIN 500 MG/1
500 CAPSULE ORAL 4 TIMES DAILY
Status: DISCONTINUED | OUTPATIENT
Start: 2025-05-08 | End: 2025-05-08 | Stop reason: HOSPADM

## 2025-05-08 RX ORDER — CEPHALEXIN 500 MG/1
500 CAPSULE ORAL 4 TIMES DAILY
Start: 2025-05-08 | End: 2025-05-12

## 2025-05-08 RX ORDER — ALBUTEROL SULFATE 90 UG/1
2 INHALANT RESPIRATORY (INHALATION) EVERY 6 HOURS PRN
Qty: 18 G | Refills: 1 | Status: SHIPPED | OUTPATIENT
Start: 2025-05-08 | End: 2025-06-07

## 2025-05-08 RX ORDER — CEPHALEXIN 500 MG/1
500 CAPSULE ORAL 4 TIMES DAILY
Status: DISCONTINUED | OUTPATIENT
Start: 2025-05-08 | End: 2025-05-08

## 2025-05-08 RX ADMIN — CEPHALEXIN 500 MG: 500 CAPSULE ORAL at 17:04

## 2025-05-08 RX ADMIN — IPRATROPIUM BROMIDE AND ALBUTEROL SULFATE 3 ML: .5; 3 SOLUTION RESPIRATORY (INHALATION) at 01:22

## 2025-05-08 RX ADMIN — METOPROLOL SUCCINATE 25 MG: 25 TABLET, EXTENDED RELEASE ORAL at 17:04

## 2025-05-08 RX ADMIN — IPRATROPIUM BROMIDE AND ALBUTEROL SULFATE 3 ML: .5; 3 SOLUTION RESPIRATORY (INHALATION) at 13:26

## 2025-05-08 RX ADMIN — RIVAROXABAN 15 MG: 15 TABLET, FILM COATED ORAL at 17:04

## 2025-05-08 RX ADMIN — IPRATROPIUM BROMIDE AND ALBUTEROL SULFATE 3 ML: .5; 3 SOLUTION RESPIRATORY (INHALATION) at 03:45

## 2025-05-08 RX ADMIN — PANTOPRAZOLE SODIUM 40 MG: 40 TABLET, DELAYED RELEASE ORAL at 17:04

## 2025-05-08 RX ADMIN — PANTOPRAZOLE SODIUM 40 MG: 40 TABLET, DELAYED RELEASE ORAL at 08:50

## 2025-05-08 RX ADMIN — GUAIFENESIN 200 MG: 100 SOLUTION ORAL at 09:14

## 2025-05-08 RX ADMIN — LEVOTHYROXINE SODIUM 50 MCG: 0.05 TABLET ORAL at 06:33

## 2025-05-08 RX ADMIN — TAMOXIFEN CITRATE 20 MG: 10 TABLET, FILM COATED ORAL at 08:50

## 2025-05-08 RX ADMIN — SODIUM CHLORIDE 250 MG: 9 INJECTION, SOLUTION INTRAVENOUS at 10:44

## 2025-05-08 RX ADMIN — ATORVASTATIN CALCIUM 10 MG: 10 TABLET, FILM COATED ORAL at 08:50

## 2025-05-08 RX ADMIN — GUAIFENESIN 200 MG: 100 SOLUTION ORAL at 03:45

## 2025-05-08 RX ADMIN — BUSPIRONE HYDROCHLORIDE 10 MG: 10 TABLET ORAL at 08:50

## 2025-05-09 ENCOUNTER — PATIENT OUTREACH (OUTPATIENT)
Dept: CASE MANAGEMENT | Facility: CLINIC | Age: 75
End: 2025-05-09
Payer: MEDICARE

## 2025-05-12 ENCOUNTER — TELEPHONE (OUTPATIENT)
Dept: SCHEDULING | Facility: CLINIC | Age: 75
End: 2025-05-12
Payer: MEDICARE

## 2025-05-12 ENCOUNTER — TELEMEDICINE (OUTPATIENT)
Dept: HOSPITALIST | Facility: CLINIC | Age: 75
End: 2025-05-12
Payer: MEDICARE

## 2025-05-12 DIAGNOSIS — N18.31 CKD STAGE 3A, GFR 45-59 ML/MIN (CMS/HCC): ICD-10-CM

## 2025-05-12 DIAGNOSIS — E78.5 HYPERLIPIDEMIA, UNSPECIFIED: ICD-10-CM

## 2025-05-12 DIAGNOSIS — I50.23 ACUTE ON CHRONIC HFREF (HEART FAILURE WITH REDUCED EJECTION FRACTION) (CMS/HCC): Primary | ICD-10-CM

## 2025-05-12 RX ORDER — ATORVASTATIN CALCIUM 10 MG/1
10 TABLET, FILM COATED ORAL DAILY
Qty: 90 TABLET | Refills: 3 | Status: SHIPPED | OUTPATIENT
Start: 2025-05-12

## 2025-05-19 ENCOUNTER — PATIENT OUTREACH (OUTPATIENT)
Dept: CASE MANAGEMENT | Facility: CLINIC | Age: 75
End: 2025-05-19
Payer: MEDICARE

## 2025-05-28 ENCOUNTER — PATIENT OUTREACH (OUTPATIENT)
Dept: CASE MANAGEMENT | Facility: CLINIC | Age: 75
End: 2025-05-28
Payer: MEDICARE

## 2025-06-02 ENCOUNTER — PATIENT OUTREACH (OUTPATIENT)
Dept: CASE MANAGEMENT | Facility: CLINIC | Age: 75
End: 2025-06-02
Payer: MEDICARE

## 2025-06-02 NOTE — PROGRESS NOTES
"   [facility] call placed and reached spouse on [phone]. She was tied up and asked for a rerun call in about 1 hour.   ADDENDUM: 6/3/25 [facility] called back and reached spouse.     SOB/MCGREGOR: none expressed   SWELLING-- lower extremities swollen and since weekend cannot stand/  WEIGHT-- unable to obtain since weekend he cannot stand.   COUGH-- none   FEVER/CHILLS-- no temp but spouse explains \"always so cold, he's all wrapped up now.\"  had contacted the house because pox registered 72%. Spouse and pt shared with  triage nurse no complaints reflecting a pox of 72%. They do have PRN oxygen should they need it but spouse feels his hands are just so cold. She will try to warm them up and recheck it. It has been running 93-96% on RA.   [facility] visits-- continue/with calls over the weekend for increased LE edema and inability to stand 2/2 a twisted knee. Unknown etiology. Called triage Saturday with same issues and triage nurse did say if he needed to ho please get him to an ER.   Spouse explain longstanding edema issue but it has never prevented him from standing.    We discussed that she feels he likely needs to go to the hospital but \"how do we get him there? I can't get him in the car like this.\"  Instructed spouse that if she calls 911 to let them know he is not in critical need but does need to get to the ER and is in an unstable situation at home.   Should his condition change, increased SOB or anything seeping from lower extremities she should not delay.   She feels he twisted his knee this weekend thus preventing him from standing.     Pt is continuing with services through [facility]  Spouse states no further questions or concerns today regarding care.     Care Plan: CHF Template   Updates made by Shellie Hammond RN since 6/3/2025 12:00 AM        Problem: CHF Diagnosis Knowledge Deficit         Goal: Patient will verbalize understanding of how heart failure affects the body          Task: Educate patient on CHF " diagnosis Completed 6/3/2025        Problem: Heart Failure Progression and Care Needs         Goal: Patient will verbalize understanding of CHF progression and care needs         Task: Educate patient on signs and symptoms of CHF complcations to report to clinician Completed 6/3/2025   Responsible User: Shellie Hammond RN        Problem: Edema Management         Goal: Patient will employ strategies to manage edema         Task: Instruct patient how to elevate lower extremities above heart level to decrease edema Completed 6/3/2025        Task: Instruct patient on how to perform ankle pump exercises to improve circulation and decrease edema Completed 6/3/2025        Problem: Physical Actvitiy and Pacing Strategies         Goal: Patient demonstrates understanding of lifestyle changes         Task: Educate patient on the benefits of lifestyle changes including physical activity in relationship to CHF Completed 6/3/2025        ACM to follow up in 1 week-on/around 6/9/25 for final call of the 30 day [facility] Program.     Shellie Hammond MSN, RN, [facility]-BC, CM  [facility]  [phone]

## 2025-06-03 ENCOUNTER — HOSPITAL ENCOUNTER (EMERGENCY)
Facility: HOSPITAL | Age: 75
Discharge: HOME | End: 2025-06-03
Attending: EMERGENCY MEDICINE | Admitting: EMERGENCY MEDICINE
Payer: MEDICARE

## 2025-06-03 ENCOUNTER — APPOINTMENT (EMERGENCY)
Dept: RADIOLOGY | Facility: HOSPITAL | Age: 75
End: 2025-06-03
Payer: MEDICARE

## 2025-06-03 VITALS
RESPIRATION RATE: 18 BRPM | DIASTOLIC BLOOD PRESSURE: 74 MMHG | SYSTOLIC BLOOD PRESSURE: 135 MMHG | OXYGEN SATURATION: 96 % | BODY MASS INDEX: 29.16 KG/M2 | HEART RATE: 92 BPM | HEIGHT: 65 IN | TEMPERATURE: 97.3 F | WEIGHT: 175 LBS

## 2025-06-03 DIAGNOSIS — S83.91XA SPRAIN OF RIGHT KNEE, UNSPECIFIED LIGAMENT, INITIAL ENCOUNTER: Primary | ICD-10-CM

## 2025-06-03 LAB
ALBUMIN SERPL-MCNC: 3.8 G/DL (ref 3.5–5.7)
ALP SERPL-CCNC: 43 IU/L (ref 34–125)
ALT SERPL-CCNC: 5 IU/L (ref 7–52)
ANION GAP SERPL CALC-SCNC: 7 MEQ/L (ref 3–15)
AST SERPL-CCNC: 14 IU/L (ref 13–39)
BASOPHILS # BLD: 0.03 K/UL (ref 0.01–0.1)
BASOPHILS NFR BLD: 0.5 %
BILIRUB SERPL-MCNC: 0.6 MG/DL (ref 0.3–1.2)
BNP SERPL-MCNC: 210 PG/ML
BUN SERPL-MCNC: 16 MG/DL (ref 7–25)
CALCIUM SERPL-MCNC: 9.3 MG/DL (ref 8.6–10.3)
CHLORIDE SERPL-SCNC: 105 MEQ/L (ref 98–107)
CO2 SERPL-SCNC: 28 MEQ/L (ref 21–31)
CREAT SERPL-MCNC: 1.5 MG/DL (ref 0.7–1.3)
CRP SERPL-MCNC: 40 MG/L
DIFFERENTIAL METHOD BLD: ABNORMAL
EGFRCR SERPLBLD CKD-EPI 2021: 48.2 ML/MIN/1.73M*2
EOSINOPHIL # BLD: 0.17 K/UL (ref 0.04–0.54)
EOSINOPHIL NFR BLD: 2.9 %
ERYTHROCYTE [DISTWIDTH] IN BLOOD BY AUTOMATED COUNT: 15.3 % (ref 11.6–14.4)
GLUCOSE SERPL-MCNC: 104 MG/DL (ref 70–99)
HCT VFR BLD AUTO: 36.4 % (ref 40.1–51)
HGB BLD-MCNC: 11.1 G/DL (ref 13.7–17.5)
IMM GRANULOCYTES # BLD AUTO: 0.02 K/UL (ref 0–0.08)
IMM GRANULOCYTES NFR BLD AUTO: 0.3 %
LYMPHOCYTES # BLD: 1 K/UL (ref 1.2–3.5)
LYMPHOCYTES NFR BLD: 16.9 %
MCH RBC QN AUTO: 31.8 PG (ref 28–33.2)
MCHC RBC AUTO-ENTMCNC: 30.5 G/DL (ref 32.2–36.5)
MCV RBC AUTO: 104.3 FL (ref 83–98)
MONOCYTES # BLD: 0.63 K/UL (ref 0.3–1)
MONOCYTES NFR BLD: 10.6 %
NEUTROPHILS # BLD: 4.07 K/UL (ref 1.7–7)
NEUTS SEG NFR BLD: 68.8 %
NRBC BLD-RTO: 0 %
PLATELET # BLD AUTO: 191 K/UL (ref 150–350)
PMV BLD AUTO: 8.5 FL (ref 9.4–12.4)
POTASSIUM SERPL-SCNC: 4.5 MEQ/L (ref 3.5–5.1)
PROT SERPL-MCNC: 7.1 G/DL (ref 6–8.2)
RBC # BLD AUTO: 3.49 M/UL (ref 4.5–5.8)
SODIUM SERPL-SCNC: 140 MEQ/L (ref 136–145)
WBC # BLD AUTO: 5.92 K/UL (ref 3.8–10.5)

## 2025-06-03 PROCEDURE — 83880 ASSAY OF NATRIURETIC PEPTIDE: CPT | Performed by: PHYSICIAN ASSISTANT

## 2025-06-03 PROCEDURE — 36415 COLL VENOUS BLD VENIPUNCTURE: CPT | Performed by: PHYSICIAN ASSISTANT

## 2025-06-03 PROCEDURE — 99283 EMERGENCY DEPT VISIT LOW MDM: CPT

## 2025-06-03 PROCEDURE — 86140 C-REACTIVE PROTEIN: CPT | Performed by: PHYSICIAN ASSISTANT

## 2025-06-03 PROCEDURE — 80053 COMPREHEN METABOLIC PANEL: CPT | Performed by: PHYSICIAN ASSISTANT

## 2025-06-03 PROCEDURE — 63700000 HC SELF-ADMINISTRABLE DRUG: Performed by: PHYSICIAN ASSISTANT

## 2025-06-03 PROCEDURE — 85025 COMPLETE CBC W/AUTO DIFF WBC: CPT | Performed by: PHYSICIAN ASSISTANT

## 2025-06-03 PROCEDURE — 73564 X-RAY EXAM KNEE 4 OR MORE: CPT | Mod: RT

## 2025-06-03 RX ORDER — OXYCODONE AND ACETAMINOPHEN 5; 325 MG/1; MG/1
1 TABLET ORAL EVERY 4 HOURS PRN
Qty: 10 TABLET | Refills: 0 | Status: SHIPPED | OUTPATIENT
Start: 2025-06-03

## 2025-06-03 RX ORDER — OXYCODONE AND ACETAMINOPHEN 5; 325 MG/1; MG/1
1 TABLET ORAL ONCE
Refills: 0 | Status: COMPLETED | OUTPATIENT
Start: 2025-06-03 | End: 2025-06-03

## 2025-06-03 RX ADMIN — OXYCODONE HYDROCHLORIDE AND ACETAMINOPHEN 1 TABLET: 5; 325 TABLET ORAL at 15:29

## 2025-06-03 ASSESSMENT — ENCOUNTER SYMPTOMS
JOINT SWELLING: 1
FATIGUE: 0
ABDOMINAL PAIN: 0
COUGH: 0
NUMBNESS: 0
CHEST TIGHTNESS: 0
PALPITATIONS: 0
WOUND: 1
ARTHRALGIAS: 1
SHORTNESS OF BREATH: 0
FEVER: 0

## 2025-06-03 NOTE — ED ATTESTATION NOTE
Procedures  Physical Exam  Review of Systems    6/3/14392:17 PM  I have personally seen and examined the patient.  I reviewed and agree with the PA/NP/Resident's assessment and plan of care.    Vital Signs Review: Vital signs have been reviewed. The oxygen saturation is  SpO2: 97 % which is  Normal    My examination, assessment, and plan of care of Cam Rosas Jr. is as follows:      Patient Presents with 75-year-old male coming in today with concerns for knee pain states he rolled over in bed 2 days ago notes been having pain in his right knee since he states he has typically been able to ambulate with a walker but now has had more difficulty secondary to pain denies any numbness or weakness denies any shortness of breath    Exam: well appearing, alert, lungs ctab with no distress, belly soft NTND no rebound or guarding, and patient has good distal pulses in the bilateral lower extremities patient has swelling in the right lower extremity and left lower extremity up to the mid shin no obvious knee effusion warm does not feel warm to touch or palpation.      Impression/Plan: Patient is a 75-year-old male coming today with appears to be most likely a knee sprain no significant trauma besides rolling over which point he states that the pain started at that time patient denies any numbness weakness no nausea vomiting no lightheaded dizziness no chest pain or shortness of breath patient is otherwise chemically stable and well-appearing.  X-rays are negative CRP is elevated but can be elevated and evidence with osteoarthritis knees are mildly injuries but there is no signs of infectious etiology at this time would recommend orthopedic follow-up pain control and ambulatory trial      Please refer to work-up tab for further management and follow-up on laboratory and imaging evaluations    ED Course as of 06/03/25 1617   Tue Jun 03, 2025   1502 X-RAY KNEE RIGHT 4+ VIEWS  CLINICAL HISTORY: Knee pain, no xray      COMMENT: 4 views of the right knee. No evidence for acute fracture or  dislocation of the right knee. No evidence of joint effusion. Tricompartmental  osteophytosis with moderate lateral compartment joint space narrowing.     --  IMPRESSION: No evidence for acute right knee fracture.   [CB]   1542 WBC: 5.92 [CB]   1542 Hemoglobin(!): 11.1 [CB]   1605 CRP(!): 40.00 [CB]   1605 BUN: 16 [CB]   1605 Creatinine(!): 1.5 [CB]   1608 BNP(!): 210  Improved from previous   [CB]   1608 CRP(!): 40.00 [CB]      ED Course User Index  [CB] Kira Valencia PA       I was physically present for the key/critical portions of the procedures documented by ZAC    This document was created using dragon dictation software.  There might be some typographical errors due to this technology.       Gaurav Fields MD  06/03/25 4209

## 2025-06-03 NOTE — DISCHARGE INSTRUCTIONS
Your workup is reassuring.  Your inflammatory markers are a bit elevated however the rest of your labs look improved from previous.  You are able to ambulate using the walker.  Take the Percocet as needed for pain and please follow-up with the orthopedist.  Follow-up with your home health care specialist as well as your family doctor.  Return to the emergency department for worsening symptoms.

## 2025-06-03 NOTE — ED PROVIDER NOTES
Emergency Medicine Note  HPI   HISTORY OF PRESENT ILLNESS     75-year-old male with a history of CHF, A-fib, chronic kidney disease, chronic peripheral edema who presents to the emergency department stating he rolled over in bed 2 days ago and has been having pain in his right knee since.  He states he typically can ambulate using a walker however he has been unable to bear weight for the past 2 days due to the pain.  He complains of chronic lower extremity edema and states this is not any worse than normal.  His visiting nurse feels that this is more related to his peripheral edema and not actually twisting his knee.  He denies chest pain or shortness of breath.  He denies fever or chills.      History provided by:  Patient   used: No    Knee Pain  Location:  Knee  Time since incident:  2 days  Injury: yes    Knee location:  R knee  Chronicity:  New  Dislocation: no    Associated symptoms: decreased ROM and swelling    Associated symptoms: no fatigue and no fever          Patient History   PAST HISTORY     Reviewed from Nursing Triage:       Past Medical History:   Diagnosis Date    Acute systolic heart failure (CMS/HCC) 02/15/2023    Ambulates with cane     and walker    Atrial fibrillation (CMS/HCC)     Breast cancer (CMS/HCC) October 2022    CHF (congestive heart failure) (CMS/HCC)     Chronic kidney disease     CKD (chronic kidney disease) 10/19/2022    History of radiation therapy     Hx antineoplastic chemo     Prostate cancer (CMS/HCC)        Past Surgical History   Procedure Laterality Date    Cardiac surgery      mitral valve repair 2006    Cardioversion  12/05/2022    Successful DC cardioversion    CARDIOVERSION EXTERNAL N/A 12/5/2022    Performed by Gary Aceves MD at [facility] [place] CARDIAC CATH/EP    Cath lab temporary pacemaker insertion N/A 12/25/2023    Performed by Haja Onofre MD at  CARDIAC CATH/EP/NEURO    GASTROSTOMY PERCUTANEOUS ENDOSCOPIC N/A 1/4/2024    Performed by  Edison Oneil MD at  OR    Knee cartilage surgery Left     Mastectomy      PORT/PERMACATH REMOVAL Left 11/9/2023    Performed by Barb Osuna MD at  OR    Prostate surgery  July 2022    Prostatectomy  07/15/2022    Right & left heart cath with coronary angiography N/A 12/25/2023    Performed by Haja Onofre MD at  CARDIAC CATH/EP/NEURO    RIGHT BREAST MASTECTOMY; RIGHT SENTINEL NODE IDENTIFICATION, MAPPING, AND BIOPSY; Right 8/17/2023    Performed by Barb Osuna MD at  OR    Tonsillectomy      TRACHEOSTOMY N/A 1/4/2024    Performed by Edison Oneil MD at  OR       Family History   Problem Relation Name Age of Onset    Hyperlipidemia Biological Mother mother     Hypertension Biological Mother mother     Breast cancer Biological Mother mother     Cancer Biological Mother mother         gyn? cervical    Hyperlipidemia Biological Father Dad     Hypertension Biological Father Dad     Arthritis Biological Father Dad     No Known Problems Biological Sister      No Known Problems Biological Brother      Heart disease Maternal Grandmother Belle     Arthritis Maternal Grandfather      COPD Maternal Grandfather      Diabetes Maternal Grandfather      No Known Problems Paternal Grandmother      No Known Problems Paternal Grandfather      Cancer less than or equal to age 50 Other son     Esophageal cancer Other son         47, in abd,chemo q 3 weeks       Social History     Tobacco Use    Smoking status: Never    Smokeless tobacco: Never   Vaping Use    Vaping status: Never Used   Substance Use Topics    Alcohol use: Yes     Alcohol/week: 14.0 standard drinks of alcohol     Types: 14 Shots of liquor per week     Comment: 1 per day    Drug use: Never         Review of Systems   REVIEW OF SYSTEMS     Review of Systems   Constitutional:  Negative for fatigue and fever.   Respiratory:  Negative for cough, chest tightness and shortness of breath.    Cardiovascular:  Positive for leg  swelling. Negative for palpitations.   Gastrointestinal:  Negative for abdominal pain.   Musculoskeletal:  Positive for arthralgias and joint swelling.   Skin:  Positive for wound.   Neurological:  Negative for numbness.   All other systems reviewed and are negative.        VITALS     ED Vitals      Date/Time Temp Pulse Resp BP SpO2 [facility]   06/03/25 1523 -- 92 18 135/74 96 % DS   06/03/25 1251 -- -- -- -- 97 % CEI   06/03/25 1250 -- 87 18 124/75 -- CEI   06/03/25 1249 36.3 °C (97.3 °F) -- -- -- -- CEI          Pulse Ox %: 97 % (06/03/25 1523)  Pulse Ox Interpretation: Normal (06/03/25 1523)           Physical Exam   PHYSICAL EXAM     Physical Exam  Vitals and nursing note reviewed.   Constitutional:       Appearance: Normal appearance.   HENT:      Head: Normocephalic and atraumatic.   Cardiovascular:      Rate and Rhythm: Normal rate and regular rhythm.      Pulses: Normal pulses.      Heart sounds: Normal heart sounds.   Pulmonary:      Effort: Pulmonary effort is normal.      Breath sounds: Normal breath sounds.   Musculoskeletal:         General: Swelling and tenderness present.      Right lower leg: Edema present.      Left lower leg: Edema present.      Comments: Tenderness on palpation of the right knee.  There is limited range of motion due to pain.  No joint effusion noted.  Normal peripheral pulses and normal sensation.  There are chronic wounds to the lower extremities with weeping and erythema.  Patient states this is unchanged.   Skin:     General: Skin is warm.      Capillary Refill: Capillary refill takes less than 2 seconds.      Findings: Erythema present.   Neurological:      General: No focal deficit present.      Mental Status: He is alert and oriented to person, place, and time.   Psychiatric:         Mood and Affect: Mood normal.           PROCEDURES     Procedures     DATA     Results       Procedure Component Value Units Date/Time    BNP (B-type natriuretic peptide) [[id number]]  (Abnormal)  Collected: 06/03/25 1528    Specimen: Blood, Venous Updated: 06/03/25 1607      pg/mL     Comprehensive metabolic panel [[id number]]  (Abnormal) Collected: 06/03/25 1528    Specimen: Blood, Venous Updated: 06/03/25 1602     Sodium 140 mEQ/L      Potassium 4.5 mEQ/L      Comment: Results obtained on plasma. Plasma Potassium values may be up to 0.4 mEQ/L less than serum values. The differences may be greater for patients with high platelet or white cell counts.        Chloride 105 mEQ/L      CO2 28 mEQ/L      BUN 16 mg/dL      Creatinine 1.5 mg/dL      Glucose 104 mg/dL      Calcium 9.3 mg/dL      AST (SGOT) 14 IU/L      ALT (SGPT) 5 IU/L      Alkaline Phosphatase 43 IU/L      Total Protein 7.1 g/dL      Comment: Test performed on plasma which typically contains approximately 0.4 g/dL more protein than serum.        Albumin 3.8 g/dL      Bilirubin, Total 0.6 mg/dL      eGFR 48.2 mL/min/1.73m*2      Comment: Calculation based on the Chronic Kidney Disease Epidemiology Collaboration (CKD-EPI) equation refit without adjustment for race.        Anion Gap 7 mEQ/L     C-reactive protein [[id number]]  (Abnormal) Collected: 06/03/25 1528    Specimen: Blood, Venous Updated: 06/03/25 1602     CRP 40.00 mg/L     CBC and differential [[id number]]  (Abnormal) Collected: 06/03/25 1528    Specimen: Blood, Venous Updated: 06/03/25 1541     WBC 5.92 K/uL      RBC 3.49 M/uL      Hemoglobin 11.1 g/dL      Hematocrit 36.4 %      .3 fL      MCH 31.8 pg      MCHC 30.5 g/dL      RDW 15.3 %      Platelets 191 K/uL      MPV 8.5 fL      Differential Type Auto     nRBC 0.0 %      Immature Granulocytes 0.3 %      Neutrophils 68.8 %      Lymphocytes 16.9 %      Monocytes 10.6 %      Eosinophils 2.9 %      Basophils 0.5 %      Immature Granulocytes, Absolute 0.02 K/uL      Neutrophils, Absolute 4.07 K/uL      Lymphocytes, Absolute 1.00 K/uL      Monocytes, Absolute 0.63 K/uL      Eosinophils, Absolute 0.17 K/uL      Basophils, Absolute  0.03 K/uL             Imaging Results              X-RAY KNEE RIGHT 4+ VIEWS (Final result)  Result time 06/03/25 13:58:05      Final result                   Impression:    IMPRESSION: No evidence for acute right knee fracture.               Narrative:    CLINICAL HISTORY: Knee pain, no xray    COMMENT: 4 views of the right knee. No evidence for acute fracture or  dislocation of the right knee. No evidence of joint effusion. Tricompartmental  osteophytosis with moderate lateral compartment joint space narrowing.                                      No orders to display       Scoring tools                                  ED Course & MDM   MDM / ED COURSE / CLINICAL IMPRESSION / DISPO     Medical Decision Making  75-year-old male who presents to the emergency department complaints of pain in the right knee.  He states he rolled over in bed and felt a pain in his knee.  He denies any other specific injury or trauma.  He states he has been having pain when ambulating for the past 2 days.  His home nurse was concerned about his chronic edema.  Patient states this is unchanged from previous.  Lab work showing that his white blood cell count is not elevated.  His hemoglobin is improved from previous.  His CRP is elevated concerning for inflammation.  He was given a dose of Percocet and was able to ambulate using the walker.  He states the pain gets worse when he has to change position.  Advised to follow-up with orthopedics return to the ER for worsening symptoms.  Patient comfortable with plan.    Problems Addressed:  Sprain of right knee, unspecified ligament, initial encounter: acute illness or injury    Amount and/or Complexity of Data Reviewed  Labs: ordered. Decision-making details documented in ED Course.  Radiology: ordered. Decision-making details documented in ED Course.    Risk  Prescription drug management.        ED Course as of 06/03/25 1715   Tue Jun 03, 2025   1502 X-RAY KNEE RIGHT 4+ VIEWS  CLINICAL  HISTORY: Knee pain, no xray     COMMENT: 4 views of the right knee. No evidence for acute fracture or  dislocation of the right knee. No evidence of joint effusion. Tricompartmental  osteophytosis with moderate lateral compartment joint space narrowing.     --  IMPRESSION: No evidence for acute right knee fracture.   [CB]   1542 WBC: 5.92 [CB]   1542 Hemoglobin(!): 11.1 [CB]   1605 CRP(!): 40.00 [CB]   1605 BUN: 16 [CB]   1605 Creatinine(!): 1.5 [CB]   1608 BNP(!): 210  Improved from previous   [CB]   1608 CRP(!): 40.00 [CB]      ED Course User Index  [CB] Kira Valencia PA     Clinical Impression      Sprain of right knee, unspecified ligament, initial encounter     _________________       ED Disposition   Discharge                       Kira Valencia PA  06/03/25 [id number]

## 2025-06-04 ENCOUNTER — PATIENT OUTREACH (OUTPATIENT)
Dept: CASE MANAGEMENT | Facility: CLINIC | Age: 75
End: 2025-06-04
Payer: MEDICARE

## 2025-06-04 ASSESSMENT — ENCOUNTER SYMPTOMS
FATIGUE: 1
WEAKNESS: 1
MUSCULOSKELETAL NEGATIVE: 1

## 2025-06-04 NOTE — PROGRESS NOTES
"  NAME: Radha Rosas Jr.    MRN: [id number]    YOB: 1950    Event Review:    Patient Outreach Date: 06/04/25  Assessment completed with: Spouse/significant other     The reason for this patient outreach call is to follow up on the patient's discharge from the Emergency Department.    Date of ED Admission: 06/03/25  Date of ED Discharge: 06/03/25  Discharging Facility: [facility]    Patient stated event and/or symptoms that led to the ED visit: Went to ER 2/2 increased pain right knee which seemed to have been twisted/perhaps sprained. He was unable to stand prior to ER.  Reason patient presented to the ED: Other (Could not walk far enough to get down a couple of stairs to get to the car for transport. Then unable to get into car.)  Patient directed to the ED by: Care Manager or other clinician ([facility]/Home Care/EMS) (Unable to stand or bear weight)  Patient seen in the ED in the last 30 days: (!) Yes  Patient admitted in the last 30 days: (!) Yes  Patient feels symptoms were addressed: No    Patient's perception of their health status since discharged: Same (Pain continues and the percocet makes him too sleepy, not taking it.)    Brief PMH: ACM had checked in on patient and spouse yesterday (6/3/25) and they shared his right knee was so painful he was unable to stand or even get up. Spouse was concerned as to how to get him to ER, \"he can't walk and I cannot get him in the car.\" ACM said if he is in that much pain he needed an ambulance for transport. She ultimately did call for an ambulance which did take the pt to the ER. He was there for 5 hrs and had a workup. His diagnosis is a knee sprain and he was given a dose of oxycodone in the ER and a prescription for home oxycodone. Pt was able to walk with medication, but his main issue per spouse is initiating the getting up from any chair or seated position. He can painfully walk he just cannot get up. It took 2 people to get him in the home " yesterday after the ER. Apparently after getting home last evening pt fell asleep and this morning spouse had difficulty waking him She spoke to HH office with her concerns. She was directed to call 911, but preferred not to after the day before.   She finally did get him aroused but he still took a long time to come around. They have decided no more oxycodone and to treat with tylenol only. They have an ortho appt 6/18/25 they are trying to get seen sooner, but she still does not know how he will get into the car.   ACM tried to offer support as they are frustrated and in pain.     Review of Systems   Constitutional:  Positive for fatigue.   Cardiovascular:  Positive for leg swelling (right knee).   Musculoskeletal: Negative.    Neurological:  Positive for weakness.   Other systems assessed in ER 6/3/25. Not assessed today.     Patient received a copy of their discharge instructions: Yes  Were the instructions reviewed with patient prior to discharge: Yes  Patient understands discharge instructions: Yes    New medications prescribed at discharge: Yes  New prescriptions filled: Yes  Medication reconciliation completed: No  Medication education provided: Yes (they are NOT interested in takig the percocet for pain, he passed out asleep yesterday evening and was still groggy this am.)    Support system at home: Spouse, Home Care Staff, Child/Children  Assistance needed with scheduling PCP appointment: No    Patient screened for SDOH: No  Does the patient request any SDOH assistance: No    Reviewed signs/symptoms of worsening condition or complication that necessitate a call to the Physician's office.  Educated patient on access to care.  RN phone number given for future care management.    Shellie Hammond MSN, RN, [facility],   [facility] Management  [phone]

## 2025-06-05 ENCOUNTER — TELEPHONE (OUTPATIENT)
Dept: CARDIOLOGY | Facility: CLINIC | Age: 75
End: 2025-06-05
Payer: MEDICARE

## 2025-06-05 NOTE — TELEPHONE ENCOUNTER
Pt of Dr Moran    Last OV: 11/18/24  Upcoming OV: 6/30/25    PMH: Chronic diastolic(congestive) heart failure, Stage 3a chronic kidney disease, PAF, Hypokalemia, Hypomagnesemia.    Received a call from Jessica, [facility] RN, to report swelling to pt's BLE and open areas on pt's bilateral feet that are oozing.    Jessica saw pt today and noticed +2 edema starting at pt's knees and extending down to pt's feet, bilateral. The instep of pt's feet had open areas that area oozing. Jessica stated pt's bilateral feet are macerated due to wearing wet socks. Jessica cleansed BLE, applied ABD pads, and wrapped BLE. Jessica instructed pt's wife to change BLE dressing daily and PRN.Jessica stated pt does not have a fever and there are no s/s of infection to BLE.    Pt does not elevate BLE. His family plans to purchase him a new recliner to elevate BLE.    Pt did not check his weight today d/t difficulty standing related to right knee pain.   Pt was seen in [facility] ER on 6/3/25 due to right knee pain.  Right Knee Xray was obtained and showed:  4 views of the right knee. No evidence for acute fracture or  dislocation of the right knee. No evidence of joint effusion. Tricompartmental  osteophytosis with moderate lateral compartment joint space narrowing.  Pt discharged home with instructions to follow up with orthopedics, however he has not done so yet.    Weight Trends (Chart review reflects 3/27/25 weight 180 lbs)  5/29/25: 174 lbs  5/31/25: 176 lbs (Last weight obtained by patient)    Pt denies abdominal swelling, distention, bloating, or SOB.    /60, HR 64, Pulse ox 96% on RA    Jessica stated it is a challenge for pt to get in 48 oz of fluid daily. Pt's wife stated pt maintains a CRYSTAL/low sodium diet.    Lasix 20mg daily PRN ordered, however Jessica does not believe pt has taken it due to not being able to weigh himself.    Jessica stated she will contact PCP and pt's podiatrist, however she wanted to notify G provider incase any  other recommendations.    Jessica can be reached at [phone] if needed.  Pt can be reached at [phone].

## 2025-06-06 NOTE — TELEPHONE ENCOUNTER
"SD/TITO- KENYATTA, thank you.    I called the patient's house & spoke to the wife-  I told her we have been updated by HCRN & discussed with team. I advised, per Corey FULLER, to have the patient    \"take the Lasix daily for now, notes from cardiology at hospital visit in May say there is no lower extremity edema\"     She expressed concerns d/t limited mobility/sprained knee & incontinence. I suggested the use of xiomara pads inside the depends, to ease the task on her & her . She reports he already uses something similar. I apologized for the difficult tasks, but explained we need to keep the fluid off to avoid further complications/hospitalization.     She verbalized understanding & thanked me.    "

## 2025-06-16 ENCOUNTER — PATIENT OUTREACH (OUTPATIENT)
Dept: CASE MANAGEMENT | Facility: CLINIC | Age: 75
End: 2025-06-16
Payer: MEDICARE

## 2025-06-16 DIAGNOSIS — I48.91 ATRIAL FIBRILLATION, UNSPECIFIED TYPE (CMS/HCC): ICD-10-CM

## 2025-06-16 NOTE — TELEPHONE ENCOUNTER
Medicine Refill Request    Last Office Visit: Visit date not found   Last Consult Visit: Visit date not found  Last Telemedicine Visit: Visit date not found  escribe xarelto 15 mg Daily # 90 to GIANT     Next Appointment: Visit date not found      Current Outpatient Medications:     albuterol HFA 90 mcg/actuation inhaler, Inhale 2 puffs every 6 (six) hours as needed for wheezing., Disp: 18 g, Rfl: 1    amiodarone (PACERONE) 200 mg tablet, Take 200 mg by mouth 2 (two) times a week (Mon, Fri)., Disp: , Rfl:     atorvastatin (LIPITOR) 10 mg tablet, Take 1 tablet (10 mg total) by mouth daily., Disp: 90 tablet, Rfl: 3    benzonatate (TESSALON) 200 mg capsule, Take 200 mg by mouth 3 (three) times a day as needed for cough., Disp: , Rfl:     busPIRone (BUSPAR) 10 mg tablet, Take 10 mg by mouth 2 (two) times a day., Disp: , Rfl:     cetirizine (ZyrTEC) 10 mg tablet, Take 10 mg by mouth every morning., Disp: , Rfl:     cholecalciferol, vitamin D3, 1,000 unit (25 mcg) tablet, Take 1,000 Units by mouth every morning., Disp: , Rfl:     cyanocobalamin (VITAMIN B12) 1,000 mcg tablet, Take 1 tablet (1,000 mcg total) by mouth daily., Disp: 30 tablet, Rfl: 0    dextromethorphan HBr (DELSYM COUGH ORAL), Take 1 Dose by mouth every 6 (six) hours as needed (cough)., Disp: , Rfl:     dextromethorphan-guaifenesin (ROBITUSSIN-DM)  mg/5 mL syrup, Take 5 mL by mouth every 12 (twelve) hours as needed for cough., Disp: , Rfl:     furosemide (LASIX) 40 mg tablet, Take 0.5 tablets (20 mg total) by mouth daily as needed (if AM standing wt is increased 3 pounds from weight on 5/9/25). 1/2 tablet, Disp: , Rfl:     levothyroxine (SYNTHROID) 50 mcg tablet, Take 50 mcg by mouth daily., Disp: , Rfl:     magnesium oxide (MAG-OX) 400 mg (241.3 mg magnesium) tablet, Take 1 tablet (400 mg total) by mouth 2 (two) times a day., Disp: 180 tablet, Rfl: 1    melatonin 3 mg tablet, Take 3 mg by mouth nightly., Disp: , Rfl:     metoprolol succinate XL  "(TOPROL-XL) 25 mg 24 hr tablet, TAKE ONE TABLET BY MOUTH EVERY EVENING, Disp: 90 tablet, Rfl: 0    mupirocin (BACTROBAN) 2 % ointment, Apply 1 Application topically 2 (two) times a day., Disp: , Rfl:     oxyCODONE-acetaminophen (PERCOCET) 5-325 mg per tablet, Take 1 tablet by mouth every 4 (four) hours as needed for severe pain for up to 10 doses., Disp: 10 tablet, Rfl: 0    pantoprazole (PROTONIX) 40 mg EC tablet, Take 40 mg by mouth 2 (two) times a day before breakfast and dinner., Disp: , Rfl:     rivaroxaban (XARELTO) 15 mg tablet, Take 1 tablet (15 mg total) by mouth daily with dinner., Disp: 30 tablet, Rfl: 0    solifenacin (VESICARE) 5 mg tablet, Take 5 mg by mouth daily., Disp: , Rfl:     tamoxifen (NOLVADEX) 20 mg chemo tablet, Take  1 tablet (20 mg total) daily, Disp: 90 tablet, Rfl: 3    tamsulosin (FLOMAX) 0.4 mg capsule, Take 1 capsule (0.4 mg total) by mouth nightly., Disp: 30 capsule, Rfl: 6    BP Readings from Last 3 Encounters:   06/03/25 135/74   05/08/25 (!) 115/56   04/13/25 (!) 103/58       Recent Lab results:  No results found for: \"CHOL\", No results found for: \"HDL\", No results found for: \"LDLCALC\",   Lab Results   Component Value Date    TRIG 303 (H) 12/27/2023        Lab Results   Component Value Date    GLUCOSE 104 (H) 06/03/2025   , No results found for: \"HGBA1C\"      Lab Results   Component Value Date    CREATININE 1.5 (H) 06/03/2025       Lab Results   Component Value Date    TSH 1.59 04/11/2025         No results found for: \"HGBA1C\"  "

## 2025-06-16 NOTE — PROGRESS NOTES
H2H call placed to pt's home number and message left that we are closing to care management as the 30 day H2H program has reached it's finish.  Wished both pt and spouse well.    Patient's needs for transitioning to home have been met. Closing episode. Encouraged patient to reach out to the physician office for immediate concerns.     Shellie Hammond MSN, RN, [facility],   [facility]ulatory Care Management  [phone]

## 2025-06-23 NOTE — PROGRESS NOTES
" Cardiology Note          HPI   Cam Rosas Jr. \"RADHA\" is a 75 y.o. man who presents followup of heart failure to our advanced heart failure clinic, originally referred by Dr. Aceves.    \"Radha\" has a past medical history of newly diagnosed atrial fibrillation in October with RVR and hospital admission where they also found his EF to be down to 40% s/p new chemotherapy for breast cancer; prostate cancer, CKD Stage 2, recent melena with no active bleeding on EGD, and s/p mitral annual ring placement by Dr. Pastor in 2006.  He was following with Dr. Ra Barajas, cardiologist, for 15 years, who unfortunately passed away late 2022.     hospitals reports he has not had a weak heart nor atrial fibrillation prior to these events in the late fall of 2022. He was cardioverted after being on amiodarone and Xarelto for 4 weeks by Dr. Aceves on December 5th, 2022. Since that time he has had no further atrial fibrillation. He has been feeing much improved as his weight is down to 186 from 204 lbs. He had an increase in his weight for months since he was initiated on chemo and decadron. He had one dose of Cancian T in September and one dose of Taxotere and carboplastin. He has had no further chemo as he did not tolerate these medications and this is what lead to him going into atrial fibrillation.  He had a mastectomy August 2023 with 1 positive node.  He is now on Tamoxifen for his breat cancer and radiation treatments for his prostate cancer. He is post prostatectomy.  He was hospitalized for 3 weeks around the holidays 2023 for respiratory failure secondary to influenza A.  His hospital course was complicated by Tako-tsubo cardiomyopathy, VT and VF requiring multiple shocks that has since recovered.  Cardiac catheterization showed no obstructive disease.  It was so severe he had to undergo a tracheostomy and a PEG tube was placed.  He was then in an LTAC associated with [place] for about 1 month. He was weaned off " the ventilator there and sent to [facility] where he finally was sent home on March 20.     Since his last visit in November, he was hospitalized a few times- about 4 in the last month. In April he was hospitalized with COVID. An echocardiogram revealed a drop in EF to 25-30%. It was felt to be a Takotsubo cardiomyopathy from COVID. Creatinine on discharge is 1.8 and his Xarelto dose was therefore switched from 20 mg daily to 15 mg daily. He was also started on Diovan 40 mg p.o. daily.   He then had a syncopal event later on in April and was hospitalized again.  Initially his Lasix and Diovan were held but resumed on discharge.  He was instructed to take Furosemide 20 mg 3x/week and Diovan 40 mg daily. His discharge weight was 172 lbs. He continued to have hypotension post discharge and after calling our office we changed the Lasix to 20 mg PRN wgt > 180 lbs.  We also stopped Diovan.  In May he was hospitalized again in [facility] with CHF exacerbation. Cardiology attempted to add Ramipril 1.25mg however he refused with his recent hypotensive episodes. A repeat echocardiogram revealed an improved LVEF of 50-55%. Most recently, he was seen in the ER in June with right knee pain and given pain meds. He is not ambulating much with his knee pain.    From a CV standpoint he reports stability. He is getting his legs wrapped once a month, he has LE wounds. He has some weeping as well. He takes Lasix PRN, when his weight is up about 1 lb. This occurs 2-3 times a week.  He does not like to take as it makes him urinate He has no dyspnea. BP running 120s/60s at home. He denies chest pain, dyspnea at rest, orthopnea, PND, edema or abdominal bloating. He denies dizziness or lightheadedness.          Past Medical History:   Diagnosis Date    Acute systolic heart failure (CMS/HCC) 02/15/2023    Ambulates with cane     and walker    Atrial fibrillation (CMS/HCC)     Breast cancer (CMS/HCC) October 2022    CHF (congestive  heart failure) (CMS/HCC)     Chronic kidney disease     CKD (chronic kidney disease) 10/19/2022    History of radiation therapy     Hx antineoplastic chemo     Prostate cancer (CMS/HCC)      Past Surgical History   Procedure Laterality Date    Cardiac surgery      mitral valve repair 2006    Cardioversion  12/05/2022    Successful DC cardioversion    CARDIOVERSION EXTERNAL N/A 12/5/2022    Performed by Gary Aceves MD at [facility]/EP    Cath lab temporary pacemaker insertion N/A 12/25/2023    Performed by Haja Onofre MD at  CARDIAC CATH/EP/NEURO    GASTROSTOMY PERCUTANEOUS ENDOSCOPIC N/A 1/4/2024    Performed by Edison Oneil MD at  OR    Knee cartilage surgery Left     Mastectomy      PORT/PERMACATH REMOVAL Left 11/9/2023    Performed by Barb Osuna MD at  OR    Prostate surgery  July 2022    Prostatectomy  07/15/2022    Right & left heart cath with coronary angiography N/A 12/25/2023    Performed by Haja Onofre MD at  CARDIAC CATH/EP/NEURO    RIGHT BREAST MASTECTOMY; RIGHT SENTINEL NODE IDENTIFICATION, MAPPING, AND BIOPSY; Right 8/17/2023    Performed by Barb Osuna MD at  OR    Tonsillectomy      TRACHEOSTOMY N/A 1/4/2024    Performed by Edison Oneil MD at  OR     Social History     Tobacco Use    Smoking status: Never    Smokeless tobacco: Never   Vaping Use    Vaping status: Never Used   Substance Use Topics    Alcohol use: Yes     Alcohol/week: 14.0 standard drinks of alcohol     Types: 14 Shots of liquor per week     Comment: 1 per day    Drug use: Never       Family Status   Relation Name Status    Bio Mother mother (Not Specified)    Bio Father Dad (Not Specified)    Bio Sister  (Not Specified)    Bio Brother  (Not Specified)    MGM Belle (Not Specified)    MGF  (Not Specified)    PGM  (Not Specified)    PGF  (Not Specified)    Other son (Not Specified)   No partnership data on file        ALLERGIES  Azithromycin, Prednisone, and  Lorazepam      Outpatient Encounter Medications as of 6/30/2025:     rivaroxaban (XARELTO) 15 mg tablet, Take 1 tablet (15 mg total) by mouth daily with dinner.    oxyCODONE-acetaminophen (PERCOCET) 5-325 mg per tablet, Take 1 tablet by mouth every 4 (four) hours as needed for severe pain for up to 10 doses. (Patient not taking: Reported on 6/30/2025)    atorvastatin (LIPITOR) 10 mg tablet, Take 1 tablet (10 mg total) by mouth daily.    albuterol HFA 90 mcg/actuation inhaler, Inhale 2 puffs every 6 (six) hours as needed for wheezing.    furosemide (LASIX) 40 mg tablet, Take 0.5 tablets (20 mg total) by mouth daily as needed (if AM standing wt is increased 3 pounds from weight on 5/9/25). 1/2 tablet    benzonatate (TESSALON) 200 mg capsule, Take 200 mg by mouth 3 (three) times a day as needed for cough.    mupirocin (BACTROBAN) 2 % ointment, Apply 1 Application topically 2 (two) times a day.    dextromethorphan HBr (DELSYM COUGH ORAL), Take 1 Dose by mouth every 6 (six) hours as needed (cough).    dextromethorphan-guaifenesin (ROBITUSSIN-DM)  mg/5 mL syrup, Take 5 mL by mouth every 12 (twelve) hours as needed for cough.    solifenacin (VESICARE) 5 mg tablet, Take 5 mg by mouth daily.    metoprolol succinate XL (TOPROL-XL) 25 mg 24 hr tablet, TAKE ONE TABLET BY MOUTH EVERY EVENING    [DISCONTINUED] rivaroxaban (XARELTO) 15 mg tablet, Take 1 tablet (15 mg total) by mouth daily with dinner.    cyanocobalamin (VITAMIN B12) 1,000 mcg tablet, Take 1 tablet (1,000 mcg total) by mouth daily.    melatonin 3 mg tablet, Take 3 mg by mouth nightly.    amiodarone (PACERONE) 200 mg tablet, Take 200 mg by mouth 2 (two) times a week (Mon, Fri).    levothyroxine (SYNTHROID) 50 mcg tablet, Take 50 mcg by mouth daily.    magnesium oxide (MAG-OX) 400 mg (241.3 mg magnesium) tablet, Take 1 tablet (400 mg total) by mouth 2 (two) times a day.    tamsulosin (FLOMAX) 0.4 mg capsule, Take 1 capsule (0.4 mg total) by mouth nightly.     "cetirizine (ZyrTEC) 10 mg tablet, Take 10 mg by mouth every morning.    tamoxifen (NOLVADEX) 20 mg chemo tablet, Take  1 tablet (20 mg total) daily    pantoprazole (PROTONIX) 40 mg EC tablet, Take 40 mg by mouth 2 (two) times a day before breakfast and dinner.    busPIRone (BUSPAR) 10 mg tablet, Take 10 mg by mouth 2 (two) times a day.    cholecalciferol, vitamin D3, 1,000 unit (25 mcg) tablet, Take 1,000 Units by mouth every morning.    ROS as above in HPI.  Additionally, swollen knees.  Otherwise all relevant systems were reviewed and are negative.    Objective   Visit Vitals  /70 (BP Location: Left upper arm, Patient Position: Sitting)   Pulse (!) 58   Ht 1.651 m (5' 5\")   Wt 79.8 kg (176 lb)   SpO2 95%   BMI 29.29 kg/m²           Wt Readings from Last 3 Encounters:   06/30/25 79.8 kg (176 lb)   06/03/25 79.4 kg (175 lb)   05/08/25 78.2 kg (172 lb 6.4 oz)       Physical Exam  HENT:      Head: Normocephalic.      Comments: Bandage to the top of the head noted.     Nose: Nose normal.   Neck:      Vascular: No carotid bruit or JVD.      Comments: JVP <8cm (not visible sitting upright in wheelchair)  Cardiovascular:      Rate and Rhythm: Normal rate and regular rhythm.      Pulses: Intact distal pulses.      Heart sounds: Normal heart sounds, S1 normal and S2 normal. No murmur heard.     No friction rub. No gallop.   Pulmonary:      Effort: No respiratory distress.      Breath sounds: Normal breath sounds. No wheezing or rales.   Abdominal:      Palpations: Abdomen is soft.   Musculoskeletal:         General: Swelling (1+ b/l LE edema to lower shin with venous stasis changes) present. Normal range of motion.      Right lower leg: Edema present.      Left lower leg: Edema present.      Comments: 1+ BLE edema, legs wrapped with ACE bandages.   Skin:     General: Skin is warm and dry.   Neurological:      Mental Status: He is alert and oriented to person, place, and time.   Psychiatric:         Behavior: Behavior " "normal.         Judgment: Judgment normal.         Lab Results   Component Value Date    GLUCOSE 104 (H) 06/03/2025    CALCIUM 9.3 06/03/2025     06/03/2025    K 4.5 06/03/2025    CO2 28 06/03/2025     06/03/2025    BUN 16 06/03/2025    CREATININE 1.5 (H) 06/03/2025     Lab Results   Component Value Date    ALT 5 (L) 06/03/2025    AST 14 06/03/2025    ALKPHOS 43 06/03/2025    BILITOT 0.6 06/03/2025     Lab Results   Component Value Date    WBC 5.92 06/03/2025    HGB 11.1 (L) 06/03/2025    HCT 36.4 (L) 06/03/2025    .3 (H) 06/03/2025     06/03/2025     Lab Results   Component Value Date    TSH 1.59 04/11/2025     No results found for: \"CHOL\"  No results found for: \"HDL\"  No results found for: \"LDLCALC\"  Lab Results   Component Value Date    TRIG 303 (H) 12/27/2023    TRIG 234 (H) 12/27/2023     No results found for: \"CHOLHDL\"  No results found for: \"HGBA1C\"  Labs October 2022:\      Labs May 18, 2024:  Sodium 145, potassium 4.6, BUN 19, creatinine 1.38, LFTs normal, hemoglobin 11.6, cholesterol 185, HDL 75, triglycerides 106, LDL 90, TSH 3.07, PSA undetectable.     EKG    Performed on 6/30/25 and personally reviewed:  Sinus bradycardia at 59 bpm  Right superior axis deviation   Nonspecific intraventricular conduction delay   Nonspecific T wave abnormality     Imaging  TTE Sept 2022  Conclusions:   Ejection fraction is visually estimated at 50-55 %. No regional wall motion abnormalities   were appreciated on today's study.   Strain evaluation was technically difficult due to presence of PVC.   The mitral valve leaflets are status post repair. A mitral annuloplasty ring is present.   Mild mitral regurgitation.   Estimated PASP is 33 mmHg. No evidence of pulmonary hypertension.   Indeterminate rhythm on ECG. cannot rule out AF. Clinical correlation suggested.   No prior for comparison.     TTE October 2022  Conclusions:   Ejection fraction is visually estimated at 40-45 %. There is global left " ventricular   hypokinesis. Patient was tachycardiac with a rate of 130 bpm.   Mild mitral annular calcification. The mitral valve leaflets are status post repair. A   mitral annuloplasty ring is present. Mild mitral regurgitation.   Compared to prior echocardiogram, EF has now declined from 55% to 40%.     TTE April 2023    Left Ventricle: Normal ventricle size. Mild concentric left ventricular hypertrophy. No outflow tract obstruction present. Low normal systolic function. Estimated EF 55%. Wall motion appears grossly normal. Grade II diastolic dysfunction.    Right Ventricle: Normal ventricle size. Increased wall thickness. Normal systolic function.    Left Atrium: Moderately dilated atrium. Cannot exclude patent foramen ovale (PFO).    Right Atrium: Mildly dilated atrium.    Aortic Valve: Tricuspid valve.  Sclerotic leaflets. Trace regurgitation. No stenosis.    Mitral Valve: Mitral annuloplasty ring present. Mild regurgitation.    Tricuspid Valve: Normal structure. Mild regurgitation. The regurgitation jet is eccentric. Estimated RVSP = 52 mmHg.    Pulmonic Valve: Normal structure. Trace regurgitation. Mildly dilated pulmonary artery.    Aorta: Aortic root normal. Sinuses of Valsalva normal-sized. Ascending aorta normal-sized.    IVC/SVC: Inferior vena cava is <2.1cm. Inferior vena cava demonstrates normal respiratory collapse.    Pericardium: Normal structure.    Compared with echo report from October 2022, LV function has normalized.    I personally reviewed results with him today in the office.    TTE 11/27/2023:    Left Ventricle: Normal ventricle size. Mild concentric left ventricular hypertrophy. No outflow tract obstruction present. Low normal systolic function. Estimated EF 55%. Wall motion appears grossly normal. Grade II diastolic dysfunction.    Right Ventricle: Mildly to moderately dilated ventricle size. Increased wall thickness. RVOT doppler shows VTI =10, possible systolic notching, time to peak  acceleration of 70-80ms. This suggests normal output and elevated pulmonary vascular resistance. RV s'=10cm/s. TAPSE=2.1cm. Normal systolic function.    Left Atrium: Moderately dilated atrium.    Right Atrium: Mildly dilated atrium.    Aortic Valve: Tricuspid valve.  Sclerotic leaflets. Trace regurgitation. No stenosis. Calculated dimensionless index = 0.63.    Mitral Valve: Mitral annuloplasty ring present. Mild regurgitation. The jet is centrally directed.    Tricuspid Valve: Normal structure. Mild regurgitation. The regurgitation jet is eccentric. Estimated RVSP = 54 mmHg.    Pulmonic Valve: Normal structure. Trace regurgitation. Mildly dilated pulmonary artery.    Aorta: Aortic root normal. Sinuses of Valsalva normal-sized. Ascending aorta normal-sized.    IVC/SVC: Inferior vena cava is <2.1cm. Inferior vena cava collapses <50% during inspiration.    Pericardium: There is a trivial circumferential pericardial effusion.    Compared with April 2023, RV is now dilated.    I personally reviewed results with him today in the office.    Left and right heart catheterization December 2023:    Angiographically normal epicardial coronary arteries    Left ventricular ejection fraction is approximately 25-30%.Wall motion abnormalities consistent with Takotsubo Cardiomyopathy    Severely elevated biventricular filling pressures    Reduced Cardiac Output and Index; Reduced Stroke volume and stroke volume index by Chelsi Calculation    Pulmonary venous post-capillary hypertension primarily due to left heart dysfunction    No peak to peak gradient on pullback to suggest aortic stenosis     Echo 12/26/2023:    Left Ventricle: Normal ventricle size. Normal wall thickness. Preserved systolic function. Estimated EF 40-45%. Hypokinesis of the apex. 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    Right Ventricle: Normal ventricle size. Pacer wire present. Normal systolic  function.    Right Atrium: Normal sized atrium.    Aortic Valve: Tricuspid valve. Trace regurgitation. No stenosis.    Mitral Valve: Normal leaflet structure. Normal leaflet motion. Trace regurgitation. No stenosis.    Tricuspid Valve: Normal structure. Mild regurgitation. Estimated RVSP = 43 mmHg. No significant stenosis.    Pulmonic Valve: Normal structure. No regurgitation. No stenosis.    Aorta: Aortic root normal. Sinuses of Valsalva normal-sized. Ascending aorta normal-sized. Aortic arch normal-sized. Descending aorta normal-sized.    Pericardium: Normal structure. No evidence of pericardial effusion. No cardiac tamponade.    Left Atrium: Mildly dilated atrium.     Echo 1/2/2024:    Left Ventricle: Normal ventricle size. Normal wall thickness. Estimated EF 60-65%. No regional wall motion abnormalities.    Limited followup study shows normal LV function now- compared with apical hypokinesis on 12/26/23     TTE 4/7/25    Left Ventricle: Normal ventricle size. Normal wall thickness. Muscle mass is normal. Estimated EF 25-30%. Preserved funciton of the basal walls with persistent akinesis of the mid and distal walls. Diastolic function: patient in a-fib.    Right Ventricle: Normal ventricle size. Normal wall thickness. Mildly reduced systolic function. TAPSE 14 mm.    Left Atrium: Mildly dilated atrium.    Right Atrium: Mildly dilated atrium.    Aortic Valve: Tricuspid valve.  Focal calcification present. Trace regurgitation. No stenosis.    Tricuspid Valve: Normal structure. Trace regurgitation. Estimated RVSP = 35 mmHg.    Aorta: Aortic root normal. Ascending aorta normal-sized. Mild dilatation at the sinuses of Valsalva.    IVC/SVC: Inferior vena cava is a small caliber vessel (<1.7cm). Inferior vena cava collapses >50% during inspiration.    TTE 5/7/25  Left ventricle: Grossly preserved systolic function.  Visualized LVEF 50 to 55%.  Abnormal septal motion.  Right ventricle: Top normal in size.  Hypokinetic.   Reduced TAPSE.  Mitral valve: Sclerotic.  Annular calcification.  Mild regurgitation.  Aortic valve: Trileaflet.  Sclerotic.  Trace aortic regurgitation.  Tricuspid valve: Grossly normal leaflet morphology.  Mild tricuspid regurgitation.  RVSP mildly elevated 38 mmHg.  Pulmonic valve: Grossly normal leaflet morphology.  Inferior vena cava: Normal size with less than 50% inspiratory collapse.  Left atrium: Moderately dilated  Pericardial effusion: None  Previous available study from 4/7/2025 suggested ejection fraction of 25 to 30% suggesting improvement in LV performance.      Assessment   1.  Chronic combined systolic and diastolic heart failure, ACC Stage C, NYHA class 2. HFrecEF. LVEF 50-55%  Acute change within one month in with the only change being new atrial fibrillation with RVR at the time of the echocardiogram in October 2022.  Repeat echocardiogram shows his ejection fraction has rebounded from 40% back to his baseline of 55%.  He also had Tako-tsubo cardiomyopathy when critically ill December 2023 that recovered fully before discharge.  He had yet again another acute drop 4/2025 with COVID, TTE revealed LVEF of 25-30% but again recovered back to baseline 1 month later.   He is taking torsemide 2-3  times a week. Daily likely too much as JVP not elevated. It appears he needs it more with his weeping legs  and we again advised him that take it more often as needed for edema.  Otherwise examines relatively compensated. Did not tolerate low dose ACE- (hypotension) but rebounded anyway.    2. Paroxysmal atrial fibrillation  Rhythm control and following with Dr. Aceves. On Xarelto, now on 15 mg daily.    3.  CKD Stage 2-3a  GFR has now been below 50 consistently, Xarelto decreased from 20 mg daily to 15 mg daily. We will follow labs.    4. GI bleed  EGD with no active bleeding.    5. Breast and Prostate Cancer  I reviewed interim correspondence.  Things are stable.    6.  Long-term use of amiodarone  TSH and  LFTs were checked  and were essentially normal. We will recheck labs.    7.  Hypokalemia  He did not tolerate the pills so is using the powder.  Although he was supposed to take daily he is only been taking every other day which improved his potassium to 4.7.  I therefore asked him to take it only 3 times per week. Now off it,so we will check labs    8.  Hypomagnesemia  Resolved with taking magnesium oxide 400 mg twice daily.  He has not had GI side effects.       By signing my name below, I, Tila El, attest that this documentation has been prepared under the direction and in the presence of MD Sienna Soriano, JONNY Bowens  6/30/2025     I attest that this visit supports the complexity inherent to evaluation and management associated with medical care services that serve as the continuing focal point for all needed health care services and/or medical care services that are part of ongoing care related to this patient's single, serious condition or a complex condition.    Thank you for allowing me to participate in the care of this patient.  I reviewed numerous hospital notes, as well as notes from nephrology, electrophysiology, and breast surgery.  I reviewed interim labs.  I ordered labs. I will plan to see him back in 6 months or sooner should the need arise.     I, Gary Moran MD personally performed the services described in this documentation as scribed by SUE El in my presence, and it is both accurate and complete. Please do not hesitate to contact me with any questions or concerns.    Sincerely,    Gary Moran MD  6/30/2025

## 2025-06-30 ENCOUNTER — TELEPHONE (OUTPATIENT)
Dept: CARDIOLOGY | Facility: CLINIC | Age: 75
End: 2025-06-30
Payer: MEDICARE

## 2025-06-30 ENCOUNTER — DOCUMENTATION (OUTPATIENT)
Dept: CARDIOLOGY | Facility: CLINIC | Age: 75
End: 2025-06-30
Payer: MEDICARE

## 2025-06-30 ENCOUNTER — OFFICE VISIT (OUTPATIENT)
Dept: CARDIOLOGY | Facility: CLINIC | Age: 75
End: 2025-06-30
Payer: MEDICARE

## 2025-06-30 VITALS
BODY MASS INDEX: 29.32 KG/M2 | HEART RATE: 58 BPM | WEIGHT: 176 LBS | DIASTOLIC BLOOD PRESSURE: 70 MMHG | SYSTOLIC BLOOD PRESSURE: 138 MMHG | HEIGHT: 65 IN | OXYGEN SATURATION: 95 %

## 2025-06-30 DIAGNOSIS — C61 PROSTATE CANCER (CMS/HCC): ICD-10-CM

## 2025-06-30 DIAGNOSIS — E83.42 HYPOMAGNESEMIA: ICD-10-CM

## 2025-06-30 DIAGNOSIS — E78.5 HYPERLIPIDEMIA, UNSPECIFIED HYPERLIPIDEMIA TYPE: ICD-10-CM

## 2025-06-30 DIAGNOSIS — I51.81 TAKOTSUBO CARDIOMYOPATHY: ICD-10-CM

## 2025-06-30 DIAGNOSIS — R60.0 LOCALIZED EDEMA: ICD-10-CM

## 2025-06-30 DIAGNOSIS — I49.01 VENTRICULAR FIBRILLATION (CMS/HCC): ICD-10-CM

## 2025-06-30 DIAGNOSIS — E87.6 HYPOKALEMIA: ICD-10-CM

## 2025-06-30 DIAGNOSIS — I50.42 CHRONIC COMBINED SYSTOLIC AND DIASTOLIC CHF (CONGESTIVE HEART FAILURE) (CMS/HCC): Primary | ICD-10-CM

## 2025-06-30 DIAGNOSIS — Z87.19 HISTORY OF GI BLEED: ICD-10-CM

## 2025-06-30 DIAGNOSIS — N18.31 STAGE 3A CHRONIC KIDNEY DISEASE (CMS/HCC): ICD-10-CM

## 2025-06-30 DIAGNOSIS — E87.8 ELECTROLYTE ABNORMALITY: ICD-10-CM

## 2025-06-30 DIAGNOSIS — I46.9 CARDIAC ARREST (CMS/HCC): ICD-10-CM

## 2025-06-30 DIAGNOSIS — I48.0 PAROXYSMAL ATRIAL FIBRILLATION (CMS/HCC): ICD-10-CM

## 2025-06-30 LAB
ATRIAL RATE: 59
P AXIS: 22
PR INTERVAL: 158
QRS DURATION: 122
QT INTERVAL: 488
QTC CALCULATION(BAZETT): 483
R AXIS: 222
T WAVE AXIS: -78
VENTRICULAR RATE: 59

## 2025-06-30 PROCEDURE — 93000 ELECTROCARDIOGRAM COMPLETE: CPT | Performed by: INTERNAL MEDICINE

## 2025-06-30 NOTE — LETTER
"June 30, 2025     Usman Collins MD  [facility] [address]    Patient: Cam Rosas Jr.  YOB: 1950  Date of Visit: 6/30/2025      Dear Dr. Collins:    Thank you for referring Cam Rosas Jr. to me for evaluation. Below are my notes for this consultation.    If you have questions, please do not hesitate to call me. I look forward to following your patient along with you.         Sincerely,        Gary Moran MD        CC: MD Sienna Ngo Kathleen J, CRNP  6/30/2025 12:21 PM  Sign when Signing Visit   Cardiology Note          HPI   Cam Rosas Jr. \"CHOPS\" is a 75 y.o. man who presents followup of heart failure to our advanced heart failure clinic, originally referred by Dr. Aceves.    \"Chops\" has a past medical history of newly diagnosed atrial fibrillation in October with RVR and hospital admission where they also found his EF to be down to 40% s/p new chemotherapy for breast cancer; prostate cancer, CKD Stage 2, recent melena with no active bleeding on EGD, and s/p mitral annual ring placement by Dr. Pastor in 2006.  He was following with Dr. Ra Barajas, cardiologist, for 15 years, who unfortunately passed away late 2022.     [facility] reports he has not had a weak heart nor atrial fibrillation prior to these events in the late fall of 2022. He was cardioverted after being on amiodarone and Xarelto for 4 weeks by Dr. Aceves on December 5th, 2022. Since that time he has had no further atrial fibrillation. He has been feeing much improved as his weight is down to 186 from 204 lbs. He had an increase in his weight for months since he was initiated on chemo and decadron. He had one dose of Cancian T in September and one dose of Taxotere and carboplastin. He has had no further chemo as he did not tolerate these medications and this is what lead to him going into atrial fibrillation.  He had a mastectomy August 2023 with 1 positive node.  He is now on " Tamoxifen for his breat cancer and radiation treatments for his prostate cancer. He is post prostatectomy.  He was hospitalized for 3 weeks around the holidays 2023 for respiratory failure secondary to influenza A.  His hospital course was complicated by Tako-tsubo cardiomyopathy, VT and VF requiring multiple shocks that has since recovered.  Cardiac catheterization showed no obstructive disease.  It was so severe he had to undergo a tracheostomy and a PEG tube was placed.  He was then in an LTAC associated with [facility] for about 1 month. He was weaned off the ventilator there and sent to [facility] rehab where he finally was sent home on March 20.     Since his last visit in November, he was hospitalized a few times- about 4 in the last month. In April he was hospitalized with COVID. An echocardiogram revealed a drop in EF to 25-30%. It was felt to be a Takotsubo cardiomyopathy from COVID. Creatinine on discharge is 1.8 and his Xarelto dose was therefore switched from 20 mg daily to 15 mg daily. He was also started on Diovan 40 mg p.o. daily.   He then had a syncopal event later on in April and was hospitalized again.  Initially his Lasix and Diovan were held but resumed on discharge.  He was instructed to take Furosemide 20 mg 3x/week and Diovan 40 mg daily. His discharge weight was 172 lbs. He continued to have hypotension post discharge and after calling our office we changed the Lasix to 20 mg PRN wgt > 180 lbs.  We also stopped Diovan.  In May he was hospitalized again in [facility] with CHF exacerbation. Cardiology attempted to add Ramipril 1.25mg however he refused with his recent hypotensive episodes. A repeat echocardiogram revealed an improved LVEF of 50-55%. Most recently, he was seen in the ER in June with right knee pain and given pain meds. He is not ambulating much with his knee pain.    From a CV standpoint he reports stability. He is getting his legs wrapped once a month, he has LE wounds.  He has some weeping as well. He takes Lasix PRN, when his weight is up about 1 lb. This occurs 2-3 times a week.  He does not like to take as it makes him urinate He has no dyspnea. BP running 120s/60s at home. He denies chest pain, dyspnea at rest, orthopnea, PND, edema or abdominal bloating. He denies dizziness or lightheadedness.          Past Medical History:   Diagnosis Date   • Acute systolic heart failure (CMS/HCC) 02/15/2023   • Ambulates with cane     and walker   • Atrial fibrillation (CMS/HCC)    • Breast cancer (CMS/HCC) October 2022   • CHF (congestive heart failure) (CMS/HCC)    • Chronic kidney disease    • CKD (chronic kidney disease) 10/19/2022   • History of radiation therapy    • Hx antineoplastic chemo    • Prostate cancer (CMS/HCC)      Past Surgical History   Procedure Laterality Date   • Cardiac surgery      mitral valve repair 2006   • Cardioversion  12/05/2022    Successful DC cardioversion   • CARDIOVERSION EXTERNAL N/A 12/5/2022    Performed by Gary Aceves MD at [facility] [place]   • Cath lab temporary pacemaker insertion N/A 12/25/2023    Performed by Haja Onofre MD at  CARDIAC CATH/EP/NEURO   • GASTROSTOMY PERCUTANEOUS ENDOSCOPIC N/A 1/4/2024    Performed by Edison Oneil MD at  OR   • Knee cartilage surgery Left    • Mastectomy     • PORT/PERMACATH REMOVAL Left 11/9/2023    Performed by Barb Osuna MD at  OR   • Prostate surgery  July 2022   • Prostatectomy  07/15/2022   • Right & left heart cath with coronary angiography N/A 12/25/2023    Performed by Haja Onofre MD at  CARDIAC CATH/EP/NEURO   • RIGHT BREAST MASTECTOMY; RIGHT SENTINEL NODE IDENTIFICATION, MAPPING, AND BIOPSY; Right 8/17/2023    Performed by Barb Osuna MD at  OR   • Tonsillectomy     • TRACHEOSTOMY N/A 1/4/2024    Performed by Edison Oneil MD at  OR     Social History     Tobacco Use   • Smoking status: Never   • Smokeless tobacco: Never   Vaping Use   •  Vaping status: Never Used   Substance Use Topics   • Alcohol use: Yes     Alcohol/week: 14.0 standard drinks of alcohol     Types: 14 Shots of liquor per week     Comment: 1 per day   • Drug use: Never       Family Status   Relation Name Status   • Bio Mother mother (Not Specified)   • Bio Father Dad (Not Specified)   • Bio Sister  (Not Specified)   • Bio Brother  (Not Specified)   • MGM Belle (Not Specified)   • MGF  (Not Specified)   • PGM  (Not Specified)   • PGF  (Not Specified)   • Other son (Not Specified)   No partnership data on file        ALLERGIES  Azithromycin, Prednisone, and Lorazepam      Outpatient Encounter Medications as of 6/30/2025:   •  rivaroxaban (XARELTO) 15 mg tablet, Take 1 tablet (15 mg total) by mouth daily with dinner.  •  oxyCODONE-acetaminophen (PERCOCET) 5-325 mg per tablet, Take 1 tablet by mouth every 4 (four) hours as needed for severe pain for up to 10 doses. (Patient not taking: Reported on 6/30/2025)  •  atorvastatin (LIPITOR) 10 mg tablet, Take 1 tablet (10 mg total) by mouth daily.  •  albuterol HFA 90 mcg/actuation inhaler, Inhale 2 puffs every 6 (six) hours as needed for wheezing.  •  furosemide (LASIX) 40 mg tablet, Take 0.5 tablets (20 mg total) by mouth daily as needed (if AM standing wt is increased 3 pounds from weight on 5/9/25). 1/2 tablet  •  benzonatate (TESSALON) 200 mg capsule, Take 200 mg by mouth 3 (three) times a day as needed for cough.  •  mupirocin (BACTROBAN) 2 % ointment, Apply 1 Application topically 2 (two) times a day.  •  dextromethorphan HBr (DELSYM COUGH ORAL), Take 1 Dose by mouth every 6 (six) hours as needed (cough).  •  dextromethorphan-guaifenesin (ROBITUSSIN-DM)  mg/5 mL syrup, Take 5 mL by mouth every 12 (twelve) hours as needed for cough.  •  solifenacin (VESICARE) 5 mg tablet, Take 5 mg by mouth daily.  •  metoprolol succinate XL (TOPROL-XL) 25 mg 24 hr tablet, TAKE ONE TABLET BY MOUTH EVERY EVENING  •  [DISCONTINUED] rivaroxaban  "(XARELTO) 15 mg tablet, Take 1 tablet (15 mg total) by mouth daily with dinner.  •  cyanocobalamin (VITAMIN B12) 1,000 mcg tablet, Take 1 tablet (1,000 mcg total) by mouth daily.  •  melatonin 3 mg tablet, Take 3 mg by mouth nightly.  •  amiodarone (PACERONE) 200 mg tablet, Take 200 mg by mouth 2 (two) times a week (Mon, Fri).  •  levothyroxine (SYNTHROID) 50 mcg tablet, Take 50 mcg by mouth daily.  •  magnesium oxide (MAG-OX) 400 mg (241.3 mg magnesium) tablet, Take 1 tablet (400 mg total) by mouth 2 (two) times a day.  •  tamsulosin (FLOMAX) 0.4 mg capsule, Take 1 capsule (0.4 mg total) by mouth nightly.  •  cetirizine (ZyrTEC) 10 mg tablet, Take 10 mg by mouth every morning.  •  tamoxifen (NOLVADEX) 20 mg chemo tablet, Take  1 tablet (20 mg total) daily  •  pantoprazole (PROTONIX) 40 mg EC tablet, Take 40 mg by mouth 2 (two) times a day before breakfast and dinner.  •  busPIRone (BUSPAR) 10 mg tablet, Take 10 mg by mouth 2 (two) times a day.  •  cholecalciferol, vitamin D3, 1,000 unit (25 mcg) tablet, Take 1,000 Units by mouth every morning.    ROS as above in HPI.  Additionally, swollen knees.  Otherwise all relevant systems were reviewed and are negative.    Objective   Visit Vitals  /70 (BP Location: Left upper arm, Patient Position: Sitting)   Pulse (!) 58   Ht 1.651 m (5' 5\")   Wt 79.8 kg (176 lb)   SpO2 95%   BMI 29.29 kg/m²           Wt Readings from Last 3 Encounters:   06/30/25 79.8 kg (176 lb)   06/03/25 79.4 kg (175 lb)   05/08/25 78.2 kg (172 lb 6.4 oz)       Physical Exam  HENT:      Head: Normocephalic.      Comments: Bandage to the top of the head noted.     Nose: Nose normal.   Neck:      Vascular: No carotid bruit or JVD.      Comments: JVP <8cm (not visible sitting upright in wheelchair)  Cardiovascular:      Rate and Rhythm: Normal rate and regular rhythm.      Pulses: Intact distal pulses.      Heart sounds: Normal heart sounds, S1 normal and S2 normal. No murmur heard.     No friction " "rub. No gallop.   Pulmonary:      Effort: No respiratory distress.      Breath sounds: Normal breath sounds. No wheezing or rales.   Abdominal:      Palpations: Abdomen is soft.   Musculoskeletal:         General: Swelling (1+ b/l LE edema to lower shin with venous stasis changes) present. Normal range of motion.      Right lower leg: Edema present.      Left lower leg: Edema present.      Comments: 1+ BLE edema, legs wrapped with ACE bandages.   Skin:     General: Skin is warm and dry.   Neurological:      Mental Status: He is alert and oriented to person, place, and time.   Psychiatric:         Behavior: Behavior normal.         Judgment: Judgment normal.         Lab Results   Component Value Date    GLUCOSE 104 (H) 06/03/2025    CALCIUM 9.3 06/03/2025     06/03/2025    K 4.5 06/03/2025    CO2 28 06/03/2025     06/03/2025    BUN 16 06/03/2025    CREATININE 1.5 (H) 06/03/2025     Lab Results   Component Value Date    ALT 5 (L) 06/03/2025    AST 14 06/03/2025    ALKPHOS 43 06/03/2025    BILITOT 0.6 06/03/2025     Lab Results   Component Value Date    WBC 5.92 06/03/2025    HGB 11.1 (L) 06/03/2025    HCT 36.4 (L) 06/03/2025    .3 (H) 06/03/2025     06/03/2025     Lab Results   Component Value Date    TSH 1.59 04/11/2025     No results found for: \"CHOL\"  No results found for: \"HDL\"  No results found for: \"LDLCALC\"  Lab Results   Component Value Date    TRIG 303 (H) 12/27/2023    TRIG 234 (H) 12/27/2023     No results found for: \"CHOLHDL\"  No results found for: \"HGBA1C\"  Labs October 2022:\      Labs May 18, 2024:  Sodium 145, potassium 4.6, BUN 19, creatinine 1.38, LFTs normal, hemoglobin 11.6, cholesterol 185, HDL 75, triglycerides 106, LDL 90, TSH 3.07, PSA undetectable.     EKG    Performed on 6/30/25 and personally reviewed:  Sinus bradycardia at 59 bpm  Right superior axis deviation   Nonspecific intraventricular conduction delay   Nonspecific T wave abnormality     Imaging  TTE Sept " 2022  Conclusions:   Ejection fraction is visually estimated at 50-55 %. No regional wall motion abnormalities   were appreciated on today's study.   Strain evaluation was technically difficult due to presence of PVC.   The mitral valve leaflets are status post repair. A mitral annuloplasty ring is present.   Mild mitral regurgitation.   Estimated PASP is 33 mmHg. No evidence of pulmonary hypertension.   Indeterminate rhythm on ECG. cannot rule out AF. Clinical correlation suggested.   No prior for comparison.     TTE October 2022  Conclusions:   Ejection fraction is visually estimated at 40-45 %. There is global left ventricular   hypokinesis. Patient was tachycardiac with a rate of 130 bpm.   Mild mitral annular calcification. The mitral valve leaflets are status post repair. A   mitral annuloplasty ring is present. Mild mitral regurgitation.   Compared to prior echocardiogram, EF has now declined from 55% to 40%.     TTE April 2023  •  Left Ventricle: Normal ventricle size. Mild concentric left ventricular hypertrophy. No outflow tract obstruction present. Low normal systolic function. Estimated EF 55%. Wall motion appears grossly normal. Grade II diastolic dysfunction.  •  Right Ventricle: Normal ventricle size. Increased wall thickness. Normal systolic function.  •  Left Atrium: Moderately dilated atrium. Cannot exclude patent foramen ovale (PFO).  •  Right Atrium: Mildly dilated atrium.  •  Aortic Valve: Tricuspid valve.  Sclerotic leaflets. Trace regurgitation. No stenosis.  •  Mitral Valve: Mitral annuloplasty ring present. Mild regurgitation.  •  Tricuspid Valve: Normal structure. Mild regurgitation. The regurgitation jet is eccentric. Estimated RVSP = 52 mmHg.  •  Pulmonic Valve: Normal structure. Trace regurgitation. Mildly dilated pulmonary artery.  •  Aorta: Aortic root normal. Sinuses of Valsalva normal-sized. Ascending aorta normal-sized.  •  IVC/SVC: Inferior vena cava is <2.1cm. Inferior vena cava  demonstrates normal respiratory collapse.  •  Pericardium: Normal structure.  •  Compared with echo report from October 2022, LV function has normalized.  •  I personally reviewed results with him today in the office.    TTE 11/27/2023:  •  Left Ventricle: Normal ventricle size. Mild concentric left ventricular hypertrophy. No outflow tract obstruction present. Low normal systolic function. Estimated EF 55%. Wall motion appears grossly normal. Grade II diastolic dysfunction.  •  Right Ventricle: Mildly to moderately dilated ventricle size. Increased wall thickness. RVOT doppler shows VTI =10, possible systolic notching, time to peak acceleration of 70-80ms. This suggests normal output and elevated pulmonary vascular resistance. RV s'=10cm/s. TAPSE=2.1cm. Normal systolic function.  •  Left Atrium: Moderately dilated atrium.  •  Right Atrium: Mildly dilated atrium.  •  Aortic Valve: Tricuspid valve.  Sclerotic leaflets. Trace regurgitation. No stenosis. Calculated dimensionless index = 0.63.  •  Mitral Valve: Mitral annuloplasty ring present. Mild regurgitation. The jet is centrally directed.  •  Tricuspid Valve: Normal structure. Mild regurgitation. The regurgitation jet is eccentric. Estimated RVSP = 54 mmHg.  •  Pulmonic Valve: Normal structure. Trace regurgitation. Mildly dilated pulmonary artery.  •  Aorta: Aortic root normal. Sinuses of Valsalva normal-sized. Ascending aorta normal-sized.  •  IVC/SVC: Inferior vena cava is <2.1cm. Inferior vena cava collapses <50% during inspiration.  •  Pericardium: There is a trivial circumferential pericardial effusion.  •  Compared with April 2023, RV is now dilated.  •  I personally reviewed results with him today in the office.    Left and right heart catheterization December 2023:  •  Angiographically normal epicardial coronary arteries  •  Left ventricular ejection fraction is approximately 25-30%.Wall motion abnormalities consistent with Takotsubo Cardiomyopathy  •   Severely elevated biventricular filling pressures  •  Reduced Cardiac Output and Index; Reduced Stroke volume and stroke volume index by Chelsi Calculation  •  Pulmonary venous post-capillary hypertension primarily due to left heart dysfunction  •  No peak to peak gradient on pullback to suggest aortic stenosis     Echo 12/26/2023:  •  Left Ventricle: Normal ventricle size. Normal wall thickness. Preserved systolic function. Estimated EF 40-45%. Hypokinesis of the apex. 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  •  Right Ventricle: Normal ventricle size. Pacer wire present. Normal systolic function.  •  Right Atrium: Normal sized atrium.  •  Aortic Valve: Tricuspid valve. Trace regurgitation. No stenosis.  •  Mitral Valve: Normal leaflet structure. Normal leaflet motion. Trace regurgitation. No stenosis.  •  Tricuspid Valve: Normal structure. Mild regurgitation. Estimated RVSP = 43 mmHg. No significant stenosis.  •  Pulmonic Valve: Normal structure. No regurgitation. No stenosis.  •  Aorta: Aortic root normal. Sinuses of Valsalva normal-sized. Ascending aorta normal-sized. Aortic arch normal-sized. Descending aorta normal-sized.  •  Pericardium: Normal structure. No evidence of pericardial effusion. No cardiac tamponade.  •  Left Atrium: Mildly dilated atrium.     Echo 1/2/2024:  •  Left Ventricle: Normal ventricle size. Normal wall thickness. Estimated EF 60-65%. No regional wall motion abnormalities.  •  Limited followup study shows normal LV function now- compared with apical hypokinesis on 12/26/23     TTE 4/7/25  •  Left Ventricle: Normal ventricle size. Normal wall thickness. Muscle mass is normal. Estimated EF 25-30%. Preserved funciton of the basal walls with persistent akinesis of the mid and distal walls. Diastolic function: patient in a-fib.  •  Right Ventricle: Normal ventricle size. Normal wall thickness. Mildly reduced systolic function. TAPSE  14 mm.  •  Left Atrium: Mildly dilated atrium.  •  Right Atrium: Mildly dilated atrium.  •  Aortic Valve: Tricuspid valve.  Focal calcification present. Trace regurgitation. No stenosis.  •  Tricuspid Valve: Normal structure. Trace regurgitation. Estimated RVSP = 35 mmHg.  •  Aorta: Aortic root normal. Ascending aorta normal-sized. Mild dilatation at the sinuses of Valsalva.  •  IVC/SVC: Inferior vena cava is a small caliber vessel (<1.7cm). Inferior vena cava collapses >50% during inspiration.    TTE 5/7/25  Left ventricle: Grossly preserved systolic function.  Visualized LVEF 50 to 55%.  Abnormal septal motion.  Right ventricle: Top normal in size.  Hypokinetic.  Reduced TAPSE.  Mitral valve: Sclerotic.  Annular calcification.  Mild regurgitation.  Aortic valve: Trileaflet.  Sclerotic.  Trace aortic regurgitation.  Tricuspid valve: Grossly normal leaflet morphology.  Mild tricuspid regurgitation.  RVSP mildly elevated 38 mmHg.  Pulmonic valve: Grossly normal leaflet morphology.  Inferior vena cava: Normal size with less than 50% inspiratory collapse.  Left atrium: Moderately dilated  Pericardial effusion: None  Previous available study from 4/7/2025 suggested ejection fraction of 25 to 30% suggesting improvement in LV performance.      Assessment   1.  Chronic combined systolic and diastolic heart failure, ACC Stage C, NYHA class 2. HFrecEF. LVEF 50-55%  Acute change within one month in with the only change being new atrial fibrillation with RVR at the time of the echocardiogram in October 2022.  Repeat echocardiogram shows his ejection fraction has rebounded from 40% back to his baseline of 55%.  He also had Tako-tsubo cardiomyopathy when critically ill December 2023 that recovered fully before discharge.  He had yet again another acute drop 4/2025 with COVID, TTE revealed LVEF of 25-30% but again recovered back to baseline 1 month later.   He is taking torsemide 2-3  times a week. Daily likely too much as JVP  not elevated. It appears he needs it more with his weeping legs  and we again advised him that take it more often as needed for edema.  Otherwise examines relatively compensated. Did not tolerate low dose ACE- (hypotension) but rebounded anyway.    2. Paroxysmal atrial fibrillation  Rhythm control and following with Dr. Aceves. On Xarelto, now on 15 mg daily.    3.  CKD Stage 2-3a  GFR has now been below 50 consistently, Xarelto decreased from 20 mg daily to 15 mg daily. We will follow labs.    4. GI bleed  EGD with no active bleeding.    5. Breast and Prostate Cancer  I reviewed interim correspondence.  Things are stable.    6.  Long-term use of amiodarone  TSH and LFTs were checked  and were essentially normal. We will recheck labs.    7.  Hypokalemia  He did not tolerate the pills so is using the powder.  Although he was supposed to take daily he is only been taking every other day which improved his potassium to 4.7.  I therefore asked him to take it only 3 times per week. Now off it,so we will check labs    8.  Hypomagnesemia  Resolved with taking magnesium oxide 400 mg twice daily.  He has not had GI side effects.       By signing my name below, I, Tila El, attest that this documentation has been prepared under the direction and in the presence of MD Sienna Soriano, JONNY Bowens  6/30/2025     I attest that this visit supports the complexity inherent to evaluation and management associated with medical care services that serve as the continuing focal point for all needed health care services and/or medical care services that are part of ongoing care related to this patient's single, serious condition or a complex condition.    Thank you for allowing me to participate in the care of this patient.  I reviewed numerous hospital notes, as well as notes from nephrology, electrophysiology, and breast surgery.  I reviewed interim labs.  I ordered labs. I will plan to see him back in  6 months or sooner should the need arise.     I, Gary Moran MD personally performed the services described in this documentation as scribed by SUE El in my presence, and it is both accurate and complete. Please do not hesitate to contact me with any questions or concerns.    Sincerely,    Gary Moran MD  6/30/2025

## 2025-06-30 NOTE — TELEPHONE ENCOUNTER
AIM team: When you get a chance please post his RPM readings below (if he has) and if not please send his HC visit VS per SUE El's request. TY

## 2025-07-11 RX ORDER — METOPROLOL SUCCINATE 25 MG/1
25 TABLET, EXTENDED RELEASE ORAL EVERY EVENING
Qty: 90 TABLET | Refills: 0 | Status: SHIPPED | OUTPATIENT
Start: 2025-07-11

## 2025-07-11 NOTE — TELEPHONE ENCOUNTER
A refill request was received from [facility] for Metoprolol succinate 25 mg nightly.    Last OV w/ Dr. Moran was on 6/30/25.  Upcoming appt 1/26/26.    Refill sent after chart review.     SD/APPs: Pls confirm he should be on it, not mentioned in the plan. TY

## 2025-07-14 ENCOUNTER — TELEPHONE (OUTPATIENT)
Dept: SURGERY | Facility: CLINIC | Age: 75
End: 2025-07-14
Payer: MEDICARE

## 2025-07-14 NOTE — TELEPHONE ENCOUNTER
Patient cancelled 6 month f/u appt scheduled today. I called and spoke to his wife regarding rescheduling, she asked to call me back. I gave her my direct # to call me back.

## 2025-07-15 ENCOUNTER — DOCUMENTATION (OUTPATIENT)
Dept: RADIATION ONCOLOGY | Facility: HOSPITAL | Age: 75
End: 2025-07-15
Payer: MEDICARE

## 2025-07-15 NOTE — PROGRESS NOTES
EOT 11/1/23  Prostate 39 fxs 5040 cGY with 1980 cGY boost.   Also, hx Breast - right CW EOT 6/7/23.     LOV with Dr. [facility] 11/21/24:  Continue follow-up and treatment with medical oncology for breast cancer and continue follow-up with myself for prostate cancer with PSA in 6 months. Pt continues on Tamoxifen.     5/5/25 ED at  for Acute Resp. Failure  *has been in and out of hospital since 4/07/25*    3/20/25  PSA  <0.04    7/22/25 Right Breast Ultrasound *pending*    Upcoming appts:  Will see Dr. Gale 7/24/25 @ 330 pm   Will see Dr. Osuna 7/30/25

## 2025-07-22 ENCOUNTER — HOSPITAL ENCOUNTER (OUTPATIENT)
Dept: RADIOLOGY | Age: 75
Discharge: HOME | End: 2025-07-22
Attending: INTERNAL MEDICINE
Payer: MEDICARE

## 2025-07-22 DIAGNOSIS — Z17.0 MALIGNANT NEOPLASM INVOLVING BOTH NIPPLE AND AREOLA OF RIGHT BREAST IN MALE, ESTROGEN RECEPTOR POSITIVE (CMS/HCC): ICD-10-CM

## 2025-07-22 DIAGNOSIS — C50.021 MALIGNANT NEOPLASM INVOLVING BOTH NIPPLE AND AREOLA OF RIGHT BREAST IN MALE, ESTROGEN RECEPTOR POSITIVE (CMS/HCC): ICD-10-CM

## 2025-07-22 PROCEDURE — 76642 ULTRASOUND BREAST LIMITED: CPT | Mod: RT

## 2025-07-24 ENCOUNTER — HOSPITAL ENCOUNTER (OUTPATIENT)
Dept: RADIATION ONCOLOGY | Facility: HOSPITAL | Age: 75
Setting detail: RADIATION/ONCOLOGY SERIES
Discharge: HOME | End: 2025-07-24
Attending: RADIOLOGY
Payer: MEDICARE

## 2025-07-24 ENCOUNTER — TELEPHONE (OUTPATIENT)
Dept: SURGERY | Facility: CLINIC | Age: 75
End: 2025-07-24
Payer: MEDICARE

## 2025-07-24 ENCOUNTER — OFFICE VISIT (OUTPATIENT)
Facility: HOSPITAL | Age: 75
End: 2025-07-24
Attending: INTERNAL MEDICINE
Payer: MEDICARE

## 2025-07-24 VITALS
WEIGHT: 180 LBS | SYSTOLIC BLOOD PRESSURE: 128 MMHG | DIASTOLIC BLOOD PRESSURE: 68 MMHG | TEMPERATURE: 98.1 F | HEART RATE: 69 BPM | OXYGEN SATURATION: 96 % | HEIGHT: 65 IN | BODY MASS INDEX: 29.99 KG/M2

## 2025-07-24 VITALS
SYSTOLIC BLOOD PRESSURE: 141 MMHG | BODY MASS INDEX: 29.95 KG/M2 | HEART RATE: 63 BPM | DIASTOLIC BLOOD PRESSURE: 62 MMHG | WEIGHT: 180 LBS | OXYGEN SATURATION: 96 %

## 2025-07-24 DIAGNOSIS — M79.89 ARM SWELLING: ICD-10-CM

## 2025-07-24 DIAGNOSIS — Z15.01 BRCA1 POSITIVE: ICD-10-CM

## 2025-07-24 DIAGNOSIS — Z17.0 MALIGNANT NEOPLASM INVOLVING BOTH NIPPLE AND AREOLA OF RIGHT BREAST IN MALE, ESTROGEN RECEPTOR POSITIVE (CMS/HCC): ICD-10-CM

## 2025-07-24 DIAGNOSIS — Z15.09 BRCA1 POSITIVE: ICD-10-CM

## 2025-07-24 DIAGNOSIS — C50.021 MALIGNANT NEOPLASM INVOLVING BOTH NIPPLE AND AREOLA OF RIGHT BREAST IN MALE, ESTROGEN RECEPTOR POSITIVE (CMS/HCC): Primary | ICD-10-CM

## 2025-07-24 DIAGNOSIS — Z17.0 MALIGNANT NEOPLASM INVOLVING BOTH NIPPLE AND AREOLA OF RIGHT BREAST IN MALE, ESTROGEN RECEPTOR POSITIVE (CMS/HCC): Primary | ICD-10-CM

## 2025-07-24 DIAGNOSIS — C61 PROSTATE CANCER (CMS/HCC): ICD-10-CM

## 2025-07-24 DIAGNOSIS — R22.31 LOCALIZED SWELLING, MASS AND LUMP, RIGHT UPPER LIMB: ICD-10-CM

## 2025-07-24 DIAGNOSIS — C50.021 MALIGNANT NEOPLASM INVOLVING BOTH NIPPLE AND AREOLA OF RIGHT BREAST IN MALE, ESTROGEN RECEPTOR POSITIVE (CMS/HCC): ICD-10-CM

## 2025-07-24 DIAGNOSIS — C61 PROSTATE CANCER (CMS/HCC): Primary | ICD-10-CM

## 2025-07-24 PROCEDURE — G8752 SYS BP LESS 140: HCPCS | Performed by: INTERNAL MEDICINE

## 2025-07-24 PROCEDURE — G8754 DIAS BP LESS 90: HCPCS | Performed by: INTERNAL MEDICINE

## 2025-07-24 PROCEDURE — G0463 HOSPITAL OUTPT CLINIC VISIT: HCPCS | Performed by: INTERNAL MEDICINE

## 2025-07-24 PROCEDURE — 99214 OFFICE O/P EST MOD 30 MIN: CPT | Performed by: INTERNAL MEDICINE

## 2025-07-24 RX ORDER — GENTAMICIN SULFATE 1 MG/G
OINTMENT TOPICAL
COMMUNITY
Start: 2025-05-28

## 2025-07-24 ASSESSMENT — ENCOUNTER SYMPTOMS
MYALGIAS: 1
MUSCULOSKELETAL NEGATIVE: 1
LEG SWELLING: 1
PSYCHIATRIC NEGATIVE: 1
HEMATOLOGIC/LYMPHATIC NEGATIVE: 1
NEUROLOGICAL NEGATIVE: 1
CONSTITUTIONAL NEGATIVE: 1
NEUROLOGICAL NEGATIVE: 1
GASTROINTESTINAL NEGATIVE: 1
CONSTITUTIONAL NEGATIVE: 1
ENDOCRINE NEGATIVE: 1
WOUND: 1
RESPIRATORY NEGATIVE: 1
DEPRESSION: 0
OCCASIONAL FEELINGS OF UNSTEADINESS: 1
GASTROINTESTINAL NEGATIVE: 1
FREQUENCY: 1
RESPIRATORY NEGATIVE: 1
PSYCHIATRIC NEGATIVE: 1
EYES NEGATIVE: 1
ARTHRALGIAS: 1
CARDIOVASCULAR NEGATIVE: 1
LOSS OF SENSATION IN FEET: 0
DIFFICULTY URINATING: 1

## 2025-07-24 ASSESSMENT — PATIENT HEALTH QUESTIONNAIRE - PHQ9: SUM OF ALL RESPONSES TO PHQ9 QUESTIONS 1 & 2: 0

## 2025-07-24 ASSESSMENT — PAIN SCALES - GENERAL
PAINLEVEL_OUTOF10: 5
PAINLEVEL_OUTOF10: 5

## 2025-07-24 NOTE — LETTER
July 24, 2025                                                          Patient: Radha Rosas Jr.   YOB: 1950   Date of Visit: 7/24/2025       Dear Dr. Clolins:    The patient is seen at the [facility] today. Attached is my assessment and plan of care.  Thank you for the opportunity to share in Radha Rosas Jr.'s care.       Sincerely,        Gregory J. [facility], MD      CC: No Recipients

## 2025-07-24 NOTE — PROGRESS NOTES
Patient ID:  Radha Rosas Jr. is a 75 y.o. male.  1950   Diagnosis Plan   1. Prostate cancer (CMS/HCC)  PSA    PSA    PSA      2. 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    Primary Care Provider:  Usman Collins MD    Problem List:  Patient Active Problem List    Diagnosis Date Noted    Takotsubo cardiomyopathy 05/06/2025    Acute respiratory failure with hypoxia (CMS/HCC) 05/06/2025    Acute on chronic respiratory failure with hypoxia and hypercapnia (CMS/HCC) 05/06/2025    Hypoxia 05/05/2025    Syncope and collapse 04/12/2025    Near syncope 04/12/2025    Elevated troponin 04/09/2025    Scalp wound 04/08/2025    Takotsubo cardiomyopathy 04/08/2025    History of prolonged Q-T interval on ECG 04/08/2025    Hyperkalemia 04/08/2025    Acute respiratory failure with hypoxia and hypercapnia (CMS/HCC) 04/07/2025    COVID-19 04/07/2025    Acute on chronic systolic congestive heart failure (CMS/HCC) 04/07/2025    Paroxysmal atrial fibrillation (CMS/HCC) 04/07/2025    History of cardiac arrest 04/07/2025    Prostate cancer (CMS/HCC) 04/07/2025    Breast cancer (CMS/HCC) 04/07/2025    History of peptic ulcer disease 04/07/2025    BPH (benign prostatic hyperplasia) 04/07/2025    Hyperlipidemia 04/07/2025    Hypothyroidism 04/07/2025    Anemia 04/07/2025    Multiple open wounds of lower extremity 04/07/2025    Varicose veins of right lower extremity with inflammation 01/02/2025    Varicose veins of left lower extremity with inflammation 01/02/2025    Takotsubo cardiomyopathy 01/10/2024    Pneumonia 01/10/2024    Elevated LFTs 01/10/2024    Tracheostomy in place (CMS/[facility]) 01/10/2024    Status post insertion of percutaneous endoscopic gastrostomy (PEG) tube (CMS/[facility]) 01/10/2024    Severe sepsis (CMS/[facility])  12/24/2023    Influenza A 12/24/2023    Cardiac arrest (CMS/HCC) 12/24/2023    Moderate protein-calorie malnutrition (CMS/HCC) 12/24/2023    Long term current use of amiodarone 10/02/2023    Acute renal failure superimposed on stage 3a chronic kidney disease (CMS/HCC) 10/02/2023    BRCA1 positive 08/28/2023    Monoallelic mutation of [place] gene 08/28/2023    Herniation of lumbar intervertebral disc with radiculopathy 07/18/2023    Spinal stenosis of lumbar region 07/18/2023    Spondylosis of lumbosacral region 07/18/2023    Acute systolic heart failure (CMS/HCC) 02/15/2023    Paroxysmal atrial fibrillation (CMS/HCC) 10/19/2022    Chronic combined systolic and diastolic CHF (congestive heart failure) (CMS/HCC) 10/19/2022    Malignant neoplasm of right male breast (CMS/[facility]) 10/19/2022    Electrolyte abnormality 10/19/2022    Gastrointestinal hemorrhage with melena 10/19/2022    CKD (chronic kidney disease) 10/19/2022    Prostate cancer (CMS/[facility]) 10/19/2022    Dehydration determined by examination 10/10/2022    APC (atrial premature contractions) 03/30/2022    Essential hypertension 08/31/2021    Hyperlipidemia 08/31/2021    Stage 3a chronic kidney disease (CMS/HCC) 08/31/2021        Cancer Staging   Malignant neoplasm of right male breast (CMS/HCC)  Staging form: Breast, AJCC 8th Edition  - Clinical stage from 3/20/2023: Stage IA (cT1c, cN0, cM0, G3, ER+, ME+, HER2+) - Signed by Barb Osuna MD on 3/20/2023  - Pathologic stage from 8/28/2023: No Stage Recommended (ypT1c, pN1a(sn), cM0, G3, ER+, ME+, HER2+) - Signed by Barb Osuna MD on 8/28/2023    Prostate cancer (CMS/[facility])  Staging form: Prostate, AJCC 8th Edition  - Pathologic stage from 7/27/2022: Stage Unknown (pT3b, pNX, cM0, PSA: 6, Grade Group: 3) - Signed by [facility], Gregory J, MD on 3/8/2023      Subjective    History of Present Illness:  The following portions of the patient's history were reviewed and updated as appropriate:  allergies, current medications, past family history, past medical history, past social history, and past surgical history.   HPI presents for follow-up assessment.  Patient complains of new right arm swelling.  Recent ultrasound of right chest wall shows fluid consistent with seroma.  Prior CAT scan showed no evidence of malignancy.  PSA not done.  Follow-up with Dr. Hernandez regarding urinary incontinence wearing a diaper because of unable to control urination Flomax did not solve the problem.  Physical therapy for right knee pain from arthritis with difficulty walking.  Review of Systems   Constitutional: Negative.    HENT:  Negative.     Respiratory: Negative.     Cardiovascular: Negative.    Gastrointestinal: Negative.    Genitourinary:  Positive for bladder incontinence and frequency.    Musculoskeletal:  Positive for arthralgias (knee left recent cortisone injection) and myalgias.   Skin: Negative.    Neurological: Negative.    Psychiatric/Behavioral: Negative.          -pt had recent cortisone injections in left knee   -pt has new swelling in right arm side he had breast cancer in.     Objective      Physical Exam:  Vital Signs for this encounter:  BP: 141/62  Heart Rate: 63  SpO2: 96 %  Weight: 81.6 kg (180 lb) (07/24 1454)    Physical Exam female sitting in wheelchair no apparent distress.  Bandages on scalp from recent Mohs surgery.  Diapers in place.  Abdomen soft nontender.  Back nontender.  Lungs clear.  Performance Status:70     PAIN ASSESSMENT:  Score Five    Location KNEE (left)    Pain Intervention      LABS:  No results found for this or any previous visit (from the past 2 weeks).       Assessment/Plan patient with history of synchronous breast and prostate cancer status post definitive radiation therapy with hormone therapy for T3b prostate cancer stopped hormones early secondary to side effects.  Patient now with urinary incontinence secondary to urgency will follow-up with Dr. Hernandez his urologist.   Patient given prescription for PSA to call for results.  New right arm swelling consistent with lymphedema likely secondary to prior surgery and radiation therapy.  Physical therapy for treatment was recommended and given prescription.  Follow-up with Dr. Gale his oncologist and is seeing her today.  See me back in 3 months.    Diagnoses and Orders Associated With This Visit:  There are no diagnoses linked to this encounter.  TREATMENT PLAN SUMMARY:  Radiation Treatments       Active   No active radiation treatments to show.     Historical   Plans   1a_part pelv   Most recent treatment: Dose planned: 180 cGy (fraction 28 of 28 on 5/22/2023)   Total: Dose planned: 5,040 cGy   Elapsed Days: 55 days as of 2023-06-07 @ 14:2817      1b_cd prosbed   Most recent treatment: Dose planned: 180 cGy (fraction 11 of 11 on 6/7/2023)   Total: Dose planned: 1,980 cGy   Elapsed Days: 55 days as of 2023-06-07 @ 14:2817      2a_right nds   Most recent treatment: Dose planned: 200 cGy (fraction 25 of 25 on 11/1/2023)   Total: Dose planned: 5,000 cGy   Elapsed Days: 34 days as of 2023-11-01 @ 10:3209      2b_R CW NoBol   Most recent treatment: Dose planned: 200 cGy (fraction 10 of 10 on 11/1/2023)   Total: Dose planned: 2,000 cGy   Elapsed Days: 34 days as of 2023-11-01 @ 10:3209      2b_right CW   Most recent treatment: Dose planned: 200 cGy (fraction 15 of 15 on 10/18/2023)   Total: Dose planned: 5,000 cGy   Elapsed Days: 34 days as of 2023-11-01 @ 10:3209      1a_part pelv   Most recent treatment: Dose planned: 180 cGy (fraction 0 of 28 on 10/18/2023)   Total: Dose planned: 5,040 cGy   Elapsed Days: : @ --      1a_part pelv1   Most recent treatment: Dose planned: 180 cGy (fraction 0 of 28 on 10/18/2023)   Total: Dose planned: 5,040 cGy   Elapsed Days: : @ --      1b_cd prosbd1   Most recent treatment: Dose planned: 180 cGy (fraction 0 of 11 on 10/18/2023)   Total: Dose planned: 1,980 cGy   Elapsed Days: : @ --      1b_cd prosbed    Most recent treatment: Dose planned: 180 cGy (fraction 0 of 11 on 10/18/2023)   Total: Dose planned: 1,980 cGy   Elapsed Days: : @ --      2b_RtCWBol 15   Most recent treatment: Dose planned: 200 cGy (fraction 0 of 15 on 10/18/2023)   Total: Dose planned: 3,000 cGy   Elapsed Days: : @ --      2 flds   Most recent treatment: Dose planned: -- (fraction 0 of 25 on 9/18/2023)   Total: Dose planned: --   Elapsed Days: : @ --      2a_rt nodEZ0   Most recent treatment: Dose planned: 200 cGy (fraction 0 of 25 on 9/18/2023)   Total: Dose planned: 5,000 cGy   Elapsed Days: : @ --      2a_rt nodes   Most recent treatment: Dose planned: 200 cGy (fraction 0 of 25 on 9/18/2023)   Total: Dose planned: 5,000 cGy   Elapsed Days: : @ --      2b_flds   Most recent treatment: Dose planned: 200 cGy (fraction 0 of 25 on 9/18/2023)   Total: Dose planned: 5,000 cGy   Elapsed Days: : @ --      2b_fldsEZ0   Most recent treatment: Dose planned: 200 cGy (fraction 0 of 25 on 9/18/2023)   Total: Dose planned: 5,000 cGy   Elapsed Days: : @ --      Reference Points   1_calc   Most recent treatment: Dose given: 183 cGy (on 6/7/2023)   Total: Dose given: 7,127 cGy   Elapsed Days: 55 days as of 2023-06-07 @ 14:2817      1a_part pelv   Most recent treatment: Dose given: 180 cGy (on 5/22/2023)   Total: Dose given: 5,040 cGy   Elapsed Days: 55 days as of 2023-06-07 @ 14:2817      1b_cd prosbed   Most recent treatment: Dose given: 180 cGy (on 6/7/2023)   Total: Dose given: 1,980 cGy   Elapsed Days: 55 days as of 2023-06-07 @ 14:2817      1c_trgt prosbed   Most recent treatment: Dose given: 180 cGy (on 6/7/2023)   Total: Dose given: 7,020 cGy   Elapsed Days: 55 days as of 2023-06-07 @ 14:2817      2a_calc Nodes   Most recent treatment: Dose given: 200 cGy (on 11/1/2023)   Total: Dose given: 5,000 cGy   Elapsed Days: 34 days as of 2023-11-01 @ 10:3209      2a_rt nodes   Most recent treatment: Dose given: 200 cGy (on 11/1/2023)   Total: Dose given: 5,000  cGy   Elapsed Days: 34 days as of 2023-11-01 @ 10:3209      2b_calc CW   Most recent treatment: Dose given: 202 cGy (on 11/1/2023)   Total: Dose given: 5,051 cGy   Elapsed Days: 34 days as of 2023-11-01 @ 10:3209      2b_right CW   Most recent treatment: Dose given: 200 cGy (on 11/1/2023)   Total: Dose given: 5,000 cGy   Elapsed Days: 34 days as of 2023-11-01 @ 10:3209      1_calc   Most recent treatment: Course completed on 10/18/2023   Total: Dose given: 0 cGy   Elapsed Days: : @ --      1a_part pelv   Most recent treatment: Course completed on 10/18/2023   Total: Dose given: 0 cGy   Elapsed Days: : @ --      1b_cd prosbed   Most recent treatment: Course completed on 10/18/2023   Total: Dose given: 0 cGy   Elapsed Days: : @ --      1c_trgt prosbed   Most recent treatment: Course completed on 10/18/2023   Total: Dose given: 0 cGy   Elapsed Days: : @ --      2b_calc CW   Most recent treatment: Course completed on 10/18/2023   Total: Dose given: 0 cGy   Elapsed Days: : @ --      2b_right CW   Most recent treatment: Course completed on 10/18/2023   Total: Dose given: 0 cGy   Elapsed Days: : @ --      2a_calc Nodes   Most recent treatment: Course completed on 9/18/2023   Total: Dose given: 0 cGy   Elapsed Days: : @ --      2a_rt nodes   Most recent treatment: Course completed on 9/18/2023   Total: Dose given: 0 cGy   Elapsed Days: : @ --      2b_calc CW   Most recent treatment: Course completed on 9/18/2023   Total: Dose given: 0 cGy   Elapsed Days: : @ --                     PO CHEMOTHERAPY:  Tamoxifen 20mg daily

## 2025-07-24 NOTE — ASSESSMENT & PLAN NOTE
Has new right upper extremity swelling which is likely related to lymphedema from prior breast cancer treatment.  To be cautious I have recommended an ultrasound to rule out DVT.  He is already on Xarelto for cardiac condition.  Will follow-up with him by phone when the ultrasound results are available.  He has been given a prescription for lymphedema physical therapy by Dr. [facility].   No

## 2025-07-24 NOTE — ASSESSMENT & PLAN NOTE
Patient has a history of both breast and prostate cancer and BRCA1 mutation.  With regards to screening for contralateral breast cancer, we discussed recommendations for men are less clear than for women.  Patient is not interested in breast MRI as he is unable to tolerate MRI testing.  We reviewed his mammogram from November 2024.  He will be due for his annual mammogram in November 2025.

## 2025-07-24 NOTE — ASSESSMENT & PLAN NOTE
July 27, 2022 underwent prostatectomy for prostate cancer at outside institution.  Oakman score 4+3.  February 2023 PSA increasing.  Urology recommended radiation therapy.  Received Lupron while receiving treatment with radiation.  Completed radiotherapy June 2023.    He is going to continue surveillance under the direction of radiation oncology and his urologist.  Most recent PSA from March 2025 was undetectable.  He will be having follow-up blood work later this month.

## 2025-07-24 NOTE — ASSESSMENT & PLAN NOTE
Diagnosed with invasive ductal carcinoma of the right breast, grade 3, ER positive MD positive HER2/jhonatan +3.  Initiated 1 cycle TCHP in September 2022 at [facility].  Questionable reaction to the anti-HER2/jhonatan therapy.  Second cycle with Taxotere and carboplatin alone.  Patient hospitalized.  Patient declined further intravenous systemic therapy for his breast cancer.  Had multiple medical issues including cardiac issues.  Was agreeable to initiating tamoxifen November 2022.  Remained on tamoxifen while underwent treatment for his prostate cancer.  Right mastectomy performed August 17, 2023.  Pathologic stage ypT1c N1a, ER positive, MD positive and HER2/jhonatan positive.  Completed adjuvant radiation to the chest wall and axilla in November 2023.    Medically he is stable without any new signs or symptoms to suggest recurrence.  No evidence of recurrence on exam.  He continues to be followed expectantly.  He continues on tamoxifen and is not having any side effects.  I have asked him to return to see me in 6 months.  We reviewed his mammogram from November 2024 and recommendations for 1 year follow-up which will be due November 2025.  He was asked to call with questions or concerns prior to his next visit.

## 2025-07-24 NOTE — PROGRESS NOTES
Review of Systems   Constitutional: Negative.    HENT:  Negative.     Eyes: Negative.    Respiratory: Negative.     Cardiovascular:  Positive for leg swelling.   Gastrointestinal: Negative.    Endocrine: Negative.    Genitourinary:  Positive for difficulty urinating (seeing urologist. Going to let Dr. Gale know which medication is prescribed).    Musculoskeletal: Negative.    Skin:  Positive for wound (on back of both legs).   Neurological: Negative.    Hematological: Negative.    Psychiatric/Behavioral: Negative.

## 2025-07-24 NOTE — PROGRESS NOTES
Cam Rosas Jr. is a 75 y.o. male,   :  1950    Encounter Diagnoses   Name Primary?    Prostate cancer (CMS/HCC)     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, estrogen receptor positive (CMS/HCC) Yes    Arm swelling     Localized swelling, mass and lump, right upper limb     BRCA1 positive         Cancer Staging   Malignant neoplasm of right male breast (CMS/HCC)  Staging form: Breast, AJCC 8th Edition  - Clinical stage from 3/20/2023: Stage IA (cT1c, cN0, cM0, G3, ER+, NH+, HER2+) - Signed by Barb Osuna MD on 3/20/2023  - Pathologic stage from 2023: No Stage Recommended (ypT1c, pN1a(sn), cM0, G3, ER+, NH+, HER2+) - Signed by Babr Osuna MD on 2023    Prostate cancer (CMS/HCC)  Staging form: Prostate, AJCC 8th Edition  - Pathologic stage from 2022: Stage Unknown (pT3b, pNX, cM0, PSA: 6, Grade Group: 3) - Signed by [facility], Gregory J, MD on 3/8/2023      Treatment Plans       No treatment plans exist            Oncology History   Malignant neoplasm of right male breast (CMS/HCC)   2022 Biopsy     1.  Right breast mass:     Invasive ductal carcinoma, Mumford grade 3 (3+ 3+2).   Invasive carcinoma is 13 mm in maximum dimension in core biopsy.    ER+ 90%; NH+ 50%; [id number]+3; [id number]%     2022 -  Chemotherapy    2022 initiated first cycle TCP H at [facility].  Questionable reaction during the anti-HER2/jhonatan therapy with Herceptin.  Taxotere and carboplatin given on 10/3/2022.  Patient hospitalized after first cycle.     10/19/2022 Initial Diagnosis    Malignant neoplasm of right male breast (CMS/HCC)     11/15/2022 -  Hormone Therapy    Tamoxifen 20 mg daily while undergoes evaluation of cardiac issues     3/20/2023 Cancer Staged    Staging form: Breast, AJCC 8th Edition  - Clinical stage from 3/20/2023: Stage IA (cT1c, cN0, cM0, G3, ER+, NH+, HER2+)     2023 Cancer Staged    Staging form: Breast,  AJCC 8th Edition  - Pathologic stage from 8/28/2023: No Stage Recommended (ypT1c, pN1a(sn), cM0, G3, ER+, AR+, HER2+)     Prostate cancer (CMS/HCC)   7/27/2022 Cancer Staged    Staging form: Prostate, AJCC 8th Edition  - Pathologic stage from 7/27/2022: Stage Unknown (pT3b, pNX, cM0, PSA: 6, Grade Group: 3)     3/21/2023 - 6/20/2023 Chemotherapy    LEUPROLIDE (LUPRON) 22.5 MG EVERY 3 MONTHS  Plan Provider: Paz Cleveland MD     3/21/2023 -  Chemotherapy    MEDIPORT FLUSH (SINGLE LUMEN) - PATIENT ON TREATMENT  Plan Provider: Paz Cleveland MD     3/21/2023 - 3/21/2023 Chemotherapy    LEUPROLIDE (LUPRON) 22.5 MG EVERY 3 MONTHS  Plan Provider: Paz Cleveland MD         Patient Active Problem List   Diagnosis    Paroxysmal atrial fibrillation (CMS/HCC)    Chronic combined systolic and diastolic CHF (congestive heart failure) (CMS/HCC)    Malignant neoplasm of right male breast (CMS/HCC)    Electrolyte abnormality    Gastrointestinal hemorrhage with melena    CKD (chronic kidney disease)    Prostate cancer (CMS/HCC)    Acute systolic heart failure (CMS/HCC)    APC (atrial premature contractions)    Dehydration determined by examination    Essential hypertension    Herniation of lumbar intervertebral disc with radiculopathy    Hyperlipidemia    Spinal stenosis of lumbar region    Spondylosis of lumbosacral region    Stage 3a chronic kidney disease (CMS/HCC)    BRCA1 positive    Monoallelic mutation of OK gene    Long term current use of amiodarone    Acute renal failure superimposed on stage 3a chronic kidney disease (CMS/HCC)    Severe sepsis (CMS/HCC)    Influenza A    Cardiac arrest (CMS/HCC)    Moderate protein-calorie malnutrition (CMS/HCC)    Takotsubo cardiomyopathy    Pneumonia    Elevated LFTs    Tracheostomy in place (CMS/HCC)    Status post insertion of percutaneous endoscopic gastrostomy (PEG) tube (CMS/HCC)    Varicose veins of right lower extremity with inflammation    Varicose veins of left  lower extremity with inflammation    Acute respiratory failure with hypoxia and hypercapnia (CMS/HCC)    COVID-19    Acute on chronic systolic congestive heart failure (CMS/HCC)    Paroxysmal atrial fibrillation (CMS/HCC)    History of cardiac arrest    Prostate cancer (CMS/HCC)    Breast cancer (CMS/HCC)    History of peptic ulcer disease    BPH (benign prostatic hyperplasia)    Hyperlipidemia    Hypothyroidism    Anemia    Multiple open wounds of lower extremity    Scalp wound    Takotsubo cardiomyopathy    History of prolonged Q-T interval on ECG    Hyperkalemia    Elevated troponin    Syncope and collapse    Near syncope    Hypoxia    Takotsubo cardiomyopathy    Acute respiratory failure with hypoxia (CMS/HCC)    Acute on chronic respiratory failure with hypoxia and hypercapnia (CMS/HCC)    Arm swelling       History of Present Illness  HPI  Cam Rosas Jr. is seen today in follow up.  He was hospitalized in April of this year and during that hospitalization had a CT scan of the chest to evaluate for pulmonary embolism.  There was no evidence of pulmonary embolism but there was findings of right chest wall soft tissue density.  I reviewed this findings and a follow-up ultrasound was recommended which was performed yesterday.  This showed a complex fluid collection adjacent to the mastectomy scar likely posttreatment seroma.  Patient is not having any fevers, chills redness and has not noted any change in the right chest wall.  He has recently developed swelling in his right arm which reports started about a week ago.  He saw Dr. [facility] earlier today who gave him a prescription for physical therapy for lymphedema.    Review of Systems - Oncology    Review of Systems:  Nursing assessment reviewed. Pertinent positive and negative symptoms noted in HPI, all others negative.    Temp:  [36.7 °C (98.1 °F)] 36.7 °C (98.1 °F)  Heart Rate:  [63-69] 69  BP: (128-141)/(62-68) 128/68  Visit Vitals  /68 (BP  "Location: Left upper arm, Patient Position: Sitting)   Pulse 69   Temp 36.7 °C (98.1 °F) (Temporal)   Ht 1.651 m (5' 5\")   Wt 81.6 kg (180 lb)   SpO2 96%   BMI 29.95 kg/m²     Physical Exam  Constitutional:       General: He is not in acute distress.     Appearance: He is well-developed.   HENT:      Head: Normocephalic.      Mouth/Throat:      Pharynx: Oropharynx is clear. No oropharyngeal exudate.   Eyes:      General: No scleral icterus.     Pupils: Pupils are equal, round, and reactive to light.   Cardiovascular:      Rate and Rhythm: Normal rate and regular rhythm.   Pulmonary:      Effort: Pulmonary effort is normal.      Breath sounds: Normal breath sounds.   Chest:      Comments: Status post right mastectomy.  Hyperpigmentation related to prior radiation.  No palpable chest wall masses or skin lesions.  Abdominal:      Palpations: Abdomen is soft.      Tenderness: There is no abdominal tenderness.   Musculoskeletal:         General: No tenderness.      Cervical back: Neck supple.      Comments: Right upper extremity edema 2+   Lymphadenopathy:      Cervical: No cervical adenopathy.   Skin:     Findings: No rash.   Neurological:      Mental Status: He is alert.         Past Medical History[1]    Past Surgical History   Procedure Laterality Date    Cardiac surgery      mitral valve repair 2006    Cardioversion  12/05/2022    Successful DC cardioversion    CARDIOVERSION EXTERNAL N/A 12/5/2022    Performed by Gary Aceves MD at [facility] [place] CATH/[place]    Cath lab temporary pacemaker insertion N/A 12/25/2023    Performed by Haja Onofre MD at  CARDIAC CATH/EP/NEURO    GASTROSTOMY PERCUTANEOUS ENDOSCOPIC N/A 1/4/2024    Performed by Edison Oneil MD at  OR    Knee cartilage surgery Left     Mastectomy      PORT/PERMACATH REMOVAL Left 11/9/2023    Performed by Barb Osuna MD at  OR    Prostate surgery  July 2022    Prostatectomy  07/15/2022    Right & left heart cath with coronary " angiography N/A 12/25/2023    Performed by Haja Onofre MD at  CARDIAC CATH/EP/NEURO    RIGHT BREAST MASTECTOMY; RIGHT SENTINEL NODE IDENTIFICATION, MAPPING, AND BIOPSY; Right 8/17/2023    Performed by Barb Osuna MD at  OR    Tonsillectomy      TRACHEOSTOMY N/A 1/4/2024    Performed by Edison Oneil MD at  OR       Social History     Tobacco Use    Smoking status: Never    Smokeless tobacco: Never   Vaping Use    Vaping status: Never Used   Substance Use Topics    Alcohol use: Yes     Alcohol/week: 14.0 standard drinks of alcohol     Types: 14 Shots of liquor per week     Comment: 1 per day    Drug use: Never       Family History   Problem Relation Name Age of Onset    Hyperlipidemia Biological Mother mother     Hypertension Biological Mother mother     Breast cancer Biological Mother mother     Cancer Biological Mother mother         gyn? cervical    Hyperlipidemia Biological Father Dad     Hypertension Biological Father Dad     Arthritis Biological Father Dad     No Known Problems Biological Sister      No Known Problems Biological Brother      Heart disease Maternal Grandmother Belle     Arthritis Maternal Grandfather      COPD Maternal Grandfather      Diabetes Maternal Grandfather      No Known Problems Paternal Grandmother      No Known Problems Paternal Grandfather      Cancer less than or equal to age 50 Other son     Esophageal cancer Other son         47, in abd,chemo q 3 weeks         Allergies  Azithromycin, Prednisone, and Lorazepam    Medications  Current Outpatient Medications   Medication Sig Dispense Refill    albuterol HFA 90 mcg/actuation inhaler Inhale 2 puffs every 6 (six) hours as needed for wheezing. 18 g 1    amiodarone (PACERONE) 200 mg tablet Take 200 mg by mouth 2 (two) times a week (Mon, Fri).      atorvastatin (LIPITOR) 10 mg tablet Take 1 tablet (10 mg total) by mouth daily. 90 tablet 3    benzonatate (TESSALON) 200 mg capsule Take 200 mg by mouth 3 (three)  times a day as needed for cough.      busPIRone (BUSPAR) 10 mg tablet Take 10 mg by mouth 2 (two) times a day.      cetirizine (ZyrTEC) 10 mg tablet Take 10 mg by mouth every morning.      cholecalciferol, vitamin D3, 1,000 unit (25 mcg) tablet Take 1,000 Units by mouth every morning.      cyanocobalamin (VITAMIN B12) 1,000 mcg tablet Take 1 tablet (1,000 mcg total) by mouth daily. 30 tablet 0    dextromethorphan HBr (DELSYM COUGH ORAL) Take 1 Dose by mouth every 6 (six) hours as needed (cough).      dextromethorphan-guaifenesin (ROBITUSSIN-DM)  mg/5 mL syrup Take 5 mL by mouth every 12 (twelve) hours as needed for cough.      furosemide (LASIX) 40 mg tablet Take 0.5 tablets (20 mg total) by mouth daily as needed (if AM standing wt is increased 3 pounds from weight on 5/9/25). 1/2 tablet      gentamicin (GARAMYCIN) 0.1 % ointment Apply topically.      levothyroxine (SYNTHROID) 50 mcg tablet Take 50 mcg by mouth daily.      magnesium oxide (MAG-OX) 400 mg (241.3 mg magnesium) tablet Take 1 tablet (400 mg total) by mouth 2 (two) times a day. 180 tablet 1    melatonin 3 mg tablet Take 3 mg by mouth nightly.      metoprolol succinate XL (TOPROL-XL) 25 mg 24 hr tablet TAKE ONE TABLET BY MOUTH EVERY EVENING 90 tablet 0    mupirocin (BACTROBAN) 2 % ointment Apply 1 Application topically 2 (two) times a day.      pantoprazole (PROTONIX) 40 mg EC tablet Take 40 mg by mouth 2 (two) times a day before breakfast and dinner.      rivaroxaban (XARELTO) 15 mg tablet Take 1 tablet (15 mg total) by mouth daily with dinner. 90 tablet 1    solifenacin (VESICARE) 5 mg tablet Take 5 mg by mouth daily.      tamoxifen (NOLVADEX) 20 mg chemo tablet Take  1 tablet (20 mg total) daily 90 tablet 3    tamsulosin (FLOMAX) 0.4 mg capsule Take 1 capsule (0.4 mg total) by mouth nightly. 30 capsule 6     No current facility-administered medications for this visit.          Laboratory  No results found for this or any previous visit (from the  past 3 weeks).      Radiology  I have reviewed the patient's Radiology report(s).    ULTRASOUND BREAST LIMITED RIGHT  Result Date: 7/22/2025  Narrative: CLINICAL HISTORY: Right chest wall soft tissue density on CT of the chest. History of right breast carcinoma status post right mastectomy. COMMENT: Targeted ultrasound examination of the right chest wall was performed. There is a 10.4 x 0.9 x 4.4 cm complex fluid collection subjacent to the mastectomy scar without associated vascularity. There is mild edema in the surrounding soft tissues and skin thickening of the right chest wall, probably related to radiation change.     Impression: IMPRESSION: Large complex fluid collection in the right mastectomy site corresponding to the finding on CT. This probably represents a postoperative seroma. Infection is not excluded based on imaging appearance alone. Patient is asymptomatic. Clinical correlation and follow-up recommended to assess the need for any additional imaging or management such as aspiration. Patient will be following up with Dr. Osuna in 1-2 weeks. Results and recommendations for clinical follow-up were discussed with the patient and his wife and conveyed to the patient in writing at the time of examination. FINAL ASSESSMENT - BIRADS CATEGORY 2 - BENIGN      Assessment and Plan  Prostate cancer (CMS/[facility])    July 27, 2022 underwent prostatectomy for prostate cancer at outside institution.  Cheyney score 4+3.  February 2023 PSA increasing.  Urology recommended radiation therapy.  Received Lupron while receiving treatment with radiation.  Completed radiotherapy June 2023.    He is going to continue surveillance under the direction of radiation oncology and his urologist.  Most recent PSA from March 2025 was undetectable.  He will be having follow-up blood work later this month.    Malignant neoplasm of right male breast (CMS/[facility])  Diagnosed with invasive ductal carcinoma of the right breast, grade 3, ER  positive NH positive HER2/jhonatan +3.  Initiated 1 cycle TCHP in September 2022 at [facility].  Questionable reaction to the anti-HER2/jhonatan therapy.  Second cycle with Taxotere and carboplatin alone.  Patient hospitalized.  Patient declined further intravenous systemic therapy for his breast cancer.  Had multiple medical issues including cardiac issues.  Was agreeable to initiating tamoxifen November 2022.  Remained on tamoxifen while underwent treatment for his prostate cancer.  Right mastectomy performed August 17, 2023.  Pathologic stage ypT1c N1a, ER positive, NH positive and HER2/jhonatan positive.  Completed adjuvant radiation to the chest wall and axilla in November 2023.    Medically he is stable without any new signs or symptoms to suggest recurrence.  No evidence of recurrence on exam.  He continues to be followed expectantly.  He continues on tamoxifen and is not having any side effects.  I have asked him to return to see me in 6 months.  We reviewed his mammogram from November 2024 and recommendations for 1 year follow-up which will be due November 2025.  He was asked to call with questions or concerns prior to his next visit.    BRCA1 positive  Patient has a history of both breast and prostate cancer and BRCA1 mutation.  With regards to screening for contralateral breast cancer, we discussed recommendations for men are less clear than for women.  Patient is not interested in breast MRI as he is unable to tolerate MRI testing.  We reviewed his mammogram from November 2024.  He will be due for his annual mammogram in November 2025.    Arm swelling  Has new right upper extremity swelling which is likely related to lymphedema from prior breast cancer treatment.  To be cautious I have recommended an ultrasound to rule out DVT.  He is already on Xarelto for cardiac condition.  Will follow-up with him by phone when the ultrasound results are available.  He has been given a prescription for lymphedema physical  therapy by Dr. [facility].      Kailey Gale MD         [1]   Past Medical History:  Diagnosis Date    Acute systolic heart failure (CMS/HCC) 02/15/2023    Ambulates with cane     and walker    Atrial fibrillation (CMS/HCC)     Breast cancer (CMS/HCC) October 2022    CHF (congestive heart failure) (CMS/HCC)     Chronic kidney disease     CKD (chronic kidney disease) 10/19/2022    History of radiation therapy     Hx antineoplastic chemo     Prostate cancer (CMS/HCC)

## 2025-07-24 NOTE — TELEPHONE ENCOUNTER
Nurse Lopez from [facility] home care requesting a call back states that she is concerned pt has lymphedema in right breast       P: [phone]

## 2025-07-30 ENCOUNTER — HOSPITAL ENCOUNTER (OUTPATIENT)
Dept: RADIOLOGY | Facility: HOSPITAL | Age: 75
Discharge: HOME | End: 2025-07-30
Attending: INTERNAL MEDICINE
Payer: MEDICARE

## 2025-07-30 ENCOUNTER — APPOINTMENT (OUTPATIENT)
Dept: LAB | Facility: HOSPITAL | Age: 75
End: 2025-07-30
Attending: INTERNAL MEDICINE
Payer: MEDICARE

## 2025-07-30 ENCOUNTER — OFFICE VISIT (OUTPATIENT)
Dept: SURGERY | Facility: CLINIC | Age: 75
End: 2025-07-30
Payer: MEDICARE

## 2025-07-30 VITALS
HEIGHT: 65 IN | BODY MASS INDEX: 29.99 KG/M2 | OXYGEN SATURATION: 96 % | TEMPERATURE: 97.3 F | DIASTOLIC BLOOD PRESSURE: 70 MMHG | SYSTOLIC BLOOD PRESSURE: 122 MMHG | WEIGHT: 180 LBS | HEART RATE: 80 BPM

## 2025-07-30 DIAGNOSIS — Z15.89 MONOALLELIC MUTATION OF ATM GENE: ICD-10-CM

## 2025-07-30 DIAGNOSIS — N18.2 CHRONIC KIDNEY DISEASE, STAGE II (MILD): ICD-10-CM

## 2025-07-30 DIAGNOSIS — C61 MALIGNANT NEOPLASM OF PROSTATE (CMS/HCC): ICD-10-CM

## 2025-07-30 DIAGNOSIS — E05.00 TOXIC DIFFUSE GOITER WITH PRETIBIAL MYXEDEMA: ICD-10-CM

## 2025-07-30 DIAGNOSIS — Z17.0: Primary | ICD-10-CM

## 2025-07-30 DIAGNOSIS — Z15.01 BRCA1 POSITIVE: ICD-10-CM

## 2025-07-30 DIAGNOSIS — E03.8 TOXIC DIFFUSE GOITER WITH PRETIBIAL MYXEDEMA: ICD-10-CM

## 2025-07-30 DIAGNOSIS — M79.89 ARM SWELLING: ICD-10-CM

## 2025-07-30 DIAGNOSIS — N18.32 CHRONIC KIDNEY DISEASE (CKD) STAGE G3B/A1, MODERATELY DECREASED GLOMERULAR FILTRATION RATE (GFR) BETWEEN 30-44 ML/MIN/1.73 SQUARE METER AND ALBUMINURIA CREATININE RATIO LESS THAN 30 MG/G (CMS/HCC): ICD-10-CM

## 2025-07-30 DIAGNOSIS — Z15.01 MONOALLELIC MUTATION OF ATM GENE: ICD-10-CM

## 2025-07-30 DIAGNOSIS — Z15.09 MONOALLELIC MUTATION OF ATM GENE: ICD-10-CM

## 2025-07-30 DIAGNOSIS — R22.31 LOCALIZED SWELLING, MASS AND LUMP, RIGHT UPPER LIMB: ICD-10-CM

## 2025-07-30 DIAGNOSIS — I48.0 PAROXYSMAL ATRIAL FIBRILLATION (CMS/HCC): ICD-10-CM

## 2025-07-30 DIAGNOSIS — Z15.09 BRCA1 POSITIVE: ICD-10-CM

## 2025-07-30 DIAGNOSIS — C50.021: Primary | ICD-10-CM

## 2025-07-30 LAB
ALBUMIN SERPL-MCNC: 3.6 G/DL (ref 3.5–5.7)
ALP SERPL-CCNC: 44 IU/L (ref 34–125)
ALT SERPL-CCNC: 6 IU/L (ref 7–52)
ANION GAP SERPL CALC-SCNC: 10 MEQ/L (ref 3–15)
AST SERPL-CCNC: 17 IU/L (ref 13–39)
BASOPHILS # BLD: 0.02 K/UL (ref 0.01–0.1)
BASOPHILS NFR BLD: 0.5 %
BILIRUB SERPL-MCNC: 0.5 MG/DL (ref 0.3–1.2)
BUN SERPL-MCNC: 16 MG/DL (ref 7–25)
CALCIUM SERPL-MCNC: 8.6 MG/DL (ref 8.6–10.3)
CHLORIDE SERPL-SCNC: 105 MEQ/L (ref 98–107)
CO2 SERPL-SCNC: 27 MEQ/L (ref 21–31)
CREAT SERPL-MCNC: 1.5 MG/DL (ref 0.7–1.3)
DIFFERENTIAL METHOD BLD: ABNORMAL
EGFRCR SERPLBLD CKD-EPI 2021: 48.2 ML/MIN/1.73M*2
EOSINOPHIL # BLD: 0.14 K/UL (ref 0.04–0.54)
EOSINOPHIL NFR BLD: 3.4 %
ERYTHROCYTE [DISTWIDTH] IN BLOOD BY AUTOMATED COUNT: 17.8 % (ref 11.6–14.4)
GLUCOSE SERPL-MCNC: 82 MG/DL (ref 70–99)
HCT VFR BLD AUTO: 33.3 % (ref 40.1–51)
HGB BLD-MCNC: 10.9 G/DL (ref 13.7–17.5)
IMM GRANULOCYTES # BLD AUTO: 0.04 K/UL (ref 0–0.08)
IMM GRANULOCYTES NFR BLD AUTO: 1 %
LYMPHOCYTES # BLD: 0.84 K/UL (ref 1.2–3.5)
LYMPHOCYTES NFR BLD: 20.6 %
MCH RBC QN AUTO: 33.9 PG (ref 28–33.2)
MCHC RBC AUTO-ENTMCNC: 32.7 G/DL (ref 32.2–36.5)
MCV RBC AUTO: 103.4 FL (ref 83–98)
MONOCYTES # BLD: 0.42 K/UL (ref 0.3–1)
MONOCYTES NFR BLD: 10.3 %
NEUTROPHILS # BLD: 2.62 K/UL (ref 1.7–7)
NEUTS SEG NFR BLD: 64.2 %
NRBC BLD-RTO: 0 %
PLATELET # BLD AUTO: 106 K/UL (ref 150–350)
PMV BLD AUTO: 8.6 FL (ref 9.4–12.4)
POTASSIUM SERPL-SCNC: 4.4 MEQ/L (ref 3.5–5.1)
PROT SERPL-MCNC: 6.4 G/DL (ref 6–8.2)
PSA SERPL-MCNC: <0.01 NG/ML
RBC # BLD AUTO: 3.22 M/UL (ref 4.5–5.8)
SODIUM SERPL-SCNC: 142 MEQ/L (ref 136–145)
TSH SERPL DL<=0.05 MIU/L-ACNC: 2.06 MIU/L (ref 0.34–5.6)
WBC # BLD AUTO: 4.08 K/UL (ref 3.8–10.5)

## 2025-07-30 PROCEDURE — 84443 ASSAY THYROID STIM HORMONE: CPT

## 2025-07-30 PROCEDURE — 99214 OFFICE O/P EST MOD 30 MIN: CPT | Performed by: SURGERY

## 2025-07-30 PROCEDURE — 80053 COMPREHEN METABOLIC PANEL: CPT

## 2025-07-30 PROCEDURE — 36415 COLL VENOUS BLD VENIPUNCTURE: CPT

## 2025-07-30 PROCEDURE — 93971 EXTREMITY STUDY: CPT | Mod: RT

## 2025-07-30 PROCEDURE — G8752 SYS BP LESS 140: HCPCS | Performed by: SURGERY

## 2025-07-30 PROCEDURE — G8754 DIAS BP LESS 90: HCPCS | Performed by: SURGERY

## 2025-07-30 PROCEDURE — 85025 COMPLETE CBC W/AUTO DIFF WBC: CPT

## 2025-07-30 PROCEDURE — 84153 ASSAY OF PSA TOTAL: CPT

## 2025-07-30 NOTE — PROGRESS NOTES
[facility]  Dr. Barb Osuna  [address] [place] [place] [address]  Phone: [phone]  Fax: [phone]      Patient ID: Cam Rosas Jr.                              : 1950    Visit Date: 2025  Referring Provider: No ref. provider found   PCP: Usman Collins MD  GYN: No care team member to display    Subjective: Radha presents for follow-up of right breast cancer.  He was initially diagnosed in  with a HER2 positive, ER/SD positive right breast cancer.  He received chemotherapy but had an adverse reaction and was hospitalized.  He was having cardiac issues, he was placed on tamoxifen, and on 2023 had a mastectomy with sentinel lymph node biopsy.  He also received postmastectomy radiation.  He also was treated for prostate cancer.     He has new Right arm swelling that was noted when he saw Dr. Gale this week. US ordered but not done. He believes he was scratching his arm and the next day woke up with swelling. Has other issues going on like skin cancer of the scalp that was removed, bilateral leg swelling and left leg arthritis.    Review of systems is positive for change in activity, joint aches, joint swelling, leg swelling, all other systems are negative     Oncology History:     Cancer Staging   Malignant neoplasm of right male breast (CMS/HCC)  Staging form: Breast, AJCC 8th Edition  - Clinical stage from 3/20/2023: Stage IA (cT1c, cN0, cM0, G3, ER+, SD+, HER2+) - Signed by Barb Osuna MD on 3/20/2023  - Pathologic stage from 2023: No Stage Recommended (ypT1c, pN1a(sn), cM0, G3, ER+, SD+, HER2+) - Signed by Barb Osuna MD on 2023    Prostate cancer (CMS/HCC)  Staging form: Prostate, AJCC 8th Edition  - Pathologic stage from 2022: Stage Unknown (pT3b, pNX, cM0, PSA: 6, Grade Group: 3) - Signed by [facility], Gregory J, MD on 3/8/2023         Oncology History   Malignant neoplasm of right  "male breast (CMS/HCC)   8/29/2022 Biopsy     1.  Right breast mass:     Invasive ductal carcinoma, [place] grade 3 (3+ 3+2).   Invasive carcinoma is 13 mm in maximum dimension in core biopsy.    ER+ 90%; UT+ 50%; [id number]+3; [id number]%     9/30/2022 -  Chemotherapy    9/30/2022 initiated first cycle TCP H at [facility].  Questionable reaction during the anti-HER2/jhonatan therapy with Herceptin.  Taxotere and carboplatin given on 10/3/2022.  Patient hospitalized after first cycle.     10/19/2022 Initial Diagnosis    Malignant neoplasm of right male breast (CMS/HCC)     11/15/2022 -  Hormone Therapy    Tamoxifen 20 mg daily while undergoes evaluation of cardiac issues     3/20/2023 Cancer Staged    Staging form: Breast, AJCC 8th Edition  - Clinical stage from 3/20/2023: Stage IA (cT1c, cN0, cM0, G3, ER+, UT+, HER2+)     8/28/2023 Cancer Staged    Staging form: Breast, AJCC 8th Edition  - Pathologic stage from 8/28/2023: No Stage Recommended (ypT1c, pN1a(sn), cM0, G3, ER+, UT+, HER2+)     Prostate cancer (CMS/HCC)   7/27/2022 Cancer Staged    Staging form: Prostate, AJCC 8th Edition  - Pathologic stage from 7/27/2022: Stage Unknown (pT3b, pNX, cM0, PSA: 6, Grade Group: 3)     3/21/2023 - 6/20/2023 Chemotherapy    LEUPROLIDE (LUPRON) 22.5 MG EVERY 3 MONTHS  Plan Provider: Paz Cleveland MD     3/21/2023 -  Chemotherapy    MEDIPORT FLUSH (SINGLE LUMEN) - PATIENT ON TREATMENT  Plan Provider: Paz Cleveland MD     3/21/2023 - 3/21/2023 Chemotherapy    LEUPROLIDE (LUPRON) 22.5 MG EVERY 3 MONTHS  Plan Provider: Paz Cleveland MD             Physical Exam:    Vitals: Visit Vitals  /70 (BP Location: Left upper arm, Patient Position: Sitting)   Pulse 80   Temp 36.3 °C (97.3 °F) (Temporal)   Ht 1.651 m (5' 5\")   Wt 81.6 kg (180 lb)   SpO2 96%   BMI 29.95 kg/m²     Body mass index is 29.95 kg/m².    Physical Exam  Constitutional:       Appearance: Normal appearance.   HENT:      Head: Normocephalic and " atraumatic.   Neck:      Comments: Tracheostomy scar well-healed  Cardiovascular:      Rate and Rhythm: Normal rate and regular rhythm.   Pulmonary:      Effort: Pulmonary effort is normal.      Breath sounds: Normal breath sounds.   Chest:      Comments: Status post right mastectomy with well-healed scar, left breast normal in appearance.  No masses noted over the right chest wall or in the left breast.  Left nipple and areola are normal.  No axillary or supraclavicular adenopathy.  Slight darkening of the skin of the right chest wall from radiation, healing sore from previous tick bite.  There is slight edema of the right chest wall.  Abdominal:      Palpations: Abdomen is soft.   Musculoskeletal:         General: Normal range of motion.      Right lower leg: Edema present.      Left lower leg: Edema present.      Comments: Tubigrip dressings on both legs with evidence of venous stasis  Right upper extremity swelling and edema   Skin:     General: Skin is warm and dry.   Neurological:      General: No focal deficit present.      Mental Status: He is alert and oriented to person, place, and time.   Psychiatric:         Mood and Affect: Mood normal.         Behavior: Behavior normal.         Breast Imaging: I have personally reviewed the reports and images as follows.  Left breast US 7/22/25: 10.4 x 4.4 cm complex fluid collection subjacent to mastectomy scar  Impression:  Right breast cancer  BRCA mutation  OK mutation  Prostate cancer  Probable venous stasis  Recommendation and Plan:   Radha presents for follow-up of his right breast cancer.  From the cancer standpoint, he seems to be doing well, with no evidence of local recurrence, new primary breast cancer, or metastatic disease.  He continues on his course of tamoxifen about issues.  He does have right upper extremity swelling that ultrasound is ordered.  He has had a hard time getting out of the house to complete this.  We will have our  help  arrange this.  He will continue his tamoxifen, continue self exams, and call with any concerns, otherwise he will follow-up in the office in about 6 months.  At that point he will be 2 years out from his surgical management of his right breast cancer and will begin an annual follow-up.    All of the questions were answered.  The patient is in agreement with the treatment plan.      No follow-ups on file.        7/30/2025   2:42 PM        Ceci Khan,

## 2025-07-30 NOTE — LETTER
2025     Usman Collins MD  [address] [facility] [address]    Patient: Cam Rosas Jr.  YOB: 1950  Date of Visit: 2025      Dear Dr. Collins:    Thank you for referring Cam Rosas Jr. to me for evaluation. Below are my notes for this consultation.    If you have questions, please do not hesitate to call me. I look forward to following your patient along with you.         Sincerely,        Barb Osuna MD        CC: Gregory J Ochsner, MD Sandra Urtishak, MD Lippe, Caroline, DO  2025  3:09 PM  Attested      [facility]  Dr. Barb Osuna  [facility] [address]  [place] [address]  Phone: [phone]  Fax: [phone]      Patient ID: Cam Rosas Jr.                              : 1950    Visit Date: 2025  Referring Provider: No ref. provider found   PCP: Usman Collins MD  GYN: No care team member to display    Subjective: Radha presents for follow-up of right breast cancer.  He was initially diagnosed in  with a HER2 positive, ER/LA positive right breast cancer.  He received chemotherapy but had an adverse reaction and was hospitalized.  He was having cardiac issues, he was placed on tamoxifen, and on 2023 had a mastectomy with sentinel lymph node biopsy.  He also received postmastectomy radiation.  He also was treated for prostate cancer.     He has new Right arm swelling that was noted when he saw Dr. Gale this week. US ordered but not done. He believes he was scratching his arm and the next day woke up with swelling. Has other issues going on like skin cancer of the scalp that was removed, bilateral leg swelling and left leg arthritis.    Review of systems is positive for change in activity, joint aches, joint swelling, leg swelling, all other systems are negative     Oncology History:     Cancer Staging   Malignant neoplasm of right male breast (CMS/HCC)  Staging form: Breast,  AJCC 8th Edition  - Clinical stage from 3/20/2023: Stage IA (cT1c, cN0, cM0, G3, ER+, CA+, HER2+) - Signed by Barb Osuna MD on 3/20/2023  - Pathologic stage from 8/28/2023: No Stage Recommended (ypT1c, pN1a(sn), cM0, G3, ER+, CA+, HER2+) - Signed by Barb Osuna MD on 8/28/2023    Prostate cancer (CMS/HCC)  Staging form: Prostate, AJCC 8th Edition  - Pathologic stage from 7/27/2022: Stage Unknown (pT3b, pNX, cM0, PSA: 6, Grade Group: 3) - Signed by Ochsner, Gregory J, MD on 3/8/2023         Oncology History   Malignant neoplasm of right male breast (CMS/HCC)   8/29/2022 Biopsy     1.  Right breast mass:     Invasive ductal carcinoma, [place] grade 3 (3+ 3+2).   Invasive carcinoma is 13 mm in maximum dimension in core biopsy.    ER+ 90%; CA+ 50%; [id number]+3; [id number]%     9/30/2022 -  Chemotherapy    9/30/2022 initiated first cycle TCP H at [facility].  Questionable reaction during the anti-HER2/jhonatan therapy with Herceptin.  Taxotere and carboplatin given on 10/3/2022.  Patient hospitalized after first cycle.     10/19/2022 Initial Diagnosis    Malignant neoplasm of right male breast (CMS/HCC)     11/15/2022 -  Hormone Therapy    Tamoxifen 20 mg daily while undergoes evaluation of cardiac issues     3/20/2023 Cancer Staged    Staging form: Breast, AJCC 8th Edition  - Clinical stage from 3/20/2023: Stage IA (cT1c, cN0, cM0, G3, ER+, CA+, HER2+)     8/28/2023 Cancer Staged    Staging form: Breast, AJCC 8th Edition  - Pathologic stage from 8/28/2023: No Stage Recommended (ypT1c, pN1a(sn), cM0, G3, ER+, CA+, HER2+)     Prostate cancer (CMS/HCC)   7/27/2022 Cancer Staged    Staging form: Prostate, AJCC 8th Edition  - Pathologic stage from 7/27/2022: Stage Unknown (pT3b, pNX, cM0, PSA: 6, Grade Group: 3)     3/21/2023 - 6/20/2023 Chemotherapy    LEUPROLIDE (LUPRON) 22.5 MG EVERY 3 MONTHS  Plan Provider: Paz Cleveland MD     3/21/2023 -  Chemotherapy    MEDIPORT FLUSH (SINGLE LUMEN)  "- PATIENT ON TREATMENT  Plan Provider: Paz Cleveland MD     3/21/2023 - 3/21/2023 Chemotherapy    LEUPROLIDE (LUPRON) 22.5 MG EVERY 3 MONTHS  Plan Provider: Paz Cleveland MD             Physical Exam:    Vitals: Visit Vitals  /70 (BP Location: Left upper arm, Patient Position: Sitting)   Pulse 80   Temp 36.3 °C (97.3 °F) (Temporal)   Ht 1.651 m (5' 5\")   Wt 81.6 kg (180 lb)   SpO2 96%   BMI 29.95 kg/m²     Body mass index is 29.95 kg/m².    Physical Exam  Constitutional:       Appearance: Normal appearance.   HENT:      Head: Normocephalic and atraumatic.   Neck:      Comments: Tracheostomy scar well-healed  Cardiovascular:      Rate and Rhythm: Normal rate and regular rhythm.   Pulmonary:      Effort: Pulmonary effort is normal.      Breath sounds: Normal breath sounds.   Chest:      Comments: Status post right mastectomy with well-healed scar, left breast normal in appearance.  No masses noted over the right chest wall or in the left breast.  Left nipple and areola are normal.  No axillary or supraclavicular adenopathy.  Slight darkening of the skin of the right chest wall from radiation, healing sore from previous tick bite.  There is slight edema of the right chest wall.  Abdominal:      Palpations: Abdomen is soft.   Musculoskeletal:         General: Normal range of motion.      Right lower leg: Edema present.      Left lower leg: Edema present.      Comments: Tubigrip dressings on both legs with evidence of venous stasis  Right upper extremity swelling and edema   Skin:     General: Skin is warm and dry.   Neurological:      General: No focal deficit present.      Mental Status: He is alert and oriented to person, place, and time.   Psychiatric:         Mood and Affect: Mood normal.         Behavior: Behavior normal.         Breast Imaging: I have personally reviewed the reports and images as follows.  Left breast US 7/22/25: 10.4 x 4.4 cm complex fluid collection subjacent to mastectomy " scar  Impression:  Right breast cancer  BRCA mutation  OK mutation  Prostate cancer  Probable venous stasis  Recommendation and Plan:   Radha presents for follow-up of his right breast cancer.  From the cancer standpoint, he seems to be doing well, with no evidence of local recurrence, new primary breast cancer, or metastatic disease.  He continues on his course of tamoxifen about issues.  He does have right upper extremity swelling that ultrasound is ordered.  He has had a hard time getting out of the house to complete this.  We will have our  help arrange this.  He will continue his tamoxifen, continue self exams, and call with any concerns, otherwise he will follow-up in the office in about 6 months.  At that point he will be 2 years out from his surgical management of his right breast cancer and will begin an annual follow-up.    All of the questions were answered.  The patient is in agreement with the treatment plan.      No follow-ups on file.        7/30/2025   2:42 PM        Ceci Khan, DO                Attestation with edits by Barb Osuna MD at 7/30/2025  5:12 PM:  Radha was seen in the office today with Dr. Khan, breast fellow, for follow-up of his right breast cancer as well as BRCA1 and OK mutations.  He noticed some swelling in his right arm recently, this happened after he was scratching his arm, he did see Dr. Gale who felt this was likely lymphedema but did order an ultrasound which has not yet been done.  He also saw his radiation oncologist who ordered physical therapy, they have had 2 physical therapists out to the house who cannot do lymphedema therapy.  He had an ultrasound recently which showed some seroma under the mastectomy site but no suspicious abnormalities.  On exam, he says some fibrosis of the right chest wall with postradiation changes.  He has significant lymphedema of the right arm and hand but no evidence of cellulitis.  There is no evidence of  local recurrence or new primary breast cancer, or metastatic disease.  I had my  assist him with getting an ultrasound of his arm done ASAP to rule out clot, although I think this is most likely lymphedema.  Assuming that ultrasound is negative, he will be assisted in making an appointment with one of our outpatient lymphedema therapist.  He will follow-up in the office with me or my nurse practitioner in about 6 months, at that time he will be 2 years out from his surgical treatment and can begin an annual follow-up.

## 2025-08-01 ENCOUNTER — TELEPHONE (OUTPATIENT)
Dept: SURGERY | Facility: CLINIC | Age: 75
End: 2025-08-01
Payer: MEDICARE

## 2025-09-02 LAB
CHOLEST SERPL-MCNC: 155 MG/DL
CHOLEST/HDLC SERPL: 2.3 (CALC)
HDLC SERPL-MCNC: 66 MG/DL
LDLC SERPL CALC-MCNC: 70 MG/DL (CALC)
NONHDLC SERPL-MCNC: 89 MG/DL (CALC)
TRIGL SERPL-MCNC: 102 MG/DL

## 2025-09-04 ENCOUNTER — TELEPHONE (OUTPATIENT)
Dept: SURGERY | Facility: CLINIC | Age: 75
End: 2025-09-04
Payer: MEDICARE

## (undated) DEVICE — APPLICATOR CHLORAPREP 26ML ORANGE TINT

## (undated) DEVICE — SUTURE SOFSILK 3-0 BLACK 1X30 V-20

## (undated) DEVICE — SUTURE POLYSORB 3-0 UNDYED 12X18 PRE-CUT

## (undated) DEVICE — MANIFOLD SINGLE PORT NEPTUNE

## (undated) DEVICE — CATH 6FR PIG 145 ANGLE

## (undated) DEVICE — SUTURE SOFSILK 2-0 BLACK 1X30 V-20

## (undated) DEVICE — NEEDLE DISP SPINAL 22GX3-1/2IN

## (undated) DEVICE — CATH SWANGANZ 6FR 2LUMEN

## (undated) DEVICE — ADAPTER SWIVEL FIBEROPTIC

## (undated) DEVICE — DEVICE CUTTING COAG 3.0MM PLASMABLADE

## (undated) DEVICE — SLEEVE SCD COMFORT KNEE LENGTH MED

## (undated) DEVICE — KIT ACIST MULTIUSE SYRINGE

## (undated) DEVICE — PAD GROUND ELECTROSURGICAL W/CORD

## (undated) DEVICE — SHEATH ULTIMUM 6FR ACT 12CM 10/BX

## (undated) DEVICE — BLADE SCALPEL #11

## (undated) DEVICE — PARTICLES HEMOSTATIC ARISTA 3 GRAM

## (undated) DEVICE — BAG DRAIN SILVER 2000ML LATEX FREE

## (undated) DEVICE — SHILEY CUFFED TRACH CANNULA 8.5

## (undated) DEVICE — NEEDLE DISP HYPO 25GX1-1/2IN

## (undated) DEVICE — BLADE SCALPEL #15

## (undated) DEVICE — GLOVE SURG PROTEXIS PF 8.5

## (undated) DEVICE — WIRE J .035CM 145CM

## (undated) DEVICE — SPRAY SILICONE 4.5OZ

## (undated) DEVICE — TIP BOVIE BLADE COATED INSULATED 2.75IN

## (undated) DEVICE — VALVES SUCTION FOR BRONCHO

## (undated) DEVICE — GLOVE 6 1/2 PROTEXIS PI MICRO

## (undated) DEVICE — EVACUATOR CLOSED SUCTION 100C

## (undated) DEVICE — SUTURE SOFSILK 2-0 BLACK 12X18 PRE-CUT

## (undated) DEVICE — OINTMENT SURGILUBE 4OZ TUBE

## (undated) DEVICE — SHEET DRAPE 3/4 REVERSEFOLD 53X77

## (undated) DEVICE — ADHESIVE SKIN DERMABOND ADVANCED 0.7ML

## (undated) DEVICE — SUTURE SOFSILK 3-0 BLACK 5X18 V-20 D-TACH

## (undated) DEVICE — TOWEL SURGICAL W17XL27IN BLUE COTTON STANDARD PREWASHED DELI

## (undated) DEVICE — SOLN IRRIG .9%SOD 1000ML

## (undated) DEVICE — BLANKET LOWER BODY

## (undated) DEVICE — DRESSING ABD STERILE 7X8IN

## (undated) DEVICE — COVERS LIGHT HANDLE DISPOSABLE

## (undated) DEVICE — CATH PLUG

## (undated) DEVICE — ELECTROSURGICAL TIP CLEANER

## (undated) DEVICE — CONTAINER SPECIMEN STERILE 5OZ

## (undated) DEVICE — SYRINGE DISP LUER-LOK 10 CC

## (undated) DEVICE — SUCTION TUBING CONNECTION 18 INCH

## (undated) DEVICE — MANIFOLD FOUR PORT NEPTUNE

## (undated) DEVICE — SUTURE MONOCRYL 4-0  Y496G PS-2 I8IN

## (undated) DEVICE — DRESSING TEGADERM CHG 4X6 1/8

## (undated) DEVICE — VALVE BIOPSY

## (undated) DEVICE — GOWN SIRUS FABRNF RAGLAN XL ST 28/CS

## (undated) DEVICE — CATHETER ALL PURPOSE URETHRAL 18 FR SINGLE USE STERILE

## (undated) DEVICE — SUTURE POLYSORB 3-0 UNDYED 1X60 REEL

## (undated) DEVICE — BANDAGE ELASTIC ACE LATEX FREE 6IN

## (undated) DEVICE — SUTURE SURGIPRO 3-0 II BLUE 1X18 P-14

## (undated) DEVICE — SYRINGE 10CC NO NEEDLE ST

## (undated) DEVICE — BRUSH CYTOLOGY DISPOSABLE

## (undated) DEVICE — SUTURE STRATAFIX PGA 3-0 FS-1 CUTTING 30CM

## (undated) DEVICE — SUTURE POLYSORB 2-0 UNDYED 1X30 V-20

## (undated) DEVICE — PENEVAC1 NONSTICK SMOKE EVAC

## (undated) DEVICE — PACK RFID MAJOR PLASTIC

## (undated) DEVICE — CATH D 6F FR4 100CM

## (undated) DEVICE — Device

## (undated) DEVICE — ***USE 121412***PACK DEVICE IMPLANT TLH

## (undated) DEVICE — SUTURE POLYSORB 2-0 UNDYED 5X18 GS-10 D-TACH

## (undated) DEVICE — SUTURE BIOSYN 4-0 UNDYED 1X18 P-12

## (undated) DEVICE — APPLIER CLIP 30 MED 11.5 SURGICLIP

## (undated) DEVICE — CONNECTORS  5-1 DISP

## (undated) DEVICE — SUTURE POLYSORB 0 UNDYED 6X18 PRE-CUT

## (undated) DEVICE — KIT SAFETY PEG WITH PLASTIC AMPULE 24FR

## (undated) DEVICE — DRESSING TEGADERM CHG 3 1/2 X 4 1/2

## (undated) DEVICE — SOLN IV NSS 0.9% 500ML

## (undated) DEVICE — BLADE SCALPEL #10

## (undated) DEVICE — TUBE SUCTION 1/4INX20FT STERILE

## (undated) DEVICE — TUBE GASTRO FEEDING 20 FR

## (undated) DEVICE — MICROPUNCTURE COAXIL INTRODUCER SER

## (undated) DEVICE — YANKAUER RIGID STERILE

## (undated) DEVICE — GOWN SIRUS POLYRNF BRTHSLV XL 30/CS

## (undated) DEVICE — STERISTRIP 1/2INX4IN

## (undated) DEVICE — SPONGE SURGICAL SM DIA0.38IN COTTON PEANUT HIGH QUALITY FIBE

## (undated) DEVICE — CATH 6FR FL4

## (undated) DEVICE — TOWEL DISPOSABLE OR

## (undated) DEVICE — TUBING SMOKE EVAC PENCIL COATED

## (undated) DEVICE — TEETH GUARD

## (undated) DEVICE — PACK RFID MLHS MINOR PROC STDZ

## (undated) DEVICE — SUTURE ETHILON 2-0   585H

## (undated) DEVICE — SUTURE SURGIPRO 2-0 BLUE 1X30 GS-22

## (undated) DEVICE — MARKER SURGICAL SKIN

## (undated) DEVICE — GUIDEWIRE BMW .014 190CM STR TIP

## (undated) DEVICE — DRESSING TEGADERM

## (undated) DEVICE — STAPLER SKIN

## (undated) DEVICE — DRESSING SPONGE ALL GAUZE 4X4 STER 10PK

## (undated) DEVICE — SUT POLYSORB 2-0 UND 75CM V20

## (undated) DEVICE — PENCIL ESU HANDSWITCHING W/HOL

## (undated) DEVICE — SOLN IRRIG STER WATER 500ML

## (undated) DEVICE — KIT CATH LAB ANGIO

## (undated) DEVICE — SUTURE VICRYL 3-0 J416H SH UNDYED

## (undated) DEVICE — SYRINGE DISP LUER-LOK 20 CC

## (undated) DEVICE — TOOMEY SYRINGE, 70CC STERILE

## (undated) DEVICE — SYRINGE DISP LUER-LOK  5 CC

## (undated) DEVICE — RESERVOIR DRAIN 100ML

## (undated) DEVICE — SUTURE 4-0 30 X 30CM MONOCRYL PLUS STRATFIX SPIRAL

## (undated) DEVICE — BAG DECANTER

## (undated) DEVICE — CATH SUCTION 14 FR

## (undated) DEVICE — APPLICATOR CHLORAPREP 10.5ML ORANGE TINT